# Patient Record
Sex: MALE | Race: WHITE | NOT HISPANIC OR LATINO | Employment: OTHER | URBAN - METROPOLITAN AREA
[De-identification: names, ages, dates, MRNs, and addresses within clinical notes are randomized per-mention and may not be internally consistent; named-entity substitution may affect disease eponyms.]

---

## 2017-01-16 DIAGNOSIS — R94.31 ABNORMAL EKG: ICD-10-CM

## 2017-01-16 DIAGNOSIS — R06.02 SHORTNESS OF BREATH: ICD-10-CM

## 2017-01-16 DIAGNOSIS — I77.810 ASCENDING AORTA DILATATION (HCC): ICD-10-CM

## 2017-01-16 DIAGNOSIS — I10 ESSENTIAL HYPERTENSION: ICD-10-CM

## 2017-01-16 RX ORDER — CARVEDILOL 25 MG/1
TABLET ORAL
Qty: 180 TABLET | Refills: 2 | Status: SHIPPED | OUTPATIENT
Start: 2017-01-16 | End: 2017-07-12 | Stop reason: SDUPTHER

## 2017-03-06 ENCOUNTER — OFFICE VISIT (OUTPATIENT)
Dept: CARDIOLOGY | Facility: CLINIC | Age: 57
End: 2017-03-06

## 2017-03-06 VITALS
DIASTOLIC BLOOD PRESSURE: 70 MMHG | SYSTOLIC BLOOD PRESSURE: 130 MMHG | WEIGHT: 288 LBS | HEIGHT: 73 IN | BODY MASS INDEX: 38.17 KG/M2 | HEART RATE: 55 BPM

## 2017-03-06 DIAGNOSIS — I25.810 CORONARY ARTERY DISEASE INVOLVING CORONARY BYPASS GRAFT OF NATIVE HEART WITHOUT ANGINA PECTORIS: Primary | ICD-10-CM

## 2017-03-06 PROCEDURE — 93000 ELECTROCARDIOGRAM COMPLETE: CPT | Performed by: INTERNAL MEDICINE

## 2017-03-06 PROCEDURE — 99214 OFFICE O/P EST MOD 30 MIN: CPT | Performed by: INTERNAL MEDICINE

## 2017-03-06 RX ORDER — CITALOPRAM 20 MG/1
20 TABLET ORAL DAILY
COMMUNITY
End: 2017-07-12 | Stop reason: ALTCHOICE

## 2017-03-06 RX ORDER — AMLODIPINE BESYLATE 10 MG/1
10 TABLET ORAL DAILY
COMMUNITY
End: 2022-12-02

## 2017-03-08 NOTE — PROGRESS NOTES
Subjective:     Encounter Date:03/06/2017      Patient ID: Edmond Chase is a 56 y.o. male.    Chief Complaint:  Shortness of Breath   This is a chronic problem. The current episode started more than 1 month ago. The problem has been gradually improving. Associated symptoms include chest pain. Pertinent negatives include no ear pain, leg swelling, syncope or vomiting.   Chest Pain    This is a chronic problem. The current episode started more than 1 year ago. The problem has been unchanged. Associated symptoms include malaise/fatigue and shortness of breath. Pertinent negatives include no syncope or vomiting.   Fatigue   Associated symptoms include chest pain and fatigue. Pertinent negatives include no vomiting.       Patient resents today for reevaluation.  He is slowly improving.  Has been a long work for him.  He still short of breath still of a chest discomfort none this is new or change.  He is starting to exercise with some success.  He is still very fatigued however and depressed.  He is seeing a psychologist for this.  He was seen today for further evaluation.    Review of Systems   Constitution: Positive for fatigue and malaise/fatigue.   HENT: Negative for ear pain.    Cardiovascular: Positive for chest pain. Negative for leg swelling and syncope.   Respiratory: Positive for shortness of breath.    Gastrointestinal: Negative for vomiting.   All other systems reviewed and are negative.        ECG 12 Lead  Date/Time: 3/6/2017 10:41 AM  Performed by: PAULIE RANGEL  Authorized by: PAULIE RANGEL   Comparison: compared with previous ECG from 12/5/2016  Similar to previous ECG  Rhythm: sinus rhythm  T depression: V4, V5 and V6  Clinical impression: abnormal ECG               Objective:     Physical Exam   Constitutional: He is oriented to person, place, and time. He appears well-developed.   HENT:   Head: Normocephalic.   Eyes: Conjunctivae are normal.   Neck: Normal range of motion.    Cardiovascular: Normal rate, regular rhythm and normal heart sounds.    Pulmonary/Chest: Breath sounds normal.   Abdominal: Soft. Bowel sounds are normal.   Musculoskeletal: Normal range of motion. He exhibits no edema.   Neurological: He is alert and oriented to person, place, and time.   Skin: Skin is warm and dry.   Psychiatric: He has a normal mood and affect. His behavior is normal.   Vitals reviewed.      Lab Review:       Assessment:         No diagnosis found.       Plan:       1. Coronary artery disease, status post CABG, aortic valve replacement, ascending aortic aneurysm repair.  Patient completed rehabilitation.  I encouraged him to get back into exercising.  2. Coronary artery disease. The patient is on an aspirin, Lipitor, and Coreg. He is eating a heart healthy diet.   3. Hypertension. Patient's blood pressure on office visit today is 150/80. Will follow his blood pressure in rehabilitation and adjusted medications accordingly.  4. Follow-up in 6 months we will do a ultrasound at that time to reassess his bioprosthetic aortic valve. All in all patient's healed nicely is doing well I minimized his medications.  5.  We did have extensive discussion about the utilization of testosterone with his known coronary artery disease.  Patient also was encouraged to start exercising.  Patient does worry quite a bit he does fight many psychological issues.  Coronary Artery Disease  Assessment  • The patient has no angina    Plan  • Lifestyle modifications discussed include adhering to a heart healthy diet, avoidance of tobacco products, maintenance of a healthy weight, medication compliance, regular exercise and regular monitoring of cholesterol and blood pressure    Subjective - Objective  • There is a history of previous coronary artery bypass graft  • Current antiplatelet therapy includes aspirin 81 mg     total length of visit 35 minute

## 2017-05-25 DIAGNOSIS — I25.10 CORONARY ARTERY DISEASE INVOLVING NATIVE CORONARY ARTERY OF NATIVE HEART WITHOUT ANGINA PECTORIS: Primary | ICD-10-CM

## 2017-05-25 RX ORDER — ATORVASTATIN CALCIUM 10 MG/1
TABLET, FILM COATED ORAL
Qty: 90 TABLET | Refills: 2 | Status: SHIPPED | OUTPATIENT
Start: 2017-05-25 | End: 2017-06-27 | Stop reason: SDUPTHER

## 2017-06-12 DIAGNOSIS — I71.21 ASCENDING AORTIC ANEURYSM (HCC): Primary | ICD-10-CM

## 2017-06-15 ENCOUNTER — LAB (OUTPATIENT)
Dept: LAB | Facility: HOSPITAL | Age: 57
End: 2017-06-15

## 2017-06-15 DIAGNOSIS — I25.10 CORONARY ARTERY DISEASE INVOLVING NATIVE CORONARY ARTERY OF NATIVE HEART WITHOUT ANGINA PECTORIS: ICD-10-CM

## 2017-06-15 LAB
ALBUMIN SERPL-MCNC: 4.5 G/DL (ref 3.5–5.2)
ALBUMIN/GLOB SERPL: 1.7 G/DL
ALP SERPL-CCNC: 58 U/L (ref 39–117)
ALT SERPL W P-5'-P-CCNC: 18 U/L (ref 1–41)
ANION GAP SERPL CALCULATED.3IONS-SCNC: 14.3 MMOL/L
AST SERPL-CCNC: 17 U/L (ref 1–40)
BILIRUB SERPL-MCNC: 0.5 MG/DL (ref 0.1–1.2)
BUN BLD-MCNC: 16 MG/DL (ref 6–20)
BUN/CREAT SERPL: 15.7 (ref 7–25)
CALCIUM SPEC-SCNC: 9.1 MG/DL (ref 8.6–10.5)
CHLORIDE SERPL-SCNC: 103 MMOL/L (ref 98–107)
CHOLEST SERPL-MCNC: 149 MG/DL (ref 0–200)
CO2 SERPL-SCNC: 26.7 MMOL/L (ref 22–29)
CREAT BLD-MCNC: 1.02 MG/DL (ref 0.76–1.27)
GFR SERPL CREATININE-BSD FRML MDRD: 75 ML/MIN/1.73
GLOBULIN UR ELPH-MCNC: 2.7 GM/DL
GLUCOSE BLD-MCNC: 119 MG/DL (ref 65–99)
HDLC SERPL-MCNC: 40 MG/DL (ref 40–60)
LDLC SERPL CALC-MCNC: 72 MG/DL (ref 0–100)
LDLC/HDLC SERPL: 1.8 {RATIO}
POTASSIUM BLD-SCNC: 4.1 MMOL/L (ref 3.5–5.2)
PROT SERPL-MCNC: 7.2 G/DL (ref 6–8.5)
SODIUM BLD-SCNC: 144 MMOL/L (ref 136–145)
TRIGL SERPL-MCNC: 186 MG/DL (ref 0–150)
VLDLC SERPL-MCNC: 37.2 MG/DL (ref 5–40)

## 2017-06-15 PROCEDURE — 80053 COMPREHEN METABOLIC PANEL: CPT

## 2017-06-15 PROCEDURE — 36415 COLL VENOUS BLD VENIPUNCTURE: CPT

## 2017-06-15 PROCEDURE — 80061 LIPID PANEL: CPT

## 2017-06-22 ENCOUNTER — HOSPITAL ENCOUNTER (OUTPATIENT)
Dept: CT IMAGING | Facility: HOSPITAL | Age: 57
Discharge: HOME | End: 2017-06-22
Attending: THORACIC SURGERY (CARDIOTHORACIC VASCULAR SURGERY) | Admitting: THORACIC SURGERY (CARDIOTHORACIC VASCULAR SURGERY)

## 2017-06-22 DIAGNOSIS — I71.21 ASCENDING AORTIC ANEURYSM (HCC): ICD-10-CM

## 2017-06-22 PROCEDURE — 71250 CT THORAX DX C-: CPT

## 2017-06-27 ENCOUNTER — TRANSCRIBE ORDERS (OUTPATIENT)
Dept: ADMINISTRATIVE | Facility: HOSPITAL | Age: 57
End: 2017-06-27

## 2017-06-27 ENCOUNTER — OFFICE VISIT (OUTPATIENT)
Dept: CARDIAC SURGERY | Facility: CLINIC | Age: 57
End: 2017-06-27

## 2017-06-27 ENCOUNTER — HOSPITAL ENCOUNTER (OUTPATIENT)
Dept: CARDIOLOGY | Facility: HOSPITAL | Age: 57
Discharge: HOME | End: 2017-06-27
Attending: THORACIC SURGERY (CARDIOTHORACIC VASCULAR SURGERY) | Admitting: THORACIC SURGERY (CARDIOTHORACIC VASCULAR SURGERY)

## 2017-06-27 VITALS
SYSTOLIC BLOOD PRESSURE: 150 MMHG | HEIGHT: 73 IN | OXYGEN SATURATION: 98 % | WEIGHT: 275 LBS | DIASTOLIC BLOOD PRESSURE: 90 MMHG | BODY MASS INDEX: 36.45 KG/M2 | HEART RATE: 65 BPM

## 2017-06-27 VITALS
RESPIRATION RATE: 20 BRPM | OXYGEN SATURATION: 98 % | WEIGHT: 275 LBS | BODY MASS INDEX: 36.45 KG/M2 | TEMPERATURE: 98.8 F | DIASTOLIC BLOOD PRESSURE: 90 MMHG | SYSTOLIC BLOOD PRESSURE: 145 MMHG | HEIGHT: 73 IN | HEART RATE: 61 BPM

## 2017-06-27 DIAGNOSIS — R01.1 HEART MURMUR: Primary | ICD-10-CM

## 2017-06-27 DIAGNOSIS — R01.1 HEART MURMUR: ICD-10-CM

## 2017-06-27 DIAGNOSIS — Z86.79 STATUS POST ASCENDING AORTIC ANEURYSM REPAIR: ICD-10-CM

## 2017-06-27 DIAGNOSIS — G47.33 OBSTRUCTIVE SLEEP APNEA, ADULT: ICD-10-CM

## 2017-06-27 DIAGNOSIS — E66.01 MORBID OBESITY, UNSPECIFIED OBESITY TYPE (HCC): ICD-10-CM

## 2017-06-27 DIAGNOSIS — Z95.2 S/P AVR (AORTIC VALVE REPLACEMENT): Primary | ICD-10-CM

## 2017-06-27 DIAGNOSIS — Z98.890 STATUS POST ASCENDING AORTIC ANEURYSM REPAIR: ICD-10-CM

## 2017-06-27 DIAGNOSIS — Z95.1 S/P CABG (CORONARY ARTERY BYPASS GRAFT): ICD-10-CM

## 2017-06-27 DIAGNOSIS — I10 ESSENTIAL HYPERTENSION: ICD-10-CM

## 2017-06-27 LAB
BH CV ECHO MEAS - AO MAX PG (FULL): 30.8 MMHG
BH CV ECHO MEAS - AO MAX PG: 34.2 MMHG
BH CV ECHO MEAS - AO MEAN PG (FULL): 16.5 MMHG
BH CV ECHO MEAS - AO MEAN PG: 18.5 MMHG
BH CV ECHO MEAS - AO ROOT AREA (BSA CORRECTED): 1.5
BH CV ECHO MEAS - AO ROOT AREA: 10.2 CM^2
BH CV ECHO MEAS - AO ROOT DIAM: 3.6 CM
BH CV ECHO MEAS - AO V2 MAX: 292.5 CM/SEC
BH CV ECHO MEAS - AO V2 MEAN: 201 CM/SEC
BH CV ECHO MEAS - AO V2 VTI: 68.3 CM
BH CV ECHO MEAS - AVA(I,A): 1.3 CM^2
BH CV ECHO MEAS - AVA(I,D): 1.3 CM^2
BH CV ECHO MEAS - AVA(V,A): 1.2 CM^2
BH CV ECHO MEAS - AVA(V,D): 1.2 CM^2
BH CV ECHO MEAS - BSA(HAYCOCK): 2.6 M^2
BH CV ECHO MEAS - BSA: 2.5 M^2
BH CV ECHO MEAS - BZI_BMI: 36.3 KILOGRAMS/M^2
BH CV ECHO MEAS - BZI_METRIC_HEIGHT: 185.4 CM
BH CV ECHO MEAS - BZI_METRIC_WEIGHT: 124.7 KG
BH CV ECHO MEAS - CI(CUBED): 2.9 L/MIN/M^2
BH CV ECHO MEAS - CI(MOD-SP2): 0.93 L/MIN/M^2
BH CV ECHO MEAS - CI(MOD-SP4): 1.1 L/MIN/M^2
BH CV ECHO MEAS - CI(TEICH): 2.1 L/MIN/M^2
BH CV ECHO MEAS - CO(CUBED): 7.2 L/MIN
BH CV ECHO MEAS - CO(MOD-SP2): 2.3 L/MIN
BH CV ECHO MEAS - CO(MOD-SP4): 2.6 L/MIN
BH CV ECHO MEAS - CO(TEICH): 5.2 L/MIN
BH CV ECHO MEAS - CONTRAST EF (2CH): 51.9 ML/M^2
BH CV ECHO MEAS - CONTRAST EF 4CH: 56.6 ML/M^2
BH CV ECHO MEAS - EDV(MOD-SP2): 79 ML
BH CV ECHO MEAS - EDV(MOD-SP4): 83 ML
BH CV ECHO MEAS - EDV(TEICH): 170.5 ML
BH CV ECHO MEAS - EF(CUBED): 64 %
BH CV ECHO MEAS - EF(MOD-SP2): 51.9 %
BH CV ECHO MEAS - EF(MOD-SP4): 56.6 %
BH CV ECHO MEAS - EF(TEICH): 54.7 %
BH CV ECHO MEAS - ESV(MOD-SP2): 38 ML
BH CV ECHO MEAS - ESV(MOD-SP4): 36 ML
BH CV ECHO MEAS - ESV(TEICH): 77.3 ML
BH CV ECHO MEAS - FS: 28.8 %
BH CV ECHO MEAS - IVS/LVPW: 1
BH CV ECHO MEAS - IVSD: 1.4 CM
BH CV ECHO MEAS - LAT PEAK E' VEL: 11 CM/SEC
BH CV ECHO MEAS - LV DIASTOLIC VOL/BSA (35-75): 33.7 ML/M^2
BH CV ECHO MEAS - LV MASS(C)D: 349.6 GRAMS
BH CV ECHO MEAS - LV MASS(C)DI: 141.9 GRAMS/M^2
BH CV ECHO MEAS - LV MAX PG: 3.4 MMHG
BH CV ECHO MEAS - LV MEAN PG: 2 MMHG
BH CV ECHO MEAS - LV SYSTOLIC VOL/BSA (12-30): 14.6 ML/M^2
BH CV ECHO MEAS - LV V1 MAX: 92.5 CM/SEC
BH CV ECHO MEAS - LV V1 MEAN: 60.6 CM/SEC
BH CV ECHO MEAS - LV V1 VTI: 23.3 CM
BH CV ECHO MEAS - LVIDD: 5.9 CM
BH CV ECHO MEAS - LVIDS: 4.2 CM
BH CV ECHO MEAS - LVLD AP2: 7.7 CM
BH CV ECHO MEAS - LVLD AP4: 8.1 CM
BH CV ECHO MEAS - LVLS AP2: 6.4 CM
BH CV ECHO MEAS - LVLS AP4: 7 CM
BH CV ECHO MEAS - LVOT AREA (M): 3.8 CM^2
BH CV ECHO MEAS - LVOT AREA: 3.8 CM^2
BH CV ECHO MEAS - LVOT DIAM: 2.2 CM
BH CV ECHO MEAS - LVPWD: 1.3 CM
BH CV ECHO MEAS - MED PEAK E' VEL: 7 CM/SEC
BH CV ECHO MEAS - MM HR: 56 BPM
BH CV ECHO MEAS - MM R-R INT: 1.1 SEC
BH CV ECHO MEAS - MV A DUR: 0.13 SEC
BH CV ECHO MEAS - MV A MAX VEL: 42.2 CM/SEC
BH CV ECHO MEAS - MV DEC SLOPE: 313 CM/SEC^2
BH CV ECHO MEAS - MV DEC TIME: 0.26 SEC
BH CV ECHO MEAS - MV E MAX VEL: 86.2 CM/SEC
BH CV ECHO MEAS - MV E/A: 2
BH CV ECHO MEAS - MV MAX PG: 3.2 MMHG
BH CV ECHO MEAS - MV MEAN PG: 1 MMHG
BH CV ECHO MEAS - MV P1/2T MAX VEL: 85.9 CM/SEC
BH CV ECHO MEAS - MV P1/2T: 80.4 MSEC
BH CV ECHO MEAS - MV V2 MAX: 89.9 CM/SEC
BH CV ECHO MEAS - MV V2 MEAN: 53.4 CM/SEC
BH CV ECHO MEAS - MV V2 VTI: 32.5 CM
BH CV ECHO MEAS - MVA P1/2T LCG: 2.6 CM^2
BH CV ECHO MEAS - MVA(P1/2T): 2.7 CM^2
BH CV ECHO MEAS - MVA(VTI): 2.7 CM^2
BH CV ECHO MEAS - PA MAX PG (FULL): 3.2 MMHG
BH CV ECHO MEAS - PA MAX PG: 4.9 MMHG
BH CV ECHO MEAS - PA V2 MAX: 111 CM/SEC
BH CV ECHO MEAS - PULM A REVS DUR: 162 SEC
BH CV ECHO MEAS - PULM A REVS VEL: 41.6 CM/SEC
BH CV ECHO MEAS - PULM DIAS VEL: 79.4 CM/SEC
BH CV ECHO MEAS - PULM S/D: 0.58
BH CV ECHO MEAS - PULM SYS VEL: 45.9 CM/SEC
BH CV ECHO MEAS - PVA(V,A): 2.9 CM^2
BH CV ECHO MEAS - PVA(V,D): 2.9 CM^2
BH CV ECHO MEAS - QP/QS: 0.89
BH CV ECHO MEAS - RV MAX PG: 1.8 MMHG
BH CV ECHO MEAS - RV MEAN PG: 1 MMHG
BH CV ECHO MEAS - RV V1 MAX: 66.2 CM/SEC
BH CV ECHO MEAS - RV V1 MEAN: 43.7 CM/SEC
BH CV ECHO MEAS - RV V1 VTI: 16 CM
BH CV ECHO MEAS - RVOT AREA: 4.9 CM^2
BH CV ECHO MEAS - RVOT DIAM: 2.5 CM
BH CV ECHO MEAS - RVSP: 3 MMHG
BH CV ECHO MEAS - SI(AO): 282.2 ML/M^2
BH CV ECHO MEAS - SI(CUBED): 52.2 ML/M^2
BH CV ECHO MEAS - SI(LVOT): 36 ML/M^2
BH CV ECHO MEAS - SI(MOD-SP2): 16.6 ML/M^2
BH CV ECHO MEAS - SI(MOD-SP4): 19.1 ML/M^2
BH CV ECHO MEAS - SI(TEICH): 37.9 ML/M^2
BH CV ECHO MEAS - SUP REN AO DIAM: 2.3 CM
BH CV ECHO MEAS - SV(AO): 695.2 ML
BH CV ECHO MEAS - SV(CUBED): 128.7 ML
BH CV ECHO MEAS - SV(LVOT): 88.6 ML
BH CV ECHO MEAS - SV(MOD-SP2): 41 ML
BH CV ECHO MEAS - SV(MOD-SP4): 47 ML
BH CV ECHO MEAS - SV(RVOT): 78.5 ML
BH CV ECHO MEAS - SV(TEICH): 93.3 ML
BH CV ECHO MEAS - TAPSE (>1.6): 1.5 CM2
BH CV ECHO MEAS - TR MAX V: 19 MMHG
BH CV ECHO MEAS - TR MAX VEL: 201 CM/SEC
BH CV XLRA - RV BASE: 3.1 CM
BH CV XLRA - TDI S': 10 CM/SEC
E/E' RATIO: 9.5
LEFT ATRIUM VOLUME INDEX: 41 ML/M2
LV EF 2D ECHO EST: 57 %
SINUS: 4.3 CM
STJ: 4.3 CM

## 2017-06-27 PROCEDURE — 99213 OFFICE O/P EST LOW 20 MIN: CPT | Performed by: THORACIC SURGERY (CARDIOTHORACIC VASCULAR SURGERY)

## 2017-06-27 PROCEDURE — 93306 TTE W/DOPPLER COMPLETE: CPT | Performed by: INTERNAL MEDICINE

## 2017-06-27 PROCEDURE — 93306 TTE W/DOPPLER COMPLETE: CPT

## 2017-06-27 RX ORDER — VENLAFAXINE 37.5 MG/1
TABLET ORAL
COMMUNITY
Start: 2017-06-14 | End: 2017-07-12

## 2017-07-04 NOTE — PROGRESS NOTES
6/27/17    Chief Complaint:     Follow up evaluation of thoracic aortic aneurysm    History of Present Illness:       Dear Dr. Miguel and Colleagues,  It was nice to see Edmond Chase in follow up evaluation for his thoracic aorta. He is a 56 y.o. male with a history of CAD, AS and an aortic aneurysm. He underwent CABG, AVR and aneurysm resection in 5/16. He has mild SOA and fatigue . He denies other symptoms in the interval. His last chest CT showed residual postsurgical changes and 2 tiny lung nodules. He is here for follow up.    Patient Active Problem List   Diagnosis   • Essential hypertension   • Hyperglycemia   • Hyperlipidemia   • Morbid obesity   • Nocturia   • Nonrheumatic aortic valve insufficiency   • Myocardial ischemia   • Coronary artery disease involving native coronary artery of native heart   • Ascending aortic aneurysm   • CAD in native artery   • S/P CABG (coronary artery bypass graft)   • S/P AVR (aortic valve replacement)   • Status post ascending aortic aneurysm repair   • Hypersomnia with sleep apnea   • Obstructive sleep apnea, adult       Past Medical History:   Diagnosis Date   • Aortic valve insufficiency     nonrheumtic    • Ascending aortic aneurysm    • CAD (coronary artery disease)    • Dizzy     CAREFUL WHEN GETTING UP   • Essential hypertension    • Fatigue    • Hyperglycemia    • Hyperlipidemia    • Morbid obesity    • Myocardial ischemia    • Nocturia    • Pneumonia     FEB 2016   • Shortness of breath    • Sleep apnea        Past Surgical History:   Procedure Laterality Date   • ASCENDING ARCH/HEMIARCH REPLACEMENT N/A 5/2/2016    Procedure: BHAVESH STERNOTOMY CORONARY ARTERY BYPASS GRAFT TIMES 3 USING LEFT INTERNAL MAMMARY ARTERY AND RIGHT GREATER SAPHENOUS VEIN GRAFT PER ENDOSCOPIC VEIN HARVESTING, AORTIC ANEURYSM REPAIR WITH ROOT REPAIR AND AORTIC VALVE REPLACEMENT;  Surgeon: Rosalio Cline MD;  Location: [facility];  Service:    • CARDIAC CATHETERIZATION N/A 4/1/2016     "Procedure: Left Heart Cath;  Surgeon: Erick Tam MD;  Location:  [facility];  Service:    • CARDIAC CATHETERIZATION N/A 4/1/2016    Procedure: Left ventriculography;  Surgeon: Erick Tam MD;  Location:  [facility];  Service:    • CARDIAC CATHETERIZATION N/A 4/1/2016    Procedure: Right Heart Cath;  Surgeon: Erick Tam MD;  Location:  [facility];  Service:    • INNER EAR SURGERY     • TONSILLECTOMY         Allergies   Allergen Reactions   • Bystolic [Nebivolol Hcl]    • Metoprolol Other (See Comments)     Metoprolol seem to cause problems with mood swings         Current Outpatient Prescriptions:   •  amLODIPine (NORVASC) 10 MG tablet, Take 10 mg by mouth Daily., Disp: , Rfl:   •  aspirin 81 MG tablet, Take 81 mg by mouth daily. PT TO CALL OFFICE RE ANY STOP DATE, Disp: , Rfl:   •  atorvastatin (LIPITOR) 10 MG tablet, Take 10 mg by mouth Daily., Disp: , Rfl:   •  carvedilol (COREG) 25 MG tablet, TAKE ONE TABLET BY MOUTH TWICE A DAY, Disp: 180 tablet, Rfl: 2  •  citalopram (CeleXA) 20 MG tablet, Take 20 mg by mouth Daily., Disp: , Rfl:   •  venlafaxine (EFFEXOR) 37.5 MG tablet, , Disp: , Rfl:     Social History     Social History   • Marital status: Single     Spouse name: N/A   • Number of children: N/A   • Years of education: N/A     Occupational History   • Not on file.     Social History Main Topics   • Smoking status: Never Smoker   • Smokeless tobacco: Never Used      Comment: caffeine use   • Alcohol use Yes      Comment: 1-2 drinks/weekly   • Drug use: No   • Sexual activity: Not on file     Other Topics Concern   • Not on file     Social History Narrative       Family History   Problem Relation Age of Onset   • Hypertension Mother    • Diabetes Mother    • Heart disease Mother    • Hypertension Father    • Heart disease Sister    • Other Other      The patient states that his sister has a problem that goes by the name of \"long QT\".  He says this is a " "familial trait but he also says that he is \"not interested in pursuing it for himself\".   • Coronary artery disease Other    • Stroke Maternal Grandmother    • Heart disease Maternal Grandmother      Review of Systems   Constitutional: Positive for fatigue.   Respiratory: Positive for shortness of breath.    All other systems reviewed and are negative.      Physical Exam:    Vital Signs:  Weight: 275 lb (125 kg)   Body mass index is 36.28 kg/(m^2).  Temp: 98.8 °F (37.1 °C)   Heart Rate: 61   BP: 145/90     Physical Exam   Constitutional: He is oriented to person, place, and time. He appears well-developed and well-nourished.   HENT:   Head: Normocephalic and atraumatic.   Nose: Nose normal.   Mouth/Throat: Oropharynx is clear and moist.   Eyes: Conjunctivae are normal. Pupils are equal, round, and reactive to light.   Neck: Normal range of motion. Neck supple. No JVD present. No thyromegaly present.   Cardiovascular: Normal rate, regular rhythm and intact distal pulses.  PMI is not displaced.  Exam reveals no gallop and no friction rub.    Murmur heard.  Pulses:       Radial pulses are 2+ on the right side, and 2+ on the left side.        Dorsalis pedis pulses are 2+ on the right side, and 2+ on the left side.   Soft systolic murmur   Pulmonary/Chest: Effort normal and breath sounds normal. He has no rales.   Abdominal: Soft. Bowel sounds are normal. He exhibits no distension and no mass. There is no tenderness.   Musculoskeletal: Normal range of motion. He exhibits no tenderness or deformity.   Lymphadenopathy:     He has no cervical adenopathy.   Neurological: He is alert and oriented to person, place, and time. He has normal reflexes.   Skin: Skin is warm and dry. No rash noted. No erythema.   Psychiatric: He has a normal mood and affect. His behavior is normal.   sternum well healed     Assessment:     Problem List Items Addressed This Visit        Cardiovascular and Mediastinum    Essential hypertension    S/P " AVR (aortic valve replacement) - Primary       Respiratory    Obstructive sleep apnea, adult       Digestive    Morbid obesity       Other    S/P CABG (coronary artery bypass graft)    Status post ascending aortic aneurysm repair        He has a residual aortic murmur.  Thoracic aorta is stable    Recommendation/Plan:     I recommend a chest ct and office visit in 2 years. I will order a 2 D echo to evaluate AVR and he will see you in follow up      Thank you for allowing me to participate in his care.    Regards,    Rosalio Cline M.D.

## 2017-07-12 ENCOUNTER — LAB (OUTPATIENT)
Dept: LAB | Facility: HOSPITAL | Age: 57
End: 2017-07-12

## 2017-07-12 ENCOUNTER — OFFICE VISIT (OUTPATIENT)
Dept: CARDIOLOGY | Facility: CLINIC | Age: 57
End: 2017-07-12

## 2017-07-12 VITALS
WEIGHT: 277 LBS | DIASTOLIC BLOOD PRESSURE: 84 MMHG | HEART RATE: 63 BPM | BODY MASS INDEX: 36.71 KG/M2 | SYSTOLIC BLOOD PRESSURE: 122 MMHG | HEIGHT: 73 IN

## 2017-07-12 DIAGNOSIS — R06.02 SHORTNESS OF BREATH: ICD-10-CM

## 2017-07-12 DIAGNOSIS — I10 ESSENTIAL HYPERTENSION: ICD-10-CM

## 2017-07-12 DIAGNOSIS — R94.31 ABNORMAL EKG: ICD-10-CM

## 2017-07-12 DIAGNOSIS — I77.810 ASCENDING AORTA DILATATION (HCC): ICD-10-CM

## 2017-07-12 LAB
BASOPHILS # BLD AUTO: 0.01 10*3/MM3 (ref 0–0.2)
BASOPHILS NFR BLD AUTO: 0.2 % (ref 0–1.5)
DEPRECATED RDW RBC AUTO: 43.1 FL (ref 37–54)
EOSINOPHIL # BLD AUTO: 0.14 10*3/MM3 (ref 0–0.7)
EOSINOPHIL NFR BLD AUTO: 2.1 % (ref 0.3–6.2)
ERYTHROCYTE [DISTWIDTH] IN BLOOD BY AUTOMATED COUNT: 12.5 % (ref 11.5–14.5)
HBA1C MFR BLD: 5.2 % (ref 4.8–5.6)
HCT VFR BLD AUTO: 38.3 % (ref 40.4–52.2)
HGB BLD-MCNC: 12.9 G/DL (ref 13.7–17.6)
IMM GRANULOCYTES # BLD: 0 10*3/MM3 (ref 0–0.03)
IMM GRANULOCYTES NFR BLD: 0 % (ref 0–0.5)
LYMPHOCYTES # BLD AUTO: 1.46 10*3/MM3 (ref 0.9–4.8)
LYMPHOCYTES NFR BLD AUTO: 22 % (ref 19.6–45.3)
MCH RBC QN AUTO: 32.2 PG (ref 27–32.7)
MCHC RBC AUTO-ENTMCNC: 33.7 G/DL (ref 32.6–36.4)
MCV RBC AUTO: 95.5 FL (ref 79.8–96.2)
MONOCYTES # BLD AUTO: 0.55 10*3/MM3 (ref 0.2–1.2)
MONOCYTES NFR BLD AUTO: 8.3 % (ref 5–12)
NEUTROPHILS # BLD AUTO: 4.47 10*3/MM3 (ref 1.9–8.1)
NEUTROPHILS NFR BLD AUTO: 67.4 % (ref 42.7–76)
PLATELET # BLD AUTO: 193 10*3/MM3 (ref 140–500)
PMV BLD AUTO: 9.8 FL (ref 6–12)
RBC # BLD AUTO: 4.01 10*6/MM3 (ref 4.6–6)
T-UPTAKE NFR SERPL: 1.04 TBI (ref 0.8–1.3)
T4 SERPL-MCNC: 8.18 MCG/DL (ref 4.5–11.7)
TSH SERPL DL<=0.05 MIU/L-ACNC: 2.81 MIU/ML (ref 0.27–4.2)
WBC NRBC COR # BLD: 6.63 10*3/MM3 (ref 4.5–10.7)

## 2017-07-12 PROCEDURE — 83036 HEMOGLOBIN GLYCOSYLATED A1C: CPT

## 2017-07-12 PROCEDURE — 84436 ASSAY OF TOTAL THYROXINE: CPT

## 2017-07-12 PROCEDURE — 84443 ASSAY THYROID STIM HORMONE: CPT

## 2017-07-12 PROCEDURE — 85025 COMPLETE CBC W/AUTO DIFF WBC: CPT

## 2017-07-12 PROCEDURE — 36415 COLL VENOUS BLD VENIPUNCTURE: CPT

## 2017-07-12 PROCEDURE — 99215 OFFICE O/P EST HI 40 MIN: CPT | Performed by: INTERNAL MEDICINE

## 2017-07-12 PROCEDURE — 93000 ELECTROCARDIOGRAM COMPLETE: CPT | Performed by: INTERNAL MEDICINE

## 2017-07-12 PROCEDURE — 84479 ASSAY OF THYROID (T3 OR T4): CPT

## 2017-07-12 RX ORDER — CARVEDILOL 25 MG/1
12.5 TABLET ORAL 2 TIMES DAILY WITH MEALS
Qty: 180 TABLET | Refills: 2 | Status: ON HOLD
Start: 2017-07-12 | End: 2017-10-30

## 2017-07-12 NOTE — PROGRESS NOTES
Subjective:     Encounter Date:07/12/2017      Patient ID: Edmond Chase is a 56 y.o. male.    Chief Complaint:  Coronary Artery Disease   Presents for follow-up visit. Symptoms include chest pressure and shortness of breath. Pertinent negatives include no chest pain. The symptoms have been worsening.   Chest Pain    This is a recurrent problem. The current episode started more than 1 month ago. The problem occurs intermittently. The problem has been rapidly worsening. The pain is present in the substernal region. The pain is moderate. The quality of the pain is described as pressure. Associated symptoms include malaise/fatigue and shortness of breath.   His past medical history is significant for CAD.   Fatigue   This is a recurrent problem. The current episode started more than 1 year ago. The problem has been rapidly worsening. Associated symptoms include fatigue. Pertinent negatives include no chest pain.   Shortness of Breath   This is a recurrent problem. The current episode started more than 1 month ago. The problem has been rapidly worsening. Pertinent negatives include no chest pain. His past medical history is significant for CAD.       56-year-old gentleman who presents today for reevaluation.  Clinically he is just not doing well.  He said that after I had seen him last he started exercise program and had been doing relatively well.  Patient said he was started to walk more more it was taking more effort to get to his target heart rate and he was feeling very good.  However about a month ago he started to deteriorate.  He also told me that his medicine was changed to Effexor.  He is developed increasing fatigue increasing shortness of breath as well as intermittent chest pressure.  He says also he just had his legs give way is just getting more more fatigued.  He presents today for further evaluation.      Review of Systems   Constitution: Positive for fatigue and malaise/fatigue.    Cardiovascular: Negative for chest pain.   Respiratory: Positive for shortness of breath.    All other systems reviewed and are negative.        ECG 12 Lead  Date/Time: 7/12/2017 4:11 PM  Performed by: PAULIE RANGEL  Authorized by: PAULIE RANGEL   Comparison: compared with previous ECG from 3/6/2017  Similar to previous ECG  Rhythm: sinus rhythm  ST Depression: V4, V5 and V6  Clinical impression: abnormal ECG               Objective:     Physical Exam   Constitutional: He is oriented to person, place, and time. He appears well-developed and well-nourished. No distress.   HENT:   Head: Normocephalic.   Eyes: Conjunctivae are normal. Pupils are equal, round, and reactive to light. No scleral icterus.   Neck: Normal range of motion. Normal carotid pulses, no hepatojugular reflux and no JVD present. Carotid bruit is not present. No tracheal deviation, no edema and no erythema present. No thyromegaly present.   Cardiovascular: Normal rate, regular rhythm, S1 normal, S2 normal, normal heart sounds and intact distal pulses.   No extrasystoles are present. PMI is not displaced.  Exam reveals no gallop, no distant heart sounds and no friction rub.    No murmur heard.  Pulmonary/Chest: Effort normal and breath sounds normal. No respiratory distress. He has no decreased breath sounds. He has no wheezes. He has no rhonchi. He has no rales. He exhibits no tenderness.   Abdominal: Soft. Bowel sounds are normal. He exhibits no distension and no mass. There is no hepatosplenomegaly. There is no tenderness. There is no rebound and no guarding.   Musculoskeletal: Normal range of motion. He exhibits no edema, tenderness or deformity.   Neurological: He is alert and oriented to person, place, and time.   Skin: Skin is warm and dry. No rash noted. He is not diaphoretic. No cyanosis or erythema. No pallor. Nails show no clubbing.   Psychiatric: He has a normal mood and affect. His speech is normal and behavior is normal.  Judgment and thought content normal.   Vitals reviewed.      Lab Review:       Assessment:          Diagnosis Plan   1. Abnormal EKG  Stress Test With Myocardial Perfusion One Day    carvedilol (COREG) 25 MG tablet   2. Shortness of breath  Stress Test With Myocardial Perfusion One Day    CBC & Differential    Thyroid Panel With TSH    carvedilol (COREG) 25 MG tablet    Hemoglobin A1c   3. Essential hypertension  Stress Test With Myocardial Perfusion One Day    CBC & Differential    Thyroid Panel With TSH    carvedilol (COREG) 25 MG tablet    Hemoglobin A1c   4. Ascending aorta dilatation  carvedilol (COREG) 25 MG tablet          Plan:       1.  Patient has significantly deteriorated since I saw him last.  Initially he did better.  He did have his antidepressant change because of some erectile dysfunction side effects.  He did say that ever since he changed his Effexor insulin things did change.  He now presents with multiple complaints.  He did have an echocardiogram recently that showed his valve was still intact.  He is also developed chest pressure and he has known coronary artery disease.  In light of that, start with sending him up for nuclear perfusion study to rule out ischemia.  Obviously with his changed to Effexor his beta blocker could be more effective.  I also cut his Coreg in half.  His blood pressures good saliva to see what happens with that.  He does have a ascending aortic dilatation recently had a CT scan with no significant changes there either.  2.  Hypertension noted above  3.  Fatigue with decreased beta blocker  4.  Will have patient reassess in 4 weeks.

## 2017-07-20 ENCOUNTER — HOSPITAL ENCOUNTER (OUTPATIENT)
Dept: CARDIOLOGY | Facility: HOSPITAL | Age: 57
Discharge: HOME | End: 2017-07-20
Attending: INTERNAL MEDICINE | Admitting: INTERNAL MEDICINE

## 2017-07-20 DIAGNOSIS — R06.02 SHORTNESS OF BREATH: ICD-10-CM

## 2017-07-20 DIAGNOSIS — R94.31 ABNORMAL EKG: ICD-10-CM

## 2017-07-20 DIAGNOSIS — I10 ESSENTIAL HYPERTENSION: ICD-10-CM

## 2017-07-20 LAB
BH CV NUCLEAR PRIOR STUDY: 2
BH CV STRESS BP STAGE 1: NORMAL
BH CV STRESS BP STAGE 2: NORMAL
BH CV STRESS DURATION MIN STAGE 1: 3
BH CV STRESS DURATION MIN STAGE 2: 3
BH CV STRESS DURATION SEC STAGE 1: 0
BH CV STRESS DURATION SEC STAGE 2: 0
BH CV STRESS GRADE STAGE 1: 10
BH CV STRESS GRADE STAGE 2: 12
BH CV STRESS HR STAGE 1: 102
BH CV STRESS HR STAGE 2: 138
BH CV STRESS METS STAGE 1: 5
BH CV STRESS METS STAGE 2: 7.5
BH CV STRESS PROTOCOL 1: NORMAL
BH CV STRESS RECOVERY BP: NORMAL MMHG
BH CV STRESS RECOVERY HR: 78 BPM
BH CV STRESS SPEED STAGE 1: 1.7
BH CV STRESS SPEED STAGE 2: 2.5
BH CV STRESS STAGE 1: 1
BH CV STRESS STAGE 2: 2
LV EF NUC BP: 60 %
MAXIMAL PREDICTED HEART RATE: 162 BPM
PERCENT MAX PREDICTED HR: 85.19 %
STRESS BASELINE BP: NORMAL MMHG
STRESS BASELINE HR: 70 BPM
STRESS PERCENT HR: 100 %
STRESS POST ESTIMATED WORKLOAD: 7 METS
STRESS POST EXERCISE DUR MIN: 6 MIN
STRESS POST EXERCISE DUR SEC: 0 SEC
STRESS POST PEAK BP: NORMAL MMHG
STRESS POST PEAK HR: 138 BPM
STRESS TARGET HR: 138 BPM

## 2017-07-20 PROCEDURE — 78452 HT MUSCLE IMAGE SPECT MULT: CPT | Performed by: INTERNAL MEDICINE

## 2017-07-20 PROCEDURE — 78452 HT MUSCLE IMAGE SPECT MULT: CPT

## 2017-07-20 PROCEDURE — 93017 CV STRESS TEST TRACING ONLY: CPT

## 2017-07-20 PROCEDURE — A9502 TC99M TETROFOSMIN: HCPCS | Performed by: INTERNAL MEDICINE

## 2017-07-20 PROCEDURE — 93018 CV STRESS TEST I&R ONLY: CPT | Performed by: INTERNAL MEDICINE

## 2017-07-20 PROCEDURE — 0 TECHNETIUM TETROFOSMIN KIT: Performed by: INTERNAL MEDICINE

## 2017-07-20 PROCEDURE — 93016 CV STRESS TEST SUPVJ ONLY: CPT | Performed by: INTERNAL MEDICINE

## 2017-07-20 RX ADMIN — TETROFOSMIN 1 DOSE: 1.38 INJECTION, POWDER, LYOPHILIZED, FOR SOLUTION INTRAVENOUS at 13:14

## 2017-07-20 RX ADMIN — TETROFOSMIN 1 DOSE: 1.38 INJECTION, POWDER, LYOPHILIZED, FOR SOLUTION INTRAVENOUS at 12:10

## 2017-07-28 ENCOUNTER — OFFICE VISIT (OUTPATIENT)
Dept: CARDIOLOGY | Facility: CLINIC | Age: 57
End: 2017-07-28

## 2017-07-28 ENCOUNTER — TELEPHONE (OUTPATIENT)
Dept: CARDIOLOGY | Facility: CLINIC | Age: 57
End: 2017-07-28

## 2017-07-28 VITALS
HEIGHT: 73 IN | SYSTOLIC BLOOD PRESSURE: 130 MMHG | OXYGEN SATURATION: 99 % | BODY MASS INDEX: 38.57 KG/M2 | DIASTOLIC BLOOD PRESSURE: 100 MMHG | WEIGHT: 291 LBS | HEART RATE: 69 BPM

## 2017-07-28 DIAGNOSIS — F32.A FATIGUE DUE TO DEPRESSION: ICD-10-CM

## 2017-07-28 DIAGNOSIS — R53.83 FATIGUE DUE TO DEPRESSION: ICD-10-CM

## 2017-07-28 DIAGNOSIS — Z95.1 S/P CABG (CORONARY ARTERY BYPASS GRAFT): ICD-10-CM

## 2017-07-28 DIAGNOSIS — Z95.2 S/P AVR (AORTIC VALVE REPLACEMENT): ICD-10-CM

## 2017-07-28 DIAGNOSIS — I25.119 CORONARY ARTERY DISEASE INVOLVING NATIVE CORONARY ARTERY OF NATIVE HEART WITH ANGINA PECTORIS (HCC): Primary | ICD-10-CM

## 2017-07-28 PROCEDURE — 99213 OFFICE O/P EST LOW 20 MIN: CPT | Performed by: INTERNAL MEDICINE

## 2017-07-28 NOTE — TELEPHONE ENCOUNTER
The pt was seen today at our office. Per pt's request I called and spoke with Chay Chiu's office and told them that Mr. Chase had d/c the Venlafaxine (Effexor) because he said it was interfering with his betablocker.  The pt said he had already called and left this msg himself but wanted me to call as well.  I spoke with Kaitlynn and she said she would pass along the msg and make sure it was documented in the chart.    Sweta

## 2017-07-28 NOTE — PROGRESS NOTES
Subjective:     Encounter Date:07/28/2017      Patient ID: Edmond Chase is a 56 y.o. male.    Chief Complaint:  Coronary Artery Disease   Presents for follow-up visit. Symptoms include chest pain, chest pressure and shortness of breath. The symptoms have been worsening.   Shortness of Breath   This is a recurrent problem. The current episode started more than 1 month ago. The problem has been rapidly worsening. Associated symptoms include chest pain. His past medical history is significant for CAD.   Chest Pain    This is a recurrent problem. The current episode started more than 1 month ago. The problem occurs intermittently. The problem has been rapidly worsening. The pain is present in the substernal region. The pain is moderate. The quality of the pain is described as pressure. Associated symptoms include malaise/fatigue and shortness of breath.   His past medical history is significant for CAD.   Fatigue   This is a recurrent problem. The current episode started more than 1 year ago. The problem has been rapidly worsening. Associated symptoms include chest pain and fatigue.     56-year-old gentleman who presents today for reevaluation.  He still having a lot of the symptoms as noted 2 weeks ago.  He says however things have improved.  His chest discomfort is better his shortness of breath is better and his overall incredible fatigue has significantly improved but still present.  All in all he has made major progress in a a positive direction.    Review of Systems   Constitution: Positive for fatigue and malaise/fatigue.   Cardiovascular: Positive for chest pain.   Respiratory: Positive for shortness of breath.        Procedures       Objective:     Physical Exam   Constitutional: He is oriented to person, place, and time. He appears well-developed.   HENT:   Head: Normocephalic.   Eyes: Conjunctivae are normal.   Neck: Normal range of motion.   Cardiovascular: Normal rate, regular rhythm and normal  heart sounds.    Pulmonary/Chest: Breath sounds normal.   Abdominal: Soft. Bowel sounds are normal.   Musculoskeletal: Normal range of motion. He exhibits no edema.   Neurological: He is alert and oriented to person, place, and time.   Skin: Skin is warm and dry.   Psychiatric: He has a normal mood and affect. His behavior is normal.   Vitals reviewed.      Lab Review:       Assessment:         No diagnosis found.       Plan:         1.  Clinically he is better with changing his antidepressant as well as decreasing his beta blocker he is doing significantly better.  2.  Hypertension blood pressure still little borderline for the time being recurrent tolerated he is increasing his exercise it should improve.  3.  I did clear him for cataract surgery from a vascular standpoint I think he stable for this.  4.  Follow-up 3-6 months sooner if he doesn't improve or he deteriorates.

## 2017-08-04 ENCOUNTER — HOSPITAL ENCOUNTER (EMERGENCY)
Facility: HOSPITAL | Age: 57
Discharge: HOME | End: 2017-08-04
Attending: EMERGENCY MEDICINE | Admitting: EMERGENCY MEDICINE

## 2017-08-04 VITALS
HEART RATE: 54 BPM | WEIGHT: 290 LBS | RESPIRATION RATE: 18 BRPM | BODY MASS INDEX: 38.43 KG/M2 | HEIGHT: 73 IN | OXYGEN SATURATION: 96 % | TEMPERATURE: 98.6 F | SYSTOLIC BLOOD PRESSURE: 136 MMHG | DIASTOLIC BLOOD PRESSURE: 81 MMHG

## 2017-08-04 DIAGNOSIS — N50.1 SCROTAL BLEEDING: Primary | ICD-10-CM

## 2017-08-04 PROCEDURE — 99283 EMERGENCY DEPT VISIT LOW MDM: CPT

## 2017-08-04 NOTE — ED TRIAGE NOTES
Pt states he scratched his testicles and now they are bleeding and wont stop. Pt states he is not on blood thinner.

## 2017-08-05 NOTE — ED NOTES
Pt states that he woke from a nap this afternoon and noticed blood to left side of scrotum. No active bleeding from scrotum. No abd pain     Yazmin Cramer RN  08/04/17 [id number]

## 2017-08-05 NOTE — ED PROVIDER NOTES
EMERGENCY DEPARTMENT ENCOUNTER    CHIEF COMPLAINT  Chief Complaint: Bleeding from the testicle  History given by: Patient  History limited by: Nothing  Room Number: 11/11  PMD: Giorgio Vallecillo MD      HPI:  Pt is a 56 y.o. male who presents complaining of bleeding from the scrotum earlier today. The pt states he was asleep and when he woke up, he had blood around him. The pt believes he scratched his scrotum in his sleep and caused it to bleed. The pt states he was unable to stop the bleeding at home. The pt states he normally gets a little vaso vagal when he sees the sight of blood. Pt reports lightheadedness. Pt takes ASA 81 mg daily.    Duration: Since the incident occurred earlier today  Onset: Gradual  Timing: Constant  Location: Testicles  Radiation: None  Quality: Bleeding  Intensity/Severity: Moderate  Progression: Improving  Associated Symptoms: Lightheadedness  Aggravating Factors: Nothing  Alleviating Factors: Nothing  Previous Episodes: The pt states he normally gets a little vaso vagal when he sees the sight of blood.  Treatment before arrival: ASA 81 mg    PAST MEDICAL HISTORY  Active Ambulatory Problems     Diagnosis Date Noted   • Essential hypertension 02/29/2016   • Hyperglycemia 02/29/2016   • Hyperlipidemia 02/29/2016   • Morbid obesity 02/29/2016   • Nocturia 02/29/2016   • Nonrheumatic aortic valve insufficiency 03/29/2016   • Myocardial ischemia 03/29/2016   • Coronary artery disease involving native coronary artery of native heart 04/19/2016   • Ascending aortic aneurysm 04/19/2016   • CAD in native artery 05/02/2016   • S/P CABG (coronary artery bypass graft) 05/18/2016   • S/P AVR (aortic valve replacement) 05/18/2016   • Status post ascending aortic aneurysm repair 05/18/2016   • Hypersomnia with sleep apnea 09/04/2016   • Obstructive sleep apnea, adult    • Fatigue 07/28/2017     Resolved Ambulatory Problems     Diagnosis Date Noted   • No Resolved Ambulatory Problems     Past Medical  "History:   Diagnosis Date   • Aortic valve insufficiency    • Ascending aortic aneurysm    • CAD (coronary artery disease)    • Dizzy    • Essential hypertension    • Fatigue    • Hyperglycemia    • Hyperlipidemia    • Morbid obesity    • Myocardial ischemia    • Nocturia    • Pneumonia    • Shortness of breath    • Sleep apnea        PAST SURGICAL HISTORY  Past Surgical History:   Procedure Laterality Date   • ASCENDING ARCH/HEMIARCH REPLACEMENT N/A 5/2/2016    Procedure: BHAVESH STERNOTOMY CORONARY ARTERY BYPASS GRAFT TIMES 3 USING LEFT INTERNAL MAMMARY ARTERY AND RIGHT GREATER SAPHENOUS VEIN GRAFT PER ENDOSCOPIC VEIN HARVESTING, AORTIC ANEURYSM REPAIR WITH ROOT REPAIR AND AORTIC VALVE REPLACEMENT;  Surgeon: Rosalio Cline MD;  Location: [facility] OR;  Service:    • CARDIAC CATHETERIZATION N/A 4/1/2016    Procedure: Left Heart Cath;  Surgeon: Erick Tam MD;  Location: [facility] CATH INVASIVE LOCATION;  Service:    • CARDIAC CATHETERIZATION N/A 4/1/2016    Procedure: Left ventriculography;  Surgeon: Erick Tam MD;  Location: [facility] CATH INVASIVE LOCATION;  Service:    • CARDIAC CATHETERIZATION N/A 4/1/2016    Procedure: Right Heart Cath;  Surgeon: Erick Tam MD;  Location: [facility] CATH INVASIVE LOCATION;  Service:    • INNER EAR SURGERY     • TONSILLECTOMY         FAMILY HISTORY  Family History   Problem Relation Age of Onset   • Hypertension Mother    • Diabetes Mother    • Heart disease Mother    • Hypertension Father    • Heart disease Sister    • Other Other      The patient states that his sister has a problem that goes by the name of \"long QT\".  He says this is a familial trait but he also says that he is \"not interested in pursuing it for himself\".   • Coronary artery disease Other    • Stroke Maternal Grandmother    • Heart disease Maternal Grandmother        SOCIAL HISTORY  Social History     Social History   • Marital status: Single     Spouse name: N/A   • Number of children: N/A   • Years of education: N/A "     Occupational History   • Not on file.     Social History Main Topics   • Smoking status: Never Smoker   • Smokeless tobacco: Never Used      Comment: caffeine use   • Alcohol use Yes      Comment: 1-2 drinks/weekly   • Drug use: No   • Sexual activity: Not on file     Other Topics Concern   • Not on file     Social History Narrative       ALLERGIES  Bystolic [nebivolol hcl] and Metoprolol    REVIEW OF SYSTEMS  Review of Systems   Constitutional: Negative for chills and fever.   Respiratory: Negative for cough and shortness of breath.    Cardiovascular: Negative for chest pain and palpitations.   Gastrointestinal: Negative for abdominal pain, nausea and vomiting.   Genitourinary: Negative for dysuria.   Musculoskeletal: Negative for back pain.   Skin:        Bleeding from the scrotum   Neurological: Positive for light-headedness. Negative for syncope, weakness and numbness.   All other systems reviewed and are negative.      PHYSICAL EXAM  ED Triage Vitals   Temp Heart Rate Resp BP SpO2   08/04/17 1821 08/04/17 1821 08/04/17 1821 08/04/17 1823 08/04/17 1821   98.6 °F (37 °C) 77 16 207/108 97 %      Temp src Heart Rate Source Patient Position BP Location FiO2 (%)   08/04/17 1821 08/04/17 1821 -- -- --   Tympanic Monitor          Physical Exam   Constitutional: He is oriented to person, place, and time and well-developed, well-nourished, and in no distress.   HENT:   Head: Normocephalic and atraumatic.   Eyes: EOM are normal.   Neck: Normal range of motion.   Cardiovascular: Normal rate and regular rhythm.    Pulmonary/Chest: Effort normal and breath sounds normal. No respiratory distress.   Genitourinary:   Genitourinary Comments: The pt has dried blood to the scrotum with no active bleeding.   Musculoskeletal: Normal range of motion.   Neurological: He is alert and oriented to person, place, and time. He has normal sensation and normal strength.   Skin: Skin is warm and dry.   Psychiatric: Affect normal.    Nursing note and vitals reviewed.      PROCEDURES  Procedures      PROGRESS AND CONSULTS  ED Course     0111  Upon initial encounter, discussed the plan to discharge the pt due to the scrotum showing no active bleeding. I advised the pt to keep the area clean and to avoid dislodging the clot. The pt understands and agrees with the plan.      MEDICAL DECISION MAKING  Results were reviewed/discussed with the patient and they were also made aware of online access. Pt also made aware that some labs, such as cultures, will not be resulted during ER visit and follow up with PMD is necessary.     MDM  Number of Diagnoses or Management Options  Scrotal bleeding:   Patient Progress  Patient progress: stable         DIAGNOSIS  Final diagnoses:   Scrotal bleeding       DISPOSITION  DISCHARGE    Patient discharged in stable condition.    Reviewed implications of results, diagnosis, meds, responsibility to follow up, warning signs and symptoms of possible worsening, potential complications and reasons to return to ER.    Patient/Family voiced understanding of above instructions.    Discussed plan for discharge, as there is no emergent indication for admission.  Pt/family is agreeable and understands need for follow up and repeat testing.  Pt is aware that discharge does not mean that nothing is wrong but it indicates no emergency is present that requires admission and they must continue care with follow-up as given below or physician of their choice.     FOLLOW-UP  Giorgio Vallecillo MD  [facility] [address]  [phone]    Schedule an appointment as soon as possible for a visit           Medication List      Notice     No changes were made to your prescriptions during this visit.            Latest Documented Vital Signs:  As of 10:52 PM  BP- 136/81 HR- 54 Temp- 98.6 °F (37 °C) (Tympanic) O2 sat- 96%    --  Documentation assistance provided by tiana Kent for Dr. Kent.  Information recorded by the tiana  was done at my direction and has been verified and validated by me.     Daniel Kent  08/04/17 2638       Isaiah Kent MD  08/04/17 [id number]

## 2017-10-23 ENCOUNTER — LAB (OUTPATIENT)
Dept: LAB | Facility: HOSPITAL | Age: 57
End: 2017-10-23
Attending: INTERNAL MEDICINE

## 2017-10-23 ENCOUNTER — OFFICE VISIT (OUTPATIENT)
Dept: CARDIOLOGY | Facility: CLINIC | Age: 57
End: 2017-10-23

## 2017-10-23 ENCOUNTER — TRANSCRIBE ORDERS (OUTPATIENT)
Dept: ADMINISTRATIVE | Facility: HOSPITAL | Age: 57
End: 2017-10-23

## 2017-10-23 VITALS
SYSTOLIC BLOOD PRESSURE: 130 MMHG | DIASTOLIC BLOOD PRESSURE: 90 MMHG | BODY MASS INDEX: 39.49 KG/M2 | HEIGHT: 73 IN | HEART RATE: 62 BPM | WEIGHT: 298 LBS

## 2017-10-23 DIAGNOSIS — Z01.810 PRE-OPERATIVE CARDIOVASCULAR EXAMINATION: Primary | ICD-10-CM

## 2017-10-23 DIAGNOSIS — Z13.6 SCREENING FOR ISCHEMIC HEART DISEASE: ICD-10-CM

## 2017-10-23 DIAGNOSIS — R94.39 ABNORMAL NUCLEAR STRESS TEST: ICD-10-CM

## 2017-10-23 DIAGNOSIS — Z01.810 PRE-OPERATIVE CARDIOVASCULAR EXAMINATION: ICD-10-CM

## 2017-10-23 DIAGNOSIS — I25.708 CORONARY ARTERY DISEASE INVOLVING CORONARY BYPASS GRAFT OF NATIVE HEART WITH OTHER FORMS OF ANGINA PECTORIS (HCC): Primary | ICD-10-CM

## 2017-10-23 PROBLEM — I25.10 CORONARY ARTERY DISEASE: Status: ACTIVE | Noted: 2017-10-23

## 2017-10-23 LAB
ANION GAP SERPL CALCULATED.3IONS-SCNC: 14.1 MMOL/L
BASOPHILS # BLD AUTO: 0.02 10*3/MM3 (ref 0–0.2)
BASOPHILS NFR BLD AUTO: 0.3 % (ref 0–1.5)
BUN BLD-MCNC: 17 MG/DL (ref 6–20)
BUN/CREAT SERPL: 16.3 (ref 7–25)
CALCIUM SPEC-SCNC: 9.3 MG/DL (ref 8.6–10.5)
CHLORIDE SERPL-SCNC: 99 MMOL/L (ref 98–107)
CO2 SERPL-SCNC: 27.9 MMOL/L (ref 22–29)
CREAT BLD-MCNC: 1.04 MG/DL (ref 0.76–1.27)
DEPRECATED RDW RBC AUTO: 43.2 FL (ref 37–54)
EOSINOPHIL # BLD AUTO: 0.2 10*3/MM3 (ref 0–0.7)
EOSINOPHIL NFR BLD AUTO: 2.8 % (ref 0.3–6.2)
ERYTHROCYTE [DISTWIDTH] IN BLOOD BY AUTOMATED COUNT: 13 % (ref 11.5–14.5)
GFR SERPL CREATININE-BSD FRML MDRD: 73 ML/MIN/1.73
GLUCOSE BLD-MCNC: 110 MG/DL (ref 65–99)
HCT VFR BLD AUTO: 38 % (ref 40.4–52.2)
HGB BLD-MCNC: 12.8 G/DL (ref 13.7–17.6)
IMM GRANULOCYTES # BLD: 0 10*3/MM3 (ref 0–0.03)
IMM GRANULOCYTES NFR BLD: 0 % (ref 0–0.5)
LYMPHOCYTES # BLD AUTO: 1.51 10*3/MM3 (ref 0.9–4.8)
LYMPHOCYTES NFR BLD AUTO: 21.3 % (ref 19.6–45.3)
MCH RBC QN AUTO: 31.2 PG (ref 27–32.7)
MCHC RBC AUTO-ENTMCNC: 33.7 G/DL (ref 32.6–36.4)
MCV RBC AUTO: 92.7 FL (ref 79.8–96.2)
MONOCYTES # BLD AUTO: 0.64 10*3/MM3 (ref 0.2–1.2)
MONOCYTES NFR BLD AUTO: 9 % (ref 5–12)
NEUTROPHILS # BLD AUTO: 4.71 10*3/MM3 (ref 1.9–8.1)
NEUTROPHILS NFR BLD AUTO: 66.6 % (ref 42.7–76)
PLATELET # BLD AUTO: 209 10*3/MM3 (ref 140–500)
PMV BLD AUTO: 9.8 FL (ref 6–12)
POTASSIUM BLD-SCNC: 3.9 MMOL/L (ref 3.5–5.2)
RBC # BLD AUTO: 4.1 10*6/MM3 (ref 4.6–6)
SODIUM BLD-SCNC: 141 MMOL/L (ref 136–145)
WBC NRBC COR # BLD: 7.08 10*3/MM3 (ref 4.5–10.7)

## 2017-10-23 PROCEDURE — 85025 COMPLETE CBC W/AUTO DIFF WBC: CPT

## 2017-10-23 PROCEDURE — 80048 BASIC METABOLIC PNL TOTAL CA: CPT

## 2017-10-23 PROCEDURE — 99214 OFFICE O/P EST MOD 30 MIN: CPT | Performed by: INTERNAL MEDICINE

## 2017-10-23 PROCEDURE — 93000 ELECTROCARDIOGRAM COMPLETE: CPT | Performed by: INTERNAL MEDICINE

## 2017-10-23 PROCEDURE — 36415 COLL VENOUS BLD VENIPUNCTURE: CPT

## 2017-10-30 ENCOUNTER — HOSPITAL ENCOUNTER (OUTPATIENT)
Facility: HOSPITAL | Age: 57
Setting detail: HOSPITAL OUTPATIENT SURGERY
Discharge: HOME | End: 2017-10-30
Attending: INTERNAL MEDICINE | Admitting: INTERNAL MEDICINE

## 2017-10-30 VITALS
TEMPERATURE: 97.3 F | RESPIRATION RATE: 16 BRPM | OXYGEN SATURATION: 96 % | WEIGHT: 290 LBS | DIASTOLIC BLOOD PRESSURE: 81 MMHG | SYSTOLIC BLOOD PRESSURE: 121 MMHG | HEIGHT: 73 IN | BODY MASS INDEX: 38.43 KG/M2 | HEART RATE: 67 BPM

## 2017-10-30 DIAGNOSIS — R94.39 ABNORMAL NUCLEAR STRESS TEST: ICD-10-CM

## 2017-10-30 DIAGNOSIS — I25.708 CORONARY ARTERY DISEASE INVOLVING CORONARY BYPASS GRAFT OF NATIVE HEART WITH OTHER FORMS OF ANGINA PECTORIS (HCC): ICD-10-CM

## 2017-10-30 PROCEDURE — 93455 CORONARY ART/GRFT ANGIO S&I: CPT | Performed by: INTERNAL MEDICINE

## 2017-10-30 PROCEDURE — C1894 INTRO/SHEATH, NON-LASER: HCPCS | Performed by: INTERNAL MEDICINE

## 2017-10-30 PROCEDURE — C1769 GUIDE WIRE: HCPCS | Performed by: INTERNAL MEDICINE

## 2017-10-30 PROCEDURE — 25010000002 MIDAZOLAM PER 1 MG: Performed by: INTERNAL MEDICINE

## 2017-10-30 PROCEDURE — 25010000002 FENTANYL CITRATE (PF) 100 MCG/2ML SOLUTION: Performed by: INTERNAL MEDICINE

## 2017-10-30 PROCEDURE — 0 IOPAMIDOL PER 1 ML: Performed by: INTERNAL MEDICINE

## 2017-10-30 RX ORDER — FENTANYL CITRATE 50 UG/ML
INJECTION, SOLUTION INTRAMUSCULAR; INTRAVENOUS AS NEEDED
Status: DISCONTINUED | OUTPATIENT
Start: 2017-10-30 | End: 2017-10-30 | Stop reason: HOSPADM

## 2017-10-30 RX ORDER — LIDOCAINE HYDROCHLORIDE 20 MG/ML
INJECTION, SOLUTION INFILTRATION; PERINEURAL AS NEEDED
Status: DISCONTINUED | OUTPATIENT
Start: 2017-10-30 | End: 2017-10-30 | Stop reason: HOSPADM

## 2017-10-30 RX ORDER — SODIUM CHLORIDE 0.9 % (FLUSH) 0.9 %
1-10 SYRINGE (ML) INJECTION AS NEEDED
Status: DISCONTINUED | OUTPATIENT
Start: 2017-10-30 | End: 2017-10-30 | Stop reason: HOSPADM

## 2017-10-30 RX ORDER — MIDAZOLAM HYDROCHLORIDE 1 MG/ML
INJECTION INTRAMUSCULAR; INTRAVENOUS AS NEEDED
Status: DISCONTINUED | OUTPATIENT
Start: 2017-10-30 | End: 2017-10-30 | Stop reason: HOSPADM

## 2017-10-30 RX ORDER — LIDOCAINE HYDROCHLORIDE 10 MG/ML
0.1 INJECTION, SOLUTION EPIDURAL; INFILTRATION; INTRACAUDAL; PERINEURAL ONCE AS NEEDED
Status: DISCONTINUED | OUTPATIENT
Start: 2017-10-30 | End: 2017-10-30 | Stop reason: HOSPADM

## 2017-10-30 RX ORDER — SODIUM CHLORIDE 9 MG/ML
75 INJECTION, SOLUTION INTRAVENOUS CONTINUOUS
Status: DISCONTINUED | OUTPATIENT
Start: 2017-10-30 | End: 2017-10-30 | Stop reason: HOSPADM

## 2017-10-30 RX ORDER — CARVEDILOL 12.5 MG/1
12.5 TABLET ORAL 2 TIMES DAILY WITH MEALS
COMMUNITY
End: 2017-12-04 | Stop reason: SDUPTHER

## 2017-10-30 RX ORDER — SODIUM CHLORIDE 0.9 % (FLUSH) 0.9 %
1-10 SYRINGE (ML) INJECTION AS NEEDED
Status: CANCELLED | OUTPATIENT
Start: 2017-10-30

## 2017-10-30 RX ADMIN — SODIUM CHLORIDE 75 ML/HR: 9 INJECTION, SOLUTION INTRAVENOUS at 09:08

## 2017-11-29 ENCOUNTER — OFFICE VISIT (OUTPATIENT)
Dept: CARDIOLOGY | Facility: CLINIC | Age: 57
End: 2017-11-29

## 2017-11-29 VITALS
BODY MASS INDEX: 40.56 KG/M2 | WEIGHT: 306 LBS | SYSTOLIC BLOOD PRESSURE: 112 MMHG | HEIGHT: 73 IN | HEART RATE: 61 BPM | DIASTOLIC BLOOD PRESSURE: 82 MMHG

## 2017-11-29 DIAGNOSIS — I10 ESSENTIAL HYPERTENSION: Primary | ICD-10-CM

## 2017-11-29 DIAGNOSIS — Z95.1 S/P CABG (CORONARY ARTERY BYPASS GRAFT): ICD-10-CM

## 2017-11-29 DIAGNOSIS — Z95.2 S/P AVR (AORTIC VALVE REPLACEMENT): ICD-10-CM

## 2017-11-29 PROCEDURE — 93000 ELECTROCARDIOGRAM COMPLETE: CPT | Performed by: INTERNAL MEDICINE

## 2017-11-29 PROCEDURE — 99214 OFFICE O/P EST MOD 30 MIN: CPT | Performed by: INTERNAL MEDICINE

## 2017-11-29 RX ORDER — PREDNISOLONE ACETATE 10 MG/ML
SUSPENSION/ DROPS OPHTHALMIC
COMMUNITY
Start: 2017-08-25 | End: 2019-01-30

## 2017-12-01 DIAGNOSIS — R94.31 ABNORMAL EKG: ICD-10-CM

## 2017-12-01 DIAGNOSIS — R06.02 SHORTNESS OF BREATH: ICD-10-CM

## 2017-12-01 DIAGNOSIS — I77.810 ASCENDING AORTA DILATATION (HCC): ICD-10-CM

## 2017-12-01 DIAGNOSIS — I10 ESSENTIAL HYPERTENSION: ICD-10-CM

## 2017-12-01 RX ORDER — CARVEDILOL 25 MG/1
TABLET ORAL
Qty: 180 TABLET | Refills: 1 | Status: SHIPPED | OUTPATIENT
Start: 2017-12-01 | End: 2017-12-04

## 2017-12-04 ENCOUNTER — TELEPHONE (OUTPATIENT)
Dept: CARDIOLOGY | Facility: CLINIC | Age: 57
End: 2017-12-04

## 2017-12-04 RX ORDER — CARVEDILOL 12.5 MG/1
12.5 TABLET ORAL 2 TIMES DAILY WITH MEALS
Qty: 180 TABLET | Refills: 1 | Status: SHIPPED | OUTPATIENT
Start: 2017-12-04 | End: 2018-06-04 | Stop reason: SDUPTHER

## 2017-12-04 NOTE — TELEPHONE ENCOUNTER
I spoke with pt and he is only taking Coreg 12.5mg twice daily.  He said this was changed in the summer.  I sent in RX.  Joaquina

## 2017-12-04 NOTE — TELEPHONE ENCOUNTER
Reymundodonald [phone] called, informing me that we sent in an RX for Coreg 25mg twice daily.  Patient informed Kaleygrisel that he takes Coreg 12.5mg twice daily not 25mg.  I left a message for pt to verify what dose of Coreg he takes.  Joaquina

## 2017-12-05 NOTE — PROGRESS NOTES
Subjective:     Encounter Date:11/29/2017      Patient ID: Edmond Chase is a 57 y.o. male.    Chief Complaint:  Coronary Artery Disease   Presents for follow-up visit. Symptoms include chest pressure. The symptoms have been worsening.   Chest Pain    This is a recurrent problem. The current episode started more than 1 month ago. The problem occurs intermittently. The problem has been rapidly worsening. The pain is present in the substernal region. The pain is moderate. The quality of the pain is described as pressure. Associated symptoms include malaise/fatigue.   Fatigue   This is a recurrent problem. The current episode started more than 1 year ago. The problem has been rapidly worsening. Associated symptoms include fatigue.       57-year-old gentleman who presents today for reevaluation.  He continues having intermittent episodes of chest pressure as well as fatigue.  Says it really hasn't changed much.  He isn't cutting his carvedilol half which she says has been little bit more difficulties days.  Otherwise he seems to be doing relatively well with no specific new complaints.    Review of Systems   Constitution: Positive for fatigue and malaise/fatigue.   All other systems reviewed and are negative.        ECG 12 Lead  Date/Time: 11/29/2017 3:58 PM  Performed by: PAULIE RANGEL  Authorized by: PAULIE RANGEL   Comparison: compared with previous ECG from 10/23/2017  Similar to previous ECG  Rhythm: sinus rhythm  T depression: V5 and V6  Clinical impression: abnormal ECG               Objective:     Physical Exam   Constitutional: He is oriented to person, place, and time. He appears well-developed.   HENT:   Head: Normocephalic.   Eyes: Conjunctivae are normal.   Neck: Normal range of motion.   Cardiovascular: Normal rate, regular rhythm and normal heart sounds.    Pulmonary/Chest: Breath sounds normal.   Abdominal: Soft. Bowel sounds are normal.   Musculoskeletal: Normal range of motion. He  exhibits no edema.   Neurological: He is alert and oriented to person, place, and time.   Skin: Skin is warm and dry.   Psychiatric: He has a normal mood and affect. His behavior is normal.   Vitals reviewed.      Lab Review:       Assessment:          Diagnosis Plan   1. Essential hypertension     2. S/P AVR (aortic valve replacement)     3. S/P CABG (coronary artery bypass graft)            Plan:       1.  History of coronary artery disease.  Patient continues of atypical chest discomfort I do not believe it is cardiac in origin.  2.  Hypertension blood pressures great  3.  Continue the same follow-up in 6-12 months        Coronary Artery Disease  Assessment  • The patient has no angina  • The patient is having symptoms consistent with unstable angina     Plan  • The patient is being referred to interventional cardiology  • Lifestyle modifications discussed include adhering to a heart healthy diet, avoidance of tobacco products, maintenance of a healthy weight, medication compliance, regular exercise and regular monitoring of cholesterol and blood pressure    Subjective - Objective  • Current antiplatelet therapy includes aspirin 81 mg

## 2018-02-26 RX ORDER — ATORVASTATIN CALCIUM 10 MG/1
TABLET, FILM COATED ORAL
Qty: 90 TABLET | Refills: 0 | Status: SHIPPED | OUTPATIENT
Start: 2018-02-26 | End: 2018-05-22 | Stop reason: SDUPTHER

## 2018-05-22 RX ORDER — ATORVASTATIN CALCIUM 10 MG/1
TABLET, FILM COATED ORAL
Qty: 90 TABLET | Refills: 0 | Status: SHIPPED | OUTPATIENT
Start: 2018-05-22 | End: 2018-09-06 | Stop reason: ALTCHOICE

## 2018-06-04 RX ORDER — CARVEDILOL 12.5 MG/1
TABLET ORAL
Qty: 180 TABLET | Refills: 0 | Status: SHIPPED | OUTPATIENT
Start: 2018-06-04 | End: 2018-09-04 | Stop reason: SDUPTHER

## 2018-06-30 ENCOUNTER — HOSPITAL ENCOUNTER (EMERGENCY)
Facility: HOSPITAL | Age: 58
Discharge: HOME | End: 2018-06-30
Attending: EMERGENCY MEDICINE | Admitting: EMERGENCY MEDICINE

## 2018-06-30 VITALS
HEIGHT: 73 IN | HEART RATE: 72 BPM | BODY MASS INDEX: 41.08 KG/M2 | TEMPERATURE: 97.9 F | OXYGEN SATURATION: 97 % | RESPIRATION RATE: 18 BRPM | WEIGHT: 310 LBS | SYSTOLIC BLOOD PRESSURE: 150 MMHG | DIASTOLIC BLOOD PRESSURE: 87 MMHG

## 2018-06-30 DIAGNOSIS — N50.1 SCROTAL BLEEDING: Primary | ICD-10-CM

## 2018-06-30 LAB
ALBUMIN SERPL-MCNC: 4.2 G/DL (ref 3.5–5.2)
ALBUMIN/GLOB SERPL: 1.6 G/DL
ALP SERPL-CCNC: 66 U/L (ref 39–117)
ALT SERPL W P-5'-P-CCNC: 11 U/L (ref 1–41)
ANION GAP SERPL CALCULATED.3IONS-SCNC: 13.6 MMOL/L
APTT PPP: 31.9 SECONDS (ref 22.7–35.4)
AST SERPL-CCNC: 10 U/L (ref 1–40)
BASOPHILS # BLD AUTO: 0.02 10*3/MM3 (ref 0–0.2)
BASOPHILS NFR BLD AUTO: 0.3 % (ref 0–1.5)
BILIRUB SERPL-MCNC: 0.4 MG/DL (ref 0.1–1.2)
BUN BLD-MCNC: 20 MG/DL (ref 6–20)
BUN/CREAT SERPL: 18.2 (ref 7–25)
CALCIUM SPEC-SCNC: 9.3 MG/DL (ref 8.6–10.5)
CHLORIDE SERPL-SCNC: 104 MMOL/L (ref 98–107)
CO2 SERPL-SCNC: 24.4 MMOL/L (ref 22–29)
CREAT BLD-MCNC: 1.1 MG/DL (ref 0.76–1.27)
DEPRECATED RDW RBC AUTO: 44.9 FL (ref 37–54)
EOSINOPHIL # BLD AUTO: 0.16 10*3/MM3 (ref 0–0.7)
EOSINOPHIL NFR BLD AUTO: 2.1 % (ref 0.3–6.2)
ERYTHROCYTE [DISTWIDTH] IN BLOOD BY AUTOMATED COUNT: 13.4 % (ref 11.5–14.5)
GFR SERPL CREATININE-BSD FRML MDRD: 69 ML/MIN/1.73
GLOBULIN UR ELPH-MCNC: 2.7 GM/DL
GLUCOSE BLD-MCNC: 153 MG/DL (ref 65–99)
HCT VFR BLD AUTO: 37.8 % (ref 40.4–52.2)
HGB BLD-MCNC: 12.3 G/DL (ref 13.7–17.6)
IMM GRANULOCYTES # BLD: 0.01 10*3/MM3 (ref 0–0.03)
IMM GRANULOCYTES NFR BLD: 0.1 % (ref 0–0.5)
INR PPP: 0.99 (ref 0.9–1.1)
LYMPHOCYTES # BLD AUTO: 1.49 10*3/MM3 (ref 0.9–4.8)
LYMPHOCYTES NFR BLD AUTO: 19.8 % (ref 19.6–45.3)
MCH RBC QN AUTO: 30.3 PG (ref 27–32.7)
MCHC RBC AUTO-ENTMCNC: 32.5 G/DL (ref 32.6–36.4)
MCV RBC AUTO: 93.1 FL (ref 79.8–96.2)
MONOCYTES # BLD AUTO: 0.46 10*3/MM3 (ref 0.2–1.2)
MONOCYTES NFR BLD AUTO: 6.1 % (ref 5–12)
NEUTROPHILS # BLD AUTO: 5.39 10*3/MM3 (ref 1.9–8.1)
NEUTROPHILS NFR BLD AUTO: 71.7 % (ref 42.7–76)
PLATELET # BLD AUTO: 236 10*3/MM3 (ref 140–500)
PMV BLD AUTO: 9.9 FL (ref 6–12)
POTASSIUM BLD-SCNC: 4 MMOL/L (ref 3.5–5.2)
PROT SERPL-MCNC: 6.9 G/DL (ref 6–8.5)
PROTHROMBIN TIME: 12.9 SECONDS (ref 11.7–14.2)
RBC # BLD AUTO: 4.06 10*6/MM3 (ref 4.6–6)
SODIUM BLD-SCNC: 142 MMOL/L (ref 136–145)
WBC NRBC COR # BLD: 7.52 10*3/MM3 (ref 4.5–10.7)

## 2018-06-30 PROCEDURE — 99283 EMERGENCY DEPT VISIT LOW MDM: CPT

## 2018-06-30 PROCEDURE — 85730 THROMBOPLASTIN TIME PARTIAL: CPT | Performed by: PHYSICIAN ASSISTANT

## 2018-06-30 PROCEDURE — 85025 COMPLETE CBC W/AUTO DIFF WBC: CPT | Performed by: PHYSICIAN ASSISTANT

## 2018-06-30 PROCEDURE — 80053 COMPREHEN METABOLIC PANEL: CPT | Performed by: PHYSICIAN ASSISTANT

## 2018-06-30 PROCEDURE — 85610 PROTHROMBIN TIME: CPT | Performed by: PHYSICIAN ASSISTANT

## 2018-06-30 RX ORDER — GINSENG 100 MG
CAPSULE ORAL 2 TIMES DAILY
Qty: 14 G | Refills: 0 | Status: SHIPPED | OUTPATIENT
Start: 2018-06-30 | End: 2019-01-30

## 2018-06-30 RX ORDER — LIDOCAINE HYDROCHLORIDE AND EPINEPHRINE 10; 10 MG/ML; UG/ML
20 INJECTION, SOLUTION INFILTRATION; PERINEURAL ONCE
Status: COMPLETED | OUTPATIENT
Start: 2018-06-30 | End: 2018-06-30

## 2018-06-30 RX ADMIN — SILVER NITRATE APPLICATORS 1 APPLICATION: 25; 75 STICK TOPICAL at 22:51

## 2018-06-30 RX ADMIN — LIDOCAINE HYDROCHLORIDE,EPINEPHRINE BITARTRATE 20 ML: 10; .01 INJECTION, SOLUTION INFILTRATION; PERINEURAL at 22:47

## 2018-07-01 NOTE — ED NOTES
"Pt reports \"blood blister the size of a pea\" on scrotum. Pt with bleeding x 30 minutes.     Pt not on blood thinners. Bleeding uncontrolled per EMS.      Kelly Samuel RN  06/30/18 0349       Kelly Samuel RN  06/30/18 2206    "

## 2018-07-01 NOTE — DISCHARGE INSTRUCTIONS
Keep area clean and dry, apply antibiotic ointment once or twice daily, watch carefully for signs of infection, suture needs to be removed in about one week. Return to care if any complications.

## 2018-07-01 NOTE — ED PROVIDER NOTES
EMERGENCY DEPARTMENT ENCOUNTER    CHIEF COMPLAINT  Chief Complaint: Bleeding blister  History given by: Pt  History limited by: Nothing  Room Number: 02/02  PMD: Giorgio Vallecillo MD      HPI:  Pt is a 57 y.o. male who presents complaining of a blood blister on his scrotum that popped and started bleeding at 2000 this evening. The pt does not remember injuring the area. The pt states that he has been to the ER for current symptoms in the past, but the blood blister was in a different location and the episode today has much more bleeding. The pt takes 81mg aspirin every day. The pt denies bruising easily. The pt confirms dizziness which he states can happen to him around blood.     Duration:  2 hours  Onset: Sudden   Timing: Constant  Location: Scrotum  Radiation: None   Quality: Bleeding  Intensity/Severity: Moderate  Progression: No change  Associated Symptoms: DIzziness  Aggravating Factors: Unknown  Alleviating Factors: Unknown  Previous Episodes: The pt has had a similar episode in the past with less bleeding.   Treatment before arrival: None    PAST MEDICAL HISTORY  Active Ambulatory Problems     Diagnosis Date Noted   • Essential hypertension 02/29/2016   • Hyperglycemia 02/29/2016   • Hyperlipidemia 02/29/2016   • Morbid obesity 02/29/2016   • Nocturia 02/29/2016   • Nonrheumatic aortic valve insufficiency 03/29/2016   • Myocardial ischemia 03/29/2016   • Coronary artery disease involving native coronary artery of native heart 04/19/2016   • Ascending aortic aneurysm 04/19/2016   • CAD in native artery 05/02/2016   • S/P CABG (coronary artery bypass graft) 05/18/2016   • S/P AVR (aortic valve replacement) 05/18/2016   • Status post ascending aortic aneurysm repair 05/18/2016   • Hypersomnia with sleep apnea 09/04/2016   • Obstructive sleep apnea, adult    • Fatigue 07/28/2017   • Abnormal nuclear stress test 10/23/2017   • Coronary artery disease 10/23/2017     Resolved Ambulatory Problems     Diagnosis Date  Noted   • No Resolved Ambulatory Problems     Past Medical History:   Diagnosis Date   • Abnormal nuclear stress test    • Aortic valve insufficiency    • Ascending aortic aneurysm    • CAD (coronary artery disease)    • Chest pain    • Dizzy    • Essential hypertension    • Fatigue    • Hyperglycemia    • Hyperlipidemia    • Lightheadedness    • Malaise and fatigue    • Morbid obesity    • Myocardial ischemia    • Nocturia    • Pneumonia    • Scrotal bleeding    • Shortness of breath    • Sleep apnea        PAST SURGICAL HISTORY  Past Surgical History:   Procedure Laterality Date   • AORTIC VALVE REPAIR/REPLACEMENT  05/2016   • ASCENDING ARCH/HEMIARCH REPLACEMENT N/A 5/2/2016    Procedure: BHAVESH STERNOTOMY CORONARY ARTERY BYPASS GRAFT TIMES 3 USING LEFT INTERNAL MAMMARY ARTERY AND RIGHT GREATER SAPHENOUS VEIN GRAFT PER ENDOSCOPIC VEIN HARVESTING, AORTIC ANEURYSM REPAIR WITH ROOT REPAIR AND AORTIC VALVE REPLACEMENT;  Surgeon: Rosalio Cline MD;  Location: [facility] OR;  Service:    • CARDIAC CATHETERIZATION N/A 4/1/2016    Procedure: Left Heart Cath;  Surgeon: Erick Tam MD;  Location: [facility] INVASIVE LOCATION;  Service:    • CARDIAC CATHETERIZATION N/A 4/1/2016    Procedure: Left ventriculography;  Surgeon: Erick Tam MD;  Location: [facility] CATH INVASIVE LOCATION;  Service:    • CARDIAC CATHETERIZATION N/A 4/1/2016    Procedure: Right Heart Cath;  Surgeon: Erick Tam MD;  Location: [facility] CATH INVASIVE LOCATION;  Service:    • CARDIAC CATHETERIZATION N/A 10/30/2017    Procedure: Coronary angiography;  Surgeon: Sorin Vasquez MD;  Location: [facility] CATH INVASIVE LOCATION;  Service:    • CARDIAC CATHETERIZATION  10/30/2017    Procedure: Saphenous Vein Graft;  Surgeon: Sorin Vasquez MD;  Location: [facility] INVASIVE LOCATION;  Service:    • CARDIAC CATHETERIZATION N/A 10/30/2017    Procedure: Native mammary injection;  Surgeon: Sorin Vasquez MD;  Location: [facility] INVASIVE LOCATION;   "Service:    • CORONARY ARTERY BYPASS GRAFT  05/2016    LIMA TO LAD, SVG TO PDA, SVG TO OM2   • INNER EAR SURGERY     • TONSILLECTOMY         FAMILY HISTORY  Family History   Problem Relation Age of Onset   • Hypertension Mother    • Diabetes Mother    • Heart disease Mother    • Hypertension Father    • Heart disease Sister    • Other Other         The patient states that his sister has a problem that goes by the name of \"long QT\".  He says this is a familial trait but he also says that he is \"not interested in pursuing it for himself\".   • Coronary artery disease Other    • Stroke Maternal Grandmother    • Heart disease Maternal Grandmother        SOCIAL HISTORY  Social History     Social History   • Marital status: Single     Spouse name: N/A   • Number of children: N/A   • Years of education: N/A     Occupational History   • Not on file.     Social History Main Topics   • Smoking status: Never Smoker   • Smokeless tobacco: Never Used      Comment: caffeine use   • Alcohol use Yes      Comment: 1-2 drinks/weekly   • Drug use: No   • Sexual activity: Defer     Other Topics Concern   • Not on file     Social History Narrative   • No narrative on file       ALLERGIES  Bystolic [nebivolol hcl] and Metoprolol    REVIEW OF SYSTEMS  Review of Systems   Constitutional: Negative for fever.   Respiratory: Negative for shortness of breath.    Cardiovascular: Negative for chest pain.   Genitourinary: Positive for genital sores (Bleeding blister on scrotum).   Neurological: Positive for dizziness.       PHYSICAL EXAM  ED Triage Vitals [06/30/18 2158]   Temp Heart Rate Resp BP SpO2   97.9 °F (36.6 °C) 70 16 156/82 98 %      Temp src Heart Rate Source Patient Position BP Location FiO2 (%)   Tympanic -- -- -- --       Physical Exam   Constitutional: No distress.   HENT:   Head: Normocephalic and atraumatic.   Eyes: EOM are normal.   Neck: Normal range of motion.   Pulmonary/Chest: No respiratory distress.   Abdominal: There is no " tenderness.   Genitourinary:   Genitourinary Comments: Multiple petechial lesions to the scrotum, one with active bleeding   Musculoskeletal: He exhibits no edema.   Neurological: He is alert.   Skin: Skin is warm and dry.   Nursing note and vitals reviewed.      LAB RESULTS  Lab Results (last 24 hours)     Procedure Component Value Units Date/Time    CBC & Differential [[id number]] Collected:  06/30/18 2225    Specimen:  Blood Updated:  06/30/18 2300    Narrative:       The following orders were created for panel order CBC & Differential.  Procedure                               Abnormality         Status                     ---------                               -----------         ------                     CBC Auto Differential[[id number]]        Abnormal            Final result                 Please view results for these tests on the individual orders.    Comprehensive Metabolic Panel [[id number]]  (Abnormal) Collected:  06/30/18 2225    Specimen:  Blood Updated:  06/30/18 2302     Glucose 153 (H) mg/dL      BUN 20 mg/dL      Creatinine 1.10 mg/dL      Sodium 142 mmol/L      Potassium 4.0 mmol/L      Chloride 104 mmol/L      CO2 24.4 mmol/L      Calcium 9.3 mg/dL      Total Protein 6.9 g/dL      Albumin 4.20 g/dL      ALT (SGPT) 11 U/L      AST (SGOT) 10 U/L      Alkaline Phosphatase 66 U/L      Total Bilirubin 0.4 mg/dL      eGFR Non African Amer 69 mL/min/1.73      Globulin 2.7 gm/dL      A/G Ratio 1.6 g/dL      BUN/Creatinine Ratio 18.2     Anion Gap 13.6 mmol/L     Protime-INR [[id number]]  (Normal) Collected:  06/30/18 2225    Specimen:  Blood Updated:  06/30/18 2311     Protime 12.9 Seconds      INR 0.99    aPTT [[id number]]  (Normal) Collected:  06/30/18 2225    Specimen:  Blood Updated:  06/30/18 2311     PTT 31.9 seconds     CBC Auto Differential [[id number]]  (Abnormal) Collected:  06/30/18 2225    Specimen:  Blood Updated:  06/30/18 2300     WBC 7.52 10*3/mm3      RBC 4.06 (L) 10*6/mm3      Hemoglobin  12.3 (L) g/dL      Hematocrit 37.8 (L) %      MCV 93.1 fL      MCH 30.3 pg      MCHC 32.5 (L) g/dL      RDW 13.4 %      RDW-SD 44.9 fl      MPV 9.9 fL      Platelets 236 10*3/mm3      Neutrophil % 71.7 %      Lymphocyte % 19.8 %      Monocyte % 6.1 %      Eosinophil % 2.1 %      Basophil % 0.3 %      Immature Grans % 0.1 %      Neutrophils, Absolute 5.39 10*3/mm3      Lymphocytes, Absolute 1.49 10*3/mm3      Monocytes, Absolute 0.46 10*3/mm3      Eosinophils, Absolute 0.16 10*3/mm3      Basophils, Absolute 0.02 10*3/mm3      Immature Grans, Absolute 0.01 10*3/mm3           I ordered the above labs and reviewed the results      PROCEDURES  Laceration Repair  Date/Time: 6/30/2018 11:09 PM  Performed by: EPIFANIO ARRIETA.  Authorized by: SHAREE REGAN     Consent:     Consent obtained:  Verbal    Consent given by:  Patient  Anesthesia (see MAR for exact dosages):     Anesthesia method:  Local infiltration    Local anesthetic:  Lidocaine 1% WITH epi (1mL)  Laceration details:     Location:  Anogenital    Anogenital location:  Scrotum  Repair type:     Repair type:  Simple  Pre-procedure details:     Preparation:  Patient was prepped and draped in usual sterile fashion  Exploration:     Hemostasis obtained with: unable to acheive blood control with silver nitrate.  Treatment:     Area cleansed with:  Betadine    Amount of cleaning:  Standard    Irrigation solution:  Sterile saline  Skin repair:     Repair method:  Sutures    Suture size:  5-0    Suture material:  Nylon    Suture technique:  Figure eight    Number of sutures:  1  Approximation:     Approximation:  Close  Post-procedure details:     Patient tolerance of procedure:  Tolerated well, no immediate complications            PROGRESS AND CONSULTS     2218 Ordered protime-INR, aPTT, CMP, and CBC for further evaluation.    2221 Ordered lidocaine with epi.    2315 Rechecked the patient and he is resting comfortably. Discussed pertinent lab results, including  unremarkable lab results. Discussed the plan to discharge the pt. Patient agrees with plan and all questions were addressed.     MEDICAL DECISION MAKING  Results were reviewed/discussed with the patient and they were also made aware of online access. Pt also made aware that some labs, such as cultures, will not be resulted during ER visit and follow up with PMD is necessary.     MDM  Number of Diagnoses or Management Options     Amount and/or Complexity of Data Reviewed  Clinical lab tests: ordered and reviewed (Hemoglobin: 12.3, glucose: 153)  Decide to obtain previous medical records or to obtain history from someone other than the patient: yes  Review and summarize past medical records: yes (Pt was seen in the ER for similar problem in 8/2017.)    Patient Progress  Patient progress: resolved         DIAGNOSIS  Final diagnoses:   Scrotal bleeding       DISPOSITION  Disposition: Discharged.    Patient discharged in stable condition.    Reviewed implications of results, diagnosis, meds, responsibility to follow up, warning signs and symptoms of possible worsening, potential complications and reasons to return to ER, including new or worsening symptoms.    Patient/Family voiced understanding of above instructions.    Discussed plan for discharge, as there is no emergent indication for admission.  Pt/family is agreeable and understands need for follow up and repeat testing.  Pt is aware that discharge does not mean that nothing is wrong but it indicates no emergency is present and they must continue care with follow-up as given below or physician of their choice.     FOLLOW-UP  Fermín Duong MD  [address] [place] [id number]  [phone]    Schedule an appointment as soon as possible for a visit in 1 week  For suture removal    Giorgio Vallecillo MD  [facility] [place], [place] [address] [phone]    Schedule an appointment as soon as possible for a visit            Medication List      New  Prescriptions    bacitracin 500 UNIT/GM ointment  Apply  topically 2 (Two) Times a Day.            Latest Documented Vital Signs:  As of 11:41 PM  BP- 150/87 HR- 72 Temp- 97.9 °F (36.6 °C) (Tympanic) O2 sat- 97%    --  Documentation assistance provided by tiana Dyer for Yandel Naylor PA-C.  Information recorded by the scribe was done at my direction and has been verified and validated by me.     Chantel Dyer  06/30/18 [id number]       NIK Diaz  06/30/18 2919

## 2018-07-01 NOTE — ED PROVIDER NOTES
Pt is a 57 y.o. male who presents to the ED complaining of blood blister to the scrotum that ruptured two hours PTA.        On exam,  Constitutional: NAD  Abdomen: Benign  : There are dilated veins on pt's sctotum that did not stop with AgNO3 application. Yandel Naylor PA-C applied figure-8 suture to ligate varicosity. Bleeding now controlled.       Labs reviewed.     Plan: Discharge home with a follow up with urology.       MD ATTESTATION NOTE    The COLLEEN and I have discussed this patient's history, physical exam, and treatment plan.  I have reviewed the documentation and personally had a face to face interaction with the patient. I affirm the documentation and agree with the treatment and plan.  The attached note describes my personal findings.      Documentation assistance provided by tiana Kingston for Dr. Bell. Information recorded by the scribe was done at my direction and has been verified and validated by me.             Reji Kingston  06/30/18 3012       Lee Bell MD  07/01/18 [id number]

## 2018-08-24 ENCOUNTER — TELEPHONE (OUTPATIENT)
Dept: CARDIOLOGY | Facility: CLINIC | Age: 58
End: 2018-08-24

## 2018-08-24 DIAGNOSIS — I25.10 CORONARY ARTERY DISEASE INVOLVING NATIVE CORONARY ARTERY OF NATIVE HEART WITHOUT ANGINA PECTORIS: Primary | ICD-10-CM

## 2018-09-04 RX ORDER — CARVEDILOL 12.5 MG/1
TABLET ORAL
Qty: 180 TABLET | Refills: 0 | Status: SHIPPED | OUTPATIENT
Start: 2018-09-04 | End: 2018-12-03 | Stop reason: SDUPTHER

## 2018-09-05 ENCOUNTER — LAB (OUTPATIENT)
Dept: LAB | Facility: HOSPITAL | Age: 58
End: 2018-09-05

## 2018-09-05 ENCOUNTER — TELEPHONE (OUTPATIENT)
Dept: CARDIOLOGY | Facility: CLINIC | Age: 58
End: 2018-09-05

## 2018-09-05 DIAGNOSIS — I25.10 CORONARY ARTERY DISEASE INVOLVING NATIVE CORONARY ARTERY OF NATIVE HEART WITHOUT ANGINA PECTORIS: ICD-10-CM

## 2018-09-05 LAB
ALBUMIN SERPL-MCNC: 4.3 G/DL (ref 3.5–5.2)
ALBUMIN/GLOB SERPL: 1.5 G/DL
ALP SERPL-CCNC: 68 U/L (ref 39–117)
ALT SERPL W P-5'-P-CCNC: 13 U/L (ref 1–41)
ANION GAP SERPL CALCULATED.3IONS-SCNC: 12.3 MMOL/L
AST SERPL-CCNC: 14 U/L (ref 1–40)
BILIRUB SERPL-MCNC: 0.5 MG/DL (ref 0.1–1.2)
BUN BLD-MCNC: 13 MG/DL (ref 6–20)
BUN/CREAT SERPL: 12.1 (ref 7–25)
CALCIUM SPEC-SCNC: 8.8 MG/DL (ref 8.6–10.5)
CHLORIDE SERPL-SCNC: 103 MMOL/L (ref 98–107)
CHOLEST SERPL-MCNC: 188 MG/DL (ref 0–200)
CO2 SERPL-SCNC: 28.7 MMOL/L (ref 22–29)
CREAT BLD-MCNC: 1.07 MG/DL (ref 0.76–1.27)
GFR SERPL CREATININE-BSD FRML MDRD: 71 ML/MIN/1.73
GLOBULIN UR ELPH-MCNC: 2.9 GM/DL
GLUCOSE BLD-MCNC: 110 MG/DL (ref 65–99)
HDLC SERPL-MCNC: 38 MG/DL (ref 40–60)
LDLC SERPL CALC-MCNC: 120 MG/DL (ref 0–100)
LDLC/HDLC SERPL: 3.15 {RATIO}
POTASSIUM BLD-SCNC: 3.6 MMOL/L (ref 3.5–5.2)
PROT SERPL-MCNC: 7.2 G/DL (ref 6–8.5)
SODIUM BLD-SCNC: 144 MMOL/L (ref 136–145)
TRIGL SERPL-MCNC: 151 MG/DL (ref 0–150)
VLDLC SERPL-MCNC: 30.2 MG/DL (ref 5–40)

## 2018-09-05 PROCEDURE — 36415 COLL VENOUS BLD VENIPUNCTURE: CPT

## 2018-09-05 PROCEDURE — 80053 COMPREHEN METABOLIC PANEL: CPT

## 2018-09-05 PROCEDURE — 80061 LIPID PANEL: CPT

## 2018-09-05 NOTE — TELEPHONE ENCOUNTER
Pt left a msg at the  saying he had his labs done today and will need his statin and carvedilol refilled.  He is also going to the dentist later this month and needs pre-meds for the visit.      Labs are available in epic.  Please advise about his labs and what to do about pre-meds.    Thanks,  Sweta

## 2018-09-05 NOTE — TELEPHONE ENCOUNTER
LDL is not optimally treated.  Would increase his Lipitor to 20 mg a day have him recheck his statin in approximately 3 months.

## 2018-09-06 RX ORDER — AMOXICILLIN 500 MG/1
TABLET, FILM COATED ORAL
Qty: 4 TABLET | Refills: 0 | Status: SHIPPED | OUTPATIENT
Start: 2018-09-06 | End: 2019-09-17 | Stop reason: SDUPTHER

## 2018-09-06 RX ORDER — ATORVASTATIN CALCIUM 20 MG/1
20 TABLET, FILM COATED ORAL DAILY
Qty: 90 TABLET | Refills: 0 | Status: SHIPPED | OUTPATIENT
Start: 2018-09-06 | End: 2018-12-02 | Stop reason: SDUPTHER

## 2018-09-06 NOTE — TELEPHONE ENCOUNTER
He has he needs amoxicillin 2 g 1 hour before procedure.  Okay to call in prescription for my standpoint

## 2018-12-03 RX ORDER — CARVEDILOL 12.5 MG/1
TABLET ORAL
Qty: 180 TABLET | Refills: 0 | Status: SHIPPED | OUTPATIENT
Start: 2018-12-03 | End: 2019-03-02 | Stop reason: SDUPTHER

## 2018-12-03 RX ORDER — ATORVASTATIN CALCIUM 20 MG/1
TABLET, FILM COATED ORAL
Qty: 90 TABLET | Refills: 0 | Status: SHIPPED | OUTPATIENT
Start: 2018-12-03 | End: 2019-03-02 | Stop reason: SDUPTHER

## 2019-01-30 ENCOUNTER — OFFICE VISIT (OUTPATIENT)
Dept: CARDIOLOGY | Facility: CLINIC | Age: 59
End: 2019-01-30

## 2019-01-30 VITALS
SYSTOLIC BLOOD PRESSURE: 116 MMHG | HEIGHT: 73 IN | WEIGHT: 315 LBS | OXYGEN SATURATION: 98 % | DIASTOLIC BLOOD PRESSURE: 72 MMHG | BODY MASS INDEX: 41.75 KG/M2 | HEART RATE: 62 BPM

## 2019-01-30 DIAGNOSIS — Z95.1 S/P CABG (CORONARY ARTERY BYPASS GRAFT): ICD-10-CM

## 2019-01-30 DIAGNOSIS — I25.10 CAD IN NATIVE ARTERY: Primary | ICD-10-CM

## 2019-01-30 DIAGNOSIS — Z95.2 S/P AVR (AORTIC VALVE REPLACEMENT): ICD-10-CM

## 2019-01-30 PROCEDURE — 99214 OFFICE O/P EST MOD 30 MIN: CPT | Performed by: INTERNAL MEDICINE

## 2019-01-30 PROCEDURE — 93000 ELECTROCARDIOGRAM COMPLETE: CPT | Performed by: INTERNAL MEDICINE

## 2019-01-30 NOTE — PROGRESS NOTES
Subjective:     Encounter Date:1/30/19      Patient ID: Edmond Chase is a 58 y.o. male.    Chief Complaint:  Coronary Artery Disease   Presents for follow-up visit. Symptoms include chest pain and chest pressure. The symptoms have been worsening.   Chest Pain    This is a recurrent problem. The current episode started more than 1 month ago. The problem occurs intermittently. The problem has been rapidly worsening. The pain is present in the substernal region. The pain is moderate. The quality of the pain is described as pressure. Associated symptoms include malaise/fatigue.   His past medical history is significant for CAD.   Fatigue   This is a recurrent problem. The current episode started more than 1 year ago. The problem has been rapidly worsening. Associated symptoms include chest pain and fatigue.       57-year-old gentleman who presents today for reevaluation.  Patient overall is doing relatively well.  He is having these strange sensations after he takes his initial dose of Coreg.  He could easily be having some side effects of it but his heart rate and blood pressure are fine so I am not quite sure what to do with it.  This point I would follow him clinically and see what evolves.        Review of Systems   Constitution: Positive for fatigue and malaise/fatigue.   Cardiovascular: Positive for chest pain.   All other systems reviewed and are negative.        ECG 12 Lead  Date/Time: 1/30/2019 1:02 PM  Performed by: Papa Miguel MD  Authorized by: Papa Miguel MD   Comparison: compared with previous ECG from 11/29/2017  Similar to previous ECG  Rhythm: sinus rhythm  T depression: V4, V5 and V6  Clinical impression: abnormal ECG               Objective:     Physical Exam   Constitutional: He is oriented to person, place, and time. He appears well-developed.   HENT:   Head: Normocephalic.   Eyes: Conjunctivae are normal.   Neck: Normal range of motion.   Cardiovascular: Normal rate and  regular rhythm.   Murmur heard.   Harsh midsystolic murmur is present with a grade of 2/6 at the upper right sternal border radiating to the neck.  Pulmonary/Chest: Breath sounds normal.   Abdominal: Soft. Bowel sounds are normal.   Musculoskeletal: Normal range of motion. He exhibits no edema.   Neurological: He is alert and oriented to person, place, and time.   Skin: Skin is warm and dry.   Psychiatric: He has a normal mood and affect. His behavior is normal.   Vitals reviewed.      Lab Review:       Assessment:          Diagnosis Plan   1. CAD in native artery     2. S/P CABG (coronary artery bypass graft)     3. S/P AVR (aortic valve replacement)            Plan:       1.  History of coronary artery disease.  Patient continues of atypical chest discomfort I do not believe it is cardiac in origin.  2.  Hypertension blood pressures great  3.  Aortic valve replacement.  Last echo was a little over a year ago.  4.  Patient having some strange feelings about an hour after he takes his initial dose of beta-blocker.  I did tell him to check his blood pressure heart rate during these episodes to see if we can get a correlation.  He had one today with his EKG and blood pressure checked both of them were within normal limits I am not quite sure the etiology.  5.  Continue the same follow-up in 6-12 months        Coronary Artery Disease  Assessment  • The patient has no angina  • The patient is having symptoms consistent with unstable angina     Plan  • The patient is being referred to interventional cardiology  • Lifestyle modifications discussed include adhering to a heart healthy diet, avoidance of tobacco products, maintenance of a healthy weight, medication compliance, regular exercise and regular monitoring of cholesterol and blood pressure    Subjective - Objective  • Current antiplatelet therapy includes aspirin 81 mg

## 2019-03-04 RX ORDER — ATORVASTATIN CALCIUM 20 MG/1
TABLET, FILM COATED ORAL
Qty: 90 TABLET | Refills: 0 | Status: SHIPPED | OUTPATIENT
Start: 2019-03-04 | End: 2019-06-01 | Stop reason: SDUPTHER

## 2019-03-04 RX ORDER — CARVEDILOL 12.5 MG/1
TABLET ORAL
Qty: 180 TABLET | Refills: 0 | Status: SHIPPED | OUTPATIENT
Start: 2019-03-04 | End: 2019-05-29 | Stop reason: SDUPTHER

## 2019-05-30 RX ORDER — CARVEDILOL 12.5 MG/1
TABLET ORAL
Qty: 180 TABLET | Refills: 3 | Status: SHIPPED | OUTPATIENT
Start: 2019-05-30 | End: 2019-10-08

## 2019-06-03 RX ORDER — ATORVASTATIN CALCIUM 20 MG/1
TABLET, FILM COATED ORAL
Qty: 90 TABLET | Refills: 0 | Status: SHIPPED | OUTPATIENT
Start: 2019-06-03 | End: 2019-08-28 | Stop reason: SDUPTHER

## 2019-07-07 ENCOUNTER — HOSPITAL ENCOUNTER (EMERGENCY)
Facility: HOSPITAL | Age: 59
Discharge: HOME | End: 2019-07-08
Attending: EMERGENCY MEDICINE | Admitting: EMERGENCY MEDICINE

## 2019-07-07 ENCOUNTER — APPOINTMENT (OUTPATIENT)
Dept: GENERAL RADIOLOGY | Facility: HOSPITAL | Age: 59
End: 2019-07-07

## 2019-07-07 DIAGNOSIS — R06.00 DYSPNEA, UNSPECIFIED TYPE: Primary | ICD-10-CM

## 2019-07-07 LAB
BASOPHILS # BLD AUTO: 0.02 10*3/MM3 (ref 0–0.2)
BASOPHILS NFR BLD AUTO: 0.3 % (ref 0–1.5)
D DIMER PPP FEU-MCNC: 0.51 MCGFEU/ML (ref 0–0.49)
DEPRECATED RDW RBC AUTO: 44 FL (ref 37–54)
EOSINOPHIL # BLD AUTO: 0.12 10*3/MM3 (ref 0–0.4)
EOSINOPHIL NFR BLD AUTO: 1.9 % (ref 0.3–6.2)
ERYTHROCYTE [DISTWIDTH] IN BLOOD BY AUTOMATED COUNT: 12.9 % (ref 12.3–15.4)
HCT VFR BLD AUTO: 38.6 % (ref 37.5–51)
HGB BLD-MCNC: 12.8 G/DL (ref 13–17.7)
IMM GRANULOCYTES # BLD AUTO: 0.02 10*3/MM3 (ref 0–0.05)
IMM GRANULOCYTES NFR BLD AUTO: 0.3 % (ref 0–0.5)
LYMPHOCYTES # BLD AUTO: 1.19 10*3/MM3 (ref 0.7–3.1)
LYMPHOCYTES NFR BLD AUTO: 18.9 % (ref 19.6–45.3)
MCH RBC QN AUTO: 30.7 PG (ref 26.6–33)
MCHC RBC AUTO-ENTMCNC: 33.2 G/DL (ref 31.5–35.7)
MCV RBC AUTO: 92.6 FL (ref 79–97)
MONOCYTES # BLD AUTO: 0.43 10*3/MM3 (ref 0.1–0.9)
MONOCYTES NFR BLD AUTO: 6.8 % (ref 5–12)
NEUTROPHILS # BLD AUTO: 4.51 10*3/MM3 (ref 1.7–7)
NEUTROPHILS NFR BLD AUTO: 71.8 % (ref 42.7–76)
NRBC BLD AUTO-RTO: 0 /100 WBC (ref 0–0.2)
PLATELET # BLD AUTO: 214 10*3/MM3 (ref 140–450)
PMV BLD AUTO: 10.4 FL (ref 6–12)
RBC # BLD AUTO: 4.17 10*6/MM3 (ref 4.14–5.8)
WBC NRBC COR # BLD: 6.29 10*3/MM3 (ref 3.4–10.8)

## 2019-07-07 PROCEDURE — 71046 X-RAY EXAM CHEST 2 VIEWS: CPT

## 2019-07-07 PROCEDURE — 85610 PROTHROMBIN TIME: CPT | Performed by: EMERGENCY MEDICINE

## 2019-07-07 PROCEDURE — 99284 EMERGENCY DEPT VISIT MOD MDM: CPT

## 2019-07-07 PROCEDURE — 85379 FIBRIN DEGRADATION QUANT: CPT | Performed by: EMERGENCY MEDICINE

## 2019-07-07 PROCEDURE — 80053 COMPREHEN METABOLIC PANEL: CPT | Performed by: EMERGENCY MEDICINE

## 2019-07-07 PROCEDURE — 93010 ELECTROCARDIOGRAM REPORT: CPT | Performed by: INTERNAL MEDICINE

## 2019-07-07 PROCEDURE — 84484 ASSAY OF TROPONIN QUANT: CPT | Performed by: EMERGENCY MEDICINE

## 2019-07-07 PROCEDURE — 93005 ELECTROCARDIOGRAM TRACING: CPT | Performed by: EMERGENCY MEDICINE

## 2019-07-07 PROCEDURE — 83880 ASSAY OF NATRIURETIC PEPTIDE: CPT | Performed by: EMERGENCY MEDICINE

## 2019-07-07 PROCEDURE — 85025 COMPLETE CBC W/AUTO DIFF WBC: CPT | Performed by: EMERGENCY MEDICINE

## 2019-07-07 RX ORDER — SODIUM CHLORIDE 0.9 % (FLUSH) 0.9 %
10 SYRINGE (ML) INJECTION AS NEEDED
Status: DISCONTINUED | OUTPATIENT
Start: 2019-07-07 | End: 2019-07-08 | Stop reason: HOSPADM

## 2019-07-08 ENCOUNTER — APPOINTMENT (OUTPATIENT)
Dept: CT IMAGING | Facility: HOSPITAL | Age: 59
End: 2019-07-08

## 2019-07-08 VITALS
BODY MASS INDEX: 44.1 KG/M2 | SYSTOLIC BLOOD PRESSURE: 141 MMHG | WEIGHT: 315 LBS | RESPIRATION RATE: 17 BRPM | DIASTOLIC BLOOD PRESSURE: 87 MMHG | TEMPERATURE: 98.9 F | HEIGHT: 71 IN | OXYGEN SATURATION: 99 % | HEART RATE: 62 BPM

## 2019-07-08 LAB
ALBUMIN SERPL-MCNC: 4.2 G/DL (ref 3.5–5.2)
ALBUMIN/GLOB SERPL: 1.5 G/DL
ALP SERPL-CCNC: 73 U/L (ref 39–117)
ALT SERPL W P-5'-P-CCNC: 12 U/L (ref 1–41)
ANION GAP SERPL CALCULATED.3IONS-SCNC: 10.9 MMOL/L (ref 5–15)
AST SERPL-CCNC: 15 U/L (ref 1–40)
BILIRUB SERPL-MCNC: 0.6 MG/DL (ref 0.2–1.2)
BUN BLD-MCNC: 13 MG/DL (ref 8–23)
BUN/CREAT SERPL: 12.6 (ref 7–25)
CALCIUM SPEC-SCNC: 9.1 MG/DL (ref 8.6–10.5)
CHLORIDE SERPL-SCNC: 105 MMOL/L (ref 98–107)
CO2 SERPL-SCNC: 26.1 MMOL/L (ref 22–29)
CREAT BLD-MCNC: 1.03 MG/DL (ref 0.76–1.27)
GFR SERPL CREATININE-BSD FRML MDRD: 74 ML/MIN/1.73
GLOBULIN UR ELPH-MCNC: 2.8 GM/DL
GLUCOSE BLD-MCNC: 145 MG/DL (ref 65–99)
HOLD SPECIMEN: NORMAL
HOLD SPECIMEN: NORMAL
INR PPP: 0.97 (ref 0.9–1.1)
NT-PROBNP SERPL-MCNC: 284.7 PG/ML (ref 5–900)
POTASSIUM BLD-SCNC: 3.9 MMOL/L (ref 3.5–5.2)
PROT SERPL-MCNC: 7 G/DL (ref 6–8.5)
PROTHROMBIN TIME: 12.6 SECONDS (ref 11.7–14.2)
SODIUM BLD-SCNC: 142 MMOL/L (ref 136–145)
TROPONIN T SERPL-MCNC: <0.01 NG/ML (ref 0–0.03)
WHOLE BLOOD HOLD SPECIMEN: NORMAL
WHOLE BLOOD HOLD SPECIMEN: NORMAL

## 2019-07-08 PROCEDURE — 0 IOPAMIDOL PER 1 ML: Performed by: EMERGENCY MEDICINE

## 2019-07-08 PROCEDURE — 71275 CT ANGIOGRAPHY CHEST: CPT

## 2019-07-08 RX ORDER — ALBUTEROL SULFATE 90 UG/1
2 AEROSOL, METERED RESPIRATORY (INHALATION) EVERY 6 HOURS PRN
Qty: 1 INHALER | Refills: 0 | Status: SHIPPED | OUTPATIENT
Start: 2019-07-08 | End: 2022-12-02

## 2019-07-08 RX ADMIN — IOPAMIDOL 95 ML: 755 INJECTION, SOLUTION INTRAVENOUS at 01:57

## 2019-07-08 NOTE — ED PROVIDER NOTES
" EMERGENCY DEPARTMENT ENCOUNTER    CHIEF COMPLAINT  Chief Complaint: SOA  History given by: Patient  History limited by: None  Room Number: 16/16  PMD: Giorgio Vallecillo MD      HPI:  Pt is a 58 y.o. male who presents complaining of SOA that began 2 weeks ago. Pt also c/o chest pain, he describes as a sensation of \"heaviness\" and fatigue, but denies leg swelling, calf tenderness, and nausea. He takes ASA, but no other anticoagulants. Hx of HTN and CAD. He has had previous cardiac caths and CABG.    Duration: Began 2 weeks ago  Onset: Gradual  Timing: Constant  Intensity/Severity: Moderate  Progression: Unchanged  Associated Symptoms: Chest pain, he describes as a sensation of \"heaviness\" and fatigue,  Previous Episodes: None stated  Treatment before arrival: None stated    PAST MEDICAL HISTORY  Active Ambulatory Problems     Diagnosis Date Noted   • Essential hypertension 02/29/2016   • Hyperglycemia 02/29/2016   • Hyperlipidemia 02/29/2016   • Morbid obesity (CMS/HCC) 02/29/2016   • Nocturia 02/29/2016   • Nonrheumatic aortic valve insufficiency 03/29/2016   • Myocardial ischemia 03/29/2016   • Coronary artery disease involving native coronary artery of native heart 04/19/2016   • Ascending aortic aneurysm (CMS/HCC) 04/19/2016   • CAD in native artery 05/02/2016   • S/P CABG (coronary artery bypass graft) 05/18/2016   • S/P AVR (aortic valve replacement) 05/18/2016   • Status post ascending aortic aneurysm repair 05/18/2016   • Hypersomnia with sleep apnea 09/04/2016   • Obstructive sleep apnea, adult    • Fatigue 07/28/2017   • Abnormal nuclear stress test 10/23/2017   • Coronary artery disease 10/23/2017     Resolved Ambulatory Problems     Diagnosis Date Noted   • No Resolved Ambulatory Problems     Past Medical History:   Diagnosis Date   • Abnormal nuclear stress test    • Aortic valve insufficiency    • Ascending aortic aneurysm (CMS/HCC)    • CAD (coronary artery disease)    • Chest pain    • Dizzy    • " Essential hypertension    • Fatigue    • Hyperglycemia    • Hyperlipidemia    • Lightheadedness    • Malaise and fatigue    • Morbid obesity (CMS/HCC)    • Myocardial ischemia    • Nocturia    • Pneumonia    • Scrotal bleeding    • Shortness of breath    • Sleep apnea        PAST SURGICAL HISTORY  Past Surgical History:   Procedure Laterality Date   • AORTIC VALVE REPAIR/REPLACEMENT  05/2016   • ASCENDING ARCH/HEMIARCH REPLACEMENT N/A 5/2/2016    Procedure: BHAVESH STERNOTOMY CORONARY ARTERY BYPASS GRAFT TIMES 3 USING LEFT INTERNAL MAMMARY ARTERY AND RIGHT GREATER SAPHENOUS VEIN GRAFT PER ENDOSCOPIC VEIN HARVESTING, AORTIC ANEURYSM REPAIR WITH ROOT REPAIR AND AORTIC VALVE REPLACEMENT;  Surgeon: Rosalio Cline MD;  Location: [facility] MAIN OR;  Service:    • CARDIAC CATHETERIZATION N/A 4/1/2016    Procedure: Left Heart Cath;  Surgeon: Erick Tam MD;  Location: [facility] CATH INVASIVE LOCATION;  Service:    • CARDIAC CATHETERIZATION N/A 4/1/2016    Procedure: Left ventriculography;  Surgeon: Erick Tam MD;  Location: [facility] CATH INVASIVE LOCATION;  Service:    • CARDIAC CATHETERIZATION N/A 4/1/2016    Procedure: Right Heart Cath;  Surgeon: Erick Tam MD;  Location: [facility] CATH INVASIVE LOCATION;  Service:    • CARDIAC CATHETERIZATION N/A 10/30/2017    Procedure: Coronary angiography;  Surgeon: Sorin Vasquez MD;  Location: [facility] CATH INVASIVE LOCATION;  Service:    • CARDIAC CATHETERIZATION  10/30/2017    Procedure: Saphenous Vein Graft;  Surgeon: Sorin Vasquez MD;  Location: [facility] CATH INVASIVE LOCATION;  Service:    • CARDIAC CATHETERIZATION N/A 10/30/2017    Procedure: Native mammary injection;  Surgeon: Sorin Vasquez MD;  Location: [facility] CATH INVASIVE LOCATION;  Service:    • CORONARY ARTERY BYPASS GRAFT  05/2016    LIMA TO LAD, SVG TO PDA, SVG TO OM2   • INNER EAR SURGERY     • TONSILLECTOMY         FAMILY HISTORY  Family History   Problem Relation Age of Onset   • Hypertension Mother    • Diabetes  "Mother    • Heart disease Mother    • Hypertension Father    • Heart disease Sister    • Other Other         The patient states that his sister has a problem that goes by the name of \"long QT\".  He says this is a familial trait but he also says that he is \"not interested in pursuing it for himself\".   • Coronary artery disease Other    • Stroke Maternal Grandmother    • Heart disease Maternal Grandmother        SOCIAL HISTORY  Social History     Socioeconomic History   • Marital status: Single     Spouse name: Not on file   • Number of children: Not on file   • Years of education: Not on file   • Highest education level: Not on file   Tobacco Use   • Smoking status: Never Smoker   • Smokeless tobacco: Never Used   • Tobacco comment: caffeine use   Substance and Sexual Activity   • Alcohol use: Yes     Comment: 1-2 drinks/weekly   • Drug use: No   • Sexual activity: Defer       ALLERGIES  Bystolic [nebivolol hcl] and Metoprolol    REVIEW OF SYSTEMS  Review of Systems   Constitutional: Positive for fatigue. Negative for activity change, appetite change and fever.   HENT: Negative for congestion and sore throat.    Eyes: Negative.    Respiratory: Positive for shortness of breath. Negative for cough.    Cardiovascular: Positive for chest pain (he describes as a sensation of \"heaviness\"). Negative for leg swelling.   Gastrointestinal: Negative for abdominal pain, diarrhea, nausea and vomiting.   Endocrine: Negative.    Genitourinary: Negative for decreased urine volume and dysuria.   Musculoskeletal: Negative for neck pain.        Denies any calf tenderness.   Skin: Negative for rash and wound.   Allergic/Immunologic: Negative.    Neurological: Negative for weakness, numbness and headaches.   Hematological: Negative.    Psychiatric/Behavioral: Negative.    All other systems reviewed and are negative.      PHYSICAL EXAM  ED Triage Vitals   Temp Heart Rate Resp BP SpO2   07/07/19 2243 07/07/19 2243 07/07/19 2243 07/07/19 " 2257 07/07/19 2243   98.7 °F (37.1 °C) 77 18 168/89 97 %      Temp src Heart Rate Source Patient Position BP Location FiO2 (%)   07/07/19 2243 07/07/19 2243 -- -- --   Tympanic Monitor          Physical Exam   Constitutional: He is oriented to person, place, and time and well-developed, well-nourished, and in no distress. No distress.   HENT:   Head: Normocephalic and atraumatic.   Eyes: EOM are normal. Pupils are equal, round, and reactive to light.   Neck: Normal range of motion. Neck supple.   Cardiovascular: Normal rate, regular rhythm and normal heart sounds.   Pulmonary/Chest: Effort normal and breath sounds normal. No respiratory distress.   Abdominal: Soft. There is no tenderness. There is no rebound and no guarding.   Musculoskeletal: Normal range of motion. He exhibits no edema.   Neurological: He is alert and oriented to person, place, and time. He has normal sensation and normal strength.   Skin: Skin is warm and dry.   Psychiatric: Mood and affect normal.   Nursing note and vitals reviewed.      LAB RESULTS  Lab Results (last 24 hours)     Procedure Component Value Units Date/Time    CBC & Differential [[id number]] Collected:  07/07/19 2305    Specimen:  Blood Updated:  07/07/19 2340    Narrative:       The following orders were created for panel order CBC & Differential.  Procedure                               Abnormality         Status                     ---------                               -----------         ------                     CBC Auto Differential[[id number]]        Abnormal            Final result                 Please view results for these tests on the individual orders.    Comprehensive Metabolic Panel [[id number]]  (Abnormal) Collected:  07/07/19 2305    Specimen:  Blood Updated:  07/08/19 0007     Glucose 145 mg/dL      BUN 13 mg/dL      Creatinine 1.03 mg/dL      Sodium 142 mmol/L      Potassium 3.9 mmol/L      Chloride 105 mmol/L      CO2 26.1 mmol/L      Calcium 9.1 mg/dL       Total Protein 7.0 g/dL      Albumin 4.20 g/dL      ALT (SGPT) 12 U/L      AST (SGOT) 15 U/L      Alkaline Phosphatase 73 U/L      Total Bilirubin 0.6 mg/dL      eGFR Non African Amer 74 mL/min/1.73      Globulin 2.8 gm/dL      A/G Ratio 1.5 g/dL      BUN/Creatinine Ratio 12.6     Anion Gap 10.9 mmol/L     Narrative:       GFR Normal >60  Chronic Kidney Disease <60  Kidney Failure <15    Protime-INR [[id number]]  (Normal) Collected:  07/07/19 2305    Specimen:  Blood Updated:  07/08/19 0003     Protime 12.6 Seconds      INR 0.97    BNP [[id number]]  (Normal) Collected:  07/07/19 2305    Specimen:  Blood Updated:  07/08/19 0005     proBNP 284.7 pg/mL     Narrative:       Among patients with dyspnea, NT-proBNP is highly sensitive for the detection of acute congestive heart failure. In addition NT-proBNP of <300 pg/ml effectively rules out acute congestive heart failure with 99% negative predictive value.    D-dimer, Quantitative [[id number]]  (Abnormal) Collected:  07/07/19 2305    Specimen:  Blood Updated:  07/07/19 2359     D-Dimer, Quantitative 0.51 MCGFEU/mL     Narrative:       The Stago D-Dimer test used in conjunction with a clinical pretest probability (PTP) assessment model, has been approved by the FDA to rule out the presence of venous thromboembolism (VTE) in outpatients suspected of deep venous thrombosis (DVT) or pulmonary embolism (PE). The cut-off for negative predictive value is <0.50 MCGFEU/mL.    Troponin [[id number]]  (Normal) Collected:  07/07/19 2305    Specimen:  Blood Updated:  07/08/19 0007     Troponin T <0.010 ng/mL     Narrative:       Troponin T Reference Range:  <= 0.03 ng/mL-   Negative for AMI  >0.03 ng/mL-     Abnormal for myocardial necrosis.  Clinicians would have to utilize clinical acumen, EKG, Troponin and serial changes to determine if it is an Acute Myocardial Infarction or myocardial injury due to an underlying chronic condition.     CBC Auto Differential [[id number]]  (Abnormal)  Collected:  07/07/19 2305    Specimen:  Blood Updated:  07/07/19 2340     WBC 6.29 10*3/mm3      RBC 4.17 10*6/mm3      Hemoglobin 12.8 g/dL      Hematocrit 38.6 %      MCV 92.6 fL      MCH 30.7 pg      MCHC 33.2 g/dL      RDW 12.9 %      RDW-SD 44.0 fl      MPV 10.4 fL      Platelets 214 10*3/mm3      Neutrophil % 71.8 %      Lymphocyte % 18.9 %      Monocyte % 6.8 %      Eosinophil % 1.9 %      Basophil % 0.3 %      Immature Grans % 0.3 %      Neutrophils, Absolute 4.51 10*3/mm3      Lymphocytes, Absolute 1.19 10*3/mm3      Monocytes, Absolute 0.43 10*3/mm3      Eosinophils, Absolute 0.12 10*3/mm3      Basophils, Absolute 0.02 10*3/mm3      Immature Grans, Absolute 0.02 10*3/mm3      nRBC 0.0 /100 WBC           I ordered the above labs and reviewed the results    RADIOLOGY  CT Angiogram Chest With Contrast   Final Result   No acute intrathoracic process identified.       Radiation dose reduction techniques were utilized, including automated   exposure control and exposure modulation based on body size.       This report was finalized on 7/8/2019 2:07 AM by Dr. Ani Eubanks M.D.          XR Chest 2 View   Final Result   No acute findings.       This report was finalized on 7/8/2019 12:12 AM by Dr. Ani Eubanks M.D.               I ordered the above noted radiological studies. Interpreted by radiologist. Reviewed by me in PACS.    PROCEDURES  Procedures  EKG          EKG time: 2259  Rhythm/Rate: NSR rate 69  P waves and UT: Nml P  QRS, axis: Nml QRS   ST and T waves: TWI in lead I, aVL, V5, and V6    Interpreted Contemporaneously by me, independently viewed  TWI is present in previous done on 5/6/16. No acute changes from then.    PROGRESS AND CONSULTS     2333 Ordered CBC, troponin, D-dimer, BNP, INR, CMP, and CXR for further evaluation.    0029 Ordered CTA chest for further evaluation.    0252 Rechecked with pt, who is resting comfortably, and informed him of the results of his labs, EKG, CXR, and  CTA chest. Plan to discharge with an albuterol inhaler. He should f/u with his PCP and/or his cardiologist. Pt understands and agrees with the plan, all questions answered.    MEDICAL DECISION MAKING  Results were reviewed/discussed with the patient and they were also made aware of online access. Pt also made aware that some labs, such as cultures, will not be resulted during ER visit and follow up with PMD is necessary.     MDM  Number of Diagnoses or Management Options     Amount and/or Complexity of Data Reviewed  Clinical lab tests: ordered and reviewed  Tests in the radiology section of CPT®: ordered and reviewed  Tests in the medicine section of CPT®: reviewed  Decide to obtain previous medical records or to obtain history from someone other than the patient: yes    Patient Progress  Patient progress: stable         DIAGNOSIS  Final diagnoses:   Dyspnea, unspecified type       DISPOSITION  DISCHARGE    Patient discharged in stable condition.    Reviewed implications of results, diagnosis, meds, responsibility to follow up, warning signs and symptoms of possible worsening, potential complications and reasons to return to ER, including new or worsening sxs.    Patient/Family voiced understanding of above instructions.    Discussed plan for discharge, as there is no emergent indication for admission. Patient referred to primary care provider for BP management due to today's BP. Pt/family is agreeable and understands need for follow up and repeat testing.  Pt is aware that discharge does not mean that nothing is wrong but it indicates no emergency is present that requires admission and they must continue care with follow-up as given below or physician of their choice.     FOLLOW-UP  Giorgio Vallecillo MD  [address] [place], [place] [id number]  [phone]    Schedule an appointment as soon as possible for a visit            Medication List      New Prescriptions    albuterol sulfate  (90 Base) MCG/ACT  inhaler  Commonly known as:  PROVENTIL HFA;VENTOLIN HFA;PROAIR HFA  Inhale 2 puffs Every 6 (Six) Hours As Needed for Wheezing.        Latest Documented Vital Signs:  As of 2:59 AM  BP- 139/88 HR- 60 Temp- 98.7 °F (37.1 °C) (Tympanic) O2 sat- 95%    --  Documentation assistance provided by tiana Shin for Dr. Flores.  Information recorded by the scribe was done at my direction and has been verified and validated by me.     Precious Shin  07/08/19 0259       Michael Flores MD  07/08/19 3506

## 2019-07-08 NOTE — ED TRIAGE NOTES
Pt ambulatory to triage with slow gait, states short of air x 2 weeks, also having chest pain for same amount of time. Last saw PMD on 6/11.

## 2019-07-11 NOTE — PROGRESS NOTES
Date of Office Visit: 2019  Encounter Provider: VIKTORIA Solorio  Place of Service: [facility] CARDIOLOGY  Patient Name: Edmond Chase  :1960    Chief Complaint   Patient presents with   • Coronary Artery Disease   • Cardiac Valve Problem   • Follow-up   :     HPI: Edmond Chase is a 58 y.o. male is a patient of Dr. Miguel. I will be seeing him for the first time today and have reviewed his medical record.     Hispast medical history is significant of coronary artery disease status post coronary artery bypass grafting, hypertension anemia, ascending aortic dilation, obstructive sleep apnea, and morbid obesity.      On 2016, the patient was admitted and underwent an elective coronary artery bypass graft x3 with LIMA to LAD and reverse individual saphenous vein graft to lateral marginal and to the posterior descending artery, aortic aneurysm repair, aortic root repair a Dacron patch, and aortic valve replacement with a #27 magna pericardial valve.  Postoperatively the patient did well and had an uneventful course.  He was discharged home on 2016.    His last echocardiogram was 2017 which showed normal left ventricular systolic function, moderate concentric hypertrophy, bioprosthetic valve present with no regurgitation and appeared normal.  There was mild mitral annular calcification with mild mitral regurgitation.  He had a perfusion stress test in 2017 which showed small size infarct located in the inferior wall with moderate brijesh-infarct ischemia.  Ejection fraction was normal and this was intermediate risk.  He ultimately had cardiac catheterization which showed three-vessel coronary artery disease, patent LIMA-LAD, occluded SVG to OM branch and occluded SVG to PDA.    Patient presents today for emergency department room follow-up.  His primary complaint was shortness of breath which began 2 weeks prior.  He also complained of chest pain  described as a heaviness with fatigue.  CTA of the chest showed nothing acute.  proBNP was within normal limits.  Chemistries and blood count are unremarkable.  He ruled out for myocardial infarction and was prescribed albuterol.  His main complaint today is fatigue.  He has started to walk 5 to 6 days a week for an hour and does not report any increased chest pressure with that.  Continues to have some atypical chest pressure which usually occurs when he is at rest and comes and goes.  He is never had an upper endoscopy.  He denies history of reflux.  In May he weaned himself off of Celexa.  He is occasionally having wheezing at rest which is new for him.  He has not been checking his blood pressure at home    Allergies   Allergen Reactions   • Bystolic [Nebivolol Hcl]    • Metoprolol Other (See Comments)     Metoprolol seem to cause problems with mood swings       Past Medical History:   Diagnosis Date   • Abnormal nuclear stress test    • Aortic valve insufficiency     nonrheumtic    • Ascending aortic aneurysm (CMS/HCC)    • CAD (coronary artery disease)    • Chest pain    • Dizzy     CAREFUL WHEN GETTING UP   • Dyspnea    • Essential hypertension    • Fatigue    • Hyperglycemia    • Hyperlipidemia    • Hypersomnia with sleep apnea    • Lightheadedness    • Malaise and fatigue    • Morbid obesity (CMS/HCC)    • Myocardial ischemia    • Nocturia    • Pneumonia     FEB 2016   • Scrotal bleeding    • Shortness of breath    • Sleep apnea        Past Surgical History:   Procedure Laterality Date   • AORTIC VALVE REPAIR/REPLACEMENT  05/2016   • ASCENDING ARCH/HEMIARCH REPLACEMENT N/A 5/2/2016    Procedure: BHAVESH STERNOTOMY CORONARY ARTERY BYPASS GRAFT TIMES 3 USING LEFT INTERNAL MAMMARY ARTERY AND RIGHT GREATER SAPHENOUS VEIN GRAFT PER ENDOSCOPIC VEIN HARVESTING, AORTIC ANEURYSM REPAIR WITH ROOT REPAIR AND AORTIC VALVE REPLACEMENT;  Surgeon: Rosalio Cline MD;  Location: [facility];  Service:    • CARDIAC  "CATHETERIZATION N/A 4/1/2016    Procedure: Left Heart Cath;  Surgeon: Erick Tam MD;  Location: [facility] CATH INVASIVE LOCATION;  Service:    • CARDIAC CATHETERIZATION N/A 4/1/2016    Procedure: Left ventriculography;  Surgeon: Erick Tam MD;  Location: [facility] CATH INVASIVE LOCATION;  Service:    • CARDIAC CATHETERIZATION N/A 4/1/2016    Procedure: Right Heart Cath;  Surgeon: Erick Tam MD;  Location: [facility] CATH INVASIVE LOCATION;  Service:    • CARDIAC CATHETERIZATION N/A 10/30/2017    Procedure: Coronary angiography;  Surgeon: Sorin Vasquez MD;  Location[facility] CATH INVASIVE LOC[facility];  Service:    • CARDIAC CATHETERIZATION  10/30/2017    Procedure: Saphenous Vein Graft;  Surgeon: Sorin Vasquez MD;  Location: [facility] CATH INVASIVE LOCATION;  Service:    • CARDIAC CATHETERIZATION N/A 10/30/2017    Procedure: Native mammary injection;  Surgeon: Sorin Vasquez MD;  Location: [facility] CATH INVASIVE LOCATION;  Service:    • CORONARY ARTERY BYPASS GRAFT  05/2016    LIMA TO LAD, SVG TO PDA, SVG TO OM2   • INNER EAR SURGERY     • TONSILLECTOMY           Family and social history reviewed.     ROS  All other systems were reviewed and are negative          Objective:     Vitals:    07/12/19 1044   BP: 144/66   BP Location: Left arm   Patient Position: Sitting   Pulse: 67   Weight: (!) 147 kg (324 lb)   Height: 185.4 cm (73\")     Body mass index is 42.75 kg/m².    PHYSICAL EXAM:  Physical Exam   Constitutional: He is oriented to person, place, and time. He appears well-developed and well-nourished. No distress.   HENT:   Head: Normocephalic.   Eyes: Conjunctivae are normal.   Glasses   Neck: Normal range of motion. No JVD present.   Cardiovascular: Normal rate, regular rhythm and intact distal pulses.   Murmur heard.   Systolic murmur is present with a grade of 2/6 at the upper left sternal border.  Pulses:       Carotid pulses are 2+ on the right side, and 2+ on the left side.       Radial pulses are 2+ on the " right side, and 2+ on the left side.        Posterior tibial pulses are 2+ on the right side, and 2+ on the left side.   Pulmonary/Chest: Effort normal and breath sounds normal. No respiratory distress. He has no wheezes. He has no rhonchi. He has no rales. He exhibits no tenderness.   Abdominal: Soft. Bowel sounds are normal. He exhibits no distension.   Musculoskeletal: Normal range of motion. He exhibits no edema.   Neurological: He is alert and oriented to person, place, and time.   Skin: Skin is warm, dry and intact. No rash noted. He is not diaphoretic. No cyanosis.   Psychiatric: He has a normal mood and affect. His behavior is normal. Judgment and thought content normal.         ECG 12 Lead  Date/Time: 7/12/2019 11:50 AM  Performed by: Misa Lobo APRN  Authorized by: Misa Lobo APRN   Comparison: compared with previous ECG from 1/30/2019  Similar to previous ECG  Rhythm: sinus rhythm  Rate: normal  Conduction: 1st degree AV block  T inversion: I, aVL, II, V3, V4, V6 and V5  Comments: No significant change            Current Outpatient Medications   Medication Sig Dispense Refill   • albuterol sulfate  (90 Base) MCG/ACT inhaler Inhale 2 puffs Every 6 (Six) Hours As Needed for Wheezing. 1 inhaler 0   • amLODIPine (NORVASC) 10 MG tablet Take 10 mg by mouth Daily.     • amoxicillin (AMOXIL) 500 MG tablet TAKE ALL 4 PILLS 1 HOUR BEFORE DENTAL PROCEDURE 4 tablet 0   • aspirin 81 MG tablet Take 81 mg by mouth Daily.     • atorvastatin (LIPITOR) 20 MG tablet TAKE ONE TABLET BY MOUTH DAILY 90 tablet 0   • carvedilol (COREG) 12.5 MG tablet TAKE ONE TABLET BY MOUTH TWICE A DAY WITH MEALS 180 tablet 3     No current facility-administered medications for this visit.      Assessment:       Diagnosis Plan   1. S/P aortic valve replacement  Adult Transthoracic Echo Complete W/ Cont if Necessary Per Protocol   2. Dyspnea on exertion  Adult Transthoracic Echo Complete W/ Cont if Necessary Per Protocol   3. S/P  CABG (coronary artery bypass graft)     4. Essential hypertension     5. Ascending aorta dilatation (CMS/HCC)     6. Fatigue, unspecified type  Thyroid Panel With TSH        Orders Placed This Encounter   Procedures   • Thyroid Panel With TSH     Standing Status:   Future     Standing Expiration Date:   7/12/2020   • ECG 12 Lead     This order was created via procedure documentation   • Adult Transthoracic Echo Complete W/ Cont if Necessary Per Protocol     Standing Status:   Future     Standing Expiration Date:   7/11/2020     Order Specific Question:   Reason for exam?     Answer:   Dyspnea     Order Specific Question:   Reason for exam?     Answer:   Valvular Function     Order Specific Question:   Valvular Function specification?     Answer:   Prosthetic Valve     Order Specific Question:   Prosthetic Valve specification?     Answer:   Suspected Dysfunction         Plan:       1.  Coronary artery disease with history of coronary artery bypass grafting times 3 May 2016 with BROCK to LAD, individual vein graft to lateral marginal branch and individual grain graft to PDA.  Cardiac cath 2017 showed only LIMA to LAD to be patent.  Other 2 grafts occluded and medical management recommended.  Continues to have atypical chest pain which is nonexertional.  He is exercising now routinely walking an hour most days of the week which does not exacerbate that.  Continue to follow clinically.  Coronary Artery Disease  Assessment  • The patient has no angina  • The patient is having symptoms consistent with unstable angina     Plan  • The patient is being referred to interventional cardiology  • Lifestyle modifications discussed include adhering to a heart healthy diet, avoidance of tobacco products, maintenance of a healthy weight, medication compliance, regular exercise and regular monitoring of cholesterol and blood pressure    Subjective - Objective  • Current antiplatelet therapy includes aspirin 81 mg      2.  Ascending  aortic aneurysm status post repair May 2016  3.  Status post aortic valve replacement with a pericardial valve last echo July 2017.  He will return for repeat echocardiogram  4.  Carotid artery disease with right internal carotid mild stenosis April 2016  5.  Hypertension-appears elevated today but he has not been checking at home.  I have given him a list to start keeping track of this at home  6.  Obstructive sleep apnea compliant with CPAP  7.  Morbid obesity this greatly impacts his health.  He has started to exercise more frequently.  He would benefit from weight loss  8.  History of anxiety-he weaned off of Celexa and may  9. Fatigue-this seems to be a chronic complaint of electrolytes chemistries and blood count are unremarkable a week ago.  We will check thyroid panel and as above check echocardiogram.  10.  Reported wheezing at rest not appreciated on auscultation today.  His BNP was normal last week.  He has never smoked.  He denies history of asthma but reports history of allergies.  He is to follow-up with his PCP      Follow up in 10/2020 as scheduled.             It has been a pleasure to participate in this patient's care.      Thank you,  VIKTORIA Solorio      **Maria Eugenia Disclaimer:**  Much of this encounter note is an electronic transcription/translation of spoken language to printed text. The electronic translation of spoken language may permit erroneous, or at times, nonsensical words or phrases to be inadvertently transcribed. Although I have reviewed the note for such errors, some may still exist.

## 2019-07-12 ENCOUNTER — OFFICE VISIT (OUTPATIENT)
Dept: CARDIOLOGY | Facility: CLINIC | Age: 59
End: 2019-07-12

## 2019-07-12 VITALS
HEIGHT: 73 IN | SYSTOLIC BLOOD PRESSURE: 144 MMHG | WEIGHT: 315 LBS | HEART RATE: 67 BPM | DIASTOLIC BLOOD PRESSURE: 66 MMHG | BODY MASS INDEX: 41.75 KG/M2

## 2019-07-12 DIAGNOSIS — R06.09 DYSPNEA ON EXERTION: ICD-10-CM

## 2019-07-12 DIAGNOSIS — I77.810 ASCENDING AORTA DILATATION (HCC): ICD-10-CM

## 2019-07-12 DIAGNOSIS — R53.83 FATIGUE, UNSPECIFIED TYPE: ICD-10-CM

## 2019-07-12 DIAGNOSIS — I10 ESSENTIAL HYPERTENSION: ICD-10-CM

## 2019-07-12 DIAGNOSIS — Z95.1 S/P CABG (CORONARY ARTERY BYPASS GRAFT): ICD-10-CM

## 2019-07-12 DIAGNOSIS — Z95.2 S/P AORTIC VALVE REPLACEMENT: Primary | ICD-10-CM

## 2019-07-12 PROCEDURE — 93000 ELECTROCARDIOGRAM COMPLETE: CPT | Performed by: NURSE PRACTITIONER

## 2019-07-12 PROCEDURE — 99214 OFFICE O/P EST MOD 30 MIN: CPT | Performed by: NURSE PRACTITIONER

## 2019-07-15 ENCOUNTER — HOSPITAL ENCOUNTER (OUTPATIENT)
Dept: CARDIOLOGY | Facility: HOSPITAL | Age: 59
Discharge: HOME | End: 2019-07-15
Admitting: NURSE PRACTITIONER

## 2019-07-15 ENCOUNTER — LAB (OUTPATIENT)
Dept: LAB | Facility: HOSPITAL | Age: 59
End: 2019-07-15

## 2019-07-15 VITALS
HEIGHT: 73 IN | BODY MASS INDEX: 41.75 KG/M2 | SYSTOLIC BLOOD PRESSURE: 140 MMHG | HEART RATE: 73 BPM | WEIGHT: 315 LBS | DIASTOLIC BLOOD PRESSURE: 60 MMHG

## 2019-07-15 DIAGNOSIS — R53.83 FATIGUE, UNSPECIFIED TYPE: ICD-10-CM

## 2019-07-15 DIAGNOSIS — Z95.2 S/P AORTIC VALVE REPLACEMENT: ICD-10-CM

## 2019-07-15 DIAGNOSIS — R06.09 DYSPNEA ON EXERTION: ICD-10-CM

## 2019-07-15 LAB
T-UPTAKE NFR SERPL: 1.08 TBI (ref 0.8–1.3)
T4 SERPL-MCNC: 7.18 MCG/DL (ref 4.5–11.7)
TSH SERPL DL<=0.05 MIU/L-ACNC: 2.77 MIU/ML (ref 0.27–4.2)

## 2019-07-15 PROCEDURE — 0399T ADULT TRANSTHORACIC ECHO COMPLETE W/ CONT IF NECESSARY PER PROTOCOL: CPT | Performed by: INTERNAL MEDICINE

## 2019-07-15 PROCEDURE — 93306 TTE W/DOPPLER COMPLETE: CPT | Performed by: INTERNAL MEDICINE

## 2019-07-15 PROCEDURE — 84443 ASSAY THYROID STIM HORMONE: CPT

## 2019-07-15 PROCEDURE — 0399T HC MYOCARDL STRAIN IMAG QUAN ASSMT PER SESS: CPT

## 2019-07-15 PROCEDURE — 84479 ASSAY OF THYROID (T3 OR T4): CPT

## 2019-07-15 PROCEDURE — 84436 ASSAY OF TOTAL THYROXINE: CPT

## 2019-07-15 PROCEDURE — 36415 COLL VENOUS BLD VENIPUNCTURE: CPT

## 2019-07-15 PROCEDURE — 93306 TTE W/DOPPLER COMPLETE: CPT

## 2019-07-16 ENCOUNTER — TELEPHONE (OUTPATIENT)
Dept: CARDIOLOGY | Facility: CLINIC | Age: 59
End: 2019-07-16

## 2019-07-16 LAB
AORTIC ARCH: 3.3 CM
AORTIC ROOT ANNULUS: 2.4 CM
ASCENDING AORTA: 3.1 CM
BH CV ECHO MEAS - AO MAX PG (FULL): 31.1 MMHG
BH CV ECHO MEAS - AO MAX PG: 39.3 MMHG
BH CV ECHO MEAS - AO MEAN PG (FULL): 17.2 MMHG
BH CV ECHO MEAS - AO MEAN PG: 21.2 MMHG
BH CV ECHO MEAS - AO V2 MAX: 313.6 CM/SEC
BH CV ECHO MEAS - AO V2 MEAN: 223.2 CM/SEC
BH CV ECHO MEAS - AO V2 VTI: 72.3 CM
BH CV ECHO MEAS - AVA(I,A): 1.3 CM^2
BH CV ECHO MEAS - AVA(I,D): 1.3 CM^2
BH CV ECHO MEAS - AVA(V,A): 1.4 CM^2
BH CV ECHO MEAS - AVA(V,D): 1.4 CM^2
BH CV ECHO MEAS - BSA(HAYCOCK): 2.8 M^2
BH CV ECHO MEAS - BSA: 2.6 M^2
BH CV ECHO MEAS - BZI_BMI: 42.7 KILOGRAMS/M^2
BH CV ECHO MEAS - BZI_METRIC_HEIGHT: 185.4 CM
BH CV ECHO MEAS - BZI_METRIC_WEIGHT: 147 KG
BH CV ECHO MEAS - EDV(MOD-SP2): 159 ML
BH CV ECHO MEAS - EDV(MOD-SP4): 122 ML
BH CV ECHO MEAS - EDV(TEICH): 137.9 ML
BH CV ECHO MEAS - EF(CUBED): 82.1 %
BH CV ECHO MEAS - EF(MOD-BP): 59 %
BH CV ECHO MEAS - EF(MOD-SP2): 59.7 %
BH CV ECHO MEAS - EF(MOD-SP4): 57.4 %
BH CV ECHO MEAS - EF(TEICH): 74.4 %
BH CV ECHO MEAS - ESV(MOD-SP2): 64 ML
BH CV ECHO MEAS - ESV(MOD-SP4): 52 ML
BH CV ECHO MEAS - ESV(TEICH): 35.3 ML
BH CV ECHO MEAS - IVS/LVPW: 1
BH CV ECHO MEAS - IVSD: 1.5 CM
BH CV ECHO MEAS - LAT PEAK E' VEL: 10 CM/SEC
BH CV ECHO MEAS - LV DIASTOLIC VOL/BSA (35-75): 46.2 ML/M^2
BH CV ECHO MEAS - LV MASS(C)D: 350.6 GRAMS
BH CV ECHO MEAS - LV MASS(C)DI: 132.7 GRAMS/M^2
BH CV ECHO MEAS - LV MAX PG: 8.2 MMHG
BH CV ECHO MEAS - LV MEAN PG: 4.1 MMHG
BH CV ECHO MEAS - LV SYSTOLIC VOL/BSA (12-30): 19.7 ML/M^2
BH CV ECHO MEAS - LV V1 MAX: 143.1 CM/SEC
BH CV ECHO MEAS - LV V1 MEAN: 94.6 CM/SEC
BH CV ECHO MEAS - LV V1 VTI: 29.6 CM
BH CV ECHO MEAS - LVIDD: 5.3 CM
BH CV ECHO MEAS - LVIDS: 3 CM
BH CV ECHO MEAS - LVLD AP2: 9.4 CM
BH CV ECHO MEAS - LVLD AP4: 9.6 CM
BH CV ECHO MEAS - LVLS AP2: 8 CM
BH CV ECHO MEAS - LVLS AP4: 8.6 CM
BH CV ECHO MEAS - LVOT AREA (M): 3.1 CM^2
BH CV ECHO MEAS - LVOT AREA: 3.1 CM^2
BH CV ECHO MEAS - LVOT DIAM: 2 CM
BH CV ECHO MEAS - LVPWD: 1.5 CM
BH CV ECHO MEAS - MED PEAK E' VEL: 8 CM/SEC
BH CV ECHO MEAS - MR MAX PG: 23.7 MMHG
BH CV ECHO MEAS - MR MAX VEL: 243.2 CM/SEC
BH CV ECHO MEAS - MV A DUR: 0.13 SEC
BH CV ECHO MEAS - MV A MAX VEL: 57.7 CM/SEC
BH CV ECHO MEAS - MV DEC SLOPE: 435.8 CM/SEC^2
BH CV ECHO MEAS - MV DEC TIME: 0.19 SEC
BH CV ECHO MEAS - MV E MAX VEL: 87.8 CM/SEC
BH CV ECHO MEAS - MV E/A: 1.5
BH CV ECHO MEAS - MV MAX PG: 3.3 MMHG
BH CV ECHO MEAS - MV MEAN PG: 1.2 MMHG
BH CV ECHO MEAS - MV P1/2T MAX VEL: 87.3 CM/SEC
BH CV ECHO MEAS - MV P1/2T: 58.7 MSEC
BH CV ECHO MEAS - MV V2 MAX: 90.5 CM/SEC
BH CV ECHO MEAS - MV V2 MEAN: 53.1 CM/SEC
BH CV ECHO MEAS - MV V2 VTI: 28.6 CM
BH CV ECHO MEAS - MVA P1/2T LCG: 2.5 CM^2
BH CV ECHO MEAS - MVA(P1/2T): 3.7 CM^2
BH CV ECHO MEAS - MVA(VTI): 3.2 CM^2
BH CV ECHO MEAS - PA ACC TIME: 0.13 SEC
BH CV ECHO MEAS - PA MAX PG (FULL): 2.6 MMHG
BH CV ECHO MEAS - PA MAX PG: 4.3 MMHG
BH CV ECHO MEAS - PA PR(ACCEL): 20.4 MMHG
BH CV ECHO MEAS - PA V2 MAX: 104.2 CM/SEC
BH CV ECHO MEAS - PULM A REVS DUR: 0.13 SEC
BH CV ECHO MEAS - PULM A REVS VEL: 24.8 CM/SEC
BH CV ECHO MEAS - PULM DIAS VEL: 78.5 CM/SEC
BH CV ECHO MEAS - PULM S/D: 0.62
BH CV ECHO MEAS - PULM SYS VEL: 48.3 CM/SEC
BH CV ECHO MEAS - PVA(V,A): 3 CM^2
BH CV ECHO MEAS - PVA(V,D): 3 CM^2
BH CV ECHO MEAS - QP/QS: 0.71
BH CV ECHO MEAS - RAP SYSTOLE: 3 MMHG
BH CV ECHO MEAS - RV MAX PG: 1.8 MMHG
BH CV ECHO MEAS - RV MEAN PG: 0.9 MMHG
BH CV ECHO MEAS - RV V1 MAX: 66.5 CM/SEC
BH CV ECHO MEAS - RV V1 MEAN: 43.1 CM/SEC
BH CV ECHO MEAS - RV V1 VTI: 13.8 CM
BH CV ECHO MEAS - RVOT AREA: 4.7 CM^2
BH CV ECHO MEAS - RVOT DIAM: 2.5 CM
BH CV ECHO MEAS - RVSP: 17 MMHG
BH CV ECHO MEAS - SI(CUBED): 47.4 ML/M^2
BH CV ECHO MEAS - SI(LVOT): 34.7 ML/M^2
BH CV ECHO MEAS - SI(MOD-SP2): 36 ML/M^2
BH CV ECHO MEAS - SI(MOD-SP4): 26.5 ML/M^2
BH CV ECHO MEAS - SI(TEICH): 38.8 ML/M^2
BH CV ECHO MEAS - SUP REN AO DIAM: 2.8 CM
BH CV ECHO MEAS - SV(CUBED): 125.3 ML
BH CV ECHO MEAS - SV(LVOT): 91.7 ML
BH CV ECHO MEAS - SV(MOD-SP2): 95 ML
BH CV ECHO MEAS - SV(MOD-SP4): 70 ML
BH CV ECHO MEAS - SV(RVOT): 65.3 ML
BH CV ECHO MEAS - SV(TEICH): 102.6 ML
BH CV ECHO MEAS - TAPSE (>1.6): 2 CM2
BH CV ECHO MEAS - TR MAX VEL: 187.7 CM/SEC
BH CV ECHO MEASUREMENTS AVERAGE E/E' RATIO: 9.76
BH CV XLRA - RV BASE: 3.3 CM
BH CV XLRA - TDI S': 11 CM/SEC
LEFT ATRIUM VOLUME INDEX: 37 ML/M2
SINUS: 4.4 CM
STJ: 2.9 CM

## 2019-07-16 NOTE — TELEPHONE ENCOUNTER
Reported echo results and thyroid panel results. His /85 after his carvedilol and 145/88. HR is 60-70 s. He continues to complain of fatigue. We discussed diet and exercise and weight loss. His goal is 130/80 or less and he wants to try lifestyle modification.  He has also been using albuterol inhaler and wonders if that is increasing his blood pressure.

## 2019-08-28 RX ORDER — ATORVASTATIN CALCIUM 20 MG/1
20 TABLET, FILM COATED ORAL DAILY
Qty: 90 TABLET | Refills: 0 | Status: SHIPPED | OUTPATIENT
Start: 2019-08-28 | End: 2019-12-09 | Stop reason: SDUPTHER

## 2019-09-17 RX ORDER — AMOXICILLIN 500 MG/1
TABLET, FILM COATED ORAL
Qty: 4 TABLET | Refills: 0 | Status: SHIPPED | OUTPATIENT
Start: 2019-09-17 | End: 2019-10-24 | Stop reason: SDUPTHER

## 2019-10-08 ENCOUNTER — OFFICE VISIT (OUTPATIENT)
Dept: CARDIOLOGY | Facility: CLINIC | Age: 59
End: 2019-10-08

## 2019-10-08 VITALS
BODY MASS INDEX: 41.75 KG/M2 | SYSTOLIC BLOOD PRESSURE: 132 MMHG | HEART RATE: 71 BPM | OXYGEN SATURATION: 98 % | WEIGHT: 315 LBS | HEIGHT: 73 IN | DIASTOLIC BLOOD PRESSURE: 82 MMHG

## 2019-10-08 DIAGNOSIS — I10 ESSENTIAL HYPERTENSION: ICD-10-CM

## 2019-10-08 DIAGNOSIS — I71.21 ASCENDING AORTIC ANEURYSM (HCC): ICD-10-CM

## 2019-10-08 DIAGNOSIS — Z95.2 S/P AVR (AORTIC VALVE REPLACEMENT): Primary | ICD-10-CM

## 2019-10-08 DIAGNOSIS — Z95.1 S/P CABG (CORONARY ARTERY BYPASS GRAFT): ICD-10-CM

## 2019-10-08 PROCEDURE — 99214 OFFICE O/P EST MOD 30 MIN: CPT | Performed by: INTERNAL MEDICINE

## 2019-10-08 RX ORDER — METOPROLOL SUCCINATE 25 MG/1
25 TABLET, EXTENDED RELEASE ORAL DAILY
Qty: 30 TABLET | Refills: 11 | Status: SHIPPED | OUTPATIENT
Start: 2019-10-08 | End: 2019-11-04 | Stop reason: SDUPTHER

## 2019-10-10 NOTE — PROGRESS NOTES
Subjective:     Encounter Date:10/08/19      Patient ID: Edmond Chase is a 59 y.o. male.    Chief Complaint:  Coronary Artery Disease   Presents for follow-up visit. Symptoms include shortness of breath. Pertinent negatives include no chest pressure. The symptoms have been worsening. Compliance with diet is good. Compliance with exercise is good. Compliance with medications is good.   Hypertension   This is a chronic problem. The problem is controlled. Associated symptoms include malaise/fatigue and shortness of breath.       57-year-old gentleman who presents today for reevaluation.  Patient overall is doing relatively well.  He is having these strange sensations after he takes his initial dose of Coreg.  He could easily be having some side effects of it but his heart rate and blood pressure are fine so I am not quite sure what to do with it.  This point I would follow him clinically and see what evolves.        Review of Systems   Constitution: Positive for malaise/fatigue.   Respiratory: Positive for shortness of breath.    All other systems reviewed and are negative.      Procedures         Objective:     Physical Exam   Constitutional: He is oriented to person, place, and time. He appears well-developed.   HENT:   Head: Normocephalic.   Eyes: Conjunctivae are normal.   Neck: Normal range of motion.   Cardiovascular: Normal rate, regular rhythm and normal heart sounds.   Pulmonary/Chest: Breath sounds normal.   Abdominal: Soft. Bowel sounds are normal.   Musculoskeletal: Normal range of motion. He exhibits no edema.   Neurological: He is alert and oriented to person, place, and time.   Skin: Skin is warm and dry.   Psychiatric: He has a normal mood and affect. His behavior is normal.   Vitals reviewed.      Lab Review:       Assessment:          Diagnosis Plan   1. S/P AVR (aortic valve replacement)     2. S/P CABG (coronary artery bypass graft)     3. Ascending aortic aneurysm (CMS/HCC)     4. Essential  hypertension            Plan:       1.  History of coronary artery disease.  Status post coronary artery bypass grafting.  Patient has recurrent chest discomforts that has not changed in many years.  2.  Hypertension blood pressures great  3.  Aortic valve replacement.  Last echo was a little over a year ago.  4.  Overall patient is doing well continue the same follow-up in 6 months        Coronary Artery Disease  Assessment  • The patient has no angina  • The patient is having symptoms consistent with unstable angina     Plan  • The patient is being referred to interventional cardiology  • Lifestyle modifications discussed include adhering to a heart healthy diet, avoidance of tobacco products, maintenance of a healthy weight, medication compliance, regular exercise and regular monitoring of cholesterol and blood pressure    Subjective - Objective  • Current antiplatelet therapy includes aspirin 81 mg

## 2019-10-23 ENCOUNTER — TELEPHONE (OUTPATIENT)
Dept: CARDIOLOGY | Facility: CLINIC | Age: 59
End: 2019-10-23

## 2019-10-23 NOTE — TELEPHONE ENCOUNTER
Pt left msg @ 1:14 pm saying he has a dental appt next week and needs premeds.      Thanks,  Sweta

## 2019-10-24 RX ORDER — AMOXICILLIN 500 MG/1
TABLET, FILM COATED ORAL
Qty: 4 TABLET | Refills: 0 | Status: ON HOLD | OUTPATIENT
Start: 2019-10-24 | End: 2020-07-07

## 2019-11-04 RX ORDER — METOPROLOL SUCCINATE 25 MG/1
25 TABLET, EXTENDED RELEASE ORAL DAILY
Qty: 30 TABLET | Refills: 11 | Status: SHIPPED | OUTPATIENT
Start: 2019-11-04 | End: 2020-10-21 | Stop reason: SDUPTHER

## 2019-12-09 ENCOUNTER — TELEPHONE (OUTPATIENT)
Dept: CARDIOLOGY | Facility: CLINIC | Age: 59
End: 2019-12-09

## 2019-12-09 DIAGNOSIS — I25.10 CAD IN NATIVE ARTERY: Primary | ICD-10-CM

## 2019-12-09 RX ORDER — ATORVASTATIN CALCIUM 20 MG/1
20 TABLET, FILM COATED ORAL DAILY
Qty: 90 TABLET | Refills: 0 | Status: SHIPPED | OUTPATIENT
Start: 2019-12-09 | End: 2020-10-21 | Stop reason: SDUPTHER

## 2019-12-10 NOTE — TELEPHONE ENCOUNTER
Spoke to pt to let him know lab orders had been put in and to get fasting labs as soon as he can.  Pt verbalized understanding./prt

## 2020-02-28 ENCOUNTER — LAB (OUTPATIENT)
Dept: LAB | Facility: HOSPITAL | Age: 60
End: 2020-02-28

## 2020-02-28 ENCOUNTER — TELEPHONE (OUTPATIENT)
Dept: CARDIOLOGY | Facility: CLINIC | Age: 60
End: 2020-02-28

## 2020-02-28 DIAGNOSIS — I25.10 CAD IN NATIVE ARTERY: ICD-10-CM

## 2020-02-28 LAB
ALBUMIN SERPL-MCNC: 4.1 G/DL (ref 3.5–5.2)
ALBUMIN/GLOB SERPL: 1.6 G/DL
ALP SERPL-CCNC: 63 U/L (ref 39–117)
ALT SERPL W P-5'-P-CCNC: 31 U/L (ref 1–41)
ANION GAP SERPL CALCULATED.3IONS-SCNC: 11.3 MMOL/L (ref 5–15)
AST SERPL-CCNC: 73 U/L (ref 1–40)
BILIRUB SERPL-MCNC: 0.5 MG/DL (ref 0.2–1.2)
BUN BLD-MCNC: 14 MG/DL (ref 8–23)
BUN/CREAT SERPL: 15.1 (ref 7–25)
CALCIUM SPEC-SCNC: 8.6 MG/DL (ref 8.6–10.5)
CHLORIDE SERPL-SCNC: 103 MMOL/L (ref 98–107)
CHOLEST SERPL-MCNC: 115 MG/DL (ref 0–200)
CO2 SERPL-SCNC: 27.7 MMOL/L (ref 22–29)
CREAT BLD-MCNC: 0.93 MG/DL (ref 0.76–1.27)
GFR SERPL CREATININE-BSD FRML MDRD: 83 ML/MIN/1.73
GLOBULIN UR ELPH-MCNC: 2.5 GM/DL
GLUCOSE BLD-MCNC: 162 MG/DL (ref 65–99)
HDLC SERPL-MCNC: 35 MG/DL (ref 40–60)
LDLC SERPL CALC-MCNC: 56 MG/DL (ref 0–100)
LDLC/HDLC SERPL: 1.61 {RATIO}
POTASSIUM BLD-SCNC: 4.4 MMOL/L (ref 3.5–5.2)
PROT SERPL-MCNC: 6.6 G/DL (ref 6–8.5)
SODIUM BLD-SCNC: 142 MMOL/L (ref 136–145)
TRIGL SERPL-MCNC: 118 MG/DL (ref 0–150)
VLDLC SERPL-MCNC: 23.6 MG/DL (ref 5–40)

## 2020-02-28 PROCEDURE — 36415 COLL VENOUS BLD VENIPUNCTURE: CPT

## 2020-02-28 PROCEDURE — 80053 COMPREHEN METABOLIC PANEL: CPT

## 2020-02-28 PROCEDURE — 80061 LIPID PANEL: CPT

## 2020-06-17 ENCOUNTER — TELEPHONE (OUTPATIENT)
Dept: CARDIOLOGY | Facility: CLINIC | Age: 60
End: 2020-06-17

## 2020-06-18 ENCOUNTER — OFFICE VISIT (OUTPATIENT)
Dept: CARDIOLOGY | Facility: CLINIC | Age: 60
End: 2020-06-18

## 2020-06-18 VITALS
SYSTOLIC BLOOD PRESSURE: 148 MMHG | DIASTOLIC BLOOD PRESSURE: 86 MMHG | BODY MASS INDEX: 41.08 KG/M2 | OXYGEN SATURATION: 98 % | WEIGHT: 310 LBS | HEART RATE: 74 BPM | HEIGHT: 73 IN

## 2020-06-18 DIAGNOSIS — I10 ESSENTIAL HYPERTENSION: ICD-10-CM

## 2020-06-18 DIAGNOSIS — I71.21 ASCENDING AORTIC ANEURYSM (HCC): ICD-10-CM

## 2020-06-18 DIAGNOSIS — Z95.1 S/P CABG (CORONARY ARTERY BYPASS GRAFT): ICD-10-CM

## 2020-06-18 DIAGNOSIS — Z95.2 S/P AVR (AORTIC VALVE REPLACEMENT): ICD-10-CM

## 2020-06-18 DIAGNOSIS — G47.33 OBSTRUCTIVE SLEEP APNEA, ADULT: ICD-10-CM

## 2020-06-18 DIAGNOSIS — I25.708 CORONARY ARTERY DISEASE OF BYPASS GRAFT OF NATIVE HEART WITH STABLE ANGINA PECTORIS (HCC): Primary | ICD-10-CM

## 2020-06-18 PROCEDURE — 99214 OFFICE O/P EST MOD 30 MIN: CPT | Performed by: NURSE PRACTITIONER

## 2020-06-18 RX ORDER — LISINOPRIL 5 MG/1
10 TABLET ORAL
COMMUNITY
Start: 2020-06-17 | End: 2021-06-24 | Stop reason: DRUGHIGH

## 2020-06-18 NOTE — PROGRESS NOTES
[facility] CARDIOLOGY  [address]  [facility] [address]  Phone: [phone]      Patient Name: Edmond Chase  :1960  Age: 59 y.o.  Primary Cardiologist: Papa Miguel MD  Encounter Provider:  VIKTORIA Van      Chief Complaint:   Chief Complaint   Patient presents with   • Follow-up     6 months         HPI  Edmond Chase is a 59 y.o. male who presents today for semiannual follow-up.     Pt has a  history significant for CAD with history of CABG, ascending aortic aneurysm, history of aortic valve replacement, hypertension, obesity, TJ.    Patient states that he has not done well over the past 6 months.  He states that he has had increased fatigue, shortness of breath, vertigo symptoms that make it challenging for him to walk.  He also adds a sensation of chest pressure.  Patient admits that he has been taking a low dose of his mother's extra carvedilol on occasion, once to twice monthly.  He states that when he has taken the carvedilol his symptoms have improved.  He states that he has taken this in addition to his home dose of metoprolol succinate.   He states that in these situations his HR has been elevated.  Patient admits that he exercises a few times a week, but knows that he should be getting more exercise.  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  Patient states that he has a dull ache to the mid sternal region that has been present since his CABG, he also has a sensation of chest pressure and associated SOA when he exerts himself, he admits that his activity level is low and that his exercise only consists of occasional walking.  He states that after a long period of resting the chest pressure resolves.  He also complains of generalized fatigue, staminal and poor exercise intolerance over the past several months.      The following portions of the patient's history were reviewed  "and updated as appropriate: allergies, current medications, past family history, past medical history, past social history, past surgical history and problem list.      Review of Systems   Constitution: Positive for malaise/fatigue.   Cardiovascular: Positive for dyspnea on exertion and palpitations. Negative for chest pain and leg swelling.   Respiratory: Negative for shortness of breath.    Neurological: Positive for dizziness. Negative for light-headedness.   All other systems reviewed and are negative.      OBJECTIVE:     Vitals:    06/18/20 1107   BP: 148/86   BP Location: Right arm   Patient Position: Sitting   Pulse: 74   SpO2: 98%   Weight: (!) 141 kg (310 lb)   Height: 185.4 cm (73\")     Body mass index is 40.9 kg/m².    Physical Exam   Constitutional: He is oriented to person, place, and time. Vital signs are normal. He appears well-developed and well-nourished.   Eyes: Conjunctivae are normal.   Neck: Carotid bruit is not present.   Cardiovascular: Normal rate and regular rhythm.   Murmur heard.   Blowing midsystolic murmur is present with a grade of 3/6 at the upper right sternal border and upper left sternal border.  Pulmonary/Chest: Effort normal and breath sounds normal.   Abdominal: Normal appearance.   Musculoskeletal: Normal range of motion.   No pedal edema   Neurological: He is alert and oriented to person, place, and time. GCS eye subscore is 4. GCS verbal subscore is 5. GCS motor subscore is 6.   Skin: Skin is warm and dry.   Psychiatric: He has a normal mood and affect. His speech is normal and behavior is normal. Judgment and thought content normal. Cognition and memory are normal.       Procedures    Cardiac Procedures:  1. Echocardiogram 6/27/2017.  LVEF 56%.  Normal LV cavity size.  All LV wall segments contract normally.  Moderate LVH.  LAE.  RA is moderately dilated.  Bioprosthetic aortic valve with peak and mean gradients elevated, however dimensionless index is 0.34 which suggests " normal valve function.  No regurgitation.  Mild MAC.  Mild mitral valve regurgitation.  Trace tricuspid valve regurgitation.  Calculated RVSP 3 mmHg.  2. Myocardial perfusion stress test 7/20/2017.  Small sized infarct in the inferior wall with moderate brijesh-infarct ischemia.  Impressions consistent with intermediate risk study.  3. Cardiac catheterization 10/30/2017.  Three-vessel CAD, patent LIMA to LAD, occluded AO-OMB SVG, occluded AO-PDA SVG recommend medical therapy.        ASSESSMENT:      Diagnosis Plan   1. Coronary artery disease of bypass graft of native heart with stable angina pectoris (CMS/HCC)  Adult Transthoracic Echo Complete W/ Cont if Necessary Per Protocol    Stress Test With Myocardial Perfusion One Day   2. S/P CABG (coronary artery bypass graft)  Stress Test With Myocardial Perfusion One Day   3. S/P AVR (aortic valve replacement)  Adult Transthoracic Echo Complete W/ Cont if Necessary Per Protocol   4. Ascending aortic aneurysm (CMS/[facility])  Adult Transthoracic Echo Complete W/ Cont if Necessary Per Protocol   5. Essential hypertension     6. Obstructive sleep apnea, adult           PLAN OF CARE:     1. CAD.  Patient with history of CABG.  Patient states that he has having symptoms consistent with stable angina.  He reports that he has chest pressure when he exerts himself that resolves after several minutes of rest.  He also reports increased fatigue and decreased exercise tolerance.  I am concerned with patient's symptoms and will make arrangements for myocardial perfusion stress test as well as echocardiogram.  2. History of CABG.  3. History of AVR.  Patient having symptoms consistent with stable angina, shortness of breath and increased fatigue over the past several months.  Patient's AVR has not been assessed for 3 years.  His murmur today seems more prominent than what is documented on prior exams.  Will obtain echocardiogram to further evaluate valvular function.  4. History of ascending  aortic aneurysm.  Patient had a CTA chest from the ER in the summer 2019.  Aneurysm repair stable at that time.  5. Hypertension.  Blood pressure controlled in office today.  Continue lisinopril 5 mg/day, metoprolol succinate 25 mg/day, amlodipine 10 mg/day.  6. TJ.  7. Follow-up to be arranged after above-mentioned testing.             Discharge Medications           Accurate as of June 18, 2020 11:26 AM. If you have any questions, ask your nurse or doctor.               Continue These Medications      Instructions Start Date   albuterol sulfate  (90 Base) MCG/ACT inhaler  Commonly known as:  PROVENTIL HFA;VENTOLIN HFA;PROAIR HFA   2 puffs, Inhalation, Every 6 Hours PRN      amLODIPine 10 MG tablet  Commonly known as:  NORVASC   10 mg, Oral, Daily      amoxicillin 500 MG tablet  Commonly known as:  AMOXIL   TAKE ALL 4 PILLS 1 HOUR BEFORE DENTAL PROCEDURE      aspirin 81 MG tablet   81 mg, Oral, Daily      atorvastatin 20 MG tablet  Commonly known as:  LIPITOR   20 mg, Oral, Daily      lisinopril 5 MG tablet  Commonly known as:  PRINIVIL,ZESTRIL   No dose, route, or frequency recorded.      metFORMIN 500 MG tablet  Commonly known as:  GLUCOPHAGE   No dose, route, or frequency recorded.      metoprolol succinate XL 25 MG 24 hr tablet  Commonly known as:  TOPROL-XL   25 mg, Oral, Daily             Thank you for allowing me to participate in the care of your patient,         VIKTORIA Van  [facility]  06/18/20  11:26 AM      **Maria Eugenia Disclaimer:**  Much of this encounter note is an electronic transcription/translation of spoken language to printed text. The electronic translation of spoken language may permit erroneous, or at times, nonsensical words or phrases to be inadvertently transcribed. Although I have reviewed the note for such errors, some may still exist.

## 2020-07-01 ENCOUNTER — LAB (OUTPATIENT)
Dept: LAB | Facility: HOSPITAL | Age: 60
End: 2020-07-01

## 2020-07-01 ENCOUNTER — TRANSCRIBE ORDERS (OUTPATIENT)
Dept: SLEEP MEDICINE | Facility: HOSPITAL | Age: 60
End: 2020-07-01

## 2020-07-01 ENCOUNTER — HOSPITAL ENCOUNTER (OUTPATIENT)
Dept: CARDIOLOGY | Facility: HOSPITAL | Age: 60
Discharge: HOME | End: 2020-07-01
Admitting: NURSE PRACTITIONER

## 2020-07-01 ENCOUNTER — TRANSCRIBE ORDERS (OUTPATIENT)
Dept: CARDIOLOGY | Facility: CLINIC | Age: 60
End: 2020-07-01

## 2020-07-01 ENCOUNTER — HOSPITAL ENCOUNTER (OUTPATIENT)
Dept: CARDIOLOGY | Facility: HOSPITAL | Age: 60
Discharge: HOME | End: 2020-07-01

## 2020-07-01 VITALS
SYSTOLIC BLOOD PRESSURE: 160 MMHG | DIASTOLIC BLOOD PRESSURE: 100 MMHG | WEIGHT: 310 LBS | BODY MASS INDEX: 41.08 KG/M2 | HEIGHT: 73 IN

## 2020-07-01 DIAGNOSIS — I25.708 CORONARY ARTERY DISEASE OF BYPASS GRAFT OF NATIVE HEART WITH STABLE ANGINA PECTORIS (HCC): Primary | ICD-10-CM

## 2020-07-01 DIAGNOSIS — Z01.810 PREPROCEDURAL CARDIOVASCULAR EXAMINATION: ICD-10-CM

## 2020-07-01 DIAGNOSIS — Z01.818 OTHER SPECIFIED PRE-OPERATIVE EXAMINATION: Primary | ICD-10-CM

## 2020-07-01 DIAGNOSIS — Z13.6 SCREENING FOR CARDIOVASCULAR CONDITION: ICD-10-CM

## 2020-07-01 DIAGNOSIS — I25.708 CORONARY ARTERY DISEASE OF BYPASS GRAFT OF NATIVE HEART WITH STABLE ANGINA PECTORIS (HCC): ICD-10-CM

## 2020-07-01 DIAGNOSIS — I71.21 ASCENDING AORTIC ANEURYSM (HCC): ICD-10-CM

## 2020-07-01 DIAGNOSIS — Z13.6 SCREENING FOR CARDIOVASCULAR CONDITION: Primary | ICD-10-CM

## 2020-07-01 DIAGNOSIS — Z95.1 S/P CABG (CORONARY ARTERY BYPASS GRAFT): ICD-10-CM

## 2020-07-01 DIAGNOSIS — Z95.2 S/P AVR (AORTIC VALVE REPLACEMENT): ICD-10-CM

## 2020-07-01 LAB
ANION GAP SERPL CALCULATED.3IONS-SCNC: 10 MMOL/L (ref 5–15)
BASOPHILS # BLD AUTO: 0.03 10*3/MM3 (ref 0–0.2)
BASOPHILS NFR BLD AUTO: 0.4 % (ref 0–1.5)
BH CV NUCLEAR PRIOR STUDY: 2
BH CV STRESS BP STAGE 1: NORMAL
BH CV STRESS COMMENTS STAGE 1: NORMAL
BH CV STRESS DOSE REGADENOSON STAGE 1: 0.4
BH CV STRESS DURATION MIN STAGE 1: 0
BH CV STRESS DURATION SEC STAGE 1: 10
BH CV STRESS HR STAGE 1: 81
BH CV STRESS PROTOCOL 1: NORMAL
BH CV STRESS RECOVERY BP: NORMAL MMHG
BH CV STRESS RECOVERY HR: 73 BPM
BH CV STRESS STAGE 1: 1
BUN SERPL-MCNC: 14 MG/DL (ref 8–23)
BUN/CREAT SERPL: 15.1 (ref 7–25)
CALCIUM SPEC-SCNC: 8.7 MG/DL (ref 8.6–10.5)
CHLORIDE SERPL-SCNC: 105 MMOL/L (ref 98–107)
CO2 SERPL-SCNC: 26 MMOL/L (ref 22–29)
CREAT SERPL-MCNC: 0.93 MG/DL (ref 0.76–1.27)
DEPRECATED RDW RBC AUTO: 46.2 FL (ref 37–54)
EOSINOPHIL # BLD AUTO: 0.15 10*3/MM3 (ref 0–0.4)
EOSINOPHIL NFR BLD AUTO: 2.1 % (ref 0.3–6.2)
ERYTHROCYTE [DISTWIDTH] IN BLOOD BY AUTOMATED COUNT: 13.8 % (ref 12.3–15.4)
GFR SERPL CREATININE-BSD FRML MDRD: 83 ML/MIN/1.73
GLUCOSE SERPL-MCNC: 194 MG/DL (ref 65–99)
HCT VFR BLD AUTO: 37.8 % (ref 37.5–51)
HGB BLD-MCNC: 12.7 G/DL (ref 13–17.7)
IMM GRANULOCYTES # BLD AUTO: 0.02 10*3/MM3 (ref 0–0.05)
IMM GRANULOCYTES NFR BLD AUTO: 0.3 % (ref 0–0.5)
LV EF NUC BP: 55 %
LYMPHOCYTES # BLD AUTO: 1.68 10*3/MM3 (ref 0.7–3.1)
LYMPHOCYTES NFR BLD AUTO: 23.3 % (ref 19.6–45.3)
MAXIMAL PREDICTED HEART RATE: 159 BPM
MCH RBC QN AUTO: 30.5 PG (ref 26.6–33)
MCHC RBC AUTO-ENTMCNC: 33.6 G/DL (ref 31.5–35.7)
MCV RBC AUTO: 90.6 FL (ref 79–97)
MONOCYTES # BLD AUTO: 0.5 10*3/MM3 (ref 0.1–0.9)
MONOCYTES NFR BLD AUTO: 6.9 % (ref 5–12)
NEUTROPHILS NFR BLD AUTO: 4.82 10*3/MM3 (ref 1.7–7)
NEUTROPHILS NFR BLD AUTO: 67 % (ref 42.7–76)
NRBC BLD AUTO-RTO: 0 /100 WBC (ref 0–0.2)
PERCENT MAX PREDICTED HR: 50.94 %
PLATELET # BLD AUTO: 197 10*3/MM3 (ref 140–450)
PMV BLD AUTO: 9.6 FL (ref 6–12)
POTASSIUM SERPL-SCNC: 4.1 MMOL/L (ref 3.5–5.2)
RBC # BLD AUTO: 4.17 10*6/MM3 (ref 4.14–5.8)
SODIUM SERPL-SCNC: 141 MMOL/L (ref 136–145)
STRESS BASELINE BP: NORMAL MMHG
STRESS BASELINE HR: 65 BPM
STRESS PERCENT HR: 60 %
STRESS POST EXERCISE DUR SEC: 10 SEC
STRESS POST PEAK BP: NORMAL MMHG
STRESS POST PEAK HR: 81 BPM
STRESS TARGET HR: 135 BPM
WBC # BLD AUTO: 7.2 10*3/MM3 (ref 3.4–10.8)

## 2020-07-01 PROCEDURE — 93018 CV STRESS TEST I&R ONLY: CPT | Performed by: INTERNAL MEDICINE

## 2020-07-01 PROCEDURE — 93356 MYOCRD STRAIN IMG SPCKL TRCK: CPT

## 2020-07-01 PROCEDURE — 93306 TTE W/DOPPLER COMPLETE: CPT

## 2020-07-01 PROCEDURE — 25010000002 REGADENOSON 0.4 MG/5ML SOLUTION: Performed by: NURSE PRACTITIONER

## 2020-07-01 PROCEDURE — 93306 TTE W/DOPPLER COMPLETE: CPT | Performed by: INTERNAL MEDICINE

## 2020-07-01 PROCEDURE — 0 RUBIDIUM CHLORIDE: Performed by: NURSE PRACTITIONER

## 2020-07-01 PROCEDURE — 36415 COLL VENOUS BLD VENIPUNCTURE: CPT

## 2020-07-01 PROCEDURE — 93356 MYOCRD STRAIN IMG SPCKL TRCK: CPT | Performed by: INTERNAL MEDICINE

## 2020-07-01 PROCEDURE — 93017 CV STRESS TEST TRACING ONLY: CPT

## 2020-07-01 PROCEDURE — 78492 MYOCRD IMG PET MLT RST&STRS: CPT | Performed by: INTERNAL MEDICINE

## 2020-07-01 PROCEDURE — 78492 MYOCRD IMG PET MLT RST&STRS: CPT

## 2020-07-01 PROCEDURE — 93016 CV STRESS TEST SUPVJ ONLY: CPT | Performed by: INTERNAL MEDICINE

## 2020-07-01 PROCEDURE — 85025 COMPLETE CBC W/AUTO DIFF WBC: CPT

## 2020-07-01 PROCEDURE — 80048 BASIC METABOLIC PNL TOTAL CA: CPT

## 2020-07-01 PROCEDURE — A9555 RB82 RUBIDIUM: HCPCS | Performed by: NURSE PRACTITIONER

## 2020-07-01 RX ADMIN — REGADENOSON 0.4 MG: 0.08 INJECTION, SOLUTION INTRAVENOUS at 08:32

## 2020-07-02 LAB
AORTIC DIMENSIONLESS INDEX: 0.3 (DI)
ASCENDING AORTA: 3.3 CM
BH CV ECHO MEAS - AO ACC SLOPE: 0 CM/SEC^2
BH CV ECHO MEAS - AO ACC TIME: 0.13 SEC
BH CV ECHO MEAS - AO MAX PG (FULL): 38.5 MMHG
BH CV ECHO MEAS - AO MAX PG: 43.2 MMHG
BH CV ECHO MEAS - AO MEAN PG (FULL): 25.8 MMHG
BH CV ECHO MEAS - AO MEAN PG: 28.8 MMHG
BH CV ECHO MEAS - AO ROOT AREA (BSA CORRECTED): 1.4
BH CV ECHO MEAS - AO ROOT AREA: 10.9 CM^2
BH CV ECHO MEAS - AO ROOT DIAM: 3.7 CM
BH CV ECHO MEAS - AO V2 MAX: 328.8 CM/SEC
BH CV ECHO MEAS - AO V2 MEAN: 259.6 CM/SEC
BH CV ECHO MEAS - AO V2 VTI: 69.9 CM
BH CV ECHO MEAS - ASC AORTA: 3.3 CM
BH CV ECHO MEAS - AVA(I,A): 1 CM^2
BH CV ECHO MEAS - AVA(I,D): 1 CM^2
BH CV ECHO MEAS - AVA(V,A): 1 CM^2
BH CV ECHO MEAS - AVA(V,D): 1 CM^2
BH CV ECHO MEAS - BSA(HAYCOCK): 2.7 M^2
BH CV ECHO MEAS - BSA: 2.6 M^2
BH CV ECHO MEAS - BZI_BMI: 40.9 KILOGRAMS/M^2
BH CV ECHO MEAS - BZI_METRIC_HEIGHT: 185.4 CM
BH CV ECHO MEAS - BZI_METRIC_WEIGHT: 140.6 KG
BH CV ECHO MEAS - EDV(MOD-SP2): 109 ML
BH CV ECHO MEAS - EDV(MOD-SP4): 94 ML
BH CV ECHO MEAS - EDV(TEICH): 125.4 ML
BH CV ECHO MEAS - EF(CUBED): 76.2 %
BH CV ECHO MEAS - EF(MOD-BP): 56.7 %
BH CV ECHO MEAS - EF(MOD-SP2): 57.8 %
BH CV ECHO MEAS - EF(MOD-SP4): 58.5 %
BH CV ECHO MEAS - EF(TEICH): 67.9 %
BH CV ECHO MEAS - ESV(MOD-SP2): 46 ML
BH CV ECHO MEAS - ESV(MOD-SP4): 39 ML
BH CV ECHO MEAS - ESV(TEICH): 40.2 ML
BH CV ECHO MEAS - FS: 38.1 %
BH CV ECHO MEAS - IVS/LVPW: 1
BH CV ECHO MEAS - IVSD: 1.7 CM
BH CV ECHO MEAS - LAT PEAK E' VEL: 11.3 CM/SEC
BH CV ECHO MEAS - LV DIASTOLIC VOL/BSA (35-75): 36.3 ML/M^2
BH CV ECHO MEAS - LV MASS(C)D: 407.7 GRAMS
BH CV ECHO MEAS - LV MASS(C)DI: 157.3 GRAMS/M^2
BH CV ECHO MEAS - LV MAX PG: 4.7 MMHG
BH CV ECHO MEAS - LV MEAN PG: 2.9 MMHG
BH CV ECHO MEAS - LV SYSTOLIC VOL/BSA (12-30): 15 ML/M^2
BH CV ECHO MEAS - LV V1 MAX: 108.8 CM/SEC
BH CV ECHO MEAS - LV V1 MEAN: 83.6 CM/SEC
BH CV ECHO MEAS - LV V1 VTI: 23 CM
BH CV ECHO MEAS - LVIDD: 5.1 CM
BH CV ECHO MEAS - LVIDS: 3.2 CM
BH CV ECHO MEAS - LVLD AP2: 9.4 CM
BH CV ECHO MEAS - LVLD AP4: 9.3 CM
BH CV ECHO MEAS - LVLS AP2: 8.1 CM
BH CV ECHO MEAS - LVLS AP4: 7.6 CM
BH CV ECHO MEAS - LVOT AREA (M): 3.1 CM^2
BH CV ECHO MEAS - LVOT AREA: 3.1 CM^2
BH CV ECHO MEAS - LVOT DIAM: 2 CM
BH CV ECHO MEAS - LVPWD: 1.7 CM
BH CV ECHO MEAS - MED PEAK E' VEL: 9.2 CM/SEC
BH CV ECHO MEAS - MR MAX PG: 54.6 MMHG
BH CV ECHO MEAS - MR MAX VEL: 369.5 CM/SEC
BH CV ECHO MEAS - MV A DUR: 0.12 SEC
BH CV ECHO MEAS - MV A MAX VEL: 51.8 CM/SEC
BH CV ECHO MEAS - MV DEC SLOPE: 229.9 CM/SEC^2
BH CV ECHO MEAS - MV DEC TIME: 0.21 SEC
BH CV ECHO MEAS - MV E MAX VEL: 69 CM/SEC
BH CV ECHO MEAS - MV E/A: 1.3
BH CV ECHO MEAS - MV MAX PG: 2.4 MMHG
BH CV ECHO MEAS - MV MEAN PG: 1.1 MMHG
BH CV ECHO MEAS - MV P1/2T MAX VEL: 42.5 CM/SEC
BH CV ECHO MEAS - MV P1/2T: 54.2 MSEC
BH CV ECHO MEAS - MV V2 MAX: 77.1 CM/SEC
BH CV ECHO MEAS - MV V2 MEAN: 50.7 CM/SEC
BH CV ECHO MEAS - MV V2 VTI: 23.2 CM
BH CV ECHO MEAS - MVA P1/2T LCG: 5.2 CM^2
BH CV ECHO MEAS - MVA(P1/2T): 4.1 CM^2
BH CV ECHO MEAS - MVA(VTI): 3.1 CM^2
BH CV ECHO MEAS - PA ACC TIME: 0.12 SEC
BH CV ECHO MEAS - PA MAX PG (FULL): 3.9 MMHG
BH CV ECHO MEAS - PA MAX PG: 5.2 MMHG
BH CV ECHO MEAS - PA PR(ACCEL): 23.1 MMHG
BH CV ECHO MEAS - PA V2 MAX: 114 CM/SEC
BH CV ECHO MEAS - PULM A REVS DUR: 0.11 SEC
BH CV ECHO MEAS - PULM A REVS VEL: 23.6 CM/SEC
BH CV ECHO MEAS - PULM DIAS VEL: 51.8 CM/SEC
BH CV ECHO MEAS - PULM S/D: 0.86
BH CV ECHO MEAS - PULM SYS VEL: 44.6 CM/SEC
BH CV ECHO MEAS - PVA(V,A): 2.5 CM^2
BH CV ECHO MEAS - PVA(V,D): 2.5 CM^2
BH CV ECHO MEAS - QP/QS: 0.81
BH CV ECHO MEAS - RV MAX PG: 1.3 MMHG
BH CV ECHO MEAS - RV MEAN PG: 0.73 MMHG
BH CV ECHO MEAS - RV V1 MAX: 57.4 CM/SEC
BH CV ECHO MEAS - RV V1 MEAN: 40.6 CM/SEC
BH CV ECHO MEAS - RV V1 VTI: 11.6 CM
BH CV ECHO MEAS - RVOT AREA: 4.9 CM^2
BH CV ECHO MEAS - RVOT DIAM: 2.5 CM
BH CV ECHO MEAS - SI(AO): 294.9 ML/M^2
BH CV ECHO MEAS - SI(CUBED): 39.7 ML/M^2
BH CV ECHO MEAS - SI(LVOT): 27.3 ML/M^2
BH CV ECHO MEAS - SI(MOD-SP2): 24.3 ML/M^2
BH CV ECHO MEAS - SI(MOD-SP4): 21.2 ML/M^2
BH CV ECHO MEAS - SI(TEICH): 32.9 ML/M^2
BH CV ECHO MEAS - SUP REN AO DIAM: 3 CM
BH CV ECHO MEAS - SV(AO): 764.5 ML
BH CV ECHO MEAS - SV(CUBED): 102.8 ML
BH CV ECHO MEAS - SV(LVOT): 70.8 ML
BH CV ECHO MEAS - SV(MOD-SP2): 63 ML
BH CV ECHO MEAS - SV(MOD-SP4): 55 ML
BH CV ECHO MEAS - SV(RVOT): 57.3 ML
BH CV ECHO MEAS - SV(TEICH): 85.2 ML
BH CV ECHO MEAS - TAPSE (>1.6): 2 CM2
BH CV ECHO MEASUREMENTS AVERAGE E/E' RATIO: 6.73
BH CV XLRA - RV BASE: 3.5 CM
BH CV XLRA - RV LENGTH: 6.4 CM
BH CV XLRA - RV MID: 2.6 CM
BH CV XLRA - TDI S': 11 CM/SEC
LEFT ATRIUM VOLUME INDEX: 28 ML/M2
MAXIMAL PREDICTED HEART RATE: 159 BPM
SINUS: 4.2 CM
STJ: 3.3 CM
STRESS TARGET HR: 135 BPM

## 2020-07-04 ENCOUNTER — LAB (OUTPATIENT)
Dept: LAB | Facility: HOSPITAL | Age: 60
End: 2020-07-04

## 2020-07-04 PROCEDURE — U0004 COV-19 TEST NON-CDC HGH THRU: HCPCS | Performed by: INTERNAL MEDICINE

## 2020-07-04 PROCEDURE — C9803 HOPD COVID-19 SPEC COLLECT: HCPCS | Performed by: INTERNAL MEDICINE

## 2020-07-06 LAB
REF LAB TEST METHOD: NORMAL
SARS-COV-2 RNA RESP QL NAA+PROBE: NOT DETECTED

## 2020-07-07 ENCOUNTER — HOSPITAL ENCOUNTER (OUTPATIENT)
Facility: HOSPITAL | Age: 60
Setting detail: HOSPITAL OUTPATIENT SURGERY
Discharge: HOME | End: 2020-07-07
Attending: INTERNAL MEDICINE | Admitting: INTERNAL MEDICINE

## 2020-07-07 VITALS
WEIGHT: 310 LBS | HEART RATE: 61 BPM | DIASTOLIC BLOOD PRESSURE: 93 MMHG | BODY MASS INDEX: 41.08 KG/M2 | RESPIRATION RATE: 18 BRPM | TEMPERATURE: 98.2 F | SYSTOLIC BLOOD PRESSURE: 168 MMHG | HEIGHT: 73 IN | OXYGEN SATURATION: 97 %

## 2020-07-07 DIAGNOSIS — I25.708 CORONARY ARTERY DISEASE OF BYPASS GRAFT OF NATIVE HEART WITH STABLE ANGINA PECTORIS (HCC): Primary | ICD-10-CM

## 2020-07-07 LAB — GLUCOSE BLDC GLUCOMTR-MCNC: 121 MG/DL (ref 70–130)

## 2020-07-07 PROCEDURE — 85347 COAGULATION TIME ACTIVATED: CPT

## 2020-07-07 PROCEDURE — C1769 GUIDE WIRE: HCPCS | Performed by: INTERNAL MEDICINE

## 2020-07-07 PROCEDURE — C1725 CATH, TRANSLUMIN NON-LASER: HCPCS | Performed by: INTERNAL MEDICINE

## 2020-07-07 PROCEDURE — C9604 PERC D-E COR REVASC T CABG S: HCPCS | Performed by: INTERNAL MEDICINE

## 2020-07-07 PROCEDURE — 99153 MOD SED SAME PHYS/QHP EA: CPT | Performed by: INTERNAL MEDICINE

## 2020-07-07 PROCEDURE — 82962 GLUCOSE BLOOD TEST: CPT

## 2020-07-07 PROCEDURE — 92937 PRQ TRLUML REVSC CAB GRF 1: CPT | Performed by: INTERNAL MEDICINE

## 2020-07-07 PROCEDURE — C1894 INTRO/SHEATH, NON-LASER: HCPCS | Performed by: INTERNAL MEDICINE

## 2020-07-07 PROCEDURE — 99152 MOD SED SAME PHYS/QHP 5/>YRS: CPT | Performed by: INTERNAL MEDICINE

## 2020-07-07 PROCEDURE — 93459 L HRT ART/GRFT ANGIO: CPT | Performed by: INTERNAL MEDICINE

## 2020-07-07 PROCEDURE — C1874 STENT, COATED/COV W/DEL SYS: HCPCS | Performed by: INTERNAL MEDICINE

## 2020-07-07 PROCEDURE — 25010000002 HEPARIN (PORCINE) PER 1000 UNITS: Performed by: INTERNAL MEDICINE

## 2020-07-07 PROCEDURE — C1887 CATHETER, GUIDING: HCPCS | Performed by: INTERNAL MEDICINE

## 2020-07-07 PROCEDURE — 25010000002 FENTANYL CITRATE (PF) 100 MCG/2ML SOLUTION: Performed by: INTERNAL MEDICINE

## 2020-07-07 PROCEDURE — 0 IOPAMIDOL PER 1 ML: Performed by: INTERNAL MEDICINE

## 2020-07-07 PROCEDURE — 25010000002 MIDAZOLAM PER 1 MG: Performed by: INTERNAL MEDICINE

## 2020-07-07 DEVICE — XIENCE SIERRA™ EVEROLIMUS ELUTING CORONARY STENT SYSTEM 2.50 MM X 15 MM / RAPID-EXCHANGE
Type: IMPLANTABLE DEVICE | Status: FUNCTIONAL
Brand: XIENCE SIERRA™

## 2020-07-07 RX ORDER — FENTANYL CITRATE 50 UG/ML
INJECTION, SOLUTION INTRAMUSCULAR; INTRAVENOUS AS NEEDED
Status: DISCONTINUED | OUTPATIENT
Start: 2020-07-07 | End: 2020-07-07 | Stop reason: HOSPADM

## 2020-07-07 RX ORDER — SODIUM CHLORIDE 0.9 % (FLUSH) 0.9 %
10 SYRINGE (ML) INJECTION AS NEEDED
Status: DISCONTINUED | OUTPATIENT
Start: 2020-07-07 | End: 2020-07-07 | Stop reason: HOSPADM

## 2020-07-07 RX ORDER — GLIMEPIRIDE 2 MG/1
2 TABLET ORAL
COMMUNITY
End: 2021-07-30 | Stop reason: ALTCHOICE

## 2020-07-07 RX ORDER — SODIUM CHLORIDE 9 MG/ML
75 INJECTION, SOLUTION INTRAVENOUS CONTINUOUS
Status: DISCONTINUED | OUTPATIENT
Start: 2020-07-07 | End: 2020-07-07 | Stop reason: HOSPADM

## 2020-07-07 RX ORDER — SODIUM CHLORIDE 9 MG/ML
100 INJECTION, SOLUTION INTRAVENOUS CONTINUOUS
Status: ACTIVE | OUTPATIENT
Start: 2020-07-07 | End: 2020-07-07

## 2020-07-07 RX ORDER — SODIUM CHLORIDE 0.9 % (FLUSH) 0.9 %
3 SYRINGE (ML) INJECTION EVERY 12 HOURS SCHEDULED
Status: DISCONTINUED | OUTPATIENT
Start: 2020-07-07 | End: 2020-07-07 | Stop reason: HOSPADM

## 2020-07-07 RX ORDER — ACETAMINOPHEN 325 MG/1
650 TABLET ORAL EVERY 4 HOURS PRN
Status: DISCONTINUED | OUTPATIENT
Start: 2020-07-07 | End: 2020-07-07 | Stop reason: HOSPADM

## 2020-07-07 RX ORDER — MIDAZOLAM HYDROCHLORIDE 1 MG/ML
INJECTION INTRAMUSCULAR; INTRAVENOUS AS NEEDED
Status: DISCONTINUED | OUTPATIENT
Start: 2020-07-07 | End: 2020-07-07 | Stop reason: HOSPADM

## 2020-07-07 RX ORDER — ASPIRIN 81 MG/1
TABLET, CHEWABLE ORAL AS NEEDED
Status: DISCONTINUED | OUTPATIENT
Start: 2020-07-07 | End: 2020-07-07 | Stop reason: HOSPADM

## 2020-07-07 RX ORDER — CLOPIDOGREL BISULFATE 75 MG/1
75 TABLET ORAL DAILY
Qty: 90 TABLET | Refills: 3 | Status: SHIPPED | OUTPATIENT
Start: 2020-07-07 | End: 2021-08-23 | Stop reason: SDUPTHER

## 2020-07-07 RX ORDER — LIDOCAINE HYDROCHLORIDE 20 MG/ML
INJECTION, SOLUTION INFILTRATION; PERINEURAL AS NEEDED
Status: DISCONTINUED | OUTPATIENT
Start: 2020-07-07 | End: 2020-07-07 | Stop reason: HOSPADM

## 2020-07-07 RX ORDER — CLOPIDOGREL BISULFATE 75 MG/1
TABLET ORAL AS NEEDED
Status: DISCONTINUED | OUTPATIENT
Start: 2020-07-07 | End: 2020-07-07 | Stop reason: HOSPADM

## 2020-07-07 RX ORDER — HEPARIN SODIUM 1000 [USP'U]/ML
INJECTION, SOLUTION INTRAVENOUS; SUBCUTANEOUS AS NEEDED
Status: DISCONTINUED | OUTPATIENT
Start: 2020-07-07 | End: 2020-07-07 | Stop reason: HOSPADM

## 2020-07-07 RX ADMIN — SODIUM CHLORIDE 75 ML/HR: 9 INJECTION, SOLUTION INTRAVENOUS at 08:36

## 2020-07-07 NOTE — INTERVAL H&P NOTE
H&P reviewed. The patient was examined and there are no changes to the H&P.      Stress abnormal in inferior wall and referred for C

## 2020-07-07 NOTE — CONSULTS
Met with patient, discussed benefits of cardiac rehab. Provided phase II information along with the contact information for cardiac rehab here at [facility]. Patient states attended program pervious;y on 6th floor of doctor's building. Will call patient on Thursday this weeke to set up appointment.

## 2020-07-07 NOTE — DISCHARGE INSTRUCTIONS
[facility]  [address] [place] [address]    Coronary Angiogram (Radial/Ulnar Approach) After Care    Refer to this sheet in the next few weeks. These instructions provide you with information on caring for yourself after your procedure. Your caregiver may also give you more specific instructions. Your treatment has been planned according to current medical practices, but problems sometimes occur. Call your caregiver if you have any problems or questions after your procedure.    Home Care Instructions:  · You may shower the day after the procedure. Remove the bandage (dressing) and gently wash the site with plain soap and water. Gently pat the site dry. You may apply a band aid daily for 2 days if desired.    · Do not apply powder or lotion to the site.  · Do not submerge the affected site in water for 3 to 5 days or until the site is completely healed.   · Do not lift, push or pull anything over 10 pounds for 2 days after your procedure.  · Inspect the site at least twice daily. You may notice some bruising at the site and it may be tender for 1 to 2 weeks.     · Increase your fluid intake for the next 2 days.    · Keep arm elevated for 24 hours. For the remainder of the day, keep your arm in “Pledge of Allegiance” position when up and about.     · You may drive 24 hours after the procedure unless otherwise instructed by your caregiver.  · Do not operate machinery or power tools for 24 hours.  · A responsible adult should be with you for the first 24 hours after you arrive home. Do not make any important legal decisions or sign legal papers for 24 hours.  Do not drink alcohol for 24 hours.    · Metformin or any medications containing Metformin should not be taken for 48 hours after your procedure.      Call Your Doctor if:   · You have unusual pain at the radial/ulnar (wrist) site.  · You have redness, warmth, swelling, or pain at the radial/ulnar (wrist) site.  · You have drainage (other  than a small amount of blood on the dressing).  · You have chills or a fever > 101.  · Your arm becomes pale or dark, cool, tingly, or numb.  · You have heavy bleeding from the site, hold pressure on the site for 20 minutes.  If the bleeding stops, apply a fresh bandage and call your cardiologist.  However, if you continue to have bleeding, call 911.

## 2020-07-09 LAB
ACT BLD: 263 SECONDS (ref 82–152)
ACT BLD: 268 SECONDS (ref 82–152)

## 2020-07-16 ENCOUNTER — OFFICE VISIT (OUTPATIENT)
Dept: CARDIAC REHAB | Facility: HOSPITAL | Age: 60
End: 2020-07-16

## 2020-07-16 ENCOUNTER — TELEPHONE (OUTPATIENT)
Dept: CARDIAC REHAB | Facility: HOSPITAL | Age: 60
End: 2020-07-16

## 2020-07-16 DIAGNOSIS — Z95.5 S/P DRUG ELUTING CORONARY STENT PLACEMENT: Primary | ICD-10-CM

## 2020-07-16 PROCEDURE — 93797 PHYS/QHP OP CAR RHAB WO ECG: CPT

## 2020-07-16 NOTE — TELEPHONE ENCOUNTER
Messaged Dr. Miguel about pt's blood pressure and angina/SOA while here for cardiac rehab initial assessment appointment.

## 2020-07-17 RX ORDER — ISOSORBIDE MONONITRATE 30 MG/1
30 TABLET, EXTENDED RELEASE ORAL DAILY
Qty: 30 TABLET | Refills: 6 | Status: SHIPPED | OUTPATIENT
Start: 2020-07-17 | End: 2020-10-21 | Stop reason: SDUPTHER

## 2020-07-20 ENCOUNTER — TREATMENT (OUTPATIENT)
Dept: CARDIAC REHAB | Facility: HOSPITAL | Age: 60
End: 2020-07-20

## 2020-07-20 ENCOUNTER — OFFICE VISIT (OUTPATIENT)
Dept: CARDIOLOGY | Facility: CLINIC | Age: 60
End: 2020-07-20

## 2020-07-20 VITALS
HEIGHT: 73 IN | WEIGHT: 315 LBS | SYSTOLIC BLOOD PRESSURE: 118 MMHG | HEART RATE: 83 BPM | OXYGEN SATURATION: 98 % | DIASTOLIC BLOOD PRESSURE: 76 MMHG | BODY MASS INDEX: 41.75 KG/M2

## 2020-07-20 DIAGNOSIS — G47.33 OBSTRUCTIVE SLEEP APNEA, ADULT: ICD-10-CM

## 2020-07-20 DIAGNOSIS — I10 ESSENTIAL HYPERTENSION: ICD-10-CM

## 2020-07-20 DIAGNOSIS — Z95.1 S/P CABG (CORONARY ARTERY BYPASS GRAFT): ICD-10-CM

## 2020-07-20 DIAGNOSIS — Z95.2 S/P AVR (AORTIC VALVE REPLACEMENT): ICD-10-CM

## 2020-07-20 DIAGNOSIS — Z95.5 S/P DRUG ELUTING CORONARY STENT PLACEMENT: Primary | ICD-10-CM

## 2020-07-20 DIAGNOSIS — I25.708 CORONARY ARTERY DISEASE OF BYPASS GRAFT OF NATIVE HEART WITH STABLE ANGINA PECTORIS (HCC): Primary | ICD-10-CM

## 2020-07-20 DIAGNOSIS — I71.21 ASCENDING AORTIC ANEURYSM (HCC): ICD-10-CM

## 2020-07-20 PROCEDURE — 93798 PHYS/QHP OP CAR RHAB W/ECG: CPT

## 2020-07-20 PROCEDURE — 93000 ELECTROCARDIOGRAM COMPLETE: CPT | Performed by: NURSE PRACTITIONER

## 2020-07-20 PROCEDURE — 99214 OFFICE O/P EST MOD 30 MIN: CPT | Performed by: NURSE PRACTITIONER

## 2020-07-20 NOTE — PROGRESS NOTES
[facility]  [address]  [facility] [address]  Phone: [phone]      Patient Name: Edmond Chase  :1960  Age: 59 y.o.  Primary Cardiologist: Papa Miguel MD  Encounter Provider:  VIKTORIA Van      Chief Complaint:   Chief Complaint   Patient presents with   • Follow-up     Hospital 20         HPI  Edmond Chase is a 59 y.o. male who presents today for hospital reevaluation.        Pt has a  history significant for CAD with history of CABG, ascending aortic aneurysm, history of aortic valve replacement, hypertension, obesity, TJ.    Patient states that since stent placement he unfortunately continues to have some is of increased fatigue.  He reports that the day of the heart catheterization he felt better and began to get more energy, but then a couple of days later he began to have more fatigue.  It is noted that he did complain of some anginal symptoms during cardiac rehab.  On  Dr. Miguel recommended initiating antianginal therapy with isosorbide mononitrate.  Unfortunately, patient was not made aware of this recommendation and has not yet picked up the prescription from the pharmacy.  Patient does have questions involving the results of his cardiac catheterization.  He states that other than the one episode during rehab he has not had any symptoms of chest pain.  He also denies any shortness of breath at rest, he does report some shortness of breath with moderate exertion.  He denies any heart palpitations, lightheadedness, lower extremity edema.  He is participating in cardiac rehab and is hopeful that with more exercise he will gain stamina.      The following portions of the patient's history were reviewed and updated as appropriate: allergies, current medications, past family history, past medical history, past social history, past surgical history and problem list.      Review of Systems   Constitution:  "Positive for malaise/fatigue.   Cardiovascular: Positive for chest pain. Negative for leg swelling.   Respiratory: Negative for shortness of breath.    Neurological: Negative for light-headedness.   All other systems reviewed and are negative.      OBJECTIVE:     Vitals:    07/20/20 1331   BP: 118/76   BP Location: Right arm   Patient Position: Sitting   Pulse: 83   SpO2: 98%   Weight: (!) 145 kg (320 lb)   Height: 185.4 cm (73\")     Body mass index is 42.22 kg/m².    Physical Exam   Constitutional: He is oriented to person, place, and time. Vital signs are normal. He appears well-developed and well-nourished.   Eyes: Conjunctivae are normal.   Neck: Carotid bruit is not present.   Cardiovascular: Normal rate, regular rhythm and normal heart sounds.   No murmur heard.  Pulmonary/Chest: Effort normal and breath sounds normal.   Abdominal: Normal appearance.   Musculoskeletal: Normal range of motion.   No pedal edema   Neurological: He is alert and oriented to person, place, and time. GCS eye subscore is 4. GCS verbal subscore is 5. GCS motor subscore is 6.   Skin: Skin is warm and dry.   Right radial cath site well healed without erythema or ecchymosis, palpable proximal and distal pulses, good capillary refill, good motor function of hand, no thrill or bruit detected, site is soft and non-tender with no hematoma, no sensory deficits noted.     Psychiatric: He has a normal mood and affect. His speech is normal and behavior is normal. Judgment and thought content normal. Cognition and memory are normal.         ECG 12 Lead  Date/Time: 7/20/2020 1:39 PM  Performed by: Marcia Stoner APRN  Authorized by: Marcia Stoner APRN   Comparison: compared with previous ECG from 7/12/2019  Similar to previous ECG  Rhythm: sinus rhythm  Rate: normal  BPM: 85  Conduction: conduction normal  T inversion: I, II, aVL, V4, V5 and V6  QRS axis: normal    Clinical impression: abnormal EKG            Cardiac " Procedures:  1. Echocardiogram 6/27/2017.  LVEF 56%.  Normal LV cavity size.  All LV wall segments contract normally.  Moderate LVH.  LAE.  RA is moderately dilated.  Bioprosthetic aortic valve with peak and mean gradients elevated, however dimensionless index is 0.34 which suggests normal valve function.  No regurgitation.  Mild MAC.  Mild mitral valve regurgitation.  Trace tricuspid valve regurgitation.  Calculated RVSP 3 mmHg.  2. Myocardial perfusion stress test 7/20/2017.  Small sized infarct in the inferior wall with moderate brijesh-infarct ischemia.  Impressions consistent with intermediate risk study.  3. Cardiac catheterization 10/30/2017.  Three-vessel CAD, patent LIMA to LAD, occluded AO-OMB SVG, occluded AO-PDA SVG recommend medical therapy.  4. Echocardiogram 7/1/2020.  LVEF 56%.  Normal diastolic function.  Normal RV cavity size, wall thickness.  No significant perivalvular leak although bioprosthetic valve was not well visualized.  Aortic valve maximum pressure gradient 43.2 mmHg, mean pressure gradient 28.8 mmHg, aortic valve area 1.0 cm².  Mild dilation of the sinuses of Valsalva measuring 4.2 cm.  Worsening aortic valve gradient compared to prior study.  5. Myocardial perfusion stress test 7/1/2020.  Small to medium sized, severe area of ischemia located in the inferior wall.  Impressions consistent with intermediate risk study.  Patient was very symptomatic.  Patient set up for cardiac catheterization.  6. Cardiac catheterization 7/7/2020.  Severe multivessel coronary disease with 100% proximal LAD, 50 to 60% stenoses throughout the circumflex territory within the proximal segment as well as the high marginal and diffuse 70% stenoses throughout the RCA with 100% PDA stenosis with faint collaterals from the LAD.  PCI of the mid LAD via MARCELINA graft with placement of ESMER.  No residual stenosis.  Recommend reevaluating symptoms and if still symptomatic aggressively titrating antianginal medications with  consideration given to long-acting nitrates in the hopes of medically managing his RCA disease as this would require extensive amount of stents to have fully revascularized.        ASSESSMENT:      Diagnosis Plan   1. Coronary artery disease of bypass graft of native heart with stable angina pectoris (CMS/HCC)     2. S/P CABG (coronary artery bypass graft)     3. S/P AVR (aortic valve replacement)     4. Ascending aortic aneurysm (CMS/HCC)     5. Essential hypertension     6. Obstructive sleep apnea, adult           PLAN OF CARE:     1. CAD.  Patient with history of CABG who recently had a ESMER placed to the MARCELINA graft.  Patient also had significant RCA disease that would require extensive amounts of stents to revascularize.  Recommended to treat medically.  Patient is unfortunately still experiencing some increased fatigue and exercise intolerance.  Antianginal in the form of isosorbide mononitrate was added to his regimen on 7/17/2020, unfortunately patient has not yet started this medication.  Patient was advised to start this medication in hopes that it will improve his symptoms of fatigue.  Patient is participating in cardiac rehab and I am hopeful that this will improve his stamina.  He will continue with guideline directed therapy with aspirin, Plavix, atorvastatin, lisinopril, metoprolol succinate, isosorbide mononitrate.  Patient was educated on the importance of continuing DAPT for minimum of 1 year.  2. History of CABG.  3. History of AVR.    4. History of ascending aortic aneurysm.  Patient had a CTA chest from the ER in the summer 2019.  Aneurysm repair stable at that time.  5. Hypertension.  Blood pressure controlled in office today.  Continue lisinopril 5 mg/day, metoprolol succinate 25 mg/day, amlodipine 10 mg/day.  6. TJ.  7. Continue cardiac rehab.  Follow-up with Dr. Miguel in 3 months.  Sooner for problems or complications.             Discharge Medications           Accurate as of July 20, 2020  11:59 PM. If you have any questions, ask your nurse or doctor.               Continue These Medications      Instructions Start Date   albuterol sulfate  (90 Base) MCG/ACT inhaler  Commonly known as:  PROVENTIL HFA;VENTOLIN HFA;PROAIR HFA   2 puffs, Inhalation, Every 6 Hours PRN      amLODIPine 10 MG tablet  Commonly known as:  NORVASC   10 mg, Oral, Daily      aspirin 81 MG tablet   81 mg, Oral, Daily      atorvastatin 20 MG tablet  Commonly known as:  LIPITOR   20 mg, Oral, Daily      clopidogrel 75 MG tablet  Commonly known as:  PLAVIX   75 mg, Oral, Daily      glimepiride 2 MG tablet  Commonly known as:  AMARYL   2 mg, Oral, Every Morning Before Breakfast      isosorbide mononitrate 30 MG 24 hr tablet  Commonly known as:  IMDUR   30 mg, Oral, Daily      lisinopril 5 MG tablet  Commonly known as:  PRINIVIL,ZESTRIL   No dose, route, or frequency recorded.      metoprolol succinate XL 25 MG 24 hr tablet  Commonly known as:  TOPROL-XL   25 mg, Oral, Daily             Thank you for allowing me to participate in the care of your patient,         VIKTORIA Van  [facility]  07/21/20  1:52 PM      **Maria Eugenia Disclaimer:**  Much of this encounter note is an electronic transcription/translation of spoken language to printed text. The electronic translation of spoken language may permit erroneous, or at times, nonsensical words or phrases to be inadvertently transcribed. Although I have reviewed the note for such errors, some may still exist.

## 2020-07-22 ENCOUNTER — TREATMENT (OUTPATIENT)
Dept: CARDIAC REHAB | Facility: HOSPITAL | Age: 60
End: 2020-07-22

## 2020-07-22 DIAGNOSIS — Z95.5 S/P DRUG ELUTING CORONARY STENT PLACEMENT: Primary | ICD-10-CM

## 2020-07-22 PROCEDURE — 93798 PHYS/QHP OP CAR RHAB W/ECG: CPT

## 2020-07-24 ENCOUNTER — TREATMENT (OUTPATIENT)
Dept: CARDIAC REHAB | Facility: HOSPITAL | Age: 60
End: 2020-07-24

## 2020-07-24 DIAGNOSIS — Z95.5 S/P DRUG ELUTING CORONARY STENT PLACEMENT: Primary | ICD-10-CM

## 2020-07-24 PROCEDURE — 93798 PHYS/QHP OP CAR RHAB W/ECG: CPT

## 2020-07-27 ENCOUNTER — TREATMENT (OUTPATIENT)
Dept: CARDIAC REHAB | Facility: HOSPITAL | Age: 60
End: 2020-07-27

## 2020-07-27 DIAGNOSIS — Z95.5 S/P DRUG ELUTING CORONARY STENT PLACEMENT: Primary | ICD-10-CM

## 2020-07-27 PROCEDURE — 93798 PHYS/QHP OP CAR RHAB W/ECG: CPT

## 2020-07-29 ENCOUNTER — TREATMENT (OUTPATIENT)
Dept: CARDIAC REHAB | Facility: HOSPITAL | Age: 60
End: 2020-07-29

## 2020-07-29 DIAGNOSIS — Z95.5 S/P DRUG ELUTING CORONARY STENT PLACEMENT: Primary | ICD-10-CM

## 2020-07-29 PROCEDURE — 93798 PHYS/QHP OP CAR RHAB W/ECG: CPT

## 2020-08-03 ENCOUNTER — TREATMENT (OUTPATIENT)
Dept: CARDIAC REHAB | Facility: HOSPITAL | Age: 60
End: 2020-08-03

## 2020-08-03 ENCOUNTER — TELEPHONE (OUTPATIENT)
Dept: CARDIAC REHAB | Facility: HOSPITAL | Age: 60
End: 2020-08-03

## 2020-08-03 DIAGNOSIS — Z95.5 S/P DRUG ELUTING CORONARY STENT PLACEMENT: Primary | ICD-10-CM

## 2020-08-03 PROCEDURE — 93798 PHYS/QHP OP CAR RHAB W/ECG: CPT

## 2020-08-03 NOTE — TELEPHONE ENCOUNTER
SOPHY Seaman had a couplet with exercise today in Cardiac Rehab. No complaints from the patient.     Thank you,   Missy Saini

## 2020-08-05 ENCOUNTER — TREATMENT (OUTPATIENT)
Dept: CARDIAC REHAB | Facility: HOSPITAL | Age: 60
End: 2020-08-05

## 2020-08-05 DIAGNOSIS — Z95.5 S/P DRUG ELUTING CORONARY STENT PLACEMENT: Primary | ICD-10-CM

## 2020-08-05 PROCEDURE — 93798 PHYS/QHP OP CAR RHAB W/ECG: CPT

## 2020-08-10 ENCOUNTER — TREATMENT (OUTPATIENT)
Dept: CARDIAC REHAB | Facility: HOSPITAL | Age: 60
End: 2020-08-10

## 2020-08-10 DIAGNOSIS — Z95.5 S/P DRUG ELUTING CORONARY STENT PLACEMENT: Primary | ICD-10-CM

## 2020-08-10 PROCEDURE — 93798 PHYS/QHP OP CAR RHAB W/ECG: CPT

## 2020-08-12 ENCOUNTER — TREATMENT (OUTPATIENT)
Dept: CARDIAC REHAB | Facility: HOSPITAL | Age: 60
End: 2020-08-12

## 2020-08-12 DIAGNOSIS — Z95.5 S/P DRUG ELUTING CORONARY STENT PLACEMENT: Primary | ICD-10-CM

## 2020-08-12 PROCEDURE — 93798 PHYS/QHP OP CAR RHAB W/ECG: CPT

## 2020-08-14 ENCOUNTER — TELEPHONE (OUTPATIENT)
Dept: CARDIOLOGY | Facility: CLINIC | Age: 60
End: 2020-08-14

## 2020-08-14 ENCOUNTER — TREATMENT (OUTPATIENT)
Dept: CARDIAC REHAB | Facility: HOSPITAL | Age: 60
End: 2020-08-14

## 2020-08-14 DIAGNOSIS — Z95.5 S/P DRUG ELUTING CORONARY STENT PLACEMENT: Primary | ICD-10-CM

## 2020-08-14 PROCEDURE — 93798 PHYS/QHP OP CAR RHAB W/ECG: CPT

## 2020-08-19 ENCOUNTER — TREATMENT (OUTPATIENT)
Dept: CARDIAC REHAB | Facility: HOSPITAL | Age: 60
End: 2020-08-19

## 2020-08-19 DIAGNOSIS — Z95.5 S/P DRUG ELUTING CORONARY STENT PLACEMENT: Primary | ICD-10-CM

## 2020-08-19 PROCEDURE — 93798 PHYS/QHP OP CAR RHAB W/ECG: CPT

## 2020-08-21 ENCOUNTER — TREATMENT (OUTPATIENT)
Dept: CARDIAC REHAB | Facility: HOSPITAL | Age: 60
End: 2020-08-21

## 2020-08-21 DIAGNOSIS — Z95.5 S/P DRUG ELUTING CORONARY STENT PLACEMENT: Primary | ICD-10-CM

## 2020-08-21 PROCEDURE — 93798 PHYS/QHP OP CAR RHAB W/ECG: CPT

## 2020-08-24 ENCOUNTER — TREATMENT (OUTPATIENT)
Dept: CARDIAC REHAB | Facility: HOSPITAL | Age: 60
End: 2020-08-24

## 2020-08-24 DIAGNOSIS — Z95.5 S/P DRUG ELUTING CORONARY STENT PLACEMENT: Primary | ICD-10-CM

## 2020-08-24 PROCEDURE — 93798 PHYS/QHP OP CAR RHAB W/ECG: CPT

## 2020-08-28 ENCOUNTER — TREATMENT (OUTPATIENT)
Dept: CARDIAC REHAB | Facility: HOSPITAL | Age: 60
End: 2020-08-28

## 2020-08-28 DIAGNOSIS — Z95.5 S/P DRUG ELUTING CORONARY STENT PLACEMENT: Primary | ICD-10-CM

## 2020-08-28 PROCEDURE — 93798 PHYS/QHP OP CAR RHAB W/ECG: CPT

## 2020-09-04 ENCOUNTER — TREATMENT (OUTPATIENT)
Dept: CARDIAC REHAB | Facility: HOSPITAL | Age: 60
End: 2020-09-04

## 2020-09-04 DIAGNOSIS — Z95.5 S/P DRUG ELUTING CORONARY STENT PLACEMENT: Primary | ICD-10-CM

## 2020-09-04 PROCEDURE — 93798 PHYS/QHP OP CAR RHAB W/ECG: CPT

## 2020-09-14 ENCOUNTER — TREATMENT (OUTPATIENT)
Dept: CARDIAC REHAB | Facility: HOSPITAL | Age: 60
End: 2020-09-14

## 2020-09-14 DIAGNOSIS — Z95.5 S/P DRUG ELUTING CORONARY STENT PLACEMENT: Primary | ICD-10-CM

## 2020-09-14 PROCEDURE — 93798 PHYS/QHP OP CAR RHAB W/ECG: CPT

## 2020-09-16 ENCOUNTER — TREATMENT (OUTPATIENT)
Dept: CARDIAC REHAB | Facility: HOSPITAL | Age: 60
End: 2020-09-16

## 2020-09-16 DIAGNOSIS — Z95.5 S/P DRUG ELUTING CORONARY STENT PLACEMENT: Primary | ICD-10-CM

## 2020-09-16 PROCEDURE — 93798 PHYS/QHP OP CAR RHAB W/ECG: CPT

## 2020-09-18 ENCOUNTER — TREATMENT (OUTPATIENT)
Dept: CARDIAC REHAB | Facility: HOSPITAL | Age: 60
End: 2020-09-18

## 2020-09-18 DIAGNOSIS — Z95.5 S/P DRUG ELUTING CORONARY STENT PLACEMENT: Primary | ICD-10-CM

## 2020-09-18 LAB — GLUCOSE BLDC GLUCOMTR-MCNC: 150 MG/DL (ref 70–130)

## 2020-09-18 PROCEDURE — 93798 PHYS/QHP OP CAR RHAB W/ECG: CPT

## 2020-09-18 PROCEDURE — 82962 GLUCOSE BLOOD TEST: CPT

## 2020-09-21 ENCOUNTER — TREATMENT (OUTPATIENT)
Dept: CARDIAC REHAB | Facility: HOSPITAL | Age: 60
End: 2020-09-21

## 2020-09-21 DIAGNOSIS — Z95.5 S/P DRUG ELUTING CORONARY STENT PLACEMENT: Primary | ICD-10-CM

## 2020-09-21 PROCEDURE — 93798 PHYS/QHP OP CAR RHAB W/ECG: CPT

## 2020-09-30 ENCOUNTER — TREATMENT (OUTPATIENT)
Dept: CARDIAC REHAB | Facility: HOSPITAL | Age: 60
End: 2020-09-30

## 2020-09-30 DIAGNOSIS — Z95.5 S/P DRUG ELUTING CORONARY STENT PLACEMENT: Primary | ICD-10-CM

## 2020-09-30 PROCEDURE — 93798 PHYS/QHP OP CAR RHAB W/ECG: CPT

## 2020-10-02 ENCOUNTER — TREATMENT (OUTPATIENT)
Dept: CARDIAC REHAB | Facility: HOSPITAL | Age: 60
End: 2020-10-02

## 2020-10-02 DIAGNOSIS — Z95.5 S/P DRUG ELUTING CORONARY STENT PLACEMENT: Primary | ICD-10-CM

## 2020-10-02 PROCEDURE — 93798 PHYS/QHP OP CAR RHAB W/ECG: CPT

## 2020-10-05 ENCOUNTER — TREATMENT (OUTPATIENT)
Dept: CARDIAC REHAB | Facility: HOSPITAL | Age: 60
End: 2020-10-05

## 2020-10-05 DIAGNOSIS — Z95.5 S/P DRUG ELUTING CORONARY STENT PLACEMENT: Primary | ICD-10-CM

## 2020-10-05 PROCEDURE — 93798 PHYS/QHP OP CAR RHAB W/ECG: CPT

## 2020-10-05 PROCEDURE — 93797 PHYS/QHP OP CAR RHAB WO ECG: CPT

## 2020-10-07 ENCOUNTER — TREATMENT (OUTPATIENT)
Dept: CARDIAC REHAB | Facility: HOSPITAL | Age: 60
End: 2020-10-07

## 2020-10-07 DIAGNOSIS — Z95.5 S/P DRUG ELUTING CORONARY STENT PLACEMENT: Primary | ICD-10-CM

## 2020-10-07 PROCEDURE — 93798 PHYS/QHP OP CAR RHAB W/ECG: CPT

## 2020-10-12 ENCOUNTER — TREATMENT (OUTPATIENT)
Dept: CARDIAC REHAB | Facility: HOSPITAL | Age: 60
End: 2020-10-12

## 2020-10-12 DIAGNOSIS — Z95.5 S/P DRUG ELUTING CORONARY STENT PLACEMENT: Primary | ICD-10-CM

## 2020-10-12 PROCEDURE — 93798 PHYS/QHP OP CAR RHAB W/ECG: CPT

## 2020-10-14 ENCOUNTER — TREATMENT (OUTPATIENT)
Dept: CARDIAC REHAB | Facility: HOSPITAL | Age: 60
End: 2020-10-14

## 2020-10-14 DIAGNOSIS — Z95.5 S/P DRUG ELUTING CORONARY STENT PLACEMENT: Primary | ICD-10-CM

## 2020-10-14 PROCEDURE — 93798 PHYS/QHP OP CAR RHAB W/ECG: CPT

## 2020-10-19 ENCOUNTER — TREATMENT (OUTPATIENT)
Dept: CARDIAC REHAB | Facility: HOSPITAL | Age: 60
End: 2020-10-19

## 2020-10-19 DIAGNOSIS — Z95.5 S/P DRUG ELUTING CORONARY STENT PLACEMENT: Primary | ICD-10-CM

## 2020-10-19 PROCEDURE — 93798 PHYS/QHP OP CAR RHAB W/ECG: CPT

## 2020-10-21 ENCOUNTER — OFFICE VISIT (OUTPATIENT)
Dept: CARDIOLOGY | Facility: CLINIC | Age: 60
End: 2020-10-21

## 2020-10-21 VITALS
BODY MASS INDEX: 41.75 KG/M2 | HEIGHT: 73 IN | HEART RATE: 80 BPM | DIASTOLIC BLOOD PRESSURE: 90 MMHG | OXYGEN SATURATION: 97 % | SYSTOLIC BLOOD PRESSURE: 140 MMHG | WEIGHT: 315 LBS

## 2020-10-21 DIAGNOSIS — I25.708 CORONARY ARTERY DISEASE OF BYPASS GRAFT OF NATIVE HEART WITH STABLE ANGINA PECTORIS (HCC): Primary | ICD-10-CM

## 2020-10-21 DIAGNOSIS — Z95.1 S/P CABG (CORONARY ARTERY BYPASS GRAFT): ICD-10-CM

## 2020-10-21 DIAGNOSIS — I10 ESSENTIAL HYPERTENSION: ICD-10-CM

## 2020-10-21 DIAGNOSIS — Z95.2 S/P AVR (AORTIC VALVE REPLACEMENT): ICD-10-CM

## 2020-10-21 PROCEDURE — 99214 OFFICE O/P EST MOD 30 MIN: CPT | Performed by: INTERNAL MEDICINE

## 2020-10-21 RX ORDER — ISOSORBIDE MONONITRATE 60 MG/1
60 TABLET, EXTENDED RELEASE ORAL DAILY
Qty: 90 TABLET | Refills: 3 | Status: SHIPPED | OUTPATIENT
Start: 2020-10-21 | End: 2021-01-22 | Stop reason: SDUPTHER

## 2020-10-21 RX ORDER — ATORVASTATIN CALCIUM 20 MG/1
20 TABLET, FILM COATED ORAL DAILY
Qty: 90 TABLET | Refills: 3 | Status: ON HOLD | OUTPATIENT
Start: 2020-10-21 | End: 2021-06-17 | Stop reason: SDUPTHER

## 2020-10-21 RX ORDER — METOPROLOL SUCCINATE 25 MG/1
25 TABLET, EXTENDED RELEASE ORAL DAILY
Qty: 90 TABLET | Refills: 3 | Status: SHIPPED | OUTPATIENT
Start: 2020-10-21 | End: 2021-12-20

## 2020-10-21 NOTE — PROGRESS NOTES
Subjective:     Encounter Date:10/21/20        Patient ID: Edmond Chase is a 60 y.o. male.    Chief Complaint:  Coronary Artery Disease  Presents for follow-up visit. Symptoms include shortness of breath. Pertinent negatives include no chest pressure. The symptoms have been worsening. Compliance with diet is good. Compliance with exercise is good. Compliance with medications is good.   Hypertension  This is a chronic problem. The problem is controlled. Associated symptoms include malaise/fatigue and shortness of breath.       60-year-old gentleman who presents today for reevaluation.  Patient had angioplasty and despite that continues to have exertional chest discomfort.  He currently is in rehab working through some of the discomfort with exertion.  He also has some shortness of breath that he describes with and without exertion.  He was seen today for further evaluation.      Review of Systems   Constitution: Positive for malaise/fatigue.   Respiratory: Positive for shortness of breath.    All other systems reviewed and are negative.      Procedures         Objective:     Physical Exam   Constitutional: He is oriented to person, place, and time. He appears well-developed.   HENT:   Head: Normocephalic.   Eyes: Conjunctivae are normal.   Neck: Normal range of motion.   Cardiovascular: Normal rate, regular rhythm and normal heart sounds.   Pulmonary/Chest: Breath sounds normal.   Abdominal: Soft. Bowel sounds are normal.   Musculoskeletal: Normal range of motion.         General: No edema.   Neurological: He is alert and oriented to person, place, and time.   Skin: Skin is warm and dry.   Psychiatric: He has a normal mood and affect. His behavior is normal.   Vitals reviewed.      Lab Review:       Assessment:          Diagnosis Plan   1. Coronary artery disease of bypass graft of native heart with stable angina pectoris (CMS/HCC)     2. Essential hypertension     3. S/P AVR (aortic valve replacement)      4. S/P CABG (coronary artery bypass graft)            Plan:       1.  History of coronary artery disease.  History of coronary artery bypass grafting.  On 7/7/2020 he had a drug-eluting stent of his mid LAD that was placed via his LIMA.  Still continues to have exertional chest angina.  In light of that I will increase his Imdur from 30 to 60 mg a day.  He continues to participate in cardiac rehab to encourage him to continue to do.  2.  Hypertension blood pressure ok  3.  Aortic valve replacement.  Last echo was a little over a year ago.  4.  Overall patient is doing well continue the same follow-up in 6 months        Coronary Artery Disease  Assessment  • The patient has no angina  • The patient is having symptoms consistent with unstable angina     Plan  • The patient is being referred to interventional cardiology  • Lifestyle modifications discussed include adhering to a heart healthy diet, avoidance of tobacco products, maintenance of a healthy weight, medication compliance, regular exercise and regular monitoring of cholesterol and blood pressure    Subjective - Objective  • Current antiplatelet therapy includes aspirin 81 mg        Vitals signs reviewed.   Constitutional:       Appearance: Well-developed.   Eyes:      Conjunctiva/sclera: Conjunctivae normal.   HENT:      Head: Normocephalic.   Neck:      Musculoskeletal: Normal range of motion.   Pulmonary:      Breath sounds: Normal breath sounds.   Cardiovascular:      Normal rate. Regular rhythm. Normal heart sounds.   Edema:     Peripheral edema absent.   Abdominal:      General: Bowel sounds are normal.      Palpations: Abdomen is soft.   Musculoskeletal: Normal range of motion.   Skin:     General: Skin is warm and dry.   Neurological:      Mental Status: Alert and oriented to person, place, and time.   Psychiatric:         Mood and Affect: Mood and affect normal.         Behavior: Behavior normal.

## 2020-10-23 ENCOUNTER — TREATMENT (OUTPATIENT)
Dept: CARDIAC REHAB | Facility: HOSPITAL | Age: 60
End: 2020-10-23

## 2020-10-23 DIAGNOSIS — Z95.5 S/P DRUG ELUTING CORONARY STENT PLACEMENT: Primary | ICD-10-CM

## 2020-10-23 PROCEDURE — 93798 PHYS/QHP OP CAR RHAB W/ECG: CPT

## 2020-10-26 ENCOUNTER — TREATMENT (OUTPATIENT)
Dept: CARDIAC REHAB | Facility: HOSPITAL | Age: 60
End: 2020-10-26

## 2020-10-26 DIAGNOSIS — Z95.5 S/P DRUG ELUTING CORONARY STENT PLACEMENT: Primary | ICD-10-CM

## 2020-10-26 PROCEDURE — 93798 PHYS/QHP OP CAR RHAB W/ECG: CPT

## 2020-10-30 ENCOUNTER — TREATMENT (OUTPATIENT)
Dept: CARDIAC REHAB | Facility: HOSPITAL | Age: 60
End: 2020-10-30

## 2020-10-30 DIAGNOSIS — Z95.5 S/P DRUG ELUTING CORONARY STENT PLACEMENT: Primary | ICD-10-CM

## 2020-10-30 PROCEDURE — 93798 PHYS/QHP OP CAR RHAB W/ECG: CPT

## 2020-11-02 ENCOUNTER — TREATMENT (OUTPATIENT)
Dept: CARDIAC REHAB | Facility: HOSPITAL | Age: 60
End: 2020-11-02

## 2020-11-02 DIAGNOSIS — Z95.5 S/P DRUG ELUTING CORONARY STENT PLACEMENT: Primary | ICD-10-CM

## 2020-11-02 PROCEDURE — 93798 PHYS/QHP OP CAR RHAB W/ECG: CPT

## 2020-11-04 ENCOUNTER — TREATMENT (OUTPATIENT)
Dept: CARDIAC REHAB | Facility: HOSPITAL | Age: 60
End: 2020-11-04

## 2020-11-04 DIAGNOSIS — Z95.5 S/P DRUG ELUTING CORONARY STENT PLACEMENT: Primary | ICD-10-CM

## 2020-11-04 PROCEDURE — 93798 PHYS/QHP OP CAR RHAB W/ECG: CPT

## 2020-11-06 ENCOUNTER — APPOINTMENT (OUTPATIENT)
Dept: CARDIAC REHAB | Facility: HOSPITAL | Age: 60
End: 2020-11-06

## 2020-11-09 ENCOUNTER — APPOINTMENT (OUTPATIENT)
Dept: CARDIAC REHAB | Facility: HOSPITAL | Age: 60
End: 2020-11-09

## 2020-11-11 ENCOUNTER — APPOINTMENT (OUTPATIENT)
Dept: CARDIAC REHAB | Facility: HOSPITAL | Age: 60
End: 2020-11-11

## 2020-11-11 NOTE — PROGRESS NOTES
Subjective:     Encounter Date:10/23/2017      Patient ID: Edmond Chase is a 57 y.o. male.    Chief Complaint:  Coronary Artery Disease   Presents for follow-up visit. Symptoms include chest pain, chest pressure and shortness of breath. The symptoms have been worsening.   Shortness of Breath   This is a recurrent problem. The current episode started more than 1 month ago. The problem has been rapidly worsening. Associated symptoms include chest pain. His past medical history is significant for CAD.   Chest Pain    This is a recurrent problem. The current episode started more than 1 month ago. The problem occurs intermittently. The problem has been rapidly worsening. The pain is present in the substernal region. The pain is moderate. The quality of the pain is described as pressure. Associated symptoms include malaise/fatigue and shortness of breath.   His past medical history is significant for CAD.   Fatigue   This is a recurrent problem. The current episode started more than 1 year ago. The problem has been rapidly worsening. Associated symptoms include chest pain and fatigue.     57-year-old gentleman who presents today for reevaluation.  Patient continues to have episodes of shortness of breath lightheadedness and some pressure in his chest.  He underwent a stress test in the spring.  Patient thought is having a reaction to his drugs.  Patient had his carvedilol decreased to half the dose and despite that he continues to get worse.  He was seen today for reassessment.      Review of Systems   Constitution: Positive for fatigue and malaise/fatigue.   Cardiovascular: Positive for chest pain.   Respiratory: Positive for shortness of breath.    Psychiatric/Behavioral: Positive for depression.   All other systems reviewed and are negative.        ECG 12 Lead  Date/Time: 10/23/2017 3:06 PM  Performed by: PAULIE RANGEL  Authorized by: PAULIE RANGEL   Comparison: compared with previous ECG from  Chief complaint:   Chief Complaint   Patient presents with   • Eye Problem       Vitals:  Visit Vitals  BP (!) 150/86   Pulse 70   Temp 97.8 °F (36.6 °C)   Resp 16   Ht 6' 2\" (1.88 m)   Wt 122.5 kg   SpO2 97%   BMI 34.67 kg/m²       HISTORY OF PRESENT ILLNESS     HPI  58 -year-old male presents with complain of right eye pain x 2 days.  Right eye is red and watery.  Patient states he feels like something is in his right eye.  Patient states he came back from [place] yesterday.  He thinks he might have scratched his eye while taking his mask off.  Patient denies any fall, trauma, fever, chills, redness and swelling surrounding the eyes, change in vision, contact lens use, nausea, vomiting, numbness, tingling on the face, cough, cold symptoms and dizziness.  No known contrast exposure to COVID-19.  Other significant problems:  Patient Active Problem List    Diagnosis Date Noted   • Acute pain of right knee 10/24/2016     Priority: Low   • Pituitary microadenoma (CMS/HCC)      Priority: Low   • Colon polyps      Priority: Low     adenoma due f/u 2016     • Testosterone deficiency 03/02/2011     Priority: Low   • Eczema 03/02/2011     Priority: Low   • Routine general medical examination at a health care facility 02/23/2011     Priority: Low   • Essential hypertension, benign 02/23/2011     Priority: Low       PAST MEDICAL, FAMILY AND SOCIAL HISTORY     Medications:  Current Outpatient Medications   Medication   • Multiple Vitamins-Minerals (MULTI FOR HIM) TABS   • ofloxacin (OCUFLOX) 0.3 % ophthalmic solution     No current facility-administered medications for this visit.        Allergies:  ALLERGIES:  No Known Allergies    Past Medical  History/Surgeries:  Past Medical History:   Diagnosis Date   • Colon polyps 2011    adenoma due f/u 2016   • Dermatitis    • Hypertension    • Pituitary microadenoma (CMS/HCC)        Past Surgical History:   Procedure Laterality Date   • Colonoscopy w biopsy  04/15/2011    Polyps,  7/12/2017  Similar to previous ECG  Rhythm: sinus rhythm  T depression: V3, V4, V5 and V6                 Objective:     Physical Exam   Constitutional: He is oriented to person, place, and time. He appears well-developed.   HENT:   Head: Normocephalic.   Eyes: Conjunctivae are normal.   Neck: Normal range of motion.   Cardiovascular: Normal rate, regular rhythm and normal heart sounds.    Pulmonary/Chest: Breath sounds normal.   Abdominal: Soft. Bowel sounds are normal.   Musculoskeletal: Normal range of motion. He exhibits no edema.   Neurological: He is alert and oriented to person, place, and time.   Skin: Skin is warm and dry.   Psychiatric: He has a normal mood and affect. His behavior is normal.   Vitals reviewed.      Lab Review:       Assessment:          Diagnosis Plan   1. Coronary artery disease involving coronary bypass graft of native heart with other forms of angina pectoris  Case Request Cath Lab: Cardiac Catheterization/Vascular Study   2. Abnormal nuclear stress test  Case Request Cath Lab: Cardiac Catheterization/Vascular Study          Plan:       1.  History of coronary artery disease status post coronary bypass grafting.  Patient presents with worsening shortness of breath and chest discomfort.  I did do a stress test several months ago that showed an inferior infarct with a moderate amount of brijesh-infarct ischemia.  I did change his anti-anginal some to see if it helped and it did not progressively worsens.  I'm when a set him up for cardiac catheterization to get a definitive assessment of his anatomy.  2.  Depression he's also had worsening symptoms over since his medicines for depression were altered.  This was due to an allergic reaction.  Obviously this could be causing some of his symptoms also.  3.  Hypertension blood pressure stable      Coronary Artery Disease  Assessment  • The patient has CCS class III - angina with activities of daily living  • The patient is having symptoms  mild diverticulosis, adenomatous polyps, Dr Ovalle, f/u 5 yrs   • Removal of tonsils,<9 y/o  1969   • Repair of nasal septum         Family History:  Family History   Problem Relation Age of Onset   • Cancer Father         lung   • Hypertension Mother    • Hypertension Father    • NEGATIVE FAMILY HX OF Brother    • NEGATIVE FAMILY HX OF Brother    • NEGATIVE FAMILY HX OF Sister    • Gastrointestinal Brother         colon polyps       Social History:  Social History     Tobacco Use   • Smoking status: Never Smoker   • Smokeless tobacco: Never Used   Substance Use Topics   • Alcohol use: Yes     Alcohol/week: 0.8 - 2.5 standard drinks     Types: 1 - 3 drink(s) per week       REVIEW OF SYSTEMS     Review of Systems   Constitutional: Negative for appetite change, chills, diaphoresis and fever.   HENT: Negative for congestion and sinus pressure.    Eyes: Positive for photophobia, pain and redness. Negative for itching and visual disturbance.   Gastrointestinal: Negative for nausea and vomiting.   Neurological: Negative for dizziness.       PHYSICAL EXAM     Physical Exam  Vitals signs and nursing note reviewed.   Constitutional:       General: He is not in acute distress.     Appearance: Normal appearance. He is well-developed.   Eyes:      General: Lids are normal.      Conjunctiva/sclera:      Right eye: Right conjunctiva is injected.      Left eye: Left conjunctiva is not injected.      Pupils: Pupils are equal, round, and reactive to light.      Comments: Right eye-conjunctival erythema and watery discharge noted. No periorbital erythema, edema or tenderness noted. Extraocular muscles intact. No pain with movement of the eyes.  Fluorescein eye stain - After discussing the procedure and the purpose of the procedure, as well as allergies, a drop of tetracaine was  infiltrated in the right eye to obtain local anesthesia. Fluorescein strip was applied directly to the conjunctiva.    All aspects of the cornea were  consistent with unstable angina     Plan  • The patient is being referred to interventional cardiology    Subjective - Objective  • Current antiplatelet therapy includes aspirin 81 mg              visualized under the fluorescent lamp. Corneal abrasion 2-3 mm size noted at 9 o' clock position of cornea. Upper eyelid everted and sterile  cotton swab used to swab inner side of upper lid.    No obvious foreign body seen.    Patient tolerated this procedure well.     Cardiovascular:      Rate and Rhythm: Normal rate and regular rhythm.      Pulses: Normal pulses.      Heart sounds: Normal heart sounds.   Pulmonary:      Effort: Pulmonary effort is normal.      Breath sounds: Normal breath sounds.      Comments: Lungs - No use of accessory muscles of respiration. Good and equal air entry in both lung fields. No wheezing, rales or rhonchi noted.   Skin:     Capillary Refill: Capillary refill takes less than 2 seconds.   Neurological:      Mental Status: He is alert.   Psychiatric:         Mood and Affect: Mood normal.         Behavior: Behavior normal.         ASSESSMENT/PLAN     Dajuan was seen today for eye problem.    Diagnoses and all orders for this visit:    Abrasion of right cornea, initial encounter  -     ofloxacin (OCUFLOX) 0.3 % ophthalmic solution; Place 2 drops into right eye 4 times daily for 7 days.    Pain in right eye        - Patient to put ofloxacin eyedrops - 2 drops 4 times a day for 5-7  days in right eye.  - Rest your eyes and do not read until symptoms are gone.  - Try to limit use of phone/TV and computer.  - Take Ibuprofen every 6 hours for pain as needed.  - Use ice or cold packs to the affected eye for 20 minutes at a time for comfort.  - If your eyes are sensitive to light, try wearing sunglasses, or stay indoors until symptoms go away.  - Go to the hospital if you develop severe eye pain, decreased vision or blurry vision, fever, redness and pain around the eye; otherwise follow up with your eye physician if no improvement in the next 24-48 hours.  - Written instructions given.  - Patient understands and agrees with the plan.

## 2020-11-13 ENCOUNTER — APPOINTMENT (OUTPATIENT)
Dept: CARDIAC REHAB | Facility: HOSPITAL | Age: 60
End: 2020-11-13

## 2020-11-16 ENCOUNTER — APPOINTMENT (OUTPATIENT)
Dept: CARDIAC REHAB | Facility: HOSPITAL | Age: 60
End: 2020-11-16

## 2020-11-18 ENCOUNTER — APPOINTMENT (OUTPATIENT)
Dept: CARDIAC REHAB | Facility: HOSPITAL | Age: 60
End: 2020-11-18

## 2020-11-20 ENCOUNTER — APPOINTMENT (OUTPATIENT)
Dept: CARDIAC REHAB | Facility: HOSPITAL | Age: 60
End: 2020-11-20

## 2020-12-01 ENCOUNTER — TELEPHONE (OUTPATIENT)
Dept: CARDIOLOGY | Facility: CLINIC | Age: 60
End: 2020-12-01

## 2020-12-01 RX ORDER — AMOXICILLIN 500 MG/1
2000 CAPSULE ORAL SEE ADMIN INSTRUCTIONS
Qty: 4 CAPSULE | Refills: 1 | Status: SHIPPED | OUTPATIENT
Start: 2020-12-01 | End: 2021-05-25 | Stop reason: SDUPTHER

## 2021-01-22 ENCOUNTER — OFFICE VISIT (OUTPATIENT)
Dept: CARDIOLOGY | Facility: CLINIC | Age: 61
End: 2021-01-22

## 2021-01-22 VITALS
WEIGHT: 315 LBS | SYSTOLIC BLOOD PRESSURE: 116 MMHG | OXYGEN SATURATION: 99 % | BODY MASS INDEX: 41.75 KG/M2 | HEART RATE: 79 BPM | HEIGHT: 73 IN | DIASTOLIC BLOOD PRESSURE: 72 MMHG

## 2021-01-22 DIAGNOSIS — I10 ESSENTIAL HYPERTENSION: ICD-10-CM

## 2021-01-22 DIAGNOSIS — I25.708 CORONARY ARTERY DISEASE OF BYPASS GRAFT OF NATIVE HEART WITH STABLE ANGINA PECTORIS (HCC): Primary | ICD-10-CM

## 2021-01-22 PROCEDURE — 99214 OFFICE O/P EST MOD 30 MIN: CPT | Performed by: INTERNAL MEDICINE

## 2021-01-22 RX ORDER — ISOSORBIDE MONONITRATE 60 MG/1
60 TABLET, EXTENDED RELEASE ORAL DAILY
Qty: 90 TABLET | Refills: 3 | Status: ON HOLD | OUTPATIENT
Start: 2021-01-22 | End: 2021-06-17 | Stop reason: SDUPTHER

## 2021-01-22 RX ORDER — SODIUM PHOSPHATE,MONO-DIBASIC 19G-7G/118
ENEMA (ML) RECTAL DAILY
COMMUNITY
End: 2022-12-02

## 2021-01-22 NOTE — PROGRESS NOTES
"      CARDIOLOGY    Papa Miguel MD    ENCOUNTER DATE:  01/22/2021    Edmond Chase / 60 y.o. / male        CHIEF COMPLAINT / REASON FOR OFFICE VISIT     Coronary Artery Disease (3 month f/u )      HISTORY OF PRESENT ILLNESS       HPI  Edmond Chase is a 60 y.o. male who presents today for reevaluation.    Pt has a  history significant for coronary artery disease and has had continued angina.  I recently increased his Imdur which really has made a significant impact.  He says his chest discomfort is significantly improved in fact a lot of times he does not notice his heart at all.  He is also starting to exercise more he has has allowed him to do more activities.  Patient denies  shortness of breath palpitations lightheadedness swelling or fatigue.      The following portions of the patient's history were reviewed and updated as appropriate: allergies, current medications, past family history, past medical history, past social history, past surgical history and problem list.      VITAL SIGNS     Visit Vitals  /72 (BP Location: Left arm, Patient Position: Sitting, Cuff Size: Large Adult)   Pulse 79   Ht 185.4 cm (73\")   Wt (!) 152 kg (336 lb)   SpO2 99%   BMI 44.33 kg/m²         Wt Readings from Last 3 Encounters:   01/22/21 (!) 152 kg (336 lb)   10/21/20 (!) 150 kg (331 lb)   07/20/20 (!) 145 kg (320 lb)     Body mass index is 44.33 kg/m².      REVIEW OF SYSTEMS   Review of Systems   Constitution: Positive for malaise/fatigue.   Respiratory: Positive for shortness of breath.    Musculoskeletal: Positive for joint pain.   Psychiatric/Behavioral: Positive for depression.   All other systems reviewed and are negative.          PHYSICAL EXAMINATION     Vitals signs reviewed.   Constitutional:       Appearance: Well-developed.   Eyes:      Conjunctiva/sclera: Conjunctivae normal.   HENT:      Head: Normocephalic.   Neck:      Musculoskeletal: Normal range of motion.   Pulmonary:      Breath sounds: " Normal breath sounds.   Cardiovascular:      Normal rate. Regular rhythm.   Edema:     Peripheral edema absent.   Musculoskeletal: Normal range of motion.   Skin:     General: Skin is warm and dry.   Neurological:      Mental Status: Alert and oriented to person, place, and time.   Psychiatric:         Behavior: Behavior normal.           REVIEWED DATA     Procedures    Cardiac Procedures:2020  Findings:  1. Coronary Artery Anatomy:  Dominance: Right coronary artery  Left Main: 30% ostial segment stenoses otherwise luminal regularities throughout.  Left Anterior Descendin% stenoses of the proximal segment.  Circumflex Artery: Moderate in caliber size.  The entire vascular bed is fairly tortuous.  The proximal segment contains a 40 to 50% segment stenoses and quickly trifurcates into a high, mid, and inferior marginal branches.  The high marginal branch contains a 50 to 60% mid segment stenoses but again is very tortuous.  The mid marginal branch contains luminal irregularities.  The inferior marginal branch has diffuse disease throughout with scattered 60% stenoses.  Right Coronary Artery: Moderate in caliber size.  This vessel is diffusely diseased.  There is scattered 60 to 70% stenoses within the proximal and mid segments.  This feeds a diffusely diseased distal segment which supplies small posterior lateral branches which also contain scattered 60 to 70% stenoses.  The posterior descending artery is 100% occluded at its ostium and has faint collaterals from the MARCELINA to LAD.     LIMA to LAD:  Graft is patent and supplies a moderate sized LAD with a 90% mid segment stenoses otherwise luminal irregularities throughout.  Does backfill a moderate size diagonal which contains 20% scattered stenoses.  SVG to PDA:  Known to be occluded  SVG to OM3:  Known to be occluded  Interpretation Summary  2020    · Left ventricular systolic function is normal. Calculated EF = 56.7%.  · Left ventricular wall thickness  is consistent with severe concentric hypertrophy. Normal diastolic function.  · Normal right ventricular cavity size, wall thickness, systolic function  · Bioprosthetic valve not well visualized but no significant perivalvular leak.  · Peak velocity of the flow distal to the aortic valve is 328.8 cm/s. Aortic valve maximum pressure gradient is 43.2 mmHg. Aortic valve mean pressure gradient is 28.8 mmHg. Aortic valve area is 1.0 cm2. Aortic valve dimensionless index is 0.3.  · Mild dilation of the sinuses of Valsalva is present measuring 4.2 cm. Prior Aortic root repair a Dacron patch  · There is some worsening of aortic valve gradient compared to previous study on 7/15/19.           Lipid Panel    Lipid Panel 2/28/20   Triglycerides 118   HDL Cholesterol 35 (A)   VLDL Cholesterol 23.6   LDL Cholesterol  56   LDL/HDL Ratio 1.61   (A) Abnormal value                ASSESSMENT & PLAN     No diagnosis found.      SUMMARY/DISCUSSION  1. Coronary artery disease.  Patient was having recurrent exertional angina.  He has responded very nicely.  I encouraged also to do more exercise because this will also promote blood flow.  2. Hypertension blood pressures good  3. Status post AVR/CABG.  4. Status post ascending aortic aneurysm repair.  Last echo was 7/1/2020 at that time his aorta measured 4.2 cm.  5. Again exercise is faith I really stressed that on today's visit.  Follow-up 1 year sooner if issues.        MEDICATIONS         Discharge Medications          Accurate as of January 22, 2021 10:43 AM. If you have any questions, ask your nurse or doctor.            Continue These Medications      Instructions Start Date   albuterol sulfate  (90 Base) MCG/ACT inhaler  Commonly known as: PROVENTIL HFA;VENTOLIN HFA;PROAIR HFA   2 puffs, Inhalation, Every 6 Hours PRN      amLODIPine 10 MG tablet  Commonly known as: NORVASC   10 mg, Oral, Daily      amoxicillin 500 MG capsule  Commonly known as: AMOXIL   2,000 mg, Oral, See  Admin Instructions, 4 capsules 1 hour prior to procedure      aspirin 81 MG tablet   81 mg, Oral, Daily      atorvastatin 20 MG tablet  Commonly known as: LIPITOR   20 mg, Oral, Daily      clopidogrel 75 MG tablet  Commonly known as: PLAVIX   75 mg, Oral, Daily      glimepiride 2 MG tablet  Commonly known as: AMARYL   2 mg, Oral, Every Morning Before Breakfast      glucosamine sulfate 500 MG capsule capsule   Oral, Daily      isosorbide mononitrate 60 MG 24 hr tablet  Commonly known as: IMDUR   60 mg, Oral, Daily      lisinopril 5 MG tablet  Commonly known as: PRINIVIL,ZESTRIL   10 mg      metoprolol succinate XL 25 MG 24 hr tablet  Commonly known as: TOPROL-XL   25 mg, Oral, Daily                 **Dragon Disclaimer:   Much of this encounter note is an electronic transcription/translation of spoken language to printed text. The electronic translation of spoken language may permit erroneous, or at times, nonsensical words or phrases to be inadvertently transcribed. Although I have reviewed the note for such errors, some may still exist.

## 2021-04-15 ENCOUNTER — IMMUNIZATION (OUTPATIENT)
Dept: VACCINE CLINIC | Facility: HOSPITAL | Age: 61
End: 2021-04-15

## 2021-04-15 PROCEDURE — 91300 HC SARSCOV02 VAC 30MCG/0.3ML IM: CPT | Performed by: INTERNAL MEDICINE

## 2021-04-15 PROCEDURE — 0001A: CPT | Performed by: INTERNAL MEDICINE

## 2021-05-04 NOTE — PROGRESS NOTES
Called pt on home phone  LM requesting return call  PSR:  When pt returns this call please remind pt Dr SAVAGE would like her to get Pneumo 23 vaccine  Please assist pt to schedule this nurse visit  THANKS!!     See scanned session report.

## 2021-05-06 ENCOUNTER — IMMUNIZATION (OUTPATIENT)
Dept: VACCINE CLINIC | Facility: HOSPITAL | Age: 61
End: 2021-05-06

## 2021-05-06 PROCEDURE — 0002A: CPT | Performed by: INTERNAL MEDICINE

## 2021-05-06 PROCEDURE — 91300 HC SARSCOV02 VAC 30MCG/0.3ML IM: CPT | Performed by: INTERNAL MEDICINE

## 2021-05-25 RX ORDER — AMOXICILLIN 500 MG/1
2000 CAPSULE ORAL SEE ADMIN INSTRUCTIONS
Qty: 4 CAPSULE | Refills: 1 | Status: SHIPPED | OUTPATIENT
Start: 2021-05-25 | End: 2021-12-07 | Stop reason: SDUPTHER

## 2021-05-25 NOTE — TELEPHONE ENCOUNTER
Patient has a Dentist Appointment scheduled for next Monday and needs medication for pretreatment. His pharmacy is listed ad the [facility] on [address].

## 2021-06-17 ENCOUNTER — HOSPITAL ENCOUNTER (OUTPATIENT)
Facility: HOSPITAL | Age: 61
Discharge: HOME | End: 2021-06-17
Attending: EMERGENCY MEDICINE | Admitting: INTERNAL MEDICINE

## 2021-06-17 ENCOUNTER — APPOINTMENT (OUTPATIENT)
Dept: GENERAL RADIOLOGY | Facility: HOSPITAL | Age: 61
End: 2021-06-17

## 2021-06-17 VITALS
DIASTOLIC BLOOD PRESSURE: 79 MMHG | TEMPERATURE: 97.7 F | RESPIRATION RATE: 18 BRPM | BODY MASS INDEX: 41.75 KG/M2 | HEIGHT: 73 IN | HEART RATE: 60 BPM | OXYGEN SATURATION: 95 % | WEIGHT: 315 LBS | SYSTOLIC BLOOD PRESSURE: 131 MMHG

## 2021-06-17 DIAGNOSIS — I10 HYPERTENSION, UNSPECIFIED TYPE: ICD-10-CM

## 2021-06-17 DIAGNOSIS — R07.9 ACUTE CHEST PAIN: Primary | ICD-10-CM

## 2021-06-17 LAB
ALBUMIN SERPL-MCNC: 4 G/DL (ref 3.5–5.2)
ALBUMIN/GLOB SERPL: 1.8 G/DL
ALP SERPL-CCNC: 67 U/L (ref 39–117)
ALT SERPL W P-5'-P-CCNC: 13 U/L (ref 1–41)
ANION GAP SERPL CALCULATED.3IONS-SCNC: 10.4 MMOL/L (ref 5–15)
APTT PPP: 29.9 SECONDS (ref 22.7–35.4)
AST SERPL-CCNC: 14 U/L (ref 1–40)
BASOPHILS # BLD AUTO: 0.03 10*3/MM3 (ref 0–0.2)
BASOPHILS NFR BLD AUTO: 0.4 % (ref 0–1.5)
BILIRUB SERPL-MCNC: 0.3 MG/DL (ref 0–1.2)
BUN SERPL-MCNC: 15 MG/DL (ref 8–23)
BUN/CREAT SERPL: 17.6 (ref 7–25)
CALCIUM SPEC-SCNC: 8.3 MG/DL (ref 8.6–10.5)
CHLORIDE SERPL-SCNC: 107 MMOL/L (ref 98–107)
CO2 SERPL-SCNC: 25.6 MMOL/L (ref 22–29)
CREAT SERPL-MCNC: 0.85 MG/DL (ref 0.76–1.27)
DEPRECATED RDW RBC AUTO: 47.5 FL (ref 37–54)
EOSINOPHIL # BLD AUTO: 0.17 10*3/MM3 (ref 0–0.4)
EOSINOPHIL NFR BLD AUTO: 2.2 % (ref 0.3–6.2)
ERYTHROCYTE [DISTWIDTH] IN BLOOD BY AUTOMATED COUNT: 14.4 % (ref 12.3–15.4)
GFR SERPL CREATININE-BSD FRML MDRD: 91 ML/MIN/1.73
GLOBULIN UR ELPH-MCNC: 2.2 GM/DL
GLUCOSE SERPL-MCNC: 132 MG/DL (ref 65–99)
HCT VFR BLD AUTO: 38 % (ref 37.5–51)
HGB BLD-MCNC: 12.7 G/DL (ref 13–17.7)
IMM GRANULOCYTES # BLD AUTO: 0.03 10*3/MM3 (ref 0–0.05)
IMM GRANULOCYTES NFR BLD AUTO: 0.4 % (ref 0–0.5)
INR PPP: 0.92 (ref 0.9–1.1)
LYMPHOCYTES # BLD AUTO: 1.38 10*3/MM3 (ref 0.7–3.1)
LYMPHOCYTES NFR BLD AUTO: 17.5 % (ref 19.6–45.3)
MAGNESIUM SERPL-MCNC: 2 MG/DL (ref 1.6–2.4)
MCH RBC QN AUTO: 31.4 PG (ref 26.6–33)
MCHC RBC AUTO-ENTMCNC: 33.4 G/DL (ref 31.5–35.7)
MCV RBC AUTO: 93.8 FL (ref 79–97)
MONOCYTES # BLD AUTO: 0.52 10*3/MM3 (ref 0.1–0.9)
MONOCYTES NFR BLD AUTO: 6.6 % (ref 5–12)
NEUTROPHILS NFR BLD AUTO: 5.74 10*3/MM3 (ref 1.7–7)
NEUTROPHILS NFR BLD AUTO: 72.9 % (ref 42.7–76)
NRBC BLD AUTO-RTO: 0 /100 WBC (ref 0–0.2)
NT-PROBNP SERPL-MCNC: 348.5 PG/ML (ref 0–900)
PLATELET # BLD AUTO: 193 10*3/MM3 (ref 140–450)
PMV BLD AUTO: 9.5 FL (ref 6–12)
POTASSIUM SERPL-SCNC: 3.7 MMOL/L (ref 3.5–5.2)
PROCALCITONIN SERPL-MCNC: 0.06 NG/ML (ref 0–0.25)
PROT SERPL-MCNC: 6.2 G/DL (ref 6–8.5)
PROTHROMBIN TIME: 12.1 SECONDS (ref 11.7–14.2)
QT INTERVAL: 391 MS
RBC # BLD AUTO: 4.05 10*6/MM3 (ref 4.14–5.8)
SARS-COV-2 RNA RESP QL NAA+PROBE: NOT DETECTED
SODIUM SERPL-SCNC: 143 MMOL/L (ref 136–145)
TROPONIN T SERPL-MCNC: <0.01 NG/ML (ref 0–0.03)
TROPONIN T SERPL-MCNC: <0.01 NG/ML (ref 0–0.03)
WBC # BLD AUTO: 7.87 10*3/MM3 (ref 3.4–10.8)

## 2021-06-17 PROCEDURE — 83735 ASSAY OF MAGNESIUM: CPT | Performed by: EMERGENCY MEDICINE

## 2021-06-17 PROCEDURE — 85730 THROMBOPLASTIN TIME PARTIAL: CPT | Performed by: EMERGENCY MEDICINE

## 2021-06-17 PROCEDURE — 93005 ELECTROCARDIOGRAM TRACING: CPT | Performed by: EMERGENCY MEDICINE

## 2021-06-17 PROCEDURE — 93005 ELECTROCARDIOGRAM TRACING: CPT

## 2021-06-17 PROCEDURE — C1894 INTRO/SHEATH, NON-LASER: HCPCS | Performed by: INTERNAL MEDICINE

## 2021-06-17 PROCEDURE — 71045 X-RAY EXAM CHEST 1 VIEW: CPT

## 2021-06-17 PROCEDURE — 84484 ASSAY OF TROPONIN QUANT: CPT | Performed by: EMERGENCY MEDICINE

## 2021-06-17 PROCEDURE — 25010000002 MIDAZOLAM PER 1 MG: Performed by: INTERNAL MEDICINE

## 2021-06-17 PROCEDURE — G0378 HOSPITAL OBSERVATION PER HR: HCPCS

## 2021-06-17 PROCEDURE — 36415 COLL VENOUS BLD VENIPUNCTURE: CPT | Performed by: EMERGENCY MEDICINE

## 2021-06-17 PROCEDURE — 0 IOPAMIDOL PER 1 ML: Performed by: INTERNAL MEDICINE

## 2021-06-17 PROCEDURE — 25010000002 HEPARIN (PORCINE) PER 1000 UNITS: Performed by: INTERNAL MEDICINE

## 2021-06-17 PROCEDURE — 83880 ASSAY OF NATRIURETIC PEPTIDE: CPT | Performed by: EMERGENCY MEDICINE

## 2021-06-17 PROCEDURE — 93455 CORONARY ART/GRFT ANGIO S&I: CPT | Performed by: INTERNAL MEDICINE

## 2021-06-17 PROCEDURE — 25010000002 FENTANYL CITRATE (PF) 50 MCG/ML SOLUTION: Performed by: INTERNAL MEDICINE

## 2021-06-17 PROCEDURE — 85025 COMPLETE CBC W/AUTO DIFF WBC: CPT | Performed by: EMERGENCY MEDICINE

## 2021-06-17 PROCEDURE — 80053 COMPREHEN METABOLIC PANEL: CPT | Performed by: EMERGENCY MEDICINE

## 2021-06-17 PROCEDURE — 99220 PR INITIAL OBSERVATION CARE/DAY 70 MINUTES: CPT | Performed by: INTERNAL MEDICINE

## 2021-06-17 PROCEDURE — 85610 PROTHROMBIN TIME: CPT | Performed by: EMERGENCY MEDICINE

## 2021-06-17 PROCEDURE — C1769 GUIDE WIRE: HCPCS | Performed by: INTERNAL MEDICINE

## 2021-06-17 PROCEDURE — 99285 EMERGENCY DEPT VISIT HI MDM: CPT

## 2021-06-17 PROCEDURE — C9803 HOPD COVID-19 SPEC COLLECT: HCPCS

## 2021-06-17 PROCEDURE — 84145 PROCALCITONIN (PCT): CPT | Performed by: EMERGENCY MEDICINE

## 2021-06-17 PROCEDURE — U0003 INFECTIOUS AGENT DETECTION BY NUCLEIC ACID (DNA OR RNA); SEVERE ACUTE RESPIRATORY SYNDROME CORONAVIRUS 2 (SARS-COV-2) (CORONAVIRUS DISEASE [COVID-19]), AMPLIFIED PROBE TECHNIQUE, MAKING USE OF HIGH THROUGHPUT TECHNOLOGIES AS DESCRIBED BY CMS-2020-01-R: HCPCS | Performed by: EMERGENCY MEDICINE

## 2021-06-17 PROCEDURE — 93010 ELECTROCARDIOGRAM REPORT: CPT | Performed by: INTERNAL MEDICINE

## 2021-06-17 RX ORDER — ASPIRIN 81 MG/1
324 TABLET, CHEWABLE ORAL ONCE
Status: COMPLETED | OUTPATIENT
Start: 2021-06-17 | End: 2021-06-17

## 2021-06-17 RX ORDER — ALBUTEROL SULFATE 2.5 MG/3ML
2.5 SOLUTION RESPIRATORY (INHALATION) EVERY 6 HOURS PRN
Status: DISCONTINUED | OUTPATIENT
Start: 2021-06-17 | End: 2021-06-17 | Stop reason: HOSPADM

## 2021-06-17 RX ORDER — ISOSORBIDE MONONITRATE 60 MG/1
60 TABLET, EXTENDED RELEASE ORAL DAILY
Status: DISCONTINUED | OUTPATIENT
Start: 2021-06-17 | End: 2021-06-17 | Stop reason: HOSPADM

## 2021-06-17 RX ORDER — ATORVASTATIN CALCIUM 40 MG/1
40 TABLET, FILM COATED ORAL DAILY
Qty: 90 TABLET | Refills: 2 | Status: SHIPPED | OUTPATIENT
Start: 2021-06-17 | End: 2022-08-08 | Stop reason: SDUPTHER

## 2021-06-17 RX ORDER — LISINOPRIL 5 MG/1
5 TABLET ORAL DAILY
Status: DISCONTINUED | OUTPATIENT
Start: 2021-06-17 | End: 2021-06-17 | Stop reason: HOSPADM

## 2021-06-17 RX ORDER — SODIUM CHLORIDE 0.9 % (FLUSH) 0.9 %
3 SYRINGE (ML) INJECTION EVERY 12 HOURS SCHEDULED
Status: DISCONTINUED | OUTPATIENT
Start: 2021-06-17 | End: 2021-06-17 | Stop reason: HOSPADM

## 2021-06-17 RX ORDER — LIDOCAINE HYDROCHLORIDE 20 MG/ML
INJECTION, SOLUTION INFILTRATION; PERINEURAL AS NEEDED
Status: DISCONTINUED | OUTPATIENT
Start: 2021-06-17 | End: 2021-06-17 | Stop reason: HOSPADM

## 2021-06-17 RX ORDER — ACETAMINOPHEN 325 MG/1
650 TABLET ORAL EVERY 4 HOURS PRN
Status: DISCONTINUED | OUTPATIENT
Start: 2021-06-17 | End: 2021-06-17 | Stop reason: HOSPADM

## 2021-06-17 RX ORDER — NITROGLYCERIN 0.4 MG/1
0.4 TABLET SUBLINGUAL
Status: DISCONTINUED | OUTPATIENT
Start: 2021-06-17 | End: 2021-06-17 | Stop reason: HOSPADM

## 2021-06-17 RX ORDER — CLONIDINE HYDROCHLORIDE 0.1 MG/1
0.1 TABLET ORAL ONCE
Status: COMPLETED | OUTPATIENT
Start: 2021-06-17 | End: 2021-06-17

## 2021-06-17 RX ORDER — SODIUM CHLORIDE 0.9 % (FLUSH) 0.9 %
10 SYRINGE (ML) INJECTION EVERY 12 HOURS SCHEDULED
Status: DISCONTINUED | OUTPATIENT
Start: 2021-06-17 | End: 2021-06-17 | Stop reason: HOSPADM

## 2021-06-17 RX ORDER — MIDAZOLAM HYDROCHLORIDE 1 MG/ML
INJECTION INTRAMUSCULAR; INTRAVENOUS AS NEEDED
Status: DISCONTINUED | OUTPATIENT
Start: 2021-06-17 | End: 2021-06-17 | Stop reason: HOSPADM

## 2021-06-17 RX ORDER — METOPROLOL SUCCINATE 25 MG/1
25 TABLET, EXTENDED RELEASE ORAL DAILY
Status: DISCONTINUED | OUTPATIENT
Start: 2021-06-17 | End: 2021-06-17 | Stop reason: HOSPADM

## 2021-06-17 RX ORDER — FENTANYL CITRATE 50 UG/ML
INJECTION, SOLUTION INTRAMUSCULAR; INTRAVENOUS AS NEEDED
Status: DISCONTINUED | OUTPATIENT
Start: 2021-06-17 | End: 2021-06-17 | Stop reason: HOSPADM

## 2021-06-17 RX ORDER — CLOPIDOGREL BISULFATE 75 MG/1
75 TABLET ORAL DAILY
Status: DISCONTINUED | OUTPATIENT
Start: 2021-06-17 | End: 2021-06-17 | Stop reason: HOSPADM

## 2021-06-17 RX ORDER — SODIUM CHLORIDE 0.9 % (FLUSH) 0.9 %
10 SYRINGE (ML) INJECTION AS NEEDED
Status: DISCONTINUED | OUTPATIENT
Start: 2021-06-17 | End: 2021-06-17 | Stop reason: HOSPADM

## 2021-06-17 RX ORDER — ASPIRIN 81 MG/1
81 TABLET ORAL DAILY
Status: DISCONTINUED | OUTPATIENT
Start: 2021-06-18 | End: 2021-06-17 | Stop reason: HOSPADM

## 2021-06-17 RX ORDER — ATORVASTATIN CALCIUM 20 MG/1
20 TABLET, FILM COATED ORAL DAILY
Status: DISCONTINUED | OUTPATIENT
Start: 2021-06-17 | End: 2021-06-17 | Stop reason: HOSPADM

## 2021-06-17 RX ORDER — AMLODIPINE BESYLATE 10 MG/1
10 TABLET ORAL DAILY
Status: DISCONTINUED | OUTPATIENT
Start: 2021-06-17 | End: 2021-06-17 | Stop reason: HOSPADM

## 2021-06-17 RX ORDER — ASPIRIN 81 MG/1
81 TABLET ORAL ONCE
Status: DISCONTINUED | OUTPATIENT
Start: 2021-06-18 | End: 2021-06-17

## 2021-06-17 RX ORDER — SODIUM CHLORIDE 9 MG/ML
100 INJECTION, SOLUTION INTRAVENOUS CONTINUOUS
Status: DISCONTINUED | OUTPATIENT
Start: 2021-06-17 | End: 2021-06-17 | Stop reason: HOSPADM

## 2021-06-17 RX ORDER — ISOSORBIDE MONONITRATE 60 MG/1
90 TABLET, EXTENDED RELEASE ORAL DAILY
Qty: 135 TABLET | Refills: 2 | Status: SHIPPED | OUTPATIENT
Start: 2021-06-17 | End: 2022-03-28

## 2021-06-17 RX ADMIN — NITROGLYCERIN 0.4 MG: 0.4 TABLET SUBLINGUAL at 08:13

## 2021-06-17 RX ADMIN — SODIUM CHLORIDE 100 ML/HR: 9 INJECTION, SOLUTION INTRAVENOUS at 13:35

## 2021-06-17 RX ADMIN — CLOPIDOGREL 75 MG: 75 TABLET, FILM COATED ORAL at 12:51

## 2021-06-17 RX ADMIN — CLONIDINE HYDROCHLORIDE 0.1 MG: 0.1 TABLET ORAL at 10:46

## 2021-06-17 RX ADMIN — ISOSORBIDE MONONITRATE 60 MG: 60 TABLET ORAL at 12:51

## 2021-06-17 RX ADMIN — METOPROLOL SUCCINATE 25 MG: 25 TABLET, EXTENDED RELEASE ORAL at 12:52

## 2021-06-17 RX ADMIN — AMLODIPINE BESYLATE 10 MG: 10 TABLET ORAL at 12:51

## 2021-06-17 RX ADMIN — ASPIRIN 324 MG: 81 TABLET, CHEWABLE ORAL at 08:13

## 2021-06-17 RX ADMIN — NITROGLYCERIN 0.4 MG: 0.4 TABLET SUBLINGUAL at 08:28

## 2021-06-17 NOTE — ED TRIAGE NOTES
Pt to ER from home via [facility] with c/o chest pain and soa over the last several days, increasing in severity. Pt states pain is midsternal, pressure, non-radiating. Pt states hx of bypass and stents. Pt anxious appearing in triage.      Pt masked in triage, staff in appropriate ppe.

## 2021-06-17 NOTE — ED NOTES
Pt to ED room 12. A&Ox4. Pt reports midsternal chest pain that started yesterday and reports that he woke up with SOA this morning. Rates his pain at an 8/10 with radiating pain to his neck and bilateral chest. Describes the pain as pressure. Reports increased pain with exertion and says it comes and goes. Pt says he has had chest pain on and off x 6 years. Pt reports nausea at this time but denies vomiting and diarrhea. No other complaints at this time.      Yari Gilman RN  06/17/21 [id number]

## 2021-06-17 NOTE — H&P
Cardiology History & Physical / Consultation      Patient Name: Edmond Chase  Age/Sex: 60 y.o. male  : 1960  MRN: [id number]    Date of Admission: 2021  Date of Encounter Visit: 21  Encounter Provider: Papa Miguel MD  Referring Provider: No ref. provider found  Place of Service: [facility] CARDIOLOGY  Patient Care Team:  Giorgio Vallecillo MD as PCP - General (Family Medicine)  Giorgio Vallecillo MD as PCP - Family Medicine (Family Medicine)          Subjective:     Chief Complaint: Chest Pain    History of Present Illness:  Edmond Chase is a 60 y.o. male patient of mine with a history of hypertension, diabetes, and coronary disease (s/p CABG/AVR bioprosthetic) with continued angina.  His Imdur was increased which improved his symptoms.  His last cardiac Catheterization was done in 2020 and ESMER was placed to his MARCELINA graft.  He has know occlusion of SVG to PDA and OM3. It was recommended to treat his RCA disease with antianginals because it would requires several stents to be fully revascularized due to his diffuse disease.     He presented to the ED this morning with complaints of chest pain when he woke up at 0500 with associated SOA with exertion.  His Imdur usually keeps his angina under control but has not been effective recently.  Patient says he has not really been feeling well for last several days.  Yesterday he just was really extremely fatigued.  He said he tried to do some things and just could not.  He also lost his appetite was a little bit nauseated.    On arrival his B/P was 204/119 adm initial troponin was negative.   EKG showed new T wave inversion in Lead II and V4. He was given clonidine 0.1 mg,  mg, and NTG x 1 with improvement in his pain. His B/p is improved to 169/100     Previous Cardiac Testing   Findings:  1. Coronary Artery Anatomy:  Dominance: Right coronary artery  Left Main: 30% ostial segment stenoses  otherwise luminal regularities throughout.  Left Anterior Descendin% stenoses of the proximal segment.  Circumflex Artery: Moderate in caliber size.  The entire vascular bed is fairly tortuous.  The proximal segment contains a 40 to 50% segment stenoses and quickly trifurcates into a high, mid, and inferior marginal branches.  The high marginal branch contains a 50 to 60% mid segment stenoses but again is very tortuous.  The mid marginal branch contains luminal irregularities.  The inferior marginal branch has diffuse disease throughout with scattered 60% stenoses.  Right Coronary Artery: Moderate in caliber size.  This vessel is diffusely diseased.  There is scattered 60 to 70% stenoses within the proximal and mid segments.  This feeds a diffusely diseased distal segment which supplies small posterior lateral branches which also contain scattered 60 to 70% stenoses.  The posterior descending artery is 100% occluded at its ostium and has faint collaterals from the MARCELINA to LAD.     LIMA to LAD:  Graft is patent and supplies a moderate sized LAD with a 90% mid segment stenoses otherwise luminal irregularities throughout.  Does backfill a moderate size diagonal which contains 20% scattered stenoses.  SVG to PDA:  Known to be occluded  SVG to OM3:  Known to be occluded        2. Hemodynamics:  Left Ventricle: 135/19/16 mmHg  Aorta: 124/75/97 mmHg     3. Left Ventriculogram:  Ejection Fraction: 60 %  Wall Motion: Inferior wall hypokinesis  Mitral Regurgitation: None     4. Percutaneous Intervention:  Location: Mid LAD via MARCELINA graft  Treatment: Lesion was predilated with a 2.5 x 12 mm compliant balloon followed by placement of a 2.5 x 15 mm Xience drug-eluting stent deployed at 12 fredis.  Pre-stenosis: 90 %  Post-stenosis: 0 %  Lesion Type C: No  HENNY Flow Pre: 3  HENNY Flow Post: 3  Bifurcation: No  Severe Calcium: No  Dissection: No     Conclusions:  1. Severe multivessel coronary artery disease with 100% proximal  LAD, 50 to 60% stenoses throughout the circumflex territory within the proximal segment as well as the high marginal, and diffuse 70% stenoses throughout the right coronary artery with 100% PDA stenoses with faint collaterals from the LAD.  2. Normal left ventricular filling pressures of 16 mmHg with a normal ejection fraction of 60% with inferior basal wall hypokinesis.  3. Accessible percutaneous intervention of mid LAD via MARCELINA graft with placement of a 2.5 x 15 mm Xience drug-eluting stent deployed at 12 fredis.  No residual stenosis and stable HENNY-3 flow pre-and post PCI.     Recommendations:   1. Patient was loaded with aspirin and clopidogrel in the Cath Lab and will continue with dual antiplatelet therapy for at least 1 year but may benefit from longer term therapy given diffuse disease within the right coronary artery.  2. Can reevaluate symptoms and if still symptomatic would aggressively titrate antianginal medications with consideration given to long-acting nitrates in the hopes of medically managing his right coronary artery disease as this would require an extensive amount of stents to fully revascularize.  3. Otherwise continue with beta-blocker and statin therapy with referral to cardiac rehab.       ECHO 7/1/2020  · Left ventricular systolic function is normal. Calculated EF = 56.7%.  · Left ventricular wall thickness is consistent with severe concentric hypertrophy. Normal diastolic function.  · Normal right ventricular cavity size, wall thickness, systolic function  · Bioprosthetic valve not well visualized but no significant perivalvular leak.  · Peak velocity of the flow distal to the aortic valve is 328.8 cm/s. Aortic valve maximum pressure gradient is 43.2 mmHg. Aortic valve mean pressure gradient is 28.8 mmHg. Aortic valve area is 1.0 cm2. Aortic valve dimensionless index is 0.3.  · Mild dilation of the sinuses of Valsalva is present measuring 4.2 cm. Prior Aortic root repair a Dacron  patch  · There is some worsening of aortic valve gradient compared to previous study on 7/15/19.    Past Medical History:  Past Medical History:   Diagnosis Date   • Abnormal nuclear stress test    • Aortic valve insufficiency     nonrheumtic    • Ascending aortic aneurysm (CMS/HCC)    • CAD (coronary artery disease)    • Chest pain    • Diabetes mellitus (CMS/HCC)    • Dizzy     CAREFUL WHEN GETTING UP   • Dyspnea    • Essential hypertension    • Fatigue    • Hyperglycemia    • Hyperlipidemia    • Hypersomnia with sleep apnea    • Lightheadedness    • Malaise and fatigue    • Morbid obesity (CMS/HCC)    • Myocardial ischemia    • Nocturia    • Pneumonia     FEB 2016   • Scrotal bleeding    • Shortness of breath    • Sleep apnea        Past Surgical History:   Procedure Laterality Date   • AORTIC VALVE REPAIR/REPLACEMENT  05/2016   • ASCENDING ARCH/HEMIARCH REPLACEMENT N/A 5/2/2016    Procedure: BHAVESH STERNOTOMY CORONARY ARTERY BYPASS GRAFT TIMES 3 USING LEFT INTERNAL MAMMARY ARTERY AND RIGHT GREATER SAPHENOUS VEIN GRAFT PER ENDOSCOPIC VEIN HARVESTING, AORTIC ANEURYSM REPAIR WITH ROOT REPAIR AND AORTIC VALVE REPLACEMENT;  Surgeon: Rosalio Cline MD;  Location: [facility];  Service:    • CARDIAC CATHETERIZATION N/A 4/1/2016    Procedure: Left Heart Cath;  Surgeon: Erick Tam MD;  Location: [facility] INVASIVE LOCATION;  Service:    • CARDIAC CATHETERIZATION N/A 4/1/2016    Procedure: Left ventriculography;  Surgeon: Erick Tam MD;  Location: [facility] INVASIVE LOCATION;  Service:    • CARDIAC CATHETERIZATION N/A 4/1/2016    Procedure: Right Heart Cath;  Surgeon: Erick Tam MD;  Location: [facility] INVASIVE LOCATION;  Service:    • CARDIAC CATHETERIZATION N/A 10/30/2017    Procedure: Coronary angiography;  Surgeon: Sorin Vasquez MD;  Location: [facility] INVASIVE LOCATION;  Service:    • CARDIAC CATHETERIZATION  10/30/2017    Procedure: Saphenous Vein Graft;  Surgeon: Sorin Vasquez MD;  Location:   [facility] CATH INVASIVE LOCATION;  Service:    • CARDIAC CATHETERIZATION N/A 10/30/2017    Procedure: Native mammary injection;  Surgeon: Sorin Vasquez MD;  Location: [facility] CATH INVASIVE LOCATION;  Service:    • CARDIAC CATHETERIZATION N/A 7/7/2020    Procedure: Coronary angiography;  Surgeon: Gregor Kim MD;  Location: [facility] CATH INVASIVE LOCATION;  Service: Cardiovascular;  Laterality: N/A;   • CARDIAC CATHETERIZATION N/A 7/7/2020    Procedure: Left heart cath;  Surgeon: Gregor Kim MD;  Location: [facility] CATH INVASIVE LOCATION;  Service: Cardiovascular;  Laterality: N/A;   • CARDIAC CATHETERIZATION N/A 7/7/2020    Procedure: Left ventriculography;  Surgeon: Gregor Kim MD;  Location: [facility] CATH INVASIVE LOCATION;  Service: Cardiovascular;  Laterality: N/A;   • CARDIAC CATHETERIZATION N/A 7/7/2020    Procedure: Stent ESMER bypass graft;  Surgeon: Gregor Kim MD;  Location: [facility] CATH INVASIVE LOCATION;  Service: Cardiovascular;  Laterality: N/A;   • CORONARY ARTERY BYPASS GRAFT  05/2016    LIMA TO LAD, SVG TO PDA, SVG TO OM2   • INNER EAR SURGERY     • TONSILLECTOMY         Home Medications:   Medications Prior to Admission   Medication Sig Dispense Refill Last Dose   • albuterol sulfate  (90 Base) MCG/ACT inhaler Inhale 2 puffs Every 6 (Six) Hours As Needed for Wheezing. 1 inhaler 0 6/17/2021 at Unknown time   • amLODIPine (NORVASC) 10 MG tablet Take 10 mg by mouth Daily.   6/16/2021 at Unknown time   • amoxicillin (AMOXIL) 500 MG capsule Take 4 capsules by mouth See Admin Instructions. 4 capsules 1 hour prior to procedure 4 capsule 1 Past Month at Unknown time   • aspirin 81 MG tablet Take 81 mg by mouth Daily.   6/16/2021 at Unknown time   • atorvastatin (LIPITOR) 20 MG tablet Take 1 tablet by mouth Daily. 90 tablet 3 6/16/2021 at Unknown time   • clopidogrel (PLAVIX) 75 MG tablet Take 1 tablet by mouth Daily. 90 tablet 3 6/16/2021 at Unknown time   • glimepiride (AMARYL)  "2 MG tablet Take 2 mg by mouth Every Morning Before Breakfast.   6/16/2021 at Unknown time   • isosorbide mononitrate (IMDUR) 60 MG 24 hr tablet Take 1 tablet by mouth Daily. 90 tablet 3 6/16/2021 at Unknown time   • lisinopril (PRINIVIL,ZESTRIL) 5 MG tablet 10 mg.   6/16/2021 at Unknown time   • metoprolol succinate XL (TOPROL-XL) 25 MG 24 hr tablet Take 1 tablet by mouth Daily. 90 tablet 3 6/16/2021 at Unknown time   • glucosamine sulfate 500 MG capsule capsule Take  by mouth Daily.          Allergies:  Allergies   Allergen Reactions   • Bystolic [Nebivolol Hcl]    • Metoprolol Other (See Comments)     Metoprolol seem to cause problems with mood swings       Past Social History:  Social History     Socioeconomic History   • Marital status: Single     Spouse name: Not on file   • Number of children: Not on file   • Years of education: Not on file   • Highest education level: Not on file   Tobacco Use   • Smoking status: Never Smoker   • Smokeless tobacco: Never Used   • Tobacco comment: caffeine use   Substance and Sexual Activity   • Alcohol use: Yes     Comment: 1 drink/monthly   • Drug use: No   • Sexual activity: Defer       Past Family History: History reviewed. No pertinent family history.   Family History   Problem Relation Age of Onset   • Hypertension Mother    • Diabetes Mother    • Heart disease Mother    • Hypertension Father    • Heart disease Sister    • Other Other         The patient states that his sister has a problem that goes by the name of \"long QT\".  He says this is a familial trait but he also says that he is \"not interested in pursuing it for himself\".   • Coronary artery disease Other    • Stroke Maternal Grandmother    • Heart disease Maternal Grandmother        Review of Systems   All other systems reviewed and are negative.          Objective:     Objective:  Temp:  [97.9 °F (36.6 °C)] 97.9 °F (36.6 °C)  Heart Rate:  [68-96] 75  Resp:  [15-19] 15  BP: (160-204)/() " 176/114    Intake/Output Summary (Last 24 hours) at 6/17/2021 1242  Last data filed at 6/17/2021 1204  Gross per 24 hour   Intake 0 ml   Output --   Net 0 ml     Body mass index is 43.54 kg/m².      06/17/21  0812   Weight: (!) 150 kg (330 lb)           Physical Exam:   Vitals reviewed.   Constitutional:       Appearance: Well-developed.   Eyes:      Conjunctiva/sclera: Conjunctivae normal.   HENT:      Head: Normocephalic.   Pulmonary:      Breath sounds: Normal breath sounds.   Cardiovascular:      Normal rate. Regular rhythm.   Abdominal:      General: Bowel sounds are normal.      Palpations: Abdomen is soft.   Musculoskeletal: Normal range of motion.      Cervical back: Normal range of motion. Skin:     General: Skin is warm and dry.   Neurological:      Mental Status: Alert and oriented to person, place, and time.   Psychiatric:         Behavior: Behavior normal.          Labs:   Lab Review:     Results from last 7 days   Lab Units 06/17/21  0927   SODIUM mmol/L 143   POTASSIUM mmol/L 3.7   CHLORIDE mmol/L 107   CO2 mmol/L 25.6   BUN mg/dL 15   CREATININE mg/dL 0.85   GLUCOSE mg/dL 132*   CALCIUM mg/dL 8.3*   AST (SGOT) U/L 14   ALT (SGPT) U/L 13     Results from last 7 days   Lab Units 06/17/21  0927   TROPONIN T ng/mL <0.010     Results from last 7 days   Lab Units 06/17/21  0824   WBC 10*3/mm3 7.87   HEMOGLOBIN g/dL 12.7*   HEMATOCRIT % 38.0   PLATELETS 10*3/mm3 193     Results from last 7 days   Lab Units 06/17/21  0824   INR  0.92   APTT seconds 29.9         Results from last 7 days   Lab Units 06/17/21  0825   MAGNESIUM mg/dL 2.0         Results from last 7 days   Lab Units 06/17/21  0825   PROBNP pg/mL 348.5                 Telemetry      PREVIOUS EKG:          EKG:  On arrival             Assessment:       Acute chest pain        Plan:      1.  Coronary artery disease.  Patient now presents with symptoms that are very similar to what he had last summer.  He does have chronic chest discomfort but this  is different.  It definitely acutely change the last 36 hours.  That is known coronary artery disease I think it is best just to proceed with a heart catheterization.  Risk and benefits were reviewed and we will proceed.  If he has no revascularizable disease try to send him home today if appropriate increase his medication and go from there.    Thank you for allowing me to participate in the care of Edmond Chase. Feel free to contact me directly with any further questions or concerns.    Papa Miguel MD  [facility]  06/17/21  12:42 EDT

## 2021-06-17 NOTE — ED PROVIDER NOTES
EMERGENCY DEPARTMENT ENCOUNTER  Patient was placed in face mask in first look and the following protective measures were taken unless additional measures were taken and documented below in the ED course. Patient was wearing facemask when I entered the room and throughout our encounter. I wore full protective equipment throughout this patient encounter including a face mask, and gloves. Hand hygiene was performed before donning protective equipment and after removal when leaving the room.    Room Number:  12/12  Date of encounter:  6/17/2021  PCP: Giorgio Vallecillo MD    HPI:  Context: Edmond Chase is a 60 y.o. male who presents to the ED c/o chief complaint of chest pain shortness of breath.  Patient complains of shortness of breath for the last 3 days, shortness of breath constant, worse with exertion.  Patient denies any orthopnea but does endorse paroxysmal nocturnal dyspnea.  Patient endorses swelling of his lower extremities, does not take his weight daily.  Patient does endorse history of CHF, does not believe he is currently on diuretic.  Patient denies any cough or upper respiratory symptoms, no fever or systemic symptoms.  Patient also endorses intermittent chest pain, coming and going.  Patient denies any inciting events.  Chest pain is described as diffuse, pressure, sometimes radiates to his neck, associated with worse shortness of breath, nausea, diaphoresis.  Patient reports pain is exertional.  Patient reports pain is usually relieved by isosorbide mononitrate although several times recently has not been.  Patient reports he is currently having chest pain now, began when he woke up at 5:00 this morning.  Patient denies taking aspirin or nitroglycerin today.  Patient reports he had similar chest pain before receiving his last cardiac stent.  Patient reports he is followed by Dr Miguel, cardiology.    MEDICAL HISTORY REVIEW  Reviewed in EPIC    PAST MEDICAL HISTORY  Active Ambulatory Problems      Diagnosis Date Noted   • Essential hypertension 02/29/2016   • Hyperglycemia 02/29/2016   • Hyperlipidemia 02/29/2016   • Morbid obesity (CMS/HCC) 02/29/2016   • Nocturia 02/29/2016   • Nonrheumatic aortic valve insufficiency 03/29/2016   • Myocardial ischemia 03/29/2016   • Coronary artery disease involving native coronary artery of native heart 04/19/2016   • Ascending aortic aneurysm (CMS/[facility]) 04/19/2016   • CAD in native artery 05/02/2016   • S/P CABG (coronary artery bypass graft) 05/18/2016   • S/P AVR (aortic valve replacement) 05/18/2016   • Status post ascending aortic aneurysm repair 05/18/2016   • Hypersomnia with sleep apnea 09/04/2016   • Obstructive sleep apnea, adult    • Fatigue 07/28/2017   • Abnormal nuclear stress test 10/23/2017   • Coronary artery disease 10/23/2017   • Coronary artery disease of bypass graft of native heart with stable angina pectoris (CMS/[facility]) 07/01/2020     Resolved Ambulatory Problems     Diagnosis Date Noted   • No Resolved Ambulatory Problems     Past Medical History:   Diagnosis Date   • Aortic valve insufficiency    • CAD (coronary artery disease)    • Chest pain    • Diabetes mellitus (CMS/[facility])    • Dizzy    • Dyspnea    • Lightheadedness    • Malaise and fatigue    • Pneumonia    • Scrotal bleeding    • Shortness of breath    • Sleep apnea        PAST SURGICAL HISTORY  Past Surgical History:   Procedure Laterality Date   • AORTIC VALVE REPAIR/REPLACEMENT  05/2016   • ASCENDING ARCH/HEMIARCH REPLACEMENT N/A 5/2/2016    Procedure: BHAVESH STERNOTOMY CORONARY ARTERY BYPASS GRAFT TIMES 3 USING LEFT INTERNAL MAMMARY ARTERY AND RIGHT GREATER SAPHENOUS VEIN GRAFT PER ENDOSCOPIC VEIN HARVESTING, AORTIC ANEURYSM REPAIR WITH ROOT REPAIR AND AORTIC VALVE REPLACEMENT;  Surgeon: Rosalio Cline MD;  Location: [facility] OR;  Service:    • CARDIAC CATHETERIZATION N/A 4/1/2016    Procedure: Left Heart Cath;  Surgeon: Erick Tam MD;  Location: [facility] CATH INVASIVE LOCATION;  Service:     • CARDIAC CATHETERIZATION N/A 4/1/2016    Procedure: Left ventriculography;  Surgeon: Erick Tam MD;  Location: [facility] CATH INVASIVE LOCATION;  Service:    • CARDIAC CATHETERIZATION N/A 4/1/2016    Procedure: Right Heart Cath;  Surgeon: Erick Tam MD;  Location: [facility] CATH INVASIVE LOCATION;  Service:    • CARDIAC CATHETERIZATION N/A 10/30/2017    Procedure: Coronary angiography;  Surgeon: Sorin Vasquez MD;  Location: [facility] CATH INVASIVE LOCATION;  Service:    • CARDIAC CATHETERIZATION  10/30/2017    Procedure: Saphenous Vein Graft;  Surgeon: Sorin Vasquez MD;  Location: [facility] CATH INVASIVE LOCATION;  Service:    • CARDIAC CATHETERIZATION N/A 10/30/2017    Procedure: Native mammary injection;  Surgeon: Sorin Vasquez MD;  Location: [facility] CATH INVASIVE LOCATION;  Service:    • CARDIAC CATHETERIZATION N/A 7/7/2020    Procedure: Coronary angiography;  Surgeon: Gregor Kim MD;  Location: [facility] CATH INVASIVE LOCATION;  Service: Cardiovascular;  Laterality: N/A;   • CARDIAC CATHETERIZATION N/A 7/7/2020    Procedure: Left heart cath;  Surgeon: Gregor Kim MD;  Location: [facility] CATH INVASIVE LOCATION;  Service: Cardiovascular;  Laterality: N/A;   • CARDIAC CATHETERIZATION N/A 7/7/2020    Procedure: Left ventriculography;  Surgeon: Gregor Kim MD;  Location: [facility] CATH INVASIVE LOCATION;  Service: Cardiovascular;  Laterality: N/A;   • CARDIAC CATHETERIZATION N/A 7/7/2020    Procedure: Stent ESMER bypass graft;  Surgeon: Gregor Kim MD;  Location: [facility] CATH INVASIVE LOCATION;  Service: Cardiovascular;  Laterality: N/A;   • CORONARY ARTERY BYPASS GRAFT  05/2016    LIMA TO LAD, SVG TO PDA, SVG TO OM2   • INNER EAR SURGERY     • TONSILLECTOMY         FAMILY HISTORY  Family History   Problem Relation Age of Onset   • Hypertension Mother    • Diabetes Mother    • Heart disease Mother    • Hypertension Father    • Heart disease Sister    • Other Other         The patient states  "that his sister has a problem that goes by the name of \"long QT\".  He says this is a familial trait but he also says that he is \"not interested in pursuing it for himself\".   • Coronary artery disease Other    • Stroke Maternal Grandmother    • Heart disease Maternal Grandmother        SOCIAL HISTORY  Social History     Socioeconomic History   • Marital status: Single     Spouse name: Not on file   • Number of children: Not on file   • Years of education: Not on file   • Highest education level: Not on file   Tobacco Use   • Smoking status: Never Smoker   • Smokeless tobacco: Never Used   • Tobacco comment: caffeine use   Substance and Sexual Activity   • Alcohol use: Yes     Comment: 1-2 drinks/weekly   • Drug use: No   • Sexual activity: Defer       ALLERGIES  Bystolic [nebivolol hcl] and Metoprolol    The patient's allergies have been reviewed    REVIEW OF SYSTEMS  All systems reviewed and negative except for those discussed in HPI.     PHYSICAL EXAM  I have reviewed the triage vital signs and nursing notes.  ED Triage Vitals [06/17/21 0630]   Temp Heart Rate Resp BP SpO2   97.9 °F (36.6 °C) 96 18 (!) 204/119 95 %      Temp src Heart Rate Source Patient Position BP Location FiO2 (%)   -- -- Standing Right arm --       General: No acute distress.  HENT: NCAT, PERRL, Nares patent.  Eyes: no scleral icterus.  Neck: trachea midline, no ROM limitations.  CV: regular rhythm, regular rate.  Unable to assess JVD secondary to habitus, nonpitting edema bilateral lower extremities  Respiratory: normal effort, trace crackles bilateral lung bases  Abdomen: soft, nondistended, NTTP, no rebound tenderness, no guarding or rigidity  : deferred.  Musculoskeletal: no deformity.  Neuro: alert, moves all extremities, follows commands.  Skin: warm, dry.    LAB RESULTS  Recent Results (from the past 24 hour(s))   ECG 12 Lead    Collection Time: 06/17/21  6:22 AM   Result Value Ref Range    QT Interval 391 ms   Protime-INR    " Collection Time: 06/17/21  8:24 AM    Specimen: Blood   Result Value Ref Range    Protime 12.1 11.7 - 14.2 Seconds    INR 0.92 0.90 - 1.10   aPTT    Collection Time: 06/17/21  8:24 AM    Specimen: Blood   Result Value Ref Range    PTT 29.9 22.7 - 35.4 seconds   CBC Auto Differential    Collection Time: 06/17/21  8:24 AM    Specimen: Blood   Result Value Ref Range    WBC 7.87 3.40 - 10.80 10*3/mm3    RBC 4.05 (L) 4.14 - 5.80 10*6/mm3    Hemoglobin 12.7 (L) 13.0 - 17.7 g/dL    Hematocrit 38.0 37.5 - 51.0 %    MCV 93.8 79.0 - 97.0 fL    MCH 31.4 26.6 - 33.0 pg    MCHC 33.4 31.5 - 35.7 g/dL    RDW 14.4 12.3 - 15.4 %    RDW-SD 47.5 37.0 - 54.0 fl    MPV 9.5 6.0 - 12.0 fL    Platelets 193 140 - 450 10*3/mm3    Neutrophil % 72.9 42.7 - 76.0 %    Lymphocyte % 17.5 (L) 19.6 - 45.3 %    Monocyte % 6.6 5.0 - 12.0 %    Eosinophil % 2.2 0.3 - 6.2 %    Basophil % 0.4 0.0 - 1.5 %    Immature Grans % 0.4 0.0 - 0.5 %    Neutrophils, Absolute 5.74 1.70 - 7.00 10*3/mm3    Lymphocytes, Absolute 1.38 0.70 - 3.10 10*3/mm3    Monocytes, Absolute 0.52 0.10 - 0.90 10*3/mm3    Eosinophils, Absolute 0.17 0.00 - 0.40 10*3/mm3    Basophils, Absolute 0.03 0.00 - 0.20 10*3/mm3    Immature Grans, Absolute 0.03 0.00 - 0.05 10*3/mm3    nRBC 0.0 0.0 - 0.2 /100 WBC   BNP    Collection Time: 06/17/21  8:25 AM    Specimen: Blood   Result Value Ref Range    proBNP 348.5 0.0 - 900.0 pg/mL   Magnesium    Collection Time: 06/17/21  8:25 AM    Specimen: Blood   Result Value Ref Range    Magnesium 2.0 1.6 - 2.4 mg/dL   Procalcitonin    Collection Time: 06/17/21  8:25 AM    Specimen: Blood   Result Value Ref Range    Procalcitonin 0.06 0.00 - 0.25 ng/mL   Comprehensive Metabolic Panel    Collection Time: 06/17/21  9:27 AM    Specimen: Blood   Result Value Ref Range    Glucose 132 (H) 65 - 99 mg/dL    BUN 15 8 - 23 mg/dL    Creatinine 0.85 0.76 - 1.27 mg/dL    Sodium 143 136 - 145 mmol/L    Potassium 3.7 3.5 - 5.2 mmol/L    Chloride 107 98 - 107 mmol/L    CO2  25.6 22.0 - 29.0 mmol/L    Calcium 8.3 (L) 8.6 - 10.5 mg/dL    Total Protein 6.2 6.0 - 8.5 g/dL    Albumin 4.00 3.50 - 5.20 g/dL    ALT (SGPT) 13 1 - 41 U/L    AST (SGOT) 14 1 - 40 U/L    Alkaline Phosphatase 67 39 - 117 U/L    Total Bilirubin 0.3 0.0 - 1.2 mg/dL    eGFR Non African Amer 91 >60 mL/min/1.73    Globulin 2.2 gm/dL    A/G Ratio 1.8 g/dL    BUN/Creatinine Ratio 17.6 7.0 - 25.0    Anion Gap 10.4 5.0 - 15.0 mmol/L   Troponin    Collection Time: 06/17/21  9:27 AM    Specimen: Blood   Result Value Ref Range    Troponin T <0.010 0.000 - 0.030 ng/mL       I ordered the above labs and reviewed the results.    RADIOLOGY  XR Chest 1 View    Result Date: 6/17/2021  CHEST SINGLE VIEW  HISTORY: Shortness of air and chest pain.  COMPARISON: CT angiogram chest 07/08/2019, 2 view chest 07/07/2019.  FINDINGS: Heart size is enlarged. Median sternotomy wires are present. Lungs appear clear and there is no evidence of pulmonary edema, pleural effusion or infiltrate.      Cardiomegaly with evidence for previous median sternotomy. No evidence for active disease in the chest.  This report was finalized on 6/17/2021 8:45 AM by Dr. Edmond Garcia M.D.        I ordered the above noted radiological studies. I reviewed the images and results. I agree with the radiologist interpretation.    PROCEDURES  Procedures    MEDICATIONS GIVEN IN ER  Medications   nitroglycerin (NITROSTAT) SL tablet 0.4 mg (0.4 mg Sublingual Given 6/17/21 0828)   cloNIDine (CATAPRES) tablet 0.1 mg (has no administration in time range)   aspirin chewable tablet 324 mg (324 mg Oral Given 6/17/21 0813)       PROGRESS, DATA ANALYSIS, CONSULTS, AND MEDICAL DECISION MAKING  A complete history and physical exam have been performed.  All available laboratory and imaging results have been reviewed by myself prior to disposition.    MDM  After the initial H&P, I discussed pertinent information from history and physical exam with patient/family.  Discussed  differential diagnosis.  Discussed plan for ED evaluation/work-up/treatment.  All questions answered.  Patient/family is agreeable with plan.  ED Course as of Jun 17 1042   Thu Jun 17, 2021   0658 EKG independently viewed and contemporaneously interpreted by ED physician. Time: 6:22 AM. Rate 76. Interpretation: Normal sinus rhythm, normal axis, normal QRS, no ST elevation, ST depression with T wave inversion in high lateral leads, T wave inversion in V5 and V6.    [JG]   0659 When compared to prior EKG on 7/20/2020, no acute changes are present. ST depressions with T wave inversions in high lateral leads and T wave inversions in V5 and V6 were present on prior EKG.    [JG]   0727 My differential diagnosis for chest pain includes but is not limited to:  Muscle strain, costochondritis, myositis, pleurisy, rib fracture, intercostal neuritis, herpes zoster, tumor, myocardial infarction, coronary syndrome, unstable angina, angina, aortic dissection, mitral valve prolapse, pericarditis, palpitations, pulmonary embolus, pneumonia, pneumothorax, lung cancer, GERD, esophagitis, esophageal spasm        [JG]   0729 Chest pain concern for ACS.  EKG shows no acute findings.  Patient having active chest pain now, given aspirin nitroglycerin.  Obtaining cardiac work-up.  Even if ED work-up is unremarkable, symptoms concerning for ACS, plan for cardiology consult and admission.    [JG]   0934 Patient reassessed status post single dose of nitroglycerin, reports chest pain dramatically improved, minimal at present.    [JG]   0946 Chronic anemia, hemoglobin at baseline.    [JG]   0955 HEART SCORE:    History #1  (Highly suspicious 2, Moderately suspicious 1, Slightly or non-suspicious 0)    ECG #0  (Significant ST depression 2,  Nonspecific repol disturbance 1, Normal 0)    Age #1  (> or = 65 2, 46-65 1,  < or = 45 0)    Risk factors #2  (hypercholesterolemia, HTN, DM, smoking, pos fam hx, obesity)  (> or = to 3 RF 2, 1 or 2 1, No  risk factors 0)    Troponin #0  (> or = 3x normal limit 2, 1-3x normal limit 1, < or = Normal limit 0)    HEART Score Key:  Scores 0-3: 0.9-1.7% risk of adverse cardiac event. In the HEART Score study, these patients were discharged (0.99% in the retrospective study, 1.7% in the prospective study)  Scores 4-6: 12-16.6% risk of adverse cardiac event. In the HEART Score study, these patients were admitted to the hospital. (11.6% retrospective, 16.6% prospective)  Scores =7: 50-65% risk of adverse cardiac event. In the HEART Score study, these patients were candidates for early invasive measures. (65.2% retrospective, 50.1% prospective)      This patient's HEART score is 4        [JG]   0957 Medical history reviewed and significant for: Patient underwent stress test in June of last year which showed small to medium sized severe area of ischemia located in the inferior wall, EF 55%.  Patient then underwent cardiac cath and July of that year, cardiac catheter showed right coronary artery dominance, left main had 30% ostial segment, LAD had 100% stenosis of proximal segment with LIMA to LAD showing a 90% mid segment stenosis.  Patient had a 40 to 50% lesion in proximal circumflex, 50 to 60% and high marginal branch off of circumflex, 60% lesion in marginal branch off of circumflex, right coronary artery showed diffusely diseased with 60 to 70% lesions.    [JG]   0957 Consulting patient's cardiologist for admission.    [JG]   0959 Patient reassessed.  Discussed ED findings, differential diagnosis, and the need for admission for evaluation/treatment.  They are agreeable to admission and all questions were answered.        [JG]   1041 Phone call with Dr Miguel.  Discussed the patient, relevant history, exam, diagnostics, ED findings/progress, and concerns. They agree to admit the patient to observation telemetry. Care assumed by the admitting physician at this time.        [JG]      ED Course User Index  [JG] Rakel  Lul MARCUS MD       AS OF 10:42 EDT VITALS:    BP - 162/100  HR - 68  TEMP - 97.9 °F (36.6 °C)  O2 SATS - 96%    DIAGNOSIS  Final diagnoses:   Acute chest pain   Hypertension, unspecified type         DISPOSITION  ADMISSION    Discussed treatment plan and reason for admission with pt/family and admitting physician.  Pt/family voiced understanding of the plan for admission for further testing/treatment as needed.          Lul Ellington MD  06/17/21 1048

## 2021-06-24 ENCOUNTER — LAB (OUTPATIENT)
Dept: LAB | Facility: HOSPITAL | Age: 61
End: 2021-06-24

## 2021-06-24 ENCOUNTER — OFFICE VISIT (OUTPATIENT)
Dept: CARDIOLOGY | Facility: CLINIC | Age: 61
End: 2021-06-24

## 2021-06-24 VITALS
HEART RATE: 80 BPM | BODY MASS INDEX: 41.75 KG/M2 | SYSTOLIC BLOOD PRESSURE: 126 MMHG | OXYGEN SATURATION: 98 % | WEIGHT: 315 LBS | HEIGHT: 73 IN | DIASTOLIC BLOOD PRESSURE: 86 MMHG

## 2021-06-24 DIAGNOSIS — Z95.2 S/P AVR (AORTIC VALVE REPLACEMENT): ICD-10-CM

## 2021-06-24 DIAGNOSIS — I10 ESSENTIAL HYPERTENSION: ICD-10-CM

## 2021-06-24 DIAGNOSIS — Z95.1 S/P CABG (CORONARY ARTERY BYPASS GRAFT): ICD-10-CM

## 2021-06-24 DIAGNOSIS — I71.21 ASCENDING AORTIC ANEURYSM (HCC): ICD-10-CM

## 2021-06-24 DIAGNOSIS — I25.708 CORONARY ARTERY DISEASE OF BYPASS GRAFT OF NATIVE HEART WITH STABLE ANGINA PECTORIS (HCC): Primary | ICD-10-CM

## 2021-06-24 DIAGNOSIS — R06.09 DYSPNEA ON EXERTION: ICD-10-CM

## 2021-06-24 DIAGNOSIS — G47.33 OBSTRUCTIVE SLEEP APNEA, ADULT: ICD-10-CM

## 2021-06-24 DIAGNOSIS — R53.83 FATIGUE, UNSPECIFIED TYPE: ICD-10-CM

## 2021-06-24 LAB
FOLATE SERPL-MCNC: 17.6 NG/ML (ref 4.78–24.2)
T4 FREE SERPL-MCNC: 1.37 NG/DL (ref 0.93–1.7)
TSH SERPL DL<=0.05 MIU/L-ACNC: 2.48 UIU/ML (ref 0.27–4.2)
VIT B12 BLD-MCNC: <150 PG/ML (ref 211–946)

## 2021-06-24 PROCEDURE — 82607 VITAMIN B-12: CPT

## 2021-06-24 PROCEDURE — 82746 ASSAY OF FOLIC ACID SERUM: CPT

## 2021-06-24 PROCEDURE — 84439 ASSAY OF FREE THYROXINE: CPT

## 2021-06-24 PROCEDURE — 84443 ASSAY THYROID STIM HORMONE: CPT

## 2021-06-24 PROCEDURE — 36415 COLL VENOUS BLD VENIPUNCTURE: CPT

## 2021-06-24 PROCEDURE — 99215 OFFICE O/P EST HI 40 MIN: CPT | Performed by: NURSE PRACTITIONER

## 2021-06-24 PROCEDURE — 82652 VIT D 1 25-DIHYDROXY: CPT

## 2021-06-24 RX ORDER — LISINOPRIL 10 MG/1
TABLET ORAL
COMMUNITY
Start: 2021-06-06 | End: 2022-12-02

## 2021-06-24 NOTE — PROGRESS NOTES
CARDIOLOGY        Patient Name: Edmond Chase  :1960  Age: 60 y.o.  Primary Cardiologist: Papa Miguel MD  Encounter Provider:  VIKTORIA Van    Date of Service: 21          CHIEF COMPLAINT / REASON FOR OFFICE VISIT     Coronary Artery Disease (BHE 1 wk f/u )      HISTORY OF PRESENT ILLNESS       HPI  Edmond Chase is a 60 y.o. male who presents today for 1 week hospital reevaluation.     Pt has a  history significant for hypertension, diabetes, CAD with history of CABG, history of aortic valve replacement.    Patient hospitalized 2021 for recurrent angina.  Patient presented to the ED with complaints of chest pain that woke him up at 0500 with associated shortness of breath with exertion.  Isosorbide mononitrate usually keeps angina symptoms under control but it has not been effective recently.  BP elevated during ED evaluation at 204/119.  Initial troponin negative.  ECG revealed new T wave inversions in lead II and V4.  Patient received clonidine, aspirin, NTG sublingual in the emergency department.  Patient was taken to the cardiac catheterization lab on  where his LIMA to LAD stent was widely patent and normal.  Left main with 20% luminal irregularities.  Circumflex had an area that was 70% stenosed in the midportion but does not go anywhere and is mainly in the AV groove branch.  RCA dominant and very tortuous vessel with 50% proximal disease, 70% mid vessel disease, 50 to 60% disease up to 90% in the distal RCA territory but due to tortuosity not amenable to any intervention.  Recommend increasing isosorbide mononitrate and following up with cardiology office.    Patient states that again this am, he woke with shortness of breath at 0400.  He states that the pain and pressure in the chest has improved with increased dose of Imdur.  His larger complaint today is continued shortness of breath and fatigue, both at rest and that worsens with minimum exertion.  He  "is complaint with CPAP device, but adds that he is not sleeping as well as he once did with the device and questions if CPAP is functioning properly.  He has not been seen by sleep medicine since initial evaluation in 2016.  He notes that he has sudden episodes of extreme fatigue that are so severe that he has to lay down and rest.  He states that he would like to exercise, but is so fatigued that he has had difficulty with normal ADLs and has not been exercising over the past several weeks.  He has not had any recent lab test for Vit B12, Vit D, Folate, TSH.  He also admits that he \"gets down on himself frequently\"  Not currently being treated for depression.  He states that he has been on anti-depressants in the past and had adverse effects.     The following portions of the patient's history were reviewed and updated as appropriate: allergies, current medications, past family history, past medical history, past social history, past surgical history and problem list.      VITAL SIGNS     Visit Vitals  /86 (BP Location: Left arm, Patient Position: Sitting, Cuff Size: Large Adult)   Pulse 80   Ht 185.4 cm (73\")   Wt (!) 154 kg (340 lb)   SpO2 98%   BMI 44.86 kg/m²         Wt Readings from Last 3 Encounters:   06/24/21 (!) 154 kg (340 lb)   06/17/21 (!) 150 kg (330 lb)   01/22/21 (!) 152 kg (336 lb)     Body mass index is 44.86 kg/m².      REVIEW OF SYSTEMS   Review of Systems   Constitutional: Positive for malaise/fatigue. Negative for chills, fever, weight gain and weight loss.   Cardiovascular: Positive for chest pain and dyspnea on exertion. Negative for leg swelling.   Respiratory: Positive for shortness of breath. Negative for cough, snoring and wheezing.    Hematologic/Lymphatic: Negative for bleeding problem. Does not bruise/bleed easily.   Skin: Negative for color change.   Musculoskeletal: Negative for falls, joint pain and myalgias.   Gastrointestinal: Negative for melena.   Genitourinary: Negative " for hematuria.   Neurological: Negative for excessive daytime sleepiness.   Psychiatric/Behavioral: Negative for depression. The patient is not nervous/anxious.            PHYSICAL EXAMINATION     Constitutional:       Appearance: Normal appearance. Well-developed.   Eyes:      Conjunctiva/sclera: Conjunctivae normal.   Neck:      Vascular: No carotid bruit.   Pulmonary:      Effort: Pulmonary effort is normal.      Breath sounds: Normal breath sounds.   Cardiovascular:      Normal rate. Regular rhythm. Normal S1. Normal S2.      Murmurs: There is a grade 3/6 systolic murmur at the URSB and ULSB.      No gallop. No click. No rub.   Edema:     Peripheral edema absent.   Musculoskeletal: Normal range of motion. Skin:     General: Skin is warm and dry.   Neurological:      Mental Status: Alert and oriented to person, place, and time.      GCS: GCS eye subscore is 4. GCS verbal subscore is 5. GCS motor subscore is 6.   Psychiatric:         Speech: Speech normal.         Behavior: Behavior normal.         Thought Content: Thought content normal.         Judgment: Judgment normal.           REVIEWED DATA     Procedures    Cardiac Procedures:  1. Echocardiogram 6/27/2017.  LVEF 56%.  Normal LV cavity size.  All LV wall segments contract normally.  Moderate LVH.  LAE.  RA is moderately dilated.  Bioprosthetic aortic valve with peak and mean gradients elevated, however dimensionless index is 0.34 which suggests normal valve function.  No regurgitation.  Mild MAC.  Mild mitral valve regurgitation.  Trace tricuspid valve regurgitation.  Calculated RVSP 3 mmHg.  2. Myocardial perfusion stress test 7/20/2017.  Small sized infarct in the inferior wall with moderate brijesh-infarct ischemia.  Impressions consistent with intermediate risk study.  3. Cardiac catheterization 10/30/2017.  Three-vessel CAD, patent LIMA to LAD, occluded AO-OMB SVG, occluded AO-PDA SVG recommend medical therapy.  4. Echocardiogram 7/1/2020.  LVEF 56%.   Normal diastolic function.  Normal RV cavity size, wall thickness.  No significant perivalvular leak although bioprosthetic valve was not well visualized.  Aortic valve maximum pressure gradient 43.2 mmHg, mean pressure gradient 28.8 mmHg, aortic valve area 1.0 cm².  Mild dilation of the sinuses of Valsalva measuring 4.2 cm.  Worsening aortic valve gradient compared to prior study.  5. Myocardial perfusion stress test 7/1/2020.  Small to medium sized, severe area of ischemia located in the inferior wall.  Impressions consistent with intermediate risk study.  Patient was very symptomatic.  Patient set up for cardiac catheterization.  6. Cardiac catheterization 7/7/2020.  Severe multivessel coronary disease with 100% proximal LAD, 50 to 60% stenoses throughout the circumflex territory within the proximal segment as well as the high marginal and diffuse 70% stenoses throughout the RCA with 100% PDA stenosis with faint collaterals from the LAD.  PCI of the mid LAD via MARCELINA graft with placement of ESMER.  No residual stenosis.  Recommend reevaluating symptoms and if still symptomatic aggressively titrating antianginal medications with consideration given to long-acting nitrates in the hopes of medically managing his RCA disease as this would require extensive amount of stents to have fully revascularized.  7. Cardiac catheterization 6/17/2021. LIMA to LAD stent was widely patent and normal.  Left main with 20% luminal irregularities.  Circumflex had an area that was 70% stenosed in the midportion but does not go anywhere and is mainly in the AV groove branch.  RCA dominant and very tortuous vessel with 50% proximal disease, 70% mid vessel disease, 50 to 60% disease up to 90% in the distal RCA territory but due to tortuosity not amenable to any intervention.  Recommend increasing isosorbide mononitrate and following up with cardiology office.        ASSESSMENT & PLAN      Diagnosis Plan   1. Coronary artery disease of bypass  graft of native heart with stable angina pectoris (CMS/HCC)  Adult Transthoracic Echo Complete W/ Cont if Necessary Per Protocol   2. S/P AVR (aortic valve replacement)  Adult Transthoracic Echo Complete W/ Cont if Necessary Per Protocol   3. S/P CABG (coronary artery bypass graft)     4. Essential hypertension     5. Ascending aortic aneurysm (CMS/[facility])     6. Obstructive sleep apnea, adult  Ambulatory Referral to Sleep Medicine   7. Dyspnea on exertion  Adult Transthoracic Echo Complete W/ Cont if Necessary Per Protocol   8. Fatigue, unspecified type  Adult Transthoracic Echo Complete W/ Cont if Necessary Per Protocol    TSH    T4, free    Vitamin B12    Folate    Vitamin D 1,25 Dihydroxy         SUMMARY/DISCUSSION  1. CAD with history of CABG.  Patient with recent hospitalization for angina where he underwent a repeat cardiac catheterization.  Cardiac catheterization revealed that his disease is stable compared to prior cardiac cath.  BROCK to LAD ESMER was patent.  Patient has other disease that is not amenable to any intervention.  (Full cath report documented above).  His isosorbide mononitrate was increased to 90 mg/day at that time.  Patient states that his angina has improved with the same increase however, he continues to have shortness of breath with exertion and extreme fatigue.  I explained to patient that his coronary disease is currently stable and that I am glad that the long-acting nitrate is improving his angina.  He will continue guideline directed therapy with aspirin, clopidogrel, atorvastatin, lisinopril, metoprolol succinate.  2. History of AVR.  Patient with history of bicuspid aortic valve requiring aortic valve replacement.  Gradients on echocardiogram 1 year ago were slightly elevated compared to prior.  Patient does have murmur on exam today which is not new.  I have recommended that given his symptoms of fatigue and shortness of breath with minimal exertion that we repeat echocardiogram to  "determine gradients of the aortic valve.  3. History of aortic aneurysm with repair.  4. Hypertension.  BP stable today at 126/86.  Continue amlodipine 10 mg/day, lisinopril 10 mg/day, metoprolol succinate 25 mg/day.  5. TJ.  Patient compliant with CPAP.  He does note that he is waking more frequently in the night and is questioning if his CPAP device is on the appropriate settings.  He has not been evaluated by sleep medicine since original diagnosis in 2016.  Referral placed to sleep medicine for further evaluation and recommendations.  6. Dyspnea with exertion and fatigue.  Patient's to continued complaints are dyspnea with exertion and extreme fatigue.  Patient states that he gets so fatigued during the day that he has to stop and lay down and take a nap.  Reports shortness of breath with very minimal exertion.  I will obtain an echocardiogram to assess his aortic valve as well as overall systolic function.  I do think that his symptoms are probably multifactorial.  Patient is obese and does not exercise routinely.  He states that he does have desire to exercise but is so tired and short of breath that he is unable to complete normal activities of daily living and is therefore not exercising routinely.  I have also recommended that we evaluate other sources of patient's fatigue especially with a thyroid panel, vitamin B12 level, vitamin D level, folate levels.  I did inform the patient that if he is deficient or if it any of these tests are abnormal that I will need to refer him back to his PCP for treatment.  He verbalized understanding.  Patient also has history of depression and admits that he is \"down on himself\".  He is not currently on any antidepressants.  He states that he has been in the past but he had adverse effects.  I think that this could also be contributing to his symptoms.  7. Keep your previously scheduled appointment with Dr. Miguel in 1 month.  Sooner for problems or complications.    Time " Spent: I spent 47 minutes caring for Edmond on this date of service. This time includes time spent by me in the following activities: preparing for the visit, reviewing tests, performing a medically appropriate examination and/or evaluation, counseling and educating the patient/family/caregiver, ordering medications, tests, or procedures, documenting information in the medical record and care coordination.         MEDICATIONS         Discharge Medications          Accurate as of June 24, 2021 11:28 AM. If you have any questions, ask your nurse or doctor.            Changes to Medications      Instructions Start Date   lisinopril 10 MG tablet  Commonly known as: PRINIVIL,ZESTRIL  What changed: Another medication with the same name was removed. Continue taking this medication, and follow the directions you see here.  Changed by: VIKTORIA Van   No dose, route, or frequency recorded.         Continue These Medications      Instructions Start Date   albuterol sulfate  (90 Base) MCG/ACT inhaler  Commonly known as: PROVENTIL HFA;VENTOLIN HFA;PROAIR HFA   2 puffs, Inhalation, Every 6 Hours PRN      amLODIPine 10 MG tablet  Commonly known as: NORVASC   10 mg, Oral, Daily      amoxicillin 500 MG capsule  Commonly known as: AMOXIL   2,000 mg, Oral, See Admin Instructions, 4 capsules 1 hour prior to procedure      aspirin 81 MG tablet   81 mg, Oral, Daily      atorvastatin 40 MG tablet  Commonly known as: LIPITOR   40 mg, Oral, Daily      clopidogrel 75 MG tablet  Commonly known as: PLAVIX   75 mg, Oral, Daily      glimepiride 2 MG tablet  Commonly known as: AMARYL   2 mg, Oral, Every Morning Before Breakfast      glucosamine sulfate 500 MG capsule capsule   Oral, Daily      isosorbide mononitrate 60 MG 24 hr tablet  Commonly known as: IMDUR   90 mg, Oral, Daily      metoprolol succinate XL 25 MG 24 hr tablet  Commonly known as: TOPROL-XL   25 mg, Oral, Daily                 **Dragon Disclaimer:   Much of this  encounter note is an electronic transcription/translation of spoken language to printed text. The electronic translation of spoken language may permit erroneous, or at times, nonsensical words or phrases to be inadvertently transcribed. Although I have reviewed the note for such errors, some may still exist.

## 2021-06-26 LAB — 1,25(OH)2D SERPL-MCNC: 37 PG/ML (ref 19.9–79.3)

## 2021-06-28 NOTE — PROGRESS NOTES
Patient was informed of laboratory studies, specifically deficiency of vitamin B12.  He was recommended to reach out to his PCP, Dr. Vallecillo, to make arrangements for treatment of vitamin B12 deficiency.  I informed him that he could take an over-the-counter supplement but with his level being as low as it is he would most likely need injections for supplementation.  He states that he will schedule a follow-up appointment with his PCP.

## 2021-07-07 ENCOUNTER — HOSPITAL ENCOUNTER (OUTPATIENT)
Dept: CARDIOLOGY | Facility: HOSPITAL | Age: 61
Discharge: HOME | End: 2021-07-07
Admitting: NURSE PRACTITIONER

## 2021-07-07 VITALS — WEIGHT: 315 LBS | HEIGHT: 73 IN | HEART RATE: 67 BPM | BODY MASS INDEX: 41.75 KG/M2

## 2021-07-07 DIAGNOSIS — I25.708 CORONARY ARTERY DISEASE OF BYPASS GRAFT OF NATIVE HEART WITH STABLE ANGINA PECTORIS (HCC): ICD-10-CM

## 2021-07-07 DIAGNOSIS — Z95.2 S/P AVR (AORTIC VALVE REPLACEMENT): ICD-10-CM

## 2021-07-07 DIAGNOSIS — R53.83 FATIGUE, UNSPECIFIED TYPE: ICD-10-CM

## 2021-07-07 DIAGNOSIS — R06.09 DYSPNEA ON EXERTION: ICD-10-CM

## 2021-07-07 LAB
AORTIC ARCH: 3.2 CM
AORTIC DIMENSIONLESS INDEX: 0.3 (DI)
ASCENDING AORTA: 3.9 CM
BH CV ECHO AV AORTIC VALVE AT ACCEL TIME CALCULATED: 74 MSEC
BH CV ECHO MEAS - ACS: 2.5 CM
BH CV ECHO MEAS - AO ACC TIME: 0.07 SEC
BH CV ECHO MEAS - AO MAX PG (FULL): 27.4 MMHG
BH CV ECHO MEAS - AO MAX PG: 30 MMHG
BH CV ECHO MEAS - AO MEAN PG (FULL): 17 MMHG
BH CV ECHO MEAS - AO MEAN PG: 18 MMHG
BH CV ECHO MEAS - AO ROOT AREA (BSA CORRECTED): 1.6
BH CV ECHO MEAS - AO ROOT AREA: 15.2 CM^2
BH CV ECHO MEAS - AO ROOT DIAM: 4.4 CM
BH CV ECHO MEAS - AO V2 MAX: 274 CM/SEC
BH CV ECHO MEAS - AO V2 MEAN: 198 CM/SEC
BH CV ECHO MEAS - AO V2 VTI: 53.3 CM
BH CV ECHO MEAS - ASC AORTA: 3.9 CM
BH CV ECHO MEAS - AT: 0.07 SEC
BH CV ECHO MEAS - AVA(I,A): 1.4 CM^2
BH CV ECHO MEAS - AVA(I,D): 1.4 CM^2
BH CV ECHO MEAS - AVA(V,A): 1.2 CM^2
BH CV ECHO MEAS - AVA(V,D): 1.2 CM^2
BH CV ECHO MEAS - BSA(HAYCOCK): 2.9 M^2
BH CV ECHO MEAS - BSA: 2.7 M^2
BH CV ECHO MEAS - BZI_BMI: 44.9 KILOGRAMS/M^2
BH CV ECHO MEAS - BZI_METRIC_HEIGHT: 185.4 CM
BH CV ECHO MEAS - BZI_METRIC_WEIGHT: 154.2 KG
BH CV ECHO MEAS - EDV(CUBED): 140.6 ML
BH CV ECHO MEAS - EDV(MOD-SP4): 79 ML
BH CV ECHO MEAS - EDV(TEICH): 129.5 ML
BH CV ECHO MEAS - EF(CUBED): 74.4 %
BH CV ECHO MEAS - EF(MOD-BP): 62 %
BH CV ECHO MEAS - EF(MOD-SP2): 57 %
BH CV ECHO MEAS - EF(MOD-SP4): 67.1 %
BH CV ECHO MEAS - EF(TEICH): 65.9 %
BH CV ECHO MEAS - ESV(CUBED): 35.9 ML
BH CV ECHO MEAS - ESV(MOD-SP4): 26 ML
BH CV ECHO MEAS - ESV(TEICH): 44.1 ML
BH CV ECHO MEAS - FS: 36.5 %
BH CV ECHO MEAS - IVS/LVPW: 1.1
BH CV ECHO MEAS - IVSD: 1.7 CM
BH CV ECHO MEAS - LAT PEAK E' VEL: 9.9 CM/SEC
BH CV ECHO MEAS - LV DIASTOLIC VOL/BSA (35-75): 29.3 ML/M^2
BH CV ECHO MEAS - LV MASS(C)D: 394.6 GRAMS
BH CV ECHO MEAS - LV MASS(C)DI: 146.3 GRAMS/M^2
BH CV ECHO MEAS - LV MAX PG: 2.6 MMHG
BH CV ECHO MEAS - LV MEAN PG: 1 MMHG
BH CV ECHO MEAS - LV SYSTOLIC VOL/BSA (12-30): 9.6 ML/M^2
BH CV ECHO MEAS - LV V1 MAX: 80.9 CM/SEC
BH CV ECHO MEAS - LV V1 MEAN: 57.2 CM/SEC
BH CV ECHO MEAS - LV V1 VTI: 18.2 CM
BH CV ECHO MEAS - LVIDD: 5.2 CM
BH CV ECHO MEAS - LVIDS: 3.3 CM
BH CV ECHO MEAS - LVLD AP4: 8.7 CM
BH CV ECHO MEAS - LVLS AP4: 6.8 CM
BH CV ECHO MEAS - LVOT AREA (M): 4.2 CM^2
BH CV ECHO MEAS - LVOT AREA: 4.2 CM^2
BH CV ECHO MEAS - LVOT DIAM: 2.3 CM
BH CV ECHO MEAS - LVPWD: 1.6 CM
BH CV ECHO MEAS - MED PEAK E' VEL: 10.2 CM/SEC
BH CV ECHO MEAS - MR MAX PG: 22.1 MMHG
BH CV ECHO MEAS - MR MAX VEL: 235 CM/SEC
BH CV ECHO MEAS - MV A DUR: 0.12 SEC
BH CV ECHO MEAS - MV A MAX VEL: 38.7 CM/SEC
BH CV ECHO MEAS - MV DEC SLOPE: 228 CM/SEC^2
BH CV ECHO MEAS - MV DEC TIME: 0.21 SEC
BH CV ECHO MEAS - MV E MAX VEL: 52.1 CM/SEC
BH CV ECHO MEAS - MV E/A: 1.3
BH CV ECHO MEAS - MV MAX PG: 2 MMHG
BH CV ECHO MEAS - MV MEAN PG: 1 MMHG
BH CV ECHO MEAS - MV P1/2T MAX VEL: 52.1 CM/SEC
BH CV ECHO MEAS - MV P1/2T: 66.9 MSEC
BH CV ECHO MEAS - MV V2 MAX: 70.3 CM/SEC
BH CV ECHO MEAS - MV V2 MEAN: 49.5 CM/SEC
BH CV ECHO MEAS - MV V2 VTI: 20 CM
BH CV ECHO MEAS - MVA P1/2T LCG: 4.2 CM^2
BH CV ECHO MEAS - MVA(P1/2T): 3.3 CM^2
BH CV ECHO MEAS - MVA(VTI): 3.8 CM^2
BH CV ECHO MEAS - PA MAX PG (FULL): 2.5 MMHG
BH CV ECHO MEAS - PA MAX PG: 4.9 MMHG
BH CV ECHO MEAS - PA V2 MAX: 111 CM/SEC
BH CV ECHO MEAS - PULM A REVS DUR: 0.14 SEC
BH CV ECHO MEAS - PULM A REVS VEL: 20.9 CM/SEC
BH CV ECHO MEAS - PULM DIAS VEL: 37 CM/SEC
BH CV ECHO MEAS - PULM S/D: 1.2
BH CV ECHO MEAS - PULM SYS VEL: 43.6 CM/SEC
BH CV ECHO MEAS - PVA(V,A): 2 CM^2
BH CV ECHO MEAS - PVA(V,D): 2 CM^2
BH CV ECHO MEAS - QP/QS: 0.67
BH CV ECHO MEAS - RV MAX PG: 2.5 MMHG
BH CV ECHO MEAS - RV MEAN PG: 1 MMHG
BH CV ECHO MEAS - RV V1 MAX: 78.3 CM/SEC
BH CV ECHO MEAS - RV V1 MEAN: 54.5 CM/SEC
BH CV ECHO MEAS - RV V1 VTI: 18 CM
BH CV ECHO MEAS - RVOT AREA: 2.8 CM^2
BH CV ECHO MEAS - RVOT DIAM: 1.9 CM
BH CV ECHO MEAS - SI(AO): 300.6 ML/M^2
BH CV ECHO MEAS - SI(CUBED): 38.8 ML/M^2
BH CV ECHO MEAS - SI(LVOT): 28 ML/M^2
BH CV ECHO MEAS - SI(MOD-SP2): 32 ML/M2
BH CV ECHO MEAS - SI(MOD-SP4): 19.7 ML/M^2
BH CV ECHO MEAS - SI(TEICH): 31.7 ML/M^2
BH CV ECHO MEAS - SV(AO): 810.4 ML
BH CV ECHO MEAS - SV(CUBED): 104.7 ML
BH CV ECHO MEAS - SV(LVOT): 75.6 ML
BH CV ECHO MEAS - SV(MOD-SP2): 79 ML
BH CV ECHO MEAS - SV(MOD-SP4): 53 ML
BH CV ECHO MEAS - SV(RVOT): 51 ML
BH CV ECHO MEAS - SV(TEICH): 85.4 ML
BH CV ECHO MEAS - TAPSE (>1.6): 3.8 CM
BH CV ECHO MEASUREMENTS AVERAGE E/E' RATIO: 5.18
BH CV XLRA - RV BASE: 3.7 CM
BH CV XLRA - RV LENGTH: 8.5 CM
BH CV XLRA - RV MID: 4 CM
BH CV XLRA - TDI S': 10.4 CM/SEC
LEFT ATRIUM VOLUME INDEX: 33 ML/M2
LV EF 2D ECHO EST: 62 %
MAXIMAL PREDICTED HEART RATE: 158 BPM
SINUS: 4.4 CM
STJ: 3.9 CM
STRESS TARGET HR: 134 BPM

## 2021-07-07 PROCEDURE — 93306 TTE W/DOPPLER COMPLETE: CPT | Performed by: INTERNAL MEDICINE

## 2021-07-07 PROCEDURE — 93306 TTE W/DOPPLER COMPLETE: CPT

## 2021-07-08 NOTE — PROGRESS NOTES
Patient made aware of echocardiogram results.  Gradients across the aortic valve replacements are within normal limits.

## 2021-07-30 ENCOUNTER — OFFICE VISIT (OUTPATIENT)
Dept: CARDIOLOGY | Facility: CLINIC | Age: 61
End: 2021-07-30

## 2021-07-30 VITALS
DIASTOLIC BLOOD PRESSURE: 90 MMHG | BODY MASS INDEX: 41.75 KG/M2 | SYSTOLIC BLOOD PRESSURE: 152 MMHG | HEART RATE: 71 BPM | WEIGHT: 315 LBS | HEIGHT: 73 IN | OXYGEN SATURATION: 98 %

## 2021-07-30 DIAGNOSIS — I10 ESSENTIAL HYPERTENSION: ICD-10-CM

## 2021-07-30 DIAGNOSIS — I25.708 CORONARY ARTERY DISEASE OF BYPASS GRAFT OF NATIVE HEART WITH STABLE ANGINA PECTORIS (HCC): Primary | ICD-10-CM

## 2021-07-30 PROCEDURE — 93000 ELECTROCARDIOGRAM COMPLETE: CPT | Performed by: INTERNAL MEDICINE

## 2021-07-30 PROCEDURE — 99214 OFFICE O/P EST MOD 30 MIN: CPT | Performed by: INTERNAL MEDICINE

## 2021-07-30 RX ORDER — PAROXETINE HYDROCHLORIDE 20 MG/1
TABLET, FILM COATED ORAL
COMMUNITY
Start: 2021-07-01 | End: 2022-01-28 | Stop reason: ALTCHOICE

## 2021-07-30 RX ORDER — DAPAGLIFLOZIN 10 MG/1
TABLET, FILM COATED ORAL
COMMUNITY
Start: 2021-07-01 | End: 2022-12-02

## 2021-07-30 RX ORDER — ERGOCALCIFEROL 1.25 MG/1
CAPSULE ORAL
COMMUNITY
Start: 2021-07-01 | End: 2022-07-29

## 2021-07-30 NOTE — PROGRESS NOTES
"      CARDIOLOGY    Papa Miguel MD    ENCOUNTER DATE:  07/30/2021    Edmond Chase / 60 y.o. / male        CHIEF COMPLAINT / REASON FOR OFFICE VISIT     Coronary Artery Disease (06/24/2021 Follow up)  Hypertension    HISTORY OF PRESENT ILLNESS       HPI  Edmond Chase is a 60 y.o. male who presents today for reevaluation.  Overall patient is doing much better.  He had angina with his antianginals he has had very minimal.  He says that occasionally gets palpitation occasionally lightheaded occasional fatigue.  He had a recent echo on 7/7/2021 in the hospital.      The following portions of the patient's history were reviewed and updated as appropriate: allergies, current medications, past family history, past medical history, past social history, past surgical history and problem list.      VITAL SIGNS     Visit Vitals  /90 (BP Location: Left arm)   Pulse 71   Ht 185.4 cm (73\")   Wt (!) 149 kg (329 lb)   SpO2 98%   BMI 43.41 kg/m²         Wt Readings from Last 3 Encounters:   07/30/21 (!) 149 kg (329 lb)   07/07/21 (!) 154 kg (340 lb)   06/24/21 (!) 154 kg (340 lb)     Body mass index is 43.41 kg/m².      REVIEW OF SYSTEMS   Review of Systems   All other systems reviewed and are negative.          PHYSICAL EXAMINATION     Constitutional:       Appearance: Healthy appearance.   Pulmonary:      Effort: Pulmonary effort is normal.   Cardiovascular:      Normal rate. Regular rhythm. Normal S1. Normal S2.      Murmurs: There is no murmur.      No gallop. No click. No rub.   Pulses:     Intact distal pulses.   Edema:     Peripheral edema absent.   Neurological:      Mental Status: Oriented to person, place and time.           REVIEWED DATA       ECG 12 Lead    Date/Time: 7/30/2021 3:13 PM  Performed by: Papa Miguel MD  Authorized by: Papa Miguel MD               1.      Interpretation Summary    · There is a bioprosthetic aortic valve present.  · Peak velocity of the flow distal to " the aortic valve is 274 cm/s. Aortic valve maximum pressure gradient is 30 mmHg. Aortic valve mean pressure gradient is 18 mmHg. Aortic valve dimensionless index is 0.3 .  · Aortic valve area is 1.4 cm2.  · Left ventricular wall thickness is consistent with moderate concentric hypertrophy.  · Estimated left ventricular EF = 62% Estimated left ventricular EF was in agreement with the calculated left ventricular EF. Left ventricular systolic function is normal.  · Left ventricular diastolic function was normal.  · Mild dilation of the aortic root is present. Mild dilation of the sinuses of Valsalva is present.           ASSESSMENT & PLAN      Diagnosis Plan   1. Coronary artery disease of bypass graft of native heart with stable angina pectoris (CMS/[facility])     2. Essential hypertension           SUMMARY/DISCUSSION  1. Coronary artery disease.  Patient is doing much better on his antianginal regimen.  Would continue the same encourage as much exercise as possible.  2. Hypertension blood pressure is a little bit high I told him to start following his blood pressure about 2 or 3 times a week and report back when he comes back and sees me.  3. Diabetes his medicines were recently changed to Farxiga which is an excellent choice with his known coronary artery disease.  4. Murmur patient has a bioprosthetic aortic valve he just had an echocardiogram valve is functioning within normal parameters.  5. Follow-up  6 months        MEDICATIONS         Discharge Medications          Accurate as of July 30, 2021  3:11 PM. If you have any questions, ask your nurse or doctor.            Continue These Medications      Instructions Start Date   albuterol sulfate  (90 Base) MCG/ACT inhaler  Commonly known as: PROVENTIL HFA;VENTOLIN HFA;PROAIR HFA   2 puffs, Inhalation, Every 6 Hours PRN      amLODIPine 10 MG tablet  Commonly known as: NORVASC   10 mg, Oral, Daily      amoxicillin 500 MG capsule  Commonly known as: AMOXIL   2,000  mg, Oral, See Admin Instructions, 4 capsules 1 hour prior to procedure      aspirin 81 MG tablet   81 mg, Oral, Daily      atorvastatin 40 MG tablet  Commonly known as: LIPITOR   40 mg, Oral, Daily      clopidogrel 75 MG tablet  Commonly known as: PLAVIX   75 mg, Oral, Daily      Farxiga 10 MG tablet  Generic drug: Dapagliflozin Propanediol   No dose, route, or frequency recorded.      glucosamine sulfate 500 MG capsule capsule   Oral, Daily      isosorbide mononitrate 60 MG 24 hr tablet  Commonly known as: IMDUR   90 mg, Oral, Daily      lisinopril 10 MG tablet  Commonly known as: PRINIVIL,ZESTRIL   No dose, route, or frequency recorded.      metoprolol succinate XL 25 MG 24 hr tablet  Commonly known as: TOPROL-XL   25 mg, Oral, Daily      PARoxetine 20 MG tablet  Commonly known as: PAXIL   No dose, route, or frequency recorded.      vitamin D 1.25 MG (90001 UT) capsule capsule  Commonly known as: ERGOCALCIFEROL   No dose, route, or frequency recorded.                 **Dragon Disclaimer:   Much of this encounter note is an electronic transcription/translation of spoken language to printed text. The electronic translation of spoken language may permit erroneous, or at times, nonsensical words or phrases to be inadvertently transcribed. Although I have reviewed the note for such errors, some may still exist.

## 2021-08-17 ENCOUNTER — OFFICE VISIT (OUTPATIENT)
Dept: SLEEP MEDICINE | Facility: HOSPITAL | Age: 61
End: 2021-08-17

## 2021-08-17 VITALS
WEIGHT: 315 LBS | BODY MASS INDEX: 41.75 KG/M2 | HEART RATE: 79 BPM | HEIGHT: 73 IN | DIASTOLIC BLOOD PRESSURE: 78 MMHG | SYSTOLIC BLOOD PRESSURE: 144 MMHG | OXYGEN SATURATION: 95 %

## 2021-08-17 DIAGNOSIS — G47.33 OSA ON CPAP: Primary | ICD-10-CM

## 2021-08-17 DIAGNOSIS — Z99.89 OSA ON CPAP: Primary | ICD-10-CM

## 2021-08-17 DIAGNOSIS — G47.14 HYPERSOMNIA DUE TO MEDICAL CONDITION: ICD-10-CM

## 2021-08-17 PROCEDURE — 99243 OFF/OP CNSLTJ NEW/EST LOW 30: CPT | Performed by: INTERNAL MEDICINE

## 2021-08-17 NOTE — PROGRESS NOTES
[facility] New Patient[facility]Consultation       Reason for Consultation: TJ    Patient Care Team:  Giorgio Vallecillo MD as PCP - Family Medicine (Family Medicine)  Sekou Pisano MD as Consulting Physician (Sleep Medicine)    Chief complaint: TJ    History of present illness:    Thank you for asking me to see your patient.  The patient is a 60 y.o. male who I last saw in 2016.  Severe obstructive sleep apnea diagnosed with AHI 79 events per hour.  Auto CPAP recommended.  The patient reports he has been compliant with therapy.  Recently, the patient did have an episode of chest pain and reevaluation recommended.  The patient uses auto CPAP with a full facemask.  The patient states he goes to bed at midnight and awakens at 9 AM.  He is tired upon arising.  He will take 2 naps daily.  However, the patient's [place] Sleepiness Scale is normal at 6.  The patient will use the restroom 2 or 3 times during the nighttime.    Review of Systems:    A complete review of systems was done and all were negative with the exception of the above    History:  Past Medical History:   Diagnosis Date   • Abnormal nuclear stress test    • Aortic valve insufficiency     nonrheumtic    • Ascending aortic aneurysm (CMS/HCC)    • CAD (coronary artery disease)    • Chest pain    • Diabetes mellitus (CMS/HCC)    • Dizzy     CAREFUL WHEN GETTING UP   • Dyspnea    • Essential hypertension    • Fatigue    • Hyperglycemia    • Hyperlipidemia    • Hypersomnia with sleep apnea    • Lightheadedness    • Malaise and fatigue    • Morbid obesity (CMS/HCC)    • Myocardial ischemia    • Nocturia    • TJ on auto CPAP 10/31/2016    Overnight polysomnogram.  Weight 276 pounds.  Severe TJ with AHI 79 events per hour.  Auto CPAP recommended.   • Pneumonia     FEB 2016   • Scrotal bleeding    • Shortness of breath    ,   Past Surgical History:   Procedure Laterality Date   • AORTIC VALVE REPAIR/REPLACEMENT  05/2016   • ASCENDING ARCH/HEMIARCH  REPLACEMENT N/A 5/2/2016    Procedure: BHAVESH STERNOTOMY CORONARY ARTERY BYPASS GRAFT TIMES 3 USING LEFT INTERNAL MAMMARY ARTERY AND RIGHT GREATER SAPHENOUS VEIN GRAFT PER ENDOSCOPIC VEIN HARVESTING, AORTIC ANEURYSM REPAIR WITH ROOT REPAIR AND AORTIC VALVE REPLACEMENT;  Surgeon: Rosalio Cline MD;  Location: [facility] MAIN OR;  Service:    • CARDIAC CATHETERIZATION N/A 4/1/2016    Procedure: Left Heart Cath;  Surgeon: Erick Tam MD;  Location: [facility] CATH INVASIVE LOCATION;  Service:    • CARDIAC CATHETERIZATION N/A 4/1/2016    Procedure: Left ventriculography;  Surgeon: Erick Tam MD;  Location: [facility] CATH INVASIVE LOCATION;  Service:    • CARDIAC CATHETERIZATION N/A 4/1/2016    Procedure: Right Heart Cath;  Surgeon: Erick Tam MD;  Location: [facility] CATH INVASIVE LOCATION;  Service:    • CARDIAC CATHETERIZATION N/A 10/30/2017    Procedure: Coronary angiography;  Surgeon: Sorin Vasquez MD;  Location: [facility] CATH INVASIVE LOCATION;  Service:    • CARDIAC CATHETERIZATION  10/30/2017    Procedure: Saphenous Vein Graft;  Surgeon: Sorin Vasquez MD;  Location: [facility] CATH INVASIVE LOCATION;  Service:    • CARDIAC CATHETERIZATION N/A 10/30/2017    Procedure: Native mammary injection;  Surgeon: Sorin Vasquez MD;  Location: [facility] CATH INVASIVE LOCATION;  Service:    • CARDIAC CATHETERIZATION N/A 7/7/2020    Procedure: Coronary angiography;  Surgeon: Gregor Kim MD;  Location: [facility] CATH INVASIVE LOCATION;  Service: Cardiovascular;  Laterality: N/A;   • CARDIAC CATHETERIZATION N/A 7/7/2020    Procedure: Left heart cath;  Surgeon: Gregor Kim MD;  Location: [facility] CATH INVASIVE LOCATION;  Service: Cardiovascular;  Laterality: N/A;   • CARDIAC CATHETERIZATION N/A 7/7/2020    Procedure: Left ventriculography;  Surgeon: Gregor Kim MD;  Location: [facility] CATH INVASIVE LOCATION;  Service: Cardiovascular;  Laterality: N/A;   • CARDIAC CATHETERIZATION N/A 7/7/2020    Procedure: Stent ESMER bypass  "graft;  Surgeon: Gregor Kim MD;  Location:  [facility] CATH INVASIVE LOCATION;  Service: Cardiovascular;  Laterality: N/A;   • CARDIAC CATHETERIZATION N/A 6/17/2021    Procedure: SAPHENOUS VEIN GRAFT;  Surgeon: Marques Ndiaye MD;  Location:  [facility] CATH INVASIVE LOCATION;  Service: Cardiovascular;  Laterality: N/A;   • CARDIAC CATHETERIZATION N/A 6/17/2021    Procedure: Left Heart Cath;  Surgeon: Marques Ndiaye MD;  Location:  [facility] CATH INVASIVE LOCATION;  Service: Cardiovascular;  Laterality: N/A;   • CARDIAC CATHETERIZATION N/A 6/17/2021    Procedure: Coronary angiography;  Surgeon: Marques Ndiaye MD;  Location:  [facility] CATH INVASIVE LOCATION;  Service: Cardiovascular;  Laterality: N/A;   • CORONARY ARTERY BYPASS GRAFT  05/2016    LIMA TO LAD, SVG TO PDA, SVG TO OM2   • INNER EAR SURGERY     • TONSILLECTOMY     ,   Family History   Problem Relation Age of Onset   • Hypertension Mother    • Diabetes Mother    • Heart disease Mother    • Hypertension Father    • Heart disease Sister    • Other Other         The patient states that his sister has a problem that goes by the name of \"long QT\".  He says this is a familial trait but he also says that he is \"not interested in pursuing it for himself\".   • Coronary artery disease Other    • Stroke Maternal Grandmother    • Heart disease Maternal Grandmother     and   Social History     Socioeconomic History   • Marital status: Single     Spouse name: Not on file   • Number of children: Not on file   • Years of education: Not on file   • Highest education level: Not on file   Tobacco Use   • Smoking status: Never Smoker   • Smokeless tobacco: Never Used   Substance and Sexual Activity   • Alcohol use: Yes     Comment: 1 drink/monthly   • Drug use: No   • Sexual activity: Defer     E-cigarette/Vaping     E-cigarette/Vaping Substances     E-cigarette/Vaping Devices        Social History: He is retired.  No caffeine.    Allergies:  Bystolic [nebivolol hcl] and " "Metoprolol     Medication Review: His list was reviewed    Vital Signs:    Vitals:    08/17/21 1007   BP: 144/78   Pulse: 79   SpO2: 95%   Weight: (!) 150 kg (330 lb)   Height: 185.4 cm (73\")      Body mass index is 43.54 kg/m².  Neck Circumference: 19.75 inches      Physical Exam:    Constitutional:  Well developed 60 y.o. male that appears in no apparent distress.  Awake & oriented times 3.  Normal mood with normal recent and remote memory and normal judgement.  Eyes:  Conjunctivae normal.  Oropharynx: Moist mucous membranes without exudate and a large tongue and uvula and moderate narrowing of the posterior pharyngeal opening and class II-3 Mallampati airway, patient wearing a facemask  Neck: Trachea midline  Respiratory: Effort is not labored  Cardiovascular: Radial pulse regular  Musculoskeletal: Gait appears normal, no digital clubbing evident, minimal, if any pre-tibial edema    Results Review: I reviewed his 2-week sleep log.    DME was [facility], now [facility] and he uses a fullface mask.  Downloads between 5/19 and 8/16/2021 compliance 100% and average usage 10 hours and 32 minutes and average AHI normal without leak and average auto CPAP pressure was 15 and the patient's auto CPAP is 14-20.    Impression:   Severe obstructive sleep apnea by overnight polysomnogram 10/31/2016 adequately treated with auto titrating CPAP.  The patient is at goal and demonstrates good compliance and usage.  The patient does have persistent complaints of hypersomnolence but his [place] Sleepiness Scale is normal at 6.    Plan:  Good sleep hygiene measures should be maintained.  Weight loss would be beneficial in this patient who is morbidly obese (Body mass index is 43.54 kg/m².)     After evaluating the patient and assessing results available, the patient is benefiting from the treatment being provided.     Pathophysiology of TJ briefly described to the patient.  Cardiovascular complications of untreated TJ also " briefly reviewed.      Additionally, I did review the Nikole recall with the patient.  He does need to register his device.  He does not use an ozone .  Humidity should be used at the lowest level.  He has not noted any particulate matter.  He is to continue his CPAP.    I answered all of his questions and I will see him back in 1 year.  He will call me if he has any questions otherwise.    Thank you for requesting me to assist in this patient's care.    Sekou Pisano MD  Sleep Medicine  08/21/21  12:29 EDT

## 2021-08-21 PROBLEM — G47.14 HYPERSOMNIA DUE TO MEDICAL CONDITION: Status: ACTIVE | Noted: 2021-08-21

## 2021-08-23 RX ORDER — CLOPIDOGREL BISULFATE 75 MG/1
75 TABLET ORAL DAILY
Qty: 90 TABLET | Refills: 3 | Status: SHIPPED | OUTPATIENT
Start: 2021-08-23 | End: 2022-08-08 | Stop reason: SDUPTHER

## 2021-12-07 ENCOUNTER — TELEPHONE (OUTPATIENT)
Dept: CARDIOLOGY | Facility: CLINIC | Age: 61
End: 2021-12-07

## 2021-12-07 RX ORDER — AMOXICILLIN 500 MG/1
2000 CAPSULE ORAL SEE ADMIN INSTRUCTIONS
Qty: 4 CAPSULE | Refills: 2 | Status: SHIPPED | OUTPATIENT
Start: 2021-12-07 | End: 2022-01-28 | Stop reason: ALTCHOICE

## 2021-12-07 NOTE — TELEPHONE ENCOUNTER
Sent in amoxicillin refill which it looks like he has taken in the past prior to dental procedures please remind him to take the 4 tablets (for a total of 2000 mg) 1 hour prior to dental cleaning.  Needs to coordinate timing with the dentist.

## 2021-12-20 RX ORDER — METOPROLOL SUCCINATE 25 MG/1
TABLET, EXTENDED RELEASE ORAL
Qty: 90 TABLET | Refills: 3 | Status: SHIPPED | OUTPATIENT
Start: 2021-12-20 | End: 2022-08-08 | Stop reason: SDUPTHER

## 2022-01-25 ENCOUNTER — IMMUNIZATION (OUTPATIENT)
Dept: VACCINE CLINIC | Facility: HOSPITAL | Age: 62
End: 2022-01-25

## 2022-01-25 DIAGNOSIS — Z23 NEED FOR VACCINATION: Primary | ICD-10-CM

## 2022-01-25 PROCEDURE — 0054A HC ADM SARSCV2 30MCG TRS-SUCR BOOSTER: CPT

## 2022-01-25 PROCEDURE — 91305 HC SARSCOV2 VAC 30 MCG TRS-SUCR PFIZER: CPT

## 2022-01-28 ENCOUNTER — OFFICE VISIT (OUTPATIENT)
Dept: CARDIOLOGY | Facility: CLINIC | Age: 62
End: 2022-01-28

## 2022-01-28 VITALS
SYSTOLIC BLOOD PRESSURE: 130 MMHG | BODY MASS INDEX: 41.75 KG/M2 | WEIGHT: 315 LBS | HEIGHT: 73 IN | DIASTOLIC BLOOD PRESSURE: 78 MMHG | HEART RATE: 84 BPM

## 2022-01-28 DIAGNOSIS — I25.708 CORONARY ARTERY DISEASE OF BYPASS GRAFT OF NATIVE HEART WITH STABLE ANGINA PECTORIS: Primary | ICD-10-CM

## 2022-01-28 DIAGNOSIS — I10 ESSENTIAL HYPERTENSION: ICD-10-CM

## 2022-01-28 PROCEDURE — 93000 ELECTROCARDIOGRAM COMPLETE: CPT | Performed by: INTERNAL MEDICINE

## 2022-01-28 PROCEDURE — 99214 OFFICE O/P EST MOD 30 MIN: CPT | Performed by: INTERNAL MEDICINE

## 2022-01-28 NOTE — PROGRESS NOTES
"      CARDIOLOGY    Papa Miguel MD    ENCOUNTER DATE:  01/28/2022    Edmond Chase / 61 y.o. / male        CHIEF COMPLAINT / REASON FOR OFFICE VISIT     Coronary Artery Disease (07/30/2021 Follow up)  Hypertension    HISTORY OF PRESENT ILLNESS       HPI  Edmond Chase is a 61 y.o. male who presents today for reevaluation.  Patient said he has had 2 episodes of chest discomfort.  First episode occurred about 2 weeks before [place].  He said it happened about 4 AM or so.  He said he got up took his medicines a little early and the pain went away.  He had another episode actually last night.  He said it was similar to what he had before [place] but the one before [place] was a lot more intense than the one last night.  He said he took a sip of cold water use inhaler as well as took aspirin isosorbide and he said it all significantly helped.  He said the pain was relieved right away.  He has not had chest discomfort during the daytime or when he is exerting himself.  He did have some shortness of breath with the episodes but has not had exertional shortness of breath palpitations lightheadedness or swelling.      The following portions of the patient's history were reviewed and updated as appropriate: allergies, current medications, past family history, past medical history, past social history, past surgical history and problem list.      VITAL SIGNS     Visit Vitals  /78 (BP Location: Left arm)   Pulse 84   Ht 185.4 cm (73\")   Wt (!) 148 kg (327 lb)   BMI 43.14 kg/m²         Wt Readings from Last 3 Encounters:   01/28/22 (!) 148 kg (327 lb)   08/17/21 (!) 150 kg (330 lb)   07/30/21 (!) 149 kg (329 lb)     Body mass index is 43.14 kg/m².      REVIEW OF SYSTEMS   Review of Systems   All other systems reviewed and are negative.          PHYSICAL EXAMINATION     Vitals reviewed.   Constitutional:       Appearance: Healthy appearance.   Pulmonary:      Effort: Pulmonary effort is normal. "   Cardiovascular:      Normal rate. Regular rhythm. Normal S1. Normal S2.      Murmurs: There is no murmur.      No gallop. No click. No rub.   Pulses:     Intact distal pulses.   Edema:     Peripheral edema absent.   Neurological:      Mental Status: Alert and oriented to person, place and time.           REVIEWED DATA       ECG 12 Lead    Date/Time: 1/28/2022 1:06 PM  Performed by: Papa Miguel MD  Authorized by: Papa Miguel MD   Comparison: compared with previous ECG from 7/20/2020  Similar to previous ECG  Rhythm: sinus rhythm  Ectopy: unifocal PVCs    Clinical impression: abnormal EKG            Cardiac Procedures:  1.           ASSESSMENT & PLAN      Diagnosis Plan   1. Coronary artery disease of bypass graft of native heart with stable angina pectoris (HCC)     2. Essential hypertension           SUMMARY/DISCUSSION  1. Hypertension blood pressures good  2. Coronary artery disease he had 2 episode of chest discomfort that I do not think sounds to be cardiac in origin and I am more concerned it is GI and possibly esophageal spasm.  I told him what to do next time take some Maalox and see if it helps.  The fact he is not having any symptoms when he is exerting himself is encouraging.  Both of these episode occurred in the middle of the night and woke him from sleep.  He was recently cathed returning his coronary disease medically.  This point I will follow him clinically and have him follow-up in a year sooner if he has issues.        MEDICATIONS         Discharge Medications          Accurate as of January 28, 2022  1:02 PM. If you have any questions, ask your nurse or doctor.            Continue These Medications      Instructions Start Date   albuterol sulfate  (90 Base) MCG/ACT inhaler  Commonly known as: PROVENTIL HFA;VENTOLIN HFA;PROAIR HFA   2 puffs, Inhalation, Every 6 Hours PRN      amLODIPine 10 MG tablet  Commonly known as: NORVASC   10 mg, Oral, Daily      aspirin 81 MG tablet    81 mg, Oral, Daily      atorvastatin 40 MG tablet  Commonly known as: LIPITOR   40 mg, Oral, Daily      clopidogrel 75 MG tablet  Commonly known as: PLAVIX   75 mg, Oral, Daily      Farxiga 10 MG tablet  Generic drug: Dapagliflozin Propanediol   No dose, route, or frequency recorded.      glucosamine sulfate 500 MG capsule capsule   Oral, Daily      isosorbide mononitrate 60 MG 24 hr tablet  Commonly known as: IMDUR   90 mg, Oral, Daily      lisinopril 10 MG tablet  Commonly known as: PRINIVIL,ZESTRIL   No dose, route, or frequency recorded.      metoprolol succinate XL 25 MG 24 hr tablet  Commonly known as: TOPROL-XL   TAKE ONE TABLET BY MOUTH DAILY      vitamin D 1.25 MG (00020 UT) capsule capsule  Commonly known as: ERGOCALCIFEROL   No dose, route, or frequency recorded.                 **Dragon Disclaimer:   Much of this encounter note is an electronic transcription/translation of spoken language to printed text. The electronic translation of spoken language may permit erroneous, or at times, nonsensical words or phrases to be inadvertently transcribed. Although I have reviewed the note for such errors, some may still exist.

## 2022-03-28 RX ORDER — ISOSORBIDE MONONITRATE 60 MG/1
TABLET, EXTENDED RELEASE ORAL
Qty: 135 TABLET | Refills: 2 | Status: SHIPPED | OUTPATIENT
Start: 2022-03-28 | End: 2022-08-08 | Stop reason: SDUPTHER

## 2022-06-07 ENCOUNTER — TELEPHONE (OUTPATIENT)
Dept: CARDIOLOGY | Facility: CLINIC | Age: 62
End: 2022-06-07

## 2022-06-07 RX ORDER — AMOXICILLIN 500 MG/1
2000 CAPSULE ORAL SEE ADMIN INSTRUCTIONS
Qty: 4 CAPSULE | Refills: 0 | Status: SHIPPED | OUTPATIENT
Start: 2022-06-07 | End: 2022-07-22

## 2022-06-07 NOTE — TELEPHONE ENCOUNTER
Pt called and LVM stating he is going to the dentist next week and needs his prophylactic antibiotics sent in to the [facility] on [address]. Could you send these in? Thanks!  Deidre

## 2022-07-09 ENCOUNTER — HOSPITAL ENCOUNTER (EMERGENCY)
Facility: HOSPITAL | Age: 62
Discharge: LEFT BEFORE DISCHARGE | End: 2022-07-09

## 2022-07-09 ENCOUNTER — APPOINTMENT (OUTPATIENT)
Dept: GENERAL RADIOLOGY | Facility: HOSPITAL | Age: 62
End: 2022-07-09

## 2022-07-09 VITALS
BODY MASS INDEX: 43.14 KG/M2 | TEMPERATURE: 99.1 F | OXYGEN SATURATION: 97 % | DIASTOLIC BLOOD PRESSURE: 91 MMHG | HEART RATE: 85 BPM | RESPIRATION RATE: 22 BRPM | HEIGHT: 73 IN | SYSTOLIC BLOOD PRESSURE: 171 MMHG

## 2022-07-09 LAB — QT INTERVAL: 379 MS

## 2022-07-09 PROCEDURE — 99211 OFF/OP EST MAY X REQ PHY/QHP: CPT

## 2022-07-09 PROCEDURE — 93005 ELECTROCARDIOGRAM TRACING: CPT

## 2022-07-09 PROCEDURE — 93010 ELECTROCARDIOGRAM REPORT: CPT | Performed by: INTERNAL MEDICINE

## 2022-07-09 RX ORDER — ASPIRIN 81 MG/1
324 TABLET, CHEWABLE ORAL ONCE
Status: DISCONTINUED | OUTPATIENT
Start: 2022-07-09 | End: 2022-07-09 | Stop reason: HOSPADM

## 2022-07-09 RX ORDER — SODIUM CHLORIDE 0.9 % (FLUSH) 0.9 %
10 SYRINGE (ML) INJECTION AS NEEDED
Status: DISCONTINUED | OUTPATIENT
Start: 2022-07-09 | End: 2022-07-09 | Stop reason: HOSPADM

## 2022-07-10 ENCOUNTER — APPOINTMENT (OUTPATIENT)
Dept: GENERAL RADIOLOGY | Facility: HOSPITAL | Age: 62
End: 2022-07-10

## 2022-07-10 ENCOUNTER — HOSPITAL ENCOUNTER (OUTPATIENT)
Facility: HOSPITAL | Age: 62
Discharge: HOME | End: 2022-07-15
Attending: EMERGENCY MEDICINE | Admitting: INTERNAL MEDICINE

## 2022-07-10 DIAGNOSIS — I20.8 ANGINA AT REST: Primary | ICD-10-CM

## 2022-07-10 DIAGNOSIS — R06.02 SHORTNESS OF BREATH: ICD-10-CM

## 2022-07-10 DIAGNOSIS — R79.9 ELEVATED BUN: ICD-10-CM

## 2022-07-10 DIAGNOSIS — D64.9 ANEMIA, UNSPECIFIED TYPE: ICD-10-CM

## 2022-07-10 PROBLEM — I20.89 ANGINA AT REST: Status: ACTIVE | Noted: 2022-07-10

## 2022-07-10 LAB
ALBUMIN SERPL-MCNC: 3.4 G/DL (ref 3.5–5.2)
ALBUMIN/GLOB SERPL: 2.1 G/DL
ALP SERPL-CCNC: 60 U/L (ref 39–117)
ALT SERPL W P-5'-P-CCNC: 15 U/L (ref 1–41)
ANION GAP SERPL CALCULATED.3IONS-SCNC: 10.1 MMOL/L (ref 5–15)
AST SERPL-CCNC: 9 U/L (ref 1–40)
BASOPHILS # BLD AUTO: 0.04 10*3/MM3 (ref 0–0.2)
BASOPHILS NFR BLD AUTO: 0.4 % (ref 0–1.5)
BILIRUB SERPL-MCNC: 0.5 MG/DL (ref 0–1.2)
BUN SERPL-MCNC: 54 MG/DL (ref 8–23)
BUN/CREAT SERPL: 78.3 (ref 7–25)
CALCIUM SPEC-SCNC: 8.2 MG/DL (ref 8.6–10.5)
CHLORIDE SERPL-SCNC: 107 MMOL/L (ref 98–107)
CO2 SERPL-SCNC: 23.9 MMOL/L (ref 22–29)
CREAT SERPL-MCNC: 0.69 MG/DL (ref 0.76–1.27)
DEPRECATED RDW RBC AUTO: 46.3 FL (ref 37–54)
EGFRCR SERPLBLD CKD-EPI 2021: 104 ML/MIN/1.73
EOSINOPHIL # BLD AUTO: 0.21 10*3/MM3 (ref 0–0.4)
EOSINOPHIL NFR BLD AUTO: 2.1 % (ref 0.3–6.2)
ERYTHROCYTE [DISTWIDTH] IN BLOOD BY AUTOMATED COUNT: 13.7 % (ref 12.3–15.4)
GLOBULIN UR ELPH-MCNC: 1.6 GM/DL
GLUCOSE BLDC GLUCOMTR-MCNC: 165 MG/DL (ref 70–130)
GLUCOSE BLDC GLUCOMTR-MCNC: 230 MG/DL (ref 70–130)
GLUCOSE BLDC GLUCOMTR-MCNC: 262 MG/DL (ref 70–130)
GLUCOSE SERPL-MCNC: 269 MG/DL (ref 65–99)
HCT VFR BLD AUTO: 32.6 % (ref 37.5–51)
HGB BLD-MCNC: 10.8 G/DL (ref 13–17.7)
IMM GRANULOCYTES # BLD AUTO: 0.05 10*3/MM3 (ref 0–0.05)
IMM GRANULOCYTES NFR BLD AUTO: 0.5 % (ref 0–0.5)
INR PPP: 1.01 (ref 0.9–1.1)
LYMPHOCYTES # BLD AUTO: 1.75 10*3/MM3 (ref 0.7–3.1)
LYMPHOCYTES NFR BLD AUTO: 17.1 % (ref 19.6–45.3)
MAGNESIUM SERPL-MCNC: 1.8 MG/DL (ref 1.6–2.4)
MCH RBC QN AUTO: 31.3 PG (ref 26.6–33)
MCHC RBC AUTO-ENTMCNC: 33.1 G/DL (ref 31.5–35.7)
MCV RBC AUTO: 94.5 FL (ref 79–97)
MONOCYTES # BLD AUTO: 0.77 10*3/MM3 (ref 0.1–0.9)
MONOCYTES NFR BLD AUTO: 7.5 % (ref 5–12)
NEUTROPHILS NFR BLD AUTO: 7.42 10*3/MM3 (ref 1.7–7)
NEUTROPHILS NFR BLD AUTO: 72.4 % (ref 42.7–76)
NRBC BLD AUTO-RTO: 0 /100 WBC (ref 0–0.2)
PLATELET # BLD AUTO: 208 10*3/MM3 (ref 140–450)
PMV BLD AUTO: 10 FL (ref 6–12)
POTASSIUM SERPL-SCNC: 4.5 MMOL/L (ref 3.5–5.2)
PROT SERPL-MCNC: 5 G/DL (ref 6–8.5)
PROTHROMBIN TIME: 13.2 SECONDS (ref 11.7–14.2)
QT INTERVAL: 356 MS
RBC # BLD AUTO: 3.45 10*6/MM3 (ref 4.14–5.8)
SARS-COV-2 RNA PNL SPEC NAA+PROBE: NOT DETECTED
SODIUM SERPL-SCNC: 141 MMOL/L (ref 136–145)
TROPONIN T SERPL-MCNC: <0.01 NG/ML (ref 0–0.03)
TROPONIN T SERPL-MCNC: <0.01 NG/ML (ref 0–0.03)
WBC NRBC COR # BLD: 10.24 10*3/MM3 (ref 3.4–10.8)

## 2022-07-10 PROCEDURE — 80053 COMPREHEN METABOLIC PANEL: CPT | Performed by: NURSE PRACTITIONER

## 2022-07-10 PROCEDURE — 36415 COLL VENOUS BLD VENIPUNCTURE: CPT

## 2022-07-10 PROCEDURE — G0378 HOSPITAL OBSERVATION PER HR: HCPCS

## 2022-07-10 PROCEDURE — 87635 SARS-COV-2 COVID-19 AMP PRB: CPT | Performed by: NURSE PRACTITIONER

## 2022-07-10 PROCEDURE — 93005 ELECTROCARDIOGRAM TRACING: CPT

## 2022-07-10 PROCEDURE — 99214 OFFICE O/P EST MOD 30 MIN: CPT | Performed by: INTERNAL MEDICINE

## 2022-07-10 PROCEDURE — 83735 ASSAY OF MAGNESIUM: CPT | Performed by: NURSE PRACTITIONER

## 2022-07-10 PROCEDURE — 85610 PROTHROMBIN TIME: CPT | Performed by: NURSE PRACTITIONER

## 2022-07-10 PROCEDURE — 25010000002 LORAZEPAM PER 2 MG: Performed by: NURSE PRACTITIONER

## 2022-07-10 PROCEDURE — 82962 GLUCOSE BLOOD TEST: CPT

## 2022-07-10 PROCEDURE — 93010 ELECTROCARDIOGRAM REPORT: CPT | Performed by: INTERNAL MEDICINE

## 2022-07-10 PROCEDURE — 25010000002 MORPHINE PER 10 MG: Performed by: EMERGENCY MEDICINE

## 2022-07-10 PROCEDURE — 63710000001 INSULIN LISPRO (HUMAN) PER 5 UNITS: Performed by: INTERNAL MEDICINE

## 2022-07-10 PROCEDURE — C9803 HOPD COVID-19 SPEC COLLECT: HCPCS

## 2022-07-10 PROCEDURE — 25010000002 ONDANSETRON PER 1 MG: Performed by: EMERGENCY MEDICINE

## 2022-07-10 PROCEDURE — 84484 ASSAY OF TROPONIN QUANT: CPT | Performed by: NURSE PRACTITIONER

## 2022-07-10 PROCEDURE — 71045 X-RAY EXAM CHEST 1 VIEW: CPT

## 2022-07-10 PROCEDURE — 85025 COMPLETE CBC W/AUTO DIFF WBC: CPT | Performed by: NURSE PRACTITIONER

## 2022-07-10 PROCEDURE — 99284 EMERGENCY DEPT VISIT MOD MDM: CPT

## 2022-07-10 PROCEDURE — 83036 HEMOGLOBIN GLYCOSYLATED A1C: CPT | Performed by: NURSE PRACTITIONER

## 2022-07-10 PROCEDURE — 96374 THER/PROPH/DIAG INJ IV PUSH: CPT

## 2022-07-10 PROCEDURE — 96375 TX/PRO/DX INJ NEW DRUG ADDON: CPT

## 2022-07-10 RX ORDER — AMLODIPINE BESYLATE 10 MG/1
10 TABLET ORAL DAILY
Status: DISCONTINUED | OUTPATIENT
Start: 2022-07-10 | End: 2022-07-15 | Stop reason: HOSPADM

## 2022-07-10 RX ORDER — ONDANSETRON 2 MG/ML
4 INJECTION INTRAMUSCULAR; INTRAVENOUS ONCE
Status: COMPLETED | OUTPATIENT
Start: 2022-07-10 | End: 2022-07-10

## 2022-07-10 RX ORDER — MORPHINE SULFATE 2 MG/ML
2 INJECTION, SOLUTION INTRAMUSCULAR; INTRAVENOUS ONCE
Status: COMPLETED | OUTPATIENT
Start: 2022-07-10 | End: 2022-07-10

## 2022-07-10 RX ORDER — ASPIRIN 325 MG
325 TABLET ORAL ONCE
Status: COMPLETED | OUTPATIENT
Start: 2022-07-10 | End: 2022-07-10

## 2022-07-10 RX ORDER — ALBUTEROL SULFATE 90 UG/1
2 AEROSOL, METERED RESPIRATORY (INHALATION) EVERY 6 HOURS PRN
Status: DISCONTINUED | OUTPATIENT
Start: 2022-07-10 | End: 2022-07-10 | Stop reason: CLARIF

## 2022-07-10 RX ORDER — NICOTINE POLACRILEX 4 MG
15 LOZENGE BUCCAL
Status: DISCONTINUED | OUTPATIENT
Start: 2022-07-10 | End: 2022-07-15 | Stop reason: HOSPADM

## 2022-07-10 RX ORDER — LISINOPRIL 10 MG/1
10 TABLET ORAL
Status: DISCONTINUED | OUTPATIENT
Start: 2022-07-10 | End: 2022-07-15 | Stop reason: HOSPADM

## 2022-07-10 RX ORDER — ATORVASTATIN CALCIUM 20 MG/1
40 TABLET, FILM COATED ORAL NIGHTLY
Status: DISCONTINUED | OUTPATIENT
Start: 2022-07-10 | End: 2022-07-15 | Stop reason: HOSPADM

## 2022-07-10 RX ORDER — CLOPIDOGREL BISULFATE 75 MG/1
75 TABLET ORAL DAILY
Status: DISCONTINUED | OUTPATIENT
Start: 2022-07-10 | End: 2022-07-15 | Stop reason: HOSPADM

## 2022-07-10 RX ORDER — NITROGLYCERIN 0.4 MG/1
0.4 TABLET SUBLINGUAL
Status: DISCONTINUED | OUTPATIENT
Start: 2022-07-10 | End: 2022-07-15 | Stop reason: HOSPADM

## 2022-07-10 RX ORDER — ISOSORBIDE MONONITRATE 60 MG/1
60 TABLET, EXTENDED RELEASE ORAL EVERY 12 HOURS
Status: DISCONTINUED | OUTPATIENT
Start: 2022-07-10 | End: 2022-07-15 | Stop reason: HOSPADM

## 2022-07-10 RX ORDER — DEXTROSE MONOHYDRATE 25 G/50ML
25 INJECTION, SOLUTION INTRAVENOUS
Status: DISCONTINUED | OUTPATIENT
Start: 2022-07-10 | End: 2022-07-15 | Stop reason: HOSPADM

## 2022-07-10 RX ORDER — NITROGLYCERIN 0.4 MG/1
TABLET SUBLINGUAL
Status: COMPLETED
Start: 2022-07-10 | End: 2022-07-10

## 2022-07-10 RX ORDER — SODIUM CHLORIDE 0.9 % (FLUSH) 0.9 %
10 SYRINGE (ML) INJECTION AS NEEDED
Status: DISCONTINUED | OUTPATIENT
Start: 2022-07-10 | End: 2022-07-15 | Stop reason: HOSPADM

## 2022-07-10 RX ORDER — LORAZEPAM 2 MG/ML
1 INJECTION INTRAMUSCULAR ONCE
Status: COMPLETED | OUTPATIENT
Start: 2022-07-10 | End: 2022-07-10

## 2022-07-10 RX ORDER — METOPROLOL SUCCINATE 25 MG/1
25 TABLET, EXTENDED RELEASE ORAL DAILY
Status: DISCONTINUED | OUTPATIENT
Start: 2022-07-10 | End: 2022-07-15 | Stop reason: HOSPADM

## 2022-07-10 RX ORDER — ALBUTEROL SULFATE 2.5 MG/3ML
2.5 SOLUTION RESPIRATORY (INHALATION) EVERY 4 HOURS PRN
Status: DISCONTINUED | OUTPATIENT
Start: 2022-07-10 | End: 2022-07-15 | Stop reason: HOSPADM

## 2022-07-10 RX ORDER — ASPIRIN 81 MG/1
81 TABLET ORAL DAILY
Status: DISCONTINUED | OUTPATIENT
Start: 2022-07-11 | End: 2022-07-15

## 2022-07-10 RX ORDER — INSULIN LISPRO 100 [IU]/ML
0-9 INJECTION, SOLUTION INTRAVENOUS; SUBCUTANEOUS
Status: DISCONTINUED | OUTPATIENT
Start: 2022-07-10 | End: 2022-07-15 | Stop reason: HOSPADM

## 2022-07-10 RX ADMIN — AMLODIPINE BESYLATE 10 MG: 10 TABLET ORAL at 18:17

## 2022-07-10 RX ADMIN — ASPIRIN 325 MG: 325 TABLET ORAL at 07:05

## 2022-07-10 RX ADMIN — LISINOPRIL 10 MG: 10 TABLET ORAL at 18:17

## 2022-07-10 RX ADMIN — SODIUM CHLORIDE 1000 ML: 9 INJECTION, SOLUTION INTRAVENOUS at 07:01

## 2022-07-10 RX ADMIN — EMPAGLIFLOZIN 25 MG: 25 TABLET, FILM COATED ORAL at 18:17

## 2022-07-10 RX ADMIN — METOPROLOL SUCCINATE 25 MG: 25 TABLET, EXTENDED RELEASE ORAL at 18:17

## 2022-07-10 RX ADMIN — ISOSORBIDE MONONITRATE 60 MG: 60 TABLET ORAL at 20:40

## 2022-07-10 RX ADMIN — ONDANSETRON 4 MG: 2 INJECTION INTRAMUSCULAR; INTRAVENOUS at 07:34

## 2022-07-10 RX ADMIN — ATORVASTATIN CALCIUM 40 MG: 20 TABLET, FILM COATED ORAL at 20:40

## 2022-07-10 RX ADMIN — NITROGLYCERIN 0.4 MG: 0.4 TABLET SUBLINGUAL at 06:48

## 2022-07-10 RX ADMIN — INSULIN LISPRO 2 UNITS: 100 INJECTION, SOLUTION INTRAVENOUS; SUBCUTANEOUS at 18:42

## 2022-07-10 RX ADMIN — LORAZEPAM 1 MG: 2 INJECTION INTRAMUSCULAR; INTRAVENOUS at 22:54

## 2022-07-10 RX ADMIN — CLOPIDOGREL 75 MG: 75 TABLET, FILM COATED ORAL at 18:17

## 2022-07-10 RX ADMIN — MORPHINE SULFATE 2 MG: 2 INJECTION, SOLUTION INTRAMUSCULAR; INTRAVENOUS at 07:34

## 2022-07-10 NOTE — PLAN OF CARE
Problem: Adult Inpatient Plan of Care  Goal: Plan of Care Review  Outcome: Ongoing, Progressing  Flowsheets (Taken 7/10/2022 1800)  Progress: no change  Plan of Care Reviewed With: patient  Outcome Evaluation: pt admitted from ER. up with stand-by assist. no c/o of chest pain.   Goal Outcome Evaluation:  Plan of Care Reviewed With: patient        Progress: no change  Outcome Evaluation: pt admitted from ER. up with stand-by assist. no c/o of chest pain.

## 2022-07-10 NOTE — H&P
[facility] History and Physical    Patient Name: Edmond Chase  :1960  61 y.o.    Date of Admission: 7/10/2022  Date of Consultation:  07/10/22  Encounter Provider: Jesika West MD  Place of Service: [facility]  Referring Provider: No ref. provider found  Patient Care Team:  System, Provider Not In as PCP - General      Chief complaint: chest pain    History of Present Illness:  Mr. Chase is a 61 year old male with hypertension, diabetes mellitus type II, coronary artery disease status post prior CABG x3, bioprosthetic aortic valve replacement, who presents with chest pain.       Patient reports that he has been experiencing intermittent chest discomfort for the last 2 days.  He even came to the emergency room yesterday and had an EKG performed low but due to the wait opted not to stay for an evaluation.  Early this morning he was woken up by chest discomfort associated with diaphoresis and shortness of breath.  He took an additional half isosorbide with some improvement in his symptoms and called EMS.    Following his arrival to the hospital he was still having chest discomfort.  He was noted to be mildly hypotensive with systolic pressures in the 90s.  He was also noted to be diaphoretic and uncomfortable appearing.  Initial EKG showed stable lateral T wave changes.  Repeat EKG was poor quality tracing and there was some concerns that he had developed inferior ST elevations.  On my review of the EKG there did not appear to be any evidence of ST elevations to warrant emergent cardiac catheterization.    Following his admission he was treated with sublingual nitroglycerin and morphine with improvement in his symptoms.  Since then his chest discomfort and shortness of breath have greatly improved.  He does report that the symptoms he experienced this morning are similar to his prior anginal symptoms.  He otherwise denies any palpitations,  lightheadedness, near-syncope or syncope.    His work-up so far shows mildly decreased hemoglobin of 10.8, normal white blood count, creatinine of 0.69, mildly elevated glucose, and 2 normal troponins.    His prior cardiac history includes a CABG x3 and bioprosthetic aortic valve replacement.  He subsequently was developed occlusion of both saphenous vein grafts and has undergone drug-eluting stent placement of his mid LAD via his MARCELINA graft.  His last cardiac catheterization in 2021 was performed for recurrent angina.  This showed chronic 50 to 60% stenosis of a severely tortuous mid right coronary artery, chronically occluded ostial posterior descending branch, small vessel disease involving left circumflex artery, nonobstructive disease of the ramus intermedius, and a patent LIMA to LAD with a patent mid LAD stent.  There do not appear to be any disease that was amenable to PCI and medical management was recommended.  Echocardiogram at that time was unremarkable.     Cardiac testing:  ECHO 21  · There is a bioprosthetic aortic valve present.  · Peak velocity of the flow distal to the aortic valve is 274 cm/s. Aortic valve maximum pressure gradient is 30 mmHg. Aortic valve mean pressure gradient is 18 mmHg. Aortic valve dimensionless index is 0.3 .  · Aortic valve area is 1.4 cm2.  · Left ventricular wall thickness is consistent with moderate concentric hypertrophy.  · Estimated left ventricular EF = 62% Estimated left ventricular EF was in agreement with the calculated left ventricular EF. Left ventricular systolic function is normal.  · Left ventricular diastolic function was normal.  · Mild dilation of the aortic root is present. Mild dilation of the sinuses of Valsalva is present.    Cardiac cath 21  CORONARY ANGIOGRAPHY:  Left main: 20% luminal irregularities in the proximal LAD  Left anterior descendin% proximal then 100% occlusion of the mid LAD just beyond the first septal   Ramus  intermedius: Very large serpiginous ramus with 2 branches free of obstructive disease  Circumflex: The native circumflex has a long area that is about 70% stenosed in the midportion it really does not go to anywhere its mainly in AV groove branch  RCA: Is a dominant vessel.  This is a highly tortuous vessel with 50% proximal disease 70% mid disease some 50 to 60% disease maybe up to 90% in the distal RCA but honestly the tortuosity I think precludes intervention on this vessel     BROCK to LAD: widely patent and normal.  The stent in mid LAD is widely patent and looks good the remainder of the vessel looks good     SUMMARY: No significant change from his prior cath would recommend ongoing medical therapy    Past Medical History:   Diagnosis Date   • Abnormal nuclear stress test    • Aortic valve insufficiency     nonrheumtic    • Ascending aortic aneurysm (HCC)    • CAD (coronary artery disease)    • Chest pain    • Diabetes mellitus (HCC)    • Dizzy     CAREFUL WHEN GETTING UP   • Dyspnea    • Essential hypertension    • Fatigue    • Hyperglycemia    • Hyperlipidemia    • Hypersomnia with sleep apnea    • Lightheadedness    • Malaise and fatigue    • Morbid obesity (HCC)    • Myocardial ischemia    • Nocturia    • TJ on auto CPAP 10/31/2016    Overnight polysomnogram.  Weight 276 pounds.  Severe TJ with AHI 79 events per hour.  Auto CPAP recommended.   • Pneumonia     FEB 2016   • Scrotal bleeding    • Shortness of breath        Past Surgical History:   Procedure Laterality Date   • AORTIC VALVE REPAIR/REPLACEMENT  05/2016   • ASCENDING ARCH/HEMIARCH REPLACEMENT N/A 5/2/2016    Procedure: BHAVESH STERNOTOMY CORONARY ARTERY BYPASS GRAFT TIMES 3 USING LEFT INTERNAL MAMMARY ARTERY AND RIGHT GREATER SAPHENOUS VEIN GRAFT PER ENDOSCOPIC VEIN HARVESTING, AORTIC ANEURYSM REPAIR WITH ROOT REPAIR AND AORTIC VALVE REPLACEMENT;  Surgeon: Rosalio Cline MD;  Location: [facility];  Service:    • CARDIAC CATHETERIZATION N/A  4/1/2016    Procedure: Left Heart Cath;  Surgeon: Erick Tam MD;  Location: [facility] CATH INVASIVE LOCATION;  Service:    • CARDIAC CATHETERIZATION N/A 4/1/2016    Procedure: Left ventriculography;  Surgeon: Erick Tam MD;  [facility];  Service:    • CARDIAC CATHETERIZATION N/A 4/1/2016    Procedure: Right Heart Cath;  Surgeon: Erick Tam MD;  Location: [facility] CATH INVASIVE LOCATION;  Service:    • CARDIAC CATHETERIZATION N/A 10/30/2017    Procedure: Coronary angiography;  Surgeon: Sorin Vasquez MD;  Location: [facility] CATH INVASIVE LOCATION;  Service:    • CARDIAC CATHETERIZATION  10/30/2017    Procedure: Saphenous Vein Graft;  Surgeon: Sorin Vasquez MD;  Location: [facility] CATH INVASIVE LOCATION;  Service:    • CARDIAC CATHETERIZATION N/A 10/30/2017    Procedure: Native mammary injection;  Surgeon: Sorin Vasquez MD;  Location: [facility] CATH INVASIVE LOCATION;  Service:    • CARDIAC CATHETERIZATION N/A 7/7/2020    Procedure: Coronary angiography;  Surgeon: Gregor Kim MD;  Location: [facility] CATH INVASIVE LOCATION;  Service: Cardiovascular;  Laterality: N/A;   • CARDIAC CATHETERIZATION N/A 7/7/2020    Procedure: Left heart cath;  Surgeon: Gregor Kim MD;  Location: [facility] CATH INVASIVE LOCATION;  Service: Cardiovascular;  Laterality: N/A;   • CARDIAC CATHETERIZATION N/A 7/7/2020    Procedure: Left ventriculography;  Surgeon: Gregor Kim MD;  Location: [facility] CATH INVASIVE LOCATION;  Service: Cardiovascular;  Laterality: N/A;   • CARDIAC CATHETERIZATION N/A 7/7/2020    Procedure: Stent ESMER bypass graft;  Surgeon: Gregor Kim MD;  Location: [facility] CATH INVASIVE LOCATION;  Service: Cardiovascular;  Laterality: N/A;   • CARDIAC CATHETERIZATION N/A 6/17/2021    Procedure: SAPHENOUS VEIN GRAFT;  Surgeon: Marques Ndiaye MD;  Location: [facility] CATH INVASIVE LOCATION;  Service: Cardiovascular;  Laterality: N/A;   • CARDIAC CATHETERIZATION N/A 6/17/2021     Procedure: Left Heart Cath;  Surgeon: Marques Ndiaye MD;  Location:  [facility] CATH INVASIVE LOCATION;  Service: Cardiovascular;  Laterality: N/A;   • CARDIAC CATHETERIZATION N/A 6/17/2021    Procedure: Coronary angiography;  Surgeon: Marques Ndiaye MD;  Location:  [facility] CATH INVASIVE LOCATION;  Service: Cardiovascular;  Laterality: N/A;   • CORONARY ARTERY BYPASS GRAFT  05/2016    LIMA TO LAD, SVG TO PDA, SVG TO OM2   • INNER EAR SURGERY     • TONSILLECTOMY           Prior to Admission medications    Medication Sig Start Date End Date Taking? Authorizing Provider   albuterol sulfate  (90 Base) MCG/ACT inhaler Inhale 2 puffs Every 6 (Six) Hours As Needed for Wheezing. 7/8/19   Michael Flores MD   amLODIPine (NORVASC) 10 MG tablet Take 10 mg by mouth Daily.    Dontae Cano MD   amoxicillin (AMOXIL) 500 MG capsule Take 4 capsules by mouth See Admin Instructions. 4 capsules 1 hour prior to procedure 6/7/22   Marcia Stoner APRN   aspirin 81 MG tablet Take 81 mg by mouth Daily. 8/5/15   Dontae Cano MD   atorvastatin (LIPITOR) 40 MG tablet Take 1 tablet by mouth Daily. 6/17/21   Marques Ndiaye MD   clopidogrel (PLAVIX) 75 MG tablet Take 1 tablet by mouth Daily. 8/23/21   Papa Miguel MD   Farxiga 10 MG tablet  7/1/21   Dontae Cano MD   glucosamine sulfate 500 MG capsule capsule Take  by mouth Daily.    Dontae Cano MD   isosorbide mononitrate (IMDUR) 60 MG 24 hr tablet TAKE 1 AND 1/2 TABLETS BY MOUTH DAILY 3/28/22   Papa Miguel MD   lisinopril (PRINIVIL,ZESTRIL) 10 MG tablet  6/6/21   Dontae Cano MD   metoprolol succinate XL (TOPROL-XL) 25 MG 24 hr tablet TAKE ONE TABLET BY MOUTH DAILY 12/20/21   Papa Miguel MD   vitamin D (ERGOCALCIFEROL) 1.25 MG (79900 UT) capsule capsule  7/1/21   Dontae Cano MD       Allergies   Allergen Reactions   • Bystolic [Nebivolol Hcl]    • Metoprolol Other (See Comments)     Metoprolol seem to cause  "problems with mood swings       Social History     Socioeconomic History   • Marital status: Single   Tobacco Use   • Smoking status: Never Smoker   • Smokeless tobacco: Never Used   Substance and Sexual Activity   • Alcohol use: Yes     Comment: 1 drink/monthly   • Drug use: No   • Sexual activity: Defer       Family History   Problem Relation Age of Onset   • Hypertension Mother    • Diabetes Mother    • Heart disease Mother    • Hypertension Father    • Heart disease Sister    • Other Other         The patient states that his sister has a problem that goes by the name of \"long QT\".  He says this is a familial trait but he also says that he is \"not interested in pursuing it for himself\".   • Coronary artery disease Other    • Stroke Maternal Grandmother    • Heart disease Maternal Grandmother        REVIEW OF SYSTEMS:   All systems reviewed.  Pertinent positives identified in HPI.  All other systems are negative.      Objective:     Vitals:    07/10/22 0917 07/10/22 0940 07/10/22 0947 07/10/22 1025   BP:       BP Location:       Patient Position:       Pulse: 77 73 80 72   Resp:       Temp:       TempSrc:       SpO2: 97% 96% 98% 97%   Weight:       Height:         Body mass index is 43.14 kg/m².    General Appearance:    Alert, cooperative, in no acute distress   Head:    Normocephalic, without obvious abnormality, atraumatic   Eyes:            Lids and lashes normal, conjunctivae and sclerae normal, no icterus, no pallor, corneas clear, PERRLA   Ears:    Ears appear intact with no abnormalities noted   Neck:   No adenopathy, supple, trachea midline, no thyromegaly, no carotid bruit, no JVD   Lungs:     Clear to auscultation, respirations regular, even and unlabored    Heart:    Regular rhythm and normal rate, normal S1 and S2, no murmur, no gallop, no rub, no click   Chest Wall:    No abnormalities observed   Abdomen:     Normal bowel sounds, no masses, no organomegaly, soft, nontender, nondistended, no guarding, " no rebound  tenderness   Extremities:   Moves all extremities well, no edema, no cyanosis, no redness   Pulses:   Pulses palpable and equal bilaterally. Normal radial, carotid, femoral, dorsalis pedis and posterior tibial pulses bilaterally. Normal abdominal aorta   Skin:  Psychiatric:   No bleeding, bruising or rash    Alert and oriented x 3, normal mood and affect   Lab Review:     Results from last 7 days   Lab Units 07/10/22  0700   SODIUM mmol/L 141   POTASSIUM mmol/L 4.5   CHLORIDE mmol/L 107   CO2 mmol/L 23.9   BUN mg/dL 54*   CREATININE mg/dL 0.69*   CALCIUM mg/dL 8.2*   BILIRUBIN mg/dL 0.5   ALK PHOS U/L 60   ALT (SGPT) U/L 15   AST (SGOT) U/L 9   GLUCOSE mg/dL 269*     Results from last 7 days   Lab Units 07/10/22  0952 07/10/22  0700   TROPONIN T ng/mL <0.010 <0.010     Results from last 7 days   Lab Units 07/10/22  0700   WBC 10*3/mm3 10.24   HEMOGLOBIN g/dL 10.8*   HEMATOCRIT % 32.6*   PLATELETS 10*3/mm3 208     Results from last 7 days   Lab Units 07/10/22  0700   INR  1.01     Results from last 7 days   Lab Units 07/10/22  0700   MAGNESIUM mg/dL 1.8                 EKG      Baseline EKG      I personally viewed and interpreted the patient's EKG/Telemetry data.        Assessment and Plan:       1.  Unstable angina.  Troponins normal.  EKG really does not show any significant changes.  2.  Coronary artery disease.  Prior CABG with occluded saphenous vein grafts.  Also status post drug-eluting stent placement of the mid LAD that appeared patent on his last cath.  Last cardiac catheterization 6/2021 did not show any disease amenable to PCI.  He has been medically managed since then.  3.  Hypertension.  Blood pressures fairly normal at this time.  4.  Hyperlipidemia.  Normally on atorvastatin.  5.  Diabetes mellitus type 2  6.  Obesity    - Currently titrating isosorbide mononitrate dosage.  - Resume remainder of his cardiac regimen.  - Recheck troponin and EKG in the morning.  - We will determine if he  requires further cardiac work-up depending on his clinical course.    Jesika West MD  07/10/22  10:44 EDT

## 2022-07-10 NOTE — ED NOTES
Nursing report ED to floor  Edmond Chase  61 y.o.  male    HPI :   Chief Complaint   Patient presents with   • Shortness of Breath       Admitting doctor:   Jesika West MD    Admitting diagnosis:   The primary encounter diagnosis was Angina at rest (HCC). Diagnoses of Shortness of breath and Elevated BUN were also pertinent to this visit.    Code status:   Current Code Status     Date Active Code Status Order ID Comments User Context       7/10/2022 1434 CPR (Attempt to Resuscitate) [id number]  Jesika West MD ED     Advance Care Planning Activity      Questions for Current Code Status     Question Answer    Code Status (Patient has no pulse and is not breathing) CPR (Attempt to Resuscitate)    Medical Interventions (Patient has pulse or is breathing) Full Support          Allergies:   Bystolic [nebivolol hcl] and Metoprolol    Intake and Output    Intake/Output Summary (Last 24 hours) at 7/10/2022 1519  Last data filed at 7/10/2022 1032  Gross per 24 hour   Intake 1000 ml   Output --   Net 1000 ml       Weight:       07/10/22  0618   Weight: (!) 148 kg (327 lb)       Most recent vitals:   Vitals:    07/10/22 1301 07/10/22 1331 07/10/22 1401 07/10/22 1427   BP: 148/85 143/89 117/86    BP Location:       Patient Position:       Pulse: 71 79 78 85   Resp:       Temp:       TempSrc:       SpO2: 98% 97% 98% 97%   Weight:       Height:           Active LDAs/IV Access:   Lines, Drains & Airways     Active LDAs     Name Placement date Placement time Site Days    Peripheral IV 07/10/22 0658 Left Antecubital 07/10/22  0658  Antecubital  less than 1    Peripheral IV 07/10/22 0721 Left Antecubital 07/10/22  0721  Antecubital  less than 1                Labs (abnormal labs have a star):   Labs Reviewed   COMPREHENSIVE METABOLIC PANEL - Abnormal; Notable for the following components:       Result Value    Glucose 269 (*)     BUN 54 (*)     Creatinine 0.69 (*)     Calcium 8.2 (*)     Total Protein 5.0 (*)     Albumin  3.40 (*)     BUN/Creatinine Ratio 78.3 (*)     All other components within normal limits    Narrative:     GFR Normal >60  Chronic Kidney Disease <60  Kidney Failure <15     CBC WITH AUTO DIFFERENTIAL - Abnormal; Notable for the following components:    RBC 3.45 (*)     Hemoglobin 10.8 (*)     Hematocrit 32.6 (*)     Lymphocyte % 17.1 (*)     Neutrophils, Absolute 7.42 (*)     All other components within normal limits   POCT GLUCOSE FINGERSTICK - Abnormal; Notable for the following components:    Glucose 262 (*)     All other components within normal limits   COVID-19,[facility] IN-HOUSE CEPHEID/NOEMÍ, NP SWAB IN TRANSPORT MEDIA 8-12 HR TAT - Normal    Narrative:     Fact sheet for providers: https://www.fda.gov/media/956984/download    Fact sheet for patients: https://www.fda.gov/media/415054/download    Test performed by PCR.   PROTIME-INR - Normal   TROPONIN (IN-HOUSE) - Normal    Narrative:     Troponin T Reference Range:  <= 0.03 ng/mL-   Negative for AMI  >0.03 ng/mL-     Abnormal for myocardial necrosis.  Clinicians would have to utilize clinical acumen, EKG, Troponin and serial changes to determine if it is an Acute Myocardial Infarction or myocardial injury due to an underlying chronic condition.       Results may be falsely decreased if patient taking Biotin.     TROPONIN (IN-HOUSE) - Normal    Narrative:     Troponin T Reference Range:  <= 0.03 ng/mL-   Negative for AMI  >0.03 ng/mL-     Abnormal for myocardial necrosis.  Clinicians would have to utilize clinical acumen, EKG, Troponin and serial changes to determine if it is an Acute Myocardial Infarction or myocardial injury due to an underlying chronic condition.       Results may be falsely decreased if patient taking Biotin.     MAGNESIUM - Normal   COVID PRE-OP / PRE-PROCEDURE SCREENING ORDER (NO ISOLATION)    Narrative:     The following orders were created for panel order COVID PRE-OP / PRE-PROCEDURE SCREENING ORDER (NO ISOLATION) - Swab,  Nasopharynx.  Procedure                               Abnormality         Status                     ---------                               -----------         ------                     COVID-19,[facility] IN-HOUSE...[[id number]]  Normal              Final result                 Please view results for these tests on the individual orders.   CBC AND DIFFERENTIAL    Narrative:     The following orders were created for panel order CBC & Differential.  Procedure                               Abnormality         Status                     ---------                               -----------         ------                     CBC Auto Differential[[id number]]        Abnormal            Final result                 Please view results for these tests on the individual orders.       EKG:   ECG 12 Lead   Preliminary Result   HEART RATE= 86  bpm   RR Interval= 696  ms   IA Interval= 188  ms   P Horizontal Axis= 10  deg   P Front Axis= 2  deg   QRSD Interval= 87  ms   QT Interval= 356  ms   QRS Axis= 6  deg   T Wave Axis= 114  deg   - ABNORMAL ECG -   Sinus rhythm   Abnormal T, consider ischemia, lateral leads   Electronically Signed By:    Date and Time of Study: 2022-07-10 06:28:49          Meds given in ED:   Medications   sodium chloride 0.9 % flush 10 mL (has no administration in time range)   nitroglycerin (NITROSTAT) SL tablet 0.4 mg (0.4 mg Sublingual Given 7/10/22 0648)   sodium chloride 0.9 % bolus 1,000 mL (0 mL Intravenous Stopped 7/10/22 1032)   aspirin tablet 325 mg (325 mg Oral Given 7/10/22 0705)   morphine injection 2 mg (2 mg Intravenous Given 7/10/22 0734)   ondansetron (ZOFRAN) injection 4 mg (4 mg Intravenous Given 7/10/22 0734)       Imaging results:  XR Chest 1 View    Result Date: 7/10/2022  No evidence for active disease in the chest.  This report was finalized on 7/10/2022 8:12 AM by Dr. Edmond Garcia M.D.        Ambulatory status:   Ambulatory    Social issues:   Social History     Socioeconomic  History   • Marital status: Single   Tobacco Use   • Smoking status: Never Smoker   • Smokeless tobacco: Never Used   Substance and Sexual Activity   • Alcohol use: Yes     Comment: 1 drink/monthly   • Drug use: No   • Sexual activity: Defer       NIH Stroke Scale:        Nursing report ED to floor:

## 2022-07-10 NOTE — ED PROVIDER NOTES
MD ATTESTATION NOTE    The COLLEEN and I have discussed this patient's history, physical exam, and treatment plan.    I provided a substantive portion of the care of this patient. I personally performed the physical exam, in its entirety. The attached note describes my personal findings.      Edmond Chase is a 61 y.o. male who presents to the ED c/o chest pain.  Patient has prior history coronary artery disease AAA MI diabetes.  Patient is anticoagulant on Plavix.  States he has been having chest pain since Friday pressure-like pain has been intermittent.  States that he had some pain yesterday but left the ER without being seen secondary to long wait.  States that around 5:00 this morning he was awoken by worsening chest tightness.  Patient is diaphoretic nauseated and short of breath.  States he thinks he is having a heart attack.  Denies any abdominal pain      On exam:  GENERAL: Moderate distressed, pale diaphoretic  HENT: nares patent  EYES: no scleral icterus  CV: regular rhythm, regular rate  RESPIRATORY: normal effort  ABDOMEN: soft, nontender nondistended  MUSCULOSKELETAL: no deformity  NEURO: alert, moves all extremities, follows commands  SKIN: warm, dry    Labs  Recent Results (from the past 24 hour(s))   POC Glucose Once    Collection Time: 07/11/22  6:22 AM    Specimen: Blood   Result Value Ref Range    Glucose 160 (H) 70 - 130 mg/dL   Troponin    Collection Time: 07/11/22 10:58 AM    Specimen: Blood   Result Value Ref Range    Troponin T <0.010 0.000 - 0.030 ng/mL   BNP    Collection Time: 07/11/22 10:58 AM    Specimen: Blood   Result Value Ref Range    proBNP 118.0 0.0 - 900.0 pg/mL   POC Glucose Once    Collection Time: 07/11/22 11:45 AM    Specimen: Blood   Result Value Ref Range    Glucose 191 (H) 70 - 130 mg/dL   POC Glucose Once    Collection Time: 07/11/22  4:18 PM    Specimen: Blood   Result Value Ref Range    Glucose 134 (H) 70 - 130 mg/dL   POC Glucose Once    Collection Time: 07/11/22   8:26 PM    Specimen: Blood   Result Value Ref Range    Glucose 139 (H) 70 - 130 mg/dL       Radiology  No Radiology Exams Resulted Within Past 24 Hours    Medical Decision Making:  ED Course as of 07/11/22 2305   Sun Jul 10, 2022   0706 Patient is a 61-year-old man with significant cardiac history who presents today with chest pressure that initially began on Friday, worsened this morning around 5 AM.  Patient is diaphoretic, he is very anxious.  His blood pressure started running 123/75, he received 1 sublingual nitro which brought his pain down to 6 but his pressure also dropped to 88/55.  He is getting a liter of normal saline.  he will get an aspirin.  We will send labs monitor troponin. Dr Cazares at bedside. [MS]   0715 Discussed with Dr. West, cardiac interventionalists, she is evaluated the EKG's -states she plans to further evaluate in the computer system. [TJ]   0723 EKG          EKG time: 0628  Rhythm/Rate: Sinus rhythm rate 86  P waves and CT: Normal  QRS, axis: Narrow  ST and T waves: ST depressions in 1 and aVL also some elevations in aVR; no other ST elevations appreciated    Interpreted Contemporaneously by me, independently viewed  No significant changed compared to prior EKG   [TJ]   0724 EKG          EKG time: 0654  Rhythm/Rate: Sinus rhythm rate 84, PVC noted  P waves and CT: Normal  QRS, axis: Narrow regular left axis  ST and T waves: More pronounced ST depressions in aVL also small ST elevations in 3 and aVF    Interpreted Contemporaneously by me, independently viewed [TJ]   0726 Dr. West has reviewed information on the patient.  Data convincing ST elevation MI.  Recommends pain control.  States elevation is likely baseline artifact. [TJ]   0730 Blood pressure 107/61, pain down to a 6 from a 8 out of 10.  Patient still diaphoretic. [MS]   0740 Pressure 120/66, heart rate 77, patient less anxious and more comfortable at this time.  We will continue to monitor. [MS]   0755 Troponin T: <0.010  [MS]   0755 BUN(!): 54 [MS]   0755 Creatinine(!): 0.69 [MS]   0856 Current blood pressure is 120/66, heart rate 77 patient states his pain is down to a 3.  He is resting comfortably in bed, he is not diaphoretic at this time. [MS]   0945 Consult Note    Discussed care with Dr West  Reviewed patient's history, exam, results and need for admission secondary to angina  Dr. West accepts the patient to be admitted to telemetry observation bed.     [MS]   1000 Patient second troponin remains normal. [MS]      ED Course User Index  [MS] Allison Dejesus, APRN  [TJ] Erick Cazares MD           PPE: Both the patient and I wore a surgical mask throughout the entire patient encounter. I wore protective goggles.     Diagnosis  Final diagnoses:   Angina at rest (HCC)   Shortness of breath   Elevated BUN        Erick Cazares MD  07/11/22 6582

## 2022-07-10 NOTE — ED PROVIDER NOTES
EMERGENCY DEPARTMENT ENCOUNTER    Room Number:  02/02  Date of encounter:  7/10/2022  PCP: System, Provider Not In  Historian: Patient  Cardiologist: Dr Miguel      PPE    Patient was placed in face mask in first look. Patient was wearing facemask when I entered the room and throughout our encounter. I wore full protective equipment throughout this patient encounter including a face mask, and gloves. Hand hygiene was performed before donning protective equipment and after removal when leaving the room.        HPI:  Chief Complaint: Chest pain shortness of breath  A complete HPI/ROS/PMH/PSH/SH/FH are unobtainable due to: Nothing    Context: Edmond Chase is a 61 y.o. male with past medical history of AAA, coronary artery disease, MI, diabetes, anticoagulated on Plavix who arrives to the ED via private vehicle from home.  Patient states that he started having chest pain Friday that has been pressure-like and intermittent.  He did come to the ER yesterday however due to the wait he left.  He states this morning around 5 AM the pain returned and is worse than it was initially.  He states he has been sweaty, nauseated, short of breath.  He states that the pain does not radiate anywhere.  He denies recent fever, chills, cough.        PAST MEDICAL HISTORY  Active Ambulatory Problems     Diagnosis Date Noted   • Essential hypertension 02/29/2016   • Hyperglycemia 02/29/2016   • Hyperlipidemia 02/29/2016   • Class 3 severe obesity due to excess calories with serious comorbidity and body mass index (BMI) of 40.0 to 44.9 in adult (HCC) 02/29/2016   • Nocturia 02/29/2016   • Nonrheumatic aortic valve insufficiency 03/29/2016   • Myocardial ischemia 03/29/2016   • Coronary artery disease involving native coronary artery of native heart 04/19/2016   • Ascending aortic aneurysm (HCC) 04/19/2016   • CAD in native artery 05/02/2016   • S/P CABG (coronary artery bypass graft) 05/18/2016   • S/P AVR (aortic valve replacement)  05/18/2016   • Status post ascending aortic aneurysm repair 05/18/2016   • Fatigue 07/28/2017   • Abnormal nuclear stress test 10/23/2017   • Coronary artery disease 10/23/2017   • Coronary artery disease of bypass graft of native heart with stable angina pectoris (HCC) 07/01/2020   • Acute chest pain 06/17/2021   • TJ on auto CPAP 10/31/2016   • Hypersomnia due to medical condition 08/21/2021     Resolved Ambulatory Problems     Diagnosis Date Noted   • No Resolved Ambulatory Problems     Past Medical History:   Diagnosis Date   • Aortic valve insufficiency    • CAD (coronary artery disease)    • Chest pain    • Diabetes mellitus ([facility])    • Dizzy    • Dyspnea    • Hypersomnia with sleep apnea    • Lightheadedness    • Malaise and fatigue    • Morbid obesity ([facility])    • Pneumonia    • Scrotal bleeding    • Shortness of breath          PAST SURGICAL HISTORY  Past Surgical History:   Procedure Laterality Date   • AORTIC VALVE REPAIR/REPLACEMENT  05/2016   • ASCENDING ARCH/HEMIARCH REPLACEMENT N/A 5/2/2016    Procedure: BHAVESH STERNOTOMY CORONARY ARTERY BYPASS GRAFT TIMES 3 USING LEFT INTERNAL MAMMARY ARTERY AND RIGHT GREATER SAPHENOUS VEIN GRAFT PER ENDOSCOPIC VEIN HARVESTING, AORTIC ANEURYSM REPAIR WITH ROOT REPAIR AND AORTIC VALVE REPLACEMENT;  Surgeon: Rosalio Cline MD;  Location: [facility] OR;  Service:    • CARDIAC CATHETERIZATION N/A 4/1/2016    Procedure: Left Heart Cath;  Surgeon: Erick Tam MD;  Location: [facility] INVASIVE LOCATION;  Service:    • CARDIAC CATHETERIZATION N/A 4/1/2016    Procedure: Left ventriculography;  Surgeon: Erick Tam MD;  Location: [facility] INVASIVE LOCATION;  Service:    • CARDIAC CATHETERIZATION N/A 4/1/2016    Procedure: Right Heart Cath;  Surgeon: Erick Tam MD;  Location: [facility] INVASIVE LOCATION;  Service:    • CARDIAC CATHETERIZATION N/A 10/30/2017    Procedure: Coronary angiography;  Surgeon: Sorin Vasquez MD;  Location: [facility] INVASIVE LOCATION;   Service:    • CARDIAC CATHETERIZATION  10/30/2017    Procedure: Saphenous Vein Graft;  Surgeon: Sorin Vasquez MD;  Location: [facility] CATH INVASIVE LOCATION;  Service:    • CARDIAC CATHETERIZATION N/A 10/30/2017    Procedure: Native mammary injection;  Surgeon: Sorin Vasquez MD;  Location: [facility] CATH INVASIVE LOCATION;  Service:    • CARDIAC CATHETERIZATION N/A 7/7/2020    Procedure: Coronary angiography;  Surgeon: Gregor Kim MD;  Location: [facility] CATH INVASIVE LOCATION;  Service: Cardiovascular;  Laterality: N/A;   • CARDIAC CATHETERIZATION N/A 7/7/2020    Procedure: Left heart cath;  Surgeon: Gregor Kim MD;  Location: [facility] CATH INVASIVE LOCATION;  Service: Cardiovascular;  Laterality: N/A;   • CARDIAC CATHETERIZATION N/A 7/7/2020    Procedure: Left ventriculography;  Surgeon: Gregor Kim MD;  Location: [facility] CATH INVASIVE LOCATION;  Service: Cardiovascular;  Laterality: N/A;   • CARDIAC CATHETERIZATION N/A 7/7/2020    Procedure: Stent ESMER bypass graft;  Surgeon: Gregor Kim MD;  Location: [facility] CATH INVASIVE LOCATION;  Service: Cardiovascular;  Laterality: N/A;   • CARDIAC CATHETERIZATION N/A 6/17/2021    Procedure: SAPHENOUS VEIN GRAFT;  Surgeon: Marques Ndiaye MD;  Location: [facility] CATH INVASIVE LOCATION;  Service: Cardiovascular;  Laterality: N/A;   • CARDIAC CATHETERIZATION N/A 6/17/2021    Procedure: Left Heart Cath;  Surgeon: Marques Ndiaye MD;  Location: [facility] CATH INVASIVE LOCATION;  Service: Cardiovascular;  Laterality: N/A;   • CARDIAC CATHETERIZATION N/A 6/17/2021    Procedure: Coronary angiography;  Surgeon: Marques Ndiaye MD;  Location: [facility] CATH INVASIVE LOCATION;  Service: Cardiovascular;  Laterality: N/A;   • CORONARY ARTERY BYPASS GRAFT  05/2016    LIMA TO LAD, SVG TO PDA, SVG TO OM2   • INNER EAR SURGERY     • TONSILLECTOMY           FAMILY HISTORY  Family History   Problem Relation Age of Onset   • Hypertension Mother    • Diabetes Mother   "  • Heart disease Mother    • Hypertension Father    • Heart disease Sister    • Other Other         The patient states that his sister has a problem that goes by the name of \"long QT\".  He says this is a familial trait but he also says that he is \"not interested in pursuing it for himself\".   • Coronary artery disease Other    • Stroke Maternal Grandmother    • Heart disease Maternal Grandmother          SOCIAL HISTORY  Social History     Socioeconomic History   • Marital status: Single   Tobacco Use   • Smoking status: Never Smoker   • Smokeless tobacco: Never Used   Substance and Sexual Activity   • Alcohol use: Yes     Comment: 1 drink/monthly   • Drug use: No   • Sexual activity: Defer         ALLERGIES  Bystolic [nebivolol hcl] and Metoprolol        REVIEW OF SYSTEMS  Review of Systems     All systems reviewed and negative except for those discussed in HPI.        PHYSICAL EXAM    ED Triage Vitals [07/10/22 0618]   Temp Heart Rate Resp BP SpO2   98.4 °F (36.9 °C) 96 20 96/54 98 %       Physical Exam  GENERAL:  nontoxic appearing, mild to moderately distressed, diaphoretic  HENT: normocephalic, atraumatic  EYES: no scleral icterus, PERRL  CV: regular rhythm, regular rate, no murmur  RESPIRATORY: normal effort, CTAB  ABDOMEN: soft, nontender  MUSCULOSKELETAL: no deformity, no calf tenderness or lower extremity edema bilaterally  NEURO: alert, moves all extremities, follows commands, mental status normal/baseline  SKIN: warm, dry, no rash   Psych: Patient very anxious  Nursing notes and vital signs reviewed      LAB RESULTS  Recent Results (from the past 24 hour(s))   ECG 12 Lead    Collection Time: 07/09/22  4:50 PM   Result Value Ref Range    QT Interval 379 ms   ECG 12 Lead    Collection Time: 07/10/22  6:28 AM   Result Value Ref Range    QT Interval 356 ms   Comprehensive Metabolic Panel    Collection Time: 07/10/22  7:00 AM    Specimen: Blood   Result Value Ref Range    Glucose 269 (H) 65 - 99 mg/dL    BUN " 54 (H) 8 - 23 mg/dL    Creatinine 0.69 (L) 0.76 - 1.27 mg/dL    Sodium 141 136 - 145 mmol/L    Potassium 4.5 3.5 - 5.2 mmol/L    Chloride 107 98 - 107 mmol/L    CO2 23.9 22.0 - 29.0 mmol/L    Calcium 8.2 (L) 8.6 - 10.5 mg/dL    Total Protein 5.0 (L) 6.0 - 8.5 g/dL    Albumin 3.40 (L) 3.50 - 5.20 g/dL    ALT (SGPT) 15 1 - 41 U/L    AST (SGOT) 9 1 - 40 U/L    Alkaline Phosphatase 60 39 - 117 U/L    Total Bilirubin 0.5 0.0 - 1.2 mg/dL    Globulin 1.6 gm/dL    A/G Ratio 2.1 g/dL    BUN/Creatinine Ratio 78.3 (H) 7.0 - 25.0    Anion Gap 10.1 5.0 - 15.0 mmol/L    eGFR 104.0 >60.0 mL/min/1.73   Protime-INR    Collection Time: 07/10/22  7:00 AM    Specimen: Blood   Result Value Ref Range    Protime 13.2 11.7 - 14.2 Seconds    INR 1.01 0.90 - 1.10   Troponin    Collection Time: 07/10/22  7:00 AM    Specimen: Blood   Result Value Ref Range    Troponin T <0.010 0.000 - 0.030 ng/mL   Magnesium    Collection Time: 07/10/22  7:00 AM    Specimen: Blood   Result Value Ref Range    Magnesium 1.8 1.6 - 2.4 mg/dL   CBC Auto Differential    Collection Time: 07/10/22  7:00 AM    Specimen: Blood   Result Value Ref Range    WBC 10.24 3.40 - 10.80 10*3/mm3    RBC 3.45 (L) 4.14 - 5.80 10*6/mm3    Hemoglobin 10.8 (L) 13.0 - 17.7 g/dL    Hematocrit 32.6 (L) 37.5 - 51.0 %    MCV 94.5 79.0 - 97.0 fL    MCH 31.3 26.6 - 33.0 pg    MCHC 33.1 31.5 - 35.7 g/dL    RDW 13.7 12.3 - 15.4 %    RDW-SD 46.3 37.0 - 54.0 fl    MPV 10.0 6.0 - 12.0 fL    Platelets 208 140 - 450 10*3/mm3    Neutrophil % 72.4 42.7 - 76.0 %    Lymphocyte % 17.1 (L) 19.6 - 45.3 %    Monocyte % 7.5 5.0 - 12.0 %    Eosinophil % 2.1 0.3 - 6.2 %    Basophil % 0.4 0.0 - 1.5 %    Immature Grans % 0.5 0.0 - 0.5 %    Neutrophils, Absolute 7.42 (H) 1.70 - 7.00 10*3/mm3    Lymphocytes, Absolute 1.75 0.70 - 3.10 10*3/mm3    Monocytes, Absolute 0.77 0.10 - 0.90 10*3/mm3    Eosinophils, Absolute 0.21 0.00 - 0.40 10*3/mm3    Basophils, Absolute 0.04 0.00 - 0.20 10*3/mm3    Immature Grans,  Absolute 0.05 0.00 - 0.05 10*3/mm3    nRBC 0.0 0.0 - 0.2 /100 WBC   COVID-19,[facility] IN-HOUSE CEPHEID/NOEMÍ NP SWAB IN TRANSPORT MEDIA 8-12 HR TAT - Swab, Nasopharynx    Collection Time: 07/10/22  7:11 AM    Specimen: Nasopharynx; Swab   Result Value Ref Range    COVID19 Not Detected Not Detected - Ref. Range   POC Glucose Once    Collection Time: 07/10/22  9:33 AM    Specimen: Blood   Result Value Ref Range    Glucose 262 (H) 70 - 130 mg/dL   Troponin    Collection Time: 07/10/22  9:52 AM    Specimen: Blood   Result Value Ref Range    Troponin T <0.010 0.000 - 0.030 ng/mL       Ordered the above labs and independently reviewed the results.      RADIOLOGY  XR Chest 1 View    Result Date: 7/10/2022  CHEST SINGLE VIEW  HISTORY: Intermittent chest pain.  COMPARISON: Upright chest 2021, CT angiogram chest 2019.  FINDINGS: The heart size is enlarged and median sternotomy wires are present. Lungs appear clear and there is no evidence for pulmonary edema, pleural effusion or infiltrate.      No evidence for active disease in the chest.  This report was finalized on 7/10/2022 8:12 AM by Dr. Edmond Garcia M.D.        I ordered the above noted radiological studies and viewed the images on the PACS system.       EKG      Independently viewed by me and interpreted by Dr Cazares         MEDICAL RECORD REVIEW  Medical records reviewed in epic patient had a cardiac cath 2021:    FINDINGS:     LEFT VENTRICULOGRAPHY: We did not cross the bioprosthetic aortic valve     CORONARY ANGIOGRAPHY:  Left main: 20% luminal irregularities in the proximal LAD  Left anterior descendin% proximal then 100% occlusion of the mid LAD just beyond the first septal   Ramus intermedius: Very large serpiginous ramus with 2 branches free of obstructive disease  Circumflex: The native circumflex has a long area that is about 70% stenosed in the midportion it really does not go to anywhere its mainly in AV groove  branch  RCA: Is a dominant vessel.  This is a highly tortuous vessel with 50% proximal disease 70% mid disease some 50 to 60% disease maybe up to 90% in the distal RCA but honestly the tortuosity I think precludes intervention on this vessel     BROCK to LAD: widely patent and normal.  The stent in mid LAD is widely patent and looks good the remainder of the vessel looks good     SUMMARY: No significant change from his prior cath would recommend ongoing medical therapy      PROCEDURES    Critical Care  Performed by: Allison Dejesus APRN  Authorized by: Erick Cazares MD     Critical care provider statement:     Critical care time (minutes):  40    Critical care was necessary to treat or prevent imminent or life-threatening deterioration of the following conditions: Angina.    Critical care was time spent personally by me on the following activities:  Ordering and performing treatments and interventions, development of treatment plan with patient or surrogate, ordering and review of laboratory studies, discussions with consultants, ordering and review of radiographic studies, evaluation of patient's response to treatment, re-evaluation of patient's condition, examination of patient, review of old charts and obtaining history from patient or surrogate            DIFFERENTIAL DIAGNOSIS  Differential diagnosis for chest pain include but are not limited to the following:  Anxiety, muscle strain, costochondritis, pleurisy, herpes zoster, MI, ACS, Aortic dissection, PE, pneumonia, pneumothorax, GERD        PROGRESS, DATA ANALYSIS, CONSULTS, AND MEDICAL DECISION MAKING        ED Course as of 07/10/22 1451   Sun Jul 10, 2022   0706 Patient is a 61-year-old man with significant cardiac history who presents today with chest pressure that initially began on Friday, worsened this morning around 5 AM.  Patient is diaphoretic, he is very anxious.  His blood pressure started running 123/75, he received 1 sublingual  nitro which brought his pain down to 6 but his pressure also dropped to 88/55.  He is getting a liter of normal saline.  he will get an aspirin.  We will send labs monitor troponin. Dr Cazares at bedside. [MS]   0715 Discussed with Dr. West, cardiac interventionalists, she is evaluated the EKG's -states she plans to further evaluate in the computer system. [TJ]   0723 EKG          EKG time: 0628  Rhythm/Rate: Sinus rhythm rate 86  P waves and TN: Normal  QRS, axis: Narrow  ST and T waves: ST depressions in 1 and aVL also some elevations in aVR; no other ST elevations appreciated    Interpreted Contemporaneously by me, independently viewed  No significant changed compared to prior EKG   [TJ]   0724 EKG          EKG time: 0654  Rhythm/Rate: Sinus rhythm rate 84, PVC noted  P waves and TN: Normal  QRS, axis: Narrow regular left axis  ST and T waves: More pronounced ST depressions in aVL also small ST elevations in 3 and aVF    Interpreted Contemporaneously by me, independently viewed [TJ]   0726 Dr. West has reviewed information on the patient.  Data convincing ST elevation MI.  Recommends pain control.  States elevation is likely baseline artifact. [TJ]   0730 Blood pressure 107/61, pain down to a 6 from a 8 out of 10.  Patient still diaphoretic. [MS]   0740 Pressure 120/66, heart rate 77, patient less anxious and more comfortable at this time.  We will continue to monitor. [MS]   0755 Troponin T: <0.010 [MS]   0755 BUN(!): 54 [MS]   0755 Creatinine(!): 0.69 [MS]   0856 Current blood pressure is 120/66, heart rate 77 patient states his pain is down to a 3.  He is resting comfortably in bed, he is not diaphoretic at this time. [MS]   0945 Consult Note    Discussed care with Dr West  Reviewed patient's history, exam, results and need for admission secondary to angina  Dr. West accepts the patient to be admitted to telemetry observation bed.     [MS]   1000 Patient second troponin remains normal. [MS]      ED Course  User Index  [MS] Allison Dejesus APRN  [TJ] Erick Cazares MD       ADMISSION    Discussed treatment plan and reason for admission with pt/family and admitting physician.  Pt/family voiced understanding of the plan for admission for further testing/treatment as needed.      DIAGNOSIS  Final diagnoses:   Angina at rest (HCC)   Shortness of breath   Elevated BUN         MEDICATIONS GIVEN IN ED    Medications   sodium chloride 0.9 % flush 10 mL (has no administration in time range)   nitroglycerin (NITROSTAT) SL tablet 0.4 mg (0.4 mg Sublingual Given 7/10/22 0648)   sodium chloride 0.9 % bolus 1,000 mL (0 mL Intravenous Stopped 7/10/22 1032)   aspirin tablet 325 mg (325 mg Oral Given 7/10/22 0705)   morphine injection 2 mg (2 mg Intravenous Given 7/10/22 0734)   ondansetron (ZOFRAN) injection 4 mg (4 mg Intravenous Given 7/10/22 0734)           COURSE & MEDICAL DECISION MAKING  Any/All labs and Any/All Imaging studies that were ordered were reviewed and are noted above.  Results were reviewed/discussed with the patient and they were also made aware of online access.    Pt also made aware that some labs, such as cultures, will not be resulted during ER visit and followup with PMD is necessary.        Allison Dejesus APRN  07/10/22 2747

## 2022-07-11 ENCOUNTER — READMISSION MANAGEMENT (OUTPATIENT)
Dept: CALL CENTER | Facility: HOSPITAL | Age: 62
End: 2022-07-11

## 2022-07-11 ENCOUNTER — TELEPHONE (OUTPATIENT)
Dept: INTERNAL MEDICINE | Age: 62
End: 2022-07-11

## 2022-07-11 PROBLEM — I25.10 CORONARY ARTERY DISEASE: Status: ACTIVE | Noted: 2017-10-23

## 2022-07-11 PROBLEM — E11.9 TYPE 2 DIABETES MELLITUS, WITHOUT LONG-TERM CURRENT USE OF INSULIN (HCC): Status: ACTIVE | Noted: 2022-07-11

## 2022-07-11 LAB
GLUCOSE BLDC GLUCOMTR-MCNC: 134 MG/DL (ref 70–130)
GLUCOSE BLDC GLUCOMTR-MCNC: 139 MG/DL (ref 70–130)
GLUCOSE BLDC GLUCOMTR-MCNC: 160 MG/DL (ref 70–130)
GLUCOSE BLDC GLUCOMTR-MCNC: 191 MG/DL (ref 70–130)
HBA1C MFR BLD: 7 % (ref 4.8–5.6)
NT-PROBNP SERPL-MCNC: 118 PG/ML (ref 0–900)
TROPONIN T SERPL-MCNC: <0.01 NG/ML (ref 0–0.03)

## 2022-07-11 PROCEDURE — 63710000001 INSULIN LISPRO (HUMAN) PER 5 UNITS: Performed by: INTERNAL MEDICINE

## 2022-07-11 PROCEDURE — 84484 ASSAY OF TROPONIN QUANT: CPT | Performed by: INTERNAL MEDICINE

## 2022-07-11 PROCEDURE — G0378 HOSPITAL OBSERVATION PER HR: HCPCS

## 2022-07-11 PROCEDURE — 82962 GLUCOSE BLOOD TEST: CPT

## 2022-07-11 PROCEDURE — 83880 ASSAY OF NATRIURETIC PEPTIDE: CPT | Performed by: INTERNAL MEDICINE

## 2022-07-11 PROCEDURE — 99214 OFFICE O/P EST MOD 30 MIN: CPT | Performed by: INTERNAL MEDICINE

## 2022-07-11 RX ORDER — UREA 10 %
3 LOTION (ML) TOPICAL NIGHTLY
Status: DISCONTINUED | OUTPATIENT
Start: 2022-07-11 | End: 2022-07-15 | Stop reason: HOSPADM

## 2022-07-11 RX ORDER — HYDROXYZINE PAMOATE 25 MG/1
25 CAPSULE ORAL 3 TIMES DAILY PRN
Status: DISCONTINUED | OUTPATIENT
Start: 2022-07-11 | End: 2022-07-15 | Stop reason: HOSPADM

## 2022-07-11 RX ADMIN — CLOPIDOGREL 75 MG: 75 TABLET, FILM COATED ORAL at 08:27

## 2022-07-11 RX ADMIN — ISOSORBIDE MONONITRATE 60 MG: 60 TABLET ORAL at 08:27

## 2022-07-11 RX ADMIN — ASPIRIN 81 MG: 81 TABLET, COATED ORAL at 08:27

## 2022-07-11 RX ADMIN — ATORVASTATIN CALCIUM 40 MG: 20 TABLET, FILM COATED ORAL at 20:15

## 2022-07-11 RX ADMIN — ISOSORBIDE MONONITRATE 60 MG: 60 TABLET ORAL at 20:15

## 2022-07-11 RX ADMIN — HYDROXYZINE PAMOATE 25 MG: 25 CAPSULE ORAL at 13:29

## 2022-07-11 RX ADMIN — LISINOPRIL 10 MG: 10 TABLET ORAL at 08:27

## 2022-07-11 RX ADMIN — INSULIN LISPRO 2 UNITS: 100 INJECTION, SOLUTION INTRAVENOUS; SUBCUTANEOUS at 11:52

## 2022-07-11 RX ADMIN — EMPAGLIFLOZIN 25 MG: 25 TABLET, FILM COATED ORAL at 08:27

## 2022-07-11 RX ADMIN — INSULIN LISPRO 2 UNITS: 100 INJECTION, SOLUTION INTRAVENOUS; SUBCUTANEOUS at 08:27

## 2022-07-11 RX ADMIN — AMLODIPINE BESYLATE 10 MG: 10 TABLET ORAL at 08:27

## 2022-07-11 RX ADMIN — Medication 3 MG: at 20:15

## 2022-07-11 RX ADMIN — METOPROLOL SUCCINATE 25 MG: 25 TABLET, EXTENDED RELEASE ORAL at 08:27

## 2022-07-11 NOTE — CASE MANAGEMENT/SOCIAL WORK
Discharge Planning Assessment  [facility]     Patient Name: Edmond Chase  MRN: [id number]  Today's Date: 7/11/2022    Admit Date: 7/10/2022     Discharge Needs Assessment     Row Name 07/11/22 1316       Living Environment    People in Home alone    Current Living Arrangements condominium    Primary Care Provided by self    Provides Primary Care For no one    Family Caregiver if Needed sibling(s)       Resource/Environmental Concerns    Resource/Environmental Concerns none       Transition Planning    Patient/Family Anticipates Transition to home    Patient/Family Anticipated Services at Transition        Discharge Needs Assessment    Equipment Currently Used at Home bp cuff;glucometer    Equipment Needed After Discharge none               Discharge Plan     Row Name 07/11/22 1316       Plan    Plan Home    Patient/Family in Agreement with Plan yes    Plan Comments Met with pt at bedside. Introduced self, explained CCP role, facesheet verified. Pt states he lives alone and is independent with ADLs.  No history of DME.  Pt drives himself.  Has used [facility] in past.  Pt requests new PCP appt with [facility].  Appt made for Fri 7/15 at 1030 with Nargis Velasquez.  Pt notified and agreeable.  Plans to return home at discharge, no identified needs.  EL Espinosa RN              Continued Care and Services - Admitted Since 7/10/2022    Coordination has not been started for this encounter.          Demographic Summary     Row Name 07/11/22 2617       General Information    Admission Type observation    Arrived From home    Referral Source admission list    Reason for Consult discharge planning    Preferred Language English       Contact Information    Permission Granted to Share Info With family/designee  Kyra Ahuja (sister) [phone]               Functional Status    No documentation.                Psychosocial    No documentation.                Abuse/Neglect    No documentation.                Legal    No  documentation.                Substance Abuse    No documentation.                Patient Forms    No documentation.                   Jessica Espinosa RN

## 2022-07-11 NOTE — TELEPHONE ENCOUNTER
Caller: MARIO ALBERTO GARCIA    Best call back number: [phone]    New or established patient?  [x] New  [] Established    Date of discharge: 07/12/22    Facility discharged from: [facility]     Diagnosis/Symptoms: SOB     Length of stay (If applicable): N/A

## 2022-07-11 NOTE — CONSULTS
"Nutrition Services        NUTRITION EDUCATION        Reason for consult: Weight loss information per patient request        Education topic: Calorie counting, Diabetes        Resources given:  Printed materials, Sample menus        Advised regarding: Appropriate portions, Beverage choices, Eating pattern, Food choices, Food preparation, Snacks        Learner:  Patient        Readiness to learn: Accepting            Current height: Height: 185.4 cm (73\")    Current weight: Weight: (!) 144 kg (318 lb 1.6 oz) (07/11/22 0500)    BMI: Body mass index is 41.97 kg/m².        Labs:  Reviewed, listed below        Pertinent medications:  Reviewed         Monitor/Evaluation: RD to follow per protocol, reinforce PRN        Discharge Planning           RD contact info provided: Yes. Contact RD with any questions/concerns.      Outpatient referral:      Comments:       Results from last 7 days   Lab Units 07/10/22  0700   SODIUM mmol/L 141   POTASSIUM mmol/L 4.5   CHLORIDE mmol/L 107   CO2 mmol/L 23.9   BUN mg/dL 54*   CREATININE mg/dL 0.69*   CALCIUM mg/dL 8.2*   BILIRUBIN mg/dL 0.5   ALK PHOS U/L 60   ALT (SGPT) U/L 15   AST (SGOT) U/L 9   GLUCOSE mg/dL 269*     Results from last 7 days   Lab Units 07/10/22  0700   MAGNESIUM mg/dL 1.8   HEMOGLOBIN g/dL 10.8*   HEMATOCRIT % 32.6*     COVID19   Date Value Ref Range Status   07/10/2022 Not Detected Not Detected - Ref. Range Final     Lab Results   Component Value Date    HGBA1C 7.00 (H) 07/10/2022           Electronically signed by:  Lisa Herrera RD  07/11/22 14:11 EDT  "

## 2022-07-11 NOTE — PLAN OF CARE
Goal Outcome Evaluation:  Plan of Care Reviewed With: patient        Progress: no change  Outcome Evaluation: Received pt in bed. Extremely anxious. OOB to chair ad annita. Order for Ativan 1mg IV x1 for anxiety. [facility] consultation placed. CP 2-3/10. Up ad annita. Voiding in BR. Will CTM.

## 2022-07-11 NOTE — CONSULTS
Patient Name:  Edmond Chase  YOB: 1960  Patient Care Team:  System, Provider Not In as PCP - General    Date of Admission:  7/10/2022  Date of Consult:  7/11/2022    Inpatient Hospitalist Consult  Consult performed by: Chantel Hou APRN  Consult ordered by: Annalee Patel APRN  Reason for consult: anxiety        Subjective   History of Present Illness  Mr. Chase is a 61 y.o. male that has been admitted to [facility] due to chest pain.  He has been admitted by Jesika West MD and and we were asked to see and assist with his medical problems, specifically relating to his anxiety. He became anxious in ED due to length of wait time and chest pain. Denies daily/frequent issues with anxiety at baseline. He has not slept well in a couple of days. He uses Cpap with sleep at home and feels this may be causing some of his issues w/sleeping. No other medical complaints on exam. He requests to see a dietician to discuss health weight loss recommendations.      Past Medical History:   Diagnosis Date   • Abnormal nuclear stress test    • Aortic valve insufficiency     nonrheumtic    • Ascending aortic aneurysm (HCC)    • CAD (coronary artery disease)    • Chest pain    • Diabetes mellitus (HCC)    • Dizzy     CAREFUL WHEN GETTING UP   • Dyspnea    • Essential hypertension    • Fatigue    • Hyperglycemia    • Hyperlipidemia    • Hypersomnia with sleep apnea    • Lightheadedness    • Malaise and fatigue    • Morbid obesity (HCC)    • Myocardial ischemia    • Nocturia    • TJ on auto CPAP 10/31/2016    Overnight polysomnogram.  Weight 276 pounds.  Severe TJ with AHI 79 events per hour.  Auto CPAP recommended.   • Pneumonia     FEB 2016   • Scrotal bleeding    • Shortness of breath      Past Surgical History:   Procedure Laterality Date   • AORTIC VALVE REPAIR/REPLACEMENT  05/2016   • ASCENDING ARCH/HEMIARCH REPLACEMENT N/A 5/2/2016    Procedure: BHAVESH STERNOTOMY  CORONARY ARTERY BYPASS GRAFT TIMES 3 USING LEFT INTERNAL MAMMARY ARTERY AND RIGHT GREATER SAPHENOUS VEIN GRAFT PER ENDOSCOPIC VEIN HARVESTING, AORTIC ANEURYSM REPAIR WITH ROOT REPAIR AND AORTIC VALVE REPLACEMENT;  Surgeon: Rosalio Cline MD;  Location: [facility];  Service:    • CARDIAC CATHETERIZATION N/A 4/1/2016    Procedure: Left Heart Cath;  Surgeon: Erick Tam MD;  Location: [facility] CATH INVASIVE LOCATION;  Service:    • CARDIAC CATHETERIZATION N/A 4/1/2016    Procedure: Left ventriculography;  Surgeon: Erick Tam MD;  Location: [facility] CATH INVASIVE LOCATION;  Service:    • CARDIAC CATHETERIZATION N/A 4/1/2016    Procedure: Right Heart Cath;  Surgeon: Erick Tam MD;  Location: [facility] INVASIVE LOCATION;  Service:    • CARDIAC CATHETERIZATION N/A 10/30/2017    Procedure: Coronary angiography;  Surgeon: Sorin Vasquez MD;  Location: [facility] INVASIVE LOCATION;  Service:    • CARDIAC CATHETERIZATION  10/30/2017    Procedure: Saphenous Vein Graft;  Surgeon: Sorin Vasquez MD;  Location: [facility] INVASIVE LOCATION;  Service:    • CARDIAC CATHETERIZATION N/A 10/30/2017    Procedure: Native mammary injection;  Surgeon: Sorin Vasquez MD;  Location: [facility] INVASIVE LOCATION;  Service:    • CARDIAC CATHETERIZATION N/A 7/7/2020    Procedure: Coronary angiography;  Surgeon: Gregor Kim MD;  Location: [facility] INVASIVE LOCATION;  Service: Cardiovascular;  Laterality: N/A;   • CARDIAC CATHETERIZATION N/A 7/7/2020    Procedure: Left heart cath;  Surgeon: Gregor Kim MD;  Location: [facility] INVASIVE LOCATION;  Service: Cardiovascular;  Laterality: N/A;   • CARDIAC CATHETERIZATION N/A 7/7/2020    Procedure: Left ventriculography;  Surgeon: Gregor Kim MD;  Location: [facility] CATH INVASIVE LOCATION;  Service: Cardiovascular;  Laterality: N/A;   • CARDIAC CATHETERIZATION N/A 7/7/2020    Procedure: Stent ESMER bypass graft;  Surgeon: Gregor Kim MD;  Location:   "[facility] CATH INVASIVE LOCATION;  Service: Cardiovascular;  Laterality: N/A;   • CARDIAC CATHETERIZATION N/A 6/17/2021    Procedure: SAPHENOUS VEIN GRAFT;  Surgeon: Marques Ndiaye MD;  Location:  [facility] CATH INVASIVE LOCATION;  Service: Cardiovascular;  Laterality: N/A;   • CARDIAC CATHETERIZATION N/A 6/17/2021    Procedure: Left Heart Cath;  Surgeon: Marques Ndiaye MD;  Location:  [facility] CATH INVASIVE LOCATION;  Service: Cardiovascular;  Laterality: N/A;   • CARDIAC CATHETERIZATION N/A 6/17/2021    Procedure: Coronary angiography;  Surgeon: Marques Ndiaye MD;  Location:  [facility] CATH INVASIVE LOCATION;  Service: Cardiovascular;  Laterality: N/A;   • CORONARY ARTERY BYPASS GRAFT  05/2016    LIMA TO LAD, SVG TO PDA, SVG TO OM2   • INNER EAR SURGERY     • TONSILLECTOMY       Family History   Problem Relation Age of Onset   • Hypertension Mother    • Diabetes Mother    • Heart disease Mother    • Hypertension Father    • Heart disease Sister    • Other Other         The patient states that his sister has a problem that goes by the name of \"long QT\".  He says this is a familial trait but he also says that he is \"not interested in pursuing it for himself\".   • Coronary artery disease Other    • Stroke Maternal Grandmother    • Heart disease Maternal Grandmother      Social History     Tobacco Use   • Smoking status: Never Smoker   • Smokeless tobacco: Never Used   Substance Use Topics   • Alcohol use: Yes     Comment: 1 drink/monthly   • Drug use: No     Medications Prior to Admission   Medication Sig Dispense Refill Last Dose   • albuterol sulfate  (90 Base) MCG/ACT inhaler Inhale 2 puffs Every 6 (Six) Hours As Needed for Wheezing. 1 inhaler 0 7/9/2022 at Unknown time   • amLODIPine (NORVASC) 10 MG tablet Take 10 mg by mouth Daily.   7/9/2022 at Unknown time   • aspirin 81 MG tablet Take 81 mg by mouth Daily.   7/10/2022 at Unknown time   • atorvastatin (LIPITOR) 40 MG tablet Take 1 tablet by mouth Daily. 90 tablet " 2 7/9/2022 at Unknown time   • clopidogrel (PLAVIX) 75 MG tablet Take 1 tablet by mouth Daily. 90 tablet 3 7/9/2022 at Unknown time   • glucosamine sulfate 500 MG capsule capsule Take  by mouth Daily.   7/9/2022 at Unknown time   • isosorbide mononitrate (IMDUR) 60 MG 24 hr tablet TAKE 1 AND 1/2 TABLETS BY MOUTH DAILY 135 tablet 2 7/9/2022 at Unknown time   • lisinopril (PRINIVIL,ZESTRIL) 10 MG tablet    7/9/2022 at Unknown time   • metoprolol succinate XL (TOPROL-XL) 25 MG 24 hr tablet TAKE ONE TABLET BY MOUTH DAILY 90 tablet 3 7/9/2022 at Unknown time   • vitamin D (ERGOCALCIFEROL) 1.25 MG (16694 UT) capsule capsule    7/9/2022 at Unknown time   • amoxicillin (AMOXIL) 500 MG capsule Take 4 capsules by mouth See Admin Instructions. 4 capsules 1 hour prior to procedure 4 capsule 0 More than a month at Unknown time   • Farxiga 10 MG tablet         Allergies:  Bystolic [nebivolol hcl] and Metoprolol    Review of Systems   Constitutional: Negative for fever.   HENT: Negative for congestion.    Respiratory: Negative for shortness of breath.    Cardiovascular: Negative for chest pain.   Gastrointestinal: Negative for nausea.   Genitourinary: Negative for difficulty urinating.   Musculoskeletal: Negative for arthralgias.   Skin: Negative for rash.   Neurological: Negative for headaches.   Psychiatric/Behavioral: Positive for sleep disturbance.       Objective      Vital Signs  Temp:  [97.6 °F (36.4 °C)-98.2 °F (36.8 °C)] 97.7 °F (36.5 °C)  Heart Rate:  [70-93] 70  Resp:  [16-20] 16  BP: (115-167)/(73-96) 125/82  Body mass index is 41.97 kg/m².    Physical Exam  Vitals and nursing note reviewed.   Constitutional:       General: He is not in acute distress.     Appearance: He is obese.   HENT:      Head: Normocephalic.      Mouth/Throat:      Mouth: Mucous membranes are moist.   Eyes:      Conjunctiva/sclera: Conjunctivae normal.   Cardiovascular:      Rate and Rhythm: Normal rate and regular rhythm.   Pulmonary:       Effort: Pulmonary effort is normal. No respiratory distress.      Breath sounds: Normal breath sounds.   Abdominal:      General: Bowel sounds are normal.      Palpations: Abdomen is soft.   Musculoskeletal:      Cervical back: Neck supple.      Right lower leg: No edema.      Left lower leg: No edema.   Skin:     General: Skin is warm and dry.   Neurological:      Mental Status: He is alert and oriented to person, place, and time.   Psychiatric:         Mood and Affect: Mood normal.         Behavior: Behavior normal.         Results Review:   I reviewed the patient's new clinical results.  I reviewed the patient's new imaging results and agree with the interpretation.  I reviewed the patient's other test results and agree with the interpretation  I personally viewed and interpreted the patient's EKG/Telemetry data         Active Hospital Problems    Diagnosis  POA   • **Angina at rest (HCC) [I20.8]  Yes   • Type 2 diabetes mellitus, without long-term current use of insulin (HCC) [E11.9]  Yes   • Coronary artery disease [I25.10]  Yes     Added automatically from request for surgery [id number]     • TJ on auto CPAP [G47.33, Z99.89]  Not Applicable     Overnight polysomnogram.  Weight 276 pounds.  Severe TJ with AHI 79 events per hour.  Auto CPAP recommended.     • S/P CABG (coronary artery bypass graft) [Z95.1]  Not Applicable   • S/P AVR (aortic valve replacement) [Z95.2]  Not Applicable   • Status post ascending aortic aneurysm repair [Z98.890, Z86.79]  Not Applicable   • Essential hypertension [I10]  Yes         · Denies anxiety affecting daily activities but does exhibit mild anxiety symptoms on exam. Add prn hydroxyzine that may also help with sleep. Schedule melatonin as well  · Monitor BG trends. A1C 7.00%. Continue oral med and correctional scale insulin. Consult dietician to discuss weight loss  · Echo and repeat labs pending. Planning stress test tomorrow  · Use home Cpap for sleep; pt states someone can  bring. Noted Pulmonology consult  · Further recommendations based on hospital course      Thank you very much for asking [facility] to be involved in this patient's care. We will follow along with you.      VIKTORIA Meza  [facility]  07/11/22  11:17 EDT

## 2022-07-11 NOTE — PROGRESS NOTES
"[facility]    Patient Name: Edmond Chase  :1960  61 y.o.  LOS: 0  Encounter Provider: Yandel Patel Jr, MD      Patient Care Team:  System, Provider Not In as PCP - General    Chief Complaint: Follow-up unstable angina, coronary artery disease, aortic stenosis status post AVR    Interval History: No acute issues overnight.       Objective   Vital Signs  Temp:  [97.6 °F (36.4 °C)-98.2 °F (36.8 °C)] 97.7 °F (36.5 °C)  Heart Rate:  [70-93] 70  Resp:  [16-20] 16  BP: (115-167)/(73-96) 125/82    Intake/Output Summary (Last 24 hours) at 2022 09  Last data filed at 7/10/2022 1032  Gross per 24 hour   Intake 1000 ml   Output --   Net 1000 ml     Flowsheet Rows    Flowsheet Row First Filed Value   Admission Height 185.4 cm (73\") Documented at 07/10/2022 0618   Admission Weight 148 kg (327 lb) Documented at 07/10/2022 0618            Constitutional:       Appearance: Healthy appearance. Not in distress.   Neck:      Vascular: No JVR. JVD normal.   Pulmonary:      Effort: Pulmonary effort is normal.      Breath sounds: Normal breath sounds. No wheezing. No rhonchi. No rales.   Chest:      Chest wall: Not tender to palpatation.   Cardiovascular:      PMI at left midclavicular line. Normal rate. Regular rhythm. Normal S1. Normal S2.      Murmurs: There is a grade 3/6 harsh midsystolic murmur at the URSB, radiating to the neck.      No gallop. No click. No rub.   Pulses:     Intact distal pulses.   Edema:     Peripheral edema absent.   Abdominal:      General: Bowel sounds are normal.      Palpations: Abdomen is soft.      Tenderness: There is no abdominal tenderness.   Musculoskeletal: Normal range of motion.         General: No tenderness. Skin:     General: Skin is warm and dry.   Neurological:      General: No focal deficit present.      Mental Status: Alert and oriented to person, place and time.           Pertinent Test Results:  Results from last 7 days   Lab Units 07/10/22  0700 "   SODIUM mmol/L 141   POTASSIUM mmol/L 4.5   CHLORIDE mmol/L 107   CO2 mmol/L 23.9   BUN mg/dL 54*   CREATININE mg/dL 0.69*   GLUCOSE mg/dL 269*   CALCIUM mg/dL 8.2*   AST (SGOT) U/L 9   ALT (SGPT) U/L 15     Results from last 7 days   Lab Units 07/10/22  0952 07/10/22  0700   TROPONIN T ng/mL <0.010 <0.010     Results from last 7 days   Lab Units 07/10/22  0700   WBC 10*3/mm3 10.24   HEMOGLOBIN g/dL 10.8*   HEMATOCRIT % 32.6*   PLATELETS 10*3/mm3 208     Results from last 7 days   Lab Units 07/10/22  0700   INR  1.01     Results from last 7 days   Lab Units 07/10/22  0700   MAGNESIUM mg/dL 1.8           Invalid input(s): LDLCALC                Medication Review:   amLODIPine, 10 mg, Oral, Daily  aspirin, 81 mg, Oral, Daily  atorvastatin, 40 mg, Oral, Nightly  clopidogrel, 75 mg, Oral, Daily  empagliflozin, 25 mg, Oral, Daily  insulin lispro, 0-9 Units, Subcutaneous, TID AC  isosorbide mononitrate, 60 mg, Oral, Q12H  lisinopril, 10 mg, Oral, Q24H  metoprolol succinate XL, 25 mg, Oral, Daily              Assessment & Plan   1. Angina -concerning pattern of angina to the patient however troponin is negative.  On review of the notes from last year when he had cardiac catheterization this seems to be a similar presentation.  Given his known severe disease it would be best to screen with nuclear stress as the patient cannot walk on the treadmill.  He had caffeine this morning.  Significant murmur in the aortic position with known prosthetic valve in place.  Will check an echocardiogram today.  Increase nitrate dosing and monitor blood pressure.  2. CAD -continue dual antiplatelet therapy, beta-blocker and statin.  Change to anginal regimen as above.  3. Hypertension  4. Valvular heart disease  5. Diabetes -appreciate medicine consult    Yandel Patel Jr, MD  [facility]  07/11/22  09:19 EDT

## 2022-07-11 NOTE — OUTREACH NOTE
Prep Survey    Flowsheet Row Responses   [facility] patient discharged from? [place]   Is LACE score < 7 ? Yes   Emergency Room discharge w/ pulse ox? No   Eligibility [facility]   Date of Admission 07/10/22   Date of Discharge 07/12/22   Discharge diagnosis Unstable angina,   Coronary artery disease,  Hypertension   Does the patient have one of the following disease processes/diagnoses(primary or secondary)? Other   Does the patient have Home health ordered? No   Is there a DME ordered? No   Prep survey completed? Yes          LAKSHMI VILLALBA - Registered Nurse

## 2022-07-11 NOTE — PLAN OF CARE
Problem: Hypertension Comorbidity  Goal: Blood Pressure in Desired Range  Outcome: Ongoing, Progressing   Goal Outcome Evaluation:  Plan of Care Reviewed With: patient        Progress: no change  Outcome Evaluation: VSS. Prn anxiety meds. Up in chair. No c/o pain. Will monitor.

## 2022-07-12 ENCOUNTER — APPOINTMENT (OUTPATIENT)
Dept: NUCLEAR MEDICINE | Facility: HOSPITAL | Age: 62
End: 2022-07-12

## 2022-07-12 ENCOUNTER — TRANSITIONAL CARE MANAGEMENT TELEPHONE ENCOUNTER (OUTPATIENT)
Dept: CALL CENTER | Facility: HOSPITAL | Age: 62
End: 2022-07-12

## 2022-07-12 ENCOUNTER — APPOINTMENT (OUTPATIENT)
Dept: CARDIOLOGY | Facility: HOSPITAL | Age: 62
End: 2022-07-12

## 2022-07-12 LAB
AORTIC ARCH: 3.4 CM
AORTIC DIMENSIONLESS INDEX: 0.4 (DI)
ASCENDING AORTA: 3.2 CM
BH CV ECHO AV AORTIC VALVE AT ACCEL TIME CALCULATED: 90 MSEC
BH CV ECHO MEAS - AO MAX PG: 25.1 MMHG
BH CV ECHO MEAS - AO MEAN PG: 12.8 MMHG
BH CV ECHO MEAS - AO V2 MAX: 250.4 CM/SEC
BH CV ECHO MEAS - AO V2 VTI: 50.3 CM
BH CV ECHO MEAS - AT: 0.09 SEC
BH CV ECHO MEAS - AVA(I,D): 1.44 CM2
BH CV ECHO MEAS - CONTRAST EF 4CH: 51 CM2
BH CV ECHO MEAS - EDV(CUBED): 173 ML
BH CV ECHO MEAS - EDV(MOD-SP2): 164 ML
BH CV ECHO MEAS - EDV(MOD-SP4): 190 ML
BH CV ECHO MEAS - EF(MOD-BP): 51.1 %
BH CV ECHO MEAS - EF(MOD-SP2): 50.6 %
BH CV ECHO MEAS - EF(MOD-SP4): 51.1 %
BH CV ECHO MEAS - ESV(CUBED): 42.6 ML
BH CV ECHO MEAS - ESV(MOD-SP2): 81 ML
BH CV ECHO MEAS - ESV(MOD-SP4): 93 ML
BH CV ECHO MEAS - FS: 37.3 %
BH CV ECHO MEAS - IVS/LVPW: 1.01 CM
BH CV ECHO MEAS - IVSD: 1.43 CM
BH CV ECHO MEAS - LAT PEAK E' VEL: 9.7 CM/SEC
BH CV ECHO MEAS - LV DIASTOLIC VOL/BSA (35-75): 72.5 CM2
BH CV ECHO MEAS - LV MASS(C)D: 351.7 GRAMS
BH CV ECHO MEAS - LV MAX PG: 3.1 MMHG
BH CV ECHO MEAS - LV MEAN PG: 1.81 MMHG
BH CV ECHO MEAS - LV SYSTOLIC VOL/BSA (12-30): 35.5 CM2
BH CV ECHO MEAS - LV V1 MAX: 88 CM/SEC
BH CV ECHO MEAS - LV V1 VTI: 20 CM
BH CV ECHO MEAS - LVIDD: 5.6 CM
BH CV ECHO MEAS - LVIDS: 3.5 CM
BH CV ECHO MEAS - LVOT AREA: 3.6 CM2
BH CV ECHO MEAS - LVOT DIAM: 2.14 CM
BH CV ECHO MEAS - LVPWD: 1.41 CM
BH CV ECHO MEAS - MED PEAK E' VEL: 6.9 CM/SEC
BH CV ECHO MEAS - MV A DUR: 0.12 SEC
BH CV ECHO MEAS - MV A MAX VEL: 56.3 CM/SEC
BH CV ECHO MEAS - MV DEC SLOPE: 367.1 CM/SEC2
BH CV ECHO MEAS - MV DEC TIME: 160 MSEC
BH CV ECHO MEAS - MV E MAX VEL: 89.4 CM/SEC
BH CV ECHO MEAS - MV E/A: 1.59
BH CV ECHO MEAS - MV MAX PG: 4.1 MMHG
BH CV ECHO MEAS - MV MEAN PG: 1.59 MMHG
BH CV ECHO MEAS - MV P1/2T: 85.4 MSEC
BH CV ECHO MEAS - MV V2 VTI: 28.6 CM
BH CV ECHO MEAS - MVA(P1/2T): 2.6 CM2
BH CV ECHO MEAS - MVA(VTI): 2.5 CM2
BH CV ECHO MEAS - PA ACC TIME: 0.12 SEC
BH CV ECHO MEAS - PA PR(ACCEL): 24.3 MMHG
BH CV ECHO MEAS - PA V2 MAX: 103.2 CM/SEC
BH CV ECHO MEAS - PULM DIAS VEL: 61.1 CM/SEC
BH CV ECHO MEAS - PULM S/D: 0.93
BH CV ECHO MEAS - PULM SYS VEL: 57 CM/SEC
BH CV ECHO MEAS - RAP SYSTOLE: 3 MMHG
BH CV ECHO MEAS - RV MAX PG: 1.44 MMHG
BH CV ECHO MEAS - RV V1 MAX: 60 CM/SEC
BH CV ECHO MEAS - RV V1 VTI: 12 CM
BH CV ECHO MEAS - SI(MOD-SP2): 31.7 ML/M2
BH CV ECHO MEAS - SI(MOD-SP4): 37 ML/M2
BH CV ECHO MEAS - SV(LVOT): 72.4 ML
BH CV ECHO MEAS - SV(MOD-SP2): 83 ML
BH CV ECHO MEAS - SV(MOD-SP4): 97 ML
BH CV ECHO MEAS - TAPSE (>1.6): 1.68 CM
BH CV ECHO MEASUREMENTS AVERAGE E/E' RATIO: 10.77
BH CV REST NUCLEAR ISOTOPE DOSE: 10.2 MCI
BH CV STRESS COMMENTS STAGE 1: NORMAL
BH CV STRESS DOSE REGADENOSON STAGE 1: 0.4
BH CV STRESS DURATION MIN STAGE 1: 0
BH CV STRESS DURATION SEC STAGE 1: 10
BH CV STRESS NUCLEAR ISOTOPE DOSE: 33 MCI
BH CV STRESS PROTOCOL 1: NORMAL
BH CV STRESS RECOVERY BP: NORMAL MMHG
BH CV STRESS RECOVERY HR: 90 BPM
BH CV STRESS STAGE 1: 1
BH CV XLRA - RV BASE: 3.5 CM
BH CV XLRA - RV LENGTH: 7.7 CM
BH CV XLRA - RV MID: 2.13 CM
BH CV XLRA - TDI S': 9.1 CM/SEC
DEPRECATED RDW RBC AUTO: 46.9 FL (ref 37–54)
ERYTHROCYTE [DISTWIDTH] IN BLOOD BY AUTOMATED COUNT: 14.1 % (ref 12.3–15.4)
GLUCOSE BLDC GLUCOMTR-MCNC: 117 MG/DL (ref 70–130)
GLUCOSE BLDC GLUCOMTR-MCNC: 122 MG/DL (ref 70–130)
GLUCOSE BLDC GLUCOMTR-MCNC: 127 MG/DL (ref 70–130)
GLUCOSE BLDC GLUCOMTR-MCNC: 183 MG/DL (ref 70–130)
HCT VFR BLD AUTO: 25 % (ref 37.5–51)
HGB BLD-MCNC: 8.6 G/DL (ref 13–17.7)
LEFT ATRIUM VOLUME INDEX: 43.1 ML/M2
LV EF NUC BP: 50 %
MAXIMAL PREDICTED HEART RATE: 157 BPM
MAXIMAL PREDICTED HEART RATE: 157 BPM
MCH RBC QN AUTO: 31.9 PG (ref 26.6–33)
MCHC RBC AUTO-ENTMCNC: 34.4 G/DL (ref 31.5–35.7)
MCV RBC AUTO: 92.6 FL (ref 79–97)
PERCENT MAX PREDICTED HR: 65.61 %
PLATELET # BLD AUTO: 149 10*3/MM3 (ref 140–450)
PMV BLD AUTO: 9.9 FL (ref 6–12)
RBC # BLD AUTO: 2.7 10*6/MM3 (ref 4.14–5.8)
SINUS: 4.1 CM
STRESS BASELINE BP: NORMAL MMHG
STRESS BASELINE HR: 77 BPM
STRESS PERCENT HR: 77 %
STRESS POST PEAK BP: NORMAL MMHG
STRESS POST PEAK HR: 103 BPM
STRESS TARGET HR: 133 BPM
STRESS TARGET HR: 133 BPM
WBC NRBC COR # BLD: 6.73 10*3/MM3 (ref 3.4–10.8)

## 2022-07-12 PROCEDURE — 25010000002 PERFLUTREN (DEFINITY) 8.476 MG IN SODIUM CHLORIDE (PF) 0.9 % 10 ML INJECTION: Performed by: INTERNAL MEDICINE

## 2022-07-12 PROCEDURE — 94799 UNLISTED PULMONARY SVC/PX: CPT

## 2022-07-12 PROCEDURE — 93016 CV STRESS TEST SUPVJ ONLY: CPT | Performed by: INTERNAL MEDICINE

## 2022-07-12 PROCEDURE — G0378 HOSPITAL OBSERVATION PER HR: HCPCS

## 2022-07-12 PROCEDURE — 78452 HT MUSCLE IMAGE SPECT MULT: CPT | Performed by: INTERNAL MEDICINE

## 2022-07-12 PROCEDURE — 99214 OFFICE O/P EST MOD 30 MIN: CPT | Performed by: INTERNAL MEDICINE

## 2022-07-12 PROCEDURE — 25010000002 REGADENOSON 0.4 MG/5ML SOLUTION: Performed by: INTERNAL MEDICINE

## 2022-07-12 PROCEDURE — 82962 GLUCOSE BLOOD TEST: CPT

## 2022-07-12 PROCEDURE — 85027 COMPLETE CBC AUTOMATED: CPT | Performed by: NURSE PRACTITIONER

## 2022-07-12 PROCEDURE — 93018 CV STRESS TEST I&R ONLY: CPT | Performed by: INTERNAL MEDICINE

## 2022-07-12 PROCEDURE — 63710000001 INSULIN LISPRO (HUMAN) PER 5 UNITS: Performed by: INTERNAL MEDICINE

## 2022-07-12 PROCEDURE — 0 TECHNETIUM SESTAMIBI: Performed by: INTERNAL MEDICINE

## 2022-07-12 PROCEDURE — 78452 HT MUSCLE IMAGE SPECT MULT: CPT

## 2022-07-12 PROCEDURE — 96375 TX/PRO/DX INJ NEW DRUG ADDON: CPT

## 2022-07-12 PROCEDURE — 93306 TTE W/DOPPLER COMPLETE: CPT | Performed by: INTERNAL MEDICINE

## 2022-07-12 PROCEDURE — 93017 CV STRESS TEST TRACING ONLY: CPT

## 2022-07-12 PROCEDURE — A9500 TC99M SESTAMIBI: HCPCS | Performed by: INTERNAL MEDICINE

## 2022-07-12 PROCEDURE — 93306 TTE W/DOPPLER COMPLETE: CPT

## 2022-07-12 RX ORDER — PANTOPRAZOLE SODIUM 40 MG/10ML
40 INJECTION, POWDER, LYOPHILIZED, FOR SOLUTION INTRAVENOUS EVERY 12 HOURS SCHEDULED
Status: DISCONTINUED | OUTPATIENT
Start: 2022-07-12 | End: 2022-07-15

## 2022-07-12 RX ORDER — PANTOPRAZOLE SODIUM 40 MG/1
40 TABLET, DELAYED RELEASE ORAL
Status: DISCONTINUED | OUTPATIENT
Start: 2022-07-13 | End: 2022-07-12

## 2022-07-12 RX ADMIN — AMLODIPINE BESYLATE 10 MG: 10 TABLET ORAL at 10:10

## 2022-07-12 RX ADMIN — TECHNETIUM TC 99M SESTAMIBI 1 DOSE: 1 INJECTION INTRAVENOUS at 08:30

## 2022-07-12 RX ADMIN — EMPAGLIFLOZIN 25 MG: 25 TABLET, FILM COATED ORAL at 10:09

## 2022-07-12 RX ADMIN — LISINOPRIL 10 MG: 10 TABLET ORAL at 10:09

## 2022-07-12 RX ADMIN — ISOSORBIDE MONONITRATE 60 MG: 60 TABLET ORAL at 20:58

## 2022-07-12 RX ADMIN — PERFLUTREN 2 ML: 6.52 INJECTION, SUSPENSION INTRAVENOUS at 11:22

## 2022-07-12 RX ADMIN — METOPROLOL SUCCINATE 25 MG: 25 TABLET, EXTENDED RELEASE ORAL at 10:09

## 2022-07-12 RX ADMIN — PANTOPRAZOLE SODIUM 40 MG: 40 INJECTION, POWDER, FOR SOLUTION INTRAVENOUS at 20:58

## 2022-07-12 RX ADMIN — CLOPIDOGREL 75 MG: 75 TABLET, FILM COATED ORAL at 10:09

## 2022-07-12 RX ADMIN — ATORVASTATIN CALCIUM 40 MG: 20 TABLET, FILM COATED ORAL at 20:58

## 2022-07-12 RX ADMIN — INSULIN LISPRO 2 UNITS: 100 INJECTION, SOLUTION INTRAVENOUS; SUBCUTANEOUS at 16:59

## 2022-07-12 RX ADMIN — ISOSORBIDE MONONITRATE 60 MG: 60 TABLET ORAL at 10:09

## 2022-07-12 RX ADMIN — ASPIRIN 81 MG: 81 TABLET, COATED ORAL at 10:09

## 2022-07-12 RX ADMIN — Medication 3 MG: at 20:58

## 2022-07-12 RX ADMIN — REGADENOSON 0.4 MG: 0.08 INJECTION, SOLUTION INTRAVENOUS at 08:30

## 2022-07-12 RX ADMIN — TECHNETIUM TC 99M SESTAMIBI 1 DOSE: 1 INJECTION INTRAVENOUS at 06:38

## 2022-07-12 NOTE — PLAN OF CARE
Goal Outcome Evaluation:  Plan of Care Reviewed With: patient        Progress: no change  Outcome Evaluation: ECHO pending. NPO for stress test. CPAP in place. Melatonin given for sleep. Pt less anxious and resting for most of the shift. Will CTM.

## 2022-07-12 NOTE — PLAN OF CARE
Goal Outcome Evaluation:  Plan of Care Reviewed With: patient        Progress: improving  Outcome Evaluation: VSS, no complaints of pain. Went for stress test and echo today. Waiting orders from [facility].

## 2022-07-12 NOTE — OUTREACH NOTE
Call Center TCM Note    Flowsheet Row Responses   [facility] patient discharged from? [place]   Does the patient have one of the following disease processes/diagnoses(primary or secondary)? Other   TCM attempt successful? No   Change in Health Status Readmitted          Rolanda Becker RN    7/12/2022, 15:34 CDT

## 2022-07-12 NOTE — PROGRESS NOTES
"[facility]    Patient Name: Edmond Chase  :1960  61 y.o.  LOS: 0  Encounter Provider: Yandel Patel Jr, MD      Patient Care Team:  System, Provider Not In as PCP - General    Chief Complaint: Follow-up unstable angina, coronary artery disease, aortic stenosis status post AVR    Interval History: Echo shows normal prosthetic valve function with normal left ventricular ejection fraction.  Nuclear images reviewed personally.  No further chest pain.       Objective   Vital Signs  Temp:  [97.2 °F (36.2 °C)-98.5 °F (36.9 °C)] 97.7 °F (36.5 °C)  Heart Rate:  [73-82] 82  Resp:  [16-23] 16  BP: (120-137)/(74-78) 137/78    Intake/Output Summary (Last 24 hours) at 2022 1441  Last data filed at 2022 1700  Gross per 24 hour   Intake 360 ml   Output --   Net 360 ml     Flowsheet Rows    Flowsheet Row First Filed Value   Admission Height 185.4 cm (73\") Documented at 07/10/2022 0618   Admission Weight 148 kg (327 lb) Documented at 07/10/2022 0618            Physical Exam  Constitutional:       Appearance: Healthy appearance. Not in distress.   Neck:      Vascular: No JVR. JVD normal.   Pulmonary:      Effort: Pulmonary effort is normal.      Breath sounds: Normal breath sounds. No wheezing. No rhonchi. No rales.   Chest:      Chest wall: Not tender to palpatation.   Cardiovascular:      PMI at left midclavicular line. Normal rate. Regular rhythm. Normal S1. Normal S2.      Murmurs: There is a grade 3/6 harsh midsystolic murmur at the URSB, radiating to the neck.      No gallop. No click. No rub.   Pulses:     Intact distal pulses.   Edema:     Peripheral edema absent.   Abdominal:      General: Bowel sounds are normal.      Palpations: Abdomen is soft.      Tenderness: There is no abdominal tenderness.   Musculoskeletal: Normal range of motion.         General: No tenderness. Skin:     General: Skin is warm and dry.   Neurological:      General: No focal deficit present.      Mental " Status: Alert and oriented to person, place and time.     Physical exam was reviewed, updated and amended when necessary.    Pertinent Test Results:  Results from last 7 days   Lab Units 07/10/22  0700   SODIUM mmol/L 141   POTASSIUM mmol/L 4.5   CHLORIDE mmol/L 107   CO2 mmol/L 23.9   BUN mg/dL 54*   CREATININE mg/dL 0.69*   GLUCOSE mg/dL 269*   CALCIUM mg/dL 8.2*   AST (SGOT) U/L 9   ALT (SGPT) U/L 15     Results from last 7 days   Lab Units 07/11/22  1058 07/10/22  0952 07/10/22  0700   TROPONIN T ng/mL <0.010 <0.010 <0.010     Results from last 7 days   Lab Units 07/10/22  0700   WBC 10*3/mm3 10.24   HEMOGLOBIN g/dL 10.8*   HEMATOCRIT % 32.6*   PLATELETS 10*3/mm3 208     Results from last 7 days   Lab Units 07/10/22  0700   INR  1.01     Results from last 7 days   Lab Units 07/10/22  0700   MAGNESIUM mg/dL 1.8           Invalid input(s): LDLCALC  Results from last 7 days   Lab Units 07/11/22  1058   PROBNP pg/mL 118.0               Medication Review:   amLODIPine, 10 mg, Oral, Daily  aspirin, 81 mg, Oral, Daily  atorvastatin, 40 mg, Oral, Nightly  clopidogrel, 75 mg, Oral, Daily  empagliflozin, 25 mg, Oral, Daily  insulin lispro, 0-9 Units, Subcutaneous, TID AC  isosorbide mononitrate, 60 mg, Oral, Q12H  lisinopril, 10 mg, Oral, Q24H  melatonin, 3 mg, Oral, Nightly  metoprolol succinate XL, 25 mg, Oral, Daily  [START ON 7/13/2022] pantoprazole, 40 mg, Oral, Q AM              Assessment & Plan   1. Angina -concerning pattern of angina to the patient.  Positive stress study.  Plan for cardiac catheterization in the morning.  2. CAD -continue dual antiplatelet therapy, beta-blocker and statin.    Continue current antianginal regimen.  3. Hypertension  4. Valvular heart disease  5. Diabetes -appreciate medicine consult  1.     Yandel Patel Jr, MD  [facility]  07/12/22  14:41 EDT

## 2022-07-12 NOTE — PROGRESS NOTES
"    Name: Edmond Chase ADMIT: 7/10/2022   : 1960  PCP: System, Provider Not In    MRN: [id number] LOS: 0 days   AGE/SEX: 61 y.o. male  ROOM: [facility]     Subjective   Subjective   Feels \"cold\". Afebrile. Reports that he has been having black colored stools lately but not since admission, taking ibuprofen for arthritic pain. Denies chest pain, shortness of breath on exam.     Review of Systems   Constitutional: Positive for chills. Negative for fever.   HENT: Negative for congestion.    Respiratory: Negative for shortness of breath.    Cardiovascular: Negative for chest pain.   Gastrointestinal: Negative for abdominal pain and nausea.   Genitourinary: Negative for difficulty urinating.   Musculoskeletal: Negative for arthralgias and myalgias.   Skin: Negative for rash.   Neurological: Negative for dizziness and headaches.   Psychiatric/Behavioral: Negative for sleep disturbance.        Objective   Objective   Vital Signs  Temp:  [97.2 °F (36.2 °C)-98.5 °F (36.9 °C)] 97.7 °F (36.5 °C)  Heart Rate:  [73-82] 82  Resp:  [16-23] 16  BP: (120-137)/(74-78) 137/78  SpO2:  [93 %-100 %] 97 %  on  Flow (L/min):  [2-3.5] 2;   Device (Oxygen Therapy): room air  Body mass index is 41.96 kg/m².  Physical Exam  Vitals and nursing note reviewed.   Constitutional:       General: He is not in acute distress.     Appearance: He is obese.   HENT:      Head: Normocephalic.      Mouth/Throat:      Mouth: Mucous membranes are moist.   Eyes:      Conjunctiva/sclera: Conjunctivae normal.   Cardiovascular:      Rate and Rhythm: Normal rate and regular rhythm.   Pulmonary:      Effort: Pulmonary effort is normal. No respiratory distress.      Breath sounds: No wheezing or rales.   Abdominal:      General: Bowel sounds are normal.      Palpations: Abdomen is soft.   Musculoskeletal:      Cervical back: Neck supple.      Right lower leg: Edema present.      Left lower leg: Edema present.      Comments: Trace   Skin:     General: Skin " is warm and dry.   Neurological:      Mental Status: He is alert and oriented to person, place, and time.   Psychiatric:         Mood and Affect: Mood normal.         Behavior: Behavior normal.         Results Review     I reviewed the patient's new clinical results.  Results from last 7 days   Lab Units 07/10/22  0700   WBC 10*3/mm3 10.24   HEMOGLOBIN g/dL 10.8*   PLATELETS 10*3/mm3 208     Results from last 7 days   Lab Units 07/10/22  0700   SODIUM mmol/L 141   POTASSIUM mmol/L 4.5   CHLORIDE mmol/L 107   CO2 mmol/L 23.9   BUN mg/dL 54*   CREATININE mg/dL 0.69*   GLUCOSE mg/dL 269*   EGFR mL/min/1.73 104.0     Results from last 7 days   Lab Units 07/10/22  0700   ALBUMIN g/dL 3.40*   BILIRUBIN mg/dL 0.5   ALK PHOS U/L 60   AST (SGOT) U/L 9   ALT (SGPT) U/L 15     Results from last 7 days   Lab Units 07/10/22  0700   CALCIUM mg/dL 8.2*   ALBUMIN g/dL 3.40*   MAGNESIUM mg/dL 1.8       Hemoglobin A1C   Date/Time Value Ref Range Status   07/10/2022 0700 7.00 (H) 4.80 - 5.60 % Final     Glucose   Date/Time Value Ref Range Status   07/12/2022 1140 127 70 - 130 mg/dL Final     Comment:     Meter: [id number] : [id number] Burton Roderick NA   07/12/2022 0627 117 70 - 130 mg/dL Final     Comment:     Meter: [id number] : [id number] Juan RJamieteagan Alexia NA   07/11/2022 2026 139 (H) 70 - 130 mg/dL Final     Comment:     Meter: [id number] : [id number] VonHandorf Alexia NA   07/11/2022 1618 134 (H) 70 - 130 mg/dL Final     Comment:     Meter: [id number] : [id number] Burton Roderick NA   07/11/2022 1145 191 (H) 70 - 130 mg/dL Final     Comment:     Meter: [id number] : [id number] Burotn Roderick NA   07/11/2022 0622 160 (H) 70 - 130 mg/dL Final     Comment:     Meter: [id number] : [id number] Pino MARQUEZ   07/10/2022 2001 230 (H) 70 - 130 mg/dL Final     Comment:     Meter: [id number] : [id number] Pino MARQUEZ       No radiology results for the last day  Scheduled Medications  amLODIPine, 10 mg,  Oral, Daily  aspirin, 81 mg, Oral, Daily  atorvastatin, 40 mg, Oral, Nightly  clopidogrel, 75 mg, Oral, Daily  empagliflozin, 25 mg, Oral, Daily  insulin lispro, 0-9 Units, Subcutaneous, TID AC  isosorbide mononitrate, 60 mg, Oral, Q12H  lisinopril, 10 mg, Oral, Q24H  melatonin, 3 mg, Oral, Nightly  metoprolol succinate XL, 25 mg, Oral, Daily  [START ON 7/13/2022] pantoprazole, 40 mg, Oral, Q AM    Infusions   Diet  NPO Diet NPO Type: Sips with Meds       Assessment/Plan     Active Hospital Problems    Diagnosis  POA   • **Angina at rest (HCC) [I20.8]  Yes   • Type 2 diabetes mellitus, without long-term current use of insulin (HCC) [E11.9]  Yes   • Coronary artery disease [I25.10]  Yes   • TJ on auto CPAP [G47.33, Z99.89]  Not Applicable   • S/P CABG (coronary artery bypass graft) [Z95.1]  Not Applicable   • S/P AVR (aortic valve replacement) [Z95.2]  Not Applicable   • Status post ascending aortic aneurysm repair [Z98.890, Z86.79]  Not Applicable   • Essential hypertension [I10]  Yes      Resolved Hospital Problems   No resolved problems to display.       61 y.o. male admitted with Angina at rest (HCC).    -Per nursing, pt slept better with Cpap and melatonin. Took 1 dose hydroxyzine yesterday afternoon  -Reporting black stools x 2-3 days prior to admission. Hgb on 7/10 was 10.8, baseline around 12. Repeat CBC today. Start po ppi  -S/P stress test and Echo, both pending  -Appreciate Nutrition assistance  -Pulmonology consult still pending  -BG trends acceptable. Currently NPO    Addendum: Hgb down to 8.9. With pt report of recent NSAID use and black stools, consult GI to evaluate. Change PPI to IV BID. Serial H&H. Check anemia labs, hemoccult stool      VIKTORIA Meza  [facility]  07/12/22  13:25 EDT

## 2022-07-13 ENCOUNTER — ANESTHESIA EVENT (OUTPATIENT)
Dept: GASTROENTEROLOGY | Facility: HOSPITAL | Age: 62
End: 2022-07-13

## 2022-07-13 ENCOUNTER — ANESTHESIA (OUTPATIENT)
Dept: GASTROENTEROLOGY | Facility: HOSPITAL | Age: 62
End: 2022-07-13

## 2022-07-13 PROBLEM — D64.9 ANEMIA: Status: ACTIVE | Noted: 2022-07-10

## 2022-07-13 LAB
ANION GAP SERPL CALCULATED.3IONS-SCNC: 11.3 MMOL/L (ref 5–15)
BUN SERPL-MCNC: 23 MG/DL (ref 8–23)
BUN/CREAT SERPL: 30.3 (ref 7–25)
CALCIUM SPEC-SCNC: 8.3 MG/DL (ref 8.6–10.5)
CHLORIDE SERPL-SCNC: 107 MMOL/L (ref 98–107)
CO2 SERPL-SCNC: 23.7 MMOL/L (ref 22–29)
CREAT SERPL-MCNC: 0.76 MG/DL (ref 0.76–1.27)
EGFRCR SERPLBLD CKD-EPI 2021: 101 ML/MIN/1.73
FERRITIN SERPL-MCNC: 30.2 NG/ML (ref 30–400)
FOLATE SERPL-MCNC: 15.5 NG/ML (ref 4.78–24.2)
GLUCOSE BLDC GLUCOMTR-MCNC: 105 MG/DL (ref 70–130)
GLUCOSE BLDC GLUCOMTR-MCNC: 111 MG/DL (ref 70–130)
GLUCOSE BLDC GLUCOMTR-MCNC: 114 MG/DL (ref 70–130)
GLUCOSE BLDC GLUCOMTR-MCNC: 114 MG/DL (ref 70–130)
GLUCOSE SERPL-MCNC: 105 MG/DL (ref 65–99)
HCT VFR BLD AUTO: 22.9 % (ref 37.5–51)
HCT VFR BLD AUTO: 25.4 % (ref 37.5–51)
HCT VFR BLD AUTO: 25.9 % (ref 37.5–51)
HGB BLD-MCNC: 8.1 G/DL (ref 13–17.7)
HGB BLD-MCNC: 8.6 G/DL (ref 13–17.7)
HGB BLD-MCNC: 9 G/DL (ref 13–17.7)
IRON 24H UR-MRATE: 51 MCG/DL (ref 59–158)
IRON SATN MFR SERPL: 15 % (ref 20–50)
POTASSIUM SERPL-SCNC: 3.7 MMOL/L (ref 3.5–5.2)
RETICS # AUTO: 0.11 10*6/MM3 (ref 0.02–0.13)
RETICS/RBC NFR AUTO: 4.16 % (ref 0.7–1.9)
SODIUM SERPL-SCNC: 142 MMOL/L (ref 136–145)
TIBC SERPL-MCNC: 346 MCG/DL (ref 298–536)
TRANSFERRIN SERPL-MCNC: 232 MG/DL (ref 200–360)
VIT B12 BLD-MCNC: 1332 PG/ML (ref 211–946)

## 2022-07-13 PROCEDURE — 85014 HEMATOCRIT: CPT | Performed by: NURSE PRACTITIONER

## 2022-07-13 PROCEDURE — 85018 HEMOGLOBIN: CPT | Performed by: INTERNAL MEDICINE

## 2022-07-13 PROCEDURE — 83540 ASSAY OF IRON: CPT | Performed by: NURSE PRACTITIONER

## 2022-07-13 PROCEDURE — 85045 AUTOMATED RETICULOCYTE COUNT: CPT | Performed by: NURSE PRACTITIONER

## 2022-07-13 PROCEDURE — G0378 HOSPITAL OBSERVATION PER HR: HCPCS

## 2022-07-13 PROCEDURE — 80048 BASIC METABOLIC PNL TOTAL CA: CPT | Performed by: NURSE PRACTITIONER

## 2022-07-13 PROCEDURE — 25010000002 PROPOFOL 10 MG/ML EMULSION: Performed by: STUDENT IN AN ORGANIZED HEALTH CARE EDUCATION/TRAINING PROGRAM

## 2022-07-13 PROCEDURE — 84466 ASSAY OF TRANSFERRIN: CPT | Performed by: NURSE PRACTITIONER

## 2022-07-13 PROCEDURE — 85014 HEMATOCRIT: CPT | Performed by: INTERNAL MEDICINE

## 2022-07-13 PROCEDURE — 85018 HEMOGLOBIN: CPT | Performed by: NURSE PRACTITIONER

## 2022-07-13 PROCEDURE — 99204 OFFICE O/P NEW MOD 45 MIN: CPT | Performed by: INTERNAL MEDICINE

## 2022-07-13 PROCEDURE — 82746 ASSAY OF FOLIC ACID SERUM: CPT | Performed by: NURSE PRACTITIONER

## 2022-07-13 PROCEDURE — 82728 ASSAY OF FERRITIN: CPT | Performed by: NURSE PRACTITIONER

## 2022-07-13 PROCEDURE — 43235 EGD DIAGNOSTIC BRUSH WASH: CPT | Performed by: INTERNAL MEDICINE

## 2022-07-13 PROCEDURE — 82962 GLUCOSE BLOOD TEST: CPT

## 2022-07-13 PROCEDURE — 96376 TX/PRO/DX INJ SAME DRUG ADON: CPT

## 2022-07-13 PROCEDURE — 82607 VITAMIN B-12: CPT | Performed by: NURSE PRACTITIONER

## 2022-07-13 RX ORDER — SODIUM CHLORIDE, SODIUM LACTATE, POTASSIUM CHLORIDE, CALCIUM CHLORIDE 600; 310; 30; 20 MG/100ML; MG/100ML; MG/100ML; MG/100ML
INJECTION, SOLUTION INTRAVENOUS CONTINUOUS PRN
Status: DISCONTINUED | OUTPATIENT
Start: 2022-07-13 | End: 2022-07-13 | Stop reason: SURG

## 2022-07-13 RX ORDER — SUCRALFATE 1 G/1
1 TABLET ORAL
Status: DISCONTINUED | OUTPATIENT
Start: 2022-07-13 | End: 2022-07-15

## 2022-07-13 RX ORDER — PROPOFOL 10 MG/ML
VIAL (ML) INTRAVENOUS AS NEEDED
Status: DISCONTINUED | OUTPATIENT
Start: 2022-07-13 | End: 2022-07-13 | Stop reason: SURG

## 2022-07-13 RX ORDER — LIDOCAINE HYDROCHLORIDE 20 MG/ML
INJECTION, SOLUTION INFILTRATION; PERINEURAL AS NEEDED
Status: DISCONTINUED | OUTPATIENT
Start: 2022-07-13 | End: 2022-07-13 | Stop reason: SURG

## 2022-07-13 RX ORDER — SODIUM CHLORIDE 9 MG/ML
30 INJECTION, SOLUTION INTRAVENOUS CONTINUOUS PRN
Status: DISCONTINUED | OUTPATIENT
Start: 2022-07-13 | End: 2022-07-15 | Stop reason: HOSPADM

## 2022-07-13 RX ADMIN — LISINOPRIL 10 MG: 10 TABLET ORAL at 09:10

## 2022-07-13 RX ADMIN — ATORVASTATIN CALCIUM 40 MG: 20 TABLET, FILM COATED ORAL at 20:26

## 2022-07-13 RX ADMIN — SUCRALFATE 1 G: 1 TABLET ORAL at 16:53

## 2022-07-13 RX ADMIN — METOPROLOL SUCCINATE 25 MG: 25 TABLET, EXTENDED RELEASE ORAL at 09:10

## 2022-07-13 RX ADMIN — LIDOCAINE HYDROCHLORIDE 60 MG: 20 INJECTION, SOLUTION INFILTRATION; PERINEURAL at 10:40

## 2022-07-13 RX ADMIN — Medication 3 MG: at 20:23

## 2022-07-13 RX ADMIN — AMLODIPINE BESYLATE 10 MG: 10 TABLET ORAL at 09:10

## 2022-07-13 RX ADMIN — SODIUM CHLORIDE, POTASSIUM CHLORIDE, SODIUM LACTATE AND CALCIUM CHLORIDE: 600; 310; 30; 20 INJECTION, SOLUTION INTRAVENOUS at 10:36

## 2022-07-13 RX ADMIN — PROPOFOL 120 MG: 10 INJECTION, EMULSION INTRAVENOUS at 10:40

## 2022-07-13 RX ADMIN — PANTOPRAZOLE SODIUM 40 MG: 40 INJECTION, POWDER, FOR SOLUTION INTRAVENOUS at 20:23

## 2022-07-13 RX ADMIN — PANTOPRAZOLE SODIUM 40 MG: 40 INJECTION, POWDER, FOR SOLUTION INTRAVENOUS at 09:10

## 2022-07-13 RX ADMIN — ISOSORBIDE MONONITRATE 60 MG: 60 TABLET ORAL at 20:23

## 2022-07-13 RX ADMIN — SODIUM CHLORIDE 30 ML/HR: 9 INJECTION, SOLUTION INTRAVENOUS at 10:25

## 2022-07-13 RX ADMIN — PROPOFOL 200 MCG/KG/MIN: 10 INJECTION, EMULSION INTRAVENOUS at 10:41

## 2022-07-13 RX ADMIN — ISOSORBIDE MONONITRATE 60 MG: 60 TABLET ORAL at 09:10

## 2022-07-13 RX ADMIN — ASPIRIN 81 MG: 81 TABLET, COATED ORAL at 09:10

## 2022-07-13 RX ADMIN — CLOPIDOGREL 75 MG: 75 TABLET, FILM COATED ORAL at 09:10

## 2022-07-13 RX ADMIN — EMPAGLIFLOZIN 25 MG: 25 TABLET, FILM COATED ORAL at 09:10

## 2022-07-13 RX ADMIN — SUCRALFATE 1 G: 1 TABLET ORAL at 20:23

## 2022-07-13 NOTE — ANESTHESIA POSTPROCEDURE EVALUATION
Patient: Edmond Chase    Procedure Summary     Date: 07/13/22 Room / Location:  [facility]    Anesthesia Start: 1035 Anesthesia Stop: 1056    Procedure: ESOPHAGOGASTRODUODENOSCOPY (N/A Esophagus) Diagnosis:       Anemia, unspecified type      (Anemia, unspecified type [D64.9])    Surgeons: Marcia Hollis MD Provider: Roldan Garcia MD    Anesthesia Type: MAC ASA Status: 4          Anesthesia Type: MAC    Vitals  Vitals Value Taken Time   /70 07/13/22 1056   Temp     Pulse 73 07/13/22 1056   Resp 18 07/13/22 1056   SpO2 96 % 07/13/22 1056           Post Anesthesia Care and Evaluation    Patient location during evaluation: bedside  Patient participation: complete - patient participated  Level of consciousness: awake and alert  Pain management: adequate    Airway patency: patent  Anesthetic complications: No anesthetic complications  PONV Status: controlled  Cardiovascular status: blood pressure returned to baseline and acceptable  Respiratory status: acceptable  Hydration status: acceptable

## 2022-07-13 NOTE — CONSULTS
[facility]  Initial Inpatient Consult Note    Referring Provider: VIKTORIA Hou    Reason for Consultation: Anemia    Subjective     History of present illness:    61 y.o. male with a history of coronary artery disease status post prior stenting admitted with unstable angina.  He is being evaluated by the cardiology team with plans for left heart catheterization today.  As mentioned he has had prior stenting about 2 to 3 years ago and is on Plavix at home.  GI consultation was requested to evaluate anemia.  He had a hemoglobin of 10.8 on presentation which is drifted down to 8.1 today.  He has had periods of anemia in the past although his prior hemoglobin to admission from June 2021 was relatively normal at 12.7.  He does endorse some dark stool in the week or so preceding his admission.  He denies any epigastric pain.  He reports a colonoscopy performed at the age of 58 was told he had diverticulosis but otherwise unremarkable.    Past Medical History:  Past Medical History:   Diagnosis Date   • Abnormal nuclear stress test    • Aortic valve insufficiency     nonrheumtic    • Ascending aortic aneurysm (HCC)    • CAD (coronary artery disease)    • Chest pain    • Diabetes mellitus (HCC)    • Dizzy     CAREFUL WHEN GETTING UP   • Dyspnea    • Essential hypertension    • Fatigue    • Hyperglycemia    • Hyperlipidemia    • Hypersomnia with sleep apnea    • Lightheadedness    • Malaise and fatigue    • Morbid obesity (HCC)    • Myocardial ischemia    • Nocturia    • TJ on auto CPAP 10/31/2016    Overnight polysomnogram.  Weight 276 pounds.  Severe TJ with AHI 79 events per hour.  Auto CPAP recommended.   • Pneumonia     FEB 2016   • Scrotal bleeding    • Shortness of breath      Past Surgical History:  Past Surgical History:   Procedure Laterality Date   • AORTIC VALVE REPAIR/REPLACEMENT  05/2016   • ASCENDING ARCH/HEMIARCH REPLACEMENT N/A 5/2/2016    Procedure: BHAVESH STERNOTOMY CORONARY ARTERY  BYPASS GRAFT TIMES 3 USING LEFT INTERNAL MAMMARY ARTERY AND RIGHT GREATER SAPHENOUS VEIN GRAFT PER ENDOSCOPIC VEIN HARVESTING, AORTIC ANEURYSM REPAIR WITH ROOT REPAIR AND AORTIC VALVE REPLACEMENT;  Surgeon: Rosalio Cline MD;  Location: [facility] OR;  Service:    • CARDIAC CATHETERIZATION N/A 4/1/2016    Procedure: Left Heart Cath;  Surgeon: Erick Tam MD;  Location: [facility] CATH INVASIVE LOCATION;  Service:    • CARDIAC CATHETERIZATION N/A 4/1/2016    Procedure: Left ventriculography;  Surgeon: Erick Tam MD;  Location: [facility] CATH INVASIVE LOCATION;  Service:    • CARDIAC CATHETERIZATION N/A 4/1/2016    Procedure: Right Heart Cath;  Surgeon: Erick Tam MD;  Location: [facility] CATH INVASIVE LOCATION;  Service:    • CARDIAC CATHETERIZATION N/A 10/30/2017    Procedure: Coronary angiography;  Surgeon: Sorin Vasquez MD;  Location: [facility] CATH INVASIVE LOCATION;  Service:    • CARDIAC CATHETERIZATION  10/30/2017    Procedure: Saphenous Vein Graft;  Surgeon: Sorin Vasquez MD;  Location: [facility] INVASIVE LOCATION;  Service:    • CARDIAC CATHETERIZATION N/A 10/30/2017    Procedure: Native mammary injection;  Surgeon: Sorin Vasquez MD;  Location: [facility] INVASIVE LOCATION;  Service:    • CARDIAC CATHETERIZATION N/A 7/7/2020    Procedure: Coronary angiography;  Surgeon: Gregor Kim MD;  Location: [facility] INVASIVE LOCATION;  Service: Cardiovascular;  Laterality: N/A;   • CARDIAC CATHETERIZATION N/A 7/7/2020    Procedure: Left heart cath;  Surgeon: Gregor Kim MD;  Location: [facility] INVASIVE LOCATION;  Service: Cardiovascular;  Laterality: N/A;   • CARDIAC CATHETERIZATION N/A 7/7/2020    Procedure: Left ventriculography;  Surgeon: Gregor Kim MD;  Location: [facility] CATH INVASIVE LOCATION;  Service: Cardiovascular;  Laterality: N/A;   • CARDIAC CATHETERIZATION N/A 7/7/2020    Procedure: Stent ESMER bypass graft;  Surgeon: Gregor Kim MD;  Location: [facility] CATH INVASIVE  "LOCATION;  Service: Cardiovascular;  Laterality: N/A;   • CARDIAC CATHETERIZATION N/A 6/17/2021    Procedure: SAPHENOUS VEIN GRAFT;  Surgeon: Marques Ndiaye MD;  Location:  [facility] INV[facility];  Service: Cardiovascular;  Laterality: N/A;   • CARDIAC CATHETERIZATION N/A 6/17/2021    Procedure: Left Heart Cath;  Surgeon: Marques Ndiaye MD;  Location:  [facility] INV[facility]ATION;  Service: Cardiovascular;  Laterality: N/A;   • CARDIAC CATHETERIZATION N/A 6/17/2021    Procedure: Coronary angiography;  Surgeon: Marques Ndiaye MD;  Location:  [facility] IN[facility];  Service: Cardiovascular;  Laterality: N/A;   • CORONARY ARTERY BYPASS GRAFT  05/2016    LIMA TO LAD, SVG TO PDA, SVG TO OM2   • INNER EAR SURGERY     • TONSILLECTOMY        Social History:   Social History     Tobacco Use   • Smoking status: Never Smoker   • Smokeless tobacco: Never Used   Substance Use Topics   • Alcohol use: Yes     Comment: 1 drink/monthly      Family History:  Family History   Problem Relation Age of Onset   • Hypertension Mother    • Diabetes Mother    • Heart disease Mother    • Hypertension Father    • Heart disease Sister    • Other Other         The patient states that his sister has a problem that goes by the name of \"long QT\".  He says this is a familial trait but he also says that he is \"not interested in pursuing it for himself\".   • Coronary artery disease Other    • Stroke Maternal Grandmother    • Heart disease Maternal Grandmother        Home Meds:  Medications Prior to Admission   Medication Sig Dispense Refill Last Dose   • albuterol sulfate  (90 Base) MCG/ACT inhaler Inhale 2 puffs Every 6 (Six) Hours As Needed for Wheezing. 1 inhaler 0 7/9/2022 at Unknown time   • amLODIPine (NORVASC) 10 MG tablet Take 10 mg by mouth Daily.   7/9/2022 at Unknown time   • aspirin 81 MG tablet Take 81 mg by mouth Daily.   7/10/2022 at Unknown time   • atorvastatin (LIPITOR) 40 MG tablet Take 1 tablet by mouth " Daily. 90 tablet 2 7/9/2022 at Unknown time   • clopidogrel (PLAVIX) 75 MG tablet Take 1 tablet by mouth Daily. 90 tablet 3 7/9/2022 at Unknown time   • glucosamine sulfate 500 MG capsule capsule Take  by mouth Daily.   7/9/2022 at Unknown time   • isosorbide mononitrate (IMDUR) 60 MG 24 hr tablet TAKE 1 AND 1/2 TABLETS BY MOUTH DAILY 135 tablet 2 7/9/2022 at Unknown time   • lisinopril (PRINIVIL,ZESTRIL) 10 MG tablet    7/9/2022 at Unknown time   • metoprolol succinate XL (TOPROL-XL) 25 MG 24 hr tablet TAKE ONE TABLET BY MOUTH DAILY 90 tablet 3 7/9/2022 at Unknown time   • vitamin D (ERGOCALCIFEROL) 1.25 MG (81418 UT) capsule capsule    7/9/2022 at Unknown time   • amoxicillin (AMOXIL) 500 MG capsule Take 4 capsules by mouth See Admin Instructions. 4 capsules 1 hour prior to procedure 4 capsule 0 More than a month at Unknown time   • Farxiga 10 MG tablet         Current Meds:   amLODIPine, 10 mg, Oral, Daily  aspirin, 81 mg, Oral, Daily  atorvastatin, 40 mg, Oral, Nightly  clopidogrel, 75 mg, Oral, Daily  empagliflozin, 25 mg, Oral, Daily  insulin lispro, 0-9 Units, Subcutaneous, TID AC  isosorbide mononitrate, 60 mg, Oral, Q12H  lisinopril, 10 mg, Oral, Q24H  melatonin, 3 mg, Oral, Nightly  metoprolol succinate XL, 25 mg, Oral, Daily  pantoprazole, 40 mg, Intravenous, Q12H      Allergies:  Allergies   Allergen Reactions   • Bystolic [Nebivolol Hcl]    • Metoprolol Other (See Comments)     Metoprolol seem to cause problems with mood swings     Review of Systems  All systems were reviewed and negative except for:  Cardiovascular: positive for  chest pressure / pain, at rest and chest pressure / pain, on exertion  Gastrointestinal: positive for  melena     Objective     Vital Signs  Temp:  [97.5 °F (36.4 °C)-98.1 °F (36.7 °C)] 97.5 °F (36.4 °C)  Heart Rate:  [65-82] 65  Resp:  [16-19] 18  BP: (123-137)/(63-78) 123/63  Physical Exam:  General Appearance:     Alert, cooperative, in no acute distress   Head:     Normocephalic, without obvious abnormality, atraumatic   Eyes:            Lids and lashes normal, conjunctivae and sclerae normal, no icterus   Throat:   No oral lesions, no thrush, oral mucosa moist   Neck:   No adenopathy, supple, trachea midline, no thyromegaly, no carotid bruit, no JVD   Lungs:     Clear to auscultation,respirations regular, even and            unlabored    Heart:    Regular rhythm and normal rate, normal S1 and S2, no        murmur, no gallop, no rub, no click   Chest Wall:    No abnormalities observed   Abdomen:     Normal bowel sounds, no masses, no organomegaly, soft     nontender, nondistended, no guarding, no rebound                 tenderness   Rectal:     Deferred   Extremities:   no edema, no cyanosis, no redness   Skin:   No bleeding, bruising or rash   Lymph nodes:   No palpable adenopathy   Psychiatric:  Judgement and insight: normal   Orientation to person place and time: normal   Mood and affect: normal   Results Review:   I reviewed the patient's new clinical results.    Results from last 7 days   Lab Units 07/13/22  0003 07/12/22  1542 07/10/22  0700   WBC 10*3/mm3  --  6.73 10.24   HEMOGLOBIN g/dL 8.1* 8.6* 10.8*   HEMATOCRIT % 22.9* 25.0* 32.6*   PLATELETS 10*3/mm3  --  149 208     Results from last 7 days   Lab Units 07/10/22  0700   SODIUM mmol/L 141   POTASSIUM mmol/L 4.5   CHLORIDE mmol/L 107   CO2 mmol/L 23.9   BUN mg/dL 54*   CREATININE mg/dL 0.69*   CALCIUM mg/dL 8.2*   BILIRUBIN mg/dL 0.5   ALK PHOS U/L 60   ALT (SGPT) U/L 15   AST (SGOT) U/L 9   GLUCOSE mg/dL 269*     Results from last 7 days   Lab Units 07/10/22  0700   INR  1.01     No results found for: LIPASE    Radiology:  XR Chest 1 View   Final Result   No evidence for active disease in the chest.       This report was finalized on 7/10/2022 8:12 AM by Dr. Edmond Garcia M.D.              Assessment & Plan   Assessment:   1.  Anemia  2.  Melena  3.  Unstable angina  4.  CAD on plavix    Plan:   61-year-old  gentleman admitted with unstable angina with plans for left heart catheterization later today.  We are asked to see him for anemia there is some report of melena preceding his admission.  There has been decline in his hemoglobin over the last year his BUN is disproportionately elevated compared to his creatinine.  When given the okay from the cardiology team I would recommend we perform an EGD, if this is unremarkable then he would likely need a follow-up colonoscopy.  We will continue IV PPI twice daily.    I discussed the patients findings and my recommendations with patient.         Gregor Carlson M.D.  [facility]  [address] [place] [address]  Office: [phone]

## 2022-07-13 NOTE — PLAN OF CARE
Goal Outcome Evaluation:  Plan of Care Reviewed With: patient        Progress: no change  Outcome Evaluation: VSS, no complaints of pain. Went for EGD today. Plan for heart cath in AM.

## 2022-07-13 NOTE — ANESTHESIA PREPROCEDURE EVALUATION
Anesthesia Evaluation     Patient summary reviewed and Nursing notes reviewed   no history of anesthetic complications:  NPO Solid Status: > 8 hours  NPO Liquid Status: > 2 hours           Airway   Mallampati: II  TM distance: >3 FB  Neck ROM: full  Dental      Pulmonary    (+) sleep apnea,   Cardiovascular     ECG reviewed    (+) hypertension, CAD, CABG, hyperlipidemia,     ROS comment: Concerning area of ischemia on stress test, patient will receive LHC. Cardiology following    Neuro/Psych  GI/Hepatic/Renal/Endo    (+) morbid obesity,  diabetes mellitus,     Musculoskeletal     Abdominal    Substance History      OB/GYN          Other                      Anesthesia Plan    ASA 4     MAC     intravenous induction     Anesthetic plan, risks, benefits, and alternatives have been provided, discussed and informed consent has been obtained with: patient.        CODE STATUS:    Code Status (Patient has no pulse and is not breathing): CPR (Attempt to Resuscitate)  Medical Interventions (Patient has pulse or is breathing): Full Support

## 2022-07-13 NOTE — PROGRESS NOTES
"    Name: Edmond Chase ADMIT: 7/10/2022   : 1960  PCP: System, Provider Not In    MRN: [id number] LOS: 0 days   AGE/SEX: 61 y.o. male  ROOM: [facility]     Subjective   Subjective   Seen briefly as pt was being taken to Endoscopy. When asked how he felt, \"not good\" but nonspecific other than feeling weak.     Review of Systems   Constitutional: Positive for fatigue. Negative for fever.   HENT: Negative for congestion.    Respiratory: Negative for shortness of breath.    Cardiovascular: Negative for chest pain.   Gastrointestinal: Negative for nausea.   Genitourinary: Negative for difficulty urinating.   Musculoskeletal: Negative for arthralgias.   Skin: Negative for rash.   Neurological: Positive for weakness.   Psychiatric/Behavioral: Negative for sleep disturbance.        Objective   Objective   Vital Signs  Temp:  [97.5 °F (36.4 °C)-98.1 °F (36.7 °C)] 98.1 °F (36.7 °C)  Heart Rate:  [63-78] 78  Resp:  [16-19] 17  BP: (117-146)/(59-82) 120/59  SpO2:  [93 %-97 %] 94 %  on   ;   Device (Oxygen Therapy): room air  Body mass index is 41.96 kg/m².  Physical Exam  Vitals and nursing note reviewed.   Constitutional:       General: He is not in acute distress.     Appearance: He is obese.   HENT:      Head: Normocephalic.      Mouth/Throat:      Mouth: Mucous membranes are moist.   Eyes:      Conjunctiva/sclera: Conjunctivae normal.   Cardiovascular:      Rate and Rhythm: Normal rate and regular rhythm.   Pulmonary:      Effort: Pulmonary effort is normal. No respiratory distress.   Abdominal:      General: Bowel sounds are normal.      Palpations: Abdomen is soft.   Musculoskeletal:      Cervical back: Neck supple.      Right lower leg: Edema present.      Left lower leg: Edema present.      Comments: Trace   Neurological:      Mental Status: He is alert and oriented to person, place, and time.   Psychiatric:         Mood and Affect: Mood normal.         Behavior: Behavior normal.         Results Review     I " reviewed the patient's new clinical results.  Results from last 7 days   Lab Units 07/13/22  0837 07/13/22  0003 07/12/22  1542 07/10/22  0700   WBC 10*3/mm3  --   --  6.73 10.24   HEMOGLOBIN g/dL 8.6* 8.1* 8.6* 10.8*   PLATELETS 10*3/mm3  --   --  149 208     Results from last 7 days   Lab Units 07/13/22  0837 07/10/22  0700   SODIUM mmol/L 142 141   POTASSIUM mmol/L 3.7 4.5   CHLORIDE mmol/L 107 107   CO2 mmol/L 23.7 23.9   BUN mg/dL 23 54*   CREATININE mg/dL 0.76 0.69*   GLUCOSE mg/dL 105* 269*   EGFR mL/min/1.73 101.0 104.0     Results from last 7 days   Lab Units 07/10/22  0700   ALBUMIN g/dL 3.40*   BILIRUBIN mg/dL 0.5   ALK PHOS U/L 60   AST (SGOT) U/L 9   ALT (SGPT) U/L 15     Results from last 7 days   Lab Units 07/13/22  0837 07/10/22  0700   CALCIUM mg/dL 8.3* 8.2*   ALBUMIN g/dL  --  3.40*   MAGNESIUM mg/dL  --  1.8       Glucose   Date/Time Value Ref Range Status   07/13/2022 1145 111 70 - 130 mg/dL Final     Comment:     Meter: [id number] : [id number] Oz Rajput    07/13/2022 0528 114 70 - 130 mg/dL Final     Comment:     Meter: [id number] : [id number] Mel Alvarez NA   07/12/2022 2012 122 70 - 130 mg/dL Final     Comment:     Meter: [id number] : [id number] Mel Alexia NA   07/12/2022 1607 183 (H) 70 - 130 mg/dL Final     Comment:     Meter: [id number] : [id number] Micheal Vaughn NA   07/12/2022 1140 127 70 - 130 mg/dL Final     Comment:     Meter: [id number] : [id number] Burton Roderick NA   07/12/2022 0627 117 70 - 130 mg/dL Final     Comment:     Meter: [id number] : [id number] Mel MARQUEZ   07/11/2022 2026 139 (H) 70 - 130 mg/dL Final     Comment:     Meter: [id number] : [id number] Mel MARQUEZ       No radiology results for the last day  Scheduled Medications  amLODIPine, 10 mg, Oral, Daily  aspirin, 81 mg, Oral, Daily  atorvastatin, 40 mg, Oral, Nightly  clopidogrel, 75 mg, Oral, Daily  empagliflozin, 25 mg, Oral, Daily  insulin lispro, 0-9  Units, Subcutaneous, TID AC  isosorbide mononitrate, 60 mg, Oral, Q12H  lisinopril, 10 mg, Oral, Q24H  melatonin, 3 mg, Oral, Nightly  metoprolol succinate XL, 25 mg, Oral, Daily  pantoprazole, 40 mg, Intravenous, Q12H  sucralfate, 1 g, Oral, 4x Daily AC & at Bedtime    Infusions  sodium chloride, 30 mL/hr, Last Rate: Stopped (07/13/22 1105)    Diet  Diet Clear Liquid       Assessment/Plan     Active Hospital Problems    Diagnosis  POA   • **Angina at rest (HCC) [I20.8]  Yes   • Type 2 diabetes mellitus, without long-term current use of insulin (HCC) [E11.9]  Yes   • Anemia [D64.9]  Unknown   • Coronary artery disease [I25.10]  Yes   • TJ on auto CPAP [G47.33, Z99.89]  Not Applicable   • S/P CABG (coronary artery bypass graft) [Z95.1]  Not Applicable   • S/P AVR (aortic valve replacement) [Z95.2]  Not Applicable   • Status post ascending aortic aneurysm repair [Z98.890, Z86.79]  Not Applicable   • Essential hypertension [I10]  Yes      Resolved Hospital Problems   No resolved problems to display.       61 y.o. male admitted with Angina at rest (HCC).    -S/P EGD showing esophageal ulcer and erosive gastropathy. Continue PPI, carafate added. Avoid NSAIDS. Monitor Hgb, currently stable  -Cardiology planning lt heart cath tomorrow  -BG trends reviewed, acceptable. Continue to monitor  -Cpap used with sleep. No wheezing on exam    VIKTORIA Meza  [facility]  07/13/22  15:07 EDT

## 2022-07-14 LAB
GLUCOSE BLDC GLUCOMTR-MCNC: 103 MG/DL (ref 70–130)
GLUCOSE BLDC GLUCOMTR-MCNC: 104 MG/DL (ref 70–130)
GLUCOSE BLDC GLUCOMTR-MCNC: 132 MG/DL (ref 70–130)
GLUCOSE BLDC GLUCOMTR-MCNC: 199 MG/DL (ref 70–130)
GLUCOSE BLDC GLUCOMTR-MCNC: 98 MG/DL (ref 70–130)
HCT VFR BLD AUTO: 23.8 % (ref 37.5–51)
HCT VFR BLD AUTO: 24.2 % (ref 37.5–51)
HCT VFR BLD AUTO: 27.9 % (ref 37.5–51)
HGB BLD-MCNC: 7.9 G/DL (ref 13–17.7)
HGB BLD-MCNC: 8.4 G/DL (ref 13–17.7)
HGB BLD-MCNC: 9.1 G/DL (ref 13–17.7)

## 2022-07-14 PROCEDURE — 93455 CORONARY ART/GRFT ANGIO S&I: CPT | Performed by: INTERNAL MEDICINE

## 2022-07-14 PROCEDURE — 0 LIDOCAINE 1 % SOLUTION: Performed by: INTERNAL MEDICINE

## 2022-07-14 PROCEDURE — 99153 MOD SED SAME PHYS/QHP EA: CPT | Performed by: INTERNAL MEDICINE

## 2022-07-14 PROCEDURE — 85014 HEMATOCRIT: CPT | Performed by: INTERNAL MEDICINE

## 2022-07-14 PROCEDURE — 63710000001 INSULIN LISPRO (HUMAN) PER 5 UNITS: Performed by: INTERNAL MEDICINE

## 2022-07-14 PROCEDURE — G0378 HOSPITAL OBSERVATION PER HR: HCPCS

## 2022-07-14 PROCEDURE — 85018 HEMOGLOBIN: CPT | Performed by: INTERNAL MEDICINE

## 2022-07-14 PROCEDURE — 0 IOPAMIDOL PER 1 ML: Performed by: INTERNAL MEDICINE

## 2022-07-14 PROCEDURE — 25010000002 HEPARIN (PORCINE) PER 1000 UNITS: Performed by: INTERNAL MEDICINE

## 2022-07-14 PROCEDURE — C1894 INTRO/SHEATH, NON-LASER: HCPCS | Performed by: INTERNAL MEDICINE

## 2022-07-14 PROCEDURE — C1769 GUIDE WIRE: HCPCS | Performed by: INTERNAL MEDICINE

## 2022-07-14 PROCEDURE — 99214 OFFICE O/P EST MOD 30 MIN: CPT | Performed by: INTERNAL MEDICINE

## 2022-07-14 PROCEDURE — 82962 GLUCOSE BLOOD TEST: CPT

## 2022-07-14 PROCEDURE — 25010000002 FENTANYL CITRATE (PF) 50 MCG/ML SOLUTION: Performed by: INTERNAL MEDICINE

## 2022-07-14 PROCEDURE — 25010000002 MIDAZOLAM PER 1 MG: Performed by: INTERNAL MEDICINE

## 2022-07-14 PROCEDURE — 99152 MOD SED SAME PHYS/QHP 5/>YRS: CPT | Performed by: INTERNAL MEDICINE

## 2022-07-14 RX ORDER — MIDAZOLAM HYDROCHLORIDE 1 MG/ML
INJECTION INTRAMUSCULAR; INTRAVENOUS AS NEEDED
Status: DISCONTINUED | OUTPATIENT
Start: 2022-07-14 | End: 2022-07-14 | Stop reason: HOSPADM

## 2022-07-14 RX ORDER — HYDROCODONE BITARTRATE AND ACETAMINOPHEN 5; 325 MG/1; MG/1
1 TABLET ORAL EVERY 4 HOURS PRN
Status: DISCONTINUED | OUTPATIENT
Start: 2022-07-14 | End: 2022-07-15 | Stop reason: HOSPADM

## 2022-07-14 RX ORDER — LIDOCAINE HYDROCHLORIDE 10 MG/ML
INJECTION, SOLUTION INFILTRATION; PERINEURAL AS NEEDED
Status: DISCONTINUED | OUTPATIENT
Start: 2022-07-14 | End: 2022-07-14 | Stop reason: HOSPADM

## 2022-07-14 RX ORDER — LIDOCAINE HYDROCHLORIDE 20 MG/ML
INJECTION, SOLUTION INFILTRATION; PERINEURAL AS NEEDED
Status: DISCONTINUED | OUTPATIENT
Start: 2022-07-14 | End: 2022-07-14 | Stop reason: HOSPADM

## 2022-07-14 RX ORDER — ACETAMINOPHEN 325 MG/1
650 TABLET ORAL EVERY 4 HOURS PRN
Status: DISCONTINUED | OUTPATIENT
Start: 2022-07-14 | End: 2022-07-15 | Stop reason: HOSPADM

## 2022-07-14 RX ORDER — ONDANSETRON 2 MG/ML
4 INJECTION INTRAMUSCULAR; INTRAVENOUS EVERY 6 HOURS PRN
Status: DISCONTINUED | OUTPATIENT
Start: 2022-07-14 | End: 2022-07-15 | Stop reason: HOSPADM

## 2022-07-14 RX ORDER — ONDANSETRON 4 MG/1
4 TABLET, FILM COATED ORAL EVERY 6 HOURS PRN
Status: DISCONTINUED | OUTPATIENT
Start: 2022-07-14 | End: 2022-07-15 | Stop reason: HOSPADM

## 2022-07-14 RX ORDER — SODIUM CHLORIDE 9 MG/ML
50 INJECTION, SOLUTION INTRAVENOUS CONTINUOUS
Status: ACTIVE | OUTPATIENT
Start: 2022-07-14 | End: 2022-07-14

## 2022-07-14 RX ORDER — FENTANYL CITRATE 50 UG/ML
INJECTION, SOLUTION INTRAMUSCULAR; INTRAVENOUS AS NEEDED
Status: DISCONTINUED | OUTPATIENT
Start: 2022-07-14 | End: 2022-07-14 | Stop reason: HOSPADM

## 2022-07-14 RX ADMIN — LISINOPRIL 10 MG: 10 TABLET ORAL at 10:09

## 2022-07-14 RX ADMIN — ISOSORBIDE MONONITRATE 60 MG: 60 TABLET ORAL at 21:18

## 2022-07-14 RX ADMIN — AMLODIPINE BESYLATE 10 MG: 10 TABLET ORAL at 10:09

## 2022-07-14 RX ADMIN — ISOSORBIDE MONONITRATE 60 MG: 60 TABLET ORAL at 10:09

## 2022-07-14 RX ADMIN — INSULIN LISPRO 2 UNITS: 100 INJECTION, SOLUTION INTRAVENOUS; SUBCUTANEOUS at 16:42

## 2022-07-14 RX ADMIN — Medication 3 MG: at 21:18

## 2022-07-14 RX ADMIN — ASPIRIN 81 MG: 81 TABLET, COATED ORAL at 10:09

## 2022-07-14 RX ADMIN — SUCRALFATE 1 G: 1 TABLET ORAL at 16:42

## 2022-07-14 RX ADMIN — ATORVASTATIN CALCIUM 40 MG: 20 TABLET, FILM COATED ORAL at 21:19

## 2022-07-14 RX ADMIN — PANTOPRAZOLE SODIUM 40 MG: 40 INJECTION, POWDER, FOR SOLUTION INTRAVENOUS at 21:19

## 2022-07-14 RX ADMIN — CLOPIDOGREL 75 MG: 75 TABLET, FILM COATED ORAL at 10:07

## 2022-07-14 RX ADMIN — SUCRALFATE 1 G: 1 TABLET ORAL at 21:18

## 2022-07-14 RX ADMIN — METOPROLOL SUCCINATE 25 MG: 25 TABLET, EXTENDED RELEASE ORAL at 10:08

## 2022-07-14 RX ADMIN — PANTOPRAZOLE SODIUM 40 MG: 40 INJECTION, POWDER, FOR SOLUTION INTRAVENOUS at 10:09

## 2022-07-14 NOTE — PROGRESS NOTES
Name: Edmond Chase ADMIT: 7/10/2022   : 1960  PCP: System, Provider Not In    MRN: [id number] LOS: 0 days   AGE/SEX: 61 y.o. male  ROOM: [facility] OR/MAIN OR     Subjective   Subjective   Plan cardiac cath today. No new issues. Sister at bedside who has multiple questions; all answered to best of my ability    Review of Systems   Constitutional: Positive for fatigue. Negative for fever.   HENT: Negative for congestion.    Respiratory: Negative for shortness of breath.    Cardiovascular: Negative for chest pain.   Gastrointestinal: Negative for abdominal pain and nausea.   Genitourinary: Negative for difficulty urinating.   Musculoskeletal: Negative for arthralgias and myalgias.   Skin: Negative for rash.   Neurological: Negative for headaches.   Psychiatric/Behavioral: Negative for sleep disturbance.        Objective   Objective   Vital Signs  Temp:  [97.6 °F (36.4 °C)-98.6 °F (37 °C)] 98 °F (36.7 °C)  Heart Rate:  [63-78] 71  Resp:  [12-22] 18  BP: (118-150)/(59-81) 143/76  SpO2:  [93 %-100 %] 93 %  on  Flow (L/min):  [1] 1;   Device (Oxygen Therapy): room air  Body mass index is 41.96 kg/m².  Physical Exam  Vitals and nursing note reviewed.   Constitutional:       General: He is not in acute distress.     Appearance: He is obese.   HENT:      Head: Normocephalic.      Mouth/Throat:      Mouth: Mucous membranes are moist.   Eyes:      Conjunctiva/sclera: Conjunctivae normal.   Cardiovascular:      Rate and Rhythm: Normal rate and regular rhythm.   Pulmonary:      Effort: Pulmonary effort is normal. No respiratory distress.      Breath sounds: Normal breath sounds.   Abdominal:      General: Bowel sounds are normal.      Palpations: Abdomen is soft.   Musculoskeletal:      Cervical back: Neck supple.      Right lower leg: Edema present.      Left lower leg: Edema present.      Comments: Trace-1+   Skin:     General: Skin is warm and dry.   Neurological:      Mental Status: He is alert and oriented  to person, place, and time.   Psychiatric:         Mood and Affect: Mood normal.         Behavior: Behavior normal.         Results Review     I reviewed the patient's new clinical results.  Results from last 7 days   Lab Units 07/14/22  0759 07/14/22  0000 07/13/22  1619 07/13/22  0837 07/13/22  0003 07/12/22  1542 07/10/22  0700   WBC 10*3/mm3  --   --   --   --   --  6.73 10.24   HEMOGLOBIN g/dL 8.4* 7.9* 9.0* 8.6*   < > 8.6* 10.8*   PLATELETS 10*3/mm3  --   --   --   --   --  149 208    < > = values in this interval not displayed.     Results from last 7 days   Lab Units 07/13/22  0837 07/10/22  0700   SODIUM mmol/L 142 141   POTASSIUM mmol/L 3.7 4.5   CHLORIDE mmol/L 107 107   CO2 mmol/L 23.7 23.9   BUN mg/dL 23 54*   CREATININE mg/dL 0.76 0.69*   GLUCOSE mg/dL 105* 269*   EGFR mL/min/1.73 101.0 104.0     Results from last 7 days   Lab Units 07/10/22  0700   ALBUMIN g/dL 3.40*   BILIRUBIN mg/dL 0.5   ALK PHOS U/L 60   AST (SGOT) U/L 9   ALT (SGPT) U/L 15     Results from last 7 days   Lab Units 07/13/22  0837 07/10/22  0700   CALCIUM mg/dL 8.3* 8.2*   ALBUMIN g/dL  --  3.40*   MAGNESIUM mg/dL  --  1.8       Glucose   Date/Time Value Ref Range Status   07/14/2022 1102 104 70 - 130 mg/dL Final     Comment:     Meter: [id number] : [id number] Oz Rajput NA   07/14/2022 0659 98 70 - 130 mg/dL Final     Comment:     Meter: [id number] : [id number] Henrietta Candelaria SEAN   07/13/2022 2212 114 70 - 130 mg/dL Final     Comment:     Meter: [id number] : [id number] Henrietta Candelaria SEAN   07/13/2022 1645 105 70 - 130 mg/dL Final     Comment:     Meter: [id number] : [id number] Clintondamaris Rajput JIMMY   07/13/2022 1145 111 70 - 130 mg/dL Final     Comment:     Meter: [id number] : [id number] Oz Rajupt    07/13/2022 0528 114 70 - 130 mg/dL Final     Comment:     Meter: [id number] : [id number] Mel Alvarez    07/12/2022 2012 122 70 - 130 mg/dL Final     Comment:     Meter: [id number] : [id number]  Mel Alvarez NA       No radiology results for the last day  Scheduled Medications  amLODIPine, 10 mg, Oral, Daily  [MAR Hold] aspirin, 81 mg, Oral, Daily  [MAR Hold] atorvastatin, 40 mg, Oral, Nightly  [MAR Hold] clopidogrel, 75 mg, Oral, Daily  [MAR Hold] empagliflozin, 25 mg, Oral, Daily  [MAR Hold] insulin lispro, 0-9 Units, Subcutaneous, TID AC  [MAR Hold] isosorbide mononitrate, 60 mg, Oral, Q12H  lisinopril, 10 mg, Oral, Q24H  [MAR Hold] melatonin, 3 mg, Oral, Nightly  metoprolol succinate XL, 25 mg, Oral, Daily  [MAR Hold] pantoprazole, 40 mg, Intravenous, Q12H  [MAR Hold] sucralfate, 1 g, Oral, 4x Daily AC & at Bedtime    Infusions  sodium chloride, 30 mL/hr, Last Rate: Stopped (07/13/22 1105)    Diet  NPO Diet NPO Type: Sips with Meds       Assessment/Plan     Active Hospital Problems    Diagnosis  POA   • **Angina at rest (HCC) [I20.8]  Yes   • Type 2 diabetes mellitus, without long-term current use of insulin (HCC) [E11.9]  Yes   • Anemia [D64.9]  Unknown   • Coronary artery disease [I25.10]  Yes   • TJ on auto CPAP [G47.33, Z99.89]  Not Applicable   • S/P CABG (coronary artery bypass graft) [Z95.1]  Not Applicable   • S/P AVR (aortic valve replacement) [Z95.2]  Not Applicable   • Status post ascending aortic aneurysm repair [Z98.890, Z86.79]  Not Applicable   • Essential hypertension [I10]  Yes      Resolved Hospital Problems   No resolved problems to display.       61 y.o. male admitted with Angina at rest (HCC).    -S/P EGD showing esophageal ulcer and erosive gastropathy. Continue PPI will change to po, carafate added. Avoid NSAIDS  -Cardiology planning lt heart cath today. If intervention not required, may drop ASA and continue plavix only  -BG trends reviewed, acceptable. Continue to monitor  -Cpap used with sleep. No wheezing on exam  -Pt would be stable for discharge if no intervention needed from medical standpoint. Recommend repeat CBC early next week       Chantel Hou,  VIKTORIA  [facility]  07/14/22  13:37 EDT

## 2022-07-14 NOTE — CASE MANAGEMENT/SOCIAL WORK
Continued Stay Note  [facility]     Patient Name: Edmond Chase  MRN: [id number]  Today's Date: 7/14/2022    Admit Date: 7/10/2022     Discharge Plan     Row Name 07/14/22 1120       Plan    Plan Home    Patient/Family in Agreement with Plan yes    Plan Comments Met with pt at bedside who confirms plan to return home at discharge.  No identified needs at this time.  Pts appt with Nargis Ron rescheduled for Fri July 22 at 1pm..  Pt informed and agreeable.  EL Espinosa RN               Discharge Codes    No documentation.               Expected Discharge Date and Time     Expected Discharge Date Expected Discharge Time    Jul 15, 2022             Jessica Espinosa, RN     home/outpatient PT/when cleared by MD

## 2022-07-14 NOTE — PLAN OF CARE
Problem: Adult Inpatient Plan of Care  Goal: Plan of Care Review  Outcome: Ongoing, Progressing  Flowsheets (Taken 7/14/2022 1814)  Progress: improving  Plan of Care Reviewed With:   patient   sibling  Goal: Patient-Specific Goal (Individualized)  Outcome: Ongoing, Progressing  Goal: Absence of Hospital-Acquired Illness or Injury  Outcome: Ongoing, Progressing  Intervention: Identify and Manage Fall Risk  Recent Flowsheet Documentation  Taken 7/14/2022 1801 by Shelley Carcamo RN  Safety Promotion/Fall Prevention: safety round/check completed  Taken 7/14/2022 1604 by Shelley Carcamo RN  Safety Promotion/Fall Prevention: safety round/check completed  Taken 7/14/2022 1422 by Shelley Carcamo RN  Safety Promotion/Fall Prevention: patient off unit  Taken 7/14/2022 1200 by Shelley Carcamo RN  Safety Promotion/Fall Prevention: patient off unit  Taken 7/14/2022 1049 by Shelley Carcamo RN  Safety Promotion/Fall Prevention: safety round/check completed  Taken 7/14/2022 0940 by Shelley Cacramo RN  Safety Promotion/Fall Prevention:   safety round/check completed   room organization consistent   nonskid shoes/slippers when out of bed   fall prevention program maintained   clutter free environment maintained  Intervention: Prevent Skin Injury  Recent Flowsheet Documentation  Taken 7/14/2022 1049 by Shelley Carcamo RN  Body Position: position changed independently  Taken 7/14/2022 0940 by Shelley Carcamo RN  Body Position:   position changed independently   lower extremity elevated  Intervention: Prevent and Manage VTE (Venous Thromboembolism) Risk  Recent Flowsheet Documentation  Taken 7/14/2022 1801 by Shelley Carcamo, RN  Activity Management: up in chair  Taken 7/14/2022 1604 by Shelley Carcamo RN  Activity Management: up in chair  Taken 7/14/2022 0940 by Shelley Carcamo, RN  Activity Management:   activity adjusted per tolerance   up ad annita   up in chair  VTE Prevention/Management:  dorsiflexion/plantar flexion performed  Intervention: Prevent Infection  Recent Flowsheet Documentation  Taken 7/14/2022 0940 by Shelley Carcamo RN  Infection Prevention:   single patient room provided   rest/sleep promoted   hand hygiene promoted  Goal: Optimal Comfort and Wellbeing  Outcome: Ongoing, Progressing  Intervention: Provide Person-Centered Care  Recent Flowsheet Documentation  Taken 7/14/2022 1801 by Shelley Carcamo RN  Trust Relationship/Rapport:   care explained   questions answered  Taken 7/14/2022 1604 by Shelley Carcamo RN  Trust Relationship/Rapport:   care explained   questions answered  Taken 7/14/2022 1049 by Shelley Carcamo RN  Trust Relationship/Rapport:   care explained   questions answered  Taken 7/14/2022 0940 by Shelley Carcamo RN  Trust Relationship/Rapport:   care explained   questions answered  Goal: Readiness for Transition of Care  Outcome: Ongoing, Progressing     Problem: Diabetes Comorbidity  Goal: Blood Glucose Level Within Targeted Range  Outcome: Ongoing, Progressing  Intervention: Monitor and Manage Glycemia  Recent Flowsheet Documentation  Taken 7/14/2022 0940 by Shelley Carcamo RN  Glycemic Management: blood glucose monitored     Problem: Hypertension Comorbidity  Goal: Blood Pressure in Desired Range  Outcome: Ongoing, Progressing  Intervention: Maintain Blood Pressure Management  Recent Flowsheet Documentation  Taken 7/14/2022 1801 by Shelley Carcamo RN  Medication Review/Management: medications reviewed  Taken 7/14/2022 0940 by Shelley Carcamo RN  Medication Review/Management: medications reviewed   Goal Outcome Evaluation:  Plan of Care Reviewed With: patient, sibling        Progress: improving

## 2022-07-14 NOTE — DISCHARGE INSTRUCTIONS
[facility]  [address] [place] [address]    Coronary Angiogram (Radial/Ulnar Approach) After Care    Refer to this sheet in the next few weeks. These instructions provide you with information on caring for yourself after your procedure. Your caregiver may also give you more specific instructions. Your treatment has been planned according to current medical practices, but problems sometimes occur. Call your caregiver if you have any problems or questions after your procedure.    Home Care Instructions:  You may shower the day after the procedure. Remove the bandage (dressing) and gently wash the site with plain soap and water. Gently pat the site dry. You may apply a band aid daily for 2 days if desired.    Do not apply powder or lotion to the site.  Do not submerge the affected site in water for 3 to 5 days or until the site is completely healed.   Do not lift, push or pull anything over 5 pounds for 5 days after your procedure or as directed by your physician.  As a reference, a gallon of milk weighs 8 pounds.   Inspect the site at least twice daily. You may notice some bruising at the site and it may be tender for 1 to 2 weeks.     Increase your fluid intake for the next 2 days.    Keep arm elevated for 24 hours. For the remainder of the day, keep your arm in “Pledge of Allegiance” position when up and about.     You may drive 24 hours after the procedure unless otherwise instructed by your caregiver.  Do not operate machinery or power tools for 24 hours.  A responsible adult should be with you for the first 24 hours after you arrive home. Do not make any important legal decisions or sign legal papers for 24 hours.  Do not drink alcohol for 24 hours.    Metformin or any medications containing Metformin should not be taken for 48 hours after your procedure.      Call Your Doctor if:   You have unusual pain at the radial/ulnar (wrist) site.  You have redness, warmth, swelling, or pain at the  radial/ulnar (wrist) site.  You have drainage (other than a small amount of blood on the dressing).  `You have chills or a fever > 101.  Your arm becomes pale or dark, cool, tingly, or numb.  You develop chest pain, shortness of breath, feel faint or pass out.    You have heavy bleeding from the site, hold pressure on the site for 20 minutes.  If the bleeding stops, apply a fresh bandage and call your cardiologist.  However, if you        continue to have bleeding, call 911 and continue to apply pressure to the site.   You have any symptoms of a stroke.  Remember BE FAST  B-balance. Sudden trouble walking or loss of balance.  E-eyes.  Sudden changes in how you see or a sudden onset of a very bad headache.   F-face. Sudden weakness or loss of feeling of the face or facial droop on one side.   A-arms Sudden weakness or numbness in one arm.  One arm drifts down if they are both held out in front of you. This happens suddenly and usually on one side of the body.   S-speech.  Sudden trouble speaking, slurred speech or trouble understanding what are saying.   T-time  Time to call emergency services.  Write down the symptoms and the time they started.

## 2022-07-14 NOTE — PROGRESS NOTES
"[facility]    Patient Name: Edmond Chase  :1960  61 y.o.  LOS: 0  Encounter Provider: Yandel Patel Jr, MD      Patient Care Team:  System, Provider Not In as PCP - General    Chief Complaint: Follow-up unstable angina, coronary artery disease, abnormal nuclear stress test, aortic stenosis status post AVR    Interval History: Patient had EGD yesterday with evidence of erosive gastropathy however no recent stigmata of bleeding.  He has significant anxiety and when I came in the room he was screaming that he was short of breath but his oxygen saturation was 100% on room air.  Attempted to see him multiple times yesterday but he was in the EGD lab and no clinical complaints according to nursing staff.       Objective   Vital Signs  Temp:  [97.6 °F (36.4 °C)-98.6 °F (37 °C)] 98.1 °F (36.7 °C)  Heart Rate:  [63-78] 71  Resp:  [17-20] 18  BP: (117-150)/(59-81) 150/81    Intake/Output Summary (Last 24 hours) at 2022 1022  Last data filed at 2022 1854  Gross per 24 hour   Intake 440 ml   Output --   Net 440 ml     Flowsheet Rows    Flowsheet Row First Filed Value   Admission Height 185.4 cm (73\") Documented at 07/10/2022 0618   Admission Weight 148 kg (327 lb) Documented at 07/10/2022 0618            Physical Exam  Vital signs reviewed.  Constitutional:       Appearance: Healthy appearance. Not in distress.   Neck:      Vascular: No JVR. JVD normal.   Pulmonary:      Effort: Pulmonary effort is normal.      Breath sounds: Normal breath sounds. No wheezing. No rhonchi. No rales.   Chest:      Chest wall: Not tender to palpatation.   Cardiovascular:      PMI at left midclavicular line. Normal rate. Regular rhythm. Normal S1. Normal S2.      Murmurs: There is a grade 3/6 harsh midsystolic murmur at the URSB, radiating to the neck.      No gallop. No click. No rub.   Pulses:     Intact distal pulses.   Edema:     Peripheral edema absent.   Abdominal:      General: Bowel sounds are " normal.      Palpations: Abdomen is soft.      Tenderness: There is no abdominal tenderness.   Musculoskeletal: Normal range of motion.         General: No tenderness. Skin:     General: Skin is warm and dry.   Neurological:      General: No focal deficit present.      Mental Status: Alert and oriented to person, place and time.     Physical exam was reviewed, updated and amended when necessary.    Pertinent Test Results:  Results from last 7 days   Lab Units 07/13/22  0837 07/10/22  0700   SODIUM mmol/L 142 141   POTASSIUM mmol/L 3.7 4.5   CHLORIDE mmol/L 107 107   CO2 mmol/L 23.7 23.9   BUN mg/dL 23 54*   CREATININE mg/dL 0.76 0.69*   GLUCOSE mg/dL 105* 269*   CALCIUM mg/dL 8.3* 8.2*   AST (SGOT) U/L  --  9   ALT (SGPT) U/L  --  15     Results from last 7 days   Lab Units 07/11/22  1058 07/10/22  0952 07/10/22  0700   TROPONIN T ng/mL <0.010 <0.010 <0.010     Results from last 7 days   Lab Units 07/14/22  0759 07/14/22  0000 07/13/22  1619 07/13/22  0837 07/13/22  0003 07/12/22  1542 07/10/22  0700   WBC 10*3/mm3  --   --   --   --   --  6.73 10.24   HEMOGLOBIN g/dL 8.4* 7.9* 9.0* 8.6* 8.1* 8.6* 10.8*   HEMATOCRIT % 24.2* 23.8* 25.9* 25.4* 22.9* 25.0* 32.6*   PLATELETS 10*3/mm3  --   --   --   --   --  149 208     Results from last 7 days   Lab Units 07/10/22  0700   INR  1.01     Results from last 7 days   Lab Units 07/10/22  0700   MAGNESIUM mg/dL 1.8           Invalid input(s): LDLCALC  Results from last 7 days   Lab Units 07/11/22  1058   PROBNP pg/mL 118.0               Medication Review:   amLODIPine, 10 mg, Oral, Daily  aspirin, 81 mg, Oral, Daily  atorvastatin, 40 mg, Oral, Nightly  clopidogrel, 75 mg, Oral, Daily  empagliflozin, 25 mg, Oral, Daily  insulin lispro, 0-9 Units, Subcutaneous, TID AC  isosorbide mononitrate, 60 mg, Oral, Q12H  lisinopril, 10 mg, Oral, Q24H  melatonin, 3 mg, Oral, Nightly  metoprolol succinate XL, 25 mg, Oral, Daily  pantoprazole, 40 mg, Intravenous, Q12H  sucralfate, 1 g,  Oral, 4x Daily AC & at Bedtime         sodium chloride, 30 mL/hr, Last Rate: Stopped (07/13/22 1105)        Assessment & Plan   1. Angina -concerning pattern of angina to the patient.  Positive stress study.  Chronic anemia noted.  EGD performed yesterday showing erosive gastropathy without recent stigmata of bleeding. Plan for cardiac catheterization today.  If no intervention we will drop aspirin and continue Plavix monotherapy.  2. CAD -continue dual antiplatelet therapy for now, beta-blocker and statin.    Continue current antianginal regimen.  3. Hypertension  4. Valvular heart disease  5. Diabetes -appreciate medicine consult    If no intervention he will be going home today.    Yandel Patel Jr, MD  [facility]  07/14/22  10:22 EDT

## 2022-07-15 VITALS
SYSTOLIC BLOOD PRESSURE: 152 MMHG | DIASTOLIC BLOOD PRESSURE: 75 MMHG | OXYGEN SATURATION: 96 % | RESPIRATION RATE: 16 BRPM | BODY MASS INDEX: 41.75 KG/M2 | TEMPERATURE: 97.9 F | WEIGHT: 315 LBS | HEIGHT: 73 IN | HEART RATE: 67 BPM

## 2022-07-15 LAB
GLUCOSE BLDC GLUCOMTR-MCNC: 111 MG/DL (ref 70–130)
GLUCOSE BLDC GLUCOMTR-MCNC: 169 MG/DL (ref 70–130)
HCT VFR BLD AUTO: 23 % (ref 37.5–51)
HCT VFR BLD AUTO: 26.8 % (ref 37.5–51)
HGB BLD-MCNC: 8.1 G/DL (ref 13–17.7)
HGB BLD-MCNC: 8.7 G/DL (ref 13–17.7)

## 2022-07-15 PROCEDURE — 99217 PR OBSERVATION CARE DISCHARGE MANAGEMENT: CPT | Performed by: INTERNAL MEDICINE

## 2022-07-15 PROCEDURE — 63710000001 INSULIN LISPRO (HUMAN) PER 5 UNITS: Performed by: INTERNAL MEDICINE

## 2022-07-15 PROCEDURE — 82962 GLUCOSE BLOOD TEST: CPT

## 2022-07-15 PROCEDURE — 85018 HEMOGLOBIN: CPT | Performed by: INTERNAL MEDICINE

## 2022-07-15 PROCEDURE — 85014 HEMATOCRIT: CPT | Performed by: INTERNAL MEDICINE

## 2022-07-15 PROCEDURE — G0378 HOSPITAL OBSERVATION PER HR: HCPCS

## 2022-07-15 PROCEDURE — 99214 OFFICE O/P EST MOD 30 MIN: CPT | Performed by: INTERNAL MEDICINE

## 2022-07-15 RX ORDER — PANTOPRAZOLE SODIUM 40 MG/1
40 TABLET, DELAYED RELEASE ORAL
Status: DISCONTINUED | OUTPATIENT
Start: 2022-07-16 | End: 2022-07-15 | Stop reason: HOSPADM

## 2022-07-15 RX ORDER — SUCRALFATE 1 G/1
1 TABLET ORAL
Status: DISCONTINUED | OUTPATIENT
Start: 2022-07-15 | End: 2022-07-15 | Stop reason: HOSPADM

## 2022-07-15 RX ORDER — FUROSEMIDE 20 MG/1
20 TABLET ORAL DAILY
Status: DISCONTINUED | OUTPATIENT
Start: 2022-07-15 | End: 2022-07-15 | Stop reason: HOSPADM

## 2022-07-15 RX ORDER — PANTOPRAZOLE SODIUM 40 MG/1
40 TABLET, DELAYED RELEASE ORAL
Qty: 30 TABLET | Refills: 5 | Status: SHIPPED | OUTPATIENT
Start: 2022-07-16 | End: 2022-12-06 | Stop reason: SDUPTHER

## 2022-07-15 RX ORDER — POTASSIUM CHLORIDE 750 MG/1
10 TABLET, FILM COATED, EXTENDED RELEASE ORAL DAILY
Status: DISCONTINUED | OUTPATIENT
Start: 2022-07-15 | End: 2022-07-15 | Stop reason: HOSPADM

## 2022-07-15 RX ORDER — POTASSIUM CHLORIDE 750 MG/1
10 TABLET, FILM COATED, EXTENDED RELEASE ORAL DAILY
Qty: 30 TABLET | Refills: 5 | Status: SHIPPED | OUTPATIENT
Start: 2022-07-16 | End: 2022-08-08 | Stop reason: SDUPTHER

## 2022-07-15 RX ORDER — SUCRALFATE 1 G/1
1 TABLET ORAL
Qty: 60 TABLET | Refills: 5 | Status: SHIPPED | OUTPATIENT
Start: 2022-07-15 | End: 2023-01-17

## 2022-07-15 RX ORDER — FUROSEMIDE 20 MG/1
20 TABLET ORAL DAILY
Qty: 30 TABLET | Refills: 5 | Status: SHIPPED | OUTPATIENT
Start: 2022-07-15 | End: 2022-08-08 | Stop reason: SDUPTHER

## 2022-07-15 RX ADMIN — SUCRALFATE 1 G: 1 TABLET ORAL at 06:38

## 2022-07-15 RX ADMIN — METOPROLOL SUCCINATE 25 MG: 25 TABLET, EXTENDED RELEASE ORAL at 08:06

## 2022-07-15 RX ADMIN — ASPIRIN 81 MG: 81 TABLET, COATED ORAL at 08:05

## 2022-07-15 RX ADMIN — PANTOPRAZOLE SODIUM 40 MG: 40 INJECTION, POWDER, FOR SOLUTION INTRAVENOUS at 08:04

## 2022-07-15 RX ADMIN — CLOPIDOGREL 75 MG: 75 TABLET, FILM COATED ORAL at 08:04

## 2022-07-15 RX ADMIN — LISINOPRIL 10 MG: 10 TABLET ORAL at 08:04

## 2022-07-15 RX ADMIN — ISOSORBIDE MONONITRATE 60 MG: 60 TABLET ORAL at 08:05

## 2022-07-15 RX ADMIN — AMLODIPINE BESYLATE 10 MG: 10 TABLET ORAL at 08:04

## 2022-07-15 RX ADMIN — INSULIN LISPRO 2 UNITS: 100 INJECTION, SOLUTION INTRAVENOUS; SUBCUTANEOUS at 11:01

## 2022-07-15 RX ADMIN — EMPAGLIFLOZIN 25 MG: 25 TABLET, FILM COATED ORAL at 08:05

## 2022-07-15 RX ADMIN — POTASSIUM CHLORIDE 10 MEQ: 750 TABLET, EXTENDED RELEASE ORAL at 11:02

## 2022-07-15 NOTE — PROGRESS NOTES
[facility]  Inpatient Progress Note    Reason for Follow Up: GI bleed    Subjective     Interval History:   No further bleeding, and ulcer was found at the GE junction no visible vessel  Last colonoscopy was 3 years ago, was told to have another one in 3 years    Current Facility-Administered Medications:   •  acetaminophen (TYLENOL) tablet 650 mg, 650 mg, Oral, Q4H PRN, Charlie Aj MD  •  albuterol (PROVENTIL) nebulizer solution 0.083% 2.5 mg/3mL, 2.5 mg, Nebulization, Q4H PRN, Charlie Aj MD  •  amLODIPine (NORVASC) tablet 10 mg, 10 mg, Oral, Daily, Charlie Aj MD, 10 mg at 07/14/22 1009  •  aspirin EC tablet 81 mg, 81 mg, Oral, Daily, Charlie Aj MD, 81 mg at 07/14/22 1009  •  atorvastatin (LIPITOR) tablet 40 mg, 40 mg, Oral, Nightly, Charlie Aj MD, 40 mg at 07/14/22 2119  •  clopidogrel (PLAVIX) tablet 75 mg, 75 mg, Oral, Daily, Charlie Aj MD, 75 mg at 07/14/22 1007  •  dextrose (D50W) (25 g/50 mL) IV injection 25 g, 25 g, Intravenous, Q15 Min PRN, Charlie Aj MD  •  dextrose (GLUTOSE) oral gel 15 g, 15 g, Oral, Q15 Min PRN, Chralie Aj MD  •  empagliflozin (JARDIANCE) tablet 25 mg, 25 mg, Oral, Daily, Charlie Aj MD, 25 mg at 07/13/22 0910  •  glucagon (human recombinant) (GLUCAGEN DIAGNOSTIC) injection 1 mg, 1 mg, Intramuscular, Q15 Min PRN, Charlie Aj MD  •  HYDROcodone-acetaminophen (NORCO) 5-325 MG per tablet 1 tablet, 1 tablet, Oral, Q4H PRN, Charlie Aj MD  •  hydrOXYzine pamoate (VISTARIL) capsule 25 mg, 25 mg, Oral, TID PRN, Charlie Aj MD, 25 mg at 07/11/22 1329  •  insulin lispro (ADMELOG) injection 0-9 Units, 0-9 Units, Subcutaneous, TID AC, Charlie Aj MD, 2 Units at 07/14/22 1642  •  isosorbide mononitrate (IMDUR) 24 hr tablet 60 mg, 60 mg, Oral, Q12H, Charlie Aj MD, 60 mg at 07/14/22 2118  •  lisinopril  (PRINIVIL,ZESTRIL) tablet 10 mg, 10 mg, Oral, Q24H, Charlie Aj MD, 10 mg at 07/14/22 1009  •  melatonin tablet 3 mg, 3 mg, Oral, Nightly, Charlie Aj MD, 3 mg at 07/14/22 2118  •  metoprolol succinate XL (TOPROL-XL) 24 hr tablet 25 mg, 25 mg, Oral, Daily, Charlie Aj MD, 25 mg at 07/14/22 1008  •  nitroglycerin (NITROSTAT) SL tablet 0.4 mg, 0.4 mg, Sublingual, Q5 Min PRN, Charlie Aj MD, 0.4 mg at 07/10/22 0648  •  ondansetron (ZOFRAN) tablet 4 mg, 4 mg, Oral, Q6H PRN **OR** ondansetron (ZOFRAN) injection 4 mg, 4 mg, Intravenous, Q6H PRN, Charlie Aj MD  •  pantoprazole (PROTONIX) injection 40 mg, 40 mg, Intravenous, Q12H, Charlie Aj MD, 40 mg at 07/14/22 2119  •  [COMPLETED] Insert peripheral IV, , , Once **AND** sodium chloride 0.9 % flush 10 mL, 10 mL, Intravenous, PRN, Charlie Aj MD  •  sodium chloride 0.9 % infusion, 30 mL/hr, Intravenous, Continuous PRN, Charlie Aj MD, Stopped at 07/13/22 1105  •  sucralfate (CARAFATE) tablet 1 g, 1 g, Oral, 4x Daily AC & at Bedtime, Charlie Aj MD, 1 g at 07/15/22 0638  Review of Systems:    There is weakness of fatigue all other systems reviewed and negative    Objective     Vital Signs  Temp:  [98 °F (36.7 °C)-98.3 °F (36.8 °C)] 98 °F (36.7 °C)  Heart Rate:  [61-86] 67  Resp:  [12-22] 16  BP: (118-155)/(67-85) 136/78  Body mass index is 42.49 kg/m².    Intake/Output Summary (Last 24 hours) at 7/15/2022 0720  Last data filed at 7/15/2022 0300  Gross per 24 hour   Intake 1620 ml   Output --   Net 1620 ml     No intake/output data recorded.     Physical Exam:   General: patient awake, alert and cooperative   Eyes: Normal lids and lashes, no scleral icterus   Neck: supple, normal ROM   Skin: warm and dry, not jaundiced   Cardiovascular: regular rhythm and rate, no murmurs auscultated   Pulm: clear to auscultation bilaterally, regular and unlabored   Abdomen: soft, nontender,  nondistended; normal bowel sounds   Extremities: no rash or edema   Psychiatric: Normal mood and behavior; memory intact     Results Review:     I reviewed the patient's new clinical results.    Results from last 7 days   Lab Units 07/14/22  2344 07/14/22  1644 07/14/22  0759 07/13/22  0003 07/12/22  1542 07/10/22  0700   WBC 10*3/mm3  --   --   --   --  6.73 10.24   HEMOGLOBIN g/dL 8.1* 9.1* 8.4*   < > 8.6* 10.8*   HEMATOCRIT % 23.0* 27.9* 24.2*   < > 25.0* 32.6*   PLATELETS 10*3/mm3  --   --   --   --  149 208    < > = values in this interval not displayed.     Results from last 7 days   Lab Units 07/13/22  0837 07/10/22  0700   SODIUM mmol/L 142 141   POTASSIUM mmol/L 3.7 4.5   CHLORIDE mmol/L 107 107   CO2 mmol/L 23.7 23.9   BUN mg/dL 23 54*   CREATININE mg/dL 0.76 0.69*   CALCIUM mg/dL 8.3* 8.2*   BILIRUBIN mg/dL  --  0.5   ALK PHOS U/L  --  60   ALT (SGPT) U/L  --  15   AST (SGOT) U/L  --  9   GLUCOSE mg/dL 105* 269*     Results from last 7 days   Lab Units 07/10/22  0700   INR  1.01     No results found for: LIPASE    Radiology:  XR Chest 1 View   Final Result   No evidence for active disease in the chest.       This report was finalized on 7/10/2022 8:12 AM by Dr. Edmond Garcia M.D.              Assessment & Plan     Patient Active Problem List   Diagnosis   • Essential hypertension   • Hyperglycemia   • Hyperlipidemia   • Class 3 severe obesity due to excess calories with serious comorbidity and body mass index (BMI) of 40.0 to 44.9 in adult ([facility])   • Nocturia   • Nonrheumatic aortic valve insufficiency   • Myocardial ischemia   • Coronary artery disease involving native coronary artery of native heart   • Ascending aortic aneurysm ([facility])   • CAD in native artery   • S/P CABG (coronary artery bypass graft)   • S/P AVR (aortic valve replacement)   • Status post ascending aortic aneurysm repair   • Fatigue   • Abnormal nuclear stress test   • Coronary artery disease   • Coronary artery disease of bypass  graft of native heart with stable angina pectoris (HCC)   • Acute chest pain   • TJ on auto CPAP   • Hypersomnia due to medical condition   • Angina at rest (HCC)   • Type 2 diabetes mellitus, without long-term current use of insulin (HCC)   • Anemia       Assessment:  1. Anemia  2. Melena  3. Angina  4. Plavix use      Plan:  · EGD with esophageal ulcer, likely source of melena  · Cardiac work-up underway  · Okay for Plavix upon discharge  · We have discussed need for colonoscopy as last one was 3 years ago he would like to do it as an outpatient, we will see him in the office to discuss  I discussed the patients findings and my recommendations with patient and nursing staff.    Gregor Last MD

## 2022-07-15 NOTE — PLAN OF CARE
Goal Outcome Evaluation:      VSS. No c/o chest pain or soa this shift. Sats wnl on RA. IVF d/c. Up ad annita. Plan for d/c home today.

## 2022-07-15 NOTE — DISCHARGE SUMMARY
[facility]      Patient Name: Edmond Chase  :1960  61 y.o.  LOS: 0  Encounter Provider: Yandel Patel Jr, MD      Date of Admission: 7/10/2022    Date of Discharge:  7/15/2022    Treatment Team:  Patient Care Team:  System, Provider Not In as PCP - General    Discharge Condition: Stable  Discharge Diagnosis:    Angina at rest (HCC)    Essential hypertension    S/P CABG (coronary artery bypass graft)    S/P AVR (aortic valve replacement)    Status post ascending aortic aneurysm repair    Coronary artery disease    TJ on auto CPAP    Type 2 diabetes mellitus, without long-term current use of insulin (HCC)    Anemia      History of Present Illness:  Mr. Chase is a 61 year old male with hypertension, diabetes mellitus type II, coronary artery disease status post prior CABG x3, bioprosthetic aortic valve replacement, who presents with chest pain.        Patient reports that he has been experiencing intermittent chest discomfort for the last 2 days.  He even came to the emergency room yesterday and had an EKG performed low but due to the wait opted not to stay for an evaluation.  Early this morning he was woken up by chest discomfort associated with diaphoresis and shortness of breath.  He took an additional half isosorbide with some improvement in his symptoms and called EMS.     Following his arrival to the hospital he was still having chest discomfort.  He was noted to be mildly hypotensive with systolic pressures in the 90s.  He was also noted to be diaphoretic and uncomfortable appearing.  Initial EKG showed stable lateral T wave changes.  Repeat EKG was poor quality tracing and there was some concerns that he had developed inferior ST elevations.  On my review of the EKG there did not appear to be any evidence of ST elevations to warrant emergent cardiac catheterization.     Following his admission he was treated with sublingual nitroglycerin and morphine with improvement in  his symptoms.  Since then his chest discomfort and shortness of breath have greatly improved.  He does report that the symptoms he experienced this morning are similar to his prior anginal symptoms.  He otherwise denies any palpitations, lightheadedness, near-syncope or syncope.     His work-up so far shows mildly decreased hemoglobin of 10.8, normal white blood count, creatinine of 0.69, mildly elevated glucose, and 2 normal troponins.     His prior cardiac history includes a CABG x3 and bioprosthetic aortic valve replacement.  He subsequently was developed occlusion of both saphenous vein grafts and has undergone drug-eluting stent placement of his mid LAD via his MARCELINA graft.  His last cardiac catheterization in 6/2021 was performed for recurrent angina.  This showed chronic 50 to 60% stenosis of a severely tortuous mid right coronary artery, chronically occluded ostial posterior descending branch, small vessel disease involving left circumflex artery, nonobstructive disease of the ramus intermedius, and a patent LIMA to LAD with a patent mid LAD stent.  There do not appear to be any disease that was amenable to PCI and medical management was recommended.  Echocardiogram at that time was unremarkable.     Hospital Course:   Echocardiogram showed normal EF with normal prosthetic valve gradients.  Stress study showed moderately severe area of ischemia in the inferior wall.  On hospital day 2 the patient admitted that he had some melena over the weekend.  GI was consulted and EGD performed showing nonbleeding gastric ulcers.  Cardiac catheterization performed showing stable coronary artery disease.  Decision was made to drop aspirin given gastric ulcer disease.  Patient continued to complain of shortness of air however his oxygen saturations were 100% on room air.  Patient does have mild diastolic dysfunction and a low-dose furosemide was ordered.  He is asking for pulmonary referral as outpatient which will be sent  to [facility].  Gastroenterology recommended Protonix and sucralfate at discharge with appropriate follow-up.  We will have the patient seen and cardiology clinic in 1 week.      Objective:  Temp:  [97.9 °F (36.6 °C)-98.3 °F (36.8 °C)] 97.9 °F (36.6 °C)  Heart Rate:  [61-86] 67  Resp:  [12-22] 16  BP: (134-155)/(67-85) 152/75    Intake/Output Summary (Last 24 hours) at 7/15/2022 1115  Last data filed at 7/15/2022 0900  Gross per 24 hour   Intake 1860 ml   Output --   Net 1860 ml     Body mass index is 42.49 kg/m².      07/11/22  0500 07/12/22  1121 07/15/22  0505   Weight: (!) 144 kg (318 lb 1.6 oz) (!) 144 kg (318 lb) (!) 146 kg (322 lb 1.5 oz)     Weight change:     Physical Exam  Vital signs reviewed.  Constitutional:       Appearance: Healthy appearance. Not in distress.   Neck:      Vascular: No JVR. JVD normal.   Pulmonary:      Effort: Pulmonary effort is normal.      Breath sounds: Normal breath sounds. No wheezing. No rhonchi. No rales.   Chest:      Chest wall: Not tender to palpatation.   Cardiovascular:      PMI at left midclavicular line. Normal rate. Regular rhythm. Normal S1. Normal S2.      Murmurs: There is a grade 3/6 harsh midsystolic murmur at the URSB, radiating to the neck.      No gallop. No click. No rub.   Pulses:     Intact distal pulses.   Edema:     Peripheral edema absent.   Abdominal:      General: Bowel sounds are normal.      Palpations: Abdomen is soft.      Tenderness: There is no abdominal tenderness.   Musculoskeletal: Normal range of motion.         General: No tenderness. Skin:     General: Skin is warm and dry.   Neurological:      General: No focal deficit present.      Mental Status: Alert and oriented to person, place and time.     Physical exam was reviewed, updated and amended when necessary.    Procedures Performed:  Procedure(s):  Left Heart Cath  Coronary angiography  Saphenous Vein Graft  Native mammary injection  07/13 1035 UPPER GI  ENDOSCOPY      Pertinent Test Results:  Results from last 7 days   Lab Units 07/13/22  0837 07/10/22  0700   SODIUM mmol/L 142 141   POTASSIUM mmol/L 3.7 4.5   CHLORIDE mmol/L 107 107   CO2 mmol/L 23.7 23.9   BUN mg/dL 23 54*   CREATININE mg/dL 0.76 0.69*   GLUCOSE mg/dL 105* 269*   CALCIUM mg/dL 8.3* 8.2*   AST (SGOT) U/L  --  9   ALT (SGPT) U/L  --  15     Results from last 7 days   Lab Units 07/11/22  1058 07/10/22  0952 07/10/22  0700   TROPONIN T ng/mL <0.010 <0.010 <0.010     Results from last 7 days   Lab Units 07/15/22  0802 07/14/22  2344 07/14/22  1644 07/14/22  0759 07/14/22  0000 07/13/22  1619 07/13/22  0837 07/13/22  0003 07/12/22  1542 07/10/22  0700   WBC 10*3/mm3  --   --   --   --   --   --   --   --  6.73 10.24   HEMOGLOBIN g/dL 8.7* 8.1* 9.1* 8.4* 7.9* 9.0* 8.6*   < > 8.6* 10.8*   HEMATOCRIT % 26.8* 23.0* 27.9* 24.2* 23.8* 25.9* 25.4*   < > 25.0* 32.6*   PLATELETS 10*3/mm3  --   --   --   --   --   --   --   --  149 208    < > = values in this interval not displayed.     Results from last 7 days   Lab Units 07/10/22  0700   INR  1.01     Results from last 7 days   Lab Units 07/10/22  0700   MAGNESIUM mg/dL 1.8           Invalid input(s): LDLCALC  Results from last 7 days   Lab Units 07/11/22  1058   PROBNP pg/mL 118.0           Discharge Diet:    Dietary Orders (From admission, onward)     Start     Ordered    07/14/22 1349  Diet Regular; Consistent Carbohydrate, Cardiac  Diet Effective Now        Question Answer Comment   Diet Texture / Consistency Regular    Common Modifiers Consistent Carbohydrate    Common Modifiers Cardiac        07/14/22 1348                Activity at Discharge:    Increase as tolerated    Discharge disposition: Stable for discharge home    Discharge Medications:     Discharge Medications      New Medications      Instructions Start Date   furosemide 20 MG tablet  Commonly known as: LASIX   20 mg, Oral, Daily      pantoprazole 40 MG EC tablet  Commonly known as: PROTONIX    40 mg, Oral, Every Early Morning   Start Date: July 16, 2022     potassium chloride 10 MEQ CR tablet   10 mEq, Oral, Daily   Start Date: July 16, 2022     sucralfate 1 g tablet  Commonly known as: CARAFATE   1 g, Oral, 2 Times Daily Before Meals         Continue These Medications      Instructions Start Date   albuterol sulfate  (90 Base) MCG/ACT inhaler  Commonly known as: PROVENTIL HFA;VENTOLIN HFA;PROAIR HFA   2 puffs, Inhalation, Every 6 Hours PRN      amLODIPine 10 MG tablet  Commonly known as: NORVASC   10 mg, Oral, Daily      amoxicillin 500 MG capsule  Commonly known as: AMOXIL   2,000 mg, Oral, See Admin Instructions, 4 capsules 1 hour prior to procedure      atorvastatin 40 MG tablet  Commonly known as: LIPITOR   40 mg, Oral, Daily      clopidogrel 75 MG tablet  Commonly known as: PLAVIX   75 mg, Oral, Daily      Farxiga 10 MG tablet  Generic drug: dapagliflozin Propanediol   No dose, route, or frequency recorded.      glucosamine sulfate 500 MG capsule capsule   Oral, Daily      isosorbide mononitrate 60 MG 24 hr tablet  Commonly known as: IMDUR   TAKE 1 AND 1/2 TABLETS BY MOUTH DAILY      lisinopril 10 MG tablet  Commonly known as: PRINIVIL,ZESTRIL   No dose, route, or frequency recorded.      metoprolol succinate XL 25 MG 24 hr tablet  Commonly known as: TOPROL-XL   TAKE ONE TABLET BY MOUTH DAILY      vitamin D 1.25 MG (85069 UT) capsule capsule  Commonly known as: ERGOCALCIFEROL   No dose, route, or frequency recorded.         Stop These Medications    aspirin 81 MG tablet              Follow-up:   Follow-up Information     Charlie Aj MD Follow up.    Specialty: Cardiology  Contact information:  [address]  [place] [address] [phone]             Gregor Carlson MD. Schedule an appointment as soon as possible for a visit in 6 week(s).    Specialties: Gastroenterology, Internal Medicine  Contact information:  [address]  [address]LO[address]  [place]  [address]  [phone]             System, Provider Not In .    Contact information:  [facility] [address]                         Future Appointments   Date Time Provider Department Center   7/22/2022  1:00 PM Nargis Velsaquez APRN [facility]   8/17/2022 11:00 AM Sekou Pisano MD [facility] None   1/30/2023 11:00 AM Marcia Stoner APRN [facility]         Time Spent on Discharge:  Greater than 30 minutes spent in discharge planning    Yandel Patel Jr, MD  07/15/22  11:15 EDT

## 2022-07-15 NOTE — PROGRESS NOTES
Name: Edmond Chase ADMIT: 7/10/2022   : 1960  PCP: System, Provider Not In    MRN: [id number] LOS: 0 days   AGE/SEX: 61 y.o. male  ROOM: [facility]     Subjective   Subjective   Feels well. No new issues. Discharging today    Review of Systems   Constitutional: Negative for fever.   HENT: Negative for congestion.    Respiratory: Negative for shortness of breath.    Cardiovascular: Negative for chest pain.   Gastrointestinal: Negative for abdominal pain.   Genitourinary: Negative for difficulty urinating.   Musculoskeletal: Negative for arthralgias and myalgias.   Skin: Negative for rash.   Neurological: Negative for headaches.   Psychiatric/Behavioral: Negative for sleep disturbance.        Objective   Objective   Vital Signs  Temp:  [98 °F (36.7 °C)-98.3 °F (36.8 °C)] 98 °F (36.7 °C)  Heart Rate:  [61-86] 67  Resp:  [12-22] 16  BP: (118-155)/(67-85) 136/78  SpO2:  [90 %-100 %] 96 %  on  Flow (L/min):  [1-2] 2;   Device (Oxygen Therapy): room air  Body mass index is 42.49 kg/m².  Physical Exam  Vitals and nursing note reviewed.   Constitutional:       General: He is not in acute distress.     Appearance: He is obese.   HENT:      Head: Normocephalic.      Mouth/Throat:      Mouth: Mucous membranes are moist.   Eyes:      Conjunctiva/sclera: Conjunctivae normal.   Cardiovascular:      Rate and Rhythm: Normal rate and regular rhythm.   Pulmonary:      Effort: Pulmonary effort is normal. No respiratory distress.      Breath sounds: Normal breath sounds.   Abdominal:      General: Bowel sounds are normal.      Palpations: Abdomen is soft.   Musculoskeletal:      Cervical back: Neck supple.      Right lower leg: Edema present.      Left lower leg: Edema present.      Comments: 1+   Neurological:      Mental Status: He is alert and oriented to person, place, and time.   Psychiatric:         Mood and Affect: Mood normal.         Behavior: Behavior normal.         Results Review     I reviewed the patient's  new clinical results.  Results from last 7 days   Lab Units 07/15/22  0802 07/14/22  2344 07/14/22  1644 07/14/22  0759 07/13/22  0003 07/12/22  1542 07/10/22  0700   WBC 10*3/mm3  --   --   --   --   --  6.73 10.24   HEMOGLOBIN g/dL 8.7* 8.1* 9.1* 8.4*   < > 8.6* 10.8*   PLATELETS 10*3/mm3  --   --   --   --   --  149 208    < > = values in this interval not displayed.     Results from last 7 days   Lab Units 07/13/22  0837 07/10/22  0700   SODIUM mmol/L 142 141   POTASSIUM mmol/L 3.7 4.5   CHLORIDE mmol/L 107 107   CO2 mmol/L 23.7 23.9   BUN mg/dL 23 54*   CREATININE mg/dL 0.76 0.69*   GLUCOSE mg/dL 105* 269*   EGFR mL/min/1.73 101.0 104.0     Results from last 7 days   Lab Units 07/10/22  0700   ALBUMIN g/dL 3.40*   BILIRUBIN mg/dL 0.5   ALK PHOS U/L 60   AST (SGOT) U/L 9   ALT (SGPT) U/L 15     Results from last 7 days   Lab Units 07/13/22  0837 07/10/22  0700   CALCIUM mg/dL 8.3* 8.2*   ALBUMIN g/dL  --  3.40*   MAGNESIUM mg/dL  --  1.8       Glucose   Date/Time Value Ref Range Status   07/15/2022 1055 169 (H) 70 - 130 mg/dL Final     Comment:     Meter: [id number] : [id number] David Salinas    07/15/2022 0522 111 70 - 130 mg/dL Final     Comment:     Meter: [id number] : [id number] [facility]   07/14/2022 2104 132 (H) 70 - 130 mg/dL Final     Comment:     Meter: [id number] : [id number] [facility]   07/14/2022 1627 199 (H) 70 - 130 mg/dL Final     Comment:     Meter: [id number] : [id number] Oz Amayadalia NA   07/14/2022 1345 103 70 - 130 mg/dL Final     Comment:     Meter: [id number] : [id number] [facility] Laura T RN   07/14/2022 1102 104 70 - 130 mg/dL Final     Comment:     Meter: [id number] : [id number] Oz MARQUEZ   07/14/2022 0659 98 70 - 130 mg/dL Final     Comment:     Meter: [id number] : [id number] Henrietta ESTRADA       No radiology results for the last day  Scheduled Medications  amLODIPine, 10 mg, Oral, Daily  atorvastatin, 40 mg, Oral,  Nightly  clopidogrel, 75 mg, Oral, Daily  empagliflozin, 25 mg, Oral, Daily  furosemide, 20 mg, Oral, Daily  insulin lispro, 0-9 Units, Subcutaneous, TID AC  isosorbide mononitrate, 60 mg, Oral, Q12H  lisinopril, 10 mg, Oral, Q24H  melatonin, 3 mg, Oral, Nightly  metoprolol succinate XL, 25 mg, Oral, Daily  [START ON 7/16/2022] pantoprazole, 40 mg, Oral, Q AM  potassium chloride, 10 mEq, Oral, Daily  sucralfate, 1 g, Oral, BID AC    Infusions  sodium chloride, 30 mL/hr, Last Rate: Stopped (07/13/22 1105)    Diet  Diet Regular; Consistent Carbohydrate, Cardiac       Assessment/Plan     Active Hospital Problems    Diagnosis  POA   • **Angina at rest (HCC) [I20.8]  Yes   • Type 2 diabetes mellitus, without long-term current use of insulin (HCC) [E11.9]  Yes   • Anemia [D64.9]  Unknown   • Coronary artery disease [I25.10]  Yes   • TJ on auto CPAP [G47.33, Z99.89]  Not Applicable   • S/P CABG (coronary artery bypass graft) [Z95.1]  Not Applicable   • S/P AVR (aortic valve replacement) [Z95.2]  Not Applicable   • Status post ascending aortic aneurysm repair [Z98.890, Z86.79]  Not Applicable   • Essential hypertension [I10]  Yes      Resolved Hospital Problems   No resolved problems to display.       61 y.o. male admitted with Angina at rest (HCC).    -S/P EGD showing esophageal ulcer and erosive gastropathy. Continue po PPI & carafate. Avoid NSAIDS. F/U GI as instructed. Planning outpatient C scope  -S/P lt heart cath yesterday. No intervention required, Continues plavix only  -BG trends reviewed, acceptable. Continue home regimen  -Cpap used with sleep. No wheezing on exam  -Stable for discharge from medical standpoint. Recommend repeat CBC early next week    VIKTORIA Meza  [facility]  07/15/22  10:59 EDT

## 2022-07-18 NOTE — CASE MANAGEMENT/SOCIAL WORK
Case Management Discharge Note      Final Note: Pt discharged home, no known needs.  EL Espinosa RN         Selected Continued Care - Discharged on 7/15/2022 Admission date: 7/10/2022 - Discharge disposition: Home or Self Care    Destination    No services have been selected for the patient.              Durable Medical Equipment    No services have been selected for the patient.              Dialysis/Infusion    No services have been selected for the patient.              Home Medical Care    No services have been selected for the patient.              Therapy    No services have been selected for the patient.              Community Resources    No services have been selected for the patient.              Community & DME    No services have been selected for the patient.                  Transportation Services  Private: Car    Final Discharge Disposition Code: 01 - home or self-care

## 2022-07-22 ENCOUNTER — OFFICE VISIT (OUTPATIENT)
Dept: INTERNAL MEDICINE | Age: 62
End: 2022-07-22

## 2022-07-22 VITALS
SYSTOLIC BLOOD PRESSURE: 140 MMHG | OXYGEN SATURATION: 99 % | HEIGHT: 73 IN | WEIGHT: 314 LBS | BODY MASS INDEX: 41.62 KG/M2 | HEART RATE: 95 BPM | DIASTOLIC BLOOD PRESSURE: 80 MMHG | TEMPERATURE: 97.5 F

## 2022-07-22 DIAGNOSIS — Z99.89 OSA ON CPAP: ICD-10-CM

## 2022-07-22 DIAGNOSIS — K22.10 ULCER OF ESOPHAGUS WITHOUT BLEEDING: Primary | ICD-10-CM

## 2022-07-22 DIAGNOSIS — I10 ESSENTIAL HYPERTENSION: ICD-10-CM

## 2022-07-22 DIAGNOSIS — J45.909 ASTHMA, UNSPECIFIED ASTHMA SEVERITY, UNSPECIFIED WHETHER COMPLICATED, UNSPECIFIED WHETHER PERSISTENT: ICD-10-CM

## 2022-07-22 DIAGNOSIS — R06.02 SHORTNESS OF BREATH: ICD-10-CM

## 2022-07-22 DIAGNOSIS — D64.9 ANEMIA, UNSPECIFIED TYPE: ICD-10-CM

## 2022-07-22 DIAGNOSIS — G47.33 OSA ON CPAP: ICD-10-CM

## 2022-07-22 DIAGNOSIS — I25.708 CORONARY ARTERY DISEASE OF BYPASS GRAFT OF NATIVE HEART WITH STABLE ANGINA PECTORIS: ICD-10-CM

## 2022-07-22 PROCEDURE — 99214 OFFICE O/P EST MOD 30 MIN: CPT

## 2022-07-22 NOTE — PROGRESS NOTES
"    I N T E R N A L  M E D I C I N E  Nargis Velasquez, VIKTORIA       ENCOUNTER DATE:  07/22/2022    Edmond Chase / 61 y.o. / male        CC:   (Transitional Care Follow Up Visit)  Establish Care and Hospital Follow Up Visit        Within 48 business hours after discharge our office contacted him via telephone to coordinate his care and needs.      I reviewed and discussed the details of that call along with the discharge summary, hospital problems, inpatient lab results, inpatient diagnostic studies, and consultation reports with the patient.     Date of TCM Phone Call 7/11/2022   [facility]   Date of Admission 7/10/2022   Date of Discharge 7/12/2022       Risk for Readmission (LACE) Score: 7 (7/15/2022  6:02 AM)            VITALS    Visit Vitals  /80   Pulse 95   Temp 97.5 °F (36.4 °C) (Temporal)   Ht 185.4 cm (72.99\")   Wt (!) 142 kg (314 lb)   SpO2 99%   BMI 41.44 kg/m²       BP Readings from Last 3 Encounters:   07/22/22 140/80   07/15/22 152/75   07/09/22 171/91     Wt Readings from Last 3 Encounters:   07/22/22 (!) 142 kg (314 lb)   07/15/22 (!) 146 kg (322 lb 1.5 oz)   01/28/22 (!) 148 kg (327 lb)      Body mass index is 41.44 kg/m².    HPI:     Date of admission/discharge: As noted above in CC  Hospital: [facility], [place], operated by [facility]   Principle Dx: Angina at rest  Secondary Dx: Gastric ulcers  History prior to hospitalization: HTN, S/P CABG, S/P AVR, S/P ascending aortic aneurysm repair, CAD, TJ on auto CPAP, Type 2 diabetes, Anemia  Evaluation/Treatment: ECHO with normal EF with normal prosthetic valve gradients.  Stress study showed moderately severe area of ischemia in the inferior wall.  EGD with nonbleeding gastric ulcers.  Cardiac cath showed stable coronary artery disease.    Course: Decision made to STOP aspirin due to gastric ulcer disease.  Patient continued to experience episodes of dyspnea despite oxygen saturations being 100% on room air.  Started on furosemide 20 mg daily.  " He was referred to [facility].  GI started patient on pantoprazole and sucralfate.  Was supposed to follow up with cardiology in 1 week, but has not yet scheduled.      Since discharge, he reports fatigue has improved, and he is experiencing reduced episodes of dyspnea.  During his hospitalization, he was experiencing anxiety along with dyspnea when he laid flat.  He remains compliant with CPAP nightly.  He does report several months ago, he was seen by Dr. Vallecillo's office and was newly diagnosed with asthma and prescribed Singulair and Symbicort, which he has not yet been taking.  He has been using an albuterol inhaler with benefit approximately 2-3x weekly.      He is scheduled to follow up with GI in 6 weeks.      Patient Care Team:  System, Provider Not In as PCP - General  ____________________________________________________________________    ASSESSMENT & PLAN:    1. Ulcer of esophagus without bleeding    2. Anemia, unspecified type    3. TJ on auto CPAP    4. Shortness of breath    5. Asthma, unspecified asthma severity, unspecified whether complicated, unspecified whether persistent    6. Essential hypertension    7. Coronary artery disease of bypass graft of native heart with stable angina pectoris (HCC)      Orders Placed This Encounter   Procedures   • Comprehensive metabolic panel   • Ambulatory Referral to Pulmonology   • CBC & Differential       Summary/Discussion:  • Continue pantoprazole and sucralfate as prescribed.  Follow up with GI as scheduled.  • Referral placed to pulmonology to address patient's intermittent episodes of dyspnea when he lays flat.  Continue nightly CPAP use.  • Pt aware that he needs to schedule with cardiology for follow up after hospital admission.  He states he will do so in the next week.    • Will recheck CBC given patient's recent anemia and CMP after starting furosemide.    • Pt educated on need to visit ED for any chest pain, dyspnea, new episodes of GI  bleeding.  Pt acknowledged understanding.    • Agreeable to establish care in 1 week.      Return in about 1 week (around 7/29/2022) for establish care.    ____________________________________________________________________    REVIEW OF SYSTEMS    Review of Systems   Constitutional: Negative for chills, fatigue and fever.   Eyes: Negative for visual disturbance.   Respiratory: Positive for shortness of breath (intermittent episodes when he lays flat). Negative for cough, chest tightness and wheezing.    Cardiovascular: Negative for chest pain, palpitations and leg swelling.   Gastrointestinal: Negative for abdominal pain.   Genitourinary: Negative for difficulty urinating.   Neurological: Negative for dizziness, weakness, light-headedness and headaches.   Psychiatric/Behavioral: Negative for sleep disturbance.         PHYSICAL EXAMINATION    Physical Exam  Vitals reviewed.   Constitutional:       General: He is not in acute distress.     Appearance: Normal appearance. He is not ill-appearing, toxic-appearing or diaphoretic.   HENT:      Head: Normocephalic and atraumatic.   Cardiovascular:      Rate and Rhythm: Normal rate and regular rhythm.      Heart sounds: Murmur heard.   Pulmonary:      Effort: Pulmonary effort is normal.      Breath sounds: Normal breath sounds.   Neurological:      Mental Status: He is alert and oriented to person, place, and time. Mental status is at baseline.   Psychiatric:         Mood and Affect: Mood normal.         Behavior: Behavior normal.         Thought Content: Thought content normal.         Judgment: Judgment normal.           REVIEWED DATA:    Labs:   Lab Results   Component Value Date     07/22/2022    K 4.2 07/22/2022    CALCIUM 9.4 07/22/2022    AST 12 07/22/2022    ALT 17 07/22/2022    BUN 14 07/22/2022    CREATININE 1.02 07/22/2022    CREATININE 0.76 07/13/2022    CREATININE 0.69 (L) 07/10/2022    EGFRIFNONA 91 06/17/2021    EGFRIFAFRI 72 03/29/2016       Lab  Results   Component Value Date    WBC 8.4 07/22/2022    HGB 11.1 (L) 07/22/2022    HGB 8.7 (L) 07/15/2022    HGB 8.1 (L) 07/14/2022     07/22/2022       Lab Results   Component Value Date    GLUCOSEU Negative 04/29/2016    BLOODU Negative 04/29/2016    NITRITEU Negative 04/29/2016    LEUKOCYTESUR Negative 04/29/2016       Imaging:   Adult Transthoracic Echo Complete W/ Cont if Necessary Per Protocol    Result Date: 7/12/2022  Narrative: · Left ventricular ejection fraction appears to be 51 - 55%. Left ventricular systolic function is normal. The left ventricular cavity is borderline dilated. Left ventricular wall thickness is consistent with mild to moderate concentric hypertrophy. All left ventricular wall segments contract normally. Left ventricular diastolic function was normal. No evidence of left ventricular thrombus or mass present. · No significant aortic valve regurgitation is present. No hemodynamically significant aortic valve stenosis is present. Peak velocity of the flow distal to the aortic valve is 250.4 cm/s. Aortic valve mean pressure gradient is 12.8 mmHg. Aortic valve dimensionless index is 0.40 . There is a 27 mm, porcine, magna bioprosthetic aortic valve present. · Mild dilation of the aortic root is present. Sinus of Valsalva = 4.1 cm Borderline dilation of the aortic arch is present. Aortic arch = 3.4 cm · The ascending aorta not well visualized.      Cardiac Catheterization/Vascular Study    Result Date: 7/14/2022  Narrative: Interventionalist: Charlie Aj M.D., F.A.C.C. Procedure performed: 1.  Coronary angiography 2.  Graft angiography Indication: Unstable angina Referring: Jorge Procedure report: Informed consent was obtained and the patient was brought to the cardiac catheterization lab for coronary angiography.  The left wrist was prepped and draped in sterile fashion.  Lidocaine was infused in the subcutaneous tissue for anesthesia.  Access was obtained to the left  radial artery using a 20-gauge radial artery needle.  Following this a 5/6 [place] slender sheath was advanced into the left radial artery without difficulty.  The sheath was then sutured in place.  Following this a 5 [place] IMT catheter was used for LIMA to LAD angiography.  A 5 [place]ran JL 4 catheter was used for left coronary angiography.  A 5 [place] JR 5 catheter was used for right coronary angiography.  A 5 [place]ran MPA 2 catheter was used for SVG to OM and SVG to PDA angiography.  At case completion the MPA catheter was removed over the wire and a comfort band was placed over the left radial artery access with excellent hemostasis. Procedure findings: Left main: Large-caliber vessel that trifurcates to an LAD, ramus and circumflex.  The left main coronary artery is normal. LAD: Medium caliber vessel that has a diffuse 90% proximal stenosis and is chronically occluded in the mid segment.  Gives rise to a small caliber diagonal branch in the midsegment.  The LAD fills via patent LIMA to LAD graft LCX: Medium caliber vessel that gives rise to a small caliber OM1 and OM 2 graft.  There is a diffuse calcified 70 to 80% proximal to mid circumflex stenosis. RCA: Large-caliber, dominant vessel that gives rise to a small caliber PDA and RPL branch.  Severely calcified and tortuous.  The RCA has a calcified 60 to 70% mid vessel stenosis and discrete calcified 80% mid to distal stenosis.  The PDA branch is chronically occluded in the midsegment.  PDA fills via left to right collaterals. Ramus: Medium to large caliber vessel that bifurcates in the midsegment.  There is a discrete 50% stenosis in the more inferior branch of the ramus. LIMA to LAD: Large vessel.  Normal. SVG to OM: Occluded at proximal anastomosis SVG to PDA: Occluded proximal anastomosis Hemodynamics: AO: 116/61 Estimated blood loss: Minimal Complications: None Conclusions: 1. Left main: Normal 2. LAD: Calcified 90% proximal stenosis.  Chronic total occlusion  mid segment.  Mid to distal vessel fills via patent LIMA to LAD 3. LCX: Severely calcified 70 to 80% stenosis in the midsegment. 4. RCA: Severely calcified and tortuous vessel with diffuse 60 to 70% mid vessel stenosis and discrete 80% mid to distal stenosis.  Chronic total occlusion small caliber PDA branch.  PDA fills via left-to-right collaterals. 5.  Ramus: Discrete 50% stenosis in the more inferior branch 6.  LIMA to LAD: Normal 7.  SVG to OM: Occluded at proximal anastomosis 8.  SVG to PDA: Occluded at proximal anastomosis Recommendations: Continue medical management.  RCA and LAD are not good candidates for intervention and unchanged from prior catheterization.    XR Chest 1 View    Result Date: 7/10/2022  Narrative: CHEST SINGLE VIEW  HISTORY: Intermittent chest pain.  COMPARISON: Upright chest 06/17/2021, CT angiogram chest 07/08/2019.  FINDINGS: The heart size is enlarged and median sternotomy wires are present. Lungs appear clear and there is no evidence for pulmonary edema, pleural effusion or infiltrate.      Impression: No evidence for active disease in the chest.  This report was finalized on 7/10/2022 8:12 AM by Dr. Edmond Garcia M.D.      Stress Test With Myocardial Perfusion One Day    Result Date: 7/12/2022  Narrative: · Myocardial perfusion imaging indicates a medium-sized, moderately severe area of ischemia located in the inferior wall. · Left ventricular ejection fraction is normal. (Calculated EF = 50%). · GI artifact is present. · Arrhythmias during stress: frequent PVCs.         Medical Tests:        Summary of old records / correspondence / consultant report:   DC summary re: issues addressed on HPI    Request outside records:         MEDICATIONS   Current Outpatient Medications   Medication Sig Dispense Refill   • albuterol sulfate  (90 Base) MCG/ACT inhaler Inhale 2 puffs Every 6 (Six) Hours As Needed for Wheezing. 1 inhaler 0   • amLODIPine (NORVASC) 10 MG tablet Take 10 mg by  mouth Daily.     • atorvastatin (LIPITOR) 40 MG tablet Take 1 tablet by mouth Daily. 90 tablet 2   • clopidogrel (PLAVIX) 75 MG tablet Take 1 tablet by mouth Daily. 90 tablet 3   • Farxiga 10 MG tablet      • furosemide (LASIX) 20 MG tablet Take 1 tablet by mouth Daily. 30 tablet 5   • glucosamine sulfate 500 MG capsule capsule Take  by mouth Daily.     • isosorbide mononitrate (IMDUR) 60 MG 24 hr tablet TAKE 1 AND 1/2 TABLETS BY MOUTH DAILY 135 tablet 2   • lisinopril (PRINIVIL,ZESTRIL) 10 MG tablet      • metoprolol succinate XL (TOPROL-XL) 25 MG 24 hr tablet TAKE ONE TABLET BY MOUTH DAILY 90 tablet 3   • pantoprazole (PROTONIX) 40 MG EC tablet Take 1 tablet by mouth Every Morning. 30 tablet 5   • potassium chloride 10 MEQ CR tablet Take 1 tablet by mouth Daily. 30 tablet 5   • sucralfate (CARAFATE) 1 g tablet Take 1 tablet by mouth 2 (Two) Times a Day Before Meals. 60 tablet 5   • vitamin D (ERGOCALCIFEROL) 1.25 MG (00638 UT) capsule capsule        No current facility-administered medications for this visit.       Current outpatient and discharge medications have been reconciled for the patient.  Reviewed by: VIKTORIA Carbone         @MSK@

## 2022-07-23 LAB
ALBUMIN SERPL-MCNC: 4.4 G/DL (ref 3.8–4.8)
ALBUMIN/GLOB SERPL: 1.9 {RATIO} (ref 1.2–2.2)
ALP SERPL-CCNC: 77 IU/L (ref 44–121)
ALT SERPL-CCNC: 17 IU/L (ref 0–44)
AST SERPL-CCNC: 12 IU/L (ref 0–40)
BASOPHILS # BLD AUTO: 0 X10E3/UL (ref 0–0.2)
BASOPHILS NFR BLD AUTO: 0 %
BILIRUB SERPL-MCNC: 0.6 MG/DL (ref 0–1.2)
BUN SERPL-MCNC: 14 MG/DL (ref 8–27)
BUN/CREAT SERPL: 14 (ref 10–24)
CALCIUM SERPL-MCNC: 9.4 MG/DL (ref 8.6–10.2)
CHLORIDE SERPL-SCNC: 105 MMOL/L (ref 96–106)
CO2 SERPL-SCNC: 21 MMOL/L (ref 20–29)
CREAT SERPL-MCNC: 1.02 MG/DL (ref 0.76–1.27)
EGFRCR SERPLBLD CKD-EPI 2021: 83 ML/MIN/1.73
EOSINOPHIL # BLD AUTO: 0.1 X10E3/UL (ref 0–0.4)
EOSINOPHIL NFR BLD AUTO: 1 %
ERYTHROCYTE [DISTWIDTH] IN BLOOD BY AUTOMATED COUNT: 14.4 % (ref 11.6–15.4)
GLOBULIN SER CALC-MCNC: 2.3 G/DL (ref 1.5–4.5)
GLUCOSE SERPL-MCNC: 182 MG/DL (ref 65–99)
HCT VFR BLD AUTO: 34 % (ref 37.5–51)
HGB BLD-MCNC: 11.1 G/DL (ref 13–17.7)
IMM GRANULOCYTES # BLD AUTO: 0 X10E3/UL (ref 0–0.1)
IMM GRANULOCYTES NFR BLD AUTO: 0 %
LYMPHOCYTES # BLD AUTO: 1.1 X10E3/UL (ref 0.7–3.1)
LYMPHOCYTES NFR BLD AUTO: 13 %
MCH RBC QN AUTO: 30.7 PG (ref 26.6–33)
MCHC RBC AUTO-ENTMCNC: 32.6 G/DL (ref 31.5–35.7)
MCV RBC AUTO: 94 FL (ref 79–97)
MONOCYTES # BLD AUTO: 0.5 X10E3/UL (ref 0.1–0.9)
MONOCYTES NFR BLD AUTO: 6 %
NEUTROPHILS # BLD AUTO: 6.6 X10E3/UL (ref 1.4–7)
NEUTROPHILS NFR BLD AUTO: 80 %
PLATELET # BLD AUTO: 322 X10E3/UL (ref 150–450)
POTASSIUM SERPL-SCNC: 4.2 MMOL/L (ref 3.5–5.2)
PROT SERPL-MCNC: 6.7 G/DL (ref 6–8.5)
RBC # BLD AUTO: 3.62 X10E6/UL (ref 4.14–5.8)
SODIUM SERPL-SCNC: 142 MMOL/L (ref 134–144)
WBC # BLD AUTO: 8.4 X10E3/UL (ref 3.4–10.8)

## 2022-07-29 ENCOUNTER — OFFICE VISIT (OUTPATIENT)
Dept: INTERNAL MEDICINE | Age: 62
End: 2022-07-29

## 2022-07-29 VITALS
DIASTOLIC BLOOD PRESSURE: 80 MMHG | WEIGHT: 314 LBS | TEMPERATURE: 97.5 F | BODY MASS INDEX: 41.62 KG/M2 | OXYGEN SATURATION: 98 % | HEART RATE: 73 BPM | HEIGHT: 73 IN | SYSTOLIC BLOOD PRESSURE: 120 MMHG

## 2022-07-29 DIAGNOSIS — I10 ESSENTIAL HYPERTENSION: ICD-10-CM

## 2022-07-29 DIAGNOSIS — Z76.89 ENCOUNTER TO ESTABLISH CARE: Primary | ICD-10-CM

## 2022-07-29 DIAGNOSIS — Z99.89 OSA ON CPAP: ICD-10-CM

## 2022-07-29 DIAGNOSIS — E11.9 TYPE 2 DIABETES MELLITUS WITHOUT COMPLICATION, WITHOUT LONG-TERM CURRENT USE OF INSULIN: ICD-10-CM

## 2022-07-29 DIAGNOSIS — I25.708 CORONARY ARTERY DISEASE OF BYPASS GRAFT OF NATIVE HEART WITH STABLE ANGINA PECTORIS: ICD-10-CM

## 2022-07-29 DIAGNOSIS — G47.33 OSA ON CPAP: ICD-10-CM

## 2022-07-29 DIAGNOSIS — K22.10 ULCER OF ESOPHAGUS WITHOUT BLEEDING: ICD-10-CM

## 2022-07-29 PROCEDURE — 99214 OFFICE O/P EST MOD 30 MIN: CPT

## 2022-07-29 RX ORDER — METFORMIN HYDROCHLORIDE 500 MG/1
500 TABLET, EXTENDED RELEASE ORAL
Qty: 30 TABLET | Refills: 0 | Status: SHIPPED | OUTPATIENT
Start: 2022-07-29 | End: 2022-08-29

## 2022-08-04 ENCOUNTER — OFFICE VISIT (OUTPATIENT)
Dept: INTERNAL MEDICINE | Age: 62
End: 2022-08-04

## 2022-08-04 VITALS
DIASTOLIC BLOOD PRESSURE: 62 MMHG | BODY MASS INDEX: 41.75 KG/M2 | OXYGEN SATURATION: 98 % | HEART RATE: 86 BPM | WEIGHT: 315 LBS | HEIGHT: 73 IN | TEMPERATURE: 96.9 F | SYSTOLIC BLOOD PRESSURE: 140 MMHG

## 2022-08-04 DIAGNOSIS — Z00.00 ENCOUNTER FOR ANNUAL WELLNESS EXAM IN MEDICARE PATIENT: Primary | ICD-10-CM

## 2022-08-04 DIAGNOSIS — E55.9 VITAMIN D DEFICIENCY: ICD-10-CM

## 2022-08-04 DIAGNOSIS — Z11.59 ENCOUNTER FOR HEPATITIS C SCREENING TEST FOR LOW RISK PATIENT: ICD-10-CM

## 2022-08-04 DIAGNOSIS — E78.5 HYPERLIPIDEMIA, UNSPECIFIED HYPERLIPIDEMIA TYPE: ICD-10-CM

## 2022-08-04 DIAGNOSIS — Z23 ENCOUNTER FOR IMMUNIZATION: ICD-10-CM

## 2022-08-04 DIAGNOSIS — E11.65 TYPE 2 DIABETES MELLITUS WITH HYPERGLYCEMIA, WITHOUT LONG-TERM CURRENT USE OF INSULIN: ICD-10-CM

## 2022-08-04 DIAGNOSIS — H61.22 IMPACTED CERUMEN OF LEFT EAR: ICD-10-CM

## 2022-08-04 DIAGNOSIS — D64.9 ANEMIA, UNSPECIFIED TYPE: ICD-10-CM

## 2022-08-04 DIAGNOSIS — I10 ESSENTIAL HYPERTENSION: ICD-10-CM

## 2022-08-04 DIAGNOSIS — Z12.5 SCREENING FOR PROSTATE CANCER: ICD-10-CM

## 2022-08-04 PROCEDURE — 99214 OFFICE O/P EST MOD 30 MIN: CPT

## 2022-08-04 PROCEDURE — 0051A COVID-19 (PFIZER) 12+ YRS: CPT

## 2022-08-04 PROCEDURE — 91305 COVID-19 (PFIZER) 12+ YRS: CPT

## 2022-08-04 NOTE — PROGRESS NOTES
I N T E R N A L  M E D I C I N E  VIKTORIA SANCHEZ      ENCOUNTER DATE:  08/04/2022    Edmond Chase / 61 y.o. / male        MEDICARE ANNUAL WELLNESS VISIT       Chief Complaint: Medicare Wellness-Initial Visit       Patient's general assessment of his health since a year ago:     - Compared to one year ago, he feels his physical health is moderately worse, due to bilateral knee pain, angina, ulcer    - Compared to one year ago, he feels his mental health is slightly worse.      HPI for other active medical problems:     PHQ-9 Depression 9: little interest or pleasure in doing things, feeling down, trouble with sleep, feeling tired, feels bad about himself    CHARANJIT-7 Anxiety 7: feeling nervous, anxious, worrying too much, trouble relaxing, becoming easily annoyed or irritable, feeling afraid as if something awful may happen     Offered medication management for depression/ anxiety symptoms, but he declines.  He has tried SSRIs in the past with side effects that were intolerable.  Offered counseling, and he also declines at this time.  No SI/ HI.  He would like to seek follow up with ortho to address his ongoing bilateral knee pain in order to improve physical mobility, which he believes which positively benefit his mental health.      Diabetes: He has started taking metformin 500 mg with dinner and is tolerating well without side effects.      Wants to check with GI at upcoming appointment about scheduling colonoscopy.    He declines PNA vaccine, but is agreeable to update shingles and TDAP at pharmacy.        HISTORY       Recent Hospitalizations:    Recent hospitalization?: YES    If YES, location, date, and diagnoses:     · Location: [facility]  · Date: 07/10/2022-07/15/2022  · Principle Discharge Dx: Angina at rest, gastric ulcers  · Secondary Dx: HTN, S/P CABG, S/P AVR, CAD, TJ on auto CPAP, Type 2 diabetes, anemia      Patient Care Team:    Patient Care Team:  Nargis Velasquez APRN as PCP - General  (Family Medicine)  Papa Miguel MD as Consulting Physician (Cardiology)  Sekou Pisano MD as Consulting Physician (Pulmonary Disease)  Gregor Carlson MD as Consulting Physician (Gastroenterology)  Estevan Yuan MD (Sports Medicine)      Allergies:  Bystolic [nebivolol hcl]    Medications:  Current Outpatient Medications on File Prior to Visit   Medication Sig Dispense Refill   • albuterol sulfate  (90 Base) MCG/ACT inhaler Inhale 2 puffs Every 6 (Six) Hours As Needed for Wheezing. 1 inhaler 0   • amLODIPine (NORVASC) 10 MG tablet Take 10 mg by mouth Daily.     • atorvastatin (LIPITOR) 40 MG tablet Take 1 tablet by mouth Daily. 90 tablet 2   • clopidogrel (PLAVIX) 75 MG tablet Take 1 tablet by mouth Daily. 90 tablet 3   • Farxiga 10 MG tablet      • furosemide (LASIX) 20 MG tablet Take 1 tablet by mouth Daily. 30 tablet 5   • glucosamine sulfate 500 MG capsule capsule Take  by mouth Daily.     • isosorbide mononitrate (IMDUR) 60 MG 24 hr tablet TAKE 1 AND 1/2 TABLETS BY MOUTH DAILY 135 tablet 2   • lisinopril (PRINIVIL,ZESTRIL) 10 MG tablet      • metFORMIN ER (GLUCOPHAGE-XR) 500 MG 24 hr tablet Take 1 tablet by mouth Daily With Dinner for 30 days. 30 tablet 0   • metoprolol succinate XL (TOPROL-XL) 25 MG 24 hr tablet TAKE ONE TABLET BY MOUTH DAILY 90 tablet 3   • pantoprazole (PROTONIX) 40 MG EC tablet Take 1 tablet by mouth Every Morning. 30 tablet 5   • potassium chloride 10 MEQ CR tablet Take 1 tablet by mouth Daily. 30 tablet 5   • sucralfate (CARAFATE) 1 g tablet Take 1 tablet by mouth 2 (Two) Times a Day Before Meals. 60 tablet 5     No current facility-administered medications on file prior to visit.        PFSH:     The following portions of the patient's history were reviewed and updated as appropriate: Allergies / Current Medications / Past Medical History / Surgical History / Social History / Family History    Problem List:  Patient Active Problem List   Diagnosis    • Essential hypertension   • Hyperglycemia   • Hyperlipidemia   • Class 3 severe obesity due to excess calories with serious comorbidity and body mass index (BMI) of 40.0 to 44.9 in adult ([facility])   • Nocturia   • Nonrheumatic aortic valve insufficiency   • Myocardial ischemia   • Coronary artery disease involving native coronary artery of native heart   • Ascending aortic aneurysm ([facility])   • CAD in native artery   • S/P CABG (coronary artery bypass graft)   • S/P AVR (aortic valve replacement)   • Status post ascending aortic aneurysm repair   • Fatigue   • Abnormal nuclear stress test   • Coronary artery disease   • Coronary artery disease of bypass graft of native heart with stable angina pectoris ([facility])   • Acute chest pain   • TJ on auto CPAP   • Hypersomnia due to medical condition   • Angina at rest ([facility])   • Type 2 diabetes mellitus, without long-term current use of insulin ([facility])   • Anemia   • Ulcer of esophagus without bleeding       Past Medical History:  Past Medical History:   Diagnosis Date   • Abnormal nuclear stress test    • Aortic valve insufficiency     nonrheumtic    • Ascending aortic aneurysm ([facility])    • CAD (coronary artery disease)    • Chest pain    • Diabetes mellitus ([facility])    • Dizzy     CAREFUL WHEN GETTING UP   • Dyspnea    • Essential hypertension    • Fatigue    • Hyperglycemia    • Hyperlipidemia    • Hypersomnia with sleep apnea    • Lightheadedness    • Malaise and fatigue    • Morbid obesity ([facility])    • Myocardial ischemia    • Nocturia    • TJ on auto CPAP 10/31/2016    Overnight polysomnogram.  Weight 276 pounds.  Severe TJ with AHI 79 events per hour.  Auto CPAP recommended.   • Pneumonia     FEB 2016   • Scrotal bleeding    • Shortness of breath        Past Surgical History:  Past Surgical History:   Procedure Laterality Date   • AORTIC VALVE REPAIR/REPLACEMENT  05/2016   • ASCENDING ARCH/HEMIARCH REPLACEMENT N/A 5/2/2016    Procedure: BHAVESH STERNOTOMY CORONARY ARTERY BYPASS GRAFT  TIMES 3 USING LEFT INTERNAL MAMMARY ARTERY AND RIGHT GREATER SAPHENOUS VEIN GRAFT PER ENDOSCOPIC VEIN HARVESTING, AORTIC ANEURYSM REPAIR WITH ROOT REPAIR AND AORTIC VALVE REPLACEMENT;  Surgeon: Rosalio Cline MD;  Location: [facility] OR;  Service:    • CARDIAC CATHETERIZATION N/A 4/1/2016    Procedure: Left Heart Cath;  Surgeon: Erick Tam MD;  Location: [facility] CATH INVASIVE LOCATION;  Service:    • CARDIAC CATHETERIZATION N/A 4/1/2016    Procedure: Left ventriculography;  Surgeon: Erick Tam MD;  Location: [facility] CATH INVASIVE LOCATION;  Service:    • CARDIAC CATHETERIZATION N/A 4/1/2016    Procedure: Right Heart Cath;  Surgeon: Erick Tam MD;  Location: [facility] CATH INVASIVE LOCATION;  Service:    • CARDIAC CATHETERIZATION N/A 10/30/2017    Procedure: Coronary angiography;  Surgeon: Sorin Vasquez MD;  Location: [facility] INVASIVE LOCATION;  Service:    • CARDIAC CATHETERIZATION  10/30/2017    Procedure: Saphenous Vein Graft;  Surgeon: Sorin Vasquez MD;  Location: [facility] INVASIVE LOCATION;  Service:    • CARDIAC CATHETERIZATION N/A 10/30/2017    Procedure: Native mammary injection;  Surgeon: Sorin Vasquez MD;  Location: [facility] INVASIVE LOCATION;  Service:    • CARDIAC CATHETERIZATION N/A 7/7/2020    Procedure: Coronary angiography;  Surgeon: Gregor Kim MD;  Location: [facility] INVASIVE LOCATION;  Service: Cardiovascular;  Laterality: N/A;   • CARDIAC CATHETERIZATION N/A 7/7/2020    Procedure: Left heart cath;  Surgeon: Gregor Kim MD;  Location: [facility] INVASIVE LOCATION;  Service: Cardiovascular;  Laterality: N/A;   • CARDIAC CATHETERIZATION N/A 7/7/2020    Procedure: Left ventriculography;  Surgeon: Gregor Kim MD;  Location: [facility] CATH INVASIVE LOCATION;  Service: Cardiovascular;  Laterality: N/A;   • CARDIAC CATHETERIZATION N/A 7/7/2020    Procedure: Stent ESMER bypass graft;  Surgeon: Gregor Kim MD;  Location: [facility] CATH INVASIVE LOCATION;   Service: Cardiovascular;  Laterality: N/A;   • CARDIAC CATHETERIZATION N/A 6/17/2021    Procedure: SAPHENOUS VEIN GRAFT;  Surgeon: Marques Ndiaye MD;  Location:  [facility] CATH INVASIVE LOCATION;  Service: Cardiovascular;  Laterality: N/A;   • CARDIAC CATHETERIZATION N/A 6/17/2021    Procedure: Left Heart Cath;  Surgeon: Marques Ndiaye MD;  Location:  [facility] CATH INVASIVE LOCATION;  Service: Cardiovascular;  Laterality: N/A;   • CARDIAC CATHETERIZATION N/A 6/17/2021    Procedure: Coronary angiography;  Surgeon: Marques Ndiaye MD;  Location:  [facility] CATH INVASIVE LOCATION;  Service: Cardiovascular;  Laterality: N/A;   • CARDIAC CATHETERIZATION N/A 7/14/2022    Procedure: Left Heart Cath;  Surgeon: Charlie Aj MD;  Location:  [facility] CATH INVASIVE LOCATION;  Service: Cardiovascular;  Laterality: N/A;   • CARDIAC CATHETERIZATION N/A 7/14/2022    Procedure: Coronary angiography;  Surgeon: Charlie jA MD;  Location[facility] C[facility] INVASIVE LOC[facility];  Service: Cardiovascular;  Laterality: N/A;   • CARDIAC CATHETERIZATION  7/14/2022    Procedure: Saphenous Vein Graft;  Surgeon: Charlie Aj MD;  Location:  [facility] CATH INVASIVE LOCATION;  Service: Cardiovascular;;   • CARDIAC CATHETERIZATION N/A 7/14/2022    Procedure: Native mammary injection;  Surgeon: Charlie Aj MD;  [facility];  Service: Cardiovascular;  Laterality: N/A;   • CORONARY ARTERY BYPASS GRAFT  05/2016    LIMA TO LAD, SVG TO PDA, SVG TO OM2   • ENDOSCOPY N/A 7/13/2022    Procedure: ESOPHAGOGASTRODUODENOSCOPY;  Surgeon: Marcia Hollis MD;  Location: [facility] ENDOSCOPY;  Service: Gastroenterology;  Laterality: N/A;  PRE- ANEMIA, MELENA  POST- MILD EROSIVE GASTRITIS, GE JUNCTION ULCER   • INNER EAR SURGERY     • TONSILLECTOMY         Social History:  Social History     Socioeconomic History   • Marital status: Single   Tobacco Use   • Smoking status: Never Smoker   • Smokeless tobacco: Never  "Used   Substance and Sexual Activity   • Alcohol use: Yes     Comment: 1 drink/monthly   • Drug use: No   • Sexual activity: Defer       Family History:  Family History   Problem Relation Age of Onset   • Hypertension Mother    • Diabetes Mother    • Heart disease Mother    • Hypertension Father    • Lung cancer Father    • Heart disease Sister    • Stroke Maternal Grandmother    • Heart disease Maternal Grandmother    • Hypertension Maternal Grandfather    • Hypertension Paternal Grandmother    • Hypertension Paternal Grandfather    • Other Other         The patient states that his sister has a problem that goes by the name of \"long QT\".  He says this is a familial trait but he also says that he is \"not interested in pursuing it for himself\".   • Coronary artery disease Other          PATIENT ASSESSMENT     Vitals:  Vitals:    08/04/22 0814   BP: 140/62   BP Location: Right arm   Pulse: 86   Temp: 96.9 °F (36.1 °C)   SpO2: 98%   Weight: (!) 143 kg (315 lb)   Height: 185.4 cm (72.99\")       BP Readings from Last 3 Encounters:   08/04/22 140/62   07/29/22 120/80   07/22/22 140/80     Wt Readings from Last 3 Encounters:   08/04/22 (!) 143 kg (315 lb)   07/29/22 (!) 142 kg (314 lb)   07/22/22 (!) 142 kg (314 lb)      Body mass index is 41.57 kg/m².    [unfilled]        Review of Systems:    Review of Systems   Constitutional: Negative for chills, fever and unexpected weight change.   Respiratory: Negative for cough, chest tightness and shortness of breath.    Cardiovascular: Negative for chest pain, palpitations and leg swelling.   Gastrointestinal: Negative for abdominal pain and blood in stool.   Neurological: Negative for dizziness, weakness, light-headedness and headaches.   Psychiatric/Behavioral: The patient is not nervous/anxious.          Physical Exam:    Physical Exam  Vitals reviewed.   Constitutional:       General: He is not in acute distress.     Appearance: Normal appearance. He is not ill-appearing, " toxic-appearing or diaphoretic.   HENT:      Head: Normocephalic and atraumatic.      Right Ear: Tympanic membrane, ear canal and external ear normal. There is no impacted cerumen.      Left Ear: There is impacted cerumen.      Nose: Nose normal. No congestion or rhinorrhea.      Mouth/Throat:      Mouth: Mucous membranes are moist.      Pharynx: Oropharynx is clear. No oropharyngeal exudate or posterior oropharyngeal erythema.   Eyes:      Extraocular Movements: Extraocular movements intact.      Conjunctiva/sclera: Conjunctivae normal.      Pupils: Pupils are equal, round, and reactive to light.   Cardiovascular:      Rate and Rhythm: Normal rate and regular rhythm.      Heart sounds: Murmur heard.   Pulmonary:      Effort: Pulmonary effort is normal. No respiratory distress.      Breath sounds: Normal breath sounds.   Abdominal:      General: Bowel sounds are normal.      Palpations: Abdomen is soft.      Tenderness: There is no abdominal tenderness.   Musculoskeletal:         General: Normal range of motion.      Cervical back: Normal range of motion and neck supple.      Right lower leg: No edema.      Left lower leg: No edema.   Feet:      Right foot:      Protective Sensation: 10 sites tested. 10 sites sensed.      Left foot:      Protective Sensation: 10 sites tested. 10 sites sensed.   Lymphadenopathy:      Cervical: No cervical adenopathy.   Skin:     General: Skin is warm and dry.   Neurological:      General: No focal deficit present.      Mental Status: He is alert and oriented to person, place, and time. Mental status is at baseline.   Psychiatric:         Mood and Affect: Mood normal.         Behavior: Behavior normal.         Thought Content: Thought content normal.         Judgment: Judgment normal.           Reviewed Data:    Labs:   Lab Results   Component Value Date     07/22/2022    K 4.2 07/22/2022    CALCIUM 9.4 07/22/2022    AST 12 07/22/2022    ALT 17 07/22/2022    BUN 14 07/22/2022     CREATININE 1.02 07/22/2022    CREATININE 0.76 07/13/2022    CREATININE 0.69 (L) 07/10/2022    EGFRIFNONA 91 06/17/2021    EGFRIFAFRI 72 03/29/2016       Lab Results   Component Value Date    HGBA1C 7.00 (H) 07/10/2022    HGBA1C 5.20 07/12/2017    HGBA1C 5.00 04/29/2016       Lab Results   Component Value Date    LDL 56 02/28/2020     (H) 09/05/2018    LDL 72 06/15/2017    HDL 35 (L) 02/28/2020    TRIG 118 02/28/2020       Lab Results   Component Value Date    TSH 2.480 06/24/2021    FREET4 1.37 06/24/2021          Lab Results   Component Value Date    WBC 8.4 07/22/2022    HGB 11.1 (L) 07/22/2022    HGB 8.7 (L) 07/15/2022    HGB 8.1 (L) 07/14/2022     07/22/2022                   Lab Results   Component Value Date    PSA 1.27 08/14/2015       Imaging:          Medical Tests:          Screening for Glaucoma:  Previous screening for glaucoma?: Yes      Hearing Loss Screen:  Finger Rub Hearing Test (right ear): passed  Finger Rub Hearing Test (left ear): failed    Pt agreeable to arrange hearing exam.      Urinary Incontinence Screen:  Episodes of urinary incontinence? : No      Depression Screen:  PHQ-2/PHQ-9 Depression Screening 7/22/2022   Little Interest or Pleasure in Doing Things 0-->not at all   Feeling Down, Depressed or Hopeless 0-->not at all   PHQ-9: Brief Depression Severity Measure Score 0        PHQ-2: 3 (Proceed to PHQ-9)    PHQ-9: 5-9 (Mild Depression)       FUNCTIONAL, FALL RISK, & COGNITIVE SCREENING (Components below):    DATA:    Functional & Cognitive Status 8/4/2022   Do you have difficulty preparing food and eating? No   Do you have difficulty bathing yourself, getting dressed or grooming yourself? No   Do you have difficulty using the toilet? No   Do you have difficulty moving around from place to place? No   Do you have trouble with steps or getting out of a bed or a chair? No   Current Diet Well Balanced Diet   Dental Exam Up to date   Eye Exam Up to date   Exercise (times per  week) 0 times per week   Current Exercises Include No Regular Exercise   Do you need help using the phone?  No   Are you deaf or do you have serious difficulty hearing?  No   Do you need help with transportation? Yes   Do you need help shopping? No   Do you need help preparing meals?  No   Do you need help with housework?  No   Do you need help with laundry? No   Do you need help taking your medications? No   Do you need help managing money? No   Do you ever drive or ride in a car without wearing a seat belt? No   Do you have difficulty concentrating, remembering or making decisions? Yes         A) Assessment of Functional Ability:  (Assessment of ability to perform ADL's (showering/bathing, using toilet, dressing, feeding self, moving self around) and IADL's (use telephone, shop, prepare food, housekeep, do laundry, transport independently, take medications independently, and handle finances)    Degree of functional impairment: NONE (based on assessment noted above)      B) Assessment of Fall Risk:  Fall Risk Assessment was completed, and patient is at low risk for falls.       Need for further evaluation of gait, strength, and balance? : No    Timed Up and Go (TUG):   (>= 12 seconds indicates high risk for falling)    Observable abnormalities included: Normal gait pattern       C. Assessment of Cognitive Function:    Mini-Cog Test:     1) Registration (3 objects): Yes   2) Number of objects recalled: 3   3) Clock Draw: Passed? : Yes       Further evaluation required? : No        COUNSELING       A. Identification of Health Risk Factors:    Risk factors include: cardiovascular risk factors, polypharmacy, chronic pain, obesity and poor hearing      B. Age-Appropriate Screening Schedule:  (Refer to the list below for future screening recommendations based on patient's age, sex and/or medical conditions. Orders for these recommended tests are listed in the plan section. The patient has been provided with a written  plan)    Health Maintenance Topics  Health Maintenance   Topic Date Due   • URINE MICROALBUMIN  Never done   • TDAP/TD VACCINES (1 - Tdap) Never done   • ZOSTER VACCINE (1 of 2) Never done   • LIPID PANEL  02/28/2021   • INFLUENZA VACCINE  10/01/2022   • DIABETIC EYE EXAM  12/01/2022   • HEMOGLOBIN A1C  01/10/2023   • DIABETIC FOOT EXAM  08/04/2023       Health Maintenance Topics Due or Over-Due  Health Maintenance Due   Topic Date Due   • URINE MICROALBUMIN  Never done   • COLORECTAL CANCER SCREENING  Never done   • TDAP/TD VACCINES (1 - Tdap) Never done   • ZOSTER VACCINE (1 of 2) Never done   • HEPATITIS C SCREENING  Never done   • LIPID PANEL  02/28/2021         C. Advanced Care Planning:    Advance Care Planning   ACP discussion was held with the patient during this visit. Patient has an advance directive (not in EMR), copy requested.       D. Patient Self-Management and Personalized Health Advice:    He has been provided with personalized counseling/information (including brochures/handouts) about:     -- optimizing diet/nutrition plans, improving exercise / conditioning, weight management, recommended hearing testing, reducing risk for cardiac/vascular events with pre-existing disease and mental health concerns (depression/anxiety)      He has been recommended for the following preventative services which has been performed today, will be ordered today or ordered/performed on upcoming follow-up visit:     -- EXERCISE counseling provided, CARDIOVASCULAR disease risk reduction counseling performed, screening for prostate cancer (discussed and PSA will be performed annually), **COLORECTAL cancer screening (colonoscopy/Cologuard discussed or ordered), vaccination for PNEUMOVAX/PCV administered (or recommended), vaccination for Tdap / Td administered (or recommended), vaccination for SHINGRIX administered  (or recommended), COVID-19 vaccination (and/or booster) recommendations provided      E. Miscellaneous  Items:    -Aspirin use counseling: Does not need ASA (and currently is not on it)    -Discussed BMI with him. The BMI is above average; BMI management plan is completed (discussed plans for weight loss)    -Reviewed use of high risk medication in the elderly: YES    -Reviewed for potential of harmful drug interactions in the elderly: YES        WRAP UP       Assessment & Plan:    1) MEDICARE ANNUAL WELLNESS VISIT    2) OTHER MEDICAL CONDITIONS ADDRESSED TODAY:            Problem List Items Addressed This Visit        Cardiac and Vasculature    Essential hypertension    Relevant Medications    amLODIPine (NORVASC) 10 MG tablet    lisinopril (PRINIVIL,ZESTRIL) 10 MG tablet    metoprolol succinate XL (TOPROL-XL) 25 MG 24 hr tablet    furosemide (LASIX) 20 MG tablet    Other Relevant Orders    CBC & Differential    Comprehensive Metabolic Panel    TSH+Free T4    Urinalysis without microscopic (no culture) - Urine, Clean Catch    Hyperlipidemia    Relevant Medications    atorvastatin (LIPITOR) 40 MG tablet    Other Relevant Orders    Lipid Panel With / Chol / HDL Ratio       Endocrine and Metabolic    Type 2 diabetes mellitus, without long-term current use of insulin (HCC)    Relevant Medications    Farxiga 10 MG tablet    metFORMIN ER (GLUCOPHAGE-XR) 500 MG 24 hr tablet    Other Relevant Orders    Microalbumin / Creatinine Urine Ratio - Urine, Clean Catch       Hematology and Neoplasia    Anemia    Overview     Added automatically from request for surgery [id number]         Relevant Orders    Vitamin B12      Other Visit Diagnoses     Encounter for annual wellness exam in Medicare patient    -  Primary    Screening for prostate cancer        Relevant Orders    PSA Screen    Encounter for hepatitis C screening test for low risk patient        Relevant Orders    Hepatitis C Antibody    Vitamin D deficiency        Relevant Orders    Vitamin D 25 Hydroxy    Impacted cerumen of left ear        Relevant Orders    Ambulatory  Referral to ENT (Otolaryngology) (Completed)    Encounter for immunization        Relevant Orders    COVID-19 Vaccine (Pfizer) Gray Cap (Completed)                    Orders Placed This Encounter   Procedures   • COVID-19 Vaccine (Pfizer) Gray Cap   • Comprehensive Metabolic Panel   • Lipid Panel With / Chol / HDL Ratio   • PSA Screen   • TSH+Free T4   • Urinalysis without microscopic (no culture) - Urine, Clean Catch   • Hepatitis C Antibody   • Vitamin D 25 Hydroxy   • Vitamin B12   • Microalbumin / Creatinine Urine Ratio - Urine, Clean Catch   • Ambulatory Referral to ENT (Otolaryngology)   • CBC & Differential       Discussion / Summary:    Pt is aware of importance of following up with cardiology, ortho, pulmonary, and GI as scheduled.      Medications as of TODAY:              Current Outpatient Medications   Medication Sig Dispense Refill   • albuterol sulfate  (90 Base) MCG/ACT inhaler Inhale 2 puffs Every 6 (Six) Hours As Needed for Wheezing. 1 inhaler 0   • amLODIPine (NORVASC) 10 MG tablet Take 10 mg by mouth Daily.     • atorvastatin (LIPITOR) 40 MG tablet Take 1 tablet by mouth Daily. 90 tablet 2   • clopidogrel (PLAVIX) 75 MG tablet Take 1 tablet by mouth Daily. 90 tablet 3   • Farxiga 10 MG tablet      • furosemide (LASIX) 20 MG tablet Take 1 tablet by mouth Daily. 30 tablet 5   • glucosamine sulfate 500 MG capsule capsule Take  by mouth Daily.     • isosorbide mononitrate (IMDUR) 60 MG 24 hr tablet TAKE 1 AND 1/2 TABLETS BY MOUTH DAILY 135 tablet 2   • lisinopril (PRINIVIL,ZESTRIL) 10 MG tablet      • metFORMIN ER (GLUCOPHAGE-XR) 500 MG 24 hr tablet Take 1 tablet by mouth Daily With Dinner for 30 days. 30 tablet 0   • metoprolol succinate XL (TOPROL-XL) 25 MG 24 hr tablet TAKE ONE TABLET BY MOUTH DAILY 90 tablet 3   • pantoprazole (PROTONIX) 40 MG EC tablet Take 1 tablet by mouth Every Morning. 30 tablet 5   • potassium chloride 10 MEQ CR tablet Take 1 tablet by mouth Daily. 30 tablet 5   •  sucralfate (CARAFATE) 1 g tablet Take 1 tablet by mouth 2 (Two) Times a Day Before Meals. 60 tablet 5     No current facility-administered medications for this visit.         FOLLOW-UP:            Return in about 6 weeks (around 9/15/2022) for Chronic care.                 Future Appointments   Date Time Provider Department Center   8/8/2022 10:40 AM Marcia Stoner APRN [facility]   8/17/2022 11:00 AM Sekou Pisano MD [facility] None   8/29/2022  2:00 PM Gregor Carlson MD [facility] GE EA MAX [facility]   9/15/2022  9:15 AM Nargis Velasquez APRN [facility]   1/30/2023 11:00 AM Marcia Stoner APRN [facility]           After Visit Summary (AVS) including the Personalized Prevention  Plan Services (PPPS) was either printed and given to the patient at check-out today and/or sent to [facility] for review.       [unfilled]

## 2022-08-05 LAB
25(OH)D3+25(OH)D2 SERPL-MCNC: 37.8 NG/ML (ref 30–100)
ALBUMIN SERPL-MCNC: 4.2 G/DL (ref 3.8–4.8)
ALBUMIN/CREAT UR: 20 MG/G CREAT (ref 0–29)
ALBUMIN/GLOB SERPL: 2 {RATIO} (ref 1.2–2.2)
ALP SERPL-CCNC: 71 IU/L (ref 44–121)
ALT SERPL-CCNC: 11 IU/L (ref 0–44)
APPEARANCE UR: CLEAR
AST SERPL-CCNC: 12 IU/L (ref 0–40)
BASOPHILS # BLD AUTO: 0 X10E3/UL (ref 0–0.2)
BASOPHILS NFR BLD AUTO: 1 %
BILIRUB SERPL-MCNC: 0.6 MG/DL (ref 0–1.2)
BILIRUB UR QL STRIP: NEGATIVE
BUN SERPL-MCNC: 16 MG/DL (ref 8–27)
BUN/CREAT SERPL: 17 (ref 10–24)
CALCIUM SERPL-MCNC: 8.8 MG/DL (ref 8.6–10.2)
CHLORIDE SERPL-SCNC: 104 MMOL/L (ref 96–106)
CHOLEST SERPL-MCNC: 116 MG/DL (ref 100–199)
CHOLEST/HDLC SERPL: 3.2 RATIO (ref 0–5)
CO2 SERPL-SCNC: 24 MMOL/L (ref 20–29)
COLOR UR: YELLOW
CREAT SERPL-MCNC: 0.94 MG/DL (ref 0.76–1.27)
CREAT UR-MCNC: 257.8 MG/DL
EGFRCR SERPLBLD CKD-EPI 2021: 91 ML/MIN/1.73
EOSINOPHIL # BLD AUTO: 0.2 X10E3/UL (ref 0–0.4)
EOSINOPHIL NFR BLD AUTO: 4 %
ERYTHROCYTE [DISTWIDTH] IN BLOOD BY AUTOMATED COUNT: 14.4 % (ref 11.6–15.4)
GLOBULIN SER CALC-MCNC: 2.1 G/DL (ref 1.5–4.5)
GLUCOSE SERPL-MCNC: 148 MG/DL (ref 65–99)
GLUCOSE UR QL STRIP: ABNORMAL
HCT VFR BLD AUTO: 32.5 % (ref 37.5–51)
HCV AB S/CO SERPL IA: 0.1 S/CO RATIO (ref 0–0.9)
HDLC SERPL-MCNC: 36 MG/DL
HGB BLD-MCNC: 10.1 G/DL (ref 13–17.7)
HGB UR QL STRIP: NEGATIVE
IMM GRANULOCYTES # BLD AUTO: 0 X10E3/UL (ref 0–0.1)
IMM GRANULOCYTES NFR BLD AUTO: 0 %
KETONES UR QL STRIP: ABNORMAL
LDLC SERPL CALC-MCNC: 60 MG/DL (ref 0–99)
LEUKOCYTE ESTERASE UR QL STRIP: NEGATIVE
LYMPHOCYTES # BLD AUTO: 1.3 X10E3/UL (ref 0.7–3.1)
LYMPHOCYTES NFR BLD AUTO: 20 %
MCH RBC QN AUTO: 28.9 PG (ref 26.6–33)
MCHC RBC AUTO-ENTMCNC: 31.1 G/DL (ref 31.5–35.7)
MCV RBC AUTO: 93 FL (ref 79–97)
MICROALBUMIN UR-MCNC: 50.6 UG/ML
MONOCYTES # BLD AUTO: 0.5 X10E3/UL (ref 0.1–0.9)
MONOCYTES NFR BLD AUTO: 9 %
NEUTROPHILS # BLD AUTO: 4.2 X10E3/UL (ref 1.4–7)
NEUTROPHILS NFR BLD AUTO: 66 %
NITRITE UR QL STRIP: NEGATIVE
PH UR STRIP: 5.5 [PH] (ref 5–7.5)
PLATELET # BLD AUTO: 264 X10E3/UL (ref 150–450)
POTASSIUM SERPL-SCNC: 3.9 MMOL/L (ref 3.5–5.2)
PROT SERPL-MCNC: 6.3 G/DL (ref 6–8.5)
PROT UR QL STRIP: ABNORMAL
PSA SERPL-MCNC: 1.5 NG/ML (ref 0–4)
RBC # BLD AUTO: 3.5 X10E6/UL (ref 4.14–5.8)
SODIUM SERPL-SCNC: 142 MMOL/L (ref 134–144)
SP GR UR STRIP: >=1.03 (ref 1–1.03)
T4 FREE SERPL-MCNC: 1.38 NG/DL (ref 0.82–1.77)
TRIGL SERPL-MCNC: 104 MG/DL (ref 0–149)
TSH SERPL DL<=0.005 MIU/L-ACNC: 1.85 UIU/ML (ref 0.45–4.5)
UROBILINOGEN UR STRIP-MCNC: 0.2 MG/DL (ref 0.2–1)
VIT B12 SERPL-MCNC: 592 PG/ML (ref 232–1245)
VLDLC SERPL CALC-MCNC: 20 MG/DL (ref 5–40)
WBC # BLD AUTO: 6.3 X10E3/UL (ref 3.4–10.8)

## 2022-08-08 ENCOUNTER — OFFICE VISIT (OUTPATIENT)
Dept: CARDIOLOGY | Facility: CLINIC | Age: 62
End: 2022-08-08

## 2022-08-08 VITALS
DIASTOLIC BLOOD PRESSURE: 82 MMHG | RESPIRATION RATE: 22 BRPM | SYSTOLIC BLOOD PRESSURE: 132 MMHG | HEART RATE: 72 BPM | BODY MASS INDEX: 42.66 KG/M2 | HEIGHT: 72 IN | WEIGHT: 315 LBS

## 2022-08-08 DIAGNOSIS — Z95.1 S/P CABG (CORONARY ARTERY BYPASS GRAFT): ICD-10-CM

## 2022-08-08 DIAGNOSIS — Z95.2 S/P AVR (AORTIC VALVE REPLACEMENT): ICD-10-CM

## 2022-08-08 DIAGNOSIS — I25.708 CORONARY ARTERY DISEASE OF BYPASS GRAFT OF NATIVE HEART WITH STABLE ANGINA PECTORIS: Primary | ICD-10-CM

## 2022-08-08 DIAGNOSIS — I10 ESSENTIAL HYPERTENSION: ICD-10-CM

## 2022-08-08 DIAGNOSIS — G47.33 OBSTRUCTIVE SLEEP APNEA, ADULT: ICD-10-CM

## 2022-08-08 PROCEDURE — 99215 OFFICE O/P EST HI 40 MIN: CPT | Performed by: NURSE PRACTITIONER

## 2022-08-08 RX ORDER — CLOPIDOGREL BISULFATE 75 MG/1
75 TABLET ORAL DAILY
Qty: 90 TABLET | Refills: 3 | Status: SHIPPED | OUTPATIENT
Start: 2022-08-08 | End: 2022-11-30 | Stop reason: HOSPADM

## 2022-08-08 RX ORDER — METOPROLOL SUCCINATE 25 MG/1
25 TABLET, EXTENDED RELEASE ORAL DAILY
Qty: 90 TABLET | Refills: 3 | Status: SHIPPED | OUTPATIENT
Start: 2022-08-08 | End: 2022-12-02

## 2022-08-08 RX ORDER — POTASSIUM CHLORIDE 750 MG/1
10 TABLET, FILM COATED, EXTENDED RELEASE ORAL DAILY
Qty: 30 TABLET | Refills: 5 | Status: SHIPPED | OUTPATIENT
Start: 2022-08-08 | End: 2022-12-02

## 2022-08-08 RX ORDER — FUROSEMIDE 20 MG/1
20 TABLET ORAL DAILY
Qty: 30 TABLET | Refills: 5 | Status: ON HOLD | OUTPATIENT
Start: 2022-08-08 | End: 2022-11-28

## 2022-08-08 RX ORDER — RANOLAZINE 500 MG/1
500 TABLET, EXTENDED RELEASE ORAL 2 TIMES DAILY
Qty: 60 TABLET | Refills: 11 | Status: ON HOLD | OUTPATIENT
Start: 2022-08-08 | End: 2022-11-28

## 2022-08-08 RX ORDER — ATORVASTATIN CALCIUM 40 MG/1
40 TABLET, FILM COATED ORAL DAILY
Qty: 90 TABLET | Refills: 2 | Status: SHIPPED | OUTPATIENT
Start: 2022-08-08 | End: 2022-12-02

## 2022-08-08 RX ORDER — ISOSORBIDE MONONITRATE 60 MG/1
90 TABLET, EXTENDED RELEASE ORAL DAILY
Qty: 135 TABLET | Refills: 2 | Status: SHIPPED | OUTPATIENT
Start: 2022-08-08 | End: 2022-12-02

## 2022-08-08 NOTE — PROGRESS NOTES
CARDIOLOGY        Patient Name: Edmond Chase  :1960  Age: 61 y.o.  Primary Cardiologist: Papa Miguel MD  Encounter Provider:  VIKTORIA Van    Date of Service: 22            CHIEF COMPLAINT / REASON FOR OFFICE VISIT     Heart Problem (1 week hospital follow up   still having chest pain, fatigue and soa )      HISTORY OF PRESENT ILLNESS       Heart Problem  Associated symptoms include chest pain. Pertinent negatives include no chills, coughing, fever or myalgias.     Edmond Chase is a 61 y.o. male who presents today for 1 week hospital reevaluation.     Pt has a  history significant for hypertension, diabetes, CAD with history of CABG, history of aortic valve replacement.    Review of medical records reveals recent hospitalization 7/10 - 7/15/2022.  Patient reports that he has been experiencing intermittent angina.  Patient came to the emergency department and ECG did not reveal any ischemia but he opted not to stay for evaluation.  Early the morning of admission patient woke with recurrent angina and diaphoresis.  He took an additional half of an Imdur with some improvement and called EMS.  Following arrival to the hospital he was continued to have angina and was noted to be mildly hypotensive initial EKG revealed stable lateral T wave changes.  Repeat EKG was poor quality but there was concern about inferior ST elevation, on Dr. Patel's review there was no evidence of ST elevation to warrant emergent cardiac catheterization.  During hospitalization patient had an echocardiogram with normal LVEF, normal prosthetic valve gradients.  Myocardial perfusion study revealed moderately severe area of ischemia in the inferior wall.  On hospital day 2 patient admitted that he had some melena over the weekend.  GI was consulted and EGD performed revealing nonbleeding gastric ulcers.  Cardiac catheterization performed revealed stable coronary disease.  Decision was made to drop  "aspirin given gastric ulcer history.  Patient does have mild diastolic dysfunction and low-dose furosemide was added.  He was also referred to pulmonary for outpatient follow-up for chronic dyspnea.    Patient states that he is very concerned because since discharge he is still battling with shortness of breath that mainly occurs at night as well as chest pain.  Patient states that he is having a difficult time sleeping secondary to trouble breathing and reporting that he is only getting approximately 3 hours of sleep per night.  He does have sleep apnea and is very compliant with his CPAP device.  He has upcoming appointment with Dr. Pisano for further assessment.  He reports that his chest discomfort is making him fearful of sleep.  He states that he is now unsure if his angina is from coronary disease and chronic angina versus gastric ulcer.  Reports that he has several follow-up appointments this month including GI, ENT, PCP as well as pulmonology.  Upon review of patient's record it is noted that since 2020 patient has had hospitalization end of June/July timeframe annually for angina.  During all of these admissions patient has required cardiac catheterization.  Admission in 2020 did require stent placement.  He notes that his mother's birthday is around this time and does admit that in August 2020 she was very ill and ultimately passed away.  He states that he does not feel that he is anxious or depressed or thinking about his mother at the time of admissions.    The following portions of the patient's history were reviewed and updated as appropriate: allergies, current medications, past family history, past medical history, past social history, past surgical history and problem list.      VITAL SIGNS     Visit Vitals  /82 (BP Location: Right arm, Patient Position: Sitting, Cuff Size: Adult)   Pulse 72   Resp 22   Ht 182.9 cm (72\")   Wt (!) 144 kg (318 lb)   BMI 43.13 kg/m²         Wt Readings from " Last 3 Encounters:   08/08/22 (!) 144 kg (318 lb)   08/04/22 (!) 143 kg (315 lb)   07/29/22 (!) 142 kg (314 lb)     Body mass index is 43.13 kg/m².      REVIEW OF SYSTEMS   Review of Systems   Constitutional: Positive for malaise/fatigue. Negative for chills, fever, weight gain and weight loss.   Cardiovascular: Positive for chest pain and dyspnea on exertion. Negative for leg swelling.   Respiratory: Positive for shortness of breath. Negative for cough, snoring and wheezing.    Hematologic/Lymphatic: Negative for bleeding problem. Does not bruise/bleed easily.   Skin: Negative for color change.   Musculoskeletal: Negative for falls, joint pain and myalgias.   Gastrointestinal: Negative for melena.   Genitourinary: Negative for hematuria.   Neurological: Negative for excessive daytime sleepiness.   Psychiatric/Behavioral: Negative for depression. The patient is nervous/anxious.            PHYSICAL EXAMINATION     Constitutional:       Appearance: Normal appearance. Well-developed.   Eyes:      Conjunctiva/sclera: Conjunctivae normal.   Neck:      Vascular: No carotid bruit.   Pulmonary:      Effort: Pulmonary effort is normal.      Breath sounds: Normal breath sounds.   Cardiovascular:      Normal rate. Regular rhythm. Normal S1. Normal S2.      Murmurs: There is a grade 3/6 systolic murmur at the URSB and ULSB.      No gallop. No click. No rub.      Comments: Right radial cath site well healed without erythema or ecchymosis, palpable proximal and distal pulses, good capillary refill, good motor function of hand, no thrill or bruit detected, site is soft and non-tender with no hematoma, no sensory deficits noted.    Edema:     Peripheral edema absent.   Musculoskeletal: Normal range of motion. Skin:     General: Skin is warm and dry.   Neurological:      Mental Status: Alert and oriented to person, place, and time.      GCS: GCS eye subscore is 4. GCS verbal subscore is 5. GCS motor subscore is 6.   Psychiatric:          Mood and Affect: Mood is anxious.         Speech: Speech normal.         Behavior: Behavior normal.         Thought Content: Thought content normal.         Judgment: Judgment normal.           REVIEWED DATA     Procedures      Cardiac Procedures:  1. Echocardiogram 6/27/2017.  LVEF 56%.  Normal LV cavity size.  All LV wall segments contract normally.  Moderate LVH.  LAE.  RA is moderately dilated.  Bioprosthetic aortic valve with peak and mean gradients elevated, however dimensionless index is 0.34 which suggests normal valve function.  No regurgitation.  Mild MAC.  Mild mitral valve regurgitation.  Trace tricuspid valve regurgitation.  Calculated RVSP 3 mmHg.  2. Myocardial perfusion stress test 7/20/2017.  Small sized infarct in the inferior wall with moderate brijesh-infarct ischemia.  Impressions consistent with intermediate risk study.  3. Cardiac catheterization 10/30/2017.  Three-vessel CAD, patent LIMA to LAD, occluded AO-OMB SVG, occluded AO-PDA SVG recommend medical therapy.  4. Echocardiogram 7/1/2020.  LVEF 56%.  Normal diastolic function.  Normal RV cavity size, wall thickness.  No significant perivalvular leak although bioprosthetic valve was not well visualized.  Aortic valve maximum pressure gradient 43.2 mmHg, mean pressure gradient 28.8 mmHg, aortic valve area 1.0 cm².  Mild dilation of the sinuses of Valsalva measuring 4.2 cm.  Worsening aortic valve gradient compared to prior study.  5. Myocardial perfusion stress test 7/1/2020.  Small to medium sized, severe area of ischemia located in the inferior wall.  Impressions consistent with intermediate risk study.  Patient was very symptomatic.  Patient set up for cardiac catheterization.  6. Cardiac catheterization 7/7/2020.  Severe multivessel coronary disease with 100% proximal LAD, 50 to 60% stenoses throughout the circumflex territory within the proximal segment as well as the high marginal and diffuse 70% stenoses throughout the RCA with 100%  PDA stenosis with faint collaterals from the LAD.  PCI of the mid LAD via MARCELINA graft with placement of ESMER.  No residual stenosis.  Recommend reevaluating symptoms and if still symptomatic aggressively titrating antianginal medications with consideration given to long-acting nitrates in the hopes of medically managing his RCA disease as this would require extensive amount of stents to have fully revascularized.  7. Cardiac catheterization 6/17/2021. LIMA to LAD stent was widely patent and normal.  Left main with 20% luminal irregularities.  Circumflex had an area that was 70% stenosed in the midportion but does not go anywhere and is mainly in the AV groove branch.  RCA dominant and very tortuous vessel with 50% proximal disease, 70% mid vessel disease, 50 to 60% disease up to 90% in the distal RCA territory but due to tortuosity not amenable to any intervention.  Recommend increasing isosorbide mononitrate and following up with cardiology office.  8. Myocardial perfusion stress test 7/12/2022.  Medium sized, mildly severe area of ischemia in the inferior wall.  9. Echocardiogram 7/12/2022.  LVEF 51-55%.  All LV wall segments contract normally.  No significant aortic valve regurgitation.  No hemodynamically significant aortic valve stenosis.  There is a 27 mm porcine magna bioprosthetic aortic valve with stable gradients.  10. Cardiac catheterization 7/14/2022.  Left main normal.  LAD with calcified 90% proximal stenosis.   mid segment.  Mid to distal vessels fill via patent LIMA to LAD.  Circumflex severely calcified 70-80% stenosis in the midsegment.RCA is severely calcified and tortuous vessel with diffuse 70% mid vessel stenosis and discrete 80% mid to distal stenosis.   small caliber PDA branch PDA branch fills via left to right collaterals.  Ramus is discrete 50% stenosis in the inferior branch.  LIMA to LAD is normal.  SVG to OM is occluded at proximal anastomosis.  SVG to PDA is occluded at proximal  anastomosis.  Continue medical management.  RCA and LAD are not candidates for intervention and unchanged from prior cardiac catheterization.        ASSESSMENT & PLAN     Diagnoses and all orders for this visit:    1. Coronary artery disease of bypass graft of native heart with stable angina pectoris (HCC) (Primary)  2. S/P CABG (coronary artery bypass graft)  · Patient with recent hospitalization for angina, at that time cardiac catheterization was stable from prior with no targets amenable for intervention.  Patient continues to complain of angina as well as shortness of breath and orthopnea.  Does not appear volume overloaded.  · Patient was found to have gastric ulcer, nonbleeding, during EGD with some noted anemia  · Most recent hemoglobin stable  · For the past 3 years patient has had hospitalization which is led to cardiac catheterization in the end of June/early July.  He does note that in August 2020 his mother passed away and she was very ill during that time.  He also notes that her birthday is around this time.  I do question if some of patient's angina could be underlying regarding this anniversary, he is very fearful and anxious when he develops chest pain but does not feel that it is related to his anxiety from his mother's death.  · Aspirin discontinued secondary to gastric ulcer.  Continue Plavix, atorvastatin, isosorbide mononitrate, metoprolol succinate.  · Add Ranexa 500 mg twice daily for angina  · Encouraged to continue follow-up appointments with pulmonology, GI, PCP    3. S/P AVR (aortic valve replacement)  · Gradients stable on most recent echocardiogram    4. Essential hypertension  · BP stable at 132/82  · Continue current BP regimen    5. Obstructive sleep apnea, adult  · Reports difficulty breathing during sleep and fear of falling asleep secondary to underlying illnesses.  · Compliant with CPAP  · Keep previously scheduled follow-up with sleep medicine.    Other orders  -      atorvastatin (LIPITOR) 40 MG tablet; Take 1 tablet by mouth Daily.  Dispense: 90 tablet; Refill: 2  -     isosorbide mononitrate (IMDUR) 60 MG 24 hr tablet; Take 1.5 tablets by mouth Daily.  Dispense: 135 tablet; Refill: 2  -     metoprolol succinate XL (TOPROL-XL) 25 MG 24 hr tablet; Take 1 tablet by mouth Daily.  Dispense: 90 tablet; Refill: 3  -     furosemide (LASIX) 20 MG tablet; Take 1 tablet by mouth Daily.  Dispense: 30 tablet; Refill: 5  -     potassium chloride 10 MEQ CR tablet; Take 1 tablet by mouth Daily.  Dispense: 30 tablet; Refill: 5  -     clopidogrel (PLAVIX) 75 MG tablet; Take 1 tablet by mouth Daily.  Dispense: 90 tablet; Refill: 3  -     ranolazine (Ranexa) 500 MG 12 hr tablet; Take 1 tablet by mouth 2 (Two) Times a Day.  Dispense: 60 tablet; Refill: 11      Return in about 2 months (around 10/8/2022) for Dr. Miguel- [facility] F/U.    Future Appointments       Provider Department [facility]    8/15/2022 8:10 AM [facility] [facility] PRIMARY CARE [facility]    8/17/2022 11:00 AM Sekou Pisano MD [facility] SLEEP MEDICINE     8/29/2022 2:00 PM Gregor Carlson MD [facility] GASTROENTEROLOGY [facility]    9/15/2022 9:15 AM Nargis Velasquez APRN [facility] PRIMARY CARE [facility]    10/18/2022 2:00 PM Papa Miguel MD [facility] CARDIOLOGY [facility]    1/30/2023 11:00 AM Marcia Stoner APRN [facility] CARDIOLOGY [facility]        Time Spent: I spent 64 minutes caring for Edmond on this date of service. This time includes time spent by me in the following activities: preparing for the visit, reviewing tests, performing a medically appropriate examination and/or evaluation, counseling and educating the patient/family/caregiver, ordering medications, tests, or procedures, documenting information in the medical record and care coordination.             MEDICATIONS         Discharge Medications           Accurate as of August 8, 2022  1:27 PM. If you have any questions, ask your nurse or doctor.            New Medications      Instructions Start Date   ranolazine 500 MG 12 hr tablet  Commonly known as: Ranexa  Started by: VIKTORIA Van   500 mg, Oral, 2 Times Daily         Continue These Medications      Instructions Start Date   albuterol sulfate  (90 Base) MCG/ACT inhaler  Commonly known as: PROVENTIL HFA;VENTOLIN HFA;PROAIR HFA   2 puffs, Inhalation, Every 6 Hours PRN      amLODIPine 10 MG tablet  Commonly known as: NORVASC   10 mg, Oral, Daily      atorvastatin 40 MG tablet  Commonly known as: LIPITOR   40 mg, Oral, Daily      clopidogrel 75 MG tablet  Commonly known as: PLAVIX   75 mg, Oral, Daily      Farxiga 10 MG tablet  Generic drug: dapagliflozin Propanediol   No dose, route, or frequency recorded.      furosemide 20 MG tablet  Commonly known as: LASIX   20 mg, Oral, Daily      glucosamine sulfate 500 MG capsule capsule   Oral, Daily      isosorbide mononitrate 60 MG 24 hr tablet  Commonly known as: IMDUR   90 mg, Oral, Daily      lisinopril 10 MG tablet  Commonly known as: PRINIVIL,ZESTRIL   No dose, route, or frequency recorded.      metFORMIN  MG 24 hr tablet  Commonly known as: GLUCOPHAGE-XR   500 mg, Oral, Daily With Dinner      metoprolol succinate XL 25 MG 24 hr tablet  Commonly known as: TOPROL-XL   25 mg, Oral, Daily      pantoprazole 40 MG EC tablet  Commonly known as: PROTONIX   40 mg, Oral, Every Early Morning      potassium chloride 10 MEQ CR tablet   10 mEq, Oral, Daily      sucralfate 1 g tablet  Commonly known as: CARAFATE   1 g, Oral, 2 Times Daily Before Meals                 **Dragon Disclaimer:   Much of this encounter note is an electronic transcription/translation of spoken language to printed text. The electronic translation of spoken language may permit erroneous, or at times, nonsensical words or phrases to be inadvertently transcribed. Although I have  reviewed the note for such errors, some may still exist.

## 2022-08-17 ENCOUNTER — OFFICE VISIT (OUTPATIENT)
Dept: SLEEP MEDICINE | Facility: HOSPITAL | Age: 62
End: 2022-08-17

## 2022-08-17 ENCOUNTER — TELEPHONE (OUTPATIENT)
Dept: INTERNAL MEDICINE | Age: 62
End: 2022-08-17

## 2022-08-17 VITALS — HEIGHT: 73 IN | WEIGHT: 314 LBS | OXYGEN SATURATION: 98 % | BODY MASS INDEX: 41.62 KG/M2

## 2022-08-17 DIAGNOSIS — G47.14 HYPERSOMNIA DUE TO MEDICAL CONDITION: ICD-10-CM

## 2022-08-17 DIAGNOSIS — G47.33 OSA ON CPAP: Primary | ICD-10-CM

## 2022-08-17 DIAGNOSIS — Z99.89 OSA ON CPAP: Primary | ICD-10-CM

## 2022-08-17 DIAGNOSIS — E66.01 CLASS 3 SEVERE OBESITY DUE TO EXCESS CALORIES WITH SERIOUS COMORBIDITY AND BODY MASS INDEX (BMI) OF 40.0 TO 44.9 IN ADULT: ICD-10-CM

## 2022-08-17 PROCEDURE — G0463 HOSPITAL OUTPT CLINIC VISIT: HCPCS

## 2022-08-17 PROCEDURE — 99213 OFFICE O/P EST LOW 20 MIN: CPT | Performed by: INTERNAL MEDICINE

## 2022-08-17 NOTE — PROGRESS NOTES
"Follow Up Sleep Disorders Center Note     Chief Complaint:  TJ     Primary Care Physician: Nargis Velasquez APRN    Interval History:   The patient is a 61 y.o. male  who I last saw 8/17/2021 and that note was reviewed.  The patient was recently in the hospital due to an esophageal ulcer and erosive gastropathy.  The patient's been more short of breath.  He feels like he is worse but seems to be getting better.  He goes to bed at midnight and awakens at 6 AM.    Review of Systems:    A complete review of systems was done and all were negative with the exception of shortness of breath, atypical chest pain, and some increasing anxiety    Social History:    Social History     Socioeconomic History   • Marital status: Single   Tobacco Use   • Smoking status: Never Smoker   • Smokeless tobacco: Never Used   Vaping Use   • Vaping Use: Never used   Substance and Sexual Activity   • Alcohol use: Yes     Comment: 1 drink/monthly   • Drug use: No   • Sexual activity: Defer       Allergies:  Bystolic [nebivolol hcl]     Medication Review:  Reviewed.      Vital Signs:    Vitals:    08/17/22 0900   SpO2: 98%   Weight: (!) 142 kg (314 lb)   Height: 185.4 cm (72.99\")     Body mass index is 41.44 kg/m².    Physical Exam:    Constitutional:  Well developed 61 y.o. male that appears in no apparent distress.  Awake & oriented times 3.  Normal mood with normal recent and remote memory and normal judgement.  Eyes:  Conjunctivae normal.  Oropharynx: Previously, moist mucous membranes without exudate and a large tongue and uvula and moderate narrowing of the posterior pharyngeal opening and class II-3 Mallampati airway, patient is wearing a facemask.    Self-administered [place] Sleepiness Scale test results: 5  0-5 Lower normal daytime sleepiness  6-10 Higher normal daytime sleepiness  11-12 Mild, 13-15 Moderate, & 16-24 Severe excessive daytime sleepiness     Downloaded PAP Data Reviewed For Compliance:  DME is [employer] and he uses a " fullface mask.  Downloads between 5/18 and 8/15/2022 compliance 99%.  Average usage is 9 hours and 21 minutes.  Average AHI is normal without leak.  Average auto CPAP pressure is 15 and his DreamStation 2 auto CPAP is 14-20    I have reviewed the above results and compared them with the patient's last downloads and reviewed with the patient.    Impression:   Severe obstructive sleep apnea by overnight polysomnogram 10/31/2016 adequately treated with DreamStation 2 auto titrating CPAP.  The patient is at goal and demonstrates good compliance and usage.  The patient does have persistent complaints of hypersomnolence but his [place] Sleepiness Scale is normal at 5.    Plan:  Good sleep hygiene measures should be maintained.  Weight loss would be beneficial in this patient who has class III severe obesity by BMI.      After evaluating the patient and assessing results available, the patient is benefiting from the treatment being provided.     The patient will continue DreamStation 2 auto CPAP.  After clinical evaluation and review of downloads, I recommend no changes to the patient's pressures.  A new prescription will be sent to the patient's DME.    I answered all of the patient's questions.  The patient will call for any problems and will follow up in 1 year.      Sekou Pisano MD  Sleep Medicine  08/17/22  11:03 EDT

## 2022-08-17 NOTE — TELEPHONE ENCOUNTER
Caller: Edmond Chase A    Relationship to patient: Self    Best call back number:[phone]     Patient is needing: PATIENT STATED HE HAD A MISSED CALL ABOUT LABS AND WOULD LIKE A CALL BACK AS SOON AS AVAILABLE PLEASE.

## 2022-08-26 NOTE — ED NOTES
Pt arrived from home by private vehicle. Pt was here yesterday but LWBS after triage. Pt states he is having a hard time breathing and is visibly sweaty. Pt states that he is afraid to go to sleep and is very anxious. Pt keeps stating he feels like he is going to pass out. Pt is in wheelchair at triage.    PAST SURGICAL HISTORY:  Fusion of spine, lumbar region 2015    Status post cervical spinal fusion 2014

## 2022-08-28 DIAGNOSIS — E11.9 TYPE 2 DIABETES MELLITUS WITHOUT COMPLICATION, WITHOUT LONG-TERM CURRENT USE OF INSULIN: ICD-10-CM

## 2022-08-29 ENCOUNTER — TELEPHONE (OUTPATIENT)
Dept: GASTROENTEROLOGY | Facility: CLINIC | Age: 62
End: 2022-08-29

## 2022-08-29 ENCOUNTER — OFFICE VISIT (OUTPATIENT)
Dept: GASTROENTEROLOGY | Facility: CLINIC | Age: 62
End: 2022-08-29

## 2022-08-29 VITALS
BODY MASS INDEX: 41.28 KG/M2 | WEIGHT: 311.5 LBS | HEIGHT: 73 IN | DIASTOLIC BLOOD PRESSURE: 68 MMHG | SYSTOLIC BLOOD PRESSURE: 123 MMHG | TEMPERATURE: 94 F | HEART RATE: 84 BPM

## 2022-08-29 DIAGNOSIS — E11.9 TYPE 2 DIABETES MELLITUS WITHOUT COMPLICATION, WITHOUT LONG-TERM CURRENT USE OF INSULIN: ICD-10-CM

## 2022-08-29 DIAGNOSIS — K63.5 POLYP OF COLON, UNSPECIFIED PART OF COLON, UNSPECIFIED TYPE: ICD-10-CM

## 2022-08-29 DIAGNOSIS — K22.10 ULCER OF ESOPHAGUS WITHOUT BLEEDING: Primary | ICD-10-CM

## 2022-08-29 PROCEDURE — 99214 OFFICE O/P EST MOD 30 MIN: CPT | Performed by: INTERNAL MEDICINE

## 2022-08-29 RX ORDER — METFORMIN HYDROCHLORIDE 500 MG/1
TABLET, EXTENDED RELEASE ORAL
Qty: 90 TABLET | Refills: 3 | Status: SHIPPED | OUTPATIENT
Start: 2022-08-29 | End: 2022-08-30

## 2022-08-29 RX ORDER — FERROUS SULFATE 325(65) MG
325 TABLET ORAL
COMMUNITY
End: 2022-12-02

## 2022-08-29 RX ORDER — SODIUM CHLORIDE, SODIUM LACTATE, POTASSIUM CHLORIDE, CALCIUM CHLORIDE 600; 310; 30; 20 MG/100ML; MG/100ML; MG/100ML; MG/100ML
30 INJECTION, SOLUTION INTRAVENOUS CONTINUOUS
Status: CANCELLED | OUTPATIENT
Start: 2022-10-04

## 2022-08-29 NOTE — PROGRESS NOTES
Chief Complaint   Patient presents with   • Shortness of Breath   • Heartburn   • Anemia     Subjective     HPI     Edmond Chase is a 61 y.o. male who presents today for office follow up.  He was recently seen by her service during the hospital stay last month for anemia and melena.  He had an EGD performed by Dr Hollis which showed evidence of a nonbleeding distal esophageal ulcer.  He did have evidence of iron deficiency and has been started on iron supplementation.  He denies any evidence of further overt GI bleeding.  His last hemoglobin had improved from 8.7-10.  He has a history of colon polyps with his last colonoscopy in 2018 at the [facility].  He has a history of multivessel coronary artery disease on Plavix he had a left heart cath this month which showed nothing amenable to intervention and continued medical management was recommended.    Objective   Vitals:    08/29/22 1355   BP: 123/68   Pulse: 84   Temp: 94 °F (34.4 °C)       Physical Exam  The following data was reviewed by: Gregor Carlson MD on 08/29/2022:  Common labs    Common Labsle 7/22/22 7/22/22 8/4/22 8/4/22 8/4/22 8/4/22 8/4/22 8/19/22    1341 1341 0000 0000 0000 0000 0000    Glucose  182 (A)  148 (A)       BUN  14  16       Creatinine  1.02  0.94       Sodium  142  142       Potassium  4.2  3.9       Chloride  105  104       Calcium  9.4  8.8       Total Protein  6.7  6.3       Albumin  4.4  4.2       Total Bilirubin  0.6  0.6       Alkaline Phosphatase  77  71       AST (SGOT)  12  12       ALT (SGPT)  17  11       WBC 8.4  6.3     7.8   Hemoglobin 11.1 (A)  10.1 (A)     10.0 (A)   Hematocrit 34.0 (A)  32.5 (A)     32.2 (A)   Platelets 322  264     295   Total Cholesterol     116      Triglycerides     104      HDL Cholesterol     36 (A)      LDL Cholesterol      60      Microalbumin, Urine       50.6    PSA      1.5     (A) Abnormal value       Comments are available for some flowsheets but are not being displayed.            Data reviewed: GI studies EGD from 7/2022   Hospital records from 7/2022      Assessment & Plan   Assessment:     1. Ulcer of esophagus without bleeding    2. Polyp of colon, unspecified part of colon, unspecified type    3.      Multivessel CAD on Plavix    Plan:   61-year-old gentleman recently seen for anemia and melena found to have a nonbleeding esophageal ulcer.  Hemoglobin has improved somewhat since hospital discharge and he remains on iron therapy.  We will recommend a follow-up EGD in approximately 6 weeks to ensure healing of his distal esophageal ulcer.  Would also recommend we perform a colonoscopy at that time given history of polyps and to assess for any other potential source for his anemia.  I will reach out to his cardiologist to ensure its okay to hold his Plavix for 5 days prior to endoscopy.            Gregor Carlson M.D.  [facility]  [address] [place] [address]  Office: [phone]

## 2022-08-29 NOTE — TELEPHONE ENCOUNTER
PAXTON patient via telephone for. Scheduled 10/04/2022 with arrival time of 1:00pm . Prep paperwork mailed to verified address on file. Patient advised arrival time may change based on [facility] guidelines. PAXTON MORRIS

## 2022-08-30 RX ORDER — METFORMIN HYDROCHLORIDE 500 MG/1
500 TABLET, EXTENDED RELEASE ORAL
Qty: 30 TABLET | Refills: 5 | Status: SHIPPED | OUTPATIENT
Start: 2022-08-30 | End: 2022-12-02

## 2022-09-15 ENCOUNTER — OFFICE VISIT (OUTPATIENT)
Dept: INTERNAL MEDICINE | Age: 62
End: 2022-09-15

## 2022-09-15 VITALS
DIASTOLIC BLOOD PRESSURE: 80 MMHG | OXYGEN SATURATION: 98 % | HEART RATE: 74 BPM | TEMPERATURE: 97.6 F | HEIGHT: 73 IN | SYSTOLIC BLOOD PRESSURE: 124 MMHG | BODY MASS INDEX: 41.08 KG/M2 | WEIGHT: 310 LBS

## 2022-09-15 DIAGNOSIS — D50.9 IRON DEFICIENCY ANEMIA, UNSPECIFIED IRON DEFICIENCY ANEMIA TYPE: Primary | ICD-10-CM

## 2022-09-15 DIAGNOSIS — I10 ESSENTIAL HYPERTENSION: ICD-10-CM

## 2022-09-15 DIAGNOSIS — E11.65 TYPE 2 DIABETES MELLITUS WITH HYPERGLYCEMIA, WITHOUT LONG-TERM CURRENT USE OF INSULIN: ICD-10-CM

## 2022-09-15 DIAGNOSIS — E78.5 HYPERLIPIDEMIA, UNSPECIFIED HYPERLIPIDEMIA TYPE: ICD-10-CM

## 2022-09-15 LAB
BASOPHILS # BLD AUTO: 0.04 10*3/MM3 (ref 0–0.2)
BASOPHILS NFR BLD AUTO: 0.5 % (ref 0–1.5)
EOSINOPHIL # BLD AUTO: 0.1 10*3/MM3 (ref 0–0.4)
EOSINOPHIL NFR BLD AUTO: 1.3 % (ref 0.3–6.2)
ERYTHROCYTE [DISTWIDTH] IN BLOOD BY AUTOMATED COUNT: 15.6 % (ref 12.3–15.4)
FERRITIN SERPL-MCNC: 16 NG/ML (ref 30–400)
HCT VFR BLD AUTO: 37.7 % (ref 37.5–51)
HGB BLD-MCNC: 11.7 G/DL (ref 13–17.7)
IMM GRANULOCYTES # BLD AUTO: 0.02 10*3/MM3 (ref 0–0.05)
IMM GRANULOCYTES NFR BLD AUTO: 0.3 % (ref 0–0.5)
IRON SATN MFR SERPL: 7 % (ref 20–50)
IRON SERPL-MCNC: 37 MCG/DL (ref 59–158)
LYMPHOCYTES # BLD AUTO: 1.36 10*3/MM3 (ref 0.7–3.1)
LYMPHOCYTES NFR BLD AUTO: 17.1 % (ref 19.6–45.3)
MCH RBC QN AUTO: 27.1 PG (ref 26.6–33)
MCHC RBC AUTO-ENTMCNC: 31 G/DL (ref 31.5–35.7)
MCV RBC AUTO: 87.3 FL (ref 79–97)
MONOCYTES # BLD AUTO: 0.69 10*3/MM3 (ref 0.1–0.9)
MONOCYTES NFR BLD AUTO: 8.7 % (ref 5–12)
NEUTROPHILS # BLD AUTO: 5.76 10*3/MM3 (ref 1.7–7)
NEUTROPHILS NFR BLD AUTO: 72.1 % (ref 42.7–76)
NRBC BLD AUTO-RTO: 0 /100 WBC (ref 0–0.2)
PLATELET # BLD AUTO: 254 10*3/MM3 (ref 140–450)
RBC # BLD AUTO: 4.32 10*6/MM3 (ref 4.14–5.8)
TIBC SERPL-MCNC: 510 MCG/DL
UIBC SERPL-MCNC: 473 MCG/DL (ref 112–346)
WBC # BLD AUTO: 7.97 10*3/MM3 (ref 3.4–10.8)

## 2022-09-15 PROCEDURE — 99214 OFFICE O/P EST MOD 30 MIN: CPT

## 2022-09-15 NOTE — PROGRESS NOTES
"    I N T E R N A L  M E D I C I N E  Nargis Velasquez, APRN    ENCOUNTER DATE:  09/15/2022    Edmond Chase / 61 y.o. / male      CHIEF COMPLAINT / REASON FOR OFFICE VISIT     Hypertension and Diabetes      ASSESSMENT & PLAN     Diagnoses and all orders for this visit:    1. Iron deficiency anemia, unspecified iron deficiency anemia type (Primary)  -     CBC & Differential  -     Iron Profile  -     Ferritin    2. Essential hypertension    3. Hyperlipidemia, unspecified hyperlipidemia type    4. Type 2 diabetes mellitus with hyperglycemia, without long-term current use of insulin ([facility])         SUMMARY/DISCUSSION  • Will monitor anemia/ iron levels.  • Follow up with all specialists as scheduled.  Will recheck A1C at follow up.      Next Appointment with me: Visit date not found    Return in about 3 months (around 12/15/2022) for Chronic care.       VITAL SIGNS     Visit Vitals  /80   Pulse 74   Temp 97.6 °F (36.4 °C)   Ht 185.4 cm (72.99\")   Wt (!) 141 kg (310 lb)   SpO2 98%   BMI 40.91 kg/m²             Wt Readings from Last 3 Encounters:   09/15/22 (!) 141 kg (310 lb)   08/29/22 (!) 141 kg (311 lb 8 oz)   08/17/22 (!) 142 kg (314 lb)     Body mass index is 40.91 kg/m².        MEDICATIONS AT THE TIME OF OFFICE VISIT     Current Outpatient Medications on File Prior to Visit   Medication Sig Dispense Refill   • albuterol sulfate  (90 Base) MCG/ACT inhaler Inhale 2 puffs Every 6 (Six) Hours As Needed for Wheezing. 1 inhaler 0   • amLODIPine (NORVASC) 10 MG tablet Take 10 mg by mouth Daily.     • atorvastatin (LIPITOR) 40 MG tablet Take 1 tablet by mouth Daily. 90 tablet 2   • clopidogrel (PLAVIX) 75 MG tablet Take 1 tablet by mouth Daily. 90 tablet 3   • Farxiga 10 MG tablet      • ferrous sulfate 325 (65 FE) MG tablet Take 325 mg by mouth Daily With Breakfast.     • furosemide (LASIX) 20 MG tablet Take 1 tablet by mouth Daily. 30 tablet 5   • glucosamine sulfate 500 MG capsule capsule Take  by mouth " Daily.     • isosorbide mononitrate (IMDUR) 60 MG 24 hr tablet Take 1.5 tablets by mouth Daily. 135 tablet 2   • lisinopril (PRINIVIL,ZESTRIL) 10 MG tablet      • metFORMIN ER (GLUCOPHAGE-XR) 500 MG 24 hr tablet Take 1 tablet by mouth Daily With Dinner. 30 tablet 5   • metoprolol succinate XL (TOPROL-XL) 25 MG 24 hr tablet Take 1 tablet by mouth Daily. 90 tablet 3   • pantoprazole (PROTONIX) 40 MG EC tablet Take 1 tablet by mouth Every Morning. 30 tablet 5   • potassium chloride 10 MEQ CR tablet Take 1 tablet by mouth Daily. 30 tablet 5   • ranolazine (Ranexa) 500 MG 12 hr tablet Take 1 tablet by mouth 2 (Two) Times a Day. 60 tablet 11   • sucralfate (CARAFATE) 1 g tablet Take 1 tablet by mouth 2 (Two) Times a Day Before Meals. 60 tablet 5     No current facility-administered medications on file prior to visit.        HISTORY OF PRESENT ILLNESS     HTN: Amlodipine 10 mg, lisinopril 10 mg, metoprolol 25 mg.   Blood pressure remains well controlled.      HLD: Atorvastatin 40 mg nightly.  08/2022 Lipid panel with LDL of 60.    Diabetes: Farxiga 10 mg, metformin 500 mg daily.  07/2022 A1C 7.00.    Esophageal ulcer: sucralfate 1 g BID, and pantoprazole 40 mg daily.  Scheduled with Dr. Carlson for EGD and colonoscopy on October 4.  Remains on Pantoprazole and Sucralfate with benefit.  Is taking iron supplement without any side effects.  Denies any episodes of rectal bleeding.  Does report ongoing fatigue, but denies any worsening.    Scheduled to follow up with pulmonology next week for clarification on possible asthma diagnosis.      Patient Care Team:  Nargis Velasquez APRN as PCP - General (Family Medicine)  Papa Miguel MD as Consulting Physician (Cardiology)  Sekou Pisano MD as Consulting Physician (Pulmonary Disease)  Gregor Carlson MD as Consulting Physician (Gastroenterology)  Estevan Yuan MD (Sports Medicine)    REVIEW OF SYSTEMS     Review of Systems   Constitutional: Positive  for fatigue. Negative for chills, fever and unexpected weight change.   Respiratory: Negative for cough, chest tightness and shortness of breath.    Cardiovascular: Negative for chest pain, palpitations and leg swelling.   Neurological: Negative for dizziness, weakness, light-headedness and headaches.   Psychiatric/Behavioral: The patient is not nervous/anxious.           PHYSICAL EXAMINATION     Physical Exam  Vitals reviewed.   Constitutional:       General: He is not in acute distress.     Appearance: Normal appearance. He is not ill-appearing, toxic-appearing or diaphoretic.   HENT:      Head: Normocephalic and atraumatic.   Cardiovascular:      Rate and Rhythm: Normal rate and regular rhythm.      Heart sounds: Normal heart sounds.   Pulmonary:      Effort: Pulmonary effort is normal.      Breath sounds: Normal breath sounds.   Neurological:      Mental Status: He is alert and oriented to person, place, and time. Mental status is at baseline.   Psychiatric:         Mood and Affect: Mood normal.         Behavior: Behavior normal.         Thought Content: Thought content normal.         Judgment: Judgment normal.           REVIEWED DATA     Labs:           Imaging:            Medical Tests:           Summary of old records / correspondence / consultant report:           Request outside records:

## 2022-10-04 ENCOUNTER — ANESTHESIA EVENT (OUTPATIENT)
Dept: GASTROENTEROLOGY | Facility: HOSPITAL | Age: 62
End: 2022-10-04

## 2022-10-04 ENCOUNTER — ANESTHESIA (OUTPATIENT)
Dept: GASTROENTEROLOGY | Facility: HOSPITAL | Age: 62
End: 2022-10-04

## 2022-10-04 ENCOUNTER — HOSPITAL ENCOUNTER (OUTPATIENT)
Facility: HOSPITAL | Age: 62
Setting detail: HOSPITAL OUTPATIENT SURGERY
Discharge: HOME | End: 2022-10-04
Attending: INTERNAL MEDICINE | Admitting: INTERNAL MEDICINE

## 2022-10-04 VITALS
BODY MASS INDEX: 40.91 KG/M2 | RESPIRATION RATE: 16 BRPM | HEART RATE: 66 BPM | OXYGEN SATURATION: 96 % | SYSTOLIC BLOOD PRESSURE: 148 MMHG | WEIGHT: 308.7 LBS | DIASTOLIC BLOOD PRESSURE: 95 MMHG | TEMPERATURE: 97.6 F | HEIGHT: 73 IN

## 2022-10-04 DIAGNOSIS — K63.5 POLYP OF COLON, UNSPECIFIED PART OF COLON, UNSPECIFIED TYPE: ICD-10-CM

## 2022-10-04 DIAGNOSIS — K22.10 ULCER OF ESOPHAGUS WITHOUT BLEEDING: ICD-10-CM

## 2022-10-04 LAB — GLUCOSE BLDC GLUCOMTR-MCNC: 146 MG/DL (ref 70–130)

## 2022-10-04 PROCEDURE — 88305 TISSUE EXAM BY PATHOLOGIST: CPT | Performed by: INTERNAL MEDICINE

## 2022-10-04 PROCEDURE — 82962 GLUCOSE BLOOD TEST: CPT

## 2022-10-04 PROCEDURE — 43239 EGD BIOPSY SINGLE/MULTIPLE: CPT | Performed by: INTERNAL MEDICINE

## 2022-10-04 PROCEDURE — 45380 COLONOSCOPY AND BIOPSY: CPT | Performed by: INTERNAL MEDICINE

## 2022-10-04 PROCEDURE — 25010000002 PROPOFOL 10 MG/ML EMULSION: Performed by: ANESTHESIOLOGY

## 2022-10-04 PROCEDURE — 45385 COLONOSCOPY W/LESION REMOVAL: CPT | Performed by: INTERNAL MEDICINE

## 2022-10-04 RX ORDER — PROPOFOL 10 MG/ML
VIAL (ML) INTRAVENOUS AS NEEDED
Status: DISCONTINUED | OUTPATIENT
Start: 2022-10-04 | End: 2022-10-04 | Stop reason: SURG

## 2022-10-04 RX ORDER — SODIUM CHLORIDE, SODIUM LACTATE, POTASSIUM CHLORIDE, CALCIUM CHLORIDE 600; 310; 30; 20 MG/100ML; MG/100ML; MG/100ML; MG/100ML
30 INJECTION, SOLUTION INTRAVENOUS CONTINUOUS
Status: DISCONTINUED | OUTPATIENT
Start: 2022-10-04 | End: 2022-10-04 | Stop reason: HOSPADM

## 2022-10-04 RX ORDER — LIDOCAINE HYDROCHLORIDE 20 MG/ML
INJECTION, SOLUTION INFILTRATION; PERINEURAL AS NEEDED
Status: DISCONTINUED | OUTPATIENT
Start: 2022-10-04 | End: 2022-10-04 | Stop reason: SURG

## 2022-10-04 RX ADMIN — LIDOCAINE HYDROCHLORIDE 60 MG: 20 INJECTION, SOLUTION INFILTRATION; PERINEURAL at 10:20

## 2022-10-04 RX ADMIN — PROPOFOL 140 MCG/KG/MIN: 10 INJECTION, EMULSION INTRAVENOUS at 10:20

## 2022-10-04 RX ADMIN — SODIUM CHLORIDE, POTASSIUM CHLORIDE, SODIUM LACTATE AND CALCIUM CHLORIDE 30 ML/HR: 600; 310; 30; 20 INJECTION, SOLUTION INTRAVENOUS at 10:01

## 2022-10-04 RX ADMIN — PROPOFOL 120 MG: 10 INJECTION, EMULSION INTRAVENOUS at 10:20

## 2022-10-04 RX ADMIN — LIDOCAINE HYDROCHLORIDE 60 MG: 20 INJECTION, SOLUTION INFILTRATION; PERINEURAL at 10:18

## 2022-10-04 NOTE — ANESTHESIA POSTPROCEDURE EVALUATION
"Patient: Edmond Chase    Procedure Summary     Date: 10/04/22 Room / Location: [facility] ENDOSCOPY 10 /  [facility]    Anesthesia Start: 1017 Anesthesia Stop: 1056    Procedures:       ESOPHAGOGASTRODUODENOSCOPY with biopsies (N/A Esophagus)      COLONOSCOPY to cecum with cold forceps and cold snare polypectomies (N/A ) Diagnosis:       Ulcer of esophagus without bleeding      Polyp of colon, unspecified part of colon, unspecified type      (Ulcer of esophagus without bleeding [K22.10])      (Polyp of colon, unspecified part of colon, unspecified type [K63.5])    Surgeons: Gregor Carlson MD Provider: Yandel Briceño MD    Anesthesia Type: MAC ASA Status: 3          Anesthesia Type: MAC    Vitals  Vitals Value Taken Time   /86 10/04/22 1108   Temp     Pulse 75 10/04/22 1108   Resp 16 10/04/22 1108   SpO2 96 % 10/04/22 1108           Post Anesthesia Care and Evaluation    Patient location during evaluation: bedside  Patient participation: complete - patient participated  Level of consciousness: awake and alert  Pain management: adequate    Airway patency: patent  Anesthetic complications: No anesthetic complications  PONV Status: none  Cardiovascular status: acceptable  Respiratory status: acceptable  Hydration status: acceptable    Comments: /86 (BP Location: Left arm, Patient Position: Lying)   Pulse 75   Temp 36.4 °C (97.6 °F) (Oral)   Resp 16   Ht 185.4 cm (73\")   Wt (!) 140 kg (308 lb 11.2 oz)   SpO2 96%   BMI 40.73 kg/m²         "

## 2022-10-04 NOTE — H&P
[facility]  Pre Procedure History & Physical    Chief Complaint:   Anemia    Subjective     HPI:   62 y.o. male here for EGD/colonoscopy for evaluation of anemia.    Past Medical History:   Past Medical History:   Diagnosis Date   • Abnormal nuclear stress test    • Aortic valve insufficiency     nonrheumtic    • Ascending aortic aneurysm (HCC)    • CAD (coronary artery disease)    • Chest pain    • Diabetes mellitus (HCC)    • Dizzy     CAREFUL WHEN GETTING UP   • Dyspnea    • Essential hypertension    • Fatigue    • Hyperglycemia    • Hyperlipidemia    • Hypersomnia with sleep apnea    • Lightheadedness    • Malaise and fatigue    • Morbid obesity (HCC)    • Myocardial ischemia    • Nocturia    • TJ on auto CPAP 10/31/2016    Overnight polysomnogram.  Weight 276 pounds.  Severe TJ with AHI 79 events per hour.  Auto CPAP recommended.   • Pneumonia     FEB 2016   • Scrotal bleeding    • Shortness of breath        Past Surgical History:  Past Surgical History:   Procedure Laterality Date   • AORTIC VALVE REPAIR/REPLACEMENT  05/2016   • ASCENDING ARCH/HEMIARCH REPLACEMENT N/A 5/2/2016    Procedure: BHAVESH STERNOTOMY CORONARY ARTERY BYPASS GRAFT TIMES 3 USING LEFT INTERNAL MAMMARY ARTERY AND RIGHT GREATER SAPHENOUS VEIN GRAFT PER ENDOSCOPIC VEIN HARVESTING, AORTIC ANEURYSM REPAIR WITH ROOT REPAIR AND AORTIC VALVE REPLACEMENT;  Surgeon: Rosalio Cline MD;  Location: [facility] OR;  Service:    • CARDIAC CATHETERIZATION N/A 4/1/2016    Procedure: Left Heart Cath;  Surgeon: Erick Tam MD;  Location: [facility] INVASIVE LOCATION;  Service:    • CARDIAC CATHETERIZATION N/A 4/1/2016    Procedure: Left ventriculography;  Surgeon: Erick Tam MD;  Location: [facility] INVASIVE LOCATION;  Service:    • CARDIAC CATHETERIZATION N/A 4/1/2016    Procedure: Right Heart Cath;  Surgeon: Erick Tam MD;  Location: [facility] INVASIVE LOCATION;  Service:    • CARDIAC CATHETERIZATION N/A 10/30/2017     Procedure: Coronary angiography;  Surgeon: Sorin Vasquez MD;  Location: [facility] CATH INVASIVE LOCATION;  Service:    • CARDIAC CATHETERIZATION  10/30/2017    Procedure: Saphenous Vein Graft;  Surgeon: Sorin Vasquez MD;  Location: [facility] CATH INVASIVE LOCATION;  Service:    • CARDIAC CATHETERIZATION N/A 10/30/2017    Procedure: Native mammary injection;  Surgeon: Sorin Vasquez MD;  Location: [facility] CATH INVASIVE LOCATION;  Service:    • CARDIAC CATHETERIZATION N/A 7/7/2020    Procedure: Coronary angiography;  Surgeon: Gregor Kim MD;  Location: [facility] CATH INVASIVE LOCATION;  Service: Cardiovascular;  Laterality: N/A;   • CARDIAC CATHETERIZATION N/A 7/7/2020    Procedure: Left heart cath;  Surgeon: Gregor Kim MD;  Location: [facility] CATH INVASIVE LOCATION;  Service: Cardiovascular;  Laterality: N/A;   • CARDIAC CATHETERIZATION N/A 7/7/2020    Procedure: Left ventriculography;  Surgeon: Gregor Kim MD;  Location: [facility] CATH INVASIVE LOCATION;  Service: Cardiovascular;  Laterality: N/A;   • CARDIAC CATHETERIZATION N/A 7/7/2020    Procedure: Stent ESMER bypass graft;  Surgeon: Gregor Kim MD;  Location: [facility] CATH INVASIVE LOCATION;  Service: Cardiovascular;  Laterality: N/A;   • CARDIAC CATHETERIZATION N/A 6/17/2021    Procedure: SAPHENOUS VEIN GRAFT;  Surgeon: Marques Ndiaye MD;  Location: [facility] CATH INVASIVE LOCATION;  Service: Cardiovascular;  Laterality: N/A;   • CARDIAC CATHETERIZATION N/A 6/17/2021    Procedure: Left Heart Cath;  Surgeon: Marques Ndiaye MD;  Location: [facility] CATH INVASIVE LOCATION;  Service: Cardiovascular;  Laterality: N/A;   • CARDIAC CATHETERIZATION N/A 6/17/2021    Procedure: Coronary angiography;  Surgeon: Marques Ndiaye MD;  Location: [facility] CATH INVASIVE LOCATION;  Service: Cardiovascular;  Laterality: N/A;   • CARDIAC CATHETERIZATION N/A 7/14/2022    Procedure: Left Heart Cath;  Surgeon: Charlie Aj MD;  Location: [facility] CATH  "INVASIVE LOCATION;  Service: Cardiovascular;  Laterality: N/A;   • CARDIAC CATHETERIZATION N/A 7/14/2022    Procedure: Coronary angiography;  Surgeon: Charlie Aj MD;  Location:  [facility] CATH INVASIVE LOCATION;  Service: Cardiovascular;  Laterality: N/A;   • CARDIAC CATHETERIZATION  7/14/2022    Procedure: Saphenous Vein Graft;  Surgeon: Charlie Aj MD;  Location:  [facility] CATH INVASIVE LOCATION;  Service: Cardiovascular;;   • CARDIAC CATHETERIZATION N/A 7/14/2022    Procedure: Native mammary injection;  Surgeon: Charlie Aj MD;  Location:  [facility] CATH INVASIVE LOCATION;  Service: Cardiovascular;  Laterality: N/A;   • CORONARY ARTERY BYPASS GRAFT  05/2016    LIMA TO LAD, SVG TO PDA, SVG TO OM2   • ENDOSCOPY N/A 7/13/2022    Procedure: ESOPHAGOGASTRODUODENOSCOPY;  Surgeon: Marcia Hollis MD;  Location: [facility] ENDOSCOPY;  Service: Gastroenterology;  Laterality: N/A;  PRE- ANEMIA, MELENA  POST- MILD EROSIVE GASTRITIS, GE JUNCTION ULCER   • INNER EAR SURGERY     • TONSILLECTOMY         Family History:  Family History   Problem Relation Age of Onset   • Hypertension Mother    • Diabetes Mother    • Heart disease Mother    • Hypertension Father    • Lung cancer Father    • Heart disease Sister    • Stroke Maternal Grandmother    • Heart disease Maternal Grandmother    • Hypertension Maternal Grandfather    • Hypertension Paternal Grandmother    • Hypertension Paternal Grandfather    • Other Other         The patient states that his sister has a problem that goes by the name of \"long QT\".  He says this is a familial trait but he also says that he is \"not interested in pursuing it for himself\".   • Coronary artery disease Other        Social History:   reports that he has never smoked. He has never used smokeless tobacco. He reports current alcohol use. He reports that he does not use drugs.    Medications:   Medications Prior to Admission   Medication Sig Dispense Refill Last Dose   • " albuterol sulfate  (90 Base) MCG/ACT inhaler Inhale 2 puffs Every 6 (Six) Hours As Needed for Wheezing. 1 inhaler 0    • amLODIPine (NORVASC) 10 MG tablet Take 10 mg by mouth Daily.      • atorvastatin (LIPITOR) 40 MG tablet Take 1 tablet by mouth Daily. 90 tablet 2    • clopidogrel (PLAVIX) 75 MG tablet Take 1 tablet by mouth Daily. 90 tablet 3    • Farxiga 10 MG tablet       • ferrous sulfate 325 (65 FE) MG tablet Take 325 mg by mouth Daily With Breakfast.      • furosemide (LASIX) 20 MG tablet Take 1 tablet by mouth Daily. 30 tablet 5    • glucosamine sulfate 500 MG capsule capsule Take  by mouth Daily.      • isosorbide mononitrate (IMDUR) 60 MG 24 hr tablet Take 1.5 tablets by mouth Daily. 135 tablet 2    • lisinopril (PRINIVIL,ZESTRIL) 10 MG tablet       • metFORMIN ER (GLUCOPHAGE-XR) 500 MG 24 hr tablet Take 1 tablet by mouth Daily With Dinner. 30 tablet 5    • metoprolol succinate XL (TOPROL-XL) 25 MG 24 hr tablet Take 1 tablet by mouth Daily. 90 tablet 3    • pantoprazole (PROTONIX) 40 MG EC tablet Take 1 tablet by mouth Every Morning. 30 tablet 5    • potassium chloride 10 MEQ CR tablet Take 1 tablet by mouth Daily. 30 tablet 5    • ranolazine (Ranexa) 500 MG 12 hr tablet Take 1 tablet by mouth 2 (Two) Times a Day. 60 tablet 11    • sucralfate (CARAFATE) 1 g tablet Take 1 tablet by mouth 2 (Two) Times a Day Before Meals. 60 tablet 5        Allergies:  Bystolic [nebivolol hcl]    ROS:    Pertinent items are noted in HPI     Objective     There were no vitals taken for this visit.    Physical Exam   Constitutional: Pt is oriented to person, place, and time and well-developed, well-nourished, and in no distress.   Mouth/Throat: Oropharynx is clear and moist.   Neck: Normal range of motion.   Cardiovascular: Normal rate, regular rhythm and normal heart sounds.    Pulmonary/Chest: Effort normal and breath sounds normal.   Abdominal: Soft. Nontender  Skin: Skin is warm and dry.   Psychiatric: Mood,  memory, affect and judgment normal.     Assessment & Plan     Diagnosis:  Anemia    Anticipated Surgical Procedure:  EGD/Colonoscopy    The risks, benefits, and alternatives of this procedure have been discussed with the patient or the responsible party- the patient understands and agrees to proceed.

## 2022-10-04 NOTE — ANESTHESIA PREPROCEDURE EVALUATION
Anesthesia Evaluation     Patient summary reviewed and Nursing notes reviewed   no history of anesthetic complications:  NPO Solid Status: > 8 hours  NPO Liquid Status: > 8 hours           Airway   Mallampati: II  TM distance: >3 FB  Neck ROM: full  No difficulty expected  Dental - normal exam     Pulmonary    (+) sleep apnea on CPAP,   (-) rhonchi, decreased breath sounds, wheezes, not a smoker  Cardiovascular   Exercise tolerance: good (4-7 METS)    PT is on anticoagulation therapy  Rhythm: regular  Rate: normal    (+) hypertension, CAD, CABG, angina, hyperlipidemia,   (-) LOWERY, murmur    ROS comment: LVEF 51-55%   CAD recommended medical mgmt not amenable to reperfusion intervention     Neuro/Psych  (+) dizziness/light headedness,    (-) CVA  GI/Hepatic/Renal/Endo    (+) morbid obesity, PUD,  diabetes mellitus type 2 poorly controlled using insulin,   (-) no renal disease    Musculoskeletal     Abdominal     Abdomen: soft.   Substance History      OB/GYN          Other                      Anesthesia Plan    ASA 3     MAC   total IV anesthesia  intravenous induction     Anesthetic plan, risks, benefits, and alternatives have been provided, discussed and informed consent has been obtained with: patient.        CODE STATUS:

## 2022-10-04 NOTE — DISCHARGE INSTRUCTIONS
Restart Plavix on Thursday, October 6 per Dr. Carlson.      For the next 24 hours patient needs to be with a responsible adult.    For 24 hours DO NOT drive, operate machinery, appliances, drink alcohol, make important decisions or sign legal documents.    Start with a light or bland diet if you are feeling sick to your stomach otherwise advance to regular diet as tolerated.    Follow recommendations on procedure report if provided by your doctor.    Call Dr Carlson for problems [phone]    Problems may include but not limited to: large amounts of bleeding, trouble breathing, repeated vomiting, severe unrelieved pain, fever or chills.

## 2022-10-05 LAB
LAB AP CASE REPORT: NORMAL
LAB AP DIAGNOSIS COMMENT: NORMAL
PATH REPORT.FINAL DX SPEC: NORMAL
PATH REPORT.GROSS SPEC: NORMAL

## 2022-10-14 ENCOUNTER — TELEPHONE (OUTPATIENT)
Dept: GASTROENTEROLOGY | Facility: CLINIC | Age: 62
End: 2022-10-14

## 2022-10-14 NOTE — TELEPHONE ENCOUNTER
Provider: DR. AMES    Caller: SAHARA TATE    Relationship to Patient: SELF    Phone Number: [phone]    Reason for Call: BIOPSY RESULTS FROM EGD ON 10/4/22. PLEASE CALL PATIENT TO GO OVER RESULTS

## 2022-10-17 NOTE — PROGRESS NOTES
Results d/w patient  He needs referral to Dr Metzger at  [facility] to be evaluated for EMR of esophageal lesion and large colon polyp  He needs office f/u 2 weeks after appt with Dr Metzger.    He should continue daily PPI

## 2022-10-21 ENCOUNTER — OFFICE VISIT (OUTPATIENT)
Dept: CARDIOLOGY | Facility: CLINIC | Age: 62
End: 2022-10-21

## 2022-10-21 VITALS
BODY MASS INDEX: 41.62 KG/M2 | OXYGEN SATURATION: 98 % | SYSTOLIC BLOOD PRESSURE: 154 MMHG | DIASTOLIC BLOOD PRESSURE: 84 MMHG | HEIGHT: 73 IN | WEIGHT: 314 LBS | HEART RATE: 76 BPM

## 2022-10-21 DIAGNOSIS — I25.708 CORONARY ARTERY DISEASE OF BYPASS GRAFT OF NATIVE HEART WITH STABLE ANGINA PECTORIS: Primary | ICD-10-CM

## 2022-10-21 DIAGNOSIS — I10 ESSENTIAL HYPERTENSION: ICD-10-CM

## 2022-10-21 PROCEDURE — 99214 OFFICE O/P EST MOD 30 MIN: CPT | Performed by: INTERNAL MEDICINE

## 2022-10-21 NOTE — PROGRESS NOTES
"      CARDIOLOGY    Papa Miguel MD    ENCOUNTER DATE:  10/21/2022    Edmond Chase / 62 y.o. / male        CHIEF COMPLAINT / REASON FOR OFFICE VISIT     Coronary Artery Disease (08/08/2022 Follow up)  Hypertension    HISTORY OF PRESENT ILLNESS       HPI  Edmond Chase is a 62 y.o. male who presents today for reevaluation.  Patient was recently hospitalized.  He ultimately was found to have a GI bleed.  He just had a scope and there is no evidence of bleeding although he was told he has Last's esophagus.  He also has a very large polyp in his colon that they have been watching that they are also concerned about.  From a cardiovascular standpoint he is actually been doing relatively well.  He was placed on Ranexa by Precious and he says it really has not noticed any type of difference.  With that in mind I told him to stop it.  He is also on some Lasix from the hospital again he was in heart failure then due to probably a high output state.  He denies any type of swelling abdominal swelling or shortness of breath.      The following portions of the patient's history were reviewed and updated as appropriate: allergies, current medications, past family history, past medical history, past social history, past surgical history and problem list.      VITAL SIGNS     Visit Vitals  /84 (BP Location: Left arm, Patient Position: Sitting)   Pulse 76   Ht 185.4 cm (73\")   Wt (!) 142 kg (314 lb)   SpO2 98%   BMI 41.43 kg/m²         Wt Readings from Last 3 Encounters:   10/21/22 (!) 142 kg (314 lb)   10/04/22 (!) 140 kg (308 lb 11.2 oz)   09/15/22 (!) 141 kg (310 lb)     Body mass index is 41.43 kg/m².      REVIEW OF SYSTEMS   ROS        PHYSICAL EXAMINATION     Vitals reviewed.   Constitutional:       Appearance: Healthy appearance.   Pulmonary:      Effort: Pulmonary effort is normal.   Cardiovascular:      Normal rate. Regular rhythm. Normal S1. Normal S2.      Murmurs: There is no murmur.      No " gallop. No click. No rub.   Pulses:     Intact distal pulses.   Edema:     Peripheral edema absent.   Neurological:      Mental Status: Alert and oriented to person, place and time.           REVIEWED DATA     Procedures    Cardiac Procedures:  1.     Lipid Panel    Lipid Panel 8/4/22   Total Cholesterol 116   Triglycerides 104   HDL Cholesterol 36 (A)   VLDL Cholesterol 20   LDL Cholesterol  60   (A) Abnormal value                ASSESSMENT & PLAN      Diagnosis Plan   1. Coronary artery disease of bypass graft of native heart with stable angina pectoris (HCC)        2. Essential hypertension              SUMMARY/DISCUSSION  1. Coronary artery disease.  At this point I would stop his Ranexa he is not really having any angina.  2. Hypertension blood pressure little bit elevated today but this is unusual for him.  I told him to follow his blood pressure uses Lasix on an as-needed basis.  3. Last's esophagus.  Very long discussion about that.  He is going to see someone down at [facility].  4. This point he seems to be doing well from a cardiovascular standpoint I would continue the same and have him follow-up in 6 months.        MEDICATIONS         Discharge Medications          Accurate as of October 21, 2022 11:23 AM. If you have any questions, ask your nurse or doctor.            Continue These Medications      Instructions Start Date   albuterol sulfate  (90 Base) MCG/ACT inhaler  Commonly known as: PROVENTIL HFA;VENTOLIN HFA;PROAIR HFA   2 puffs, Inhalation, Every 6 Hours PRN      amLODIPine 10 MG tablet  Commonly known as: NORVASC   10 mg, Oral, Daily      atorvastatin 40 MG tablet  Commonly known as: LIPITOR   40 mg, Oral, Daily      clopidogrel 75 MG tablet  Commonly known as: PLAVIX   75 mg, Oral, Daily      Farxiga 10 MG tablet  Generic drug: dapagliflozin Propanediol   No dose, route, or frequency recorded.      ferrous sulfate 325 (65 FE) MG tablet   325 mg, Oral, Daily With  Breakfast      furosemide 20 MG tablet  Commonly known as: LASIX   20 mg, Oral, Daily      glucosamine sulfate 500 MG capsule capsule   Oral, Daily      isosorbide mononitrate 60 MG 24 hr tablet  Commonly known as: IMDUR   90 mg, Oral, Daily      lisinopril 10 MG tablet  Commonly known as: PRINIVIL,ZESTRIL   No dose, route, or frequency recorded.      metFORMIN  MG 24 hr tablet  Commonly known as: GLUCOPHAGE-XR   500 mg, Oral, Daily With Dinner      metoprolol succinate XL 25 MG 24 hr tablet  Commonly known as: TOPROL-XL   25 mg, Oral, Daily      pantoprazole 40 MG EC tablet  Commonly known as: PROTONIX   40 mg, Oral, Every Early Morning      potassium chloride 10 MEQ CR tablet   10 mEq, Oral, Daily      ranolazine 500 MG 12 hr tablet  Commonly known as: Ranexa   500 mg, Oral, 2 Times Daily      sucralfate 1 g tablet  Commonly known as: CARAFATE   1 g, Oral, 2 Times Daily Before Meals                 **Dragon Disclaimer:   Much of this encounter note is an electronic transcription/translation of spoken language to printed text. The electronic translation of spoken language may permit erroneous, or at times, nonsensical words or phrases to be inadvertently transcribed. Although I have reviewed the note for such errors, some may still exist.

## 2022-10-28 ENCOUNTER — TELEPHONE (OUTPATIENT)
Dept: GASTROENTEROLOGY | Facility: CLINIC | Age: 62
End: 2022-10-28

## 2022-10-28 NOTE — TELEPHONE ENCOUNTER
Patient called. Advised a referral to Dr. Metzger at [facility] has been sent. Advised someone from their office will call to schedule an appointment. Advised once he has the appointment to call back to our office to schedule a 2 week follow up with Dr. Carlson. Advised to continue his Pantoprazole. He verb understanding.

## 2022-10-28 NOTE — TELEPHONE ENCOUNTER
----- Message from Gregor Carlson MD sent at 10/17/2022 12:03 PM EDT -----  Results d/w patient  He needs referral to Dr Metzger at  [facility] to be evaluated for EMR of esophageal lesion and large colon polyp  He needs office f/u 2 weeks after appt with Dr Metzger.    He should continue daily PPI

## 2022-11-21 ENCOUNTER — TELEPHONE (OUTPATIENT)
Dept: INTERNAL MEDICINE | Age: 62
End: 2022-11-21

## 2022-11-21 NOTE — TELEPHONE ENCOUNTER
Pt was informed Nargis will still be his pcp and will see notes made my doctors int he [facility] system.

## 2022-11-21 NOTE — TELEPHONE ENCOUNTER
Caller: Edmond Chase    Relationship: Self    Best call back number: [phone]    Who are you requesting to speak with (clinical staff, provider,  specific staff member): JUWAN NG    What was the call regarding: PATIENT STATED HE HAS RECENTLY BEEN DIAGNOSED WITH ESOPHOGEAL CANCER, AND IS REQUESTING THAT JUWAN NG BE A PART OF HIS CARE TEAM.    PATIENT STATED HE HAS A [employer] CARE TEAM FOR THIS DIAGNOSIS.    Do you require a callback: PLEASE CALL TO DISCUSS

## 2022-11-25 ENCOUNTER — APPOINTMENT (OUTPATIENT)
Dept: GENERAL RADIOLOGY | Facility: HOSPITAL | Age: 62
End: 2022-11-25

## 2022-11-25 ENCOUNTER — APPOINTMENT (OUTPATIENT)
Dept: CT IMAGING | Facility: HOSPITAL | Age: 62
End: 2022-11-25

## 2022-11-25 ENCOUNTER — HOSPITAL ENCOUNTER (INPATIENT)
Facility: HOSPITAL | Age: 62
LOS: 5 days | Discharge: HOME | End: 2022-11-30
Attending: EMERGENCY MEDICINE | Admitting: INTERNAL MEDICINE

## 2022-11-25 DIAGNOSIS — C15.9 MALIGNANT NEOPLASM OF ESOPHAGUS, UNSPECIFIED LOCATION: ICD-10-CM

## 2022-11-25 DIAGNOSIS — Z86.010 HISTORY OF COLONOSCOPY WITH POLYPECTOMY: ICD-10-CM

## 2022-11-25 DIAGNOSIS — K62.5 RECTAL BLEEDING: Primary | ICD-10-CM

## 2022-11-25 DIAGNOSIS — Z98.890 HISTORY OF COLONOSCOPY WITH POLYPECTOMY: ICD-10-CM

## 2022-11-25 DIAGNOSIS — F41.9 ANXIETY: ICD-10-CM

## 2022-11-25 DIAGNOSIS — C15.9: ICD-10-CM

## 2022-11-25 DIAGNOSIS — Z79.01 CHRONIC ANTICOAGULATION: ICD-10-CM

## 2022-11-25 DIAGNOSIS — I95.9 SYMPTOMATIC HYPOTENSION: ICD-10-CM

## 2022-11-25 DIAGNOSIS — K92.2 GASTROINTESTINAL HEMORRHAGE, UNSPECIFIED GASTROINTESTINAL HEMORRHAGE TYPE: ICD-10-CM

## 2022-11-25 DIAGNOSIS — D62 ACUTE BLOOD LOSS ANEMIA: ICD-10-CM

## 2022-11-25 LAB
ABO GROUP BLD: NORMAL
ALBUMIN SERPL-MCNC: 3.8 G/DL (ref 3.5–5.2)
ALBUMIN/GLOB SERPL: 1.7 G/DL
ALP SERPL-CCNC: 77 U/L (ref 39–117)
ALT SERPL W P-5'-P-CCNC: 12 U/L (ref 1–41)
ANION GAP SERPL CALCULATED.3IONS-SCNC: 11 MMOL/L (ref 5–15)
AST SERPL-CCNC: 11 U/L (ref 1–40)
BASOPHILS # BLD AUTO: 0.02 10*3/MM3 (ref 0–0.2)
BASOPHILS # BLD AUTO: 0.02 10*3/MM3 (ref 0–0.2)
BASOPHILS # BLD AUTO: 0.03 10*3/MM3 (ref 0–0.2)
BASOPHILS NFR BLD AUTO: 0.2 % (ref 0–1.5)
BASOPHILS NFR BLD AUTO: 0.3 % (ref 0–1.5)
BASOPHILS NFR BLD AUTO: 0.4 % (ref 0–1.5)
BILIRUB SERPL-MCNC: 0.4 MG/DL (ref 0–1.2)
BLD GP AB SCN SERPL QL: NEGATIVE
BUN SERPL-MCNC: 18 MG/DL (ref 8–23)
BUN/CREAT SERPL: 18 (ref 7–25)
CALCIUM SPEC-SCNC: 8.9 MG/DL (ref 8.6–10.5)
CHLORIDE SERPL-SCNC: 108 MMOL/L (ref 98–107)
CO2 SERPL-SCNC: 25 MMOL/L (ref 22–29)
CREAT SERPL-MCNC: 1 MG/DL (ref 0.76–1.27)
DEPRECATED RDW RBC AUTO: 54.2 FL (ref 37–54)
DEPRECATED RDW RBC AUTO: 55.9 FL (ref 37–54)
DEPRECATED RDW RBC AUTO: 57.2 FL (ref 37–54)
EGFRCR SERPLBLD CKD-EPI 2021: 84 ML/MIN/1.73
EOSINOPHIL # BLD AUTO: 0.01 10*3/MM3 (ref 0–0.4)
EOSINOPHIL # BLD AUTO: 0.04 10*3/MM3 (ref 0–0.4)
EOSINOPHIL # BLD AUTO: 0.12 10*3/MM3 (ref 0–0.4)
EOSINOPHIL NFR BLD AUTO: 0.1 % (ref 0.3–6.2)
EOSINOPHIL NFR BLD AUTO: 0.5 % (ref 0.3–6.2)
EOSINOPHIL NFR BLD AUTO: 1.7 % (ref 0.3–6.2)
ERYTHROCYTE [DISTWIDTH] IN BLOOD BY AUTOMATED COUNT: 16.6 % (ref 12.3–15.4)
ERYTHROCYTE [DISTWIDTH] IN BLOOD BY AUTOMATED COUNT: 16.6 % (ref 12.3–15.4)
ERYTHROCYTE [DISTWIDTH] IN BLOOD BY AUTOMATED COUNT: 17 % (ref 12.3–15.4)
GLOBULIN UR ELPH-MCNC: 2.2 GM/DL
GLUCOSE BLDC GLUCOMTR-MCNC: 181 MG/DL (ref 70–130)
GLUCOSE SERPL-MCNC: 207 MG/DL (ref 65–99)
HCT VFR BLD AUTO: 25.5 % (ref 37.5–51)
HCT VFR BLD AUTO: 26.7 % (ref 37.5–51)
HCT VFR BLD AUTO: 31.1 % (ref 37.5–51)
HCT VFR BLD AUTO: 36.5 % (ref 37.5–51)
HGB BLD-MCNC: 10.2 G/DL (ref 13–17.7)
HGB BLD-MCNC: 11.5 G/DL (ref 13–17.7)
HGB BLD-MCNC: 8.3 G/DL (ref 13–17.7)
HGB BLD-MCNC: 8.4 G/DL (ref 13–17.7)
IMM GRANULOCYTES # BLD AUTO: 0.01 10*3/MM3 (ref 0–0.05)
IMM GRANULOCYTES # BLD AUTO: 0.03 10*3/MM3 (ref 0–0.05)
IMM GRANULOCYTES # BLD AUTO: 0.05 10*3/MM3 (ref 0–0.05)
IMM GRANULOCYTES NFR BLD AUTO: 0.1 % (ref 0–0.5)
IMM GRANULOCYTES NFR BLD AUTO: 0.4 % (ref 0–0.5)
IMM GRANULOCYTES NFR BLD AUTO: 0.5 % (ref 0–0.5)
INR PPP: 0.95 (ref 0.9–1.1)
LYMPHOCYTES # BLD AUTO: 0.9 10*3/MM3 (ref 0.7–3.1)
LYMPHOCYTES # BLD AUTO: 1.19 10*3/MM3 (ref 0.7–3.1)
LYMPHOCYTES # BLD AUTO: 1.3 10*3/MM3 (ref 0.7–3.1)
LYMPHOCYTES NFR BLD AUTO: 15.4 % (ref 19.6–45.3)
LYMPHOCYTES NFR BLD AUTO: 18.7 % (ref 19.6–45.3)
LYMPHOCYTES NFR BLD AUTO: 8.7 % (ref 19.6–45.3)
MCH RBC QN AUTO: 28.2 PG (ref 26.6–33)
MCH RBC QN AUTO: 28.9 PG (ref 26.6–33)
MCH RBC QN AUTO: 29 PG (ref 26.6–33)
MCHC RBC AUTO-ENTMCNC: 31.5 G/DL (ref 31.5–35.7)
MCHC RBC AUTO-ENTMCNC: 31.5 G/DL (ref 31.5–35.7)
MCHC RBC AUTO-ENTMCNC: 32.5 G/DL (ref 31.5–35.7)
MCV RBC AUTO: 89.2 FL (ref 79–97)
MCV RBC AUTO: 89.5 FL (ref 79–97)
MCV RBC AUTO: 91.8 FL (ref 79–97)
MONOCYTES # BLD AUTO: 0.47 10*3/MM3 (ref 0.1–0.9)
MONOCYTES # BLD AUTO: 0.52 10*3/MM3 (ref 0.1–0.9)
MONOCYTES # BLD AUTO: 0.69 10*3/MM3 (ref 0.1–0.9)
MONOCYTES NFR BLD AUTO: 6.1 % (ref 5–12)
MONOCYTES NFR BLD AUTO: 6.7 % (ref 5–12)
MONOCYTES NFR BLD AUTO: 7.5 % (ref 5–12)
NEUTROPHILS NFR BLD AUTO: 4.98 10*3/MM3 (ref 1.7–7)
NEUTROPHILS NFR BLD AUTO: 5.96 10*3/MM3 (ref 1.7–7)
NEUTROPHILS NFR BLD AUTO: 71.6 % (ref 42.7–76)
NEUTROPHILS NFR BLD AUTO: 77.3 % (ref 42.7–76)
NEUTROPHILS NFR BLD AUTO: 8.68 10*3/MM3 (ref 1.7–7)
NEUTROPHILS NFR BLD AUTO: 83.8 % (ref 42.7–76)
NRBC BLD AUTO-RTO: 0 /100 WBC (ref 0–0.2)
PLATELET # BLD AUTO: 163 10*3/MM3 (ref 140–450)
PLATELET # BLD AUTO: 167 10*3/MM3 (ref 140–450)
PLATELET # BLD AUTO: 237 10*3/MM3 (ref 140–450)
PMV BLD AUTO: 10.1 FL (ref 6–12)
PMV BLD AUTO: 10.1 FL (ref 6–12)
PMV BLD AUTO: 9.7 FL (ref 6–12)
POTASSIUM SERPL-SCNC: 4.1 MMOL/L (ref 3.5–5.2)
PROT SERPL-MCNC: 6 G/DL (ref 6–8.5)
PROTHROMBIN TIME: 12.7 SECONDS (ref 11.7–14.2)
QT INTERVAL: 380 MS
RBC # BLD AUTO: 2.86 10*6/MM3 (ref 4.14–5.8)
RBC # BLD AUTO: 2.91 10*6/MM3 (ref 4.14–5.8)
RBC # BLD AUTO: 4.08 10*6/MM3 (ref 4.14–5.8)
RH BLD: POSITIVE
SODIUM SERPL-SCNC: 144 MMOL/L (ref 136–145)
T&S EXPIRATION DATE: NORMAL
TROPONIN T SERPL-MCNC: 0.01 NG/ML (ref 0–0.03)
WBC NRBC COR # BLD: 10.35 10*3/MM3 (ref 3.4–10.8)
WBC NRBC COR # BLD: 6.96 10*3/MM3 (ref 3.4–10.8)
WBC NRBC COR # BLD: 7.71 10*3/MM3 (ref 3.4–10.8)

## 2022-11-25 PROCEDURE — C1751 CATH, INF, PER/CENT/MIDLINE: HCPCS

## 2022-11-25 PROCEDURE — 74174 CTA ABD&PLVS W/CONTRAST: CPT

## 2022-11-25 PROCEDURE — 99285 EMERGENCY DEPT VISIT HI MDM: CPT

## 2022-11-25 PROCEDURE — 02HV33Z INSERTION OF INFUSION DEVICE INTO SUPERIOR VENA CAVA, PERCUTANEOUS APPROACH: ICD-10-PCS | Performed by: INTERNAL MEDICINE

## 2022-11-25 PROCEDURE — 36415 COLL VENOUS BLD VENIPUNCTURE: CPT

## 2022-11-25 PROCEDURE — 86850 RBC ANTIBODY SCREEN: CPT | Performed by: PHYSICIAN ASSISTANT

## 2022-11-25 PROCEDURE — 93010 ELECTROCARDIOGRAM REPORT: CPT | Performed by: INTERNAL MEDICINE

## 2022-11-25 PROCEDURE — 85610 PROTHROMBIN TIME: CPT | Performed by: EMERGENCY MEDICINE

## 2022-11-25 PROCEDURE — 84484 ASSAY OF TROPONIN QUANT: CPT | Performed by: INTERNAL MEDICINE

## 2022-11-25 PROCEDURE — P9035 PLATELET PHERES LEUKOREDUCED: HCPCS

## 2022-11-25 PROCEDURE — 86900 BLOOD TYPING SEROLOGIC ABO: CPT

## 2022-11-25 PROCEDURE — 25010000002 ALBUMIN HUMAN 25% PER 50 ML

## 2022-11-25 PROCEDURE — P9016 RBC LEUKOCYTES REDUCED: HCPCS

## 2022-11-25 PROCEDURE — 82962 GLUCOSE BLOOD TEST: CPT

## 2022-11-25 PROCEDURE — 99253 IP/OBS CNSLTJ NEW/EST LOW 45: CPT | Performed by: SURGERY

## 2022-11-25 PROCEDURE — 0 IOPAMIDOL PER 1 ML: Performed by: INTERNAL MEDICINE

## 2022-11-25 PROCEDURE — 86923 COMPATIBILITY TEST ELECTRIC: CPT

## 2022-11-25 PROCEDURE — 85018 HEMOGLOBIN: CPT | Performed by: PHYSICIAN ASSISTANT

## 2022-11-25 PROCEDURE — 85025 COMPLETE CBC W/AUTO DIFF WBC: CPT | Performed by: PHYSICIAN ASSISTANT

## 2022-11-25 PROCEDURE — 85014 HEMATOCRIT: CPT | Performed by: PHYSICIAN ASSISTANT

## 2022-11-25 PROCEDURE — 93005 ELECTROCARDIOGRAM TRACING: CPT | Performed by: INTERNAL MEDICINE

## 2022-11-25 PROCEDURE — P9100 PATHOGEN TEST FOR PLATELETS: HCPCS

## 2022-11-25 PROCEDURE — 25010000002 ONDANSETRON PER 1 MG

## 2022-11-25 PROCEDURE — 86927 PLASMA FRESH FROZEN: CPT

## 2022-11-25 PROCEDURE — 80053 COMPREHEN METABOLIC PANEL: CPT | Performed by: PHYSICIAN ASSISTANT

## 2022-11-25 PROCEDURE — 85025 COMPLETE CBC W/AUTO DIFF WBC: CPT | Performed by: INTERNAL MEDICINE

## 2022-11-25 PROCEDURE — 36430 TRANSFUSION BLD/BLD COMPNT: CPT

## 2022-11-25 PROCEDURE — 86901 BLOOD TYPING SEROLOGIC RH(D): CPT | Performed by: PHYSICIAN ASSISTANT

## 2022-11-25 PROCEDURE — 86900 BLOOD TYPING SEROLOGIC ABO: CPT | Performed by: PHYSICIAN ASSISTANT

## 2022-11-25 PROCEDURE — P9047 ALBUMIN (HUMAN), 25%, 50ML: HCPCS

## 2022-11-25 PROCEDURE — 99254 IP/OBS CNSLTJ NEW/EST MOD 60: CPT | Performed by: INTERNAL MEDICINE

## 2022-11-25 PROCEDURE — P9059 PLASMA, FRZ BETWEEN 8-24HOUR: HCPCS

## 2022-11-25 RX ORDER — NOREPINEPHRINE BIT/0.9 % NACL 8 MG/250ML
.02-.3 INFUSION BOTTLE (ML) INTRAVENOUS
Status: DISCONTINUED | OUTPATIENT
Start: 2022-11-25 | End: 2022-11-27

## 2022-11-25 RX ORDER — ALBUMIN (HUMAN) 12.5 G/50ML
SOLUTION INTRAVENOUS
Status: COMPLETED
Start: 2022-11-25 | End: 2022-11-25

## 2022-11-25 RX ORDER — MAGNESIUM CARB/ALUMINUM HYDROX 105-160MG
150 TABLET,CHEWABLE ORAL ONCE
Status: DISCONTINUED | OUTPATIENT
Start: 2022-11-25 | End: 2022-11-25

## 2022-11-25 RX ORDER — DEXTROSE MONOHYDRATE 25 G/50ML
25 INJECTION, SOLUTION INTRAVENOUS
Status: DISCONTINUED | OUTPATIENT
Start: 2022-11-25 | End: 2022-11-30 | Stop reason: HOSPADM

## 2022-11-25 RX ORDER — ONDANSETRON 2 MG/ML
4 INJECTION INTRAMUSCULAR; INTRAVENOUS EVERY 4 HOURS PRN
Status: DISCONTINUED | OUTPATIENT
Start: 2022-11-25 | End: 2022-11-30 | Stop reason: HOSPADM

## 2022-11-25 RX ORDER — NICOTINE POLACRILEX 4 MG
15 LOZENGE BUCCAL
Status: DISCONTINUED | OUTPATIENT
Start: 2022-11-25 | End: 2022-11-30 | Stop reason: HOSPADM

## 2022-11-25 RX ORDER — ALBUMIN (HUMAN) 12.5 G/50ML
25 SOLUTION INTRAVENOUS ONCE
Status: COMPLETED | OUTPATIENT
Start: 2022-11-25 | End: 2022-11-25

## 2022-11-25 RX ADMIN — SODIUM CHLORIDE 1000 ML: 9 INJECTION, SOLUTION INTRAVENOUS at 09:31

## 2022-11-25 RX ADMIN — ONDANSETRON 4 MG: 2 INJECTION INTRAMUSCULAR; INTRAVENOUS at 23:10

## 2022-11-25 RX ADMIN — PANTOPRAZOLE SODIUM 8 MG/HR: 40 INJECTION, POWDER, FOR SOLUTION INTRAVENOUS at 10:51

## 2022-11-25 RX ADMIN — SODIUM CHLORIDE 1000 ML: 9 INJECTION, SOLUTION INTRAVENOUS at 08:00

## 2022-11-25 RX ADMIN — ALBUMIN (HUMAN) 25 G: 12.5 SOLUTION INTRAVENOUS at 15:22

## 2022-11-25 RX ADMIN — IOPAMIDOL 95 ML: 755 INJECTION, SOLUTION INTRAVENOUS at 22:59

## 2022-11-25 RX ADMIN — PANTOPRAZOLE SODIUM 8 MG/HR: 40 INJECTION, POWDER, FOR SOLUTION INTRAVENOUS at 15:06

## 2022-11-25 RX ADMIN — ALBUMIN (HUMAN) 25 G: 0.25 INJECTION, SOLUTION INTRAVENOUS at 15:22

## 2022-11-25 RX ADMIN — POLYETHYLENE GLYCOL 3350, SODIUM SULFATE ANHYDROUS, SODIUM BICARBONATE, SODIUM CHLORIDE, POTASSIUM CHLORIDE 2000 ML: 236; 22.74; 6.74; 5.86; 2.97 POWDER, FOR SOLUTION ORAL at 13:23

## 2022-11-25 RX ADMIN — POLYETHYLENE GLYCOL 3350, SODIUM SULFATE ANHYDROUS, SODIUM BICARBONATE, SODIUM CHLORIDE, POTASSIUM CHLORIDE 2000 ML: 236; 22.74; 6.74; 5.86; 2.97 POWDER, FOR SOLUTION ORAL at 20:58

## 2022-11-25 RX ADMIN — Medication 0.04 MCG/KG/MIN: at 11:27

## 2022-11-25 RX ADMIN — PANTOPRAZOLE SODIUM 8 MG/HR: 40 INJECTION, POWDER, FOR SOLUTION INTRAVENOUS at 19:48

## 2022-11-26 ENCOUNTER — ANESTHESIA EVENT (OUTPATIENT)
Dept: PERIOP | Facility: HOSPITAL | Age: 62
End: 2022-11-26

## 2022-11-26 ENCOUNTER — ANESTHESIA (OUTPATIENT)
Dept: PERIOP | Facility: HOSPITAL | Age: 62
End: 2022-11-26

## 2022-11-26 VITALS — SYSTOLIC BLOOD PRESSURE: 169 MMHG | OXYGEN SATURATION: 93 % | DIASTOLIC BLOOD PRESSURE: 93 MMHG | HEART RATE: 81 BPM

## 2022-11-26 LAB
ALBUMIN SERPL-MCNC: 2.5 G/DL (ref 3.5–5.2)
ALBUMIN/GLOB SERPL: 1.8 G/DL
ALP SERPL-CCNC: 48 U/L (ref 39–117)
ALT SERPL W P-5'-P-CCNC: 8 U/L (ref 1–41)
ANION GAP SERPL CALCULATED.3IONS-SCNC: 6.6 MMOL/L (ref 5–15)
ANION GAP SERPL CALCULATED.3IONS-SCNC: 7 MMOL/L (ref 5–15)
AST SERPL-CCNC: 11 U/L (ref 1–40)
BASOPHILS # BLD AUTO: 0.01 10*3/MM3 (ref 0–0.2)
BASOPHILS # BLD AUTO: 0.02 10*3/MM3 (ref 0–0.2)
BASOPHILS NFR BLD AUTO: 0.1 % (ref 0–1.5)
BASOPHILS NFR BLD AUTO: 0.2 % (ref 0–1.5)
BH BB BLOOD EXPIRATION DATE: NORMAL
BH BB BLOOD TYPE BARCODE: 5100
BH BB BLOOD TYPE BARCODE: 6200
BH BB DISPENSE STATUS: NORMAL
BH BB PRODUCT CODE: NORMAL
BH BB UNIT NUMBER: NORMAL
BILIRUB SERPL-MCNC: 0.4 MG/DL (ref 0–1.2)
BUN SERPL-MCNC: 24 MG/DL (ref 8–23)
BUN SERPL-MCNC: 24 MG/DL (ref 8–23)
BUN/CREAT SERPL: 20 (ref 7–25)
BUN/CREAT SERPL: 25.3 (ref 7–25)
CALCIUM SPEC-SCNC: 6.3 MG/DL (ref 8.6–10.5)
CALCIUM SPEC-SCNC: 6.9 MG/DL (ref 8.6–10.5)
CHLORIDE SERPL-SCNC: 117 MMOL/L (ref 98–107)
CHLORIDE SERPL-SCNC: 120 MMOL/L (ref 98–107)
CO2 SERPL-SCNC: 21.4 MMOL/L (ref 22–29)
CO2 SERPL-SCNC: 22 MMOL/L (ref 22–29)
CREAT SERPL-MCNC: 0.95 MG/DL (ref 0.76–1.27)
CREAT SERPL-MCNC: 1.2 MG/DL (ref 0.76–1.27)
CROSSMATCH INTERPRETATION: NORMAL
DEPRECATED RDW RBC AUTO: 49.3 FL (ref 37–54)
DEPRECATED RDW RBC AUTO: 51.5 FL (ref 37–54)
DEPRECATED RDW RBC AUTO: 52.3 FL (ref 37–54)
DEPRECATED RDW RBC AUTO: 54.1 FL (ref 37–54)
DEPRECATED RDW RBC AUTO: 55.2 FL (ref 37–54)
DEPRECATED RDW RBC AUTO: 55.8 FL (ref 37–54)
EGFRCR SERPLBLD CKD-EPI 2021: 67.5 ML/MIN/1.73
EGFRCR SERPLBLD CKD-EPI 2021: 89.4 ML/MIN/1.73
EOSINOPHIL # BLD AUTO: 0.01 10*3/MM3 (ref 0–0.4)
EOSINOPHIL # BLD AUTO: 0.02 10*3/MM3 (ref 0–0.4)
EOSINOPHIL # BLD AUTO: 0.03 10*3/MM3 (ref 0–0.4)
EOSINOPHIL # BLD AUTO: 0.03 10*3/MM3 (ref 0–0.4)
EOSINOPHIL NFR BLD AUTO: 0.1 % (ref 0.3–6.2)
EOSINOPHIL NFR BLD AUTO: 0.2 % (ref 0.3–6.2)
EOSINOPHIL NFR BLD AUTO: 0.2 % (ref 0.3–6.2)
EOSINOPHIL NFR BLD AUTO: 0.3 % (ref 0.3–6.2)
ERYTHROCYTE [DISTWIDTH] IN BLOOD BY AUTOMATED COUNT: 15.7 % (ref 12.3–15.4)
ERYTHROCYTE [DISTWIDTH] IN BLOOD BY AUTOMATED COUNT: 16 % (ref 12.3–15.4)
ERYTHROCYTE [DISTWIDTH] IN BLOOD BY AUTOMATED COUNT: 16.6 % (ref 12.3–15.4)
ERYTHROCYTE [DISTWIDTH] IN BLOOD BY AUTOMATED COUNT: 16.8 % (ref 12.3–15.4)
ERYTHROCYTE [DISTWIDTH] IN BLOOD BY AUTOMATED COUNT: 17.5 % (ref 12.3–15.4)
ERYTHROCYTE [DISTWIDTH] IN BLOOD BY AUTOMATED COUNT: 17.6 % (ref 12.3–15.4)
FERRITIN SERPL-MCNC: 46.1 NG/ML (ref 30–400)
GLOBULIN UR ELPH-MCNC: 1.4 GM/DL
GLUCOSE BLDC GLUCOMTR-MCNC: 116 MG/DL (ref 70–130)
GLUCOSE BLDC GLUCOMTR-MCNC: 136 MG/DL (ref 70–130)
GLUCOSE BLDC GLUCOMTR-MCNC: 148 MG/DL (ref 70–130)
GLUCOSE BLDC GLUCOMTR-MCNC: 204 MG/DL (ref 70–130)
GLUCOSE BLDC GLUCOMTR-MCNC: 212 MG/DL (ref 70–130)
GLUCOSE SERPL-MCNC: 177 MG/DL (ref 65–99)
GLUCOSE SERPL-MCNC: 280 MG/DL (ref 65–99)
HCT VFR BLD AUTO: 16.9 % (ref 37.5–51)
HCT VFR BLD AUTO: 21.8 % (ref 37.5–51)
HCT VFR BLD AUTO: 22.1 % (ref 37.5–51)
HCT VFR BLD AUTO: 22.9 % (ref 37.5–51)
HCT VFR BLD AUTO: 23 % (ref 37.5–51)
HCT VFR BLD AUTO: 23.4 % (ref 37.5–51)
HGB BLD-MCNC: 5.6 G/DL (ref 13–17.7)
HGB BLD-MCNC: 7.1 G/DL (ref 13–17.7)
HGB BLD-MCNC: 7.3 G/DL (ref 13–17.7)
HGB BLD-MCNC: 7.5 G/DL (ref 13–17.7)
HGB BLD-MCNC: 7.6 G/DL (ref 13–17.7)
HGB BLD-MCNC: 7.9 G/DL (ref 13–17.7)
IMM GRANULOCYTES # BLD AUTO: 0.04 10*3/MM3 (ref 0–0.05)
IMM GRANULOCYTES # BLD AUTO: 0.08 10*3/MM3 (ref 0–0.05)
IMM GRANULOCYTES # BLD AUTO: 0.09 10*3/MM3 (ref 0–0.05)
IMM GRANULOCYTES # BLD AUTO: 0.11 10*3/MM3 (ref 0–0.05)
IMM GRANULOCYTES NFR BLD AUTO: 0.4 % (ref 0–0.5)
IMM GRANULOCYTES NFR BLD AUTO: 0.6 % (ref 0–0.5)
IMM GRANULOCYTES NFR BLD AUTO: 0.6 % (ref 0–0.5)
IMM GRANULOCYTES NFR BLD AUTO: 0.8 % (ref 0–0.5)
IMM GRANULOCYTES NFR BLD AUTO: 0.8 % (ref 0–0.5)
IMM GRANULOCYTES NFR BLD AUTO: 0.9 % (ref 0–0.5)
INR PPP: 1.25 (ref 0.9–1.1)
IRON 24H UR-MRATE: 146 MCG/DL (ref 59–158)
IRON SATN MFR SERPL: 63 % (ref 20–50)
LYMPHOCYTES # BLD AUTO: 1.19 10*3/MM3 (ref 0.7–3.1)
LYMPHOCYTES # BLD AUTO: 1.37 10*3/MM3 (ref 0.7–3.1)
LYMPHOCYTES # BLD AUTO: 1.63 10*3/MM3 (ref 0.7–3.1)
LYMPHOCYTES # BLD AUTO: 1.79 10*3/MM3 (ref 0.7–3.1)
LYMPHOCYTES # BLD AUTO: 1.8 10*3/MM3 (ref 0.7–3.1)
LYMPHOCYTES # BLD AUTO: 1.91 10*3/MM3 (ref 0.7–3.1)
LYMPHOCYTES NFR BLD AUTO: 12 % (ref 19.6–45.3)
LYMPHOCYTES NFR BLD AUTO: 12.4 % (ref 19.6–45.3)
LYMPHOCYTES NFR BLD AUTO: 12.5 % (ref 19.6–45.3)
LYMPHOCYTES NFR BLD AUTO: 16.2 % (ref 19.6–45.3)
LYMPHOCYTES NFR BLD AUTO: 18.4 % (ref 19.6–45.3)
LYMPHOCYTES NFR BLD AUTO: 9.6 % (ref 19.6–45.3)
MAGNESIUM SERPL-MCNC: 1.5 MG/DL (ref 1.6–2.4)
MCH RBC QN AUTO: 28 PG (ref 26.6–33)
MCH RBC QN AUTO: 28.3 PG (ref 26.6–33)
MCH RBC QN AUTO: 28.4 PG (ref 26.6–33)
MCH RBC QN AUTO: 28.9 PG (ref 26.6–33)
MCH RBC QN AUTO: 29.7 PG (ref 26.6–33)
MCH RBC QN AUTO: 29.7 PG (ref 26.6–33)
MCHC RBC AUTO-ENTMCNC: 32.1 G/DL (ref 31.5–35.7)
MCHC RBC AUTO-ENTMCNC: 32.6 G/DL (ref 31.5–35.7)
MCHC RBC AUTO-ENTMCNC: 33.1 G/DL (ref 31.5–35.7)
MCHC RBC AUTO-ENTMCNC: 33.2 G/DL (ref 31.5–35.7)
MCHC RBC AUTO-ENTMCNC: 33.5 G/DL (ref 31.5–35.7)
MCHC RBC AUTO-ENTMCNC: 33.8 G/DL (ref 31.5–35.7)
MCV RBC AUTO: 85.7 FL (ref 79–97)
MCV RBC AUTO: 85.8 FL (ref 79–97)
MCV RBC AUTO: 86.8 FL (ref 79–97)
MCV RBC AUTO: 87 FL (ref 79–97)
MCV RBC AUTO: 88.6 FL (ref 79–97)
MCV RBC AUTO: 89.5 FL (ref 79–97)
MONOCYTES # BLD AUTO: 0.81 10*3/MM3 (ref 0.1–0.9)
MONOCYTES # BLD AUTO: 0.85 10*3/MM3 (ref 0.1–0.9)
MONOCYTES # BLD AUTO: 1.05 10*3/MM3 (ref 0.1–0.9)
MONOCYTES # BLD AUTO: 1.07 10*3/MM3 (ref 0.1–0.9)
MONOCYTES # BLD AUTO: 1.19 10*3/MM3 (ref 0.1–0.9)
MONOCYTES # BLD AUTO: 1.22 10*3/MM3 (ref 0.1–0.9)
MONOCYTES NFR BLD AUTO: 7.8 % (ref 5–12)
MONOCYTES NFR BLD AUTO: 8.3 % (ref 5–12)
MONOCYTES NFR BLD AUTO: 8.5 % (ref 5–12)
MONOCYTES NFR BLD AUTO: 8.6 % (ref 5–12)
MONOCYTES NFR BLD AUTO: 8.8 % (ref 5–12)
MONOCYTES NFR BLD AUTO: 8.9 % (ref 5–12)
NEUTROPHILS NFR BLD AUTO: 10.04 10*3/MM3 (ref 1.7–7)
NEUTROPHILS NFR BLD AUTO: 10.64 10*3/MM3 (ref 1.7–7)
NEUTROPHILS NFR BLD AUTO: 11.3 10*3/MM3 (ref 1.7–7)
NEUTROPHILS NFR BLD AUTO: 7.07 10*3/MM3 (ref 1.7–7)
NEUTROPHILS NFR BLD AUTO: 72.5 % (ref 42.7–76)
NEUTROPHILS NFR BLD AUTO: 73.7 % (ref 42.7–76)
NEUTROPHILS NFR BLD AUTO: 78.3 % (ref 42.7–76)
NEUTROPHILS NFR BLD AUTO: 78.4 % (ref 42.7–76)
NEUTROPHILS NFR BLD AUTO: 78.6 % (ref 42.7–76)
NEUTROPHILS NFR BLD AUTO: 8.59 10*3/MM3 (ref 1.7–7)
NEUTROPHILS NFR BLD AUTO: 8.67 10*3/MM3 (ref 1.7–7)
NEUTROPHILS NFR BLD AUTO: 80.5 % (ref 42.7–76)
NRBC BLD AUTO-RTO: 0 /100 WBC (ref 0–0.2)
NRBC BLD AUTO-RTO: 0 /100 WBC (ref 0–0.2)
NRBC BLD AUTO-RTO: 0.1 /100 WBC (ref 0–0.2)
PHOSPHATE SERPL-MCNC: 5.5 MG/DL (ref 2.5–4.5)
PLATELET # BLD AUTO: 103 10*3/MM3 (ref 140–450)
PLATELET # BLD AUTO: 104 10*3/MM3 (ref 140–450)
PLATELET # BLD AUTO: 105 10*3/MM3 (ref 140–450)
PLATELET # BLD AUTO: 120 10*3/MM3 (ref 140–450)
PLATELET # BLD AUTO: 126 10*3/MM3 (ref 140–450)
PLATELET # BLD AUTO: 136 10*3/MM3 (ref 140–450)
PMV BLD AUTO: 10 FL (ref 6–12)
PMV BLD AUTO: 10.1 FL (ref 6–12)
PMV BLD AUTO: 9.5 FL (ref 6–12)
PMV BLD AUTO: 9.7 FL (ref 6–12)
PMV BLD AUTO: 9.7 FL (ref 6–12)
PMV BLD AUTO: 9.9 FL (ref 6–12)
POTASSIUM SERPL-SCNC: 3.7 MMOL/L (ref 3.5–5.2)
POTASSIUM SERPL-SCNC: 4 MMOL/L (ref 3.5–5.2)
PROT SERPL-MCNC: 3.9 G/DL (ref 6–8.5)
PROTHROMBIN TIME: 15.9 SECONDS (ref 11.7–14.2)
RBC # BLD AUTO: 1.97 10*6/MM3 (ref 4.14–5.8)
RBC # BLD AUTO: 2.46 10*6/MM3 (ref 4.14–5.8)
RBC # BLD AUTO: 2.54 10*6/MM3 (ref 4.14–5.8)
RBC # BLD AUTO: 2.56 10*6/MM3 (ref 4.14–5.8)
RBC # BLD AUTO: 2.65 10*6/MM3 (ref 4.14–5.8)
RBC # BLD AUTO: 2.73 10*6/MM3 (ref 4.14–5.8)
SODIUM SERPL-SCNC: 145 MMOL/L (ref 136–145)
SODIUM SERPL-SCNC: 149 MMOL/L (ref 136–145)
TIBC SERPL-MCNC: 232 MCG/DL (ref 298–536)
TRANSFERRIN SERPL-MCNC: 156 MG/DL (ref 200–360)
UNIT  ABO: NORMAL
UNIT  RH: NORMAL
VIT B12 BLD-MCNC: 323 PG/ML (ref 211–946)
WBC NRBC COR # BLD: 10.93 10*3/MM3 (ref 3.4–10.8)
WBC NRBC COR # BLD: 11.76 10*3/MM3 (ref 3.4–10.8)
WBC NRBC COR # BLD: 12.46 10*3/MM3 (ref 3.4–10.8)
WBC NRBC COR # BLD: 13.57 10*3/MM3 (ref 3.4–10.8)
WBC NRBC COR # BLD: 14.43 10*3/MM3 (ref 3.4–10.8)
WBC NRBC COR # BLD: 9.76 10*3/MM3 (ref 3.4–10.8)

## 2022-11-26 PROCEDURE — 83735 ASSAY OF MAGNESIUM: CPT | Performed by: INTERNAL MEDICINE

## 2022-11-26 PROCEDURE — P9016 RBC LEUKOCYTES REDUCED: HCPCS

## 2022-11-26 PROCEDURE — 0W3P8ZZ CONTROL BLEEDING IN GASTROINTESTINAL TRACT, VIA NATURAL OR ARTIFICIAL OPENING ENDOSCOPIC: ICD-10-PCS | Performed by: INTERNAL MEDICINE

## 2022-11-26 PROCEDURE — 99232 SBSQ HOSP IP/OBS MODERATE 35: CPT | Performed by: SURGERY

## 2022-11-26 PROCEDURE — 86900 BLOOD TYPING SEROLOGIC ABO: CPT

## 2022-11-26 PROCEDURE — 84100 ASSAY OF PHOSPHORUS: CPT | Performed by: INTERNAL MEDICINE

## 2022-11-26 PROCEDURE — 82747 ASSAY OF FOLIC ACID RBC: CPT | Performed by: INTERNAL MEDICINE

## 2022-11-26 PROCEDURE — 82728 ASSAY OF FERRITIN: CPT | Performed by: INTERNAL MEDICINE

## 2022-11-26 PROCEDURE — 84466 ASSAY OF TRANSFERRIN: CPT | Performed by: INTERNAL MEDICINE

## 2022-11-26 PROCEDURE — 80053 COMPREHEN METABOLIC PANEL: CPT | Performed by: INTERNAL MEDICINE

## 2022-11-26 PROCEDURE — 82962 GLUCOSE BLOOD TEST: CPT

## 2022-11-26 PROCEDURE — 99254 IP/OBS CNSLTJ NEW/EST MOD 60: CPT | Performed by: STUDENT IN AN ORGANIZED HEALTH CARE EDUCATION/TRAINING PROGRAM

## 2022-11-26 PROCEDURE — 99232 SBSQ HOSP IP/OBS MODERATE 35: CPT | Performed by: INTERNAL MEDICINE

## 2022-11-26 PROCEDURE — 36430 TRANSFUSION BLD/BLD COMPNT: CPT

## 2022-11-26 PROCEDURE — 85014 HEMATOCRIT: CPT | Performed by: INTERNAL MEDICINE

## 2022-11-26 PROCEDURE — 85610 PROTHROMBIN TIME: CPT | Performed by: SURGERY

## 2022-11-26 PROCEDURE — 0DJ08ZZ INSPECTION OF UPPER INTESTINAL TRACT, VIA NATURAL OR ARTIFICIAL OPENING ENDOSCOPIC: ICD-10-PCS | Performed by: INTERNAL MEDICINE

## 2022-11-26 PROCEDURE — 82607 VITAMIN B-12: CPT | Performed by: INTERNAL MEDICINE

## 2022-11-26 PROCEDURE — 43235 EGD DIAGNOSTIC BRUSH WASH: CPT | Performed by: INTERNAL MEDICINE

## 2022-11-26 PROCEDURE — 83540 ASSAY OF IRON: CPT | Performed by: INTERNAL MEDICINE

## 2022-11-26 PROCEDURE — 63710000001 INSULIN REGULAR HUMAN PER 5 UNITS: Performed by: INTERNAL MEDICINE

## 2022-11-26 PROCEDURE — 85025 COMPLETE CBC W/AUTO DIFF WBC: CPT | Performed by: INTERNAL MEDICINE

## 2022-11-26 PROCEDURE — 45382 COLONOSCOPY W/CONTROL BLEED: CPT | Performed by: INTERNAL MEDICINE

## 2022-11-26 PROCEDURE — 25010000002 PROPOFOL 10 MG/ML EMULSION: Performed by: ANESTHESIOLOGY

## 2022-11-26 DEVICE — DEV CLIP ENDO RESOLUTION360 CONTRL ROT 235CM: Type: IMPLANTABLE DEVICE | Site: CECUM | Status: FUNCTIONAL

## 2022-11-26 RX ORDER — MAGNESIUM SULFATE HEPTAHYDRATE 40 MG/ML
4 INJECTION, SOLUTION INTRAVENOUS AS NEEDED
Status: DISCONTINUED | OUTPATIENT
Start: 2022-11-26 | End: 2022-11-30 | Stop reason: HOSPADM

## 2022-11-26 RX ORDER — PROPOFOL 10 MG/ML
VIAL (ML) INTRAVENOUS CONTINUOUS PRN
Status: DISCONTINUED | OUTPATIENT
Start: 2022-11-26 | End: 2022-11-26 | Stop reason: SURG

## 2022-11-26 RX ORDER — MAGNESIUM SULFATE HEPTAHYDRATE 40 MG/ML
2 INJECTION, SOLUTION INTRAVENOUS AS NEEDED
Status: DISCONTINUED | OUTPATIENT
Start: 2022-11-26 | End: 2022-11-30 | Stop reason: HOSPADM

## 2022-11-26 RX ORDER — ESMOLOL HYDROCHLORIDE 10 MG/ML
INJECTION INTRAVENOUS AS NEEDED
Status: DISCONTINUED | OUTPATIENT
Start: 2022-11-26 | End: 2022-11-26 | Stop reason: SURG

## 2022-11-26 RX ORDER — SODIUM CHLORIDE 9 MG/ML
50 INJECTION, SOLUTION INTRAVENOUS CONTINUOUS
Status: DISCONTINUED | OUTPATIENT
Start: 2022-11-26 | End: 2022-11-27

## 2022-11-26 RX ORDER — LIDOCAINE HYDROCHLORIDE 10 MG/ML
INJECTION, SOLUTION EPIDURAL; INFILTRATION; INTRACAUDAL; PERINEURAL
Status: DISPENSED
Start: 2022-11-26 | End: 2022-11-26

## 2022-11-26 RX ORDER — SODIUM CHLORIDE, SODIUM LACTATE, POTASSIUM CHLORIDE, CALCIUM CHLORIDE 600; 310; 30; 20 MG/100ML; MG/100ML; MG/100ML; MG/100ML
INJECTION, SOLUTION INTRAVENOUS CONTINUOUS PRN
Status: DISCONTINUED | OUTPATIENT
Start: 2022-11-26 | End: 2022-11-26 | Stop reason: SURG

## 2022-11-26 RX ORDER — MAGNESIUM SULFATE 1 G/100ML
1 INJECTION INTRAVENOUS AS NEEDED
Status: DISCONTINUED | OUTPATIENT
Start: 2022-11-26 | End: 2022-11-30 | Stop reason: HOSPADM

## 2022-11-26 RX ADMIN — PANTOPRAZOLE SODIUM 8 MG/HR: 40 INJECTION, POWDER, FOR SOLUTION INTRAVENOUS at 21:44

## 2022-11-26 RX ADMIN — SODIUM CHLORIDE, POTASSIUM CHLORIDE, SODIUM LACTATE AND CALCIUM CHLORIDE: 600; 310; 30; 20 INJECTION, SOLUTION INTRAVENOUS at 02:41

## 2022-11-26 RX ADMIN — PANTOPRAZOLE SODIUM 8 MG/HR: 40 INJECTION, POWDER, FOR SOLUTION INTRAVENOUS at 16:28

## 2022-11-26 RX ADMIN — PANTOPRAZOLE SODIUM 8 MG/HR: 40 INJECTION, POWDER, FOR SOLUTION INTRAVENOUS at 23:34

## 2022-11-26 RX ADMIN — PANTOPRAZOLE SODIUM 8 MG/HR: 40 INJECTION, POWDER, FOR SOLUTION INTRAVENOUS at 06:00

## 2022-11-26 RX ADMIN — INSULIN HUMAN 8 UNITS: 100 INJECTION, SOLUTION PARENTERAL at 06:03

## 2022-11-26 RX ADMIN — ESMOLOL HYDROCHLORIDE 10 MG: 100 INJECTION, SOLUTION INTRAVENOUS at 03:42

## 2022-11-26 RX ADMIN — PANTOPRAZOLE SODIUM 8 MG/HR: 40 INJECTION, POWDER, FOR SOLUTION INTRAVENOUS at 00:56

## 2022-11-26 RX ADMIN — INSULIN HUMAN 5 UNITS: 100 INJECTION, SOLUTION PARENTERAL at 00:36

## 2022-11-26 RX ADMIN — Medication 0.06 MCG/KG/MIN: at 05:50

## 2022-11-26 RX ADMIN — SODIUM CHLORIDE 50 ML/HR: 9 INJECTION, SOLUTION INTRAVENOUS at 17:22

## 2022-11-26 RX ADMIN — PROPOFOL 50 MCG/KG/MIN: 10 INJECTION, EMULSION INTRAVENOUS at 02:42

## 2022-11-26 RX ADMIN — PANTOPRAZOLE SODIUM 8 MG/HR: 40 INJECTION, POWDER, FOR SOLUTION INTRAVENOUS at 11:09

## 2022-11-26 NOTE — ANESTHESIA PROCEDURE NOTES
Arterial Line      Patient reassessed immediately prior to procedure    Patient location during procedure: floor   Line placed for hemodynamic monitoring and ABGs/Labs/ISTAT.  Performed By   Anesthesiologist: Stefan Rodriguez MD   Preanesthetic Checklist  Completed: patient identified, IV checked, site marked, risks and benefits discussed, surgical consent, monitors and equipment checked, pre-op evaluation and timeout performed  Arterial Line Prep    Sterile Tech: cap, gloves, sterile barriers and mask  Prep: ChloraPrep  Patient monitoring: EKG, continuous pulse oximetry and blood pressure monitoring  Arterial Line Procedure   Laterality:left  Location:  radial artery  Catheter size: 20 G   Guidance: palpation technique  Number of attempts: 1  Successful placement: yes   Post Assessment   Dressing Type: wrist guard applied, secured with tape and occlusive dressing applied.   Complications no  Circ/Move/Sens Assessment: normal and unchanged.   Patient Tolerance: patient tolerated the procedure well with no apparent complications

## 2022-11-26 NOTE — ANESTHESIA PREPROCEDURE EVALUATION
Anesthesia Evaluation     Patient summary reviewed and Nursing notes reviewed   NPO Solid Status: > 8 hours  NPO Liquid Status: > 2 hours           Airway   Mallampati: II  TM distance: >3 FB  Neck ROM: full  No difficulty expected  Dental - normal exam     Pulmonary     breath sounds clear to auscultation  (+) shortness of breath, sleep apnea on CPAP,   (-) pneumonia  Cardiovascular   Exercise tolerance: poor (<4 METS)    ECG reviewed  Rhythm: irregular  Rate: normal    (+) hypertension, valvular problems/murmurs, CAD, CABG >6 Months, hyperlipidemia,   (-) angina      Neuro/Psych  (+) dizziness/light headedness,    GI/Hepatic/Renal/Endo    (+) obesity,  GI bleeding lower active bleeding, diabetes mellitus,     Musculoskeletal (-) negative ROS    Abdominal    Substance History - negative use     OB/GYN negative ob/gyn ROS         Other   blood dyscrasia anemia thrombocytopenia,     ROS/Med Hx Other: H/O esophageal CA.  Active Lower GI bleed.  Previous CABG and AV replacement.  Patent grafts to LAD, but blocked grafts to LCX and PDA. Medical management.  EF 50 - 55%. Stress test - inferior wall ischemia.       Phys Exam Other: Pale.   PVCs                Anesthesia Plan    ASA 4 - emergent     MAC and Fransisca     intravenous induction     Anesthetic plan, risks, benefits, and alternatives have been provided, discussed and informed consent has been obtained with: patient.  Pre-procedure education provided  Use of blood products discussed with patient  Consented to blood products.   Plan discussed with CRNA.        CODE STATUS:    Code Status (Patient has no pulse and is not breathing): CPR (Attempt to Resuscitate)  Medical Interventions (Patient has pulse or is breathing): Full Support  Release to patient: Routine Release

## 2022-11-26 NOTE — ANESTHESIA POSTPROCEDURE EVALUATION
Patient: Edmond Chase    Procedure Summary     Date: 11/26/22 Room / Location: [facility] OR  / [facility] MAIN OR    Anesthesia Start: 0240 Anesthesia Stop: 0402    Procedure: COLONOSCOPY AT BEDSIDE Diagnosis:     Surgeons: Tomy Lopez MD Provider: Stefan Rodriguez MD    Anesthesia Type: MAC, Fransisca ASA Status: 4 - Emergent          Anesthesia Type: MACFransisca    Vitals  Vitals Value Taken Time   /61 11/26/22 0651   Temp 36.3 °C (97.4 °F) 11/26/22 0651   Pulse 86 11/26/22 0659   Resp 18 11/26/22 0600   SpO2 98 % 11/26/22 0659   Vitals shown include unvalidated device data.        Post Anesthesia Care and Evaluation      Comments: Pt. Discharged prior to being evaluated by anesthesia.  Chart is reviewed and no complications are noted.  THIS CASE IS NOT MEDICALLY DIRECTED

## 2022-11-27 LAB
ANION GAP SERPL CALCULATED.3IONS-SCNC: 8 MMOL/L (ref 5–15)
BASOPHILS # BLD AUTO: 0.02 10*3/MM3 (ref 0–0.2)
BASOPHILS # BLD AUTO: 0.02 10*3/MM3 (ref 0–0.2)
BASOPHILS # BLD AUTO: 0.03 10*3/MM3 (ref 0–0.2)
BASOPHILS NFR BLD AUTO: 0.2 % (ref 0–1.5)
BASOPHILS NFR BLD AUTO: 0.2 % (ref 0–1.5)
BASOPHILS NFR BLD AUTO: 0.3 % (ref 0–1.5)
BH BB BLOOD EXPIRATION DATE: NORMAL
BH BB BLOOD EXPIRATION DATE: NORMAL
BH BB BLOOD TYPE BARCODE: 5100
BH BB BLOOD TYPE BARCODE: 5100
BH BB DISPENSE STATUS: NORMAL
BH BB DISPENSE STATUS: NORMAL
BH BB PRODUCT CODE: NORMAL
BH BB PRODUCT CODE: NORMAL
BH BB UNIT NUMBER: NORMAL
BH BB UNIT NUMBER: NORMAL
BUN SERPL-MCNC: 21 MG/DL (ref 8–23)
BUN/CREAT SERPL: 28.4 (ref 7–25)
CALCIUM SPEC-SCNC: 7 MG/DL (ref 8.6–10.5)
CHLORIDE SERPL-SCNC: 120 MMOL/L (ref 98–107)
CO2 SERPL-SCNC: 23 MMOL/L (ref 22–29)
CREAT SERPL-MCNC: 0.74 MG/DL (ref 0.76–1.27)
CROSSMATCH INTERPRETATION: NORMAL
CROSSMATCH INTERPRETATION: NORMAL
DEPRECATED RDW RBC AUTO: 54 FL (ref 37–54)
DEPRECATED RDW RBC AUTO: 54.3 FL (ref 37–54)
DEPRECATED RDW RBC AUTO: 57.4 FL (ref 37–54)
EGFRCR SERPLBLD CKD-EPI 2021: 101.2 ML/MIN/1.73
EOSINOPHIL # BLD AUTO: 0.05 10*3/MM3 (ref 0–0.4)
EOSINOPHIL # BLD AUTO: 0.06 10*3/MM3 (ref 0–0.4)
EOSINOPHIL # BLD AUTO: 0.27 10*3/MM3 (ref 0–0.4)
EOSINOPHIL NFR BLD AUTO: 0.5 % (ref 0.3–6.2)
EOSINOPHIL NFR BLD AUTO: 0.7 % (ref 0.3–6.2)
EOSINOPHIL NFR BLD AUTO: 2.9 % (ref 0.3–6.2)
ERYTHROCYTE [DISTWIDTH] IN BLOOD BY AUTOMATED COUNT: 17.1 % (ref 12.3–15.4)
ERYTHROCYTE [DISTWIDTH] IN BLOOD BY AUTOMATED COUNT: 17.3 % (ref 12.3–15.4)
ERYTHROCYTE [DISTWIDTH] IN BLOOD BY AUTOMATED COUNT: 17.8 % (ref 12.3–15.4)
GLUCOSE BLDC GLUCOMTR-MCNC: 108 MG/DL (ref 70–130)
GLUCOSE BLDC GLUCOMTR-MCNC: 130 MG/DL (ref 70–130)
GLUCOSE BLDC GLUCOMTR-MCNC: 169 MG/DL (ref 70–130)
GLUCOSE SERPL-MCNC: 157 MG/DL (ref 65–99)
HCT VFR BLD AUTO: 21.3 % (ref 37.5–51)
HCT VFR BLD AUTO: 22 % (ref 37.5–51)
HCT VFR BLD AUTO: 22.4 % (ref 37.5–51)
HGB BLD-MCNC: 6.8 G/DL (ref 13–17.7)
HGB BLD-MCNC: 7.2 G/DL (ref 13–17.7)
HGB BLD-MCNC: 7.3 G/DL (ref 13–17.7)
IMM GRANULOCYTES # BLD AUTO: 0.1 10*3/MM3 (ref 0–0.05)
IMM GRANULOCYTES NFR BLD AUTO: 1 % (ref 0–0.5)
IMM GRANULOCYTES NFR BLD AUTO: 1.1 % (ref 0–0.5)
IMM GRANULOCYTES NFR BLD AUTO: 1.1 % (ref 0–0.5)
LYMPHOCYTES # BLD AUTO: 1.19 10*3/MM3 (ref 0.7–3.1)
LYMPHOCYTES # BLD AUTO: 1.64 10*3/MM3 (ref 0.7–3.1)
LYMPHOCYTES # BLD AUTO: 1.89 10*3/MM3 (ref 0.7–3.1)
LYMPHOCYTES NFR BLD AUTO: 12.9 % (ref 19.6–45.3)
LYMPHOCYTES NFR BLD AUTO: 17.8 % (ref 19.6–45.3)
LYMPHOCYTES NFR BLD AUTO: 19.5 % (ref 19.6–45.3)
MCH RBC QN AUTO: 27.2 PG (ref 26.6–33)
MCH RBC QN AUTO: 28 PG (ref 26.6–33)
MCH RBC QN AUTO: 29.1 PG (ref 26.6–33)
MCHC RBC AUTO-ENTMCNC: 31.9 G/DL (ref 31.5–35.7)
MCHC RBC AUTO-ENTMCNC: 32.1 G/DL (ref 31.5–35.7)
MCHC RBC AUTO-ENTMCNC: 33.2 G/DL (ref 31.5–35.7)
MCV RBC AUTO: 85.2 FL (ref 79–97)
MCV RBC AUTO: 87.2 FL (ref 79–97)
MCV RBC AUTO: 87.6 FL (ref 79–97)
MONOCYTES # BLD AUTO: 0.61 10*3/MM3 (ref 0.1–0.9)
MONOCYTES # BLD AUTO: 0.76 10*3/MM3 (ref 0.1–0.9)
MONOCYTES # BLD AUTO: 0.77 10*3/MM3 (ref 0.1–0.9)
MONOCYTES NFR BLD AUTO: 6.6 % (ref 5–12)
MONOCYTES NFR BLD AUTO: 7.9 % (ref 5–12)
MONOCYTES NFR BLD AUTO: 8.3 % (ref 5–12)
NEUTROPHILS NFR BLD AUTO: 6.41 10*3/MM3 (ref 1.7–7)
NEUTROPHILS NFR BLD AUTO: 6.86 10*3/MM3 (ref 1.7–7)
NEUTROPHILS NFR BLD AUTO: 69.7 % (ref 42.7–76)
NEUTROPHILS NFR BLD AUTO: 7.22 10*3/MM3 (ref 1.7–7)
NEUTROPHILS NFR BLD AUTO: 70.8 % (ref 42.7–76)
NEUTROPHILS NFR BLD AUTO: 78.5 % (ref 42.7–76)
NRBC BLD AUTO-RTO: 0.1 /100 WBC (ref 0–0.2)
NRBC BLD AUTO-RTO: 0.1 /100 WBC (ref 0–0.2)
NRBC BLD AUTO-RTO: 0.2 /100 WBC (ref 0–0.2)
PLATELET # BLD AUTO: 104 10*3/MM3 (ref 140–450)
PLATELET # BLD AUTO: 93 10*3/MM3 (ref 140–450)
PLATELET # BLD AUTO: 95 10*3/MM3 (ref 140–450)
PMV BLD AUTO: 9.4 FL (ref 6–12)
PMV BLD AUTO: 9.5 FL (ref 6–12)
PMV BLD AUTO: 9.9 FL (ref 6–12)
POTASSIUM SERPL-SCNC: 3.6 MMOL/L (ref 3.5–5.2)
QT INTERVAL: 385 MS
RBC # BLD AUTO: 2.5 10*6/MM3 (ref 4.14–5.8)
RBC # BLD AUTO: 2.51 10*6/MM3 (ref 4.14–5.8)
RBC # BLD AUTO: 2.57 10*6/MM3 (ref 4.14–5.8)
SODIUM SERPL-SCNC: 151 MMOL/L (ref 136–145)
UNIT  ABO: NORMAL
UNIT  ABO: NORMAL
UNIT  RH: NORMAL
UNIT  RH: NORMAL
WBC NRBC COR # BLD: 9.2 10*3/MM3 (ref 3.4–10.8)
WBC NRBC COR # BLD: 9.2 10*3/MM3 (ref 3.4–10.8)
WBC NRBC COR # BLD: 9.7 10*3/MM3 (ref 3.4–10.8)

## 2022-11-27 PROCEDURE — 63710000001 INSULIN REGULAR HUMAN PER 5 UNITS: Performed by: INTERNAL MEDICINE

## 2022-11-27 PROCEDURE — 99232 SBSQ HOSP IP/OBS MODERATE 35: CPT | Performed by: STUDENT IN AN ORGANIZED HEALTH CARE EDUCATION/TRAINING PROGRAM

## 2022-11-27 PROCEDURE — 82962 GLUCOSE BLOOD TEST: CPT

## 2022-11-27 PROCEDURE — 99232 SBSQ HOSP IP/OBS MODERATE 35: CPT | Performed by: INTERNAL MEDICINE

## 2022-11-27 PROCEDURE — 93010 ELECTROCARDIOGRAM REPORT: CPT | Performed by: STUDENT IN AN ORGANIZED HEALTH CARE EDUCATION/TRAINING PROGRAM

## 2022-11-27 PROCEDURE — P9016 RBC LEUKOCYTES REDUCED: HCPCS

## 2022-11-27 PROCEDURE — 86900 BLOOD TYPING SEROLOGIC ABO: CPT

## 2022-11-27 PROCEDURE — 93005 ELECTROCARDIOGRAM TRACING: CPT | Performed by: INTERNAL MEDICINE

## 2022-11-27 PROCEDURE — 80048 BASIC METABOLIC PNL TOTAL CA: CPT | Performed by: INTERNAL MEDICINE

## 2022-11-27 PROCEDURE — 85025 COMPLETE CBC W/AUTO DIFF WBC: CPT | Performed by: INTERNAL MEDICINE

## 2022-11-27 PROCEDURE — 36430 TRANSFUSION BLD/BLD COMPNT: CPT

## 2022-11-27 PROCEDURE — 99232 SBSQ HOSP IP/OBS MODERATE 35: CPT | Performed by: SURGERY

## 2022-11-27 RX ORDER — ISOSORBIDE MONONITRATE 60 MG/1
60 TABLET, EXTENDED RELEASE ORAL
Status: DISCONTINUED | OUTPATIENT
Start: 2022-11-27 | End: 2022-11-30 | Stop reason: HOSPADM

## 2022-11-27 RX ORDER — SODIUM CHLORIDE 450 MG/100ML
50 INJECTION, SOLUTION INTRAVENOUS CONTINUOUS
Status: DISCONTINUED | OUTPATIENT
Start: 2022-11-27 | End: 2022-11-28

## 2022-11-27 RX ORDER — NITROGLYCERIN 0.4 MG/1
0.4 TABLET SUBLINGUAL
Status: DISCONTINUED | OUTPATIENT
Start: 2022-11-27 | End: 2022-11-30 | Stop reason: HOSPADM

## 2022-11-27 RX ADMIN — ISOSORBIDE MONONITRATE 60 MG: 60 TABLET ORAL at 10:16

## 2022-11-27 RX ADMIN — NITROGLYCERIN 0.4 MG: 0.4 TABLET SUBLINGUAL at 03:28

## 2022-11-27 RX ADMIN — PANTOPRAZOLE SODIUM 8 MG/HR: 40 INJECTION, POWDER, FOR SOLUTION INTRAVENOUS at 20:52

## 2022-11-27 RX ADMIN — SODIUM CHLORIDE 50 ML/HR: 4.5 INJECTION, SOLUTION INTRAVENOUS at 11:18

## 2022-11-27 RX ADMIN — PANTOPRAZOLE SODIUM 8 MG/HR: 40 INJECTION, POWDER, FOR SOLUTION INTRAVENOUS at 05:17

## 2022-11-27 RX ADMIN — INSULIN HUMAN 3 UNITS: 100 INJECTION, SOLUTION PARENTERAL at 05:50

## 2022-11-27 RX ADMIN — PANTOPRAZOLE SODIUM 8 MG/HR: 40 INJECTION, POWDER, FOR SOLUTION INTRAVENOUS at 15:46

## 2022-11-27 RX ADMIN — NITROGLYCERIN 0.4 MG: 0.4 TABLET SUBLINGUAL at 03:37

## 2022-11-27 RX ADMIN — NITROGLYCERIN 0.4 MG: 0.4 TABLET SUBLINGUAL at 03:32

## 2022-11-27 RX ADMIN — PANTOPRAZOLE SODIUM 8 MG/HR: 40 INJECTION, POWDER, FOR SOLUTION INTRAVENOUS at 10:18

## 2022-11-27 RX ADMIN — SODIUM CHLORIDE 50 ML/HR: 9 INJECTION, SOLUTION INTRAVENOUS at 05:19

## 2022-11-28 ENCOUNTER — TELEPHONE (OUTPATIENT)
Dept: SURGERY | Facility: CLINIC | Age: 62
End: 2022-11-28

## 2022-11-28 LAB
ANION GAP SERPL CALCULATED.3IONS-SCNC: 4.4 MMOL/L (ref 5–15)
BASOPHILS # BLD AUTO: 0.02 10*3/MM3 (ref 0–0.2)
BASOPHILS # BLD AUTO: 0.03 10*3/MM3 (ref 0–0.2)
BASOPHILS # BLD AUTO: 0.04 10*3/MM3 (ref 0–0.2)
BASOPHILS NFR BLD AUTO: 0.3 % (ref 0–1.5)
BASOPHILS NFR BLD AUTO: 0.4 % (ref 0–1.5)
BASOPHILS NFR BLD AUTO: 0.5 % (ref 0–1.5)
BH BB BLOOD EXPIRATION DATE: NORMAL
BH BB BLOOD EXPIRATION DATE: NORMAL
BH BB BLOOD TYPE BARCODE: 5100
BH BB BLOOD TYPE BARCODE: 5100
BH BB DISPENSE STATUS: NORMAL
BH BB DISPENSE STATUS: NORMAL
BH BB PRODUCT CODE: NORMAL
BH BB PRODUCT CODE: NORMAL
BH BB UNIT NUMBER: NORMAL
BH BB UNIT NUMBER: NORMAL
BUN SERPL-MCNC: 12 MG/DL (ref 8–23)
BUN/CREAT SERPL: 19.7 (ref 7–25)
CALCIUM SPEC-SCNC: 7 MG/DL (ref 8.6–10.5)
CHLORIDE SERPL-SCNC: 112 MMOL/L (ref 98–107)
CO2 SERPL-SCNC: 26.6 MMOL/L (ref 22–29)
CREAT SERPL-MCNC: 0.61 MG/DL (ref 0.76–1.27)
CROSSMATCH INTERPRETATION: NORMAL
CROSSMATCH INTERPRETATION: NORMAL
DEPRECATED RDW RBC AUTO: 50.2 FL (ref 37–54)
DEPRECATED RDW RBC AUTO: 50.9 FL (ref 37–54)
DEPRECATED RDW RBC AUTO: 52.1 FL (ref 37–54)
EGFRCR SERPLBLD CKD-EPI 2021: 107.3 ML/MIN/1.73
EOSINOPHIL # BLD AUTO: 0.26 10*3/MM3 (ref 0–0.4)
EOSINOPHIL # BLD AUTO: 0.28 10*3/MM3 (ref 0–0.4)
EOSINOPHIL # BLD AUTO: 0.3 10*3/MM3 (ref 0–0.4)
EOSINOPHIL NFR BLD AUTO: 3.4 % (ref 0.3–6.2)
EOSINOPHIL NFR BLD AUTO: 3.7 % (ref 0.3–6.2)
EOSINOPHIL NFR BLD AUTO: 3.7 % (ref 0.3–6.2)
ERYTHROCYTE [DISTWIDTH] IN BLOOD BY AUTOMATED COUNT: 16.2 % (ref 12.3–15.4)
ERYTHROCYTE [DISTWIDTH] IN BLOOD BY AUTOMATED COUNT: 16.4 % (ref 12.3–15.4)
ERYTHROCYTE [DISTWIDTH] IN BLOOD BY AUTOMATED COUNT: 16.6 % (ref 12.3–15.4)
FOLATE BLD-MCNC: 331 NG/ML
FOLATE RBC-MCNC: 1498 NG/ML
GLUCOSE BLDC GLUCOMTR-MCNC: 105 MG/DL (ref 70–130)
GLUCOSE BLDC GLUCOMTR-MCNC: 123 MG/DL (ref 70–130)
GLUCOSE BLDC GLUCOMTR-MCNC: 168 MG/DL (ref 70–130)
GLUCOSE SERPL-MCNC: 126 MG/DL (ref 65–99)
HCT VFR BLD AUTO: 19.6 % (ref 37.5–51)
HCT VFR BLD AUTO: 22.1 % (ref 37.5–51)
HCT VFR BLD AUTO: 22.1 % (ref 37.5–51)
HCT VFR BLD AUTO: 22.3 % (ref 37.5–51)
HGB BLD-MCNC: 6.6 G/DL (ref 13–17.7)
HGB BLD-MCNC: 7.4 G/DL (ref 13–17.7)
HGB BLD-MCNC: 7.5 G/DL (ref 13–17.7)
IMM GRANULOCYTES # BLD AUTO: 0.07 10*3/MM3 (ref 0–0.05)
IMM GRANULOCYTES # BLD AUTO: 0.1 10*3/MM3 (ref 0–0.05)
IMM GRANULOCYTES NFR BLD AUTO: 0.9 % (ref 0–0.5)
IMM GRANULOCYTES NFR BLD AUTO: 1.3 % (ref 0–0.5)
LYMPHOCYTES # BLD AUTO: 1.2 10*3/MM3 (ref 0.7–3.1)
LYMPHOCYTES # BLD AUTO: 1.53 10*3/MM3 (ref 0.7–3.1)
LYMPHOCYTES # BLD AUTO: 1.73 10*3/MM3 (ref 0.7–3.1)
LYMPHOCYTES NFR BLD AUTO: 15.9 % (ref 19.6–45.3)
LYMPHOCYTES NFR BLD AUTO: 18.7 % (ref 19.6–45.3)
LYMPHOCYTES NFR BLD AUTO: 22.4 % (ref 19.6–45.3)
MAGNESIUM SERPL-MCNC: 1.7 MG/DL (ref 1.6–2.4)
MCH RBC QN AUTO: 28.4 PG (ref 26.6–33)
MCH RBC QN AUTO: 28.8 PG (ref 26.6–33)
MCH RBC QN AUTO: 28.9 PG (ref 26.6–33)
MCHC RBC AUTO-ENTMCNC: 33.2 G/DL (ref 31.5–35.7)
MCHC RBC AUTO-ENTMCNC: 33.7 G/DL (ref 31.5–35.7)
MCHC RBC AUTO-ENTMCNC: 33.9 G/DL (ref 31.5–35.7)
MCV RBC AUTO: 84.5 FL (ref 79–97)
MCV RBC AUTO: 85 FL (ref 79–97)
MCV RBC AUTO: 87.1 FL (ref 79–97)
MONOCYTES # BLD AUTO: 0.46 10*3/MM3 (ref 0.1–0.9)
MONOCYTES # BLD AUTO: 0.55 10*3/MM3 (ref 0.1–0.9)
MONOCYTES # BLD AUTO: 0.57 10*3/MM3 (ref 0.1–0.9)
MONOCYTES NFR BLD AUTO: 6.1 % (ref 5–12)
MONOCYTES NFR BLD AUTO: 7 % (ref 5–12)
MONOCYTES NFR BLD AUTO: 7.1 % (ref 5–12)
NEUTROPHILS NFR BLD AUTO: 5.1 10*3/MM3 (ref 1.7–7)
NEUTROPHILS NFR BLD AUTO: 5.48 10*3/MM3 (ref 1.7–7)
NEUTROPHILS NFR BLD AUTO: 5.64 10*3/MM3 (ref 1.7–7)
NEUTROPHILS NFR BLD AUTO: 65.9 % (ref 42.7–76)
NEUTROPHILS NFR BLD AUTO: 69 % (ref 42.7–76)
NEUTROPHILS NFR BLD AUTO: 72.5 % (ref 42.7–76)
NRBC BLD AUTO-RTO: 0.3 /100 WBC (ref 0–0.2)
NRBC BLD AUTO-RTO: 0.5 /100 WBC (ref 0–0.2)
PLATELET # BLD AUTO: 102 10*3/MM3 (ref 140–450)
PLATELET # BLD AUTO: 90 10*3/MM3 (ref 140–450)
PLATELET # BLD AUTO: 99 10*3/MM3 (ref 140–450)
PMV BLD AUTO: 10 FL (ref 6–12)
PMV BLD AUTO: 9.6 FL (ref 6–12)
PMV BLD AUTO: 9.8 FL (ref 6–12)
POTASSIUM SERPL-SCNC: 3.4 MMOL/L (ref 3.5–5.2)
RBC # BLD AUTO: 2.32 10*6/MM3 (ref 4.14–5.8)
RBC # BLD AUTO: 2.56 10*6/MM3 (ref 4.14–5.8)
RBC # BLD AUTO: 2.6 10*6/MM3 (ref 4.14–5.8)
SODIUM SERPL-SCNC: 143 MMOL/L (ref 136–145)
UNIT  ABO: NORMAL
UNIT  ABO: NORMAL
UNIT  RH: NORMAL
UNIT  RH: NORMAL
WBC NRBC COR # BLD: 7.56 10*3/MM3 (ref 3.4–10.8)
WBC NRBC COR # BLD: 7.73 10*3/MM3 (ref 3.4–10.8)
WBC NRBC COR # BLD: 8.17 10*3/MM3 (ref 3.4–10.8)

## 2022-11-28 PROCEDURE — 86923 COMPATIBILITY TEST ELECTRIC: CPT

## 2022-11-28 PROCEDURE — 99223 1ST HOSP IP/OBS HIGH 75: CPT | Performed by: NURSE PRACTITIONER

## 2022-11-28 PROCEDURE — 86900 BLOOD TYPING SEROLOGIC ABO: CPT

## 2022-11-28 PROCEDURE — 83735 ASSAY OF MAGNESIUM: CPT | Performed by: INTERNAL MEDICINE

## 2022-11-28 PROCEDURE — 85025 COMPLETE CBC W/AUTO DIFF WBC: CPT | Performed by: INTERNAL MEDICINE

## 2022-11-28 PROCEDURE — 99231 SBSQ HOSP IP/OBS SF/LOW 25: CPT | Performed by: SURGERY

## 2022-11-28 PROCEDURE — 99232 SBSQ HOSP IP/OBS MODERATE 35: CPT | Performed by: INTERNAL MEDICINE

## 2022-11-28 PROCEDURE — 36430 TRANSFUSION BLD/BLD COMPNT: CPT

## 2022-11-28 PROCEDURE — 63710000001 INSULIN REGULAR HUMAN PER 5 UNITS: Performed by: INTERNAL MEDICINE

## 2022-11-28 PROCEDURE — 99231 SBSQ HOSP IP/OBS SF/LOW 25: CPT | Performed by: INTERNAL MEDICINE

## 2022-11-28 PROCEDURE — P9016 RBC LEUKOCYTES REDUCED: HCPCS

## 2022-11-28 PROCEDURE — 25010000002 MAGNESIUM SULFATE 2 GM/50ML SOLUTION: Performed by: INTERNAL MEDICINE

## 2022-11-28 PROCEDURE — 80048 BASIC METABOLIC PNL TOTAL CA: CPT | Performed by: INTERNAL MEDICINE

## 2022-11-28 PROCEDURE — 82962 GLUCOSE BLOOD TEST: CPT

## 2022-11-28 RX ORDER — METOPROLOL SUCCINATE 25 MG/1
25 TABLET, EXTENDED RELEASE ORAL DAILY
Status: DISCONTINUED | OUTPATIENT
Start: 2022-11-28 | End: 2022-11-30 | Stop reason: HOSPADM

## 2022-11-28 RX ORDER — ACETAMINOPHEN 325 MG/1
650 TABLET ORAL EVERY 6 HOURS PRN
Status: DISCONTINUED | OUTPATIENT
Start: 2022-11-28 | End: 2022-11-30 | Stop reason: HOSPADM

## 2022-11-28 RX ORDER — POTASSIUM CHLORIDE 7.45 MG/ML
10 INJECTION INTRAVENOUS
Status: DISCONTINUED | OUTPATIENT
Start: 2022-11-28 | End: 2022-11-30 | Stop reason: HOSPADM

## 2022-11-28 RX ORDER — CHOLECALCIFEROL (VITAMIN D3) 125 MCG
5 CAPSULE ORAL NIGHTLY PRN
Status: DISCONTINUED | OUTPATIENT
Start: 2022-11-28 | End: 2022-11-30 | Stop reason: HOSPADM

## 2022-11-28 RX ORDER — CALCIUM GLUCONATE 20 MG/ML
1 INJECTION, SOLUTION INTRAVENOUS AS NEEDED
Status: DISCONTINUED | OUTPATIENT
Start: 2022-11-28 | End: 2022-11-30 | Stop reason: HOSPADM

## 2022-11-28 RX ORDER — POTASSIUM CHLORIDE 750 MG/1
40 TABLET, FILM COATED, EXTENDED RELEASE ORAL AS NEEDED
Status: DISCONTINUED | OUTPATIENT
Start: 2022-11-28 | End: 2022-11-30 | Stop reason: HOSPADM

## 2022-11-28 RX ORDER — AMLODIPINE BESYLATE 10 MG/1
10 TABLET ORAL DAILY
Status: DISCONTINUED | OUTPATIENT
Start: 2022-11-28 | End: 2022-11-30 | Stop reason: HOSPADM

## 2022-11-28 RX ORDER — HYDROCODONE BITARTRATE AND ACETAMINOPHEN 5; 325 MG/1; MG/1
1 TABLET ORAL EVERY 6 HOURS PRN
Status: DISCONTINUED | OUTPATIENT
Start: 2022-11-28 | End: 2022-11-30 | Stop reason: HOSPADM

## 2022-11-28 RX ORDER — POTASSIUM CHLORIDE 1.5 G/1.77G
40 POWDER, FOR SOLUTION ORAL AS NEEDED
Status: DISCONTINUED | OUTPATIENT
Start: 2022-11-28 | End: 2022-11-30 | Stop reason: HOSPADM

## 2022-11-28 RX ADMIN — MAGNESIUM SULFATE HEPTAHYDRATE 2 G: 2 INJECTION, SOLUTION INTRAVENOUS at 06:36

## 2022-11-28 RX ADMIN — SODIUM CHLORIDE 50 ML/HR: 4.5 INJECTION, SOLUTION INTRAVENOUS at 09:32

## 2022-11-28 RX ADMIN — POTASSIUM CHLORIDE 40 MEQ: 750 TABLET, EXTENDED RELEASE ORAL at 22:53

## 2022-11-28 RX ADMIN — ISOSORBIDE MONONITRATE 60 MG: 60 TABLET ORAL at 08:31

## 2022-11-28 RX ADMIN — PANTOPRAZOLE SODIUM 8 MG/HR: 40 INJECTION, POWDER, FOR SOLUTION INTRAVENOUS at 02:00

## 2022-11-28 RX ADMIN — METOPROLOL SUCCINATE 25 MG: 25 TABLET, EXTENDED RELEASE ORAL at 15:32

## 2022-11-28 RX ADMIN — Medication 5 MG: at 22:53

## 2022-11-28 RX ADMIN — POTASSIUM CHLORIDE 40 MEQ: 750 TABLET, EXTENDED RELEASE ORAL at 18:17

## 2022-11-28 RX ADMIN — AMLODIPINE BESYLATE 10 MG: 10 TABLET ORAL at 15:32

## 2022-11-28 RX ADMIN — INSULIN HUMAN 3 UNITS: 100 INJECTION, SOLUTION PARENTERAL at 13:57

## 2022-11-28 RX ADMIN — PANTOPRAZOLE SODIUM 8 MG/HR: 40 INJECTION, POWDER, FOR SOLUTION INTRAVENOUS at 06:35

## 2022-11-28 NOTE — TELEPHONE ENCOUNTER
Caller: Kyra Ahuja    Relationship to patient: Emergency Contact    Best call back number: [phone]    Type of visit: NEW PATIENT     Requested date: AFTER DISCHARGED    If rescheduling, when is the original appointment: ASAP    Additional notes:  PT IS CURRENTLY ADMITTED INTO THE HOSPITAL AND WAS TOLD BY   TO MAKE A FOLLOW UP  APPT FOR THIS UPCOMING MONDAY. HUB HAD NO AVAILABILITY.

## 2022-11-29 PROBLEM — C15.5 MALIGNANT NEOPLASM OF LOWER THIRD OF ESOPHAGUS: Status: ACTIVE | Noted: 2022-11-29

## 2022-11-29 LAB
ANION GAP SERPL CALCULATED.3IONS-SCNC: 5.3 MMOL/L (ref 5–15)
BASOPHILS # BLD AUTO: 0.03 10*3/MM3 (ref 0–0.2)
BASOPHILS # BLD AUTO: 0.03 10*3/MM3 (ref 0–0.2)
BASOPHILS NFR BLD AUTO: 0.3 % (ref 0–1.5)
BASOPHILS NFR BLD AUTO: 0.4 % (ref 0–1.5)
BUN SERPL-MCNC: 6 MG/DL (ref 8–23)
BUN/CREAT SERPL: 9.2 (ref 7–25)
CALCIUM SPEC-SCNC: 8 MG/DL (ref 8.6–10.5)
CHLORIDE SERPL-SCNC: 110 MMOL/L (ref 98–107)
CO2 SERPL-SCNC: 28.7 MMOL/L (ref 22–29)
CREAT SERPL-MCNC: 0.65 MG/DL (ref 0.76–1.27)
DEPRECATED RDW RBC AUTO: 51.1 FL (ref 37–54)
DEPRECATED RDW RBC AUTO: 51.2 FL (ref 37–54)
EGFRCR SERPLBLD CKD-EPI 2021: 105.2 ML/MIN/1.73
EOSINOPHIL # BLD AUTO: 0.25 10*3/MM3 (ref 0–0.4)
EOSINOPHIL # BLD AUTO: 0.35 10*3/MM3 (ref 0–0.4)
EOSINOPHIL NFR BLD AUTO: 3.6 % (ref 0.3–6.2)
EOSINOPHIL NFR BLD AUTO: 3.8 % (ref 0.3–6.2)
ERYTHROCYTE [DISTWIDTH] IN BLOOD BY AUTOMATED COUNT: 16.3 % (ref 12.3–15.4)
ERYTHROCYTE [DISTWIDTH] IN BLOOD BY AUTOMATED COUNT: 16.5 % (ref 12.3–15.4)
GLUCOSE BLDC GLUCOMTR-MCNC: 118 MG/DL (ref 70–130)
GLUCOSE BLDC GLUCOMTR-MCNC: 134 MG/DL (ref 70–130)
GLUCOSE BLDC GLUCOMTR-MCNC: 151 MG/DL (ref 70–130)
GLUCOSE SERPL-MCNC: 118 MG/DL (ref 65–99)
HCT VFR BLD AUTO: 24.3 % (ref 37.5–51)
HCT VFR BLD AUTO: 25.8 % (ref 37.5–51)
HGB BLD-MCNC: 8.4 G/DL (ref 13–17.7)
HGB BLD-MCNC: 8.4 G/DL (ref 13–17.7)
IMM GRANULOCYTES # BLD AUTO: 0.09 10*3/MM3 (ref 0–0.05)
IMM GRANULOCYTES # BLD AUTO: 0.1 10*3/MM3 (ref 0–0.05)
IMM GRANULOCYTES NFR BLD AUTO: 1.1 % (ref 0–0.5)
IMM GRANULOCYTES NFR BLD AUTO: 1.3 % (ref 0–0.5)
LYMPHOCYTES # BLD AUTO: 1.02 10*3/MM3 (ref 0.7–3.1)
LYMPHOCYTES # BLD AUTO: 1.69 10*3/MM3 (ref 0.7–3.1)
LYMPHOCYTES NFR BLD AUTO: 14.5 % (ref 19.6–45.3)
LYMPHOCYTES NFR BLD AUTO: 18.4 % (ref 19.6–45.3)
MCH RBC QN AUTO: 28.4 PG (ref 26.6–33)
MCH RBC QN AUTO: 29.8 PG (ref 26.6–33)
MCHC RBC AUTO-ENTMCNC: 32.6 G/DL (ref 31.5–35.7)
MCHC RBC AUTO-ENTMCNC: 34.6 G/DL (ref 31.5–35.7)
MCV RBC AUTO: 86.2 FL (ref 79–97)
MCV RBC AUTO: 87.2 FL (ref 79–97)
MONOCYTES # BLD AUTO: 0.43 10*3/MM3 (ref 0.1–0.9)
MONOCYTES # BLD AUTO: 0.56 10*3/MM3 (ref 0.1–0.9)
MONOCYTES NFR BLD AUTO: 6.1 % (ref 5–12)
MONOCYTES NFR BLD AUTO: 6.1 % (ref 5–12)
NEUTROPHILS NFR BLD AUTO: 5.2 10*3/MM3 (ref 1.7–7)
NEUTROPHILS NFR BLD AUTO: 6.43 10*3/MM3 (ref 1.7–7)
NEUTROPHILS NFR BLD AUTO: 70.3 % (ref 42.7–76)
NEUTROPHILS NFR BLD AUTO: 74.1 % (ref 42.7–76)
NRBC BLD AUTO-RTO: 0.4 /100 WBC (ref 0–0.2)
NRBC BLD AUTO-RTO: 0.6 /100 WBC (ref 0–0.2)
PLATELET # BLD AUTO: 126 10*3/MM3 (ref 140–450)
PLATELET # BLD AUTO: 137 10*3/MM3 (ref 140–450)
PMV BLD AUTO: 10 FL (ref 6–12)
PMV BLD AUTO: 9.7 FL (ref 6–12)
POTASSIUM SERPL-SCNC: 4 MMOL/L (ref 3.5–5.2)
RBC # BLD AUTO: 2.82 10*6/MM3 (ref 4.14–5.8)
RBC # BLD AUTO: 2.96 10*6/MM3 (ref 4.14–5.8)
SODIUM SERPL-SCNC: 144 MMOL/L (ref 136–145)
WBC NRBC COR # BLD: 7.02 10*3/MM3 (ref 3.4–10.8)
WBC NRBC COR # BLD: 9.16 10*3/MM3 (ref 3.4–10.8)

## 2022-11-29 PROCEDURE — 99232 SBSQ HOSP IP/OBS MODERATE 35: CPT | Performed by: INTERNAL MEDICINE

## 2022-11-29 PROCEDURE — 82962 GLUCOSE BLOOD TEST: CPT

## 2022-11-29 PROCEDURE — 80048 BASIC METABOLIC PNL TOTAL CA: CPT | Performed by: INTERNAL MEDICINE

## 2022-11-29 PROCEDURE — 99232 SBSQ HOSP IP/OBS MODERATE 35: CPT | Performed by: NURSE PRACTITIONER

## 2022-11-29 PROCEDURE — 99232 SBSQ HOSP IP/OBS MODERATE 35: CPT | Performed by: SURGERY

## 2022-11-29 PROCEDURE — 85025 COMPLETE CBC W/AUTO DIFF WBC: CPT | Performed by: INTERNAL MEDICINE

## 2022-11-29 RX ORDER — PANTOPRAZOLE SODIUM 40 MG/1
40 TABLET, DELAYED RELEASE ORAL
Status: DISCONTINUED | OUTPATIENT
Start: 2022-11-29 | End: 2022-11-30 | Stop reason: HOSPADM

## 2022-11-29 RX ORDER — LORAZEPAM 0.5 MG/1
0.5 TABLET ORAL EVERY 6 HOURS PRN
Status: DISCONTINUED | OUTPATIENT
Start: 2022-11-29 | End: 2022-11-30 | Stop reason: HOSPADM

## 2022-11-29 RX ORDER — POLYETHYLENE GLYCOL 3350 17 G/17G
17 POWDER, FOR SOLUTION ORAL DAILY
Status: DISCONTINUED | OUTPATIENT
Start: 2022-11-29 | End: 2022-11-30 | Stop reason: HOSPADM

## 2022-11-29 RX ADMIN — AMLODIPINE BESYLATE 10 MG: 10 TABLET ORAL at 08:33

## 2022-11-29 RX ADMIN — PANTOPRAZOLE SODIUM 40 MG: 40 TABLET, DELAYED RELEASE ORAL at 13:03

## 2022-11-29 RX ADMIN — Medication 5 MG: at 23:34

## 2022-11-29 RX ADMIN — LORAZEPAM 0.5 MG: 0.5 TABLET ORAL at 21:23

## 2022-11-29 RX ADMIN — METOPROLOL SUCCINATE 25 MG: 25 TABLET, EXTENDED RELEASE ORAL at 08:33

## 2022-11-29 RX ADMIN — ISOSORBIDE MONONITRATE 60 MG: 60 TABLET ORAL at 08:33

## 2022-11-30 ENCOUNTER — READMISSION MANAGEMENT (OUTPATIENT)
Dept: CALL CENTER | Facility: HOSPITAL | Age: 62
End: 2022-11-30

## 2022-11-30 VITALS
OXYGEN SATURATION: 93 % | SYSTOLIC BLOOD PRESSURE: 151 MMHG | HEART RATE: 68 BPM | DIASTOLIC BLOOD PRESSURE: 91 MMHG | HEIGHT: 73 IN | WEIGHT: 310 LBS | TEMPERATURE: 98.1 F | BODY MASS INDEX: 41.08 KG/M2 | RESPIRATION RATE: 21 BRPM

## 2022-11-30 DIAGNOSIS — C80.1 ANXIETY ASSOCIATED WITH CANCER DIAGNOSIS: ICD-10-CM

## 2022-11-30 DIAGNOSIS — C15.5 MALIGNANT NEOPLASM OF LOWER THIRD OF ESOPHAGUS: Primary | ICD-10-CM

## 2022-11-30 DIAGNOSIS — F41.1 ANXIETY ASSOCIATED WITH CANCER DIAGNOSIS: ICD-10-CM

## 2022-11-30 LAB
ANION GAP SERPL CALCULATED.3IONS-SCNC: 6.6 MMOL/L (ref 5–15)
BASOPHILS # BLD AUTO: 0.02 10*3/MM3 (ref 0–0.2)
BASOPHILS NFR BLD AUTO: 0.3 % (ref 0–1.5)
BUN SERPL-MCNC: 8 MG/DL (ref 8–23)
BUN/CREAT SERPL: 12.1 (ref 7–25)
CALCIUM SPEC-SCNC: 8.1 MG/DL (ref 8.6–10.5)
CHLORIDE SERPL-SCNC: 105 MMOL/L (ref 98–107)
CO2 SERPL-SCNC: 28.4 MMOL/L (ref 22–29)
CREAT SERPL-MCNC: 0.66 MG/DL (ref 0.76–1.27)
DEPRECATED RDW RBC AUTO: 52.6 FL (ref 37–54)
EGFRCR SERPLBLD CKD-EPI 2021: 104.7 ML/MIN/1.73
EOSINOPHIL # BLD AUTO: 0.18 10*3/MM3 (ref 0–0.4)
EOSINOPHIL NFR BLD AUTO: 2.7 % (ref 0.3–6.2)
ERYTHROCYTE [DISTWIDTH] IN BLOOD BY AUTOMATED COUNT: 16.7 % (ref 12.3–15.4)
GLUCOSE BLDC GLUCOMTR-MCNC: 118 MG/DL (ref 70–130)
GLUCOSE SERPL-MCNC: 122 MG/DL (ref 65–99)
HCT VFR BLD AUTO: 24.7 % (ref 37.5–51)
HGB BLD-MCNC: 8.4 G/DL (ref 13–17.7)
IMM GRANULOCYTES # BLD AUTO: 0.07 10*3/MM3 (ref 0–0.05)
IMM GRANULOCYTES NFR BLD AUTO: 1 % (ref 0–0.5)
LYMPHOCYTES # BLD AUTO: 1.34 10*3/MM3 (ref 0.7–3.1)
LYMPHOCYTES NFR BLD AUTO: 20 % (ref 19.6–45.3)
MCH RBC QN AUTO: 29.3 PG (ref 26.6–33)
MCHC RBC AUTO-ENTMCNC: 34 G/DL (ref 31.5–35.7)
MCV RBC AUTO: 86.1 FL (ref 79–97)
MONOCYTES # BLD AUTO: 0.41 10*3/MM3 (ref 0.1–0.9)
MONOCYTES NFR BLD AUTO: 6.1 % (ref 5–12)
NEUTROPHILS NFR BLD AUTO: 4.68 10*3/MM3 (ref 1.7–7)
NEUTROPHILS NFR BLD AUTO: 69.9 % (ref 42.7–76)
NRBC BLD AUTO-RTO: 0.4 /100 WBC (ref 0–0.2)
PLATELET # BLD AUTO: 139 10*3/MM3 (ref 140–450)
PMV BLD AUTO: 9.9 FL (ref 6–12)
POTASSIUM SERPL-SCNC: 3.7 MMOL/L (ref 3.5–5.2)
RBC # BLD AUTO: 2.87 10*6/MM3 (ref 4.14–5.8)
SODIUM SERPL-SCNC: 140 MMOL/L (ref 136–145)
WBC NRBC COR # BLD: 6.7 10*3/MM3 (ref 3.4–10.8)

## 2022-11-30 PROCEDURE — 99231 SBSQ HOSP IP/OBS SF/LOW 25: CPT | Performed by: INTERNAL MEDICINE

## 2022-11-30 PROCEDURE — 85025 COMPLETE CBC W/AUTO DIFF WBC: CPT | Performed by: INTERNAL MEDICINE

## 2022-11-30 PROCEDURE — 82962 GLUCOSE BLOOD TEST: CPT

## 2022-11-30 PROCEDURE — 80048 BASIC METABOLIC PNL TOTAL CA: CPT | Performed by: INTERNAL MEDICINE

## 2022-11-30 RX ORDER — POLYETHYLENE GLYCOL 3350 17 G/17G
17 POWDER, FOR SOLUTION ORAL DAILY
Qty: 510 G | Refills: 0 | Status: SHIPPED | OUTPATIENT
Start: 2022-12-01 | End: 2022-12-14

## 2022-11-30 RX ADMIN — POLYETHYLENE GLYCOL 3350 17 G: 17 POWDER, FOR SOLUTION ORAL at 09:08

## 2022-11-30 RX ADMIN — ISOSORBIDE MONONITRATE 60 MG: 60 TABLET ORAL at 09:08

## 2022-11-30 RX ADMIN — PANTOPRAZOLE SODIUM 40 MG: 40 TABLET, DELAYED RELEASE ORAL at 05:00

## 2022-11-30 RX ADMIN — METOPROLOL SUCCINATE 25 MG: 25 TABLET, EXTENDED RELEASE ORAL at 09:08

## 2022-11-30 RX ADMIN — AMLODIPINE BESYLATE 10 MG: 10 TABLET ORAL at 09:08

## 2022-11-30 NOTE — OUTREACH NOTE
Prep Survey    Flowsheet Row Responses   [facility] facility patient discharged from? [place]   Is LACE score < 7 ? No   Emergency Room discharge w/ pulse ox? No   Eligibility [facility]   Date of Admission 11/25/22   Date of Discharge 11/30/22   Discharge Disposition Home or Self Care   Discharge diagnosis Rectal bleeding  Acute blood loss anemia due to GI bleed with resulting hemorrhagic shock    Does the patient have one of the following disease processes/diagnoses(primary or secondary)? Other   Does the patient have Home health ordered? No   Is there a DME ordered? No   Prep survey completed? Yes          ARMAAN VILLALBA - Registered Nurse

## 2022-12-01 ENCOUNTER — HOSPITAL ENCOUNTER (OUTPATIENT)
Facility: HOSPITAL | Age: 62
Setting detail: OBSERVATION
Discharge: ANOTHER HOSPITAL | End: 2022-12-02
Attending: EMERGENCY MEDICINE | Admitting: INTERNAL MEDICINE

## 2022-12-01 ENCOUNTER — TRANSITIONAL CARE MANAGEMENT TELEPHONE ENCOUNTER (OUTPATIENT)
Dept: CALL CENTER | Facility: HOSPITAL | Age: 62
End: 2022-12-01

## 2022-12-01 DIAGNOSIS — K92.2 GASTROINTESTINAL HEMORRHAGE, UNSPECIFIED GASTROINTESTINAL HEMORRHAGE TYPE: ICD-10-CM

## 2022-12-01 DIAGNOSIS — D62 ACUTE BLOOD LOSS ANEMIA: ICD-10-CM

## 2022-12-01 DIAGNOSIS — K62.5 GASTROINTESTINAL HEMORRHAGE ASSOCIATED WITH ANORECTAL SOURCE: Primary | ICD-10-CM

## 2022-12-01 LAB
ABO GROUP BLD: NORMAL
ALBUMIN SERPL-MCNC: 3.1 G/DL (ref 3.5–5.2)
ALBUMIN/GLOB SERPL: 1.8 G/DL
ALP SERPL-CCNC: 55 U/L (ref 39–117)
ALT SERPL W P-5'-P-CCNC: 8 U/L (ref 1–41)
ANION GAP SERPL CALCULATED.3IONS-SCNC: 10.5 MMOL/L (ref 5–15)
AST SERPL-CCNC: 12 U/L (ref 1–40)
BASOPHILS # BLD AUTO: 0.03 10*3/MM3 (ref 0–0.2)
BASOPHILS NFR BLD AUTO: 0.3 % (ref 0–1.5)
BILIRUB SERPL-MCNC: 0.3 MG/DL (ref 0–1.2)
BLD GP AB SCN SERPL QL: NEGATIVE
BUN SERPL-MCNC: 10 MG/DL (ref 8–23)
BUN/CREAT SERPL: 13 (ref 7–25)
CALCIUM SPEC-SCNC: 7.7 MG/DL (ref 8.6–10.5)
CHLORIDE SERPL-SCNC: 105 MMOL/L (ref 98–107)
CO2 SERPL-SCNC: 24.5 MMOL/L (ref 22–29)
CREAT SERPL-MCNC: 0.77 MG/DL (ref 0.76–1.27)
D-LACTATE SERPL-SCNC: 1.7 MMOL/L (ref 0.5–2)
D-LACTATE SERPL-SCNC: 3.7 MMOL/L (ref 0.5–2)
DEPRECATED RDW RBC AUTO: 49.9 FL (ref 37–54)
EGFRCR SERPLBLD CKD-EPI 2021: 100 ML/MIN/1.73
EOSINOPHIL # BLD AUTO: 0.19 10*3/MM3 (ref 0–0.4)
EOSINOPHIL NFR BLD AUTO: 2 % (ref 0.3–6.2)
ERYTHROCYTE [DISTWIDTH] IN BLOOD BY AUTOMATED COUNT: 16.4 % (ref 12.3–15.4)
GLOBULIN UR ELPH-MCNC: 1.7 GM/DL
GLUCOSE BLDC GLUCOMTR-MCNC: 103 MG/DL (ref 70–130)
GLUCOSE BLDC GLUCOMTR-MCNC: 121 MG/DL (ref 70–130)
GLUCOSE SERPL-MCNC: 240 MG/DL (ref 65–99)
HCT VFR BLD AUTO: 22.2 % (ref 37.5–51)
HCT VFR BLD AUTO: 23.2 % (ref 37.5–51)
HCT VFR BLD AUTO: 25.5 % (ref 37.5–51)
HCT VFR BLD AUTO: 25.6 % (ref 37.5–51)
HGB BLD-MCNC: 7.4 G/DL (ref 13–17.7)
HGB BLD-MCNC: 7.5 G/DL (ref 13–17.7)
HGB BLD-MCNC: 8.6 G/DL (ref 13–17.7)
HGB BLD-MCNC: 8.7 G/DL (ref 13–17.7)
HOLD SPECIMEN: NORMAL
HOLD SPECIMEN: NORMAL
IMM GRANULOCYTES # BLD AUTO: 0.07 10*3/MM3 (ref 0–0.05)
IMM GRANULOCYTES NFR BLD AUTO: 0.8 % (ref 0–0.5)
LYMPHOCYTES # BLD AUTO: 2.19 10*3/MM3 (ref 0.7–3.1)
LYMPHOCYTES NFR BLD AUTO: 23.6 % (ref 19.6–45.3)
MCH RBC QN AUTO: 28.7 PG (ref 26.6–33)
MCHC RBC AUTO-ENTMCNC: 33.3 G/DL (ref 31.5–35.7)
MCV RBC AUTO: 86 FL (ref 79–97)
MONOCYTES # BLD AUTO: 0.84 10*3/MM3 (ref 0.1–0.9)
MONOCYTES NFR BLD AUTO: 9.1 % (ref 5–12)
NEUTROPHILS NFR BLD AUTO: 5.96 10*3/MM3 (ref 1.7–7)
NEUTROPHILS NFR BLD AUTO: 64.2 % (ref 42.7–76)
NRBC BLD AUTO-RTO: 0.8 /100 WBC (ref 0–0.2)
PLATELET # BLD AUTO: 191 10*3/MM3 (ref 140–450)
PMV BLD AUTO: 9.7 FL (ref 6–12)
POTASSIUM SERPL-SCNC: 3.6 MMOL/L (ref 3.5–5.2)
PROT SERPL-MCNC: 4.8 G/DL (ref 6–8.5)
RBC # BLD AUTO: 2.58 10*6/MM3 (ref 4.14–5.8)
RH BLD: POSITIVE
SODIUM SERPL-SCNC: 140 MMOL/L (ref 136–145)
T&S EXPIRATION DATE: NORMAL
WBC NRBC COR # BLD: 9.28 10*3/MM3 (ref 3.4–10.8)
WHOLE BLOOD HOLD COAG: NORMAL
WHOLE BLOOD HOLD SPECIMEN: NORMAL

## 2022-12-01 PROCEDURE — 86900 BLOOD TYPING SEROLOGIC ABO: CPT

## 2022-12-01 PROCEDURE — 80053 COMPREHEN METABOLIC PANEL: CPT

## 2022-12-01 PROCEDURE — 99213 OFFICE O/P EST LOW 20 MIN: CPT | Performed by: SURGERY

## 2022-12-01 PROCEDURE — 96366 THER/PROPH/DIAG IV INF ADDON: CPT

## 2022-12-01 PROCEDURE — 82962 GLUCOSE BLOOD TEST: CPT

## 2022-12-01 PROCEDURE — 83605 ASSAY OF LACTIC ACID: CPT

## 2022-12-01 PROCEDURE — 86850 RBC ANTIBODY SCREEN: CPT

## 2022-12-01 PROCEDURE — 85025 COMPLETE CBC W/AUTO DIFF WBC: CPT

## 2022-12-01 PROCEDURE — 99244 OFF/OP CNSLTJ NEW/EST MOD 40: CPT | Performed by: INTERNAL MEDICINE

## 2022-12-01 PROCEDURE — 86923 COMPATIBILITY TEST ELECTRIC: CPT

## 2022-12-01 PROCEDURE — 96375 TX/PRO/DX INJ NEW DRUG ADDON: CPT

## 2022-12-01 PROCEDURE — 96376 TX/PRO/DX INJ SAME DRUG ADON: CPT

## 2022-12-01 PROCEDURE — 99285 EMERGENCY DEPT VISIT HI MDM: CPT

## 2022-12-01 PROCEDURE — G0378 HOSPITAL OBSERVATION PER HR: HCPCS

## 2022-12-01 PROCEDURE — 36430 TRANSFUSION BLD/BLD COMPNT: CPT

## 2022-12-01 PROCEDURE — P9016 RBC LEUKOCYTES REDUCED: HCPCS

## 2022-12-01 PROCEDURE — 96365 THER/PROPH/DIAG IV INF INIT: CPT

## 2022-12-01 PROCEDURE — 86901 BLOOD TYPING SEROLOGIC RH(D): CPT

## 2022-12-01 PROCEDURE — 25010000002 ONDANSETRON PER 1 MG: Performed by: INTERNAL MEDICINE

## 2022-12-01 PROCEDURE — 83605 ASSAY OF LACTIC ACID: CPT | Performed by: EMERGENCY MEDICINE

## 2022-12-01 PROCEDURE — 85018 HEMOGLOBIN: CPT | Performed by: INTERNAL MEDICINE

## 2022-12-01 PROCEDURE — 85014 HEMATOCRIT: CPT | Performed by: INTERNAL MEDICINE

## 2022-12-01 PROCEDURE — 36415 COLL VENOUS BLD VENIPUNCTURE: CPT

## 2022-12-01 RX ORDER — ONDANSETRON 4 MG/1
4 TABLET, FILM COATED ORAL EVERY 6 HOURS PRN
Status: DISCONTINUED | OUTPATIENT
Start: 2022-12-01 | End: 2022-12-02 | Stop reason: HOSPADM

## 2022-12-01 RX ORDER — ACETAMINOPHEN 325 MG/1
650 TABLET ORAL EVERY 4 HOURS PRN
Status: DISCONTINUED | OUTPATIENT
Start: 2022-12-01 | End: 2022-12-02 | Stop reason: HOSPADM

## 2022-12-01 RX ORDER — ONDANSETRON 2 MG/ML
4 INJECTION INTRAMUSCULAR; INTRAVENOUS EVERY 6 HOURS PRN
Status: DISCONTINUED | OUTPATIENT
Start: 2022-12-01 | End: 2022-12-02 | Stop reason: HOSPADM

## 2022-12-01 RX ORDER — IPRATROPIUM BROMIDE AND ALBUTEROL SULFATE 2.5; .5 MG/3ML; MG/3ML
3 SOLUTION RESPIRATORY (INHALATION) EVERY 6 HOURS PRN
Status: DISCONTINUED | OUTPATIENT
Start: 2022-12-01 | End: 2022-12-02 | Stop reason: HOSPADM

## 2022-12-01 RX ORDER — LABETALOL HYDROCHLORIDE 5 MG/ML
10 INJECTION, SOLUTION INTRAVENOUS EVERY 4 HOURS PRN
Status: DISCONTINUED | OUTPATIENT
Start: 2022-12-01 | End: 2022-12-02 | Stop reason: HOSPADM

## 2022-12-01 RX ORDER — PANTOPRAZOLE SODIUM 40 MG/10ML
80 INJECTION, POWDER, LYOPHILIZED, FOR SOLUTION INTRAVENOUS ONCE
Status: COMPLETED | OUTPATIENT
Start: 2022-12-01 | End: 2022-12-01

## 2022-12-01 RX ORDER — PANTOPRAZOLE SODIUM 40 MG/10ML
40 INJECTION, POWDER, LYOPHILIZED, FOR SOLUTION INTRAVENOUS
Status: DISCONTINUED | OUTPATIENT
Start: 2022-12-01 | End: 2022-12-02 | Stop reason: HOSPADM

## 2022-12-01 RX ORDER — MAGNESIUM SULFATE HEPTAHYDRATE 40 MG/ML
2 INJECTION, SOLUTION INTRAVENOUS AS NEEDED
Status: DISCONTINUED | OUTPATIENT
Start: 2022-12-01 | End: 2022-12-02 | Stop reason: HOSPADM

## 2022-12-01 RX ORDER — CHOLECALCIFEROL (VITAMIN D3) 125 MCG
5 CAPSULE ORAL NIGHTLY PRN
Status: DISCONTINUED | OUTPATIENT
Start: 2022-12-01 | End: 2022-12-02 | Stop reason: HOSPADM

## 2022-12-01 RX ORDER — SODIUM CHLORIDE 0.9 % (FLUSH) 0.9 %
10 SYRINGE (ML) INJECTION AS NEEDED
Status: DISCONTINUED | OUTPATIENT
Start: 2022-12-01 | End: 2022-12-02 | Stop reason: HOSPADM

## 2022-12-01 RX ORDER — ACETAMINOPHEN 650 MG/1
650 SUPPOSITORY RECTAL EVERY 4 HOURS PRN
Status: DISCONTINUED | OUTPATIENT
Start: 2022-12-01 | End: 2022-12-02 | Stop reason: HOSPADM

## 2022-12-01 RX ORDER — POTASSIUM CHLORIDE 750 MG/1
40 TABLET, FILM COATED, EXTENDED RELEASE ORAL AS NEEDED
Status: DISCONTINUED | OUTPATIENT
Start: 2022-12-01 | End: 2022-12-02 | Stop reason: HOSPADM

## 2022-12-01 RX ORDER — SODIUM CHLORIDE 9 MG/ML
40 INJECTION, SOLUTION INTRAVENOUS AS NEEDED
Status: DISCONTINUED | OUTPATIENT
Start: 2022-12-01 | End: 2022-12-02 | Stop reason: HOSPADM

## 2022-12-01 RX ORDER — SODIUM CHLORIDE 0.9 % (FLUSH) 0.9 %
10 SYRINGE (ML) INJECTION EVERY 12 HOURS SCHEDULED
Status: DISCONTINUED | OUTPATIENT
Start: 2022-12-01 | End: 2022-12-02 | Stop reason: HOSPADM

## 2022-12-01 RX ORDER — CALCIUM GLUCONATE 20 MG/ML
1 INJECTION, SOLUTION INTRAVENOUS AS NEEDED
Status: DISCONTINUED | OUTPATIENT
Start: 2022-12-01 | End: 2022-12-02 | Stop reason: HOSPADM

## 2022-12-01 RX ORDER — HYDROCODONE BITARTRATE AND ACETAMINOPHEN 5; 325 MG/1; MG/1
1 TABLET ORAL EVERY 6 HOURS PRN
Status: DISCONTINUED | OUTPATIENT
Start: 2022-12-01 | End: 2022-12-02 | Stop reason: HOSPADM

## 2022-12-01 RX ORDER — LORAZEPAM 0.5 MG/1
0.5 TABLET ORAL EVERY 6 HOURS PRN
Status: DISCONTINUED | OUTPATIENT
Start: 2022-12-01 | End: 2022-12-02 | Stop reason: HOSPADM

## 2022-12-01 RX ORDER — NICOTINE POLACRILEX 4 MG
15 LOZENGE BUCCAL
Status: DISCONTINUED | OUTPATIENT
Start: 2022-12-01 | End: 2022-12-02 | Stop reason: HOSPADM

## 2022-12-01 RX ORDER — MAGNESIUM SULFATE HEPTAHYDRATE 40 MG/ML
4 INJECTION, SOLUTION INTRAVENOUS AS NEEDED
Status: DISCONTINUED | OUTPATIENT
Start: 2022-12-01 | End: 2022-12-02 | Stop reason: HOSPADM

## 2022-12-01 RX ORDER — POTASSIUM CHLORIDE 1.5 G/1.77G
40 POWDER, FOR SOLUTION ORAL AS NEEDED
Status: DISCONTINUED | OUTPATIENT
Start: 2022-12-01 | End: 2022-12-02 | Stop reason: HOSPADM

## 2022-12-01 RX ORDER — DEXTROSE MONOHYDRATE 25 G/50ML
25 INJECTION, SOLUTION INTRAVENOUS
Status: DISCONTINUED | OUTPATIENT
Start: 2022-12-01 | End: 2022-12-02 | Stop reason: HOSPADM

## 2022-12-01 RX ORDER — SUCRALFATE 1 G/1
1 TABLET ORAL
Status: DISCONTINUED | OUTPATIENT
Start: 2022-12-01 | End: 2022-12-02 | Stop reason: HOSPADM

## 2022-12-01 RX ORDER — POTASSIUM CHLORIDE 7.45 MG/ML
10 INJECTION INTRAVENOUS
Status: DISCONTINUED | OUTPATIENT
Start: 2022-12-01 | End: 2022-12-02 | Stop reason: HOSPADM

## 2022-12-01 RX ADMIN — ONDANSETRON 4 MG: 2 INJECTION INTRAMUSCULAR; INTRAVENOUS at 21:12

## 2022-12-01 RX ADMIN — PANTOPRAZOLE SODIUM 80 MG: 40 INJECTION, POWDER, FOR SOLUTION INTRAVENOUS at 06:48

## 2022-12-01 RX ADMIN — SUCRALFATE 1 G: 1 TABLET ORAL at 18:48

## 2022-12-01 RX ADMIN — Medication 10 ML: at 20:16

## 2022-12-01 RX ADMIN — PANTOPRAZOLE SODIUM 8 MG/HR: 40 INJECTION, POWDER, FOR SOLUTION INTRAVENOUS at 11:02

## 2022-12-01 RX ADMIN — POLYETHYLENE GLYCOL 3350, SODIUM SULFATE ANHYDROUS, SODIUM BICARBONATE, SODIUM CHLORIDE, POTASSIUM CHLORIDE 2000 ML: 236; 22.74; 6.74; 5.86; 2.97 POWDER, FOR SOLUTION ORAL at 17:42

## 2022-12-01 RX ADMIN — POLYETHYLENE GLYCOL 3350, SODIUM SULFATE ANHYDROUS, SODIUM BICARBONATE, SODIUM CHLORIDE, POTASSIUM CHLORIDE 2000 ML: 236; 22.74; 6.74; 5.86; 2.97 POWDER, FOR SOLUTION ORAL at 20:12

## 2022-12-01 RX ADMIN — PANTOPRAZOLE SODIUM 8 MG/HR: 40 INJECTION, POWDER, FOR SOLUTION INTRAVENOUS at 06:48

## 2022-12-01 RX ADMIN — LABETALOL HYDROCHLORIDE 10 MG: 5 INJECTION, SOLUTION INTRAVENOUS at 23:02

## 2022-12-01 RX ADMIN — PANTOPRAZOLE SODIUM 40 MG: 40 INJECTION, POWDER, FOR SOLUTION INTRAVENOUS at 18:48

## 2022-12-01 NOTE — CONSULTS
Consultation note    Referring physician: Markos Mortensen MD    Consulting Physician: Jose Manuel Baca MD    Reason for consultation: GI bleed    Chief Complaint   Patient presents with   • Rectal Bleeding       HPI:   The patient is a very pleasant 62 y.o. years old male that presented to the hospital with rectal bleeding.  The patient was recently admitted to the hospital after he had massive GI bleed after endoscopic mucosal resection of 5 cecal polyp.  He was discharged yesterday.  He was at home this morning and had 1 dark red bowel movement.  This was followed by bright red bloody bowel movement.  He was seen in the emergency room and was found to have a hemoglobin of 7.4.  He had 2 units of PRBC.  His follow-up hemoglobin was 8.7.  He denies any abdominal pain.  No more bleeding after arrival to the ER      Past Medical History:   Diagnosis Date   • Abnormal nuclear stress test    • Aortic valve insufficiency     nonrheumtic    • Ascending aortic aneurysm    • CAD (coronary artery disease)    • Chest pain    • Diabetes mellitus (HCC)    • Dizzy     CAREFUL WHEN GETTING UP   • Dyspnea    • Essential hypertension    • Fatigue    • Hyperglycemia    • Hyperlipidemia    • Hypersomnia with sleep apnea    • Lightheadedness    • Malaise and fatigue    • Morbid obesity (HCC)    • Myocardial ischemia    • Nocturia    • TJ on auto CPAP 10/31/2016    Overnight polysomnogram.  Weight 276 pounds.  Severe TJ with AHI 79 events per hour.  Auto CPAP recommended.   • Pneumonia     FEB 2016   • Scrotal bleeding    • Shortness of breath        Past Surgical History:   Procedure Laterality Date   • AORTIC VALVE REPAIR/REPLACEMENT  05/2016   • ASCENDING ARCH/HEMIARCH REPLACEMENT N/A 5/2/2016    Procedure: BHAVESH STERNOTOMY CORONARY ARTERY BYPASS GRAFT TIMES 3 USING LEFT INTERNAL MAMMARY ARTERY AND RIGHT GREATER SAPHENOUS VEIN GRAFT PER ENDOSCOPIC VEIN HARVESTING, AORTIC ANEURYSM REPAIR WITH ROOT REPAIR AND AORTIC VALVE  REPLACEMENT;  Surgeon: Rosalio Cline MD;  Location: [facility] MAIN OR;  Service:    • CARDIAC CATHETERIZATION N/A 4/1/2016    Procedure: Left Heart Cath;  Surgeon: Erick Tam MD;  Location: [facility] CATH INVASIVE LOCATION;  Service:    • CARDIAC CATHETERIZATION N/A 4/1/2016    Procedure: Left ventriculography;  Surgeon: Erick Tam MD;  Location: [facility] CATH INVASIVE LOCATION;  Service:    • CARDIAC CATHETERIZATION N/A 4/1/2016    Procedure: Right Heart Cath;  Surgeon: Erick Tam MD;  Location: [facility] CATH INVASIVE LOCATION;  Service:    • CARDIAC CATHETERIZATION N/A 10/30/2017    Procedure: Coronary angiography;  Surgeon: Sorin Vasquez MD;  Location: [facility] CATH INVASIVE LOCATION;  Service:    • CARDIAC CATHETERIZATION  10/30/2017    Procedure: Saphenous Vein Graft;  Surgeon: Sorin Vasquez MD;  Location: [facility] CATH INVASIVE LOCATION;  Service:    • CARDIAC CATHETERIZATION N/A 10/30/2017    Procedure: Native mammary injection;  Surgeon: Sorin Vasquez MD;  Location: [facility] CATH INVASIVE LOCATION;  Service:    • CARDIAC CATHETERIZATION N/A 7/7/2020    Procedure: Coronary angiography;  Surgeon: Gregor Kim MD;  Location: [facility] CATH INVASIVE LOCATION;  Service: Cardiovascular;  Laterality: N/A;   • CARDIAC CATHETERIZATION N/A 7/7/2020    Procedure: Left heart cath;  Surgeon: Gregor Kim MD;  Location: [facility] CATH INVASIVE LOCATION;  Service: Cardiovascular;  Laterality: N/A;   • CARDIAC CATHETERIZATION N/A 7/7/2020    Procedure: Left ventriculography;  Surgeon: Gregor Kim MD;  Location: [facility] CATH INVASIVE LOCATION;  Service: Cardiovascular;  Laterality: N/A;   • CARDIAC CATHETERIZATION N/A 7/7/2020    Procedure: Stent ESMER bypass graft;  Surgeon: Gregor Kim MD;  Location: [facility] CATH INVASIVE LOCATION;  Service: Cardiovascular;  Laterality: N/A;   • CARDIAC CATHETERIZATION N/A 6/17/2021    Procedure: SAPHENOUS VEIN GRAFT;  Surgeon: Marques Ndiaye MD;  Location: [facility]  CATH INVASIVE LOCATION;  Service: Cardiovascular;  Laterality: N/A;   • CARDIAC CATHETERIZATION N/A 6/17/2021    Procedure: Left Heart Cath;  Surgeon: Marques Ndiaye MD;  Location: [facility] CATH INVASIVE LOCATION;  Service: Cardiovascular;  Laterality: N/A;   • CARDIAC CATHETERIZATION N/A 6/17/2021    Procedure: Coronary angiography;  Surgeon: Marques Ndiaye MD;  Location: [facility] CATH INVASIVE LOCATION;  Service: Cardiovascular;  Laterality: N/A;   • CARDIAC CATHETERIZATION N/A 7/14/2022    Procedure: Left Heart Cath;  Surgeon: Charlie Aj MD;  Location: [facility] CATH INVASIVE LOCATION;  Service: Cardiovascular;  Laterality: N/A;   • CARDIAC CATHETERIZATION N/A 7/14/2022    Procedure: Coronary angiography;  Surgeon: Charile Aj MD;  Location: [facility] CATH INVASIVE LOCATION;  Service: Cardiovascular;  Laterality: N/A;   • CARDIAC CATHETERIZATION  7/14/2022    Procedure: Saphenous Vein Graft;  Surgeon: Charlie Aj MD;  Location: [facility] CATH INVASIVE LOCATION;  Service: Cardiovascular;;   • CARDIAC CATHETERIZATION N/A 7/14/2022    Procedure: Native mammary injection;  Surgeon: Charlie Aj MD;  Location: [facility] CATH INVASIVE LOCATION;  Service: Cardiovascular;  Laterality: N/A;   • COLONOSCOPY N/A 11/26/2022    Procedure: COLONOSCOPY AT BEDSIDE;  Surgeon: Tomy Lopez MD;  Location: [facility] MAIN OR;  Service: Gastroenterology;  Laterality: N/A;   • COLONOSCOPY W/ POLYPECTOMY N/A 10/4/2022    Procedure: COLONOSCOPY to cecum with cold forceps and cold snare polypectomies;  Surgeon: Gregor Carlson MD;  Location: [facility] ENDOSCOPY;  Service: Gastroenterology;  Laterality: N/A;  PRE- hx of polyps  POST- diverticulosis, polyps   • CORONARY ARTERY BYPASS GRAFT  05/2016    LIMA TO LAD, SVG TO PDA, SVG TO OM2   • ENDOSCOPY N/A 7/13/2022    Procedure: ESOPHAGOGASTRODUODENOSCOPY;  Surgeon: Marcia Hollis MD;  Location: [facility] ENDOSCOPY;  Service: Gastroenterology;  Laterality:  N/A;  PRE- ANEMIA, MELENA  POST- MILD EROSIVE GASTRITIS, GE JUNCTION ULCER   • ENDOSCOPY N/A 10/4/2022    Procedure: ESOPHAGOGASTRODUODENOSCOPY with biopsies;  Surgeon: Gregor Carlson MD;  Location: [facility] ENDOSCOPY;  Service: Gastroenterology;  Laterality: N/A;  PRE- hx of esophageal ulcer  POST- gastric cardia mass   • INNER EAR SURGERY     • TONSILLECTOMY           Current Facility-Administered Medications:   •  pantoprazole (PROTONIX) injection 40 mg, 40 mg, Intravenous, BID AC, Yandel Kingston MD  •  polyethylene glycol (GoLYTELY) solution 2,000 mL, 2,000 mL, Oral, Q8H, Yandel Kingston MD  •  sodium chloride 0.9 % flush 10 mL, 10 mL, Intravenous, PRN, Praneeth Ron MD    Current Outpatient Medications:   •  albuterol sulfate  (90 Base) MCG/ACT inhaler, Inhale 2 puffs Every 6 (Six) Hours As Needed for Wheezing., Disp: 1 inhaler, Rfl: 0  •  amLODIPine (NORVASC) 10 MG tablet, Take 10 mg by mouth Daily., Disp: , Rfl:   •  atorvastatin (LIPITOR) 40 MG tablet, Take 1 tablet by mouth Daily., Disp: 90 tablet, Rfl: 2  •  Farxiga 10 MG tablet, , Disp: , Rfl:   •  ferrous sulfate 325 (65 FE) MG tablet, Take 325 mg by mouth Daily With Breakfast., Disp: , Rfl:   •  glucosamine sulfate 500 MG capsule capsule, Take  by mouth Daily., Disp: , Rfl:   •  isosorbide mononitrate (IMDUR) 60 MG 24 hr tablet, Take 1.5 tablets by mouth Daily., Disp: 135 tablet, Rfl: 2  •  lisinopril (PRINIVIL,ZESTRIL) 10 MG tablet, , Disp: , Rfl:   •  metFORMIN ER (GLUCOPHAGE-XR) 500 MG 24 hr tablet, Take 1 tablet by mouth Daily With Dinner., Disp: 30 tablet, Rfl: 5  •  metoprolol succinate XL (TOPROL-XL) 25 MG 24 hr tablet, Take 1 tablet by mouth Daily., Disp: 90 tablet, Rfl: 3  •  pantoprazole (PROTONIX) 40 MG EC tablet, Take 1 tablet by mouth Every Morning., Disp: 30 tablet, Rfl: 5  •  polyethylene glycol (MIRALAX) 17 GM/SCOOP powder, Mix one capful (17 g) in liquid and drink by mouth Daily for 30 days., Disp: 510 g, Rfl: 0  •   "potassium chloride 10 MEQ CR tablet, Take 1 tablet by mouth Daily., Disp: 30 tablet, Rfl: 5  •  sucralfate (CARAFATE) 1 g tablet, Take 1 tablet by mouth 2 (Two) Times a Day Before Meals., Disp: 60 tablet, Rfl: 5    Allergies   Allergen Reactions   • Bystolic [Nebivolol Hcl]        Social History     Socioeconomic History   • Marital status: Single   Tobacco Use   • Smoking status: Never   • Smokeless tobacco: Never   Vaping Use   • Vaping Use: Never used   Substance and Sexual Activity   • Alcohol use: Yes     Comment: 1 drink/monthly   • Drug use: No   • Sexual activity: Defer       Family History   Problem Relation Age of Onset   • Hypertension Mother    • Diabetes Mother    • Heart disease Mother    • Hypertension Father    • Lung cancer Father    • Heart disease Sister    • Stroke Maternal Grandmother    • Heart disease Maternal Grandmother    • Hypertension Maternal Grandfather    • Hypertension Paternal Grandmother    • Hypertension Paternal Grandfather    • Other Other         The patient states that his sister has a problem that goes by the name of \"long QT\".  He says this is a familial trait but he also says that he is \"not interested in pursuing it for himself\".   • Coronary artery disease Other        ROS:   Constitutional: denies any weight changes, reports fatigue.  Eyes: : denies blurred or double vision  Cardiovascular: denies chest pain, palpitations, edemas.  Respiratory: denies cough, sputum, SOB.  Gastrointestinal: denies N&V, abd pain, diarrhea, constipation.  Reports rectal bleed  Genitourinary: denies dysuria, frequency.  Endocrine: denies cold intolerance, lethargy and flushing.  Hem: denies excessive bruising and postop bleeding.  Musculoskeletal: denies weakness, joint swelling, pain or stiffness.  Neuro: denies seizures, CVA, paresthesia, or peripheral neuropathy.   Skin: denies change in nevi, rashes, masses.    Physical Exam:   Vitals:    12/01/22 1452   BP:    Pulse: 90   Resp:    Temp:  "   SpO2: 98%     GENERAL:alert, well appearing, and in no distress and chronically ill appearing  HEENT: normochephalic, atraumatic, no scleral icterus moist mucous membranes.  NECK: Supple there is no thyromegaly or lymphadenopathy  CHEST: clear to auscultation, no wheezes, rales or rhonchi, symmetric air entry  CARDIAC: regular rate and rhythm    ABDOMEN: soft, nontender, nondistended, no masses or organomegaly  EXTREMITIES: no cyanosis, clubbing or edema    NEURO: alert and oriented, normal speech, cranial nerves 2-12 grossly intact, no focal deficits   SKIN: Moist, warm, no rashes.    Diagnostic workup:   Hemoglobin 8.7 from 7.4, white blood cell count 9.2  Lactate 1.7    Assessment and plan:   The patient is a very pleasant 62 y.o. years old male with GI bleed likely from prior polypectomy site at the cecum.  Patient already have colonoscopy while admitted during the weekend.  Patient continued to have bleeding.  I discussed with the patient about further step.  I think that he should be transferred to [facility] so Dr. Blanc can treat for complication after endoscopic mucosal resection.    If he continues to be here then he will need to undergo further colonoscopy.  Surgical resection will be left only for inability to control bleeding by endoscopic or radiology means.  He understood    Case was discussed with patient.    Risk and benefits of the current recommended treatment were explained to the patient that had time to ask questions that where answered, verbalized understanding and agreed with the plan.     Jose Manuel Baca MD  General, Minimally Invasive and Endoscopic Surgery  [facility]    [address], [address]  [place], [place], [address]  P: [phone]  F: [phone]

## 2022-12-01 NOTE — ED PROVIDER NOTES
MD ATTESTATION NOTE    The COLLEEN and I have discussed this patient's history, physical exam, and treatment plan.  I have reviewed the documentation and personally had a face to face interaction with the patient. I affirm the documentation and agree with the treatment and plan.  The attached note describes my personal findings.      I provided a substantive portion of the care of the patient.  I personally performed the physical exam in its entirety, and below are my findings.  For this patient encounter, the patient wore surgical mask, I wore full protective PPE including N95 and eye protection.      Brief HPI: Patient presents for recurrent rectal bleeding.  Patient was just admitted to the hospital and discharged on Wednesday.  Patient states he was doing well and then had 2 significant bloody bowel movements.  Patient states he feels lightheaded with movement.  Patient states he feels like his got a pass out.  Patient having no abdominal pain.    PHYSICAL EXAM  ED Triage Vitals [12/01/22 0532]   Temp Heart Rate Resp BP SpO2   98.9 °F (37.2 °C) 94 22 106/60 98 %      Temp src Heart Rate Source Patient Position BP Location FiO2 (%)   Oral -- -- -- --         GENERAL: no acute distress.  Pale and diaphoretic  HENT: nares patent  EYES: no scleral icterus  CV: regular rhythm, normal rate  RESPIRATORY: normal effort  ABDOMEN: soft  MUSCULOSKELETAL: no deformity  NEURO: alert, moves all extremities, follows commands  PSYCH:  calm, cooperative  SKIN: warm, dry    Vital signs and nursing notes reviewed.        Plan: Patient has been started on Protonix.  Patient has been given emergent transfusion here.  Patient will need to be admitted again.       Praneeth Ron MD  12/01/22 0747

## 2022-12-01 NOTE — ED PROVIDER NOTES
EMERGENCY DEPARTMENT ENCOUNTER    Room Number:  05/05  Date seen:  12/1/2022  Time seen: 06:13 EST  PCP: Nargis Velasquez APRN  Historian: patient      HPI:  Chief Complaint: rectal bleeding    A complete HPI/ROS/PMH/PSH/SH/FH are unobtainable due to: none    Context: Edmond Chase is a 62 y.o. male who presents to the ED for evaluation of For rectal bleeding.  Patient was just discharged from the hospital ICU yesterday for a severe gastrointestinal bleed related to a colonoscopy with polyp removal.  He has continued to hold his antiplatelet as instructed.  He states that he woke in the morning with the urge to have a bowel movement, passed some solid stool that had some blood on it and then a short time later passed a large amount of bright in dark blood.  He denies any abdominal pain.  He does feel lightheaded and generally weak.  His symptoms of feeling ill are constant and moderate to severe and has not noted anything that makes it worse or better in particular.  The rectal bleeding is intermittent.        PAST MEDICAL HISTORY  Active Ambulatory Problems     Diagnosis Date Noted   • Essential hypertension 02/29/2016   • Hyperglycemia 02/29/2016   • Hyperlipidemia 02/29/2016   • Class 3 severe obesity due to excess calories with serious comorbidity and body mass index (BMI) of 40.0 to 44.9 in adult (HCC) 02/29/2016   • Nocturia 02/29/2016   • Nonrheumatic aortic valve insufficiency 03/29/2016   • Myocardial ischemia 03/29/2016   • Coronary artery disease involving native coronary artery of native heart 04/19/2016   • Ascending aortic aneurysm 04/19/2016   • CAD in native artery 05/02/2016   • S/P CABG (coronary artery bypass graft) 05/18/2016   • S/P AVR (aortic valve replacement) 05/18/2016   • Status post ascending aortic aneurysm repair 05/18/2016   • Fatigue 07/28/2017   • Abnormal nuclear stress test 10/23/2017   • Coronary artery disease 10/23/2017   • Coronary artery disease of bypass graft of native  heart with stable angina pectoris ([facility]) 07/01/2020   • Acute chest pain 06/17/2021   • TJ on auto CPAP 10/31/2016   • Hypersomnia due to medical condition 08/21/2021   • Angina at rest ([facility]) 07/10/2022   • Type 2 diabetes mellitus, without long-term current use of insulin ([facility]) 07/11/2022   • Anemia 07/10/2022   • Ulcer of esophagus without bleeding 07/29/2022   • Polyp of colon 08/29/2022   • Rectal bleeding 11/25/2022   • Gastrointestinal hemorrhage 11/25/2022   • Malignant neoplasm of lower third of esophagus ([facility]) 11/29/2022     Resolved Ambulatory Problems     Diagnosis Date Noted   • No Resolved Ambulatory Problems     Past Medical History:   Diagnosis Date   • Aortic valve insufficiency    • CAD (coronary artery disease)    • Chest pain    • Diabetes mellitus ([facility])    • Dizzy    • Dyspnea    • Hypersomnia with sleep apnea    • Lightheadedness    • Malaise and fatigue    • Morbid obesity ([facility])    • Pneumonia    • Scrotal bleeding    • Shortness of breath          PAST SURGICAL HISTORY  Past Surgical History:   Procedure Laterality Date   • AORTIC VALVE REPAIR/REPLACEMENT  05/2016   • ASCENDING ARCH/HEMIARCH REPLACEMENT N/A 5/2/2016    Procedure: BHAVESH STERNOTOMY CORONARY ARTERY BYPASS GRAFT TIMES 3 USING LEFT INTERNAL MAMMARY ARTERY AND RIGHT GREATER SAPHENOUS VEIN GRAFT PER ENDOSCOPIC VEIN HARVESTING, AORTIC ANEURYSM REPAIR WITH ROOT REPAIR AND AORTIC VALVE REPLACEMENT;  Surgeon: Rosalio Cline MD;  Location: [facility];  Service:    • CARDIAC CATHETERIZATION N/A 4/1/2016    Procedure: Left Heart Cath;  Surgeon: Erick Tam MD;  Location: [facility] CATH INVASIVE LOCATION;  Service:    • CARDIAC CATHETERIZATION N/A 4/1/2016    Procedure: Left ventriculography;  Surgeon: Erick Tam MD;  Location: [facility] INVASIVE LOCATION;  Service:    • CARDIAC CATHETERIZATION N/A 4/1/2016    Procedure: Right Heart Cath;  Surgeon: Erick Tam MD;  Location: [facility] CATH INVASIVE LOCATION;  Service:    • CARDIAC  CATHETERIZATION N/A 10/30/2017    Procedure: Coronary angiography;  Surgeon: Sorin Vasquez MD;  Location: [facility] CATH INVASIVE LOCATION;  Service:    • CARDIAC CATHETERIZATION  10/30/2017    Procedure: Saphenous Vein Graft;  Surgeon: Sorin Vasquez MD;  Location: [facility] CATH INVASIVE LOCATION;  Service:    • CARDIAC CATHETERIZATION N/A 10/30/2017    Procedure: Native mammary injection;  Surgeon: Sorin Vasquez MD;  Location: [facility] CATH INVASIVE LOCATION;  Service:    • CARDIAC CATHETERIZATION N/A 7/7/2020    Procedure: Coronary angiography;  Surgeon: Gregor Kim MD;  Location: [facility] CATH INVASIVE LOCATION;  Service: Cardiovascular;  Laterality: N/A;   • CARDIAC CATHETERIZATION N/A 7/7/2020    Procedure: Left heart cath;  Surgeon: Gregor Kim MD;  Location: [facility] CATH INVASIVE LOCATION;  Service: Cardiovascular;  Laterality: N/A;   • CARDIAC CATHETERIZATION N/A 7/7/2020    Procedure: Left ventriculography;  Surgeon: Gregor Kim MD;  Location: [facility] CATH INVASIVE LOCATION;  Service: Cardiovascular;  Laterality: N/A;   • CARDIAC CATHETERIZATION N/A 7/7/2020    Procedure: Stent ESMER bypass graft;  Surgeon: Gregor Kim MD;  Location: [facility] CATH INVASIVE LOCATION;  Service: Cardiovascular;  Laterality: N/A;   • CARDIAC CATHETERIZATION N/A 6/17/2021    Procedure: SAPHENOUS VEIN GRAFT;  Surgeon: Marques Ndiaye MD;  Location: [facility] CATH INVASIVE LOCATION;  Service: Cardiovascular;  Laterality: N/A;   • CARDIAC CATHETERIZATION N/A 6/17/2021    Procedure: Left Heart Cath;  Surgeon: Marques Ndiaye MD;  Location: [facility] CATH INVASIVE LOCATION;  Service: Cardiovascular;  Laterality: N/A;   • CARDIAC CATHETERIZATION N/A 6/17/2021    Procedure: Coronary angiography;  Surgeon: Marques Ndiaye MD;  Location: [facility] CATH INVASIVE LOCATION;  Service: Cardiovascular;  Laterality: N/A;   • CARDIAC CATHETERIZATION N/A 7/14/2022    Procedure: Left Heart Cath;  Surgeon: Madai  Charlie MILLER MD;  Location: [facility] CATH INVASIVE LOCATION;  Service: Cardiovascular;  Laterality: N/A;   • CARDIAC CATHETERIZATION N/A 7/14/2022    Procedure: Coronary angiography;  Surgeon: Charlie Aj MD;  Location: [facility] CATH INVASIVE LOCATION;  Service: Cardiovascular;  Laterality: N/A;   • CARDIAC CATHETERIZATION  7/14/2022    Procedure: Saphenous Vein Graft;  Surgeon: Charlie Aj MD;  Location: [facility] CATH INVASIVE LOCATION;  Service: Cardiovascular;;   • CARDIAC CATHETERIZATION N/A 7/14/2022    Procedure: Native mammary injection;  Surgeon: Charlie Aj MD;  Location: [facility] CATH INVASIVE LOCATION;  Service: Cardiovascular;  Laterality: N/A;   • COLONOSCOPY N/A 11/26/2022    Procedure: COLONOSCOPY AT BEDSIDE;  Surgeon: Tomy Lopze MD;  Location: [facility] MAIN OR;  Service: Gastroenterology;  Laterality: N/A;   • COLONOSCOPY W/ POLYPECTOMY N/A 10/4/2022    Procedure: COLONOSCOPY to cecum with cold forceps and cold snare polypectomies;  Surgeon: Gregor Carlson MD;  Location: [facility] ENDOSCOPY;  Service: Gastroenterology;  Laterality: N/A;  PRE- hx of polyps  POST- diverticulosis, polyps   • CORONARY ARTERY BYPASS GRAFT  05/2016    LIMA TO LAD, SVG TO PDA, SVG TO OM2   • ENDOSCOPY N/A 7/13/2022    Procedure: ESOPHAGOGASTRODUODENOSCOPY;  Surgeon: Marcia Hollis MD;  Location: [facility] ENDOSCOPY;  Service: Gastroenterology;  Laterality: N/A;  PRE- ANEMIA, MELENA  POST- MILD EROSIVE GASTRITIS, GE JUNCTION ULCER   • ENDOSCOPY N/A 10/4/2022    Procedure: ESOPHAGOGASTRODUODENOSCOPY with biopsies;  Surgeon: Gregor Carlson MD;  Location: [facility] ENDOSCOPY;  Service: Gastroenterology;  Laterality: N/A;  PRE- hx of esophageal ulcer  POST- gastric cardia mass   • INNER EAR SURGERY     • TONSILLECTOMY           FAMILY HISTORY  Family History   Problem Relation Age of Onset   • Hypertension Mother    • Diabetes Mother    • Heart disease Mother    • Hypertension Father    •  "Lung cancer Father    • Heart disease Sister    • Stroke Maternal Grandmother    • Heart disease Maternal Grandmother    • Hypertension Maternal Grandfather    • Hypertension Paternal Grandmother    • Hypertension Paternal Grandfather    • Other Other         The patient states that his sister has a problem that goes by the name of \"long QT\".  He says this is a familial trait but he also says that he is \"not interested in pursuing it for himself\".   • Coronary artery disease Other          SOCIAL HISTORY  Social History     Socioeconomic History   • Marital status: Single   Tobacco Use   • Smoking status: Never   • Smokeless tobacco: Never   Vaping Use   • Vaping Use: Never used   Substance and Sexual Activity   • Alcohol use: Yes     Comment: 1 drink/monthly   • Drug use: No   • Sexual activity: Defer         ALLERGIES  Bystolic [nebivolol hcl]        REVIEW OF SYSTEMS  Review of Systems     All systems reviewed and negative except for those discussed in HPI.       PHYSICAL EXAM  ED Triage Vitals [12/01/22 0532]   Temp Heart Rate Resp BP SpO2   98.9 °F (37.2 °C) 94 22 106/60 98 %      Temp src Heart Rate Source Patient Position BP Location FiO2 (%)   Oral -- -- -- --         GENERAL: not distressed, pale  HENT: atraumatic  EYES: no scleral icterus  CV: regular rhythm, regular rate  RESPIRATORY: normal effort CTA B  ABDOMEN: soft, nontender nondistended normal bowel sounds no guarding or rigidity  MUSCULOSKELETAL: no deformity  NEURO: alert, moves all extremities, follows commands  SKIN: warm, dry    Vital signs and nursing notes reviewed.          LAB RESULTS  Recent Results (from the past 24 hour(s))   Comprehensive Metabolic Panel    Collection Time: 12/01/22  5:44 AM    Specimen: Blood   Result Value Ref Range    Glucose 240 (H) 65 - 99 mg/dL    BUN 10 8 - 23 mg/dL    Creatinine 0.77 0.76 - 1.27 mg/dL    Sodium 140 136 - 145 mmol/L    Potassium 3.6 3.5 - 5.2 mmol/L    Chloride 105 98 - 107 mmol/L    CO2 24.5 " 22.0 - 29.0 mmol/L    Calcium 7.7 (L) 8.6 - 10.5 mg/dL    Total Protein 4.8 (L) 6.0 - 8.5 g/dL    Albumin 3.10 (L) 3.50 - 5.20 g/dL    ALT (SGPT) 8 1 - 41 U/L    AST (SGOT) 12 1 - 40 U/L    Alkaline Phosphatase 55 39 - 117 U/L    Total Bilirubin 0.3 0.0 - 1.2 mg/dL    Globulin 1.7 gm/dL    A/G Ratio 1.8 g/dL    BUN/Creatinine Ratio 13.0 7.0 - 25.0    Anion Gap 10.5 5.0 - 15.0 mmol/L    eGFR 100.0 >60.0 mL/min/1.73   Type & Screen    Collection Time: 12/01/22  5:44 AM    Specimen: Blood   Result Value Ref Range    ABO Type O     RH type Positive     Antibody Screen Negative     T&S Expiration Date 12/4/2022 11:59:59 PM    Lactic Acid, Plasma    Collection Time: 12/01/22  5:44 AM    Specimen: Blood   Result Value Ref Range    Lactate 3.7 (C) 0.5 - 2.0 mmol/L   Green Top (Gel)    Collection Time: 12/01/22  5:44 AM   Result Value Ref Range    Extra Tube Hold for add-ons.    Lavender Top    Collection Time: 12/01/22  5:44 AM   Result Value Ref Range    Extra Tube hold for add-on    Gold Top - SST    Collection Time: 12/01/22  5:44 AM   Result Value Ref Range    Extra Tube Hold for add-ons.    Light Blue Top    Collection Time: 12/01/22  5:44 AM   Result Value Ref Range    Extra Tube Hold for add-ons.    CBC Auto Differential    Collection Time: 12/01/22  5:44 AM    Specimen: Blood   Result Value Ref Range    WBC 9.28 3.40 - 10.80 10*3/mm3    RBC 2.58 (L) 4.14 - 5.80 10*6/mm3    Hemoglobin 7.4 (L) 13.0 - 17.7 g/dL    Hematocrit 22.2 (L) 37.5 - 51.0 %    MCV 86.0 79.0 - 97.0 fL    MCH 28.7 26.6 - 33.0 pg    MCHC 33.3 31.5 - 35.7 g/dL    RDW 16.4 (H) 12.3 - 15.4 %    RDW-SD 49.9 37.0 - 54.0 fl    MPV 9.7 6.0 - 12.0 fL    Platelets 191 140 - 450 10*3/mm3    Neutrophil % 64.2 42.7 - 76.0 %    Lymphocyte % 23.6 19.6 - 45.3 %    Monocyte % 9.1 5.0 - 12.0 %    Eosinophil % 2.0 0.3 - 6.2 %    Basophil % 0.3 0.0 - 1.5 %    Immature Grans % 0.8 (H) 0.0 - 0.5 %    Neutrophils, Absolute 5.96 1.70 - 7.00 10*3/mm3    Lymphocytes,  Absolute 2.19 0.70 - 3.10 10*3/mm3    Monocytes, Absolute 0.84 0.10 - 0.90 10*3/mm3    Eosinophils, Absolute 0.19 0.00 - 0.40 10*3/mm3    Basophils, Absolute 0.03 0.00 - 0.20 10*3/mm3    Immature Grans, Absolute 0.07 (H) 0.00 - 0.05 10*3/mm3    nRBC 0.8 (H) 0.0 - 0.2 /100 WBC   Prepare RBC, 2 Units    Collection Time: 12/01/22  9:31 AM   Result Value Ref Range    Product Code F2132O10     Unit Number [id number]     UNIT  ABO O     UNIT  RH POS     Crossmatch Interpretation Compatible     Dispense Status IS     Blood Expiration Date 202212202359     Blood Type Barcode 5100     Product Code J2416S02     Unit Number [id number]     UNIT  ABO O     UNIT  RH POS     Crossmatch Interpretation Compatible     Dispense Status IS     Blood Expiration Date 202212202359     Blood Type Barcode 5100    STAT Lactic Acid, Reflex    Collection Time: 12/01/22 12:52 PM    Specimen: Blood   Result Value Ref Range    Lactate 1.7 0.5 - 2.0 mmol/L   Hemoglobin & Hematocrit, Blood    Collection Time: 12/01/22 12:52 PM    Specimen: Blood   Result Value Ref Range    Hemoglobin 8.7 (L) 13.0 - 17.7 g/dL    Hematocrit 25.6 (L) 37.5 - 51.0 %       Ordered the above labs and independently reviewed the results.      PROCEDURES  Critical Care  Performed by: Perla Resendiz PA  Authorized by: Praneeth Ron MD     Critical care provider statement:     Critical care time (minutes):  32    Critical care was necessary to treat or prevent imminent or life-threatening deterioration of the following conditions:  Circulatory failure and shock    Critical care was time spent personally by me on the following activities:  Development of treatment plan with patient or surrogate, discussions with consultants, evaluation of patient's response to treatment, examination of patient, obtaining history from patient or surrogate, review of old charts, re-evaluation of patient's condition, pulse oximetry, ordering and review of radiographic studies,  ordering and review of laboratory studies and ordering and performing treatments and interventions            MEDICATIONS GIVEN IN ER  Medications   sodium chloride 0.9 % flush 10 mL (has no administration in time range)   pantoprazole (PROTONIX) injection 40 mg (has no administration in time range)   polyethylene glycol (GoLYTELY) solution 2,000 mL (has no administration in time range)   pantoprazole (PROTONIX) injection 80 mg (80 mg Intravenous Given 12/1/22 0648)             PROGRESS AND CONSULTS    DDX includes but not limited to gastritis, bleeding ulcer, bleeding diverticula, bleeding polypectomy site    ED Course as of 12/01/22 1556   u Dec 01, 2022   0616 Medical chart reviewed.  Patient admitted 11/25/2022 until 11/30/2022 when he presented with an acute blood loss anemia related to a GI bleed with hemorrhagic shock.  Underwent endoscopy and is status post clipping of a bleeding polypectomy site.  Antiplatelets were deemed okay to resume 7 days post endoscopic intervention.  Hemoglobin was 8.4 yesterday upon discharge and was as low as 5.6 while admitted. [KA]   0616 My initial evaluation of the patient he has having bleeding and has had a one-point hemoglobin drop since yesterday.  Currently his vitals are normal.  We will continue to monitor closely.  I ordered 2 units PRBCs and have a call out to GI.  I called the blood bank and the patient's blood for transfusion will be ready in 4 minutes. I called his nurse and they will send someone to get it to start it immediately   [KA]   0635 BUN: 10 [KA]   0635 Creatinine: 0.77 [KA]   0635 Glucose(!): 240 [KA]   0637 Lactate(!!): 3.7 [KA]   0725 I discussed the patient with Dr. Mortensen of the ICU.  He is familiar with the patient and formal recommendations of GI aware that the patient have an interventional radiology procedure if he had any recurrent bleeding.  He would like us to discuss with gastroenterology before he agrees to admit to make sure the services  are available.  Repeat page out to the gastroenterology. [KA]   9141 I discussed the patient with Dr. Last, gastroenterologist.  He is agreeable with the plan for admission to the ICU. He is going off call and will notify the oncoming MD. [KA]   0758 Dr. Ron has discussed the patient with Dr. Holloway of radiology.  He is able to perform an interventional procedure for this patient including embolization or angiogram but will need confirmation and noticed from gastroenterology because the interventional room is unavailable and the patient will need to go to the OR for the procedure. [KA]      ED Course User Index  [KA] Perla Resendiz PA             Patient was placed in face mask in first look. Patient was wearing facemask each time I entered the room and throughout our encounter. I wore protective equipment throughout this patient encounter including a face mask, eye shield and gloves. Hand hygiene was performed before donning protective equipment and after removal when leaving the room.        DIAGNOSIS  Final diagnoses:   Gastrointestinal hemorrhage, unspecified gastrointestinal hemorrhage type   Acute blood loss anemia           Latest Documented Vital Signs:  As of 15:56 EST  BP- 153/84 HR- 90 Temp- 97.8 °F (36.6 °C) (Oral) O2 sat- 98%       Perla Resendiz PA  12/01/22 8161

## 2022-12-01 NOTE — ED NOTES
Call return from GI on call, to clarify GoLytely order, verbal order to start GoLytely now c pt to consume half by 1800 and other half by 2200, no other orders at this time

## 2022-12-01 NOTE — ED NOTES
Nursing report ED to floor  Edmond Chase  62 y.o.  male    HPI :   Chief Complaint   Patient presents with    Rectal Bleeding       Admitting doctor:   Markos Mortensen MD    Admitting diagnosis:   The primary encounter diagnosis was Gastrointestinal hemorrhage associated with anorectal source. Diagnoses of Gastrointestinal hemorrhage, unspecified gastrointestinal hemorrhage type and Acute blood loss anemia were also pertinent to this visit.    Code status:   Current Code Status       Date Active Code Status Order ID Comments User Context       Prior            Allergies:   Bystolic [nebivolol hcl]    Isolation:   No active isolations    Intake and Output    Intake/Output Summary (Last 24 hours) at 12/1/2022 1748  Last data filed at 12/1/2022 1452  Gross per 24 hour   Intake 1595 ml   Output 300 ml   Net 1295 ml       Weight:       12/01/22  0546   Weight: (!) 144 kg (317 lb 3.2 oz)       Most recent vitals:   Vitals:    12/01/22 1642 12/01/22 1646 12/01/22 1701 12/01/22 1716   BP:  155/85 156/83 160/94   BP Location:       Patient Position:       Pulse: 73 75 73 76   Resp:       Temp:       TempSrc:       SpO2: 98% 97% 98% 100%   Weight:       Height:           Active LDAs/IV Access:   Lines, Drains & Airways       Active LDAs       Name Placement date Placement time Site Days    Peripheral IV 12/01/22 0542 Right Wrist 12/01/22  0542  Wrist  less than 1    Peripheral IV 12/01/22 0641 Anterior;Left Forearm 12/01/22  0641  Forearm  less than 1    Peripheral IV 12/01/22 0649 Left;Posterior Hand 12/01/22  0649  Hand  less than 1                    Labs (abnormal labs have a star):   Labs Reviewed   COMPREHENSIVE METABOLIC PANEL - Abnormal; Notable for the following components:       Result Value    Glucose 240 (*)     Calcium 7.7 (*)     Total Protein 4.8 (*)     Albumin 3.10 (*)     All other components within normal limits    Narrative:     GFR Normal >60  Chronic Kidney Disease <60  Kidney Failure <15      LACTIC ACID, PLASMA - Abnormal; Notable for the following components:    Lactate 3.7 (*)     All other components within normal limits   CBC WITH AUTO DIFFERENTIAL - Abnormal; Notable for the following components:    RBC 2.58 (*)     Hemoglobin 7.4 (*)     Hematocrit 22.2 (*)     RDW 16.4 (*)     Immature Grans % 0.8 (*)     Immature Grans, Absolute 0.07 (*)     nRBC 0.8 (*)     All other components within normal limits   HEMOGLOBIN AND HEMATOCRIT, BLOOD - Abnormal; Notable for the following components:    Hemoglobin 8.7 (*)     Hematocrit 25.6 (*)     All other components within normal limits   LACTIC ACID, REFLEX - Normal   RAINBOW DRAW    Narrative:     The following orders were created for panel order [place] Draw.  Procedure                               Abnormality         Status                     ---------                               -----------         ------                     Green Top (Gel)[[id number]]                                  Final result               Lavender Top[[id number]]                                     Final result               Gold Top - SST[[id number]]                                   Final result               Light Blue Top[[id number]]                                   Final result                 Please view results for these tests on the individual orders.   HEMOGLOBIN AND HEMATOCRIT, BLOOD   HEMOGLOBIN AND HEMATOCRIT, BLOOD   HEMOGLOBIN AND HEMATOCRIT, BLOOD   TYPE AND SCREEN   PREPARE RBC   CBC AND DIFFERENTIAL    Narrative:     The following orders were created for panel order CBC & Differential.  Procedure                               Abnormality         Status                     ---------                               -----------         ------                     CBC Auto Differential[[id number]]        Abnormal            Final result                 Please view results for these tests on the individual orders.   GREEN TOP   LAVENDER TOP   GOLD TOP - SST   LIGHT BLUE  TOP       EKG:   No orders to display       Meds given in ED:   Medications   sodium chloride 0.9 % flush 10 mL (has no administration in time range)   pantoprazole (PROTONIX) injection 40 mg (has no administration in time range)   polyethylene glycol (GoLYTELY) solution 2,000 mL (2,000 mL Oral Given 12/1/22 7072)   pantoprazole (PROTONIX) injection 80 mg (80 mg Intravenous Given 12/1/22 0648)       Imaging results:  No radiology results for the last day    Ambulatory status:   - UP C ASSIST    Social issues:   Social History     Socioeconomic History    Marital status: Single   Tobacco Use    Smoking status: Never    Smokeless tobacco: Never   Vaping Use    Vaping Use: Never used   Substance and Sexual Activity    Alcohol use: Yes     Comment: 1 drink/monthly    Drug use: No    Sexual activity: Defer       NIH Stroke Scale:         Marisol Gunn RN  12/01/22 17:48 EST

## 2022-12-01 NOTE — PROGRESS NOTES
"Nutrition Services    Patient Name:  Edmond Chase  YOB: 1960  MRN: [id number]  Admit Date:  12/1/2022      Comment:     62yoM discharged from [facility] yesterday for acute blood loss anemia due to GI bleed with hemorrhagic shock and esophageal cancer (diagnosed 11/25/22) presents back to ED today for severe hematochezia. Pt s/p polypectomy during last admission.    Nutrition assessment due to newly diagnosed esophageal cancer and suspected sub-optimal PO intake. RD spoke with pt at bedside who endorsed intentional gradual weight loss past year. Also endorsed reflux and decreased PO intake past month. Pt requested information for nutrition with esophageal cancer and reported plan to start keto diet post-discharge. RD encouraged pt to eat soft foods with low acidity once medically appropriate and incorporate general healthy nutrition into diet. Pt reported tentative plan for esophageal tumor resection and feeding tube placement once pt recovered from current acute illness and outpatient testing complete. RD will continue to follow course.     NUTRITION SCREENING      Reason for Encounter Predicted suboptimal intake, new esophageal cancer diagnosis   Diagnosis/Problem Gastrointestinal hemorrhage, unspecified gastrointestinal hemorrhage type       PO Diet Diet: Liquid Diets; Clear Liquid; Texture: Regular Texture (IDDSI 7); Fluid Consistency: Thin (IDDSI 0)   PO Supplements/Snacks    PO Intake %        Labs  Listed below, reviewed   Physical Findings Obese, pale, abdominal pain, SOB, room air   GI Function Hematochezia, recurrent; abdominal pain   Skin Status Pale, clammy, diaphoretic       Height:  Weight:  BMI:    Weight Trend Height: 185.4 cm (73\")  Weight: (!) 144 kg (317 lb 3.2 oz) (12/01/22 0546)  Body mass index is 41.85 kg/m².    Loss, Unknown       Intervention/Plan RD will continue to follow course.          Results from last 7 days   Lab Units 12/01/22  0544 11/30/22  0459 11/29/22  0421 " 11/26/22  1336 11/26/22  0504 11/25/22  0716   SODIUM mmol/L 140 140 144   < > 145 144   POTASSIUM mmol/L 3.6 3.7 4.0   < > 4.0 4.1   CHLORIDE mmol/L 105 105 110*   < > 117* 108*   CO2 mmol/L 24.5 28.4 28.7   < > 21.4* 25.0   BUN mg/dL 10 8 6*   < > 24* 18   CREATININE mg/dL 0.77 0.66* 0.65*   < > 1.20 1.00   CALCIUM mg/dL 7.7* 8.1* 8.0*   < > 6.3* 8.9   BILIRUBIN mg/dL 0.3  --   --   --  0.4 0.4   ALK PHOS U/L 55  --   --   --  48 77   ALT (SGPT) U/L 8  --   --   --  8 12   AST (SGOT) U/L 12  --   --   --  11 11   GLUCOSE mg/dL 240* 122* 118*   < > 280* 207*    < > = values in this interval not displayed.     Results from last 7 days   Lab Units 12/01/22  1252 11/28/22  1352 11/28/22  0437 11/26/22  1032 11/26/22  0504   MAGNESIUM mg/dL  --   --  1.7  --  1.5*   PHOSPHORUS mg/dL  --   --   --   --  5.5*   HEMOGLOBIN g/dL 8.7*   < > 7.4*   < > 7.3*   HEMATOCRIT % 25.6*   < > 22.3*   < > 21.8*  22.1*    < > = values in this interval not displayed.     COVID19   Date Value Ref Range Status   07/10/2022 Not Detected Not Detected - Ref. Range Final     Lab Results   Component Value Date    HGBA1C 7.00 (H) 07/10/2022       RD to follow up per protocol.    Electronically signed by:  Anamika Katz RD  12/01/22 14:46 EST

## 2022-12-01 NOTE — H&P
[facility]    CC: gi bleed    HPI:  Mr. Chase is a 61yo male who underwent polypectomy with Dr. Jimenes and subsequently developed GI bleed at site of polypectomy he was admitted here 11/25/-11/30.  He had very severe life threatening bleed. GI was able to clip the area and he had no further bleeding.  He was seen by cardiology and cleared to stay off plavix indefinately.  He returned home and was doing well until this morning  He had a single large bloody bowel movement, no pain.  He became lightheaded, weak and tremulous.  He came to the ER here and has not had further bleeding since. He has undergone transfusion.  Transfer to [facility] recommended by surgery/GI services here for continuity of care.    Past Medical History:   Diagnosis Date   • Abnormal nuclear stress test    • Aortic valve insufficiency     nonrheumtic    • Ascending aortic aneurysm    • CAD (coronary artery disease)    • Chest pain    • Diabetes mellitus (HCC)    • Dizzy     CAREFUL WHEN GETTING UP   • Dyspnea    • Essential hypertension    • Fatigue    • Hyperglycemia    • Hyperlipidemia    • Hypersomnia with sleep apnea    • Lightheadedness    • Malaise and fatigue    • Morbid obesity (HCC)    • Myocardial ischemia    • Nocturia    • TJ on auto CPAP 10/31/2016    Overnight polysomnogram.  Weight 276 pounds.  Severe TJ with AHI 79 events per hour.  Auto CPAP recommended.   • Pneumonia     FEB 2016   • Scrotal bleeding    • Shortness of breath      Social History     Socioeconomic History   • Marital status: Single   Tobacco Use   • Smoking status: Never   • Smokeless tobacco: Never   Vaping Use   • Vaping Use: Never used   Substance and Sexual Activity   • Alcohol use: Yes     Comment: 1 drink/monthly   • Drug use: No   • Sexual activity: Defer     Family History   Problem Relation Age of Onset   • Hypertension Mother    • Diabetes Mother    • Heart disease Mother    • Hypertension Father    • Lung cancer Father    • Heart  "disease Sister    • Stroke Maternal Grandmother    • Heart disease Maternal Grandmother    • Hypertension Maternal Grandfather    • Hypertension Paternal Grandmother    • Hypertension Paternal Grandfather    • Other Other         The patient states that his sister has a problem that goes by the name of \"long QT\".  He says this is a familial trait but he also says that he is \"not interested in pursuing it for himself\".   • Coronary artery disease Other      MEDS: reviewed as per chart  ALL: bystolic  ROS: 12 point negative except as in HPI    Vital Sign Min/Max for last 24 hours  Temp  Min: 97.8 °F (36.6 °C)  Max: 98.9 °F (37.2 °C)   BP  Min: 106/60  Max: 160/94   Pulse  Min: 69  Max: 94   Resp  Min: 18  Max: 22   SpO2  Min: 94 %  Max: 100 %   No data recorded   Weight  Min: 144 kg (317 lb 3.2 oz)  Max: 144 kg (317 lb 3.2 oz)     GEN:  NAD, appears ill, obese, A&O x3  HEENT: PERRL, EOMI, no icterus, mmm, no JVD, trachea midline, neck supple  CHEST: CTA bilat, no wheezes, no crackles, no use of accessory muscles  CV: RRR, no m/g/r  ABD: soft, nt, nd +bs, no hepatosplenomegaly  EXT: no c/c/e  SKIN: no rashes, no xanthomas, nl turgor  LYMPH: no palpable cervical or supraclavicular lymphadenopathy  NEURO: CN 2-12 intact and symmetric bilaterally  PSYCH: nl affect, nl orientation, nl judgment, nl mood  MsK: no kyphoscoliosis, 5/5 strength ue and le bilaterlaly    Labs: 12/1: reviewed:  Lactate 1.7  hgb 8.7  Glucose 240  Bun 10  Cr 0.77  Na 140  Bicarb 24    A/P:  1. Acute blood loss anemia due to GI bleed with resulting hemorrhagic shock -- better now s/p endoscopic clipping --now with rebleed again  2. Recent colonoscopy with polypectomy  3. CAD with stent -- holding antiplatlet agents -cards says ok to hold indefinately at this point  4. Bioprosthetic aortic valve  5. TJ on cpap  6. obeisty  7. DMII --  8. HLD  9. HTN --  10. Recent disagnosis of esophageal cancer and grijalva's esophagus --continue ppi--follow up with " Dr. Stockton  11. Hypernatremia -- better    Gi planning rescope, IR aware of Patient should  He have bleeding needing intervention    I contacted  and he has been accepted there pending bed availablility

## 2022-12-01 NOTE — DISCHARGE SUMMARY
[facility]    Admit date: 12/1/2022  Discharge date: 12/2/2022    Admission/discharge diagnosis:  1. Acute blood loss anemia due to GI bleed with resulting hemorrhagic shock -- better now s/p endoscopic clipping (sigmoid colon) --now with rebleed again  2. Recent colonoscopy with polypectomy --sigmoid  3. CAD with stent -- holding antiplatlet agents -cards says ok to hold indefinately at this point  4. Bioprosthetic aortic valve  5. TJ on cpap  6. obeisty  7. DMII --  8. HLD  9. HTN --  10. Recent disagnosis of esophageal cancer and grijalva's esophagus --continue ppi--follow up with Dr. Stockton  11. Hypernatremia -- better    HPI:  Mr. Chase is a 63yo male who underwent polypectomy with Dr. Jimenes and subsequently developed GI bleed at site of polypectomy he was admitted here 11/25/-11/30.  He had very severe life threatening bleed. GI was able to clip the area and he had no further bleeding.  He was seen by cardiology and cleared to stay off plavix indefinately.  He returned home and was doing well until this morning  He had a single large bloody bowel movement, no pain.  He became lightheaded, weak and tremulous.  He came to the ER here and has not had further bleeding since. He has undergone transfusion.  Transfer to /[facility] recommended by surgery/GI services here for continuity of care.    Hospital course:  Admitted transfusion done, orders for bowel prep. He was kept overnight awaiting bed at [facility]. A bowel prep was completed overnight    Greater than 30 mins spent in discharging patient    Dispo: [facility]/ hospital    Activity: pre admission    Diet; NPO pending scope           Discharge Medications      Continue These Medications      Instructions Start Date   pantoprazole 40 MG EC tablet  Commonly known as: PROTONIX   40 mg, Oral, Every Early Morning      polyethylene glycol 17 GM/SCOOP powder  Commonly known as: MIRALAX   Mix one capful (17 g) in liquid and drink by mouth Daily for 30  days.      sucralfate 1 g tablet  Commonly known as: CARAFATE   1 g, Oral, 2 Times Daily Before Meals         Stop These Medications    albuterol sulfate  (90 Base) MCG/ACT inhaler  Commonly known as: PROVENTIL HFA;VENTOLIN HFA;PROAIR HFA     amLODIPine 10 MG tablet  Commonly known as: NORVASC     atorvastatin 40 MG tablet  Commonly known as: LIPITOR     Farxiga 10 MG tablet  Generic drug: dapagliflozin Propanediol     ferrous sulfate 325 (65 FE) MG tablet     glucosamine sulfate 500 MG capsule capsule     isosorbide mononitrate 60 MG 24 hr tablet  Commonly known as: IMDUR     lisinopril 10 MG tablet  Commonly known as: PRINIVIL,ZESTRIL     metFORMIN  MG 24 hr tablet  Commonly known as: GLUCOPHAGE-XR     metoprolol succinate XL 25 MG 24 hr tablet  Commonly known as: TOPROL-XL     potassium chloride 10 MEQ CR tablet

## 2022-12-01 NOTE — CONSULTS
Chief Complaint   Patient presents with   • Rectal Bleeding       Edmond Chase is a 62 y.o. male who presents with hematochezia    HPI  Mr. Chase is a 62 yoM w h/o HTN, HLD, CAD previously on Plavix but held recently due to bleeding, bioprosthetic aortic valve placement who presents with recurrent hematochezia.    He had EGD and colonoscopy here in early October with Dr. Carlson that showed 22 mm polyp in the cecum with pseudodepression biopsied, 5 mm polyp removed from transverse colon, 4 mm polyp removed from sigmoid colon and scattered diverticula in sigmoid and descending colon, 3 hyperplastic polyps removed from rectosigmoid and non-bleeding internal hemorrhoids (path from cecal polyp biopsy SSA, transverse TA, and hyperplastic polyps in rectosigmoid) . EGD showed 1.2 cm nodule at the cardia (path Last's esophagus with high grade dysplasia and foci of superficial invasion).  He was then referred to [facility] and had EMR of colon polyp and of gastric cardia nodule with Dr. Metzger in mid November with findings of pT1b adenocarcinoma of esophagus.  He then presented here on 11/25 with significant hematochezia and anemia.  He had CTA 11/26 that showed abnormal contrast adjacent to endoscopy clip within cecum suspicious for active extrav vs pseudoaneurysm. EGD and colonoscopy repeated on 11/26 that showed 3-4 clips at GE junction with no evidence of bleeding and on colonoscopy he was noted to have normal TI although some blood there, ulcer in the cecum with a few clips and blood clot and 6 additional clips were placed, blood noted throughout the colon.  Attempted to transfer patient to [facility] because no IR on call here last weekend, however he was unable to be transferred but stabilized.  He was monitored for the past 3 days in the hospital with reported non-bloody BM and stable Hb.  Cardiology had recommended that he likely could hold Plavix indefinitely.  Hb 7.4 on arrival was 8.4 yesterday and the 2  days preceding following blood transfusion.   He reports he went home and then woke up early this AM with darker stool followed by bright red with associated lightheadedness.  Past Medical History:   Diagnosis Date   • Abnormal nuclear stress test    • Aortic valve insufficiency     nonrheumtic    • Ascending aortic aneurysm    • CAD (coronary artery disease)    • Chest pain    • Diabetes mellitus (HCC)    • Dizzy     CAREFUL WHEN GETTING UP   • Dyspnea    • Essential hypertension    • Fatigue    • Hyperglycemia    • Hyperlipidemia    • Hypersomnia with sleep apnea    • Lightheadedness    • Malaise and fatigue    • Morbid obesity (HCC)    • Myocardial ischemia    • Nocturia    • TJ on auto CPAP 10/31/2016    Overnight polysomnogram.  Weight 276 pounds.  Severe TJ with AHI 79 events per hour.  Auto CPAP recommended.   • Pneumonia     FEB 2016   • Scrotal bleeding    • Shortness of breath        Past Surgical History:   Procedure Laterality Date   • AORTIC VALVE REPAIR/REPLACEMENT  05/2016   • ASCENDING ARCH/HEMIARCH REPLACEMENT N/A 5/2/2016    Procedure: BHAVESH STERNOTOMY CORONARY ARTERY BYPASS GRAFT TIMES 3 USING LEFT INTERNAL MAMMARY ARTERY AND RIGHT GREATER SAPHENOUS VEIN GRAFT PER ENDOSCOPIC VEIN HARVESTING, AORTIC ANEURYSM REPAIR WITH ROOT REPAIR AND AORTIC VALVE REPLACEMENT;  Surgeon: Rosalio Cline MD;  Location: [facility];  Service:    • CARDIAC CATHETERIZATION N/A 4/1/2016    Procedure: Left Heart Cath;  Surgeon: Erick Tam MD;  Location: [facility] INVASIVE LOCATION;  Service:    • CARDIAC CATHETERIZATION N/A 4/1/2016    Procedure: Left ventriculography;  Surgeon: Erick Tam MD;  Location: [facility] INVASIVE LOCATION;  Service:    • CARDIAC CATHETERIZATION N/A 4/1/2016    Procedure: Right Heart Cath;  Surgeon: Erick Tam MD;  Location: [facility] INVASIVE LOCATION;  Service:    • CARDIAC CATHETERIZATION N/A 10/30/2017    Procedure: Coronary angiography;  Surgeon: Sorin Vasquez MD;  Location:  [facility] CATH INVASIVE LOCATION;  Service:    • CARDIAC CATHETERIZATION  10/30/2017    Procedure: Saphenous Vein Graft;  Surgeon: Sorin Vasquez MD;  Location: [facility] CATH INVASIVE LOCATION;  Service:    • CARDIAC CATHETERIZATION N/A 10/30/2017    Procedure: Native mammary injection;  Surgeon: Sorin Vasquez MD;  Location: [facility] CATH INVASIVE LOC[facility];  Service:    • CARDIAC CATHETERIZATION N/A 7/7/2020    Procedure: Coronary angiography;  Surgeon: Gregor Kim MD;  Location: [facility] CATH INVASIVE LOC[facility];  Service: Cardiovascular;  Laterality: N/A;   • CARDIAC CATHETERIZATION N/A 7/7/2020    Procedure: Left heart cath;  Surgeon: Gregor Kim MD;  Location: [facility] CATH INVASIVE LOCATION;  Service: Cardiovascular;  Laterality: N/A;   • CARDIAC CATHETERIZATION N/A 7/7/2020    Procedure: Left ventriculography;  Surgeon: Gregor Kim MD;  Location: [facility] CATH INVASIVE LOCATION;  Service: Cardiovascular;  Laterality: N/A;   • CARDIAC CATHETERIZATION N/A 7/7/2020    Procedure: Stent ESMER bypass graft;  Surgeon: Gregor Kim MD;  Location: [facility] CATH INVASIVE LOCATION;  Service: Cardiovascular;  Laterality: N/A;   • CARDIAC CATHETERIZATION N/A 6/17/2021    Procedure: SAPHENOUS VEIN GRAFT;  Surgeon: Marques Ndiaye MD;  Location: [facility] CATH INVASIVE LOCATION;  Service: Cardiovascular;  Laterality: N/A;   • CARDIAC CATHETERIZATION N/A 6/17/2021    Procedure: Left Heart Cath;  Surgeon: Marques Ndiaye MD;  Location: [facility] CATH INVASIVE LOCATION;  Service: Cardiovascular;  Laterality: N/A;   • CARDIAC CATHETERIZATION N/A 6/17/2021    Procedure: Coronary angiography;  Surgeon: Marques Ndiaye MD;  Location: [facility] CATH INVASIVE LOCATION;  Service: Cardiovascular;  Laterality: N/A;   • CARDIAC CATHETERIZATION N/A 7/14/2022    Procedure: Left Heart Cath;  Surgeon: Charlie Aj MD;  Location: [facility] CATH INVASIVE LOCATION;  Service: Cardiovascular;  Laterality: N/A;   • CARDIAC  CATHETERIZATION N/A 7/14/2022    Procedure: Coronary angiography;  Surgeon: Charlie Aj MD;  Location: [facility] CATH INVASIVE LOCATION;  Service: Cardiovascular;  Laterality: N/A;   • CARDIAC CATHETERIZATION  7/14/2022    Procedure: Saphenous Vein Graft;  Surgeon: Charlie Aj MD;  Location: [facility] CATH INVASIVE LOCATION;  Service: Cardiovascular;;   • CARDIAC CATHETERIZATION N/A 7/14/2022    Procedure: Native mammary injection;  Surgeon: Charlie Aj MD;  Location: [facility] CATH INVASIVE LOCATION;  Service: Cardiovascular;  Laterality: N/A;   • COLONOSCOPY N/A 11/26/2022    Procedure: COLONOSCOPY AT BEDSIDE;  Surgeon: Tomy Lopez MD;  Location: [facility] MAIN OR;  Service: Gastroenterology;  Laterality: N/A;   • COLONOSCOPY W/ POLYPECTOMY N/A 10/4/2022    Procedure: COLONOSCOPY to cecum with cold forceps and cold snare polypectomies;  Surgeon: Gregor Carlson MD;  Location: [facility] ENDOSCOPY;  Service: Gastroenterology;  Laterality: N/A;  PRE- hx of polyps  POST- diverticulosis, polyps   • CORONARY ARTERY BYPASS GRAFT  05/2016    LIMA TO LAD, SVG TO PDA, SVG TO OM2   • ENDOSCOPY N/A 7/13/2022    Procedure: ESOPHAGOGASTRODUODENOSCOPY;  Surgeon: Marcia Hollis MD;  Location: [facility] ENDOSCOPY;  Service: Gastroenterology;  Laterality: N/A;  PRE- ANEMIA, MELENA  POST- MILD EROSIVE GASTRITIS, GE JUNCTION ULCER   • ENDOSCOPY N/A 10/4/2022    Procedure: ESOPHAGOGASTRODUODENOSCOPY with biopsies;  Surgeon: Gregor Carlson MD;  Location: [facility] ENDOSCOPY;  Service: Gastroenterology;  Laterality: N/A;  PRE- hx of esophageal ulcer  POST- gastric cardia mass   • INNER EAR SURGERY     • TONSILLECTOMY           Current Facility-Administered Medications:   •  [COMPLETED] pantoprazole (PROTONIX) injection 80 mg, 80 mg, Intravenous, Once, 80 mg at 12/01/22 0648 **AND** pantoprazole (PROTONIX) 40 mg in 100 mL NS (VTB), 8 mg/hr, Intravenous, Continuous, Perla Resendiz PA, Last Rate: 20 mL/hr  at 12/01/22 0648, 8 mg/hr at 12/01/22 0648  •  sodium chloride 0.9 % flush 10 mL, 10 mL, Intravenous, PRN, Praneeth Ron MD    Current Outpatient Medications:   •  albuterol sulfate  (90 Base) MCG/ACT inhaler, Inhale 2 puffs Every 6 (Six) Hours As Needed for Wheezing., Disp: 1 inhaler, Rfl: 0  •  amLODIPine (NORVASC) 10 MG tablet, Take 10 mg by mouth Daily., Disp: , Rfl:   •  atorvastatin (LIPITOR) 40 MG tablet, Take 1 tablet by mouth Daily., Disp: 90 tablet, Rfl: 2  •  Farxiga 10 MG tablet, , Disp: , Rfl:   •  ferrous sulfate 325 (65 FE) MG tablet, Take 325 mg by mouth Daily With Breakfast., Disp: , Rfl:   •  glucosamine sulfate 500 MG capsule capsule, Take  by mouth Daily., Disp: , Rfl:   •  isosorbide mononitrate (IMDUR) 60 MG 24 hr tablet, Take 1.5 tablets by mouth Daily., Disp: 135 tablet, Rfl: 2  •  lisinopril (PRINIVIL,ZESTRIL) 10 MG tablet, , Disp: , Rfl:   •  metFORMIN ER (GLUCOPHAGE-XR) 500 MG 24 hr tablet, Take 1 tablet by mouth Daily With Dinner., Disp: 30 tablet, Rfl: 5  •  metoprolol succinate XL (TOPROL-XL) 25 MG 24 hr tablet, Take 1 tablet by mouth Daily., Disp: 90 tablet, Rfl: 3  •  pantoprazole (PROTONIX) 40 MG EC tablet, Take 1 tablet by mouth Every Morning., Disp: 30 tablet, Rfl: 5  •  polyethylene glycol (MIRALAX) 17 GM/SCOOP powder, Mix one capful (17 g) in liquid and drink by mouth Daily for 30 days., Disp: 510 g, Rfl: 0  •  potassium chloride 10 MEQ CR tablet, Take 1 tablet by mouth Daily., Disp: 30 tablet, Rfl: 5  •  sucralfate (CARAFATE) 1 g tablet, Take 1 tablet by mouth 2 (Two) Times a Day Before Meals., Disp: 60 tablet, Rfl: 5    Allergies   Allergen Reactions   • Bystolic [Nebivolol Hcl]        Social History     Socioeconomic History   • Marital status: Single   Tobacco Use   • Smoking status: Never   • Smokeless tobacco: Never   Vaping Use   • Vaping Use: Never used   Substance and Sexual Activity   • Alcohol use: Yes     Comment: 1 drink/monthly   • Drug use: No   •  "Sexual activity: Defer       Family History   Problem Relation Age of Onset   • Hypertension Mother    • Diabetes Mother    • Heart disease Mother    • Hypertension Father    • Lung cancer Father    • Heart disease Sister    • Stroke Maternal Grandmother    • Heart disease Maternal Grandmother    • Hypertension Maternal Grandfather    • Hypertension Paternal Grandmother    • Hypertension Paternal Grandfather    • Other Other         The patient states that his sister has a problem that goes by the name of \"long QT\".  He says this is a familial trait but he also says that he is \"not interested in pursuing it for himself\".   • Coronary artery disease Other        Review of Systems   Constitutional: Negative for chills and fever.   Respiratory: Negative for cough and shortness of breath.    Gastrointestinal: Positive for blood in stool. Negative for abdominal distention, abdominal pain, nausea and vomiting.   Skin: Negative for color change and rash.   Neurological: Positive for light-headedness.   All other systems reviewed and are negative.      Vitals:    12/01/22 0801   BP: 138/74   Pulse: 78   Resp:    Temp:    SpO2: 99%       Physical Exam    CT Angiogram Abdomen Pelvis    Result Date: 11/26/2022   1. The patient has an area of abnormal contrast adjacent to an endoscopy clip within the cecum, which is suspicious for active extravasation versus pseudoaneurysm.  FINDINGS were relayed to the patient's nurseTucker, at 12:00 AM  Radiation dose reduction techniques were utilized, including automated exposure control and exposure modulation based on body size.  This report was finalized on 11/26/2022 12:01 AM by Dr. Ani Eubanks M.D.      XR Chest Post CVA Port    Result Date: 11/25/2022   As described.  This report was finalized on 11/25/2022 3:23 PM by Dr. Robert Neumann M.D.       CTA 11/26 that showed abnormal contrast adjacent to endoscopy clip within cecum suspicious for active extrav vs pseudoaneurysm. " EGD and colonoscopy repeated on 11/26 that showed 3-4 clips at GE junction with no evidence of bleeding and on colonoscopy he was noted to have normal TI although some blood there, ulcer in the cecum with a few clips and blood clot and 6 additional clips were placed, blood noted throughout the colon.        Assessment:      1. Hematochezia, Severe,, recurrent following EMR of cecal polyp at [facility]     2.  Acute blood loss anemia     3.  Large cecal polyp status post polypectomy     4.  Coronary artery disease: previously on Plavix but none since 11/24 and this is likely to be discontinued indefinitely per Cardiology     5.  Esophageal cancer              Plan:   - Hemodynamically stable currently  - Monitor H/H, transfuse for Hb<7  - CLD today  - Bowel prep this evening with plan for repeat colonoscopy tomorrow  - If he were to decline clinically, then he will need IR.  I have discussed case with IR on call this AM.   - I also discussed with patient the above plan and also the possibility that endoscopic hemostasis may not be possible necessitating IR anyway.  He is understanding and all questions were answered at this time    Addendum:  Spoke with Dr. Baca and Dr. Mortensen.  I agree that patient would likely benefit from transfer to [facility] for evaluation with his GI physician that performed the procedure initially.  I spoke with patient about this possibility and he is understanding and agreeable.  He has been accepted to [facility] pending bed availability.  Will continue with bowel prep this evening and plan tentatively for repeat colonoscopy tomorrow, however if he is able to be transferred to [facility] and is stable would hold off for evaluation and treatment there.     Yandel Kingston MD

## 2022-12-01 NOTE — ED NOTES
Pt to ED via ambulance from home. Pt with dark red blood in stool that started today. Pt with nausea and vomiting at triage.pt pale.    Pt was recently d/c yesterday from this hospital for same symptoms.    Pt dx with esophageal CA, not currently on treatment

## 2022-12-01 NOTE — OUTREACH NOTE
Call Center TCM Note    Flowsheet Row Responses   [facility] [place], operated by [facility] patient discharged from? [place]   Does the patient have one of the following disease processes/diagnoses(primary or secondary)? Other   TCM attempt successful? No   Unsuccessful attempts Attempt 1   Change in Health Status Readmitted          Sariah Sarabia RN    12/1/2022, 12:44 EST

## 2022-12-01 NOTE — CASE MANAGEMENT/SOCIAL WORK
Dr. Mortensen requesting transfer to [facility]. Contacted  Transfer Center and requested they contact Dr. Mortensen. KECIA CohnN RN

## 2022-12-02 ENCOUNTER — TELEPHONE (OUTPATIENT)
Dept: SURGERY | Facility: CLINIC | Age: 62
End: 2022-12-02

## 2022-12-02 VITALS
TEMPERATURE: 97.8 F | WEIGHT: 314.38 LBS | HEART RATE: 65 BPM | HEIGHT: 73 IN | DIASTOLIC BLOOD PRESSURE: 100 MMHG | SYSTOLIC BLOOD PRESSURE: 171 MMHG | BODY MASS INDEX: 41.67 KG/M2 | OXYGEN SATURATION: 92 % | RESPIRATION RATE: 18 BRPM

## 2022-12-02 LAB
ANION GAP SERPL CALCULATED.3IONS-SCNC: 7.9 MMOL/L (ref 5–15)
BASOPHILS # BLD AUTO: 0.02 10*3/MM3 (ref 0–0.2)
BASOPHILS NFR BLD AUTO: 0.3 % (ref 0–1.5)
BH BB BLOOD EXPIRATION DATE: NORMAL
BH BB BLOOD EXPIRATION DATE: NORMAL
BH BB BLOOD TYPE BARCODE: 5100
BH BB BLOOD TYPE BARCODE: 5100
BH BB DISPENSE STATUS: NORMAL
BH BB DISPENSE STATUS: NORMAL
BH BB PRODUCT CODE: NORMAL
BH BB PRODUCT CODE: NORMAL
BH BB UNIT NUMBER: NORMAL
BH BB UNIT NUMBER: NORMAL
BUN SERPL-MCNC: 12 MG/DL (ref 8–23)
BUN/CREAT SERPL: 17.1 (ref 7–25)
CALCIUM SPEC-SCNC: 8 MG/DL (ref 8.6–10.5)
CHLORIDE SERPL-SCNC: 106 MMOL/L (ref 98–107)
CO2 SERPL-SCNC: 30.1 MMOL/L (ref 22–29)
CREAT SERPL-MCNC: 0.7 MG/DL (ref 0.76–1.27)
CROSSMATCH INTERPRETATION: NORMAL
CROSSMATCH INTERPRETATION: NORMAL
DEPRECATED RDW RBC AUTO: 51.2 FL (ref 37–54)
EGFRCR SERPLBLD CKD-EPI 2021: 102.9 ML/MIN/1.73
EOSINOPHIL # BLD AUTO: 0.11 10*3/MM3 (ref 0–0.4)
EOSINOPHIL NFR BLD AUTO: 1.8 % (ref 0.3–6.2)
ERYTHROCYTE [DISTWIDTH] IN BLOOD BY AUTOMATED COUNT: 16 % (ref 12.3–15.4)
GLUCOSE BLDC GLUCOMTR-MCNC: 101 MG/DL (ref 70–130)
GLUCOSE BLDC GLUCOMTR-MCNC: 156 MG/DL (ref 70–130)
GLUCOSE SERPL-MCNC: 114 MG/DL (ref 65–99)
HCT VFR BLD AUTO: 23.9 % (ref 37.5–51)
HCT VFR BLD AUTO: 23.9 % (ref 37.5–51)
HGB BLD-MCNC: 7.5 G/DL (ref 13–17.7)
HGB BLD-MCNC: 7.5 G/DL (ref 13–17.7)
IMM GRANULOCYTES # BLD AUTO: 0.04 10*3/MM3 (ref 0–0.05)
IMM GRANULOCYTES NFR BLD AUTO: 0.7 % (ref 0–0.5)
LYMPHOCYTES # BLD AUTO: 1.28 10*3/MM3 (ref 0.7–3.1)
LYMPHOCYTES NFR BLD AUTO: 20.8 % (ref 19.6–45.3)
MCH RBC QN AUTO: 27.9 PG (ref 26.6–33)
MCHC RBC AUTO-ENTMCNC: 31.4 G/DL (ref 31.5–35.7)
MCV RBC AUTO: 88.8 FL (ref 79–97)
MONOCYTES # BLD AUTO: 0.45 10*3/MM3 (ref 0.1–0.9)
MONOCYTES NFR BLD AUTO: 7.3 % (ref 5–12)
NEUTROPHILS NFR BLD AUTO: 4.24 10*3/MM3 (ref 1.7–7)
NEUTROPHILS NFR BLD AUTO: 69.1 % (ref 42.7–76)
NRBC BLD AUTO-RTO: 0.3 /100 WBC (ref 0–0.2)
PLATELET # BLD AUTO: 175 10*3/MM3 (ref 140–450)
PMV BLD AUTO: 10.2 FL (ref 6–12)
POTASSIUM SERPL-SCNC: 3.6 MMOL/L (ref 3.5–5.2)
RBC # BLD AUTO: 2.69 10*6/MM3 (ref 4.14–5.8)
SODIUM SERPL-SCNC: 144 MMOL/L (ref 136–145)
UNIT  ABO: NORMAL
UNIT  ABO: NORMAL
UNIT  RH: NORMAL
UNIT  RH: NORMAL
WBC NRBC COR # BLD: 6.14 10*3/MM3 (ref 3.4–10.8)

## 2022-12-02 PROCEDURE — 99214 OFFICE O/P EST MOD 30 MIN: CPT | Performed by: INTERNAL MEDICINE

## 2022-12-02 PROCEDURE — 82962 GLUCOSE BLOOD TEST: CPT

## 2022-12-02 PROCEDURE — 25010000002 LORAZEPAM PER 2 MG: Performed by: INTERNAL MEDICINE

## 2022-12-02 PROCEDURE — G0378 HOSPITAL OBSERVATION PER HR: HCPCS

## 2022-12-02 PROCEDURE — 63710000001 INSULIN REGULAR HUMAN PER 5 UNITS: Performed by: INTERNAL MEDICINE

## 2022-12-02 PROCEDURE — 85025 COMPLETE CBC W/AUTO DIFF WBC: CPT | Performed by: INTERNAL MEDICINE

## 2022-12-02 PROCEDURE — 96375 TX/PRO/DX INJ NEW DRUG ADDON: CPT

## 2022-12-02 PROCEDURE — 80048 BASIC METABOLIC PNL TOTAL CA: CPT | Performed by: INTERNAL MEDICINE

## 2022-12-02 PROCEDURE — 85018 HEMOGLOBIN: CPT | Performed by: INTERNAL MEDICINE

## 2022-12-02 PROCEDURE — 96376 TX/PRO/DX INJ SAME DRUG ADON: CPT

## 2022-12-02 PROCEDURE — 85014 HEMATOCRIT: CPT | Performed by: INTERNAL MEDICINE

## 2022-12-02 RX ORDER — LORAZEPAM 2 MG/ML
0.5 INJECTION INTRAMUSCULAR EVERY 4 HOURS PRN
Status: DISCONTINUED | OUTPATIENT
Start: 2022-12-02 | End: 2022-12-02 | Stop reason: HOSPADM

## 2022-12-02 RX ADMIN — LORAZEPAM 0.5 MG: 2 INJECTION INTRAMUSCULAR; INTRAVENOUS at 09:45

## 2022-12-02 RX ADMIN — PANTOPRAZOLE SODIUM 40 MG: 40 INJECTION, POWDER, FOR SOLUTION INTRAVENOUS at 08:30

## 2022-12-02 RX ADMIN — LORAZEPAM 0.5 MG: 2 INJECTION INTRAMUSCULAR; INTRAVENOUS at 02:17

## 2022-12-02 RX ADMIN — Medication 10 ML: at 08:04

## 2022-12-02 RX ADMIN — INSULIN HUMAN 2 UNITS: 100 INJECTION, SOLUTION PARENTERAL at 00:13

## 2022-12-02 RX ADMIN — LABETALOL HYDROCHLORIDE 10 MG: 5 INJECTION, SOLUTION INTRAVENOUS at 09:45

## 2022-12-02 NOTE — PROGRESS NOTES
Case Management Discharge Note                Selected Continued Care - Admitted Since 12/1/2022     Destination    No services have been selected for the patient.              Durable Medical Equipment    No services have been selected for the patient.              Dialysis/Infusion    No services have been selected for the patient.              Home Medical Care    No services have been selected for the patient.              Therapy    No services have been selected for the patient.              Community Resources    No services have been selected for the patient.              [facility]    No services have been selected for the patient.                       Final Discharge Disposition Code: 02 - short [facility] for [facility]

## 2022-12-02 NOTE — PROGRESS NOTES
"Physicians Statement of Medical Necessity for  Ambulance Transportation    GENERAL INFORMATION     Name: Edmond Chase  YOB: 1960  Medicare #:   Transport Date: 12/2/2022   (Valid for round trips this date, or for scheduled repetitive trips for 60 days from the date signed below.)  Origin: [facility]      Is the Patient's stay covered under Medicare Part A (PPS/DRG?) no Closest appropriate facility?  Yes  If this a hosp-hosp transfer?  Yes  Is this a hospice patient? no    MEDICAL NECESSITY QUESTIONAIRE    Ambulance Transportation is medically necessary only if other means of transportation are contraindicated or would be potentially harmful to the patient.  To meet this requirement, the patient must be either \"bed confined\" or suffer from a condition such that transport by means other than an ambulance is contraindicated by the patient's condition.  The following questions must be answered by the healthcare professional signing below for this form to be valid:     1) Describe the MEDICAL CONDITION (physical and/or mental) of this patient AT THE TIME OF AMBULANCE TRANSPORT that requires the patient to be transported in an ambulance, and why transport by other means is contraindicated by the patient's condition: GI bleed  Past Medical History:   Diagnosis Date   • Abnormal nuclear stress test    • Aortic valve insufficiency     nonrheumtic    • Ascending aortic aneurysm    • CAD (coronary artery disease)    • Chest pain    • Diabetes mellitus (HCC)    • Dizzy     CAREFUL WHEN GETTING UP   • Dyspnea    • Essential hypertension    • Fatigue    • Hyperglycemia    • Hyperlipidemia    • Hypersomnia with sleep apnea    • Lightheadedness    • Malaise and fatigue    • Morbid obesity (HCC)    • Myocardial ischemia    • Nocturia    • TJ on auto CPAP 10/31/2016    Overnight polysomnogram.  Weight 276 pounds.  Severe TJ with AHI 79 events per hour.  Auto CPAP recommended. "   • Pneumonia     FEB 2016   • Scrotal bleeding    • Shortness of breath       Past Surgical History:   Procedure Laterality Date   • AORTIC VALVE REPAIR/REPLACEMENT  05/2016   • ASCENDING ARCH/HEMIARCH REPLACEMENT N/A 5/2/2016    Procedure: BHAVESH STERNOTOMY CORONARY ARTERY BYPASS GRAFT TIMES 3 USING LEFT INTERNAL MAMMARY ARTERY AND RIGHT GREATER SAPHENOUS VEIN GRAFT PER ENDOSCOPIC VEIN HARVESTING, AORTIC ANEURYSM REPAIR WITH ROOT REPAIR AND AORTIC VALVE REPLACEMENT;  Surgeon: Rosalio Cline MD;  Location: [facility] OR;  Service:    • CARDIAC CATHETERIZATION N/A 4/1/2016    Procedure: Left Heart Cath;  Surgeon: Erick Tam MD;  Location: [facility] CATH INVASIVE LOCATION;  Service:    • CARDIAC CATHETERIZATION N/A 4/1/2016    Procedure: Left ventriculography;  Surgeon: Erick Tam MD;  Location: [facility] CATH INVASIVE LOCATION;  Service:    • CARDIAC CATHETERIZATION N/A 4/1/2016    Procedure: Right Heart Cath;  Surgeon: Erick Tam MD;  Location: [facility] CATH INVASIVE LOCATION;  Service:    • CARDIAC CATHETERIZATION N/A 10/30/2017    Procedure: Coronary angiography;  Surgeon: Sorin Vasquez MD;  Location: [facility] CATH INVASIVE LOCATION;  Service:    • CARDIAC CATHETERIZATION  10/30/2017    Procedure: Saphenous Vein Graft;  Surgeon: Sorin Vasquez MD;  Location: [facility] CATH INVASIVE LOCATION;  Service:    • CARDIAC CATHETERIZATION N/A 10/30/2017    Procedure: Native mammary injection;  Surgeon: Sorin Vasquez MD;  Location: [facility] CATH INVASIVE LOCATION;  Service:    • CARDIAC CATHETERIZATION N/A 7/7/2020    Procedure: Coronary angiography;  Surgeon: Gregor Kim MD;  Location: [facility] CATH INVASIVE LOCATION;  Service: Cardiovascular;  Laterality: N/A;   • CARDIAC CATHETERIZATION N/A 7/7/2020    Procedure: Left heart cath;  Surgeon: Gregor Kim MD;  Location: [facility] CATH INVASIVE LOCATION;  Service: Cardiovascular;  Laterality: N/A;   • CARDIAC CATHETERIZATION N/A 7/7/2020    Procedure: Left  ventriculography;  Surgeon: Gregor Kim MD;  Location[facility] C[facility] INVASIVE LOC[facility];  Service: Cardiovascular;  Laterality: N/A;   • CARDIAC CATHETERIZATION N/A 7/7/2020    Procedure: Stent ESMER bypass graft;  Surgeon: Gregor Kim MD;  Location:  [facility] CATH INVASIVE LOCATION;  Service: Cardiovascular;  Laterality: N/A;   • CARDIAC CATHETERIZATION N/A 6/17/2021    Procedure: SAPHENOUS VEIN GRAFT;  Surgeon: Marques Ndiaye MD;  [facility];  Service: Cardiovascular;  Laterality: N/A;   • CARDIAC CATHETERIZATION N/A 6/17/2021    Procedure: Left Heart Cath;  Surgeon: Marques Ndiaye MD;  [facility] C[facility];  Service: Cardiovascular;  Laterality: N/A;   • CARDIAC CATHETERIZATION N/A 6/17/2021    Procedure: Coronary angiography;  Surgeon: Marques Ndiaye MD;  Location: [facility] CATH INVASIVE LOCATION;  Service: Cardiovascular;  Laterality: N/A;   • CARDIAC CATHETERIZATION N/A 7/14/2022    Procedure: Left Heart Cath;  Surgeon: Charlie Aj MD;  Location: [facility] CATH INVASIVE LOCATION;  Service: Cardiovascular;  Laterality: N/A;   • CARDIAC CATHETERIZATION N/A 7/14/2022    Procedure: Coronary angiography;  Surgeon: Charlie Aj MD;  [facility]: [facility] C[facility]ASIVE LOC[facility];  Service: Cardiovascular;  Laterality: N/A;   • CARDIAC CATHETERIZATION  7/14/2022    Procedure: Saphenous Vein Graft;  Surgeon: Charlie Aj MD;  Location: [facility] CATH INVASIVE LOCATION;  Service: Cardiovascular;;   • CARDIAC CATHETERIZATION N/A 7/14/2022    Procedure: Native mammary injection;  Surgeon: Charlie Aj MD;  Location: [facility] CATH INVASIVE LOCATION;  Service: Cardiovascular;  Laterality: N/A;   • COLONOSCOPY N/A 11/26/2022    Procedure: COLONOSCOPY AT BEDSIDE;  Surgeon: Tomy Lopez MD;  Location: [facility] MAIN OR;  Service: Gastroenterology;  Laterality: N/A;   • COLONOSCOPY W/ POLYPECTOMY N/A 10/4/2022    Procedure: COLONOSCOPY to cecum  "with cold forceps and cold snare polypectomies;  Surgeon: Gregor Carlson MD;  Location:  [facility];  Service: Gastroenterology;  Laterality: N/A;  PRE- hx of polyps  POST- diverticulosis, polyps   • CORONARY ARTERY BYPASS GRAFT  05/2016    LIMA TO LAD, SVG TO PDA, SVG TO OM2   • ENDOSCOPY N/A 7/13/2022    Procedure: ESOPHAGOGASTRODUODENOSCOPY;  Surgeon: Marcia Hollis MD;  Location:  [facility];  Service: Gastroenterology;  Laterality: N/A;  PRE- ANEMIA, MELENA  POST- MILD EROSIVE GASTRITIS, GE JUNCTION ULCER   • ENDOSCOPY N/A 10/4/2022    Procedure: ESOPHAGOGASTRODUODENOSCOPY with biopsies;  Surgeon: Gregor Carlson MD;  Location:  [facility];  Service: Gastroenterology;  Laterality: N/A;  PRE- hx of esophageal ulcer  POST- gastric cardia mass   • INNER EAR SURGERY     • TONSILLECTOMY        2) Is this patient \"bed confined\" as defined below? No   To be \"bed confined\" the patient must satisfy all three of the following criteria:  (1) unable to get up from bed without assistance; AND (2) unable to ambulate;  AND (3) unable to sit in a chair or wheelchair.  3) Can this patient safely be transported by car or wheelchair van (I.e., may safely sit during transport, without an attendant or monitoring?)NO  4. In addition to completing questions 1-3 above, please check any of the following conditions that apply*:          *Note: supporting documentation for any boxes checked must be maintained in the patient's medical records  Emergency transport to [facility] for GI bleed    SIGNATURE OF PHYSICIAN OR OTHER AUTHORIZED HEALTHCARE PROFESSIONAL    I certify that the above information is true and correct based on my evaluation of this patient, and represent that the patient requires transport by ambulance and that other forms of transport are contraindicated.  I understand that this information will be used by the Centers for Medicare and Medicaid Services (CMS) to support the " determiniation of medical necessity for ambulance services, and I represent that I have personal knowledge of the patient's condition at the time of transport.       If this box is checked, I also certify that the patient is physically or mentally incapable of signing the ambulance service's claim form and that the institution with which I am affiliated has furnished care, servicor assistance to the patient.  My signature below is made on behalf of the patient pursuant to 42 .36(b)(4). In accordance with 42 .37, the specific reason(s) that the patient is physically or mentally incapable of signing the claim for is as follows: GI bleed    Signature of Physician or Healthcare Professional  Date/Time:   12/2/2022 08:03 EST       (For Scheduled repetitive transport, this form is not valid for transports performed more than 60 days after this date).                                                                                                                                            -------------------Cecilia Song RN  -------------------------------------------------------------------------  Printed Name and Credentials of Physician or Authorized Healthcare Professional     *Form must be signed by patient's attending physician for scheduled, repetitive transports,.  For non-repetitive ambulance transports, if unable to obtain the signature of the attending physician, any of the following may sign (please select below):     Physician  Clinical Nurse Specialist  Registered Nurse X    Physician Assistant  Discharge Planner  Licensed Practical Nurse     Nurse Practitioner   X

## 2022-12-02 NOTE — PLAN OF CARE
Goal Outcome Evaluation:      Patient has had 4 dark red liquid BM's this shift, Pt, has drunk 3000mL of the bowel prep so far, and report has been called to [facility].

## 2022-12-02 NOTE — PROGRESS NOTES
Gastroenterology   Inpatient Progress Note    Reason for Follow Up: GI bleed    Subjective     Interval History:   Situation discussed with nursing staff this morning.  Patient apparently does have a bed at [facility] in the CCU and is being transferred there this morning.  He had good response to bowel prep with multiple liquid, dark red bowel movements.    Current Facility-Administered Medications:   •  acetaminophen (TYLENOL) tablet 650 mg, 650 mg, Oral, Q4H PRN **OR** acetaminophen (TYLENOL) suppository 650 mg, 650 mg, Rectal, Q4H PRN, Markos Mortensen MD  •  calcium gluconate 1g/50ml 0.675% NaCl IV SOLN, 1 g, Intravenous, PRN **AND** calcium gluconate 6 g in sodium chloride 0.9 % 500 mL IVPB, 6 g, Intravenous, PRN **AND** Calcium, , , PRN, Markos Mortensen MD  •  dextrose (D50W) (25 g/50 mL) IV injection 25 g, 25 g, Intravenous, Q15 Min PRN, Markos Mortensen MD  •  dextrose (GLUTOSE) oral gel 15 g, 15 g, Oral, Q15 Min PRN, Markos Mortensen MD  •  glucagon (human recombinant) (GLUCAGEN DIAGNOSTIC) injection 1 mg, 1 mg, Intramuscular, Q15 Min PRN, Markos Mortensen MD  •  HYDROcodone-acetaminophen (NORCO) 5-325 MG per tablet 1 tablet, 1 tablet, Oral, Q6H PRN, Markos Mortensen MD  •  insulin regular (humuLIN R,novoLIN R) injection 0-9 Units, 0-9 Units, Subcutaneous, Q6H, Markos Mortensen MD, 2 Units at 12/02/22 0013  •  ipratropium-albuterol (DUO-NEB) nebulizer solution 3 mL, 3 mL, Nebulization, Q6H PRN, Markos Mortesnen MD  •  labetalol (NORMODYNE,TRANDATE) injection 10 mg, 10 mg, Intravenous, Q4H PRN, Anthony Pereyra MD, 10 mg at 12/01/22 2302  •  LORazepam (ATIVAN) injection 0.5 mg, 0.5 mg, Intravenous, Q4H PRN, Anthony Pereyra MD, 0.5 mg at 12/02/22 0217  •  LORazepam (ATIVAN) tablet 0.5 mg, 0.5 mg, Oral, Q6H PRN, Markos Mortensen MD  •  Magnesium Sulfate 2 gram Bolus, followed by 8 gram infusion (total Mg dose 10 grams)- Mg less than or equal to 1mg/dL, 2 g, Intravenous, PRN **OR** Magnesium Sulfate 2 gram /  50mL Infusion (GIVE X 3 BAGS TO EQUAL 6GM TOTAL DOSE) - Mg 1.1 - 1.5 mg/dl, 2 g, Intravenous, PRN **OR** Magnesium Sulfate 4 gram infusion- Mg 1.6-1.9 mg/dL, 4 g, Intravenous, PRN, Markos Mortensen MD  •  melatonin tablet 5 mg, 5 mg, Oral, Nightly PRN, Markos Mortensen MD  •  ondansetron (ZOFRAN) tablet 4 mg, 4 mg, Oral, Q6H PRN **OR** ondansetron (ZOFRAN) injection 4 mg, 4 mg, Intravenous, Q6H PRN, Markos Mortensen MD, 4 mg at 12/01/22 2112  •  pantoprazole (PROTONIX) injection 40 mg, 40 mg, Intravenous, BID AC, Yandel Kingston MD, 40 mg at 12/01/22 1848  •  potassium chloride (K-DUR,KLOR-CON) ER tablet 40 mEq, 40 mEq, Oral, PRN **OR** potassium chloride (KLOR-CON) packet 40 mEq, 40 mEq, Oral, PRN **OR** potassium chloride 10 mEq in 100 mL IVPB, 10 mEq, Intravenous, Q1H PRN, Markos Mortensen MD  •  potassium phosphate 45 mmol in sodium chloride 0.9 % 500 mL infusion, 45 mmol, Intravenous, PRN **OR** potassium phosphate 30 mmol in sodium chloride 0.9 % 250 mL infusion, 30 mmol, Intravenous, PRN **OR** potassium phosphate 15 mmol in sodium chloride 0.9 % 100 mL infusion, 15 mmol, Intravenous, PRN **OR** sodium phosphates 40 mmol in sodium chloride 0.9 % 500 mL IVPB, 40 mmol, Intravenous, PRN **OR** sodium phosphates 20 mmol in sodium chloride 0.9 % 250 mL IVPB, 20 mmol, Intravenous, PRN, Markos Mortensen MD  •  sodium chloride 0.9 % flush 10 mL, 10 mL, Intravenous, PRN, Praneeth Ron MD  •  sodium chloride 0.9 % flush 10 mL, 10 mL, Intravenous, Q12H, Markos Mortensen MD, 10 mL at 12/01/22 2016  •  sodium chloride 0.9 % flush 10 mL, 10 mL, Intravenous, PRN, Markos Mortensen MD  •  sodium chloride 0.9 % infusion 40 mL, 40 mL, Intravenous, PRN, Markos Mortensen MD  •  sucralfate (CARAFATE) tablet 1 g, 1 g, Oral, BID ACBalbina Scott P, MD, 1 g at 12/01/22 1037  Review of Systems:    All systems were reviewed and negative except for:  Gastrointestinal: positive for  diarrhea    Objective     Vital Signs  Temp:   [97.8 °F (36.6 °C)-98.5 °F (36.9 °C)] 97.8 °F (36.6 °C)  Heart Rate:  [] 75  Resp:  [18] 18  BP: (114-180)/() 135/75  Body mass index is 41.49 kg/m².    Intake/Output Summary (Last 24 hours) at 12/2/2022 0800  Last data filed at 12/1/2022 2000  Gross per 24 hour   Intake 1715 ml   Output 300 ml   Net 1415 ml     No intake/output data recorded.     Physical Exam:   General: patient awake, alert and cooperative   Eyes: no scleral icterus   Skin: warm and dry, not jaundiced   Abdomen: soft, nontender, nondistended; normal bowel sounds, no masses palpated, no periumbical lymphadenopathy   Psychiatric: Appropriate affect and behavior     Results Review:     I reviewed the patient's new clinical results.    Results from last 7 days   Lab Units 12/02/22 0536 12/01/22  2337 12/01/22 2011 12/01/22  1252 12/01/22  0544 11/30/22  0459   WBC 10*3/mm3 6.14  --   --   --  9.28 6.70   HEMOGLOBIN g/dL 7.5*  7.5* 8.6* 7.5*   < > 7.4* 8.4*   HEMATOCRIT % 23.9*  23.9* 25.5* 23.2*   < > 22.2* 24.7*   PLATELETS 10*3/mm3 175  --   --   --  191 139*    < > = values in this interval not displayed.     Results from last 7 days   Lab Units 12/02/22  0536 12/01/22  0544 11/30/22  0459 11/26/22  1336 11/26/22  0504   SODIUM mmol/L 144 140 140   < > 145   POTASSIUM mmol/L 3.6 3.6 3.7   < > 4.0   CHLORIDE mmol/L 106 105 105   < > 117*   CO2 mmol/L 30.1* 24.5 28.4   < > 21.4*   BUN mg/dL 12 10 8   < > 24*   CREATININE mg/dL 0.70* 0.77 0.66*   < > 1.20   CALCIUM mg/dL 8.0* 7.7* 8.1*   < > 6.3*   BILIRUBIN mg/dL  --  0.3  --   --  0.4   ALK PHOS U/L  --  55  --   --  48   ALT (SGPT) U/L  --  8  --   --  8   AST (SGOT) U/L  --  12  --   --  11   GLUCOSE mg/dL 114* 240* 122*   < > 280*    < > = values in this interval not displayed.     Results from last 7 days   Lab Units 11/26/22  1032 11/25/22  0803   INR  1.25* 0.95     No results found for: LIPASE    Radiology:  No orders to display       Assessment & Plan     Patient Active  Problem List   Diagnosis   • Essential hypertension   • Hyperglycemia   • Hyperlipidemia   • Class 3 severe obesity due to excess calories with serious comorbidity and body mass index (BMI) of 40.0 to 44.9 in adult ([facility])   • Nocturia   • Nonrheumatic aortic valve insufficiency   • Myocardial ischemia   • Coronary artery disease involving native coronary artery of native heart   • Ascending aortic aneurysm   • CAD in native artery   • S/P CABG (coronary artery bypass graft)   • S/P AVR (aortic valve replacement)   • Status post ascending aortic aneurysm repair   • Fatigue   • Abnormal nuclear stress test   • Coronary artery disease   • Coronary artery disease of bypass graft of native heart with stable angina pectoris ([facility])   • Acute chest pain   • TJ on auto CPAP   • Hypersomnia due to medical condition   • Angina at rest ([facility])   • Type 2 diabetes mellitus, without long-term current use of insulin ([facility])   • Anemia   • Ulcer of esophagus without bleeding   • Polyp of colon   • Rectal bleeding   • Gastrointestinal hemorrhage   • Malignant neoplasm of lower third of esophagus ([facility])   • Gastrointestinal hemorrhage, unspecified gastrointestinal hemorrhage type     These problems are new to me  Assessment:  1. Hematochezia secondary to post polypectomy bleed.  Hemoglobin stable.  Status post Hemoclip of polypectomy site in the cecum.  2. Acute blood loss anemia.  Hemoglobin stable.  3. History of esophageal cancer status post endoscopic mucosal resection  4. Coronary artery disease with antiplatelet therapy currently being held      Plan:  · [facility]  · Monitor H&H  I discussed the patients findings and my recommendations with patient and nursing staff.    MD Shayne Gonzalez M.D.  [facility], [facility] [place] [address]  Office: [phone]

## 2022-12-02 NOTE — PROGRESS NOTES
Enter Query Response Below      Query Response:   Rectal bleeding was not directly due to plavix, it was due to polypectomy, was made worse by plavix  Electronically signed by Markos Mortensen MD, 22, 10:44 AM EST.             If applicable, please update the problem list.         Patient: Edmond Chase        : 1960  Account: [id number]           Admit Date: 2022        How to Respond to this query:       a. Click New Note     b. Answer query within the yellow box.                c. Update the Problem List, if applicable.      If you have any questions about this query contact me at: [email or web address]    Dr. Mortensen    62 year old male admitted  for rectal bleeding approximately 1 week status post polypectomy. The patient resumed Plavix 72 hours post procedure. On post op day 4 the patient started having bright red blood per rectum. Hemoglobin dropped to 5.6 on . Colonoscopy was performed and showed bleeding at the polypectomy site with large clot. Additional clips (x6) were placed.  Plavix was discontinued. Treatment included transfusion of packed red cells.    Can this be specified as:    rectal bleeding due to Plavix  rectal bleeding unrelated to Plavix  other, please specify____________  unable to clinically determine      By submitting this query, we are merely seeking further clarification of documentation to accurately reflect all conditions that you are monitoring, evaluating, treating or that extend the hospitalization or utilize additional resources of care. Please utilize your independent clinical judgment when addressing the question(s) above.     This query and your response, once completed, will be entered into the legal medical record.    Sincerely,  Anna Cortez RN CCDS  Clinical Documentation Integrity Program

## 2022-12-02 NOTE — PAYOR COMM NOTE
"Edmond Chase (62 y.o. Male)                       ATTENTION; INPATIENT AUTHORIZATION REQUEST - ICD 10 - D62 - K92.2                      FACILITY NPI  - [id number] [facility] [address] [facility] [address]                     PHYSICIAN NPI - [id number] [facility]OR[facility]  [facility] [address] [facility] [address]                     REPLY TO UR DEPT  [phone] OR CALL   GREGG GALLEGOS LPSAMUEL       Date of Birth   1960    Social Security Number       Address   [address]  [facility] [address]    Home Phone   [phone]    MRN   [id number]       Catholic   None    Marital Status   Single                            Admission Date   12/1/22    Admission Type   Emergency    Admitting Provider   Markos Koroma MD    Attending Provider   Markos Koroma MD    Department, Room/Bed   [facility] INTENSIVE CARE, I385/1       Discharge Date       Discharge Disposition       Discharge Destination                               Attending Provider: Markos Koroma MD    Allergies: Bystolic [Nebivolol Hcl]    Isolation: None   Infection: None   Code Status: CPR    Ht: 185.4 cm (72.99\")   Wt: 143 kg (314 lb 6 oz)    Admission Cmt: None   Principal Problem: Gastrointestinal hemorrhage, unspecified gastrointestinal hemorrhage type [K92.2]                 Active Insurance as of 12/1/2022     Primary Coverage     Payor Plan Insurance Group Employer/Plan Group    [facility] BY JODI [facility] BY JODI [id number]     Payor Plan Address Payor Plan Phone Number Payor Plan Fax Number Effective Dates    [address] [address]   1/1/2021 - None Entered    [facility] [id number]       Subscriber Name Subscriber Birth Date Member ID       EDMOND CHASE 1960 [id number]                 Emergency Contacts      (Rel.) Home Phone Work Phone Mobile Phone    Melinda Ahuja [facility] (Relative) [phone] -- [phone]    Sadia Ahuja [facility] (Relative) " [phone] -- [phone]    Kyra Ahuja (Sister) [phone] -- [phone]               History & Physical      Markos Mortensen MD at 12/01/22 1729          [facility]    CC: gi bleed    HPI:  Mr. Chase is a 63yo male who underwent polypectomy with Dr. Jimenes and subsequently developed GI bleed at site of polypectomy he was admitted here 11/25/-11/30.  He had very severe life threatening bleed. GI was able to clip the area and he had no further bleeding.  He was seen by cardiology and cleared to stay off plavix indefinately.  He returned home and was doing well until this morning  He had a single large bloody bowel movement, no pain.  He became lightheaded, weak and tremulous.  He came to the ER here and has not had further bleeding since. He has undergone transfusion.  Transfer to [facility] recommended by surgery/GI services here for continuity of care.    Past Medical History:   Diagnosis Date   • Abnormal nuclear stress test    • Aortic valve insufficiency     nonrheumtic    • Ascending aortic aneurysm    • CAD (coronary artery disease)    • Chest pain    • Diabetes mellitus (HCC)    • Dizzy     CAREFUL WHEN GETTING UP   • Dyspnea    • Essential hypertension    • Fatigue    • Hyperglycemia    • Hyperlipidemia    • Hypersomnia with sleep apnea    • Lightheadedness    • Malaise and fatigue    • Morbid obesity (HCC)    • Myocardial ischemia    • Nocturia    • TJ on auto CPAP 10/31/2016    Overnight polysomnogram.  Weight 276 pounds.  Severe TJ with AHI 79 events per hour.  Auto CPAP recommended.   • Pneumonia     FEB 2016   • Scrotal bleeding    • Shortness of breath      Social History     Socioeconomic History   • Marital status: Single   Tobacco Use   • Smoking status: Never   • Smokeless tobacco: Never   Vaping Use   • Vaping Use: Never used   Substance and Sexual Activity   • Alcohol use: Yes     Comment: 1 drink/monthly   • Drug use: No   • Sexual activity: Defer     Family History  "  Problem Relation Age of Onset   • Hypertension Mother    • Diabetes Mother    • Heart disease Mother    • Hypertension Father    • Lung cancer Father    • Heart disease Sister    • Stroke Maternal Grandmother    • Heart disease Maternal Grandmother    • Hypertension Maternal Grandfather    • Hypertension Paternal Grandmother    • Hypertension Paternal Grandfather    • Other Other         The patient states that his sister has a problem that goes by the name of \"long QT\".  He says this is a familial trait but he also says that he is \"not interested in pursuing it for himself\".   • Coronary artery disease Other      MEDS: reviewed as per chart  ALL: bystolic  ROS: 12 point negative except as in HPI    Vital Sign Min/Max for last 24 hours  Temp  Min: 97.8 °F (36.6 °C)  Max: 98.9 °F (37.2 °C)   BP  Min: 106/60  Max: 160/94   Pulse  Min: 69  Max: 94   Resp  Min: 18  Max: 22   SpO2  Min: 94 %  Max: 100 %   No data recorded   Weight  Min: 144 kg (317 lb 3.2 oz)  Max: 144 kg (317 lb 3.2 oz)     GEN:  NAD, appears ill, obese, A&O x3  HEENT: PERRL, EOMI, no icterus, mmm, no JVD, trachea midline, neck supple  CHEST: CTA bilat, no wheezes, no crackles, no use of accessory muscles  CV: RRR, no m/g/r  ABD: soft, nt, nd +bs, no hepatosplenomegaly  EXT: no c/c/e  SKIN: no rashes, no xanthomas, nl turgor  LYMPH: no palpable cervical or supraclavicular lymphadenopathy  NEURO: CN 2-12 intact and symmetric bilaterally  PSYCH: nl affect, nl orientation, nl judgment, nl mood  MsK: no kyphoscoliosis, 5/5 strength ue and le bilaterlaly    Labs: 12/1: reviewed:  Lactate 1.7  hgb 8.7  Glucose 240  Bun 10  Cr 0.77  Na 140  Bicarb 24    A/P:  1. Acute blood loss anemia due to GI bleed with resulting hemorrhagic shock -- better now s/p endoscopic clipping --now with rebleed again  2. Recent colonoscopy with polypectomy  3. CAD with stent -- holding antiplatlet agents -cards says ok to hold indefinately at this point  4. Bioprosthetic aortic " valve  5. TJ on cpap  6. obeisty  7. DMII --  8. HLD  9. HTN --  10. Recent disagnosis of esophageal cancer and grijalva's esophagus --continue ppi--follow up with Dr. Stockton  11. Hypernatremia -- better    Gi planning rescope, IR aware of Patient should  He have bleeding needing intervention    I contacted  and he has been accepted there pending bed availablility      Electronically signed by Markos Mortensen MD at 12/01/22 1741          Emergency Department Notes      Kelly Carpenter, RN at 12/01/22 0530        Pt to ED via ambulance from home. Pt with dark red blood in stool that started today. Pt with nausea and vomiting at triage.pt pale.    Pt was recently d/c yesterday from this hospital for same symptoms.    Pt dx with esophageal CA, not currently on treatment    Electronically signed by Kelly Carpenter, RN at 12/01/22 0548     Perla Resendiz PA at 12/01/22 0613      Procedure Orders    1. Critical Care [[id number]] ordered by Perla Resendiz PA          Attestation signed by Praneeth Ron MD at 12/02/22 0637        SHARED APC FACE TO FACE: This visit was performed by both the physician and an APC. I personally evaluated and examined the patient. I performed the entirety of the physical exam and I documented this exam in this attestation or in accompanying documentation.    Praneeth Ron MD 12/2/2022 06:37 EST                          EMERGENCY DEPARTMENT ENCOUNTER    Room Number:  05/05  Date seen:  12/1/2022  Time seen: 06:13 EST  PCP: Nargis Velasquez APRN  Historian: patient      HPI:  Chief Complaint: rectal bleeding    A complete HPI/ROS/PMH/PSH/SH/FH are unobtainable due to: none    Context: Edmond Chase is a 62 y.o. male who presents to the ED for evaluation of For rectal bleeding.  Patient was just discharged from the hospital ICU yesterday for a severe gastrointestinal bleed related to a colonoscopy with polyp removal.  He has continued to hold his antiplatelet as instructed.  He  states that he woke in the morning with the urge to have a bowel movement, passed some solid stool that had some blood on it and then a short time later passed a large amount of bright in dark blood.  He denies any abdominal pain.  He does feel lightheaded and generally weak.  His symptoms of feeling ill are constant and moderate to severe and has not noted anything that makes it worse or better in particular.  The rectal bleeding is intermittent.        PAST MEDICAL HISTORY  Active Ambulatory Problems     Diagnosis Date Noted   • Essential hypertension 02/29/2016   • Hyperglycemia 02/29/2016   • Hyperlipidemia 02/29/2016   • Class 3 severe obesity due to excess calories with serious comorbidity and body mass index (BMI) of 40.0 to 44.9 in adult ([facility]) 02/29/2016   • Nocturia 02/29/2016   • Nonrheumatic aortic valve insufficiency 03/29/2016   • Myocardial ischemia 03/29/2016   • Coronary artery disease involving native coronary artery of native heart 04/19/2016   • Ascending aortic aneurysm 04/19/2016   • CAD in native artery 05/02/2016   • S/P CABG (coronary artery bypass graft) 05/18/2016   • S/P AVR (aortic valve replacement) 05/18/2016   • Status post ascending aortic aneurysm repair 05/18/2016   • Fatigue 07/28/2017   • Abnormal nuclear stress test 10/23/2017   • Coronary artery disease 10/23/2017   • Coronary artery disease of bypass graft of native heart with stable angina pectoris ([facility]) 07/01/2020   • Acute chest pain 06/17/2021   • TJ on auto CPAP 10/31/2016   • Hypersomnia due to medical condition 08/21/2021   • Angina at rest ([facility]) 07/10/2022   • Type 2 diabetes mellitus, without long-term current use of insulin ([facility]) 07/11/2022   • Anemia 07/10/2022   • Ulcer of esophagus without bleeding 07/29/2022   • Polyp of colon 08/29/2022   • Rectal bleeding 11/25/2022   • Gastrointestinal hemorrhage 11/25/2022   • Malignant neoplasm of lower third of esophagus ([facility]) 11/29/2022     Resolved Ambulatory Problems      Diagnosis Date Noted   • No Resolved Ambulatory Problems     Past Medical History:   Diagnosis Date   • Aortic valve insufficiency    • CAD (coronary artery disease)    • Chest pain    • Diabetes mellitus (HCC)    • Dizzy    • Dyspnea    • Hypersomnia with sleep apnea    • Lightheadedness    • Malaise and fatigue    • Morbid obesity (HCC)    • Pneumonia    • Scrotal bleeding    • Shortness of breath          PAST SURGICAL HISTORY  Past Surgical History:   Procedure Laterality Date   • AORTIC VALVE REPAIR/REPLACEMENT  05/2016   • ASCENDING ARCH/HEMIARCH REPLACEMENT N/A 5/2/2016    Procedure: BHAVESH STERNOTOMY CORONARY ARTERY BYPASS GRAFT TIMES 3 USING LEFT INTERNAL MAMMARY ARTERY AND RIGHT GREATER SAPHENOUS VEIN GRAFT PER ENDOSCOPIC VEIN HARVESTING, AORTIC ANEURYSM REPAIR WITH ROOT REPAIR AND AORTIC VALVE REPLACEMENT;  Surgeon: Rosalio Cline MD;  Location: [facility] OR;  Service:    • CARDIAC CATHETERIZATION N/A 4/1/2016    Procedure: Left Heart Cath;  Surgeon: Erick Tam MD;  Location: [facility] CATH INVASIVE LOCATION;  Service:    • CARDIAC CATHETERIZATION N/A 4/1/2016    Procedure: Left ventriculography;  Surgeon: Erick Tam MD;  Location: [facility] CATH INVASIVE LOCATION;  Service:    • CARDIAC CATHETERIZATION N/A 4/1/2016    Procedure: Right Heart Cath;  Surgeon: Erick Tam MD;  Location: [facility] CATH INVASIVE LOCATION;  Service:    • CARDIAC CATHETERIZATION N/A 10/30/2017    Procedure: Coronary angiography;  Surgeon: Sorin Vasquez MD;  Location: [facility] CATH INVASIVE LOCATION;  Service:    • CARDIAC CATHETERIZATION  10/30/2017    Procedure: Saphenous Vein Graft;  Surgeon: Sorin Vasquez MD;  Location: [facility] CATH INVASIVE LOCATION;  Service:    • CARDIAC CATHETERIZATION N/A 10/30/2017    Procedure: Native mammary injection;  Surgeon: Sorin Vasquez MD;  Location: [facility] CATH INVASIVE LOCATION;  Service:    • CARDIAC CATHETERIZATION N/A 7/7/2020    Procedure: Coronary angiography;  Surgeon: Judy  Gregor VILLALBA MD;  [facility];  Service: Cardiovascular;  Laterality: N/A;   • CARDIAC CATHETERIZATION N/A 7/7/2020    Procedure: Left heart cath;  Surgeon: Gregor Kim MD;  [facility];  Service: Cardiovascular;  Laterality: N/A;   • CARDIAC CATHETERIZATION N/A 7/7/2020    Procedure: Left ventriculography;  Surgeon: Gregor Kim MD;  [facility];  Service: Cardiovascular;  Laterality: N/A;   • CARDIAC CATHETERIZATION N/A 7/7/2020    Procedure: Stent ESMER bypass graft;  Surgeon: Gregor Kim MD;  [facility];  Service: Cardiovascular;  Laterality: N/A;   • CARDIAC CATHETERIZATION N/A 6/17/2021    Procedure: SAPHENOUS VEIN GRAFT;  Surgeon: Marques Ndiaye MD;  Location: [facility] CATH INVASIVE LOCATION;  Service: Cardiovascular;  Laterality: N/A;   • CARDIAC CATHETERIZATION N/A 6/17/2021    Procedure: Left Heart Cath;  Surgeon: Marques Ndiaye MD;  Location: [facility] CATH INVASIVE LOCATION;  Service: Cardiovascular;  Laterality: N/A;   • CARDIAC CATHETERIZATION N/A 6/17/2021    Procedure: Coronary angiography;  Surgeon: Marques Ndiaye MD;  Location: [facility] CATH INVASIVE LOCATION;  Service: Cardiovascular;  Laterality: N/A;   • CARDIAC CATHETERIZATION N/A 7/14/2022    Procedure: Left Heart Cath;  Surgeon: Charlie Aj MD;  Location: [facility] CATH INVASIVE LOCATION;  Service: Cardiovascular;  Laterality: N/A;   • CARDIAC CATHETERIZATION N/A 7/14/2022    Procedure: Coronary angiography;  Surgeon: Charlie Aj MD;  Location: [facility] CATH INVASIVE LOCATION;  Service: Cardiovascular;  Laterality: N/A;   • CARDIAC CATHETERIZATION  7/14/2022    Procedure: Saphenous Vein Graft;  Surgeon: Charlie Aj MD;  Location: [facility] CATH INVASIVE LOCATION;  Service: Cardiovascular;;   • CARDIAC CATHETERIZATION N/A 7/14/2022    Procedure: Native mammary injection;  Surgeon: Madai  "Charlie MILLER MD;  Location: [facility] CATH INVASIVE LOCATION;  Service: Cardiovascular;  Laterality: N/A;   • COLONOSCOPY N/A 11/26/2022    Procedure: COLONOSCOPY AT BEDSIDE;  Surgeon: Tomy Lopez MD;  Location: [facility] MAIN OR;  Service: Gastroenterology;  Laterality: N/A;   • COLONOSCOPY W/ POLYPECTOMY N/A 10/4/2022    Procedure: COLONOSCOPY to cecum with cold forceps and cold snare polypectomies;  Surgeon: Gregor Carlson MD;  Location: [facility] ENDOSCOPY;  Service: Gastroenterology;  Laterality: N/A;  PRE- hx of polyps  POST- diverticulosis, polyps   • CORONARY ARTERY BYPASS GRAFT  05/2016    LIMA TO LAD, SVG TO PDA, SVG TO OM2   • ENDOSCOPY N/A 7/13/2022    Procedure: ESOPHAGOGASTRODUODENOSCOPY;  Surgeon: Marcia Hollis MD;  Location: [facility] ENDOSCOPY;  Service: Gastroenterology;  Laterality: N/A;  PRE- ANEMIA, MELENA  POST- MILD EROSIVE GASTRITIS, GE JUNCTION ULCER   • ENDOSCOPY N/A 10/4/2022    Procedure: ESOPHAGOGASTRODUODENOSCOPY with biopsies;  Surgeon: Gregor Carlson MD;  Location: [facility] ENDOSCOPY;  Service: Gastroenterology;  Laterality: N/A;  PRE- hx of esophageal ulcer  POST- gastric cardia mass   • INNER EAR SURGERY     • TONSILLECTOMY           FAMILY HISTORY  Family History   Problem Relation Age of Onset   • Hypertension Mother    • Diabetes Mother    • Heart disease Mother    • Hypertension Father    • Lung cancer Father    • Heart disease Sister    • Stroke Maternal Grandmother    • Heart disease Maternal Grandmother    • Hypertension Maternal Grandfather    • Hypertension Paternal Grandmother    • Hypertension Paternal Grandfather    • Other Other         The patient states that his sister has a problem that goes by the name of \"long QT\".  He says this is a familial trait but he also says that he is \"not interested in pursuing it for himself\".   • Coronary artery disease Other          SOCIAL HISTORY  Social History     Socioeconomic History   • Marital status: Single   Tobacco " Use   • Smoking status: Never   • Smokeless tobacco: Never   Vaping Use   • Vaping Use: Never used   Substance and Sexual Activity   • Alcohol use: Yes     Comment: 1 drink/monthly   • Drug use: No   • Sexual activity: Defer         ALLERGIES  Bystolic [nebivolol hcl]        REVIEW OF SYSTEMS  Review of Systems     All systems reviewed and negative except for those discussed in HPI.       PHYSICAL EXAM  ED Triage Vitals [12/01/22 0532]   Temp Heart Rate Resp BP SpO2   98.9 °F (37.2 °C) 94 22 106/60 98 %      Temp src Heart Rate Source Patient Position BP Location FiO2 (%)   Oral -- -- -- --         GENERAL: not distressed, pale  HENT: atraumatic  EYES: no scleral icterus  CV: regular rhythm, regular rate  RESPIRATORY: normal effort CTA B  ABDOMEN: soft, nontender nondistended normal bowel sounds no guarding or rigidity  MUSCULOSKELETAL: no deformity  NEURO: alert, moves all extremities, follows commands  SKIN: warm, dry    Vital signs and nursing notes reviewed.          LAB RESULTS  Recent Results (from the past 24 hour(s))   Comprehensive Metabolic Panel    Collection Time: 12/01/22  5:44 AM    Specimen: Blood   Result Value Ref Range    Glucose 240 (H) 65 - 99 mg/dL    BUN 10 8 - 23 mg/dL    Creatinine 0.77 0.76 - 1.27 mg/dL    Sodium 140 136 - 145 mmol/L    Potassium 3.6 3.5 - 5.2 mmol/L    Chloride 105 98 - 107 mmol/L    CO2 24.5 22.0 - 29.0 mmol/L    Calcium 7.7 (L) 8.6 - 10.5 mg/dL    Total Protein 4.8 (L) 6.0 - 8.5 g/dL    Albumin 3.10 (L) 3.50 - 5.20 g/dL    ALT (SGPT) 8 1 - 41 U/L    AST (SGOT) 12 1 - 40 U/L    Alkaline Phosphatase 55 39 - 117 U/L    Total Bilirubin 0.3 0.0 - 1.2 mg/dL    Globulin 1.7 gm/dL    A/G Ratio 1.8 g/dL    BUN/Creatinine Ratio 13.0 7.0 - 25.0    Anion Gap 10.5 5.0 - 15.0 mmol/L    eGFR 100.0 >60.0 mL/min/1.73   Type & Screen    Collection Time: 12/01/22  5:44 AM    Specimen: Blood   Result Value Ref Range    ABO Type O     RH type Positive     Antibody Screen Negative     T&S  Expiration Date 12/4/2022 11:59:59 PM    Lactic Acid, Plasma    Collection Time: 12/01/22  5:44 AM    Specimen: Blood   Result Value Ref Range    Lactate 3.7 (C) 0.5 - 2.0 mmol/L   Green Top (Gel)    Collection Time: 12/01/22  5:44 AM   Result Value Ref Range    Extra Tube Hold for add-ons.    Lavender Top    Collection Time: 12/01/22  5:44 AM   Result Value Ref Range    Extra Tube hold for add-on    Gold Top - SST    Collection Time: 12/01/22  5:44 AM   Result Value Ref Range    Extra Tube Hold for add-ons.    Light Blue Top    Collection Time: 12/01/22  5:44 AM   Result Value Ref Range    Extra Tube Hold for add-ons.    CBC Auto Differential    Collection Time: 12/01/22  5:44 AM    Specimen: Blood   Result Value Ref Range    WBC 9.28 3.40 - 10.80 10*3/mm3    RBC 2.58 (L) 4.14 - 5.80 10*6/mm3    Hemoglobin 7.4 (L) 13.0 - 17.7 g/dL    Hematocrit 22.2 (L) 37.5 - 51.0 %    MCV 86.0 79.0 - 97.0 fL    MCH 28.7 26.6 - 33.0 pg    MCHC 33.3 31.5 - 35.7 g/dL    RDW 16.4 (H) 12.3 - 15.4 %    RDW-SD 49.9 37.0 - 54.0 fl    MPV 9.7 6.0 - 12.0 fL    Platelets 191 140 - 450 10*3/mm3    Neutrophil % 64.2 42.7 - 76.0 %    Lymphocyte % 23.6 19.6 - 45.3 %    Monocyte % 9.1 5.0 - 12.0 %    Eosinophil % 2.0 0.3 - 6.2 %    Basophil % 0.3 0.0 - 1.5 %    Immature Grans % 0.8 (H) 0.0 - 0.5 %    Neutrophils, Absolute 5.96 1.70 - 7.00 10*3/mm3    Lymphocytes, Absolute 2.19 0.70 - 3.10 10*3/mm3    Monocytes, Absolute 0.84 0.10 - 0.90 10*3/mm3    Eosinophils, Absolute 0.19 0.00 - 0.40 10*3/mm3    Basophils, Absolute 0.03 0.00 - 0.20 10*3/mm3    Immature Grans, Absolute 0.07 (H) 0.00 - 0.05 10*3/mm3    nRBC 0.8 (H) 0.0 - 0.2 /100 WBC   Prepare RBC, 2 Units    Collection Time: 12/01/22  9:31 AM   Result Value Ref Range    Product Code O8606Y70     Unit Number [id number]     UNIT  ABO O     UNIT  RH POS     Crossmatch Interpretation Compatible     Dispense Status IS     Blood Expiration Date 202212202359     Blood Type Barcode 510      Product Code F0661I50     Unit Number [id number]     UNIT  ABO O     UNIT  RH POS     Crossmatch Interpretation Compatible     Dispense Status IS     Blood Expiration Date 202212202359     Blood Type Barcode 5100    STAT Lactic Acid, Reflex    Collection Time: 12/01/22 12:52 PM    Specimen: Blood   Result Value Ref Range    Lactate 1.7 0.5 - 2.0 mmol/L   Hemoglobin & Hematocrit, Blood    Collection Time: 12/01/22 12:52 PM    Specimen: Blood   Result Value Ref Range    Hemoglobin 8.7 (L) 13.0 - 17.7 g/dL    Hematocrit 25.6 (L) 37.5 - 51.0 %       Ordered the above labs and independently reviewed the results.      PROCEDURES  Critical Care  Performed by: Perla Resendiz PA  Authorized by: Praneeth Ron MD     Critical care provider statement:     Critical care time (minutes):  32    Critical care was necessary to treat or prevent imminent or life-threatening deterioration of the following conditions:  Circulatory failure and shock    Critical care was time spent personally by me on the following activities:  Development of treatment plan with patient or surrogate, discussions with consultants, evaluation of patient's response to treatment, examination of patient, obtaining history from patient or surrogate, review of old charts, re-evaluation of patient's condition, pulse oximetry, ordering and review of radiographic studies, ordering and review of laboratory studies and ordering and performing treatments and interventions            MEDICATIONS GIVEN IN ER  Medications   sodium chloride 0.9 % flush 10 mL (has no administration in time range)   pantoprazole (PROTONIX) injection 40 mg (has no administration in time range)   polyethylene glycol (GoLYTELY) solution 2,000 mL (has no administration in time range)   pantoprazole (PROTONIX) injection 80 mg (80 mg Intravenous Given 12/1/22 0648)             PROGRESS AND CONSULTS    DDX includes but not limited to gastritis, bleeding ulcer, bleeding diverticula,  bleeding polypectomy site    ED Course as of 12/01/22 1556   Thu Dec 01, 2022   0616 Medical chart reviewed.  Patient admitted 11/25/2022 until 11/30/2022 when he presented with an acute blood loss anemia related to a GI bleed with hemorrhagic shock.  Underwent endoscopy and is status post clipping of a bleeding polypectomy site.  Antiplatelets were deemed okay to resume 7 days post endoscopic intervention.  Hemoglobin was 8.4 yesterday upon discharge and was as low as 5.6 while admitted. [KA]   0616 My initial evaluation of the patient he has having bleeding and has had a one-point hemoglobin drop since yesterday.  Currently his vitals are normal.  We will continue to monitor closely.  I ordered 2 units PRBCs and have a call out to GI.  I called the blood bank and the patient's blood for transfusion will be ready in 4 minutes. I called his nurse and they will send someone to get it to start it immediately   [KA]   0635 BUN: 10 [KA]   0635 Creatinine: 0.77 [KA]   0635 Glucose(!): 240 [KA]   0637 Lactate(!!): 3.7 [KA]   0725 I discussed the patient with Dr. Mortensen of the ICU.  He is familiar with the patient and formal recommendations of GI aware that the patient have an interventional radiology procedure if he had any recurrent bleeding.  He would like us to discuss with gastroenterology before he agrees to admit to make sure the services are available.  Repeat page out to the gastroenterology. [KA]   0757 I discussed the patient with Dr. Last, gastroenterologist.  He is agreeable with the plan for admission to the ICU. He is going off call and will notify the oncoming MD. [KA]   0757 Dr. Ron has discussed the patient with Dr. Holloway of radiology.  He is able to perform an interventional procedure for this patient including embolization or angiogram but will need confirmation and noticed from gastroenterology because the interventional room is unavailable and the patient will need to go to the OR for the  procedure. [KA]      ED Course User Index  [KA] Perla Resendiz PA             Patient was placed in face mask in first look. Patient was wearing facemask each time I entered the room and throughout our encounter. I wore protective equipment throughout this patient encounter including a face mask, eye shield and gloves. Hand hygiene was performed before donning protective equipment and after removal when leaving the room.        DIAGNOSIS  Final diagnoses:   Gastrointestinal hemorrhage, unspecified gastrointestinal hemorrhage type   Acute blood loss anemia           Latest Documented Vital Signs:  As of 15:56 EST  BP- 153/84 HR- 90 Temp- 97.8 °F (36.6 °C) (Oral) O2 sat- 98%       Perla Resendiz PA  12/01/22 1556      Electronically signed by Praneeth Ron MD at 12/02/22 0637     Praneeth Ron MD at 12/01/22 0707          MD ATTESTATION NOTE    The COLLEEN and I have discussed this patient's history, physical exam, and treatment plan.  I have reviewed the documentation and personally had a face to face interaction with the patient. I affirm the documentation and agree with the treatment and plan.  The attached note describes my personal findings.      I provided a substantive portion of the care of the patient.  I personally performed the physical exam in its entirety, and below are my findings.  For this patient encounter, the patient wore surgical mask, I wore full protective PPE including N95 and eye protection.      Brief HPI: Patient presents for recurrent rectal bleeding.  Patient was just admitted to the hospital and discharged on Wednesday.  Patient states he was doing well and then had 2 significant bloody bowel movements.  Patient states he feels lightheaded with movement.  Patient states he feels like his got a pass out.  Patient having no abdominal pain.    PHYSICAL EXAM  ED Triage Vitals [12/01/22 0532]   Temp Heart Rate Resp BP SpO2   98.9 °F (37.2 °C) 94 22 106/60 98 %      Temp src Heart Rate  Source Patient Position BP Location FiO2 (%)   Oral -- -- -- --         GENERAL: no acute distress.  Pale and diaphoretic  HENT: nares patent  EYES: no scleral icterus  CV: regular rhythm, normal rate  RESPIRATORY: normal effort  ABDOMEN: soft  MUSCULOSKELETAL: no deformity  NEURO: alert, moves all extremities, follows commands  PSYCH:  calm, cooperative  SKIN: warm, dry    Vital signs and nursing notes reviewed.        Plan: Patient has been started on Protonix.  Patient has been given emergent transfusion here.  Patient will need to be admitted again.       Praneeth Ron MD  12/01/22 0708      Electronically signed by Praneeth Ron MD at 12/01/22 0708     Marisol Gunn, RN at 12/01/22 1725         to dania WHITE MD     Electronically signed by Marisol Gunn, RN at 12/01/22 1726     Yandel Marvin at 12/01/22 1727        Dr. Last paged for RN for orders.    Electronically signed by Yandel Marvin at 12/01/22 1728     Marisol Gunn, STEVE at 12/01/22 1730        Call return from  on call, to clarify GoLytely order, verbal order to start GoLytely now c pt to consume half by 1800 and other half by 2200, no other orders at this time    Electronically signed by Marisol Gunn, RN at 12/01/22 1732         Vital Signs (last day)     Date/Time Temp Temp src Pulse Resp BP Patient Position SpO2    12/02/22 0700 97.8 (36.6) Oral 75 -- 135/75 -- 88    12/02/22 0600 -- -- 81 -- -- -- 96    12/02/22 0500 -- -- 64 -- 121/67 -- 97    12/02/22 0400 98 (36.7) Oral 73 -- -- -- 97    12/02/22 0300 -- -- 71 -- 114/71 -- 93    12/02/22 0230 -- -- 73 -- 128/73 -- 93    12/02/22 0200 -- -- 103 -- 144/72 -- 97    12/02/22 0130 -- -- 68 -- 131/79 -- 97    12/02/22 0100 -- -- 83 -- 154/89 -- 99    12/02/22 0030 -- -- 67 -- 133/69 -- 92    12/02/22 0000 -- -- 68 -- 141/73 -- 93    12/01/22 2330 -- -- 80 -- 122/89 -- 98    12/01/22 2300 -- -- 91 -- 149/95 -- 96    12/01/22 2230 -- -- 100 -- 180/110 -- 96    12/01/22 2200 -- --  76 -- 172/98 -- 93    12/01/22 2130 -- -- 81 -- 160/91 -- 95    12/01/22 2115 -- -- 97 -- -- -- 96    12/01/22 2030 -- -- 79 -- 156/86 -- 97    12/01/22 2000 -- -- 82 -- 175/95 -- 98    12/01/22 1900 98.5 (36.9) Oral 84 -- 144/89 -- 99    12/01/22 1816 -- -- 78 -- 159/77 -- 98    12/01/22 1801 -- -- 81 -- 170/87 -- 96    12/01/22 1746 -- -- 77 -- 168/85 -- 98    12/01/22 1731 -- -- 71 -- 140/70 -- 97    12/01/22 1716 -- -- 76 -- 160/94 -- 100    12/01/22 1701 -- -- 73 -- 156/83 -- 98    12/01/22 1646 -- -- 75 -- 155/85 -- 97    12/01/22 1642 -- -- 73 -- -- -- 98    12/01/22 1631 -- -- 74 -- 145/86 -- 97    12/01/22 1616 -- -- 74 -- 139/79 -- 96    12/01/22 1452 -- -- 90 -- -- -- 98    12/01/22 1428 -- -- 71 -- -- -- 96    12/01/22 1401 -- -- 70 -- 153/84 -- 97    12/01/22 1331 -- -- 73 -- 153/83 -- 98    12/01/22 1317 -- -- 70 -- -- -- 98    12/01/22 1316 -- -- -- -- 149/88 -- --    12/01/22 1301 -- -- 70 -- 150/86 -- 97    12/01/22 1246 -- -- 69 -- 146/90 -- 97    12/01/22 1146 -- -- 70 -- 139/74 -- 98    12/01/22 1131 -- -- 71 -- 143/81 -- 98    12/01/22 1046 -- -- 73 -- 146/83 -- 98    12/01/22 1031 -- -- 73 18 136/82 -- 100    12/01/22 1023 -- -- 69 -- -- -- 97    12/01/22 1016 -- -- -- -- 153/93 -- --    12/01/22 1001 -- -- 74 -- 137/75 -- 100    12/01/22 09:58:31 97.8 (36.6) Oral 70 18 139/70 Lying 100    12/01/22 0916 -- -- 69 18 148/78 -- 100    12/01/22 0846 -- -- 71 18 135/78 -- 99    12/01/22 0831 -- -- 71 -- 130/80 -- 100    12/01/22 0816 -- -- 75 -- 136/76 -- 100    12/01/22 0801 -- -- 78 -- 138/74 -- 99    12/01/22 0701 -- -- 75 -- 127/65 -- 100    12/01/22 06:56:49 98.1 (36.7) Oral 75 18 109/70 -- 98    12/01/22 0646 -- -- 76 -- 121/66 -- 97    12/01/22 06:40:36 98.1 (36.7) Oral -- -- -- -- --    12/01/22 0631 -- -- 79 -- 119/69 -- 96    12/01/22 0617 -- -- 80 -- -- -- 98    12/01/22 0616 -- -- 85 -- 112/70 -- --    12/01/22 0601 -- -- 80 -- 111/71 -- 94    12/01/22 0546 -- -- 86 -- -- -- 95     12/01/22 0542 -- -- -- -- 118/71 -- --    12/01/22 0532 98.9 (37.2) Oral 94 22 106/60 -- 98          Oxygen Therapy (last day)     Date/Time SpO2 Device (Oxygen Therapy) Flow (L/min) Oxygen Concentration (%) ETCO2 (mmHg)    12/02/22 0700 88 -- -- -- --    12/02/22 0600 96 -- -- -- --    12/02/22 0500 97 -- -- -- --    12/02/22 0400 97 -- -- -- --    12/02/22 0300 93 -- -- -- --    12/02/22 0230 93 -- -- -- --    12/02/22 0200 97 -- -- -- --    12/02/22 0130 97 CPAP -- -- --    12/02/22 0100 99 -- -- -- --    12/02/22 0047 -- room air -- -- --    12/02/22 0030 92 -- -- -- --    12/02/22 0000 93 CPAP -- -- --    12/01/22 2330 98 -- -- -- --    12/01/22 2300 96 -- -- -- --    12/01/22 2230 96 -- -- -- --    12/01/22 2200 93 -- -- -- --    12/01/22 2130 95 -- -- -- --    12/01/22 2115 96 -- -- -- --    12/01/22 2030 97 -- -- -- --    12/01/22 2000 98 room air -- -- --    12/01/22 1900 99 room air -- -- --    12/01/22 1816 98 -- -- -- --    12/01/22 1801 96 -- -- -- --    12/01/22 1746 98 -- -- -- --    12/01/22 1731 97 -- -- -- --    12/01/22 1716 100 -- -- -- --    12/01/22 1701 98 -- -- -- --    12/01/22 1646 97 -- -- -- --    12/01/22 1642 98 -- -- -- --    12/01/22 1631 97 -- -- -- --    12/01/22 1616 96 -- -- -- --    12/01/22 1452 98 -- -- -- --    12/01/22 1428 96 -- -- -- --    12/01/22 1401 97 -- -- -- --    12/01/22 1331 98 -- -- -- --    12/01/22 1317 98 -- -- -- --    12/01/22 1301 97 -- -- -- --    12/01/22 1246 97 -- -- -- --    12/01/22 1146 98 -- -- -- --    12/01/22 1131 98 -- -- -- --    12/01/22 1046 98 -- -- -- --    12/01/22 1031 100 -- -- -- --    12/01/22 1023 97 -- -- -- --    12/01/22 1001 100 -- -- -- --    12/01/22 09:58:31 100 room air -- -- --    12/01/22 0916 100 -- -- -- --    12/01/22 0846 99 -- -- -- --    12/01/22 0831 100 -- -- -- --    12/01/22 0816 100 -- -- -- --    12/01/22 0801 99 -- -- -- --    12/01/22 0701 100 -- -- -- --    12/01/22 06:56:49 98 -- -- -- --    12/01/22 0646 97  -- -- -- --    12/01/22 0631 96 -- -- -- --    12/01/22 0617 98 -- -- -- --    12/01/22 0601 94 -- -- -- --    12/01/22 0546 95 -- -- -- --    12/01/22 0532 98 room air -- -- --            Facility-Administered Medications as of 12/2/2022   Medication Dose Route Frequency Provider Last Rate Last Admin   • acetaminophen (TYLENOL) tablet 650 mg  650 mg Oral Q4H PRN Markos Mortensen MD        Or   • acetaminophen (TYLENOL) suppository 650 mg  650 mg Rectal Q4H PRN Markos Mortensen MD       • calcium gluconate 1g/50ml 0.675% NaCl IV SOLN  1 g Intravenous PRN Markos Mortensen MD        And   • calcium gluconate 6 g in sodium chloride 0.9 % 500 mL IVPB  6 g Intravenous PRN Markos Mortensen MD       • dextrose (D50W) (25 g/50 mL) IV injection 25 g  25 g Intravenous Q15 Min PRN Markos Mortensen MD       • dextrose (GLUTOSE) oral gel 15 g  15 g Oral Q15 Min PRN Markos Mortensen MD       • glucagon (human recombinant) (GLUCAGEN DIAGNOSTIC) injection 1 mg  1 mg Intramuscular Q15 Min PRN Markos Mortensen MD       • HYDROcodone-acetaminophen (NORCO) 5-325 MG per tablet 1 tablet  1 tablet Oral Q6H PRN Markos Mortensen MD       • insulin regular (humuLIN R,novoLIN R) injection 0-9 Units  0-9 Units Subcutaneous Q6H Markos Mortensen MD   2 Units at 12/02/22 0013   • ipratropium-albuterol (DUO-NEB) nebulizer solution 3 mL  3 mL Nebulization Q6H PRN Markos Mortensen MD       • labetalol (NORMODYNE,TRANDATE) injection 10 mg  10 mg Intravenous Q4H PRN Anthony Pereyra MD   10 mg at 12/02/22 0945   • LORazepam (ATIVAN) injection 0.5 mg  0.5 mg Intravenous Q4H PRN Anthony Pereyra MD   0.5 mg at 12/02/22 0945   • LORazepam (ATIVAN) tablet 0.5 mg  0.5 mg Oral Q6H PRN Markos Mortensen MD       • Magnesium Sulfate 2 gram Bolus, followed by 8 gram infusion (total Mg dose 10 grams)- Mg less than or equal to 1mg/dL  2 g Intravenous PRN Markos Mortensen MD        Or   • Magnesium Sulfate 2 gram / 50mL Infusion (GIVE X 3 BAGS TO EQUAL 6GM TOTAL  DOSE) - Mg 1.1 - 1.5 mg/dl  2 g Intravenous PRN Markos Mortensen MD        Or   • Magnesium Sulfate 4 gram infusion- Mg 1.6-1.9 mg/dL  4 g Intravenous PRN Markos Mortensen MD       • melatonin tablet 5 mg  5 mg Oral Nightly PRN Markos Mortensen MD       • ondansetron (ZOFRAN) tablet 4 mg  4 mg Oral Q6H PRN Markos Mortensen MD        Or   • ondansetron (ZOFRAN) injection 4 mg  4 mg Intravenous Q6H PRN Markos Mortensen MD   4 mg at 12/01/22 2112   • pantoprazole (PROTONIX) injection 40 mg  40 mg Intravenous BID AC Yandel Kingston MD   40 mg at 12/02/22 0830   • [COMPLETED] pantoprazole (PROTONIX) injection 80 mg  80 mg Intravenous Once Perla Resendiz PA   80 mg at 12/01/22 0648   • [COMPLETED] polyethylene glycol (GoLYTELY) solution 2,000 mL  2,000 mL Oral Q8H Yandel Kingston MD   2,000 mL at 12/01/22 2012   • potassium chloride (K-DUR,KLOR-CON) ER tablet 40 mEq  40 mEq Oral PRN Markos Mortensen MD        Or   • potassium chloride (KLOR-CON) packet 40 mEq  40 mEq Oral PRN Markos Mortensen MD        Or   • potassium chloride 10 mEq in 100 mL IVPB  10 mEq Intravenous Q1H PRN Markos Mortensen MD       • potassium phosphate 45 mmol in sodium chloride 0.9 % 500 mL infusion  45 mmol Intravenous PRN Markos Mortensen MD        Or   • potassium phosphate 30 mmol in sodium chloride 0.9 % 250 mL infusion  30 mmol Intravenous PRN Markos Mortensen MD        Or   • potassium phosphate 15 mmol in sodium chloride 0.9 % 100 mL infusion  15 mmol Intravenous PRN Markos Mortensen MD        Or   • sodium phosphates 40 mmol in sodium chloride 0.9 % 500 mL IVPB  40 mmol Intravenous PRN Markos Mortensen MD        Or   • sodium phosphates 20 mmol in sodium chloride 0.9 % 250 mL IVPB  20 mmol Intravenous PRN Markos Mortensen MD       • sodium chloride 0.9 % flush 10 mL  10 mL Intravenous PRN Praneeth Ron MD       • sodium chloride 0.9 % flush 10 mL  10 mL Intravenous Q12H Markos Mortensen MD   10 mL at 12/02/22 0804   • sodium chloride  0.9 % flush 10 mL  10 mL Intravenous PRN Markos Mortensen MD       • sodium chloride 0.9 % infusion 40 mL  40 mL Intravenous PRN Markos Mortensen MD       • sucralfate (CARAFATE) tablet 1 g  1 g Oral BID AC Markos Mortensen MD   1 g at 12/01/22 1848     Blood Administration Record (From admission, onward)    Completed transfusions     Ordered     Start    12/01/22 0623  Transfuse RBC, 2 Units Infuse Each Unit Over: 2H  Transfusion      Released Time Blood Unit Number Status   12/01/22 0633   22  [id number]  [id number] Completed 12/01/22 1038   12/01/22 0924   22  [id number]  [id number] Completed 12/01/22 1223       12/01/22 0622                Orders (last 24 hrs)      Start     Ordered    12/02/22 0600  Basic Metabolic Panel  Daily       12/01/22 1756    12/02/22 0600  CBC & Differential  Daily       12/01/22 1756 12/02/22 0600  CBC Auto Differential  PROCEDURE ONCE         12/01/22 2205 12/02/22 0209  LORazepam (ATIVAN) injection 0.5 mg  Every 4 Hours PRN         12/02/22 0210    12/02/22 0011  POC Glucose Once  PROCEDURE ONCE         12/02/22 0009    12/01/22 2256  labetalol (NORMODYNE,TRANDATE) injection 10 mg  Every 4 Hours PRN         12/01/22 2256    12/01/22 2100  polyethylene glycol (GoLYTELY) solution 2,000 mL  Every 8 Hours Scheduled         12/01/22 1106    12/01/22 2100  sodium chloride 0.9 % flush 10 mL  Every 12 Hours Scheduled         12/01/22 1756    12/01/22 1913  POC Glucose Once  PROCEDURE ONCE         12/01/22 1911 12/01/22 1819  POC Glucose Once  PROCEDURE ONCE         12/01/22 1816    12/01/22 1801  sucralfate (CARAFATE) tablet 1 g  2 Times Daily Before Meals         12/01/22 1759 12/01/22 1800  POC Glucose Q6H  Every 6 Hours       12/01/22 1756    12/01/22 1800  insulin regular (humuLIN R,novoLIN R) injection 0-9 Units  Every 6 Hours Scheduled         12/01/22 1756    12/01/22 1757  HYDROcodone-acetaminophen (NORCO) 5-325 MG per tablet 1 tablet  Every 6 Hours PRN          12/01/22 1758 12/01/22 1757  LORazepam (ATIVAN) tablet 0.5 mg  Every 6 Hours PRN         12/01/22 1757 12/01/22 1757  melatonin tablet 5 mg  Nightly PRN         12/01/22 1757 12/01/22 1757  Daily Weights  Daily       12/01/22 1756 12/01/22 1757  Patient Currently On Electrolyte Replacement Protocol - Please Refer to MAR for Protocol Details  Misc Nursing Order (Specify)  Daily      Comments: Patient Currently On Electrolyte Replacement Protocol - Please Refer to MAR for Protocol Details    12/01/22 1756 12/01/22 1756  ondansetron (ZOFRAN) tablet 4 mg  Every 6 Hours PRN        See Hyperspace for full Linked Orders Report.    12/01/22 1756 12/01/22 1756  ondansetron (ZOFRAN) injection 4 mg  Every 6 Hours PRN        See Hyperspace for full Linked Orders Report.    12/01/22 1756 12/01/22 1756  ipratropium-albuterol (DUO-NEB) nebulizer solution 3 mL  Every 6 Hours PRN         12/01/22 1756 12/01/22 1756  calcium gluconate 1g/50ml 0.675% NaCl IV SOLN  As Needed        See Hyperspace for full Linked Orders Report.    12/01/22 1756 12/01/22 1756  calcium gluconate 6 g in sodium chloride 0.9 % 500 mL IVPB  As Needed        Note to Pharmacy: Final concentration of bolus dose between 10 to 50 mg/mL.   See Hyperspace for full Linked Orders Report.    12/01/22 1756 12/01/22 1756  potassium phosphate 45 mmol in sodium chloride 0.9 % 500 mL infusion  As Needed        See Hyperspace for full Linked Orders Report.    12/01/22 1756 12/01/22 1756  potassium phosphate 30 mmol in sodium chloride 0.9 % 250 mL infusion  As Needed        See Hyperspace for full Linked Orders Report.    12/01/22 1756 12/01/22 1756  potassium phosphate 15 mmol in sodium chloride 0.9 % 100 mL infusion  As Needed        See Hyperspace for full Linked Orders Report.    12/01/22 1756 12/01/22 1756  sodium phosphates 40 mmol in sodium chloride 0.9 % 500 mL IVPB  As Needed        See Hyperspace for full Linked Orders Report.     12/01/22 1756 12/01/22 1756  sodium phosphates 20 mmol in sodium chloride 0.9 % 250 mL IVPB  As Needed        See Hyperspace for full Linked Orders Report.    12/01/22 1756 12/01/22 1755  Magnesium Sulfate 2 gram Bolus, followed by 8 gram infusion (total Mg dose 10 grams)- Mg less than or equal to 1mg/dL  As Needed        See Hyperspace for full Linked Orders Report.    12/01/22 1756    12/01/22 1755  Magnesium Sulfate 2 gram / 50mL Infusion (GIVE X 3 BAGS TO EQUAL 6GM TOTAL DOSE) - Mg 1.1 - 1.5 mg/dl  As Needed        See Hyperspace for full Linked Orders Report.    12/01/22 1756 12/01/22 1755  Magnesium Sulfate 4 gram infusion- Mg 1.6-1.9 mg/dL  As Needed        See Hyperspace for full Linked Orders Report.    12/01/22 1756 12/01/22 1755  potassium chloride (K-DUR,KLOR-CON) ER tablet 40 mEq  As Needed        See Hyperspace for full Linked Orders Report.    12/01/22 1756 12/01/22 1755  potassium chloride (KLOR-CON) packet 40 mEq  As Needed        See Hyperspace for full Linked Orders Report.    12/01/22 1756 12/01/22 1755  potassium chloride 10 mEq in 100 mL IVPB  Every 1 Hour PRN        See Hyperspace for full Linked Orders Report.    12/01/22 1756 12/01/22 1755  acetaminophen (TYLENOL) tablet 650 mg  Every 4 Hours PRN        See Hyperspace for full Linked Orders Report.    12/01/22 1756 12/01/22 1755  acetaminophen (TYLENOL) suppository 650 mg  Every 4 Hours PRN        See Hyperspace for full Linked Orders Report.    12/01/22 1756 12/01/22 1755  Vital Signs Per hospital policy  Per Hospital Policy         12/01/22 1756 12/01/22 1755  Cardiac Monitoring  Continuous         12/01/22 1756 12/01/22 1755  Continuous Pulse Oximetry  Continuous         12/01/22 1756 12/01/22 1755  Height and weight  Once         12/01/22 1756    12/01/22 1755  Intake and Output  Every Shift       12/01/22 1756    12/01/22 1755  Oral Care - Patient Not on NPPV & Not Intubated  Every Shift        12/01/22 1756    12/01/22 1755  If Patient has BG of < 80 and is symptomatic but not on an IV insulin protocol then use the Adult Hypoglycemia Treatment Orders.  Once         12/01/22 1756    12/01/22 1755  POC Glucose Once  Once        Comments: On arrival to unit. If >140, call admitting MD for orders.      12/01/22 1756    12/01/22 1755  Tobacco cessation education  Once         12/01/22 1756    12/01/22 1755  Follow Continuous Cardiac Monitoring Standing Orders  Continuous        Comments: Follow Standing Orders As Outlined in Process Instructions (Open Order Report to View Full Instructions)    12/01/22 1756    12/01/22 1755  ICU / CCU - Maintain IV Access  Continuous,   Status:  Canceled         12/01/22 1756    12/01/22 1755  ICU / CCU - Place Order Consult Intensivist For Critical Care Management (If Patient Not Admitted to Cardiology for Primary Cardiology Condition)  Continuous         12/01/22 1756    12/01/22 1755  ICU / CCU - Notify All Physicians When Patient is Transferred  Once         12/01/22 1756    12/01/22 1755  Use Mobility Guidelines for Advancement of Activity  Until Discontinued         12/01/22 1756    12/01/22 1755  Insert Peripheral IV  Once         12/01/22 1756    12/01/22 1755  Saline Lock & Maintain IV Access  Continuous         12/01/22 1756    12/01/22 1755  Code Status and Medical Interventions:  Continuous         12/01/22 1756    12/01/22 1755  Place Sequential Compression Device  Once         12/01/22 1756    12/01/22 1755  Maintain Sequential Compression Device  Continuous         12/01/22 1756    12/01/22 1754  sodium chloride 0.9 % flush 10 mL  As Needed         12/01/22 1756    12/01/22 1754  sodium chloride 0.9 % infusion 40 mL  As Needed         12/01/22 1756    12/01/22 1754  dextrose (GLUTOSE) oral gel 15 g  Every 15 Minutes PRN         12/01/22 1756    12/01/22 1754  dextrose (D50W) (25 g/50 mL) IV injection 25 g  Every 15 Minutes PRN         12/01/22 1756    12/01/22  1754  glucagon (human recombinant) (GLUCAGEN DIAGNOSTIC) injection 1 mg  Every 15 Minutes PRN         12/01/22 1756    12/01/22 1730  pantoprazole (PROTONIX) injection 40 mg  2 Times Daily Before Meals         12/01/22 1105    12/01/22 1556  Critical Care  Once        Comments: This order was created via procedure documentation    12/01/22 1555    12/01/22 1200  Hemoglobin & Hematocrit, Blood  Every 6 Hours       12/01/22 1115    12/01/22 1116  Inpatient Gastroenterology Consult  Once        Specialty:  Gastroenterology  Provider:  Gregor Last MD    12/01/22 1116    12/01/22 1116  Inpatient General Surgery Consult  Once        Specialty:  General Surgery  Provider:  Jose Manuel Baca MD    12/01/22 1116    12/01/22 1106  Case Request  Once         12/01/22 1106    12/01/22 1105  Diet: Liquid Diets; Clear Liquid; Texture: Regular Texture (IDDSI 7); Fluid Consistency: Thin (IDDSI 0)  Diet Effective Now         12/01/22 1105    12/01/22 0638  pantoprazole (PROTONIX) 40 mg in 100 mL NS (VTB)  Continuous,   Status:  Discontinued        See Hyperspace for full Linked Orders Report.    12/01/22 0637    12/01/22 0543  sodium chloride 0.9 % flush 10 mL  As Needed         12/01/22 0543    Unscheduled  Oxygen Therapy- Nasal Cannula; 2 LPM; Titrate for SPO2: equal to or greater than, 92%  Continuous PRN       12/01/22 0543    Unscheduled  Follow Hypoglycemia Standing Orders For Blood Glucose <70 & Notify Provider of Treatment  As Needed      Comments: Follow Hypoglycemia Orders As Outlined in Process Instructions (Open Order Report to View Full Instructions)  Notify Provider Any Time Hypoglycemia Treatment is Administered    12/01/22 1756    Unscheduled  Initiate & Follow Electrolyte Replacement Protocol  As Needed       12/01/22 1756    Unscheduled  Magnesium  As Needed       12/01/22 1756    Unscheduled  Potassium  As Needed       12/01/22 1756    Unscheduled  Calcium  As Needed      Comments: 6 hours after  infusion complete     See Hyperspace for full Linked Orders Report.    12/01/22 1756    Unscheduled  Patient May Use Home CPAP / BIPAP For Sleep or As Needed  As Needed       12/01/22 1757    Signed and Held  Implement Anesthesia orders day of procedure.  Once         Signed and Held    Signed and Held  Obtain informed consent  Once         Signed and Held    Signed and Held  Verify bowel prep was successful  Once         Signed and Held    Signed and Held  Give tap water enema if bowel prep was insufficient  Once         Signed and Held                   Physician Progress Notes (last 24 hours)      Shayne Aceves MD at 12/02/22 0800          Gastroenterology   Inpatient Progress Note    Reason for Follow Up: GI bleed    Subjective     Interval History:   Situation discussed with nursing staff this morning.  Patient apparently does have a bed at [facility] in the CCU and is being transferred there this morning.  He had good response to bowel prep with multiple liquid, dark red bowel movements.    Current Facility-Administered Medications:   •  acetaminophen (TYLENOL) tablet 650 mg, 650 mg, Oral, Q4H PRN **OR** acetaminophen (TYLENOL) suppository 650 mg, 650 mg, Rectal, Q4H PRN, Markos Mortensen MD  •  calcium gluconate 1g/50ml 0.675% NaCl IV SOLN, 1 g, Intravenous, PRN **AND** calcium gluconate 6 g in sodium chloride 0.9 % 500 mL IVPB, 6 g, Intravenous, PRN **AND** Calcium, , , PRN, Markos Mortensen MD  •  dextrose (D50W) (25 g/50 mL) IV injection 25 g, 25 g, Intravenous, Q15 Min PRN, Markos Mortensen MD  •  dextrose (GLUTOSE) oral gel 15 g, 15 g, Oral, Q15 Min PRN, Markos Mortensen MD  •  glucagon (human recombinant) (GLUCAGEN DIAGNOSTIC) injection 1 mg, 1 mg, Intramuscular, Q15 Min PRN, Markos Mortensen MD  •  HYDROcodone-acetaminophen (NORCO) 5-325 MG per tablet 1 tablet, 1 tablet, Oral, Q6H PRN, Markos Mortensen MD  •  insulin regular (humuLIN R,novoLIN R) injection 0-9 Units, 0-9 Units, Subcutaneous, Q6H,  Markos Mortensen MD, 2 Units at 12/02/22 0013  •  ipratropium-albuterol (DUO-NEB) nebulizer solution 3 mL, 3 mL, Nebulization, Q6H PRN, Markos Mortensen MD  •  labetalol (NORMODYNE,TRANDATE) injection 10 mg, 10 mg, Intravenous, Q4H PRN, Anthony Pereyra MD, 10 mg at 12/01/22 2302  •  LORazepam (ATIVAN) injection 0.5 mg, 0.5 mg, Intravenous, Q4H PRN, Anthony Pereyra MD, 0.5 mg at 12/02/22 0217  •  LORazepam (ATIVAN) tablet 0.5 mg, 0.5 mg, Oral, Q6H PRN, Markos Mortensen MD  •  Magnesium Sulfate 2 gram Bolus, followed by 8 gram infusion (total Mg dose 10 grams)- Mg less than or equal to 1mg/dL, 2 g, Intravenous, PRN **OR** Magnesium Sulfate 2 gram / 50mL Infusion (GIVE X 3 BAGS TO EQUAL 6GM TOTAL DOSE) - Mg 1.1 - 1.5 mg/dl, 2 g, Intravenous, PRN **OR** Magnesium Sulfate 4 gram infusion- Mg 1.6-1.9 mg/dL, 4 g, Intravenous, PRN, Markos Mortensen MD  •  melatonin tablet 5 mg, 5 mg, Oral, Nightly PRN, Markos Mortensen MD  •  ondansetron (ZOFRAN) tablet 4 mg, 4 mg, Oral, Q6H PRN **OR** ondansetron (ZOFRAN) injection 4 mg, 4 mg, Intravenous, Q6H PRN, Markos Mortensen MD, 4 mg at 12/01/22 2112  •  pantoprazole (PROTONIX) injection 40 mg, 40 mg, Intravenous, BID AC, Yandel Kingston MD, 40 mg at 12/01/22 1848  •  potassium chloride (K-DUR,KLOR-CON) ER tablet 40 mEq, 40 mEq, Oral, PRN **OR** potassium chloride (KLOR-CON) packet 40 mEq, 40 mEq, Oral, PRN **OR** potassium chloride 10 mEq in 100 mL IVPB, 10 mEq, Intravenous, Q1H PRN, Markos Moretnsen MD  •  potassium phosphate 45 mmol in sodium chloride 0.9 % 500 mL infusion, 45 mmol, Intravenous, PRN **OR** potassium phosphate 30 mmol in sodium chloride 0.9 % 250 mL infusion, 30 mmol, Intravenous, PRN **OR** potassium phosphate 15 mmol in sodium chloride 0.9 % 100 mL infusion, 15 mmol, Intravenous, PRN **OR** sodium phosphates 40 mmol in sodium chloride 0.9 % 500 mL IVPB, 40 mmol, Intravenous, PRN **OR** sodium phosphates 20 mmol in sodium chloride 0.9 % 250 mL IVPB, 20 mmol,  Intravenous, PRN, Markos Mortensen MD  •  sodium chloride 0.9 % flush 10 mL, 10 mL, Intravenous, PRN, Praneeth Ron MD  •  sodium chloride 0.9 % flush 10 mL, 10 mL, Intravenous, Q12H, Markos Mortensen MD, 10 mL at 12/01/22 2016  •  sodium chloride 0.9 % flush 10 mL, 10 mL, Intravenous, PRN, Markos Mortensen MD  •  sodium chloride 0.9 % infusion 40 mL, 40 mL, Intravenous, PRN, Markos Mortensen MD  •  sucralfate (CARAFATE) tablet 1 g, 1 g, Oral, BID AC, Markos Mortensen MD, 1 g at 12/01/22 1848  Review of Systems:    All systems were reviewed and negative except for:  Gastrointestinal: positive for  diarrhea    Objective     Vital Signs  Temp:  [97.8 °F (36.6 °C)-98.5 °F (36.9 °C)] 97.8 °F (36.6 °C)  Heart Rate:  [] 75  Resp:  [18] 18  BP: (114-180)/() 135/75  Body mass index is 41.49 kg/m².    Intake/Output Summary (Last 24 hours) at 12/2/2022 0800  Last data filed at 12/1/2022 2000  Gross per 24 hour   Intake 1715 ml   Output 300 ml   Net 1415 ml     No intake/output data recorded.     Physical Exam:   General: patient awake, alert and cooperative   Eyes: no scleral icterus   Skin: warm and dry, not jaundiced   Abdomen: soft, nontender, nondistended; normal bowel sounds, no masses palpated, no periumbical lymphadenopathy   Psychiatric: Appropriate affect and behavior     Results Review:     I reviewed the patient's new clinical results.    Results from last 7 days   Lab Units 12/02/22  0536 12/01/22  2337 12/01/22 2011 12/01/22  1252 12/01/22  0544 11/30/22  0459   WBC 10*3/mm3 6.14  --   --   --  9.28 6.70   HEMOGLOBIN g/dL 7.5*  7.5* 8.6* 7.5*   < > 7.4* 8.4*   HEMATOCRIT % 23.9*  23.9* 25.5* 23.2*   < > 22.2* 24.7*   PLATELETS 10*3/mm3 175  --   --   --  191 139*    < > = values in this interval not displayed.     Results from last 7 days   Lab Units 12/02/22  0536 12/01/22  0544 11/30/22  0459 11/26/22  1336 11/26/22  0504   SODIUM mmol/L 144 140 140   < > 145   POTASSIUM mmol/L 3.6 3.6 3.7    < > 4.0   CHLORIDE mmol/L 106 105 105   < > 117*   CO2 mmol/L 30.1* 24.5 28.4   < > 21.4*   BUN mg/dL 12 10 8   < > 24*   CREATININE mg/dL 0.70* 0.77 0.66*   < > 1.20   CALCIUM mg/dL 8.0* 7.7* 8.1*   < > 6.3*   BILIRUBIN mg/dL  --  0.3  --   --  0.4   ALK PHOS U/L  --  55  --   --  48   ALT (SGPT) U/L  --  8  --   --  8   AST (SGOT) U/L  --  12  --   --  11   GLUCOSE mg/dL 114* 240* 122*   < > 280*    < > = values in this interval not displayed.     Results from last 7 days   Lab Units 11/26/22  1032 11/25/22  0803   INR  1.25* 0.95     No results found for: LIPASE    Radiology:  No orders to display       Assessment & Plan     Patient Active Problem List   Diagnosis   • Essential hypertension   • Hyperglycemia   • Hyperlipidemia   • Class 3 severe obesity due to excess calories with serious comorbidity and body mass index (BMI) of 40.0 to 44.9 in adult ([facility])   • Nocturia   • Nonrheumatic aortic valve insufficiency   • Myocardial ischemia   • Coronary artery disease involving native coronary artery of native heart   • Ascending aortic aneurysm   • CAD in native artery   • S/P CABG (coronary artery bypass graft)   • S/P AVR (aortic valve replacement)   • Status post ascending aortic aneurysm repair   • Fatigue   • Abnormal nuclear stress test   • Coronary artery disease   • Coronary artery disease of bypass graft of native heart with stable angina pectoris ([facility])   • Acute chest pain   • TJ on auto CPAP   • Hypersomnia due to medical condition   • Angina at rest ([facility])   • Type 2 diabetes mellitus, without long-term current use of insulin ([facility])   • Anemia   • Ulcer of esophagus without bleeding   • Polyp of colon   • Rectal bleeding   • Gastrointestinal hemorrhage   • Malignant neoplasm of lower third of esophagus ([facility])   • Gastrointestinal hemorrhage, unspecified gastrointestinal hemorrhage type     These problems are new to me  Assessment:  1. Hematochezia secondary to post polypectomy bleed.  Hemoglobin stable.   Status post Hemoclip of polypectomy site in the cecum.  2. Acute blood loss anemia.  Hemoglobin stable.  3. History of esophageal cancer status post endoscopic mucosal resection  4. Coronary artery disease with antiplatelet therapy currently being held      Plan:  · Plan for transfer to [facility] today for repeat colonoscopy  · Monitor H&H  I discussed the patients findings and my recommendations with patient and nursing staff.    MD Shayne Gonzalez M.D.  [facility] [place] [address]  Office: [phone]    Electronically signed by Shayne Aceves MD at 12/02/22 0806          Consult Notes (last 24 hours)      Jose Manuel Baca MD at 12/01/22 1510      Consult Orders    1. Inpatient General Surgery Consult [[id number]] ordered by Markos Mortensen MD at 12/01/22 1116               Consultation note    Referring physician: Markos Mortensen MD    Consulting Physician: Jose Manuel Baca MD    Reason for consultation: GI bleed    Chief Complaint   Patient presents with   • Rectal Bleeding       HPI:   The patient is a very pleasant 62 y.o. years old male that presented to the hospital with rectal bleeding.  The patient was recently admitted to the hospital after he had massive GI bleed after endoscopic mucosal resection of 5 cecal polyp.  He was discharged yesterday.  He was at home this morning and had 1 dark red bowel movement.  This was followed by bright red bloody bowel movement.  He was seen in the emergency room and was found to have a hemoglobin of 7.4.  He had 2 units of PRBC.  His follow-up hemoglobin was 8.7.  He denies any abdominal pain.  No more bleeding after arrival to the ER      Past Medical History:   Diagnosis Date   • Abnormal nuclear stress test    • Aortic valve insufficiency     nonrheumtic    • Ascending aortic aneurysm    • CAD (coronary artery disease)    • Chest pain    • Diabetes  mellitus (HCC)    • Dizzy     CAREFUL WHEN GETTING UP   • Dyspnea    • Essential hypertension    • Fatigue    • Hyperglycemia    • Hyperlipidemia    • Hypersomnia with sleep apnea    • Lightheadedness    • Malaise and fatigue    • Morbid obesity (HCC)    • Myocardial ischemia    • Nocturia    • TJ on auto CPAP 10/31/2016    Overnight polysomnogram.  Weight 276 pounds.  Severe TJ with AHI 79 events per hour.  Auto CPAP recommended.   • Pneumonia     FEB 2016   • Scrotal bleeding    • Shortness of breath        Past Surgical History:   Procedure Laterality Date   • AORTIC VALVE REPAIR/REPLACEMENT  05/2016   • ASCENDING ARCH/HEMIARCH REPLACEMENT N/A 5/2/2016    Procedure: BHAVESH STERNOTOMY CORONARY ARTERY BYPASS GRAFT TIMES 3 USING LEFT INTERNAL MAMMARY ARTERY AND RIGHT GREATER SAPHENOUS VEIN GRAFT PER ENDOSCOPIC VEIN HARVESTING, AORTIC ANEURYSM REPAIR WITH ROOT REPAIR AND AORTIC VALVE REPLACEMENT;  Surgeon: Rosalio Cline MD;  Location: [facility] OR;  Service:    • CARDIAC CATHETERIZATION N/A 4/1/2016    Procedure: Left Heart Cath;  Surgeon: Erick Tma MD;  Location: [facility] CATH INVASIVE LOCATION;  Service:    • CARDIAC CATHETERIZATION N/A 4/1/2016    Procedure: Left ventriculography;  Surgeon: Erick Tam MD;  Location: [facility] CATH INVASIVE LOCATION;  Service:    • CARDIAC CATHETERIZATION N/A 4/1/2016    Procedure: Right Heart Cath;  Surgeon: Erick Tam MD;  Location: [facility] CATH INVASIVE LOCATION;  Service:    • CARDIAC CATHETERIZATION N/A 10/30/2017    Procedure: Coronary angiography;  Surgeon: Sorin Vasquez MD;  Location: [facility] CATH INVASIVE LOCATION;  Service:    • CARDIAC CATHETERIZATION  10/30/2017    Procedure: Saphenous Vein Graft;  Surgeon: Sorin Vasquez MD;  Location: [facility] CATH INVASIVE LOCATION;  Service:    • CARDIAC CATHETERIZATION N/A 10/30/2017    Procedure: Native mammary injection;  Surgeon: Sorin Vasquez MD;  Location: [facility] CATH INVASIVE LOCATION;  Service:    • CARDIAC  CATHETERIZATION N/A 7/7/2020    Procedure: Coronary angiography;  Surgeon: Gregor Kim MD;  Location:  [facility] CATH INVASIVE LOCATION;  Service: Cardiovascular;  Laterality: N/A;   • CARDIAC CATHETERIZATION N/A 7/7/2020    Procedure: Left heart cath;  Surgeon: Gregor Kim MD;  Location:  [facility] CATH INVASIVE LOCATION;  Service: Cardiovascular;  Laterality: N/A;   • CARDIAC CATHETERIZATION N/A 7/7/2020    Procedure: Left ventriculography;  Surgeon: Gregor Kim MD;  Location:  [facility] CATH INVASIVE LOCATION;  Service: Cardiovascular;  Laterality: N/A;   • CARDIAC CATHETERIZATION N/A 7/7/2020    Procedure: Stent ESMER bypass graft;  Surgeon: Gregor Kim MD;  Location:  [facility] CATH INVASIVE LOCATION;  Service: Cardiovascular;  Laterality: N/A;   • CARDIAC CATHETERIZATION N/A 6/17/2021    Procedure: SAPHENOUS VEIN GRAFT;  Surgeon: Marques Ndiaye MD;  [facility];  Service: Cardiovascular;  Laterality: N/A;   • CARDIAC CATHETERIZATION N/A 6/17/2021    Procedure: Left Heart Cath;  Surgeon: Marques Ndiaye MD;  [facility];  Service: Cardiovascular;  Laterality: N/A;   • CARDIAC CATHETERIZATION N/A 6/17/2021    Procedure: Coronary angiography;  Surgeon: Marques Ndiaye MD;  [facility];  Service: Cardiovascular;  Laterality: N/A;   • CARDIAC CATHETERIZATION N/A 7/14/2022    Procedure: Left Heart Cath;  Surgeon: Charlie Aj MD;  [facility];  Service: Cardiovascular;  Laterality: N/A;   • CARDIAC CATHETERIZATION N/A 7/14/2022    Procedure: Coronary angiography;  Surgeon: Charlie Aj MD;  [facility];  Service: Cardiovascular;  Laterality: N/A;   • CARDIAC CATHETERIZATION  7/14/2022    Procedure: Saphenous Vein Graft;  Surgeon: Charlie Aj MD;  [facility];  Service: Cardiovascular;;   • CARDIAC  CATHETERIZATION N/A 7/14/2022    Procedure: Native mammary injection;  Surgeon: Charlie Aj MD;  Location: [facility] CATH INVASIVE LOCATION;  Service: Cardiovascular;  Laterality: N/A;   • COLONOSCOPY N/A 11/26/2022    Procedure: COLONOSCOPY AT BEDSIDE;  Surgeon: Tomy Lopez MD;  Location: [facility] MAIN OR;  Service: Gastroenterology;  Laterality: N/A;   • COLONOSCOPY W/ POLYPECTOMY N/A 10/4/2022    Procedure: COLONOSCOPY to cecum with cold forceps and cold snare polypectomies;  Surgeon: Gregor Carlson MD;  Location: [facility] ENDOSCOPY;  Service: Gastroenterology;  Laterality: N/A;  PRE- hx of polyps  POST- diverticulosis, polyps   • CORONARY ARTERY BYPASS GRAFT  05/2016    LIMA TO LAD, SVG TO PDA, SVG TO OM2   • ENDOSCOPY N/A 7/13/2022    Procedure: ESOPHAGOGASTRODUODENOSCOPY;  Surgeon: Marcia Hollis MD;  Location: [facility] ENDOSCOPY;  Service: Gastroenterology;  Laterality: N/A;  PRE- ANEMIA, MELENA  POST- MILD EROSIVE GASTRITIS, GE JUNCTION ULCER   • ENDOSCOPY N/A 10/4/2022    Procedure: ESOPHAGOGASTRODUODENOSCOPY with biopsies;  Surgeon: Gregor Carlson MD;  Location: [facility] ENDOSCOPY;  Service: Gastroenterology;  Laterality: N/A;  PRE- hx of esophageal ulcer  POST- gastric cardia mass   • INNER EAR SURGERY     • TONSILLECTOMY           Current Facility-Administered Medications:   •  pantoprazole (PROTONIX) injection 40 mg, 40 mg, Intravenous, BID AC, Yandel Kingston MD  •  polyethylene glycol (GoLYTELY) solution 2,000 mL, 2,000 mL, Oral, Q8H, Yandel Kingston MD  •  sodium chloride 0.9 % flush 10 mL, 10 mL, Intravenous, PRN, Praneeth Ron MD    Current Outpatient Medications:   •  albuterol sulfate  (90 Base) MCG/ACT inhaler, Inhale 2 puffs Every 6 (Six) Hours As Needed for Wheezing., Disp: 1 inhaler, Rfl: 0  •  amLODIPine (NORVASC) 10 MG tablet, Take 10 mg by mouth Daily., Disp: , Rfl:   •  atorvastatin (LIPITOR) 40 MG tablet, Take 1 tablet by mouth Daily., Disp: 90 tablet,  Rfl: 2  •  Farxiga 10 MG tablet, , Disp: , Rfl:   •  ferrous sulfate 325 (65 FE) MG tablet, Take 325 mg by mouth Daily With Breakfast., Disp: , Rfl:   •  glucosamine sulfate 500 MG capsule capsule, Take  by mouth Daily., Disp: , Rfl:   •  isosorbide mononitrate (IMDUR) 60 MG 24 hr tablet, Take 1.5 tablets by mouth Daily., Disp: 135 tablet, Rfl: 2  •  lisinopril (PRINIVIL,ZESTRIL) 10 MG tablet, , Disp: , Rfl:   •  metFORMIN ER (GLUCOPHAGE-XR) 500 MG 24 hr tablet, Take 1 tablet by mouth Daily With Dinner., Disp: 30 tablet, Rfl: 5  •  metoprolol succinate XL (TOPROL-XL) 25 MG 24 hr tablet, Take 1 tablet by mouth Daily., Disp: 90 tablet, Rfl: 3  •  pantoprazole (PROTONIX) 40 MG EC tablet, Take 1 tablet by mouth Every Morning., Disp: 30 tablet, Rfl: 5  •  polyethylene glycol (MIRALAX) 17 GM/SCOOP powder, Mix one capful (17 g) in liquid and drink by mouth Daily for 30 days., Disp: 510 g, Rfl: 0  •  potassium chloride 10 MEQ CR tablet, Take 1 tablet by mouth Daily., Disp: 30 tablet, Rfl: 5  •  sucralfate (CARAFATE) 1 g tablet, Take 1 tablet by mouth 2 (Two) Times a Day Before Meals., Disp: 60 tablet, Rfl: 5    Allergies   Allergen Reactions   • Bystolic [Nebivolol Hcl]        Social History     Socioeconomic History   • Marital status: Single   Tobacco Use   • Smoking status: Never   • Smokeless tobacco: Never   Vaping Use   • Vaping Use: Never used   Substance and Sexual Activity   • Alcohol use: Yes     Comment: 1 drink/monthly   • Drug use: No   • Sexual activity: Defer       Family History   Problem Relation Age of Onset   • Hypertension Mother    • Diabetes Mother    • Heart disease Mother    • Hypertension Father    • Lung cancer Father    • Heart disease Sister    • Stroke Maternal Grandmother    • Heart disease Maternal Grandmother    • Hypertension Maternal Grandfather    • Hypertension Paternal Grandmother    • Hypertension Paternal Grandfather    • Other Other         The patient states that his sister has a  "problem that goes by the name of \"long QT\".  He says this is a familial trait but he also says that he is \"not interested in pursuing it for himself\".   • Coronary artery disease Other        ROS:   Constitutional: denies any weight changes, reports fatigue.  Eyes: : denies blurred or double vision  Cardiovascular: denies chest pain, palpitations, edemas.  Respiratory: denies cough, sputum, SOB.  Gastrointestinal: denies N&V, abd pain, diarrhea, constipation.  Reports rectal bleed  Genitourinary: denies dysuria, frequency.  Endocrine: denies cold intolerance, lethargy and flushing.  Hem: denies excessive bruising and postop bleeding.  Musculoskeletal: denies weakness, joint swelling, pain or stiffness.  Neuro: denies seizures, CVA, paresthesia, or peripheral neuropathy.   Skin: denies change in nevi, rashes, masses.    Physical Exam:   Vitals:    12/01/22 1452   BP:    Pulse: 90   Resp:    Temp:    SpO2: 98%     GENERAL:alert, well appearing, and in no distress and chronically ill appearing  HEENT: normochephalic, atraumatic, no scleral icterus moist mucous membranes.  NECK: Supple there is no thyromegaly or lymphadenopathy  CHEST: clear to auscultation, no wheezes, rales or rhonchi, symmetric air entry  CARDIAC: regular rate and rhythm    ABDOMEN: soft, nontender, nondistended, no masses or organomegaly  EXTREMITIES: no cyanosis, clubbing or edema    NEURO: alert and oriented, normal speech, cranial nerves 2-12 grossly intact, no focal deficits   SKIN: Moist, warm, no rashes.    Diagnostic workup:   Hemoglobin 8.7 from 7.4, white blood cell count 9.2  Lactate 1.7    Assessment and plan:   The patient is a very pleasant 62 y.o. years old male with GI bleed likely from prior polypectomy site at the cecum.  Patient already have colonoscopy while admitted during the weekend.  Patient continued to have bleeding.  I discussed with the patient about further step.  I think that he should be transferred to [facility] " so Dr. Blanc can treat for complication after endoscopic mucosal resection.    If he continues to be here then he will need to undergo further colonoscopy.  Surgical resection will be left only for inability to control bleeding by endoscopic or radiology means.  He understood    Case was discussed with patient.    Risk and benefits of the current recommended treatment were explained to the patient that had time to ask questions that where answered, verbalized understanding and agreed with the plan.     Jose Manuel Baca MD  General, Minimally Invasive and Endoscopic Surgery  [facility] [place] [address], [address]  [place], [place], [address]  P: [phone]  F: [phone]     Electronically signed by Jose Manuel Baca MD at 12/01/22 4986

## 2022-12-02 NOTE — PAYOR COMM NOTE
"Edmond Chase (62 y.o. Male)                                ATTENTION; PLEASE CANCEL PREVIOUS INPATIENT AUTHORIZATION REQUEST                             FOR MEMBER ID #[id number]  ,   REPLY TO [facility] DEPT  [phone] OR CALL                            [phone] GREGG LAGUERRESal GIFFORD  (PATIENT STATUS CHANGED TO OBSERVATION )       Date of Birth   1960    Social Security Number       Address   [address]  [facility] [address]    Home Phone   [phone]    MRN   [id number]       Sabianism   None    Marital Status   Single                            Admission Date   12/1/22    Admission Type   Emergency    Admitting Provider   Markos Mortensen MD    Attending Provider   Markos Mortensen MD    Department, Room/Bed   [facility] INTENSIVE CARE, I385/1       Discharge Date       Discharge Disposition       Discharge Destination                               Attending Provider: Markos Mortensen MD    Allergies: Bystolic [Nebivolol Hcl]    Isolation: None   Infection: None   Code Status: CPR    Ht: 185.4 cm (72.99\")   Wt: 143 kg (314 lb 6 oz)    Admission Cmt: None   Principal Problem: Gastrointestinal hemorrhage, unspecified gastrointestinal hemorrhage type [K92.2]                 Active Insurance as of 12/1/2022     Primary Coverage     Payor Plan Insurance Group Employer/Plan Group    [employer] BY J[employer]I [facility] BY JODI [id number]     Payor Plan Address Payor Plan Phone Number Payor Plan Fax Number Effective Dates    [address] [address]   1/1/2021 - None Entered    [facility] [address]       Subscriber Name Subscriber Birth Date Member ID       EDMOND CHASE 1960 [id number]                 Emergency Contacts      (Rel.) Home Phone Work Phone Mobile Phone    Melinda Ahuja [facility] (Relative) [phone] -- [phone]    Sadia Ahuja [facility] (Relative) [phone] -- [phone]    Kyra Ahuja (Sister) [phone] -- [phone]    "

## 2022-12-02 NOTE — TELEPHONE ENCOUNTER
Explained to patient that we will cancel Mondays appointment since he is being transferred to [facility].     Also told patient to call us once he is discharged from [facility] so we can schedule all of our studies before he follows up back in our office.     Pt stated understanding.

## 2022-12-06 ENCOUNTER — OFFICE VISIT (OUTPATIENT)
Dept: GASTROENTEROLOGY | Facility: CLINIC | Age: 62
End: 2022-12-06

## 2022-12-06 ENCOUNTER — TELEPHONE (OUTPATIENT)
Dept: GASTROENTEROLOGY | Facility: CLINIC | Age: 62
End: 2022-12-06

## 2022-12-06 VITALS
TEMPERATURE: 98.3 F | WEIGHT: 308.5 LBS | HEIGHT: 73 IN | BODY MASS INDEX: 40.89 KG/M2 | HEART RATE: 97 BPM | DIASTOLIC BLOOD PRESSURE: 74 MMHG | SYSTOLIC BLOOD PRESSURE: 129 MMHG

## 2022-12-06 DIAGNOSIS — C15.5 MALIGNANT NEOPLASM OF LOWER THIRD OF ESOPHAGUS: Primary | ICD-10-CM

## 2022-12-06 DIAGNOSIS — K63.5 POLYP OF COLON, UNSPECIFIED PART OF COLON, UNSPECIFIED TYPE: ICD-10-CM

## 2022-12-06 PROCEDURE — 99213 OFFICE O/P EST LOW 20 MIN: CPT | Performed by: INTERNAL MEDICINE

## 2022-12-06 RX ORDER — LISINOPRIL 10 MG/1
10 TABLET ORAL EVERY EVENING
COMMUNITY
End: 2023-02-10 | Stop reason: HOSPADM

## 2022-12-06 RX ORDER — PANTOPRAZOLE SODIUM 40 MG/1
40 TABLET, DELAYED RELEASE ORAL
Qty: 30 TABLET | Refills: 5 | Status: SHIPPED | OUTPATIENT
Start: 2022-12-06 | End: 2022-12-15 | Stop reason: SDUPTHER

## 2022-12-06 RX ORDER — POTASSIUM CHLORIDE 750 MG/1
10 TABLET, FILM COATED, EXTENDED RELEASE ORAL DAILY
COMMUNITY
End: 2023-02-10 | Stop reason: HOSPADM

## 2022-12-06 RX ORDER — AMLODIPINE BESYLATE 10 MG/1
10 TABLET ORAL DAILY
COMMUNITY
End: 2023-02-10 | Stop reason: HOSPADM

## 2022-12-06 RX ORDER — ATORVASTATIN CALCIUM 40 MG/1
40 TABLET, FILM COATED ORAL NIGHTLY
Status: ON HOLD | COMMUNITY
End: 2023-02-10 | Stop reason: SDUPTHER

## 2022-12-06 RX ORDER — ERGOCALCIFEROL 1.25 MG/1
50000 CAPSULE ORAL
COMMUNITY
Start: 2022-11-15 | End: 2022-12-14

## 2022-12-06 RX ORDER — ISOSORBIDE MONONITRATE 60 MG/1
60 TABLET, EXTENDED RELEASE ORAL DAILY
COMMUNITY
End: 2023-02-10 | Stop reason: HOSPADM

## 2022-12-06 RX ORDER — DAPAGLIFLOZIN 10 MG/1
10 TABLET, FILM COATED ORAL DAILY
Status: ON HOLD | COMMUNITY
End: 2023-02-10 | Stop reason: SDUPTHER

## 2022-12-06 NOTE — PROGRESS NOTES
Chief Complaint   Patient presents with   • GI Bleeding     Subjective     HPI  Edmond hCase is a 62 y.o. male who presents today for office follow up.  This gentleman is status post endoscopic mucosal resection of a gastric cardia lesion as well as a large laterally spreading colon polyp with Dr. Yassine simental at [facility].  His GE junction lesion showed evidence of submucosal invasion but stages 1B and has been referred to Dr. Nina beltran with thoracic surgery for possible esophagectomy.  His colon polyp was a sessile serrated adenoma with recommendation for 6 to 12-month follow-up.    He did develop postoperative bleeding from his polypectomy site he presented here to [facility] and underwent colonoscopy with 1 my partners with clipping of the polypectomy site in the cecum.  He then represented with further bleeding few days after discharge was sent down to [facility] where he was monitored for 3 days and did not have any further bleeding and did not undergo any further endoscopic evaluation.  He was just discharged yesterday.  Feels weak but overall improving.      Objective   Vitals:    12/06/22 1529   BP: 129/74   Pulse: 97   Temp: 98.3 °F (36.8 °C)       Physical Exam  Vitals reviewed.   Constitutional:       Appearance: He is well-developed.   HENT:      Head: Normocephalic and atraumatic.   Neurological:      Mental Status: He is alert and oriented to person, place, and time.   Psychiatric:         Behavior: Behavior normal.         Thought Content: Thought content normal.         Judgment: Judgment normal.                Assessment & Plan   Assessment:     1. Malignant neoplasm of lower third of esophagus (HCC)    2. Polyp of colon, unspecified part of colon, unspecified type      Plan:   Office f/u in 4 mos, will determine timing of f/u colonoscopy at that time  Recommend daily iron supplement  He is awaiting PET scan and f/u with Dr Stockton for his esophageal cancer            Gregor  MILAN Carlson M.D.  [facility]  [address] [place] [address]  Office: [phone]

## 2022-12-07 ENCOUNTER — TELEPHONE (OUTPATIENT)
Dept: CARDIOLOGY | Facility: CLINIC | Age: 62
End: 2022-12-07

## 2022-12-08 RX ORDER — AMOXICILLIN 500 MG/1
2000 CAPSULE ORAL SEE ADMIN INSTRUCTIONS
COMMUNITY
End: 2022-12-08 | Stop reason: SDUPTHER

## 2022-12-08 RX ORDER — AMOXICILLIN 500 MG/1
CAPSULE ORAL
Qty: 4 CAPSULE | Refills: 0 | Status: SHIPPED | OUTPATIENT
Start: 2022-12-08 | End: 2022-12-14

## 2022-12-09 ENCOUNTER — TELEPHONE (OUTPATIENT)
Dept: SURGERY | Facility: CLINIC | Age: 62
End: 2022-12-09

## 2022-12-09 NOTE — TELEPHONE ENCOUNTER
Spoke w/ pt over the phone to discuss details of upcoming PFT, PET, and stress echo. Pt stated understanding of where to arrive, when, and how to prep for each procedure.

## 2022-12-12 ENCOUNTER — HOSPITAL ENCOUNTER (OUTPATIENT)
Dept: PET IMAGING | Facility: HOSPITAL | Age: 62
Discharge: HOME | End: 2022-12-12

## 2022-12-12 DIAGNOSIS — C15.9 MALIGNANT NEOPLASM OF ESOPHAGUS, UNSPECIFIED LOCATION: ICD-10-CM

## 2022-12-12 LAB — GLUCOSE BLDC GLUCOMTR-MCNC: 149 MG/DL (ref 70–130)

## 2022-12-12 PROCEDURE — 82962 GLUCOSE BLOOD TEST: CPT

## 2022-12-12 PROCEDURE — A9552 F18 FDG: HCPCS | Performed by: NURSE PRACTITIONER

## 2022-12-12 PROCEDURE — 78815 PET IMAGE W/CT SKULL-THIGH: CPT

## 2022-12-12 PROCEDURE — 0 FLUDEOXYGLUCOSE F18 SOLUTION: Performed by: NURSE PRACTITIONER

## 2022-12-12 RX ADMIN — FLUDEOXYGLUCOSE F18 1 DOSE: 300 INJECTION INTRAVENOUS at 08:19

## 2022-12-14 ENCOUNTER — OFFICE VISIT (OUTPATIENT)
Dept: INTERNAL MEDICINE | Age: 62
End: 2022-12-14

## 2022-12-14 ENCOUNTER — HOSPITAL ENCOUNTER (OUTPATIENT)
Facility: HOSPITAL | Age: 62
Setting detail: OBSERVATION
Discharge: HOME | End: 2022-12-15
Attending: EMERGENCY MEDICINE | Admitting: STUDENT IN AN ORGANIZED HEALTH CARE EDUCATION/TRAINING PROGRAM

## 2022-12-14 ENCOUNTER — APPOINTMENT (OUTPATIENT)
Dept: CARDIOLOGY | Facility: HOSPITAL | Age: 62
End: 2022-12-14

## 2022-12-14 ENCOUNTER — APPOINTMENT (OUTPATIENT)
Dept: GENERAL RADIOLOGY | Facility: HOSPITAL | Age: 62
End: 2022-12-14

## 2022-12-14 VITALS
HEART RATE: 88 BPM | BODY MASS INDEX: 41.48 KG/M2 | HEIGHT: 73 IN | DIASTOLIC BLOOD PRESSURE: 80 MMHG | SYSTOLIC BLOOD PRESSURE: 160 MMHG | TEMPERATURE: 97.6 F | OXYGEN SATURATION: 97 % | WEIGHT: 313 LBS

## 2022-12-14 DIAGNOSIS — R06.02 SHORTNESS OF BREATH: Primary | ICD-10-CM

## 2022-12-14 DIAGNOSIS — D64.9 ANEMIA, UNSPECIFIED TYPE: ICD-10-CM

## 2022-12-14 DIAGNOSIS — R42 LIGHTHEADEDNESS: ICD-10-CM

## 2022-12-14 DIAGNOSIS — R07.9 EXERTIONAL CHEST PAIN: Primary | ICD-10-CM

## 2022-12-14 DIAGNOSIS — C15.5 MALIGNANT NEOPLASM OF LOWER THIRD OF ESOPHAGUS: ICD-10-CM

## 2022-12-14 DIAGNOSIS — R06.09 DOE (DYSPNEA ON EXERTION): ICD-10-CM

## 2022-12-14 PROBLEM — D50.9 IRON DEFICIENCY ANEMIA: Status: ACTIVE | Noted: 2022-12-14

## 2022-12-14 LAB
ABO GROUP BLD: NORMAL
ALBUMIN SERPL-MCNC: 3.7 G/DL (ref 3.5–5.2)
ALBUMIN/GLOB SERPL: 1.9 G/DL
ALP SERPL-CCNC: 72 U/L (ref 39–117)
ALT SERPL W P-5'-P-CCNC: 8 U/L (ref 1–41)
ANION GAP SERPL CALCULATED.3IONS-SCNC: 8 MMOL/L (ref 5–15)
AORTIC DIMENSIONLESS INDEX: 0.6 (DI)
AST SERPL-CCNC: 12 U/L (ref 1–40)
BASOPHILS # BLD AUTO: 0.02 10*3/MM3 (ref 0–0.2)
BASOPHILS NFR BLD AUTO: 0.3 % (ref 0–1.5)
BH CV ECHO MEAS - ACS: 1.49 CM
BH CV ECHO MEAS - AO MAX PG: 26.7 MMHG
BH CV ECHO MEAS - AO MEAN PG: 13 MMHG
BH CV ECHO MEAS - AO ROOT DIAM: 4.4 CM
BH CV ECHO MEAS - AO V2 MAX: 258.6 CM/SEC
BH CV ECHO MEAS - AO V2 VTI: 51.8 CM
BH CV ECHO MEAS - AVA(I,D): 1.85 CM2
BH CV ECHO MEAS - EDV(CUBED): 132.9 ML
BH CV ECHO MEAS - EDV(MOD-SP2): 174 ML
BH CV ECHO MEAS - EDV(MOD-SP4): 223 ML
BH CV ECHO MEAS - EF(MOD-BP): 65.9 %
BH CV ECHO MEAS - EF(MOD-SP2): 70.7 %
BH CV ECHO MEAS - EF(MOD-SP4): 64.1 %
BH CV ECHO MEAS - ESV(CUBED): 46.3 ML
BH CV ECHO MEAS - ESV(MOD-SP2): 51 ML
BH CV ECHO MEAS - ESV(MOD-SP4): 80 ML
BH CV ECHO MEAS - FS: 29.7 %
BH CV ECHO MEAS - IVS/LVPW: 0.99 CM
BH CV ECHO MEAS - IVSD: 1.35 CM
BH CV ECHO MEAS - LAT PEAK E' VEL: 12.1 CM/SEC
BH CV ECHO MEAS - LV DIASTOLIC VOL/BSA (35-75): 85.7 CM2
BH CV ECHO MEAS - LV MASS(C)D: 286.5 GRAMS
BH CV ECHO MEAS - LV MAX PG: 6.4 MMHG
BH CV ECHO MEAS - LV MEAN PG: 3.5 MMHG
BH CV ECHO MEAS - LV SYSTOLIC VOL/BSA (12-30): 30.7 CM2
BH CV ECHO MEAS - LV V1 MAX: 126.9 CM/SEC
BH CV ECHO MEAS - LV V1 VTI: 28.6 CM
BH CV ECHO MEAS - LVIDD: 5.1 CM
BH CV ECHO MEAS - LVIDS: 3.6 CM
BH CV ECHO MEAS - LVOT AREA: 3.4 CM2
BH CV ECHO MEAS - LVOT DIAM: 2.07 CM
BH CV ECHO MEAS - LVPWD: 1.36 CM
BH CV ECHO MEAS - MED PEAK E' VEL: 9.1 CM/SEC
BH CV ECHO MEAS - MV A MAX VEL: 42 CM/SEC
BH CV ECHO MEAS - MV DEC SLOPE: 301.6 CM/SEC2
BH CV ECHO MEAS - MV DEC TIME: 0.29 MSEC
BH CV ECHO MEAS - MV E MAX VEL: 83.1 CM/SEC
BH CV ECHO MEAS - MV E/A: 1.98
BH CV ECHO MEAS - MV MAX PG: 3.3 MMHG
BH CV ECHO MEAS - MV MEAN PG: 1.26 MMHG
BH CV ECHO MEAS - MV P1/2T: 91 MSEC
BH CV ECHO MEAS - MV V2 VTI: 24.2 CM
BH CV ECHO MEAS - MVA(P1/2T): 2.42 CM2
BH CV ECHO MEAS - MVA(VTI): 4 CM2
BH CV ECHO MEAS - PA V2 MAX: 104.5 CM/SEC
BH CV ECHO MEAS - QP/QS: 0.58
BH CV ECHO MEAS - RAP SYSTOLE: 3 MMHG
BH CV ECHO MEAS - RV MAX PG: 2.15 MMHG
BH CV ECHO MEAS - RV V1 MAX: 73.3 CM/SEC
BH CV ECHO MEAS - RV V1 VTI: 14.9 CM
BH CV ECHO MEAS - RVOT DIAM: 2.18 CM
BH CV ECHO MEAS - SI(MOD-SP2): 47.3 ML/M2
BH CV ECHO MEAS - SI(MOD-SP4): 54.9 ML/M2
BH CV ECHO MEAS - SV(LVOT): 96 ML
BH CV ECHO MEAS - SV(MOD-SP2): 123 ML
BH CV ECHO MEAS - SV(MOD-SP4): 143 ML
BH CV ECHO MEAS - SV(RVOT): 55.8 ML
BH CV ECHO MEASUREMENTS AVERAGE E/E' RATIO: 7.84
BH CV XLRA - TDI S': 10.7 CM/SEC
BILIRUB SERPL-MCNC: 0.4 MG/DL (ref 0–1.2)
BLD GP AB SCN SERPL QL: NEGATIVE
BUN SERPL-MCNC: 12 MG/DL (ref 8–23)
BUN/CREAT SERPL: 15 (ref 7–25)
CALCIUM SPEC-SCNC: 8.3 MG/DL (ref 8.6–10.5)
CHLORIDE SERPL-SCNC: 107 MMOL/L (ref 98–107)
CO2 SERPL-SCNC: 27 MMOL/L (ref 22–29)
CREAT SERPL-MCNC: 0.8 MG/DL (ref 0.76–1.27)
D-LACTATE SERPL-SCNC: 1.3 MMOL/L (ref 0.5–2)
DEPRECATED RDW RBC AUTO: 48.9 FL (ref 37–54)
EGFRCR SERPLBLD CKD-EPI 2021: 98.8 ML/MIN/1.73
EOSINOPHIL # BLD AUTO: 0.15 10*3/MM3 (ref 0–0.4)
EOSINOPHIL NFR BLD AUTO: 2.5 % (ref 0.3–6.2)
ERYTHROCYTE [DISTWIDTH] IN BLOOD BY AUTOMATED COUNT: 15.2 % (ref 12.3–15.4)
EXPIRATION DATE: ABNORMAL
FECAL OCCULT BLOOD SCREEN, POC: POSITIVE
GLOBULIN UR ELPH-MCNC: 2 GM/DL
GLUCOSE SERPL-MCNC: 146 MG/DL (ref 65–99)
HCT VFR BLD AUTO: 27.6 % (ref 37.5–51)
HGB BLD-MCNC: 8.8 G/DL (ref 13–17.7)
HOLD SPECIMEN: NORMAL
HOLD SPECIMEN: NORMAL
IMM GRANULOCYTES # BLD AUTO: 0.02 10*3/MM3 (ref 0–0.05)
IMM GRANULOCYTES NFR BLD AUTO: 0.3 % (ref 0–0.5)
LEFT ATRIUM VOLUME INDEX: 49.6 ML/M2
LYMPHOCYTES # BLD AUTO: 1.06 10*3/MM3 (ref 0.7–3.1)
LYMPHOCYTES NFR BLD AUTO: 17.4 % (ref 19.6–45.3)
Lab: 204
MAGNESIUM SERPL-MCNC: 1.8 MG/DL (ref 1.6–2.4)
MAXIMAL PREDICTED HEART RATE: 156 BPM
MCH RBC QN AUTO: 27.9 PG (ref 26.6–33)
MCHC RBC AUTO-ENTMCNC: 31.9 G/DL (ref 31.5–35.7)
MCV RBC AUTO: 87.6 FL (ref 79–97)
MONOCYTES # BLD AUTO: 0.51 10*3/MM3 (ref 0.1–0.9)
MONOCYTES NFR BLD AUTO: 8.4 % (ref 5–12)
NEGATIVE CONTROL: NEGATIVE
NEUTROPHILS NFR BLD AUTO: 4.34 10*3/MM3 (ref 1.7–7)
NEUTROPHILS NFR BLD AUTO: 71.1 % (ref 42.7–76)
NRBC BLD AUTO-RTO: 0 /100 WBC (ref 0–0.2)
NT-PROBNP SERPL-MCNC: 628 PG/ML (ref 0–900)
PLATELET # BLD AUTO: 225 10*3/MM3 (ref 140–450)
PMV BLD AUTO: 9.3 FL (ref 6–12)
POSITIVE CONTROL: POSITIVE
POTASSIUM SERPL-SCNC: 3.9 MMOL/L (ref 3.5–5.2)
PROT SERPL-MCNC: 5.7 G/DL (ref 6–8.5)
QT INTERVAL: 365 MS
QT INTERVAL: 399 MS
RBC # BLD AUTO: 3.15 10*6/MM3 (ref 4.14–5.8)
RH BLD: POSITIVE
SINUS: 4.4 CM
SODIUM SERPL-SCNC: 142 MMOL/L (ref 136–145)
STRESS TARGET HR: 133 BPM
T&S EXPIRATION DATE: NORMAL
TROPONIN T SERPL-MCNC: <0.01 NG/ML (ref 0–0.03)
WBC NRBC COR # BLD: 6.1 10*3/MM3 (ref 3.4–10.8)
WHOLE BLOOD HOLD COAG: NORMAL
WHOLE BLOOD HOLD SPECIMEN: NORMAL

## 2022-12-14 PROCEDURE — 86901 BLOOD TYPING SEROLOGIC RH(D): CPT | Performed by: NURSE PRACTITIONER

## 2022-12-14 PROCEDURE — 36415 COLL VENOUS BLD VENIPUNCTURE: CPT | Performed by: STUDENT IN AN ORGANIZED HEALTH CARE EDUCATION/TRAINING PROGRAM

## 2022-12-14 PROCEDURE — 93005 ELECTROCARDIOGRAM TRACING: CPT

## 2022-12-14 PROCEDURE — 82270 OCCULT BLOOD FECES: CPT | Performed by: NURSE PRACTITIONER

## 2022-12-14 PROCEDURE — 83880 ASSAY OF NATRIURETIC PEPTIDE: CPT | Performed by: NURSE PRACTITIONER

## 2022-12-14 PROCEDURE — 25010000002 PERFLUTREN (DEFINITY) 8.476 MG IN SODIUM CHLORIDE (PF) 0.9 % 10 ML INJECTION: Performed by: STUDENT IN AN ORGANIZED HEALTH CARE EDUCATION/TRAINING PROGRAM

## 2022-12-14 PROCEDURE — 86923 COMPATIBILITY TEST ELECTRIC: CPT

## 2022-12-14 PROCEDURE — 93005 ELECTROCARDIOGRAM TRACING: CPT | Performed by: STUDENT IN AN ORGANIZED HEALTH CARE EDUCATION/TRAINING PROGRAM

## 2022-12-14 PROCEDURE — 86900 BLOOD TYPING SEROLOGIC ABO: CPT | Performed by: NURSE PRACTITIONER

## 2022-12-14 PROCEDURE — 86900 BLOOD TYPING SEROLOGIC ABO: CPT

## 2022-12-14 PROCEDURE — 93010 ELECTROCARDIOGRAM REPORT: CPT | Performed by: INTERNAL MEDICINE

## 2022-12-14 PROCEDURE — 80053 COMPREHEN METABOLIC PANEL: CPT | Performed by: NURSE PRACTITIONER

## 2022-12-14 PROCEDURE — 86850 RBC ANTIBODY SCREEN: CPT | Performed by: NURSE PRACTITIONER

## 2022-12-14 PROCEDURE — G0378 HOSPITAL OBSERVATION PER HR: HCPCS

## 2022-12-14 PROCEDURE — 84484 ASSAY OF TROPONIN QUANT: CPT | Performed by: NURSE PRACTITIONER

## 2022-12-14 PROCEDURE — 36430 TRANSFUSION BLD/BLD COMPNT: CPT

## 2022-12-14 PROCEDURE — 99285 EMERGENCY DEPT VISIT HI MDM: CPT

## 2022-12-14 PROCEDURE — P9016 RBC LEUKOCYTES REDUCED: HCPCS

## 2022-12-14 PROCEDURE — 93306 TTE W/DOPPLER COMPLETE: CPT | Performed by: INTERNAL MEDICINE

## 2022-12-14 PROCEDURE — 85025 COMPLETE CBC W/AUTO DIFF WBC: CPT | Performed by: NURSE PRACTITIONER

## 2022-12-14 PROCEDURE — 84484 ASSAY OF TROPONIN QUANT: CPT | Performed by: STUDENT IN AN ORGANIZED HEALTH CARE EDUCATION/TRAINING PROGRAM

## 2022-12-14 PROCEDURE — 99214 OFFICE O/P EST MOD 30 MIN: CPT

## 2022-12-14 PROCEDURE — 93306 TTE W/DOPPLER COMPLETE: CPT

## 2022-12-14 PROCEDURE — 36415 COLL VENOUS BLD VENIPUNCTURE: CPT

## 2022-12-14 PROCEDURE — 83605 ASSAY OF LACTIC ACID: CPT | Performed by: NURSE PRACTITIONER

## 2022-12-14 PROCEDURE — 83735 ASSAY OF MAGNESIUM: CPT | Performed by: NURSE PRACTITIONER

## 2022-12-14 PROCEDURE — 71045 X-RAY EXAM CHEST 1 VIEW: CPT

## 2022-12-14 PROCEDURE — 1111F DSCHRG MED/CURRENT MED MERGE: CPT

## 2022-12-14 RX ORDER — FERROUS SULFATE TAB EC 324 MG (65 MG FE EQUIVALENT) 324 (65 FE) MG
324 TABLET DELAYED RESPONSE ORAL
COMMUNITY
End: 2023-02-10 | Stop reason: HOSPADM

## 2022-12-14 RX ORDER — ATORVASTATIN CALCIUM 20 MG/1
40 TABLET, FILM COATED ORAL NIGHTLY
Status: DISCONTINUED | OUTPATIENT
Start: 2022-12-14 | End: 2022-12-15 | Stop reason: HOSPADM

## 2022-12-14 RX ORDER — SODIUM CHLORIDE 0.9 % (FLUSH) 0.9 %
10 SYRINGE (ML) INJECTION AS NEEDED
Status: DISCONTINUED | OUTPATIENT
Start: 2022-12-14 | End: 2022-12-15 | Stop reason: HOSPADM

## 2022-12-14 RX ORDER — ISOSORBIDE MONONITRATE 30 MG/1
60 TABLET, EXTENDED RELEASE ORAL DAILY
Status: DISCONTINUED | OUTPATIENT
Start: 2022-12-14 | End: 2022-12-15 | Stop reason: HOSPADM

## 2022-12-14 RX ORDER — NITROGLYCERIN 0.4 MG/1
0.4 TABLET SUBLINGUAL
Status: DISCONTINUED | OUTPATIENT
Start: 2022-12-14 | End: 2022-12-15 | Stop reason: HOSPADM

## 2022-12-14 RX ORDER — AMLODIPINE BESYLATE 5 MG/1
10 TABLET ORAL DAILY
Status: DISCONTINUED | OUTPATIENT
Start: 2022-12-14 | End: 2022-12-15 | Stop reason: HOSPADM

## 2022-12-14 RX ORDER — UREA 10 %
3 LOTION (ML) TOPICAL NIGHTLY PRN
Status: DISCONTINUED | OUTPATIENT
Start: 2022-12-14 | End: 2022-12-15 | Stop reason: HOSPADM

## 2022-12-14 RX ORDER — PANTOPRAZOLE SODIUM 40 MG/1
40 TABLET, DELAYED RELEASE ORAL
Status: DISCONTINUED | OUTPATIENT
Start: 2022-12-15 | End: 2022-12-15 | Stop reason: HOSPADM

## 2022-12-14 RX ORDER — ACETAMINOPHEN 325 MG/1
650 TABLET ORAL EVERY 4 HOURS PRN
Status: DISCONTINUED | OUTPATIENT
Start: 2022-12-14 | End: 2022-12-15 | Stop reason: HOSPADM

## 2022-12-14 RX ORDER — LISINOPRIL 5 MG/1
10 TABLET ORAL EVERY EVENING
Status: DISCONTINUED | OUTPATIENT
Start: 2022-12-14 | End: 2022-12-15 | Stop reason: HOSPADM

## 2022-12-14 RX ORDER — ONDANSETRON 2 MG/ML
4 INJECTION INTRAMUSCULAR; INTRAVENOUS EVERY 6 HOURS PRN
Status: DISCONTINUED | OUTPATIENT
Start: 2022-12-14 | End: 2022-12-15 | Stop reason: HOSPADM

## 2022-12-14 RX ORDER — SUCRALFATE 1 G/1
1 TABLET ORAL DAILY
Status: DISCONTINUED | OUTPATIENT
Start: 2022-12-14 | End: 2022-12-15 | Stop reason: HOSPADM

## 2022-12-14 RX ORDER — ONDANSETRON 4 MG/1
4 TABLET, FILM COATED ORAL EVERY 6 HOURS PRN
Status: DISCONTINUED | OUTPATIENT
Start: 2022-12-14 | End: 2022-12-15 | Stop reason: HOSPADM

## 2022-12-14 RX ORDER — FERROUS SULFATE 325(65) MG
325 TABLET ORAL
Status: DISCONTINUED | OUTPATIENT
Start: 2022-12-15 | End: 2022-12-15 | Stop reason: HOSPADM

## 2022-12-14 RX ORDER — FUROSEMIDE 20 MG/1
20 TABLET ORAL DAILY
Status: DISCONTINUED | OUTPATIENT
Start: 2022-12-14 | End: 2022-12-15 | Stop reason: HOSPADM

## 2022-12-14 RX ORDER — FUROSEMIDE 20 MG/1
20 TABLET ORAL AS NEEDED
COMMUNITY
End: 2023-02-10 | Stop reason: HOSPADM

## 2022-12-14 RX ORDER — LORAZEPAM 1 MG/1
0.5 TABLET ORAL 2 TIMES DAILY PRN
Status: DISCONTINUED | OUTPATIENT
Start: 2022-12-14 | End: 2022-12-15 | Stop reason: HOSPADM

## 2022-12-14 RX ORDER — POTASSIUM CHLORIDE 750 MG/1
10 TABLET, FILM COATED, EXTENDED RELEASE ORAL DAILY
Status: DISCONTINUED | OUTPATIENT
Start: 2022-12-14 | End: 2022-12-15 | Stop reason: HOSPADM

## 2022-12-14 RX ADMIN — LISINOPRIL 10 MG: 5 TABLET ORAL at 16:23

## 2022-12-14 RX ADMIN — Medication 10 ML: at 10:50

## 2022-12-14 RX ADMIN — FUROSEMIDE 20 MG: 20 TABLET ORAL at 16:23

## 2022-12-14 RX ADMIN — PERFLUTREN 2 ML: 6.52 INJECTION, SUSPENSION INTRAVENOUS at 15:33

## 2022-12-14 RX ADMIN — ATORVASTATIN CALCIUM 40 MG: 20 TABLET, FILM COATED ORAL at 21:36

## 2022-12-14 RX ADMIN — AMLODIPINE BESYLATE 10 MG: 5 TABLET ORAL at 16:23

## 2022-12-14 RX ADMIN — EMPAGLIFLOZIN 25 MG: 25 TABLET, FILM COATED ORAL at 18:16

## 2022-12-14 RX ADMIN — SUCRALFATE 1 G: 1 TABLET ORAL at 16:23

## 2022-12-14 RX ADMIN — POTASSIUM CHLORIDE 10 MEQ: 750 TABLET, EXTENDED RELEASE ORAL at 16:23

## 2022-12-14 NOTE — H&P
Patient Name:  Edmond Chase  YOB: 1960  MRN:  [id number]  Admit Date:  12/14/2022  Patient Care Team:  Nargis Velasquez APRN as PCP - General (Family Medicine)  Papa Miguel MD as Consulting Physician (Cardiology)  Sekou Pisano MD as Consulting Physician (Pulmonary Disease)  Gregor Carlson MD as Consulting Physician (Gastroenterology)  Estevan Yuan MD (Sports Medicine)      Subjective   History Present Illness     Chief Complaint   Patient presents with   • Shortness of Breath   • Fatigue       Mr. Chase is a 62 y.o. man with a history of coronary artery disease s/p cabg, a bioprosthetic aortic valve, TJ on cpap, type 2 diabetes, hypertension, hyperlipidemia, recent diagnosis of esophageal cancer and grijalva's esophagus followed by Dr. Stockton, recent admission for a GI bleed from a recent polypectomy site with resultant hemorrhagic shock. GI managed to clip the area and stop the bleeding. He presents today with exertional chest pain, shortness of breath, fatigue at the behest of his PCP. He tells me that the predominant symptom that he is experiencing is fatigue, which has been continuously present at least since his hospitalization here for hemorrhagic shock and possibly since a GI bleed he experienced in August, and he simply hasn't recovered. He does endorse worsened chronic stable angina symptoms (exertional chest pressure, relieved by rest and his imdur). He also endorses orthopnea and increased lower extremity swelling despite daily use of prn lasix.      History of Present Illness  Review of Systems   Constitutional: Positive for fatigue. Negative for chills, diaphoresis and fever.   HENT: Negative for postnasal drip, rhinorrhea and sinus pain.    Eyes: Negative for pain and redness.   Respiratory: Positive for shortness of breath. Negative for cough.    Cardiovascular: Positive for chest pain and leg swelling. Negative for palpitations.    Gastrointestinal: Negative for abdominal pain and constipation.   Endocrine: Negative for polydipsia and polyuria.   Genitourinary: Negative for dysuria, frequency and urgency.   Musculoskeletal: Negative for arthralgias and myalgias.   Skin: Negative for rash and wound.   Allergic/Immunologic: Negative.    Neurological: Negative for light-headedness and headaches.   Hematological: Negative.    Psychiatric/Behavioral: Negative for behavioral problems and confusion.        Personal History     Past Medical History:   Diagnosis Date   • Abnormal nuclear stress test    • Aortic valve insufficiency     nonrheumtic    • Ascending aortic aneurysm    • CAD (coronary artery disease)    • Cancer (HCC)    • Chest pain    • Diabetes mellitus (HCC)    • Dizzy     CAREFUL WHEN GETTING UP   • Dyspnea    • Essential hypertension    • Fatigue    • Hyperglycemia    • Hyperlipidemia    • Hypersomnia with sleep apnea    • Lightheadedness    • Malaise and fatigue    • Morbid obesity (HCC)    • Myocardial ischemia    • Nocturia    • TJ on auto CPAP 10/31/2016    Overnight polysomnogram.  Weight 276 pounds.  Severe TJ with AHI 79 events per hour.  Auto CPAP recommended.   • Pneumonia     FEB 2016   • Scrotal bleeding    • Shortness of breath      Past Surgical History:   Procedure Laterality Date   • AORTIC VALVE REPAIR/REPLACEMENT  05/2016   • ASCENDING ARCH/HEMIARCH REPLACEMENT N/A 5/2/2016    Procedure: BHAVESH STERNOTOMY CORONARY ARTERY BYPASS GRAFT TIMES 3 USING LEFT INTERNAL MAMMARY ARTERY AND RIGHT GREATER SAPHENOUS VEIN GRAFT PER ENDOSCOPIC VEIN HARVESTING, AORTIC ANEURYSM REPAIR WITH ROOT REPAIR AND AORTIC VALVE REPLACEMENT;  Surgeon: Rosalio Cline MD;  Location: [facility] OR;  Service:    • CARDIAC CATHETERIZATION N/A 4/1/2016    Procedure: Left Heart Cath;  Surgeon: Erick Tam MD;  Location: [facility] INVASIVE LOCATION;  Service:    • CARDIAC CATHETERIZATION N/A 4/1/2016    Procedure: Left ventriculography;  Surgeon:  Erick Tam MD;  Location: [facility] CATH INVASIVE LOCATION;  Service:    • CARDIAC CATHETERIZATION N/A 4/1/2016    Procedure: Right Heart Cath;  Surgeon: Erick Tam MD;  Location: [facility] CATH INVASIVE LOCATION;  Service:    • CARDIAC CATHETERIZATION N/A 10/30/2017    Procedure: Coronary angiography;  Surgeon: Sorin Vasquez MD;  Location: [facility] CATH INVASIVE LOCATION;  Service:    • CARDIAC CATHETERIZATION  10/30/2017    Procedure: Saphenous Vein Graft;  Surgeon: Sorin Vasquez MD;  Location: [facility] CATH INVASIVE LOCATION;  Service:    • CARDIAC CATHETERIZATION N/A 10/30/2017    Procedure: Native mammary injection;  Surgeon: Sorin Vasquez MD;  Location: [facility] CATH INVASIVE LOCATION;  Service:    • CARDIAC CATHETERIZATION N/A 7/7/2020    Procedure: Coronary angiography;  Surgeon: Gregor Kim MD;  Location: [facility] CATH INVASIVE LOCATION;  Service: Cardiovascular;  Laterality: N/A;   • CARDIAC CATHETERIZATION N/A 7/7/2020    Procedure: Left heart cath;  Surgeon: Gregor Kim MD;  Location: [facility] CATH INVASIVE LOCATION;  Service: Cardiovascular;  Laterality: N/A;   • CARDIAC CATHETERIZATION N/A 7/7/2020    Procedure: Left ventriculography;  Surgeon: Gregor Kim MD;  Location: [facility] CATH INVASIVE LOCATION;  Service: Cardiovascular;  Laterality: N/A;   • CARDIAC CATHETERIZATION N/A 7/7/2020    Procedure: Stent ESMER bypass graft;  Surgeon: Gregor Kim MD;  Location: [facility] CATH INVASIVE LOCATION;  Service: Cardiovascular;  Laterality: N/A;   • CARDIAC CATHETERIZATION N/A 6/17/2021    Procedure: SAPHENOUS VEIN GRAFT;  Surgeon: Marques Ndiaye MD;  Location: [facility] CATH INVASIVE LOCATION;  Service: Cardiovascular;  Laterality: N/A;   • CARDIAC CATHETERIZATION N/A 6/17/2021    Procedure: Left Heart Cath;  Surgeon: Marques Ndiaye MD;  Location: [facility] CATH INVASIVE LOCATION;  Service: Cardiovascular;  Laterality: N/A;   • CARDIAC CATHETERIZATION N/A 6/17/2021    Procedure: Coronary  angiography;  Surgeon: Marques Ndiaye MD;  Location: [facility] CATH INVASIVE LOCATION;  Service: Cardiovascular;  Laterality: N/A;   • CARDIAC CATHETERIZATION N/A 7/14/2022    Procedure: Left Heart Cath;  Surgeon: Charlie Aj MD;  Location: [facility] CATH INVASIVE LOCATION;  Service: Cardiovascular;  Laterality: N/A;   • CARDIAC CATHETERIZATION N/A 7/14/2022    Procedure: Coronary angiography;  Surgeon: Charlie Aj MD;  Location: [facility] CATH INVASIVE LOCATION;  Service: Cardiovascular;  Laterality: N/A;   • CARDIAC CATHETERIZATION  7/14/2022    Procedure: Saphenous Vein Graft;  Surgeon: Charlie Aj MD;  Location: [facility] CATH INVASIVE LOCATION;  Service: Cardiovascular;;   • CARDIAC CATHETERIZATION N/A 7/14/2022    Procedure: Native mammary injection;  Surgeon: Charlie Aj MD;  Location: [facility] CATH INVASIVE LOCATION;  Service: Cardiovascular;  Laterality: N/A;   • COLONOSCOPY N/A 11/26/2022    Procedure: COLONOSCOPY AT BEDSIDE;  Surgeon: Tomy Lopez MD;  Location: [facility] MAIN OR;  Service: Gastroenterology;  Laterality: N/A;   • COLONOSCOPY W/ POLYPECTOMY N/A 10/4/2022    Procedure: COLONOSCOPY to cecum with cold forceps and cold snare polypectomies;  Surgeon: Gregor Carlson MD;  Location: [facility] ENDOSCOPY;  Service: Gastroenterology;  Laterality: N/A;  PRE- hx of polyps  POST- diverticulosis, polyps   • CORONARY ARTERY BYPASS GRAFT  05/2016    LIMA TO LAD, SVG TO PDA, SVG TO OM2   • ENDOSCOPY N/A 7/13/2022    Procedure: ESOPHAGOGASTRODUODENOSCOPY;  Surgeon: Marcia Hollis MD;  Location: [facility] ENDOSCOPY;  Service: Gastroenterology;  Laterality: N/A;  PRE- ANEMIA, MELENA  POST- MILD EROSIVE GASTRITIS, GE JUNCTION ULCER   • ENDOSCOPY N/A 10/4/2022    Procedure: ESOPHAGOGASTRODUODENOSCOPY with biopsies;  Surgeon: Gregor Carlson MD;  Location: [facility] ENDOSCOPY;  Service: Gastroenterology;  Laterality: N/A;  PRE- hx of esophageal ulcer  POST- gastric cardia  "mass   • INNER EAR SURGERY     • TONSILLECTOMY       Family History   Problem Relation Age of Onset   • Hypertension Mother    • Diabetes Mother    • Heart disease Mother    • Hypertension Father    • Lung cancer Father    • Heart disease Sister    • Stroke Maternal Grandmother    • Heart disease Maternal Grandmother    • Hypertension Maternal Grandfather    • Hypertension Paternal Grandmother    • Hypertension Paternal Grandfather    • Other Other         The patient states that his sister has a problem that goes by the name of \"long QT\".  He says this is a familial trait but he also says that he is \"not interested in pursuing it for himself\".   • Coronary artery disease Other      Social History     Tobacco Use   • Smoking status: Never   • Smokeless tobacco: Never   Vaping Use   • Vaping Use: Never used   Substance Use Topics   • Alcohol use: Yes     Comment: 1 drink/monthly   • Drug use: No     No current facility-administered medications on file prior to encounter.     Current Outpatient Medications on File Prior to Encounter   Medication Sig Dispense Refill   • amLODIPine (NORVASC) 10 MG tablet 10 mg.     • amoxicillin (AMOXIL) 500 MG capsule TAKE ALL 4 PILLS ONE HOUR BEFORE PROCEDURE 4 capsule 0   • atorvastatin (LIPITOR) 40 MG tablet 40 mg.     • dapagliflozin Propanediol (Farxiga) 10 MG tablet 10 mg.     • ergocalciferol (ERGOCALCIFEROL) 1.25 MG (16323 UT) capsule 50,000 Int'l Units.     • ferrous sulfate 324 (65 Fe) MG tablet delayed-release EC tablet Take 324 mg by mouth Daily With Breakfast.     • furosemide (LASIX) 20 MG tablet Take 20 mg by mouth Daily.     • isosorbide mononitrate (IMDUR) 30 MG 24 hr tablet 60 mg.     • lisinopril (PRINIVIL,ZESTRIL) 10 MG tablet 10 mg.     • metFORMIN (GLUCOPHAGE) 500 MG tablet 500 mg.     • pantoprazole (PROTONIX) 40 MG EC tablet Take 1 tablet by mouth Every Morning. 30 tablet 5   • polyethylene glycol (MIRALAX) 17 GM/SCOOP powder Mix one capful (17 g) in liquid and " drink by mouth Daily for 30 days. 510 g 0   • potassium chloride 10 MEQ CR tablet      • sucralfate (CARAFATE) 1 g tablet Take 1 tablet by mouth 2 (Two) Times a Day Before Meals. 60 tablet 5     Allergies   Allergen Reactions   • Bystolic [Nebivolol Hcl]        Objective    Objective     Vital Signs  Temp:  [97.6 °F (36.4 °C)-98.9 °F (37.2 °C)] 98.9 °F (37.2 °C)  Heart Rate:  [88-90] 90  Resp:  [20] 20  BP: (125-160)/(75-80) 125/75  SpO2:  [95 %-97 %] 95 %  on   ;   Device (Oxygen Therapy): room air  Body mass index is 41.3 kg/m².    Physical Exam  Constitutional:       General: He is not in acute distress.     Appearance: He is obese. He is not toxic-appearing.   HENT:      Head: Normocephalic and atraumatic.      Mouth/Throat:      Mouth: Mucous membranes are moist.      Pharynx: Oropharynx is clear. No oropharyngeal exudate.   Eyes:      Extraocular Movements: Extraocular movements intact.      Conjunctiva/sclera: Conjunctivae normal.      Pupils: Pupils are equal, round, and reactive to light.   Cardiovascular:      Rate and Rhythm: Normal rate and regular rhythm.      Heart sounds: Murmur heard.   Pulmonary:      Effort: Pulmonary effort is normal. No respiratory distress.      Breath sounds: Normal breath sounds. No wheezing, rhonchi or rales.   Abdominal:      General: Bowel sounds are normal. There is distension.      Palpations: Abdomen is soft.      Tenderness: There is no abdominal tenderness. There is no guarding or rebound.   Musculoskeletal:         General: No tenderness.      Cervical back: Normal range of motion and neck supple.      Right lower leg: No edema.      Left lower leg: No edema.   Skin:     Capillary Refill: Capillary refill takes less than 2 seconds.      Coloration: Skin is pale.      Findings: No erythema or rash.   Neurological:      General: No focal deficit present.      Mental Status: He is alert and oriented to person, place, and time.   Psychiatric:         Mood and Affect: Mood  normal.         Behavior: Behavior normal.         Results Review:  I reviewed the patient's new clinical results.  I reviewed the patient's new imaging results and agree with the interpretation.  I reviewed the patient's other test results and agree with the interpretation  I personally viewed and interpreted the patient's EKG/Telemetry data  Discussed with ED provider.    Lab Results (last 24 hours)     Procedure Component Value Units Date/Time    CBC & Differential [[id number]]  (Abnormal) Collected: 12/14/22 1050    Specimen: Blood Updated: 12/14/22 1102    Narrative:      The following orders were created for panel order CBC & Differential.  Procedure                               Abnormality         Status                     ---------                               -----------         ------                     CBC Auto Differential[[id number]]        Abnormal            Final result                 Please view results for these tests on the individual orders.    Comprehensive Metabolic Panel [[id number]]  (Abnormal) Collected: 12/14/22 1050    Specimen: Blood Updated: 12/14/22 1121     Glucose 146 mg/dL      BUN 12 mg/dL      Creatinine 0.80 mg/dL      Sodium 142 mmol/L      Potassium 3.9 mmol/L      Chloride 107 mmol/L      CO2 27.0 mmol/L      Calcium 8.3 mg/dL      Total Protein 5.7 g/dL      Albumin 3.70 g/dL      ALT (SGPT) 8 U/L      AST (SGOT) 12 U/L      Alkaline Phosphatase 72 U/L      Total Bilirubin 0.4 mg/dL      Globulin 2.0 gm/dL      A/G Ratio 1.9 g/dL      BUN/Creatinine Ratio 15.0     Anion Gap 8.0 mmol/L      eGFR 98.8 mL/min/1.73      Comment: National Kidney Foundation and American Society of Nephrology (ASN) Task Force recommended calculation based on the Chronic Kidney Disease Epidemiology Collaboration (CKD-EPI) equation refit without adjustment for race.       Narrative:      GFR Normal >60  Chronic Kidney Disease <60  Kidney Failure <15      BNP [[id number]]  (Normal) Collected: 12/14/22  1050    Specimen: Blood Updated: 12/14/22 1119     proBNP 628.0 pg/mL     Narrative:      Among patients with dyspnea, NT-proBNP is highly sensitive for the detection of acute congestive heart failure. In addition NT-proBNP of <300 pg/ml effectively rules out acute congestive heart failure with 99% negative predictive value.    Results may be falsely decreased if patient taking Biotin.      Troponin [[id number]]  (Normal) Collected: 12/14/22 1050    Specimen: Blood Updated: 12/14/22 1223     Troponin T <0.010 ng/mL     Narrative:      Troponin T Reference Range:  <= 0.03 ng/mL-   Negative for AMI  >0.03 ng/mL-     Abnormal for myocardial necrosis.  Clinicians would have to utilize clinical acumen, EKG, Troponin and serial changes to determine if it is an Acute Myocardial Infarction or myocardial injury due to an underlying chronic condition.       Results may be falsely decreased if patient taking Biotin.      Lactic Acid, Plasma [[id number]]  (Normal) Collected: 12/14/22 1050    Specimen: Blood Updated: 12/14/22 1119     Lactate 1.3 mmol/L     Magnesium [[id number]]  (Normal) Collected: 12/14/22 1050    Specimen: Blood Updated: 12/14/22 1121     Magnesium 1.8 mg/dL     CBC Auto Differential [[id number]]  (Abnormal) Collected: 12/14/22 1050    Specimen: Blood Updated: 12/14/22 1102     WBC 6.10 10*3/mm3      RBC 3.15 10*6/mm3      Hemoglobin 8.8 g/dL      Hematocrit 27.6 %      MCV 87.6 fL      MCH 27.9 pg      MCHC 31.9 g/dL      RDW 15.2 %      RDW-SD 48.9 fl      MPV 9.3 fL      Platelets 225 10*3/mm3      Neutrophil % 71.1 %      Lymphocyte % 17.4 %      Monocyte % 8.4 %      Eosinophil % 2.5 %      Basophil % 0.3 %      Immature Grans % 0.3 %      Neutrophils, Absolute 4.34 10*3/mm3      Lymphocytes, Absolute 1.06 10*3/mm3      Monocytes, Absolute 0.51 10*3/mm3      Eosinophils, Absolute 0.15 10*3/mm3      Basophils, Absolute 0.02 10*3/mm3      Immature Grans, Absolute 0.02 10*3/mm3      nRBC 0.0 /100 WBC      Troponin [[id number]] Collected: 12/14/22 1050    Specimen: Blood Updated: 12/14/22 1245    POCT Occult Blood Stool [[id number]]  (Abnormal) Collected: 12/14/22 1102    Specimen: Stool from Per Rectum Updated: 12/14/22 1103     Fecal Occult Blood Positive     Lot Number [id number]     Expiration Date 07/31/2023     Positive Control Positive     Negative Control Negative          Imaging Results (Last 24 Hours)     Procedure Component Value Units Date/Time    XR Chest 1 View [[id number]] Collected: 12/14/22 1154     Updated: 12/14/22 1216    Narrative:      CHEST SINGLE VIEW     HISTORY: Shortness of air, extreme fatigue     COMPARISON: Portable chest 11/25/2020, PET CT 12/12/2022.     FINDINGS: Right IJ central venous catheter has been withdrawn since the  prior exam. Heart size upper normal. Sternotomy wires are present. Lungs  appear clear of focal airspace disease and there is no evidence for  pulmonary edema or pleural effusion. Cardiac monitoring leads are  present.       Impression:      No evidence for active disease in the chest.     This report was finalized on 12/14/2022 12:13 PM by Dr. Edmond Garcia M.D.             Results for orders placed during the hospital encounter of 07/10/22    Adult Transthoracic Echo Complete W/ Cont if Necessary Per Protocol    Interpretation Summary  · Left ventricular ejection fraction appears to be 51 - 55%. Left ventricular systolic function is normal. The left ventricular cavity is borderline dilated. Left ventricular wall thickness is consistent with mild to moderate concentric hypertrophy. All left ventricular wall segments contract normally. Left ventricular diastolic function was normal. No evidence of left ventricular thrombus or mass present.  · No significant aortic valve regurgitation is present. No hemodynamically significant aortic valve stenosis is present. Peak velocity of the flow distal to the aortic valve is 250.4 cm/s. Aortic valve mean pressure gradient is 12.8  mmHg. Aortic valve dimensionless index is 0.40 . There is a 27 mm, porcine, magna bioprosthetic aortic valve present.  · Mild dilation of the aortic root is present. Sinus of Valsalva = 4.1 cm Borderline dilation of the aortic arch is present. Aortic arch = 3.4 cm  · The ascending aorta not well visualized.      ECG 12 Lead Dyspnea   Preliminary Result   HEART RATE= 79  bpm   RR Interval= 759  ms   NM Interval= 188  ms   P Horizontal Axis= -36  deg   P Front Axis= 45  deg   QRSD Interval= 91  ms   QT Interval= 365  ms   QRS Axis= 4  deg   T Wave Axis= 134  deg   - ABNORMAL ECG -   Sinus rhythm   Multiple ventricular premature complexes   Probable left atrial enlargement   Abnormal R-wave progression, late transition   Abnormal T, consider ischemia, lateral leads   Electronically Signed By:    Date and Time of Study: 2022-12-14 10:23:46           Assessment/Plan     Active Hospital Problems    Diagnosis  POA   • **Exertional chest pain [R07.9]  Yes   • Type 2 diabetes mellitus, without long-term current use of insulin ([facility]) [E11.9]  Yes   • TJ on auto CPAP [G47.33, Z99.89]  Not Applicable   • S/P CABG (coronary artery bypass graft) [Z95.1]  Not Applicable   • S/P AVR (aortic valve replacement) [Z95.2]  Not Applicable   • Status post ascending aortic aneurysm repair [Z98.890, Z86.79]  Not Applicable   • CAD in native artery [I25.10]  Yes   • Essential hypertension [I10]  Yes   • Class 3 severe obesity due to excess calories with serious comorbidity and body mass index (BMI) of 40.0 to 44.9 in adult ([facility]) [E66.01, Z68.41]  Not Applicable   • Hyperlipidemia [E78.5]  Yes      Resolved Hospital Problems   No resolved problems to display.       Mr. Chase is a 62 y.o. man with a history of coronary artery disease s/p cabg, a bioprosthetic aortic valve, TJ on cpap, type 2 diabetes, hypertension, hyperlipidemia, recent diagnosis of esophageal cancer and grijalva's esophagus followed by Dr. Stockton, recent admission for a GI  bleed from a recent polypectomy site with resultant hemorrhagic shock. GI managed to clip the area and stop the bleeding. He presents today with exertional chest pain, shortness of breath, fatigue.    · Fatigue-I think this might be a combination of symptomatic anemia and debility from his recent hospitalizations. He does have orthopnea and reported lower extremity swelling (his legs are swollen but non-pitting), but his chest xray is clear and BNP is within normal limits. I will transfuse 1 unit of PRBC to see if this makes a difference. Also check an echocardiogram.   · Chest pain-His first troponin was negative. I will repeat this and repeat an ekg. The initial ekg did show t wave inversions and st depressions in leads 1 and AVL, but these are present old ekgs as well. Check an echocardiogram as above and ask cardiology to see him.   · Restart his home medications once reconciled  · I discussed the patient's findings and my recommendations with patient and ED provider.    VTE Prophylaxis - SCDs.  Code Status - Limited code (no CPR, no intubation).       Rahat Patel MD  [facility]  12/14/22  12:46 EST

## 2022-12-14 NOTE — ED NOTES
"Pt to ED with c/o persistent angina at baseline, esophageal discomfort at baseline, diagnosed in last month with esophageal Ca, has not started on chemo yet. Pt recently admitted here, then sent to , had \"massive Gi bleed\" with transfusions, denies blood noted in stools x days, intermittent dizziness and fatigue.      Denies abd pain, fevers, cough, n/v/d.     Pt wearing face mask for the duration while in ER today. This RN wore capr and gloves while providing care.     "

## 2022-12-14 NOTE — ED NOTES
"Nursing report ED to floor  Edmond Chase  62 y.o.  male    HPI :   Chief Complaint   Patient presents with    Shortness of Breath    Fatigue       Admitting doctor:   Rahat Patel MD    Admitting diagnosis:   The primary encounter diagnosis was Exertional chest pain. Diagnoses of LOWERY (dyspnea on exertion) and Lightheadedness were also pertinent to this visit.    Code status:   Current Code Status       Date Active Code Status Order ID Comments User Context       Prior            Allergies:   Bystolic [nebivolol hcl]    Isolation:   No active isolations    Intake and Output  No intake or output data in the 24 hours ending 12/14/22 1341    Weight:       12/14/22  1006   Weight: (!) 142 kg (313 lb)       Most recent vitals:   Vitals:    12/14/22 1006 12/14/22 1014 12/14/22 1316   BP:  125/75 144/83   Pulse: 90  70   Resp: 20  18   Temp: 98.9 °F (37.2 °C)     TempSrc: Tympanic     SpO2: 95%  94%   Weight: (!) 142 kg (313 lb)     Height: 185.4 cm (73\")         Active LDAs/IV Access:   Lines, Drains & Airways       Active LDAs       Name Placement date Placement time Site Days    Peripheral IV 12/14/22 1050 Left Antecubital 12/14/22  1050  Antecubital  less than 1                    Labs (abnormal labs have a star):   Labs Reviewed   COMPREHENSIVE METABOLIC PANEL - Abnormal; Notable for the following components:       Result Value    Glucose 146 (*)     Calcium 8.3 (*)     Total Protein 5.7 (*)     All other components within normal limits    Narrative:     GFR Normal >60  Chronic Kidney Disease <60  Kidney Failure <15     CBC WITH AUTO DIFFERENTIAL - Abnormal; Notable for the following components:    RBC 3.15 (*)     Hemoglobin 8.8 (*)     Hematocrit 27.6 (*)     Lymphocyte % 17.4 (*)     All other components within normal limits   POCT OCCULT BLOOD STOOL (ED ONLY) - Abnormal; Notable for the following components:    Fecal Occult Blood Positive (*)     All other components within normal limits   BNP (IN-HOUSE) - " Normal    Narrative:     Among patients with dyspnea, NT-proBNP is highly sensitive for the detection of acute congestive heart failure. In addition NT-proBNP of <300 pg/ml effectively rules out acute congestive heart failure with 99% negative predictive value.    Results may be falsely decreased if patient taking Biotin.     TROPONIN (IN-HOUSE) - Normal    Narrative:     Troponin T Reference Range:  <= 0.03 ng/mL-   Negative for AMI  >0.03 ng/mL-     Abnormal for myocardial necrosis.  Clinicians would have to utilize clinical acumen, EKG, Troponin and serial changes to determine if it is an Acute Myocardial Infarction or myocardial injury due to an underlying chronic condition.       Results may be falsely decreased if patient taking Biotin.     LACTIC ACID, PLASMA - Normal   MAGNESIUM - Normal   TROPONIN (IN-HOUSE) - Normal    Narrative:     Troponin T Reference Range:  <= 0.03 ng/mL-   Negative for AMI  >0.03 ng/mL-     Abnormal for myocardial necrosis.  Clinicians would have to utilize clinical acumen, EKG, Troponin and serial changes to determine if it is an Acute Myocardial Infarction or myocardial injury due to an underlying chronic condition.       Results may be falsely decreased if patient taking Biotin.     RAINBOW DRAW    Narrative:     The following orders were created for panel order [place] Draw.  Procedure                               Abnormality         Status                     ---------                               -----------         ------                     Green Top (Gel)[[id number]]                                  Final result               Lavender Top[[id number]]                                     Final result               Gold Top - SST[[id number]]                                   Final result               Light Blue Top[[id number]]                                   Final result                 Please view results for these tests on the individual orders.   TYPE AND SCREEN   CBC AND  DIFFERENTIAL    Narrative:     The following orders were created for panel order CBC & Differential.  Procedure                               Abnormality         Status                     ---------                               -----------         ------                     CBC Auto Differential[[id number]]        Abnormal            Final result                 Please view results for these tests on the individual orders.   GREEN TOP   LAVENDER TOP   GOLD TOP - SST   LIGHT BLUE TOP       EKG:   ECG 12 Lead Dyspnea   Preliminary Result   HEART RATE= 79  bpm   RR Interval= 759  ms   SC Interval= 188  ms   P Horizontal Axis= -36  deg   P Front Axis= 45  deg   QRSD Interval= 91  ms   QT Interval= 365  ms   QRS Axis= 4  deg   T Wave Axis= 134  deg   - ABNORMAL ECG -   Sinus rhythm   Multiple ventricular premature complexes   Probable left atrial enlargement   Abnormal R-wave progression, late transition   Abnormal T, consider ischemia, lateral leads   Electronically Signed By:    Date and Time of Study: 2022-12-14 10:23:46      ECG 12 Lead Chest Pain    (Results Pending)       Meds given in ED:   Medications   sodium chloride 0.9 % flush 10 mL (10 mL Intravenous Given 12/14/22 1050)   nitroglycerin (NITROSTAT) SL tablet 0.4 mg (has no administration in time range)   acetaminophen (TYLENOL) tablet 650 mg (has no administration in time range)   ondansetron (ZOFRAN) tablet 4 mg (has no administration in time range)     Or   ondansetron (ZOFRAN) injection 4 mg (has no administration in time range)   melatonin tablet 3 mg (has no administration in time range)       Imaging results:  XR Chest 1 View    Result Date: 12/14/2022  No evidence for active disease in the chest.  This report was finalized on 12/14/2022 12:13 PM by Dr. Edmond Garcia M.D.       Ambulatory status:   - bedrest     Social issues:   Social History     Socioeconomic History    Marital status: Single   Tobacco Use    Smoking status: Never    Smokeless  tobacco: Never   Vaping Use    Vaping Use: Never used   Substance and Sexual Activity    Alcohol use: Yes     Comment: 1 drink/monthly    Drug use: No    Sexual activity: Defer       NIH Stroke Scale:         Hattie Nguyen RN  12/14/22 13:41 EST

## 2022-12-14 NOTE — ED PROVIDER NOTES
EMERGENCY DEPARTMENT ENCOUNTER    Room Number:  48/48  Date of encounter:  12/14/2022  PCP: Nargis Velasquez APRN  Historian: Patient      PPE    Patient was placed in face mask in first look. Patient was wearing facemask when I entered the room and throughout our encounter. I wore full protective equipment throughout this patient encounter including a face mask, and gloves. Hand hygiene was performed before donning protective equipment and after removal when leaving the room.        HPI:  Chief Complaint: Shortness of breath  A complete HPI/ROS/PMH/PSH/SH/FH are unobtainable due to: Nothing    Context: Edmond Chase is a 62 y.o. male with a history of HTN, hyperlipidemia, CAD, DM, GI bleed, esophageal cancer who arrives to the ED via private vehicle.  Patient presents with c/o generalized weakness, increased shortness of breath, dizziness ongoing since discharge from the hospital earlier this month.  He states that he was initially admitted here for GI bleeding and was transferred to [facility].  Patient states that he was at his primary care office today and was sent here for possible low hemoglobin. Patient also states that he feels swollen despite taking Lasix.  Patient denies black or bloody stool.  Patient denies fever, chills,dizziness, abdominal pain. Patient states that he does have chronic chest pain.   Patient states that nothing makes the symptoms better and exertion worsens symptoms.          PAST MEDICAL HISTORY  Active Ambulatory Problems     Diagnosis Date Noted   • Essential hypertension 02/29/2016   • Hyperglycemia 02/29/2016   • Hyperlipidemia 02/29/2016   • Class 3 severe obesity due to excess calories with serious comorbidity and body mass index (BMI) of 40.0 to 44.9 in adult (HCC) 02/29/2016   • Nocturia 02/29/2016   • Nonrheumatic aortic valve insufficiency 03/29/2016   • Myocardial ischemia 03/29/2016   • Coronary artery disease involving native coronary artery of native heart  04/19/2016   • Ascending aortic aneurysm 04/19/2016   • CAD in native artery 05/02/2016   • S/P CABG (coronary artery bypass graft) 05/18/2016   • S/P AVR (aortic valve replacement) 05/18/2016   • Status post ascending aortic aneurysm repair 05/18/2016   • Fatigue 07/28/2017   • Abnormal nuclear stress test 10/23/2017   • Coronary artery disease 10/23/2017   • Coronary artery disease of bypass graft of native heart with stable angina pectoris ([facility]) 07/01/2020   • TJ on auto CPAP 10/31/2016   • Hypersomnia due to medical condition 08/21/2021   • Angina at rest ([facility]) 07/10/2022   • Type 2 diabetes mellitus, without long-term current use of insulin ([facility]) 07/11/2022   • Anemia 07/10/2022   • Ulcer of esophagus without bleeding 07/29/2022   • Polyp of colon 08/29/2022   • Rectal bleeding 11/25/2022   • Gastrointestinal hemorrhage 11/25/2022   • Malignant neoplasm of lower third of esophagus ([facility]) 11/29/2022   • Gastrointestinal hemorrhage, unspecified gastrointestinal hemorrhage type 12/01/2022     Resolved Ambulatory Problems     Diagnosis Date Noted   • No Resolved Ambulatory Problems     Past Medical History:   Diagnosis Date   • Aortic valve insufficiency    • CAD (coronary artery disease)    • Cancer ([facility])    • Chest pain    • Diabetes mellitus ([facility])    • Dizzy    • Dyspnea    • Hypersomnia with sleep apnea    • Lightheadedness    • Malaise and fatigue    • Morbid obesity ([facility])    • Pneumonia    • Scrotal bleeding    • Shortness of breath          PAST SURGICAL HISTORY  Past Surgical History:   Procedure Laterality Date   • AORTIC VALVE REPAIR/REPLACEMENT  05/2016   • ASCENDING ARCH/HEMIARCH REPLACEMENT N/A 5/2/2016    Procedure: BHAVESH STERNOTOMY CORONARY ARTERY BYPASS GRAFT TIMES 3 USING LEFT INTERNAL MAMMARY ARTERY AND RIGHT GREATER SAPHENOUS VEIN GRAFT PER ENDOSCOPIC VEIN HARVESTING, AORTIC ANEURYSM REPAIR WITH ROOT REPAIR AND AORTIC VALVE REPLACEMENT;  Surgeon: Rosalio Cline MD;  Location: [facility];   Service:    • CARDIAC CATHETERIZATION N/A 4/1/2016    Procedure: Left Heart Cath;  Surgeon: Erick Tam MD;  Location: [facility] CATH INVASIVE LOCATION;  Service:    • CARDIAC CATHETERIZATION N/A 4/1/2016    Procedure: Left ventriculography;  Surgeon: Erick Tam MD;  Location: [facility] CATH INVASIVE LOCATION;  Service:    • CARDIAC CATHETERIZATION N/A 4/1/2016    Procedure: Right Heart Cath;  Surgeon: Erick Tam MD;  Location: [facility] CATH INVASIVE LOCATION;  Service:    • CARDIAC CATHETERIZATION N/A 10/30/2017    Procedure: Coronary angiography;  Surgeon: Sorin Vasquez MD;  Location: [facility] CATH INVASIVE LOCATION;  Service:    • CARDIAC CATHETERIZATION  10/30/2017    Procedure: Saphenous Vein Graft;  Surgeon: Sorin Vasquez MD;  Location: [facility] CATH INVASIVE LOCATION;  Service:    • CARDIAC CATHETERIZATION N/A 10/30/2017    Procedure: Native mammary injection;  Surgeon: Sorin Vasquez MD;  Location: [facility] CATH INVASIVE LOCATION;  Service:    • CARDIAC CATHETERIZATION N/A 7/7/2020    Procedure: Coronary angiography;  Surgeon: Gregor Kim MD;  Location: [facility] CATH INVASIVE LOCATION;  Service: Cardiovascular;  Laterality: N/A;   • CARDIAC CATHETERIZATION N/A 7/7/2020    Procedure: Left heart cath;  Surgeon: Gregor Kim MD;  Location: [facility] CATH INVASIVE LOCATION;  Service: Cardiovascular;  Laterality: N/A;   • CARDIAC CATHETERIZATION N/A 7/7/2020    Procedure: Left ventriculography;  Surgeon: Gregor Kim MD;  Location: [facility] CATH INVASIVE LOCATION;  Service: Cardiovascular;  Laterality: N/A;   • CARDIAC CATHETERIZATION N/A 7/7/2020    Procedure: Stent ESMER bypass graft;  Surgeon: Gregor Kim MD;  Location: [facility] CATH INVASIVE LOCATION;  Service: Cardiovascular;  Laterality: N/A;   • CARDIAC CATHETERIZATION N/A 6/17/2021    Procedure: SAPHENOUS VEIN GRAFT;  Surgeon: Marques Ndiaye MD;  Location: [facility] CATH INVASIVE LOCATION;  Service: Cardiovascular;  Laterality: N/A;   •  CARDIAC CATHETERIZATION N/A 6/17/2021    Procedure: Left Heart Cath;  Surgeon: Marques Ndiaye MD;  Location: [facility] CATH INVASIVE LOCATION;  Service: Cardiovascular;  Laterality: N/A;   • CARDIAC CATHETERIZATION N/A 6/17/2021    Procedure: Coronary angiography;  Surgeon: Marques Ndiaye MD;  Location: [facility] CATH INVASIVE LOCATION;  Service: Cardiovascular;  Laterality: N/A;   • CARDIAC CATHETERIZATION N/A 7/14/2022    Procedure: Left Heart Cath;  Surgeon: Charlie Aj MD;  Location: [facility] CATH INVASIVE LOCATION;  Service: Cardiovascular;  Laterality: N/A;   • CARDIAC CATHETERIZATION N/A 7/14/2022    Procedure: Coronary angiography;  Surgeon: Charlie Aj MD;  Location: [facility] CATH INVASIVE LOCATION;  Service: Cardiovascular;  Laterality: N/A;   • CARDIAC CATHETERIZATION  7/14/2022    Procedure: Saphenous Vein Graft;  Surgeon: Charlie Aj MD;  Location: [facility] CATH INVASIVE LOCATION;  Service: Cardiovascular;;   • CARDIAC CATHETERIZATION N/A 7/14/2022    Procedure: Native mammary injection;  Surgeon: Charlie Aj MD;  Location: [facility] CATH INVASIVE LOCATION;  Service: Cardiovascular;  Laterality: N/A;   • COLONOSCOPY N/A 11/26/2022    Procedure: COLONOSCOPY AT BEDSIDE;  Surgeon: Tomy Lopez MD;  Location: [facility] MAIN OR;  Service: Gastroenterology;  Laterality: N/A;   • COLONOSCOPY W/ POLYPECTOMY N/A 10/4/2022    Procedure: COLONOSCOPY to cecum with cold forceps and cold snare polypectomies;  Surgeon: Gregor Carlson MD;  Location: [facility] ENDOSCOPY;  Service: Gastroenterology;  Laterality: N/A;  PRE- hx of polyps  POST- diverticulosis, polyps   • CORONARY ARTERY BYPASS GRAFT  05/2016    LIMA TO LAD, SVG TO PDA, SVG TO OM2   • ENDOSCOPY N/A 7/13/2022    Procedure: ESOPHAGOGASTRODUODENOSCOPY;  Surgeon: Marcia Hollis MD;  Location: [facility] ENDOSCOPY;  Service: Gastroenterology;  Laterality: N/A;  PRE- ANEMIA, MELENA  POST- MILD EROSIVE GASTRITIS, GE JUNCTION  "ULCER   • ENDOSCOPY N/A 10/4/2022    Procedure: ESOPHAGOGASTRODUODENOSCOPY with biopsies;  Surgeon: Gregor Carlson MD;  Location: [facility] ENDOSCOPY;  Service: Gastroenterology;  Laterality: N/A;  PRE- hx of esophageal ulcer  POST- gastric cardia mass   • INNER EAR SURGERY     • TONSILLECTOMY           FAMILY HISTORY  Family History   Problem Relation Age of Onset   • Hypertension Mother    • Diabetes Mother    • Heart disease Mother    • Hypertension Father    • Lung cancer Father    • Heart disease Sister    • Stroke Maternal Grandmother    • Heart disease Maternal Grandmother    • Hypertension Maternal Grandfather    • Hypertension Paternal Grandmother    • Hypertension Paternal Grandfather    • Other Other         The patient states that his sister has a problem that goes by the name of \"long QT\".  He says this is a familial trait but he also says that he is \"not interested in pursuing it for himself\".   • Coronary artery disease Other          SOCIAL HISTORY  Social History     Socioeconomic History   • Marital status: Single   Tobacco Use   • Smoking status: Never   • Smokeless tobacco: Never   Vaping Use   • Vaping Use: Never used   Substance and Sexual Activity   • Alcohol use: Yes     Comment: 1 drink/monthly   • Drug use: No   • Sexual activity: Defer         ALLERGIES  Bystolic [nebivolol hcl]        REVIEW OF SYSTEMS  Review of Systems     All systems reviewed and negative except for those discussed in HPI.        PHYSICAL EXAM    ED Triage Vitals   Temp Heart Rate Resp BP SpO2   12/14/22 1006 12/14/22 1006 12/14/22 1006 12/14/22 1014 12/14/22 1006   98.9 °F (37.2 °C) 90 20 125/75 95 %       Physical Exam  GENERAL: Ill appearing, nontoxic appearing, not distressed  HENT: normocephalic, atraumatic  EYES: no scleral icterus, PERRL, pale sclera  CV: regular rhythm, regular rate, no murmur  RESPIRATORY: normal effort, CTAB  ABDOMEN: soft , normal bowel sounds, nontender  Rectal exam performed.  " Chaperone present throughout exam, STEVE Kuhn  External exam normal.  No visualized external hemorrhoids, no palpated internal hemorrhoids.  No visible anal fissures.  Heme positive, dark brown stool noted.  passed.  MUSCULOSKELETAL: no deformity, bilateral lower extremity swelling, no pitting edema  NEURO: alert, moves all extremities, follows commands, mental status normal/baseline  SKIN: warm, dry, no rash , pallor  Psych: Appropriate mood and affect  Nursing notes and vital signs reviewed      LAB RESULTS  Recent Results (from the past 24 hour(s))   ECG 12 Lead Dyspnea    Collection Time: 12/14/22 10:23 AM   Result Value Ref Range    QT Interval 365 ms   Comprehensive Metabolic Panel    Collection Time: 12/14/22 10:50 AM    Specimen: Blood   Result Value Ref Range    Glucose 146 (H) 65 - 99 mg/dL    BUN 12 8 - 23 mg/dL    Creatinine 0.80 0.76 - 1.27 mg/dL    Sodium 142 136 - 145 mmol/L    Potassium 3.9 3.5 - 5.2 mmol/L    Chloride 107 98 - 107 mmol/L    CO2 27.0 22.0 - 29.0 mmol/L    Calcium 8.3 (L) 8.6 - 10.5 mg/dL    Total Protein 5.7 (L) 6.0 - 8.5 g/dL    Albumin 3.70 3.50 - 5.20 g/dL    ALT (SGPT) 8 1 - 41 U/L    AST (SGOT) 12 1 - 40 U/L    Alkaline Phosphatase 72 39 - 117 U/L    Total Bilirubin 0.4 0.0 - 1.2 mg/dL    Globulin 2.0 gm/dL    A/G Ratio 1.9 g/dL    BUN/Creatinine Ratio 15.0 7.0 - 25.0    Anion Gap 8.0 5.0 - 15.0 mmol/L    eGFR 98.8 >60.0 mL/min/1.73   BNP    Collection Time: 12/14/22 10:50 AM    Specimen: Blood   Result Value Ref Range    proBNP 628.0 0.0 - 900.0 pg/mL   Troponin    Collection Time: 12/14/22 10:50 AM    Specimen: Blood   Result Value Ref Range    Troponin T <0.010 0.000 - 0.030 ng/mL   Lactic Acid, Plasma    Collection Time: 12/14/22 10:50 AM    Specimen: Blood   Result Value Ref Range    Lactate 1.3 0.5 - 2.0 mmol/L   Type & Screen    Collection Time: 12/14/22 10:50 AM    Specimen: Blood   Result Value Ref Range    ABO Type O     RH type Positive     Antibody  Screen Negative     T&S Expiration Date 12/17/2022 11:59:59 PM    Magnesium    Collection Time: 12/14/22 10:50 AM    Specimen: Blood   Result Value Ref Range    Magnesium 1.8 1.6 - 2.4 mg/dL   CBC Auto Differential    Collection Time: 12/14/22 10:50 AM    Specimen: Blood   Result Value Ref Range    WBC 6.10 3.40 - 10.80 10*3/mm3    RBC 3.15 (L) 4.14 - 5.80 10*6/mm3    Hemoglobin 8.8 (L) 13.0 - 17.7 g/dL    Hematocrit 27.6 (L) 37.5 - 51.0 %    MCV 87.6 79.0 - 97.0 fL    MCH 27.9 26.6 - 33.0 pg    MCHC 31.9 31.5 - 35.7 g/dL    RDW 15.2 12.3 - 15.4 %    RDW-SD 48.9 37.0 - 54.0 fl    MPV 9.3 6.0 - 12.0 fL    Platelets 225 140 - 450 10*3/mm3    Neutrophil % 71.1 42.7 - 76.0 %    Lymphocyte % 17.4 (L) 19.6 - 45.3 %    Monocyte % 8.4 5.0 - 12.0 %    Eosinophil % 2.5 0.3 - 6.2 %    Basophil % 0.3 0.0 - 1.5 %    Immature Grans % 0.3 0.0 - 0.5 %    Neutrophils, Absolute 4.34 1.70 - 7.00 10*3/mm3    Lymphocytes, Absolute 1.06 0.70 - 3.10 10*3/mm3    Monocytes, Absolute 0.51 0.10 - 0.90 10*3/mm3    Eosinophils, Absolute 0.15 0.00 - 0.40 10*3/mm3    Basophils, Absolute 0.02 0.00 - 0.20 10*3/mm3    Immature Grans, Absolute 0.02 0.00 - 0.05 10*3/mm3    nRBC 0.0 0.0 - 0.2 /100 WBC   Green Top (Gel)    Collection Time: 12/14/22 10:50 AM   Result Value Ref Range    Extra Tube Hold for add-ons.    Lavender Top    Collection Time: 12/14/22 10:50 AM   Result Value Ref Range    Extra Tube hold for add-on    Gold Top - SST    Collection Time: 12/14/22 10:50 AM   Result Value Ref Range    Extra Tube Hold for add-ons.    Light Blue Top    Collection Time: 12/14/22 10:50 AM   Result Value Ref Range    Extra Tube Hold for add-ons.    Troponin    Collection Time: 12/14/22 10:50 AM    Specimen: Blood   Result Value Ref Range    Troponin T <0.010 0.000 - 0.030 ng/mL   POCT Occult Blood Stool    Collection Time: 12/14/22 11:02 AM    Specimen: Per Rectum; Stool   Result Value Ref Range    Fecal Occult Blood Positive (A) Negative    Lot Number [id number]      Expiration Date 07/31/2023     Positive Control Positive Positive    Negative Control Negative Negative   Prepare RBC, 1 Units    Collection Time: 12/14/22  2:46 PM   Result Value Ref Range    Product Code D1673V36     Unit Number [id number]     UNIT  ABO O     UNIT  RH POS     Crossmatch Interpretation Compatible     Dispense Status XM     Blood Expiration Date [id number]     Blood Type Barcode 5100    ECG 12 Lead Chest Pain    Collection Time: 12/14/22  3:26 PM   Result Value Ref Range    QT Interval 399 ms   Adult Transthoracic Echo Complete W/ Cont if Necessary Per Protocol    Collection Time: 12/14/22  3:29 PM   Result Value Ref Range    Target HR (85%) 133 bpm    Max. Pred. HR (100%) 156 bpm    EF(MOD-bp) 65.9 %    LVIDd 5.1 cm    LVIDs 3.6 cm    IVSd 1.35 cm    LVPWd 1.36 cm    FS 29.7 %    IVS/LVPW 0.99 cm    ESV(cubed) 46.3 ml    LV Sys Vol (BSA corrected) 30.7 cm2    EDV(cubed) 132.9 ml    LV Flanagan Vol (BSA corrected) 85.7 cm2    LVOT area 3.4 cm2    LV mass(C)d 286.5 grams    LVOT diam 2.07 cm    EDV(MOD-sp2) 174.0 ml    EDV(MOD-sp4) 223.0 ml    ESV(MOD-sp2) 51.0 ml    ESV(MOD-sp4) 80.0 ml    SV(MOD-sp2) 123.0 ml    SV(MOD-sp4) 143.0 ml    SI(MOD-sp2) 47.3 ml/m2    SI(MOD-sp4) 54.9 ml/m2    EF(MOD-sp2) 70.7 %    EF(MOD-sp4) 64.1 %    MV E max jr 83.1 cm/sec    MV A max jr 42.0 cm/sec    MV dec time 0.29 msec    MV E/A 1.98     LA ESV Index (BP) 49.6 ml/m2    Med Peak E' Jr 9.1 cm/sec    Lat Peak E' Jr 12.1 cm/sec    Avg E/e' ratio 7.84     SV(LVOT) 96.0 ml    SV(RVOT) 55.8 ml    Qp/Qs 0.58     RV S' 10.7 cm/sec    LV V1 max 126.9 cm/sec    LV V1 max PG 6.4 mmHg    LV V1 mean PG 3.5 mmHg    LV V1 VTI 28.6 cm    Ao pk jr 258.6 cm/sec    Ao max PG 26.7 mmHg    Ao mean PG 13.0 mmHg    Ao V2 VTI 51.8 cm    LESLY(I,D) 1.85 cm2    MV max PG 3.3 mmHg    MV mean PG 1.26 mmHg    MV V2 VTI 24.2 cm    MV P1/2t 91.0 msec    MVA(P1/2t) 2.42 cm2    MVA(VTI) 4.0 cm2    MV dec slope 301.6 cm/sec2    RAP  systole 3.0 mmHg    RVOT diam 2.18 cm    RV V1 max PG 2.15 mmHg    RV V1 max 73.3 cm/sec    RV V1 VTI 14.9 cm    PA V2 max 104.5 cm/sec    Ao root diam 4.4 cm    ACS 1.49 cm    Sinus 4.4 cm    Dimensionless Index 0.60 (DI)       Ordered the above labs and independently reviewed the results.      RADIOLOGY  XR Chest 1 View    Result Date: 12/14/2022  CHEST SINGLE VIEW  HISTORY: Shortness of air, extreme fatigue  COMPARISON: Portable chest 11/25/2020, PET CT 12/12/2022.  FINDINGS: Right IJ central venous catheter has been withdrawn since the prior exam. Heart size upper normal. Sternotomy wires are present. Lungs appear clear of focal airspace disease and there is no evidence for pulmonary edema or pleural effusion. Cardiac monitoring leads are present.      No evidence for active disease in the chest.  This report was finalized on 12/14/2022 12:13 PM by Dr. Edmond Garcia M.D.        I ordered the above noted radiological studies and viewed the images on the PACS system.       EKG      Independently viewed by me and interpreted by Dr Ron         MEDICAL RECORD REVIEW  Medical records reviewed in [facility], patient admitted 11/25 through 11/30/2022 for acute blood loss anemia due to GI bleed, resulting in hemorrhagic shock.  Patient noted to have distal esophageal cancer on recent EGD.  He went to [facility] for polypectomy.  Patient then had admission 12 1 through 12 2 here for acute blood loss anemia due to GI bleed, he was transferred to [facility] for continuity of care for the GI services.  Patient was admitted at [facility] 12/1/1930 12/5/2022.  He received multiple blood transfusions during that admission.      PROCEDURES    Procedures        DIFFERENTIAL DIAGNOSIS  Differential diagnosis include but are not limited to the following: Anemia, GI bleed, electrolyte abnormality, esophageal cancer      PROGRESS, DATA ANALYSIS, CONSULTS, AND MEDICAL DECISION MAKING        ED Course as of 12/14/22  1542   Wed Dec 14, 2022   1048 Patient is a 62-year-old who presents today with shortness of breath, dizziness possible anemia with significant history of GI bleed and esophageal cancer.  Patient does appear very pale.  Labs including type and screen have been ordered to evaluate hemoglobin.  Patient did have heme positive stools on my exam. [MS]   1130 Reviewed pt's history and workup with Dr. Ron.  After a bedside evaluation, he agrees with the plan of care.  Care transferred to him at this time.   [MS]   1235 12:35 EST  Patient presents for multiple complaints.  Patient states he is having chest pain and shortness of breath.  States he is lightheaded.  Patient's stool is not black or bloody but is heme positive and hemoglobin is improved from when he was here before.  Patient states he is too sick to go home.  Patient has been discussed with Dr. Patel and will be admitted [SL]      ED Course User Index  [MS] Allison Dejesus, APRN  [SL] Praneeth Ron MD           MEDICATIONS GIVEN IN ED    Medications   sodium chloride 0.9 % flush 10 mL (10 mL Intravenous Given 12/14/22 1050)   nitroglycerin (NITROSTAT) SL tablet 0.4 mg (has no administration in time range)   acetaminophen (TYLENOL) tablet 650 mg (has no administration in time range)   ondansetron (ZOFRAN) tablet 4 mg (has no administration in time range)     Or   ondansetron (ZOFRAN) injection 4 mg (has no administration in time range)   melatonin tablet 3 mg (has no administration in time range)   LORazepam (ATIVAN) tablet 0.5 mg (has no administration in time range)   amLODIPine (NORVASC) tablet 10 mg (has no administration in time range)   atorvastatin (LIPITOR) tablet 40 mg (has no administration in time range)   empagliflozin (JARDIANCE) tablet 25 mg (has no administration in time range)   ferrous sulfate tablet 325 mg (has no administration in time range)   furosemide (LASIX) tablet 20 mg (has no administration in time range)    isosorbide mononitrate (IMDUR) 24 hr tablet 60 mg (has no administration in time range)   lisinopril (PRINIVIL,ZESTRIL) tablet 10 mg (has no administration in time range)   pantoprazole (PROTONIX) EC tablet 40 mg (has no administration in time range)   potassium chloride (K-DUR,KLOR-CON) ER tablet 10 mEq (has no administration in time range)   sucralfate (CARAFATE) tablet 1 g (has no administration in time range)   perflutren (DEFINITY) 8.476 mg in Sodium Chloride (PF) 0.9 % 10 mL injection (2 mL Intravenous Given 12/14/22 5170)           COURSE & MEDICAL DECISION MAKING  Any/All labs and Any/All Imaging studies that were ordered were reviewed and are noted above.  Results were reviewed/discussed with the patient and they were also made aware of online access.    Pt also made aware that some labs, such as cultures, will not be resulted during ER visit and followup with PMD is necessary.        Allison Dejesus, APRN  12/14/22 8034

## 2022-12-14 NOTE — ED PROVIDER NOTES
MD ATTESTATION NOTE    The COLLEEN and I have discussed this patient's history, physical exam, and treatment plan.  I have reviewed the documentation and personally had a face to face interaction with the patient. I affirm the documentation and agree with the treatment and plan.  The attached note describes my personal findings.      I provided a substantive portion of the care of the patient.  I personally performed the physical exam in its entirety, and below are my findings.  For this patient encounter, the patient wore surgical mask, I wore full protective PPE including N95 and eye protection.      Brief HPI: Patient presents for multiple complaints.  Patient has had recent GI bleed requiring several transfusions.  Patient states he has had increasing chest pain and shortness of breath.  States he is barely able to walk across the room.  Patient has not noted any black stools or dark tarry stools this time.  Patient sent over from primary care doctor for increasing shortness of breath and leg swelling.    PHYSICAL EXAM  ED Triage Vitals   Temp Heart Rate Resp BP SpO2   12/14/22 1006 12/14/22 1006 12/14/22 1006 12/14/22 1014 12/14/22 1006   98.9 °F (37.2 °C) 90 20 125/75 95 %      Temp src Heart Rate Source Patient Position BP Location FiO2 (%)   12/14/22 1006 -- -- -- --   Tympanic             GENERAL: no acute distress  HENT: nares patent  EYES: no scleral icterus  CV: regular rhythm, normal rate  RESPIRATORY: normal effort  ABDOMEN: soft  MUSCULOSKELETAL: no deformity  NEURO: alert, moves all extremities, follows commands  PSYCH:  calm, cooperative  SKIN: warm, dry    Vital signs and nursing notes reviewed.    EKG          EKG time: 1023  Rhythm/Rate: Normal sinus rhythm 79 PVCs   P waves and OR: Normal P waves  QRS, axis: Normal qrs  ST and T waves: T wave inversions lateral leads    Interpreted Contemporaneously by me, independently viewed  Unchanged compared to prior       Plan: Patient will most likely need to  be admitted.  Will need a cardiology consult.    ED Course as of 12/14/22 1821   Wed Dec 14, 2022   1048 Patient is a 62-year-old who presents today with shortness of breath, dizziness possible anemia with significant history of GI bleed and esophageal cancer.  Patient does appear very pale.  Labs including type and screen have been ordered to evaluate hemoglobin.  Patient did have heme positive stools on my exam. [MS]   1130 Reviewed pt's history and workup with Dr. Ron.  After a bedside evaluation, he agrees with the plan of care.  Care transferred to him at this time.   [MS]   1235 12:35 EST  Patient presents for multiple complaints.  Patient states he is having chest pain and shortness of breath.  States he is lightheaded.  Patient's stool is not black or bloody but is heme positive and hemoglobin is improved from when he was here before.  Patient states he is too sick to go home.  Patient has been discussed with Dr. Patel and will be admitted [SL]      ED Course User Index  [MS] Allison Dejesus, APRN  [SL] Praneeth Ron MD        Diagnosis Plan   1. Exertional chest pain        2. LOWERY (dyspnea on exertion)        3. Lightheadedness            Admit       Praneeth Ron MD  12/14/22 1201       Praneeth Ron MD  12/14/22 1237       Praneeth Ron MD  12/14/22 1821

## 2022-12-14 NOTE — PROGRESS NOTES
"    I N T E R N A L  M E D I C I N E  Nargis Velasquez, APRN       ENCOUNTER DATE:  12/14/2022    Edmond Chase / 62 y.o. / male        CC:   (Transitional Care Follow Up Visit)  Hospital Follow Up Visit        Within 48 business hours after discharge our office contacted him via telephone to coordinate his care and needs.      I reviewed and discussed the details of that call along with the discharge summary, hospital problems, inpatient lab results, inpatient diagnostic studies, and consultation reports with the patient.     Date of TCM Phone Call 12/15/2022   [facility]   Date of Admission 12/14/2022   Date of Discharge 12/15/2022   Discharge Disposition Home or Self Care       Risk for Readmission (LACE) Score: 9 (12/15/2022  6:00 AM)            VITALS    Visit Vitals  /80   Pulse 88   Temp 97.6 °F (36.4 °C)   Ht 185.4 cm (72.99\")   Wt (!) 142 kg (313 lb)   SpO2 97%   BMI 41.30 kg/m²       BP Readings from Last 3 Encounters:   12/15/22 155/99   12/14/22 160/80   12/06/22 129/74     Wt Readings from Last 3 Encounters:   12/15/22 (!) 142 kg (313 lb 11.4 oz)   12/14/22 (!) 142 kg (313 lb)   12/06/22 (!) 140 kg (308 lb 8 oz)      Body mass index is 41.3 kg/m².    HPI:     Date of admission/discharge: December 1-5, 2022  Hospital: [facility]  Principle Dx: Acute blood loss anemia due to GI bleed  Secondary Dx: Recent colonoscopy with polypectomy  History prior to hospitalization: CAD with stent, TJ on CPAP, obesity, DMII, HLD, HTN  Evaluation/Treatment/ Course:    Pt has had several recent hospital stays.  He was admitted 11/25-11/30/2022 for hemorrhagic shock following acute blood anemia due to GI bleed.  He had recently undergone colonoscopy with polypectomy.  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  During hospital admission, he received endoscopic clipping to sigmoid colon.    On 12/1-2, he was again admitted at [facility] for a rebleed and then transferred to [facility] where he " received multiple blood transfusions.    He has stopped Plavix indefinately.  He recently followed up with GI office, with recommendation of daily iron supplementation and will next follow up again in 4 months to determine timing of repeat colonoscopy.      He is now being followed by Valerie Stockton MD, thoracic surgery, for his esophageal cancer.  Pt reports plan is to remove esophagus.  He was advised chemo/ radiation is not being planned currently.  He will undergo cardiac clearance prior to surgery.  ECHO is scheduled December 20, 2022.     Here today, pt reports acute worsening of continued fatigue, weakness, shortness of breath since being discharged from the hospital.  He denies any blood in stool.  He has been taking furosemide 20 mg daily since hospital discharge, but does complaint of worsening lower extremity swelling.      He reports he has plans to meet with psychiatry on December 19 to discuss possible medication management of recent anxiety associated with hospital stays and cancer diagnosis.      Patient Care Team:  Nargis Velasqeuz APRN as PCP - General (Family Medicine)  Papa Miguel MD as Consulting Physician (Cardiology)  Sekou Pisano MD as Consulting Physician (Pulmonary Disease)  Gregor Carlson MD as Consulting Physician (Gastroenterology)  Estevan Yuan MD (Sports Medicine)  ____________________________________________________________________    ASSESSMENT & PLAN:    1. Shortness of breath    2. Anemia, unspecified type    3. Malignant neoplasm of lower third of esophagus (HCC)      No orders of the defined types were placed in this encounter.      Summary/Discussion:  • Pt is currently hemodynamically stable, but appears pale and is with complaints of shortness of breath, extreme fatigue, lower extremity swelling, along with history of anemia/ prior rectal bleeding.  He is referred to ER for further evaluation.      Return in about 1 week (around 12/21/2022) for  Recheck.    ____________________________________________________________________    REVIEW OF SYSTEMS    Review of Systems   Constitutional: Positive for fatigue. Negative for chills, fever and unexpected weight change.   Respiratory: Positive for shortness of breath. Negative for cough and chest tightness.    Cardiovascular: Positive for leg swelling. Negative for chest pain and palpitations.   Gastrointestinal: Negative for blood in stool.   Skin: Positive for pallor.   Neurological: Positive for light-headedness. Negative for dizziness, weakness and headaches.   Psychiatric/Behavioral: The patient is not nervous/anxious.          PHYSICAL EXAMINATION    Physical Exam  Vitals reviewed.   Constitutional:       General: He is not in acute distress.     Appearance: Normal appearance. He is not ill-appearing, toxic-appearing or diaphoretic.   HENT:      Head: Normocephalic and atraumatic.      Mouth/Throat:      Mouth: Mucous membranes are dry.   Cardiovascular:      Rate and Rhythm: Normal rate and regular rhythm.      Heart sounds: Normal heart sounds.   Pulmonary:      Effort: Pulmonary effort is normal.      Breath sounds: Normal breath sounds.   Musculoskeletal:      Right lower leg: Edema (nonpitting) present.      Left lower leg: Edema (nonpitting) present.   Skin:     Coloration: Skin is pale.   Neurological:      Mental Status: He is alert and oriented to person, place, and time. Mental status is at baseline.   Psychiatric:         Mood and Affect: Mood normal.         Behavior: Behavior normal.         Thought Content: Thought content normal.         Judgment: Judgment normal.           REVIEWED DATA:    Labs:   Lab Results   Component Value Date     12/15/2022    K 3.8 12/15/2022    CALCIUM 8.4 (L) 12/15/2022    AST 12 12/14/2022    ALT 8 12/14/2022    BUN 12 12/15/2022    CREATININE 0.69 (L) 12/15/2022    CREATININE 0.80 12/14/2022    CREATININE 0.70 (L) 12/02/2022    EGFRIFNONA 91 06/17/2021     EGFRIFAFRI 72 03/29/2016       Lab Results   Component Value Date    WBC 5.79 12/15/2022    HGB 9.8 (L) 12/15/2022    HGB 8.8 (L) 12/14/2022    HGB 7.5 (L) 12/02/2022    HGB 7.5 (L) 12/02/2022     12/15/2022       Lab Results   Component Value Date    PROTEIN 1+ (A) 08/04/2022    GLUCOSEU 3+ (A) 08/04/2022    BLOODU Negative 08/04/2022    NITRITEU Negative 08/04/2022    LEUKOCYTESUR Negative 08/04/2022       Imaging:   Adult Transthoracic Echo Complete W/ Cont if Necessary Per Protocol    Result Date: 12/14/2022  Narrative: •  Left ventricular ejection fraction appears to be 66 - 70%. •  Left ventricular wall thickness is consistent with mild concentric hypertrophy. •  Left ventricular diastolic function was normal. •  There is a bioprosthetic aortic valve present. •  Mild dilation of the aortic root is present. The aortic root measures 4.4 cm. Mild dilation of the sinuses of Valsalva is present.     CT Angiogram Abdomen Pelvis    Result Date: 11/26/2022  Narrative: CT ANGIOGRAM OF THE CHEST  HISTORY: GI bleeding  COMPARISON: None available.  TECHNIQUE: Axial CT imaging was obtained through the abdomen and pelvis. IV contrast was administered. Three-D reformatted images were obtained.  FINDINGS: Images are degraded by motion artifact. Images through the lung bases straight some minimal atelectasis versus scarring. The superior mesenteric artery and celiac axis are widely patent. The gastroduodenal artery arises from the celiac axis, and the proper hepatic artery arises from the superior mesenteric artery. There is a single right renal artery. There are 2 left renal arteries. Patient is suspected to have at least moderate narrowing at the origin of the main left renal artery. The inferior mesenteric artery is patent. There is atherosclerotic involvement of the abdominal aorta, which continues into the iliac vessels, although the iliac vessels appear widely patent, as are the common femoral and visualized  superficial femoral and profunda femoris arteries. The patient has endoscopy clips which are noted within the colon. Adjacent to these clips, there is a bilobed collection of contrast material, measuring up to 1.8 cm, suspicious for either active extravasation or pseudoaneurysm. No suspicious hepatic lesions are seen. Liver is enlarged, measuring up to 19.5 cm in craniocaudal dimensions. Gallbladder is somewhat distended. Adrenal glands, spleen, and duodenum are unremarkable. There are clips which are noted at the GE junction. Correlation with procedural history is recommended. Pancreas is mildly atrophic. Kidneys enhance symmetrically. There is no hydronephrosis. There is a left renal cyst. No additional follow-up is necessary. There is no bowel obstruction. No acute osseous abnormalities are seen.      Impression:  1. The patient has an area of abnormal contrast adjacent to an endoscopy clip within the cecum, which is suspicious for active extravasation versus pseudoaneurysm.  FINDINGS were relayed to the patient's nurse, Tucker, at 12:00 AM  Radiation dose reduction techniques were utilized, including automated exposure control and exposure modulation based on body size.  This report was finalized on 11/26/2022 12:01 AM by Dr. Ani Eubanks M.D.      XR Chest 1 View    Result Date: 12/14/2022  Narrative: CHEST SINGLE VIEW  HISTORY: Shortness of air, extreme fatigue  COMPARISON: Portable chest 11/25/2020, PET CT 12/12/2022.  FINDINGS: Right IJ central venous catheter has been withdrawn since the prior exam. Heart size upper normal. Sternotomy wires are present. Lungs appear clear of focal airspace disease and there is no evidence for pulmonary edema or pleural effusion. Cardiac monitoring leads are present.      Impression: No evidence for active disease in the chest.  This report was finalized on 12/14/2022 12:13 PM by Dr. Edmond Garcia M.D.      NM PET/CT Skull Base to Mid Thigh    Result Date:  12/14/2022  Narrative: F-18 FDG PET SKULL BASE TO MID THIGH WITH PET CT FUSION.  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 Bleeding at cecal polypectomy site.  TECHNIQUE: Radiation dose reduction techniques were utilized, including automated exposure control and exposure modulation based on body size. Blood glucose level at time of injection was 149 mg/dL. 6.8 mCi of F-18 FDG were injected and PET was performed from skull base to mid thigh. CT was obtained for localization and attenuation correction. Time at injection 8:19 AM. PET start time 9:41 AM. There is no recent chest CT for comparison. Correlated with CTA abdomen and pelvis 11/25/2022.  FINDINGS: There is a 1 cm focus of relative hypermetabolic activity along the posterior wall of the GE junction which is immediately adjacent to a surgical clip, and the maximal SUV is 3.6. There is a speckled pattern of activity throughout the stomach, vascular pool, liver, spleen, and marrow space and this finding is therefore indeterminate. There is no hypermetabolic lymphadenopathy within the chest or abdomen. Very heterogeneous speckled pattern of activity throughout the liver without definite suspicious foci. There is no suspicious adrenal activity. There is nonspecific bowel activity. There is no suspicious hypermetabolic activity within the neck.      Impression: Overall, there is no convincing evidence for malignancy at the esophagus or stomach and there is no hypermetabolic lymphadenopathy or convincing evidence for metastatic disease.  This report was finalized on 12/14/2022 12:24 PM by Dr. Alcira Roberts M.D.      XR Chest Post CVA Port    Result Date: 11/25/2022  Narrative: XR CHEST POST CVA PORT-  INDICATIONS: Dyspnea  TECHNIQUE: Frontal view of the chest  COMPARISON: 07/10/2022  FINDINGS:  Right IJ catheter extends to the superior vena cava. Sternotomy wires are present. Heart size is  normal. Pulmonary vasculature is unremarkable. No focal pulmonary consolidation, pleural effusion, or pneumothorax is seen, with note that the costophrenic angles are excluded from the image. No acute osseous process.      Impression:  As described.  This report was finalized on 11/25/2022 3:23 PM by Dr. Robert Neumann M.D.      Arterial Line    Result Date: 11/26/2022  Narrative: Stefan Rodriguez MD     11/26/2022  2:42 AM Arterial Line Patient reassessed immediately prior to procedure Patient location during procedure: floor Line placed for hemodynamic monitoring and ABGs/Labs/ISTAT. Performed By Anesthesiologist: Stefan Rodriguez MD Preanesthetic Checklist Completed: patient identified, IV checked, site marked, risks and benefits discussed, surgical consent, monitors and equipment checked, pre-op evaluation and timeout performed Arterial Line Prep  Sterile Tech: cap, gloves, sterile barriers and mask Prep: ChloraPrep Patient monitoring: EKG, continuous pulse oximetry and blood pressure monitoring Arterial Line Procedure Laterality:left Location:  radial artery Catheter size: 20 G Guidance: palpation technique Number of attempts: 1 Successful placement: yes Post Assessment Dressing Type: wrist guard applied, secured with tape and occlusive dressing applied. Complications no Circ/Move/Sens Assessment: normal and unchanged. Patient Tolerance: patient tolerated the procedure well with no apparent complications        Medical Tests:        Summary of old records / correspondence / consultant report:   H&P    Request outside records:         MEDICATIONS   Current Outpatient Medications   Medication Sig Dispense Refill   • amLODIPine (NORVASC) 10 MG tablet Take 10 mg by mouth Daily.     • atorvastatin (LIPITOR) 40 MG tablet Take 40 mg by mouth Every Night.     • dapagliflozin Propanediol (Farxiga) 10 MG tablet Take 10 mg by mouth Daily.     • ferrous sulfate 324 (65 Fe) MG tablet delayed-release EC tablet  Take 324 mg by mouth Daily With Breakfast.     • furosemide (LASIX) 20 MG tablet Take 20 mg by mouth Daily.     • isosorbide mononitrate (IMDUR) 60 MG 24 hr tablet Take 60 mg by mouth Daily.     • lisinopril (PRINIVIL,ZESTRIL) 10 MG tablet Take 10 mg by mouth Every Evening.     • metFORMIN (GLUCOPHAGE) 500 MG tablet Take 500 mg by mouth Every Evening.     • potassium chloride 10 MEQ CR tablet Take 10 mEq by mouth Daily.     • sucralfate (CARAFATE) 1 g tablet Take 1 tablet by mouth 2 (Two) Times a Day Before Meals. 60 tablet 5   • aspirin 81 MG EC tablet Take 1 tablet by mouth Daily for 30 days. 30 tablet 0   • GLUCOSAMINE HCL PO Take 1 tablet by mouth Daily.     • pantoprazole (PROTONIX) 40 MG EC tablet Take 1 tablet by mouth 2 (Two) Times a Day With Meals. 30 tablet 5     No current facility-administered medications for this visit.       Current outpatient and discharge medications have been reconciled for the patient.  Reviewed by: VIKTORIA Carbone         @MSK@

## 2022-12-14 NOTE — PROGRESS NOTES
Clinical Pharmacy Services: Medication History    Edmond Chase is a 62 y.o. male presenting to [facility] for   Chief Complaint   Patient presents with   • Shortness of Breath   • Fatigue       He  has a past medical history of Abnormal nuclear stress test, Aortic valve insufficiency, Ascending aortic aneurysm, CAD (coronary artery disease), Cancer (HCC), Chest pain, Diabetes mellitus (HCC), Dizzy, Dyspnea, Essential hypertension, Fatigue, Hyperglycemia, Hyperlipidemia, Hypersomnia with sleep apnea, Lightheadedness, Malaise and fatigue, Morbid obesity (HCC), Myocardial ischemia, Nocturia, TJ on auto CPAP (10/31/2016), Pneumonia, Scrotal bleeding, and Shortness of breath.    Allergies as of 12/14/2022 - Reviewed 12/14/2022   Allergen Reaction Noted   • Bystolic [nebivolol hcl]  05/17/2016       Medication information was obtained from: Patient  Pharmacy and Phone Number:     Prior to Admission Medications     Prescriptions Last Dose Informant Patient Reported? Taking?    amLODIPine (NORVASC) 10 MG tablet 12/14/2022 Self Yes Yes    Take 10 mg by mouth Daily.    atorvastatin (LIPITOR) 40 MG tablet 12/13/2022 Self Yes Yes    Take 40 mg by mouth Every Night.    dapagliflozin Propanediol (Farxiga) 10 MG tablet 12/14/2022 Self Yes Yes    Take 10 mg by mouth Daily.    ferrous sulfate 324 (65 Fe) MG tablet delayed-release EC tablet 12/13/2022 Self Yes Yes    Take 324 mg by mouth Daily With Breakfast.    furosemide (LASIX) 20 MG tablet 12/14/2022 Self Yes Yes    Take 20 mg by mouth Daily.    GLUCOSAMINE HCL PO 12/13/2022 Self Yes Yes    Take 1 tablet by mouth Daily.    isosorbide mononitrate (IMDUR) 60 MG 24 hr tablet 12/14/2022 Self Yes Yes    Take 60 mg by mouth Daily.    lisinopril (PRINIVIL,ZESTRIL) 10 MG tablet 12/13/2022 Self Yes Yes    Take 10 mg by mouth Every Evening.    metFORMIN (GLUCOPHAGE) 500 MG tablet 12/13/2022 Self Yes Yes    Take 500 mg by mouth Every Evening.    pantoprazole (PROTONIX)  40 MG EC tablet 12/14/2022 Self No Yes    Take 1 tablet by mouth Every Morning.    potassium chloride 10 MEQ CR tablet 12/14/2022 Self Yes Yes    Take 10 mEq by mouth Daily.    sucralfate (CARAFATE) 1 g tablet 12/13/2022 Self No Yes    Take 1 tablet by mouth 2 (Two) Times a Day Before Meals.    Patient taking differently:  Take 1 g by mouth Daily.            Medication notes: Pt is unsure the strength on his glucosamine    This medication list is complete to the best of my knowledge as of 12/14/2022    Please call if questions.    Skyler Silva  Medication History Technician   [phone]    12/14/2022 13:42 EST

## 2022-12-14 NOTE — PLAN OF CARE
Problem: Fall Injury Risk  Goal: Absence of Fall and Fall-Related Injury  Intervention: Identify and Manage Contributors  Description: Develop a fall prevention plan with the patient and caregiver/family.  Provide reorientation, appropriate sensory stimulation and routines with changes in mental status to decrease risk of fall.  Promote use of personal vision and auditory aids.  Assess assistance level required for safe and effective self-care; provide support as needed, such as toileting, mobilization. For age 65 and older, implement timed toileting with assistance.  Encourage physical activity, such as performance of mobility and self-care at highest level of patient ability, multicomponent exercise program and provision of appropriate assistive devices.  If fall occurs, assess the severity of injury; implement fall injury protocol. Determine the cause and revise fall injury prevention plan.  Regularly review medication contribution to fall risk; adjust medication administration times to minimize risk of falling.  Consider risk related to polypharmacy and age.  Balance adequate pain management with potential for oversedation.  Recent Flowsheet Documentation  Taken 12/14/2022 1600 by Arely Lane, RN  Medication Review/Management:   medications reviewed   high-risk medications identified  Intervention: Promote Injury-Free Environment  Description: Provide a safe, barrier-free environment that encourages independent activity.  Keep care area uncluttered and well-lighted.  Determine need for increased observation or monitoring.  Avoid use of devices that minimize mobility, such as restraints or indwelling urinary catheter.  Recent Flowsheet Documentation  Taken 12/14/2022 1747 by Arely Lane, RN  Safety Promotion/Fall Prevention: safety round/check completed  Taken 12/14/2022 1600 by Arely Lane, RN  Safety Promotion/Fall Prevention:   assistive device/personal items within reach   clutter free environment  maintained   fall prevention program maintained   nonskid shoes/slippers when out of bed   room organization consistent   safety round/check completed   lighting adjusted     Problem: Adult Inpatient Plan of Care  Goal: Absence of Hospital-Acquired Illness or Injury  Intervention: Identify and Manage Fall Risk  Description: Perform standard risk assessment on admission using a validated tool or comprehensive approach appropriate to the patient; reassess fall risk frequently, with change in status or transfer to another level of care.  Communicate fall injury risk to interprofessional healthcare team.  Determine need for increased observation, equipment and environmental modification, such as low bed, signage and supportive, nonskid footwear.  Adjust safety measures to individual developmental age, stage and identified risk factors.  Reinforce the importance of safety and physical activity with patient and family.  Perform regular intentional rounding to assess need for position change, pain assessment and personal needs, including assistance with toileting.  Recent Flowsheet Documentation  Taken 12/14/2022 1747 by Arely Lane, RN  Safety Promotion/Fall Prevention: safety round/check completed  Taken 12/14/2022 1600 by Arely Lane, RN  Safety Promotion/Fall Prevention:   assistive device/personal items within reach   clutter free environment maintained   fall prevention program maintained   nonskid shoes/slippers when out of bed   room organization consistent   safety round/check completed   lighting adjusted  Intervention: Prevent Skin Injury  Description: Perform a screening for skin injury risk, such as pressure or moisture associated skin damage on admission and at regular intervals throughout hospital stay.  Keep all areas of skin (especially folds) clean and dry.  Maintain adequate skin hydration.  Relieve and redistribute pressure and protect bony prominences; implement measures based on patient-specific  risk factors.  Match turning and repositioning schedule to clinical condition.  Encourage weight shift frequently; assist with reposition if unable to complete independently.  Float heels off bed; avoid pressure on the Achilles tendon.  Keep skin free from extended contact with medical devices.  Encourage functional activity and mobility, as early as tolerated.  Use aids (e.g., slide boards, mechanical lift) during transfer.  Recent Flowsheet Documentation  Taken 12/14/2022 1600 by Arely Lane RN  Body Position:   position changed independently   weight shifting   neutral body alignment   neutral head position  Intervention: Prevent and Manage VTE (Venous Thromboembolism) Risk  Description: Assess for VTE (venous thromboembolism) risk.  Encourage and assist with early ambulation.  Initiate and maintain compression or other therapy, as indicated, based on identified risk in accordance with organizational protocol and provider order.  Encourage both active and passive leg exercises while in bed, if unable to ambulate.  Recent Flowsheet Documentation  Taken 12/14/2022 1600 by Arely Lane RN  Activity Management: activity adjusted per tolerance  Goal: Optimal Comfort and Wellbeing  Intervention: Provide Person-Centered Care  Description: Use a family-focused approach to care.  Develop trust and rapport by proactively providing information, encouraging questions, addressing concerns and offering reassurance.  Acknowledge emotional response to hospitalization.  Recognize and utilize personal coping strategies.  Honor spiritual and cultural preferences.  Recent Flowsheet Documentation  Taken 12/14/2022 1600 by Arely Lane RN  Trust Relationship/Rapport:   care explained   choices provided   emotional support provided   empathic listening provided   questions answered   questions encouraged   reassurance provided   thoughts/feelings acknowledged  Goal: Readiness for Transition of Care  Intervention: Mutually  Develop Transition Plan  Description: Identify available resources for support (e.g., family, friends, community).  Identify and address barriers to ongoing treatment and home management (e.g., environmental, financial).  Provide opportunities to practice self-management skills.  Assess and monitor emotional readiness for transition.  Establish or reconnect linkage with outpatient providers or community-based services.  Recent Flowsheet Documentation  Taken 12/14/2022 1644 by Arely Lane, RN  Transportation Anticipated: car, drives self  Patient/Family Anticipated Services at Transition: none  Patient/Family Anticipates Transition to: home  Taken 12/14/2022 1639 by Arely Lane, RN  Equipment Currently Used at Home: none   Goal Outcome Evaluation:      No s/sx of distress. Able to stand and ambulate from stretcher to bed with SBA. Good appetite and PO intake noted. Blood consent signed. Awaiting [facility] availability. VSS. Monitoring.

## 2022-12-15 ENCOUNTER — READMISSION MANAGEMENT (OUTPATIENT)
Dept: CALL CENTER | Facility: HOSPITAL | Age: 62
End: 2022-12-15

## 2022-12-15 VITALS
RESPIRATION RATE: 18 BRPM | BODY MASS INDEX: 41.58 KG/M2 | HEIGHT: 73 IN | TEMPERATURE: 97.8 F | SYSTOLIC BLOOD PRESSURE: 155 MMHG | HEART RATE: 76 BPM | WEIGHT: 313.71 LBS | OXYGEN SATURATION: 91 % | DIASTOLIC BLOOD PRESSURE: 99 MMHG

## 2022-12-15 LAB
ANION GAP SERPL CALCULATED.3IONS-SCNC: 7 MMOL/L (ref 5–15)
BH BB BLOOD EXPIRATION DATE: NORMAL
BH BB BLOOD TYPE BARCODE: 5100
BH BB DISPENSE STATUS: NORMAL
BH BB PRODUCT CODE: NORMAL
BH BB UNIT NUMBER: NORMAL
BUN SERPL-MCNC: 12 MG/DL (ref 8–23)
BUN/CREAT SERPL: 17.4 (ref 7–25)
CALCIUM SPEC-SCNC: 8.4 MG/DL (ref 8.6–10.5)
CHLORIDE SERPL-SCNC: 105 MMOL/L (ref 98–107)
CO2 SERPL-SCNC: 28 MMOL/L (ref 22–29)
CREAT SERPL-MCNC: 0.69 MG/DL (ref 0.76–1.27)
CROSSMATCH INTERPRETATION: NORMAL
DEPRECATED RDW RBC AUTO: 48.4 FL (ref 37–54)
EGFRCR SERPLBLD CKD-EPI 2021: 103.3 ML/MIN/1.73
ERYTHROCYTE [DISTWIDTH] IN BLOOD BY AUTOMATED COUNT: 15 % (ref 12.3–15.4)
GLUCOSE SERPL-MCNC: 98 MG/DL (ref 65–99)
HCT VFR BLD AUTO: 30 % (ref 37.5–51)
HGB BLD-MCNC: 9.8 G/DL (ref 13–17.7)
MCH RBC QN AUTO: 28.7 PG (ref 26.6–33)
MCHC RBC AUTO-ENTMCNC: 32.7 G/DL (ref 31.5–35.7)
MCV RBC AUTO: 88 FL (ref 79–97)
PLATELET # BLD AUTO: 232 10*3/MM3 (ref 140–450)
PMV BLD AUTO: 9.6 FL (ref 6–12)
POTASSIUM SERPL-SCNC: 3.8 MMOL/L (ref 3.5–5.2)
RBC # BLD AUTO: 3.41 10*6/MM3 (ref 4.14–5.8)
SODIUM SERPL-SCNC: 140 MMOL/L (ref 136–145)
TSH SERPL DL<=0.05 MIU/L-ACNC: 1.68 UIU/ML (ref 0.27–4.2)
UNIT  ABO: NORMAL
UNIT  RH: NORMAL
WBC NRBC COR # BLD: 5.79 10*3/MM3 (ref 3.4–10.8)

## 2022-12-15 PROCEDURE — 99213 OFFICE O/P EST LOW 20 MIN: CPT | Performed by: INTERNAL MEDICINE

## 2022-12-15 PROCEDURE — 97161 PT EVAL LOW COMPLEX 20 MIN: CPT

## 2022-12-15 PROCEDURE — G0378 HOSPITAL OBSERVATION PER HR: HCPCS

## 2022-12-15 PROCEDURE — 80048 BASIC METABOLIC PNL TOTAL CA: CPT | Performed by: STUDENT IN AN ORGANIZED HEALTH CARE EDUCATION/TRAINING PROGRAM

## 2022-12-15 PROCEDURE — 85027 COMPLETE CBC AUTOMATED: CPT | Performed by: STUDENT IN AN ORGANIZED HEALTH CARE EDUCATION/TRAINING PROGRAM

## 2022-12-15 PROCEDURE — 84443 ASSAY THYROID STIM HORMONE: CPT | Performed by: STUDENT IN AN ORGANIZED HEALTH CARE EDUCATION/TRAINING PROGRAM

## 2022-12-15 RX ORDER — ASPIRIN 81 MG/1
81 TABLET ORAL DAILY
Status: DISCONTINUED | OUTPATIENT
Start: 2022-12-15 | End: 2022-12-15 | Stop reason: HOSPADM

## 2022-12-15 RX ORDER — ASPIRIN 81 MG/1
81 TABLET ORAL DAILY
Qty: 30 TABLET | Refills: 0 | Status: SHIPPED | OUTPATIENT
Start: 2022-12-16 | End: 2023-01-15

## 2022-12-15 RX ORDER — PANTOPRAZOLE SODIUM 40 MG/1
40 TABLET, DELAYED RELEASE ORAL 2 TIMES DAILY WITH MEALS
Qty: 30 TABLET | Refills: 5 | Status: SHIPPED | OUTPATIENT
Start: 2022-12-15 | End: 2023-02-10 | Stop reason: HOSPADM

## 2022-12-15 RX ADMIN — LISINOPRIL 10 MG: 5 TABLET ORAL at 16:08

## 2022-12-15 RX ADMIN — LORAZEPAM 0.5 MG: 1 TABLET ORAL at 00:00

## 2022-12-15 RX ADMIN — FERROUS SULFATE TAB 325 MG (65 MG ELEMENTAL FE) 325 MG: 325 (65 FE) TAB at 08:33

## 2022-12-15 RX ADMIN — POTASSIUM CHLORIDE 10 MEQ: 750 TABLET, EXTENDED RELEASE ORAL at 08:32

## 2022-12-15 RX ADMIN — SUCRALFATE 1 G: 1 TABLET ORAL at 08:32

## 2022-12-15 RX ADMIN — AMLODIPINE BESYLATE 10 MG: 5 TABLET ORAL at 08:33

## 2022-12-15 RX ADMIN — FUROSEMIDE 20 MG: 20 TABLET ORAL at 08:32

## 2022-12-15 RX ADMIN — ISOSORBIDE MONONITRATE 60 MG: 30 TABLET, EXTENDED RELEASE ORAL at 08:32

## 2022-12-15 NOTE — CASE MANAGEMENT/SOCIAL WORK
Discharge Planning Assessment  [facility]     Patient Name: Edmond Chase  MRN: [id number]  Today's Date: 12/15/2022    Admit Date: 12/14/2022    Plan: Pt intends to return home upon discharge   Discharge Needs Assessment     Row Name 12/15/22 1411       Living Environment    People in Home alone    Current Living Arrangements apartment    Primary Care Provided by self    Provides Primary Care For no one    Family Caregiver if Needed none;other (see comments)  Pt does have relatives that are his HCS    Quality of Family Relationships unable to assess    Able to Return to Prior Arrangements yes       Resource/Environmental Concerns    Resource/Environmental Concerns none    Transportation Concerns none       Transition Planning    Patient/Family Anticipates Transition to home    Patient/Family Anticipated Services at Transition none    Transportation Anticipated car, drives self;family or friend will provide       Discharge Needs Assessment    Equipment Currently Used at Home cpap    Concerns to be Addressed no discharge needs identified;denies needs/concerns at this time    Anticipated Changes Related to Illness none    Equipment Needed After Discharge none    Provided Post Acute Provider List? N/A    Provided Post Acute Provider Quality & Resource List? N/A               Discharge Plan     Row Name 12/15/22 1413       Plan    Plan Pt intends to return home upon discharge    Patient/Family in Agreement with Plan yes    Provided Post Acute Provider List? N/A    Provided Post Acute Provider Quality & Resource List? N/A    Plan Comments Talked to pt at [facility]. Introduced self at D and explained role of CCP. Verified information on face sheet and PCP is Nargis Velasquez. Pt is independent in ADLs and does not have any steps to enter his apartment. Pt states he is independent in ADL's and is able to maneuver about the home without assistive devices. Denies any need for assistives devices or community resources. Pt  denies any difficulty paying for medications for medications. Advised pt that if any further needs arise, to contact case management              Continued Care and Services - Admitted Since 12/14/2022    Coordination has not been started for this encounter.          Demographic Summary    No documentation.                Functional Status    No documentation.                Psychosocial    No documentation.                Abuse/Neglect    No documentation.                Legal    No documentation.                Substance Abuse    No documentation.                Patient Forms    No documentation.                   Sharon Willard RN

## 2022-12-15 NOTE — NURSING NOTE
IV REMOVED FROM LAC. CATHETER INTACT. DRESSING APPLIED. PATIENT ABLE TO VERBALIZE UNDERSTANDING OF DISCHARGE INSTRUCTIONS, MEDICATION CHANGES AND FOLLOW UP IMPORTANCE. INDIFFERENT WITH START OF ASA. WILL DISCUSS FURTHER WITH CARDIOLOGIST AT ONE WEEK FOLLOW UP. AWAITING TRANSPORT.

## 2022-12-15 NOTE — PLAN OF CARE
Goal Outcome Evaluation:  Plan of Care Reviewed With: patient           Outcome Evaluation: Patient is a 62 y.o male who presents to [facility] ED with SOA and generalized weakness. Patient lives at home alone with no DAMARI. Patient is independent at baseline and does not use an AD. Patient sat up to EOB mod(I) today. Patient performed STS with SBA and ambulated 200ft with no AD and SBA. Gait slow but steady with no LOB noted. Patient appears at baseline function at this time. Acute PT will sign off. Recommend patient continue ambulating in halls with nsg.

## 2022-12-15 NOTE — THERAPY EVALUATION
Patient Name: Edmond Chase  : 1960    MRN: [id number]                              Today's Date: 12/15/2022       Admit Date: 2022    Visit Dx:     ICD-10-CM ICD-9-CM   1. Exertional chest pain  R07.9 786.50   2. LOWERY (dyspnea on exertion)  R06.09 786.09   3. Lightheadedness  R42 780.4     Patient Active Problem List   Diagnosis   • Essential hypertension   • Hyperglycemia   • Hyperlipidemia   • Class 3 severe obesity due to excess calories with serious comorbidity and body mass index (BMI) of 40.0 to 44.9 in adult ([facility])   • Nocturia   • Nonrheumatic aortic valve insufficiency   • Myocardial ischemia   • Coronary artery disease involving native coronary artery of native heart   • Ascending aortic aneurysm   • CAD in native artery   • S/P CABG (coronary artery bypass graft)   • S/P AVR (aortic valve replacement)   • Status post ascending aortic aneurysm repair   • Fatigue   • Abnormal nuclear stress test   • Coronary artery disease   • Coronary artery disease of bypass graft of native heart with stable angina pectoris ([facility])   • TJ on auto CPAP   • Hypersomnia due to medical condition   • Angina at rest ([facility])   • Type 2 diabetes mellitus, without long-term current use of insulin ([facility])   • Anemia   • Ulcer of esophagus without bleeding   • Polyp of colon   • Rectal bleeding   • Gastrointestinal hemorrhage   • Malignant neoplasm of lower third of esophagus ([facility])   • Gastrointestinal hemorrhage, unspecified gastrointestinal hemorrhage type   • Exertional chest pain   • Iron deficiency anemia     Past Medical History:   Diagnosis Date   • Abnormal nuclear stress test    • Aortic valve insufficiency     nonrheumtic    • Ascending aortic aneurysm    • CAD (coronary artery disease)    • Cancer ([facility])    • Chest pain    • Diabetes mellitus ([facility])    • Dizzy     CAREFUL WHEN GETTING UP   • Dyspnea    • Essential hypertension    • Fatigue    • Hyperglycemia    • Hyperlipidemia    • Hypersomnia with sleep apnea     • Lightheadedness    • Malaise and fatigue    • Morbid obesity (HCC)    • Myocardial ischemia    • Nocturia    • TJ on auto CPAP 10/31/2016    Overnight polysomnogram.  Weight 276 pounds.  Severe TJ with AHI 79 events per hour.  Auto CPAP recommended.   • Pneumonia     FEB 2016   • Scrotal bleeding    • Shortness of breath      Past Surgical History:   Procedure Laterality Date   • AORTIC VALVE REPAIR/REPLACEMENT  05/2016   • ASCENDING ARCH/HEMIARCH REPLACEMENT N/A 5/2/2016    Procedure: BHAVESH STERNOTOMY CORONARY ARTERY BYPASS GRAFT TIMES 3 USING LEFT INTERNAL MAMMARY ARTERY AND RIGHT GREATER SAPHENOUS VEIN GRAFT PER ENDOSCOPIC VEIN HARVESTING, AORTIC ANEURYSM REPAIR WITH ROOT REPAIR AND AORTIC VALVE REPLACEMENT;  Surgeon: Rosalio Cline MD;  Location: [facility] MAIN OR;  Service:    • CARDIAC CATHETERIZATION N/A 4/1/2016    Procedure: Left Heart Cath;  Surgeon: Erick Tam MD;  Location: [facility] CATH INVASIVE LOCATION;  Service:    • CARDIAC CATHETERIZATION N/A 4/1/2016    Procedure: Left ventriculography;  Surgeon: Erick Tam MD;  Location: [facility] CATH INVASIVE LOCATION;  Service:    • CARDIAC CATHETERIZATION N/A 4/1/2016    Procedure: Right Heart Cath;  Surgeon: Erick Tam MD;  Location: [facility] CATH INVASIVE LOCATION;  Service:    • CARDIAC CATHETERIZATION N/A 10/30/2017    Procedure: Coronary angiography;  Surgeon: Sorin Vasquez MD;  Location: [facility] CATH INVASIVE LOCATION;  Service:    • CARDIAC CATHETERIZATION  10/30/2017    Procedure: Saphenous Vein Graft;  Surgeon: Sorin Vasquez MD;  Location: [facility] CATH INVASIVE LOCATION;  Service:    • CARDIAC CATHETERIZATION N/A 10/30/2017    Procedure: Native mammary injection;  Surgeon: Sorin Vasquez MD;  Location: [facility] CATH INVASIVE LOCATION;  Service:    • CARDIAC CATHETERIZATION N/A 7/7/2020    Procedure: Coronary angiography;  Surgeon: Gregor Kim MD;  Location: [facility] CATH INVASIVE LOCATION;  Service: Cardiovascular;  Laterality: N/A;    • CARDIAC CATHETERIZATION N/A 7/7/2020    Procedure: Left heart cath;  Surgeon: Gregor Kim MD;  Location:  [facility] CATH INVASIVE LOCATION;  Service: Cardiovascular;  Laterality: N/A;   • CARDIAC CATHETERIZATION N/A 7/7/2020    Procedure: Left ventriculography;  Surgeon: Gregor Kim MD;  Location:  [facility] CATH INVASIVE LOCATION;  Service: Cardiovascular;  Laterality: N/A;   • CARDIAC CATHETERIZATION N/A 7/7/2020    Procedure: Stent ESMER bypass graft;  Surgeon: Gregor Kim MD;  Location:  [facility] CATH INVASIVE LOCATION;  Service: Cardiovascular;  Laterality: N/A;   • CARDIAC CATHETERIZATION N/A 6/17/2021    Procedure: SAPHENOUS VEIN GRAFT;  Surgeon: Marques Ndiaye MD;  [facility];  Service: Cardiovascular;  Laterality: N/A;   • CARDIAC CATHETERIZATION N/A 6/17/2021    Procedure: Left Heart Cath;  Surgeon: Marques Ndiaye MD;  [facility];  Service: Cardiovascular;  Laterality: N/A;   • CARDIAC CATHETERIZATION N/A 6/17/2021    Procedure: Coronary angiography;  Surgeon: Marques Ndiaye MD;  [facility];  Service: Cardiovascular;  Laterality: N/A;   • CARDIAC CATHETERIZATION N/A 7/14/2022    Procedure: Left Heart Cath;  Surgeon: Charlie Aj MD;  [facility];  Service: Cardiovascular;  Laterality: N/A;   • CARDIAC CATHETERIZATION N/A 7/14/2022    Procedure: Coronary angiography;  Surgeon: Charlie Aj MD;  [facility];  Service: Cardiovascular;  Laterality: N/A;   • CARDIAC CATHETERIZATION  7/14/2022    Procedure: Saphenous Vein Graft;  Surgeon: Charlie Aj MD;  [facility];  Service: Cardiovascular;;   • CARDIAC CATHETERIZATION N/A 7/14/2022    Procedure: Native mammary injection;  Surgeon: Charlie Aj MD;  Location:  [facility] CATH INVASIVE LOCATION;  Service: Cardiovascular;  Laterality: N/A;    • COLONOSCOPY N/A 11/26/2022    Procedure: COLONOSCOPY AT BEDSIDE;  Surgeon: Tomy Lopez MD;  Location: [facility] MAIN OR;  Service: Gastroenterology;  Laterality: N/A;   • COLONOSCOPY W/ POLYPECTOMY N/A 10/4/2022    Procedure: COLONOSCOPY to cecum with cold forceps and cold snare polypectomies;  Surgeon: Gregor Carlson MD;  Location: [facility] ENDOSCOPY;  Service: Gastroenterology;  Laterality: N/A;  PRE- hx of polyps  POST- diverticulosis, polyps   • CORONARY ARTERY BYPASS GRAFT  05/2016    LIMA TO LAD, SVG TO PDA, SVG TO OM2   • ENDOSCOPY N/A 7/13/2022    Procedure: ESOPHAGOGASTRODUODENOSCOPY;  Surgeon: Marcia Hollis MD;  Location: [facility] ENDOSCOPY;  Service: Gastroenterology;  Laterality: N/A;  PRE- ANEMIA, MELENA  POST- MILD EROSIVE GASTRITIS, GE JUNCTION ULCER   • ENDOSCOPY N/A 10/4/2022    Procedure: ESOPHAGOGASTRODUODENOSCOPY with biopsies;  Surgeon: Gregor Carlson MD;  Location: [facility] ENDOSCOPY;  Service: Gastroenterology;  Laterality: N/A;  PRE- hx of esophageal ulcer  POST- gastric cardia mass   • INNER EAR SURGERY     • TONSILLECTOMY        General Information     Row Name 12/15/22 [facility]5          Physical Therapy Time and Intention    Document Type evaluation  -     Mode of Treatment individual therapy;physical therapy  -     Row Name 12/15/22 [facility]5          General Information    Patient Profile Reviewed yes  -SM     Prior Level of Function independent:  -     Barriers to Rehab medically complex  -     Row Name 12/15/22 [facility]5          Living Environment    People in Home alone  -     Row Name 12/15/22 [facility]          Home Main Entrance    Number of Stairs, Main Entrance none  -     Row Name 12/15/22 [facility]          Cognition    Orientation Status (Cognition) oriented x 4  -SM           User Key  (r) = Recorded By, (t) = Taken By, (c) = Cosigned By    Initials Name Provider Type     Ramona Ortega PT Physical Therapist               Mobility     Row Name 12/15/22 [facility]           Bed Mobility    Bed Mobility supine-sit  -SM     Supine-Sit [place] (Bed Mobility) modified independence  -     Comment, (Bed Mobility) sitting EOB at end of session  -     Row Name 12/15/22 1215          Sit-Stand Transfer    Sit-Stand [place] (Transfers) standby assist  -     Assistive Device (Sit-Stand Transfers) other (see comments)  no AD  -SM     Row Name 12/15/22 1215          Gait/Stairs (Locomotion)    [place] Level (Gait) standby assist  -     Assistive Device (Gait) other (see comments)  no AD  -SM     Distance in Feet (Gait) 200ft  -     [place] Level (Stairs) not tested  -     Comment, (Gait/Stairs) Gait slow but steady with no LOB noted.  -           User Key  (r) = Recorded By, (t) = Taken By, (c) = Cosigned By    Initials Name Provider Type     Ramona Ortega PT Physical Therapist               Obj/Interventions     Row Name 12/15/22 1217          Range of Motion Comprehensive    General Range of Motion no range of motion deficits identified  -[facility] Name 12/15/22 1217          Strength Comprehensive (MMT)    General Manual Muscle Testing (MMT) Assessment no strength deficits identified  -[facility] Name 12/15/22 1217          Balance    Balance Assessment sitting static balance;sitting dynamic balance;sit to stand dynamic balance;standing static balance;standing dynamic balance  -     Static Sitting Balance independent  -     Dynamic Sitting Balance modified independence  -     Position, Sitting Balance sitting edge of bed  -     Sit to Stand Dynamic Balance standby assist  -     Static Standing Balance standby assist  -     Position/Device Used, Standing Balance unsupported  -     Balance Interventions sitting;standing;sit to stand;static;dynamic  -           User Key  (r) = Recorded By, (t) = Taken By, (c) = Cosigned By    Initials Name Provider Type     Ramona Ortega PT Physical Therapist               Goals/Plan    No  documentation.                Clinical Impression     Row Name 12/15/22 1218          Pain    Pretreatment Pain Rating 0/10 - no pain  -SM     Posttreatment Pain Rating 0/10 - no pain  -SM     Row Name 12/15/22 1218          Plan of Care Review    Plan of Care Reviewed With patient  -SM     Outcome Evaluation Patient is a 62 y.o male who presents to [facility] ED with SOA and generalized weakness. Patient lives at home alone with no DAMARI. Patient is independent at baseline and does not use an AD. Patient sat up to EOB mod(I) today. Patient performed STS with SBA and ambulated 200ft with no AD and SBA. Gait slow but steady with no LOB noted. Patient appears at baseline function at this time. Acute PT will sign off. Recommend patient continue ambulating in halls with nsg.  -     Row Name 12/15/22 1218          Therapy Assessment/Plan (PT)    Criteria for Skilled Interventions Met (PT) no;no problems identified which require skilled intervention  -     Row Name 12/15/22 1218          Vital Signs    O2 Delivery Pre Treatment room air  -SM     O2 Delivery Intra Treatment room air  -SM     O2 Delivery Post Treatment room air  -SM     Pre Patient Position Supine  -SM     Intra Patient Position Standing  -SM     Post Patient Position Sitting  -SM     Row Name 12/15/22 1218          Positioning and Restraints    Pre-Treatment Position in bed  -SM     Post Treatment Position bed  -SM     In Bed encouraged to call for assist;call light within reach;notified nsg;sitting EOB  -SM           User Key  (r) = Recorded By, (t) = Taken By, (c) = Cosigned By    Initials Name Provider Type     Ramona Ortega, PT Physical Therapist               Outcome Measures     Row Name 12/15/22 1220          How much help from another person do you currently need...    Turning from your back to your side while in flat bed without using bedrails? 4  -SM     Moving from lying on back to sitting on the side of a flat bed without bedrails? 4  -SM      Moving to and from a bed to a chair (including a wheelchair)? 4  -SM     Standing up from a chair using your arms (e.g., wheelchair, bedside chair)? 4  -SM     Climbing 3-5 steps with a railing? 4  -SM     To walk in hospital room? 4  -SM     AM-PAC 6 Clicks Score (PT) 24  -     Highest level of mobility 8 --> Walked 250 feet or more  -     Row Name 12/15/22 1220          Functional Assessment    Outcome Measure Options AM-PAC 6 Clicks Basic Mobility (PT)  -           User Key  (r) = Recorded By, (t) = Taken By, (c) = Cosigned By    Initials Name Provider Type     Ramona Ortega PT Physical Therapist                             Physical Therapy Education     Title: PT OT SLP Therapies (In Progress)     Topic: Physical Therapy (In Progress)     Point: Mobility training (Done)     Learning Progress Summary           Patient Acceptance, E, VU by  at 12/15/2022 1221                   Point: Home exercise program (Not Started)     Learner Progress:  Not documented in this visit.          Point: Body mechanics (Done)     Learning Progress Summary           Patient Acceptance, E, VU by  at 12/15/2022 1221                   Point: Precautions (Done)     Learning Progress Summary           Patient Acceptance, E, VU by  at 12/15/2022 1221                               User Key     Initials Effective Dates Name Provider Type Discipline     05/02/22 -  Ramona Ortega PT Physical Therapist PT              PT Recommendation and Plan     Plan of Care Reviewed With: patient  Outcome Evaluation: Patient is a 62 y.o male who presents to [facility] ED with SOA and generalized weakness. Patient lives at home alone with no DAMARI. Patient is independent at baseline and does not use an AD. Patient sat up to EOB mod(I) today. Patient performed STS with SBA and ambulated 200ft with no AD and SBA. Gait slow but steady with no LOB noted. Patient appears at baseline function at this time. Acute PT will sign off. Recommend  patient continue ambulating in halls with nsg.     Time Calculation:    PT Charges     Row Name 12/15/22 1221             Time Calculation    Start Time 1126  -SM      Stop Time 1135  -SM      Time Calculation (min) 9 min  -SM      PT Received On 12/15/22  -            User Key  (r) = Recorded By, (t) = Taken By, (c) = Cosigned By    Initials Name Provider Type     Ramona Ortega, KELSEY Physical Therapist              Therapy Charges for Today     Code Description Service Date Service Provider Modifiers Qty    [id number] HC PT EVAL LOW COMPLEXITY 3 12/15/2022 Ramona Ortega PT GP 1          PT G-Codes  Outcome Measure Options: AM-PAC 6 Clicks Basic Mobility (PT)  AM-PAC 6 Clicks Score (PT): 24  PT Discharge Summary  Anticipated Discharge Disposition (PT): home  Patient was intermittently wearing a face mask during this therapy encounter. Therapist used appropriate personal protective equipment including mask and gloves.  Mask used was standard procedure mask. Appropriate PPE was worn during the entire therapy session. Hand hygiene was completed before and after therapy session. Patient is not in enhanced droplet precautions.     Ramona Ortega PT  12/15/2022

## 2022-12-15 NOTE — PROGRESS NOTES
Name: Edmond Chase ADMIT: 2022   : 1960  PCP: Nargis Velasquez APRN    MRN: [id number] LOS: 0 days   AGE/SEX: 62 y.o. male  ROOM: 48/48     Subjective   Subjective     No events overnight. His hgb responded appropriately to the transfusion last and his echocardiogram was unremarkable. Cardiac enzymes have been stable. He reports that he might feel a little bit better than yesterday, but overall I think his symptoms are largely stable.       Objective   Objective   Vital Signs  Temp:  [97.5 °F (36.4 °C)-98.8 °F (37.1 °C)] 97.7 °F (36.5 °C)  Heart Rate:  [63-80] 63  Resp:  [17-18] 18  BP: (128-167)/(83-94) 156/94  SpO2:  [94 %-97 %] 94 %  on   ;   Device (Oxygen Therapy): room air  Body mass index is 41.39 kg/m².  Physical Exam  Constitutional:       General: He is not in acute distress.     Appearance: He is not toxic-appearing.   Cardiovascular:      Rate and Rhythm: Normal rate and regular rhythm.      Heart sounds: Murmur heard.   Pulmonary:      Effort: Pulmonary effort is normal.      Breath sounds: Normal breath sounds.   Abdominal:      General: Bowel sounds are normal.      Palpations: Abdomen is soft.   Musculoskeletal:         General: No tenderness.      Right lower leg: No edema.      Left lower leg: No edema.   Neurological:      Mental Status: He is alert.   Psychiatric:         Mood and Affect: Mood normal.         Behavior: Behavior normal.         Results Review     I reviewed the patient's new clinical results.  Results from last 7 days   Lab Units 12/15/22  0557 22  1050   WBC 10*3/mm3 5.79 6.10   HEMOGLOBIN g/dL 9.8* 8.8*   PLATELETS 10*3/mm3 232 225     Results from last 7 days   Lab Units 12/15/22  0557 22  1050   SODIUM mmol/L 140 142   POTASSIUM mmol/L 3.8 3.9   CHLORIDE mmol/L 105 107   CO2 mmol/L 28.0 27.0   BUN mg/dL 12 12   CREATININE mg/dL 0.69* 0.80   GLUCOSE mg/dL 98 146*   Estimated Creatinine Clearance: 160.6 mL/min (A) (by C-G formula based on SCr of  0.69 mg/dL (L)).  Results from last 7 days   Lab Units 12/14/22  1050   ALBUMIN g/dL 3.70   BILIRUBIN mg/dL 0.4   ALK PHOS U/L 72   AST (SGOT) U/L 12   ALT (SGPT) U/L 8     Results from last 7 days   Lab Units 12/15/22  0557 12/14/22  1050   CALCIUM mg/dL 8.4* 8.3*   ALBUMIN g/dL  --  3.70   MAGNESIUM mg/dL  --  1.8     Results from last 7 days   Lab Units 12/14/22  1050   LACTATE mmol/L 1.3     COVID19   Date Value Ref Range Status   07/10/2022 Not Detected Not Detected - Ref. Range Final   06/17/2021 Not Detected Not Detected - Ref. Range Final   07/04/2020 Not Detected Not Detected - Ref. Range Final     No results found for: HGBA1C, POCGLU    Adult Transthoracic Echo Complete W/ Cont if Necessary Per Protocol  •  Left ventricular ejection fraction appears to be 66 - 70%.  •  Left ventricular wall thickness is consistent with mild concentric   hypertrophy.  •  Left ventricular diastolic function was normal.  •  There is a bioprosthetic aortic valve present.  •  Mild dilation of the aortic root is present. The aortic root measures   4.4 cm. Mild dilation of the sinuses of Valsalva is present.  NM PET/CT Skull Base to Mid Thigh  Narrative: F-18 FDG PET SKULL BASE TO MID THIGH WITH PET CT FUSION.     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 Bleeding at cecal  polypectomy site.     TECHNIQUE: Radiation dose reduction techniques were utilized, including  automated exposure control and exposure modulation based on body size.   Blood glucose level at time of injection was 149 mg/dL. 6.8 mCi of F-18  FDG were injected and PET was performed from skull base to mid thigh. CT  was obtained for localization and attenuation correction. Time at  injection 8:19 AM. PET start time 9:41 AM. There is no recent chest CT  for comparison. Correlated with CTA abdomen and pelvis 11/25/2022.     FINDINGS: There is a 1 cm focus of relative hypermetabolic  activity  along the posterior wall of the GE junction which is immediately  adjacent to a surgical clip, and the maximal SUV is 3.6. There is a  speckled pattern of activity throughout the stomach, vascular pool,  liver, spleen, and marrow space and this finding is therefore  indeterminate. There is no hypermetabolic lymphadenopathy within the  chest or abdomen. Very heterogeneous speckled pattern of activity  throughout the liver without definite suspicious foci. There is no  suspicious adrenal activity. There is nonspecific bowel activity. There  is no suspicious hypermetabolic activity within the neck.     Impression: Overall, there is no convincing evidence for malignancy at  the esophagus or stomach and there is no hypermetabolic lymphadenopathy  or convincing evidence for metastatic disease.     This report was finalized on 12/14/2022 12:24 PM by Dr. Alcira Roberts M.D.     XR Chest 1 View  Narrative: CHEST SINGLE VIEW     HISTORY: Shortness of air, extreme fatigue     COMPARISON: Portable chest 11/25/2020, PET CT 12/12/2022.     FINDINGS: Right IJ central venous catheter has been withdrawn since the  prior exam. Heart size upper normal. Sternotomy wires are present. Lungs  appear clear of focal airspace disease and there is no evidence for  pulmonary edema or pleural effusion. Cardiac monitoring leads are  present.     Impression: No evidence for active disease in the chest.     This report was finalized on 12/14/2022 12:13 PM by Dr. Edmond Garcia M.D.       Scheduled Medications  amLODIPine, 10 mg, Oral, Daily  atorvastatin, 40 mg, Oral, Nightly  empagliflozin, 25 mg, Oral, Daily  ferrous sulfate, 325 mg, Oral, Daily With Breakfast  furosemide, 20 mg, Oral, Daily  isosorbide mononitrate, 60 mg, Oral, Daily  lisinopril, 10 mg, Oral, Q PM  pantoprazole, 40 mg, Oral, Q AM  potassium chloride, 10 mEq, Oral, Daily  sucralfate, 1 g, Oral, Daily    Infusions   Diet  NPO Diet NPO Type: Strict NPO        Assessment/Plan     Active Hospital Problems    Diagnosis  POA   • **Exertional chest pain [R07.9]  Yes   • Iron deficiency anemia [D50.9]  Yes   • Type 2 diabetes mellitus, without long-term current use of insulin (HCC) [E11.9]  Yes   • Fatigue [R53.83]  Yes   • TJ on auto CPAP [G47.33, Z99.89]  Not Applicable   • S/P CABG (coronary artery bypass graft) [Z95.1]  Not Applicable   • S/P AVR (aortic valve replacement) [Z95.2]  Not Applicable   • Status post ascending aortic aneurysm repair [Z98.890, Z86.79]  Not Applicable   • CAD in native artery [I25.10]  Yes   • Essential hypertension [I10]  Yes   • Class 3 severe obesity due to excess calories with serious comorbidity and body mass index (BMI) of 40.0 to 44.9 in adult (HCC) [E66.01, Z68.41]  Not Applicable   • Hyperlipidemia [E78.5]  Yes      Resolved Hospital Problems   No resolved problems to display.       62 y.o. male admitted with Exertional chest pain.    · Chest pain/fatigue-didn't really seem to have much response from the transfusion. Echocardiogram was unremarkable. Cardiac enzymes are negative x2. Cardiology consultation is pending. tsh was also normal.  · Iron deficiency anemia-oral iron  · DM2-glucose is at goal  · Essential hypertension-adequately controlled  · Esophageal cancer and grijalva's esophagus followed by Dr. Stockton-ppi/sulcralfate  · Coronary artery disease s/p CABG-asa/statin/lisinopril/imdur. Doesn't really have room with his heart rate for a beta blocker at the moment  · AS s/p bioprosthetic aortic valve  · TJ on cpap  · SCDs for DVT prophylaxis.  · Limited code (no CPR, no intubation).  · Discussed with patient.  · Anticipate discharge TBD timing yet to be determined.      Rahat Patel MD  [facility]  12/15/22  11:33 EST    I wore protective equipment throughout this patient encounter including a face mask, gloves and protective eyewear.  Hand hygiene was performed before donning protective equipment and after  removal when leaving the room.

## 2022-12-15 NOTE — CONSULTS
Date of Hospital Visit: 2022  Date of consult: 12/15/22  Encounter Provider: Vinayak Zendejas MD  Place of Service: [facility] CARDIOLOGY  Patient Name: Edmond Chase  :1960  Referral Provider: Rahat Patel MD    Chief complaint:  shortness of breath    Reason for consult : Fatigue and chest pain    History of Present Illness   Mr. Chase is a 62 year old pt of Dr. Miguel with a history of HTN, HLD, CAD, DM , GIB,and esophageal cancer. Pt had a cardiac cath in 2016 showed revealed 80% proximal LAD stenosis, percent circumflex stenosis, and 70% diffuse RCA stenosis with dilated aortic root.  On May 2, 2016 the patient underwent an elective CABG with aortic aneurysm repair, aortic root repair, and aortic valve replacement with a #27 magna pericardial valve. An ECHO in 2021 showed LVEF of 51 to 55%, mild dilation of aortic root.,  No hemodynamically significant aortic valve stenosis.  Stress testing done 2022 revealed a medium sized moderately severe area of anemia in the inferior wall which led to cardiac catheterization cardiac catheterization revealed normal left main, 90% proximal stenosis of LAD, LIMA to LAD patent, left circumflex severely calcified 70 to 80% stenosis in mid segment, RCA severely calcified and tortuous with diffuse 60 to 70% mid vessel stenosis and discrete 80% mid to distal stenosis with  of small caliber PDA branch-PDA fills via left-to-right collaterals, LIMA to LAD normal, SVG to OM occluded proximal anastomosis, SVG to PDA occluded at proximal anastomosis.  Patient was determined not to be a good candidate for intervention and cardiac catheterization at was unchanged from prior.    Pt presented on 22 to ER with complaints of passing a large amount of bright red clots. 2 units of PRBCs were given as well as 2 L of IV fluid and systolic pressure improved from 70s to 80s.  Vasopressors were started to support blood  pressure.  On prior upper endoscopy and colonoscopy performed by Dr. Wright October 1, 2022 he was found to have an esophageal mass as well as a large cecal polyp.  He underwent endoscopic mucosal resection of esophageal and cecal masses and found to have esophageal cancer with precancerous polyp in cecum.  Plavix was resumed 4 days after his procedure.  Surgery saw the patient 11/25 and felt that he was not stable enough to undergo CTA.  Colon prep was noted to be poor on colonoscopy on 11/26/2022 although there was red blood in the TI.  A large clot was noted to be attached to ulcer with clips placed in the cecum.  6 additional clips were placed with no bleeding noted after the procedure.   We were asked to see in consulted about holding antiplatelet therapy du to GIB. It was felt his antiplatelet therapy could be held safely and he would eventually nee a single antiplatelet agent given his bio AVR.     Pt presented to ER on 12/14/22with complaints of generalized weakness, increasing shortness of breath and dizziness that is ongoing since being discharged from the hospital earlier this month.Pt reported he was admitted here for GIB and was then transferred to [facility]. Pt reported he was seen at his PCP office today and was sent to ER for low HGB and he has edema despite taking his Lasix. Pt denied any black or bloody stools, fever, chills dizziness or abd pain. He reported chronic chest pain. In ER, BUN/CR 12/0.80, proBNP 628, troponin negative, Lactate 1.3, WBC 6.10, HGB 8.8, PLTs 225, occult stool positive, CXR negative, EKG showed NSR 79 PVcs, T wave inversion lateral leads,     I have been asked to see for chest pain.         ECHO 12/14/22    Left ventricular ejection fraction appears to be 66 - 70%.  •  Left ventricular wall thickness is consistent with mild concentric hypertrophy.  •  Left ventricular diastolic function was normal.  •  There is a bioprosthetic aortic valve present.  •  Mild dilation of the  aortic root is present. The aortic root measures 4.4 cm. Mild dilation of the sinuses of Valsalva is present.       CATH 7/14/22      Conclusions:   1. Left main: Normal  2. LAD: Calcified 90% proximal stenosis.  Chronic total occlusion mid segment.  Mid to distal vessel fills via patent LIMA to LAD  3. LCX: Severely calcified 70 to 80% stenosis in the midsegment.   4. RCA: Severely calcified and tortuous vessel with diffuse 60 to 70% mid vessel stenosis and discrete 80% mid to distal stenosis.  Chronic total occlusion small caliber PDA branch.  PDA fills via left-to-right collaterals.  5.  Ramus: Discrete 50% stenosis in the more inferior branch  6.  LIMA to LAD: Normal  7.  SVG to OM: Occluded at proximal anastomosis  8.  SVG to PDA: Occluded at proximal anastomosis     Recommendations: Continue medical management.  RCA and LAD are not good candidates for intervention and unchanged from prior catheterization      Stress Test 7/12/22    • Myocardial perfusion imaging indicates a medium-sized, moderately severe area of ischemia located in the inferior wall.  • Left ventricular ejection fraction is normal. (Calculated EF = 50%).  • GI artifact is present.  • Arrhythmias during stress: frequent PVCs.         Past Medical History:   Diagnosis Date   • Abnormal nuclear stress test    • Aortic valve insufficiency     nonrheumtic    • Ascending aortic aneurysm    • CAD (coronary artery disease)    • Cancer (HCC)    • Chest pain    • Diabetes mellitus (HCC)    • Dizzy     CAREFUL WHEN GETTING UP   • Dyspnea    • Essential hypertension    • Fatigue    • Hyperglycemia    • Hyperlipidemia    • Hypersomnia with sleep apnea    • Lightheadedness    • Malaise and fatigue    • Morbid obesity (HCC)    • Myocardial ischemia    • Nocturia    • TJ on auto CPAP 10/31/2016    Overnight polysomnogram.  Weight 276 pounds.  Severe TJ with AHI 79 events per hour.  Auto CPAP recommended.   • Pneumonia     FEB 2016   • Scrotal bleeding    •  Shortness of breath        Past Surgical History:   Procedure Laterality Date   • AORTIC VALVE REPAIR/REPLACEMENT  05/2016   • ASCENDING ARCH/HEMIARCH REPLACEMENT N/A 5/2/2016    Procedure: BHAVESH STERNOTOMY CORONARY ARTERY BYPASS GRAFT TIMES 3 USING LEFT INTERNAL MAMMARY ARTERY AND RIGHT GREATER SAPHENOUS VEIN GRAFT PER ENDOSCOPIC VEIN HARVESTING, AORTIC ANEURYSM REPAIR WITH ROOT REPAIR AND AORTIC VALVE REPLACEMENT;  Surgeon: Rosalio Cline MD;  Location: [facility] OR;  Service:    • CARDIAC CATHETERIZATION N/A 4/1/2016    Procedure: Left Heart Cath;  Surgeon: Erick Tam MD;  Location: [facility] CATH INVASIVE LOCATION;  Service:    • CARDIAC CATHETERIZATION N/A 4/1/2016    Procedure: Left ventriculography;  Surgeon: Erick Tam MD;  Location: [facility] CATH INVASIVE LOCATION;  Service:    • CARDIAC CATHETERIZATION N/A 4/1/2016    Procedure: Right Heart Cath;  Surgeon: Erick Tam MD;  Location: [facility] CATH INVASIVE LOCATION;  Service:    • CARDIAC CATHETERIZATION N/A 10/30/2017    Procedure: Coronary angiography;  Surgeon: Sorin Vasquez MD;  Location: [facility] CATH INVASIVE LOCATION;  Service:    • CARDIAC CATHETERIZATION  10/30/2017    Procedure: Saphenous Vein Graft;  Surgeon: Sorin Vasquez MD;  Location: [facility] CATH INVASIVE LOCATION;  Service:    • CARDIAC CATHETERIZATION N/A 10/30/2017    Procedure: Native mammary injection;  Surgeon: Sorin Vasquez MD;  Location: [facility] CATH INVASIVE LOCATION;  Service:    • CARDIAC CATHETERIZATION N/A 7/7/2020    Procedure: Coronary angiography;  Surgeon: Gregor Kim MD;  Location: [facility] CATH INVASIVE LOCATION;  Service: Cardiovascular;  Laterality: N/A;   • CARDIAC CATHETERIZATION N/A 7/7/2020    Procedure: Left heart cath;  Surgeon: Gregor Kim MD;  Location: [facility] CATH INVASIVE LOCATION;  Service: Cardiovascular;  Laterality: N/A;   • CARDIAC CATHETERIZATION N/A 7/7/2020    Procedure: Left ventriculography;  Surgeon: Gregor Kim MD;   [facility];  Service: Cardiovascular;  Laterality: N/A;   • CARDIAC CATHETERIZATION N/A 7/7/2020    Procedure: Stent ESMER bypass graft;  Surgeon: Gregor Kim MD;  Location:  [facility] CATH INVASIVE LOCATION;  Service: Cardiovascular;  Laterality: N/A;   • CARDIAC CATHETERIZATION N/A 6/17/2021    Procedure: SAPHENOUS VEIN GRAFT;  Surgeon: Marques Ndiaye MD;  [facility];  Service: Cardiovascular;  Laterality: N/A;   • CARDIAC CATHETERIZATION N/A 6/17/2021    Procedure: Left Heart Cath;  Surgeon: Marques Ndiaye MD;  [facility];  Service: Cardiovascular;  Laterality: N/A;   • CARDIAC CATHETERIZATION N/A 6/17/2021    Procedure: Coronary angiography;  Surgeon: Marques Ndiaye MD;  Location: [facility] CATH INVASIVE LOCATION;  Service: Cardiovascular;  Laterality: N/A;   • CARDIAC CATHETERIZATION N/A 7/14/2022    Procedure: Left Heart Cath;  Surgeon: Charlie Aj MD;  Location: [facility] CATH INVASIVE LOCATION;  Service: Cardiovascular;  Laterality: N/A;   • CARDIAC CATHETERIZATION N/A 7/14/2022    Procedure: Coronary angiography;  Surgeon: Charlie Aj MD;  Location: [facility] CATH INVASIVE LOCATION;  Service: Cardiovascular;  Laterality: N/A;   • CARDIAC CATHETERIZATION  7/14/2022    Procedure: Saphenous Vein Graft;  Surgeon: Charlie Aj MD;  Location: [facility] CATH INVASIVE LOCATION;  Service: Cardiovascular;;   • CARDIAC CATHETERIZATION N/A 7/14/2022    Procedure: Native mammary injection;  Surgeon: Charlie Aj MD;  Location: [facility] CATH INVASIVE LOCATION;  Service: Cardiovascular;  Laterality: N/A;   • COLONOSCOPY N/A 11/26/2022    Procedure: COLONOSCOPY AT BEDSIDE;  Surgeon: Tomy Lopez MD;  Location: [facility] OR;  Service: Gastroenterology;  Laterality: N/A;   • COLONOSCOPY W/ POLYPECTOMY N/A 10/4/2022    Procedure: COLONOSCOPY to cecum with cold forceps and cold snare polypectomies;   Surgeon: Gregor Carlson MD;  Location:  [facility];  Service: Gastroenterology;  Laterality: N/A;  PRE- hx of polyps  POST- diverticulosis, polyps   • CORONARY ARTERY BYPASS GRAFT  05/2016    LIMA TO LAD, SVG TO PDA, SVG TO OM2   • ENDOSCOPY N/A 7/13/2022    Procedure: ESOPHAGOGASTRODUODENOSCOPY;  Surgeon: Marcia Hollis MD;  Location:  [facility];  Service: Gastroenterology;  Laterality: N/A;  PRE- ANEMIA, MELENA  POST- MILD EROSIVE GASTRITIS, GE JUNCTION ULCER   • ENDOSCOPY N/A 10/4/2022    Procedure: ESOPHAGOGASTRODUODENOSCOPY with biopsies;  Surgeon: Gregor Carlson MD;  Location:  [facility];  Service: Gastroenterology;  Laterality: N/A;  PRE- hx of esophageal ulcer  POST- gastric cardia mass   • INNER EAR SURGERY     • TONSILLECTOMY         (Not in a hospital admission)      Current Meds  Scheduled Meds:amLODIPine, 10 mg, Oral, Daily  atorvastatin, 40 mg, Oral, Nightly  empagliflozin, 25 mg, Oral, Daily  ferrous sulfate, 325 mg, Oral, Daily With Breakfast  furosemide, 20 mg, Oral, Daily  isosorbide mononitrate, 60 mg, Oral, Daily  lisinopril, 10 mg, Oral, Q PM  pantoprazole, 40 mg, Oral, Q AM  potassium chloride, 10 mEq, Oral, Daily  sucralfate, 1 g, Oral, Daily      Continuous Infusions:   PRN Meds:.•  acetaminophen  •  LORazepam  •  melatonin  •  nitroglycerin  •  ondansetron **OR** ondansetron  •  [COMPLETED] Insert Peripheral IV **AND** sodium chloride    Allergies as of 12/14/2022 - Reviewed 12/14/2022   Allergen Reaction Noted   • Bystolic [nebivolol hcl]  05/17/2016       Social History     Socioeconomic History   • Marital status: Single   Tobacco Use   • Smoking status: Never   • Smokeless tobacco: Never   Vaping Use   • Vaping Use: Never used   Substance and Sexual Activity   • Alcohol use: Yes     Comment: 1-2 drink/monthly   • Drug use: No   • Sexual activity: Defer       Family History   Problem Relation Age of Onset   • Hypertension Mother    • Diabetes Mother   "  • Heart disease Mother    • Hypertension Father    • Lung cancer Father    • Heart disease Sister    • Stroke Maternal Grandmother    • Heart disease Maternal Grandmother    • Hypertension Maternal Grandfather    • Hypertension Paternal Grandmother    • Hypertension Paternal Grandfather    • Other Other         The patient states that his sister has a problem that goes by the name of \"long QT\".  He says this is a familial trait but he also says that he is \"not interested in pursuing it for himself\".   • Coronary artery disease Other        REVIEW OF SYSTEMS:   All systems reviewed and pertinent positives include in HPI otherwise negative review of systems.       Objective:   Temp:  [97.5 °F (36.4 °C)-98.8 °F (37.1 °C)] 97.7 °F (36.5 °C)  Heart Rate:  [63-80] 63  Resp:  [17-18] 18  BP: (128-167)/(83-94) 156/94  Body mass index is 41.39 kg/m².  Flowsheet Rows    Flowsheet Row First Filed Value   Admission Height 185.4 cm (73\") Documented at 12/14/2022 1006   Admission Weight 142 kg (313 lb) Documented at 12/14/2022 1006        Vitals:    12/15/22 0737   BP: 156/94   Pulse: 63   Resp:    Temp: 97.7 °F (36.5 °C)   SpO2: 94%       General Appearance:    Alert, cooperative, in no acute distress   Head:    Normocephalic, without obvious abnormality, atraumatic   Eyes:            Lids and lashes normal, conjunctivae and sclerae normal, no   icterus, no pallor, corneas clear, PERRLA   Ears:    Ears appear intact with no abnormalities noted   Throat:   No oral lesions, no thrush, oral mucosa moist   Neck:   No adenopathy, supple, trachea midline, no thyromegaly, no   carotid bruit, no JVD   Back:     No kyphosis present, no scoliosis present, no skin lesions, erythema or scars, no tenderness to percussion or palpation, range of motion normal   Lungs:     Clear to auscultation,respirations regular, even and unlabored    Heart:    Regular rhythm and normal rate, normal S1 and S2, no murmur, no gallop, no rub, no click   Chest " Wall:    No abnormalities observed   Abdomen:     Normal bowel sounds, no masses, no organomegaly, soft nontender, nondistended, no guarding, no rebound  tenderness   Extremities:   Moves all extremities well, no edema, no cyanosis, no redness   Pulses:   Pulses palpable and equal bilaterally. Normal radial, carotid, femoral, dorsalis pedis and posterior tibial pulses bilaterally. Normal abdominal aorta   Skin:  Neurology:   Psychiatric:   No bleeding, bruising or rash   Normal speech and cranial nerve exam, no focal deficit   Alert and oriented x 3, normal mood and affect                 Review of Data:      Results from last 7 days   Lab Units 12/15/22  0557 12/14/22  1050   SODIUM mmol/L 140 142   POTASSIUM mmol/L 3.8 3.9   CHLORIDE mmol/L 105 107   CO2 mmol/L 28.0 27.0   BUN mg/dL 12 12   CREATININE mg/dL 0.69* 0.80   CALCIUM mg/dL 8.4* 8.3*   BILIRUBIN mg/dL  --  0.4   ALK PHOS U/L  --  72   ALT (SGPT) U/L  --  8   AST (SGOT) U/L  --  12   GLUCOSE mg/dL 98 146*     Results from last 7 days   Lab Units 12/14/22  1746 12/14/22  1050   TROPONIN T ng/mL <0.010 <0.010  <0.010     @LABRCNTbnp@  Results from last 7 days   Lab Units 12/15/22  0557 12/14/22  1050   WBC 10*3/mm3 5.79 6.10   HEMOGLOBIN g/dL 9.8* 8.8*   HEMATOCRIT % 30.0* 27.6*   PLATELETS 10*3/mm3 232 225         Results from last 7 days   Lab Units 12/14/22  1050   MAGNESIUM mg/dL 1.8     @LABRCNTIP(chol,trig,hdl,ldl)            I personally viewed and interpreted the patient's EKG/Telemetry data  )  Patient Active Problem List   Diagnosis   • Essential hypertension   • Hyperglycemia   • Hyperlipidemia   • Class 3 severe obesity due to excess calories with serious comorbidity and body mass index (BMI) of 40.0 to 44.9 in adult (HCC)   • Nocturia   • Nonrheumatic aortic valve insufficiency   • Myocardial ischemia   • Coronary artery disease involving native coronary artery of native heart   • Ascending aortic aneurysm   • CAD in native artery   • S/P CABG  (coronary artery bypass graft)   • S/P AVR (aortic valve replacement)   • Status post ascending aortic aneurysm repair   • Fatigue   • Abnormal nuclear stress test   • Coronary artery disease   • Coronary artery disease of bypass graft of native heart with stable angina pectoris (HCC)   • TJ on auto CPAP   • Hypersomnia due to medical condition   • Angina at rest (HCC)   • Type 2 diabetes mellitus, without long-term current use of insulin (HCC)   • Anemia   • Ulcer of esophagus without bleeding   • Polyp of colon   • Rectal bleeding   • Gastrointestinal hemorrhage   • Malignant neoplasm of lower third of esophagus (HCC)   • Gastrointestinal hemorrhage, unspecified gastrointestinal hemorrhage type   • Exertional chest pain   • Iron deficiency anemia         Assessment and Plan:    Mr Chase is a 62 years old gentleman with past medical history of hypertension, hyperlipidemia, diabetes mellitus, GI bleed and esophageal cancer, CAD/CABG with aortic root repair and aortic valve replacement with tissue valve in May 2016.    Coronary/graft angiogram in July 2022 revealed patent LIMA to LAD and occluded venous grafts, severe multivessel native artery disease.  He was admitted last month, November 2022, with GI bleed that required massive transfusion and diagnosed with esophageal cancer and colon polyp followed by polypectomy complicated by bleeding and treated by clipping.  Patient presented to ER on 12/14/2022 with complaints of generalized body weakness  ACS ruled out and patient not in overt heart failure.  He received a unit of packed RBC yesterday and reports feeling a little bit better today.  Home cardiac medications include: Amlodipine, atorvastatin, Farxiga, Lasix, isosorbide mononitrate, lisinopril.     1.  Fatigue-likely multifactorial from anemia, underlying malignancy  No evidence for overt heart failure  2.  Prior history of coronary artery disease/CABG  He reports occasional chest pain during activities  but stable.  No chest pain during current admission.  He has a different chest pain that comes on supine position from GERD  Continue current cardiac medications  3.  Prosthetic aortic valve that is functioning normally.    No further cardiac testing needed at this point.  Patient will be followed in clinic.    I have discussed patient with his nurse    Thank you for consulting with cardiology and allowing me to participate in care of Mr. Chase.  Cardiology will sign off.    Vinayak Zendejas MD  12/15/22  10:25 EST.  Time spent in reviewing chart, discussion and examination:

## 2022-12-15 NOTE — DISCHARGE INSTR - APPOINTMENTS
Call and schedule one week follow up with primary care provider.    CALL AND SCHEDULE OE WEEK FOLLOW UP WITH CARDIOLOGY.

## 2022-12-16 ENCOUNTER — TRANSITIONAL CARE MANAGEMENT TELEPHONE ENCOUNTER (OUTPATIENT)
Dept: CALL CENTER | Facility: HOSPITAL | Age: 62
End: 2022-12-16

## 2022-12-16 NOTE — OUTREACH NOTE
Prep Survey    Flowsheet Row Responses   [facility] patient discharged from? [facility]   Is LACE score < 7 ? No   Eligibility [facility]   Date of Admission 12/14/22   Date of Discharge 12/15/22   Discharge Disposition Home or Self Care   Discharge diagnosis Extertional chest pain   Does the patient have one of the following disease processes/diagnoses(primary or secondary)? Other   Does the patient have Home health ordered? No   Is there a DME ordered? No   Prep survey completed? Yes          VALENTIN A - Registered Nurse

## 2022-12-16 NOTE — OUTREACH NOTE
Call Center TCM Note    Flowsheet Row Responses   [facility] [place] patient discharged from? [facility]   Does the patient have one of the following disease processes/diagnoses(primary or secondary)? Other   TCM attempt successful? Yes   Call start time 1225   Call end time 1225   Discharge diagnosis Extertional chest pain   Meds reviewed with patient/caregiver? Yes   Is the patient having any side effects they believe may be caused by any medication additions or changes? No   Does the patient have all medications ordered at discharge? Yes   Is the patient taking all medications as directed (includes completed medication regime)? Yes   Does the patient have an appointment with their PCP within 7 days of discharge? No   Has home health visited the patient within 72 hours of discharge? N/A   Psychosocial issues? No   Did the patient receive a copy of their discharge instructions? Yes   What is the patient's perception of their health status since discharge? Improving   Is the patient/caregiver able to teach back the hierarchy of who to call/visit for symptoms/problems? PCP, Specialist, Home health nurse, Urgent Care, ED, 911 Yes   TCM call completed? Yes   Wrap up additional comments Doing well, no questions, he is taking care of his f/u appts.   Call end time 1225   Would this patient benefit from a Referral to Amb Social Work? No   Is the patient interested in additional calls from an ambulatory ?  NOTE:  applies to high risk patients requiring additional follow-up. No          Arlette Johnson RN    12/16/2022, 12:26 EST

## 2022-12-19 ENCOUNTER — TELEPHONE (OUTPATIENT)
Dept: CARDIOLOGY | Facility: HOSPITAL | Age: 62
End: 2022-12-19

## 2022-12-19 ENCOUNTER — OFFICE VISIT (OUTPATIENT)
Dept: PSYCHIATRY | Facility: HOSPITAL | Age: 62
End: 2022-12-19

## 2022-12-19 DIAGNOSIS — F41.1 GAD (GENERALIZED ANXIETY DISORDER): Primary | ICD-10-CM

## 2022-12-19 PROCEDURE — 90792 PSYCH DIAG EVAL W/MED SRVCS: CPT | Performed by: NURSE PRACTITIONER

## 2022-12-19 RX ORDER — GABAPENTIN 300 MG/1
300 CAPSULE ORAL 3 TIMES DAILY
Qty: 90 CAPSULE | Refills: 2 | Status: SHIPPED | OUTPATIENT
Start: 2022-12-19 | End: 2023-01-20

## 2022-12-19 NOTE — PROGRESS NOTES
In Person  Provider Location: [facility]    Chief Complaint: Anxiety, uncertainty, insomnia    Subjective  Patient ID: Edmond Chase is a 62 y.o. 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  Remains uncertain regarding cancer dx, plan of care due to acuity of GI bleed, need for recovery prior to consideration of treatment. Currently undergoing PFTs and scans in anticipation of treatment plan development. Identifies anxiety surrounding diagnosis, uncertainty, treatment.     Patient is a retired higher education , higher than average fund of knowledge noted. Relevant medical history includes TJ, compliant with CPAP, obesity, arthritis, heart disease with hx of CABG. Pt endorses chronic feelings of depression, generally pessimistic demeanor. Denies medication or non medication treatment. Current PHQ 9 reviewed; pt endorses several days of reduced interest and pleasure, feeling down and depressed. Pt reports challenges initiating and maintaining continuous sleep. Sleeps in chair, although would like to be able to get back to bed. Able to dose for 3-4 hours at a time in chair with footstool. Utilizes CPAP for any time of sleep. Continues with feelings of fatigue, although seemingly related to recent anemia. Denies this being a chronic concern. Pt endorses feelings of failure, difficulty concentrating, and moving slowly.  Appetite reported to be 'fair.' Pt identifies reduced appetite since appx August, appx 10-15 pounds weight loss. Pt endorses mild nausea. Denies impactful substance use, past or current. Pt reports mild pain in arthritic knee, mild neuropathy in feet. CHARANJIT  7 = 7. Pt identifies anxiety surrounding uncertainty of current situation being primary target sx.    Pt reports historical walking although has been more sedentary with recent health events. Also endorse impact of arthritis on mobility, somewhat responsive to injections, ibuprofen (no longer taking). Intellect is important asset of personality.  Describes goal of being able to actively participate in care, engage in full force living, as able.     Social History  Marital Status: Single, lives alone. 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   Alcohol Use: Occasional alcohol use; very limited since recent diagnosis. Generally 2 drinks 2-3 times monthly.   Marijuana/ Other drug Use: distant hx of marijuana  Spiritual: Open minded    Medical History  Psychiatric History: Pt identifies long standing depression since childhood although denies any treatment. Sees self as pessimist  Hx open heart surgery with complications; acute insomnia with 'reruns of negative aspects of life' playing in head. No medication history.  TJ compliant with CPAP    Family History  Family Psychiatric History: Mother with potential hx of depression  Family Cancer History: Father  of lung cancer, hx tobacco smoke; cared for mother until     The following portions of the patient's history were reviewed and updated as appropriate: He  has a past medical history of Abnormal nuclear stress test, Aortic valve insufficiency, Ascending aortic aneurysm, CAD (coronary artery disease), Cancer (HCC), Chest pain, Diabetes mellitus (HCC), Dizzy, Dyspnea, Essential hypertension, Fatigue, Hyperglycemia, Hyperlipidemia, Hypersomnia with  sleep apnea, Lightheadedness, Malaise and fatigue, Morbid obesity (HCC), Myocardial ischemia, Nocturia, TJ on auto CPAP (10/31/2016), Pneumonia, Scrotal bleeding, and Shortness of breath.  He  has a past surgical history that includes Inner ear surgery; Tonsillectomy; Cardiac catheterization (N/A, 4/1/2016); Cardiac catheterization (N/A, 4/1/2016); Cardiac catheterization (N/A, 4/1/2016); Ascending Arch/Hemiarch Replacement (N/A, 5/2/2016); Aortic valve replacement (05/2016); Coronary artery bypass graft (05/2016); Cardiac catheterization (N/A, 10/30/2017); Cardiac catheterization (10/30/2017); Cardiac catheterization (N/A, 10/30/2017); Cardiac catheterization (N/A, 7/7/2020); Cardiac catheterization (N/A, 7/7/2020); Cardiac catheterization (N/A, 7/7/2020); Cardiac catheterization (N/A, 7/7/2020); Cardiac catheterization (N/A, 6/17/2021); Cardiac catheterization (N/A, 6/17/2021); Cardiac catheterization (N/A, 6/17/2021); Esophagogastroduodenoscopy (N/A, 7/13/2022); Cardiac catheterization (N/A, 7/14/2022); Cardiac catheterization (N/A, 7/14/2022); Cardiac catheterization (7/14/2022); Cardiac catheterization (N/A, 7/14/2022); Esophagogastroduodenoscopy (N/A, 10/4/2022); Colonoscopy w/ polypectomy (N/A, 10/4/2022); and Colonoscopy (N/A, 11/26/2022).  His family history includes Coronary artery disease in an other family member; Diabetes in his mother; Heart disease in his maternal grandmother, mother, and sister; Hypertension in his father, maternal grandfather, mother, paternal grandfather, and paternal grandmother; Lung cancer in his father; Other in an other family member; Stroke in his maternal grandmother.  He  reports that he has never smoked. He has never used smokeless tobacco. He reports current alcohol use. He reports that he does not use drugs.  Current Outpatient Medications   Medication Sig Dispense Refill   • amLODIPine (NORVASC) 10 MG tablet Take 10 mg by mouth Daily.     • aspirin 81 MG EC tablet Take  1 tablet by mouth Daily for 30 days. 30 tablet 0   • atorvastatin (LIPITOR) 40 MG tablet Take 40 mg by mouth Every Night.     • dapagliflozin Propanediol (Farxiga) 10 MG tablet Take 10 mg by mouth Daily.     • ferrous sulfate 324 (65 Fe) MG tablet delayed-release EC tablet Take 324 mg by mouth Daily With Breakfast.     • furosemide (LASIX) 20 MG tablet Take 20 mg by mouth Daily.     • gabapentin (Neurontin) 300 MG capsule Take 1 capsule by mouth 3 (Three) Times a Day. 90 capsule 2   • GLUCOSAMINE HCL PO Take 1 tablet by mouth Daily.     • isosorbide mononitrate (IMDUR) 60 MG 24 hr tablet Take 60 mg by mouth Daily.     • lisinopril (PRINIVIL,ZESTRIL) 10 MG tablet Take 10 mg by mouth Every Evening.     • metFORMIN (GLUCOPHAGE) 500 MG tablet Take 500 mg by mouth Every Evening.     • pantoprazole (PROTONIX) 40 MG EC tablet Take 1 tablet by mouth 2 (Two) Times a Day With Meals. 30 tablet 5   • potassium chloride 10 MEQ CR tablet Take 10 mEq by mouth Daily.     • sucralfate (CARAFATE) 1 g tablet Take 1 tablet by mouth 2 (Two) Times a Day Before Meals. 60 tablet 5     No current facility-administered medications for this visit.     Current Outpatient Medications on File Prior to Visit   Medication Sig   • amLODIPine (NORVASC) 10 MG tablet Take 10 mg by mouth Daily.   • aspirin 81 MG EC tablet Take 1 tablet by mouth Daily for 30 days.   • atorvastatin (LIPITOR) 40 MG tablet Take 40 mg by mouth Every Night.   • dapagliflozin Propanediol (Farxiga) 10 MG tablet Take 10 mg by mouth Daily.   • ferrous sulfate 324 (65 Fe) MG tablet delayed-release EC tablet Take 324 mg by mouth Daily With Breakfast.   • furosemide (LASIX) 20 MG tablet Take 20 mg by mouth Daily.   • GLUCOSAMINE HCL PO Take 1 tablet by mouth Daily.   • isosorbide mononitrate (IMDUR) 60 MG 24 hr tablet Take 60 mg by mouth Daily.   • lisinopril (PRINIVIL,ZESTRIL) 10 MG tablet Take 10 mg by mouth Every Evening.   • metFORMIN (GLUCOPHAGE) 500 MG tablet Take 500 mg by  mouth Every Evening.   • pantoprazole (PROTONIX) 40 MG EC tablet Take 1 tablet by mouth 2 (Two) Times a Day With Meals.   • potassium chloride 10 MEQ CR tablet Take 10 mEq by mouth Daily.   • sucralfate (CARAFATE) 1 g tablet Take 1 tablet by mouth 2 (Two) Times a Day Before Meals.     No current facility-administered medications on file prior to visit.     He is allergic to bystolic [nebivolol hcl]..    Review of Systems   Constitutional: Positive for activity change, appetite change, fatigue and unexpected weight change.   Psychiatric/Behavioral: Positive for decreased concentration, dysphoric mood and sleep disturbance. Negative for suicidal ideas. The patient is nervous/anxious.        Objective   Mental Status Exam  Appearance:  clean and casually dressed, appropriate  Attitude toward clinician:  cooperative and agreeable   Speech:    Rate:  regular rate and rhythm and slow    Volume:  normal  Motor:  no abnormal movements present  Mood:  Organized, methodical, concerned  Affect:  euthymic and mood congruent  Thought Processes:  linear, logical, and goal directed  Thought Content:  normal  Suicidal Thoughts:  absent  Homicidal Thoughts:  absent  Perceptual Disturbance: no perceptual disturbance  Attention and Concentration:  good  Insight and Judgement:  good  Memory:  memory appears to be intact    Physical Exam  Constitutional:       Appearance: Normal appearance. He is obese.   Neurological:      Mental Status: He is alert.   Psychiatric:         Mood and Affect: Mood normal.         Behavior: Behavior normal.         Thought Content: Thought content normal.         Judgment: Judgment normal.      Station and gait observed to be slow, steady.    Lab Review:   Admission on 12/14/2022, Discharged on 12/15/2022   Component Date Value   • QT Interval 12/14/2022 365    • Glucose 12/14/2022 146 (H)    • BUN 12/14/2022 12    • Creatinine 12/14/2022 0.80    • Sodium 12/14/2022 142    • Potassium 12/14/2022 3.9    •  Chloride 12/14/2022 107    • CO2 12/14/2022 27.0    • Calcium 12/14/2022 8.3 (L)    • Total Protein 12/14/2022 5.7 (L)    • Albumin 12/14/2022 3.70    • ALT (SGPT) 12/14/2022 8    • AST (SGOT) 12/14/2022 12    • Alkaline Phosphatase 12/14/2022 72    • Total Bilirubin 12/14/2022 0.4    • Globulin 12/14/2022 2.0    • A/G Ratio 12/14/2022 1.9    • BUN/Creatinine Ratio 12/14/2022 15.0    • Anion Gap 12/14/2022 8.0    • eGFR 12/14/2022 98.8    • proBNP 12/14/2022 628.0    • Troponin T 12/14/2022 <0.010    • Lactate 12/14/2022 1.3    • ABO Type 12/14/2022 O    • RH type 12/14/2022 Positive    • Antibody Screen 12/14/2022 Negative    • T&S Expiration Date 12/14/2022 12/17/2022 11:59:59 PM    • Magnesium 12/14/2022 1.8    • WBC 12/14/2022 6.10    • RBC 12/14/2022 3.15 (L)    • Hemoglobin 12/14/2022 8.8 (L)    • Hematocrit 12/14/2022 27.6 (L)    • MCV 12/14/2022 87.6    • MCH 12/14/2022 27.9    • MCHC 12/14/2022 31.9    • RDW 12/14/2022 15.2    • RDW-SD 12/14/2022 48.9    • MPV 12/14/2022 9.3    • Platelets 12/14/2022 225    • Neutrophil % 12/14/2022 71.1    • Lymphocyte % 12/14/2022 17.4 (L)    • Monocyte % 12/14/2022 8.4    • Eosinophil % 12/14/2022 2.5    • Basophil % 12/14/2022 0.3    • Immature Grans % 12/14/2022 0.3    • Neutrophils, Absolute 12/14/2022 4.34    • Lymphocytes, Absolute 12/14/2022 1.06    • Monocytes, Absolute 12/14/2022 0.51    • Eosinophils, Absolute 12/14/2022 0.15    • Basophils, Absolute 12/14/2022 0.02    • Immature Grans, Absolute 12/14/2022 0.02    • nRBC 12/14/2022 0.0    • Extra Tube 12/14/2022 Hold for add-ons.    • Extra Tube 12/14/2022 hold for add-on    • Extra Tube 12/14/2022 Hold for add-ons.    • Extra Tube 12/14/2022 Hold for add-ons.    • Fecal Occult Blood 12/14/2022 Positive (A)    • Lot Number 12/14/2022 204    • Expiration Date 12/14/2022 07/31/2023    • Positive Control 12/14/2022 Positive    • Negative Control 12/14/2022 Negative    • Troponin T 12/14/2022 <0.010    • QT Interval  12/14/2022 399    • Target HR (85%) 12/14/2022 133    • Max. Pred. HR (100%) 12/14/2022 156    • EF(MOD-bp) 12/14/2022 65.9    • LVIDd 12/14/2022 5.1    • LVIDs 12/14/2022 3.6    • IVSd 12/14/2022 1.35    • LVPWd 12/14/2022 1.36    • FS 12/14/2022 29.7    • IVS/LVPW 12/14/2022 0.99    • ESV(cubed) 12/14/2022 46.3    • LV Sys Vol (BSA correcte* 12/14/2022 30.7    • EDV(cubed) 12/14/2022 132.9    • LV Flanagan Vol (BSA correct* 12/14/2022 85.7    • LVOT area 12/14/2022 3.4    • LV mass(C)d 12/14/2022 286.5    • LVOT diam 12/14/2022 2.07    • EDV(MOD-sp2) 12/14/2022 174.0    • EDV(MOD-sp4) 12/14/2022 223.0    • ESV(MOD-sp2) 12/14/2022 51.0    • ESV(MOD-sp4) 12/14/2022 80.0    • SV(MOD-sp2) 12/14/2022 123.0    • SV(MOD-sp4) 12/14/2022 143.0    • SI(MOD-sp2) 12/14/2022 47.3    • SI(MOD-sp4) 12/14/2022 54.9    • EF(MOD-sp2) 12/14/2022 70.7    • EF(MOD-sp4) 12/14/2022 64.1    • MV E max jr 12/14/2022 83.1    • MV A max jr 12/14/2022 42.0    • MV dec time 12/14/2022 0.29    • MV E/A 12/14/2022 1.98    • LA ESV Index (BP) 12/14/2022 49.6    • Med Peak E' Jr 12/14/2022 9.1    • Lat Peak E' Jr 12/14/2022 12.1    • Avg E/e' ratio 12/14/2022 7.84    • SV(LVOT) 12/14/2022 96.0    • SV(RVOT) 12/14/2022 55.8    • Qp/Qs 12/14/2022 0.58    • RV S' 12/14/2022 10.7    • LV V1 max 12/14/2022 126.9    • LV V1 max PG 12/14/2022 6.4    • LV V1 mean PG 12/14/2022 3.5    • LV V1 VTI 12/14/2022 28.6    • Ao pk jr 12/14/2022 258.6    • Ao max PG 12/14/2022 26.7    • Ao mean PG 12/14/2022 13.0    • Ao V2 VTI 12/14/2022 51.8    • LESLY(I,D) 12/14/2022 1.85    • MV max PG 12/14/2022 3.3    • MV mean PG 12/14/2022 1.26    • MV V2 VTI 12/14/2022 24.2    • MV P1/2t 12/14/2022 91.0    • MVA(P1/2t) 12/14/2022 2.42    • MVA(VTI) 12/14/2022 4.0    • MV dec slope 12/14/2022 301.6    • RAP systole 12/14/2022 3.0    • RVOT diam 12/14/2022 2.18    • RV V1 max PG 12/14/2022 2.15    • RV V1 max 12/14/2022 73.3    • RV V1 VTI 12/14/2022 14.9    • PA V2 max  12/14/2022 104.5    • Ao root diam 12/14/2022 4.4    • ACS 12/14/2022 1.49    • Sinus 12/14/2022 4.4    • Dimensionless Index 12/14/2022 0.60    • Troponin T 12/14/2022 <0.010    • Product Code 12/15/2022 [id number]    • Unit Number 12/15/2022 [id number]    • UNIT  ABO 12/15/2022 O    • UNIT  RH 12/15/2022 POS    • Crossmatch Interpretation 12/15/2022 Compatible    • Dispense Status 12/15/2022 PT    • Blood Expiration Date 12/15/2022 [id number]    • Blood Type Barcode 12/15/2022 5100    • Glucose 12/15/2022 98    • BUN 12/15/2022 12    • Creatinine 12/15/2022 0.69 (L)    • Sodium 12/15/2022 140    • Potassium 12/15/2022 3.8    • Chloride 12/15/2022 105    • CO2 12/15/2022 28.0    • Calcium 12/15/2022 8.4 (L)    • BUN/Creatinine Ratio 12/15/2022 17.4    • Anion Gap 12/15/2022 7.0    • eGFR 12/15/2022 103.3    • WBC 12/15/2022 5.79    • RBC 12/15/2022 3.41 (L)    • Hemoglobin 12/15/2022 9.8 (L)    • Hematocrit 12/15/2022 30.0 (L)    • MCV 12/15/2022 88.0    • MCH 12/15/2022 28.7    • MCHC 12/15/2022 32.7    • RDW 12/15/2022 15.0    • RDW-SD 12/15/2022 48.4    • MPV 12/15/2022 9.6    • Platelets 12/15/2022 232    • TSH 12/15/2022 1.680    Hospital Outpatient Visit on 12/12/2022   Component Date Value   • Glucose 12/12/2022 149 (H)    Admission on 12/01/2022, Discharged on 12/02/2022   Component Date Value   • Glucose 12/01/2022 240 (H)    • BUN 12/01/2022 10    • Creatinine 12/01/2022 0.77    • Sodium 12/01/2022 140    • Potassium 12/01/2022 3.6    • Chloride 12/01/2022 105    • CO2 12/01/2022 24.5    • Calcium 12/01/2022 7.7 (L)    • Total Protein 12/01/2022 4.8 (L)    • Albumin 12/01/2022 3.10 (L)    • ALT (SGPT) 12/01/2022 8    • AST (SGOT) 12/01/2022 12    • Alkaline Phosphatase 12/01/2022 55    • Total Bilirubin 12/01/2022 0.3    • Globulin 12/01/2022 1.7    • A/G Ratio 12/01/2022 1.8    • BUN/Creatinine Ratio 12/01/2022 13.0    • Anion Gap 12/01/2022 10.5    • eGFR 12/01/2022 100.0    • ABO Type 12/01/2022 O     • RH type 12/01/2022 Positive    • Antibody Screen 12/01/2022 Negative    • T&S Expiration Date 12/01/2022 12/4/2022 11:59:59 PM    • Lactate 12/01/2022 3.7 (C)    • Extra Tube 12/01/2022 Hold for add-ons.    • Extra Tube 12/01/2022 hold for add-on    • Extra Tube 12/01/2022 Hold for add-ons.    • Extra Tube 12/01/2022 Hold for add-ons.    • WBC 12/01/2022 9.28    • RBC 12/01/2022 2.58 (L)    • Hemoglobin 12/01/2022 7.4 (L)    • Hematocrit 12/01/2022 22.2 (L)    • MCV 12/01/2022 86.0    • MCH 12/01/2022 28.7    • MCHC 12/01/2022 33.3    • RDW 12/01/2022 16.4 (H)    • RDW-SD 12/01/2022 49.9    • MPV 12/01/2022 9.7    • Platelets 12/01/2022 191    • Neutrophil % 12/01/2022 64.2    • Lymphocyte % 12/01/2022 23.6    • Monocyte % 12/01/2022 9.1    • Eosinophil % 12/01/2022 2.0    • Basophil % 12/01/2022 0.3    • Immature Grans % 12/01/2022 0.8 (H)    • Neutrophils, Absolute 12/01/2022 5.96    • Lymphocytes, Absolute 12/01/2022 2.19    • Monocytes, Absolute 12/01/2022 0.84    • Eosinophils, Absolute 12/01/2022 0.19    • Basophils, Absolute 12/01/2022 0.03    • Immature Grans, Absolute 12/01/2022 0.07 (H)    • nRBC 12/01/2022 0.8 (H)    • Product Code 12/02/2022 [id number]    • Unit Number 12/02/2022 [id number]    • UNIT  ABO 12/02/2022 O    • UNIT  RH 12/02/2022 POS    • Crossmatch Interpretation 12/02/2022 Compatible    • Dispense Status 12/02/2022 PT    • Blood Expiration Date 12/02/2022 202212202359    • Blood Type Barcode 12/02/2022 5100    • Product Code 12/02/2022 [id number]    • Unit Number 12/02/2022 [id number]    • UNIT  ABO 12/02/2022 O    • UNIT  RH 12/02/2022 POS    • Crossmatch Interpretation 12/02/2022 Compatible    • Dispense Status 12/02/2022 PT    • Blood Expiration Date 12/02/2022 202212202359    • Blood Type Barcode 12/02/2022 5100    • Lactate 12/01/2022 1.7    • Hemoglobin 12/01/2022 8.7 (L)    • Hematocrit 12/01/2022 25.6 (L)    • Hemoglobin 12/01/2022 7.5 (L)    • Hematocrit 12/01/2022 23.2  (L)    • Hemoglobin 12/01/2022 8.6 (L)    • Hematocrit 12/01/2022 25.5 (L)    • Glucose 12/01/2022 103    • Glucose 12/01/2022 121    • Hemoglobin 12/02/2022 7.5 (L)    • Hematocrit 12/02/2022 23.9 (L)    • Glucose 12/02/2022 114 (H)    • BUN 12/02/2022 12    • Creatinine 12/02/2022 0.70 (L)    • Sodium 12/02/2022 144    • Potassium 12/02/2022 3.6    • Chloride 12/02/2022 106    • CO2 12/02/2022 30.1 (H)    • Calcium 12/02/2022 8.0 (L)    • BUN/Creatinine Ratio 12/02/2022 17.1    • Anion Gap 12/02/2022 7.9    • eGFR 12/02/2022 102.9    • WBC 12/02/2022 6.14    • RBC 12/02/2022 2.69 (L)    • Hemoglobin 12/02/2022 7.5 (L)    • Hematocrit 12/02/2022 23.9 (L)    • MCV 12/02/2022 88.8    • MCH 12/02/2022 27.9    • MCHC 12/02/2022 31.4 (L)    • RDW 12/02/2022 16.0 (H)    • RDW-SD 12/02/2022 51.2    • MPV 12/02/2022 10.2    • Platelets 12/02/2022 175    • Neutrophil % 12/02/2022 69.1    • Lymphocyte % 12/02/2022 20.8    • Monocyte % 12/02/2022 7.3    • Eosinophil % 12/02/2022 1.8    • Basophil % 12/02/2022 0.3    • Immature Grans % 12/02/2022 0.7 (H)    • Neutrophils, Absolute 12/02/2022 4.24    • Lymphocytes, Absolute 12/02/2022 1.28    • Monocytes, Absolute 12/02/2022 0.45    • Eosinophils, Absolute 12/02/2022 0.11    • Basophils, Absolute 12/02/2022 0.02    • Immature Grans, Absolute 12/02/2022 0.04    • nRBC 12/02/2022 0.3 (H)    • Glucose 12/02/2022 156 (H)    • Glucose 12/02/2022 101    No results displayed because visit has over 200 results.          Diagnoses and all orders for this visit:    1. CHARANJIT (generalized anxiety disorder) (Primary)  -     gabapentin (Neurontin) 300 MG capsule; Take 1 capsule by mouth 3 (Three) Times a Day.  Dispense: 90 capsule; Refill: 2    Plan of Care  Pt with history of dysthymia, current elevation in PHQ 9, CHARANJIT 7 mildly elevated. Considered traditional antidepressant or antianxiety therapy; given recent GI bleed, will forgo use of serotonergic agent. Will add gabapentin 300 mg q dinner  and 600 mg q hs for sx of anxiety and sleep. Potential SE reviewed.     Considered benefits of peer support, cancer based counseling. Shared with patient resources including [employer] and [facility]. Will follow up in two weeks to assist in support surrounding plan of care development.

## 2022-12-19 NOTE — TELEPHONE ENCOUNTER
Patient was scheduled for a dobutamine stress echo today for surgery clearance. Patient had a nuclear stress test, an echo, and a cardiac cath done July 2022. Stress echo order was placed November 29 by [facility]. Patient was recently hospitalized and Dr. Zendejas was consulted on December 15, 2022. In his consult note he states that no further cardiac testing was needed at this point.    Spoke with Dr. Coulter regarding ordered stress echo and he does not feel that this test is necessary for cardiac clearance at this time given that the patient recently had a nuclear stress test and a cardiac cath.     Dobutamine stress echo to be canceled and patient will be called

## 2022-12-20 ENCOUNTER — HOSPITAL ENCOUNTER (OUTPATIENT)
Dept: CARDIOLOGY | Facility: HOSPITAL | Age: 62
End: 2022-12-20

## 2022-12-20 NOTE — TELEPHONE ENCOUNTER
Patient called patient access in regard to scheduling stress test at the hospital since it was cancelled in our office. I spoke with patient to explain to him the message below- this test is no longer necessary due to him having recent testing completed. Patient verbalized understanding but is concerned his surgeon, Dr. Stockton is not aware. I will send encounter to ordering APRN to ensure their office is aware testing is not needed at this time.     Thank you,   Kaylin

## 2022-12-27 ENCOUNTER — READMISSION MANAGEMENT (OUTPATIENT)
Dept: CALL CENTER | Facility: HOSPITAL | Age: 62
End: 2022-12-27

## 2022-12-27 NOTE — OUTREACH NOTE
Medical Week 2 Survey    Flowsheet Row Responses   [facility] patient discharged from? [place]   Does the patient have one of the following disease processes/diagnoses(primary or secondary)? Other   Week 2 attempt successful? Yes   Call start time 1558   Discharge diagnosis Extertional chest pain   Call end time 1602   Meds reviewed with patient/caregiver? Yes   Is the patient having any side effects they believe may be caused by any medication additions or changes? No   Does the patient have all medications ordered at discharge? Yes   Is the patient taking all medications as directed (includes completed medication regime)? Yes   Does the patient have a primary care provider?  Yes   Does the patient have an appointment with their PCP within 7 days of discharge? No   What is preventing the patient from scheduling follow up appointments within 7 days of discharge? Haven't had time   Nursing Interventions Educated patient on importance of making appointment, Advised patient to make appointment   Has the patient kept scheduled appointments due by today? N/A   Has home health visited the patient within 72 hours of discharge? N/A   Psychosocial issues? No   Did the patient receive a copy of their discharge instructions? Yes   Nursing interventions Reviewed instructions with patient   What is the patient's perception of their health status since discharge? Improving   Is the patient/caregiver able to teach back signs and symptoms related to disease process for when to call PCP? Yes   Is the patient/caregiver able to teach back signs and symptoms related to disease process for when to call 911? Yes   Is the patient/caregiver able to teach back the hierarchy of who to call/visit for symptoms/problems? PCP, Specialist, Home health nurse, Urgent Care, ED, 911 Yes   If the patient is a current smoker, are they able to teach back resources for cessation? Not a smoker   Week 2 Call Completed? Yes          DIONISIO Mays  Licensed Nurse

## 2023-01-04 ENCOUNTER — OFFICE VISIT (OUTPATIENT)
Dept: PSYCHIATRY | Facility: HOSPITAL | Age: 63
End: 2023-01-04
Payer: COMMERCIAL

## 2023-01-04 DIAGNOSIS — F41.1 GAD (GENERALIZED ANXIETY DISORDER): Primary | ICD-10-CM

## 2023-01-04 PROCEDURE — 99214 OFFICE O/P EST MOD 30 MIN: CPT | Performed by: NURSE PRACTITIONER

## 2023-01-04 NOTE — PROGRESS NOTES
[facility]  In Person Session    Subjective  Patient ID: Edmond Chase is a 62 y.o. male is seen face to face in the [facility] ([facility]. Pt friend, Lester, patient's oldest friend, is present with patient permission.    CC: Anxiety, uncertainty, insomnia    HPI: Interval history reviewed; pt continues to anxiously await plan of care determination regarding esophageal cancer, recent GI bleed. Has upcoming visit with Dr. Stockton, looking forward to understanding treatment plans. Pt reports delay regarding initiation of gabapentin, although has gotten to goal dosing of 300 mg q AM, 300 mg q evening meal, and 300 mg q hs. Reports improved ability to sleep 4-5 hours at a time and nap during day if needed. Continues to use CPAP regularly. Pt recognizes fatigue which is a daily contrast to previous normal although finds current rest to be acceptable. Believes anxiety has benefited some by addition of gabapentin. Does not endorse specific SE, although confirms challenges differentiating cause and effect of various changes over recent months. Pt endorses vacillating large emotions, although appreciates having breakdowns with those he trusts and feels comforted by. Does not feel this is new, and instead articulates experiences of grief over holidays, consideration of debilitation alongside treatment, etc. Medication hx reviewed to include historical treatment with SSRI and SNRI therapy without perception of efficacy.    Collateral history per friend echoes importance of taking it easy and not pushing himself as much. Is available to assist where needed. Plans to stay with patient to care for him through April. Both see this timeline as being an important consideration.     During visit, pt becomes somewhat shaky; vitals obtained. /82, 96% RA, 82 BPM. Pt calms once knowing vitals are normal. Discussed having had oatmeal for breakfast, and did take diabetic medications. Does have  glucometer and agrees to take.  This normalizes prior to patient leaving office with stable gait.    Objective   Mental Status Exam  Appearance:  clean and casually dressed, appropriate  Attitude toward clinician:  cooperative and agreeable   Speech:    Rate:  regular rate and rhythm   Volume:  normal  Motor:  no abnormal movements present  Mood:  Nervous  Affect:  anxious  Thought Processes:  linear, logical, and goal directed  Thought Content:  normal  Suicidal Thoughts:  absent  Homicidal Thoughts:  absent  Perceptual Disturbance: no perceptual disturbance  Attention and Concentration:  fair  Insight and Judgement:  fair  Memory:  memory appears to be intact    Review of Systems   Constitutional: Positive for activity change and fatigue.   Psychiatric/Behavioral: Positive for sleep disturbance. Negative for dysphoric mood. The patient is nervous/anxious.      Medications Reviewed:  Gabapentin 300 mg tid    Diagnoses and all orders for this visit:    1. CHARANJIT (generalized anxiety disorder) (Primary)      Plan of Care  Pt with continued CHARANJIT, acutely elevated with current uncertainty surrounding health and plan of care.  Continue gabapentin 300 mg q am, dinner, and bedtime. Will consider increasing dose at next visit.   Counseling provided regarding ability for pre hab, benefits of activity as able/ rest as needed.  Follow up scheduled in 2 weeks via Zoom to consider treatment recommendations and needs at that time.    I spent 39 minutes caring for Edmond on this date of service. This time includes time spent by me in the following activities: preparing for the visit, obtaining and/or reviewing a separately obtained history, performing a medically appropriate examination and/or evaluation, counseling and educating the patient/family/caregiver and documenting information in the medical record.

## 2023-01-05 ENCOUNTER — READMISSION MANAGEMENT (OUTPATIENT)
Dept: CALL CENTER | Facility: HOSPITAL | Age: 63
End: 2023-01-05
Payer: COMMERCIAL

## 2023-01-05 NOTE — OUTREACH NOTE
Medical Week 1 Survey    Flowsheet Row Responses   [facility] patient discharged from? [facility]   Does the patient have one of the following disease processes/diagnoses(primary or secondary)? Other   Call start time 1057   Call end time 1059   Discharge diagnosis Extertional chest pain   Meds reviewed with patient/caregiver? Yes   Is the patient having any side effects they believe may be caused by any medication additions or changes? No   Does the patient have all medications ordered at discharge? N/A   Is the patient taking all medications as directed (includes completed medication regime)? Yes   Does the patient have a primary care provider?  Yes   Does the patient have an appointment with their PCP within 7 days of discharge? Yes   Has the patient kept scheduled appointments due by today? Yes   Has home health visited the patient within 72 hours of discharge? N/A   Psychosocial issues? No   Did the patient receive a copy of their discharge instructions? Yes   Nursing interventions Reviewed instructions with patient   What is the patient's perception of their health status since discharge? Improving   Is the patient/caregiver able to teach back signs and symptoms related to disease process for when to call PCP? Yes   Is the patient/caregiver able to teach back signs and symptoms related to disease process for when to call 911? Yes   Is the patient/caregiver able to teach back the hierarchy of who to call/visit for symptoms/problems? PCP, Specialist, Home health nurse, Urgent Care, ED, 911 Yes   If the patient is a current smoker, are they able to teach back resources for cessation? Not a smoker   Is the patient interested in additional calls from an ambulatory ?  NOTE:  applies to high risk patients requiring additional follow-up. No          LES MARCUS - Registered Nurse

## 2023-01-16 ENCOUNTER — OFFICE VISIT (OUTPATIENT)
Dept: SURGERY | Facility: CLINIC | Age: 63
End: 2023-01-16
Payer: COMMERCIAL

## 2023-01-16 ENCOUNTER — PATIENT OUTREACH (OUTPATIENT)
Dept: OTHER | Facility: HOSPITAL | Age: 63
End: 2023-01-16
Payer: COMMERCIAL

## 2023-01-16 ENCOUNTER — READMISSION MANAGEMENT (OUTPATIENT)
Dept: CALL CENTER | Facility: HOSPITAL | Age: 63
End: 2023-01-16
Payer: COMMERCIAL

## 2023-01-16 ENCOUNTER — LAB (OUTPATIENT)
Dept: LAB | Facility: HOSPITAL | Age: 63
End: 2023-01-16
Payer: COMMERCIAL

## 2023-01-16 VITALS
SYSTOLIC BLOOD PRESSURE: 182 MMHG | DIASTOLIC BLOOD PRESSURE: 88 MMHG | HEART RATE: 78 BPM | OXYGEN SATURATION: 97 % | WEIGHT: 310 LBS | BODY MASS INDEX: 41.08 KG/M2 | HEIGHT: 73 IN

## 2023-01-16 DIAGNOSIS — C15.5 MALIGNANT NEOPLASM OF LOWER THIRD OF ESOPHAGUS: ICD-10-CM

## 2023-01-16 DIAGNOSIS — K92.1 GASTROINTESTINAL HEMORRHAGE WITH MELENA: ICD-10-CM

## 2023-01-16 DIAGNOSIS — C15.5 MALIGNANT NEOPLASM OF LOWER THIRD OF ESOPHAGUS: Primary | ICD-10-CM

## 2023-01-16 LAB
BASOPHILS # BLD AUTO: 0.02 10*3/MM3 (ref 0–0.2)
BASOPHILS NFR BLD AUTO: 0.3 % (ref 0–1.5)
DEPRECATED RDW RBC AUTO: 46.5 FL (ref 37–54)
EOSINOPHIL # BLD AUTO: 0.12 10*3/MM3 (ref 0–0.4)
EOSINOPHIL NFR BLD AUTO: 1.8 % (ref 0.3–6.2)
ERYTHROCYTE [DISTWIDTH] IN BLOOD BY AUTOMATED COUNT: 14.5 % (ref 12.3–15.4)
HCT VFR BLD AUTO: 36.8 % (ref 37.5–51)
HGB BLD-MCNC: 11.5 G/DL (ref 13–17.7)
IMM GRANULOCYTES # BLD AUTO: 0.01 10*3/MM3 (ref 0–0.05)
IMM GRANULOCYTES NFR BLD AUTO: 0.2 % (ref 0–0.5)
LYMPHOCYTES # BLD AUTO: 1.04 10*3/MM3 (ref 0.7–3.1)
LYMPHOCYTES NFR BLD AUTO: 15.8 % (ref 19.6–45.3)
MCH RBC QN AUTO: 27.8 PG (ref 26.6–33)
MCHC RBC AUTO-ENTMCNC: 31.3 G/DL (ref 31.5–35.7)
MCV RBC AUTO: 88.9 FL (ref 79–97)
MONOCYTES # BLD AUTO: 0.54 10*3/MM3 (ref 0.1–0.9)
MONOCYTES NFR BLD AUTO: 8.2 % (ref 5–12)
NEUTROPHILS NFR BLD AUTO: 4.85 10*3/MM3 (ref 1.7–7)
NEUTROPHILS NFR BLD AUTO: 73.7 % (ref 42.7–76)
NRBC BLD AUTO-RTO: 0 /100 WBC (ref 0–0.2)
PLATELET # BLD AUTO: 218 10*3/MM3 (ref 140–450)
PMV BLD AUTO: 10.1 FL (ref 6–12)
RBC # BLD AUTO: 4.14 10*6/MM3 (ref 4.14–5.8)
WBC NRBC COR # BLD: 6.58 10*3/MM3 (ref 3.4–10.8)

## 2023-01-16 PROCEDURE — 85025 COMPLETE CBC W/AUTO DIFF WBC: CPT

## 2023-01-16 PROCEDURE — 99215 OFFICE O/P EST HI 40 MIN: CPT | Performed by: THORACIC SURGERY (CARDIOTHORACIC VASCULAR SURGERY)

## 2023-01-16 PROCEDURE — 36415 COLL VENOUS BLD VENIPUNCTURE: CPT

## 2023-01-16 RX ORDER — ACETAMINOPHEN 500 MG
1000 TABLET ORAL ONCE
Status: CANCELLED | OUTPATIENT
Start: 2023-02-03 | End: 2023-01-16

## 2023-01-16 RX ORDER — GABAPENTIN 300 MG/1
600 CAPSULE ORAL ONCE
Status: CANCELLED | OUTPATIENT
Start: 2023-02-03 | End: 2023-01-16

## 2023-01-16 RX ORDER — SODIUM CHLORIDE 9 MG/ML
40 INJECTION, SOLUTION INTRAVENOUS AS NEEDED
Status: CANCELLED | OUTPATIENT
Start: 2023-02-03

## 2023-01-16 RX ORDER — HEPARIN SODIUM 5000 [USP'U]/ML
5000 INJECTION, SOLUTION INTRAVENOUS; SUBCUTANEOUS ONCE
Status: CANCELLED | OUTPATIENT
Start: 2023-02-03

## 2023-01-16 RX ORDER — CELECOXIB 200 MG/1
200 CAPSULE ORAL ONCE
Status: CANCELLED | OUTPATIENT
Start: 2023-02-03 | End: 2023-01-16

## 2023-01-16 RX ORDER — SODIUM CHLORIDE 0.9 % (FLUSH) 0.9 %
3-10 SYRINGE (ML) INJECTION AS NEEDED
Status: CANCELLED | OUTPATIENT
Start: 2023-02-03

## 2023-01-16 RX ORDER — SODIUM CHLORIDE 0.9 % (FLUSH) 0.9 %
3 SYRINGE (ML) INJECTION EVERY 12 HOURS SCHEDULED
Status: CANCELLED | OUTPATIENT
Start: 2023-02-03

## 2023-01-16 RX ORDER — METOPROLOL SUCCINATE 25 MG/1
25 TABLET, EXTENDED RELEASE ORAL EVERY EVENING
COMMUNITY
Start: 2022-12-18 | End: 2023-02-10 | Stop reason: HOSPADM

## 2023-01-16 NOTE — OUTREACH NOTE
Medical Week 4 Survey    Flowsheet Row Responses   [facility] patient discharged from? [place]   Does the patient have one of the following disease processes/diagnoses(primary or secondary)? Other   Week 4 attempt successful? No          GALINA GENAO - Registered Nurse

## 2023-01-16 NOTE — LETTER
"January 20, 2023     VIKTORIA Carbone  [address]  [facility] [address]  [facility] [address]    Patient: Edmond Chase   YOB: 1960   Date of Visit: 1/16/2023       Dear VIKTORIA Lazo:    Thank you for referring Edmond Chase to me for evaluation. Below are the relevant portions of my assessment and plan of care.    If you have questions, please do not hesitate to call me. I look forward to following Edmond along with you.         Sincerely,        Valerie Stockton MD        CC: No Recipients  Valerie Stockton MD  01/20/23 1940  Signed  Chief Complaint  Esophageal cancer  Subjective         Edmond Chase presents to [facility] SUR[facility]ERY  History of Present Illness    EDMOND CHASE is a 62-year-old male who presents today for follow-up. He is accompanied today by an adult male.    The patient reports he is no longer bleeding.    The patient is accompanied by an adult male today.  An adult female states that the patient experienced dizziness when he went down and leaned forward.    The patient reports that he has not felt right since 07/2022. He states that he has Last's esophagus. He reports that he is anxious to start his treatment to address the cancer.  He states that he came to see his primary care provider and she thought blood work was in order.  He states that they took blood work and kept him overnight. He reports that he was given 1 unit of blood.He states that he had a stent in 2020. He reports that he had an echocardiogram and a stress test in 07/2022. He states that the day after Thanksgiving was his big near death experience. He reports that he received 10 units of blood the first day and 5 units afterwards. He states that he has been feeling \" crappy \" since then. He reports that he is continuing to take over-the-counter iron. He states that he had to take an over-the-counter laxative to counteract the iron. He reports that he is seeing a psychiatric " "VIKTORIA and she has him on gabapentin 900 mg a day.  He reports that he has a dry throat and a dry mouth. He states that he has not slept in his bed since 07/2022. He reports that he spends most nights in his easy chair. He denies any more bleeding from the bottom. He states that he is always worried about black stool. He denies any blood or black stool. He states that the iron makes him constipated. He reports that he takes a laxative and it seems to help. He states that he has not seen a dietitian yet. He reports that he likes to walk. He states that walking is easier for him than standing around is very hard.        Objective    Vital Signs:  BP (!) 182/88 (BP Location: Left arm, Patient Position: Sitting, Cuff Size: Adult)   Pulse 78   Ht 185.4 cm (73\")   Wt (!) 141 kg (310 lb)   SpO2 97%   BMI 40.90 kg/m²   Estimated body mass index is 40.9 kg/m² as calculated from the following:    Height as of this encounter: 185.4 cm (73\").    Weight as of this encounter: 141 kg (310 lb).             Physical Exam  Vitals and nursing note reviewed.   Constitutional:       Appearance: He is well-developed.   HENT:      Head: Normocephalic and atraumatic.      Nose: Nose normal.   Eyes:      Conjunctiva/sclera: Conjunctivae normal.   Pulmonary:      Effort: Pulmonary effort is normal.   Musculoskeletal:         General: Normal range of motion.      Cervical back: Normal range of motion and neck supple.   Skin:     General: Skin is warm and dry.   Neurological:      Mental Status: He is alert and oriented to person, place, and time.   Psychiatric:         Behavior: Behavior normal.         Thought Content: Thought content normal.         Judgment: Judgment normal.        Result Review :                  Assessment and Plan   Diagnoses and all orders for this visit:    1. Malignant neoplasm of lower third of esophagus (HCC) (Primary)  -     CBC & Differential; Future  -     Ambulatory Referral to Oncology Nurse Navigator  -    " " Case Request; Standing  -     CBC and Differential; Future  -     Basic metabolic panel; Future  -     APTT; Future  -     Protime-INR; Future  -     Type and screen; Future  -     ECG 12 Lead; Future  -     acetaminophen (TYLENOL) tablet 1,000 mg  -     celecoxib (CeleBREX) capsule 200 mg  -     gabapentin (NEURONTIN) capsule 600 mg  -     heparin (porcine) 5000 UNIT/ML injection 5,000 Units  -     sodium chloride 0.9 % flush 3 mL  -     sodium chloride 0.9 % flush 3-10 mL  -     sodium chloride 0.9 % infusion 40 mL  -     ampicillin-sulbactam (UNASYN) 3 g in sodium chloride 0.9 % 100 mL IVPB-VTB  -     Case Request    2. Gastrointestinal hemorrhage with melena  -     CBC & Differential; Future    Other orders  -     Follow Anesthesia Guidelines / Protocol; Future  -     Obtain Informed Consent; Future  -     Provide NPO Instructions to Patient; Future  -     Chlorhexidine Skin Prep; Future  -     Provide NPO Instructions to Patient; Future  -     Provide \"Carbo Loading\" Instructions to Patient; Future  -     Provide Patient With Enhanced Recovery Booklet(s) or Handout; Future  -     Provide Chlorhexidine Skin Prep Wipes and Instructions; Future  -     Provide Patient With Enhanced Recovery Booklet(s); Future  -     Provide Patient With Education on Use of Incentive Spirometry; Future  -     Patient to Exercise 3 Times Per Week For At Least 20 Minutes; Future  -     Follow Anesthesia Guidelines / Protocol; Standing  -     Patient to Drink Gatorade 20oz 2 Hours Prior to Surgery; Standing  -     Diabetic Patient to Drink Gatorade G2 20oz 2 Hours Prior to Surgery; Standing  -     Insert Peripheral IV; Standing  -     Saline Lock & Maintain IV Access; Standing  -     Inpatient Admission; Standing        Edmond Chase is a 69-year-old male with a history of adenocarcinoma of the esophagus with invasion. We discussed the risks, benefits, and alternatives of an esophagectomy.  We discussed the risks of esophagectomy " including pneumonia, anastomotic leak and atrial fibrillation.  We discussed the need for feeding tube for 8 to 12 weeks postoperatively as well as the need to be n.p.o. for 2 weeks postoperatively.    Mr. EDMOND TATE is scheduled for an esophagectomy the first week of 02/03/2023.  He needs to follow up with his cardiologist for cardiac clearance for esophagectomy and I have messaged Dr. Miguel.    He is having issues with fatigue and dizziness which I think is secondary to his gabapentin use.  I have instructed him to stop gabapentin.      I spent 40 minutes caring for Edmond on this date of service. This time includes time spent by me in the following activities:preparing for the visit, reviewing tests, obtaining and/or reviewing a separately obtained history, performing a medically appropriate examination and/or evaluation , counseling and educating the patient/family/caregiver, ordering medications, tests, or procedures, documenting information in the medical record and independently interpreting results and communicating that information with the patient/family/caregiver  Follow Up   No follow-ups on file.  Patient was given instructions and counseling regarding his condition or for health maintenance advice. Please see specific information pulled into the AVS if appropriate.     Transcribed from ambient dictation for Valerie Stockton MD by Estevan Mondragon.  01/16/23   13:05 EST    Patient or patient representative verbalized consent to the visit recording.  I have personally performed the services described in this document as transcribed by the above individual, and it is both accurate and complete.

## 2023-01-16 NOTE — PROGRESS NOTES
"Chief Complaint  Esophageal cancer  Subjective        Edmond Chase presents to [facility] THORACIC SURGERY  History of Present Illness    EDMOND CHASE is a 62-year-old male who presents today for follow-up. He is accompanied today by an adult male.    The patient reports he is no longer bleeding.    The patient is accompanied by an adult male today.  An adult female states that the patient experienced dizziness when he went down and leaned forward.    The patient reports that he has not felt right since 07/2022. He states that he has Last's esophagus. He reports that he is anxious to start his treatment to address the cancer.  He states that he came to see his primary care provider and she thought blood work was in order.  He states that they took blood work and kept him overnight. He reports that he was given 1 unit of blood.He states that he had a stent in 2020. He reports that he had an echocardiogram and a stress test in 07/2022. He states that the day after Thanksgiving was his big near death experience. He reports that he received 10 units of blood the first day and 5 units afterwards. He states that he has been feeling \" crappy \" since then. He reports that he is continuing to take over-the-counter iron. He states that he had to take an over-the-counter laxative to counteract the iron. He reports that he is seeing a psychiatric APRN and she has him on gabapentin 900 mg a day.  He reports that he has a dry throat and a dry mouth. He states that he has not slept in his bed since 07/2022. He reports that he spends most nights in his easy chair. He denies any more bleeding from the bottom. He states that he is always worried about black stool. He denies any blood or black stool. He states that the iron makes him constipated. He reports that he takes a laxative and it seems to help. He states that he has not seen a dietitian yet. He reports that he likes to walk. He states that " "walking is easier for him than standing around is very hard.        Objective   Vital Signs:  BP (!) 182/88 (BP Location: Left arm, Patient Position: Sitting, Cuff Size: Adult)   Pulse 78   Ht 185.4 cm (73\")   Wt (!) 141 kg (310 lb)   SpO2 97%   BMI 40.90 kg/m²   Estimated body mass index is 40.9 kg/m² as calculated from the following:    Height as of this encounter: 185.4 cm (73\").    Weight as of this encounter: 141 kg (310 lb).             Physical Exam  Vitals and nursing note reviewed.   Constitutional:       Appearance: He is well-developed.   HENT:      Head: Normocephalic and atraumatic.      Nose: Nose normal.   Eyes:      Conjunctiva/sclera: Conjunctivae normal.   Pulmonary:      Effort: Pulmonary effort is normal.   Musculoskeletal:         General: Normal range of motion.      Cervical back: Normal range of motion and neck supple.   Skin:     General: Skin is warm and dry.   Neurological:      Mental Status: He is alert and oriented to person, place, and time.   Psychiatric:         Behavior: Behavior normal.         Thought Content: Thought content normal.         Judgment: Judgment normal.        Result Review :                   Assessment and Plan   Diagnoses and all orders for this visit:    1. Malignant neoplasm of lower third of esophagus (HCC) (Primary)  -     CBC & Differential; Future  -     Ambulatory Referral to Oncology Nurse Navigator  -     Case Request; Standing  -     CBC and Differential; Future  -     Basic metabolic panel; Future  -     APTT; Future  -     Protime-INR; Future  -     Type and screen; Future  -     ECG 12 Lead; Future  -     acetaminophen (TYLENOL) tablet 1,000 mg  -     celecoxib (CeleBREX) capsule 200 mg  -     gabapentin (NEURONTIN) capsule 600 mg  -     heparin (porcine) 5000 UNIT/ML injection 5,000 Units  -     sodium chloride 0.9 % flush 3 mL  -     sodium chloride 0.9 % flush 3-10 mL  -     sodium chloride 0.9 % infusion 40 mL  -     ampicillin-sulbactam " "(UNASYN) 3 g in sodium chloride 0.9 % 100 mL IVPB-VTB  -     Case Request    2. Gastrointestinal hemorrhage with melena  -     CBC & Differential; Future    Other orders  -     Follow Anesthesia Guidelines / Protocol; Future  -     Obtain Informed Consent; Future  -     Provide NPO Instructions to Patient; Future  -     Chlorhexidine Skin Prep; Future  -     Provide NPO Instructions to Patient; Future  -     Provide \"Carbo Loading\" Instructions to Patient; Future  -     Provide Patient With Enhanced Recovery Booklet(s) or Handout; Future  -     Provide Chlorhexidine Skin Prep Wipes and Instructions; Future  -     Provide Patient With Enhanced Recovery Booklet(s); Future  -     Provide Patient With Education on Use of Incentive Spirometry; Future  -     Patient to Exercise 3 Times Per Week For At Least 20 Minutes; Future  -     Follow Anesthesia Guidelines / Protocol; Standing  -     Patient to Drink Gatorade 20oz 2 Hours Prior to Surgery; Standing  -     Diabetic Patient to Drink Gatorade G2 20oz 2 Hours Prior to Surgery; Standing  -     Insert Peripheral IV; Standing  -     Saline Lock & Maintain IV Access; Standing  -     Inpatient Admission; Standing        Edmond Chase is a 69-year-old male with a history of adenocarcinoma of the esophagus with invasion. We discussed the risks, benefits, and alternatives of an esophagectomy.  We discussed the risks of esophagectomy including pneumonia, anastomotic leak and atrial fibrillation.  We discussed the need for feeding tube for 8 to 12 weeks postoperatively as well as the need to be n.p.o. for 2 weeks postoperatively.    Mr. EDMOND CHASE is scheduled for an esophagectomy the first week of 02/03/2023.  He needs to follow up with his cardiologist for cardiac clearance for esophagectomy and I have messaged Dr. Miguel.    He is having issues with fatigue and dizziness which I think is secondary to his gabapentin use.  I have instructed him to stop gabapentin.     "   I spent 40 minutes caring for Edmond on this date of service. This time includes time spent by me in the following activities:preparing for the visit, reviewing tests, obtaining and/or reviewing a separately obtained history, performing a medically appropriate examination and/or evaluation , counseling and educating the patient/family/caregiver, ordering medications, tests, or procedures, documenting information in the medical record and independently interpreting results and communicating that information with the patient/family/caregiver  Follow Up   No follow-ups on file.  Patient was given instructions and counseling regarding his condition or for health maintenance advice. Please see specific information pulled into the AVS if appropriate.     Transcribed from ambient dictation for Valerie Stockton MD by Estevan Mondragon.  01/16/23   13:05 EST    Patient or patient representative verbalized consent to the visit recording.  I have personally performed the services described in this document as transcribed by the above individual, and it is both accurate and complete.

## 2023-01-17 ENCOUNTER — PATIENT OUTREACH (OUTPATIENT)
Dept: OTHER | Facility: HOSPITAL | Age: 63
End: 2023-01-17
Payer: COMMERCIAL

## 2023-01-17 RX ORDER — SUCRALFATE 1 G/1
TABLET ORAL
Qty: 60 TABLET | Refills: 5 | Status: SHIPPED | OUTPATIENT
Start: 2023-01-17 | End: 2023-02-10 | Stop reason: HOSPADM

## 2023-01-17 NOTE — PROGRESS NOTES
Called patient,naman diamondm that Dr. Stockton referred his case to me. Requested call back to discuss my role and explain what services we offer in the Cancer Center

## 2023-01-18 ENCOUNTER — PATIENT OUTREACH (OUTPATIENT)
Dept: OTHER | Facility: HOSPITAL | Age: 63
End: 2023-01-18
Payer: COMMERCIAL

## 2023-01-18 ENCOUNTER — TELEPHONE (OUTPATIENT)
Dept: CARDIOLOGY | Facility: CLINIC | Age: 63
End: 2023-01-18
Payer: COMMERCIAL

## 2023-01-18 DIAGNOSIS — C15.5 MALIGNANT NEOPLASM OF LOWER THIRD OF ESOPHAGUS: Primary | ICD-10-CM

## 2023-01-18 NOTE — TELEPHONE ENCOUNTER
PER DR. RANGEL--PT NEEDS APPT WITH DR. RANGEL OR [facility]AGE IN THE NEXT WEEK OR 2 FOR CHEST PAIN FOLLOWING A GI BLEED.    THANKS,  ALYSSIA

## 2023-01-18 NOTE — PROGRESS NOTES
"Rec'd call from patient. Introduced myself, explained the navigation role, he was receptive.  He states he has stage 1 esophageal cancer. He has a history of coronary artery disease, a bioprosthetic aortic valve, TJ on cpap, type 2 diabetes, hypertension, hyperlipidemia and grijalva's esophagus    The patient stated he hasn't been feeling well since late July when he was admitted for an esophageal bleed. He was referred to Dr. 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  He had then developed a GI bleed after a colonoscopy in [facility] right after Thanksgiving. He was admitted to the ICU and required 10 units of blood. \"Since then I have had 5 more units of blood\".    He saw Dr. Stockton on 1/16/23 and will be having an esophagectomy on 2/3/23.  He has a good understanding of his pathology and treatment options.  He was able to teach back of his plan of care.  We discussed CM/DCP will be following him while IP and will facilitate any needs that he may have.  He was sent for labs that day; he assumes to make sure he was no longer anemic. The meets with his cardiologist later this week to get the sign off for surgery.  He has pre-admission testing next week.      The patient lives alone in a single level [facility]. He stated he is overweight (310 pounds currently) and has arthritis in 1 knee. He does not use any assistive devices.  We discussed some weight loss after surgery is likely, although we will refer him to [facility] due to his diagnosis. The patient stated he wanted to work on losing weight after surgery.     Te patient verbalized an adequate support system with friends and family. His friend will be staying with him through April. He denies any transportation or resource needs.    The patient verbalized being \"scared\" about his upcoming surgery. provided active listening and emotional support, validating and normalizing patient's feelings. He has met with " Anna on two occasions and was scheduled to meet with her yesterday via telemedicine appt although had connection issues. He stated her phone number was garbled on his cell phone so he couldn't call her back.  I provided him with Anna's number. He will call and reschedule his appt. On 12/18/22, his PHQ-9 was 15 with CHARANJIT 7 = 7. Patient expressed gratitude for my support and denied any additional needs at this time.    We discussed integrative therapies and other services at [facility].  The patient will stop by the Cancer Center on Friday after his cardiology appt. I will give him a navigation folder at that time.

## 2023-01-20 ENCOUNTER — TELEMEDICINE - AUDIO (OUTPATIENT)
Dept: NUTRITION | Facility: HOSPITAL | Age: 63
End: 2023-01-20
Payer: COMMERCIAL

## 2023-01-20 ENCOUNTER — OFFICE VISIT (OUTPATIENT)
Dept: CARDIOLOGY | Facility: CLINIC | Age: 63
End: 2023-01-20
Payer: COMMERCIAL

## 2023-01-20 ENCOUNTER — PATIENT OUTREACH (OUTPATIENT)
Dept: OTHER | Facility: HOSPITAL | Age: 63
End: 2023-01-20
Payer: COMMERCIAL

## 2023-01-20 VITALS
DIASTOLIC BLOOD PRESSURE: 88 MMHG | OXYGEN SATURATION: 98 % | BODY MASS INDEX: 41.62 KG/M2 | WEIGHT: 314 LBS | SYSTOLIC BLOOD PRESSURE: 154 MMHG | HEART RATE: 74 BPM | HEIGHT: 73 IN

## 2023-01-20 DIAGNOSIS — I10 ESSENTIAL HYPERTENSION: ICD-10-CM

## 2023-01-20 DIAGNOSIS — I25.708 CORONARY ARTERY DISEASE OF BYPASS GRAFT OF NATIVE HEART WITH STABLE ANGINA PECTORIS: Primary | ICD-10-CM

## 2023-01-20 DIAGNOSIS — Z95.2 S/P AVR (AORTIC VALVE REPLACEMENT): ICD-10-CM

## 2023-01-20 PROCEDURE — 99214 OFFICE O/P EST MOD 30 MIN: CPT | Performed by: INTERNAL MEDICINE

## 2023-01-20 NOTE — PROGRESS NOTES
OUTPATIENT ONCOLOGY NUTRITION ASSESSMENT      Patient Name: Edmond Chase  YOB: 1960  MRN: [id number]  Assessment Date: 1/20/2023        COMMENTS:  Referral from lung nurse navigator. The patient is scheduled for esophagectomy 2/3/23. Spoke to patient on the phone today. He mentioned he would like to lose weight prior to surgery and I discouraged him from intentionally losing weight at this time. Explained he will lose weight after his surgery most likely. Recommended that he work on eating smaller amounts more frequently instead so that he becomes comfortable with this eating pattern for after his surgery. He seems to have a good understanding of the plan for a feeding tube and gradually increasing the po diet post op.   The patient states he was told to drink carnation instant breakfast prior to his surgery but he is lactose intolerant.   If Impact AR is needed will be happy to arrange in order to get this to the patient preoperatively.               Reason for Assessment Nursing referral     Diagnosis/Problem   Esophageal cancer   Treatment Plan Surgery   Frequency    Goal of cancer treatment Currative   Comments:        Encounter Information        Nutrition/Diet History:     Oral Nutrition Supplements:    Factors/Symptoms Affecting Intake: No factors at this time   Comments:      Medical/Surgical History Past Medical History:   Diagnosis Date   • Abnormal nuclear stress test    • Aortic valve insufficiency     nonrheumtic    • Ascending aortic aneurysm    • CAD (coronary artery disease)    • Cancer (HCC)    • Chest pain    • Diabetes mellitus (HCC)    • Dizzy     CAREFUL WHEN GETTING UP   • Dyspnea    • Essential hypertension    • Fatigue    • Hyperglycemia    • Hyperlipidemia    • Hypersomnia with sleep apnea    • Lightheadedness    • Malaise and fatigue    • Morbid obesity (HCC)    • Myocardial ischemia    • Nocturia    • TJ on auto CPAP 10/31/2016    Overnight polysomnogram.  Weight 276  pounds.  Severe TJ with AHI 79 events per hour.  Auto CPAP recommended.   • Pneumonia     FEB 2016   • Scrotal bleeding    • Shortness of breath        Past Surgical History:   Procedure Laterality Date   • AORTIC VALVE REPAIR/REPLACEMENT  05/2016   • ASCENDING ARCH/HEMIARCH REPLACEMENT N/A 5/2/2016    Procedure: BHAVESH STERNOTOMY CORONARY ARTERY BYPASS GRAFT TIMES 3 USING LEFT INTERNAL MAMMARY ARTERY AND RIGHT GREATER SAPHENOUS VEIN GRAFT PER ENDOSCOPIC VEIN HARVESTING, AORTIC ANEURYSM REPAIR WITH ROOT REPAIR AND AORTIC VALVE REPLACEMENT;  Surgeon: Rosalio Cline MD;  Location: [facility] OR;  Service:    • CARDIAC CATHETERIZATION N/A 4/1/2016    Procedure: Left Heart Cath;  Surgeon: Erick Tam MD;  Location: [facility] CATH INVASIVE LOCATION;  Service:    • CARDIAC CATHETERIZATION N/A 4/1/2016    Procedure: Left ventriculography;  Surgeon: Erick Tam MD;  Location: [facility] CATH INVASIVE LOCATION;  Service:    • CARDIAC CATHETERIZATION N/A 4/1/2016    Procedure: Right Heart Cath;  Surgeon: Erick Tam MD;  Location: [facility] CATH INVASIVE LOCATION;  Service:    • CARDIAC CATHETERIZATION N/A 10/30/2017    Procedure: Coronary angiography;  Surgeon: Sorin Vasquez MD;  Location: [facility] CATH INVASIVE LOCATION;  Service:    • CARDIAC CATHETERIZATION  10/30/2017    Procedure: Saphenous Vein Graft;  Surgeon: Sorin Vasquez MD;  Location: [facility] CATH INVASIVE LOCATION;  Service:    • CARDIAC CATHETERIZATION N/A 10/30/2017    Procedure: Native mammary injection;  Surgeon: Sorin Vasquez MD;  Location: [facility] CATH INVASIVE LOCATION;  Service:    • CARDIAC CATHETERIZATION N/A 7/7/2020    Procedure: Coronary angiography;  Surgeon: Gregor Kim MD;  Location: [facility] CATH INVASIVE LOCATION;  Service: Cardiovascular;  Laterality: N/A;   • CARDIAC CATHETERIZATION N/A 7/7/2020    Procedure: Left heart cath;  Surgeon: Gregor Kim MD;  Location: [facility] CATH INVASIVE LOCATION;  Service: Cardiovascular;   Laterality: N/A;   • CARDIAC CATHETERIZATION N/A 7/7/2020    Procedure: Left ventriculography;  Surgeon: Gregor Kim MD;  Location: [facility] CATH INVASIVE LOC[facility];  Service: Cardiovascular;  Laterality: N/A;   • CARDIAC CATHETERIZATION N/A 7/7/2020    Procedure: Stent ESMER bypass graft;  Surgeon: Gregor Kim MD;  Location:  [facility] CATH INVASIVE LOCATION;  Service: Cardiovascular;  Laterality: N/A;   • CARDIAC CATHETERIZATION N/A 6/17/2021    Procedure: SAPHENOUS VEIN GRAFT;  Surgeon: Marques Ndiaye MD;  Location:  [facility] CATH INVASIVE LOCATION;  Service: Cardiovascular;  Laterality: N/A;   • CARDIAC CATHETERIZATION N/A 6/17/2021    Procedure: Left Heart Cath;  Surgeon: Marques Ndiaye MD;  [facility];  Service: Cardiovascular;  Laterality: N/A;   • CARDIAC CATHETERIZATION N/A 6/17/2021    Procedure: Coronary angiography;  Surgeon: Marques Ndiaye MD;  Location: [facility] CATH INVASIVE LOCATION;  Service: Cardiovascular;  Laterality: N/A;   • CARDIAC CATHETERIZATION N/A 7/14/2022    Procedure: Left Heart Cath;  Surgeon: Charlie Aj MD;  [facility];  Service: Cardiovascular;  Laterality: N/A;   • CARDIAC CATHETERIZATION N/A 7/14/2022    Procedure: Coronary angiography;  Surgeon: Charlie Aj MD;  [facility];  Service: Cardiovascular;  Laterality: N/A;   • CARDIAC CATHETERIZATION  7/14/2022    Procedure: Saphenous Vein Graft;  Surgeon: Charlie Aj MD;  Location: [facility] CATH INVASIVE LOCATION;  Service: Cardiovascular;;   • CARDIAC CATHETERIZATION N/A 7/14/2022    Procedure: Native mammary injection;  Surgeon: Charlie Aj MD;  Location: [facility] CATH INVASIVE LOCATION;  Service: Cardiovascular;  Laterality: N/A;   • COLONOSCOPY N/A 11/26/2022    Procedure: COLONOSCOPY AT BEDSIDE;  Surgeon: Tomy Lopez MD;  Location: [facility] MAIN OR;  Service: Gastroenterology;  Laterality: N/A;   •  "COLONOSCOPY W/ POLYPECTOMY N/A 10/4/2022    Procedure: COLONOSCOPY to cecum with cold forceps and cold snare polypectomies;  Surgeon: Gregor Carlson MD;  Location:  [facility] ENDOSCOPY;  Service: Gastroenterology;  Laterality: N/A;  PRE- hx of polyps  POST- diverticulosis, polyps   • CORONARY ARTERY BYPASS GRAFT  05/2016    LIMA TO LAD, SVG TO PDA, SVG TO OM2   • ENDOSCOPY N/A 7/13/2022    Procedure: ESOPHAGOGASTRODUODENOSCOPY;  Surgeon: Marcia Hollis MD;  Location: [facility] ENDOSCOPY;  Service: Gastroenterology;  Laterality: N/A;  PRE- ANEMIA, MELENA  POST- MILD EROSIVE GASTRITIS, GE JUNCTION ULCER   • ENDOSCOPY N/A 10/4/2022    Procedure: ESOPHAGOGASTRODUODENOSCOPY with biopsies;  Surgeon: Gregor Carlson MD;  Location: [facility] ENDOSCOPY;  Service: Gastroenterology;  Laterality: N/A;  PRE- hx of esophageal ulcer  POST- gastric cardia mass   • INNER EAR SURGERY     • TONSILLECTOMY            Anthropometrics        Current Height  Current Weight  BMI kg/m2 Ht Readings from Last 1 Encounters:   01/20/23 185.4 cm (73\")     Wt Readings from Last 1 Encounters:   01/20/23 (!) 142 kg (314 lb)     There is no height or weight on file to calculate BMI. BMI 41     Obese       Ideal Body Weight (IBW) 184 lb       Usual Body Weight (UBW) 310 lb   Weight Change/Trend Stable       Weight History Wt Readings from Last 30 Encounters:   01/20/23 0756 (!) 142 kg (314 lb)   01/16/23 0949 (!) 141 kg (310 lb)   12/15/22 0618 (!) 142 kg (313 lb 11.4 oz)   12/14/22 1553 (!) 140 kg (308 lb)   12/14/22 1532 (!) 142 kg (313 lb)   12/14/22 1006 (!) 142 kg (313 lb)   12/14/22 0932 (!) 142 kg (313 lb)   12/06/22 1529 (!) 140 kg (308 lb 8 oz)   12/02/22 0600 (!) 143 kg (314 lb 6 oz)   12/01/22 0546 (!) 144 kg (317 lb 3.2 oz)   11/25/22 1123 (!) 141 kg (310 lb)   10/21/22 1052 (!) 142 kg (314 lb)   10/04/22 0934 (!) 140 kg (308 lb 11.2 oz)   09/15/22 0915 (!) 141 kg (310 lb)   08/29/22 1355 (!) 141 kg (311 lb 8 oz)   08/17/22 0900 " (!) 142 kg (314 lb)   08/08/22 1032 (!) 144 kg (318 lb)   08/04/22 0814 (!) 143 kg (315 lb)   07/29/22 0744 (!) 142 kg (314 lb)   07/22/22 1247 (!) 142 kg (314 lb)   07/15/22 0505 (!) 146 kg (322 lb 1.5 oz)   07/12/22 1121 (!) 144 kg (318 lb)   07/11/22 0500 (!) 144 kg (318 lb 1.6 oz)   07/10/22 0618 (!) 148 kg (327 lb)   01/28/22 1108 (!) 148 kg (327 lb)   08/17/21 1007 (!) 150 kg (330 lb)   07/30/21 1016 (!) 149 kg (329 lb)   07/07/21 1225 (!) 154 kg (340 lb)   06/24/21 1105 (!) 154 kg (340 lb)   06/17/21 0812 (!) 150 kg (330 lb)   01/22/21 0823 (!) 152 kg (336 lb)   10/21/20 1044 (!) 150 kg (331 lb)   07/20/20 1331 (!) 145 kg (320 lb)   07/07/20 0821 (!) 141 kg (310 lb)   07/01/20 0740 (!) 141 kg (310 lb)   06/18/20 1107 (!) 141 kg (310 lb)   10/08/19 1424 (!) 147 kg (324 lb)          Medications           Current medications: Glucosamine HCl, amLODIPine, atorvastatin, dapagliflozin Propanediol, ferrous sulfate, furosemide, isosorbide mononitrate, lisinopril, metFORMIN, metoprolol succinate XL, pantoprazole, potassium chloride, and sucralfate                 Tests/Procedures        Tests/Procedures Other:  esophagectomy 2/3/23     Labs       Pertinent Labs          Invalid input(s): LABALBUJUANITA  Results from last 7 days   Lab Units 01/16/23  1214   HEMOGLOBIN g/dL 11.5*   HEMATOCRIT % 36.8*   WBC 10*3/mm3 6.58     Results from last 7 days   Lab Units 01/16/23  1214   PLATELETS 10*3/mm3 218     COVID19   Date Value Ref Range Status   07/10/2022 Not Detected Not Detected - Ref. Range Final     Lab Results   Component Value Date    HGBA1C 7.00 (H) 07/10/2022              Estimated/Assessed Needs        Energy Requirements    Height for Calculation      Weight for Calculation 83 kg   Method for Estimation  25 kcal/kg, 30 kcal/kg   EST Needs (kcal/day) 3879-0585       Protein Requirements    Weight for Calculation 142 kg   EST Protein Needs (g/kg) 0.8 gm/kg   EST Daily Needs (g/day) 114 g       Fluid Requirements      Method for Estimation 1 mL/kcal    Estimated Needs (mL/day) 6416-3760           PES STATEMENT / NUTRITION DIAGNOSIS      Nutrition Dx Problem Problem:    Knowledge Deficit    Etiology:  Medical diagnosis: Esophageal cancer  Factors affecting nutrition: Reported/Observed By    Signs/Symptoms:  Information Deficit and Report/Observation    Comment:      NUTRITION INTERVENTION / PLAN OF CARE      Intervention Goal(s) Maintain nutrition status and Provide information         RD Intervention/Action Follow Tx progress       --    RD to follow per protocol.    Electronically signed by:  Audrey Correa RD, LD  01/20/23 11:38 EST

## 2023-01-20 NOTE — PROGRESS NOTES
"      CARDIOLOGY    Papa Miguel MD    ENCOUNTER DATE:  01/20/2023    Edmond Chase / 62 y.o. / male        CHIEF COMPLAINT / REASON FOR OFFICE VISIT     Surgery Clearance  Chronic angina  Hypertension  AVR    HISTORY OF PRESENT ILLNESS       HPI  Edmond Chase is a 62 y.o. male who presents today for surgical consultation.  Patient was found to have a GI bleed first part of December.  Patient is now found to have a esophageal cancer and is to undergo a right video-assisted esophagectomy.  I was asked by thoracic surgery for perioperative risk assessment.  Patient has chest discomfort he has had chest discomfort for many years.  This is angina however this is stable angina.  He has had a recent heart catheterization with no target vessels and diffuse coronary artery disease.      The following portions of the patient's history were reviewed and updated as appropriate: allergies, current medications, past family history, past medical history, past social history, past surgical history and problem list.      VITAL SIGNS     Visit Vitals  /88 (BP Location: Left arm)   Pulse 74   Ht 185.4 cm (73\")   Wt (!) 142 kg (314 lb)   SpO2 98%   BMI 41.43 kg/m²         Wt Readings from Last 3 Encounters:   01/20/23 (!) 142 kg (314 lb)   01/16/23 (!) 141 kg (310 lb)   12/15/22 (!) 142 kg (313 lb 11.4 oz)     Body mass index is 41.43 kg/m².      REVIEW OF SYSTEMS   ROS        PHYSICAL EXAMINATION     Vitals reviewed.   Constitutional:       Appearance: Healthy appearance.   Pulmonary:      Effort: Pulmonary effort is normal.   Cardiovascular:      Normal rate. Regular rhythm. Normal S1. Normal S2.      Murmurs: There is no murmur.      No gallop. No click. No rub.   Pulses:     Intact distal pulses.   Edema:     Peripheral edema absent.   Neurological:      Mental Status: Alert and oriented to person, place and time.           REVIEWED DATA     Procedures    Cardiac Procedures:  7/14/22  Conclusions:   1. Left " main: Normal  2. LAD: Calcified 90% proximal stenosis.  Chronic total occlusion mid segment.  Mid to distal vessel fills via patent LIMA to LAD  3. LCX: Severely calcified 70 to 80% stenosis in the midsegment.   4. RCA: Severely calcified and tortuous vessel with diffuse 60 to 70% mid vessel stenosis and discrete 80% mid to distal stenosis.  Chronic total occlusion small caliber PDA branch.  PDA fills via left-to-right collaterals.  5.  Ramus: Discrete 50% stenosis in the more inferior branch  6.  LIMA to LAD: Normal  7.  SVG to OM: Occluded at proximal anastomosis  8.  SVG to PDA: Occluded at proximal anastomosis     Interpretation Summary  12/14/22       •  Left ventricular ejection fraction appears to be 66 - 70%.  •  Left ventricular wall thickness is consistent with mild concentric hypertrophy.  •  Left ventricular diastolic function was normal.  •  There is a bioprosthetic aortic valve present.  •  Mild dilation of the aortic root is present. The aortic root measures 4.4 cm. Mild dilation of the sinuses of Valsalva is present.     1.     Lipid Panel    Lipid Panel 8/4/22   Total Cholesterol 116   Triglycerides 104   HDL Cholesterol 36 (A)   VLDL Cholesterol 20   LDL Cholesterol  60   (A) Abnormal value                ASSESSMENT & PLAN      Diagnosis Plan   1. Coronary artery disease of bypass graft of native heart with stable angina pectoris (HCC)        2. Essential hypertension        3. S/P AVR (aortic valve replacement)              SUMMARY/DISCUSSION  1. Hypertension blood pressure is little bit elevated today.  He is obviously very concerned about surgery.  2. Status post AVR.  Patient had a recent echo results noted above valve is fine.  3. Stable angina patient is going to have chest pains perioperatively.  He has had stable angina for many years and with the stress of the surgery this is going to be an anticipated issue perioperatively.  Treated with nitroglycerin either nitroglycerin paste sublingual  nitroglycerin or his oral nitroglycerin.  I would suggest perioperatively we used nitroglycerin paste in case we have issues with his blood pressure.  What is going to also complicate the problem is he is having an esophagectomy so we will never know if his chest discomfort is postsurgical or cardiac in origin.  We will address the issues as they arise.  Patient is at moderate risk for surgery but no further testing or no further intervention is going to change that risk.  Would proceed as clinically indicated.        MEDICATIONS         Discharge Medications          Accurate as of January 20, 2023  9:32 AM. If you have any questions, ask your nurse or doctor.            Continue These Medications      Instructions Start Date   amLODIPine 10 MG tablet  Commonly known as: NORVASC   10 mg, Oral, Daily      atorvastatin 40 MG tablet  Commonly known as: LIPITOR   40 mg, Oral, Nightly      Farxiga 10 MG tablet  Generic drug: dapagliflozin Propanediol   10 mg, Oral, Daily      ferrous sulfate 324 (65 Fe) MG tablet delayed-release EC tablet   324 mg, Oral, Daily With Breakfast      furosemide 20 MG tablet  Commonly known as: LASIX   20 mg, Oral, Daily      gabapentin 300 MG capsule  Commonly known as: Neurontin   300 mg, Oral, 3 Times Daily      GLUCOSAMINE HCL PO   1 tablet, Oral, Daily      isosorbide mononitrate 60 MG 24 hr tablet  Commonly known as: IMDUR   60 mg, Oral, Daily      lisinopril 10 MG tablet  Commonly known as: PRINIVIL,ZESTRIL   10 mg, Oral, Every Evening      metFORMIN 500 MG tablet  Commonly known as: GLUCOPHAGE   500 mg, Oral, Every Evening      metoprolol succinate XL 25 MG 24 hr tablet  Commonly known as: TOPROL-XL   No dose, route, or frequency recorded.      pantoprazole 40 MG EC tablet  Commonly known as: PROTONIX   40 mg, Oral, 2 Times Daily With Meals      potassium chloride 10 MEQ CR tablet   10 mEq, Oral, Daily      sucralfate 1 g tablet  Commonly known as: CARAFATE   TAKE ONE TABLET BY MOUTH  TWICE A DAY BEFORE A MEAL                 **Dragon Disclaimer:   Much of this encounter note is an electronic transcription/translation of spoken language to printed text. The electronic translation of spoken language may permit erroneous, or at times, nonsensical words or phrases to be inadvertently transcribed. Although I have reviewed the note for such errors, some may still exist.

## 2023-01-20 NOTE — PROGRESS NOTES
Patient stopped by [facility], although I was not available.  Provided navigation folder with the following information: [facility], Cancer and Restorative Exercise - CARE, [employer] exercise program, Living with a Diagnosis of Lung Cancer, Cancer Resource Center, Message Therapy, Reiki Therapy, [facility], Cancer Nutrition, Smoking Cessation and Survivorship Clinic.      I will follow up next week

## 2023-01-23 ENCOUNTER — PRE-ADMISSION TESTING (OUTPATIENT)
Dept: PREADMISSION TESTING | Facility: HOSPITAL | Age: 63
End: 2023-01-23
Payer: COMMERCIAL

## 2023-01-23 VITALS
RESPIRATION RATE: 18 BRPM | HEART RATE: 61 BPM | TEMPERATURE: 97.6 F | SYSTOLIC BLOOD PRESSURE: 127 MMHG | HEIGHT: 73 IN | DIASTOLIC BLOOD PRESSURE: 74 MMHG | WEIGHT: 314 LBS | BODY MASS INDEX: 41.62 KG/M2 | OXYGEN SATURATION: 97 %

## 2023-01-23 DIAGNOSIS — C15.5 MALIGNANT NEOPLASM OF LOWER THIRD OF ESOPHAGUS: ICD-10-CM

## 2023-01-23 LAB
ABO GROUP BLD: NORMAL
BLD GP AB SCN SERPL QL: NEGATIVE
RH BLD: POSITIVE
T&S EXPIRATION DATE: NORMAL

## 2023-01-23 PROCEDURE — 85025 COMPLETE CBC W/AUTO DIFF WBC: CPT | Performed by: THORACIC SURGERY (CARDIOTHORACIC VASCULAR SURGERY)

## 2023-01-23 PROCEDURE — 80048 BASIC METABOLIC PNL TOTAL CA: CPT | Performed by: THORACIC SURGERY (CARDIOTHORACIC VASCULAR SURGERY)

## 2023-01-23 PROCEDURE — 86901 BLOOD TYPING SEROLOGIC RH(D): CPT

## 2023-01-23 PROCEDURE — 85610 PROTHROMBIN TIME: CPT | Performed by: THORACIC SURGERY (CARDIOTHORACIC VASCULAR SURGERY)

## 2023-01-23 PROCEDURE — 85730 THROMBOPLASTIN TIME PARTIAL: CPT | Performed by: THORACIC SURGERY (CARDIOTHORACIC VASCULAR SURGERY)

## 2023-01-23 PROCEDURE — 86900 BLOOD TYPING SEROLOGIC ABO: CPT

## 2023-01-23 PROCEDURE — 86850 RBC ANTIBODY SCREEN: CPT

## 2023-01-23 RX ORDER — CHLORHEXIDINE GLUCONATE 500 MG/1
CLOTH TOPICAL TAKE AS DIRECTED
Status: ON HOLD | COMMUNITY
End: 2023-02-03

## 2023-01-23 NOTE — DISCHARGE INSTRUCTIONS
Take the following medications the morning of surgery: ISOSORBIDE, AMLODIPINE AND PANTOPRAZOLE    ARRIVE  AM ON 2/3/23    If you are on prescription narcotic pain medication to control your pain you may also take that medication the morning of surgery.    General Instructions:  Do not eat solid food after midnight the night before surgery.  You may drink clear liquids day of surgery but must stop at least one hour before your hospital arrival time.  It is beneficial for you to have a clear drink that contains carbohydrates the day of surgery.  We suggest a 12 to 20 ounce bottle of Gatorade or Powerade for non-diabetic patients or a 12 to 20 ounce bottle of G2 or Powerade Zero for diabetic patients. (Pediatric patients, are not advised to drink a 12 to 20 ounce carbohydrate drink)    Clear liquids are liquids you can see through.  Nothing red in color.     Plain water                               Sports drinks  Sodas                                   Gelatin (Jell-O)  Fruit juices without pulp such as white grape juice and apple juice  Popsicles that contain no fruit or yogurt  Tea or coffee (no cream or milk added)  Gatorade / Powerade  G2 / Powerade Zero    Infants may have breast milk up to four hours before surgery.  Infants drinking formula may drink formula up to six hours before surgery.   Patients who avoid smoking, chewing tobacco and alcohol for 4 weeks prior to surgery have a reduced risk of post-operative complications.  Quit smoking as many days before surgery as you can.  Do not smoke, use chewing tobacco or drink alcohol the day of surgery.   If applicable bring your C-PAP/ BI-PAP machine.  Bring any papers given to you in the doctor’s office.  Wear clean comfortable clothes.  Do not wear contact lenses, false eyelashes or make-up.  Bring a case for your glasses.   Bring crutches or walker if applicable.  Remove all piercings.  Leave jewelry and any other valuables at home.  Hair extensions with  metal clips must be removed prior to surgery.  The Pre-Admission Testing nurse will instruct you to bring medications if unable to obtain an accurate list in Pre-Admission Testing.        If you were given a blood bank ID arm band remember to bring it with you the day of surgery.    Preventing a Surgical Site Infection:  For 2 to 3 days before surgery, avoid shaving with a razor because the razor can irritate skin and make it easier to develop an infection.    Any areas of open skin can increase the risk of a post-operative wound infection by allowing bacteria to enter and travel throughout the body.  Notify your surgeon if you have any skin wounds / rashes even if it is not near the expected surgical site.  The area will need assessed to determine if surgery should be delayed until it is healed.  The night prior to surgery shower using a fresh bar of anti-bacterial soap (such as Dial) and clean washcloth.  Sleep in a clean bed with clean clothing.  Do not allow pets to sleep with you.  Shower on the morning of surgery using a fresh bar of anti-bacterial soap (such as Dial) and clean washcloth.  Dry with a clean towel and dress in clean clothing.  Ask your surgeon if you will be receiving antibiotics prior to surgery.  Make sure you, your family, and all healthcare providers clean their hands with soap and water or an alcohol based hand  before caring for you or your wound.    Day of surgery:  Your arrival time is approximately two hours before your scheduled surgery time.  Upon arrival, a Pre-op nurse and Anesthesiologist will review your health history, obtain vital signs, and answer questions you may have.  The only belongings needed at this time will be a list of your home medications and if applicable your C-PAP/BI-PAP machine.  A Pre-op nurse will start an IV and you may receive medication in preparation for surgery, including something to help you relax.     Please be aware that surgery does come  with discomfort.  We want to make every effort to control your discomfort so please discuss any uncontrolled symptoms with your nurse.   Your doctor will most likely have prescribed pain medications.      If you are going home after surgery you will receive individualized written care instructions before being discharged.  A responsible adult must drive you to and from the hospital on the day of your surgery and stay with you for 24 hours.  Discharge prescriptions can be filled by the hospital pharmacy during regular pharmacy hours.  If you are having surgery late in the day/evening your prescription may be e-prescribed to your pharmacy.  Please verify your pharmacy hours or chose a 24 hour pharmacy to avoid not having access to your prescription because your pharmacy has closed for the day.    If you are staying overnight following surgery, you will be transported to your hospital room following the recovery period.  [facility] has all private rooms.    If you have any questions please call Pre-Admission Testing at [phone].  Deductibles and co-payments are collected on the day of service. Please be prepared to pay the required co-pay, deductible or deposit on the day of service as defined by your plan.    Call your surgeon immediately if you experience any of the following symptoms:  Sore Throat  Shortness of Breath or difficulty breathing  Cough  Chills  Body soreness or muscle pain  Headache  Fever  New loss of taste or smell  Do not arrive for your surgery ill.  Your procedure will need to be rescheduled to another time.  You will need to call your physician before the day of surgery to avoid any unnecessary exposure to hospital staff as well as other patients.   CHLORHEXIDINE CLOTH INSTRUCTIONS  The morning of surgery follow these instructions using the Chlorhexidine cloths you've been given.  These steps reduce bacteria on the body.  Do not use the cloths near your eyes, ears mouth,  genitalia or on open wounds.  Throw the cloths away after use but do not try to flush them down a toilet.      Open and remove one cloth at a time from the package.    Leave the cloth unfolded and begin the bathing.  Massage the skin with the cloths using gentle pressure to remove bacteria.  Do not scrub harshly.   Follow the steps below with one 2% CHG cloth per area (6 total cloths).  One cloth for neck, shoulders and chest.  One cloth for both arms, hands, fingers and underarms (do underarms last).  One cloth for the abdomen followed by groin.  One cloth for right leg and foot including between the toes.  One cloth for left leg and foot including between the toes.  The last cloth is to be used for the back of the neck, back and buttocks.    Allow the CHG to air dry 3 minutes on the skin which will give it time to work and decrease the chance of irritation.  The skin may feel sticky until it is dry.  Do not rinse with water or any other liquid or you will lose the beneficial effects of the CHG.  If mild skin irritation occurs, do rinse the skin to remove the CHG.  Report this to the nurse at time of admission.  Do not apply lotions, creams, ointments, deodorants or perfumes after using the clothes. Dress in clean clothes before coming to the hospital.

## 2023-01-24 ENCOUNTER — PATIENT OUTREACH (OUTPATIENT)
Dept: OTHER | Facility: HOSPITAL | Age: 63
End: 2023-01-24
Payer: COMMERCIAL

## 2023-01-24 NOTE — PROGRESS NOTES
Called patient. He picked up navigation folder last Friday with the following information: [facility], Cancer and Restorative Exercise - CARE, [employer] exercise program, Living with a Diagnosis of Lung Cancer, [facility] Resource [facility], Message [facility], Reiki Therapy, [facility] [facility], Cancer Nutrition, Smoking Cessation and Survivorship Clinic.      He had pre-admission testing last week. He complains of being tired. We discussed this still may be related to his anemia. He is still taking iron.     He has some degree of lactose intolerance. He plans on buying Boost instead of carnation instant breakfast.     He sees Anna this coming Friday. States they may discuss an alterative antidepressant. He stated that Dr. Stockton thought his gabapentin was causing some side effects.      He denied any other supportive care or resource needs.     I will try to meet him in person before this appt with Anna Friday. He agreed.

## 2023-01-27 ENCOUNTER — OFFICE VISIT (OUTPATIENT)
Dept: PSYCHIATRY | Facility: HOSPITAL | Age: 63
End: 2023-01-27
Payer: COMMERCIAL

## 2023-01-27 ENCOUNTER — PATIENT OUTREACH (OUTPATIENT)
Dept: OTHER | Facility: HOSPITAL | Age: 63
End: 2023-01-27
Payer: COMMERCIAL

## 2023-01-27 DIAGNOSIS — F41.1 GAD (GENERALIZED ANXIETY DISORDER): Primary | ICD-10-CM

## 2023-01-27 PROCEDURE — 99214 OFFICE O/P EST MOD 30 MIN: CPT | Performed by: NURSE PRACTITIONER

## 2023-01-27 NOTE — PROGRESS NOTES
Called patient, left message that I would not be able to meet him in person today, although will attempt to attend his post op appt with Dr. Stockton or call later that week. Wished him well on his surgery

## 2023-01-30 ENCOUNTER — OFFICE VISIT (OUTPATIENT)
Dept: SURGERY | Facility: CLINIC | Age: 63
End: 2023-01-30
Payer: COMMERCIAL

## 2023-01-30 VITALS
HEART RATE: 80 BPM | SYSTOLIC BLOOD PRESSURE: 136 MMHG | OXYGEN SATURATION: 96 % | HEIGHT: 73 IN | WEIGHT: 314 LBS | BODY MASS INDEX: 41.62 KG/M2 | DIASTOLIC BLOOD PRESSURE: 72 MMHG

## 2023-01-30 DIAGNOSIS — C15.5 MALIGNANT NEOPLASM OF LOWER THIRD OF ESOPHAGUS: Primary | ICD-10-CM

## 2023-01-30 PROCEDURE — 99215 OFFICE O/P EST HI 40 MIN: CPT | Performed by: NURSE PRACTITIONER

## 2023-02-02 ENCOUNTER — ANESTHESIA EVENT (OUTPATIENT)
Dept: PERIOP | Facility: HOSPITAL | Age: 63
DRG: 327 | End: 2023-02-02
Payer: COMMERCIAL

## 2023-02-03 ENCOUNTER — HOSPITAL ENCOUNTER (INPATIENT)
Facility: HOSPITAL | Age: 63
LOS: 7 days | Discharge: HOME HEALTH | DRG: 327 | End: 2023-02-10
Attending: THORACIC SURGERY (CARDIOTHORACIC VASCULAR SURGERY) | Admitting: THORACIC SURGERY (CARDIOTHORACIC VASCULAR SURGERY)
Payer: COMMERCIAL

## 2023-02-03 ENCOUNTER — ANESTHESIA (OUTPATIENT)
Dept: PERIOP | Facility: HOSPITAL | Age: 63
DRG: 327 | End: 2023-02-03
Payer: COMMERCIAL

## 2023-02-03 ENCOUNTER — APPOINTMENT (OUTPATIENT)
Dept: GENERAL RADIOLOGY | Facility: HOSPITAL | Age: 63
DRG: 327 | End: 2023-02-03
Payer: COMMERCIAL

## 2023-02-03 DIAGNOSIS — C15.5 MALIGNANT NEOPLASM OF LOWER THIRD OF ESOPHAGUS: Primary | ICD-10-CM

## 2023-02-03 LAB
ABO GROUP BLD: NORMAL
ANION GAP SERPL CALCULATED.3IONS-SCNC: 8 MMOL/L (ref 5–15)
BLD GP AB SCN SERPL QL: NEGATIVE
BUN SERPL-MCNC: 17 MG/DL (ref 8–23)
BUN/CREAT SERPL: 15.6 (ref 7–25)
CALCIUM SPEC-SCNC: 8.7 MG/DL (ref 8.6–10.5)
CHLORIDE SERPL-SCNC: 105 MMOL/L (ref 98–107)
CO2 SERPL-SCNC: 24 MMOL/L (ref 22–29)
CREAT SERPL-MCNC: 1.09 MG/DL (ref 0.76–1.27)
DEPRECATED RDW RBC AUTO: 43.7 FL (ref 37–54)
EGFRCR SERPLBLD CKD-EPI 2021: 75.8 ML/MIN/1.73
ERYTHROCYTE [DISTWIDTH] IN BLOOD BY AUTOMATED COUNT: 13.9 % (ref 12.3–15.4)
GLUCOSE BLDC GLUCOMTR-MCNC: 108 MG/DL (ref 70–130)
GLUCOSE BLDC GLUCOMTR-MCNC: 200 MG/DL (ref 70–130)
GLUCOSE BLDC GLUCOMTR-MCNC: 206 MG/DL (ref 70–130)
GLUCOSE BLDC GLUCOMTR-MCNC: 215 MG/DL (ref 70–130)
GLUCOSE SERPL-MCNC: 205 MG/DL (ref 65–99)
HCT VFR BLD AUTO: 34.2 % (ref 37.5–51)
HGB BLD-MCNC: 10.9 G/DL (ref 13–17.7)
MCH RBC QN AUTO: 28.2 PG (ref 26.6–33)
MCHC RBC AUTO-ENTMCNC: 31.9 G/DL (ref 31.5–35.7)
MCV RBC AUTO: 88.4 FL (ref 79–97)
PLATELET # BLD AUTO: 202 10*3/MM3 (ref 140–450)
PMV BLD AUTO: 9.4 FL (ref 6–12)
POTASSIUM SERPL-SCNC: 4.5 MMOL/L (ref 3.5–5.2)
RBC # BLD AUTO: 3.87 10*6/MM3 (ref 4.14–5.8)
RH BLD: POSITIVE
SODIUM SERPL-SCNC: 137 MMOL/L (ref 136–145)
T&S EXPIRATION DATE: NORMAL
WBC NRBC COR # BLD: 12.46 10*3/MM3 (ref 3.4–10.8)

## 2023-02-03 PROCEDURE — C9290 INJ, BUPIVACAINE LIPOSOME: HCPCS | Performed by: ANESTHESIOLOGY

## 2023-02-03 PROCEDURE — 86850 RBC ANTIBODY SCREEN: CPT | Performed by: THORACIC SURGERY (CARDIOTHORACIC VASCULAR SURGERY)

## 2023-02-03 PROCEDURE — 25010000002 HEPARIN (PORCINE) PER 1000 UNITS: Performed by: THORACIC SURGERY (CARDIOTHORACIC VASCULAR SURGERY)

## 2023-02-03 PROCEDURE — 86901 BLOOD TYPING SEROLOGIC RH(D): CPT | Performed by: THORACIC SURGERY (CARDIOTHORACIC VASCULAR SURGERY)

## 2023-02-03 PROCEDURE — 0DT54ZZ RESECTION OF ESOPHAGUS, PERCUTANEOUS ENDOSCOPIC APPROACH: ICD-10-PCS | Performed by: THORACIC SURGERY (CARDIOTHORACIC VASCULAR SURGERY)

## 2023-02-03 PROCEDURE — 93010 ELECTROCARDIOGRAM REPORT: CPT | Performed by: INTERNAL MEDICINE

## 2023-02-03 PROCEDURE — 25010000002 MIDAZOLAM PER 1 MG: Performed by: ANESTHESIOLOGY

## 2023-02-03 PROCEDURE — P9041 ALBUMIN (HUMAN),5%, 50ML: HCPCS | Performed by: NURSE ANESTHETIST, CERTIFIED REGISTERED

## 2023-02-03 PROCEDURE — 44015 INSERT NEEDLE CATH BOWEL: CPT | Performed by: SURGERY

## 2023-02-03 PROCEDURE — 85027 COMPLETE CBC AUTOMATED: CPT | Performed by: THORACIC SURGERY (CARDIOTHORACIC VASCULAR SURGERY)

## 2023-02-03 PROCEDURE — 43288 ESPHG THRSC MOBLJ: CPT | Performed by: SURGERY

## 2023-02-03 PROCEDURE — 0DHA4UZ INSERTION OF FEEDING DEVICE INTO JEJUNUM, PERCUTANEOUS ENDOSCOPIC APPROACH: ICD-10-PCS | Performed by: THORACIC SURGERY (CARDIOTHORACIC VASCULAR SURGERY)

## 2023-02-03 PROCEDURE — 0 HYDROMORPHONE HCL PF 50 MG/5ML SOLUTION: Performed by: THORACIC SURGERY (CARDIOTHORACIC VASCULAR SURGERY)

## 2023-02-03 PROCEDURE — 71045 X-RAY EXAM CHEST 1 VIEW: CPT

## 2023-02-03 PROCEDURE — 25010000002 HYDROMORPHONE PER 4 MG: Performed by: THORACIC SURGERY (CARDIOTHORACIC VASCULAR SURGERY)

## 2023-02-03 PROCEDURE — 25010000002 MAGNESIUM SULFATE PER 500 MG OF MAGNESIUM: Performed by: NURSE ANESTHETIST, CERTIFIED REGISTERED

## 2023-02-03 PROCEDURE — 0BJ08ZZ INSPECTION OF TRACHEOBRONCHIAL TREE, VIA NATURAL OR ARTIFICIAL OPENING ENDOSCOPIC: ICD-10-PCS | Performed by: THORACIC SURGERY (CARDIOTHORACIC VASCULAR SURGERY)

## 2023-02-03 PROCEDURE — 80048 BASIC METABOLIC PNL TOTAL CA: CPT | Performed by: THORACIC SURGERY (CARDIOTHORACIC VASCULAR SURGERY)

## 2023-02-03 PROCEDURE — C1729 CATH, DRAINAGE: HCPCS | Performed by: THORACIC SURGERY (CARDIOTHORACIC VASCULAR SURGERY)

## 2023-02-03 PROCEDURE — 25010000002 FENTANYL CITRATE (PF) 50 MCG/ML SOLUTION: Performed by: ANESTHESIOLOGY

## 2023-02-03 PROCEDURE — 86900 BLOOD TYPING SEROLOGIC ABO: CPT | Performed by: THORACIC SURGERY (CARDIOTHORACIC VASCULAR SURGERY)

## 2023-02-03 PROCEDURE — 25010000002 ONDANSETRON PER 1 MG: Performed by: ANESTHESIOLOGY

## 2023-02-03 PROCEDURE — C9290 INJ, BUPIVACAINE LIPOSOME: HCPCS | Performed by: THORACIC SURGERY (CARDIOTHORACIC VASCULAR SURGERY)

## 2023-02-03 PROCEDURE — 88313 SPECIAL STAINS GROUP 2: CPT | Performed by: THORACIC SURGERY (CARDIOTHORACIC VASCULAR SURGERY)

## 2023-02-03 PROCEDURE — 88341 IMHCHEM/IMCYTCHM EA ADD ANTB: CPT | Performed by: THORACIC SURGERY (CARDIOTHORACIC VASCULAR SURGERY)

## 2023-02-03 PROCEDURE — 0 BUPIVACAINE LIPOSOME 1.3 % SUSPENSION: Performed by: ANESTHESIOLOGY

## 2023-02-03 PROCEDURE — 93005 ELECTROCARDIOGRAM TRACING: CPT

## 2023-02-03 PROCEDURE — 88309 TISSUE EXAM BY PATHOLOGIST: CPT | Performed by: THORACIC SURGERY (CARDIOTHORACIC VASCULAR SURGERY)

## 2023-02-03 PROCEDURE — 25010000002 AMPICILLIN-SULBACTAM PER 1.5 G: Performed by: THORACIC SURGERY (CARDIOTHORACIC VASCULAR SURGERY)

## 2023-02-03 PROCEDURE — 25010000002 DEXAMETHASONE SODIUM PHOSPHATE 20 MG/5ML SOLUTION: Performed by: NURSE ANESTHETIST, CERTIFIED REGISTERED

## 2023-02-03 PROCEDURE — 82962 GLUCOSE BLOOD TEST: CPT

## 2023-02-03 PROCEDURE — 8E0W4CZ ROBOTIC ASSISTED PROCEDURE OF TRUNK REGION, PERCUTANEOUS ENDOSCOPIC APPROACH: ICD-10-PCS | Performed by: THORACIC SURGERY (CARDIOTHORACIC VASCULAR SURGERY)

## 2023-02-03 PROCEDURE — 43288 ESPHG THRSC MOBLJ: CPT | Performed by: THORACIC SURGERY (CARDIOTHORACIC VASCULAR SURGERY)

## 2023-02-03 PROCEDURE — 3E0T3BZ INTRODUCTION OF ANESTHETIC AGENT INTO PERIPHERAL NERVES AND PLEXI, PERCUTANEOUS APPROACH: ICD-10-PCS | Performed by: THORACIC SURGERY (CARDIOTHORACIC VASCULAR SURGERY)

## 2023-02-03 PROCEDURE — 0 BUPIVACAINE LIPOSOME 1.3 % SUSPENSION 20 ML VIAL: Performed by: THORACIC SURGERY (CARDIOTHORACIC VASCULAR SURGERY)

## 2023-02-03 PROCEDURE — 88305 TISSUE EXAM BY PATHOLOGIST: CPT | Performed by: THORACIC SURGERY (CARDIOTHORACIC VASCULAR SURGERY)

## 2023-02-03 PROCEDURE — 88342 IMHCHEM/IMCYTCHM 1ST ANTB: CPT | Performed by: THORACIC SURGERY (CARDIOTHORACIC VASCULAR SURGERY)

## 2023-02-03 PROCEDURE — 07B74ZZ EXCISION OF THORAX LYMPHATIC, PERCUTANEOUS ENDOSCOPIC APPROACH: ICD-10-PCS | Performed by: THORACIC SURGERY (CARDIOTHORACIC VASCULAR SURGERY)

## 2023-02-03 PROCEDURE — 25010000002 HYDROMORPHONE PER 4 MG: Performed by: NURSE ANESTHETIST, CERTIFIED REGISTERED

## 2023-02-03 PROCEDURE — 0 AMPICILLIN-SULBACTAM PER 1.5 G: Performed by: NURSE ANESTHETIST, CERTIFIED REGISTERED

## 2023-02-03 PROCEDURE — 25010000002 ONABOTULINUMTOXINA 100 UNITS RECONSTITUTED SOLUTION: Performed by: THORACIC SURGERY (CARDIOTHORACIC VASCULAR SURGERY)

## 2023-02-03 PROCEDURE — 25010000002 PROPOFOL 10 MG/ML EMULSION: Performed by: NURSE ANESTHETIST, CERTIFIED REGISTERED

## 2023-02-03 PROCEDURE — 25010000002 ALBUMIN HUMAN 5% PER 50 ML: Performed by: NURSE ANESTHETIST, CERTIFIED REGISTERED

## 2023-02-03 PROCEDURE — 44015 INSERT NEEDLE CATH BOWEL: CPT | Performed by: THORACIC SURGERY (CARDIOTHORACIC VASCULAR SURGERY)

## 2023-02-03 DEVICE — STAPLER 30 RELOAD GREEN
Type: IMPLANTABLE DEVICE | Site: ESOPHAGUS | Status: FUNCTIONAL
Brand: ENDOWRIST

## 2023-02-03 DEVICE — LIGACLIP MCA MULTIPLE CLIP APPLIERS, 20 SMALL CLIPS
Type: IMPLANTABLE DEVICE | Site: ESOPHAGUS | Status: FUNCTIONAL
Brand: LIGACLIP

## 2023-02-03 DEVICE — HORIZON TI SMALL RED 6 CLIPS/CART
Type: IMPLANTABLE DEVICE | Site: ESOPHAGUS | Status: FUNCTIONAL
Brand: WECK

## 2023-02-03 DEVICE — PLEDGET SOFT LARGE 3/8X3/16X1/16
Type: IMPLANTABLE DEVICE | Site: ESOPHAGUS | Status: FUNCTIONAL
Brand: DEKNATEL

## 2023-02-03 DEVICE — STAPLER 30 RELOAD
Type: IMPLANTABLE DEVICE | Site: ESOPHAGUS | Status: FUNCTIONAL
Brand: ENDOWRIST

## 2023-02-03 DEVICE — LIGACLIP MCA MULTIPLE CLIP APPLIERS, 20 MEDIUM CLIPS
Type: IMPLANTABLE DEVICE | Site: ESOPHAGUS | Status: FUNCTIONAL
Brand: LIGACLIP

## 2023-02-03 DEVICE — STAPLER 30 RELOAD WHITE
Type: IMPLANTABLE DEVICE | Site: ESOPHAGUS | Status: FUNCTIONAL
Brand: ENDOWRIST

## 2023-02-03 DEVICE — ABSORBABLE HEMOSTAT (OXIDIZED REGENERATED CELLULOSE, U.S.P.)
Type: IMPLANTABLE DEVICE | Site: ESOPHAGUS | Status: FUNCTIONAL
Brand: SURGICEL

## 2023-02-03 DEVICE — ARTICULATION RELOAD WITH TRI-STAPLE TECHNOLOGY
Type: IMPLANTABLE DEVICE | Site: ESOPHAGUS | Status: FUNCTIONAL
Brand: ENDO GIA

## 2023-02-03 DEVICE — ABSORBABLE HEMOSTAT (OXIDIZED REGENERATED CELLULOSE)
Type: IMPLANTABLE DEVICE | Site: ESOPHAGUS | Status: FUNCTIONAL
Brand: SURGICEL NU-KNIT

## 2023-02-03 RX ORDER — ONDANSETRON 2 MG/ML
4 INJECTION INTRAMUSCULAR; INTRAVENOUS ONCE AS NEEDED
Status: DISCONTINUED | OUTPATIENT
Start: 2023-02-03 | End: 2023-02-03 | Stop reason: HOSPADM

## 2023-02-03 RX ORDER — SODIUM CHLORIDE 9 MG/ML
9 INJECTION, SOLUTION INTRAVENOUS CONTINUOUS
Status: DISCONTINUED | OUTPATIENT
Start: 2023-02-03 | End: 2023-02-03

## 2023-02-03 RX ORDER — MAGNESIUM HYDROXIDE 1200 MG/15ML
LIQUID ORAL AS NEEDED
Status: DISCONTINUED | OUTPATIENT
Start: 2023-02-03 | End: 2023-02-03 | Stop reason: HOSPADM

## 2023-02-03 RX ORDER — DEXAMETHASONE SODIUM PHOSPHATE 4 MG/ML
INJECTION, SOLUTION INTRA-ARTICULAR; INTRALESIONAL; INTRAMUSCULAR; INTRAVENOUS; SOFT TISSUE AS NEEDED
Status: DISCONTINUED | OUTPATIENT
Start: 2023-02-03 | End: 2023-02-03 | Stop reason: SURG

## 2023-02-03 RX ORDER — FENTANYL CITRATE 50 UG/ML
INJECTION, SOLUTION INTRAMUSCULAR; INTRAVENOUS
Status: COMPLETED | OUTPATIENT
Start: 2023-02-03 | End: 2023-02-03

## 2023-02-03 RX ORDER — NICOTINE POLACRILEX 4 MG
15 LOZENGE BUCCAL
Status: DISCONTINUED | OUTPATIENT
Start: 2023-02-03 | End: 2023-02-08

## 2023-02-03 RX ORDER — SODIUM CHLORIDE 9 MG/ML
40 INJECTION, SOLUTION INTRAVENOUS AS NEEDED
Status: DISCONTINUED | OUTPATIENT
Start: 2023-02-03 | End: 2023-02-03 | Stop reason: HOSPADM

## 2023-02-03 RX ORDER — KETAMINE HCL IN NACL, ISO-OSM 100MG/10ML
SYRINGE (ML) INJECTION AS NEEDED
Status: DISCONTINUED | OUTPATIENT
Start: 2023-02-03 | End: 2023-02-03 | Stop reason: SURG

## 2023-02-03 RX ORDER — ACETAMINOPHEN 500 MG
1000 TABLET ORAL 3 TIMES DAILY
Status: DISCONTINUED | OUTPATIENT
Start: 2023-02-03 | End: 2023-02-06

## 2023-02-03 RX ORDER — HEPARIN SODIUM 5000 [USP'U]/ML
5000 INJECTION, SOLUTION INTRAVENOUS; SUBCUTANEOUS ONCE
Status: COMPLETED | OUTPATIENT
Start: 2023-02-03 | End: 2023-02-03

## 2023-02-03 RX ORDER — DEXTROSE MONOHYDRATE 25 G/50ML
25 INJECTION, SOLUTION INTRAVENOUS
Status: DISCONTINUED | OUTPATIENT
Start: 2023-02-03 | End: 2023-02-10 | Stop reason: HOSPADM

## 2023-02-03 RX ORDER — CELECOXIB 200 MG/1
200 CAPSULE ORAL ONCE
Status: COMPLETED | OUTPATIENT
Start: 2023-02-03 | End: 2023-02-03

## 2023-02-03 RX ORDER — SODIUM CHLORIDE, SODIUM LACTATE, POTASSIUM CHLORIDE, CALCIUM CHLORIDE 600; 310; 30; 20 MG/100ML; MG/100ML; MG/100ML; MG/100ML
9 INJECTION, SOLUTION INTRAVENOUS CONTINUOUS
Status: DISCONTINUED | OUTPATIENT
Start: 2023-02-03 | End: 2023-02-06

## 2023-02-03 RX ORDER — LABETALOL HYDROCHLORIDE 5 MG/ML
10 INJECTION, SOLUTION INTRAVENOUS EVERY 4 HOURS PRN
Status: DISCONTINUED | OUTPATIENT
Start: 2023-02-03 | End: 2023-02-10 | Stop reason: HOSPADM

## 2023-02-03 RX ORDER — BUPIVACAINE HYDROCHLORIDE 2.5 MG/ML
INJECTION, SOLUTION EPIDURAL; INFILTRATION; INTRACAUDAL
Status: COMPLETED
Start: 2023-02-03 | End: 2023-02-03

## 2023-02-03 RX ORDER — MIDAZOLAM HYDROCHLORIDE 1 MG/ML
1 INJECTION INTRAMUSCULAR; INTRAVENOUS
Status: CANCELLED | OUTPATIENT
Start: 2023-02-03

## 2023-02-03 RX ORDER — ONDANSETRON 2 MG/ML
4 INJECTION INTRAMUSCULAR; INTRAVENOUS EVERY 6 HOURS PRN
Status: DISCONTINUED | OUTPATIENT
Start: 2023-02-03 | End: 2023-02-10 | Stop reason: HOSPADM

## 2023-02-03 RX ORDER — SODIUM CHLORIDE 0.9 % (FLUSH) 0.9 %
3 SYRINGE (ML) INJECTION EVERY 12 HOURS SCHEDULED
Status: DISCONTINUED | OUTPATIENT
Start: 2023-02-03 | End: 2023-02-03 | Stop reason: HOSPADM

## 2023-02-03 RX ORDER — SUCCINYLCHOLINE/SOD CL,ISO/PF 200MG/10ML
SYRINGE (ML) INTRAVENOUS AS NEEDED
Status: DISCONTINUED | OUTPATIENT
Start: 2023-02-03 | End: 2023-02-03 | Stop reason: SURG

## 2023-02-03 RX ORDER — ONDANSETRON 2 MG/ML
INJECTION INTRAMUSCULAR; INTRAVENOUS AS NEEDED
Status: DISCONTINUED | OUTPATIENT
Start: 2023-02-03 | End: 2023-02-03 | Stop reason: SURG

## 2023-02-03 RX ORDER — ALBUMIN, HUMAN INJ 5% 5 %
SOLUTION INTRAVENOUS CONTINUOUS PRN
Status: DISCONTINUED | OUTPATIENT
Start: 2023-02-03 | End: 2023-02-03 | Stop reason: SURG

## 2023-02-03 RX ORDER — NALOXONE HCL 0.4 MG/ML
0.1 VIAL (ML) INJECTION
Status: DISCONTINUED | OUTPATIENT
Start: 2023-02-03 | End: 2023-02-10 | Stop reason: HOSPADM

## 2023-02-03 RX ORDER — OXYCODONE HYDROCHLORIDE 5 MG/1
5 TABLET ORAL ONCE AS NEEDED
Status: DISCONTINUED | OUTPATIENT
Start: 2023-02-03 | End: 2023-02-03 | Stop reason: HOSPADM

## 2023-02-03 RX ORDER — GABAPENTIN 300 MG/1
300 CAPSULE ORAL EVERY 8 HOURS SCHEDULED
Status: DISCONTINUED | OUTPATIENT
Start: 2023-02-04 | End: 2023-02-10 | Stop reason: HOSPADM

## 2023-02-03 RX ORDER — MIDAZOLAM HYDROCHLORIDE 1 MG/ML
INJECTION INTRAMUSCULAR; INTRAVENOUS
Status: COMPLETED | OUTPATIENT
Start: 2023-02-03 | End: 2023-02-03

## 2023-02-03 RX ORDER — LIDOCAINE HYDROCHLORIDE 10 MG/ML
0.5 INJECTION, SOLUTION EPIDURAL; INFILTRATION; INTRACAUDAL; PERINEURAL ONCE AS NEEDED
Status: DISCONTINUED | OUTPATIENT
Start: 2023-02-03 | End: 2023-02-03 | Stop reason: HOSPADM

## 2023-02-03 RX ORDER — BUPIVACAINE HYDROCHLORIDE 2.5 MG/ML
INJECTION, SOLUTION EPIDURAL; INFILTRATION; INTRACAUDAL
Status: COMPLETED | OUTPATIENT
Start: 2023-02-03 | End: 2023-02-03

## 2023-02-03 RX ORDER — SODIUM CHLORIDE, SODIUM LACTATE, POTASSIUM CHLORIDE, CALCIUM CHLORIDE 600; 310; 30; 20 MG/100ML; MG/100ML; MG/100ML; MG/100ML
9 INJECTION, SOLUTION INTRAVENOUS CONTINUOUS
Status: DISCONTINUED | OUTPATIENT
Start: 2023-02-03 | End: 2023-02-10 | Stop reason: HOSPADM

## 2023-02-03 RX ORDER — LIDOCAINE HYDROCHLORIDE 20 MG/ML
INJECTION, SOLUTION INFILTRATION; PERINEURAL AS NEEDED
Status: DISCONTINUED | OUTPATIENT
Start: 2023-02-03 | End: 2023-02-03 | Stop reason: SURG

## 2023-02-03 RX ORDER — GABAPENTIN 300 MG/1
300 CAPSULE ORAL EVERY 8 HOURS SCHEDULED
Status: DISCONTINUED | OUTPATIENT
Start: 2023-02-04 | End: 2023-02-03

## 2023-02-03 RX ORDER — SODIUM CHLORIDE 0.9 % (FLUSH) 0.9 %
3-10 SYRINGE (ML) INJECTION AS NEEDED
Status: DISCONTINUED | OUTPATIENT
Start: 2023-02-03 | End: 2023-02-03 | Stop reason: HOSPADM

## 2023-02-03 RX ORDER — OXYCODONE HYDROCHLORIDE 5 MG/1
5 TABLET ORAL EVERY 4 HOURS PRN
Status: DISCONTINUED | OUTPATIENT
Start: 2023-02-03 | End: 2023-02-10 | Stop reason: HOSPADM

## 2023-02-03 RX ORDER — DIPHENHYDRAMINE HCL 25 MG
25 CAPSULE ORAL EVERY 4 HOURS PRN
Status: DISCONTINUED | OUTPATIENT
Start: 2023-02-03 | End: 2023-02-03 | Stop reason: HOSPADM

## 2023-02-03 RX ORDER — FAMOTIDINE 10 MG/ML
20 INJECTION, SOLUTION INTRAVENOUS ONCE
Status: COMPLETED | OUTPATIENT
Start: 2023-02-03 | End: 2023-02-03

## 2023-02-03 RX ORDER — MIDAZOLAM HYDROCHLORIDE 1 MG/ML
1 INJECTION INTRAMUSCULAR; INTRAVENOUS
Status: DISCONTINUED | OUTPATIENT
Start: 2023-02-03 | End: 2023-02-03 | Stop reason: HOSPADM

## 2023-02-03 RX ORDER — DIPHENHYDRAMINE HYDROCHLORIDE 50 MG/ML
25 INJECTION INTRAMUSCULAR; INTRAVENOUS EVERY 4 HOURS PRN
Status: DISCONTINUED | OUTPATIENT
Start: 2023-02-03 | End: 2023-02-03 | Stop reason: HOSPADM

## 2023-02-03 RX ORDER — HYDROMORPHONE HCL IN 0.9% NACL 10 MG/50ML
PATIENT CONTROLLED ANALGESIA SYRINGE INTRAVENOUS CONTINUOUS
Status: DISCONTINUED | OUTPATIENT
Start: 2023-02-03 | End: 2023-02-06

## 2023-02-03 RX ORDER — SODIUM CHLORIDE 0.9 % (FLUSH) 0.9 %
3 SYRINGE (ML) INJECTION EVERY 12 HOURS SCHEDULED
Status: CANCELLED | OUTPATIENT
Start: 2023-02-03

## 2023-02-03 RX ORDER — IBUPROFEN 600 MG/1
1 TABLET ORAL
Status: DISCONTINUED | OUTPATIENT
Start: 2023-02-03 | End: 2023-02-10 | Stop reason: HOSPADM

## 2023-02-03 RX ORDER — OXYCODONE HYDROCHLORIDE 5 MG/1
5 TABLET ORAL EVERY 4 HOURS PRN
Status: DISCONTINUED | OUTPATIENT
Start: 2023-02-04 | End: 2023-02-03

## 2023-02-03 RX ORDER — DEXTROSE, SODIUM CHLORIDE, AND POTASSIUM CHLORIDE 5; .45; .15 G/100ML; G/100ML; G/100ML
125 INJECTION INTRAVENOUS CONTINUOUS
Status: DISCONTINUED | OUTPATIENT
Start: 2023-02-03 | End: 2023-02-05

## 2023-02-03 RX ORDER — FAMOTIDINE 10 MG/ML
20 INJECTION, SOLUTION INTRAVENOUS ONCE
Status: CANCELLED | OUTPATIENT
Start: 2023-02-03 | End: 2023-02-03

## 2023-02-03 RX ORDER — ACETAMINOPHEN 500 MG
1000 TABLET ORAL ONCE
Status: COMPLETED | OUTPATIENT
Start: 2023-02-03 | End: 2023-02-03

## 2023-02-03 RX ORDER — ROCURONIUM BROMIDE 10 MG/ML
INJECTION, SOLUTION INTRAVENOUS AS NEEDED
Status: DISCONTINUED | OUTPATIENT
Start: 2023-02-03 | End: 2023-02-03 | Stop reason: SURG

## 2023-02-03 RX ORDER — MAGNESIUM SULFATE HEPTAHYDRATE 500 MG/ML
INJECTION, SOLUTION INTRAMUSCULAR; INTRAVENOUS AS NEEDED
Status: DISCONTINUED | OUTPATIENT
Start: 2023-02-03 | End: 2023-02-03 | Stop reason: SURG

## 2023-02-03 RX ORDER — SODIUM CHLORIDE 9 MG/ML
30 INJECTION, SOLUTION INTRAVENOUS CONTINUOUS
Status: DISCONTINUED | OUTPATIENT
Start: 2023-02-03 | End: 2023-02-06

## 2023-02-03 RX ORDER — SODIUM CHLORIDE 0.9 % (FLUSH) 0.9 %
3-10 SYRINGE (ML) INJECTION AS NEEDED
Status: CANCELLED | OUTPATIENT
Start: 2023-02-03

## 2023-02-03 RX ORDER — PROPOFOL 10 MG/ML
VIAL (ML) INTRAVENOUS AS NEEDED
Status: DISCONTINUED | OUTPATIENT
Start: 2023-02-03 | End: 2023-02-03 | Stop reason: SURG

## 2023-02-03 RX ORDER — GABAPENTIN 300 MG/1
600 CAPSULE ORAL ONCE
Status: COMPLETED | OUTPATIENT
Start: 2023-02-03 | End: 2023-02-03

## 2023-02-03 RX ORDER — ACETAMINOPHEN 500 MG
1000 TABLET ORAL 3 TIMES DAILY
Status: DISCONTINUED | OUTPATIENT
Start: 2023-02-03 | End: 2023-02-03

## 2023-02-03 RX ORDER — HYDROMORPHONE HYDROCHLORIDE 1 MG/ML
0.5 INJECTION, SOLUTION INTRAMUSCULAR; INTRAVENOUS; SUBCUTANEOUS
Status: DISPENSED | OUTPATIENT
Start: 2023-02-03 | End: 2023-02-10

## 2023-02-03 RX ORDER — AMPICILLIN SODIUM AND SULBACTAM SODIUM 2; 1 G/1; G/1
INJECTION, POWDER, FOR SOLUTION INTRAVENOUS AS NEEDED
Status: DISCONTINUED | OUTPATIENT
Start: 2023-02-03 | End: 2023-02-03 | Stop reason: SURG

## 2023-02-03 RX ORDER — HYDROMORPHONE HYDROCHLORIDE 1 MG/ML
0.25 INJECTION, SOLUTION INTRAMUSCULAR; INTRAVENOUS; SUBCUTANEOUS
Status: DISCONTINUED | OUTPATIENT
Start: 2023-02-03 | End: 2023-02-03 | Stop reason: HOSPADM

## 2023-02-03 RX ORDER — ONDANSETRON 4 MG/1
4 TABLET, FILM COATED ORAL EVERY 6 HOURS PRN
Status: DISCONTINUED | OUTPATIENT
Start: 2023-02-03 | End: 2023-02-03

## 2023-02-03 RX ORDER — FENTANYL CITRATE 50 UG/ML
50 INJECTION, SOLUTION INTRAMUSCULAR; INTRAVENOUS
Status: DISCONTINUED | OUTPATIENT
Start: 2023-02-03 | End: 2023-02-03 | Stop reason: HOSPADM

## 2023-02-03 RX ORDER — LIDOCAINE HYDROCHLORIDE 10 MG/ML
0.5 INJECTION, SOLUTION EPIDURAL; INFILTRATION; INTRACAUDAL; PERINEURAL ONCE AS NEEDED
Status: CANCELLED | OUTPATIENT
Start: 2023-02-03

## 2023-02-03 RX ORDER — NALOXONE HCL 0.4 MG/ML
0.4 VIAL (ML) INJECTION
Status: DISCONTINUED | OUTPATIENT
Start: 2023-02-03 | End: 2023-02-03 | Stop reason: HOSPADM

## 2023-02-03 RX ORDER — SODIUM CHLORIDE, SODIUM LACTATE, POTASSIUM CHLORIDE, CALCIUM CHLORIDE 600; 310; 30; 20 MG/100ML; MG/100ML; MG/100ML; MG/100ML
9 INJECTION, SOLUTION INTRAVENOUS CONTINUOUS
Status: CANCELLED | OUTPATIENT
Start: 2023-02-03

## 2023-02-03 RX ORDER — HEPARIN SODIUM 5000 [USP'U]/ML
5000 INJECTION, SOLUTION INTRAVENOUS; SUBCUTANEOUS EVERY 8 HOURS SCHEDULED
Status: DISCONTINUED | OUTPATIENT
Start: 2023-02-04 | End: 2023-02-10 | Stop reason: HOSPADM

## 2023-02-03 RX ORDER — METOCLOPRAMIDE HYDROCHLORIDE 5 MG/ML
10 INJECTION INTRAMUSCULAR; INTRAVENOUS ONCE AS NEEDED
Status: DISCONTINUED | OUTPATIENT
Start: 2023-02-03 | End: 2023-02-03 | Stop reason: HOSPADM

## 2023-02-03 RX ORDER — NITROGLYCERIN 0.4 MG/1
0.4 TABLET SUBLINGUAL
Status: DISCONTINUED | OUTPATIENT
Start: 2023-02-03 | End: 2023-02-10 | Stop reason: HOSPADM

## 2023-02-03 RX ORDER — FENTANYL CITRATE 50 UG/ML
50 INJECTION, SOLUTION INTRAMUSCULAR; INTRAVENOUS
Status: CANCELLED | OUTPATIENT
Start: 2023-02-03

## 2023-02-03 RX ADMIN — AMPICILLIN SODIUM AND SULBACTAM SODIUM 3 G: 2; 1 INJECTION, POWDER, FOR SOLUTION INTRAVENOUS at 09:30

## 2023-02-03 RX ADMIN — GABAPENTIN 600 MG: 300 CAPSULE ORAL at 06:32

## 2023-02-03 RX ADMIN — HYDROMORPHONE HYDROCHLORIDE 0.25 MG: 1 INJECTION, SOLUTION INTRAMUSCULAR; INTRAVENOUS; SUBCUTANEOUS at 16:28

## 2023-02-03 RX ADMIN — SODIUM CHLORIDE, POTASSIUM CHLORIDE, SODIUM LACTATE AND CALCIUM CHLORIDE: 600; 310; 30; 20 INJECTION, SOLUTION INTRAVENOUS at 09:22

## 2023-02-03 RX ADMIN — ROCURONIUM BROMIDE 10 MG: 50 INJECTION, SOLUTION INTRAVENOUS at 13:53

## 2023-02-03 RX ADMIN — ALBUMIN HUMAN: 0.05 INJECTION, SOLUTION INTRAVENOUS at 08:54

## 2023-02-03 RX ADMIN — FENTANYL CITRATE 25 MCG: 50 INJECTION, SOLUTION INTRAMUSCULAR; INTRAVENOUS at 07:08

## 2023-02-03 RX ADMIN — Medication 10 MG: at 10:06

## 2023-02-03 RX ADMIN — SODIUM CHLORIDE 30 ML/HR: 9 INJECTION, SOLUTION INTRAVENOUS at 17:02

## 2023-02-03 RX ADMIN — DEXAMETHASONE SODIUM PHOSPHATE 10 MG: 4 INJECTION, SOLUTION INTRAMUSCULAR; INTRAVENOUS at 08:18

## 2023-02-03 RX ADMIN — BUPIVACAINE 10 ML: 13.3 INJECTION, SUSPENSION, LIPOSOMAL INFILTRATION at 07:35

## 2023-02-03 RX ADMIN — SODIUM CHLORIDE, POTASSIUM CHLORIDE, SODIUM LACTATE AND CALCIUM CHLORIDE 9 ML/HR: 600; 310; 30; 20 INJECTION, SOLUTION INTRAVENOUS at 06:58

## 2023-02-03 RX ADMIN — SODIUM CHLORIDE, POTASSIUM CHLORIDE, SODIUM LACTATE AND CALCIUM CHLORIDE 9 ML/HR: 600; 310; 30; 20 INJECTION, SOLUTION INTRAVENOUS at 06:56

## 2023-02-03 RX ADMIN — Medication 10 MG: at 14:02

## 2023-02-03 RX ADMIN — FAMOTIDINE 20 MG: 10 INJECTION INTRAVENOUS at 06:56

## 2023-02-03 RX ADMIN — Medication 20 MG: at 07:48

## 2023-02-03 RX ADMIN — METOPROLOL TARTRATE 5 MG: 1 INJECTION, SOLUTION INTRAVENOUS at 21:10

## 2023-02-03 RX ADMIN — AMPICILLIN SODIUM AND SULBACTAM SODIUM 3 G: 2; 1 INJECTION, POWDER, FOR SOLUTION INTRAMUSCULAR; INTRAVENOUS at 07:30

## 2023-02-03 RX ADMIN — SODIUM CHLORIDE, POTASSIUM CHLORIDE, SODIUM LACTATE AND CALCIUM CHLORIDE: 600; 310; 30; 20 INJECTION, SOLUTION INTRAVENOUS at 10:42

## 2023-02-03 RX ADMIN — ONDANSETRON 4 MG: 2 INJECTION INTRAMUSCULAR; INTRAVENOUS at 15:06

## 2023-02-03 RX ADMIN — LIDOCAINE HYDROCHLORIDE 100 MG: 20 INJECTION, SOLUTION INFILTRATION; PERINEURAL at 07:48

## 2023-02-03 RX ADMIN — DEXTROSE MONOHYDRATE, SODIUM CHLORIDE, AND POTASSIUM CHLORIDE 125 ML/HR: 50; 4.5; 1.49 INJECTION, SOLUTION INTRAVENOUS at 21:32

## 2023-02-03 RX ADMIN — ROCURONIUM BROMIDE 10 MG: 50 INJECTION, SOLUTION INTRAVENOUS at 07:48

## 2023-02-03 RX ADMIN — Medication 10 MG: at 08:54

## 2023-02-03 RX ADMIN — SODIUM CHLORIDE, POTASSIUM CHLORIDE, SODIUM LACTATE AND CALCIUM CHLORIDE: 600; 310; 30; 20 INJECTION, SOLUTION INTRAVENOUS at 13:42

## 2023-02-03 RX ADMIN — ACETAMINOPHEN 1000 MG: 500 TABLET, FILM COATED ORAL at 21:10

## 2023-02-03 RX ADMIN — Medication 10 MG: at 11:03

## 2023-02-03 RX ADMIN — AMPICILLIN SODIUM AND SULBACTAM SODIUM 3 G: 2; 1 INJECTION, POWDER, FOR SOLUTION INTRAVENOUS at 11:30

## 2023-02-03 RX ADMIN — ALBUMIN HUMAN: 0.05 INJECTION, SOLUTION INTRAVENOUS at 10:45

## 2023-02-03 RX ADMIN — AMPICILLIN SODIUM AND SULBACTAM SODIUM 3 G: 2; 1 INJECTION, POWDER, FOR SOLUTION INTRAVENOUS at 13:30

## 2023-02-03 RX ADMIN — BUPIVACAINE HYDROCHLORIDE 20 ML: 2.5 INJECTION, SOLUTION EPIDURAL; INFILTRATION; INTRACAUDAL; PERINEURAL at 07:35

## 2023-02-03 RX ADMIN — NITROGLYCERIN 1.5 INCH: 20 OINTMENT TOPICAL at 23:43

## 2023-02-03 RX ADMIN — SUGAMMADEX 200 MG: 100 INJECTION, SOLUTION INTRAVENOUS at 15:27

## 2023-02-03 RX ADMIN — CELECOXIB 200 MG: 200 CAPSULE ORAL at 06:32

## 2023-02-03 RX ADMIN — HYDROMORPHONE HYDROCHLORIDE 0.25 MG: 1 INJECTION, SOLUTION INTRAMUSCULAR; INTRAVENOUS; SUBCUTANEOUS at 17:00

## 2023-02-03 RX ADMIN — Medication 10 MG: at 13:00

## 2023-02-03 RX ADMIN — HYDROMORPHONE HYDROCHLORIDE: 10 INJECTION, SOLUTION INTRAMUSCULAR; INTRAVENOUS; SUBCUTANEOUS at 16:54

## 2023-02-03 RX ADMIN — ROCURONIUM BROMIDE 20 MG: 50 INJECTION, SOLUTION INTRAVENOUS at 09:25

## 2023-02-03 RX ADMIN — MAGNESIUM SULFATE HEPTAHYDRATE 2 G: 500 INJECTION, SOLUTION INTRAMUSCULAR; INTRAVENOUS at 08:27

## 2023-02-03 RX ADMIN — ROCURONIUM BROMIDE 10 MG: 50 INJECTION, SOLUTION INTRAVENOUS at 14:44

## 2023-02-03 RX ADMIN — Medication 200 MG: at 07:49

## 2023-02-03 RX ADMIN — INSULIN HUMAN 4 UNITS: 100 INJECTION, SOLUTION PARENTERAL at 23:55

## 2023-02-03 RX ADMIN — MIDAZOLAM 1 MG: 1 INJECTION INTRAMUSCULAR; INTRAVENOUS at 07:07

## 2023-02-03 RX ADMIN — ACETAMINOPHEN 1000 MG: 500 TABLET, FILM COATED ORAL at 06:38

## 2023-02-03 RX ADMIN — ROCURONIUM BROMIDE 20 MG: 50 INJECTION, SOLUTION INTRAVENOUS at 10:35

## 2023-02-03 RX ADMIN — SODIUM CHLORIDE, POTASSIUM CHLORIDE, SODIUM LACTATE AND CALCIUM CHLORIDE 9 ML/HR: 600; 310; 30; 20 INJECTION, SOLUTION INTRAVENOUS at 07:31

## 2023-02-03 RX ADMIN — Medication 10 MG: at 12:01

## 2023-02-03 RX ADMIN — ROCURONIUM BROMIDE 20 MG: 50 INJECTION, SOLUTION INTRAVENOUS at 13:04

## 2023-02-03 RX ADMIN — Medication 10 MG: at 14:51

## 2023-02-03 RX ADMIN — FENTANYL CITRATE 25 MCG: 50 INJECTION, SOLUTION INTRAMUSCULAR; INTRAVENOUS at 07:14

## 2023-02-03 RX ADMIN — ROCURONIUM BROMIDE 10 MG: 50 INJECTION, SOLUTION INTRAVENOUS at 08:58

## 2023-02-03 RX ADMIN — HEPARIN SODIUM 5000 UNITS: 5000 INJECTION INTRAVENOUS; SUBCUTANEOUS at 07:28

## 2023-02-03 RX ADMIN — ROCURONIUM BROMIDE 20 MG: 50 INJECTION, SOLUTION INTRAVENOUS at 11:52

## 2023-02-03 RX ADMIN — BUPIVACAINE HYDROCHLORIDE 20 ML: 2.5 INJECTION, SOLUTION EPIDURAL; INFILTRATION; INTRACAUDAL at 07:55

## 2023-02-03 RX ADMIN — PROPOFOL 200 MG: 10 INJECTION, EMULSION INTRAVENOUS at 07:48

## 2023-02-03 RX ADMIN — HYDROMORPHONE HYDROCHLORIDE 1 MG: 1 INJECTION, SOLUTION INTRAMUSCULAR; INTRAVENOUS; SUBCUTANEOUS at 15:50

## 2023-02-03 RX ADMIN — SODIUM CHLORIDE, POTASSIUM CHLORIDE, SODIUM LACTATE AND CALCIUM CHLORIDE: 600; 310; 30; 20 INJECTION, SOLUTION INTRAVENOUS at 11:44

## 2023-02-03 RX ADMIN — ROCURONIUM BROMIDE 40 MG: 50 INJECTION, SOLUTION INTRAVENOUS at 07:57

## 2023-02-03 NOTE — ANESTHESIA PROCEDURE NOTES
Peripheral Block      Patient location during procedure: holding area  Reason for block: at surgeon's request and post-op pain management  Performed by  Anesthesiologist: Jerri Su MD  Preanesthetic Checklist  Completed: patient identified, IV checked, site marked, risks and benefits discussed, surgical consent, monitors and equipment checked, pre-op evaluation and timeout performed  Prep:  Sterile barriers:gloves  Prep: ChloraPrep  Patient monitoring: continuous pulse oximetry, blood pressure monitoring and EKG  Procedure    Sedation: yes    Laterality:right  Block Type:erector spinae block    Needle Type:echogenic  Needle Gauge:20 G      Medications Used: bupivacaine liposome (EXPAREL) 1.3 % injection - Infiltration   10 mL - 2/3/2023 7:35:00 AM  bupivacaine PF (MARCAINE) 0.25 % injection - Injection   20 mL - 2/3/2023 7:35:00 AM      Medications  Comment:Ultrasound guidance was used to view and verify medication disbursement.  Ultrasound guidance was used for needle placement.    Post Assessment  Injection Assessment: negative aspiration for heme, no paresthesia on injection and incremental injection  Patient Tolerance:comfortable throughout block  Complications:no  Additional Notes  Ultrasound guidance was used to guide needle placement, as well as to view and verify medication disbursement.

## 2023-02-03 NOTE — ANESTHESIA PROCEDURE NOTES
Arterial Line      Patient location during procedure: holding area  Start time: 2/3/2023 7:30 AM  Stop Time:2/3/2023 7:35 AM       Line placed for hemodynamic monitoring.  Performed By   Anesthesiologist: Jerri Su MD   Preanesthetic Checklist  Completed: patient identified, IV checked, site marked, risks and benefits discussed, monitors and equipment checked and pre-op evaluation  Arterial Line Prep    Prep: ChloraPrep  Arterial Line Procedure   Laterality:left  Location:  radial artery  Catheter size: 20 G   Guidance: ultrasound guided  PROCEDURE NOTE/ULTRASOUND INTERPRETATION.  Using ultrasound guidance the potential vascular sites for insertion of the catheter were visualized to determine the patency of the vessel to be used for vascular access.  After selecting the appropriate site for insertion, the needle was visualized under ultrasound being inserted into the radial artery, followed by ultrasound confirmation of wire and catheter placement. There were no abnormalities seen on ultrasound; an image was taken; and the patient tolerated the procedure with no complications.   Number of attempts: 1  Successful placement: yes Images: still images obtained, printed/placed on the chart  Post Assessment   Dressing Type: occlusive dressing applied.   Complications no  Circ/Move/Sens Assessment: normal.   Patient Tolerance: patient tolerated the procedure well with no apparent complications

## 2023-02-03 NOTE — ANESTHESIA PROCEDURE NOTES
Airway  Urgency: elective    Date/Time: 2/3/2023 7:51 AM  Airway not difficult    General Information and Staff    Patient location during procedure: OR  Anesthesiologist: Jerri Su MD  CRNA/CAA: Jarad Chavez CRNA    Indications and Patient Condition  Indications for airway management: airway protection    Preoxygenated: yes  MILS maintained throughout  Mask difficulty assessment: 2 - vent by mask + OA or adjuvant +/- NMBA    Final Airway Details  Final airway type: endotracheal airway      Successful airway: EBT - double lumen left  Cuffed: yes   Successful intubation technique: direct laryngoscopy  Facilitating devices/methods: intubating stylet  Endotracheal tube insertion site: oral  Blade: Kent  Blade size: 2  EBT DL size (fr): 39  Cormack-Lehane Classification: grade I - full view of glottis  Placement verified by: chest auscultation, bronchoscopy, capnometry and single lung ventilation   Measured from: teeth  ETT/EBT  to teeth (cm): 31  Number of attempts at approach: 1  Assessment: lips, teeth, and gum same as pre-op and atraumatic intubation

## 2023-02-03 NOTE — ANESTHESIA PREPROCEDURE EVALUATION
Anesthesia Evaluation                  Airway   Mallampati: I  TM distance: >3 FB  Neck ROM: full  No difficulty expected  Dental - normal exam     Pulmonary - normal exam   (+) pneumonia , shortness of breath, sleep apnea on CPAP,   Cardiovascular     (+) hypertension, valvular problems/murmurs, CAD, CABG, angina, systolic click, hyperlipidemia,     ROS comment: AVR and aortic root    Neuro/Psych  (+) headaches, dizziness/light headedness, psychiatric history Anxiety and Depression,    GI/Hepatic/Renal/Endo    (+) obesity, morbid obesity, GERD, PUD, GI bleeding lower , diabetes mellitus type 2,     Musculoskeletal     Abdominal    Substance History      OB/GYN          Other   arthritis,    history of cancer active                  Anesthesia Plan    ASA 3     general     intravenous induction   Postoperative Plan: Expected vent after surgery  Anesthetic plan, risks, benefits, and alternatives have been provided, discussed and informed consent has been obtained with: patient.        CODE STATUS:

## 2023-02-03 NOTE — ANESTHESIA POSTPROCEDURE EVALUATION
Patient: Edmond Chase    Procedure Summary     Date: 02/03/23 Room / Location: [facility] OR  / [facility] MAIN OR    Anesthesia Start: 0737 Anesthesia Stop: 1601    Procedure: BRONCHOSCOPY, RIGHT ROBOTIC VIDEO ASSISTED THORACOSCOPY WITH TOTAL ESOPHAGECTOMY, INTERCOSTAL NERVE BLOCK, FEEDING JEJUNOSTOMY PLACEMENT (Right: Esophagus) Diagnosis:       Malignant neoplasm of lower third of esophagus (HCC)      (Malignant neoplasm of lower third of esophagus (HCC) [C15.5])    Surgeons: Valerie Stockton MD Provider: Jerri Su MD    Anesthesia Type: general ASA Status: 3          Anesthesia Type: general    Vitals  Vitals Value Taken Time   /78 02/03/23 1701   Temp 36.6 °C (97.9 °F) 02/03/23 1557   Pulse 75 02/03/23 1714   Resp 20 02/03/23 1557   SpO2 96 % 02/03/23 1714   Vitals shown include unvalidated device data.        Post Anesthesia Care and Evaluation    Patient location during evaluation: bedside  Patient participation: complete - patient participated  Level of consciousness: awake and alert  Pain management: adequate    Airway patency: patent  Anesthetic complications: No anesthetic complications  PONV Status: none  Cardiovascular status: acceptable  Respiratory status: acceptable  Hydration status: acceptable    Comments: /73   Pulse 74   Temp 36.6 °C (97.9 °F) (Oral)   Resp 20   SpO2 92%

## 2023-02-03 NOTE — ANESTHESIA PROCEDURE NOTES
Peripheral Block      Patient location during procedure: holding area  Start time: 2/3/2023 7:55 AM  Stop time: 2/3/2023 8:05 AM  Reason for block: at surgeon's request and post-op pain management  Performed by  Anesthesiologist: Jerri Su MD  Preanesthetic Checklist  Completed: patient identified, IV checked, site marked, risks and benefits discussed, surgical consent, monitors and equipment checked, pre-op evaluation and timeout performed  Prep:  Sterile barriers:gloves  Prep: ChloraPrep  Patient monitoring: continuous pulse oximetry, blood pressure monitoring and EKG  Procedure    Sedation: yes  Performed under: PNB  Guidance:ultrasound guided    ULTRASOUND INTERPRETATION.  Using ultrasound guidance a 20 G gauge needle was placed in close proximity to the nerve, at which point, under ultrasound guidance anesthetic was injected in the area of the nerve and spread of the anesthesia was seen on ultrasound in close proximity thereto.  There were no abnormalities seen on ultrasound; a digital image was taken; and the patient tolerated the procedure with no complications. Images:still images obtained, printed/placed on chart    Laterality:Bilateral  Block Type:TAP    Needle Type:echogenic  Needle Gauge:20 G    Catheter Size:20 G    Medications Used: bupivacaine liposome (EXPAREL) 1.3 % injection - Infiltration   10 mL - 2/3/2023 7:55:00 AM  bupivacaine PF (MARCAINE) 0.25 % injection - Injection   20 mL - 2/3/2023 7:55:00 AM      Medications  Comment:Ultrasound guidance was used to view and verify medication disbursement.  Ultrasound guidance was used for needle placement.  5cc of exparel in each side of rectus sheath block and 10 cc of marcaine 0.25%    Post Assessment  Injection Assessment: negative aspiration for heme, no paresthesia on injection and incremental injection  Patient Tolerance:comfortable throughout block  Complications:no  Additional Notes  Ultrasound guidance was used to guide needle placement,  as well as to view and verify medication disbursement.

## 2023-02-03 NOTE — BRIEF OP NOTE
THORASCOPIC ESOPHAGOGASTRECTOMY WITH DAVINCI ROBOT  Progress Note    Edmond Chase  2/3/2023    Pre-op Diagnosis:   Malignant neoplasm of lower third of esophagus (HCC) [C15.5]       Post-Op Diagnosis Codes:     * Malignant neoplasm of lower third of esophagus (HCC) [C15.5]    Procedure/CPT® Codes:        Procedure(s):  BRONCHOSCOPY, RIGHT ROBOTIC VIDEO ASSISTED THORACOSCOPY WITH TOTAL ESOPHAGECTOMY, INTERCOSTAL NERVE BLOCK, FEEDING JEJUNOSTOMY PLACEMENT        Surgeon(s):  Valerie Stockton MD Gul, Nabeel, MD    Anesthesia: General with Block    Staff:   Circulator: Mary Jane Ferreira RN; Sulema Aldrich RN; Julia Mejía RN  Scrub Person: Perla Saucedo  Assistant: Tamika Boyce CRNFA  Assistant: Tamika Boyce CRNFA      Estimated Blood Loss: 400 mL    Urine Voided: 300 mL    Specimens:                Specimens     ID Source Type Tests Collected By Collected At Frozen?    A Lymph Node Tissue · TISSUE PATHOLOGY EXAM   Valerie Stockton MD 2/3/23 0853 No    Description: lymph node level 9 R, fresh for permanent    B Lymph Node Tissue · TISSUE PATHOLOGY EXAM   Valerie Stockton MD 2/3/23 0854 No    Description: lymph node level 7 R, fresh for permanent    C Esophagus Tissue · TISSUE PATHOLOGY EXAM   Valerie Stockton MD 2/3/23 1407 No    Description: esophagus and GE junction, fresh for permanent    D Esophagus Tissue · TISSUE PATHOLOGY EXAM   Valerie Stockton MD 2/3/23 1414 No    Description: FINAL ESOPHAGEAL MARGIN    This specimen was not marked as sent.    E Stomach Tissue · TISSUE PATHOLOGY EXAM   Valerie Stockton MD 2/3/23 1435 No    Description: FINAL GASTRIC MARGIN    This specimen was not marked as sent.                Drains:   Chest Tube 1 Right Midaxillary (Active)       Gastrostomy/Enterostomy Jejunostomy 1 19 Fr. LUQ (Active)       Urethral Catheter Non-latex;Silicone 16 Fr. (Active)       Findings: Distal esophageal cancer, normal anatomy        Complications: None  apparent    Assistant: Tamika Boyce CRNFA  was responsible for performing the following activities: Retraction, Suction, Closing, Placing Dressing and Held/Positioned Camera and their skilled assistance was necessary for the success of this case.    Valerie Stockton MD     Date: 2/3/2023  Time: 15:17 EST

## 2023-02-03 NOTE — OP NOTE
THORASCOPIC ESOPHAGOGASTRECTOMY WITH DAVINCI ROBOT  Procedure Report    Patient Name:  Edmond Chase  YOB: 1960    Date of Surgery:  2/3/2023     Indications: Stage I esophageal cancer    Pre-op Diagnosis:   Malignant neoplasm of lower third of esophagus (HCC) [C15.5]       Post-Op Diagnosis Codes:     * Malignant neoplasm of lower third of esophagus (HCC) [C15.5]    Procedure/CPT® Codes:      Procedure(s):  BRONCHOSCOPY, RIGHT ROBOTIC VIDEO ASSISTED THORACOSCOPY WITH TOTAL ESOPHAGECTOMY, INTERCOSTAL NERVE BLOCK, FEEDING JEJUNOSTOMY PLACEMENT    Staff:  Surgeon(s):  Valerie Stockton MD Gul, Nabeel, MD    Assistant: Dr. Kirby and Tamika Boyce CRNFA was responsible for performing the following activities: Retraction, Suction, Irrigation, Closing, Placing Dressing and Held/Positioned Camera and their skilled assistance was necessary for the success of this case.     Anesthesia: General with Block    Estimated Blood Loss: 400 mL    Implants:    Implant Name Type Inv. Item Serial No.  Lot No. LRB No. Used Action   RELOAD STPLR ENDOWRIST 30 DAVINCI/X/XI 6ROW 2.5 WHT 1P/U - [id number] Implant RELOAD STPLR ENDOWRIST 30 DAVINCI/X/XI 6ROW 2.5 WHT 1P/U  INTUITIVE SURGICAL [id number] Right 1 Implanted   RELOAD STPLR ENDOWRIST 30 DAVINCI/X/XI 6ROW 2.5 WHT 1P/U - [id number] Implant RELOAD STPLR ENDOWRIST 30 DAVINCI/X/XI 6ROW 2.5 WHT 1P/U  INTUITIVE SURGICAL [id number] Right 2 Implanted   HEMOST ABS SURGICEL 2X14 - [id number] Implant HEMOST ABS SURGICEL 2X14  [employer]  DIV OF J AND J [id number] Right 1 Implanted   RELOAD STPLR ENDOWRIST 30 DAVINCI/X/XI 6ROW 4.3 GRN 1P/U - [id number] Implant RELOAD STPLR ENDOWRIST 30 DAVINCI/X/XI 6ROW 4.3 GRN 1P/U  INTUITIVE SURGICAL [id number] Right 1 Implanted   RELOAD STPLR ENDOWRIST 30 DAVINCI/X/XI 6ROW 4.3 GRN 1P/U - [id number] Implant RELOAD STPLR ENDOWRIST 30 DAVINCI/X/XI 6ROW 4.3 GRN 1P/U  INTUITIVE SURGICAL [id number] Right 1 Implanted   RELOAD STPLJORDYN MURPHYT  30 DAVINCI/X/XI 6ROW 3.5 TREVA 1P/U - [id number] Implant RELOAD STPLR ENDOWRIST 30 DAVINCI/X/XI 6ROW 3.5 TREVA 1P/U  INTUITIVE SURGICAL [id number] Right 2 Implanted   CLIP LIGAT VASC HORIZON TI SM/WD RD 6CT - [id number] Implant CLIP LIGAT VASC HORIZON TI SM/WD RD 6CT  [employer] [id number] Right 1 Implanted   RELOAD STPLR ENDOWRIST 30 DAVINCI/X/XI 6ROW 3.5 TREVA 1P/U - [id number] Implant RELOAD STPLR ENDOWRIST 30 DAVINCI/X/XI 6ROW 3.5 TREVA 1P/U  INTUITIVE SURGICAL [id number] Right 1 Implanted   INCISIONLINE PLEDGET TFLN SFT LG - [id number] Implant INCISIONLINE PLEDGET TFLN SFT LG  [employer] [id number] Right 1 Implanted   CLIPAPPLR M/ ENDO LIGACLIP 20CLP 11IN MD - [id number] Implant CLIPAPPLR M/ ENDO LIGACLIP 20CLP 11IN MD  [employer] ENDO SURGERY  DIV OF J AND J [id number] Right 1 Implanted   RELOAD STPLR ENDO MARCIA TRISTAPLE 45 ART MD KRISHNA - [id number] Implant RELOAD STPLR ENDO MARCIA TRISTAPLE 45 ART MD ERENDIRA SANTILLAN 2 Right 1 Implanted   RELOAD STPLR ENDO MARCIA TRISTAPLE 45 ART MD KRISHNA - [id number] Implant RELOAD STPLR ENDO MARCIA TRISTAPLE 45 ART MD ERENDIRA SANTILLAN [id number] Right 12 Implanted   CLIPAPPLR M/ ENDO LIGACLIP 9 3/8IN SM - [id number] Implant CLIPAPPLR M/ ENDO LIGACLIP 9 3/8IN SM  [employer] ENDO SURGERY  DIV OF J AND J [id number] Right 1 Implanted   HEMOST ABS SURGICEL NUKNIT 6X9 - [id number] Implant HEMOST ABS SURGICEL NUKNIT 6X9  [employer]  DIV OF J AND J [id number] Right 1 Implanted   RELOAD STPLR ENDO MARCIA TRISTAPLE 45 ART MD KRISHNA - [id number] Implant RELOAD STPLR ENDO MARCIA TRISTAPLE 45 ART MD ERENDIRA SANTILLAN [id number] Right 4 Implanted   RELOAD STPLR ENDO MARCIA TRISTAPLE 45 ART MD KRISHNA - [id number] Implant RELOAD STPLR ENDO MARCIA TRISTAPLE 45 ART MD KRISHNA  COVIDISANDI [id number] Right 5 Implanted       Specimen:             Specimens     ID Source Type Tests Collected By Collected At Frozen?    A Lymph Node Tissue · TISSUE PATHOLOGY EXAM   Valerie Stockton MD 2/3/23 0853 No    Description: lymph node level 9 R, fresh for permanent    B  Lymph Node Tissue · TISSUE PATHOLOGY EXAM   Valerie Stockton MD 2/3/23 0827 No    Description: lymph node level 7 R, fresh for permanent    C Esophagus Tissue · TISSUE PATHOLOGY EXAM   Valerie Stockton MD 2/3/23 1407 No    Description: esophagus and GE junction, fresh for permanent    D Esophagus Tissue · TISSUE PATHOLOGY EXAM   Valerie Stockton MD 2/3/23 1414 No    Description: FINAL ESOPHAGEAL MARGIN    E Stomach Tissue · TISSUE PATHOLOGY EXAM   Valerie Stockton MD 2/3/23 1435 No    Description: FINAL GASTRIC MARGIN            Findings: Normal anatomy.  No evidence of malignancy in the surrounding lymph nodes.      Complications: None apparent    Description of Procedure: Edmond Chase was identified in the preop area and again his consent was confirmed, he was taken from the holding area to the operating room, where he was placed supine on the OR table. An operative timeout was performed, and after induction with general endotracheal anesthesia and placement of a double-lumen endotracheal tube, the patient was placed in the left lateral decubitus position and the right chest was prepped and draped in standard sterile fashion.  Surface landmarks including scapular tip, anterior/superior iliac spine were identified and an access incision was made over the anterior axillary line in the seventh intercostal space.  A 12 mm robotic trocar was placed in this access incision.  Posteriorly, 3 8 mm ports were placed in the seventh intercostal space.  An assistant port was placed in the fifth intercostal space mid axillary line.  The robot was brought on the field and docked.  After targeting was achieved, the caudier forcep and robotic energy device were brought into the field.  I proceeded to the robotic console and Dr. Kirby remained at the bedside to manipulate and pass instruments.   The lung was retracted anteriorly and dissection began with use of Bovie electrocautery over the esophagus where the pleura was opened  longitudinally. This was carried up to the level of the azygous vein, which was dissected free and transected using an Endo-MARCIA stapler with a vascular load. Dissection of the pleura continued superiorly beyond the level of the gregory onward to the thoracic inlet. Now the esophagus was encircled at the level of the inferior pulmonary vein and a penrose drain was placed around it for retraction.  The Penrose drain was stapled.  Blunt dissection with the aid of the energy device was continued all the way into the thoracic inlet and likewise distally to the esophageal hiatus. No mass was identified.  The inferior pulmonary ligament lymph nodes, subcarinal lymph node and paraesophageal lymph nodes were harvested. Care was also taken to avoid injury to the membranous airway, and the dissection was completed without difficulty. Following completion of this dissection a rib block was performed and a 24 Ecuadorean chest tube was placed into the chest cavity and the lung was reexpanded without difficulty.  The posterior trocar site had some bleeding associated with it.  This was controlled with Bovie electrocautery.  The a 24 Kenyan chest tube was placed in the anterior trocar site under direct vision.  The incisions were closed in standard fashion.  The patient was repositioned in the supine position and the abdomen was prepped and draped in standard sterile fashion, with the use of chlorhexidine pain and Ioban drape, as was the left neck. A shoulder roll was placed to facilitate neck extension.  A Veress needle was inserted in Mathew's point in the left upper quadrant.  Insufflation was obtained.  A 12 mm robotic trocar was placed in the midline just above the umbilicus.  3 robotic trochars were placed to the left and right of midline.  An assistant port was placed in the right upper quadrant.  The laparoscopic liver retractor was placed in the midline and the left lateral segment was elevated.  The robot was brought into the  field and docked in standard fashion.  Instruments were placed in the abdomen under direct vision.  Dr. Kirby remained at the bedside to manipulate and pass instruments and I proceeded to the robotic console.    Dissection began by first taking down the pars flaccida and mobilizing the esophageal hiatus.  There was a replaced right hepatic artery that had to be divided.  The peritoneum overlying the hiatus was opened and dissection was continued circumferentially around the esophagus, to complete the crural dissection and a penrose drain was placed around the EGJ.  Attention was now directed towards mobilizing the stomach. The greater omentum was elevated and inspected, and the avascular plane distal to the gastroepiploic artery was identified, and this was entered and dissected using the Ligasure. Care was taken at all times to protect and preserve the gastroepiploic artery. Dissection continued proximally along the greater curvature of the stomach, towards the spleen and the short gastric vessels were taken. Once this dissection was continued and the highest short gastric was taken, the stomach was elevated and some of the posterior attachments within the lesser sac were taken.  Now, the stomach was free along its entirety, and attention was directed towards identifying the left gastric arterial pedicle. This was dissected and transected using an endovascular MARCIA stapler with a vascular load. The cut ends were inspected and there was bleeding from the staple edges from the left gastric artery.  I elected to perform a laparotomy.  An approximately 10 cm incision was made supraumbilicall just below the xiphoid.  Dissection was carried down into the abdomen using Bovie electrocautery.  The Bookwalter retractor was placed in the patient's abdomen to provide visualization.  The cut edges of the staple line were oversewn with Prolene suture with pledgets.  Hemostasis was obtained.  There was a small liver hematoma which  was examined and packed.  Once hemostasis was obtained, we proceeded with the operation.  Now, with the stomach completely mobilized, attention was directed towards the neck.  A 4 cm curvilinear incision was made over the anterior triangle of the neck and dissected down through the subcutaneous tissues. The platysma muscle was divided, as was the omohyoid. Due to the previous thoracoscopic mobilization, the esophagus was soon identified and retrieved into the wound. This being done, the proximal esophagus was transected using an Endo-MARCIA stapler, and a Penrose drain was secured to the esophageal tip, and the esophageal specimen was withdrawn into the abdomen.   The stomach was measured and a 4 cm gastric tube was fashioned leaving a small gastric pouch just proximal to the pylorus with 19 sequential firings of an Endo-MARCIA stapler. The esophageal and proximal gastric specimen was transected and sent for pathologic examination. Now, a Penrose drain was secured to the proximal stomach. The Penrose was placed so that orientation could be maintained, and was withdrawn back up through the neck. Of note, the stomach was directed under direct vision through the esophageal hiatus and was passed without difficulty through the esophageal bed.   Meanwhile, the esophageal anastomosis was completed. Once alignment was confirmed in the neck, the electrocautery was used to create an enterotomy in the proximal cervical esophagus and in the stomach, after securing 3-0 silk sutures were placed. This was placed so that a functional end-to-side anastomosis would be created between the cervical esophagus and the gastric conduit. The posterior wall of the anastomosis was completed using a single firing of a 45 mm Endo-MARCIA stapler with a purple tissue load. The anterior wall was completed with a running 4-0 PDS suture and 3-0 silk lembert sutures. Prior to securing the final sutures of the anastomosis, an NG tube was passed through the  anastomosis into the gastric conduit.  The platysma was reapproximated using a 3-0 Vicryl. A running 4-0 Monocryl subcuticular suture was applied to the skin. At this point, the abdomen was inspected for hemostasis.  A 16 Albanian T-tube was used to place a feeding jejunostomy approximately 40 cm from the ligament of Treitz.  This was created in a Abram fashion.  Two 0 PDS sutures were used in a running fashion to reapproximate the fascia. A 3-0 Vicryl suture was used in the deep dermal tissues and a running 4-0 Monocryl subcuticular suture was used to reapproximate the skin. Dermabond was used to complete the skin incision. A dry sterile dressing was applied. The patient was extubated and transferred to recovery room in a stable condition, having suffered no intraoperative complications. All needle and sponge counts were correct at the end of the procedure. There were no intraoperative complications.          Valerie Stockton MD     Date: 2/3/2023  Time: 17:22 EST

## 2023-02-04 ENCOUNTER — APPOINTMENT (OUTPATIENT)
Dept: CARDIOLOGY | Facility: HOSPITAL | Age: 63
DRG: 327 | End: 2023-02-04
Payer: COMMERCIAL

## 2023-02-04 ENCOUNTER — APPOINTMENT (OUTPATIENT)
Dept: GENERAL RADIOLOGY | Facility: HOSPITAL | Age: 63
DRG: 327 | End: 2023-02-04
Payer: COMMERCIAL

## 2023-02-04 LAB
ANION GAP SERPL CALCULATED.3IONS-SCNC: 9.8 MMOL/L (ref 5–15)
BASOPHILS # BLD AUTO: 0.01 10*3/MM3 (ref 0–0.2)
BASOPHILS NFR BLD AUTO: 0.1 % (ref 0–1.5)
BH CV ECHO MEAS - AO MAX PG: 23.8 MMHG
BH CV ECHO MEAS - AO MEAN PG: 13 MMHG
BH CV ECHO MEAS - AO V2 MAX: 244 CM/SEC
BH CV ECHO MEAS - AO V2 VTI: 45.3 CM
BH CV ECHO MEAS - AVA(I,D): 2 CM2
BH CV ECHO MEAS - EDV(CUBED): 166.4 ML
BH CV ECHO MEAS - ESV(CUBED): 53.3 ML
BH CV ECHO MEAS - FS: 31.6 %
BH CV ECHO MEAS - IVS/LVPW: 1.07 CM
BH CV ECHO MEAS - IVSD: 1.5 CM
BH CV ECHO MEAS - LV MASS(C)D: 355.3 GRAMS
BH CV ECHO MEAS - LV MAX PG: 4.3 MMHG
BH CV ECHO MEAS - LV MEAN PG: 2.37 MMHG
BH CV ECHO MEAS - LV V1 MAX: 103.9 CM/SEC
BH CV ECHO MEAS - LV V1 VTI: 19.3 CM
BH CV ECHO MEAS - LVIDD: 5.5 CM
BH CV ECHO MEAS - LVIDS: 3.8 CM
BH CV ECHO MEAS - LVOT AREA: 4.7 CM2
BH CV ECHO MEAS - LVOT DIAM: 2.45 CM
BH CV ECHO MEAS - LVPWD: 1.4 CM
BH CV ECHO MEAS - SV(LVOT): 90.6 ML
BUN SERPL-MCNC: 20 MG/DL (ref 8–23)
BUN/CREAT SERPL: 20.6 (ref 7–25)
CALCIUM SPEC-SCNC: 8.5 MG/DL (ref 8.6–10.5)
CHLORIDE SERPL-SCNC: 106 MMOL/L (ref 98–107)
CO2 SERPL-SCNC: 26.2 MMOL/L (ref 22–29)
CREAT SERPL-MCNC: 0.97 MG/DL (ref 0.76–1.27)
DEPRECATED RDW RBC AUTO: 43.8 FL (ref 37–54)
EGFRCR SERPLBLD CKD-EPI 2021: 87.2 ML/MIN/1.73
EOSINOPHIL # BLD AUTO: 0 10*3/MM3 (ref 0–0.4)
EOSINOPHIL NFR BLD AUTO: 0 % (ref 0.3–6.2)
ERYTHROCYTE [DISTWIDTH] IN BLOOD BY AUTOMATED COUNT: 13.9 % (ref 12.3–15.4)
GLUCOSE BLDC GLUCOMTR-MCNC: 149 MG/DL (ref 70–130)
GLUCOSE BLDC GLUCOMTR-MCNC: 169 MG/DL (ref 70–130)
GLUCOSE BLDC GLUCOMTR-MCNC: 171 MG/DL (ref 70–130)
GLUCOSE BLDC GLUCOMTR-MCNC: 175 MG/DL (ref 70–130)
GLUCOSE BLDC GLUCOMTR-MCNC: 200 MG/DL (ref 70–130)
GLUCOSE SERPL-MCNC: 194 MG/DL (ref 65–99)
HCT VFR BLD AUTO: 31.9 % (ref 37.5–51)
HGB BLD-MCNC: 9.9 G/DL (ref 13–17.7)
IMM GRANULOCYTES # BLD AUTO: 0.02 10*3/MM3 (ref 0–0.05)
IMM GRANULOCYTES NFR BLD AUTO: 0.2 % (ref 0–0.5)
LYMPHOCYTES # BLD AUTO: 0.38 10*3/MM3 (ref 0.7–3.1)
LYMPHOCYTES NFR BLD AUTO: 3.7 % (ref 19.6–45.3)
MAXIMAL PREDICTED HEART RATE: 156 BPM
MCH RBC QN AUTO: 27 PG (ref 26.6–33)
MCHC RBC AUTO-ENTMCNC: 31 G/DL (ref 31.5–35.7)
MCV RBC AUTO: 86.9 FL (ref 79–97)
MONOCYTES # BLD AUTO: 0.74 10*3/MM3 (ref 0.1–0.9)
MONOCYTES NFR BLD AUTO: 7.3 % (ref 5–12)
NEUTROPHILS NFR BLD AUTO: 88.7 % (ref 42.7–76)
NEUTROPHILS NFR BLD AUTO: 9.03 10*3/MM3 (ref 1.7–7)
NRBC BLD AUTO-RTO: 0 /100 WBC (ref 0–0.2)
PLATELET # BLD AUTO: 201 10*3/MM3 (ref 140–450)
PMV BLD AUTO: 10.4 FL (ref 6–12)
POTASSIUM SERPL-SCNC: 4.7 MMOL/L (ref 3.5–5.2)
QT INTERVAL: 388 MS
RBC # BLD AUTO: 3.67 10*6/MM3 (ref 4.14–5.8)
SODIUM SERPL-SCNC: 142 MMOL/L (ref 136–145)
STRESS TARGET HR: 133 BPM
WBC NRBC COR # BLD: 10.18 10*3/MM3 (ref 3.4–10.8)

## 2023-02-04 PROCEDURE — 93321 DOPPLER ECHO F-UP/LMTD STD: CPT

## 2023-02-04 PROCEDURE — 63710000001 INSULIN REGULAR HUMAN PER 5 UNITS: Performed by: INTERNAL MEDICINE

## 2023-02-04 PROCEDURE — 85025 COMPLETE CBC W/AUTO DIFF WBC: CPT | Performed by: THORACIC SURGERY (CARDIOTHORACIC VASCULAR SURGERY)

## 2023-02-04 PROCEDURE — 25010000002 HYDROMORPHONE PER 4 MG: Performed by: THORACIC SURGERY (CARDIOTHORACIC VASCULAR SURGERY)

## 2023-02-04 PROCEDURE — 97110 THERAPEUTIC EXERCISES: CPT

## 2023-02-04 PROCEDURE — 93325 DOPPLER ECHO COLOR FLOW MAPG: CPT | Performed by: INTERNAL MEDICINE

## 2023-02-04 PROCEDURE — 93308 TTE F-UP OR LMTD: CPT

## 2023-02-04 PROCEDURE — 71045 X-RAY EXAM CHEST 1 VIEW: CPT

## 2023-02-04 PROCEDURE — 99254 IP/OBS CNSLTJ NEW/EST MOD 60: CPT | Performed by: INTERNAL MEDICINE

## 2023-02-04 PROCEDURE — 93308 TTE F-UP OR LMTD: CPT | Performed by: INTERNAL MEDICINE

## 2023-02-04 PROCEDURE — 25010000002 HEPARIN (PORCINE) PER 1000 UNITS: Performed by: THORACIC SURGERY (CARDIOTHORACIC VASCULAR SURGERY)

## 2023-02-04 PROCEDURE — 97166 OT EVAL MOD COMPLEX 45 MIN: CPT

## 2023-02-04 PROCEDURE — 97161 PT EVAL LOW COMPLEX 20 MIN: CPT

## 2023-02-04 PROCEDURE — 93321 DOPPLER ECHO F-UP/LMTD STD: CPT | Performed by: INTERNAL MEDICINE

## 2023-02-04 PROCEDURE — 99024 POSTOP FOLLOW-UP VISIT: CPT | Performed by: NURSE PRACTITIONER

## 2023-02-04 PROCEDURE — 93325 DOPPLER ECHO COLOR FLOW MAPG: CPT

## 2023-02-04 PROCEDURE — 80048 BASIC METABOLIC PNL TOTAL CA: CPT | Performed by: THORACIC SURGERY (CARDIOTHORACIC VASCULAR SURGERY)

## 2023-02-04 PROCEDURE — 94761 N-INVAS EAR/PLS OXIMETRY MLT: CPT

## 2023-02-04 PROCEDURE — 82962 GLUCOSE BLOOD TEST: CPT

## 2023-02-04 PROCEDURE — 94799 UNLISTED PULMONARY SVC/PX: CPT

## 2023-02-04 RX ORDER — SODIUM CHLORIDE 9 MG/ML
10 INJECTION, SOLUTION INTRAVENOUS CONTINUOUS
Status: DISCONTINUED | OUTPATIENT
Start: 2023-02-04 | End: 2023-02-07

## 2023-02-04 RX ORDER — DIAZEPAM 2 MG/1
2 TABLET ORAL EVERY 6 HOURS PRN
Status: DISCONTINUED | OUTPATIENT
Start: 2023-02-04 | End: 2023-02-10 | Stop reason: HOSPADM

## 2023-02-04 RX ORDER — DEXMEDETOMIDINE HYDROCHLORIDE 4 UG/ML
.2-1.5 INJECTION, SOLUTION INTRAVENOUS
Status: DISCONTINUED | OUTPATIENT
Start: 2023-02-04 | End: 2023-02-04

## 2023-02-04 RX ADMIN — ACETAMINOPHEN 1000 MG: 500 TABLET, FILM COATED ORAL at 15:20

## 2023-02-04 RX ADMIN — DEXTROSE MONOHYDRATE, SODIUM CHLORIDE, AND POTASSIUM CHLORIDE 125 ML/HR: 50; 4.5; 1.49 INJECTION, SOLUTION INTRAVENOUS at 13:26

## 2023-02-04 RX ADMIN — INSULIN HUMAN 2 UNITS: 100 INJECTION, SOLUTION PARENTERAL at 11:55

## 2023-02-04 RX ADMIN — ACETAMINOPHEN 1000 MG: 500 TABLET, FILM COATED ORAL at 08:00

## 2023-02-04 RX ADMIN — METOPROLOL TARTRATE 25 MG: 25 TABLET, FILM COATED ORAL at 08:01

## 2023-02-04 RX ADMIN — HEPARIN SODIUM 5000 UNITS: 5000 INJECTION INTRAVENOUS; SUBCUTANEOUS at 13:19

## 2023-02-04 RX ADMIN — ACETAMINOPHEN 1000 MG: 500 TABLET, FILM COATED ORAL at 20:21

## 2023-02-04 RX ADMIN — INSULIN HUMAN 2 UNITS: 100 INJECTION, SOLUTION PARENTERAL at 05:40

## 2023-02-04 RX ADMIN — DEXTROSE MONOHYDRATE, SODIUM CHLORIDE, AND POTASSIUM CHLORIDE 125 ML/HR: 50; 4.5; 1.49 INJECTION, SOLUTION INTRAVENOUS at 21:28

## 2023-02-04 RX ADMIN — DEXTROSE MONOHYDRATE, SODIUM CHLORIDE, AND POTASSIUM CHLORIDE 125 ML/HR: 50; 4.5; 1.49 INJECTION, SOLUTION INTRAVENOUS at 06:02

## 2023-02-04 RX ADMIN — HEPARIN SODIUM 5000 UNITS: 5000 INJECTION INTRAVENOUS; SUBCUTANEOUS at 20:23

## 2023-02-04 RX ADMIN — METOPROLOL TARTRATE 25 MG: 25 TABLET, FILM COATED ORAL at 20:20

## 2023-02-04 RX ADMIN — GABAPENTIN 300 MG: 300 CAPSULE ORAL at 20:23

## 2023-02-04 RX ADMIN — SODIUM CHLORIDE 10 ML/HR: 9 INJECTION, SOLUTION INTRAVENOUS at 10:23

## 2023-02-04 RX ADMIN — DIAZEPAM 2 MG: 2 TABLET ORAL at 20:21

## 2023-02-04 RX ADMIN — HYDROMORPHONE HYDROCHLORIDE 0.5 MG: 1 INJECTION, SOLUTION INTRAMUSCULAR; INTRAVENOUS; SUBCUTANEOUS at 20:22

## 2023-02-04 RX ADMIN — DIAZEPAM 2 MG: 2 TABLET ORAL at 11:55

## 2023-02-04 RX ADMIN — HEPARIN SODIUM 5000 UNITS: 5000 INJECTION INTRAVENOUS; SUBCUTANEOUS at 05:32

## 2023-02-04 RX ADMIN — NITROGLYCERIN 1.5 INCH: 20 OINTMENT TOPICAL at 05:32

## 2023-02-04 NOTE — CONSULTS
"Nutrition Services    Patient Name:  Edmond Chase  YOB: 1960  MRN: [id number]  Admit Date:  2/3/2023    Assessment Date:  02/04/23    Comment: Consult for TF assessment    Pt is POD #1 total esophagectomy due to esophageal cancer. J-tube placed as well. Initial plan was to initiate TF @ 10 mL/hr today (with strict NPO and do not advance TF), however now only going to run normal saline at 10 ml/hr for now per NP's latest note.     When ready to begin TF, recommend:  Impact Peptide 1.5 @ 10 mL/hr, 30 mL water flush Q 4 hours for patency.   Do not advance TF rate for first 24 hours per Dr. Stockton's orders.   When ready to advance, do so by 10-20 mL Q 8 hours to goal rate of 65 mL/hr as tolerated.   Provision at goal: 2340 kcal, 146 gm protein, 1201 ml free water + 180 ml in flushes.     RD to follow clinical course.     CLINICAL NUTRITION ASSESSMENT      Reason for Assessment Physician Consult, Tube Feeding Assessment    Diagnosis/Problem Esophageal cancer  S/P: Bronchoscopy, right video-assisted thoracoscopy with total esophagectomy, intercostal nerve block, feeding jejunostomy tube placement on 2/3/23     Medical & Surgical Hx Past Medical History:   Diagnosis Date   • Abnormal nuclear stress test    • Anxiety and depression    • Aortic valve insufficiency     nonrheumtic    • Arthritis    • Ascending aortic aneurysm    • CAD (coronary artery disease)    • Cancer (HCC)    • Chest pain    • Diabetes mellitus (HCC)    • Dizzy     CAREFUL WHEN GETTING UP   • Dyspnea    • Esophageal cancer (HCC)    • Essential hypertension    • Fatigue    • GERD (gastroesophageal reflux disease)    • GI bleed    • History of recent blood transfusion    • Hyperglycemia    • Hyperlipidemia    • Hypersomnia with sleep apnea    • Lightheadedness    • Malaise and fatigue    • Migraines     \"OCCULAR MIGRAINES\"   • Morbid obesity (HCC)    • Myocardial ischemia    • Nocturia    • TJ on auto CPAP 10/31/2016    Overnight " polysomnogram.  Weight 276 pounds.  Severe TJ with AHI 79 events per hour.  Auto CPAP recommended.   • Pneumonia     FEB 2016   • Scrotal bleeding    • Shortness of breath    • SOB (shortness of breath)        Past Surgical History:   Procedure Laterality Date   • AORTIC VALVE REPAIR/REPLACEMENT  05/2016   • ASCENDING ARCH/HEMIARCH REPLACEMENT N/A 5/2/2016    Procedure: BHAVESH STERNOTOMY CORONARY ARTERY BYPASS GRAFT TIMES 3 USING LEFT INTERNAL MAMMARY ARTERY AND RIGHT GREATER SAPHENOUS VEIN GRAFT PER ENDOSCOPIC VEIN HARVESTING, AORTIC ANEURYSM REPAIR WITH ROOT REPAIR AND AORTIC VALVE REPLACEMENT;  Surgeon: Rosalio Cline MD;  Location: [facility] OR;  Service:    • CARDIAC CATHETERIZATION N/A 4/1/2016    Procedure: Left Heart Cath;  Surgeon: Erick Tam MD;  Location: [facility] CATH INVASIVE LOCATION;  Service:    • CARDIAC CATHETERIZATION N/A 4/1/2016    Procedure: Left ventriculography;  Surgeon: Erick Tam MD;  Location: [facility] CATH INVASIVE LOCATION;  Service:    • CARDIAC CATHETERIZATION N/A 4/1/2016    Procedure: Right Heart Cath;  Surgeon: Erick Tam MD;  Location: [facility] INVASIVE LOCATION;  Service:    • CARDIAC CATHETERIZATION N/A 10/30/2017    Procedure: Coronary angiography;  Surgeon: Sorin Vasquez MD;  Location: [facility] CATH INVASIVE LOCATION;  Service:    • CARDIAC CATHETERIZATION  10/30/2017    Procedure: Saphenous Vein Graft;  Surgeon: Sorin Vasquez MD;  Location: [facility] CATH INVASIVE LOCATION;  Service:    • CARDIAC CATHETERIZATION N/A 10/30/2017    Procedure: Native mammary injection;  Surgeon: Sorin Vasquez MD;  Location: [facility] INVASIVE LOCATION;  Service:    • CARDIAC CATHETERIZATION N/A 7/7/2020    Procedure: Coronary angiography;  Surgeon: Gregor Kim MD;  Location: [facility] INVASIVE LOCATION;  Service: Cardiovascular;  Laterality: N/A;   • CARDIAC CATHETERIZATION N/A 7/7/2020    Procedure: Left heart cath;  Surgeon: Gregor Kim MD;  Location: [facility]  CATH INVASIVE LOCATION;  Service: Cardiovascular;  Laterality: N/A;   • CARDIAC CATHETERIZATION N/A 7/7/2020    Procedure: Left ventriculography;  Surgeon: Gregor Kim MD;  Location: [facility] CATH INVASIVE LOCATION;  Service: Cardiovascular;  Laterality: N/A;   • CARDIAC CATHETERIZATION N/A 7/7/2020    Procedure: Stent ESMER bypass graft;  Surgeon: Gregor Kim MD;  [facility];  Service: Cardiovascular;  Laterality: N/A;   • CARDIAC CATHETERIZATION N/A 6/17/2021    Procedure: SAPHENOUS VEIN GRAFT;  Surgeon: Marques Ndiaye MD;  Location: [facility] CATH INVASIVE LOCATION;  Service: Cardiovascular;  Laterality: N/A;   • CARDIAC CATHETERIZATION N/A 6/17/2021    Procedure: Left Heart Cath;  Surgeon: Marques Ndiaye MD;  Location: [facility] CATH INVASIVE LOCATION;  Service: Cardiovascular;  Laterality: N/A;   • CARDIAC CATHETERIZATION N/A 6/17/2021    Procedure: Coronary angiography;  Surgeon: Marques Ndiaye MD;  Location: [facility] CATH INVASIVE LOCATION;  Service: Cardiovascular;  Laterality: N/A;   • CARDIAC CATHETERIZATION N/A 7/14/2022    Procedure: Left Heart Cath;  Surgeon: Charlie Aj MD;  Location: [facility] CATH INVASIVE LOCATION;  Service: Cardiovascular;  Laterality: N/A;   • CARDIAC CATHETERIZATION N/A 7/14/2022    Procedure: Coronary angiography;  Surgeon: Charlie Aj MD;  Location: [facility] CATH INVASIVE LOCATION;  Service: Cardiovascular;  Laterality: N/A;   • CARDIAC CATHETERIZATION  7/14/2022    Procedure: Saphenous Vein Graft;  Surgeon: Charlie Aj MD;  Location: [facility] CATH INVASIVE LOCATION;  Service: Cardiovascular;;   • CARDIAC CATHETERIZATION N/A 7/14/2022    Procedure: Native mammary injection;  Surgeon: Charlie Aj MD;  Location: [facility] CATH INVASIVE LOCATION;  Service: Cardiovascular;  Laterality: N/A;   • COLONOSCOPY N/A 11/26/2022    Procedure: COLONOSCOPY AT BEDSIDE;  Surgeon: Tomy Lopez MD;  Location: [facility] OR;  " Service: Gastroenterology;  Laterality: N/A;   • COLONOSCOPY W/ POLYPECTOMY N/A 10/4/2022    Procedure: COLONOSCOPY to cecum with cold forceps and cold snare polypectomies;  Surgeon: Gregor Carlson MD;  Location:  [facility];  Service: Gastroenterology;  Laterality: N/A;  PRE- hx of polyps  POST- diverticulosis, polyps   • CORONARY ARTERY BYPASS GRAFT  05/2016    LIMA TO LAD, SVG TO PDA, SVG TO OM2   • ENDOSCOPY N/A 7/13/2022    Procedure: ESOPHAGOGASTRODUODENOSCOPY;  Surgeon: Marcia Hollis MD;  Location:  [facility];  Service: Gastroenterology;  Laterality: N/A;  PRE- ANEMIA, MELENA  POST- MILD EROSIVE GASTRITIS, GE JUNCTION ULCER   • ENDOSCOPY N/A 10/4/2022    Procedure: ESOPHAGOGASTRODUODENOSCOPY with biopsies;  Surgeon: Gregor Carlson MD;  Location:  [facility];  Service: Gastroenterology;  Laterality: N/A;  PRE- hx of esophageal ulcer  POST- gastric cardia mass   • INNER EAR SURGERY     • TONSILLECTOMY          Current Problems      Encounter Information        Nutrition History NPO. MD comments for TF to start at 10 ml/hr, do not advance. POD #1 esophagectomy & J-tube. This morning, NP for CTS indicated normal saline only @ 10 ml/hr and hold off on starting TF today.    Factors Affecting Intake altered GI function, swallow impairment   Tests/Procedures X-Ray, Other: surgery 2/3/23, chest x-rays (multiple)  \"some subcutaneous emphysema along right lateral chest wall and some left basilar density along the left diaphragm, likely atelectasis.\"     Anthropometrics        Current Height   Current Weight  BMI kg/m2 Height: 185.4 cm (73\")  Weight: (!) 144 kg (317 lb 10.9 oz) (02/04/23 0600)  Body mass index is 41.91 kg/m².   Admission Weight    Ideal Body Weight (IBW) 184 lb (172%)   Usual Body Weight (UBW) Mid-300's, trending down into lower 300's lb in past year   Weight Change/Trend Loss, stable since July 2022       Weight history: Wt Readings from Last 30 Encounters: " "  02/04/23 0600 (!) 144 kg (317 lb 10.9 oz)   02/03/23 1800 (!) 143 kg (315 lb 4.1 oz)   01/30/23 1509 (!) 142 kg (314 lb)   01/23/23 1440 (!) 142 kg (314 lb)   01/20/23 0756 (!) 142 kg (314 lb)   01/16/23 0949 (!) 141 kg (310 lb)   12/15/22 0618 (!) 142 kg (313 lb 11.4 oz)   12/14/22 1553 (!) 140 kg (308 lb)   12/14/22 1532 (!) 142 kg (313 lb)   12/14/22 1006 (!) 142 kg (313 lb)   12/14/22 0932 (!) 142 kg (313 lb)   12/06/22 1529 (!) 140 kg (308 lb 8 oz)   12/02/22 0600 (!) 143 kg (314 lb 6 oz)   12/01/22 0546 (!) 144 kg (317 lb 3.2 oz)   11/25/22 1123 (!) 141 kg (310 lb)   10/21/22 1052 (!) 142 kg (314 lb)   10/04/22 0934 (!) 140 kg (308 lb 11.2 oz)   09/15/22 0915 (!) 141 kg (310 lb)   08/29/22 1355 (!) 141 kg (311 lb 8 oz)   08/17/22 0900 (!) 142 kg (314 lb)   08/08/22 1032 (!) 144 kg (318 lb)   08/04/22 0814 (!) 143 kg (315 lb)   07/29/22 0744 (!) 142 kg (314 lb)   07/22/22 1247 (!) 142 kg (314 lb)   07/15/22 0505 (!) 146 kg (322 lb 1.5 oz)   07/12/22 1121 (!) 144 kg (318 lb)   07/11/22 0500 (!) 144 kg (318 lb 1.6 oz)   07/10/22 0618 (!) 148 kg (327 lb)   01/28/22 1108 (!) 148 kg (327 lb)   08/17/21 1007 (!) 150 kg (330 lb)   07/30/21 1016 (!) 149 kg (329 lb)   07/07/21 1225 (!) 154 kg (340 lb)   06/24/21 1105 (!) 154 kg (340 lb)   06/17/21 0812 (!) 150 kg (330 lb)   01/22/21 0823 (!) 152 kg (336 lb)   10/21/20 1044 (!) 150 kg (331 lb)   07/20/20 1331 (!) 145 kg (320 lb)   07/07/20 0821 (!) 141 kg (310 lb)        Estimated/Assessed Needs        Energy Requirements    Height for Calculation  Height: 185.4 cm (73\")   Weight for Calculation 144 kg (actual)   Method for Estimation  14 kcal/kg   EST Needs (kcal/day) 2016       Protein Requirements    Weight for Calculation 83.6 kg (IBW used, BMI 41)   EST Protein Needs (g/kg) 2.0 gm/kg   EST Daily Needs (g/day) 167       Fluid Requirements     Method for Estimation 1 mL/kcal    Estimated Needs (mL/day) 2016       Fluid Deficit    Current Na Level (mEq/L)    Desired " Na Level (mEq/L)    Estimated Fluid Deficit (L)       Labs        Pertinent Labs    Results from last 7 days   Lab Units 02/04/23  0309 02/03/23  1611   SODIUM mmol/L 142 137   POTASSIUM mmol/L 4.7 4.5   CHLORIDE mmol/L 106 105   CO2 mmol/L 26.2 24.0   BUN mg/dL 20 17   CREATININE mg/dL 0.97 1.09   CALCIUM mg/dL 8.5* 8.7   GLUCOSE mg/dL 194* 205*     Results from last 7 days   Lab Units 02/04/23  0309   HEMOGLOBIN g/dL 9.9*   HEMATOCRIT % 31.9*   WBC 10*3/mm3 10.18     Results from last 7 days   Lab Units 02/04/23  0309 02/03/23  1611   PLATELETS 10*3/mm3 201 202     COVID19   Date Value Ref Range Status   07/10/2022 Not Detected Not Detected - Ref. Range Final     Lab Results   Component Value Date    HGBA1C 7.00 (H) 07/10/2022          Medications            Scheduled Medications acetaminophen, 1,000 mg, Per J Tube, TID  gabapentin, 300 mg, Per J Tube, Q8H  heparin (porcine), 5,000 Units, Subcutaneous, Q8H  insulin regular, 0-9 Units, Subcutaneous, Q6H  metoprolol tartrate, 25 mg, Per J Tube, Q12H   Or  metoprolol tartrate, 5 mg, Intravenous, Q12H  nitroglycerin, 1.5 inch, Topical, Q6H        Infusions dextrose 5 % and sodium chloride 0.45 % with KCl 20 mEq/L, 125 mL/hr, Last Rate: 125 mL/hr (02/04/23 0602)  HYDROmorphone HCl-NaCl,   lactated ringers, 9 mL/hr, Last Rate: 9 mL/hr (02/03/23 0737)  lactated ringers, 9 mL/hr, Last Rate: Stopped (02/03/23 1500)  sodium chloride, 30 mL/hr, Last Rate: 30 mL/hr (02/03/23 1702)  sodium chloride, 10 mL/hr, Last Rate: 10 mL/hr (02/04/23 1023)        PRN Medications •  dextrose  •  dextrose  •  diazePAM  •  glucagon (human recombinant)  •  HYDROmorphone  •  labetalol  •  naloxone  •  nitroglycerin  •  [DISCONTINUED] ondansetron **OR** ondansetron  •  oxyCODONE     Physical Findings          Physical Appearance on oxygen therapy (per flow sheet)   Oral/Mouth Cavity WNL   Edema  lower extremity , 1+ (trace), 2+ (mild), 3+ (moderate)   Gastrointestinal last bowel movement:2/2    Skin  surgical incision - chest, neck, abdomen   Tubes/Drains chest tube, jejunostomy , NG tube   NFPE Not applicable at this time   --  Current Nutrition Orders & Evaluation of Intake       Oral Nutrition     Food Allergies NKFA   Current PO Diet NPO Diet NPO Type: Strict NPO   Supplement    PO Evaluation     Trending % PO Intake      Nutrition Diagnosis        Nutrition Dx Problem 1 Problem: Needs Alternative Route  Etiology: Medical Diagnosis and Factors Affecting Nutrition   -S/p esophagectomy d/t cancer  Signs/Symptoms: NPO, Report/Observation and Other (comment) - J-tube placed  :      INTERVENTION / PLAN OF CARE  Intervention Goal        Intervention Goal(s) Maintain nutrition status, Nutrition support treatment, Meet estimated needs, Disease management/therapy, Initiate TF/PN and No significant weight loss     Nutrition Intervention        RD Action Follow Tx Progress, Care plan reviewed and Recommend/ordered: EN Rx below     Prescription         Diet     Supplement/Snack    EN/PN  See below   Prescription Ordered No, recommended - NP Vita Jones indicated to hold TF for now.       Enteral Prescription:     Enteral Route Jejunostomy    TF Delivery Method Continuous    Enteral Product Impact Peptide 1.5    Modular -    Propofol Rate/Kcal -    TF Start Rate 10 ml/hr - do not advance on day #1    TF Goal Rate 65 ml/hr    Free Water Flush 30 ml Q 4 hr - adjust per clinical course/labs    TF Provision at Goal: 2340 kcal, 146 gm pro, 1201 ml free water + 180 mL flush         Calories 116% needs met         Protein  87% needs met         Fluid (mL) 1381 mL     Prescription Ordered No, recommended       Monitor/Evaluation        Monitor Per protocol, I&O, Pertinent labs, EN delivery/tolerance, Weight, Skin status, GI status, Symptoms, POC/GOC   Discharge Plan Pending clinical course   Education Will instruct as appropriate     RD to follow per protocol.       Electronically signed by:  Neris Chang  PATRICIA  02/04/23 11:14 EST

## 2023-02-04 NOTE — PROGRESS NOTES
[facility]  [phone]  Dr. Abdi Dominique    Subjective:  LOS: 1    Chief Complaint: Postop esophagectomy    Patient is sitting in a chair.  He feels somewhat short of breath.  Earlier having chest discomfort any Nitropaste was applied.  However he started feeling dizzy.  This was subsequently taken off.  He feels okay now.  Expected chest pain from recent surgery.    Objective   Vital Signs past 24hrs    Temp range: Temp (24hrs), Av.1 °F (36.7 °C), Min:97.5 °F (36.4 °C), Max:98.8 °F (37.1 °C)    BP range: BP: (106-156)/(59-87) 106/59  Pulse range: Heart Rate:  [67-90] 67  Resp rate range: Resp:  [12-20] 18    Device (Oxygen Therapy): nasal cannula with [id number] (L/min):  [1-10] 1  Oxygen range:SpO2:  [91 %-98 %] 94 %      (!) 144 kg (317 lb 10.9 oz); Body mass index is 41.91 kg/m².    Intake/Output Summary (Last 24 hours) at 2023 1303  Last data filed at 2023 1208  Gross per 24 hour   Intake 3947 ml   Output 3025 ml   Net 922 ml       Physical Exam  Constitutional:       Appearance: He is obese. He is ill-appearing.   Eyes:      Pupils: Pupils are equal, round, and reactive to light.   Cardiovascular:      Rate and Rhythm: Normal rate and regular rhythm.      Heart sounds: No murmur heard.  Pulmonary:      Effort: Pulmonary effort is normal.      Breath sounds: Normal breath sounds.      Comments: Right chest tube  Abdominal:      General: Bowel sounds are normal.      Palpations: Abdomen is soft. There is no mass.      Tenderness: There is no abdominal tenderness.      Comments: PEJ+nt   Musculoskeletal:         General: Swelling (2+) present.   Neurological:      Mental Status: He is alert.       Results Review:    I have reviewed the laboratory and imaging data since the last note by [facility] physician.  My annotations are noted in assessment and plan.    Medication Review:  I have reviewed the current MAR.  My annotations are noted in assessment and plan.    dextrose 5 % and sodium  chloride 0.45 % with KCl 20 mEq/L, 125 mL/hr, Last Rate: 125 mL/hr (02/04/23 0602)  HYDROmorphone HCl-NaCl,   lactated ringers, 9 mL/hr, Last Rate: 9 mL/hr (02/03/23 0737)  lactated ringers, 9 mL/hr, Last Rate: Stopped (02/03/23 1500)  sodium chloride, 30 mL/hr, Last Rate: 30 mL/hr (02/03/23 1702)  sodium chloride, 10 mL/hr, Last Rate: 10 mL/hr (02/04/23 1023)      Plan   Lines, Drains & Airways     Active LDAs     Name Placement date Placement time Site Days    Peripheral IV 02/03/23 0636 Posterior;Right Hand 02/03/23  0636  Hand  1    Peripheral IV 02/03/23 0633 Left;Posterior Hand 02/03/23 0633  Hand  1    Chest Tube 1 Right Midaxillary 02/03/23  1058  Midaxillary  1    NG/OG Tube Nasogastric Left nostril 02/03/23  1500  Left nostril  sutured  less than 1    Gastrostomy/Enterostomy Jejunostomy 1 19 Fr. LUQ 02/03/23  1439  LUQ  less than 1    Urethral Catheter Non-latex;Silicone 16 Fr. 02/03/23  0800  -- 1    Arterial Line 02/03/23 Left Radial 02/03/23  0730  created via procedure documentation  Radial  1              [facility] Problems  Status post esophagectomy for adeno CA distal third of esophagus, 2/3/2023  Right-sided pneumothorax with chest tube  Expected postop pain  CAD with stent  Bioprosthetic aortic valve  TJ on CPAP at home, no PAP therapy here  Morbid obesity  Diabetes type 2      THESE ARE NEW MEDICAL PROBLEMS TO ME.    Plan of Treatment    Doing well postoperatively.  Pain under control.  Note thoracic surgery has started some fluids via J-tube.  Keeping n.p.o.  On Dilaudid PCA.    With right-sided chest tube and pneumothorax almost completely resolved.    Note cardiology consultation by thoracic surgery.  No clear evidence of any acute coronary syndrome.    Appears to have considerable leg swelling over the ankles.  We will check his albumin again.  Currently on IV fluids.    Sliding scale for diabetes.    Abdi Dominique MD  02/04/23  13:03 EST      Part of this note may be an electronic  transcription/translation of spoken language to printed text using the Dragon Dictation System.

## 2023-02-04 NOTE — CONSULTS
CONSULT NOTE    Patient Identification:  Edmond Chase  62 y.o.  male  1960  [id number]            Requesting physician: Dr. Stockton    Reason for Consultation:  ICU management post-esophagetcomy    CC: Esophageal cancer    History of Present Illness:  Edmond Chase is a 62-year-old male with a medical history of esophageal adenocarcinoma with invasion, coronary artery disease with stent placement, prior placement of bioprosthetic aortic valve, type 2 diabetes and obstructive sleep apnea on auto CPAP.  He is a never smoker.    He was recently has been admitted twice with acute blood loss anemia due to GI bleed.  In late November 2022, patient had recently had a polypectomy and presented to the ER 1 week post polypectomy with dizziness, lightheadedness and bright red rectal rectum.  Patient underwent clipping with improvement and was discharged.  He returned to the hospital on 12/1/2022 with acute blood loss anemia due to GI bleed and was subsequently transferred to [facility] for further evaluation.  Patient's condition improved and patient did not requiring GI intervention.    Patient presented to the hospital on 2/3/2023 for scheduled esophagectomy with Dr. Stockton for malignant neoplasm of lower third of the esophagus.  He also had a feeding jejunostomy placement.  He was extubated in the PACU and is admitted to the ICU for postop management.    Review of Systems:  Constitutional: Negative for activity change, appetite change, chills, fatigue or fever.  HEENT: Negative for congestion, sinus pain or pressure.  Positive for sore throat.  Negative for eye discharge, itching pain or redness.  Respiratory: Negative for cough, choking, wheezing or stridor.  Positive for subjective shortness of breath.  Cardiac: Negative for palpitations.  Positive for substernal chest pain without radiation and leg swelling.  GI: Negative for constipation, diarrhea, nausea or vomiting.  Positive for abdominal  "pain.  : Negative for dysuria, frequency or urgency.  Musculoskeletal: Negative for arthralgias or myalgias.  Skin: Negative for color change, pallor, rash or wound.  Neurological: Negative for syncope, weakness, numbness.  Positive for intermittent dizziness.  Psychiatric: Positive for anxiety/nervousness.    Past Medical History:  Past Medical History:   Diagnosis Date   • Abnormal nuclear stress test    • Anxiety and depression    • Aortic valve insufficiency     nonrheumtic    • Arthritis    • Ascending aortic aneurysm    • CAD (coronary artery disease)    • Cancer (HCC)    • Chest pain    • Diabetes mellitus (HCC)    • Dizzy     CAREFUL WHEN GETTING UP   • Dyspnea    • Esophageal cancer (HCC)    • Essential hypertension    • Fatigue    • GERD (gastroesophageal reflux disease)    • GI bleed    • History of recent blood transfusion    • Hyperglycemia    • Hyperlipidemia    • Hypersomnia with sleep apnea    • Lightheadedness    • Malaise and fatigue    • Migraines     \"OCCULAR MIGRAINES\"   • Morbid obesity (HCC)    • Myocardial ischemia    • Nocturia    • TJ on auto CPAP 10/31/2016    Overnight polysomnogram.  Weight 276 pounds.  Severe TJ with AHI 79 events per hour.  Auto CPAP recommended.   • Pneumonia     FEB 2016   • Scrotal bleeding    • Shortness of breath    • SOB (shortness of breath)        Past Surgical History:  Past Surgical History:   Procedure Laterality Date   • AORTIC VALVE REPAIR/REPLACEMENT  05/2016   • ASCENDING ARCH/HEMIARCH REPLACEMENT N/A 5/2/2016    Procedure: BHAVESH STERNOTOMY CORONARY ARTERY BYPASS GRAFT TIMES 3 USING LEFT INTERNAL MAMMARY ARTERY AND RIGHT GREATER SAPHENOUS VEIN GRAFT PER ENDOSCOPIC VEIN HARVESTING, AORTIC ANEURYSM REPAIR WITH ROOT REPAIR AND AORTIC VALVE REPLACEMENT;  Surgeon: Rosalio Cline MD;  Location: [facility] OR;  Service:    • CARDIAC CATHETERIZATION N/A 4/1/2016    Procedure: Left Heart Cath;  Surgeon: Erick Tam MD;  Location: [facility] CATH INVASIVE " LOCATION;  Service:    • CARDIAC CATHETERIZATION N/A 4/1/2016    Procedure: Left ventriculography;  Surgeon: Erick Tam MD;  [facility]AS[facility] LOC[facility];  Service:    • CARDIAC CATHETERIZATION N/A 4/1/2016    Procedure: Right Heart Cath;  Surgeon: Erick Tam MD;  [facility];  Service:    • CARDIAC CATHETERIZATION N/A 10/30/2017    Procedure: Coronary angiography;  Surgeon: Sorin Vasquez MD;  Location: [facility] CATH INVASIVE LOCATION;  Service:    • CARDIAC CATHETERIZATION  10/30/2017    Procedure: Saphenous Vein Graft;  Surgeon: Sorin Vasquez MD;  [facility];  Service:    • CARDIAC CATHETERIZATION N/A 10/30/2017    Procedure: Native mammary injection;  Surgeon: Sorin Vasquez MD;  Location: [facility] CATH INVASIVE LOCATION;  Service:    • CARDIAC CATHETERIZATION N/A 7/7/2020    Procedure: Coronary angiography;  Surgeon: Gregor Kim MD;  Location: [facility] CATH INVASIVE LOCATION;  Service: Cardiovascular;  Laterality: N/A;   • CARDIAC CATHETERIZATION N/A 7/7/2020    Procedure: Left heart cath;  Surgeon: Gregor Kim MD;  Location: [facility] CATH INVASIVE LOCATION;  Service: Cardiovascular;  Laterality: N/A;   • CARDIAC CATHETERIZATION N/A 7/7/2020    Procedure: Left ventriculography;  Surgeon: Gregor Kim MD;  Location: [facility] CATH INVASIVE LOCATION;  Service: Cardiovascular;  Laterality: N/A;   • CARDIAC CATHETERIZATION N/A 7/7/2020    Procedure: Stent ESMER bypass graft;  Surgeon: Gregor Kim MD;  Location: [facility] CATH INVASIVE LOCATION;  Service: Cardiovascular;  Laterality: N/A;   • CARDIAC CATHETERIZATION N/A 6/17/2021    Procedure: SAPHENOUS VEIN GRAFT;  Surgeon: Marques Ndiaye MD;  Location: [facility] CATH INVASIVE LOCATION;  Service: Cardiovascular;  Laterality: N/A;   • CARDIAC CATHETERIZATION N/A 6/17/2021    Procedure: Left Heart Cath;  Surgeon: Marques Ndiaye MD;  Location: [facility] CATH INVASIVE LOCATION;   Service: Cardiovascular;  Laterality: N/A;   • CARDIAC CATHETERIZATION N/A 6/17/2021    Procedure: Coronary angiography;  Surgeon: Marques Ndiaye MD;  Location: [facility] CATH INVASIVE LOCATION;  Service: Cardiovascular;  Laterality: N/A;   • CARDIAC CATHETERIZATION N/A 7/14/2022    Procedure: Left Heart Cath;  Surgeon: Charlie Aj MD;  Location: [facility] CATH INVASIVE LOCATION;  Service: Cardiovascular;  Laterality: N/A;   • CARDIAC CATHETERIZATION N/A 7/14/2022    Procedure: Coronary angiography;  Surgeon: Charlie Aj MD;  Location: [facility] CATH INVASIVE LOCATION;  Service: Cardiovascular;  Laterality: N/A;   • CARDIAC CATHETERIZATION  7/14/2022    Procedure: Saphenous Vein Graft;  Surgeon: Charlie Aj MD;  Location: [facility] CATH INVASIVE LOCATION;  Service: Cardiovascular;;   • CARDIAC CATHETERIZATION N/A 7/14/2022    Procedure: Native mammary injection;  Surgeon: Charlie Aj MD;  Location: [facility] CATH INVASIVE LOCATION;  Service: Cardiovascular;  Laterality: N/A;   • COLONOSCOPY N/A 11/26/2022    Procedure: COLONOSCOPY AT BEDSIDE;  Surgeon: Tomy Lopez MD;  Location: [facility] MAIN OR;  Service: Gastroenterology;  Laterality: N/A;   • COLONOSCOPY W/ POLYPECTOMY N/A 10/4/2022    Procedure: COLONOSCOPY to cecum with cold forceps and cold snare polypectomies;  Surgeon: Gregor Carlson MD;  Location: [facility] ENDOSCOPY;  Service: Gastroenterology;  Laterality: N/A;  PRE- hx of polyps  POST- diverticulosis, polyps   • CORONARY ARTERY BYPASS GRAFT  05/2016    LIMA TO LAD, SVG TO PDA, SVG TO OM2   • ENDOSCOPY N/A 7/13/2022    Procedure: ESOPHAGOGASTRODUODENOSCOPY;  Surgeon: Marcia Hollis MD;  Location: [facility] ENDOSCOPY;  Service: Gastroenterology;  Laterality: N/A;  PRE- ANEMIA, MELENA  POST- MILD EROSIVE GASTRITIS, GE JUNCTION ULCER   • ENDOSCOPY N/A 10/4/2022    Procedure: ESOPHAGOGASTRODUODENOSCOPY with biopsies;  Surgeon: Gregor Carlson MD;  Location: [facility]  ENDOSCOPY;  Service: Gastroenterology;  Laterality: N/A;  PRE- hx of esophageal ulcer  POST- gastric cardia mass   • INNER EAR SURGERY     • TONSILLECTOMY          Home Meds:  Medications Prior to Admission   Medication Sig Dispense Refill Last Dose   • amLODIPine (NORVASC) 10 MG tablet Take 10 mg by mouth Daily.   2/3/2023 at 0430   • atorvastatin (LIPITOR) 40 MG tablet Take 40 mg by mouth Every Night.   Past Week   • dapagliflozin Propanediol (Farxiga) 10 MG tablet Take 10 mg by mouth Daily.   Past Week   • ferrous sulfate 324 (65 Fe) MG tablet delayed-release EC tablet Take 324 mg by mouth Daily With Breakfast.   Past Week   • furosemide (LASIX) 20 MG tablet Take 20 mg by mouth As Needed.   Past Week   • GLUCOSAMINE HCL PO Take 1 tablet by mouth Daily.   Past Week   • isosorbide mononitrate (IMDUR) 60 MG 24 hr tablet Take 60 mg by mouth Daily.   2/3/2023 at 0430   • lisinopril (PRINIVIL,ZESTRIL) 10 MG tablet Take 10 mg by mouth Every Evening. HOLD 24 HOURS PRIOR TO SURGERY   Past Week   • metFORMIN (GLUCOPHAGE) 500 MG tablet Take 500 mg by mouth Every Evening.   Past Week   • metoprolol succinate XL (TOPROL-XL) 25 MG 24 hr tablet 25 mg Every Evening.   2/2/2023 at 1830   • pantoprazole (PROTONIX) 40 MG EC tablet Take 1 tablet by mouth 2 (Two) Times a Day With Meals. (Patient taking differently: Take 40 mg by mouth Daily.) 30 tablet 5 2/3/2023 at 0430   • potassium chloride 10 MEQ CR tablet Take 10 mEq by mouth Daily.   Past Week   • sucralfate (CARAFATE) 1 g tablet TAKE ONE TABLET BY MOUTH TWICE A DAY BEFORE A MEAL (Patient taking differently: Take 1 g by mouth Every Evening.) 60 tablet 5 Past Week       Allergies:  No Known Allergies    Social History:   Social History     Socioeconomic History   • Marital status: Single   Tobacco Use   • Smoking status: Never   • Smokeless tobacco: Never   Vaping Use   • Vaping Use: Never used   Substance and Sexual Activity   • Alcohol use: Yes     Comment: 1-2 drink/monthly  "  • Drug use: No   • Sexual activity: Defer       Family History:  Family History   Problem Relation Age of Onset   • Hypertension Mother    • Diabetes Mother    • Heart disease Mother    • Hypertension Father    • Lung cancer Father    • Heart disease Sister    • Stroke Maternal Grandmother    • Heart disease Maternal Grandmother    • Hypertension Maternal Grandfather    • Hypertension Paternal Grandmother    • Hypertension Paternal Grandfather    • Other Other         The patient states that his sister has a problem that goes by the name of \"long QT\".  He says this is a familial trait but he also says that he is \"not interested in pursuing it for himself\".   • Coronary artery disease Other    • Malig Hyperthermia Neg Hx        Physical Exam:  /75   Pulse 83   Temp 97.5 °F (36.4 °C) (Oral)   Resp 20   Ht 185.4 cm (73\")   Wt (!) 143 kg (315 lb 4.1 oz)   SpO2 95%   BMI 41.59 kg/m²  Body mass index is 41.59 kg/m². 95% (!) 143 kg (315 lb 4.1 oz)     Physical Exam:  Constitutional: Alert, obese male laying in bed.  Normal appearance, no acute distress.  Head: Normocephalic, atraumatic.  Nose: NG tube to low wall suction present in left nare.  Mouth/throat: Dry mucous membranes, clear of exudate or erythema.  Mallampati type III airway.  Eyes: Pupils equal round reactive to light, extraocular movements intact, conjunctiva normal  Neck: Supple, no JVD, trachea midline, surgical incision over left neck (edges approximated Dermabond in place)  Cardiovascular: Normal rate, regular rhythm, normal S1-S2.  No murmur or gallop.    Pulmonary: Normal respiratory effort, breath sounds clear, no wheezes, rhonchi or rales.  SPO2 95% on 10 L face tent.  Abdominal: Obese, soft, hypoactive bowel sounds.  Generalized tenderness.  : Not assessed  Musculoskeletal: Negative for swelling or tenderness over joints.  3+ edema in bilateral lower extremities (patient reports chronic)  Skin: Warm, dry, no ecchymosis or erythema.  " Capillary fill less than 3 seconds.  Neurological: Alert and oriented x3, no focal deficit.  Psychiatric: Anxious.      LABS:  COVID19   Date Value Ref Range Status   07/10/2022 Not Detected Not Detected - Ref. Range Final       Lab Results   Component Value Date    CALCIUM 8.7 02/03/2023    PHOS 5.5 (H) 11/26/2022     Results from last 7 days   Lab Units 02/03/23  1611   SODIUM mmol/L 137   POTASSIUM mmol/L 4.5   CHLORIDE mmol/L 105   CO2 mmol/L 24.0   BUN mg/dL 17   CREATININE mg/dL 1.09   GLUCOSE mg/dL 205*   CALCIUM mg/dL 8.7   WBC 10*3/mm3 12.46*   HEMOGLOBIN g/dL 10.9*   PLATELETS 10*3/mm3 202     Lab Results   Component Value Date    CKTOTAL 296 (H) 02/20/2016    TROPONINT <0.010 12/14/2022                                 Lab Results   Component Value Date    TSH 1.680 12/15/2022     Estimated Creatinine Clearance: 101.7 mL/min (by C-G formula based on SCr of 1.09 mg/dL).         Imaging: I personally visualized the images of scans/x-rays performed within last 3 days.  CXR 2/3/23: right chest tube in place in right lung base, 4cm right apical pneumothorax without shift.       Assessment:  1. Malignant neoplasm of the lower third of these status post esophagectomy.  2. Right apical pneumothorax measuring 4 cm (CXR 2/3/2023 at 1623)  3. Post-operative pain  4. Coronary artery disease with stent placement  5. Bioprosthetic aortic valve  6. Obstructive sleep apnea on CPAP  7. Morbid obesity (BMI 41.59)  8. Type 2 diabetes with hyperglycemia  9. Hypertension  10. Recent GI bleed following polypectomy    Recommendations:  · Admit patient to the ICU for post operative esophagectomy management  · Upon assessment, patient complaining of substernal chest discomfort without radiation.  He reports that he has a history of angina is on isosorbide at baseline and follows with Dr. Miguel. EKG performed, unchanged from previous. Reviewed previous pre-op office note from Dr. Miguel, who recommended nitropaste  post-operatively if angina symptoms persisted. Ordered 1.5inch nitropaste Q6 hours.   · CXR from 2/3/23 at 1623 showed 4cm right apical pneumothorax without shift- patient currently asymptomatic. Unclear if thoracic surgery is aware of this apical pneumothorax, STAT CXR ordered to evaluate pneumothorax.  · TJ- provide supplemental oxygen overnight, NO CPAP/BIPAP for patient due to surgery.  · Accuchecks/SSI ordered.  · Scheduled metoprolol and PRN labetolol ordered to maintain SBP <160.  · Significant output from right basilar CT, surgery aware. H&H recheck ordered for midnight.  · Dilaudid PCA + ERAS protocol per thoracic surgery.  · Heparin for DVT prophylaxis.  · NPO.    Patient was placed in face mask upon entering room and kept mask on throughout our encounter. I wore full protective equipment throughout this patient encounter including a face mask, gown and gloves. Hand hygiene was performed before donning protective equipment and after removal when leaving the room.    Belinda Koehler, APRN  2/3/2023  20:01 EST      Much of this encounter note is an electronic transcription/translation of spoken language to printed text using Dragon Software.

## 2023-02-04 NOTE — PLAN OF CARE
Problem: Aspiration (Enteral Nutrition)  Goal: Absence of Aspiration Signs and Symptoms  Outcome: Adequate for Care Transition     Problem: Device-Related Complication Risk (Enteral Nutrition)  Goal: Safe, Effective Therapy Delivery  Outcome: Ongoing, Not Progressing     Problem: Feeding Intolerance (Enteral Nutrition)  Goal: Feeding Tolerance  Outcome: Ongoing, Not Progressing   Goal Outcome Evaluation: RD assessment for TF needs, s/p esophagectomy & J-tube placement. When ready to initiate, Impact Peptide 1.5 per MD orders. Currently has NS @ 10 ml/hr only via J-tube. Strict NPO.

## 2023-02-04 NOTE — PLAN OF CARE
Goal Outcome Evaluation:              Outcome Evaluation: Pts/p bronchoscopy and thoracoscopy with total esophagectomy and J-tube placement on 2/3/23. Requires CGA for STS transfers and to perform standing exercises. Unable to assess ambulation this date as pt to stay hooked to suction for chest tube per RN, thus mimiced ambulation with march in place supported by FWW. Prior to admission pt lives in one level condo with elevator access. Has friend to assist upon DC. Anticipate DC home with HH PT once medically appropriate.

## 2023-02-04 NOTE — CONSULTS
[facility] Consult    Patient Name: Edmond Chase  Age/Sex: 62 y.o. male  : 1960  MRN: [id number]    Date of Admission: 2/3/2023  Date of Encounter Visit: 23  Encounter Provider: Jesika West MD  Referring Provider: Valerie Stockton MD  Place of Service: [facility] CARDIOLOGY  Patient Care Team:  Nargis Velasquez APRN as PCP - General (Family Medicine)  Papa Miguel MD as Consulting Physician (Cardiology)  Sekou Pisano MD as Consulting Physician (Pulmonary Disease)  Gregor Carlson MD as Consulting Physician (Gastroenterology)  Estevan Yuan MD (Sports Medicine)  Shelley Goldsmith, RN as Nurse Navigator    Subjective:     Consulted for: chest pain, CAD    Chief Complaint: post op esophagectomy and J tube placement    History of Present Illness:  Edmond Chase is a 62 y.o. male patient of Dr. Miguel's with coronary artery disease with prior CABG, bioprosthetic AVR in 2016, hypertension, hyperlipidemia, diabetes mellitus type 2, recently diagnosed esophageal cancer who is now status post esophagectomy and J-tube placement on 2/3/2022.      Patient has a history of known chronic stable angina.  He was last evaluated by Dr. Miguel on 2023.  He was felt to be stable from a cardiac standpoint no felt he was okay to proceed with surgery.  Dr. Miguel recommended having nitroglycerin paste placed perioperatively to manage issues with his blood pressure and angina.    Patient underwent robotic assisted fluoroscopy was total esophagectomy and J-tube placement on 2/3/2023 with Dr. Stockton.  Is recommended he was placed on nitroglycerin paste postoperatively.  However this morning he was noted to be relatively hypotensive.  The patient did complain of symptoms of lightheadedness.  His Nitropaste has since been removed.   The patient does complain of chest discomfort but he is unable to distinguish it from surgical  "pain.  He does report that the pain is constant.  He does feel like he has some difficulty breathing although he is only on 1 L nasal cannula and saturating normally.  His arterial line pressures appear to be elevated.  EKG postoperatively shows no acute changes.    Most recent cardiac work-up included a cardiac catheterization in 7/2022.  This showed a patent LIMA to LAD, diffusely calcified 70 to 80% stenosis of the proximal to mid circumflex artery, and 80% stenosis of the mid to distal right coronary artery, 50% stenosis of the ramus intermedius, and occluded saphenous vein graft to the OM and occluded saphenous vein graft to the PDA.  Last echocardiogram on 12/14/2022 which showed normal left ventricular systolic function and wall motion with an EF of 66 to 70%, normal function of his bioprosthetic aortic valve, and mild dilatation of his aortic root measuring 4.4 cm.      Past Medical History:  Past Medical History:   Diagnosis Date   • Abnormal nuclear stress test    • Anxiety and depression    • Aortic valve insufficiency     nonrheumtic    • Arthritis    • Ascending aortic aneurysm    • CAD (coronary artery disease)    • Cancer (HCC)    • Chest pain    • Diabetes mellitus (HCC)    • Dizzy     CAREFUL WHEN GETTING UP   • Dyspnea    • Esophageal cancer (HCC)    • Essential hypertension    • Fatigue    • GERD (gastroesophageal reflux disease)    • GI bleed    • History of recent blood transfusion    • Hyperglycemia    • Hyperlipidemia    • Hypersomnia with sleep apnea    • Lightheadedness    • Malaise and fatigue    • Migraines     \"OCCULAR MIGRAINES\"   • Morbid obesity (HCC)    • Myocardial ischemia    • Nocturia    • TJ on auto CPAP 10/31/2016    Overnight polysomnogram.  Weight 276 pounds.  Severe TJ with AHI 79 events per hour.  Auto CPAP recommended.   • Pneumonia     FEB 2016   • Scrotal bleeding    • Shortness of breath    • SOB (shortness of breath)        Past Surgical History:   Procedure " Laterality Date   • AORTIC VALVE REPAIR/REPLACEMENT  05/2016   • ASCENDING ARCH/HEMIARCH REPLACEMENT N/A 5/2/2016    Procedure: BHAVESH STERNOTOMY CORONARY ARTERY BYPASS GRAFT TIMES 3 USING LEFT INTERNAL MAMMARY ARTERY AND RIGHT GREATER SAPHENOUS VEIN GRAFT PER ENDOSCOPIC VEIN HARVESTING, AORTIC ANEURYSM REPAIR WITH ROOT REPAIR AND AORTIC VALVE REPLACEMENT;  Surgeon: Rosalio Cline MD;  Location: [facility] MAIN OR;  Service:    • CARDIAC CATHETERIZATION N/A 4/1/2016    Procedure: Left Heart Cath;  Surgeon: Erick Tam MD;  Location: [facility] CATH INVASIVE LOCATION;  Service:    • CARDIAC CATHETERIZATION N/A 4/1/2016    Procedure: Left ventriculography;  Surgeon: Erick Tam MD;  Location: [facility] CATH INVASIVE LOCATION;  Service:    • CARDIAC CATHETERIZATION N/A 4/1/2016    Procedure: Right Heart Cath;  Surgeon: Erick Tam MD;  Location: [facility] CATH INVASIVE LOCATION;  Service:    • CARDIAC CATHETERIZATION N/A 10/30/2017    Procedure: Coronary angiography;  Surgeon: Sorin Vasquez MD;  Location: [facility] CATH INVASIVE LOCATION;  Service:    • CARDIAC CATHETERIZATION  10/30/2017    Procedure: Saphenous Vein Graft;  Surgeon: Sorin Vasquez MD;  Location: [facility] CATH INVASIVE LOCATION;  Service:    • CARDIAC CATHETERIZATION N/A 10/30/2017    Procedure: Native mammary injection;  Surgeon: Sorin Vasquez MD;  Location: [facility] CATH INVASIVE LOCATION;  Service:    • CARDIAC CATHETERIZATION N/A 7/7/2020    Procedure: Coronary angiography;  Surgeon: Gregor Kim MD;  Location: [facility] CATH INVASIVE LOCATION;  Service: Cardiovascular;  Laterality: N/A;   • CARDIAC CATHETERIZATION N/A 7/7/2020    Procedure: Left heart cath;  Surgeon: Gregor Kim MD;  Location: [facility] CATH INVASIVE LOCATION;  Service: Cardiovascular;  Laterality: N/A;   • CARDIAC CATHETERIZATION N/A 7/7/2020    Procedure: Left ventriculography;  Surgeon: Gregor Kim MD;  Location: [facility] CATH INVASIVE LOCATION;  Service: Cardiovascular;   Laterality: N/A;   • CARDIAC CATHETERIZATION N/A 7/7/2020    Procedure: Stent ESMER bypass graft;  Surgeon: Gregor Kim MD;  [facility];  Service: Cardiovascular;  Laterality: N/A;   • CARDIAC CATHETERIZATION N/A 6/17/2021    Procedure: SAPHENOUS VEIN GRAFT;  Surgeon: Marques Ndiaye MD;  [facility];  Service: Cardiovascular;  Laterality: N/A;   • CARDIAC CATHETERIZATION N/A 6/17/2021    Procedure: Left Heart Cath;  Surgeon: Marques Ndiaye MD;  [facility];  Service: Cardiovascular;  Laterality: N/A;   • CARDIAC CATHETERIZATION N/A 6/17/2021    Procedure: Coronary angiography;  Surgeon: Marques Ndiaye MD;  Location: [facility] CATH INVASIVE LOCATION;  Service: Cardiovascular;  Laterality: N/A;   • CARDIAC CATHETERIZATION N/A 7/14/2022    Procedure: Left Heart Cath;  Surgeon: Charlie Aj MD;  Location: [facility] CATH INVASIVE LOCATION;  Service: Cardiovascular;  Laterality: N/A;   • CARDIAC CATHETERIZATION N/A 7/14/2022    Procedure: Coronary angiography;  Surgeon: Charlie Aj MD;  [facility];  Service: Cardiovascular;  Laterality: N/A;   • CARDIAC CATHETERIZATION  7/14/2022    Procedure: Saphenous Vein Graft;  Surgeon: Charlie Aj MD;  Location: [facility] CATH INVASIVE LOCATION;  Service: Cardiovascular;;   • CARDIAC CATHETERIZATION N/A 7/14/2022    Procedure: Native mammary injection;  Surgeon: Charlie Aj MD;  Location: [facility] CATH INVASIVE LOCATION;  Service: Cardiovascular;  Laterality: N/A;   • COLONOSCOPY N/A 11/26/2022    Procedure: COLONOSCOPY AT BEDSIDE;  Surgeon: Tomy Lopez MD;  Location: [facility] MAIN OR;  Service: Gastroenterology;  Laterality: N/A;   • COLONOSCOPY W/ POLYPECTOMY N/A 10/4/2022    Procedure: COLONOSCOPY to cecum with cold forceps and cold snare polypectomies;  Surgeon: Gregor Carlson MD;  Location: [facility] ENDOSCOPY;   Service: Gastroenterology;  Laterality: N/A;  PRE- hx of polyps  POST- diverticulosis, polyps   • CORONARY ARTERY BYPASS GRAFT  05/2016    LIMA TO LAD, SVG TO PDA, SVG TO OM2   • ENDOSCOPY N/A 7/13/2022    Procedure: ESOPHAGOGASTRODUODENOSCOPY;  Surgeon: Marcia Hollis MD;  Location:  [facility];  Service: Gastroenterology;  Laterality: N/A;  PRE- ANEMIA, MELENA  POST- MILD EROSIVE GASTRITIS, GE JUNCTION ULCER   • ENDOSCOPY N/A 10/4/2022    Procedure: ESOPHAGOGASTRODUODENOSCOPY with biopsies;  Surgeon: Gregor Carlson MD;  Location:  [facility];  Service: Gastroenterology;  Laterality: N/A;  PRE- hx of esophageal ulcer  POST- gastric cardia mass   • INNER EAR SURGERY     • TONSILLECTOMY         Home Medications:   Medications Prior to Admission   Medication Sig Dispense Refill Last Dose   • amLODIPine (NORVASC) 10 MG tablet Take 10 mg by mouth Daily.   2/3/2023 at 0430   • atorvastatin (LIPITOR) 40 MG tablet Take 40 mg by mouth Every Night.   Past Week   • dapagliflozin Propanediol (Farxiga) 10 MG tablet Take 10 mg by mouth Daily.   Past Week   • ferrous sulfate 324 (65 Fe) MG tablet delayed-release EC tablet Take 324 mg by mouth Daily With Breakfast.   Past Week   • furosemide (LASIX) 20 MG tablet Take 20 mg by mouth As Needed.   Past Week   • GLUCOSAMINE HCL PO Take 1 tablet by mouth Daily.   Past Week   • isosorbide mononitrate (IMDUR) 60 MG 24 hr tablet Take 60 mg by mouth Daily.   2/3/2023 at 0430   • lisinopril (PRINIVIL,ZESTRIL) 10 MG tablet Take 10 mg by mouth Every Evening. HOLD 24 HOURS PRIOR TO SURGERY   Past Week   • metFORMIN (GLUCOPHAGE) 500 MG tablet Take 500 mg by mouth Every Evening.   Past Week   • metoprolol succinate XL (TOPROL-XL) 25 MG 24 hr tablet 25 mg Every Evening.   2/2/2023 at 1830   • pantoprazole (PROTONIX) 40 MG EC tablet Take 1 tablet by mouth 2 (Two) Times a Day With Meals. (Patient taking differently: Take 40 mg by mouth Daily.) 30 tablet 5 2/3/2023 at 0430   •  "potassium chloride 10 MEQ CR tablet Take 10 mEq by mouth Daily.   Past Week   • sucralfate (CARAFATE) 1 g tablet TAKE ONE TABLET BY MOUTH TWICE A DAY BEFORE A MEAL (Patient taking differently: Take 1 g by mouth Every Evening.) 60 tablet 5 Past Week       Allergies:  No Known Allergies    Past Social History:  Social History     Socioeconomic History   • Marital status: Single   Tobacco Use   • Smoking status: Never   • Smokeless tobacco: Never   Vaping Use   • Vaping Use: Never used   Substance and Sexual Activity   • Alcohol use: Yes     Comment: 1-2 drink/monthly   • Drug use: No   • Sexual activity: Defer       Past Family History:  Family History   Problem Relation Age of Onset   • Hypertension Mother    • Diabetes Mother    • Heart disease Mother    • Hypertension Father    • Lung cancer Father    • Heart disease Sister    • Stroke Maternal Grandmother    • Heart disease Maternal Grandmother    • Hypertension Maternal Grandfather    • Hypertension Paternal Grandmother    • Hypertension Paternal Grandfather    • Other Other         The patient states that his sister has a problem that goes by the name of \"long QT\".  He says this is a familial trait but he also says that he is \"not interested in pursuing it for himself\".   • Coronary artery disease Other    • Malig Hyperthermia Neg Hx        Review of Systems:     CONSTITUTIONAL: No weight loss, fever, chills, weakness or fatigue.   HEENT: Eyes: No visual loss, blurred vision, double vision or yellow sclerae. Ears, Nose, Throat: No hearing loss, sneezing, congestion, runny nose or sore throat.   SKIN: No rash or itching.     RESPIRATORY: No shortness of breath, hemoptysis, cough or sputum.   GASTROINTESTINAL: No anorexia, nausea, vomiting or diarrhea. No abdominal pain, bright red blood per rectum or melena.  GENITOURINARY: No burning on urination, hematuria or increased frequency.  NEUROLOGICAL: No headache, dizziness, syncope, paralysis, ataxia, numbness or " tingling in the extremities. No change in bowel or bladder control.   MUSCULOSKELETAL: No muscle, back pain, joint pain or stiffness.   HEMATOLOGIC: No anemia, bleeding or bruising.   LYMPHATICS: No enlarged nodes. No history of splenectomy.   PSYCHIATRIC: No history of depression, anxiety, hallucinations.   ENDOCRINOLOGIC: No reports of sweating, cold or heat intolerance. No polyuria or polydipsia.     Objective:   Temp:  [97.5 °F (36.4 °C)-98.8 °F (37.1 °C)] 98.8 °F (37.1 °C)  Heart Rate:  [67-90] 70  Resp:  [12-20] 18  BP: (106-156)/(57-87) 107/65  Arterial Line BP: (124-197)/(56-80) 140/76     Intake/Output Summary (Last 24 hours) at 2/4/2023 1426  Last data filed at 2/4/2023 1208  Gross per 24 hour   Intake 2947 ml   Output 2995 ml   Net -48 ml     Body mass index is 41.91 kg/m².      02/03/23  1800 02/04/23  0600   Weight: (!) 143 kg (315 lb 4.1 oz) (!) 144 kg (317 lb 10.9 oz)     Weight change:     Physical Exam:   General Appearance:    Alert, cooperative, in no acute distress   Head:    Normocephalic, without obvious abnormality, atraumatic   Eyes:            Conjunctivae and sclerae normal, no   icterus, no pallor, corneas clear, PERRLA   Neck:   No adenopathy, supple, trachea midline, no thyromegaly, no   carotid bruit, no JVD   Lungs:     Clear to auscultation,respirations regular, even and unlabored    Heart:    Regular rhythm and normal rate, normal S1 and S2, no murmur, +rub   Chest Wall:    No abnormalities observed   Abdomen:     Normal bowel sounds, no masses, no organomegaly, soft, nontender, nondistended, no guarding, no rebound  tenderness   Extremities:   Moves all extremities well, no edema, no cyanosis, no redness   Pulses:   Pulses palpable and equal bilaterally.      Lab Review:   Results from last 7 days   Lab Units 02/04/23  0309 02/03/23  1611   SODIUM mmol/L 142 137   POTASSIUM mmol/L 4.7 4.5   CHLORIDE mmol/L 106 105   CO2 mmol/L 26.2 24.0   BUN mg/dL 20 17   CREATININE mg/dL 0.97 1.09    GLUCOSE mg/dL 194* 205*   CALCIUM mg/dL 8.5* 8.7         Results from last 7 days   Lab Units 23  0309 23  1611   WBC 10*3/mm3 10.18 12.46*   HEMOGLOBIN g/dL 9.9* 10.9*   HEMATOCRIT % 31.9* 34.2*   PLATELETS 10*3/mm3 201 202                   Invalid input(s): LDLCALC            Echo 2022:  •  Left ventricular ejection fraction appears to be 66 - 70%.  •  Left ventricular wall thickness is consistent with mild concentric hypertrophy.  •  Left ventricular diastolic function was normal.  •  There is a bioprosthetic aortic valve present.  •  Mild dilation of the aortic root is present. The aortic root measures 4.4 cm. Mild dilation of the sinuses of Valsalva is present.    [facility] 2022:  1. Left main: Normal  2. LAD: Calcified 90% proximal stenosis.  Chronic total occlusion mid segment.  Mid to distal vessel fills via patent LIMA to LAD  3. LCX: Severely calcified 70 to 80% stenosis in the midsegment.   4. RCA: Severely calcified and tortuous vessel with diffuse 60 to 70% mid vessel stenosis and discrete 80% mid to distal stenosis.  Chronic total occlusion small caliber PDA branch.  PDA fills via left-to-right collaterals.  5.  Ramus: Discrete 50% stenosis in the more inferior branch  6.  LIMA to LAD: Normal  7.  SVG to OM: Occluded at proximal anastomosis  8.  SVG to PDA: Occluded at proximal anastomosis    Lexiscan Cardiolite Stress test 2022:  • Myocardial perfusion imaging indicates a medium-sized, moderately severe area of ischemia located in the inferior wall.  • Left ventricular ejection fraction is normal. (Calculated EF = 50%).  • GI artifact is present.  • Arrhythmias during stress: frequent PVCs.    [facility] 2021:  CORONARY ANGIOGRAPHY:  Left main: 20% luminal irregularities in the proximal LAD  Left anterior descendin% proximal then 100% occlusion of the mid LAD just beyond the first septal   Ramus intermedius: Very large serpiginous ramus with 2 branches free of  obstructive disease  Circumflex: The native circumflex has a long area that is about 70% stenosed in the midportion it really does not go to anywhere its mainly in AV groove branch  RCA: Is a dominant vessel.  This is a highly tortuous vessel with 50% proximal disease 70% mid disease some 50 to 60% disease maybe up to 90% in the distal RCA but honestly the tortuosity I think precludes intervention on this vessel     BROCK to LAD: widely patent and normal.  The stent in mid LAD is widely patent and looks good the remainder of the vessel looks good     SUMMARY: No significant change from his prior cath would recommend ongoing medical therapy    [facility] 2020:  1. Severe multivessel coronary artery disease with 100% proximal LAD, 50 to 60% stenoses throughout the circumflex territory within the proximal segment as well as the high marginal, and diffuse 70% stenoses throughout the right coronary artery with 100% PDA stenoses with faint collaterals from the LAD.  2. Normal left ventricular filling pressures of 16 mmHg with a normal ejection fraction of 60% with inferior basal wall hypokinesis.  3. Accessible percutaneous intervention of mid LAD via MARCELINA graft with placement of a 2.5 x 15 mm Xience drug-eluting stent deployed at 12 fredis.  No residual stenosis and stable HENNY-3 flow pre-and post PCI.      [id number]:    2022:      Imaging:  Imaging Results (Most Recent)     Procedure Component Value Units Date/Time    XR Chest 1 View [[id number]] Collected: 23 0750     Updated: 23 075    Narrative:      CHEST SINGLE VIEW     HISTORY: Postop lung surgery     COMPARISON: Portable chest 2023, 2022     FINDINGS: There is a nasogastric tube with tip overlapping the right  hilum and this is without change. Heart size is enlarged and median  sternotomy wires are present. There is a right basilar chest tube and  the right-sided pneumothorax exhibits significant improvement compared  to  yesterday's exam at 4:13 PM and is not clearly identified. There is  subcutaneous emphysema along the right lateral chest wall. There is  developed increased density of the left lung base with silhouetting of  the left hemidiaphragm and this may be due to increased atelectasis or  developing infiltrate and could be followed on subsequent exam.       Impression:      1. No change in appearance of the right lung with right chest tube in  place. Pneumothorax exhibits improvement and is not clearly identified.  There is subcutaneous emphysema along the right lateral chest wall.  2. Increased left basilar density with silhouetting of the left  hemidiaphragm. This may be due to progressive atelectasis and could be  followed on subsequent exam.     This report was finalized on 2/4/2023 7:51 AM by Dr. Edmond Garcia M.D.       XR Chest 1 View [[id number]] Collected: 02/03/23 2119     Updated: 02/03/23 2124    Narrative:      SINGLE VIEW OF THE CHEST     HISTORY: Right-sided pneumothorax     COMPARISON: 02/03/2023     FINDINGS:  Tubes and lines appear stable in position. Previously identified  right-sided pneumothorax appears smaller. There is cardiomegaly.  Bibasilar atelectasis appears improved. Subcutaneous air is noted within  the right lateral chest wall. Patient is status post median sternotomy  with coronary artery bypass grafting, as well as valve replacement.       Impression:      Interval improvement in right-sided pneumothorax.     This report was finalized on 2/3/2023 9:20 PM by Dr. Ani Eubanks M.D.       XR Chest 1 View [[id number]] Collected: 02/03/23 1638     Updated: 02/03/23 1642    Narrative:      PORTABLE CHEST X-RAY     HISTORY: Postop total esophagectomy.     TECHNIQUE: Portable chest x-ray correlated with preoperative chest x-ray  December 14, 2022.     FINDINGS: There is a right chest tube position over the lung base. A  right apical pneumothorax measures about 4 cm long. There is  no  mediastinal shift. Nasogastric tube has its tip to the right of midline  below the level of the right hilum, as is typically seen following this  procedure. The cardiac silhouette is enlarged and the pulmonary  vasculature is engorged. There may be a small left pleural effusion. No  pneumothorax on the left.       Impression:      Postoperative change in the chest with 4 cm right apical  pneumothorax as described.     This report was finalized on 2/3/2023 4:39 PM by Dr. Yandel Lees M.D.               Assessment/Plan:     1.  Esophageal cancer status post esophagectomy.  And J-tube placement.  2.  Chest pain.  Suspect is most likely postoperative pain.  His EKG postoperatively shows no acute changes.  The symptoms are constant.  Monitor for now.  3.  Possible pericardial rub.  Noted on exam today.  4.  Coronary artery disease.  Cardiac catheterization 7/2022 showed only 1 out of 3 patent grafts and chronic severe stenosis of the left circumflex and right coronary arteries.  He has been medically managed since.  5.  History of bioprosthetic aortic valve replacement.  Normal function on his last echocardiogram in 12/2022.  6.  Hypertension.  Blood pressures are intermittently elevated.  He is back on metoprolol.  7.  Hyperlipidemia  8.  Diabetes mellitus type 2    -Check limited echocardiogram to further evaluate pericardial rub noted on exam.  - Continue metoprolol and titrate dosage as needed.  Resume the remainder of his home antihypertensive medications as needed.  - At this point I think is less likely that his chest pain is anginal.  We will monitor his symptoms for now.      Thank you for allowing me to participate in the care of Edmond Chase. Feel free to contact me directly with any further questions or concerns.    Jesika West MD  [facility]  02/04/23  14:26 EST

## 2023-02-04 NOTE — NURSING NOTE
Dr Pichardo notified via phone regarding increased CT output. 120mL from 5p-6p, 110mL 6-7pm, 7p-740pm 70mL. No new orders. Continue to monitor output; no need to call for hourly output of 100mL/hour unless patient becomes hemodynamically unstable.

## 2023-02-04 NOTE — NURSING NOTE
Dr rollins at bedside to assess pt. md ordered to d/c nitro patch due to pt being dizzy. Md assessed chest tube and is aware of output

## 2023-02-04 NOTE — THERAPY EVALUATION
Patient Name: Edmond Chase  : 1960    MRN: [id number]                              Today's Date: 2023       Admit Date: 2/3/2023    Visit Dx:     ICD-10-CM ICD-9-CM   1. Malignant neoplasm of lower third of esophagus (HCC)  C15.5 150.5     Patient Active Problem List   Diagnosis   • Essential hypertension   • Hyperglycemia   • Hyperlipidemia   • Class 3 severe obesity due to excess calories with serious comorbidity and body mass index (BMI) of 40.0 to 44.9 in adult (Formerly [facility])   • Nocturia   • Nonrheumatic aortic valve insufficiency   • Myocardial ischemia   • Coronary artery disease involving native coronary artery of native heart   • Ascending aortic aneurysm   • CAD in native artery   • S/P CABG (coronary artery bypass graft)   • S/P AVR (aortic valve replacement)   • Status post ascending aortic aneurysm repair   • Fatigue   • Abnormal nuclear stress test   • Coronary artery disease   • Coronary artery disease of bypass graft of native heart with stable angina pectoris (Formerly [facility])   • TJ on auto CPAP   • Hypersomnia due to medical condition   • Angina at rest ([facility])   • Type 2 diabetes mellitus, without long-term current use of insulin (Formerly [facility])   • Anemia   • Ulcer of esophagus without bleeding   • Polyp of colon   • Rectal bleeding   • Gastrointestinal hemorrhage   • Malignant neoplasm of lower third of esophagus (HCC)   • Gastrointestinal hemorrhage, unspecified gastrointestinal hemorrhage type   • Exertional chest pain   • Iron deficiency anemia     Past Medical History:   Diagnosis Date   • Abnormal nuclear stress test    • Anxiety and depression    • Aortic valve insufficiency     nonrheumtic    • Arthritis    • Ascending aortic aneurysm    • CAD (coronary artery disease)    • Cancer (HCC)    • Chest pain    • Diabetes mellitus ([facility])    • Dizzy     CAREFUL WHEN GETTING UP   • Dyspnea    • Esophageal cancer (HCC)    • Essential hypertension    • Fatigue    • GERD (gastroesophageal reflux disease)    • GI  "bleed    • History of recent blood transfusion    • Hyperglycemia    • Hyperlipidemia    • Hypersomnia with sleep apnea    • Lightheadedness    • Malaise and fatigue    • Migraines     \"OCCULAR MIGRAINES\"   • Morbid obesity (HCC)    • Myocardial ischemia    • Nocturia    • TJ on auto CPAP 10/31/2016    Overnight polysomnogram.  Weight 276 pounds.  Severe TJ with AHI 79 events per hour.  Auto CPAP recommended.   • Pneumonia     FEB 2016   • Scrotal bleeding    • Shortness of breath    • SOB (shortness of breath)      Past Surgical History:   Procedure Laterality Date   • AORTIC VALVE REPAIR/REPLACEMENT  05/2016   • ASCENDING ARCH/HEMIARCH REPLACEMENT N/A 5/2/2016    Procedure: BHAVESH STERNOTOMY CORONARY ARTERY BYPASS GRAFT TIMES 3 USING LEFT INTERNAL MAMMARY ARTERY AND RIGHT GREATER SAPHENOUS VEIN GRAFT PER ENDOSCOPIC VEIN HARVESTING, AORTIC ANEURYSM REPAIR WITH ROOT REPAIR AND AORTIC VALVE REPLACEMENT;  Surgeon: Rosalio Cline MD;  Location: [facility] OR;  Service:    • CARDIAC CATHETERIZATION N/A 4/1/2016    Procedure: Left Heart Cath;  Surgeon: Erick Tam MD;  Location: [facility] INVASIVE LOCATION;  Service:    • CARDIAC CATHETERIZATION N/A 4/1/2016    Procedure: Left ventriculography;  Surgeon: Erick Tam MD;  Location: [facility] CATH INVASIVE LOCATION;  Service:    • CARDIAC CATHETERIZATION N/A 4/1/2016    Procedure: Right Heart Cath;  Surgeon: Erick Tam MD;  Location: [facility] CATH INVASIVE LOCATION;  Service:    • CARDIAC CATHETERIZATION N/A 10/30/2017    Procedure: Coronary angiography;  Surgeon: Sorin Vasquez MD;  Location: [facility] CATH INVASIVE LOCATION;  Service:    • CARDIAC CATHETERIZATION  10/30/2017    Procedure: Saphenous Vein Graft;  Surgeon: Sorin Vasquez MD;  Location: [facility] CATH INVASIVE LOCATION;  Service:    • CARDIAC CATHETERIZATION N/A 10/30/2017    Procedure: Native mammary injection;  Surgeon: Sorin Vasquez MD;  Location: [facility] CATH INVASIVE LOCATION;  Service:    • " CARDIAC CATHETERIZATION N/A 7/7/2020    Procedure: Coronary angiography;  Surgeon: Gregor Kim MD;  Location:  [facility] CATH INVASIVE LOCATION;  Service: Cardiovascular;  Laterality: N/A;   • CARDIAC CATHETERIZATION N/A 7/7/2020    Procedure: Left heart cath;  Surgeon: Gregor Kim MD;  Location:  [facility] CATH INVASIVE LOCATION;  Service: Cardiovascular;  Laterality: N/A;   • CARDIAC CATHETERIZATION N/A 7/7/2020    Procedure: Left ventriculography;  Surgeon: Gregor Kim MD;  Location:  [facility] CATH INVASIVE LOCATION;  Service: Cardiovascular;  Laterality: N/A;   • CARDIAC CATHETERIZATION N/A 7/7/2020    Procedure: Stent ESMER bypass graft;  Surgeon: Gregor Kim MD;  Location:  [facility] CATH INVASIVE LOCATION;  Service: Cardiovascular;  Laterality: N/A;   • CARDIAC CATHETERIZATION N/A 6/17/2021    Procedure: SAPHENOUS VEIN GRAFT;  Surgeon: Marques Ndiaye MD;  Location: [facility] CATH INVASIVE LOCATION;  Service: Cardiovascular;  Laterality: N/A;   • CARDIAC CATHETERIZATION N/A 6/17/2021    Procedure: Left Heart Cath;  Surgeon: Marques Ndiaye MD;  Location:  [facility] CATH INVASIVE LOCATION;  Service: Cardiovascular;  Laterality: N/A;   • CARDIAC CATHETERIZATION N/A 6/17/2021    Procedure: Coronary angiography;  Surgeon: Marques Ndiaye MD;  Location: [facility] CATH INVASIVE LOCATION;  Service: Cardiovascular;  Laterality: N/A;   • CARDIAC CATHETERIZATION N/A 7/14/2022    Procedure: Left Heart Cath;  Surgeon: Charlie Aj MD;  Location: [facility] CATH INVASIVE LOCATION;  Service: Cardiovascular;  Laterality: N/A;   • CARDIAC CATHETERIZATION N/A 7/14/2022    Procedure: Coronary angiography;  Surgeon: Charlie Aj MD;  Location:  [facility] CATH INVASIVE LOCATION;  Service: Cardiovascular;  Laterality: N/A;   • CARDIAC CATHETERIZATION  7/14/2022    Procedure: Saphenous Vein Graft;  Surgeon: Charlie Aj MD;  Location:  [facility] CATH INVASIVE LOCATION;  Service: Cardiovascular;;   • CARDIAC  CATHETERIZATION N/A 7/14/2022    Procedure: Native mammary injection;  Surgeon: Charlie Aj MD;  Location: [facility] CATH INVASIVE LOCATION;  Service: Cardiovascular;  Laterality: N/A;   • COLONOSCOPY N/A 11/26/2022    Procedure: COLONOSCOPY AT BEDSIDE;  Surgeon: Tomy Lopez MD;  Location: [facility] MAIN OR;  Service: Gastroenterology;  Laterality: N/A;   • COLONOSCOPY W/ POLYPECTOMY N/A 10/4/2022    Procedure: COLONOSCOPY to cecum with cold forceps and cold snare polypectomies;  Surgeon: Gregor Carlson MD;  Location: [facility] ENDOSCOPY;  Service: Gastroenterology;  Laterality: N/A;  PRE- hx of polyps  POST- diverticulosis, polyps   • CORONARY ARTERY BYPASS GRAFT  05/2016    LIMA TO LAD, SVG TO PDA, SVG TO OM2   • ENDOSCOPY N/A 7/13/2022    Procedure: ESOPHAGOGASTRODUODENOSCOPY;  Surgeon: Marcia Hollis MD;  Location: [facility] ENDOSCOPY;  Service: Gastroenterology;  Laterality: N/A;  PRE- ANEMIA, MELENA  POST- MILD EROSIVE GASTRITIS, GE JUNCTION ULCER   • ENDOSCOPY N/A 10/4/2022    Procedure: ESOPHAGOGASTRODUODENOSCOPY with biopsies;  Surgeon: Gregor Carlson MD;  Location: [facility] ENDOSCOPY;  Service: Gastroenterology;  Laterality: N/A;  PRE- hx of esophageal ulcer  POST- gastric cardia mass   • INNER EAR SURGERY     • TONSILLECTOMY        General Information     Row Name 02/04/23 1454          OT Time and Intention    Document Type evaluation  -BS     Mode of Treatment occupational therapy  -BS     Row Name 02/04/23 1454          General Information    Patient Profile Reviewed yes  -BS     Prior Level of Function independent:;feeding;grooming;dressing;bathing;min assist:;home management;cooking;cleaning  -BS     Existing Precautions/Restrictions fall  -BS     Barriers to Rehab medically complex  -BS     Row Name 02/04/23 3367          Occupational Profile    Environmental Supports and Barriers (Occupational Profile) Pt reports he is independent with all ADLs and driving at baseline. He has a  walkin shower, NO shower chair, commode is a low height, and has a friend who is providing as a caregiver until April  -     Row Name 02/04/23 1454          Living Environment    People in Home alone;friend(s)  Pt reports friend caregiving until April  -     Row Name 02/04/23 1454          Cognition    Orientation Status (Cognition) oriented x 4  -BS     Row Name 02/04/23 1454          Safety Issues, Functional Mobility    Impairments Affecting Function (Mobility) strength;endurance/activity tolerance  -BS           User Key  (r) = Recorded By, (t) = Taken By, (c) = Cosigned By    Initials Name Provider Type    Tiffany Gupta OT Occupational Therapist                 Mobility/ADL's     Row Name 02/04/23 1457          Bed Mobility    Bed Mobility scooting/bridging  -BS     Scooting/Bridging [place] (Bed Mobility) set up  SUA with bed adjustments  -     Row Name 02/04/23 1457          Transfers    Comment, (Transfers) Pt declined further OOB activity d/t fatigue and pain  -BS           User Key  (r) = Recorded By, (t) = Taken By, (c) = Cosigned By    Initials Name Provider Type    Tiffany Gupta OT Occupational Therapist               Obj/Interventions     Row Name 02/04/23 1458          Vision Assessment/Intervention    Visual Impairment/Limitations WFL  -BS     Row Name 02/04/23 1458          Range of Motion Comprehensive    General Range of Motion bilateral upper extremity ROM WFL  -     Row Name 02/04/23 1458          Strength Comprehensive (MMT)    General Manual Muscle Testing (MMT) Assessment upper extremity strength deficits identified  -BS     Comment, General Manual Muscle Testing (MMT) Assessment BUE grossly 4-/5. Generalized weakness noted  -BS     Row Name 02/04/23 1458          Balance    Balance Assessment sitting static balance  -BS     Static Sitting Balance standby assist  -BS     Balance Interventions sitting;static  -BS           User Key  (r) = Recorded By, (t) = Taken By, (c) =  Cosigned By    Initials Name Provider Type    Tiffany Gupta OT Occupational Therapist               Goals/Plan     Row Name 02/04/23 1505          Dressing Goal 1 (OT)    Activity/Device (Dressing Goal 1, OT) upper body dressing;lower body dressing  -BS     [place]/Cues Needed (Dressing Goal 1, OT) standby assist  -BS     Time Frame (Dressing Goal 1, OT) long term goal (LTG);10 days  -BS     Progress/Outcome (Dressing Goal 1, OT) goal ongoing  -BS     Row Name 02/04/23 1505          Toileting Goal 1 (OT)    Activity/Device (Toileting Goal 1, OT) adjust/manage clothing;perform perineal hygiene;commode  -BS     [place] Level/Cues Needed (Toileting Goal 1, OT) minimum assist (75% or more patient effort)  -BS     Time Frame (Toileting Goal 1, OT) long term goal (LTG);10 days  -BS     Progress/Outcome (Toileting Goal 1, OT) goal ongoing  -BS     [facility] Name 02/04/23 1507          Grooming Goal 1 (OT)    Activity/Device (Grooming Goal 1, OT) hair care;oral care;wash face, hands  -BS     [place] (Grooming Goal 1, OT) standby assist  -BS     Time Frame (Grooming Goal 1, OT) long term goal (LTG);10 days  -BS     Strategies/Barriers (Grooming Goal 1, OT) sinkside  -BS     Progress/Outcome (Grooming Goal 1, OT) goal ongoing  -BS     [facility] Name 02/04/23 1506          Strength Goal 1 (OT)    Strength Goal 1 (OT) BUE 4+/5  -BS     Time Frame (Strength Goal 1, OT) long term goal (LTG);by discharge  -BS     Progress/Outcome (Strength Goal 1, OT) goal ongoing  -BS     Row Name 02/04/23 1509          Therapy Assessment/Plan (OT)    Planned Therapy Interventions (OT) activity tolerance training;adaptive equipment training;BADL retraining;functional balance retraining;strengthening exercise;ROM/therapeutic exercise;occupation/activity based interventions  -BS           User Key  (r) = Recorded By, (t) = Taken By, (c) = Cosigned By    Initials Name Provider Type    Tiffany Gupta OT Occupational Therapist                Clinical Impression     Row Name 02/04/23 1459          Pain Assessment    Pretreatment Pain Rating 6/10  -BS     Posttreatment Pain Rating 6/10  -BS     Pain Location generalized  -BS     Pain Location - abdomen  -BS     Pain Intervention(s) Repositioned;Ambulation/increased activity  -BS     Row Name 02/04/23 1459          Plan of Care Review    Plan of Care Reviewed With patient  -BS     Progress no change  -BS     Outcome Evaluation Pt is a 62 y.o. male s/p bronchoscopy and thoracoscopy with total espohagectomy. Pt reports he is independent with all ADLs, household managment, and driving at baseline. He has a friend who has volunteered to assist/ caregive until April. Pt declined OOB activity d/t pain and fatigue limiting eval. Pt presents with decreased strength, activity tolerance, and pain impacting ADL independence. Skilled IPOT services warranted. Rec d/c home with assist and home health.  -BS     Row Name 02/04/23 1459          Therapy Assessment/Plan (OT)    Rehab Potential (OT) good, to achieve stated therapy goals  -BS     Criteria for Skilled Therapeutic Interventions Met (OT) yes;meets criteria;skilled treatment is necessary  -BS     Therapy Frequency (OT) 5 times/wk  -BS     Row Name 02/04/23 1459          Therapy Plan Review/Discharge Plan (OT)    Anticipated Discharge Disposition (OT) home with assist;home with home health  -BS     Row Name 02/04/23 1459          Vital Signs    O2 Delivery Pre Treatment nasal cannula  -BS     O2 Delivery Intra Treatment nasal cannula  -BS     O2 Delivery Post Treatment nasal cannula  -BS     Pre Patient Position Supine  -BS     Intra Patient Position Supine  -BS     Post Patient Position Supine  -BS     Row Name 02/04/23 1459          Positioning and Restraints    Pre-Treatment Position in bed  -BS     Post Treatment Position bed  -BS     In Bed notified nsg;call light within reach;encouraged to call for assist;exit alarm on  -BS           User Key  (r) = Recorded  By, (t) = Taken By, (c) = Cosigned By    Initials Name Provider Type    Tiffany Gupta OT Occupational Therapist               Outcome Measures     Row Name 02/04/23 1507          How much help from another is currently needed...    Putting on and taking off regular lower body clothing? 2  -BS     Bathing (including washing, rinsing, and drying) 2  -BS     Toileting (which includes using toilet bed pan or urinal) 2  -BS     Putting on and taking off regular upper body clothing 3  -BS     Taking care of personal grooming (such as brushing teeth) 3  -BS     Eating meals 4  -BS     AM-PAC 6 Clicks Score (OT) 16  -BS     Row Name 02/04/23 1308          How much help from another person do you currently need...    Turning from your back to your side while in flat bed without using bedrails? 3  -CC     Moving from lying on back to sitting on the side of a flat bed without bedrails? 3  -CC     Moving to and from a bed to a chair (including a wheelchair)? 3  -CC     Standing up from a chair using your arms (e.g., wheelchair, bedside chair)? 3  -CC     Climbing 3-5 steps with a railing? 2  -CC     To walk in hospital room? 3  -CC     AM-PAC 6 Clicks Score (PT) 17  -CC     Highest level of mobility 5 --> Static standing  -CC     Row Name 02/04/23 1507 02/04/23 1308       Functional Assessment    Outcome Measure Options AM-PAC 6 Clicks Daily Activity (OT)  -BS AM-PAC 6 Clicks Basic Mobility (PT)  -CC          User Key  (r) = Recorded By, (t) = Taken By, (c) = Cosigned By    Initials Name Provider Type    Rolanda Winters, PT Physical Therapist    Tiffany Gupta OT Occupational Therapist                Occupational Therapy Education     Title: PT OT SLP Therapies (In Progress)     Topic: Occupational Therapy (In Progress)     Point: ADL training (Done)     Description:   Instruct learner(s) on proper safety adaptation and remediation techniques during self care or transfers.   Instruct in proper use of assistive  devices.              Learning Progress Summary           Patient Acceptance, E, VU by BS at 2/4/2023 1507    Comment: Reason for eval/ consult                   Point: Home exercise program (Not Started)     Description:   Instruct learner(s) on appropriate technique for monitoring, assisting and/or progressing therapeutic exercises/activities.              Learner Progress:  Not documented in this visit.          Point: Precautions (Done)     Description:   Instruct learner(s) on prescribed precautions during self-care and functional transfers.              Learning Progress Summary           Patient Acceptance, E, VU by BS at 2/4/2023 1507    Comment: Reason for eval/ consult                   Point: Body mechanics (Done)     Description:   Instruct learner(s) on proper positioning and spine alignment during self-care, functional mobility activities and/or exercises.              Learning Progress Summary           Patient Acceptance, E, VU by BS at 2/4/2023 1507    Comment: Reason for eval/ consult                               User Key     Initials Effective Dates Name Provider Type Discipline    KECIA 11/14/22 -  Tifafny Holloway, WILLI Occupational Therapist OT              OT Recommendation and Plan  Planned Therapy Interventions (OT): activity tolerance training, adaptive equipment training, BADL retraining, functional balance retraining, strengthening exercise, ROM/therapeutic exercise, occupation/activity based interventions  Therapy Frequency (OT): 5 times/wk  Plan of Care Review  Plan of Care Reviewed With: patient  Progress: no change  Outcome Evaluation: Pt is a 62 y.o. male s/p bronchoscopy and thoracoscopy with total espohagectomy. Pt reports he is independent with all ADLs, household managment, and driving at baseline. He has a friend who has volunteered to assist/ caregive until April. Pt declined OOB activity d/t pain and fatigue limiting eval. Pt presents with decreased strength, activity tolerance, and  pain impacting ADL independence. Skilled IPOT services warranted. Rec d/c home with assist and home health.     Time Calculation:    Time Calculation- OT     Row Name 02/04/23 1508             Time Calculation- OT    OT Start Time 1330  -BS      OT Stop Time 1342  -BS      OT Time Calculation (min) 12 min  -BS      Total Timed Code Minutes- OT 0 minute(s)  -BS      OT Received On 02/04/23  -BS      OT - Next Appointment 02/06/23  -BS      OT Goal Re-Cert Due Date 02/18/23  -BS         Untimed Charges    OT Eval/Re-eval Minutes 12  -BS         Total Minutes    Untimed Charges Total Minutes 12  -BS       Total Minutes 12  -BS            User Key  (r) = Recorded By, (t) = Taken By, (c) = Cosigned By    Initials Name Provider Type    Tiffany Gupta OT Occupational Therapist              Therapy Charges for Today     Code Description Service Date Service Provider Modifiers Qty    [id number] HC OT EVAL MOD COMPLEXITY 3 2/4/2023 Tiffany Holloway OT GO 1               Tiffany Holloway OT  2/4/2023

## 2023-02-04 NOTE — PLAN OF CARE
Goal Outcome Evaluation:  Plan of Care Reviewed With: patient        Progress: no change  Outcome Evaluation: Received from PACU at 1800. Vital signs stable. Ct output averaging approximatley 100mL/hour (MD notified). F/c averaging about 125ml/hr. Pain well controlled at this time. CT intact and without crepitus, fluctuation or air leak.

## 2023-02-04 NOTE — THERAPY EVALUATION
Patient Name: Edmond Chase  : 1960    MRN: [id number]                              Today's Date: 2023       Admit Date: 2/3/2023    Visit Dx:     ICD-10-CM ICD-9-CM   1. Malignant neoplasm of lower third of esophagus (HCC)  C15.5 150.5     Patient Active Problem List   Diagnosis   • Essential hypertension   • Hyperglycemia   • Hyperlipidemia   • Class 3 severe obesity due to excess calories with serious comorbidity and body mass index (BMI) of 40.0 to 44.9 in adult (Formerly [facility])   • Nocturia   • Nonrheumatic aortic valve insufficiency   • Myocardial ischemia   • Coronary artery disease involving native coronary artery of native heart   • Ascending aortic aneurysm   • CAD in native artery   • S/P CABG (coronary artery bypass graft)   • S/P AVR (aortic valve replacement)   • Status post ascending aortic aneurysm repair   • Fatigue   • Abnormal nuclear stress test   • Coronary artery disease   • Coronary artery disease of bypass graft of native heart with stable angina pectoris (Formerly [facility])   • TJ on auto CPAP   • Hypersomnia due to medical condition   • Angina at rest (Formerly [facility])   • Type 2 diabetes mellitus, without long-term current use of insulin (Formerly [facility])   • Anemia   • Ulcer of esophagus without bleeding   • Polyp of colon   • Rectal bleeding   • Gastrointestinal hemorrhage   • Malignant neoplasm of lower third of esophagus (HCC)   • Gastrointestinal hemorrhage, unspecified gastrointestinal hemorrhage type   • Exertional chest pain   • Iron deficiency anemia     Past Medical History:   Diagnosis Date   • Abnormal nuclear stress test    • Anxiety and depression    • Aortic valve insufficiency     nonrheumtic    • Arthritis    • Ascending aortic aneurysm    • CAD (coronary artery disease)    • Cancer (HCC)    • Chest pain    • Diabetes mellitus (Formerly [facility])    • Dizzy     CAREFUL WHEN GETTING UP   • Dyspnea    • Esophageal cancer (HCC)    • Essential hypertension    • Fatigue    • GERD (gastroesophageal reflux disease)    • GI  "bleed    • History of recent blood transfusion    • Hyperglycemia    • Hyperlipidemia    • Hypersomnia with sleep apnea    • Lightheadedness    • Malaise and fatigue    • Migraines     \"OCCULAR MIGRAINES\"   • Morbid obesity (HCC)    • Myocardial ischemia    • Nocturia    • TJ on auto CPAP 10/31/2016    Overnight polysomnogram.  Weight 276 pounds.  Severe TJ with AHI 79 events per hour.  Auto CPAP recommended.   • Pneumonia     FEB 2016   • Scrotal bleeding    • Shortness of breath    • SOB (shortness of breath)      Past Surgical History:   Procedure Laterality Date   • AORTIC VALVE REPAIR/REPLACEMENT  05/2016   • ASCENDING ARCH/HEMIARCH REPLACEMENT N/A 5/2/2016    Procedure: BHAVESH STERNOTOMY CORONARY ARTERY BYPASS GRAFT TIMES 3 USING LEFT INTERNAL MAMMARY ARTERY AND RIGHT GREATER SAPHENOUS VEIN GRAFT PER ENDOSCOPIC VEIN HARVESTING, AORTIC ANEURYSM REPAIR WITH ROOT REPAIR AND AORTIC VALVE REPLACEMENT;  Surgeon: Rosalio Cline MD;  Location: [facility] OR;  Service:    • CARDIAC CATHETERIZATION N/A 4/1/2016    Procedure: Left Heart Cath;  Surgeon: Erick Tam MD;  Location: [facility] INVASIVE LOCATION;  Service:    • CARDIAC CATHETERIZATION N/A 4/1/2016    Procedure: Left ventriculography;  Surgeon: Erick Tam MD;  Location: [facility] CATH INVASIVE LOCATION;  Service:    • CARDIAC CATHETERIZATION N/A 4/1/2016    Procedure: Right Heart Cath;  Surgeon: Erick Tam MD;  Location: [facility] CATH INVASIVE LOCATION;  Service:    • CARDIAC CATHETERIZATION N/A 10/30/2017    Procedure: Coronary angiography;  Surgeon: Sorin Vasquez MD;  Location: [facility] CATH INVASIVE LOCATION;  Service:    • CARDIAC CATHETERIZATION  10/30/2017    Procedure: Saphenous Vein Graft;  Surgeon: Sorin Vasquez MD;  Location: [facility] CATH INVASIVE LOCATION;  Service:    • CARDIAC CATHETERIZATION N/A 10/30/2017    Procedure: Native mammary injection;  Surgeon: Sorin Vasquez MD;  Location: [facility] CATH INVASIVE LOCATION;  Service:    • " CARDIAC CATHETERIZATION N/A 7/7/2020    Procedure: Coronary angiography;  Surgeon: Gregor Kim MD;  Location:  [facility] CATH INVASIVE LOCATION;  Service: Cardiovascular;  Laterality: N/A;   • CARDIAC CATHETERIZATION N/A 7/7/2020    Procedure: Left heart cath;  Surgeon: Gregor Kim MD;  Location:  [facility] CATH INVASIVE LOCATION;  Service: Cardiovascular;  Laterality: N/A;   • CARDIAC CATHETERIZATION N/A 7/7/2020    Procedure: Left ventriculography;  Surgeon: Gregor Kim MD;  Location:  [facility] CATH INVASIVE LOCATION;  Service: Cardiovascular;  Laterality: N/A;   • CARDIAC CATHETERIZATION N/A 7/7/2020    Procedure: Stent ESMER bypass graft;  Surgeon: Gregor Kim MD;  Location:  [facility] CATH INVASIVE LOCATION;  Service: Cardiovascular;  Laterality: N/A;   • CARDIAC CATHETERIZATION N/A 6/17/2021    Procedure: SAPHENOUS VEIN GRAFT;  Surgeon: Marques Ndiaye MD;  Location: [facility] CATH INVASIVE LOCATION;  Service: Cardiovascular;  Laterality: N/A;   • CARDIAC CATHETERIZATION N/A 6/17/2021    Procedure: Left Heart Cath;  Surgeon: Marques Ndiaye MD;  Location:  [facility] CATH INVASIVE LOCATION;  Service: Cardiovascular;  Laterality: N/A;   • CARDIAC CATHETERIZATION N/A 6/17/2021    Procedure: Coronary angiography;  Surgeon: Marques Ndiaye MD;  Location: [facility] CATH INVASIVE LOCATION;  Service: Cardiovascular;  Laterality: N/A;   • CARDIAC CATHETERIZATION N/A 7/14/2022    Procedure: Left Heart Cath;  Surgeon: Charlie Aj MD;  Location: [facility] CATH INVASIVE LOCATION;  Service: Cardiovascular;  Laterality: N/A;   • CARDIAC CATHETERIZATION N/A 7/14/2022    Procedure: Coronary angiography;  Surgeon: Charlie Aj MD;  Location:  [facility] CATH INVASIVE LOCATION;  Service: Cardiovascular;  Laterality: N/A;   • CARDIAC CATHETERIZATION  7/14/2022    Procedure: Saphenous Vein Graft;  Surgeon: Charlie Aj MD;  Location:  [facility] CATH INVASIVE LOCATION;  Service: Cardiovascular;;   • CARDIAC  CATHETERIZATION N/A 7/14/2022    Procedure: Native mammary injection;  Surgeon: Charlie Aj MD;  Location: [facility] CATH INVASIVE LOCATION;  Service: Cardiovascular;  Laterality: N/A;   • COLONOSCOPY N/A 11/26/2022    Procedure: COLONOSCOPY AT BEDSIDE;  Surgeon: Tomy Lopez MD;  Location: [facility] MAIN OR;  Service: Gastroenterology;  Laterality: N/A;   • COLONOSCOPY W/ POLYPECTOMY N/A 10/4/2022    Procedure: COLONOSCOPY to cecum with cold forceps and cold snare polypectomies;  Surgeon: Gregor Carlson MD;  Location: [facility] ENDOSCOPY;  Service: Gastroenterology;  Laterality: N/A;  PRE- hx of polyps  POST- diverticulosis, polyps   • CORONARY ARTERY BYPASS GRAFT  05/2016    LIMA TO LAD, SVG TO PDA, SVG TO OM2   • ENDOSCOPY N/A 7/13/2022    Procedure: ESOPHAGOGASTRODUODENOSCOPY;  Surgeon: Marcia Hollis MD;  Location: [facility] ENDOSCOPY;  Service: Gastroenterology;  Laterality: N/A;  PRE- ANEMIA, MELENA  POST- MILD EROSIVE GASTRITIS, GE JUNCTION ULCER   • ENDOSCOPY N/A 10/4/2022    Procedure: ESOPHAGOGASTRODUODENOSCOPY with biopsies;  Surgeon: Gregor Carlson MD;  Location: [facility] ENDOSCOPY;  Service: Gastroenterology;  Laterality: N/A;  PRE- hx of esophageal ulcer  POST- gastric cardia mass   • INNER EAR SURGERY     • TONSILLECTOMY        General Information     Row Name 02/04/23 1303          Physical Therapy Time and Intention    Document Type evaluation  -CC     Mode of Treatment individual therapy;physical therapy  -CC     Row Name 02/04/23 1303          General Information    Patient Profile Reviewed yes  -CC     Barriers to Rehab none identified  -CC     Row Name 02/04/23 1303          Living Environment    People in Home other (see comments)  -CC     Row Name 02/04/23 1303          Stairs Within Home, Primary    Stairs, Within Home, Primary lives in condo with elevator access, one level condo  -CC     Row Name 02/04/23 1303          Cognition    Orientation Status (Cognition) oriented x  4  -     Row Name 02/04/23 1303          Safety Issues, Functional Mobility    Impairments Affecting Function (Mobility) strength;endurance/activity tolerance  -           User Key  (r) = Recorded By, (t) = Taken By, (c) = Cosigned By    Initials Name Provider Type    CC Rolanda Posadas, KELSEY Physical Therapist               Mobility     Row Name 02/04/23 1304          Bed Mobility    Comment, (Bed Mobility) UIC  -     Row Name 02/04/23 1304          Sit-Stand Transfer    Sit-Stand [place] (Transfers) contact guard  -     Assistive Device (Sit-Stand Transfers) walker, front-wheeled  -CC     Comment, (Sit-Stand Transfer) x 2 trials with standing exercises  -     Row Name 02/04/23 1304          Gait/Stairs (Locomotion)    [place] Level (Gait) 1 person to manage equipment;unable to assess  -     Comment, (Gait/Stairs) unable to trial gait today due to extensive lines/tubes. Per RN pt to stay connected to suction for chest tube.  -           User Key  (r) = Recorded By, (t) = Taken By, (c) = Cosigned By    Initials Name Provider Type    CC Rolanda Posadas, KELSEY Physical Therapist               Obj/Interventions     Row Name 02/04/23 1305          Strength Comprehensive (MMT)    General Manual Muscle Testing (MMT) Assessment lower extremity strength deficits identified  -     Row Name 02/04/23 1305          Motor Skills    Therapeutic Exercise --  2x20 standing march UE supported, 1x10 standing mini squat UE supported  -           User Key  (r) = Recorded By, (t) = Taken By, (c) = Cosigned By    Initials Name Provider Type     Rolanda Posadas PT Physical Therapist               Goals/Plan     Row Name 02/04/23 1308          Bed Mobility Goal 1 (PT)    Activity/Assistive Device (Bed Mobility Goal 1, PT) bed mobility activities, all  -     [place] Level/Cues Needed (Bed Mobility Goal 1, PT) supervision required  -     Time Frame (Bed Mobility Goal 1, PT) by discharge  -      Row Name 02/04/23 1303          Transfer Goal 1 (PT)    Activity/Assistive Device (Transfer Goal 1, PT) transfers, all  -CC     [place] Level/Cues Needed (Transfer Goal 1, PT) supervision required  -CC     Time Frame (Transfer Goal 1, PT) by discharge  -CC     Row Name 02/04/23 1307          Gait Training Goal 1 (PT)    Activity/Assistive Device (Gait Training Goal 1, PT) gait (walking locomotion)  -CC     [place] Level (Gait Training Goal 1, PT) standby assist  -CC     Distance (Gait Training Goal 1, PT) 100  -CC     Time Frame (Gait Training Goal 1, PT) by discharge  -CC           User Key  (r) = Recorded By, (t) = Taken By, (c) = Cosigned By    Initials Name Provider Type    CC Rolanda Posadas, PT Physical Therapist               Clinical Impression     Row Name 02/04/23 1303          Pain    Pain Location - abdomen  -     Row Name 02/04/23 1306          Plan of Care Review    Outcome Evaluation Pts/p bronchoscopy and thoracoscopy with total esophagectomy and J-tube placement on 2/3/23. Requires CGA for STS transfers and to perform standing exercises. Unable to assess ambulation this date as pt to stay hooked to suction for chest tube per RN, thus mimiced ambulation with march in place supported by FWW. Prior to admission pt lives in one level condo with elevator access. Has friend to assist upon DC. Anticipate DC home with HH PT once medically appropriate.  -     Row Name 02/04/23 1304          Therapy Assessment/Plan (PT)    Rehab Potential (PT) good, to achieve stated therapy goals  -     Criteria for Skilled Interventions Met (PT) yes;skilled treatment is necessary  -CC     Therapy Frequency (PT) 5 times/wk  -CC     Row Name 02/04/23 1304          Positioning and Restraints    Pre-Treatment Position sitting in chair/recliner  -CC     Post Treatment Position chair  -CC     In Chair reclined;call light within reach;encouraged to call for assist;with family/caregiver  -CC           User  Key  (r) = Recorded By, (t) = Taken By, (c) = Cosigned By    Initials Name Provider Type    Rolanda Winters PT Physical Therapist               Outcome Measures     Row Name 02/04/23 1308          How much help from another person do you currently need...    Turning from your back to your side while in flat bed without using bedrails? 3  -CC     Moving from lying on back to sitting on the side of a flat bed without bedrails? 3  -CC     Moving to and from a bed to a chair (including a wheelchair)? 3  -CC     Standing up from a chair using your arms (e.g., wheelchair, bedside chair)? 3  -CC     Climbing 3-5 steps with a railing? 2  -CC     To walk in hospital room? 3  -CC     AM-PAC 6 Clicks Score (PT) 17  -CC     Highest level of mobility 5 --> Static standing  -CC     Row Name 02/04/23 1308          Functional Assessment    Outcome Measure Options AM-PAC 6 Clicks Basic Mobility (PT)  -CC           User Key  (r) = Recorded By, (t) = Taken By, (c) = Cosigned By    Initials Name Provider Type    Rolanda Winters PT Physical Therapist                             Physical Therapy Education     Title: PT OT SLP Therapies (Not Started)     Topic: Physical Therapy (Not Started)     Point: Mobility training (Not Started)     Learner Progress:  Not documented in this visit.          Point: Home exercise program (Not Started)     Learner Progress:  Not documented in this visit.          Point: Body mechanics (Not Started)     Learner Progress:  Not documented in this visit.          Point: Precautions (Not Started)     Learner Progress:  Not documented in this visit.                          PT Recommendation and Plan     Outcome Evaluation: Pts/p bronchoscopy and thoracoscopy with total esophagectomy and J-tube placement on 2/3/23. Requires CGA for STS transfers and to perform standing exercises. Unable to assess ambulation this date as pt to stay hooked to suction for chest tube per RN, thus mimiced  ambulation with march in place supported by FWW. Prior to admission pt lives in one level condo with elevator access. Has friend to assist upon DC. Anticipate DC home with HH PT once medically appropriate.     Time Calculation:    PT Charges     Row Name 02/04/23 1308             Time Calculation    Start Time 1101  -CC      Stop Time 1121  -CC      Time Calculation (min) 20 min  -CC      PT Received On 02/04/23  -CC      PT - Next Appointment 02/06/23  -CC      PT Goal Re-Cert Due Date 02/11/23  -CC         Time Calculation- PT    Total Timed Code Minutes- PT 10 minute(s)  -CC         Timed Charges    22008 - PT Therapeutic Exercise Minutes 10  -CC         Untimed Charges    PT Eval/Re-eval Minutes 10  -CC         Total Minutes    Timed Charges Total Minutes 10  -CC      Untimed Charges Total Minutes 10  -CC       Total Minutes 20  -CC            User Key  (r) = Recorded By, (t) = Taken By, (c) = Cosigned By    Initials Name Provider Type    CC Rolanda Posadas, PT Physical Therapist              Therapy Charges for Today     Code Description Service Date Service Provider Modifiers Qty    84615740173 HC PT THER PROC EA 15 MIN 2/4/2023 Rolanda Posadas, PT GP 1    61473796011 HC PT EVAL LOW COMPLEXITY 1 2/4/2023 Rolanda Posadas, PT GP 1          PT G-Codes  Outcome Measure Options: AM-PAC 6 Clicks Basic Mobility (PT)  AM-PAC 6 Clicks Score (PT): 17  PT Discharge Summary  Anticipated Discharge Disposition (PT): home with assist, home with home health    Rolanda Posadas PT  2/4/2023

## 2023-02-04 NOTE — PLAN OF CARE
"Goal Outcome Evaluation:   Patient is drowsy from surgery procedure and PCA medications, but is able to open eyes spontaneously and answer questions appropriately.  Patient is hesitant to move due to pain, nurse encourages patient to move more and utilize his IS at bedside.  Patient has asked often for sips of water to rinse his mouth or something other than barely moist swabs to use, nurse reminds him that he is not to have anything in his mouth at this time.  Patient is asking how long he will have the NG tube in place, \"because it bothers me, I feel like there is something in my throat\".  Nurse reminds patient that NG tube is sutured in and necessary at this time, that his surgeon will discuss time goals after she evaluates his healing process.                    "

## 2023-02-04 NOTE — PROGRESS NOTES
"  POST-OPERATIVE NOTE     Chief Complaint: Esophageal cancer, postoperative care  S/P: Bronchoscopy, right video-assisted thoracoscopy with total esophagectomy, intercostal nerve block, feeding jejunostomy tube placement  POD # 1    Subjective:  Symptoms:  Stable.  He reports shortness of breath, chest pain and weakness.    Diet:  NPO.  No nausea or vomiting.    Activity level: Impaired due to pain.    Pain:  He complains of pain that is mild.  Pain is partially controlled.        Objective:  General Appearance:  Ill-appearing and in no acute distress.    Vital signs: (most recent): Blood pressure 129/72, pulse 75, temperature 98.2 °F (36.8 °C), resp. rate 18, height 185.4 cm (73\"), weight (!) 144 kg (317 lb 10.9 oz), SpO2 95 %.  Vital signs are normal.  No fever.    Output: Producing urine.    Lungs:  Normal effort and normal respiratory rate.  He is not in respiratory distress.  No rales, wheezes or rhonchi.    Heart: Normal rate.  Regular rhythm.    Chest: Chest wall tenderness present.    Abdomen: Abdomen is soft.  Absent bowel sounds.   There is generalized tenderness.     Extremities: There is dependent edema.    Pulses: Distal pulses are intact.    Neurological: Patient is alert and oriented to person, place and time.    Skin:  Warm and dry.              Chest tube:   Site: Right, Clean, Dry, Intact and Securement device intact  Suction: -20 cm  Air Leak: negative  24 Hour Total: 1115cc    NGT:   24 Hour Total: 0cc    Results Review:     I reviewed the patient's new clinical results.  I reviewed the patient's new imaging results and agree with the interpretation.  I reviewed the patient's other test results and agree with the interpretation  Discussed with Patient, RN, Dr. Pichardo.    Assessment & Plan      Chest x-ray this morning demonstrates no significant pneumothorax with some subcutaneous emphysema along right lateral chest wall and some left basilar density along the left diaphragm, likely " atelectasis.    Esophageal cancer: POD #1, s/p total esophagectomy.  Initiate normal saline at 10 cc/h via J-tube.  Okay for meds through J-tube.  Hold off on initiating tube feeds for now.  Patient should be strict NPO, but may have swab sparingly.  Absolutely no BiPAP.  Continue Dilaudid PCA.  Keep Narvaez catheter in place for now.  Okay to DC A-line.  Check labs and chest x-ray in AM.    Postoperative weakness: PT/OT consulted.  Up to chair and have patient take 3 walks per day with assistance of staff.    Coronary artery disease with angina: Patient follows with Dr. Miguel.  He was initiated on Nitropaste last night per Dr. Miguel's reoperative recommendations.  He is quite lightheaded this morning so we will DC Nitropaste.  We will go ahead and ask cardiology to see him for their opinion while he is here.  Obviously he is at high risk for developing atrial fibrillation postoperatively so we will continue scheduled metoprolol for now.    Appreciate assistance from all providers.    VIKTORIA Ramirez  [facility]  02/04/23  11:01 EST    Patient was seen and assessed while wearing personal protective equipment including facemask, protective eyewear and gloves.  Hand hygiene performed prior to entering the room and upon exiting with doffing of gloves.

## 2023-02-04 NOTE — PLAN OF CARE
Goal Outcome Evaluation:  Plan of Care Reviewed With: patient        Progress: no change  Outcome Evaluation: Pt is a 62 y.o. male s/p bronchoscopy and thoracoscopy with total espohagectomy. Pt reports he is independent with all ADLs, household managment, and driving at baseline. He has a friend who has volunteered to assist/ caregive until April. Pt declined OOB activity d/t pain and fatigue limiting eval. Pt presents with decreased strength, activity tolerance, and pain impacting ADL independence. Skilled IPOT services warranted. Rec d/c home with assist and home health.

## 2023-02-05 ENCOUNTER — APPOINTMENT (OUTPATIENT)
Dept: GENERAL RADIOLOGY | Facility: HOSPITAL | Age: 63
DRG: 327 | End: 2023-02-05
Payer: COMMERCIAL

## 2023-02-05 LAB
ALBUMIN SERPL-MCNC: 3.4 G/DL (ref 3.5–5.2)
ALBUMIN/GLOB SERPL: 1.5 G/DL
ALP SERPL-CCNC: 74 U/L (ref 39–117)
ALT SERPL W P-5'-P-CCNC: 362 U/L (ref 1–41)
ANION GAP SERPL CALCULATED.3IONS-SCNC: 6 MMOL/L (ref 5–15)
AST SERPL-CCNC: 220 U/L (ref 1–40)
BILIRUB SERPL-MCNC: 0.6 MG/DL (ref 0–1.2)
BUN SERPL-MCNC: 24 MG/DL (ref 8–23)
BUN/CREAT SERPL: 22 (ref 7–25)
CALCIUM SPEC-SCNC: 8.1 MG/DL (ref 8.6–10.5)
CHLORIDE SERPL-SCNC: 104 MMOL/L (ref 98–107)
CO2 SERPL-SCNC: 26 MMOL/L (ref 22–29)
CREAT SERPL-MCNC: 1.09 MG/DL (ref 0.76–1.27)
CREAT UR-MCNC: 251.9 MG/DL
DEPRECATED RDW RBC AUTO: 45.7 FL (ref 37–54)
EGFRCR SERPLBLD CKD-EPI 2021: 75.8 ML/MIN/1.73
ERYTHROCYTE [DISTWIDTH] IN BLOOD BY AUTOMATED COUNT: 14.2 % (ref 12.3–15.4)
GLOBULIN UR ELPH-MCNC: 2.2 GM/DL
GLUCOSE BLDC GLUCOMTR-MCNC: 109 MG/DL (ref 70–130)
GLUCOSE BLDC GLUCOMTR-MCNC: 123 MG/DL (ref 70–130)
GLUCOSE BLDC GLUCOMTR-MCNC: 157 MG/DL (ref 70–130)
GLUCOSE BLDC GLUCOMTR-MCNC: 157 MG/DL (ref 70–130)
GLUCOSE SERPL-MCNC: 164 MG/DL (ref 65–99)
HCT VFR BLD AUTO: 33.4 % (ref 37.5–51)
HGB BLD-MCNC: 10.4 G/DL (ref 13–17.7)
MAGNESIUM SERPL-MCNC: 2.1 MG/DL (ref 1.6–2.4)
MCH RBC QN AUTO: 27.7 PG (ref 26.6–33)
MCHC RBC AUTO-ENTMCNC: 31.1 G/DL (ref 31.5–35.7)
MCV RBC AUTO: 89.1 FL (ref 79–97)
NT-PROBNP SERPL-MCNC: 1193 PG/ML (ref 0–900)
PHOSPHATE SERPL-MCNC: 3.5 MG/DL (ref 2.5–4.5)
PLATELET # BLD AUTO: 179 10*3/MM3 (ref 140–450)
PMV BLD AUTO: 10.3 FL (ref 6–12)
POTASSIUM SERPL-SCNC: 4.9 MMOL/L (ref 3.5–5.2)
PROT SERPL-MCNC: 5.6 G/DL (ref 6–8.5)
RBC # BLD AUTO: 3.75 10*6/MM3 (ref 4.14–5.8)
SODIUM SERPL-SCNC: 136 MMOL/L (ref 136–145)
SODIUM UR-SCNC: <20 MMOL/L
WBC NRBC COR # BLD: 13.32 10*3/MM3 (ref 3.4–10.8)

## 2023-02-05 PROCEDURE — 80053 COMPREHEN METABOLIC PANEL: CPT | Performed by: INTERNAL MEDICINE

## 2023-02-05 PROCEDURE — 99024 POSTOP FOLLOW-UP VISIT: CPT | Performed by: NURSE PRACTITIONER

## 2023-02-05 PROCEDURE — 71045 X-RAY EXAM CHEST 1 VIEW: CPT

## 2023-02-05 PROCEDURE — 99232 SBSQ HOSP IP/OBS MODERATE 35: CPT | Performed by: INTERNAL MEDICINE

## 2023-02-05 PROCEDURE — 0 HYDROMORPHONE HCL PF 50 MG/5ML SOLUTION: Performed by: THORACIC SURGERY (CARDIOTHORACIC VASCULAR SURGERY)

## 2023-02-05 PROCEDURE — 94799 UNLISTED PULMONARY SVC/PX: CPT

## 2023-02-05 PROCEDURE — 25010000002 HEPARIN (PORCINE) PER 1000 UNITS: Performed by: THORACIC SURGERY (CARDIOTHORACIC VASCULAR SURGERY)

## 2023-02-05 PROCEDURE — 63710000001 INSULIN REGULAR HUMAN PER 5 UNITS: Performed by: INTERNAL MEDICINE

## 2023-02-05 PROCEDURE — 25010000002 HYDROMORPHONE PER 4 MG: Performed by: THORACIC SURGERY (CARDIOTHORACIC VASCULAR SURGERY)

## 2023-02-05 PROCEDURE — 83735 ASSAY OF MAGNESIUM: CPT

## 2023-02-05 PROCEDURE — 25010000002 FUROSEMIDE PER 20 MG: Performed by: INTERNAL MEDICINE

## 2023-02-05 PROCEDURE — 82962 GLUCOSE BLOOD TEST: CPT

## 2023-02-05 PROCEDURE — 85027 COMPLETE CBC AUTOMATED: CPT | Performed by: NURSE PRACTITIONER

## 2023-02-05 PROCEDURE — 84300 ASSAY OF URINE SODIUM: CPT | Performed by: NURSE PRACTITIONER

## 2023-02-05 PROCEDURE — 83880 ASSAY OF NATRIURETIC PEPTIDE: CPT | Performed by: INTERNAL MEDICINE

## 2023-02-05 PROCEDURE — 84100 ASSAY OF PHOSPHORUS: CPT | Performed by: NURSE PRACTITIONER

## 2023-02-05 PROCEDURE — 82570 ASSAY OF URINE CREATININE: CPT | Performed by: NURSE PRACTITIONER

## 2023-02-05 RX ORDER — FUROSEMIDE 10 MG/ML
40 INJECTION INTRAMUSCULAR; INTRAVENOUS EVERY 12 HOURS
Status: DISCONTINUED | OUTPATIENT
Start: 2023-02-05 | End: 2023-02-08

## 2023-02-05 RX ADMIN — HEPARIN SODIUM 5000 UNITS: 5000 INJECTION INTRAVENOUS; SUBCUTANEOUS at 21:23

## 2023-02-05 RX ADMIN — ACETAMINOPHEN 1000 MG: 500 TABLET, FILM COATED ORAL at 15:20

## 2023-02-05 RX ADMIN — GABAPENTIN 300 MG: 300 CAPSULE ORAL at 15:19

## 2023-02-05 RX ADMIN — GABAPENTIN 300 MG: 300 CAPSULE ORAL at 21:24

## 2023-02-05 RX ADMIN — HEPARIN SODIUM 5000 UNITS: 5000 INJECTION INTRAVENOUS; SUBCUTANEOUS at 05:39

## 2023-02-05 RX ADMIN — METOPROLOL TARTRATE 25 MG: 25 TABLET, FILM COATED ORAL at 08:34

## 2023-02-05 RX ADMIN — DIAZEPAM 2 MG: 2 TABLET ORAL at 20:36

## 2023-02-05 RX ADMIN — HYDROMORPHONE HYDROCHLORIDE: 10 INJECTION, SOLUTION INTRAMUSCULAR; INTRAVENOUS; SUBCUTANEOUS at 01:33

## 2023-02-05 RX ADMIN — INSULIN HUMAN 2 UNITS: 100 INJECTION, SOLUTION PARENTERAL at 12:24

## 2023-02-05 RX ADMIN — ACETAMINOPHEN 1000 MG: 500 TABLET, FILM COATED ORAL at 08:34

## 2023-02-05 RX ADMIN — METOPROLOL TARTRATE 25 MG: 25 TABLET, FILM COATED ORAL at 20:35

## 2023-02-05 RX ADMIN — DEXTROSE MONOHYDRATE, SODIUM CHLORIDE, AND POTASSIUM CHLORIDE 125 ML/HR: 50; 4.5; 1.49 INJECTION, SOLUTION INTRAVENOUS at 05:36

## 2023-02-05 RX ADMIN — ACETAMINOPHEN 1000 MG: 500 TABLET, FILM COATED ORAL at 20:36

## 2023-02-05 RX ADMIN — SODIUM CHLORIDE 1000 ML: 9 INJECTION, SOLUTION INTRAVENOUS at 12:24

## 2023-02-05 RX ADMIN — HEPARIN SODIUM 5000 UNITS: 5000 INJECTION INTRAVENOUS; SUBCUTANEOUS at 15:21

## 2023-02-05 RX ADMIN — GABAPENTIN 300 MG: 300 CAPSULE ORAL at 05:39

## 2023-02-05 RX ADMIN — HYDROMORPHONE HYDROCHLORIDE 0.5 MG: 1 INJECTION, SOLUTION INTRAMUSCULAR; INTRAVENOUS; SUBCUTANEOUS at 01:45

## 2023-02-05 RX ADMIN — FUROSEMIDE 40 MG: 10 INJECTION, SOLUTION INTRAMUSCULAR; INTRAVENOUS at 17:09

## 2023-02-05 NOTE — PROGRESS NOTES
[facility]  [phone]  Dr. Abdi Dominique    Subjective:  LOS: 2    Chief Complaint: Postop esophagectomy    Patient is sitting in a chair.  He looks and feels somewhat better. Somewhat woozy from pain meds.    Objective   Vital Signs past 24hrs    Temp range: Temp (24hrs), Av.4 °F (36.9 °C), Min:98 °F (36.7 °C), Max:98.6 °F (37 °C)    BP range: BP: (107-151)/(57-85) 132/70  Pulse range: Heart Rate:  [] 70  Resp rate range: Resp:  [18-20] 18    Device (Oxygen Therapy): nasal cannula with VKHY0List (L/min):  [1] 1  Oxygen range:SpO2:  [90 %-96 %] 96 %      (!) 147 kg (324 lb 8.3 oz); Body mass index is 43.01 kg/m².    Intake/Output Summary (Last 24 hours) at 2023 1314  Last data filed at 2023 0300  Gross per 24 hour   Intake 2163 ml   Output 815 ml   Net 1348 ml       Physical Exam  Constitutional:       Appearance: He is obese.   Eyes:      Pupils: Pupils are equal, round, and reactive to light.   Cardiovascular:      Rate and Rhythm: Normal rate and regular rhythm.      Heart sounds: No murmur heard.  Pulmonary:      Effort: Pulmonary effort is normal.      Breath sounds: Normal breath sounds.      Comments: Right chest tube  Abdominal:      General: Bowel sounds are normal.      Palpations: Abdomen is soft. There is no mass.      Tenderness: There is no abdominal tenderness.      Comments: PEJ+nt   Musculoskeletal:         General: Swelling (2+) present.   Neurological:      Mental Status: He is alert.       Results Review:    I have reviewed the laboratory and imaging data since the last note by [facility] physician.  My annotations are noted in assessment and plan.    Medication Review:  I have reviewed the current MAR.  My annotations are noted in assessment and plan.    dextrose 5 % and sodium chloride 0.45 % with KCl 20 mEq/L, 125 mL/hr, Last Rate: 125 mL/hr (23 0536)  HYDROmorphone HCl-NaCl,   lactated ringers, 9 mL/hr, Last Rate: 9 mL/hr (23 0737)  lactated ringers,  9 mL/hr, Last Rate: Stopped (02/03/23 1500)  sodium chloride, 30 mL/hr, Last Rate: 30 mL/hr (02/03/23 1702)  sodium chloride, 10 mL/hr, Last Rate: 10 mL/hr (02/04/23 1023)      Plan   Lines, Drains & Airways     Active LDAs     Name Placement date Placement time Site Days    Peripheral IV 02/03/23 0636 Posterior;Right Hand 02/03/23  0636  Hand  1    Peripheral IV 02/03/23 0633 Left;Posterior Hand 02/03/23  0633  Hand  1    Chest Tube 1 Right Midaxillary 02/03/23  1058  Midaxillary  1    NG/OG Tube Nasogastric Left nostril 02/03/23  1500  Left nostril  sutured  less than 1    Gastrostomy/Enterostomy Jejunostomy 1 19 Fr. LUQ 02/03/23  1439  LUQ  less than 1    Urethral Catheter Non-latex;Silicone 16 Fr. 02/03/23  0800  -- 1    Arterial Line 02/03/23 Left Radial 02/03/23  0730  created via procedure documentation  Radial  1              [facility] Problems  Status post esophagectomy for adeno CA distal third of esophagus, 2/3/2023  Right-sided pneumothorax with chest tube  Expected postop pain  CAD with stent  Bioprosthetic aortic valve  TJ on CPAP at home, no PAP therapy here  Morbid obesity  Diabetes type 2  Elevated liver enzymes        Plan of Treatment    Doing well postoperatively.  Pain under control.  On Dilaudid PCA.    With right-sided chest tube and pneumothorax almost completely resolved.    Note cardiology input.  No evidence of any acute coronary syndrome.    Leg swelling and poor UO. Nephrology consult per thoracic surgery.    Elevated liver enzymes.  We will need to monitor.    Sliding scale for diabetes.    Hopefully well enough for transfer out of ICU tomorrow.    Abdi Dominique MD  02/05/23  13:14 EST      Part of this note may be an electronic transcription/translation of spoken language to printed text using the Dragon Dictation System.

## 2023-02-05 NOTE — CONSULTS
"  Patient Care Team:  Nargis Velasquez APRN as PCP - General (Family Medicine)  Papa Miguel MD as Consulting Physician (Cardiology)  Sekou Pisano MD as Consulting Physician (Pulmonary Disease)  Gregor Carlson MD as Consulting Physician (Gastroenterology)  Estevan Yuan MD (Sports Medicine)  Shelley Goldsmith, RN as Nurse Navigator    Chief complaint: decreased uop    Subjective     History of Present Illness  61yo with h/o esoph adenocarcinoma, CAD, DMII s/p esophagectomy on 2/3.  He has had reduced uop recently and we were asked to see him.  He is awake and alert.  He tells me he has had cardiac disease and CHF previously.  He is on lasix on home meds.      Review of Systems   Constitutional: Negative for fatigue and fever.   Respiratory: Positive for shortness of breath. Negative for cough.    Cardiovascular: Positive for leg swelling. Negative for chest pain.   Gastrointestinal: Negative for diarrhea, nausea and vomiting.   Musculoskeletal: Negative for back pain.   Neurological: Negative for headaches.   Psychiatric/Behavioral: Negative for confusion.        Past Medical History:   Diagnosis Date   • Abnormal nuclear stress test    • Anxiety and depression    • Aortic valve insufficiency     nonrheumtic    • Arthritis    • Ascending aortic aneurysm    • CAD (coronary artery disease)    • Cancer (HCC)    • Chest pain    • Diabetes mellitus (HCC)    • Dizzy     CAREFUL WHEN GETTING UP   • Dyspnea    • Esophageal cancer (HCC)    • Essential hypertension    • Fatigue    • GERD (gastroesophageal reflux disease)    • GI bleed    • History of recent blood transfusion    • Hyperglycemia    • Hyperlipidemia    • Hypersomnia with sleep apnea    • Lightheadedness    • Malaise and fatigue    • Migraines     \"OCCULAR MIGRAINES\"   • Morbid obesity (HCC)    • Myocardial ischemia    • Nocturia    • TJ on auto CPAP 10/31/2016    Overnight polysomnogram.  Weight 276 pounds.  Severe TJ with AHI " 79 events per hour.  Auto CPAP recommended.   • Pneumonia     FEB 2016   • Scrotal bleeding    • Shortness of breath    • SOB (shortness of breath)    ,   Past Surgical History:   Procedure Laterality Date   • AORTIC VALVE REPAIR/REPLACEMENT  05/2016   • ASCENDING ARCH/HEMIARCH REPLACEMENT N/A 5/2/2016    Procedure: BHAVESH STERNOTOMY CORONARY ARTERY BYPASS GRAFT TIMES 3 USING LEFT INTERNAL MAMMARY ARTERY AND RIGHT GREATER SAPHENOUS VEIN GRAFT PER ENDOSCOPIC VEIN HARVESTING, AORTIC ANEURYSM REPAIR WITH ROOT REPAIR AND AORTIC VALVE REPLACEMENT;  Surgeon: Rosalio Cline MD;  Location: [facility] OR;  Service:    • CARDIAC CATHETERIZATION N/A 4/1/2016    Procedure: Left Heart Cath;  Surgeon: Erick Tam MD;  Location: [facility] CATH INVASIVE LOCATION;  Service:    • CARDIAC CATHETERIZATION N/A 4/1/2016    Procedure: Left ventriculography;  Surgeon: Erick Tam MD;  Location: [facility] CATH INVASIVE LOCATION;  Service:    • CARDIAC CATHETERIZATION N/A 4/1/2016    Procedure: Right Heart Cath;  Surgeon: Erick Tam MD;  Location: [facility] CATH INVASIVE LOCATION;  Service:    • CARDIAC CATHETERIZATION N/A 10/30/2017    Procedure: Coronary angiography;  Surgeon: Sorin Vasquez MD;  Location: [facility] CATH INVASIVE LOCATION;  Service:    • CARDIAC CATHETERIZATION  10/30/2017    Procedure: Saphenous Vein Graft;  Surgeon: Sorin Vasquez MD;  Location: [facility] CATH INVASIVE LOCATION;  Service:    • CARDIAC CATHETERIZATION N/A 10/30/2017    Procedure: Native mammary injection;  Surgeon: Sorin Vasquez MD;  Location: [facility] CATH INVASIVE LOCATION;  Service:    • CARDIAC CATHETERIZATION N/A 7/7/2020    Procedure: Coronary angiography;  Surgeon: Gregor Kim MD;  Location: [facility] CATH INVASIVE LOCATION;  Service: Cardiovascular;  Laterality: N/A;   • CARDIAC CATHETERIZATION N/A 7/7/2020    Procedure: Left heart cath;  Surgeon: Gregor Kim MD;  Location: [facility] CATH INVASIVE LOCATION;  Service: Cardiovascular;   Laterality: N/A;   • CARDIAC CATHETERIZATION N/A 7/7/2020    Procedure: Left ventriculography;  Surgeon: Gregor Kim MD;  Location: [facility] CATH INVASIVE LOC[facility];  Service: Cardiovascular;  Laterality: N/A;   • CARDIAC CATHETERIZATION N/A 7/7/2020    Procedure: Stent ESMER bypass graft;  Surgeon: Gregor Kim MD;  Location:  [facility] CATH INVASIVE LOCATION;  Service: Cardiovascular;  Laterality: N/A;   • CARDIAC CATHETERIZATION N/A 6/17/2021    Procedure: SAPHENOUS VEIN GRAFT;  Surgeon: Marques Ndiaye MD;  Location:  [facility] CATH INVASIVE LOCATION;  Service: Cardiovascular;  Laterality: N/A;   • CARDIAC CATHETERIZATION N/A 6/17/2021    Procedure: Left Heart Cath;  Surgeon: Marques Ndiaye MD;  [facility];  Service: Cardiovascular;  Laterality: N/A;   • CARDIAC CATHETERIZATION N/A 6/17/2021    Procedure: Coronary angiography;  Surgeon: Marques Ndiaye MD;  Location: [facility] CATH INVASIVE LOCATION;  Service: Cardiovascular;  Laterality: N/A;   • CARDIAC CATHETERIZATION N/A 7/14/2022    Procedure: Left Heart Cath;  Surgeon: Charlie Aj MD;  [facility];  Service: Cardiovascular;  Laterality: N/A;   • CARDIAC CATHETERIZATION N/A 7/14/2022    Procedure: Coronary angiography;  Surgeon: Charlie Aj MD;  [facility];  Service: Cardiovascular;  Laterality: N/A;   • CARDIAC CATHETERIZATION  7/14/2022    Procedure: Saphenous Vein Graft;  Surgeon: Charlie Aj MD;  Location: [facility] CATH INVASIVE LOCATION;  Service: Cardiovascular;;   • CARDIAC CATHETERIZATION N/A 7/14/2022    Procedure: Native mammary injection;  Surgeon: Charlie Aj MD;  Location: [facility] CATH INVASIVE LOCATION;  Service: Cardiovascular;  Laterality: N/A;   • COLONOSCOPY N/A 11/26/2022    Procedure: COLONOSCOPY AT BEDSIDE;  Surgeon: Tomy Lopez MD;  Location: [facility] MAIN OR;  Service: Gastroenterology;  Laterality: N/A;   •  "COLONOSCOPY W/ POLYPECTOMY N/A 10/4/2022    Procedure: COLONOSCOPY to cecum with cold forceps and cold snare polypectomies;  Surgeon: Gregor Carlson MD;  Location:  [facility] ENDOSCOPY;  Service: Gastroenterology;  Laterality: N/A;  PRE- hx of polyps  POST- diverticulosis, polyps   • CORONARY ARTERY BYPASS GRAFT  05/2016    LIMA TO LAD, SVG TO PDA, SVG TO OM2   • ENDOSCOPY N/A 7/13/2022    Procedure: ESOPHAGOGASTRODUODENOSCOPY;  Surgeon: Marcia Hollis MD;  Location: [facility] ENDOSCOPY;  Service: Gastroenterology;  Laterality: N/A;  PRE- ANEMIA, MELENA  POST- MILD EROSIVE GASTRITIS, GE JUNCTION ULCER   • ENDOSCOPY N/A 10/4/2022    Procedure: ESOPHAGOGASTRODUODENOSCOPY with biopsies;  Surgeon: Gregor Carlson MD;  Location: [facility] ENDOSCOPY;  Service: Gastroenterology;  Laterality: N/A;  PRE- hx of esophageal ulcer  POST- gastric cardia mass   • INNER EAR SURGERY     • TONSILLECTOMY     ,   Family History   Problem Relation Age of Onset   • Hypertension Mother    • Diabetes Mother    • Heart disease Mother    • Hypertension Father    • Lung cancer Father    • Heart disease Sister    • Stroke Maternal Grandmother    • Heart disease Maternal Grandmother    • Hypertension Maternal Grandfather    • Hypertension Paternal Grandmother    • Hypertension Paternal Grandfather    • Other Other         The patient states that his sister has a problem that goes by the name of \"long QT\".  He says this is a familial trait but he also says that he is \"not interested in pursuing it for himself\".   • Coronary artery disease Other    • Malig Hyperthermia Neg Hx    ,   Social History     Socioeconomic History   • Marital status: Single   Tobacco Use   • Smoking status: Never   • Smokeless tobacco: Never   Vaping Use   • Vaping Use: Never used   Substance and Sexual Activity   • Alcohol use: Yes     Comment: 1-2 drink/monthly   • Drug use: No   • Sexual activity: Defer     E-cigarette/Vaping   • E-cigarette/Vaping Use Never " User      E-cigarette/Vaping Substances     E-cigarette/Vaping Devices       ,   Medications Prior to Admission   Medication Sig Dispense Refill Last Dose   • amLODIPine (NORVASC) 10 MG tablet Take 10 mg by mouth Daily.   2/3/2023 at 0430   • atorvastatin (LIPITOR) 40 MG tablet Take 40 mg by mouth Every Night.   Past Week   • dapagliflozin Propanediol (Farxiga) 10 MG tablet Take 10 mg by mouth Daily.   Past Week   • ferrous sulfate 324 (65 Fe) MG tablet delayed-release EC tablet Take 324 mg by mouth Daily With Breakfast.   Past Week   • furosemide (LASIX) 20 MG tablet Take 20 mg by mouth As Needed.   Past Week   • GLUCOSAMINE HCL PO Take 1 tablet by mouth Daily.   Past Week   • isosorbide mononitrate (IMDUR) 60 MG 24 hr tablet Take 60 mg by mouth Daily.   2/3/2023 at 0430   • lisinopril (PRINIVIL,ZESTRIL) 10 MG tablet Take 10 mg by mouth Every Evening. HOLD 24 HOURS PRIOR TO SURGERY   Past Week   • metFORMIN (GLUCOPHAGE) 500 MG tablet Take 500 mg by mouth Every Evening.   Past Week   • metoprolol succinate XL (TOPROL-XL) 25 MG 24 hr tablet 25 mg Every Evening.   2/2/2023 at 1830   • pantoprazole (PROTONIX) 40 MG EC tablet Take 1 tablet by mouth 2 (Two) Times a Day With Meals. (Patient taking differently: Take 40 mg by mouth Daily.) 30 tablet 5 2/3/2023 at 0430   • potassium chloride 10 MEQ CR tablet Take 10 mEq by mouth Daily.   Past Week   • sucralfate (CARAFATE) 1 g tablet TAKE ONE TABLET BY MOUTH TWICE A DAY BEFORE A MEAL (Patient taking differently: Take 1 g by mouth Every Evening.) 60 tablet 5 Past Week   , Scheduled Meds:  acetaminophen, 1,000 mg, Per J Tube, TID  gabapentin, 300 mg, Per J Tube, Q8H  heparin (porcine), 5,000 Units, Subcutaneous, Q8H  insulin regular, 0-9 Units, Subcutaneous, Q6H  metoprolol tartrate, 25 mg, Per J Tube, Q12H   Or  metoprolol tartrate, 5 mg, Intravenous, Q12H  sodium chloride, 1,000 mL, Intravenous, Once    , Continuous Infusions:  dextrose 5 % and sodium chloride 0.45 % with  KCl 20 mEq/L, 125 mL/hr, Last Rate: 125 mL/hr (02/05/23 0536)  HYDROmorphone HCl-NaCl,   lactated ringers, 9 mL/hr, Last Rate: 9 mL/hr (02/03/23 0737)  lactated ringers, 9 mL/hr, Last Rate: Stopped (02/03/23 1500)  sodium chloride, 30 mL/hr, Last Rate: 30 mL/hr (02/03/23 1702)  sodium chloride, 10 mL/hr, Last Rate: 10 mL/hr (02/04/23 1023)    , PRN Meds:  •  dextrose  •  dextrose  •  diazePAM  •  glucagon (human recombinant)  •  HYDROmorphone  •  labetalol  •  naloxone  •  nitroglycerin  •  [DISCONTINUED] ondansetron **OR** ondansetron  •  oxyCODONE and Allergies:  Patient has no known allergies.    Objective     Vital Signs  Temp:  [98.2 °F (36.8 °C)-98.6 °F (37 °C)] 98.2 °F (36.8 °C)  Heart Rate:  [] 73  Resp:  [10-28] 10  BP: (108-151)/(61-85) 129/69  Arterial Line BP: (125-201)/(58-72) 196/72    I/O this shift:  In: 60 [Other:60]  Out: 390 [Urine:330; Chest Tube:60]  I/O last 3 completed shifts:  In: 4110 [I.V.:3869; Other:241]  Out: 2440 [Urine:1240; Emesis/NG output:125; Chest Tube:1075]    Physical Exam  Constitutional:       Appearance: He is obese.   HENT:      Head: Normocephalic.      Nose: Nose normal.      Mouth/Throat:      Mouth: Mucous membranes are moist.   Eyes:      General: No scleral icterus.     Pupils: Pupils are equal, round, and reactive to light.   Cardiovascular:      Rate and Rhythm: Normal rate and regular rhythm.      Pulses: Normal pulses.      Heart sounds: Normal heart sounds.   Pulmonary:      Effort: Pulmonary effort is normal.      Breath sounds: Rhonchi present.   Abdominal:      General: Abdomen is flat.   Musculoskeletal:         General: Normal range of motion.      Cervical back: Normal range of motion.      Right lower leg: Edema present.      Left lower leg: Edema present.   Skin:     General: Skin is warm and dry.   Neurological:      General: No focal deficit present.      Mental Status: He is alert.         Results Review:    I reviewed the patient's new clinical  results.    WBC WBC   Date Value Ref Range Status   02/05/2023 13.32 (H) 3.40 - 10.80 10*3/mm3 Final   02/04/2023 10.18 3.40 - 10.80 10*3/mm3 Final   02/03/2023 12.46 (H) 3.40 - 10.80 10*3/mm3 Final      HGB Hemoglobin   Date Value Ref Range Status   02/05/2023 10.4 (L) 13.0 - 17.7 g/dL Final   02/04/2023 9.9 (L) 13.0 - 17.7 g/dL Final   02/03/2023 10.9 (L) 13.0 - 17.7 g/dL Final      HCT Hematocrit   Date Value Ref Range Status   02/05/2023 33.4 (L) 37.5 - 51.0 % Final   02/04/2023 31.9 (L) 37.5 - 51.0 % Final   02/03/2023 34.2 (L) 37.5 - 51.0 % Final      Platlets No results found for: LABPLAT   MCV MCV   Date Value Ref Range Status   02/05/2023 89.1 79.0 - 97.0 fL Final   02/04/2023 86.9 79.0 - 97.0 fL Final   02/03/2023 88.4 79.0 - 97.0 fL Final          Sodium Sodium   Date Value Ref Range Status   02/05/2023 136 136 - 145 mmol/L Final   02/04/2023 142 136 - 145 mmol/L Final   02/03/2023 137 136 - 145 mmol/L Final      Potassium Potassium   Date Value Ref Range Status   02/05/2023 4.9 3.5 - 5.2 mmol/L Final   02/04/2023 4.7 3.5 - 5.2 mmol/L Final   02/03/2023 4.5 3.5 - 5.2 mmol/L Final      Chloride Chloride   Date Value Ref Range Status   02/05/2023 104 98 - 107 mmol/L Final   02/04/2023 106 98 - 107 mmol/L Final   02/03/2023 105 98 - 107 mmol/L Final      CO2 CO2   Date Value Ref Range Status   02/05/2023 26.0 22.0 - 29.0 mmol/L Final   02/04/2023 26.2 22.0 - 29.0 mmol/L Final   02/03/2023 24.0 22.0 - 29.0 mmol/L Final      BUN BUN   Date Value Ref Range Status   02/05/2023 24 (H) 8 - 23 mg/dL Final   02/04/2023 20 8 - 23 mg/dL Final   02/03/2023 17 8 - 23 mg/dL Final      Creatinine Creatinine   Date Value Ref Range Status   02/05/2023 1.09 0.76 - 1.27 mg/dL Final   02/04/2023 0.97 0.76 - 1.27 mg/dL Final   02/03/2023 1.09 0.76 - 1.27 mg/dL Final      Calcium Calcium   Date Value Ref Range Status   02/05/2023 8.1 (L) 8.6 - 10.5 mg/dL Final   02/04/2023 8.5 (L) 8.6 - 10.5 mg/dL Final   02/03/2023 8.7 8.6 -  10.5 mg/dL Final      PO4 No results found for: CAPO4   Albumin Albumin   Date Value Ref Range Status   02/05/2023 3.4 (L) 3.5 - 5.2 g/dL Final      Magnesium Magnesium   Date Value Ref Range Status   02/05/2023 2.1 1.6 - 2.4 mg/dL Final      Uric Acid No results found for: URICACID         Assessment & Plan       Malignant neoplasm of lower third of esophagus (HCC)      Assessment & Plan  Decreased uop-  Fairly normal renal function at this time.  Baseline closer to 0.8 from previous labs.  Urine sodium <20.  Poss decompensated heart failure? Doesn't appear volume depleted on exam, some dyspnea.  Had noted pleural effusion on xray with small right sided pneumo.  Will hold IVF's at this time and start iv lasix 40mg bid.  Monitor uop for now.    Anemia- hgb stable  Esoph adeno- s/p esophagectomy  Right sided pneumo- chest tube in place.  H/o bioprosthetic aortic valve      I discussed the patients findings and my recommendations with patient    Estevan Arriaga MD  02/05/23  15:36 EST

## 2023-02-05 NOTE — PLAN OF CARE
Problem: Feeding Intolerance (Enteral Nutrition)  Goal: Feeding Tolerance  Outcome: Ongoing, Progressing   Goal Outcome Evaluation: TF started today via J-tube.

## 2023-02-05 NOTE — CONSULTS
Nutrition Services    Patient Name:  Edmond Chase  YOB: 1960  MRN: [id number]  Admit Date:  2/3/2023  Assessment Date:  02/05/23    NUTRITION NOTE       Reason for Encounter Follow-up , Other: TF assessment consult        Nutrition Order NPO Diet NPO Type: Strict NPO    EN/PN Order Impact Peptide 1.5 @ 10 mL/hr  Do not advance    Supplements/Snacks -         Pertinent Information TF began this morning via J-tube as per RD recommendations in 2/4/23 consultation note. Will not be advancing beyond 10 ml/hr today. Glu 169 (trending in mid-100 to low-200 range), . Chest xray of 2/5 showed small pneumothorax, right chest tube in place.          Intervention/Plan -RD to monitor TF tolerance, labs, weight and clinical status.   -TF rate to advance per MD orders - goal is 65 ml/hr.      RD to follow up per protocol.    Electronically signed by:  Neris Chang RD  02/05/23 10:00 EST

## 2023-02-05 NOTE — PROGRESS NOTES
"  POST-OPERATIVE NOTE     Chief Complaint: Esophageal cancer, postoperative care  S/P: Bronchoscopy, right video-assisted thoracoscopy with total esophagectomy, intercostal nerve block, feeding jejunostomy tube placement  POD # 2    Subjective:  Symptoms:  Stable.  He reports shortness of breath, chest pain and weakness.    Diet:  NPO.  No nausea or vomiting.    Activity level: Impaired due to pain.    Pain:  He complains of pain that is mild.  Pain is partially controlled.    Persistent postop pain but overall feeling well.  No new complaints.    Objective:  General Appearance:  Ill-appearing, in no acute distress and comfortable.    Vital signs: (most recent): Blood pressure 108/75, pulse 77, temperature 98.5 °F (36.9 °C), temperature source Oral, resp. rate 18, height 185 cm (72.84\"), weight (!) 147 kg (324 lb 8.3 oz), SpO2 94 %.  Vital signs are normal.  No fever.    Output: Producing urine.    HEENT: (NG tube and nasal cannula)    Lungs:  Normal effort and normal respiratory rate.  He is not in respiratory distress.  No rales, wheezes or rhonchi.    Heart: Normal rate.  Regular rhythm.    Chest: Chest wall tenderness present.    Abdomen: Abdomen is soft.  Absent bowel sounds.   There is generalized tenderness.  There is incisional tenderness.    Extremities: Decreased range of motion.  There is dependent edema (Bilateral lower extremities).    Pulses: Distal pulses are intact.    Neurological: Patient is alert and oriented to person, place and time.    Skin:  Warm and dry.  (Postoperative incisions well approximated and intact)          Chest tube:   Site: Right, Clean, Dry, Intact and Securement device intact  Suction: -20 cm  Air Leak: negative  24 Hour Total: 640cc    NGT:   24 Hour [id number] cc    Results Review:     I reviewed the patient's new clinical results.  I reviewed the patient's new imaging results and agree with the interpretation.  I reviewed the patient's other test results and agree with the " interpretation  Discussed with Patient, RN, Dr. Pichardo.    Assessment & Plan      Esophageal cancer: POD #2, s/p total esophagectomy.  Some expected postop pain, but this is improved with ERAS medications.  Continue PCA for now.  We will initiate tube feeds at 10 cc/h via J-tube.  Do not advance.Patient should be strict NPO, but may have swabs sparingly.  Absolutely no BiPAP.  Low urine output and slightly elevated creatinine.  Keep Narvaez catheter in place for now.  We will asked nephrology to see him.  Appreciate their assistance.  Place chest tube to waterseal and leave NG tube to intermittent low wall suction.  We will plan to remove these on postoperative day 5.    Postoperative weakness: PT/OT consulted.  Up to chair and have patient take 3 walks per day with assistance of staff.    Coronary artery disease with angina: Patient follows with Dr. Miguel.  He was initiated on Nitropaste last night per Dr. Miguel's reoperative recommendations.  This was discontinued yesterday secondary to lightheadedness.  Cardiology has seen him.  Appreciate their assistance.  Continue scheduled metoprolol.      Appreciate assistance from all providers.    Sujata John, FABIAN, APRN  [facility]  02/05/23  10:24 EST    Patient was seen and assessed while wearing personal protective equipment including facemask, protective eyewear and gloves.  Hand hygiene performed prior to entering the room and upon exiting with doffing of gloves.

## 2023-02-05 NOTE — NURSING NOTE
Dr. Pichardo notified of no change to pt status including urine output still low. Dr. Pichardo said calls were not necessary if everything is ok.

## 2023-02-05 NOTE — PROGRESS NOTES
[facility] Follow Up    Chief Complaint: Follow up CAD    Interval History: The patient ambulated some around the nurses station today.  He complains of feeling weak and shaky with exertion.  Chest pain is unchanged.  Does not really worsen with exertion.    Objective:     Objective:  Temp:  [98 °F (36.7 °C)-98.6 °F (37 °C)] 98.4 °F (36.9 °C)  Heart Rate:  [] 70  Resp:  [18-20] 18  BP: (108-151)/(61-85) 132/70  Arterial Line BP: (125-201)/(58-72) 196/72     Intake/Output Summary (Last 24 hours) at 2/5/2023 1429  Last data filed at 2/5/2023 1200  Gross per 24 hour   Intake 2223 ml   Output 1155 ml   Net 1068 ml     Body mass index is 43.01 kg/m².      02/04/23  0600 02/04/23  1727 02/05/23  0300   Weight: (!) 144 kg (317 lb 10.9 oz) (!) 144 kg (317 lb 7.4 oz) (!) 147 kg (324 lb 8.3 oz)     Weight change: 1 kg (2 lb 3.3 oz)      Physical Exam:   General : Alert, cooperative, in no acute distress.  Neuro: Alert,cooperative and oriented.  Lungs: CTAB. Normal respiratory effort and rate.  CV: Regular rate and rhythm, normal S1 and S2, no murmurs, gallops or rubs.  ABD: Soft, nontender, nondistended. Positive bowel sounds.  Extr: No edema or cyanosis, moves all extremities.    Lab Review:   Results from last 7 days   Lab Units 02/05/23  0325 02/04/23  0309   SODIUM mmol/L 136 142   POTASSIUM mmol/L 4.9 4.7   CHLORIDE mmol/L 104 106   CO2 mmol/L 26.0 26.2   BUN mg/dL 24* 20   CREATININE mg/dL 1.09 0.97   GLUCOSE mg/dL 164* 194*   CALCIUM mg/dL 8.1* 8.5*   AST (SGOT) U/L 220*  --    ALT (SGPT) U/L 362*  --          Results from last 7 days   Lab Units 02/05/23  0325 02/04/23  0309   WBC 10*3/mm3 13.32* 10.18   HEMOGLOBIN g/dL 10.4* 9.9*   HEMATOCRIT % 33.4* 31.9*   PLATELETS 10*3/mm3 179 201         Results from last 7 days   Lab Units 02/05/23  0325   MAGNESIUM mg/dL 2.1           Invalid input(s): LDLCALC          I reviewed the patient's new clinical results.  I personally viewed and  interpreted the patient's EKG  Current Medications:   Scheduled Meds:acetaminophen, 1,000 mg, Per J Tube, TID  gabapentin, 300 mg, Per J Tube, Q8H  heparin (porcine), 5,000 Units, Subcutaneous, Q8H  insulin regular, 0-9 Units, Subcutaneous, Q6H  metoprolol tartrate, 25 mg, Per J Tube, Q12H   Or  metoprolol tartrate, 5 mg, Intravenous, Q12H  sodium chloride, 1,000 mL, Intravenous, Once      Continuous Infusions:dextrose 5 % and sodium chloride 0.45 % with KCl 20 mEq/L, 125 mL/hr, Last Rate: 125 mL/hr (02/05/23 0536)  HYDROmorphone HCl-NaCl,   lactated ringers, 9 mL/hr, Last Rate: 9 mL/hr (02/03/23 0737)  lactated ringers, 9 mL/hr, Last Rate: Stopped (02/03/23 1500)  sodium chloride, 30 mL/hr, Last Rate: 30 mL/hr (02/03/23 1702)  sodium chloride, 10 mL/hr, Last Rate: 10 mL/hr (02/04/23 1023)        Allergies:  No Known Allergies    Assessment/Plan:     1.  Esophageal cancer status post esophagectomy.  And J-tube placement.  2.  Chest pain.  Suspect is most likely postoperative pain.  His EKG postoperatively shows no acute changes.  The symptoms are constant does not really seem to worsen with exertion.  Limited echocardiogram shows grossly normal ventricular systolic function and wall motion.  3.  Coronary artery disease.  Cardiac catheterization 7/2022 showed only 1 out of 3 patent grafts and chronic severe stenosis of the left circumflex and right coronary arteries.  He has been medically managed since.  4.  History of bioprosthetic aortic valve replacement.  Normal function on his last echocardiogram in 12/2022.  5.  Hypertension.    Well controlled.  6.  Hyperlipidemia  7.  Diabetes mellitus type 2  8.  Trivial pericardial effusion.  Noted on limited echocardiogram yesterday.  Echocardiogram performed for pericardial rub noted on exam.  This is appears to resolved today on exam.    - Continue current cardiac management.    Jesika West MD  02/05/23  14:29 EST

## 2023-02-06 ENCOUNTER — APPOINTMENT (OUTPATIENT)
Dept: GENERAL RADIOLOGY | Facility: HOSPITAL | Age: 63
DRG: 327 | End: 2023-02-06
Payer: COMMERCIAL

## 2023-02-06 LAB
ALBUMIN SERPL-MCNC: 3.2 G/DL (ref 3.5–5.2)
ALBUMIN/GLOB SERPL: 1 G/DL
ALP SERPL-CCNC: 78 U/L (ref 39–117)
ALT SERPL W P-5'-P-CCNC: 260 U/L (ref 1–41)
ANION GAP SERPL CALCULATED.3IONS-SCNC: 9.1 MMOL/L (ref 5–15)
AST SERPL-CCNC: 77 U/L (ref 1–40)
BILIRUB SERPL-MCNC: 0.5 MG/DL (ref 0–1.2)
BUN SERPL-MCNC: 22 MG/DL (ref 8–23)
BUN/CREAT SERPL: 19.5 (ref 7–25)
CALCIUM SPEC-SCNC: 8.6 MG/DL (ref 8.6–10.5)
CHLORIDE SERPL-SCNC: 102 MMOL/L (ref 98–107)
CO2 SERPL-SCNC: 28.9 MMOL/L (ref 22–29)
CREAT SERPL-MCNC: 1.13 MG/DL (ref 0.76–1.27)
DEPRECATED RDW RBC AUTO: 47 FL (ref 37–54)
EGFRCR SERPLBLD CKD-EPI 2021: 72.6 ML/MIN/1.73
ERYTHROCYTE [DISTWIDTH] IN BLOOD BY AUTOMATED COUNT: 14.2 % (ref 12.3–15.4)
GLOBULIN UR ELPH-MCNC: 3.1 GM/DL
GLUCOSE BLDC GLUCOMTR-MCNC: 118 MG/DL (ref 70–130)
GLUCOSE BLDC GLUCOMTR-MCNC: 121 MG/DL (ref 70–130)
GLUCOSE BLDC GLUCOMTR-MCNC: 141 MG/DL (ref 70–130)
GLUCOSE BLDC GLUCOMTR-MCNC: 153 MG/DL (ref 70–130)
GLUCOSE SERPL-MCNC: 128 MG/DL (ref 65–99)
HCT VFR BLD AUTO: 32.6 % (ref 37.5–51)
HGB BLD-MCNC: 10.1 G/DL (ref 13–17.7)
MCH RBC QN AUTO: 27.8 PG (ref 26.6–33)
MCHC RBC AUTO-ENTMCNC: 31 G/DL (ref 31.5–35.7)
MCV RBC AUTO: 89.8 FL (ref 79–97)
PHOSPHATE SERPL-MCNC: 2.9 MG/DL (ref 2.5–4.5)
PLATELET # BLD AUTO: 167 10*3/MM3 (ref 140–450)
PMV BLD AUTO: 10.1 FL (ref 6–12)
POTASSIUM SERPL-SCNC: 4.4 MMOL/L (ref 3.5–5.2)
PROT SERPL-MCNC: 6.3 G/DL (ref 6–8.5)
RBC # BLD AUTO: 3.63 10*6/MM3 (ref 4.14–5.8)
SODIUM SERPL-SCNC: 140 MMOL/L (ref 136–145)
URATE SERPL-MCNC: 5.8 MG/DL (ref 3.4–7)
WBC NRBC COR # BLD: 6.66 10*3/MM3 (ref 3.4–10.8)

## 2023-02-06 PROCEDURE — 85027 COMPLETE CBC AUTOMATED: CPT | Performed by: NURSE PRACTITIONER

## 2023-02-06 PROCEDURE — 80053 COMPREHEN METABOLIC PANEL: CPT | Performed by: INTERNAL MEDICINE

## 2023-02-06 PROCEDURE — 99232 SBSQ HOSP IP/OBS MODERATE 35: CPT | Performed by: INTERNAL MEDICINE

## 2023-02-06 PROCEDURE — 97110 THERAPEUTIC EXERCISES: CPT

## 2023-02-06 PROCEDURE — 84550 ASSAY OF BLOOD/URIC ACID: CPT | Performed by: INTERNAL MEDICINE

## 2023-02-06 PROCEDURE — 82962 GLUCOSE BLOOD TEST: CPT

## 2023-02-06 PROCEDURE — 0 HYDROMORPHONE HCL PF 50 MG/5ML SOLUTION: Performed by: THORACIC SURGERY (CARDIOTHORACIC VASCULAR SURGERY)

## 2023-02-06 PROCEDURE — 84100 ASSAY OF PHOSPHORUS: CPT | Performed by: NURSE PRACTITIONER

## 2023-02-06 PROCEDURE — 25010000002 FUROSEMIDE PER 20 MG: Performed by: INTERNAL MEDICINE

## 2023-02-06 PROCEDURE — 63710000001 INSULIN REGULAR HUMAN PER 5 UNITS: Performed by: INTERNAL MEDICINE

## 2023-02-06 PROCEDURE — 99024 POSTOP FOLLOW-UP VISIT: CPT | Performed by: NURSE PRACTITIONER

## 2023-02-06 PROCEDURE — 25010000002 HYDROMORPHONE PER 4 MG: Performed by: THORACIC SURGERY (CARDIOTHORACIC VASCULAR SURGERY)

## 2023-02-06 PROCEDURE — 94799 UNLISTED PULMONARY SVC/PX: CPT

## 2023-02-06 PROCEDURE — 94761 N-INVAS EAR/PLS OXIMETRY MLT: CPT

## 2023-02-06 PROCEDURE — 71045 X-RAY EXAM CHEST 1 VIEW: CPT

## 2023-02-06 PROCEDURE — 25010000002 HEPARIN (PORCINE) PER 1000 UNITS: Performed by: THORACIC SURGERY (CARDIOTHORACIC VASCULAR SURGERY)

## 2023-02-06 RX ORDER — ACETAMINOPHEN 160 MG/5ML
1000 SOLUTION ORAL 3 TIMES DAILY
Status: DISCONTINUED | OUTPATIENT
Start: 2023-02-06 | End: 2023-02-10 | Stop reason: HOSPADM

## 2023-02-06 RX ADMIN — METOPROLOL TARTRATE 25 MG: 25 TABLET, FILM COATED ORAL at 20:57

## 2023-02-06 RX ADMIN — ACETAMINOPHEN 1000 MG: 325 SUSPENSION ORAL at 15:41

## 2023-02-06 RX ADMIN — INSULIN HUMAN 2 UNITS: 100 INJECTION, SOLUTION PARENTERAL at 23:11

## 2023-02-06 RX ADMIN — HYDROMORPHONE HYDROCHLORIDE: 10 INJECTION, SOLUTION INTRAMUSCULAR; INTRAVENOUS; SUBCUTANEOUS at 01:05

## 2023-02-06 RX ADMIN — DIAZEPAM 2 MG: 2 TABLET ORAL at 23:13

## 2023-02-06 RX ADMIN — FUROSEMIDE 40 MG: 10 INJECTION, SOLUTION INTRAMUSCULAR; INTRAVENOUS at 15:45

## 2023-02-06 RX ADMIN — FUROSEMIDE 40 MG: 10 INJECTION, SOLUTION INTRAMUSCULAR; INTRAVENOUS at 04:10

## 2023-02-06 RX ADMIN — ACETAMINOPHEN 1000 MG: 325 SUSPENSION ORAL at 20:56

## 2023-02-06 RX ADMIN — ACETAMINOPHEN 1000 MG: 500 TABLET, FILM COATED ORAL at 08:06

## 2023-02-06 RX ADMIN — HEPARIN SODIUM 5000 UNITS: 5000 INJECTION INTRAVENOUS; SUBCUTANEOUS at 06:27

## 2023-02-06 RX ADMIN — GABAPENTIN 300 MG: 300 CAPSULE ORAL at 13:58

## 2023-02-06 RX ADMIN — METOPROLOL TARTRATE 25 MG: 25 TABLET, FILM COATED ORAL at 08:06

## 2023-02-06 RX ADMIN — HYDROMORPHONE HYDROCHLORIDE 0.5 MG: 1 INJECTION, SOLUTION INTRAMUSCULAR; INTRAVENOUS; SUBCUTANEOUS at 21:17

## 2023-02-06 RX ADMIN — HYDROMORPHONE HYDROCHLORIDE 0.5 MG: 1 INJECTION, SOLUTION INTRAMUSCULAR; INTRAVENOUS; SUBCUTANEOUS at 15:42

## 2023-02-06 RX ADMIN — GABAPENTIN 300 MG: 300 CAPSULE ORAL at 23:13

## 2023-02-06 RX ADMIN — DIAZEPAM 2 MG: 2 TABLET ORAL at 08:06

## 2023-02-06 RX ADMIN — HEPARIN SODIUM 5000 UNITS: 5000 INJECTION INTRAVENOUS; SUBCUTANEOUS at 23:10

## 2023-02-06 RX ADMIN — HYDROMORPHONE HYDROCHLORIDE 0.5 MG: 1 INJECTION, SOLUTION INTRAMUSCULAR; INTRAVENOUS; SUBCUTANEOUS at 23:13

## 2023-02-06 RX ADMIN — GABAPENTIN 300 MG: 300 CAPSULE ORAL at 06:27

## 2023-02-06 RX ADMIN — HEPARIN SODIUM 5000 UNITS: 5000 INJECTION INTRAVENOUS; SUBCUTANEOUS at 13:58

## 2023-02-06 NOTE — PLAN OF CARE
Goal Outcome Evaluation:  Plan of Care Reviewed With: patient        Progress: improving  Outcome Evaluation: Pt agreeable to PT this afternoon. HE is up in chair upon entry to room. Pt progressing w activity today. He was able to stand w SBA/CGA. He then ambulated w SBA/CGA using rolling platform walker approx 120 ft. He exhibits slow pace and requires assist to manage equipment, but no overt unsteadiness or LOB noted. Pt assisted to bed after activity w all lines intact.  Will continue to progress activity as tolerated.

## 2023-02-06 NOTE — PROGRESS NOTES
LOS: 3 days   Patient Care Team:  Nargis Velasquez APRN as PCP - General (Family Medicine)  Papa Miguel MD as Consulting Physician (Cardiology)  Sekou Pisano MD as Consulting Physician (Pulmonary Disease)  Gregor Carlson MD as Consulting Physician (Gastroenterology)  Estevan Yuan MD (Sports Medicine)  Shelley Goldsmith, RN as Nurse Navigator    Chief Complaint: Follow-up chest pain, coronary artery disease, tissue AVR.    Interval History: Still feels very weak today.  He has some pain around his surgical site, but denied any anginal pain.  Short of breath from shallow breathing, but otherwise okay.    Vital Signs:  Temp:  [97.8 °F (36.6 °C)-98.4 °F (36.9 °C)] 98.4 °F (36.9 °C)  Heart Rate:  [65-89] 71  Resp:  [10-28] 22  BP: (104-149)/(53-85) 138/71    Intake/Output Summary (Last 24 hours) at 2/6/2023 1146  Last data filed at 2/6/2023 0806  Gross per 24 hour   Intake 2571 ml   Output 2825 ml   Net -254 ml       Physical Exam:   General Appearance:    No acute distress, alert and oriented x4   Lungs:     Clear to auscultation bilaterally     Heart:    Regular rhythm and normal rate. II/VI SM RUSB and LUSB.   Abdomen:     Soft, postop with J-tube, nondistended.    Extremities:   Trace to 1+ edema of the lower extremities     Results Review:    Results from last 7 days   Lab Units 02/06/23  0636   SODIUM mmol/L 140   POTASSIUM mmol/L 4.4   CHLORIDE mmol/L 102   CO2 mmol/L 28.9   BUN mg/dL 22   CREATININE mg/dL 1.13   GLUCOSE mg/dL 128*   CALCIUM mg/dL 8.6         Results from last 7 days   Lab Units 02/06/23  0636   WBC 10*3/mm3 6.66   HEMOGLOBIN g/dL 10.1*   HEMATOCRIT % 32.6*   PLATELETS 10*3/mm3 167             Results from last 7 days   Lab Units 02/05/23  0325   MAGNESIUM mg/dL 2.1           I reviewed the patient's new clinical results.        Assessment:  1.  Esophageal cancer, status post total esophagectomy and J-tube placement on 2/3/2023 by Dr. Stockton.  2.  Chest pain  postoperatively  3.  Coronary artery disease, status post CABG (1 out of 3 bypass grafts patent on heart catheterization in July 2022.  Chronic severe stenosis of the left circumflex and RCA managed medically)  4.  Tissue aortic valve replacement  5.  Morbid obesity, complicating all aspects of care  6.  Right-sided pneumothorax, status post chest tube  7.  Hypertension  8.  Diabetes    Plan:  -He seems to be fairly stable from a cardiac perspective.  The chest discomfort is likely postoperative pain.  No significant EKG changes.    -Tissue AVR functioning normally with normal gradients by echo on 12/14/2022    -IV Lasix per nephrology for poor urine output yesterday.    -Continue metoprolol 25 mg every 12 hours.    -Add back Imdur, lisinopril, and amlodipine when appropriate.  Blood pressure has been reasonable thus far.    Jamal Ramey MD  02/06/23  11:46 EST

## 2023-02-06 NOTE — PAYOR COMM NOTE
"Edmond Chase (62 y.o. Male)                     ATTENTION; CASE REF #[id number] - HP / OP REPORT / CLINICALS FOR NURSE REVIEW                   REPLY TO [facility] DEPT  [id number] OR CALL  GREGG GALLEGOS LPN       Date of Birth   1960    Social Security Number       Address   [address]  [facility] [address]    Home Phone   [phone]    MRN   [id number]       Adventist   None    Marital Status   Single                            Admission Date   2/3/23    Admission Type   Elective    Admitting Provider   Valerie Stockton MD    Attending Provider   Valerie Stockton MD    Department, Room/Bed   [facility] INTENSIVE CARE, I377/1       Discharge Date       Discharge Disposition       Discharge Destination                               Attending Provider: Valerie Stockton MD    Allergies: No Known Allergies    Isolation: None   Infection: None   Code Status: CPR    Ht: 185 cm (72.84\")   Wt: 147 kg (324 lb 8.3 oz)    Admission Cmt: None   Principal Problem: Malignant neoplasm of lower third of esophagus (HCC) [C15.5]                 Active Insurance as of 2/3/2023     Primary Coverage     Payor Plan Insurance Group Employer/Plan Group    [facility] BY JODI [id number]     Payor Plan Address Payor Plan Phone Number Payor Plan Fax Number Effective Dates    [address] [address]   1/1/2021 - None Entered    [facility] [id number]       Subscriber Name Subscriber Birth Date Member ID       EDMOND CHASE 1960 [id number]                 Emergency Contacts      (Rel.) Home Phone Work Phone Mobile Phone    Kyra Ahuja (Sister) [phone] -- [phone]    Melinda Ahuja [facility] (Relative) [phone] -- [phone]    Sadia Ahuja [facility] (Relative) [phone] -- [phone]               History & Physical      Valerie Stockton MD at 02/03/23 0727          H&P reviewed. The patient was examined and there are no " "changes to the H&P.          Electronically signed by Valerie Stockton MD at 02/03/23 0731   Source Note        Chief Complaint  Esophageal cancer  Subjective         Edmond Chase presents to [facility] THORACIC SURGERY  History of Present Illness    EDMOND CHASE is a 62-year-old male who presents today for follow-up. He is accompanied today by an adult male.    The patient reports he is no longer bleeding.    The patient is accompanied by an adult male today.  An adult female states that the patient experienced dizziness when he went down and leaned forward.    The patient reports that he has not felt right since 07/2022. He states that he has Last's esophagus. He reports that he is anxious to start his treatment to address the cancer.  He states that he came to see his primary care provider and she thought blood work was in order.  He states that they took blood work and kept him overnight. He reports that he was given 1 unit of blood.He states that he had a stent in 2020. He reports that he had an echocardiogram and a stress test in 07/2022. He states that the day after Thanksgiving was his big near death experience. He reports that he received 10 units of blood the first day and 5 units afterwards. He states that he has been feeling \" crappy \" since then. He reports that he is continuing to take over-the-counter iron. He states that he had to take an over-the-counter laxative to counteract the iron. He reports that he is seeing a psychiatric APRN and she has him on gabapentin 900 mg a day.  He reports that he has a dry throat and a dry mouth. He states that he has not slept in his bed since 07/2022. He reports that he spends most nights in his easy chair. He denies any more bleeding from the bottom. He states that he is always worried about black stool. He denies any blood or black stool. He states that the iron makes him constipated. He reports that he takes a laxative and it " "seems to help. He states that he has not seen a dietitian yet. He reports that he likes to walk. He states that walking is easier for him than standing around is very hard.        Objective    Vital Signs:  BP (!) 182/88 (BP Location: Left arm, Patient Position: Sitting, Cuff Size: Adult)   Pulse 78   Ht 185.4 cm (73\")   Wt (!) 141 kg (310 lb)   SpO2 97%   BMI 40.90 kg/m²   Estimated body mass index is 40.9 kg/m² as calculated from the following:    Height as of this encounter: 185.4 cm (73\").    Weight as of this encounter: 141 kg (310 lb).             Physical Exam  Vitals and nursing note reviewed.   Constitutional:       Appearance: He is well-developed.   HENT:      Head: Normocephalic and atraumatic.      Nose: Nose normal.   Eyes:      Conjunctiva/sclera: Conjunctivae normal.   Pulmonary:      Effort: Pulmonary effort is normal.   Musculoskeletal:         General: Normal range of motion.      Cervical back: Normal range of motion and neck supple.   Skin:     General: Skin is warm and dry.   Neurological:      Mental Status: He is alert and oriented to person, place, and time.   Psychiatric:         Behavior: Behavior normal.         Thought Content: Thought content normal.         Judgment: Judgment normal.        Result Review :                  Assessment and Plan   Diagnoses and all orders for this visit:    1. Malignant neoplasm of lower third of esophagus (HCC) (Primary)  -     CBC & Differential; Future  -     Ambulatory Referral to Oncology Nurse Navigator  -     Case Request; Standing  -     CBC and Differential; Future  -     Basic metabolic panel; Future  -     APTT; Future  -     Protime-INR; Future  -     Type and screen; Future  -     ECG 12 Lead; Future  -     acetaminophen (TYLENOL) tablet 1,000 mg  -     celecoxib (CeleBREX) capsule 200 mg  -     gabapentin (NEURONTIN) capsule 600 mg  -     heparin (porcine) 5000 UNIT/ML injection 5,000 Units  -     sodium chloride 0.9 % flush 3 mL  -     " "sodium chloride 0.9 % flush 3-10 mL  -     sodium chloride 0.9 % infusion 40 mL  -     ampicillin-sulbactam (UNASYN) 3 g in sodium chloride 0.9 % 100 mL IVPB-VTB  -     Case Request    2. Gastrointestinal hemorrhage with melena  -     CBC & Differential; Future    Other orders  -     Follow Anesthesia Guidelines / Protocol; Future  -     Obtain Informed Consent; Future  -     Provide NPO Instructions to Patient; Future  -     Chlorhexidine Skin Prep; Future  -     Provide NPO Instructions to Patient; Future  -     Provide \"Carbo Loading\" Instructions to Patient; Future  -     Provide Patient With Enhanced Recovery Booklet(s) or Handout; Future  -     Provide Chlorhexidine Skin Prep Wipes and Instructions; Future  -     Provide Patient With Enhanced Recovery Booklet(s); Future  -     Provide Patient With Education on Use of Incentive Spirometry; Future  -     Patient to Exercise 3 Times Per Week For At Least 20 Minutes; Future  -     Follow Anesthesia Guidelines / Protocol; Standing  -     Patient to Drink Gatorade 20oz 2 Hours Prior to Surgery; Standing  -     Diabetic Patient to Drink Gatorade G2 20oz 2 Hours Prior to Surgery; Standing  -     Insert Peripheral IV; Standing  -     Saline Lock & Maintain IV Access; Standing  -     Inpatient Admission; Standing        Edmond Chase is a 69-year-old male with a history of adenocarcinoma of the esophagus with invasion. We discussed the risks, benefits, and alternatives of an esophagectomy.  We discussed the risks of esophagectomy including pneumonia, anastomotic leak and atrial fibrillation.  We discussed the need for feeding tube for 8 to 12 weeks postoperatively as well as the need to be n.p.o. for 2 weeks postoperatively.    Mr. EDMOND CHASE is scheduled for an esophagectomy the first week of 02/03/2023.  He needs to follow up with his cardiologist for cardiac clearance for esophagectomy and I have messaged Dr. Miguel.    He is having issues with fatigue and " dizziness which I think is secondary to his gabapentin use.  I have instructed him to stop gabapentin.      I spent 40 minutes caring for Edmond on this date of service. This time includes time spent by me in the following activities:preparing for the visit, reviewing tests, obtaining and/or reviewing a separately obtained history, performing a medically appropriate examination and/or evaluation , counseling and educating the patient/family/caregiver, ordering medications, tests, or procedures, documenting information in the medical record and independently interpreting results and communicating that information with the patient/family/caregiver  Follow Up   No follow-ups on file.  Patient was given instructions and counseling regarding his condition or for health maintenance advice. Please see specific information pulled into the AVS if appropriate.     Transcribed from ambient dictation for Valerie Stockton MD by Estevan Mondragon.  01/16/23   13:05 EST    Patient or patient representative verbalized consent to the visit recording.  I have personally performed the services described in this document as transcribed by the above individual, and it is both accurate and complete.        Electronically signed by Valerie Stockton MD at 01/20/23 1940                      Facility-Administered Medications as of 2/6/2023   Medication Dose Route Frequency Provider Last Rate Last Admin   • [COMPLETED] acetaminophen (TYLENOL) tablet 1,000 mg  1,000 mg Oral Once Valerie Stockton MD   1,000 mg at 02/03/23 0638   • [COMPLETED] ampicillin-sulbactam (UNASYN) 3 g in sodium chloride 0.9 % 100 mL IVPB-VTB  3 g Intravenous Once Valerie Stockton MD   3 g at 02/03/23 0730   • [COMPLETED] bupivacaine (PF) (MARCAINE) 0.25 % injection  - ADS Override Pull            • [COMPLETED] bupivacaine liposome (EXPAREL) 1.3 % injection  - ADS Override Pull            • [COMPLETED] celecoxib (CeleBREX) capsule 200 mg  200 mg Oral Once Valerie Stockton MD    200 mg at 02/03/23 0632   • dextrose (D50W) (25 g/50 mL) IV injection 25 g  25 g Intravenous Q15 Min PRN Valerie Stockton MD       • dextrose (GLUTOSE) oral gel 15 g  15 g Oral Q15 Min PRN Valerie Stockton MD       • diazePAM (VALIUM) tablet 2 mg  2 mg Per J Tube Q6H PRN Vita Jones APRN   2 mg at 02/06/23 0806   • [COMPLETED] famotidine (PEPCID) injection 20 mg  20 mg Intravenous Once Jerri Su MD   20 mg at 02/03/23 0656   • [COMPLETED] fentaNYL citrate (PF) (SUBLIMAZE) injection   Intravenous Code / Trauma / Sedation Medication Jerri Su MD   25 mcg at 02/03/23 0714   • furosemide (LASIX) injection 40 mg  40 mg Intravenous Q12H Estevan Arriaga MD   40 mg at 02/06/23 0410   • gabapentin (NEURONTIN) capsule 300 mg  300 mg Per J Tube Q8H Sujata John DNP, APRN   300 mg at 02/06/23 0627   • [COMPLETED] gabapentin (NEURONTIN) capsule 600 mg  600 mg Oral Once Valerie Stockton MD   600 mg at 02/03/23 0632   • glucagon (GLUCAGEN) injection 1 mg  1 mg Intramuscular Q15 Min PRN Valerie Stockton MD       • [COMPLETED] heparin (porcine) 5000 UNIT/ML injection 5,000 Units  5,000 Units Subcutaneous Once Valerie Stockton MD   5,000 Units at 02/03/23 0728   • heparin (porcine) 5000 UNIT/ML injection 5,000 Units  5,000 Units Subcutaneous Q8H Valerie Stockton MD   5,000 Units at 02/06/23 0627   • HYDROmorphone (DILAUDID) injection 0.5 mg  0.5 mg Intravenous Q2H PRN Valerie Stockton MD   0.5 mg at 02/05/23 0145   • HYDROmorphone (DILAUDID) PCA 0.2 mg/ml 50 mL syringe   Intravenous Continuous Valerie Stockton MD   New Syringe/Cartridge at 02/06/23 0105   • insulin regular (humuLIN R,novoLIN R) injection 0-9 Units  0-9 Units Subcutaneous Q6H Markos Mortensen MD   2 Units at 02/05/23 1224   • labetalol (NORMODYNE,TRANDATE) injection 10 mg  10 mg Intravenous Q4H PRN Belinda Koehler, APRN       • lactated ringers infusion  9 mL/hr Intravenous Continuous Valerie Stockton MD 9 mL/hr at 02/03/23 0737 New Bag at 02/03/23  1144   • metoprolol tartrate (LOPRESSOR) tablet 25 mg  25 mg Per J Tube Q12H Sujata John DNP, APRN   25 mg at 02/06/23 0806    Or   • metoprolol tartrate (LOPRESSOR) injection 5 mg  5 mg Intravenous Q12H Sujata John DNP, APRN   5 mg at 02/03/23 2110   • [COMPLETED] midazolam (VERSED) injection   Intravenous Code / Trauma / Sedation Medication Jerri Su MD   1 mg at 02/03/23 0707   • naloxone (NARCAN) injection 0.1 mg  0.1 mg Intravenous Q5 Min PRN Valerie Stockton MD       • nitroglycerin (NITROSTAT) SL tablet 0.4 mg  0.4 mg Sublingual Q5 Min PRN Valerie Stockton MD       • ondansetron (ZOFRAN) injection 4 mg  4 mg Intravenous Q6H PRN Valerie Stockton MD       • oxyCODONE (ROXICODONE) immediate release tablet 5 mg  5 mg Per J Tube Q4H PRN Sujata John DNP, APRN       • [COMPLETED] sodium chloride 0.9 % bolus 1,000 mL  1,000 mL Intravenous Once Sujata John DNP, APRN 250 mL/hr at 02/05/23 1224 1,000 mL at 02/05/23 1224   • sodium chloride 0.9 % infusion  10 mL/hr Per J Tube Continuous Vita Jones APRN 10 mL/hr at 02/04/23 1023 10 mL/hr at 02/04/23 1023     Orders      Start     Ordered    02/07/23 0600  CBC (No Diff)  Morning Draw         02/06/23 1059    02/06/23 0859  Impact Peptide 1.5 (Pivot 1.5); Tube Feeding Type: Continuous; Continuous Tube Feeding Start Rate (mL/hr): 20; Then Advance Rate By (mL/hr): 20; Every __ Hours: Patient at Goal Rate; To Goal Rate of (mL/hr): 20  Diet Effective Now         02/06/23 0858    02/06/23 0629  POC Glucose Once  PROCEDURE ONCE         02/06/23 0626    02/06/23 0600  Renal Function Panel  Daily       02/05/23 0814    02/06/23 0600  CBC (No Diff)  Morning Draw         02/05/23 1126    02/06/23 0600  Comprehensive Metabolic Panel  Morning Draw         02/05/23 1316    02/06/23 0600  Uric Acid  Morning Draw         02/05/23 1544    02/06/23 0600  Basic Metabolic Panel  Morning Draw,   Status:  Canceled         02/05/23 1544    02/06/23 0600   Phosphorus  PROCEDURE ONCE         02/05/23 2204 02/05/23 2309  POC Glucose Once  PROCEDURE ONCE         02/05/23 2307    02/05/23 1828  POC Glucose Once  PROCEDURE ONCE         02/05/23 1825    02/05/23 1645  furosemide (LASIX) injection 40 mg  Every 12 Hours         02/05/23 1545    02/05/23 1545  BNP  Once         02/05/23 1544    02/05/23 1215  sodium chloride 0.9 % bolus 1,000 mL  Once         02/05/23 1124    02/05/23 1124  POC Glucose Once  PROCEDURE ONCE         02/05/23 1120    02/05/23 0901  Chest Tube To Water Seal  Continuous         02/05/23 0900    02/05/23 0901  Inpatient Nephrology Consult  Once        Specialty:  Nephrology  Provider:  Sheldon Murray MD    02/05/23 0901    02/05/23 0842  Sodium, Urine, Random - Urine, Clean Catch  Once         02/05/23 0841    02/05/23 0842  Creatinine Urine Random (kidney function) GFR component - Urine, Clean Catch  Once         02/05/23 0841    02/05/23 0841  Impact Peptide 1.5 (Pivot 1.5); Tube Feeding Type: Continuous; Continuous Tube Feeding Start Rate (mL/hr): 10; Then Advance Rate By (mL/hr): Do Not Advance; Every __ Hours: Patient at Goal Rate; To Goal Rate of (mL/hr): 10  Diet Effective Now,   Status:  Canceled         02/05/23 0840    02/05/23 0840  Elevate Head Of Bed 30-45 Degrees  Continuous         02/05/23 0840    02/05/23 0840  Inpatient Consult to Nutrition Services  Once        Provider:  (Not yet assigned)    02/05/23 0840    02/05/23 0840  Administer Tube Feeding Via Feeding Tube Already in Place  Continuous         02/05/23 0840    02/05/23 0600  Renal Function Panel  Morning Draw,   Status:  Canceled         02/04/23 1107    02/05/23 0600  CBC (No Diff)  Morning Draw         02/04/23 1107    02/05/23 0600  XR Chest 1 View  Daily       02/04/23 1107    02/05/23 0600  Comprehensive Metabolic Panel  Morning Draw         02/04/23 1309    02/05/23 0600  Phosphorus  PROCEDURE ONCE         02/04/23 2204 02/05/23 0600  Magnesium  Morning  Draw         02/04/23 2346    02/05/23 0533  POC Glucose Once  PROCEDURE ONCE         02/05/23 0528    02/05/23 0000  POC Glucose Q6H  Every 6 Hours       02/04/23 2345    02/04/23 2332  POC Glucose Once  PROCEDURE ONCE         02/04/23 2323    02/04/23 2200  gabapentin (NEURONTIN) capsule 300 mg  Every 8 Hours Scheduled         02/03/23 2058    02/04/23 2045  dexmedetomidine (PRECEDEX) 400 mcg in 100 mL NS infusion  Titrated,   Status:  Discontinued         02/04/23 1953    02/04/23 1610  Adult Transthoracic Echo Limited W/ Cont if Necessary Per Protocol  Once         02/04/23 1610    02/04/23 1600  gabapentin (NEURONTIN) capsule 300 mg  Every 8 Hours Scheduled,   Status:  Discontinued         02/03/23 1538    02/04/23 1533  POC Glucose Once  PROCEDURE ONCE         02/04/23 1531    02/04/23 1500  oxyCODONE (ROXICODONE) immediate release tablet 5 mg  Every 4 Hours PRN,   Status:  Discontinued         02/03/23 1538    02/04/23 1106  POC Glucose Once  PROCEDURE ONCE         02/04/23 1104    02/04/23 1101  Inpatient Cardiology Consult  Once        Specialty:  Cardiology  Provider:  Papa Miguel MD    02/04/23 1101    02/04/23 1000  sodium chloride 0.9 % infusion  Continuous         02/04/23 0906    02/04/23 0908  diazePAM (VALIUM) tablet 2 mg  Every 6 Hours PRN         02/04/23 0908    02/04/23 0800  Ambulate patient in AM  3 Times Daily         02/03/23 1805 02/04/23 0800  Clean incision line twice daily with peroxide and paint with Betadine.  2 Times Daily       02/03/23 1805 02/04/23 0700  POC Glucose TID AC  3 Times Daily Before Meals,   Status:  Canceled       02/03/23 1805    02/04/23 0600  CBC & Differential  Morning Draw         02/03/23 1805 02/04/23 0600  Basic Metabolic Panel  Morning Draw         02/03/23 1805    02/04/23 0600  heparin (porcine) 5000 UNIT/ML injection 5,000 Units  Every 8 Hours Scheduled         02/03/23 1805    02/04/23 0600  XR Chest 1 View  1 Time Imaging         02/03/23  1805    02/04/23 0600  Clear & Record PCA Settings  2x Daily PCA       02/03/23 1805    02/04/23 0600  CBC Auto Differential  PROCEDURE ONCE         02/03/23 2205 02/04/23 0541  POC Glucose Once  PROCEDURE ONCE         02/04/23 0538    02/04/23 0322  POC Glucose Once  PROCEDURE ONCE         02/04/23 0321    02/04/23 0000  insulin regular (humuLIN R,novoLIN R) injection 0-9 Units  Every 6 Hours Scheduled         02/03/23 1856    02/04/23 0000  nitroglycerin (NITROSTAT) ointment 1.5 inch  Every 6 Hours Scheduled,   Status:  Discontinued         02/03/23 2004 02/04/23 0000  XR Chest 1 View  1 Time Imaging,   Status:  Canceled         02/03/23 2040 02/03/23 2352  POC Glucose Once  PROCEDURE ONCE         02/03/23 2350 02/03/23 2145  acetaminophen (TYLENOL) tablet 1,000 mg  3 Times Daily,   Status:  Discontinued         02/03/23 2058 02/03/23 2145  metoprolol tartrate (LOPRESSOR) tablet 25 mg  Every 12 Hours Scheduled        See Hyperspace for full Linked Orders Report.    02/03/23 2058 02/03/23 2145  metoprolol tartrate (LOPRESSOR) injection 5 mg  Every 12 Hours Scheduled        See Hyperspace for full Linked Orders Report.    02/03/23 2058 02/03/23 2100  acetaminophen (TYLENOL) tablet 1,000 mg  3 Times Daily,   Status:  Discontinued         02/03/23 1805 02/03/23 2100  metoprolol tartrate (LOPRESSOR) tablet 25 mg  Every 12 Hours Scheduled,   Status:  Discontinued        See Hyperspace for full Linked Orders Report.    02/03/23 1805 02/03/23 2100  metoprolol tartrate (LOPRESSOR) injection 5 mg  Every 12 Hours Scheduled,   Status:  Discontinued        See Hyperspace for full Linked Orders Report.    02/03/23 1805 02/03/23 2059  oxyCODONE (ROXICODONE) immediate release tablet 5 mg  Every 4 Hours PRN         02/03/23 2059 02/03/23 2056  labetalol (NORMODYNE,TRANDATE) injection 10 mg  Every 4 Hours PRN         02/03/23 2056 02/03/23 2051  XR Chest 1 View  1 Time Imaging         02/03/23  2050 02/03/23 2005  ECG 12 Lead Chest Pain  STAT         02/03/23 2004 02/03/23 2000  Incentive Spirometry  Every 2 Hours While Awake       02/03/23 1805 02/03/23 1953  POC Glucose Once  PROCEDURE ONCE         02/03/23 1951 02/03/23 1900  Vital Signs every 1 hour x4, then every 2 hours x2, then every 4 hours.  Every Hour       02/03/23 1805 02/03/23 1900  dextrose 5 % and sodium chloride 0.45 % with KCl 20 mEq/L infusion  Continuous,   Status:  Discontinued         02/03/23 1805 02/03/23 1900  insulin regular (humuLIN R,novoLIN R) injection 0-9 Units  Every 6 Hours Scheduled,   Status:  Discontinued         02/03/23 1805 02/03/23 1805  Notify Physician (Standard Adult Parameters)  Until Discontinued        Comments: Notify MD for chest tube op >200cc/hr for 4 hrs or if >1000cc in 4hrs, if UOP <30cc/hr or < 120cc in 4hrs    02/03/23 1805 02/03/23 1805  Notify Provider - Chest Tube Output Greater Than 100 mL/hr  Until Discontinued         02/03/23 1805 02/03/23 1805  Cardiac Monitoring  Continuous,   Status:  Canceled         02/03/23 1805 02/03/23 1805  Arterial line monitoring  Every Shift       02/03/23 1805 02/03/23 1805  I&O every 4 hours for the first 24 hours, then every shift.  Every Shift       02/03/23 1805 02/03/23 1805  D/C A-line if hemodynamically stable  Every Shift      Comments: POD1    02/03/23 1805    02/03/23 1805  D/C chest tube dressing  Once        Comments: POD1    02/03/23 1805    02/03/23 1805  Remove incision line dressing.  Once        Comments: POD1    02/03/23 1805    02/03/23 1805  Continuous Cardiac Monitoring  Continuous        Comments: Follow Standing Orders As Outlined in Process Instructions (Open Order Report to View Full Instructions)    02/03/23 1805    02/03/23 1805  Telemetry - Maintain IV Access  Continuous         02/03/23 1805    02/03/23 1805  May Be Off Telemetry for Tests  Continuous         02/03/23 1805    02/03/23 1805  Oxygen  "Therapy- Nasal Cannula; Titrate for SPO2: equal to or greater than, per policy, 90%  Continuous         02/03/23 1805 02/03/23 1805  Continuous Pulse Oximetry  Continuous         02/03/23 1805 02/03/23 1805  DO NOT bolus patient without notifying Surgeon  Until Discontinued         02/03/23 1805 02/03/23 1805  Code Status and Medical Interventions:  Continuous         02/03/23 1805 02/03/23 1805  Chest Tube to Continuous Suction  Continuous,   Status:  Canceled         02/03/23 1805 02/03/23 1805  NPO Diet NPO Type: Strict NPO  Diet Effective Now        Comments: NO MEDS, NO WATER, NO ICE CHIPS    02/03/23 1805 02/03/23 1805  Consult to Nutritional Services  Once        Provider:  (Not yet assigned)    02/03/23 1805 02/03/23 1805  Inpatient Intensivist Consult  Once        Provider:  Markos Mortensen MD    02/03/23 1805 02/03/23 1805  May use jejunostomy for meds on 2/4/23 at 1500.  Nursing Communication  Once        Comments: May use jejunostomy for meds on 2/4/23 at 1500.    02/03/23 1805 02/03/23 1805  Absolutely no BiPAP, please place a sign above the patient's bed that reads \"absolutely no BiPAP\"  Nursing Communication  Once        Comments: Absolutely no BiPAP, please place a sign above the patient's bed that reads \"absolutely no BiPAP\"    02/03/23 1805 02/03/23 1805  Head of bed to be greater than 30 degrees at all times, please place a sign above the patient's bed that reads \"head of bed at 30 degrees or greater at all times\"  Nursing Communication  Once        Comments: Head of bed to be greater than 30 degrees at all times, please place a sign above the patient's bed that reads \"head of bed at 30 degrees or greater at all times\"    02/03/23 1805 02/03/23 1805  NG Tube to Low Wall Suction  Continuous        Comments: Please flush with 10 cc of saline every shift to keep the NG tube patent and functional    02/03/23 1805 02/03/23 1805  Do NOT Hold Basal or Correction Scale " Insulin When Patient is NPO, Hold Scheduled Mealtime (Bolus) Insulin if NPO  Continuous         02/03/23 1805 02/03/23 1805  Assess Respiratory Rate, Pain Score & Sedation Level Using Pasero Opioid-Sedation Scale (POSS)  Per Order Details        Comments: Assess Every 2 Hours x24 Hours, Then Every 4 Hours & PRN While Receiving PCA.    02/03/23 1805 02/03/23 1805  Notify Provider for Inadequate Pain Control, Excessive Sedation or Other Issues Regarding PCA Therapy  Until Discontinued         02/03/23 1805 02/03/23 1805  Opioid Administration - Capnography (EtCO2) Monitoring  Per Order Details         02/03/23 1805 02/03/23 1805  Opioid Administration - Document EtCO2 Value With Each Set of Vitals & Any Change in Patient Status  Per Order Details         02/03/23 1805 02/03/23 1805  Opioid Administration - Notify Provider Capnography (EtCO2)  Until Discontinued         02/03/23 1805 02/03/23 1804  ondansetron (ZOFRAN) tablet 4 mg  Every 6 Hours PRN,   Status:  Discontinued        See Hyperspace for full Linked Orders Report.    02/03/23 1805 02/03/23 1804  ondansetron (ZOFRAN) injection 4 mg  Every 6 Hours PRN        See Hyperspace for full Linked Orders Report.    02/03/23 1805 02/03/23 1804  dextrose (GLUTOSE) oral gel 15 g  Every 15 Minutes PRN         02/03/23 1805 02/03/23 1804  dextrose (D50W) (25 g/50 mL) IV injection 25 g  Every 15 Minutes PRN         02/03/23 1805 02/03/23 1804  glucagon (GLUCAGEN) injection 1 mg  Every 15 Minutes PRN         02/03/23 1805 02/03/23 1804  naloxone (NARCAN) injection 0.1 mg  Every 5 Minutes PRN         02/03/23 1805 02/03/23 1804  nitroglycerin (NITROSTAT) SL tablet 0.4 mg  Every 5 Minutes PRN         02/03/23 1805 02/03/23 1804  Telemetry - Continuous Pulse Oximetry  Continuous PRN,   Status:  Canceled      Comments: If Patient Develops Unresponsiveness, Acute Dyspnea, Cyanosis or Suspected Hypoxemia Start Continuous Pulse Ox Monitoring,  Apply Oxygen & Notify Provider    02/03/23 1805    02/03/23 1804  Oxygen Therapy- Nasal Cannula; Titrate for SPO2: 90%, Greater Than or Equal To  Continuous PRN,   Status:  Canceled      Comments: If Patient Develops Unresponsiveness, Acute Dyspnea, Cyanosis or Suspected Hypoxemia Start Continuous Pulse Ox Monitoring, Apply Oxygen & Notify Provider    02/03/23 1805    02/03/23 1658  sodium chloride 0.9 % infusion  Continuous,   Status:  Discontinued         02/03/23 1656    02/03/23 1628  HYDROmorphone (DILAUDID) PCA 0.2 mg/ml 50 mL syringe  Continuous         02/03/23 1626    02/03/23 1609  POC Glucose Once  PROCEDURE ONCE         02/03/23 1607    02/03/23 1542  Vital Signs Every 5 Minutes for 15 Minutes, Every 15 Minutes Thereafter.  Continuous,   Status:  Canceled         02/03/23 1541    02/03/23 1542  Call Anesthesiologist For Additional IV Fluid Bolus For Hypotension / Tachycardia  Continuous,   Status:  Canceled         02/03/23 1541    02/03/23 1542  Notify Anesthesia of Any Acute Changes in Patient Condition  Until Discontinued,   Status:  Canceled         02/03/23 1541    02/03/23 1542  Notify Anesthesia for Unrelieved Pain  Until Discontinued,   Status:  Canceled         02/03/23 1541    02/03/23 1542  If Respirations are Less Than 8, See Narcan Order & Notify Anesthesia STAT  Continuous,   Status:  Canceled         02/03/23 1541    02/03/23 1542  Once Discharge Criteria to Floor Met, Follow Surgeon Orders  Continuous,   Status:  Canceled         02/03/23 1541    02/03/23 1542  Discharge Patient From PACU When Discharge Criteria Met  Continuous,   Status:  Canceled         02/03/23 1541    02/03/23 1541  diphenhydrAMINE (BENADRYL) capsule 25 mg  Every 4 Hours PRN,   Status:  Discontinued        See Hyperspace for full Linked Orders Report.    02/03/23 1541    02/03/23 1541  diphenhydrAMINE (BENADRYL) injection 25 mg  Every 4 Hours PRN,   Status:  Discontinued        See Hyperspace for full Linked Orders  Report.    02/03/23 1541    02/03/23 1541  diphenhydrAMINE (BENADRYL) injection 25 mg  Every 4 Hours PRN,   Status:  Discontinued        See Hyperspace for full Linked Orders Report.    02/03/23 1541    02/03/23 1541  ondansetron (ZOFRAN) injection 4 mg  Once As Needed,   Status:  Discontinued         02/03/23 1541    02/03/23 1541  metoclopramide (REGLAN) injection 10 mg  Once As Needed,   Status:  Discontinued         02/03/23 1541    02/03/23 1541  oxyCODONE (ROXICODONE) immediate release tablet 5 mg  Once As Needed,   Status:  Discontinued         02/03/23 1541    02/03/23 1541  HYDROmorphone (DILAUDID) injection 0.25 mg  Every 30 Minutes PRN,   Status:  Discontinued         02/03/23 1541    02/03/23 1541  naloxone (NARCAN) injection 0.4 mg  Every 5 Minutes PRN,   Status:  Discontinued         02/03/23 1541    02/03/23 1540  XR Chest 1 View  1 Time Imaging         02/03/23 1539    02/03/23 1539  Oxygen Therapy- Nasal Cannula; Titrate for SPO2: per policy  Continuous,   Status:  Canceled         02/03/23 1538    02/03/23 1539  Pulse Oximetry, Continuous  Continuous,   Status:  Canceled         02/03/23 1538    02/03/23 1539  Assess Need for Indwelling Urinary Catheter - Follow Removal Protocol  Continuous,   Status:  Canceled        Comments: Follow Protocol As Outlined in Process Instructions (Open Order Report to View Full Instructions)    02/03/23 1538    02/03/23 1539  Urinary Catheter Care  Every Shift,   Status:  Canceled       02/03/23 1538    02/03/23 1539  Basic Metabolic Panel  STAT         02/03/23 1538    02/03/23 1539  CBC (No Diff)  STAT         02/03/23 1538    02/03/23 1539  PT Consult: Eval & Treat Post Surgery Care  Once         02/03/23 1538    02/03/23 1539  OT Consult: Eval & Treat  Once         02/03/23 1538    02/03/23 1539  Continue Indwelling Urinary Catheter  Once        See Hyperspace for full Linked Orders Report.    02/03/23 1538    02/03/23 1539  Assess Need for Indwelling Urinary  Catheter - Follow Removal Protocol  Continuous        Comments: Follow Protocol As Outlined in Process Instructions (Open Order Report to View Full Instructions)   See Hyperspace for full Linked Orders Report.    02/03/23 1538    02/03/23 1538  HYDROmorphone (DILAUDID) injection 0.5 mg  Every 2 Hours PRN         02/03/23 1538    02/03/23 1538  Urinary Catheter Care  Every Shift      See Hyperspace for full Linked Orders Report.    02/03/23 1538    02/03/23 1012  thrombin (THROMBIN-JMI) spray kit  As Needed,   Status:  Discontinued         02/03/23 1012    02/03/23 1012  gelatin absorbable (GELFOAM) sponge  As Needed,   Status:  Discontinued         02/03/23 1012    02/03/23 0906  Insert Indwelling Urinary Catheter  Once,   Status:  Canceled        Comments: Follow Protocol As Outlined in Process Instructions (Open Order Report to View Full Instructions)    02/03/23 0905    02/03/23 0900  sodium chloride 0.9 % flush 3 mL  Every 12 Hours Scheduled,   Status:  Discontinued         02/03/23 0601    02/03/23 0900  sodium chloride 0.9 % flush 3 mL  Every 12 Hours Scheduled,   Status:  Discontinued         02/03/23 0625    02/03/23 0856  sodium chloride (NS) irrigation solution  As Needed,   Status:  Discontinued         02/03/23 0856    02/03/23 0855  sodium chloride (NS) irrigation solution  As Needed,   Status:  Discontinued         02/03/23 0855    02/03/23 0854  onabotulinumtoxina (BOTOX) injection  As Needed,   Status:  Discontinued         02/03/23 0855    02/03/23 0853  Tissue Pathology Exam  RELEASE UPON ORDERING         02/03/23 0853    02/03/23 0849  bupivacaine (PF) 0.25 % 10 mL, bupivacaine liposome 20 mL mixture  As Needed,   Status:  Discontinued         02/03/23 0852    02/03/23 0741  Pulse Oximetry, Continuous  Continuous,   Status:  Canceled         02/03/23 0743    02/03/23 0708  fentaNYL citrate (PF) (SUBLIMAZE) injection  Code / Trauma / Sedation Medication         02/03/23 0708    02/03/23 0707   midazolam (VERSED) injection  Code / Trauma / Sedation Medication         02/03/23 0708    02/03/23 0705  lactated ringers infusion  Continuous,   Status:  Discontinued         02/03/23 0703    02/03/23 0700  sodium chloride 0.9 % infusion  Continuous,   Status:  Discontinued         02/03/23 0658    02/03/23 0649  bupivacaine (PF) (MARCAINE) 0.25 % injection  - ADS Override Pull        Note to Pharmacy: Created by cabinet override    02/03/23 0649    02/03/23 0648  bupivacaine liposome (EXPAREL) 1.3 % injection  - ADS Override Pull        Note to Pharmacy: Created by cabinet override    02/03/23 0648    02/03/23 0632  POC Glucose Once  PROCEDURE ONCE         02/03/23 0631    02/03/23 0627  lactated ringers infusion  Continuous         02/03/23 0625    02/03/23 0627  famotidine (PEPCID) injection 20 mg  Once         02/03/23 0625    02/03/23 0626  Insert Peripheral IV  Once,   Status:  Canceled         02/03/23 0625    02/03/23 0626  Saline Lock & Maintain IV Access  Continuous,   Status:  Canceled         02/03/23 0625    02/03/23 0626  Pulse Oximetry, Continuous  Continuous,   Status:  Canceled         02/03/23 0625    02/03/23 0626  POC Glucose Once  Once,   Status:  Canceled        Comments: For all diabetic patients. Notify Anesthesiologist for blood sugar greater than 180 or less than 60..      02/03/23 0625    02/03/23 0626  May take Beta Blocker from home with sip of water.  Once,   Status:  Canceled        Comments: Only applicable to patients on home Beta Blocker    02/03/23 0625    02/03/23 0625  fentaNYL citrate (PF) (SUBLIMAZE) injection 50 mcg  Every 10 Minutes PRN,   Status:  Discontinued         02/03/23 0625    02/03/23 0625  midazolam (VERSED) injection 1 mg  Every 10 Minutes PRN,   Status:  Discontinued         02/03/23 0625    02/03/23 0625  Vital Signs - Per Anesthesia Protocol  As Needed,   Status:  Canceled       02/03/23 0625    02/03/23 0625  Oxygen Therapy- Nasal Cannula; Titrate for SPO2:  Per Policy  Continuous PRN,   Status:  Canceled       02/03/23 0625    02/03/23 0625  Pulse Oximetry, Continuous  Continuous PRN,   Status:  Canceled       02/03/23 0625    02/03/23 0625  POC Glucose PRN  As Needed,   Status:  Canceled      Comments: For all diabetic patients. Notify Anesthesiologist for blood sugar > 180.      02/03/23 0625 02/03/23 0625  sodium chloride 0.9 % flush 3-10 mL  As Needed,   Status:  Discontinued         02/03/23 0625    02/03/23 0625  lidocaine PF 1% (XYLOCAINE) injection 0.5 mL  Once As Needed,   Status:  Discontinued         02/03/23 0625    02/03/23 0603  acetaminophen (TYLENOL) tablet 1,000 mg  Once         02/03/23 0601 02/03/23 0603  celecoxib (CeleBREX) capsule 200 mg  Once         02/03/23 0601 02/03/23 0603  gabapentin (NEURONTIN) capsule 600 mg  Once         02/03/23 0601 02/03/23 0603  heparin (porcine) 5000 UNIT/ML injection 5,000 Units  Once         02/03/23 0601    02/03/23 0603  ampicillin-sulbactam (UNASYN) 3 g in sodium chloride 0.9 % 100 mL IVPB-VTB  Once         02/03/23 0601 02/03/23 0602  Follow Anesthesia Guidelines / Protocol  Once,   Status:  Canceled         02/03/23 0601    02/03/23 0602  Patient to Drink Gatorade 20oz 2 Hours Prior to Surgery  Once,   Status:  Canceled         02/03/23 0601    02/03/23 0602  Diabetic Patient to Drink Gatorade G2 20oz 2 Hours Prior to Surgery  Once,   Status:  Canceled         02/03/23 0601    02/03/23 0602  Insert Peripheral IV  Once,   Status:  Canceled        Comments: Please place two IVs on all DaVinci cases    02/03/23 0601 02/03/23 0602  Saline Lock & Maintain IV Access  Continuous,   Status:  Canceled         02/03/23 0601    02/03/23 0602  Inpatient Admission  Once         02/03/23 0601    02/03/23 0601  sodium chloride 0.9 % flush 3-10 mL  As Needed,   Status:  Discontinued         02/03/23 0601    02/03/23 0601  sodium chloride 0.9 % infusion 40 mL  As Needed,   Status:  Discontinued          02/03/23 0601    02/03/23 0600  Type & Screen  Morning Draw         01/23/23 1538    02/03/23 0000  Hemoglobin & Hematocrit, Blood  Once,   Status:  Canceled         02/03/23 2040    Unscheduled  Up in Chair tonight if hemodynamically stable and for meals.  As Needed       02/03/23 1805    Unscheduled  Potassium  As Needed      Comments: For Sudden Ventricular Dysrhythmia      02/03/23 1805    Unscheduled  Magnesium  As Needed      Comments: For Sudden Ventricular Dysrhythmia      02/03/23 1805    Unscheduled  Digoxin Level  As Needed      Comments: For Atrial Dysrhythmia      02/03/23 1805    Unscheduled  Blood Gas, Arterial -  As Needed      Comments: Per O2 Policy      02/03/23 1805    Unscheduled  Follow Hypoglycemia Standing Orders For Blood Glucose <70 & Notify Provider of Treatment  As Needed      Comments: Follow Hypoglycemia Orders As Outlined in Process Instructions (Open Order Report to View Full Instructions)  Notify Provider Any Time Hypoglycemia Treatment is Administered    02/03/23 1805    Unscheduled  Michael and Document Tube Depth (in cm)  As Needed       02/05/23 0840    Signed and Held  Vital Signs - Per Anesthesia Protocol  As Needed,   Status:  Canceled         Signed and Held    Signed and Held  Oxygen Therapy- Nasal Cannula; Titrate for SPO2: Per Policy  Continuous PRN,   Status:  Canceled         Signed and Held    Signed and Held  Pulse Oximetry, Continuous  Continuous PRN,   Status:  Canceled         Signed and Held    Signed and Held  POC Glucose PRN  As Needed,   Status:  Canceled        Comments: For all diabetic patients. Notify Anesthesiologist for blood sugar > 180.      Signed and Held    Signed and Held  Insert Peripheral IV  Once,   Status:  Canceled         Signed and Held    Signed and Held  Saline Lock & Maintain IV Access  Continuous,   Status:  Canceled         Signed and Held    Signed and Held  sodium chloride 0.9 % flush 3 mL  Every 12 Hours Scheduled,   Status:  Canceled          Signed and Held    Signed and Held  sodium chloride 0.9 % flush 3-10 mL  As Needed,   Status:  Canceled         Signed and Held    Signed and Held  lidocaine PF 1% (XYLOCAINE) injection 0.5 mL  Once As Needed,   Status:  Canceled         Signed and Held    Signed and Held  lactated ringers infusion  Continuous,   Status:  Canceled         Signed and Held    Signed and Held  fentaNYL citrate (PF) (SUBLIMAZE) injection 50 mcg  Every 10 Minutes PRN,   Status:  Canceled         Signed and Held    Signed and Held  midazolam (VERSED) injection 1 mg  Every 10 Minutes PRN,   Status:  Canceled         Signed and Held    Signed and Held  famotidine (PEPCID) injection 20 mg  Once,   Status:  Canceled         Signed and Held    Signed and Held  May take Beta Blocker from home with sip of water.  Once,   Status:  Canceled        Comments: Only applicable to patients on home Beta Blocker    Signed and Held    Signed and Held  POC Glucose Once  Once,   Status:  Canceled        Comments: For all diabetic patients. Notify Anesthesiologist for blood sugar greater than 180 or less than 60..      Signed and Held                   Operative/Procedure Notes        Valerie Stockton MD at 02/03/23 0836          THORASCOPIC ESOPHAGOGASTRECTOMY WITH DAVINCI ROBOT  Procedure Report    Patient Name:  Edmond Chase  YOB: 1960    Date of Surgery:  2/3/2023     Indications: Stage I esophageal cancer    Pre-op Diagnosis:   Malignant neoplasm of lower third of esophagus (HCC) [C15.5]       Post-Op Diagnosis Codes:     * Malignant neoplasm of lower third of esophagus (HCC) [C15.5]    Procedure/CPT® Codes:      Procedure(s):  BRONCHOSCOPY, RIGHT ROBOTIC VIDEO ASSISTED THORACOSCOPY WITH TOTAL ESOPHAGECTOMY, INTERCOSTAL NERVE BLOCK, FEEDING JEJUNOSTOMY PLACEMENT    Staff:  Surgeon(s):  Valerie Stockton MD Gul, Nabeel, MD    Assistant: Dr. Kirby and Tamika Boyce CRNFA was responsible for performing the following activities:  Retraction, Suction, Irrigation, Closing, Placing Dressing and Held/Positioned Camera and their skilled assistance was necessary for the success of this case.     Anesthesia: General with Block    Estimated Blood Loss: 400 mL    Implants:    Implant Name Type Inv. Item Serial No.  Lot No. LRB No. Used Action   RELOAD STPLR ENDOWRIST 30 DAVINCI/X/XI 6ROW 2.5 WHT 1P/U - [id number] Implant RELOAD STPLR ENDOWRIST 30 DAVINCI/X/XI 6ROW 2.5 WHT 1P/U  INTUITIVE SURGICAL [id number] Right 1 Implanted   RELOAD STPLR ENDOWRIST 30 DAVINCI/X/XI 6ROW 2.5 WHT 1P/U - [id number] Implant RELOAD STPLR ENDOWRIST 30 DAVINCI/X/XI 6ROW 2.5 WHT 1P/U  INTUITIVE SURGICAL [id number] Right 2 Implanted   HEMOST ABS SURGICEL 2X14 - [id number] Implant HEMOST ABS SURGICEL 2X14  [employer]  DIV OF J AND J [id number] Right 1 Implanted   RELOAD STPLR ENDOWRIST 30 DAVINCI/X/XI 6ROW 4.3 GRN 1P/U - [id number] Implant RELOAD STPLR ENDOWRIST 30 DAVINCI/X/XI 6ROW 4.3 GRN 1P/U  INTUITIVE SURGICAL [id number] Right 1 Implanted   RELOAD STPLR ENDOWRIST 30 DAVINCI/X/XI 6ROW 4.3 GRN 1P/U - [id number] Implant RELOAD STPLR ENDOWRIST 30 DAVINCI/X/XI 6ROW 4.3 GRN 1P/U  INTUITIVE SURGICAL [id number] Right 1 Implanted   RELOAD STPLR ENDOWRIST 30 DAVINCI/X/XI 6ROW 3.5 TREVA 1P/U - [id number] Implant RELOAD STPLR ENDOWRIST 30 DAVINCI/X/XI 6ROW 3.5 TREVA 1P/U  INTUITIVE SURGICAL [id number] Right 2 Implanted   CLIP LIGAT VASC HORIZON TI SM/WD RD 6CT - [id number] Implant CLIP LIGAT VASC HORIZON TI SM/WD RD 6CT  [employer] [id number] Right 1 Implanted   RELOAD STPLR ENDOWRIST 30 DAVINCI/X/XI 6ROW 3.5 TREVA 1P/U - [id number] Implant RELOAD STPLR ENDOWRIST 30 DAVINCI/X/XI 6ROW 3.5 TREVA 1P/U  [facility] SURGICAL [id number] Right 1 Implanted   INCISIONLINE PLEDGET TFLN SFT  - [id number] Implant INCISIONLINE PLEDGET TFLN SFT   [employer]ODU[employer] [id number] Right 1 Implanted   CLIPAPPLR M/ ENDO LIGACLIP 20CLP 11IN MD - [id number] Implant CLIPAPPLR M/ ENDO LIGACLIP 20CLP  11IN MD  [employer] ENDO SURGERY  DIV OF J AND J [id number] Right 1 Implanted   RELOAD STPLR ENDO MARCIA TRISTAPLE 45 ART MD KRISHNA - [id number] Implant RELOAD STPLR ENDO MARCIA TRISTAPLE 45 ART MD ERENDIRA SANTILLAN 2 Right 1 Implanted   RELOAD STPLR ENDO MARCIA TRISTAPLE 45 ART MD KRISHNA - [id number] Implant RELOAD STPLR ENDO MARCIA TRISTAPLE 45 ART MD ERENDIRA SANTILLAN [id number] Right 12 Implanted   CLIPAPPLR M/ ENDO LIGACLIP 9 3/8IN SM - [id number] Implant CLIPAPPLR M/ ENDO LIGACLIP 9 3/8IN SM  [employer] ENDO SURG[employer]  DIV OF J AND J [id number] Right 1 Implanted   HEMOST ABS SURGICEL NUKNIT 6X9 - [id number] Implant HEMOST ABS SURGICEL NUKNIT 6X9  [employer]  DIV OF J AND J [id number] Right 1 Implanted   RELOAD STPLR ENDO MARCIA TRISTAPLE 45 ART MD KRISHNA - [id number] Implant RELOAD STPLR ENDO MARCIA TRISTAPLE 45 ART MD ERENDIRA SANTILLAN [id number] Right 4 Implanted   RELOAD STPLR ENDO MARCIA TRISTAPLE 45 ART MD KRISHNA - [id number] Implant RELOAD STPLR ENDO MARCIA TRISTAPLE 45 ART MD ERENDIRA SANTILLAN [id number] Right 5 Implanted       Specimen:             Specimens     ID Source Type Tests Collected By Collected At Frozen?    A Lymph Node Tissue · TISSUE PATHOLOGY EXAM   Valerie Stockton MD 2/3/23 0853 No    Description: lymph node level 9 R, fresh for permanent    B Lymph Node Tissue · TISSUE PATHOLOGY EXAM   Valerie Stockton MD 2/3/23 0854 No    Description: lymph node level 7 R, fresh for permanent    C Esophagus Tissue · TISSUE PATHOLOGY EXAM   Valerie Stockton MD 2/3/23 1407 No    Description: esophagus and GE junction, fresh for permanent    D Esophagus Tissue · TISSUE PATHOLOGY EXAM   Valerie Stockton MD 2/3/23 1414 No    Description: FINAL ESOPHAGEAL MARGIN    E Stomach Tissue · TISSUE PATHOLOGY EXAM   Valerie tSockton MD 2/3/23 1435 No    Description: FINAL GASTRIC MARGIN            Findings: Normal anatomy.  No evidence of malignancy in the surrounding lymph nodes.      Complications: None apparent    Description of Procedure: Edmond ANGELA Chase was identified in the preop area  and again his consent was confirmed, he was taken from the holding area to the operating room, where he was placed supine on the OR table. An operative timeout was performed, and after induction with general endotracheal anesthesia and placement of a double-lumen endotracheal tube, the patient was placed in the left lateral decubitus position and the right chest was prepped and draped in standard sterile fashion.  Surface landmarks including scapular tip, anterior/superior iliac spine were identified and an access incision was made over the anterior axillary line in the seventh intercostal space.  A 12 mm robotic trocar was placed in this access incision.  Posteriorly, 3 8 mm ports were placed in the seventh intercostal space.  An assistant port was placed in the fifth intercostal space mid axillary line.  The robot was brought on the field and docked.  After targeting was achieved, the caudier forcep and robotic energy device were brought into the field.  I proceeded to the robotic console and Dr. Kirby remained at the bedside to manipulate and pass instruments.   The lung was retracted anteriorly and dissection began with use of Bovie electrocautery over the esophagus where the pleura was opened longitudinally. This was carried up to the level of the azygous vein, which was dissected free and transected using an Endo-MARCIA stapler with a vascular load. Dissection of the pleura continued superiorly beyond the level of the gregory onward to the thoracic inlet. Now the esophagus was encircled at the level of the inferior pulmonary vein and a penrose drain was placed around it for retraction.  The Penrose drain was stapled.  Blunt dissection with the aid of the energy device was continued all the way into the thoracic inlet and likewise distally to the esophageal hiatus. No mass was identified.  The inferior pulmonary ligament lymph nodes, subcarinal lymph node and paraesophageal lymph nodes were harvested. Care was also  taken to avoid injury to the membranous airway, and the dissection was completed without difficulty. Following completion of this dissection a rib block was performed and a 24 Hungarian chest tube was placed into the chest cavity and the lung was reexpanded without difficulty.  The posterior trocar site had some bleeding associated with it.  This was controlled with Bovie electrocautery.  The a 24 Icelandic chest tube was placed in the anterior trocar site under direct vision.  The incisions were closed in standard fashion.  The patient was repositioned in the supine position and the abdomen was prepped and draped in standard sterile fashion, with the use of chlorhexidine pain and Ioban drape, as was the left neck. A shoulder roll was placed to facilitate neck extension.  A Veress needle was inserted in Mathew's point in the left upper quadrant.  Insufflation was obtained.  A 12 mm robotic trocar was placed in the midline just above the umbilicus.  3 robotic trochars were placed to the left and right of midline.  An assistant port was placed in the right upper quadrant.  The laparoscopic liver retractor was placed in the midline and the left lateral segment was elevated.  The robot was brought into the field and docked in standard fashion.  Instruments were placed in the abdomen under direct vision.  Dr. Kirby remained at the bedside to manipulate and pass instruments and I proceeded to the robotic console.    Dissection began by first taking down the pars flaccida and mobilizing the esophageal hiatus.  There was a replaced right hepatic artery that had to be divided.  The peritoneum overlying the hiatus was opened and dissection was continued circumferentially around the esophagus, to complete the crural dissection and a penrose drain was placed around the EGJ.  Attention was now directed towards mobilizing the stomach. The greater omentum was elevated and inspected, and the avascular plane distal to the gastroepiploic  artery was identified, and this was entered and dissected using the Ligasure. Care was taken at all times to protect and preserve the gastroepiploic artery. Dissection continued proximally along the greater curvature of the stomach, towards the spleen and the short gastric vessels were taken. Once this dissection was continued and the highest short gastric was taken, the stomach was elevated and some of the posterior attachments within the lesser sac were taken.  Now, the stomach was free along its entirety, and attention was directed towards identifying the left gastric arterial pedicle. This was dissected and transected using an endovascular MARCIA stapler with a vascular load. The cut ends were inspected and there was bleeding from the staple edges from the left gastric artery.  I elected to perform a laparotomy.  An approximately 10 cm incision was made supraumbilicall just below the xiphoid.  Dissection was carried down into the abdomen using Bovie electrocautery.  The Bookwalter retractor was placed in the patient's abdomen to provide visualization.  The cut edges of the staple line were oversewn with Prolene suture with pledgets.  Hemostasis was obtained.  There was a small liver hematoma which was examined and packed.  Once hemostasis was obtained, we proceeded with the operation.  Now, with the stomach completely mobilized, attention was directed towards the neck.  A 4 cm curvilinear incision was made over the anterior triangle of the neck and dissected down through the subcutaneous tissues. The platysma muscle was divided, as was the omohyoid. Due to the previous thoracoscopic mobilization, the esophagus was soon identified and retrieved into the wound. This being done, the proximal esophagus was transected using an Endo-MARCIA stapler, and a Penrose drain was secured to the esophageal tip, and the esophageal specimen was withdrawn into the abdomen.   The stomach was measured and a 4 cm gastric tube was  fashioned leaving a small gastric pouch just proximal to the pylorus with 19 sequential firings of an Endo-MARCIA stapler. The esophageal and proximal gastric specimen was transected and sent for pathologic examination. Now, a Penrose drain was secured to the proximal stomach. The Penrose was placed so that orientation could be maintained, and was withdrawn back up through the neck. Of note, the stomach was directed under direct vision through the esophageal hiatus and was passed without difficulty through the esophageal bed.   Meanwhile, the esophageal anastomosis was completed. Once alignment was confirmed in the neck, the electrocautery was used to create an enterotomy in the proximal cervical esophagus and in the stomach, after securing 3-0 silk sutures were placed. This was placed so that a functional end-to-side anastomosis would be created between the cervical esophagus and the gastric conduit. The posterior wall of the anastomosis was completed using a single firing of a 45 mm Endo-MARCIA stapler with a purple tissue load. The anterior wall was completed with a running 4-0 PDS suture and 3-0 silk lembert sutures. Prior to securing the final sutures of the anastomosis, an NG tube was passed through the anastomosis into the gastric conduit.  The platysma was reapproximated using a 3-0 Vicryl. A running 4-0 Monocryl subcuticular suture was applied to the skin. At this point, the abdomen was inspected for hemostasis.  A 16 Setswana T-tube was used to place a feeding jejunostomy approximately 40 cm from the ligament of Treitz.  This was created in a Abram fashion.  Two 0 PDS sutures were used in a running fashion to reapproximate the fascia. A 3-0 Vicryl suture was used in the deep dermal tissues and a running 4-0 Monocryl subcuticular suture was used to reapproximate the skin. Dermabond was used to complete the skin incision. A dry sterile dressing was applied. The patient was extubated and transferred to recovery room  in a stable condition, having suffered no intraoperative complications. All needle and sponge counts were correct at the end of the procedure. There were no intraoperative complications.          Valerie Stockton MD     Date: 2/3/2023  Time: 17:22 EST      Electronically signed by Valerie Stockton MD at 02/03/23 1729          Physician Progress Notes            Yazmin Molina APRN at 02/06/23 0924        Supportive Oncology Services    Supportive visit to patient, well known to me via outpatient care in Behavioral Oncology clinic. Pt is alert and oriented. Close friend, Lester, is at bedside. Pt is notably tired, appropriate to situation. Reports stable pain, elevated anxiety. Considers concerns regarding rehabilitation needed to recover from surgery. Nursing reports appropriate night sleep until appx shift change; pt reports seeing monkeys. Is not seeing them now. Denies any altered perceptions at this time. Pt did have valium this AM due to anxiety.    Considered with patient non pharmacological strategies for maintaining level of orientation, minimizing anxiety.  Curtains are open, natural light coming in. Close friend at bedside. Nurse plans to assist with getting patient up shortly. Considered hypnagogic state with adjusted visual experience vs delirium; will communicate with surgical team as delirium may benefit from discontinuation of benzodiazapines, alternate treatment strategy. Otherwise, I remain available if needed.    VIKTORIA Moeller  [phone]    Electronically signed by Yazmin Molina APRN at 02/06/23 0909     Jesika West MD at 02/05/23 1422            [facility] Follow Up    Chief Complaint: Follow up CAD    Interval History: The patient ambulated some around the nurses station today.  He complains of feeling weak and shaky with exertion.  Chest pain is unchanged.  Does not really worsen with exertion.    Objective:     Objective:  Temp:   [98 °F (36.7 °C)-98.6 °F (37 °C)] 98.4 °F (36.9 °C)  Heart Rate:  [] 70  Resp:  [18-20] 18  BP: (108-151)/(61-85) 132/70  Arterial Line BP: (125-201)/(58-72) 196/72     Intake/Output Summary (Last 24 hours) at 2/5/2023 1429  Last data filed at 2/5/2023 1200  Gross per 24 hour   Intake 2223 ml   Output 1155 ml   Net 1068 ml     Body mass index is 43.01 kg/m².      02/04/23  0600 02/04/23  1727 02/05/23  0300   Weight: (!) 144 kg (317 lb 10.9 oz) (!) 144 kg (317 lb 7.4 oz) (!) 147 kg (324 lb 8.3 oz)     Weight change: 1 kg (2 lb 3.3 oz)      Physical Exam:   General : Alert, cooperative, in no acute distress.  Neuro: Alert,cooperative and oriented.  Lungs: CTAB. Normal respiratory effort and rate.  CV: Regular rate and rhythm, normal S1 and S2, no murmurs, gallops or rubs.  ABD: Soft, nontender, nondistended. Positive bowel sounds.  Extr: No edema or cyanosis, moves all extremities.    Lab Review:   Results from last 7 days   Lab Units 02/05/23  0325 02/04/23  0309   SODIUM mmol/L 136 142   POTASSIUM mmol/L 4.9 4.7   CHLORIDE mmol/L 104 106   CO2 mmol/L 26.0 26.2   BUN mg/dL 24* 20   CREATININE mg/dL 1.09 0.97   GLUCOSE mg/dL 164* 194*   CALCIUM mg/dL 8.1* 8.5*   AST (SGOT) U/L 220*  --    ALT (SGPT) U/L 362*  --          Results from last 7 days   Lab Units 02/05/23  0325 02/04/23  0309   WBC 10*3/mm3 13.32* 10.18   HEMOGLOBIN g/dL 10.4* 9.9*   HEMATOCRIT % 33.4* 31.9*   PLATELETS 10*3/mm3 179 201         Results from last 7 days   Lab Units 02/05/23  0325   MAGNESIUM mg/dL 2.1           Invalid input(s): LDLCALC          I reviewed the patient's new clinical results.  I personally viewed and interpreted the patient's EKG  Current Medications:   Scheduled Meds:acetaminophen, 1,000 mg, Per J Tube, TID  gabapentin, 300 mg, Per J Tube, Q8H  heparin (porcine), 5,000 Units, Subcutaneous, Q8H  insulin regular, 0-9 Units, Subcutaneous, Q6H  metoprolol tartrate, 25 mg, Per J Tube, Q12H   Or  metoprolol tartrate, 5 mg,  Intravenous, Q12H  sodium chloride, 1,000 mL, Intravenous, Once      Continuous Infusions:dextrose 5 % and sodium chloride 0.45 % with KCl 20 mEq/L, 125 mL/hr, Last Rate: 125 mL/hr (02/05/23 0536)  HYDROmorphone HCl-NaCl,   lactated ringers, 9 mL/hr, Last Rate: 9 mL/hr (02/03/23 0737)  lactated ringers, 9 mL/hr, Last Rate: Stopped (02/03/23 1500)  sodium chloride, 30 mL/hr, Last Rate: 30 mL/hr (02/03/23 1702)  sodium chloride, 10 mL/hr, Last Rate: 10 mL/hr (02/04/23 1023)        Allergies:  No Known Allergies    Assessment/Plan:     1.  Esophageal cancer status post esophagectomy.  And J-tube placement.  2.  Chest pain.  Suspect is most likely postoperative pain.  His EKG postoperatively shows no acute changes.  The symptoms are constant does not really seem to worsen with exertion.  Limited echocardiogram shows grossly normal ventricular systolic function and wall motion.  3.  Coronary artery disease.  Cardiac catheterization 7/2022 showed only 1 out of 3 patent grafts and chronic severe stenosis of the left circumflex and right coronary arteries.  He has been medically managed since.  4.  History of bioprosthetic aortic valve replacement.  Normal function on his last echocardiogram in 12/2022.  5.  Hypertension.    Well controlled.  6.  Hyperlipidemia  7.  Diabetes mellitus type 2  8.  Trivial pericardial effusion.  Noted on limited echocardiogram yesterday.  Echocardiogram performed for pericardial rub noted on exam.  This is appears to resolved today on exam.    - Continue current cardiac management.    Jesika West MD  02/05/23  14:29 EST    Electronically signed by Jesika West MD at 02/05/23 6131     Abdi Dominique MD at 02/05/23 8856              [facility]  [phone]  Dr. Abdi Doimnique    Subjective:  LOS: 2    Chief Complaint: Postop esophagectomy    Patient is sitting in a chair.  He looks and feels somewhat better. Somewhat woozy from pain meds.    Objective   Vital Signs past  24hrs    Temp range: Temp (24hrs), Av.4 °F (36.9 °C), Min:98 °F (36.7 °C), Max:98.6 °F (37 °C)    BP range: BP: (107-151)/(57-85) 132/70  Pulse range: Heart Rate:  [] 70  Resp rate range: Resp:  [18-20] 18    Device (Oxygen Therapy): nasal cannula with ANHB1Gblw (L/min):  [1] 1  Oxygen range:SpO2:  [90 %-96 %] 96 %      (!) 147 kg (324 lb 8.3 oz); Body mass index is 43.01 kg/m².    Intake/Output Summary (Last 24 hours) at 2023 1314  Last data filed at 2023 0300  Gross per 24 hour   Intake 2163 ml   Output 815 ml   Net 1348 ml       Physical Exam  Constitutional:       Appearance: He is obese.   Eyes:      Pupils: Pupils are equal, round, and reactive to light.   Cardiovascular:      Rate and Rhythm: Normal rate and regular rhythm.      Heart sounds: No murmur heard.  Pulmonary:      Effort: Pulmonary effort is normal.      Breath sounds: Normal breath sounds.      Comments: Right chest tube  Abdominal:      General: Bowel sounds are normal.      Palpations: Abdomen is soft. There is no mass.      Tenderness: There is no abdominal tenderness.      Comments: PEJ+nt   Musculoskeletal:         General: Swelling (2+) present.   Neurological:      Mental Status: He is alert.       Results Review:    I have reviewed the laboratory and imaging data since the last note by [facility] physician.  My annotations are noted in assessment and plan.    Medication Review:  I have reviewed the current MAR.  My annotations are noted in assessment and plan.    dextrose 5 % and sodium chloride 0.45 % with KCl 20 mEq/L, 125 mL/hr, Last Rate: 125 mL/hr (23 0536)  HYDROmorphone HCl-NaCl,   lactated ringers, 9 mL/hr, Last Rate: 9 mL/hr (23 0737)  lactated ringers, 9 mL/hr, Last Rate: Stopped (23 1500)  sodium chloride, 30 mL/hr, Last Rate: 30 mL/hr (23 1702)  sodium chloride, 10 mL/hr, Last Rate: 10 mL/hr (23 1023)           PCCM Problems  Status post esophagectomy for adeno CA distal third of  "esophagus, 2/3/2023  Right-sided pneumothorax with chest tube  Expected postop pain  CAD with stent  Bioprosthetic aortic valve  TJ on CPAP at home, no PAP therapy here  Morbid obesity  Diabetes type 2  Elevated liver enzymes        Plan of Treatment    Doing well postoperatively.  Pain under control.  On Dilaudid PCA.    With right-sided chest tube and pneumothorax almost completely resolved.    Note cardiology input.  No evidence of any acute coronary syndrome.    Leg swelling and poor UO. Nephrology consult per thoracic surgery.    Elevated liver enzymes.  We will need to monitor.    Sliding scale for diabetes.    Hopefully well enough for transfer out of ICU tomorrow.    Abdi Dominique MD  02/05/23  13:14 EST      Part of this note may be an electronic transcription/translation of spoken language to printed text using the Dragon Dictation System.      Electronically signed by Abdi Dominique MD at 02/05/23 1317     Sujata John DNP, APRN at 02/05/23 1024          POST-OPERATIVE NOTE     Chief Complaint: Esophageal cancer, postoperative care  S/P: Bronchoscopy, right video-assisted thoracoscopy with total esophagectomy, intercostal nerve block, feeding jejunostomy tube placement  POD # 2    Subjective:  Symptoms:  Stable.  He reports shortness of breath, chest pain and weakness.    Diet:  NPO.  No nausea or vomiting.    Activity level: Impaired due to pain.    Pain:  He complains of pain that is mild.  Pain is partially controlled.    Persistent postop pain but overall feeling well.  No new complaints.    Objective:  General Appearance:  Ill-appearing, in no acute distress and comfortable.    Vital signs: (most recent): Blood pressure 108/75, pulse 77, temperature 98.5 °F (36.9 °C), temperature source Oral, resp. rate 18, height 185 cm (72.84\"), weight (!) 147 kg (324 lb 8.3 oz), SpO2 94 %.  Vital signs are normal.  No fever.    Output: Producing urine.    HEENT: (NG tube and nasal cannula)    Lungs:  Normal " effort and normal respiratory rate.  He is not in respiratory distress.  No rales, wheezes or rhonchi.    Heart: Normal rate.  Regular rhythm.    Chest: Chest wall tenderness present.    Abdomen: Abdomen is soft.  Absent bowel sounds.   There is generalized tenderness.  There is incisional tenderness.    Extremities: Decreased range of motion.  There is dependent edema (Bilateral lower extremities).    Pulses: Distal pulses are intact.    Neurological: Patient is alert and oriented to person, place and time.    Skin:  Warm and dry.  (Postoperative incisions well approximated and intact)          Chest tube:   Site: Right, Clean, Dry, Intact and Securement device intact  Suction: -20 cm  Air Leak: negative  24 Hour Total: 640cc    NGT:   24 Hour [id number] cc    Results Review:     I reviewed the patient's new clinical results.  I reviewed the patient's new imaging results and agree with the interpretation.  I reviewed the patient's other test results and agree with the interpretation  Discussed with Patient, RN, Dr. Pichardo.    Assessment & Plan      Esophageal cancer: POD #2, s/p total esophagectomy.  Some expected postop pain, but this is improved with ERAS medications.  Continue PCA for now.  We will initiate tube feeds at 10 cc/h via J-tube.  Do not advance.Patient should be strict NPO, but may have swabs sparingly.  Absolutely no BiPAP.  Low urine output and slightly elevated creatinine.  Keep Narvaez catheter in place for now.  We will asked nephrology to see him.  Appreciate their assistance.  Place chest tube to waterseal and leave NG tube to intermittent low wall suction.  We will plan to remove these on postoperative day 5.    Postoperative weakness: PT/OT consulted.  Up to chair and have patient take 3 walks per day with assistance of staff.    Coronary artery disease with angina: Patient follows with Dr. Miguel.  He was initiated on Nitropaste last night per Dr. Miguel's reoperative recommendations.  This was  discontinued yesterday secondary to lightheadedness.  Cardiology has seen him.  Appreciate their assistance.  Continue scheduled metoprolol.      Appreciate assistance from all providers.    Sujata John DNP, VIKTORIA  [facility]  23  10:24 EST    Patient was seen and assessed while wearing personal protective equipment including facemask, protective eyewear and gloves.  Hand hygiene performed prior to entering the room and upon exiting with doffing of gloves.         Electronically signed by Sujata John DNP, APRN at 23 1030     Abdi Dominique MD at 23 1309              [facility]  [phone]  Dr. Abdi Dominique    Subjective:  LOS: 1    Chief Complaint: Postop esophagectomy    Patient is sitting in a chair.  He feels somewhat short of breath.  Earlier having chest discomfort any Nitropaste was applied.  However he started feeling dizzy.  This was subsequently taken off.  He feels okay now.  Expected chest pain from recent surgery.    Objective   Vital Signs past 24hrs    Temp range: Temp (24hrs), Av.1 °F (36.7 °C), Min:97.5 °F (36.4 °C), Max:98.8 °F (37.1 °C)    BP range: BP: (106-156)/(59-87) 106/59  Pulse range: Heart Rate:  [67-90] 67  Resp rate range: Resp:  [12-20] 18    Device (Oxygen Therapy): nasal cannula with QNHP2Dbbu (L/min):  [1-10] 1  Oxygen range:SpO2:  [91 %-98 %] 94 %      (!) 144 kg (317 lb 10.9 oz); Body mass index is 41.91 kg/m².    Intake/Output Summary (Last 24 hours) at 2023 1303  Last data filed at 2023 1208  Gross per 24 hour   Intake 3947 ml   Output 3025 ml   Net 922 ml       Physical Exam  Constitutional:       Appearance: He is obese. He is ill-appearing.   Eyes:      Pupils: Pupils are equal, round, and reactive to light.   Cardiovascular:      Rate and Rhythm: Normal rate and regular rhythm.      Heart sounds: No murmur heard.  Pulmonary:      Effort: Pulmonary effort is normal.      Breath sounds: Normal breath sounds.       Comments: Right chest tube  Abdominal:      General: Bowel sounds are normal.      Palpations: Abdomen is soft. There is no mass.      Tenderness: There is no abdominal tenderness.      Comments: PEJ+nt   Musculoskeletal:         General: Swelling (2+) present.   Neurological:      Mental Status: He is alert.       Results Review:    I have reviewed the laboratory and imaging data since the last note by [facility] physician.  My annotations are noted in assessment and plan.    Medication Review:  I have reviewed the current MAR.  My annotations are noted in assessment and plan.    dextrose 5 % and sodium chloride 0.45 % with KCl 20 mEq/L, 125 mL/hr, Last Rate: 125 mL/hr (02/04/23 0602)  HYDROmorphone HCl-NaCl,   lactated ringers, 9 mL/hr, Last Rate: 9 mL/hr (02/03/23 0737)  lactated ringers, 9 mL/hr, Last Rate: Stopped (02/03/23 1500)  sodium chloride, 30 mL/hr, Last Rate: 30 mL/hr (02/03/23 1702)  sodium chloride, 10 mL/hr, Last Rate: 10 mL/hr (02/04/23 1023)      Plan   Lines, Drains & Airways     Active LDAs     Name Placement date Placement time Site Days    Peripheral IV 02/03/23 0636 Posterior;Right Hand 02/03/23  0636  Hand  1    Peripheral IV 02/03/23 0633 Left;Posterior Hand 02/03/23  0633  Hand  1    Chest Tube 1 Right Midaxillary 02/03/23  1058  Midaxillary  1    NG/OG Tube Nasogastric Left nostril 02/03/23  1500  Left nostril  sutured  less than 1    Gastrostomy/Enterostomy Jejunostomy 1 19 Fr. LUQ 02/03/23  1439  LUQ  less than 1    Urethral Catheter Non-latex;Silicone 16 Fr. 02/03/23  0800  -- 1    Arterial Line 02/03/23 Left Radial 02/03/23  0730  created via procedure documentation  Radial  1              [facility] Problems  Status post esophagectomy for adeno CA distal third of esophagus, 2/3/2023  Right-sided pneumothorax with chest tube  Expected postop pain  CAD with stent  Bioprosthetic aortic valve  TJ on CPAP at home, no PAP therapy here  Morbid obesity  Diabetes type 2      THESE ARE NEW MEDICAL  "PROBLEMS TO ME.    Plan of Treatment    Doing well postoperatively.  Pain under control.  Note thoracic surgery has started some fluids via J-tube.  Keeping n.p.o.  On Dilaudid PCA.    With right-sided chest tube and pneumothorax almost completely resolved.    Note cardiology consultation by thoracic surgery.  No clear evidence of any acute coronary syndrome.    Appears to have considerable leg swelling over the ankles.  We will check his albumin again.  Currently on IV fluids.    Sliding scale for diabetes.    Abdi Dominique MD  02/04/23  13:03 EST      Part of this note may be an electronic transcription/translation of spoken language to printed text using the Dragon Dictation System.      Electronically signed by Abdi Dominique MD at 02/04/23 1309     Vita Jones APRN at 02/04/23 1101     Attestation signed by Paddy Pichardo MD PhD at 02/04/23 1151    I have reviewed this documentation and agree.                  POST-OPERATIVE NOTE     Chief Complaint: Esophageal cancer, postoperative care  S/P: Bronchoscopy, right video-assisted thoracoscopy with total esophagectomy, intercostal nerve block, feeding jejunostomy tube placement  POD # 1    Subjective:  Symptoms:  Stable.  He reports shortness of breath, chest pain and weakness.    Diet:  NPO.  No nausea or vomiting.    Activity level: Impaired due to pain.    Pain:  He complains of pain that is mild.  Pain is partially controlled.        Objective:  General Appearance:  Ill-appearing and in no acute distress.    Vital signs: (most recent): Blood pressure 129/72, pulse 75, temperature 98.2 °F (36.8 °C), resp. rate 18, height 185.4 cm (73\"), weight (!) 144 kg (317 lb 10.9 oz), SpO2 95 %.  Vital signs are normal.  No fever.    Output: Producing urine.    Lungs:  Normal effort and normal respiratory rate.  He is not in respiratory distress.  No rales, wheezes or rhonchi.    Heart: Normal rate.  Regular rhythm.    Chest: Chest wall tenderness present.    Abdomen: " Abdomen is soft.  Absent bowel sounds.   There is generalized tenderness.     Extremities: There is dependent edema.    Pulses: Distal pulses are intact.    Neurological: Patient is alert and oriented to person, place and time.    Skin:  Warm and dry.              Chest tube:   Site: Right, Clean, Dry, Intact and Securement device intact  Suction: -20 cm  Air Leak: negative  24 Hour Total: 1115cc    NGT:   24 Hour Total: 0cc    Results Review:     I reviewed the patient's new clinical results.  I reviewed the patient's new imaging results and agree with the interpretation.  I reviewed the patient's other test results and agree with the interpretation  Discussed with Patient, RN, Dr. Pichardo.    Assessment & Plan      Chest x-ray this morning demonstrates no significant pneumothorax with some subcutaneous emphysema along right lateral chest wall and some left basilar density along the left diaphragm, likely atelectasis.    Esophageal cancer: POD #1, s/p total esophagectomy.  Initiate normal saline at 10 cc/h via J-tube.  Okay for meds through J-tube.  Hold off on initiating tube feeds for now.  Patient should be strict NPO, but may have swab sparingly.  Absolutely no BiPAP.  Continue Dilaudid PCA.  Keep Narvaez catheter in place for now.  Okay to DC A-line.  Check labs and chest x-ray in AM.    Postoperative weakness: PT/OT consulted.  Up to chair and have patient take 3 walks per day with assistance of staff.    Coronary artery disease with angina: Patient follows with Dr. Miguel.  He was initiated on Nitropaste last night per Dr. iMguel's reoperative recommendations.  He is quite lightheaded this morning so we will DC Nitropaste.  We will go ahead and ask cardiology to see him for their opinion while he is here.  Obviously he is at high risk for developing atrial fibrillation postoperatively so we will continue scheduled metoprolol for now.    Appreciate assistance from all providers.    VIKTORIA Ramirez  [facility]  02/04/23  11:01 EST    Patient was seen and assessed while wearing personal protective equipment including facemask, protective eyewear and gloves.  Hand hygiene performed prior to entering the room and upon exiting with doffing of gloves.         Electronically signed by Paddy Pichardo MD PhD at 02/04/23 1151          Consult Notes (last 7 days)      Belinda Koehler APRN at 02/03/23 2001     Attestation signed by Michael Parker MD at 02/03/23 0804    IIMichael MD, personally performed the services described in this documentation as scribed by VIKTORIA , and it is both accurate and complete.  I have independently examined the patient and agree with the assessment and plan with additions and clarifications.  I performed > 50% of the management (including interview, exam, assessment, and plan) required in the care of this patient. have reviewed this documentation and agree.                           CONSULT NOTE    Patient Identification:  Edmond Chase  62 y.o.  male  1960  [id number]            Requesting physician: Dr. Stockton    Reason for Consultation:  ICU management post-esophagetcomy    CC: Esophageal cancer    History of Present Illness:  Edmond Chase is a 62-year-old male with a medical history of esophageal adenocarcinoma with invasion, coronary artery disease with stent placement, prior placement of bioprosthetic aortic valve, type 2 diabetes and obstructive sleep apnea on auto CPAP.  He is a never smoker.    He was recently has been admitted twice with acute blood loss anemia due to GI bleed.  In late November 2022, patient had recently had a polypectomy and presented to the ER 1 week post polypectomy with dizziness, lightheadedness and bright red rectal rectum.  Patient underwent clipping with improvement and was discharged.  He returned to the hospital on 12/1/2022 with acute blood loss anemia due to GI bleed and was subsequently transferred to [facility]  "for further evaluation.  Patient's condition improved and patient did not requiring GI intervention.    Patient presented to the hospital on 2/3/2023 for scheduled esophagectomy with Dr. Stockton for malignant neoplasm of lower third of the esophagus.  He also had a feeding jejunostomy placement.  He was extubated in the PACU and is admitted to the ICU for postop management.    Review of Systems:  Constitutional: Negative for activity change, appetite change, chills, fatigue or fever.  HEENT: Negative for congestion, sinus pain or pressure.  Positive for sore throat.  Negative for eye discharge, itching pain or redness.  Respiratory: Negative for cough, choking, wheezing or stridor.  Positive for subjective shortness of breath.  Cardiac: Negative for palpitations.  Positive for substernal chest pain without radiation and leg swelling.  GI: Negative for constipation, diarrhea, nausea or vomiting.  Positive for abdominal pain.  : Negative for dysuria, frequency or urgency.  Musculoskeletal: Negative for arthralgias or myalgias.  Skin: Negative for color change, pallor, rash or wound.  Neurological: Negative for syncope, weakness, numbness.  Positive for intermittent dizziness.  Psychiatric: Positive for anxiety/nervousness.    Past Medical History:  Past Medical History:   Diagnosis Date   • Abnormal nuclear stress test    • Anxiety and depression    • Aortic valve insufficiency     nonrheumtic    • Arthritis    • Ascending aortic aneurysm    • CAD (coronary artery disease)    • Cancer (HCC)    • Chest pain    • Diabetes mellitus (HCC)    • Dizzy     CAREFUL WHEN GETTING UP   • Dyspnea    • Esophageal cancer (HCC)    • Essential hypertension    • Fatigue    • GERD (gastroesophageal reflux disease)    • GI bleed    • History of recent blood transfusion    • Hyperglycemia    • Hyperlipidemia    • Hypersomnia with sleep apnea    • Lightheadedness    • Malaise and fatigue    • Migraines     \"OCCULAR MIGRAINES\"   • Morbid " obesity (HCC)    • Myocardial ischemia    • Nocturia    • TJ on auto CPAP 10/31/2016    Overnight polysomnogram.  Weight 276 pounds.  Severe TJ with AHI 79 events per hour.  Auto CPAP recommended.   • Pneumonia     FEB 2016   • Scrotal bleeding    • Shortness of breath    • SOB (shortness of breath)        Past Surgical History:  Past Surgical History:   Procedure Laterality Date   • AORTIC VALVE REPAIR/REPLACEMENT  05/2016   • ASCENDING ARCH/HEMIARCH REPLACEMENT N/A 5/2/2016    Procedure: BHAVESH STERNOTOMY CORONARY ARTERY BYPASS GRAFT TIMES 3 USING LEFT INTERNAL MAMMARY ARTERY AND RIGHT GREATER SAPHENOUS VEIN GRAFT PER ENDOSCOPIC VEIN HARVESTING, AORTIC ANEURYSM REPAIR WITH ROOT REPAIR AND AORTIC VALVE REPLACEMENT;  Surgeon: Rosalio Cline MD;  Location: [facility] MAIN OR;  Service:    • CARDIAC CATHETERIZATION N/A 4/1/2016    Procedure: Left Heart Cath;  Surgeon: Erick Tam MD;  Location: [facility] CATH INVASIVE LOCATION;  Service:    • CARDIAC CATHETERIZATION N/A 4/1/2016    Procedure: Left ventriculography;  Surgeon: Erick Tam MD;  Location: [facility] CATH INVASIVE LOCATION;  Service:    • CARDIAC CATHETERIZATION N/A 4/1/2016    Procedure: Right Heart Cath;  Surgeon: Erick Tam MD;  Location: [facility] CATH INVASIVE LOCATION;  Service:    • CARDIAC CATHETERIZATION N/A 10/30/2017    Procedure: Coronary angiography;  Surgeon: Sorin Vasquez MD;  Location: [facility] CATH INVASIVE LOCATION;  Service:    • CARDIAC CATHETERIZATION  10/30/2017    Procedure: Saphenous Vein Graft;  Surgeon: Sorin Vasquez MD;  Location: [facility] CATH INVASIVE LOCATION;  Service:    • CARDIAC CATHETERIZATION N/A 10/30/2017    Procedure: Native mammary injection;  Surgeon: Sorin Vasquez MD;  Location: [facility] CATH INVASIVE LOCATION;  Service:    • CARDIAC CATHETERIZATION N/A 7/7/2020    Procedure: Coronary angiography;  Surgeon: Gregor Kim MD;  Location: [facility] CATH INVASIVE LOCATION;  Service: Cardiovascular;  Laterality: N/A;   •  CARDIAC CATHETERIZATION N/A 7/7/2020    Procedure: Left heart cath;  Surgeon: Gregor Kim MD;  Location:  [facility] CATH INVASIVE LOCATION;  Service: Cardiovascular;  Laterality: N/A;   • CARDIAC CATHETERIZATION N/A 7/7/2020    Procedure: Left ventriculography;  Surgeon: Gregor Kim MD;  Location:  [facility] CATH INVASIVE LOCATION;  Service: Cardiovascular;  Laterality: N/A;   • CARDIAC CATHETERIZATION N/A 7/7/2020    Procedure: Stent ESMER bypass graft;  Surgeon: Gregor Kim MD;  Location:  [facility] CATH INVASIVE LOCATION;  Service: Cardiovascular;  Laterality: N/A;   • CARDIAC CATHETERIZATION N/A 6/17/2021    Procedure: SAPHENOUS VEIN GRAFT;  Surgeon: Marques Ndiaye MD;  Location:  [facility] CATH INVASIVE LOCATION;  Service: Cardiovascular;  Laterality: N/A;   • CARDIAC CATHETERIZATION N/A 6/17/2021    Procedure: Left Heart Cath;  Surgeon: Marques Ndiaye MD;  [facility] C[facility]VASIVE [facility];  Service: Cardiovascular;  Laterality: N/A;   • CARDIAC CATHETERIZATION N/A 6/17/2021    Procedure: Coronary angiography;  Surgeon: Marques Ndiaye MD;  [facility] C[facility];  Service: Cardiovascular;  Laterality: N/A;   • CARDIAC CATHETERIZATION N/A 7/14/2022    Procedure: Left Heart Cath;  Surgeon: Charlie Aj MD;  Location: [facility] CATH INVASIVE LOC[facility];  Service: Cardiovascular;  Laterality: N/A;   • CARDIAC CATHETERIZATION N/A 7/14/2022    Procedure: Coronary angiography;  Surgeon: Charlie Aj MD;  Location: [facility] CATH INVASIVE LOC[facility];  Service: Cardiovascular;  Laterality: N/A;   • CARDIAC CATHETERIZATION  7/14/2022    Procedure: Saphenous Vein Graft;  Surgeon: Charlie Aj MD;  [facility]: [facility] CATH [facility]VASIVE LOC[facility];  Service: Cardiovascular;;   • CARDIAC CATHETERIZATION N/A 7/14/2022    Procedure: Native mammary injection;  Surgeon: Charlie Aj MD;  Location:  [facility] CATH INVASIVE LOCATION;  Service: Cardiovascular;  Laterality: N/A;   •  COLONOSCOPY N/A 11/26/2022    Procedure: COLONOSCOPY AT BEDSIDE;  Surgeon: Tomy Lopez MD;  Location: [facility] MAIN OR;  Service: Gastroenterology;  Laterality: N/A;   • COLONOSCOPY W/ POLYPECTOMY N/A 10/4/2022    Procedure: COLONOSCOPY to cecum with cold forceps and cold snare polypectomies;  Surgeon: Gregor Carlson MD;  Location:  [facility] ENDOSCOPY;  Service: Gastroenterology;  Laterality: N/A;  PRE- hx of polyps  POST- diverticulosis, polyps   • CORONARY ARTERY BYPASS GRAFT  05/2016    LIMA TO LAD, SVG TO PDA, SVG TO OM2   • ENDOSCOPY N/A 7/13/2022    Procedure: ESOPHAGOGASTRODUODENOSCOPY;  Surgeon: Marcia Hollis MD;  [facility];  Service: Gastroenterology;  Laterality: N/A;  PRE- ANEMIA, MELENA  POST- MILD EROSIVE GASTRITIS, GE JUNCTION ULCER   • ENDOSCOPY N/A 10/4/2022    Procedure: ESOPHAGOGASTRODUODENOSCOPY with biopsies;  Surgeon: Gregor Carlson MD;  [facility];  Service: Gastroenterology;  Laterality: N/A;  PRE- hx of esophageal ulcer  POST- gastric cardia mass   • INNER EAR SURGERY     • TONSILLECTOMY          Home Meds:  Medications Prior to Admission   Medication Sig Dispense Refill Last Dose   • amLODIPine (NORVASC) 10 MG tablet Take 10 mg by mouth Daily.   2/3/2023 at 0430   • atorvastatin (LIPITOR) 40 MG tablet Take 40 mg by mouth Every Night.   Past Week   • dapagliflozin Propanediol (Farxiga) 10 MG tablet Take 10 mg by mouth Daily.   Past Week   • ferrous sulfate 324 (65 Fe) MG tablet delayed-release EC tablet Take 324 mg by mouth Daily With Breakfast.   Past Week   • furosemide (LASIX) 20 MG tablet Take 20 mg by mouth As Needed.   Past Week   • GLUCOSAMINE HCL PO Take 1 tablet by mouth Daily.   Past Week   • isosorbide mononitrate (IMDUR) 60 MG 24 hr tablet Take 60 mg by mouth Daily.   2/3/2023 at 0430   • lisinopril (PRINIVIL,ZESTRIL) 10 MG tablet Take 10 mg by mouth Every Evening. HOLD 24 HOURS PRIOR TO SURGERY   Past Week   • metFORMIN  "(GLUCOPHAGE) 500 MG tablet Take 500 mg by mouth Every Evening.   Past Week   • metoprolol succinate XL (TOPROL-XL) 25 MG 24 hr tablet 25 mg Every Evening.   2/2/2023 at 1830   • pantoprazole (PROTONIX) 40 MG EC tablet Take 1 tablet by mouth 2 (Two) Times a Day With Meals. (Patient taking differently: Take 40 mg by mouth Daily.) 30 tablet 5 2/3/2023 at 0430   • potassium chloride 10 MEQ CR tablet Take 10 mEq by mouth Daily.   Past Week   • sucralfate (CARAFATE) 1 g tablet TAKE ONE TABLET BY MOUTH TWICE A DAY BEFORE A MEAL (Patient taking differently: Take 1 g by mouth Every Evening.) 60 tablet 5 Past Week       Allergies:  No Known Allergies    Social History:   Social History     Socioeconomic History   • Marital status: Single   Tobacco Use   • Smoking status: Never   • Smokeless tobacco: Never   Vaping Use   • Vaping Use: Never used   Substance and Sexual Activity   • Alcohol use: Yes     Comment: 1-2 drink/monthly   • Drug use: No   • Sexual activity: Defer       Family History:  Family History   Problem Relation Age of Onset   • Hypertension Mother    • Diabetes Mother    • Heart disease Mother    • Hypertension Father    • Lung cancer Father    • Heart disease Sister    • Stroke Maternal Grandmother    • Heart disease Maternal Grandmother    • Hypertension Maternal Grandfather    • Hypertension Paternal Grandmother    • Hypertension Paternal Grandfather    • Other Other         The patient states that his sister has a problem that goes by the name of \"long QT\".  He says this is a familial trait but he also says that he is \"not interested in pursuing it for himself\".   • Coronary artery disease Other    • Malig Hyperthermia Neg Hx        Physical Exam:  /75   Pulse 83   Temp 97.5 °F (36.4 °C) (Oral)   Resp 20   Ht 185.4 cm (73\")   Wt (!) 143 kg (315 lb 4.1 oz)   SpO2 95%   BMI 41.59 kg/m²  Body mass index is 41.59 kg/m². 95% (!) 143 kg (315 lb 4.1 oz)     Physical Exam:  Constitutional: Alert, obese " male laying in bed.  Normal appearance, no acute distress.  Head: Normocephalic, atraumatic.  Nose: NG tube to low wall suction present in left nare.  Mouth/throat: Dry mucous membranes, clear of exudate or erythema.  Mallampati type III airway.  Eyes: Pupils equal round reactive to light, extraocular movements intact, conjunctiva normal  Neck: Supple, no JVD, trachea midline, surgical incision over left neck (edges approximated Dermabond in place)  Cardiovascular: Normal rate, regular rhythm, normal S1-S2.  No murmur or gallop.    Pulmonary: Normal respiratory effort, breath sounds clear, no wheezes, rhonchi or rales.  SPO2 95% on 10 L face tent.  Abdominal: Obese, soft, hypoactive bowel sounds.  Generalized tenderness.  : Not assessed  Musculoskeletal: Negative for swelling or tenderness over joints.  3+ edema in bilateral lower extremities (patient reports chronic)  Skin: Warm, dry, no ecchymosis or erythema.  Capillary fill less than 3 seconds.  Neurological: Alert and oriented x3, no focal deficit.  Psychiatric: Anxious.      LABS:  COVID19   Date Value Ref Range Status   07/10/2022 Not Detected Not Detected - Ref. Range Final       Lab Results   Component Value Date    CALCIUM 8.7 02/03/2023    PHOS 5.5 (H) 11/26/2022     Results from last 7 days   Lab Units 02/03/23  1611   SODIUM mmol/L 137   POTASSIUM mmol/L 4.5   CHLORIDE mmol/L 105   CO2 mmol/L 24.0   BUN mg/dL 17   CREATININE mg/dL 1.09   GLUCOSE mg/dL 205*   CALCIUM mg/dL 8.7   WBC 10*3/mm3 12.46*   HEMOGLOBIN g/dL 10.9*   PLATELETS 10*3/mm3 202     Lab Results   Component Value Date    CKTOTAL 296 (H) 02/20/2016    TROPONINT <0.010 12/14/2022                                 Lab Results   Component Value Date    TSH 1.680 12/15/2022     Estimated Creatinine Clearance: 101.7 mL/min (by C-G formula based on SCr of 1.09 mg/dL).         Imaging: I personally visualized the images of scans/x-rays performed within last 3 days.  CXR 2/3/23: right chest tube  in place in right lung base, 4cm right apical pneumothorax without shift.       Assessment:  1. Malignant neoplasm of the lower third of these status post esophagectomy.  2. Right apical pneumothorax measuring 4 cm (CXR 2/3/2023 at 1623)  3. Post-operative pain  4. Coronary artery disease with stent placement  5. Bioprosthetic aortic valve  6. Obstructive sleep apnea on CPAP  7. Morbid obesity (BMI 41.59)  8. Type 2 diabetes with hyperglycemia  9. Hypertension  10. Recent GI bleed following polypectomy    Recommendations:  · Admit patient to the ICU for post operative esophagectomy management  · Upon assessment, patient complaining of substernal chest discomfort without radiation.  He reports that he has a history of angina is on isosorbide at baseline and follows with Dr. Miguel. EKG performed, unchanged from previous. Reviewed previous pre-op office note from Dr. Miguel, who recommended nitropaste post-operatively if angina symptoms persisted. Ordered 1.5inch nitropaste Q6 hours.   · CXR from 2/3/23 at 1623 showed 4cm right apical pneumothorax without shift- patient currently asymptomatic. Unclear if thoracic surgery is aware of this apical pneumothorax, STAT CXR ordered to evaluate pneumothorax.  · JT- provide supplemental oxygen overnight, NO CPAP/BIPAP for patient due to surgery.  · Accuchecks/SSI ordered.  · Scheduled metoprolol and PRN labetolol ordered to maintain SBP <160.  · Significant output from right basilar CT, surgery aware. H&H recheck ordered for midnight.  · Dilaudid PCA + ERAS protocol per thoracic surgery.  · Heparin for DVT prophylaxis.  · NPO.    Patient was placed in face mask upon entering room and kept mask on throughout our encounter. I wore full protective equipment throughout this patient encounter including a face mask, gown and gloves. Hand hygiene was performed before donning protective equipment and after removal when leaving the room.    Belinda Koehler, APRN  2/3/2023  20:01  EST      Much of this encounter note is an electronic transcription/translation of spoken language to printed text using Dragon Software.    Electronically signed by Michael Parker MD at [phone]     Jesika West MD at [phone]            [facility] Consult    Patient Name: Edmond Chase  Age/Sex: 62 y.o. male  : 1960  MRN: [id number]    Date of Admission: 2/3/2023  Date of Encounter Visit: 23  Encounter Provider: Jesika West MD  Referring Provider: Valerie Stockton MD  Place of Service: [facility] CARDIOLOGY  Patient Care Team:  Nargis Velasquez APRN as PCP - General (Family Medicine)  Papa Miguel MD as Consulting Physician (Cardiology)  Sekou Pisano MD as Consulting Physician (Pulmonary Disease)  Gregor Carlson MD as Consulting Physician (Gastroenterology)  Estevan Yuan MD (Sports Medicine)  Shelley Goldsmith, RN as Nurse Navigator    Subjective:     Consulted for: chest pain, CAD    Chief Complaint: post op esophagectomy and J tube placement    History of Present Illness:  Edmond Chase is a 62 y.o. male patient of Dr. Miguel's with coronary artery disease with prior CABG, bioprosthetic AVR in 2016, hypertension, hyperlipidemia, diabetes mellitus type 2, recently diagnosed esophageal cancer who is now status post esophagectomy and J-tube placement on 2/3/2022.      Patient has a history of known chronic stable angina.  He was last evaluated by Dr. Miguel on 2023.  He was felt to be stable from a cardiac standpoint no felt he was okay to proceed with surgery.  Dr. Miguel recommended having nitroglycerin paste placed perioperatively to manage issues with his blood pressure and angina.    Patient underwent robotic assisted fluoroscopy was total esophagectomy and J-tube placement on 2/3/2023 with Dr. Stockton.  Is recommended he was placed on nitroglycerin paste postoperatively.   "However this morning he was noted to be relatively hypotensive.  The patient did complain of symptoms of lightheadedness.  His Nitropaste has since been removed.   The patient does complain of chest discomfort but he is unable to distinguish it from surgical pain.  He does report that the pain is constant.  He does feel like he has some difficulty breathing although he is only on 1 L nasal cannula and saturating normally.  His arterial line pressures appear to be elevated.  EKG postoperatively shows no acute changes.    Most recent cardiac work-up included a cardiac catheterization in 7/2022.  This showed a patent LIMA to LAD, diffusely calcified 70 to 80% stenosis of the proximal to mid circumflex artery, and 80% stenosis of the mid to distal right coronary artery, 50% stenosis of the ramus intermedius, and occluded saphenous vein graft to the OM and occluded saphenous vein graft to the PDA.  Last echocardiogram on 12/14/2022 which showed normal left ventricular systolic function and wall motion with an EF of 66 to 70%, normal function of his bioprosthetic aortic valve, and mild dilatation of his aortic root measuring 4.4 cm.      Past Medical History:  Past Medical History:   Diagnosis Date   • Abnormal nuclear stress test    • Anxiety and depression    • Aortic valve insufficiency     nonrheumtic    • Arthritis    • Ascending aortic aneurysm    • CAD (coronary artery disease)    • Cancer (HCC)    • Chest pain    • Diabetes mellitus (HCC)    • Dizzy     CAREFUL WHEN GETTING UP   • Dyspnea    • Esophageal cancer (HCC)    • Essential hypertension    • Fatigue    • GERD (gastroesophageal reflux disease)    • GI bleed    • History of recent blood transfusion    • Hyperglycemia    • Hyperlipidemia    • Hypersomnia with sleep apnea    • Lightheadedness    • Malaise and fatigue    • Migraines     \"OCCULAR MIGRAINES\"   • Morbid obesity (HCC)    • Myocardial ischemia    • Nocturia    • TJ on auto CPAP 10/31/2016    " Overnight polysomnogram.  Weight 276 pounds.  Severe TJ with AHI 79 events per hour.  Auto CPAP recommended.   • Pneumonia     FEB 2016   • Scrotal bleeding    • Shortness of breath    • SOB (shortness of breath)        Past Surgical History:   Procedure Laterality Date   • AORTIC VALVE REPAIR/REPLACEMENT  05/2016   • ASCENDING ARCH/HEMIARCH REPLACEMENT N/A 5/2/2016    Procedure: BHAVESH STERNOTOMY CORONARY ARTERY BYPASS GRAFT TIMES 3 USING LEFT INTERNAL MAMMARY ARTERY AND RIGHT GREATER SAPHENOUS VEIN GRAFT PER ENDOSCOPIC VEIN HARVESTING, AORTIC ANEURYSM REPAIR WITH ROOT REPAIR AND AORTIC VALVE REPLACEMENT;  Surgeon: Rosalio Cline MD;  Location: [facility] OR;  Service:    • CARDIAC CATHETERIZATION N/A 4/1/2016    Procedure: Left Heart Cath;  Surgeon: Erick Tam MD;  Location: [facility] CATH INVASIVE LOCATION;  Service:    • CARDIAC CATHETERIZATION N/A 4/1/2016    Procedure: Left ventriculography;  Surgeon: Erick Tam MD;  Location: [facility] CATH INVASIVE LOCATION;  Service:    • CARDIAC CATHETERIZATION N/A 4/1/2016    Procedure: Right Heart Cath;  Surgeon: Erick Tam MD;  Location: [facility] CATH INVASIVE LOCATION;  Service:    • CARDIAC CATHETERIZATION N/A 10/30/2017    Procedure: Coronary angiography;  Surgeon: Sorin Vasquez MD;  Location: [facility] CATH INVASIVE LOCATION;  Service:    • CARDIAC CATHETERIZATION  10/30/2017    Procedure: Saphenous Vein Graft;  Surgeon: Sorin Vasquez MD;  Location: [facility] CATH INVASIVE LOCATION;  Service:    • CARDIAC CATHETERIZATION N/A 10/30/2017    Procedure: Native mammary injection;  Surgeon: Sorin Vasquez MD;  Location: [facility] INVASIVE LOCATION;  Service:    • CARDIAC CATHETERIZATION N/A 7/7/2020    Procedure: Coronary angiography;  Surgeon: Gregor Kim MD;  Location: [facility] INVASIVE LOCATION;  Service: Cardiovascular;  Laterality: N/A;   • CARDIAC CATHETERIZATION N/A 7/7/2020    Procedure: Left heart cath;  Surgeon: Gregor Kim MD;  [facility];  Service: Cardiovascular;  Laterality: N/A;   • CARDIAC CATHETERIZATION N/A 7/7/2020    Procedure: Left ventriculography;  Surgeon: Gregor Kim MD;  [facility];  Service: Cardiovascular;  Laterality: N/A;   • CARDIAC CATHETERIZATION N/A 7/7/2020    Procedure: Stent ESMER bypass graft;  Surgeon: Gregor Kim MD;  Location: [facility] CATH INVASIVE LOCATION;  Service: Cardiovascular;  Laterality: N/A;   • CARDIAC CATHETERIZATION N/A 6/17/2021    Procedure: SAPHENOUS VEIN GRAFT;  Surgeon: Marques Ndiaye MD;  Location: [facility] CATH INVASIVE LOCATION;  Service: Cardiovascular;  Laterality: N/A;   • CARDIAC CATHETERIZATION N/A 6/17/2021    Procedure: Left Heart Cath;  Surgeon: Marques Ndiaye MD;  Location: [facility] CATH INVASIVE LOCATION;  Service: Cardiovascular;  Laterality: N/A;   • CARDIAC CATHETERIZATION N/A 6/17/2021    Procedure: Coronary angiography;  Surgeon: Marques Ndiaye MD;  Location: [facility] CATH INVASIVE LOCATION;  Service: Cardiovascular;  Laterality: N/A;   • CARDIAC CATHETERIZATION N/A 7/14/2022    Procedure: Left Heart Cath;  Surgeon: Charlie Aj MD;  Location: [facility] CATH INVASIVE LOCATION;  Service: Cardiovascular;  Laterality: N/A;   • CARDIAC CATHETERIZATION N/A 7/14/2022    Procedure: Coronary angiography;  Surgeon: Charlie Aj MD;  Location: [facility] CATH INVASIVE LOCATION;  Service: Cardiovascular;  Laterality: N/A;   • CARDIAC CATHETERIZATION  7/14/2022    Procedure: Saphenous Vein Graft;  Surgeon: Charlie Aj MD;  Location: [facility] CATH INVASIVE LOCATION;  Service: Cardiovascular;;   • CARDIAC CATHETERIZATION N/A 7/14/2022    Procedure: Native mammary injection;  Surgeon: Charlie Aj MD;  Location: [facility] CATH INVASIVE LOCATION;  Service: Cardiovascular;  Laterality: N/A;   • COLONOSCOPY N/A 11/26/2022    Procedure: COLONOSCOPY AT BEDSIDE;  Surgeon: Tomy Lopez MD;  Location: [facility]  MAIN OR;  Service: Gastroenterology;  Laterality: N/A;   • COLONOSCOPY W/ POLYPECTOMY N/A 10/4/2022    Procedure: COLONOSCOPY to cecum with cold forceps and cold snare polypectomies;  Surgeon: Gregor Carlson MD;  Location:  [facility]OSCOPY;  Service: Gastroenterology;  Laterality: N/A;  PRE- hx of polyps  POST- diverticulosis, polyps   • CORONARY ARTERY BYPASS GRAFT  05/2016    LIMA TO LAD, SVG TO PDA, SVG TO OM2   • ENDOSCOPY N/A 7/13/2022    Procedure: ESOPHAGOGASTRODUODENOSCOPY;  Surgeon: Marcia Hollis MD;  Location:  [facility]OSCOPY;  Service: Gastroenterology;  Laterality: N/A;  PRE- ANEMIA, MELENA  POST- MILD EROSIVE GASTRITIS, GE JUNCTION ULCER   • ENDOSCOPY N/A 10/4/2022    Procedure: ESOPHAGOGASTRODUODENOSCOPY with biopsies;  Surgeon: Gregor Carlson MD;  Location:  [facility];  Service: Gastroenterology;  Laterality: N/A;  PRE- hx of esophageal ulcer  POST- gastric cardia mass   • INNER EAR SURGERY     • TONSILLECTOMY         Home Medications:   Medications Prior to Admission   Medication Sig Dispense Refill Last Dose   • amLODIPine (NORVASC) 10 MG tablet Take 10 mg by mouth Daily.   2/3/2023 at 0430   • atorvastatin (LIPITOR) 40 MG tablet Take 40 mg by mouth Every Night.   Past Week   • dapagliflozin Propanediol (Farxiga) 10 MG tablet Take 10 mg by mouth Daily.   Past Week   • ferrous sulfate 324 (65 Fe) MG tablet delayed-release EC tablet Take 324 mg by mouth Daily With Breakfast.   Past Week   • furosemide (LASIX) 20 MG tablet Take 20 mg by mouth As Needed.   Past Week   • GLUCOSAMINE HCL PO Take 1 tablet by mouth Daily.   Past Week   • isosorbide mononitrate (IMDUR) 60 MG 24 hr tablet Take 60 mg by mouth Daily.   2/3/2023 at 0430   • lisinopril (PRINIVIL,ZESTRIL) 10 MG tablet Take 10 mg by mouth Every Evening. HOLD 24 HOURS PRIOR TO SURGERY   Past Week   • metFORMIN (GLUCOPHAGE) 500 MG tablet Take 500 mg by mouth Every Evening.   Past Week   • metoprolol succinate XL  "(TOPROL-XL) 25 MG 24 hr tablet 25 mg Every Evening.   2/2/2023 at 1830   • pantoprazole (PROTONIX) 40 MG EC tablet Take 1 tablet by mouth 2 (Two) Times a Day With Meals. (Patient taking differently: Take 40 mg by mouth Daily.) 30 tablet 5 2/3/2023 at 0430   • potassium chloride 10 MEQ CR tablet Take 10 mEq by mouth Daily.   Past Week   • sucralfate (CARAFATE) 1 g tablet TAKE ONE TABLET BY MOUTH TWICE A DAY BEFORE A MEAL (Patient taking differently: Take 1 g by mouth Every Evening.) 60 tablet 5 Past Week       Allergies:  No Known Allergies    Past Social History:  Social History     Socioeconomic History   • Marital status: Single   Tobacco Use   • Smoking status: Never   • Smokeless tobacco: Never   Vaping Use   • Vaping Use: Never used   Substance and Sexual Activity   • Alcohol use: Yes     Comment: 1-2 drink/monthly   • Drug use: No   • Sexual activity: Defer       Past Family History:  Family History   Problem Relation Age of Onset   • Hypertension Mother    • Diabetes Mother    • Heart disease Mother    • Hypertension Father    • Lung cancer Father    • Heart disease Sister    • Stroke Maternal Grandmother    • Heart disease Maternal Grandmother    • Hypertension Maternal Grandfather    • Hypertension Paternal Grandmother    • Hypertension Paternal Grandfather    • Other Other         The patient states that his sister has a problem that goes by the name of \"long QT\".  He says this is a familial trait but he also says that he is \"not interested in pursuing it for himself\".   • Coronary artery disease Other    • Malig Hyperthermia Neg Hx        Review of Systems:     CONSTITUTIONAL: No weight loss, fever, chills, weakness or fatigue.   HEENT: Eyes: No visual loss, blurred vision, double vision or yellow sclerae. Ears, Nose, Throat: No hearing loss, sneezing, congestion, runny nose or sore throat.   SKIN: No rash or itching.     RESPIRATORY: No shortness of breath, hemoptysis, cough or sputum. "   GASTROINTESTINAL: No anorexia, nausea, vomiting or diarrhea. No abdominal pain, bright red blood per rectum or melena.  GENITOURINARY: No burning on urination, hematuria or increased frequency.  NEUROLOGICAL: No headache, dizziness, syncope, paralysis, ataxia, numbness or tingling in the extremities. No change in bowel or bladder control.   MUSCULOSKELETAL: No muscle, back pain, joint pain or stiffness.   HEMATOLOGIC: No anemia, bleeding or bruising.   LYMPHATICS: No enlarged nodes. No history of splenectomy.   PSYCHIATRIC: No history of depression, anxiety, hallucinations.   ENDOCRINOLOGIC: No reports of sweating, cold or heat intolerance. No polyuria or polydipsia.     Objective:   Temp:  [97.5 °F (36.4 °C)-98.8 °F (37.1 °C)] 98.8 °F (37.1 °C)  Heart Rate:  [67-90] 70  Resp:  [12-20] 18  BP: (106-156)/(57-87) 107/65  Arterial Line BP: (124-197)/(56-80) 140/76     Intake/Output Summary (Last 24 hours) at 2/4/2023 1426  Last data filed at 2/4/2023 1208  Gross per 24 hour   Intake 2947 ml   Output 2995 ml   Net -48 ml     Body mass index is 41.91 kg/m².      02/03/23  1800 02/04/23  0600   Weight: (!) 143 kg (315 lb 4.1 oz) (!) 144 kg (317 lb 10.9 oz)     Weight change:     Physical Exam:   General Appearance:    Alert, cooperative, in no acute distress   Head:    Normocephalic, without obvious abnormality, atraumatic   Eyes:            Conjunctivae and sclerae normal, no   icterus, no pallor, corneas clear, PERRLA   Neck:   No adenopathy, supple, trachea midline, no thyromegaly, no   carotid bruit, no JVD   Lungs:     Clear to auscultation,respirations regular, even and unlabored    Heart:    Regular rhythm and normal rate, normal S1 and S2, no murmur, +rub   Chest Wall:    No abnormalities observed   Abdomen:     Normal bowel sounds, no masses, no organomegaly, soft, nontender, nondistended, no guarding, no rebound  tenderness   Extremities:   Moves all extremities well, no edema, no cyanosis, no redness    Pulses:   Pulses palpable and equal bilaterally.      Lab Review:   Results from last 7 days   Lab Units 02/04/23  0309 02/03/23  1611   SODIUM mmol/L 142 137   POTASSIUM mmol/L 4.7 4.5   CHLORIDE mmol/L 106 105   CO2 mmol/L 26.2 24.0   BUN mg/dL 20 17   CREATININE mg/dL 0.97 1.09   GLUCOSE mg/dL 194* 205*   CALCIUM mg/dL 8.5* 8.7         Results from last 7 days   Lab Units 02/04/23  0309 02/03/23  1611   WBC 10*3/mm3 10.18 12.46*   HEMOGLOBIN g/dL 9.9* 10.9*   HEMATOCRIT % 31.9* 34.2*   PLATELETS 10*3/mm3 201 202                   Invalid input(s): LDLCALC            Echo 12/14/2022:  •  Left ventricular ejection fraction appears to be 66 - 70%.  •  Left ventricular wall thickness is consistent with mild concentric hypertrophy.  •  Left ventricular diastolic function was normal.  •  There is a bioprosthetic aortic valve present.  •  Mild dilation of the aortic root is present. The aortic root measures 4.4 cm. Mild dilation of the sinuses of Valsalva is present.    [facility] 07/14/2022:  1. Left main: Normal  2. LAD: Calcified 90% proximal stenosis.  Chronic total occlusion mid segment.  Mid to distal vessel fills via patent LIMA to LAD  3. LCX: Severely calcified 70 to 80% stenosis in the midsegment.   4. RCA: Severely calcified and tortuous vessel with diffuse 60 to 70% mid vessel stenosis and discrete 80% mid to distal stenosis.  Chronic total occlusion small caliber PDA branch.  PDA fills via left-to-right collaterals.  5.  Ramus: Discrete 50% stenosis in the more inferior branch  6.  LIMA to LAD: Normal  7.  SVG to OM: Occluded at proximal anastomosis  8.  SVG to PDA: Occluded at proximal anastomosis    Lexiscan Cardiolite Stress test 07/12/2022:  • Myocardial perfusion imaging indicates a medium-sized, moderately severe area of ischemia located in the inferior wall.  • Left ventricular ejection fraction is normal. (Calculated EF = 50%).  • GI artifact is present.  • Arrhythmias during stress: frequent  PVCs.    [facility] 2021:  CORONARY ANGIOGRAPHY:  Left main: 20% luminal irregularities in the proximal LAD  Left anterior descendin% proximal then 100% occlusion of the mid LAD just beyond the first septal   Ramus intermedius: Very large serpiginous ramus with 2 branches free of obstructive disease  Circumflex: The native circumflex has a long area that is about 70% stenosed in the midportion it really does not go to anywhere its mainly in AV groove branch  RCA: Is a dominant vessel.  This is a highly tortuous vessel with 50% proximal disease 70% mid disease some 50 to 60% disease maybe up to 90% in the distal RCA but honestly the tortuosity I think precludes intervention on this vessel     BROCK to LAD: widely patent and normal.  The stent in mid LAD is widely patent and looks good the remainder of the vessel looks good     SUMMARY: No significant change from his prior cath would recommend ongoing medical therapy    [facility] 2020:  1. Severe multivessel coronary artery disease with 100% proximal LAD, 50 to 60% stenoses throughout the circumflex territory within the proximal segment as well as the high marginal, and diffuse 70% stenoses throughout the right coronary artery with 100% PDA stenoses with faint collaterals from the LAD.  2. Normal left ventricular filling pressures of 16 mmHg with a normal ejection fraction of 60% with inferior basal wall hypokinesis.  3. Accessible percutaneous intervention of mid LAD via MARCELINA graft with placement of a 2.5 x 15 mm Xience drug-eluting stent deployed at 12 fredis.  No residual stenosis and stable HENNY-3 flow pre-and post PCI.      [id number]:    2022:      Imaging:  Imaging Results (Most Recent)     Procedure Component Value Units Date/Time    XR Chest 1 View [[id number]] Collected: 23 0750     Updated: 23 0754    Narrative:      CHEST SINGLE VIEW     HISTORY: Postop lung surgery     COMPARISON: Portable chest 2023, 2022      FINDINGS: There is a nasogastric tube with tip overlapping the right  hilum and this is without change. Heart size is enlarged and median  sternotomy wires are present. There is a right basilar chest tube and  the right-sided pneumothorax exhibits significant improvement compared  to yesterday's exam at 4:13 PM and is not clearly identified. There is  subcutaneous emphysema along the right lateral chest wall. There is  developed increased density of the left lung base with silhouetting of  the left hemidiaphragm and this may be due to increased atelectasis or  developing infiltrate and could be followed on subsequent exam.       Impression:      1. No change in appearance of the right lung with right chest tube in  place. Pneumothorax exhibits improvement and is not clearly identified.  There is subcutaneous emphysema along the right lateral chest wall.  2. Increased left basilar density with silhouetting of the left  hemidiaphragm. This may be due to progressive atelectasis and could be  followed on subsequent exam.     This report was finalized on 2/4/2023 7:51 AM by Dr. Edmond Garcia M.D.       XR Chest 1 View [[id number]] Collected: 02/03/23 2119     Updated: 02/03/23 2124    Narrative:      SINGLE VIEW OF THE CHEST     HISTORY: Right-sided pneumothorax     COMPARISON: 02/03/2023     FINDINGS:  Tubes and lines appear stable in position. Previously identified  right-sided pneumothorax appears smaller. There is cardiomegaly.  Bibasilar atelectasis appears improved. Subcutaneous air is noted within  the right lateral chest wall. Patient is status post median sternotomy  with coronary artery bypass grafting, as well as valve replacement.       Impression:      Interval improvement in right-sided pneumothorax.     This report was finalized on 2/3/2023 9:20 PM by Dr. Ani Eubanks M.D.       XR Chest 1 View [[id number]] Collected: 02/03/23 1638     Updated: 02/03/23 1642    Narrative:      PORTABLE CHEST  X-RAY     HISTORY: Postop total esophagectomy.     TECHNIQUE: Portable chest x-ray correlated with preoperative chest x-ray  December 14, 2022.     FINDINGS: There is a right chest tube position over the lung base. A  right apical pneumothorax measures about 4 cm long. There is no  mediastinal shift. Nasogastric tube has its tip to the right of midline  below the level of the right hilum, as is typically seen following this  procedure. The cardiac silhouette is enlarged and the pulmonary  vasculature is engorged. There may be a small left pleural effusion. No  pneumothorax on the left.       Impression:      Postoperative change in the chest with 4 cm right apical  pneumothorax as described.     This report was finalized on 2/3/2023 4:39 PM by Dr. Ynadel Lees M.D.               Assessment/Plan:     1.  Esophageal cancer status post esophagectomy.  And J-tube placement.  2.  Chest pain.  Suspect is most likely postoperative pain.  His EKG postoperatively shows no acute changes.  The symptoms are constant.  Monitor for now.  3.  Possible pericardial rub.  Noted on exam today.  4.  Coronary artery disease.  Cardiac catheterization 7/2022 showed only 1 out of 3 patent grafts and chronic severe stenosis of the left circumflex and right coronary arteries.  He has been medically managed since.  5.  History of bioprosthetic aortic valve replacement.  Normal function on his last echocardiogram in 12/2022.  6.  Hypertension.  Blood pressures are intermittently elevated.  He is back on metoprolol.  7.  Hyperlipidemia  8.  Diabetes mellitus type 2    -Check limited echocardiogram to further evaluate pericardial rub noted on exam.  - Continue metoprolol and titrate dosage as needed.  Resume the remainder of his home antihypertensive medications as needed.  - At this point I think is less likely that his chest pain is anginal.  We will monitor his symptoms for now.      Thank you for allowing me to participate in the care of  Edmond Chase. Feel free to contact me directly with any further questions or concerns.    Jesika West MD  [facility]  02/04/23  14:26 EST             Electronically signed by Jesika West MD at 02/04/23 1609     Estevan Arriaga MD at 02/05/23 1536            Patient Care Team:  Nargis Velasquez APRN as PCP - General (Family Medicine)  Papa Miguel MD as Consulting Physician (Cardiology)  Sekou Pisano MD as Consulting Physician (Pulmonary Disease)  Gregor Carlson MD as Consulting Physician (Gastroenterology)  Estevan Yuan MD (Sports Medicine)  Shelley Goldsmith, RN as Nurse Navigator    Chief complaint: decreased uop    Subjective     History of Present Illness  61yo with h/o esoph adenocarcinoma, CAD, DMII s/p esophagectomy on 2/3.  He has had reduced uop recently and we were asked to see him.  He is awake and alert.  He tells me he has had cardiac disease and CHF previously.  He is on lasix on home meds.      Review of Systems   Constitutional: Negative for fatigue and fever.   Respiratory: Positive for shortness of breath. Negative for cough.    Cardiovascular: Positive for leg swelling. Negative for chest pain.   Gastrointestinal: Negative for diarrhea, nausea and vomiting.   Musculoskeletal: Negative for back pain.   Neurological: Negative for headaches.   Psychiatric/Behavioral: Negative for confusion.        Past Medical History:   Diagnosis Date   • Abnormal nuclear stress test    • Anxiety and depression    • Aortic valve insufficiency     nonrheumtic    • Arthritis    • Ascending aortic aneurysm    • CAD (coronary artery disease)    • Cancer (HCC)    • Chest pain    • Diabetes mellitus (HCC)    • Dizzy     CAREFUL WHEN GETTING UP   • Dyspnea    • Esophageal cancer (HCC)    • Essential hypertension    • Fatigue    • GERD (gastroesophageal reflux disease)    • GI bleed    • History of recent blood transfusion    • Hyperglycemia    •  "Hyperlipidemia    • Hypersomnia with sleep apnea    • Lightheadedness    • Malaise and fatigue    • Migraines     \"OCCULAR MIGRAINES\"   • Morbid obesity (HCC)    • Myocardial ischemia    • Nocturia    • TJ on auto CPAP 10/31/2016    Overnight polysomnogram.  Weight 276 pounds.  Severe TJ with AHI 79 events per hour.  Auto CPAP recommended.   • Pneumonia     FEB 2016   • Scrotal bleeding    • Shortness of breath    • SOB (shortness of breath)    ,   Past Surgical History:   Procedure Laterality Date   • AORTIC VALVE REPAIR/REPLACEMENT  05/2016   • ASCENDING ARCH/HEMIARCH REPLACEMENT N/A 5/2/2016    Procedure: BHAVESH STERNOTOMY CORONARY ARTERY BYPASS GRAFT TIMES 3 USING LEFT INTERNAL MAMMARY ARTERY AND RIGHT GREATER SAPHENOUS VEIN GRAFT PER ENDOSCOPIC VEIN HARVESTING, AORTIC ANEURYSM REPAIR WITH ROOT REPAIR AND AORTIC VALVE REPLACEMENT;  Surgeon: Rosalio Cline MD;  Location: [facility] OR;  Service:    • CARDIAC CATHETERIZATION N/A 4/1/2016    Procedure: Left Heart Cath;  Surgeon: Erick Tam MD;  Location: [facility] CATH INVASIVE LOCATION;  Service:    • CARDIAC CATHETERIZATION N/A 4/1/2016    Procedure: Left ventriculography;  Surgeon: Erick Tam MD;  Location: [facility] CATH INVASIVE LOCATION;  Service:    • CARDIAC CATHETERIZATION N/A 4/1/2016    Procedure: Right Heart Cath;  Surgeon: Erick Tam MD;  Location: [facility] CATH INVASIVE LOCATION;  Service:    • CARDIAC CATHETERIZATION N/A 10/30/2017    Procedure: Coronary angiography;  Surgeon: Sorin Vasquez MD;  Location: [facility] CATH INVASIVE LOCATION;  Service:    • CARDIAC CATHETERIZATION  10/30/2017    Procedure: Saphenous Vein Graft;  Surgeon: Sorin Vasquez MD;  Location: [facility] CATH INVASIVE LOCATION;  Service:    • CARDIAC CATHETERIZATION N/A 10/30/2017    Procedure: Native mammary injection;  Surgeon: Sorin Vasquez MD;  Location: [facility] CATH INVASIVE LOCATION;  Service:    • CARDIAC CATHETERIZATION N/A 7/7/2020    Procedure: Coronary angiography;  " Surgeon: Gregor Kim MD;  Location[facility] CATH INVASIVE LOC[facility];  Service: Cardiovascular;  Laterality: N/A;   • CARDIAC CATHETERIZATION N/A 7/7/2020    Procedure: Left heart cath;  Surgeon: Gregor Kim MD;  [facility];  Service: Cardiovascular;  Laterality: N/A;   • CARDIAC CATHETERIZATION N/A 7/7/2020    Procedure: Left ventriculography;  Surgeon: Gregor Kim MD;  Location: [facility] CATH INVASIVE LOC[facility];  Service: Cardiovascular;  Laterality: N/A;   • CARDIAC CATHETERIZATION N/A 7/7/2020    Procedure: Stent ESMER bypass graft;  Surgeon: Gregor Kim MD;  [facility] C[facility]ASIVE [facility];  Service: Cardiovascular;  Laterality: N/A;   • CARDIAC CATHETERIZATION N/A 6/17/2021    Procedure: SAPHENOUS VEIN GRAFT;  Surgeon: Marques Ndiaye MD;  Location: [facility] CATH INVASIVE LOCATION;  Service: Cardiovascular;  Laterality: N/A;   • CARDIAC CATHETERIZATION N/A 6/17/2021    Procedure: Left Heart Cath;  Surgeon: Marques Ndiaye MD;  Location: [facility] CATH INVASIVE LOCATION;  Service: Cardiovascular;  Laterality: N/A;   • CARDIAC CATHETERIZATION N/A 6/17/2021    Procedure: Coronary angiography;  Surgeon: Marques Ndiaye MD;  Location: [facility] CATH INVASIVE LOCATION;  Service: Cardiovascular;  Laterality: N/A;   • CARDIAC CATHETERIZATION N/A 7/14/2022    Procedure: Left Heart Cath;  Surgeon: Charlie Aj MD;  Location: [facility] CATH INVASIVE LOCATION;  Service: Cardiovascular;  Laterality: N/A;   • CARDIAC CATHETERIZATION N/A 7/14/2022    Procedure: Coronary angiography;  Surgeon: Charlie Aj MD;  Location: [facility] CATH INVASIVE LOCATION;  Service: Cardiovascular;  Laterality: N/A;   • CARDIAC CATHETERIZATION  7/14/2022    Procedure: Saphenous Vein Graft;  Surgeon: Charlie Aj MD;  Location: [facility] CATH INVASIVE LOCATION;  Service: Cardiovascular;;   • CARDIAC CATHETERIZATION N/A 7/14/2022    Procedure: Native mammary injection;   "Surgeon: Charlie Aj MD;  Location: [facility] CATH INVASIVE LOCATION;  Service: Cardiovascular;  Laterality: N/A;   • COLONOSCOPY N/A 11/26/2022    Procedure: COLONOSCOPY AT BEDSIDE;  Surgeon: Tomy Lopez MD;  Location: [facility] MAIN OR;  Service: Gastroenterology;  Laterality: N/A;   • COLONOSCOPY W/ POLYPECTOMY N/A 10/4/2022    Procedure: COLONOSCOPY to cecum with cold forceps and cold snare polypectomies;  Surgeon: Gregor Carlson MD;  Location: [facility] ENDOSCOPY;  Service: Gastroenterology;  Laterality: N/A;  PRE- hx of polyps  POST- diverticulosis, polyps   • CORONARY ARTERY BYPASS GRAFT  05/2016    LIMA TO LAD, SVG TO PDA, SVG TO OM2   • ENDOSCOPY N/A 7/13/2022    Procedure: ESOPHAGOGASTRODUODENOSCOPY;  Surgeon: Marcia Hollis MD;  Location: [facility] ENDOSCOPY;  Service: Gastroenterology;  Laterality: N/A;  PRE- ANEMIA, MELENA  POST- MILD EROSIVE GASTRITIS, GE JUNCTION ULCER   • ENDOSCOPY N/A 10/4/2022    Procedure: ESOPHAGOGASTRODUODENOSCOPY with biopsies;  Surgeon: Gregor Carlson MD;  Location: [facility] ENDOSCOPY;  Service: Gastroenterology;  Laterality: N/A;  PRE- hx of esophageal ulcer  POST- gastric cardia mass   • INNER EAR SURGERY     • TONSILLECTOMY     ,   Family History   Problem Relation Age of Onset   • Hypertension Mother    • Diabetes Mother    • Heart disease Mother    • Hypertension Father    • Lung cancer Father    • Heart disease Sister    • Stroke Maternal Grandmother    • Heart disease Maternal Grandmother    • Hypertension Maternal Grandfather    • Hypertension Paternal Grandmother    • Hypertension Paternal Grandfather    • Other Other         The patient states that his sister has a problem that goes by the name of \"long QT\".  He says this is a familial trait but he also says that he is \"not interested in pursuing it for himself\".   • Coronary artery disease Other    • Malig Hyperthermia Neg Hx    ,   Social History     Socioeconomic History   • Marital status: " Single   Tobacco Use   • Smoking status: Never   • Smokeless tobacco: Never   Vaping Use   • Vaping Use: Never used   Substance and Sexual Activity   • Alcohol use: Yes     Comment: 1-2 drink/monthly   • Drug use: No   • Sexual activity: Defer     E-cigarette/Vaping   • E-cigarette/Vaping Use Never User      E-cigarette/Vaping Substances     E-cigarette/Vaping Devices       ,   Medications Prior to Admission   Medication Sig Dispense Refill Last Dose   • amLODIPine (NORVASC) 10 MG tablet Take 10 mg by mouth Daily.   2/3/2023 at 0430   • atorvastatin (LIPITOR) 40 MG tablet Take 40 mg by mouth Every Night.   Past Week   • dapagliflozin Propanediol (Farxiga) 10 MG tablet Take 10 mg by mouth Daily.   Past Week   • ferrous sulfate 324 (65 Fe) MG tablet delayed-release EC tablet Take 324 mg by mouth Daily With Breakfast.   Past Week   • furosemide (LASIX) 20 MG tablet Take 20 mg by mouth As Needed.   Past Week   • GLUCOSAMINE HCL PO Take 1 tablet by mouth Daily.   Past Week   • isosorbide mononitrate (IMDUR) 60 MG 24 hr tablet Take 60 mg by mouth Daily.   2/3/2023 at 0430   • lisinopril (PRINIVIL,ZESTRIL) 10 MG tablet Take 10 mg by mouth Every Evening. HOLD 24 HOURS PRIOR TO SURGERY   Past Week   • metFORMIN (GLUCOPHAGE) 500 MG tablet Take 500 mg by mouth Every Evening.   Past Week   • metoprolol succinate XL (TOPROL-XL) 25 MG 24 hr tablet 25 mg Every Evening.   2/2/2023 at 1830   • pantoprazole (PROTONIX) 40 MG EC tablet Take 1 tablet by mouth 2 (Two) Times a Day With Meals. (Patient taking differently: Take 40 mg by mouth Daily.) 30 tablet 5 2/3/2023 at 0430   • potassium chloride 10 MEQ CR tablet Take 10 mEq by mouth Daily.   Past Week   • sucralfate (CARAFATE) 1 g tablet TAKE ONE TABLET BY MOUTH TWICE A DAY BEFORE A MEAL (Patient taking differently: Take 1 g by mouth Every Evening.) 60 tablet 5 Past Week   , Scheduled Meds:  acetaminophen, 1,000 mg, Per J Tube, TID  gabapentin, 300 mg, Per J Tube, Q8H  heparin  (porcine), 5,000 Units, Subcutaneous, Q8H  insulin regular, 0-9 Units, Subcutaneous, Q6H  metoprolol tartrate, 25 mg, Per J Tube, Q12H   Or  metoprolol tartrate, 5 mg, Intravenous, Q12H  sodium chloride, 1,000 mL, Intravenous, Once    , Continuous Infusions:  dextrose 5 % and sodium chloride 0.45 % with KCl 20 mEq/L, 125 mL/hr, Last Rate: 125 mL/hr (02/05/23 0536)  HYDROmorphone HCl-NaCl,   lactated ringers, 9 mL/hr, Last Rate: 9 mL/hr (02/03/23 0737)  lactated ringers, 9 mL/hr, Last Rate: Stopped (02/03/23 1500)  sodium chloride, 30 mL/hr, Last Rate: 30 mL/hr (02/03/23 1702)  sodium chloride, 10 mL/hr, Last Rate: 10 mL/hr (02/04/23 1023)    , PRN Meds:  •  dextrose  •  dextrose  •  diazePAM  •  glucagon (human recombinant)  •  HYDROmorphone  •  labetalol  •  naloxone  •  nitroglycerin  •  [DISCONTINUED] ondansetron **OR** ondansetron  •  oxyCODONE and Allergies:  Patient has no known allergies.    Objective     Vital Signs  Temp:  [98.2 °F (36.8 °C)-98.6 °F (37 °C)] 98.2 °F (36.8 °C)  Heart Rate:  [] 73  Resp:  [10-28] 10  BP: (108-151)/(61-85) 129/69  Arterial Line BP: (125-201)/(58-72) 196/72    I/O this shift:  In: 60 [Other:60]  Out: 390 [Urine:330; Chest Tube:60]  I/O last 3 completed shifts:  In: 4110 [I.V.:3869; Other:241]  Out: 2440 [Urine:1240; Emesis/NG output:125; Chest Tube:1075]    Physical Exam  Constitutional:       Appearance: He is obese.   HENT:      Head: Normocephalic.      Nose: Nose normal.      Mouth/Throat:      Mouth: Mucous membranes are moist.   Eyes:      General: No scleral icterus.     Pupils: Pupils are equal, round, and reactive to light.   Cardiovascular:      Rate and Rhythm: Normal rate and regular rhythm.      Pulses: Normal pulses.      Heart sounds: Normal heart sounds.   Pulmonary:      Effort: Pulmonary effort is normal.      Breath sounds: Rhonchi present.   Abdominal:      General: Abdomen is flat.   Musculoskeletal:         General: Normal range of motion.       Cervical back: Normal range of motion.      Right lower leg: Edema present.      Left lower leg: Edema present.   Skin:     General: Skin is warm and dry.   Neurological:      General: No focal deficit present.      Mental Status: He is alert.         Results Review:    I reviewed the patient's new clinical results.    WBC WBC   Date Value Ref Range Status   02/05/2023 13.32 (H) 3.40 - 10.80 10*3/mm3 Final   02/04/2023 10.18 3.40 - 10.80 10*3/mm3 Final   02/03/2023 12.46 (H) 3.40 - 10.80 10*3/mm3 Final      HGB Hemoglobin   Date Value Ref Range Status   02/05/2023 10.4 (L) 13.0 - 17.7 g/dL Final   02/04/2023 9.9 (L) 13.0 - 17.7 g/dL Final   02/03/2023 10.9 (L) 13.0 - 17.7 g/dL Final      HCT Hematocrit   Date Value Ref Range Status   02/05/2023 33.4 (L) 37.5 - 51.0 % Final   02/04/2023 31.9 (L) 37.5 - 51.0 % Final   02/03/2023 34.2 (L) 37.5 - 51.0 % Final      Platlets No results found for: LABPLAT   MCV MCV   Date Value Ref Range Status   02/05/2023 89.1 79.0 - 97.0 fL Final   02/04/2023 86.9 79.0 - 97.0 fL Final   02/03/2023 88.4 79.0 - 97.0 fL Final          Sodium Sodium   Date Value Ref Range Status   02/05/2023 136 136 - 145 mmol/L Final   02/04/2023 142 136 - 145 mmol/L Final   02/03/2023 137 136 - 145 mmol/L Final      Potassium Potassium   Date Value Ref Range Status   02/05/2023 4.9 3.5 - 5.2 mmol/L Final   02/04/2023 4.7 3.5 - 5.2 mmol/L Final   02/03/2023 4.5 3.5 - 5.2 mmol/L Final      Chloride Chloride   Date Value Ref Range Status   02/05/2023 104 98 - 107 mmol/L Final   02/04/2023 106 98 - 107 mmol/L Final   02/03/2023 105 98 - 107 mmol/L Final      CO2 CO2   Date Value Ref Range Status   02/05/2023 26.0 22.0 - 29.0 mmol/L Final   02/04/2023 26.2 22.0 - 29.0 mmol/L Final   02/03/2023 24.0 22.0 - 29.0 mmol/L Final      BUN BUN   Date Value Ref Range Status   02/05/2023 24 (H) 8 - 23 mg/dL Final   02/04/2023 20 8 - 23 mg/dL Final   02/03/2023 17 8 - 23 mg/dL Final      Creatinine Creatinine   Date  Value Ref Range Status   02/05/2023 1.09 0.76 - 1.27 mg/dL Final   02/04/2023 0.97 0.76 - 1.27 mg/dL Final   02/03/2023 1.09 0.76 - 1.27 mg/dL Final      Calcium Calcium   Date Value Ref Range Status   02/05/2023 8.1 (L) 8.6 - 10.5 mg/dL Final   02/04/2023 8.5 (L) 8.6 - 10.5 mg/dL Final   02/03/2023 8.7 8.6 - 10.5 mg/dL Final      PO4 No results found for: CAPO4   Albumin Albumin   Date Value Ref Range Status   02/05/2023 3.4 (L) 3.5 - 5.2 g/dL Final      Magnesium Magnesium   Date Value Ref Range Status   02/05/2023 2.1 1.6 - 2.4 mg/dL Final      Uric Acid No results found for: URICACID         Assessment & Plan       Malignant neoplasm of lower third of esophagus (HCC)      Assessment & Plan  Decreased uop-  Fairly normal renal function at this time.  Baseline closer to 0.8 from previous labs.  Urine sodium <20.  Poss decompensated heart failure? Doesn't appear volume depleted on exam, some dyspnea.  Had noted pleural effusion on xray with small right sided pneumo.  Will hold IVF's at this time and start iv lasix 40mg bid.  Monitor uop for now.    Anemia- hgb stable  Esoph adeno- s/p esophagectomy  Right sided pneumo- chest tube in place.  H/o bioprosthetic aortic valve      I discussed the patients findings and my recommendations with patient    Estevan Arriaga MD  02/05/23  15:36 EST          Electronically signed by Estevan Arriaga MD at 02/05/23 1427

## 2023-02-06 NOTE — PROGRESS NOTES
Supportive Oncology Services    Supportive visit to patient, well known to me via outpatient care in Behavioral Oncology clinic. Pt is alert and oriented. Close friend, Lester, is at bedside. Pt is notably tired, appropriate to situation. Reports stable pain, elevated anxiety. Considers concerns regarding rehabilitation needed to recover from surgery. Nursing reports appropriate night sleep until appx shift change; pt reports seeing monkeys. Is not seeing them now. Denies any altered perceptions at this time. Pt did have valium this AM due to anxiety.    Considered with patient non pharmacological strategies for maintaining level of orientation, minimizing anxiety.  Curtains are open, natural light coming in. Close friend at bedside. Nurse plans to assist with getting patient up shortly. Considered hypnagogic state with adjusted visual experience vs delirium; will communicate with surgical team as delirium may benefit from discontinuation of benzodiazapines, alternate treatment strategy. Otherwise, I remain available if needed.    Anna Molina, APRN  [phone]

## 2023-02-06 NOTE — PLAN OF CARE
Goal Outcome Evaluation:  Plan of Care Reviewed With: patient           Outcome Evaluation: Pt has transfer orders to floor. Chest tube output marked at 140 this evening. Up around unit x2 this shift. Using IS q1-2h. PCA d/c this afternoon. Pt c/o some confusion from time to time but answers questions appropriately. VSS, afebrile. Will continue to monitor.

## 2023-02-06 NOTE — PROGRESS NOTES
Discharge Planning Assessment  [facility]     Patient Name: Edmond Chase  MRN: [id number]  Today's Date: 2/6/2023    Admit Date: 2/3/2023    Plan: Home with [facility]   Discharge Needs Assessment     Row Name 02/06/23 1459       Living Environment    People in Home alone;friend(s)    Name(s) of People in Home His friend Daniel is staying with him now    Current Living Arrangements condominium    Potentially Unsafe Housing Conditions none    Primary Care Provided by self    Provides Primary Care For no one    Family Caregiver if Needed friend(s);sibling(s);other relative(s)    Quality of Family Relationships supportive    Able to Return to Prior Arrangements yes       Resource/Environmental Concerns    Transportation Concerns none       Food Insecurity    Within the past 12 months, you worried that your food would run out before you got the money to buy more. Never true    Within the past 12 months, the food you bought just didn't last and you didn't have money to get more. Never true       Transition Planning    Patient/Family Anticipates Transition to home    Patient/Family Anticipated Services at Transition none    Transportation Anticipated family or friend will provide       Discharge Needs Assessment    Current Outpatient/Agency/Support Group homecare agency    Equipment Currently Used at Home cpap;bp cuff;grab bar;glucometer    Concerns to be Addressed discharge planning    Anticipated Changes Related to Illness none    Equipment Needed After Discharge none    Provided Post Acute Provider List? N/A    Provided Post Acute Provider Quality & Resource List? N/A               Discharge Plan     Row Name 02/06/23 1508       Plan    Plan Comments CCP spoke to patient at bedside.   CCP role explained.  Discharge planning discussed. Face sheet verified.   Pt emergency contact is his sister [phone].  Pt obtains his medications from [facility] pharmacy [facility].   Patient PCP is VIKTORIA Carbone.  Pt  lives in a house with his friend Daniel.   Pt is independent with ADL’s.  He uses no DME to ambulate.  He has no history of rehab.  He has no history of Home Health.   Pt plan is Home with [facility].  [facility] will follow.  Pt denies needs.    CCP following    Row Name 02/06/23 1507       Plan    Plan Home with [facility]              Continued Care and Services - Admitted Since 2/3/2023     Home Medical Care     Service Provider Request Status Selected Services Address Phone Fax Patient Preferred     [facility] Pending - Request Sent N/A [id number] [address] [phone] --                 Demographic Summary    No documentation.                Functional Status    No documentation.                Psychosocial    No documentation.                Abuse/Neglect    No documentation.                Legal    No documentation.                Substance Abuse    No documentation.                Patient Forms    No documentation.                   Cecilia Song RN

## 2023-02-06 NOTE — PROGRESS NOTES
[facility]  [phone]  Dr. Abdi Dominique    Subjective:  LOS: 3    Chief Complaint: Postop esophagectomy    Patient reports being somewhat woozy.  Also some intermittent hallucinations.  He feels due to pain meds.    Objective   Vital Signs past 24hrs    Temp range: Temp (24hrs), Av.2 °F (36.8 °C), Min:97.8 °F (36.6 °C), Max:98.4 °F (36.9 °C)    BP range: BP: (104-149)/(53-85) 120/85  Pulse range: Heart Rate:  [65-89] 75  Resp rate range: Resp:  [10-28] 22    Device (Oxygen Therapy): nasal cannula with [id number] (L/min):  [1-4] 4  Oxygen range:SpO2:  [86 %-100 %] 90 %      (!) 147 kg (324 lb 8.3 oz); Body mass index is 43.01 kg/m².    Intake/Output Summary (Last 24 hours) at 2023 1056  Last data filed at 2023 0806  Gross per 24 hour   Intake 2571 ml   Output 2825 ml   Net -254 ml       Physical Exam  Constitutional:       Appearance: He is obese.   Eyes:      Pupils: Pupils are equal, round, and reactive to light.   Cardiovascular:      Rate and Rhythm: Normal rate and regular rhythm.      Heart sounds: No murmur heard.  Pulmonary:      Effort: Pulmonary effort is normal.      Breath sounds: Normal breath sounds.      Comments: Right chest tube  Abdominal:      General: Bowel sounds are normal.      Palpations: Abdomen is soft. There is no mass.      Tenderness: There is no abdominal tenderness.      Comments: PEJ+nt   Musculoskeletal:         General: Swelling (2+) present.   Neurological:      Mental Status: He is alert.       Results Review:    I have reviewed the laboratory and imaging data since the last note by [facility] physician.  My annotations are noted in assessment and plan.    Medication Review:  I have reviewed the current MAR.  My annotations are noted in assessment and plan.    HYDROmorphone HCl-NaCl,   lactated ringers, 9 mL/hr, Last Rate: 9 mL/hr (23 0737)  sodium chloride, 10 mL/hr, Last Rate: 10 mL/hr (23 1023)      Plan   Lines, Drains & Airways     Active  LDAs     Name Placement date Placement time Site Days    Peripheral IV 02/03/23 0636 Posterior;Right Hand 02/03/23  0636  Hand  1    Peripheral IV 02/03/23 0633 Left;Posterior Hand 02/03/23  0633  Hand  1    Chest Tube 1 Right Midaxillary 02/03/23  1058  Midaxillary  1    NG/OG Tube Nasogastric Left nostril 02/03/23  1500  Left nostril  sutured  less than 1    Gastrostomy/Enterostomy Jejunostomy 1 19 Fr. LUQ 02/03/23  1439  LUQ  less than 1    Urethral Catheter Non-latex;Silicone 16 Fr. 02/03/23  0800  -- 1    Arterial Line 02/03/23 Left Radial 02/03/23  0730  created via procedure documentation  Radial  1              [facility] Problems  Status post esophagectomy for adeno CA distal third of esophagus, 2/3/2023  Right-sided pneumothorax with chest tube  Expected postop pain  CAD with stent  Bioprosthetic aortic valve  TJ on CPAP at home, no PAP therapy here  Morbid obesity  Diabetes type 2  Elevated liver enzymes        Plan of Treatment    Continues to improve daily.  Able to ambulate around the nurses station.  He feels that the pain meds are causing confusion.  Will speak with thoracic surgery about switching to as needed pain meds and taking off Dilaudid PCA.    With right-sided chest tube and pneumothorax almost completely resolved.  Remains on -20 suction at this time.  Chest x-ray shows persistent right-sided infiltrates but patient is afebrile with white count normal.    Note cardiology input.  No evidence of any acute coronary syndrome.    Leg swelling and poor UO.  Now on diuretics per nephrology.    Elevated liver enzymes.  Improved after diuretics.    Sliding scale for diabetes.    Hopefully well enough for transfer out of ICU.    Abdi Dominique MD  02/06/23  10:56 EST      Part of this note may be an electronic transcription/translation of spoken language to printed text using the Dragon Dictation System.

## 2023-02-06 NOTE — THERAPY TREATMENT NOTE
Patient Name: Edmond Chase  : 1960    MRN: [id number]                              Today's Date: 2023       Admit Date: 2/3/2023    Visit Dx:     ICD-10-CM ICD-9-CM   1. Malignant neoplasm of lower third of esophagus (HCC)  C15.5 150.5     Patient Active Problem List   Diagnosis   • Essential hypertension   • Hyperglycemia   • Hyperlipidemia   • Class 3 severe obesity due to excess calories with serious comorbidity and body mass index (BMI) of 40.0 to 44.9 in adult ([facility])   • Nocturia   • Nonrheumatic aortic valve insufficiency   • Myocardial ischemia   • Coronary artery disease involving native coronary artery of native heart   • Ascending aortic aneurysm   • CAD in native artery   • S/P CABG (coronary artery bypass graft)   • S/P AVR (aortic valve replacement)   • Status post ascending aortic aneurysm repair   • Fatigue   • Abnormal nuclear stress test   • Coronary artery disease   • Coronary artery disease of bypass graft of native heart with stable angina pectoris ([facility])   • TJ on auto CPAP   • Hypersomnia due to medical condition   • Angina at rest ([facility])   • Type 2 diabetes mellitus, without long-term current use of insulin ([facility])   • Anemia   • Ulcer of esophagus without bleeding   • Polyp of colon   • Rectal bleeding   • Gastrointestinal hemorrhage   • Malignant neoplasm of lower third of esophagus (HCC)   • Gastrointestinal hemorrhage, unspecified gastrointestinal hemorrhage type   • Exertional chest pain   • Iron deficiency anemia     Past Medical History:   Diagnosis Date   • Abnormal nuclear stress test    • Anxiety and depression    • Aortic valve insufficiency     nonrheumtic    • Arthritis    • Ascending aortic aneurysm    • CAD (coronary artery disease)    • Cancer (HCC)    • Chest pain    • Diabetes mellitus ([facility])    • Dizzy     CAREFUL WHEN GETTING UP   • Dyspnea    • Esophageal cancer (HCC)    • Essential hypertension    • Fatigue    • GERD (gastroesophageal reflux disease)    • GI  "bleed    • History of recent blood transfusion    • Hyperglycemia    • Hyperlipidemia    • Hypersomnia with sleep apnea    • Lightheadedness    • Malaise and fatigue    • Migraines     \"OCCULAR MIGRAINES\"   • Morbid obesity (HCC)    • Myocardial ischemia    • Nocturia    • TJ on auto CPAP 10/31/2016    Overnight polysomnogram.  Weight 276 pounds.  Severe TJ with AHI 79 events per hour.  Auto CPAP recommended.   • Pneumonia     FEB 2016   • Scrotal bleeding    • Shortness of breath    • SOB (shortness of breath)      Past Surgical History:   Procedure Laterality Date   • AORTIC VALVE REPAIR/REPLACEMENT  05/2016   • ASCENDING ARCH/HEMIARCH REPLACEMENT N/A 5/2/2016    Procedure: BHAVESH STERNOTOMY CORONARY ARTERY BYPASS GRAFT TIMES 3 USING LEFT INTERNAL MAMMARY ARTERY AND RIGHT GREATER SAPHENOUS VEIN GRAFT PER ENDOSCOPIC VEIN HARVESTING, AORTIC ANEURYSM REPAIR WITH ROOT REPAIR AND AORTIC VALVE REPLACEMENT;  Surgeon: Rosalio Cline MD;  Location: [facility] OR;  Service:    • CARDIAC CATHETERIZATION N/A 4/1/2016    Procedure: Left Heart Cath;  Surgeon: Erick Tam MD;  Location: [facility] INVASIVE LOCATION;  Service:    • CARDIAC CATHETERIZATION N/A 4/1/2016    Procedure: Left ventriculography;  Surgeon: Erick Tam MD;  Location: [facility] CATH INVASIVE LOCATION;  Service:    • CARDIAC CATHETERIZATION N/A 4/1/2016    Procedure: Right Heart Cath;  Surgeon: Erick aTm MD;  Location: [facility] CATH INVASIVE LOCATION;  Service:    • CARDIAC CATHETERIZATION N/A 10/30/2017    Procedure: Coronary angiography;  Surgeon: Sorin Vasquez MD;  Location: [facility] CATH INVASIVE LOCATION;  Service:    • CARDIAC CATHETERIZATION  10/30/2017    Procedure: Saphenous Vein Graft;  Surgeon: Sorin Vasquez MD;  Location: [facility] CATH INVASIVE LOCATION;  Service:    • CARDIAC CATHETERIZATION N/A 10/30/2017    Procedure: Native mammary injection;  Surgeon: Sorin Vasquez MD;  Location: [facility] CATH INVASIVE LOCATION;  Service:    • " CARDIAC CATHETERIZATION N/A 7/7/2020    Procedure: Coronary angiography;  Surgeon: Gregor Kim MD;  Location:  [facility] CATH INVASIVE LOCATION;  Service: Cardiovascular;  Laterality: N/A;   • CARDIAC CATHETERIZATION N/A 7/7/2020    Procedure: Left heart cath;  Surgeon: Gregor Kim MD;  Location:  [facility] CATH INVASIVE LOCATION;  Service: Cardiovascular;  Laterality: N/A;   • CARDIAC CATHETERIZATION N/A 7/7/2020    Procedure: Left ventriculography;  Surgeon: Gregor Kim MD;  Location:  [facility] CATH INVASIVE LOCATION;  Service: Cardiovascular;  Laterality: N/A;   • CARDIAC CATHETERIZATION N/A 7/7/2020    Procedure: Stent ESMER bypass graft;  Surgeon: Gregor Kim MD;  Location:  [facility] CATH INVASIVE LOCATION;  Service: Cardiovascular;  Laterality: N/A;   • CARDIAC CATHETERIZATION N/A 6/17/2021    Procedure: SAPHENOUS VEIN GRAFT;  Surgeon: Marques Ndiaye MD;  [facility]AS[facility];  Service: Cardiovascular;  Laterality: N/A;   • CARDIAC CATHETERIZATION N/A 6/17/2021    Procedure: Left Heart Cath;  Surgeon: Marques Ndiaye MD;  Location:  [facility] CATH INVASIVE LOCATION;  Service: Cardiovascular;  Laterality: N/A;   • CARDIAC CATHETERIZATION N/A 6/17/2021    Procedure: Coronary angiography;  Surgeon: Marques Ndiaye MD;  [facility];  Service: Cardiovascular;  Laterality: N/A;   • CARDIAC CATHETERIZATION N/A 7/14/2022    Procedure: Left Heart Cath;  Surgeon: Charlie Aj MD;  [facility];  Service: Cardiovascular;  Laterality: N/A;   • CARDIAC CATHETERIZATION N/A 7/14/2022    Procedure: Coronary angiography;  Surgeon: Charlie Aj MD;  Location:  [facility] CATH INVASIVE LOCATION;  Service: Cardiovascular;  Laterality: N/A;   • CARDIAC CATHETERIZATION  7/14/2022    Procedure: Saphenous Vein Graft;  Surgeon: Charlie Aj MD;  Location:  [facility] CATH INVASIVE LOCATION;  Service: Cardiovascular;;   • CARDIAC  CATHETERIZATION N/A 7/14/2022    Procedure: Native mammary injection;  Surgeon: Charlie Aj MD;  Location: [facility] CATH INVASIVE LOCATION;  Service: Cardiovascular;  Laterality: N/A;   • COLONOSCOPY N/A 11/26/2022    Procedure: COLONOSCOPY AT BEDSIDE;  Surgeon: Tomy Lopez MD;  Location: [facility] OR;  Service: Gastroenterology;  Laterality: N/A;   • COLONOSCOPY W/ POLYPECTOMY N/A 10/4/2022    Procedure: COLONOSCOPY to cecum with cold forceps and cold snare polypectomies;  Surgeon: Gregor Carlson MD;  Location: [facility] ENDOSCOPY;  Service: Gastroenterology;  Laterality: N/A;  PRE- hx of polyps  POST- diverticulosis, polyps   • CORONARY ARTERY BYPASS GRAFT  05/2016    LIMA TO LAD, SVG TO PDA, SVG TO OM2   • ENDOSCOPY N/A 7/13/2022    Procedure: ESOPHAGOGASTRODUODENOSCOPY;  Surgeon: Marcia Hollis MD;  Location: [facility] ENDOSCOPY;  Service: Gastroenterology;  Laterality: N/A;  PRE- ANEMIA, MELENA  POST- MILD EROSIVE GASTRITIS, GE JUNCTION ULCER   • ENDOSCOPY N/A 10/4/2022    Procedure: ESOPHAGOGASTRODUODENOSCOPY with biopsies;  Surgeon: Gregor Carlson MD;  Location: [facility] ENDOSCOPY;  Service: Gastroenterology;  Laterality: N/A;  PRE- hx of esophageal ulcer  POST- gastric cardia mass   • ESOPHAGOGASTRECTOMY Right 2/3/2023    Procedure: BRONCHOSCOPY, RIGHT ROBOTIC VIDEO ASSISTED THORACOSCOPY WITH TOTAL ESOPHAGECTOMY, INTERCOSTAL NERVE BLOCK, FEEDING JEJUNOSTOMY PLACEMENT;  Surgeon: Valerie Stockton MD;  Location: [facility] OR;  Service: Robotics - DaVinci;  Laterality: Right;   • INNER EAR SURGERY     • TONSILLECTOMY        General Information     Row Name 02/06/23 1549          Physical Therapy Time and Intention    Document Type therapy note (daily note)  -EJ     Mode of Treatment physical therapy  -EJ     Row Name 02/06/23 1549          General Information    Existing Precautions/Restrictions fall  multiple lines and tubes  -EJ           User Key  (r) = Recorded By, (t) = Taken By,  (c) = Cosigned By    Initials Name Provider Type    Clari Morley, PT Physical Therapist               Mobility     Row Name 02/06/23 1549          Bed Mobility    Bed Mobility sit-supine  -EJ     Sit-Supine Trigg (Bed Mobility) verbal cues;minimum assist (75% patient effort);2 person assist  -EJ     Row Name 02/06/23 1549          Sit-Stand Transfer    Sit-Stand Trigg (Transfers) verbal cues;contact guard;standby assist;1 person to manage equipment  -EJ     Assistive Device (Sit-Stand Transfers) walker, rolling platform  -EJ     Row Name 02/06/23 1549          Gait/Stairs (Locomotion)    Trigg Level (Gait) verbal cues;contact guard;standby assist  -EJ     Assistive Device (Gait) walker, rolling platform  -EJ     Distance in Feet (Gait) 120  -EJ     Deviations/Abnormal Patterns (Gait) krystin decreased  -EJ     Comment, (Gait/Stairs) no overt LOB noted, assist required for multiple lines and tubes  -EJ           User Key  (r) = Recorded By, (t) = Taken By, (c) = Cosigned By    Initials Name Provider Type    Clari Morley, PT Physical Therapist               Obj/Interventions    No documentation.                Goals/Plan    No documentation.                Clinical Impression     Row Name 02/06/23 1550          Pain    Pain Location - Side/Orientation Right  -EJ     Pain Location - flank;chest  -EJ     Pre/Posttreatment Pain Comment from chest tube  -EJ     Pain Intervention(s) Repositioned;Ambulation/increased activity  -EJ     Row Name 02/06/23 1550          Plan of Care Review    Plan of Care Reviewed With patient  -EJ     Progress improving  -EJ     Outcome Evaluation Pt agreeable to PT this afternoon. HE is up in chair upon entry to room. Pt progressing w activity today. He was able to stand w SBA/CGA. He then ambulated w SBA/CGA using rolling platform walker approx 120 ft. He exhibits slow pace and requires assist to manage equipment, but no overt unsteadiness or LOB noted.  Pt assisted to bed after activity w all lines intact.  Will continue to progress activity as tolerated.  -EJ     Row Name 02/06/23 2501          Positioning and Restraints    Pre-Treatment Position sitting in chair/recliner  -EJ     Post Treatment Position bed  -EJ     In Bed notified nsg;supine;call light within reach;encouraged to call for assist;exit alarm on  -EJ           User Key  (r) = Recorded By, (t) = Taken By, (c) = Cosigned By    Initials Name Provider Type    Clari Morley PT Physical Therapist               Outcome Measures     Row Name 02/06/23 0092          How much help from another person do you currently need...    Turning from your back to your side while in flat bed without using bedrails? 3  -EJ     Moving from lying on back to sitting on the side of a flat bed without bedrails? 3  -EJ     Moving to and from a bed to a chair (including a wheelchair)? 3  -EJ     Standing up from a chair using your arms (e.g., wheelchair, bedside chair)? 3  -EJ     Climbing 3-5 steps with a railing? 2  -EJ     To walk in hospital room? 3  -EJ     AM-PAC 6 Clicks Score (PT) 17  -EJ     Highest level of mobility 5 --> Static standing  -EJ           User Key  (r) = Recorded By, (t) = Taken By, (c) = Cosigned By    Initials Name Provider Type    Clari Morley, PT Physical Therapist                             Physical Therapy Education     Title: PT OT SLP Therapies (In Progress)     Topic: Physical Therapy (Not Started)     Point: Mobility training (Not Started)     Learner Progress:  Not documented in this visit.          Point: Home exercise program (Not Started)     Learner Progress:  Not documented in this visit.          Point: Body mechanics (Not Started)     Learner Progress:  Not documented in this visit.          Point: Precautions (Not Started)     Learner Progress:  Not documented in this visit.                          PT Recommendation and Plan     Plan of Care Reviewed With:  patient  Progress: improving  Outcome Evaluation: Pt agreeable to PT this afternoon. HE is up in chair upon entry to room. Pt progressing w activity today. He was able to stand w SBA/CGA. He then ambulated w SBA/CGA using rolling platform walker approx 120 ft. He exhibits slow pace and requires assist to manage equipment, but no overt unsteadiness or LOB noted. Pt assisted to bed after activity w all lines intact.  Will continue to progress activity as tolerated.     Time Calculation:    PT Charges     Row Name 02/06/23 1553             Time Calculation    Start Time 1450  -EJ      Stop Time 1507  -EJ      Time Calculation (min) 17 min  -EJ      PT Received On 02/06/23  -EJ      PT - Next Appointment 02/07/23  -EJ            User Key  (r) = Recorded By, (t) = Taken By, (c) = Cosigned By    Initials Name Provider Type    Clari Morley, PT Physical Therapist              Therapy Charges for Today     Code Description Service Date Service Provider Modifiers Qty    64537411095 HC PT THER PROC EA 15 MIN 2/6/2023 Clari Ling, PT GP 1    74239855276 HC PT THER SUPP EA 15 MIN 2/6/2023 Clari Ling, PT GP 1          PT G-Codes  Outcome Measure Options: AM-PAC 6 Clicks Daily Activity (OT)  AM-PAC 6 Clicks Score (PT): 17  AM-PAC 6 Clicks Score (OT): 16       Clari Ling PT  2/6/2023

## 2023-02-06 NOTE — PROGRESS NOTES
[facility] Progress Note      Patient Name: Edmond Chase  : 1960  MRN: [id number]  Primary Care Physician:  Nargis Velasquez APRN  Date of admission: 2/3/2023    Subjective     Interval History:   The patient was seen and examined today for follow-up on oligoria  Doing better.  Urinating well.  Urine output of 1.9 L last 24 hours.    Review of Systems:   As noted above    Objective     Vitals:   Temp:  [97.2 °F (36.2 °C)-98.8 °F (37.1 °C)] 98.8 °F (37.1 °C)  Heart Rate:  [65-89] 86  Resp:  [20-28] 20  BP: (104-148)/(53-85) 133/80  Flow (L/min):  [1-4] 2    Intake/Output Summary (Last 24 hours) at 2023 1640  Last data filed at 2023 1400  Gross per 24 hour   Intake 2439 ml   Output 2960 ml   Net -521 ml       Physical Exam:    General Appearance: alert, oriented x 3, no acute distress   Skin: warm and dry  HEENT: oral mucosa normal, nonicteric sclera  Neck: supple, no JVD  Lungs: CTA  Heart: RRR, normal S1 and S2  Abdomen: soft, nontender, nondistended  : no palpable bladder  Extremities: 2+ pitting edema bilateral lower extremities cyanosis or clubbing  Neuro: normal speech and mental status     Scheduled Meds:     acetaminophen, 1,000 mg, Per J Tube, TID  furosemide, 40 mg, Intravenous, Q12H  gabapentin, 300 mg, Per J Tube, Q8H  heparin (porcine), 5,000 Units, Subcutaneous, Q8H  insulin regular, 0-9 Units, Subcutaneous, Q6H  metoprolol tartrate, 25 mg, Per J Tube, Q12H   Or  metoprolol tartrate, 5 mg, Intravenous, Q12H      IV Meds:   lactated ringers, 9 mL/hr, Last Rate: 9 mL/hr (23 0737)  sodium chloride, 10 mL/hr, Last Rate: 10 mL/hr (23 1023)        Results Reviewed:   I have personally reviewed the results from the time of this admission to 2023 16:40 EST     Results from last 7 days   Lab Units 23  0636 23  0325 23  0309   SODIUM mmol/L 140 136 142   POTASSIUM mmol/L 4.4 4.9 4.7   CHLORIDE mmol/L 102 104 106   CO2 mmol/L 28.9  26.0 26.2   BUN mg/dL 22 24* 20   CREATININE mg/dL 1.13 1.09 0.97   CALCIUM mg/dL 8.6 8.1* 8.5*   BILIRUBIN mg/dL 0.5 0.6  --    ALK PHOS U/L 78 74  --    ALT (SGPT) U/L 260* 362*  --    AST (SGOT) U/L 77* 220*  --    GLUCOSE mg/dL 128* 164* 194*       Estimated Creatinine Clearance: 100 mL/min (by C-G formula based on SCr of 1.13 mg/dL).    Results from last 7 days   Lab Units 02/06/23  0636 02/05/23  0325   MAGNESIUM mg/dL  --  2.1   PHOSPHORUS mg/dL 2.9 3.5       Results from last 7 days   Lab Units 02/06/23  0636   URIC ACID mg/dL 5.8       Results from last 7 days   Lab Units 02/06/23  0636 02/05/23  0325 02/04/23  0309 02/03/23  1611   WBC 10*3/mm3 6.66 13.32* 10.18 12.46*   HEMOGLOBIN g/dL 10.1* 10.4* 9.9* 10.9*   PLATELETS 10*3/mm3 167 179 201 202             Assessment / Plan     ASSESSMENT:  1 RAMIREZ with decrease urine output Fairly normal renal function at this time.  Baseline closer to 0.8 from previous labs.  Urine sodium <20.  Poss decompensated heart failure? Doesn't appear volume depleted on exam, some dyspnea.  Had noted pleural effusion on xray with small right sided pneumo.  Will hold IVF's at this time and start iv lasix 40mg bid.  Monitor uop for now.    Anemia- hgb stable  Esoph adeno- s/p esophagectomy  Right sided pneumo- chest tube in place.  H/o bioprosthetic aortic valve        PLAN:  · Diuresed well with Lasix.  · We will continue same diuretic dose.  · Continue surveillance lab.  · Replace electrolytes.      Thank you for involving us in the care of Edmond ANGELA Salvatore.  Please feel free to call with any questions.    Alex Hernadez MD  02/06/23  16:40 [facility]    [facility]  [phone]    Parts of this note may be an electronic transcription/translation of spoken language to printed text using the Dragon dictation system.

## 2023-02-06 NOTE — PROGRESS NOTES
"  POST-OPERATIVE NOTE     Chief Complaint: Esophageal cancer, postoperative care  S/P: Bronchoscopy, right video-assisted thoracoscopy with total esophagectomy, intercostal nerve block, feeding jejunostomy tube placement  POD # 3    Subjective:  Symptoms:  Stable.  He reports shortness of breath, chest pain and weakness.    Diet:  NPO.  No nausea or vomiting.    Activity level: Impaired due to pain.    Pain:  He complains of pain that is mild.  Pain is partially controlled.    Up to chair.  Reports PCA is making him \"loopy,\" but feeling well overall    Objective:  General Appearance:  Ill-appearing, in no acute distress and comfortable.    Vital signs: (most recent): Blood pressure 133/69, pulse 79, temperature 97.2 °F (36.2 °C), temperature source Oral, resp. rate 20, height 185 cm (72.84\"), weight (!) 147 kg (324 lb 8.3 oz), SpO2 96 %.  Vital signs are normal.  No fever.    Output: Producing urine.    HEENT: (NG tube and nasal cannula)    Lungs:  Normal effort and normal respiratory rate.  He is not in respiratory distress.  No rales, wheezes or rhonchi.    Heart: Normal rate.  Regular rhythm.    Chest: Chest wall tenderness present.    Abdomen: Abdomen is soft.  Absent bowel sounds.   There is generalized tenderness.  There is incisional tenderness.    Extremities: Decreased range of motion.  There is dependent edema (Bilateral lower extremities).    Pulses: Distal pulses are intact.    Neurological: Patient is alert and oriented to person, place and time.    Skin:  Warm and dry.  (Postoperative incisions well approximated and intact)            Chest tube:   Site: Right, Clean, Dry, Intact and Securement device intact  Suction: waterseal  Air Leak: negative  24 Hour Total: 130cc    NGT:   24 Hour Total: 150cc    Results Review:     I reviewed the patient's new clinical results.  I reviewed the patient's new imaging results and agree with the interpretation.  I reviewed the patient's other test results and agree " with the interpretation  Discussed with Patient, RN, Dr. Pichardo.    Assessment & Plan      Patient is POD #3, s/p total esophagectomy.  Some expected postop pain, but this is improved with ERAS medications.  We will stop his PCA per patient request.  Continue Dilaudid IV as needed.  Increase tube feeds to 20 cc/h via J-tube. Do not advance. Continue strict NPO, but may have swabs sparingly.     Increased urine output with initiation of Lasix yesterday.  Appreciate nephrology assistance.    Continue chest tube to waterseal and leave NG tube to intermittent low wall suction.  We will plan to remove these on postoperative day 5.    Postoperative weakness: PT/OT consulted.  Up to chair and have patient take 3 walks per day with assistance of staff.    Coronary artery disease with angina: Patient follows with Dr. Miguel.  Nitropaste has been discontinued.  Appreciate assistance from cardiology    Recheck labs and chest x-ray tomorrow morning.  Wean oxygen as able.  Absolutely no BiPAP.  Transfer to [facility].    Sujata John DNP, APRN  [facility]  02/06/23  14:22 EST    Patient was seen and assessed while wearing personal protective equipment including facemask, protective eyewear and gloves.  Hand hygiene performed prior to entering the room and upon exiting with doffing of gloves.

## 2023-02-07 ENCOUNTER — APPOINTMENT (OUTPATIENT)
Dept: GENERAL RADIOLOGY | Facility: HOSPITAL | Age: 63
DRG: 327 | End: 2023-02-07
Payer: COMMERCIAL

## 2023-02-07 LAB
ALBUMIN SERPL-MCNC: 3.1 G/DL (ref 3.5–5.2)
ANION GAP SERPL CALCULATED.3IONS-SCNC: 9.8 MMOL/L (ref 5–15)
BUN SERPL-MCNC: 20 MG/DL (ref 8–23)
BUN/CREAT SERPL: 23.8 (ref 7–25)
CALCIUM SPEC-SCNC: 8.6 MG/DL (ref 8.6–10.5)
CHLORIDE SERPL-SCNC: 101 MMOL/L (ref 98–107)
CO2 SERPL-SCNC: 31.2 MMOL/L (ref 22–29)
CREAT SERPL-MCNC: 0.84 MG/DL (ref 0.76–1.27)
DEPRECATED RDW RBC AUTO: 45.9 FL (ref 37–54)
EGFRCR SERPLBLD CKD-EPI 2021: 97.4 ML/MIN/1.73
ERYTHROCYTE [DISTWIDTH] IN BLOOD BY AUTOMATED COUNT: 14.1 % (ref 12.3–15.4)
GLUCOSE BLDC GLUCOMTR-MCNC: 117 MG/DL (ref 70–130)
GLUCOSE BLDC GLUCOMTR-MCNC: 134 MG/DL (ref 70–130)
GLUCOSE BLDC GLUCOMTR-MCNC: 139 MG/DL (ref 70–130)
GLUCOSE BLDC GLUCOMTR-MCNC: 164 MG/DL (ref 70–130)
GLUCOSE SERPL-MCNC: 147 MG/DL (ref 65–99)
HCT VFR BLD AUTO: 31.9 % (ref 37.5–51)
HGB BLD-MCNC: 10.1 G/DL (ref 13–17.7)
LAB AP CASE REPORT: NORMAL
LAB AP DIAGNOSIS COMMENT: NORMAL
LAB AP SYNOPTIC CHECKLIST: NORMAL
MAGNESIUM SERPL-MCNC: 2 MG/DL (ref 1.6–2.4)
MCH RBC QN AUTO: 28.1 PG (ref 26.6–33)
MCHC RBC AUTO-ENTMCNC: 31.7 G/DL (ref 31.5–35.7)
MCV RBC AUTO: 88.9 FL (ref 79–97)
PATH REPORT.FINAL DX SPEC: NORMAL
PATH REPORT.GROSS SPEC: NORMAL
PHOSPHATE SERPL-MCNC: 2.6 MG/DL (ref 2.5–4.5)
PLATELET # BLD AUTO: 182 10*3/MM3 (ref 140–450)
PMV BLD AUTO: 9.9 FL (ref 6–12)
POTASSIUM SERPL-SCNC: 3.8 MMOL/L (ref 3.5–5.2)
RBC # BLD AUTO: 3.59 10*6/MM3 (ref 4.14–5.8)
SODIUM SERPL-SCNC: 142 MMOL/L (ref 136–145)
WBC NRBC COR # BLD: 5.07 10*3/MM3 (ref 3.4–10.8)

## 2023-02-07 PROCEDURE — 99232 SBSQ HOSP IP/OBS MODERATE 35: CPT | Performed by: INTERNAL MEDICINE

## 2023-02-07 PROCEDURE — 99024 POSTOP FOLLOW-UP VISIT: CPT

## 2023-02-07 PROCEDURE — 97530 THERAPEUTIC ACTIVITIES: CPT

## 2023-02-07 PROCEDURE — 25010000002 FUROSEMIDE PER 20 MG: Performed by: INTERNAL MEDICINE

## 2023-02-07 PROCEDURE — 25010000002 ONDANSETRON PER 1 MG: Performed by: THORACIC SURGERY (CARDIOTHORACIC VASCULAR SURGERY)

## 2023-02-07 PROCEDURE — 85027 COMPLETE CBC AUTOMATED: CPT | Performed by: INTERNAL MEDICINE

## 2023-02-07 PROCEDURE — 97535 SELF CARE MNGMENT TRAINING: CPT

## 2023-02-07 PROCEDURE — 82962 GLUCOSE BLOOD TEST: CPT

## 2023-02-07 PROCEDURE — 63710000001 INSULIN REGULAR HUMAN PER 5 UNITS: Performed by: INTERNAL MEDICINE

## 2023-02-07 PROCEDURE — 83735 ASSAY OF MAGNESIUM: CPT | Performed by: THORACIC SURGERY (CARDIOTHORACIC VASCULAR SURGERY)

## 2023-02-07 PROCEDURE — 25010000002 HEPARIN (PORCINE) PER 1000 UNITS: Performed by: THORACIC SURGERY (CARDIOTHORACIC VASCULAR SURGERY)

## 2023-02-07 PROCEDURE — 71045 X-RAY EXAM CHEST 1 VIEW: CPT

## 2023-02-07 PROCEDURE — 80069 RENAL FUNCTION PANEL: CPT | Performed by: NURSE PRACTITIONER

## 2023-02-07 PROCEDURE — 25010000002 HYDROMORPHONE PER 4 MG: Performed by: THORACIC SURGERY (CARDIOTHORACIC VASCULAR SURGERY)

## 2023-02-07 RX ORDER — LISINOPRIL 10 MG/1
10 TABLET ORAL
Status: DISCONTINUED | OUTPATIENT
Start: 2023-02-07 | End: 2023-02-08

## 2023-02-07 RX ORDER — DOCUSATE SODIUM 50 MG/5 ML
100 LIQUID (ML) ORAL 2 TIMES DAILY
Status: DISCONTINUED | OUTPATIENT
Start: 2023-02-07 | End: 2023-02-10 | Stop reason: HOSPADM

## 2023-02-07 RX ADMIN — DIAZEPAM 2 MG: 2 TABLET ORAL at 05:08

## 2023-02-07 RX ADMIN — ACETAMINOPHEN 1000 MG: 325 SUSPENSION ORAL at 20:13

## 2023-02-07 RX ADMIN — LISINOPRIL 10 MG: 10 TABLET ORAL at 15:39

## 2023-02-07 RX ADMIN — DIAZEPAM 2 MG: 2 TABLET ORAL at 12:31

## 2023-02-07 RX ADMIN — GABAPENTIN 300 MG: 300 CAPSULE ORAL at 15:40

## 2023-02-07 RX ADMIN — DIAZEPAM 2 MG: 2 TABLET ORAL at 20:13

## 2023-02-07 RX ADMIN — DOCUSATE SODIUM 100 MG: 50 LIQUID ORAL at 20:27

## 2023-02-07 RX ADMIN — GABAPENTIN 300 MG: 300 CAPSULE ORAL at 05:07

## 2023-02-07 RX ADMIN — ACETAMINOPHEN 1000 MG: 325 SUSPENSION ORAL at 08:12

## 2023-02-07 RX ADMIN — HYDROMORPHONE HYDROCHLORIDE 0.5 MG: 1 INJECTION, SOLUTION INTRAMUSCULAR; INTRAVENOUS; SUBCUTANEOUS at 20:14

## 2023-02-07 RX ADMIN — GABAPENTIN 300 MG: 300 CAPSULE ORAL at 20:13

## 2023-02-07 RX ADMIN — HEPARIN SODIUM 5000 UNITS: 5000 INJECTION INTRAVENOUS; SUBCUTANEOUS at 20:13

## 2023-02-07 RX ADMIN — ACETAMINOPHEN 1000 MG: 325 SUSPENSION ORAL at 15:39

## 2023-02-07 RX ADMIN — METOPROLOL TARTRATE 25 MG: 25 TABLET, FILM COATED ORAL at 08:12

## 2023-02-07 RX ADMIN — HYDROMORPHONE HYDROCHLORIDE 0.5 MG: 1 INJECTION, SOLUTION INTRAMUSCULAR; INTRAVENOUS; SUBCUTANEOUS at 06:46

## 2023-02-07 RX ADMIN — HEPARIN SODIUM 5000 UNITS: 5000 INJECTION INTRAVENOUS; SUBCUTANEOUS at 15:39

## 2023-02-07 RX ADMIN — INSULIN HUMAN 2 UNITS: 100 INJECTION, SOLUTION PARENTERAL at 21:43

## 2023-02-07 RX ADMIN — ONDANSETRON 4 MG: 2 INJECTION INTRAMUSCULAR; INTRAVENOUS at 00:14

## 2023-02-07 RX ADMIN — HEPARIN SODIUM 5000 UNITS: 5000 INJECTION INTRAVENOUS; SUBCUTANEOUS at 05:07

## 2023-02-07 RX ADMIN — FUROSEMIDE 40 MG: 10 INJECTION, SOLUTION INTRAMUSCULAR; INTRAVENOUS at 05:07

## 2023-02-07 RX ADMIN — OXYCODONE 5 MG: 5 TABLET ORAL at 15:39

## 2023-02-07 RX ADMIN — METOPROLOL TARTRATE 25 MG: 25 TABLET, FILM COATED ORAL at 20:13

## 2023-02-07 RX ADMIN — LABETALOL HYDROCHLORIDE 10 MG: 5 INJECTION, SOLUTION INTRAVENOUS at 11:26

## 2023-02-07 RX ADMIN — FUROSEMIDE 40 MG: 10 INJECTION, SOLUTION INTRAMUSCULAR; INTRAVENOUS at 15:39

## 2023-02-07 RX ADMIN — DOCUSATE SODIUM 100 MG: 50 LIQUID ORAL at 12:27

## 2023-02-07 RX ADMIN — HYDROMORPHONE HYDROCHLORIDE 0.5 MG: 1 INJECTION, SOLUTION INTRAMUSCULAR; INTRAVENOUS; SUBCUTANEOUS at 11:25

## 2023-02-07 NOTE — DISCHARGE PLACEMENT REQUEST
"Edmond Chase (62 y.o. Male)     Date of Birth   1960    Social Security Number       Address   [address]  [facility] [address]    Home Phone   [phone]    MRN   [id number]       Cheondoism   None    Marital Status   Single                            Admission Date   2/3/23    Admission Type   Elective    Admitting Provider   Valerie Stockton MD    Attending Provider   Valerie Stockton MD    Department, Room/Bed   [address], [address]/1       Discharge Date       Discharge Disposition       Discharge Destination                               Attending Provider: Valerie Stockton MD    Allergies: No Known Allergies    Isolation: None   Infection: None   Code Status: CPR    Ht: 185 cm (72.84\")   Wt: 147 kg (324 lb 8.3 oz)    Admission Cmt: None   Principal Problem: Malignant neoplasm of lower third of esophagus (HCC) [C15.5]                 Active Insurance as of 2/3/2023     Primary Coverage     Payor Plan Insurance Group Employer/Plan Group    [facility] BY JODI [facility] BY JODI [id number]     Payor Plan Address Payor Plan Phone Number Payor Plan Fax Number Effective Dates    [address] [address]   1/1/2021 - None Entered    [facility] [id number]       Subscriber Name Subscriber Birth Date Member ID       EDMOND CHASE 1960 [id number]                 Emergency Contacts      (Rel.) Home Phone Work Phone Mobile Phone    Kyra Ahuja (Sister) [phone] -- [phone]    Melinda Ahuja [facility] (Relative) [phone] -- [phone]    Sadia Ahuja [facility] (Relative) [phone] -- [phone]          "

## 2023-02-07 NOTE — PROGRESS NOTES
[facility] Progress Note      Patient Name: Edmond Chase  : 1960  MRN: [id number]  Primary Care Physician:  Nargis Velasquez APRN  Date of admission: 2/3/2023    Subjective     Interval History:   The patient was seen and examined today for follow-up on oligoria  Urine output of 3.5 L last 24 hours able to achieve net negative balance of 3 L..  No available weight today  Review of Systems:   As noted above    Objective     Vitals:   Temp:  [97.2 °F (36.2 °C)-98.8 °F (37.1 °C)] 98.7 °F (37.1 °C)  Heart Rate:  [71-90] 75  Resp:  [20-22] 20  BP: (120-172)/(66-91) 172/91  Flow (L/min):  [2] 2    Intake/Output Summary (Last 24 hours) at 2023 0937  Last data filed at 2023 0810  Gross per 24 hour   Intake 543.45 ml   Output 2886 ml   Net -2342.55 ml       Physical Exam:    General Appearance: alert, oriented x 3, no acute distress   Skin: warm and dry  HEENT: oral mucosa normal, nonicteric sclera  Neck: supple, no JVD  Lungs: CTA  Heart: RRR, normal S1 and S2  Abdomen: soft, nontender, nondistended  : no palpable bladder  Extremities: 1-2+ pitting edema bilateral lower extremities.  Improved from before.  No cyanosis or clubbing  Neuro: normal speech and mental status     Scheduled Meds:     acetaminophen, 1,000 mg, Per J Tube, TID  furosemide, 40 mg, Intravenous, Q12H  gabapentin, 300 mg, Per J Tube, Q8H  heparin (porcine), 5,000 Units, Subcutaneous, Q8H  insulin regular, 0-9 Units, Subcutaneous, Q6H  metoprolol tartrate, 25 mg, Per J Tube, Q12H   Or  metoprolol tartrate, 5 mg, Intravenous, Q12H      IV Meds:   lactated ringers, 9 mL/hr, Last Rate: 9 mL/hr (23 0737)  sodium chloride, 10 mL/hr, Last Rate: 10 mL/hr (23 1023)        Results Reviewed:   I have personally reviewed the results from the time of this admission to 2023 09:37 EST     Results from last 7 days   Lab Units 23  0558 23  0636 23  0325   SODIUM mmol/L 142 140 136    POTASSIUM mmol/L 3.8 4.4 4.9   CHLORIDE mmol/L 101 102 104   CO2 mmol/L 31.2* 28.9 26.0   BUN mg/dL 20 22 24*   CREATININE mg/dL 0.84 1.13 1.09   CALCIUM mg/dL 8.6 8.6 8.1*   BILIRUBIN mg/dL  --  0.5 0.6   ALK PHOS U/L  --  78 74   ALT (SGPT) U/L  --  260* 362*   AST (SGOT) U/L  --  77* 220*   GLUCOSE mg/dL 147* 128* 164*       Estimated Creatinine Clearance: 134.5 mL/min (by C-G formula based on SCr of 0.84 mg/dL).    Results from last 7 days   Lab Units 02/07/23  0558 02/06/23  0636 02/05/23  0325   MAGNESIUM mg/dL 2.0  --  2.1   PHOSPHORUS mg/dL 2.6 2.9 3.5       Results from last 7 days   Lab Units 02/06/23  0636   URIC ACID mg/dL 5.8       Results from last 7 days   Lab Units 02/07/23  0558 02/06/23  0636 02/05/23  0325 02/04/23  0309 02/03/23  1611   WBC 10*3/mm3 5.07 6.66 13.32* 10.18 12.46*   HEMOGLOBIN g/dL 10.1* 10.1* 10.4* 9.9* 10.9*   PLATELETS 10*3/mm3 182 167 179 201 202             Assessment / Plan     ASSESSMENT:  1 RAMIREZ with decrease urine output Fairly normal renal function at this time.  Baseline closer to 0.8 from previous labs.  Urine sodium <20.  Poss decompensated heart failure? Doesn't appear volume depleted on exam, some dyspnea.  Had noted pleural effusion on xray with small right sided pneumo.  Will hold IVF's at this time and start iv lasix 40mg bid.  Monitor uop for now.    Anemia- hgb stable  Esoph adeno- s/p esophagectomy  Right sided pneumo- chest tube in place.  H/o bioprosthetic aortic valve        PLAN:  · Patient continues to have excessive hypovolemia.  We will continue IV diuretics for the time being.  · Check daily weight.  · Continue surveillance labs.  · We will consider switch to oral diuretics tomorrow ma'am.      Thank you for involving us in the care of Edmond Chase.  Please feel free to call with any questions.    Alex Hernadez MD  02/07/23  09:37 [facility]    [facility]  [phone]    Parts of this note may be an electronic  transcription/translation of spoken language to printed text using the Dragon dictation system.

## 2023-02-07 NOTE — PLAN OF CARE
Goal Outcome Evaluation:  Plan of Care Reviewed With: patient           Outcome Evaluation: Pt was found UIC, agreeable to activity with OT. He performs UB AROM/therex to promote strength for ADLs/mobility. He stands with CGA and uses platform walker to hold chest tube for fxl ambulation in room and hallway to promote endurance for household mobility and ADLs. Pt completes TF BSC with SBA, CGA for toileting tasks for clothing mgt. Pt returns to supine in bed and left with all needs in reach.

## 2023-02-07 NOTE — PROGRESS NOTES
"Nutrition Services    Patient Name:  Edmond Chase  YOB: 1960  MRN: [id number]  Admit Date:  2/3/2023    Assessment Date:  02/07/23    Comment: Nutrition follow up.  Tf's adv to 30mL/hr today with the Impact Peptide.  IVF stopped as well.  NG tube remains in with minimal output (possilbe removal tomorrow).  No BM since prior to admit, a stool softener was added today.  Pt doesn't c/o of any GI issues other than incisional pain at the tube site. Asking for water and when he can drink something. Advised him to ask the vascular surgical team and reinforced strict NPO at this time.     Will to increase his free water to maintain his hydration to 40mL/hr.  TF goal is 65mL/hr (plan is to start av tomorrow).  Provisions below    RD to follow clinical course. nutrition support needs, readiness to try cyclic feeds.    CLINICAL NUTRITION ASSESSMENT      Reason for Assessment Follow-up Protocol, Tube Feeding Assessment    Diagnosis/Problem Esophageal cancer  S/P: Bronchoscopy, right video-assisted thoracoscopy with total esophagectomy, intercostal nerve block, feeding jejunostomy tube placement on 2/3/23     Medical & Surgical Hx Past Medical History:   Diagnosis Date   • Abnormal nuclear stress test    • Anxiety and depression    • Aortic valve insufficiency     nonrheumtic    • Arthritis    • Ascending aortic aneurysm    • CAD (coronary artery disease)    • Cancer (HCC)    • Chest pain    • Diabetes mellitus (HCC)    • Dizzy     CAREFUL WHEN GETTING UP   • Dyspnea    • Esophageal cancer (HCC)    • Essential hypertension    • Fatigue    • GERD (gastroesophageal reflux disease)    • GI bleed    • History of recent blood transfusion    • Hyperglycemia    • Hyperlipidemia    • Hypersomnia with sleep apnea    • Lightheadedness    • Malaise and fatigue    • Migraines     \"OCCULAR MIGRAINES\"   • Morbid obesity (HCC)    • Myocardial ischemia    • Nocturia    • TJ on auto CPAP 10/31/2016    Overnight " polysomnogram.  Weight 276 pounds.  Severe TJ with AHI 79 events per hour.  Auto CPAP recommended.   • Pneumonia     FEB 2016   • Scrotal bleeding    • Shortness of breath    • SOB (shortness of breath)        Past Surgical History:   Procedure Laterality Date   • AORTIC VALVE REPAIR/REPLACEMENT  05/2016   • ASCENDING ARCH/HEMIARCH REPLACEMENT N/A 5/2/2016    Procedure: BHAVESH STERNOTOMY CORONARY ARTERY BYPASS GRAFT TIMES 3 USING LEFT INTERNAL MAMMARY ARTERY AND RIGHT GREATER SAPHENOUS VEIN GRAFT PER ENDOSCOPIC VEIN HARVESTING, AORTIC ANEURYSM REPAIR WITH ROOT REPAIR AND AORTIC VALVE REPLACEMENT;  Surgeon: Rosalio Cline MD;  Location: [facility] OR;  Service:    • CARDIAC CATHETERIZATION N/A 4/1/2016    Procedure: Left Heart Cath;  Surgeon: Erick Tam MD;  Location: [facility] CATH INVASIVE LOCATION;  Service:    • CARDIAC CATHETERIZATION N/A 4/1/2016    Procedure: Left ventriculography;  Surgeon: Erick Tam MD;  Location: [facility] CATH INVASIVE LOCATION;  Service:    • CARDIAC CATHETERIZATION N/A 4/1/2016    Procedure: Right Heart Cath;  Surgeon: Erick Tam MD;  Location: [facility] INVASIVE LOCATION;  Service:    • CARDIAC CATHETERIZATION N/A 10/30/2017    Procedure: Coronary angiography;  Surgeon: Sorin Vasquez MD;  Location: [facility] CATH INVASIVE LOCATION;  Service:    • CARDIAC CATHETERIZATION  10/30/2017    Procedure: Saphenous Vein Graft;  Surgeon: Sorin Vasquez MD;  Location: [facility] CATH INVASIVE LOCATION;  Service:    • CARDIAC CATHETERIZATION N/A 10/30/2017    Procedure: Native mammary injection;  Surgeon: Sorin Vasquez MD;  Location: [facility] INVASIVE LOCATION;  Service:    • CARDIAC CATHETERIZATION N/A 7/7/2020    Procedure: Coronary angiography;  Surgeon: Gregor Kim MD;  Location: [facility] INVASIVE LOCATION;  Service: Cardiovascular;  Laterality: N/A;   • CARDIAC CATHETERIZATION N/A 7/7/2020    Procedure: Left heart cath;  Surgeon: Gregor Kim MD;  Location: [facility]  CATH INVASIVE LOCATION;  Service: Cardiovascular;  Laterality: N/A;   • CARDIAC CATHETERIZATION N/A 7/7/2020    Procedure: Left ventriculography;  Surgeon: Gregor Kim MD;  Location: [facility] CATH INVASIVE LOCATION;  Service: Cardiovascular;  Laterality: N/A;   • CARDIAC CATHETERIZATION N/A 7/7/2020    Procedure: Stent ESMER bypass graft;  Surgeon: Gregor Kim MD;  [facility] CATH INVAS[facility] LOC[facility];  Service: Cardiovascular;  Laterality: N/A;   • CARDIAC CATHETERIZATION N/A 6/17/2021    Procedure: SAPHENOUS VEIN GRAFT;  Surgeon: Marques Ndiaye MD;  Location: [facility] CATH INVASIVE LOCATION;  Service: Cardiovascular;  Laterality: N/A;   • CARDIAC CATHETERIZATION N/A 6/17/2021    Procedure: Left Heart Cath;  Surgeon: Marques Ndiaye MD;  Location: [facility] CATH INVASIVE LOCATION;  Service: Cardiovascular;  Laterality: N/A;   • CARDIAC CATHETERIZATION N/A 6/17/2021    Procedure: Coronary angiography;  Surgeon: Marques Ndiaye MD;  Location: [facility] CATH INVASIVE LOCATION;  Service: Cardiovascular;  Laterality: N/A;   • CARDIAC CATHETERIZATION N/A 7/14/2022    Procedure: Left Heart Cath;  Surgeon: Charlie Aj MD;  Location: [facility] CATH INVASIVE LOCATION;  Service: Cardiovascular;  Laterality: N/A;   • CARDIAC CATHETERIZATION N/A 7/14/2022    Procedure: Coronary angiography;  Surgeon: Charlie Aj MD;  Location: [facility] CATH INVASIVE LOCATION;  Service: Cardiovascular;  Laterality: N/A;   • CARDIAC CATHETERIZATION  7/14/2022    Procedure: Saphenous Vein Graft;  Surgeon: Charlie Aj MD;  Location: [facility] CATH INVASIVE LOCATION;  Service: Cardiovascular;;   • CARDIAC CATHETERIZATION N/A 7/14/2022    Procedure: Native mammary injection;  Surgeon: Charlie Aj MD;  Location: [facility] CATH INVASIVE LOCATION;  Service: Cardiovascular;  Laterality: N/A;   • COLONOSCOPY N/A 11/26/2022    Procedure: COLONOSCOPY AT BEDSIDE;  Surgeon: Tomy Lopez MD;  Location: [facility] OR;  " Service: Gastroenterology;  Laterality: N/A;   • COLONOSCOPY W/ POLYPECTOMY N/A 10/4/2022    Procedure: COLONOSCOPY to cecum with cold forceps and cold snare polypectomies;  Surgeon: Gregor Carlson MD;  Location: [facility] ENDOSCOPY;  Service: Gastroenterology;  Laterality: N/A;  PRE- hx of polyps  POST- diverticulosis, polyps   • CORONARY ARTERY BYPASS GRAFT  05/2016    LIMA TO LAD, SVG TO PDA, SVG TO OM2   • ENDOSCOPY N/A 7/13/2022    Procedure: ESOPHAGOGASTRODUODENOSCOPY;  Surgeon: Marcia Hollis MD;  Location: [facility] ENDOSCOPY;  Service: Gastroenterology;  Laterality: N/A;  PRE- ANEMIA, MELENA  POST- MILD EROSIVE GASTRITIS, GE JUNCTION ULCER   • ENDOSCOPY N/A 10/4/2022    Procedure: ESOPHAGOGASTRODUODENOSCOPY with biopsies;  Surgeon: Gregor Carlson MD;  Location: [facility] ENDOSCOPY;  Service: Gastroenterology;  Laterality: N/A;  PRE- hx of esophageal ulcer  POST- gastric cardia mass   • ESOPHAGOGASTRECTOMY Right 2/3/2023    Procedure: BRONCHOSCOPY, RIGHT ROBOTIC VIDEO ASSISTED THORACOSCOPY WITH TOTAL ESOPHAGECTOMY, INTERCOSTAL NERVE BLOCK, FEEDING JEJUNOSTOMY PLACEMENT;  Surgeon: Valerie Stockton MD;  Location: [facility] MAIN OR;  Service: Robotics - [employer];  Laterality: Right;   • INNER EAR SURGERY     • TONSILLECTOMY          Current Problems      Encounter Information        Nutrition History NPO. MD comments for TF to start at 10 ml/hr, do not advance. POD #1 esophagectomy & J-tube. This morning, NP for CTS indicated normal saline only @ 10 ml/hr and hold off on starting TF today.    Factors Affecting Intake altered GI function, swallow impairment   Tests/Procedures X-Ray, Other: surgery 2/3/23, chest x-rays (multiple)  \"some subcutaneous emphysema along right lateral chest wall and some left basilar density along the left diaphragm, likely atelectasis.\"     Anthropometrics        Current Height   Current Weight  BMI kg/m2 Height: 185 cm (72.84\")  Weight: (!) 147 kg (324 lb 8.3 oz) (02/05/23 " "0300)  Body mass index is 43.01 kg/m².   Admission Weight    Ideal Body Weight (IBW) 184 lb (172%)   Usual Body Weight (UBW) Mid-300's, trending down into lower 300's lb in past year   Weight Change/Trend Loss, stable since July 2022       Weight history: Wt Readings from Last 30 Encounters:   02/05/23 0300 (!) 147 kg (324 lb 8.3 oz)   02/04/23 1727 (!) 144 kg (317 lb 7.4 oz)   02/04/23 0600 (!) 144 kg (317 lb 10.9 oz)   02/03/23 1800 (!) 143 kg (315 lb 4.1 oz)   01/30/23 1509 (!) 142 kg (314 lb)   01/23/23 1440 (!) 142 kg (314 lb)   01/20/23 0756 (!) 142 kg (314 lb)   01/16/23 0949 (!) 141 kg (310 lb)   12/15/22 0618 (!) 142 kg (313 lb 11.4 oz)   12/14/22 1553 (!) 140 kg (308 lb)   12/14/22 1532 (!) 142 kg (313 lb)   12/14/22 1006 (!) 142 kg (313 lb)   12/14/22 0932 (!) 142 kg (313 lb)   12/06/22 1529 (!) 140 kg (308 lb 8 oz)   12/02/22 0600 (!) 143 kg (314 lb 6 oz)   12/01/22 0546 (!) 144 kg (317 lb 3.2 oz)   11/25/22 1123 (!) 141 kg (310 lb)   10/21/22 1052 (!) 142 kg (314 lb)   10/04/22 0934 (!) 140 kg (308 lb 11.2 oz)   09/15/22 0915 (!) 141 kg (310 lb)   08/29/22 1355 (!) 141 kg (311 lb 8 oz)   08/17/22 0900 (!) 142 kg (314 lb)   08/08/22 1032 (!) 144 kg (318 lb)   08/04/22 0814 (!) 143 kg (315 lb)   07/29/22 0744 (!) 142 kg (314 lb)   07/22/22 1247 (!) 142 kg (314 lb)   07/15/22 0505 (!) 146 kg (322 lb 1.5 oz)   07/12/22 1121 (!) 144 kg (318 lb)   07/11/22 0500 (!) 144 kg (318 lb 1.6 oz)   07/10/22 0618 (!) 148 kg (327 lb)   01/28/22 1108 (!) 148 kg (327 lb)   08/17/21 1007 (!) 150 kg (330 lb)   07/30/21 1016 (!) 149 kg (329 lb)   07/07/21 1225 (!) 154 kg (340 lb)   06/24/21 1105 (!) 154 kg (340 lb)   06/17/21 0812 (!) 150 kg (330 lb)   01/22/21 0823 (!) 152 kg (336 lb)   10/21/20 1044 (!) 150 kg (331 lb)   07/20/20 1331 (!) 145 kg (320 lb)   07/07/20 0821 (!) 141 kg (310 lb)        Estimated/Assessed Needs        Energy Requirements    Height for Calculation  Height: 185 cm (72.84\")   Weight for Calculation " 144 kg (actual)   Method for Estimation  14 kcal/kg   EST Needs (kcal/day) 2016       Protein Requirements    Weight for Calculation 83.6 kg (IBW used, BMI 41)   EST Protein Needs (g/kg) 2.0 gm/kg   EST Daily Needs (g/day) 167       Fluid Requirements     Method for Estimation 1 mL/kcal    Estimated Needs (mL/day) 2016       Fluid Deficit    Current Na Level (mEq/L)    Desired Na Level (mEq/L)    Estimated Fluid Deficit (L)       Labs        Pertinent Labs    Results from last 7 days   Lab Units 02/07/23 0558 02/06/23 0636 02/05/23  0325   SODIUM mmol/L 142 140 136   POTASSIUM mmol/L 3.8 4.4 4.9   CHLORIDE mmol/L 101 102 104   CO2 mmol/L 31.2* 28.9 26.0   BUN mg/dL 20 22 24*   CREATININE mg/dL 0.84 1.13 1.09   CALCIUM mg/dL 8.6 8.6 8.1*   BILIRUBIN mg/dL  --  0.5 0.6   ALK PHOS U/L  --  78 74   ALT (SGPT) U/L  --  260* 362*   AST (SGOT) U/L  --  77* 220*   GLUCOSE mg/dL 147* 128* 164*     Results from last 7 days   Lab Units 02/07/23 0558 02/06/23 0636 02/05/23  0325   MAGNESIUM mg/dL 2.0  --  2.1   PHOSPHORUS mg/dL 2.6   < > 3.5   HEMOGLOBIN g/dL 10.1*   < > 10.4*   HEMATOCRIT % 31.9*   < > 33.4*   WBC 10*3/mm3 5.07   < > 13.32*   ALBUMIN g/dL 3.1*   < > 3.4*    < > = values in this interval not displayed.     Results from last 7 days   Lab Units 02/07/23 0558 02/06/23  0636 02/05/23  0325 02/04/23  0309 02/03/23  1611   PLATELETS 10*3/mm3 182 167 179 201 202     COVID19   Date Value Ref Range Status   07/10/2022 Not Detected Not Detected - Ref. Range Final     Lab Results   Component Value Date    HGBA1C 7.00 (H) 07/10/2022          Medications            Scheduled Medications acetaminophen, 1,000 mg, Per J Tube, TID  docusate sodium, 100 mg, Per J Tube, BID  furosemide, 40 mg, Intravenous, Q12H  gabapentin, 300 mg, Per J Tube, Q8H  heparin (porcine), 5,000 Units, Subcutaneous, Q8H  insulin regular, 0-9 Units, Subcutaneous, Q6H  metoprolol tartrate, 25 mg, Per J Tube, Q12H   Or  metoprolol tartrate, 5 mg,  Intravenous, Q12H        Infusions lactated ringers, 9 mL/hr, Last Rate: 9 mL/hr (02/03/23 0737)        PRN Medications •  dextrose  •  dextrose  •  diazePAM  •  glucagon (human recombinant)  •  HYDROmorphone  •  labetalol  •  naloxone  •  nitroglycerin  •  [DISCONTINUED] ondansetron **OR** ondansetron  •  oxyCODONE     Physical Findings          Physical Appearance on oxygen therapy (per flow sheet)   Oral/Mouth Cavity WNL   Edema  lower extremity , 1+ (trace), 2+ (mild), 3+ (moderate)   Gastrointestinal last bowel movement:2/2   Skin  surgical incision - chest, neck, abdomen   Tubes/Drains chest tube, jejunostomy , NG tube   NFPE Not applicable at this time   --  Current Nutrition Orders & Evaluation of Intake       Oral Nutrition     Food Allergies NKFA   Current PO Diet NPO Diet NPO Type: Strict NPO   Supplement    PO Evaluation     Trending % PO Intake       Enteral Nutrition     Enteral Route Jejunostomy    TF Delivery Method Continuous    Enteral Product Impact Peptide 1.5    Modular     Propofol Rate/Kcal     TF Current Rate (mL) 30    TF Goal Rate (mL) 65    Current Water Flush (mL) 0    Current TF Provision  1080kcal, 67gm protein, 554mL free water + 0mL water flushes         Calories 53 % needs met         Protein  40 % needs met         TF Fluid (mL) 554mL         IV Fluids stopped    Avg Volume Delivered (mL) Not well documented    % Goal Volume     TF Residual  other:150mL out NG    TF Changes rate increased    TF Tolerance tolerating     Nutrition Diagnosis        Nutrition Dx Problem 1 Problem: Needs Alternative Route  Etiology: Medical Diagnosis and Factors Affecting Nutrition   -S/p esophagectomy d/t cancer  Signs/Symptoms: NPO, Report/Observation and Other (comment) - J-tube placed  :      INTERVENTION / PLAN OF CARE  Intervention Goal        Intervention Goal(s) Maintain nutrition status, Nutrition support treatment, Meet estimated needs, Disease management/therapy, Initiate TF/PN and No  significant weight loss     Nutrition Intervention        RD Action Follow Tx Progress, Care plan reviewed and Recommend/ordered: EN Rx below     Prescription         Diet     Supplement/Snack    EN/PN  See below   Prescription Ordered No, recommended - NP Vita Jones indicated to hold TF for now.       Enteral Prescription:     Enteral Route Jejunostomy    TF Delivery Method Continuous    Enteral Product Impact Peptide 1.5    Modular -    Propofol Rate/Kcal -    TF Start Rate 10 ml/hr - do not advance on day #1    TF Goal Rate 65 ml/hr    Free Water Flush 40mL/hr    TF Provision at Goal: 2340 kcal, 146 gm pro, 1201 ml free water + 960 mL flush         Calories 116% needs met         Protein  87% needs met         Fluid (mL) 2161 mL     Prescription Ordered No, recommended       Monitor/Evaluation        Monitor Per protocol, I&O, Pertinent labs, EN delivery/tolerance, Weight, Skin status, GI status, Symptoms, POC/GOC   Discharge Plan Pending clinical course   Education Will instruct as appropriate           Education topic: Tube feeding instruction     Resources given:  Other: discussion     Advised regarding: Other:  TF/water/NPO status     Learner:  Patient, Caregiver     Readiness to learn: Accepting     RD contact info provided: Yes     Outpatient referral:      RD to follow per protocol.       Electronically signed by:  Kristy Mejía RD  02/07/23 13:18 EST

## 2023-02-07 NOTE — PLAN OF CARE
"Goal Outcome Evaluation:  Plan of Care Reviewed With: patient        Progress: no change  Outcome Evaluation: Pt UIC at beg of PT treatment this AM. Pt reporting feeling \"tired\" throughout session and lethargy noted.  Pt stood req min A x 2 and use of fww. Pt then amb w/ CGA/min A x 2 and use of fww. Pt very slow although no overt LOB and limited by fatigue. Pt in chair at end of session w/ cardio thoracic team in room. PT will prog as pt carey.    Patient was intermittently wearing a face mask during this therapy encounter. Therapist used appropriate personal protective equipment including mask and gloves.  Mask used was standard procedure mask. Appropriate PPE was worn during the entire therapy session. Hand hygiene was completed before and after therapy session. Patient is not in enhanced droplet precautions.  PT tech: Jared.   "

## 2023-02-07 NOTE — THERAPY TREATMENT NOTE
Patient Name: Edmond Chase  : 1960    MRN: [id number]                              Today's Date: 2023       Admit Date: 2/3/2023    Visit Dx:     ICD-10-CM ICD-9-CM   1. Malignant neoplasm of lower third of esophagus (HCC)  C15.5 150.5     Patient Active Problem List   Diagnosis   • Essential hypertension   • Hyperglycemia   • Hyperlipidemia   • Class 3 severe obesity due to excess calories with serious comorbidity and body mass index (BMI) of 40.0 to 44.9 in adult ([facility])   • Nocturia   • Nonrheumatic aortic valve insufficiency   • Myocardial ischemia   • Coronary artery disease involving native coronary artery of native heart   • Ascending aortic aneurysm   • CAD in native artery   • S/P CABG (coronary artery bypass graft)   • S/P AVR (aortic valve replacement)   • Status post ascending aortic aneurysm repair   • Fatigue   • Abnormal nuclear stress test   • Coronary artery disease   • Coronary artery disease of bypass graft of native heart with stable angina pectoris ([facility])   • JT on auto CPAP   • Hypersomnia due to medical condition   • Angina at rest ([facility])   • Type 2 diabetes mellitus, without long-term current use of insulin ([facility])   • Anemia   • Ulcer of esophagus without bleeding   • Polyp of colon   • Rectal bleeding   • Gastrointestinal hemorrhage   • Malignant neoplasm of lower third of esophagus (HCC)   • Gastrointestinal hemorrhage, unspecified gastrointestinal hemorrhage type   • Exertional chest pain   • Iron deficiency anemia     Past Medical History:   Diagnosis Date   • Abnormal nuclear stress test    • Anxiety and depression    • Aortic valve insufficiency     nonrheumtic    • Arthritis    • Ascending aortic aneurysm    • CAD (coronary artery disease)    • Cancer (HCC)    • Chest pain    • Diabetes mellitus ([facility])    • Dizzy     CAREFUL WHEN GETTING UP   • Dyspnea    • Esophageal cancer (HCC)    • Essential hypertension    • Fatigue    • GERD (gastroesophageal reflux disease)    • GI  "bleed    • History of recent blood transfusion    • Hyperglycemia    • Hyperlipidemia    • Hypersomnia with sleep apnea    • Lightheadedness    • Malaise and fatigue    • Migraines     \"OCCULAR MIGRAINES\"   • Morbid obesity (HCC)    • Myocardial ischemia    • Nocturia    • TJ on auto CPAP 10/31/2016    Overnight polysomnogram.  Weight 276 pounds.  Severe TJ with AHI 79 events per hour.  Auto CPAP recommended.   • Pneumonia     FEB 2016   • Scrotal bleeding    • Shortness of breath    • SOB (shortness of breath)      Past Surgical History:   Procedure Laterality Date   • AORTIC VALVE REPAIR/REPLACEMENT  05/2016   • ASCENDING ARCH/HEMIARCH REPLACEMENT N/A 5/2/2016    Procedure: BHAVESH STERNOTOMY CORONARY ARTERY BYPASS GRAFT TIMES 3 USING LEFT INTERNAL MAMMARY ARTERY AND RIGHT GREATER SAPHENOUS VEIN GRAFT PER ENDOSCOPIC VEIN HARVESTING, AORTIC ANEURYSM REPAIR WITH ROOT REPAIR AND AORTIC VALVE REPLACEMENT;  Surgeon: Rosalio Cline MD;  Location: [facility] OR;  Service:    • CARDIAC CATHETERIZATION N/A 4/1/2016    Procedure: Left Heart Cath;  Surgeon: Erick Tam MD;  Location: [facility] INVASIVE LOCATION;  Service:    • CARDIAC CATHETERIZATION N/A 4/1/2016    Procedure: Left ventriculography;  Surgeon: Erick Tam MD;  Location: [facility] CATH INVASIVE LOCATION;  Service:    • CARDIAC CATHETERIZATION N/A 4/1/2016    Procedure: Right Heart Cath;  Surgeon: Erick Tam MD;  Location: [facility] CATH INVASIVE LOCATION;  Service:    • CARDIAC CATHETERIZATION N/A 10/30/2017    Procedure: Coronary angiography;  Surgeon: Sorin Vasquez MD;  Location: [facility] CATH INVASIVE LOCATION;  Service:    • CARDIAC CATHETERIZATION  10/30/2017    Procedure: Saphenous Vein Graft;  Surgeon: Sorin Vasquez MD;  Location: [facility] CATH INVASIVE LOCATION;  Service:    • CARDIAC CATHETERIZATION N/A 10/30/2017    Procedure: Native mammary injection;  Surgeon: Sorin Vasquez MD;  Location: [facility] CATH INVASIVE LOCATION;  Service:    • " CARDIAC CATHETERIZATION N/A 7/7/2020    Procedure: Coronary angiography;  Surgeon: Gregor Kim MD;  Location:  [facility] CATH INVASIVE LOCATION;  Service: Cardiovascular;  Laterality: N/A;   • CARDIAC CATHETERIZATION N/A 7/7/2020    Procedure: Left heart cath;  Surgeon: Gregor Kim MD;  Location:  [facility] CATH INVASIVE LOCATION;  Service: Cardiovascular;  Laterality: N/A;   • CARDIAC CATHETERIZATION N/A 7/7/2020    Procedure: Left ventriculography;  Surgeon: Gregor Kim MD;  Location:  [facility] CATH INVASIVE LOCATION;  Service: Cardiovascular;  Laterality: N/A;   • CARDIAC CATHETERIZATION N/A 7/7/2020    Procedure: Stent ESMER bypass graft;  Surgeon: Gregor Kim MD;  Location:  [facility] CATH INVASIVE LOCATION;  Service: Cardiovascular;  Laterality: N/A;   • CARDIAC CATHETERIZATION N/A 6/17/2021    Procedure: SAPHENOUS VEIN GRAFT;  Surgeon: Marques Ndiaye MD;  [facility];  Service: Cardiovascular;  Laterality: N/A;   • CARDIAC CATHETERIZATION N/A 6/17/2021    Procedure: Left Heart Cath;  Surgeon: Marques Ndiaye MD;  Location:  [facility] CATH INVASIVE LOCATION;  Service: Cardiovascular;  Laterality: N/A;   • CARDIAC CATHETERIZATION N/A 6/17/2021    Procedure: Coronary angiography;  Surgeon: Marques Ndiaye MD;  [facility];  Service: Cardiovascular;  Laterality: N/A;   • CARDIAC CATHETERIZATION N/A 7/14/2022    Procedure: Left Heart Cath;  Surgeon: Charlie Aj MD;  [facility];  Service: Cardiovascular;  Laterality: N/A;   • CARDIAC CATHETERIZATION N/A 7/14/2022    Procedure: Coronary angiography;  Surgeon: Charlie Aj MD;  Location:  [facility] CATH INVASIVE LOCATION;  Service: Cardiovascular;  Laterality: N/A;   • CARDIAC CATHETERIZATION  7/14/2022    Procedure: Saphenous Vein Graft;  Surgeon: Charlie Aj MD;  Location:  [facility] CATH INVASIVE LOCATION;  Service: Cardiovascular;;   • CARDIAC  CATHETERIZATION N/A 7/14/2022    Procedure: Native mammary injection;  Surgeon: Charlie Aj MD;  Location: [facility] CATH INVASIVE LOCATION;  Service: Cardiovascular;  Laterality: N/A;   • COLONOSCOPY N/A 11/26/2022    Procedure: COLONOSCOPY AT BEDSIDE;  Surgeon: Tomy Lopez MD;  Location: [facility] MAIN OR;  Service: Gastroenterology;  Laterality: N/A;   • COLONOSCOPY W/ POLYPECTOMY N/A 10/4/2022    Procedure: COLONOSCOPY to cecum with cold forceps and cold snare polypectomies;  Surgeon: Gregor Carlson MD;  Location: [facility] ENDOSCOPY;  Service: Gastroenterology;  Laterality: N/A;  PRE- hx of polyps  POST- diverticulosis, polyps   • CORONARY ARTERY BYPASS GRAFT  05/2016    LIMA TO LAD, SVG TO PDA, SVG TO OM2   • ENDOSCOPY N/A 7/13/2022    Procedure: ESOPHAGOGASTRODUODENOSCOPY;  Surgeon: Marcia Hollis MD;  Location: [facility] ENDOSCOPY;  Service: Gastroenterology;  Laterality: N/A;  PRE- ANEMIA, MELENA  POST- MILD EROSIVE GASTRITIS, GE JUNCTION ULCER   • ENDOSCOPY N/A 10/4/2022    Procedure: ESOPHAGOGASTRODUODENOSCOPY with biopsies;  Surgeon: Gregor Carlson MD;  Location: [facility] ENDOSCOPY;  Service: Gastroenterology;  Laterality: N/A;  PRE- hx of esophageal ulcer  POST- gastric cardia mass   • ESOPHAGOGASTRECTOMY Right 2/3/2023    Procedure: BRONCHOSCOPY, RIGHT ROBOTIC VIDEO ASSISTED THORACOSCOPY WITH TOTAL ESOPHAGECTOMY, INTERCOSTAL NERVE BLOCK, FEEDING JEJUNOSTOMY PLACEMENT;  Surgeon: Valerie Stockton MD;  Location: [facility] MAIN OR;  Service: Robotics - DaVinci;  Laterality: Right;   • INNER EAR SURGERY     • TONSILLECTOMY        General Information     Row Name 02/07/23 1023          Physical Therapy Time and Intention    Document Type therapy note (daily note)  -PH     Mode of Treatment physical therapy  -PH     Row Name 02/07/23 1023          General Information    Existing Precautions/Restrictions fall  -PH     Barriers to Rehab medically complex;previous functional deficit  -PH     Row  Name 02/07/23 1023          Cognition    Orientation Status (Cognition) oriented x 4  -PH     Row Name 02/07/23 1023          Safety Issues, Functional Mobility    Impairments Affecting Function (Mobility) endurance/activity tolerance;strength;balance;pain  -PH     Comment, Safety Issues/Impairments (Mobility) gt belt and non skid socks donned  -PH           User Key  (r) = Recorded By, (t) = Taken By, (c) = Cosigned By    Initials Name Provider Type     Lexi Alanis PTA Physical Therapist Assistant               Mobility     Row Name 02/07/23 1023          Bed Mobility    Comment, (Bed Mobility) UIC and returned to chair  -PH     Row Name 02/07/23 1023          Sit-Stand Transfer    Sit-Stand [place] (Transfers) minimum assist (75% patient effort);verbal cues;nonverbal cues (demo/gesture)  -PH     Assistive Device (Sit-Stand Transfers) walker, front-wheeled  -PH     Row Name 02/07/23 1023          Gait/Stairs (Locomotion)    [place] Level (Gait) contact guard;minimum assist (75% patient effort);verbal cues;2 person assist  -PH     Assistive Device (Gait) walker, front-wheeled  -PH     Distance in Feet (Gait) 100'  -PH     Deviations/Abnormal Patterns (Gait) krystin decreased;gait speed decreased;festinating/shuffling;stride length decreased  -PH     Bilateral Gait Deviations forward flexed posture;heel strike decreased  -PH     [place] Level (Stairs) not tested  -PH     Comment, (Gait/Stairs) no overt LOB; pt limited by fatigue.  -PH           User Key  (r) = Recorded By, (t) = Taken By, (c) = Cosigned By    Initials Name Provider Type     Lexi Alanis PTA Physical Therapist Assistant               Obj/Interventions     Row Name 02/07/23 1025          Motor Skills    Therapeutic Exercise other (see comments)  franky/adan team in room at end of session  -PH           User Key  (r) = Recorded By, (t) = Taken By, (c) = Cosigned By    Initials Name Provider Type      "Lexi Alanis PTA Physical Therapist Assistant               Goals/Plan    No documentation.                Clinical Impression     Row Name 02/07/23 1026          Pain    Posttreatment Pain Rating 4/10  -PH     Pain Location - Side/Orientation Right  -PH     Pain Location incisional  -PH     Pain Location - flank  -PH     Pre/Posttreatment Pain Comment chest tube insertion region  -PH     Pain Intervention(s) Ambulation/increased activity;Repositioned;Rest  -PH     Additional Documentation Pain Scale: Numbers Pre/Post-Treatment (Group)  -PH     Row Name 02/07/23 1026          Plan of Care Review    Plan of Care Reviewed With patient  -PH     Progress no change  -PH     Outcome Evaluation Pt UIC at beg of PT treatment this AM. Pt reporting feeling \"tired\" throughout session and lethargy noted.  Pt stood req min A x 2 and use of fww. Pt then amb w/ CGA/min A x 2 and use of fww. Pt very slow although no overt LOB and limited by fatigue. Pt in chair at end of session w/ cardio thoracic team in room. PT will prog as pt carey.  -PH     Row Name 02/07/23 1026          Vital Signs    O2 Delivery Pre Treatment nasal cannula  -PH     O2 Delivery Intra Treatment nasal cannula  -PH     O2 Delivery Post Treatment nasal cannula  -PH     Row Name 02/07/23 1026          Positioning and Restraints    Pre-Treatment Position sitting in chair/recliner  -PH     Post Treatment Position chair  -PH     In Chair reclined;call light within reach;encouraged to call for assist;exit alarm on  -PH           User Key  (r) = Recorded By, (t) = Taken By, (c) = Cosigned By    Initials Name Provider Type    PH Lexi Alanis PTA Physical Therapist Assistant               Outcome Measures     Row Name 02/07/23 1029          How much help from another person do you currently need...    Turning from your back to your side while in flat bed without using bedrails? 3  -PH     Moving from lying on back to sitting on the side of a flat bed " "without bedrails? 3  -PH     Moving to and from a bed to a chair (including a wheelchair)? 3  -PH     Standing up from a chair using your arms (e.g., wheelchair, bedside chair)? 3  -PH     Climbing 3-5 steps with a railing? 2  -PH     To walk in hospital room? 3  -PH     AM-PAC 6 Clicks Score (PT) 17  -PH     Highest level of mobility 5 --> Static standing  -PH     Row Name 02/07/23 1029          Functional Assessment    Outcome Measure Options AM-PAC 6 Clicks Basic Mobility (PT)  -PH           User Key  (r) = Recorded By, (t) = Taken By, (c) = Cosigned By    Initials Name Provider Type     Lexi Alanis PTA Physical Therapist Assistant                             Physical Therapy Education     Title: PT OT SLP Therapies (Done)     Topic: Physical Therapy (Done)     Point: Mobility training (Done)     Learning Progress Summary           Patient Acceptance, E,D, VU by  at 2/7/2023 1029    Acceptance, E, VU by  at 2/6/2023 1739                   Point: Home exercise program (Done)     Learning Progress Summary           Patient Acceptance, E,D, VU by  at 2/7/2023 1029    Acceptance, E, VU by  at 2/6/2023 1739                   Point: Body mechanics (Done)     Learning Progress Summary           Patient Acceptance, E,D, VU by  at 2/7/2023 1029    Acceptance, E, VU by  at 2/6/2023 1739                   Point: Precautions (Done)     Learning Progress Summary           Patient Acceptance, E,D, VU by  at 2/7/2023 1029    Acceptance, E, VU by  at 2/6/2023 1739                               User Key     Initials Effective Dates Name Provider Type Discipline     06/16/21 -  Lexi Alanis PTA Physical Therapist Assistant PT    JF 01/11/22 -  Naila Grimm, RN Registered Nurse Nurse              PT Recommendation and Plan     Plan of Care Reviewed With: patient  Progress: no change  Outcome Evaluation: Pt UIC at beg of PT treatment this AM. Pt reporting feeling \"tired\" throughout " session and lethargy noted.  Pt stood req min A x 2 and use of fww. Pt then amb w/ CGA/min A x 2 and use of fww. Pt very slow although no overt LOB and limited by fatigue. Pt in chair at end of session w/ cardio thoracic team in room. PT will prog as pt carey.     Time Calculation:    PT Charges     Row Name 02/07/23 1030             Time Calculation    Start Time 0945  -PH      Stop Time 1000  -PH      Time Calculation (min) 15 min  -PH      PT Received On 02/07/23  -PH      PT - Next Appointment 02/08/23  -PH         Timed Charges    50315 - PT Therapeutic Activity Minutes 15  -PH         Total Minutes    Timed Charges Total Minutes 15  -PH       Total Minutes 15  -PH            User Key  (r) = Recorded By, (t) = Taken By, (c) = Cosigned By    Initials Name Provider Type    PH Lexi Alanis PTA Physical Therapist Assistant              Therapy Charges for Today     Code Description Service Date Service Provider Modifiers Qty    73367060989 HC PT THERAPEUTIC ACT EA 15 MIN 2/7/2023 Lexi Alanis PTA GP 1    62061722031 HC PT THER SUPP EA 15 MIN 2/7/2023 Lexi Alanis PTA GP 1          PT G-Codes  Outcome Measure Options: AM-PAC 6 Clicks Basic Mobility (PT)  AM-PAC 6 Clicks Score (PT): 17  AM-PAC 6 Clicks Score (OT): 16  PT Discharge Summary  Anticipated Discharge Disposition (PT): skilled nursing facility, home with home health, home with assist    Lexi Alanis PTA  2/7/2023

## 2023-02-07 NOTE — PROGRESS NOTES
"  POST-OPERATIVE NOTE     Chief Complaint: Esophageal cancer, postoperative care  S/P: Bronchoscopy, right video-assisted thoracoscopy with total esophagectomy, intercostal nerve block, feeding jejunostomy tube placement  POD # 3    Subjective:  Symptoms:  Stable.  He reports chest pain (Surgical incisions tenderness.) and weakness.  No shortness of breath.    Diet:  NPO.  No nausea or vomiting.    Activity level: Impaired due to pain.    Pain:  He complains of pain that is mild.  Pain is well controlled.        Objective:  General Appearance:  Ill-appearing, in no acute distress and comfortable.    Vital signs: (most recent): Blood pressure 172/91, pulse 75, temperature 98.7 °F (37.1 °C), temperature source Oral, resp. rate 20, height 185 cm (72.84\"), weight (!) 147 kg (324 lb 8.3 oz), SpO2 93 %.  Vital signs are normal.  No fever.    Output: Producing urine (Narvaez catheter in place).    HEENT: (NG tube: I L WS     and nasal cannula)    Lungs:  Normal effort and normal respiratory rate.  He is not in respiratory distress.  No rales, wheezes or rhonchi.    Heart: Normal rate.  Regular rhythm.    Chest: Symmetric chest wall expansion. Chest wall tenderness present.    Abdomen: Abdomen is soft.  Absent bowel sounds.   There is generalized tenderness.  There is incisional tenderness.    Extremities: Decreased range of motion.  There is dependent edema (Bilateral lower extremities).    Pulses: Distal pulses are intact.    Neurological: Patient is alert and oriented to person, place and time.    Skin:  Warm and dry.  (Postoperative incisions well approximated and intact)          Chest tube:   Site: Right, Clean, Dry, Intact and Securement device intact  Suction: waterseal  Air Leak: negative  24 Hour Total: 130cc    NGT:   24 Hour Total: 150cc    Results Review:     I reviewed the patient's new clinical results.  I reviewed the patient's new imaging results and agree with the interpretation.  I reviewed the patient's " other test results and agree with the interpretation  Discussed with Patient, RN, Dr. Pichardo.    Assessment & Plan      Patient is POD #4, s/p total esophagectomy.      He endorses some expected postoperative pain.  Well-controlled on current medications.  He is tolerating tube feeds at 20 cc/h via J-tube.  We will uptitrate to a fixed rate of 30 cc/h with free water flushes per registered dietitian to ensure J-tube patency.  Colace started.    Ensure strict n.p.o. status, may have oral swabs sparingly.    Diuresis per nephrology, appreciate recommendations.    Chest tube remains to waterseal and is draining a small amount of thin serosanguineous fluid. Continue NG tube to intermittent low wall suction. We will plan to remove these on postoperative day 5.    Postoperative weakness: PT/OT assistance appreciated. Up to chair and have patient take 3 walks per day with assistance of staff.    Coronary artery disease with angina: Recommendations from cardiology noted and much appreciated.  Chest discomfort attributed to postoperative pain. Continue metoprolol 25 every 12 hours.  Defer antihypertensives to cardiology.    Recheck labs and chest x-ray tomorrow morning.  Wean oxygen as able.  Absolutely no BiPAP.  Discontinue Narvaez catheter    VIKTORIA Hughes  [facility]  02/07/23  11:00 EST    Patient was seen and assessed while wearing personal protective equipment including facemask, protective eyewear and gloves.  Hand hygiene performed prior to entering the room and upon exiting with doffing of gloves.

## 2023-02-07 NOTE — PROGRESS NOTES
LOS: 4 days   Patient Care Team:  Nargis Velasquez APRN as PCP - General (Family Medicine)  Papa Miguel MD as Consulting Physician (Cardiology)  Sekou Pisano MD as Consulting Physician (Pulmonary Disease)  Gregor Carlson MD as Consulting Physician (Gastroenterology)  Estevan Yuan MD (Sports Medicine)  Shelley Goldsmith, RN as Nurse Navigator    Chief Complaint: Follow-up chest pain, coronary artery disease, tissue AVR.    Interval History: Feels slightly better today.  He has some pain around his surgical site, but no anginal pain.  No significant shortness of breath.  Vital Signs:  Temp:  [97.6 °F (36.4 °C)-98.8 °F (37.1 °C)] 97.6 °F (36.4 °C)  Heart Rate:  [75-90] 75  Resp:  [20] 20  BP: (137-172)/(66-91) 155/83    Intake/Output Summary (Last 24 hours) at 2/7/2023 1420  Last data filed at 2/7/2023 0810  Gross per 24 hour   Intake 543.45 ml   Output 2486 ml   Net -1942.55 ml       Physical Exam:   General Appearance:    No acute distress, alert and oriented x4   Lungs:     Clear to auscultation bilaterally     Heart:    Regular rhythm and normal rate. II/VI SM RUSB and LUSB.   Abdomen:     Soft, postop with J-tube, nondistended.    Extremities:   Trace to 1+ edema of the lower extremities     Results Review:    Results from last 7 days   Lab Units 02/07/23  0558   SODIUM mmol/L 142   POTASSIUM mmol/L 3.8   CHLORIDE mmol/L 101   CO2 mmol/L 31.2*   BUN mg/dL 20   CREATININE mg/dL 0.84   GLUCOSE mg/dL 147*   CALCIUM mg/dL 8.6         Results from last 7 days   Lab Units 02/07/23  0558   WBC 10*3/mm3 5.07   HEMOGLOBIN g/dL 10.1*   HEMATOCRIT % 31.9*   PLATELETS 10*3/mm3 182             Results from last 7 days   Lab Units 02/07/23  0558   MAGNESIUM mg/dL 2.0           I reviewed the patient's new clinical results.        Assessment:  1.  Esophageal cancer, status post total esophagectomy and J-tube placement on 2/3/2023 by Dr. Stockton.  2.  Chest pain postoperatively  3.  Coronary  artery disease, status post CABG (1 out of 3 bypass grafts patent on heart catheterization in July 2022.  Chronic severe stenosis of the left circumflex and RCA managed medically)  4.  Tissue aortic valve replacement  5.  Morbid obesity, complicating all aspects of care  6.  Right-sided pneumothorax, status post chest tube  7.  Hypertension  8.  Diabetes    Plan:  -Again, this is most likely postsurgical pain.  He states that he is not having any angina.  Currently off home dose of Imdur.    -Tissue AVR functioning normally with normal gradients by echo on 12/14/2022    -IV Lasix per nephrology for poor urine output.  Peripheral edema appears largely unchanged.    -Continue metoprolol 25 mg every 12 hours.    -Add back lisinopril.  I would continue to hold the amlodipine given the peripheral edema.    Jamal Ramey MD  02/07/23  14:20 EST

## 2023-02-07 NOTE — THERAPY TREATMENT NOTE
Patient Name: Edmond Chase  : 1960    MRN: [id number]                              Today's Date: 2023       Admit Date: 2/3/2023    Visit Dx:     ICD-10-CM ICD-9-CM   1. Malignant neoplasm of lower third of esophagus (HCC)  C15.5 150.5     Patient Active Problem List   Diagnosis   • Essential hypertension   • Hyperglycemia   • Hyperlipidemia   • Class 3 severe obesity due to excess calories with serious comorbidity and body mass index (BMI) of 40.0 to 44.9 in adult ([facility])   • Nocturia   • Nonrheumatic aortic valve insufficiency   • Myocardial ischemia   • Coronary artery disease involving native coronary artery of native heart   • Ascending aortic aneurysm   • CAD in native artery   • S/P CABG (coronary artery bypass graft)   • S/P AVR (aortic valve replacement)   • Status post ascending aortic aneurysm repair   • Fatigue   • Abnormal nuclear stress test   • Coronary artery disease   • Coronary artery disease of bypass graft of native heart with stable angina pectoris ([facility])   • TJ on auto CPAP   • Hypersomnia due to medical condition   • Angina at rest ([facility])   • Type 2 diabetes mellitus, without long-term current use of insulin ([facility])   • Anemia   • Ulcer of esophagus without bleeding   • Polyp of colon   • Rectal bleeding   • Gastrointestinal hemorrhage   • Malignant neoplasm of lower third of esophagus (HCC)   • Gastrointestinal hemorrhage, unspecified gastrointestinal hemorrhage type   • Exertional chest pain   • Iron deficiency anemia     Past Medical History:   Diagnosis Date   • Abnormal nuclear stress test    • Anxiety and depression    • Aortic valve insufficiency     nonrheumtic    • Arthritis    • Ascending aortic aneurysm    • CAD (coronary artery disease)    • Cancer (HCC)    • Chest pain    • Diabetes mellitus ([facility])    • Dizzy     CAREFUL WHEN GETTING UP   • Dyspnea    • Esophageal cancer (HCC)    • Essential hypertension    • Fatigue    • GERD (gastroesophageal reflux disease)    • GI  "bleed    • History of recent blood transfusion    • Hyperglycemia    • Hyperlipidemia    • Hypersomnia with sleep apnea    • Lightheadedness    • Malaise and fatigue    • Migraines     \"OCCULAR MIGRAINES\"   • Morbid obesity (HCC)    • Myocardial ischemia    • Nocturia    • TJ on auto CPAP 10/31/2016    Overnight polysomnogram.  Weight 276 pounds.  Severe TJ with AHI 79 events per hour.  Auto CPAP recommended.   • Pneumonia     FEB 2016   • Scrotal bleeding    • Shortness of breath    • SOB (shortness of breath)      Past Surgical History:   Procedure Laterality Date   • AORTIC VALVE REPAIR/REPLACEMENT  05/2016   • ASCENDING ARCH/HEMIARCH REPLACEMENT N/A 5/2/2016    Procedure: BHAVESH STERNOTOMY CORONARY ARTERY BYPASS GRAFT TIMES 3 USING LEFT INTERNAL MAMMARY ARTERY AND RIGHT GREATER SAPHENOUS VEIN GRAFT PER ENDOSCOPIC VEIN HARVESTING, AORTIC ANEURYSM REPAIR WITH ROOT REPAIR AND AORTIC VALVE REPLACEMENT;  Surgeon: Rosalio Cline MD;  Location: [facility] OR;  Service:    • CARDIAC CATHETERIZATION N/A 4/1/2016    Procedure: Left Heart Cath;  Surgeon: Erick Tam MD;  Location: [facility] INVASIVE LOCATION;  Service:    • CARDIAC CATHETERIZATION N/A 4/1/2016    Procedure: Left ventriculography;  Surgeon: Erick Tam MD;  Location: [facility] CATH INVASIVE LOCATION;  Service:    • CARDIAC CATHETERIZATION N/A 4/1/2016    Procedure: Right Heart Cath;  Surgeon: Erick Tam MD;  Location: [facility] CATH INVASIVE LOCATION;  Service:    • CARDIAC CATHETERIZATION N/A 10/30/2017    Procedure: Coronary angiography;  Surgeon: Sorin Vasquez MD;  Location: [facility] CATH INVASIVE LOCATION;  Service:    • CARDIAC CATHETERIZATION  10/30/2017    Procedure: Saphenous Vein Graft;  Surgeon: Sorin Vasquez MD;  Location: [facility] CATH INVASIVE LOCATION;  Service:    • CARDIAC CATHETERIZATION N/A 10/30/2017    Procedure: Native mammary injection;  Surgeon: Sorin Vasquez MD;  Location: [facility] CATH INVASIVE LOCATION;  Service:    • " CARDIAC CATHETERIZATION N/A 7/7/2020    Procedure: Coronary angiography;  Surgeon: Gregor Kim MD;  Location:  [facility] CATH INVASIVE LOCATION;  Service: Cardiovascular;  Laterality: N/A;   • CARDIAC CATHETERIZATION N/A 7/7/2020    Procedure: Left heart cath;  Surgeon: Gregor Kim MD;  Location:  [facility] CATH INVASIVE LOCATION;  Service: Cardiovascular;  Laterality: N/A;   • CARDIAC CATHETERIZATION N/A 7/7/2020    Procedure: Left ventriculography;  Surgeon: Gregor Kim MD;  Location:  [facility] CATH INVASIVE LOCATION;  Service: Cardiovascular;  Laterality: N/A;   • CARDIAC CATHETERIZATION N/A 7/7/2020    Procedure: Stent ESMER bypass graft;  Surgeon: Gregor Kim MD;  Location:  [facility] CATH INVASIVE LOCATION;  Service: Cardiovascular;  Laterality: N/A;   • CARDIAC CATHETERIZATION N/A 6/17/2021    Procedure: SAPHENOUS VEIN GRAFT;  Surgeon: Marques Ndiaye MD;  Location: [facility] CATH INVASIVE LOC[facility];  Service: Cardiovascular;  Laterality: N/A;   • CARDIAC CATHETERIZATION N/A 6/17/2021    Procedure: Left Heart Cath;  Surgeon: Marques Ndiaye MD;  Location:  [facility] CATH INVASIVE LOCATION;  Service: Cardiovascular;  Laterality: N/A;   • CARDIAC CATHETERIZATION N/A 6/17/2021    Procedure: Coronary angiography;  Surgeon: Marques Ndiaye MD;  [facility];  Service: Cardiovascular;  Laterality: N/A;   • CARDIAC CATHETERIZATION N/A 7/14/2022    Procedure: Left Heart Cath;  Surgeon: Charlie Aj MD;  [facility];  Service: Cardiovascular;  Laterality: N/A;   • CARDIAC CATHETERIZATION N/A 7/14/2022    Procedure: Coronary angiography;  Surgeon: Charlie Aj MD;  Location:  [facility] CATH INVASIVE LOCATION;  Service: Cardiovascular;  Laterality: N/A;   • CARDIAC CATHETERIZATION  7/14/2022    Procedure: Saphenous Vein Graft;  Surgeon: Charlie Aj MD;  Location:  [facility] CATH INVASIVE LOCATION;  Service: Cardiovascular;;   • CARDIAC  CATHETERIZATION N/A 7/14/2022    Procedure: Native mammary injection;  Surgeon: hCarlie Aj MD;  Location: [facility] CATH INVASIVE LOCATION;  Service: Cardiovascular;  Laterality: N/A;   • COLONOSCOPY N/A 11/26/2022    Procedure: COLONOSCOPY AT BEDSIDE;  Surgeon: Tomy Lopez MD;  Location: [facility] MAIN OR;  Service: Gastroenterology;  Laterality: N/A;   • COLONOSCOPY W/ POLYPECTOMY N/A 10/4/2022    Procedure: COLONOSCOPY to cecum with cold forceps and cold snare polypectomies;  Surgeon: Gregor Carlson MD;  Location: [facility] ENDOSCOPY;  Service: Gastroenterology;  Laterality: N/A;  PRE- hx of polyps  POST- diverticulosis, polyps   • CORONARY ARTERY BYPASS GRAFT  05/2016    LIMA TO LAD, SVG TO PDA, SVG TO OM2   • ENDOSCOPY N/A 7/13/2022    Procedure: ESOPHAGOGASTRODUODENOSCOPY;  Surgeon: Marcia Hollis MD;  Location: [facility] ENDOSCOPY;  Service: Gastroenterology;  Laterality: N/A;  PRE- ANEMIA, MELENA  POST- MILD EROSIVE GASTRITIS, GE JUNCTION ULCER   • ENDOSCOPY N/A 10/4/2022    Procedure: ESOPHAGOGASTRODUODENOSCOPY with biopsies;  Surgeon: Gregor Carlson MD;  Location: [facility] ENDOSCOPY;  Service: Gastroenterology;  Laterality: N/A;  PRE- hx of esophageal ulcer  POST- gastric cardia mass   • ESOPHAGOGASTRECTOMY Right 2/3/2023    Procedure: BRONCHOSCOPY, RIGHT ROBOTIC VIDEO ASSISTED THORACOSCOPY WITH TOTAL ESOPHAGECTOMY, INTERCOSTAL NERVE BLOCK, FEEDING JEJUNOSTOMY PLACEMENT;  Surgeon: Valerie Stockton MD;  Location: [facility] MAIN OR;  Service: Robotics - DaVinci;  Laterality: Right;   • INNER EAR SURGERY     • TONSILLECTOMY        General Information     Row Name 02/07/23 1553          OT Time and Intention    Document Type therapy note (daily note)  -JW     Mode of Treatment occupational therapy  -     Row Name 02/07/23 155          General Information    Patient Profile Reviewed yes  -JW     Existing Precautions/Restrictions fall;other (see comments)  R chest tube to water seal. NG tube,  PEG  -[facility] Name 02/07/23 1553          Cognition    Orientation Status (Cognition) oriented x 4  -[facility] Name 02/07/23 1553          Safety Issues, Functional Mobility    Impairments Affecting Function (Mobility) endurance/activity tolerance;strength;balance;pain  -           User Key  (r) = Recorded By, (t) = Taken By, (c) = Cosigned By    Initials Name Provider Type     Geraldine Pappas OT Occupational Therapist                 Mobility/ADL's     Row Name 02/07/23 1553          Bed Mobility    Bed Mobility sit-supine  -     Sit-Supine [place] (Bed Mobility) minimum assist (75% patient effort);verbal cues  -     Comment, (Bed Mobility) UIC at start of session  -[facility] Name 02/07/23 1553          Transfers    Transfers toilet transfer  -[facility] Name 02/07/23 1553          Sit-Stand Transfer    Sit-Stand [place] (Transfers) minimum assist (75% patient effort);verbal cues;nonverbal cues (demo/gesture)  -     Assistive Device (Sit-Stand Transfers) walker, front-wheeled  -JW     Row Name 02/07/23 1553          Toilet Transfer    Type (Toilet Transfer) sit-stand;stand-sit  -     [place] Level (Toilet Transfer) contact guard;verbal cues  -     Assistive Device (Toilet Transfer) walker, front-wheeled;commode, 3-in-1  -[facility] Name 02/07/23 1553          Functional Mobility    Functional Mobility- Comment fxl ambulation in room and hallway to address endurance for household mobility. CGA and use of rolling flatform walker for lines/chest tube  -[facility] Name 02/07/23 1553          Activities of Daily Living    BADL Assessment/Intervention toileting;grooming  -JW     Row Name 02/07/23 1553          Toileting Assessment/Training    [place] Level (Toileting) contact guard assist  -     Assistive Devices (Toileting) commode, 3-in-1  -     Comment, (Toileting) CGA for clothing mgt  -[facility] Name 02/07/23 1553          Grooming Assessment/Training    [place] Level  (Grooming) oral care regimen;set up  -     Position (Grooming) supported sitting  -           User Key  (r) = Recorded By, (t) = Taken By, (c) = Cosigned By    Initials Name Provider Type    Geraldine Hartley OT Occupational Therapist               Obj/Interventions     Row Name 02/07/23 1556          Shoulder (Therapeutic Exercise)    Shoulder (Therapeutic Exercise) AROM (active range of motion)  -     Shoulder AROM (Therapeutic Exercise) bilateral;flexion;extension;sitting;10 repetitions  -     Row Name 02/07/23 1556          Motor Skills    Therapeutic Exercise shoulder  -     Row Name 02/07/23 1556          Balance    Balance Assessment standing static balance;standing dynamic balance  -     Static Sitting Balance modified independence  -     Static Standing Balance contact guard  -     Dynamic Standing Balance minimal assist  -     Position/Device Used, Standing Balance walker, front-wheeled  -           User Key  (r) = Recorded By, (t) = Taken By, (c) = Cosigned By    Initials Name Provider Type    Geraldine Hartley OT Occupational Therapist               Goals/Plan    No documentation.                Clinical Impression     Row Name 02/07/23 1557          Pain Assessment    Pretreatment Pain Rating 4/10  -     Posttreatment Pain Rating 4/10  -     Pain Location incisional  -     Pain Location - flank  -     Pain Intervention(s) Repositioned;Ambulation/increased activity;Rest  -     Row Name 02/07/23 1557          Plan of Care Review    Plan of Care Reviewed With patient  -     Outcome Evaluation Pt was found UIC, agreeable to activity with OT. He performs UB AROM/therex to promote strength for ADLs/mobility. He stands with CGA and uses platform walker to hold chest tube for fxl ambulation in room and hallway to promote endurance for household mobility and ADLs. Pt completes TF BSC with SBA, CGA for toileting tasks for clothing mgt. Pt returns to supine in bed and left with all  needs in reach.  -     Row Name 02/07/23 1557          Positioning and Restraints    Pre-Treatment Position sitting in chair/recliner  -JW     Post Treatment Position bed  -JW     In Bed fowlers;call light within reach;encouraged to call for assist;exit alarm on  -JW           User Key  (r) = Recorded By, (t) = Taken By, (c) = Cosigned By    Initials Name Provider Type    JW Geraldine Pappas OT Occupational Therapist               Outcome Measures     Row Name 02/07/23 1029          How much help from another person do you currently need...    Turning from your back to your side while in flat bed without using bedrails? 3  -PH     Moving from lying on back to sitting on the side of a flat bed without bedrails? 3  -PH     Moving to and from a bed to a chair (including a wheelchair)? 3  -PH     Standing up from a chair using your arms (e.g., wheelchair, bedside chair)? 3  -PH     Climbing 3-5 steps with a railing? 2  -PH     To walk in hospital room? 3  -PH     AM-PAC 6 Clicks Score (PT) 17  -PH     Highest level of mobility 5 --> Static standing  -PH     Row Name 02/07/23 1029          Functional Assessment    Outcome Measure Options AM-PAC 6 Clicks Basic Mobility (PT)  -PH           User Key  (r) = Recorded By, (t) = Taken By, (c) = Cosigned By    Initials Name Provider Type     Lexi Alanis PTA Physical Therapist Assistant                Occupational Therapy Education     Title: PT OT SLP Therapies (Done)     Topic: Occupational Therapy (Done)     Point: ADL training (Done)     Description:   Instruct learner(s) on proper safety adaptation and remediation techniques during self care or transfers.   Instruct in proper use of assistive devices.              Learning Progress Summary           Patient Acceptance, E, VU by ALLAN at 2/6/2023 1739    Acceptance, E, VU by BS at 2/4/2023 1507    Comment: Reason for eval/ consult                   Point: Home exercise program (Done)     Description:   Instruct  learner(s) on appropriate technique for monitoring, assisting and/or progressing therapeutic exercises/activities.              Learning Progress Summary           Patient Acceptance, E, VU by  at 2/6/2023 1739                   Point: Precautions (Done)     Description:   Instruct learner(s) on prescribed precautions during self-care and functional transfers.              Learning Progress Summary           Patient Acceptance, E, VU by  at 2/6/2023 1739    Acceptance, E, VU by BS at 2/4/2023 1507    Comment: Reason for eval/ consult                   Point: Body mechanics (Done)     Description:   Instruct learner(s) on proper positioning and spine alignment during self-care, functional mobility activities and/or exercises.              Learning Progress Summary           Patient Acceptance, E, VU by  at 2/6/2023 1739    Acceptance, E, VU by BS at 2/4/2023 1507    Comment: Reason for eval/ consult                               User Key     Initials Effective Dates Name Provider Type Discipline     01/11/22 -  Naila Grimm, RN Registered Nurse Nurse     11/14/22 -  Tiffany Holloway OT Occupational Therapist OT              OT Recommendation and Plan     Plan of Care Review  Plan of Care Reviewed With: patient  Outcome Evaluation: Pt was found UIC, agreeable to activity with OT. He performs UB AROM/therex to promote strength for ADLs/mobility. He stands with CGA and uses platform walker to hold chest tube for fxl ambulation in room and hallway to promote endurance for household mobility and ADLs. Pt completes TF BSC with SBA, CGA for toileting tasks for clothing mgt. Pt returns to supine in bed and left with all needs in reach.     Time Calculation:    Time Calculation- OT     Row Name 02/07/23 1600             Time Calculation- OT    OT Start Time 1430  -      OT Stop Time 1500  -      OT Time Calculation (min) 30 min  -      Total Timed Code Minutes- OT 30 minute(s)  -      OT Received On  02/07/23  -      OT - Next Appointment 02/08/23  -         Timed Charges    70188 - OT Therapeutic Activity Minutes 20  -JW      81559 - OT Self Care/Mgmt Minutes 10  -JW         Total Minutes    Timed Charges Total Minutes 30  -JW       Total Minutes 30  -JW            User Key  (r) = Recorded By, (t) = Taken By, (c) = Cosigned By    Initials Name Provider Type    Geraldine Hartley OT Occupational Therapist              Therapy Charges for Today     Code Description Service Date Service Provider Modifiers Qty    98933617147 HC OT THERAPEUTIC ACT EA 15 MIN 2/7/2023 Geraldine Pappas OT GO 1    75186913565 HC OT SELF CARE/MGMT/TRAIN EA 15 MIN 2/7/2023 Geraldine Pappas OT GO 1               Geraldine Pappas OT  2/7/2023

## 2023-02-07 NOTE — PLAN OF CARE
Goal Outcome Evaluation:  Plan of Care Reviewed With: patient        Progress: no change  Outcome Evaluation: VSS ex occasional sinus tachycardia on monitor. Up to BSC x2 assist. Pain managed with prn valium and IV pain meds. Right chest tube to waterseal. no air leak. Pt states he is confused but answers orientation questions appropriately. Narvaez catheter to bedside drainage. catheter care provided as needed. TF infusing via jtube per order. NG tube to intermittent LWS. Will continue to provide supportive care.

## 2023-02-08 ENCOUNTER — APPOINTMENT (OUTPATIENT)
Dept: GENERAL RADIOLOGY | Facility: HOSPITAL | Age: 63
DRG: 327 | End: 2023-02-08
Payer: COMMERCIAL

## 2023-02-08 LAB
ALBUMIN SERPL-MCNC: 3.2 G/DL (ref 3.5–5.2)
ANION GAP SERPL CALCULATED.3IONS-SCNC: 5.5 MMOL/L (ref 5–15)
BUN SERPL-MCNC: 19 MG/DL (ref 8–23)
BUN/CREAT SERPL: 23.5 (ref 7–25)
CALCIUM SPEC-SCNC: 8.6 MG/DL (ref 8.6–10.5)
CHLORIDE SERPL-SCNC: 101 MMOL/L (ref 98–107)
CO2 SERPL-SCNC: 34.5 MMOL/L (ref 22–29)
CREAT SERPL-MCNC: 0.81 MG/DL (ref 0.76–1.27)
DEPRECATED RDW RBC AUTO: 47 FL (ref 37–54)
EGFRCR SERPLBLD CKD-EPI 2021: 98.5 ML/MIN/1.73
ERYTHROCYTE [DISTWIDTH] IN BLOOD BY AUTOMATED COUNT: 14.1 % (ref 12.3–15.4)
GLUCOSE BLDC GLUCOMTR-MCNC: 143 MG/DL (ref 70–130)
GLUCOSE BLDC GLUCOMTR-MCNC: 166 MG/DL (ref 70–130)
GLUCOSE BLDC GLUCOMTR-MCNC: 182 MG/DL (ref 70–130)
GLUCOSE SERPL-MCNC: 160 MG/DL (ref 65–99)
HCT VFR BLD AUTO: 33.3 % (ref 37.5–51)
HGB BLD-MCNC: 10.3 G/DL (ref 13–17.7)
MCH RBC QN AUTO: 28 PG (ref 26.6–33)
MCHC RBC AUTO-ENTMCNC: 30.9 G/DL (ref 31.5–35.7)
MCV RBC AUTO: 90.5 FL (ref 79–97)
PHOSPHATE SERPL-MCNC: 2.7 MG/DL (ref 2.5–4.5)
PLATELET # BLD AUTO: 200 10*3/MM3 (ref 140–450)
PMV BLD AUTO: 9.3 FL (ref 6–12)
POTASSIUM SERPL-SCNC: 3.7 MMOL/L (ref 3.5–5.2)
RBC # BLD AUTO: 3.68 10*6/MM3 (ref 4.14–5.8)
SODIUM SERPL-SCNC: 141 MMOL/L (ref 136–145)
WBC NRBC COR # BLD: 4.97 10*3/MM3 (ref 3.4–10.8)

## 2023-02-08 PROCEDURE — 99024 POSTOP FOLLOW-UP VISIT: CPT | Performed by: NURSE PRACTITIONER

## 2023-02-08 PROCEDURE — 99232 SBSQ HOSP IP/OBS MODERATE 35: CPT | Performed by: NURSE PRACTITIONER

## 2023-02-08 PROCEDURE — 63710000001 INSULIN REGULAR HUMAN PER 5 UNITS: Performed by: INTERNAL MEDICINE

## 2023-02-08 PROCEDURE — 85027 COMPLETE CBC AUTOMATED: CPT | Performed by: NURSE PRACTITIONER

## 2023-02-08 PROCEDURE — 71045 X-RAY EXAM CHEST 1 VIEW: CPT

## 2023-02-08 PROCEDURE — 94762 N-INVAS EAR/PLS OXIMTRY CONT: CPT

## 2023-02-08 PROCEDURE — 25010000002 HEPARIN (PORCINE) PER 1000 UNITS: Performed by: THORACIC SURGERY (CARDIOTHORACIC VASCULAR SURGERY)

## 2023-02-08 PROCEDURE — 82962 GLUCOSE BLOOD TEST: CPT

## 2023-02-08 PROCEDURE — 80069 RENAL FUNCTION PANEL: CPT | Performed by: NURSE PRACTITIONER

## 2023-02-08 PROCEDURE — 25010000002 FUROSEMIDE PER 20 MG: Performed by: INTERNAL MEDICINE

## 2023-02-08 PROCEDURE — 97530 THERAPEUTIC ACTIVITIES: CPT

## 2023-02-08 RX ORDER — TORSEMIDE 20 MG/1
80 TABLET ORAL DAILY
Status: DISCONTINUED | OUTPATIENT
Start: 2023-02-09 | End: 2023-02-10 | Stop reason: HOSPADM

## 2023-02-08 RX ORDER — TORSEMIDE 20 MG/1
80 TABLET ORAL DAILY
Status: DISCONTINUED | OUTPATIENT
Start: 2023-02-08 | End: 2023-02-08

## 2023-02-08 RX ORDER — NICOTINE POLACRILEX 4 MG
15 LOZENGE BUCCAL
Status: DISCONTINUED | OUTPATIENT
Start: 2023-02-08 | End: 2023-02-10 | Stop reason: HOSPADM

## 2023-02-08 RX ORDER — LISINOPRIL 10 MG/1
10 TABLET ORAL
Status: DISCONTINUED | OUTPATIENT
Start: 2023-02-09 | End: 2023-02-10 | Stop reason: HOSPADM

## 2023-02-08 RX ADMIN — INSULIN HUMAN 2 UNITS: 100 INJECTION, SOLUTION PARENTERAL at 11:17

## 2023-02-08 RX ADMIN — DOCUSATE SODIUM 100 MG: 50 LIQUID ORAL at 08:44

## 2023-02-08 RX ADMIN — HEPARIN SODIUM 5000 UNITS: 5000 INJECTION INTRAVENOUS; SUBCUTANEOUS at 05:17

## 2023-02-08 RX ADMIN — INSULIN HUMAN 2 UNITS: 100 INJECTION, SOLUTION PARENTERAL at 17:18

## 2023-02-08 RX ADMIN — ACETAMINOPHEN 1000 MG: 325 SUSPENSION ORAL at 21:13

## 2023-02-08 RX ADMIN — HEPARIN SODIUM 5000 UNITS: 5000 INJECTION INTRAVENOUS; SUBCUTANEOUS at 13:37

## 2023-02-08 RX ADMIN — GABAPENTIN 300 MG: 300 CAPSULE ORAL at 21:13

## 2023-02-08 RX ADMIN — METOPROLOL TARTRATE 25 MG: 25 TABLET, FILM COATED ORAL at 21:13

## 2023-02-08 RX ADMIN — DIAZEPAM 2 MG: 2 TABLET ORAL at 18:23

## 2023-02-08 RX ADMIN — GABAPENTIN 300 MG: 300 CAPSULE ORAL at 05:18

## 2023-02-08 RX ADMIN — METOPROLOL TARTRATE 25 MG: 25 TABLET, FILM COATED ORAL at 08:44

## 2023-02-08 RX ADMIN — ACETAMINOPHEN 1000 MG: 325 SUSPENSION ORAL at 15:11

## 2023-02-08 RX ADMIN — HEPARIN SODIUM 5000 UNITS: 5000 INJECTION INTRAVENOUS; SUBCUTANEOUS at 21:13

## 2023-02-08 RX ADMIN — GABAPENTIN 300 MG: 300 CAPSULE ORAL at 13:37

## 2023-02-08 RX ADMIN — FUROSEMIDE 40 MG: 10 INJECTION, SOLUTION INTRAMUSCULAR; INTRAVENOUS at 05:17

## 2023-02-08 RX ADMIN — ACETAMINOPHEN 1000 MG: 325 SUSPENSION ORAL at 08:44

## 2023-02-08 RX ADMIN — LISINOPRIL 10 MG: 10 TABLET ORAL at 08:44

## 2023-02-08 RX ADMIN — DIAZEPAM 2 MG: 2 TABLET ORAL at 05:18

## 2023-02-08 NOTE — PROGRESS NOTES
[facility] Progress Note      Patient Name: Edmond Chase  : 1960  MRN: [id number]  Primary Care Physician:  Nargis Velasquez APRN  Date of admission: 2/3/2023    Subjective     Interval History:   The patient was seen and examined today for follow-up on oliguria  Urine output has not been recorded.  Patient noted that he is urinating very well  No weight checked today.  Review of Systems:   As noted above    Objective     Vitals:   Temp:  [97.6 °F (36.4 °C)-98.3 °F (36.8 °C)] 97.9 °F (36.6 °C)  Heart Rate:  [76] 76  Resp:  [18-20] 18  BP: (145-164)/(72-83) 164/82  Flow (L/min):  [1-2] 2    Intake/Output Summary (Last 24 hours) at 2023 1012  Last data filed at 2023 0845  Gross per 24 hour   Intake 130 ml   Output 460 ml   Net -330 ml       Physical Exam:    General Appearance: alert, oriented x 3, no acute distress   Skin: warm and dry  HEENT: oral mucosa normal, nonicteric sclera  Neck: supple, no JVD  Lungs: CTA  Heart: RRR, normal S1 and S2  Abdomen: soft, nontender, nondistended  : no palpable bladder  Extremities: 1-2+ pitting edema bilateral lower extremities.  Improved from before.  No cyanosis or clubbing  Neuro: normal speech and mental status     Scheduled Meds:     acetaminophen, 1,000 mg, Per J Tube, TID  docusate sodium, 100 mg, Per J Tube, BID  furosemide, 40 mg, Intravenous, Q12H  gabapentin, 300 mg, Per J Tube, Q8H  heparin (porcine), 5,000 Units, Subcutaneous, Q8H  insulin regular, 0-9 Units, Subcutaneous, Q6H  lisinopril, 10 mg, Nasogastric, Q24H  metoprolol tartrate, 25 mg, Per J Tube, Q12H   Or  metoprolol tartrate, 5 mg, Intravenous, Q12H      IV Meds:   lactated ringers, 9 mL/hr, Last Rate: 9 mL/hr (23 0737)        Results Reviewed:   I have personally reviewed the results from the time of this admission to 2023 10:12 EST     Results from last 7 days   Lab Units 23  0458 23  0558 23  0636 23  0325   SODIUM  mmol/L 141 142 140 136   POTASSIUM mmol/L 3.7 3.8 4.4 4.9   CHLORIDE mmol/L 101 101 102 104   CO2 mmol/L 34.5* 31.2* 28.9 26.0   BUN mg/dL 19 20 22 24*   CREATININE mg/dL 0.81 0.84 1.13 1.09   CALCIUM mg/dL 8.6 8.6 8.6 8.1*   BILIRUBIN mg/dL  --   --  0.5 0.6   ALK PHOS U/L  --   --  78 74   ALT (SGPT) U/L  --   --  260* 362*   AST (SGOT) U/L  --   --  77* 220*   GLUCOSE mg/dL 160* 147* 128* 164*       Estimated Creatinine Clearance: 139.4 mL/min (by C-G formula based on SCr of 0.81 mg/dL).    Results from last 7 days   Lab Units 02/08/23 0458 02/07/23 0558 02/06/23 0636 02/05/23  0325   MAGNESIUM mg/dL  --  2.0  --  2.1   PHOSPHORUS mg/dL 2.7 2.6 2.9 3.5       Results from last 7 days   Lab Units 02/06/23  0636   URIC ACID mg/dL 5.8       Results from last 7 days   Lab Units 02/08/23 0458 02/07/23 0558 02/06/23  0636 02/05/23  0325 02/04/23  0309   WBC 10*3/mm3 4.97 5.07 6.66 13.32* 10.18   HEMOGLOBIN g/dL 10.3* 10.1* 10.1* 10.4* 9.9*   PLATELETS 10*3/mm3 200 182 167 179 201             Assessment / Plan     ASSESSMENT:  1.  RAMIREZ with decrease urine output Fairly normal renal function at this time.  Baseline closer to 0.8 from previous labs.  Urine sodium <20.    Secondary to decompensated heart failure.  Diuresing well with Lasix  2. Anemia- hgb stable  3. Esoph adeno- s/p esophagectomy  4. Right sided pneumo- chest tube in place.  5. H/o bioprosthetic aortic valve        PLAN:  · His volume status seems to be improving  · We will switch to torsemide 80 mg daily starting today.  · We will check daily weight  · Continue surveillance labs      Thank you for involving us in the care of Edmond Chase.  Please feel free to call with any questions.    Alex Hernadez MD  02/08/23  10:12 [facility]    [facility]  [phone]    Parts of this note may be an electronic transcription/translation of spoken language to printed text using the Dragon dictation system.

## 2023-02-08 NOTE — PLAN OF CARE
Goal Outcome Evaluation:   VSS. A&OX4. 02 @ 2lpm. Pain managed with PRN pain medication. Rt chest tube to WS. No air leak noted. NGT to intermittent LWS, brown output noted. Impact peptide per J tube. Strict NPO, ok to have swabs. Voiding per urinal. Call light in reach.

## 2023-02-08 NOTE — PLAN OF CARE
Problem: Adult Inpatient Plan of Care  Goal: Plan of Care Review  2/8/2023 1415 by Marisol Smiley RN  Outcome: Ongoing, Progressing  Flowsheets (Taken 2/8/2023 1415)  Progress: improving  Plan of Care Reviewed With: patient  2/8/2023 1414 by Marisol Smiley, RN  Outcome: Ongoing, Progressing  Flowsheets (Taken 2/8/2023 1414)  Progress: improving  Plan of Care Reviewed With: patient  2/8/2023 1413 by Marisol Smiley RN  Outcome: Ongoing, Progressing   Goal Outcome Evaluation:  Plan of Care Reviewed With: patient        Progress: improving       Pt had NG tube and right chest tube removed today. Pain much better controlled at this time. Pt tube feeds will be to goal at 1630. Tolerating them well at this time. Pt had a large bowel movement today. Pt has been up in the chair multiple times. Walked in the renee also. Pt will need further education on his j-tube before discharge. Pt has been room air all day but does require it at night due to not being able to use his CPAP. VSS. Anticipate discharge in the next few days.

## 2023-02-08 NOTE — PROGRESS NOTES
"  POST-OPERATIVE NOTE     Chief Complaint: Esophageal cancer, postoperative care  S/P: Bronchoscopy, right video-assisted thoracoscopy with total esophagectomy, intercostal nerve block, feeding jejunostomy tube placement  POD # 5    Subjective:  Symptoms:  Stable.  He reports chest pain (Surgical incisions tenderness.) and weakness.  No shortness of breath.    Diet:  NPO.  No nausea or vomiting.    Activity level: Impaired due to pain.    Pain:  He complains of pain that is mild.  Pain is well controlled.    Up to chair. Did not sleep well, multiple BMs.    Objective:  General Appearance:  Ill-appearing, in no acute distress and comfortable.    Vital signs: (most recent): Blood pressure 137/78, pulse 76, temperature 98.6 °F (37 °C), temperature source Oral, resp. rate 18, height 185 cm (72.84\"), weight (!) 147 kg (324 lb 8.3 oz), SpO2 93 %.  Vital signs are normal.  No fever.    Output: Producing urine (Narvaez catheter in place).    Lungs:  Normal effort and normal respiratory rate.  He is not in respiratory distress.  No rales, wheezes or rhonchi.    Heart: Normal rate.  Regular rhythm.    Chest: Symmetric chest wall expansion. Chest wall tenderness present.    Abdomen: Abdomen is soft.  Absent bowel sounds.   There is generalized tenderness.  There is incisional tenderness.    Extremities: Decreased range of motion.  There is dependent edema (Bilateral lower extremities).    Pulses: Distal pulses are intact.    Neurological: Patient is alert and oriented to person, place and time.    Skin:  Warm and dry.  (Postoperative incisions well approximated and intact)            Chest tube:   Site: Right, Clean, Dry, Intact and Securement device intact  Suction: waterseal  Air Leak: negative  24 Hour Total: 110cc    NGT:   24 Hour Total: 200cc    Results Review:     I reviewed the patient's new clinical results.  I reviewed the patient's new imaging results and agree with the interpretation.  I reviewed the patient's other " test results and agree with the interpretation  Discussed with Patient, RN, Dr. Stockton    Assessment & Plan      Patient is POD #5, s/p total esophagectomy.      Continues to have some expected postoperative pain, but is well controlled overall.  NG and chest tube removed at the bedside today.  Advance tube feeds to goal rate of 65ml/hr. Plan to transition to cyclic feeds tomorrow evening.  Appreciate assistance from registered dietitian.  Impact peptide is on national shortage.  However, he will need high-protein formula to promote healing given his recent extensive surgery.  RD has recommended Peptamen 1.5 at discharge.  Bowel movement today with initiation of colace.  Continue strict n.p.o. , may have oral swabs sparingly.  Diuresis per nephrology. Has been changed to torsemide 80mg PO.   Continue metoprolol 25 every 12 hours.  Defer antihypertensives to cardiology.    Recheck labs and chest x-ray tomorrow morning.  Wean oxygen as able.  Absolutely no BiPAP.  We will plan to order an overnight oximetry study prior to discharge to assess need for nocturnal oxygen given that patient is unable to use his BiPAP in the immediate postoperative period.    Sujata John, FABIAN, APRN  [facility]  02/08/23  11:18 EST    Patient was seen and assessed while wearing personal protective equipment including facemask, protective eyewear and gloves.  Hand hygiene performed prior to entering the room and upon exiting with doffing of gloves.

## 2023-02-08 NOTE — PROGRESS NOTES
"    Patient Name: Edmond Chase  :1960  62 y.o.      Patient Care Team:  Nargis Velasquez APRN as PCP - General (Family Medicine)  Papa Miguel MD as Consulting Physician (Cardiology)  Sekou Pisano MD as Consulting Physician (Pulmonary Disease)  Gregor Carlson MD as Consulting Physician (Gastroenterology)  Estevan Yuan MD (Sports Medicine)  Shelley Goldsmith, RN as Nurse Navigator    Chief Complaint: follow up esophageal cancer, coronary artery disease, tissue aortic valve replacement    Interval History: chest feels better with chest tube out. A little hard to talk. abd tender.        Objective   Vital Signs  Temp:  [97.9 °F (36.6 °C)-98.6 °F (37 °C)] 98.6 °F (37 °C)  Heart Rate:  [76] 76  Resp:  [18] 18  BP: (137-164)/(72-82) 137/78    Intake/Output Summary (Last 24 hours) at 2023 1304  Last data filed at 2023 0845  Gross per 24 hour   Intake 130 ml   Output 460 ml   Net -330 ml     Flowsheet Rows    Flowsheet Row First Filed Value   Admission Height 185.4 cm (73\") Documented at 2023 1800   Admission Weight 143 kg (315 lb 4.1 oz) Documented at 2023 1800          Physical Exam:   General Appearance:    Alert, cooperative, in no acute distress   Lungs:     Clear to auscultation.  Normal respiratory effort and rate.      Heart:    Regular rhythm and normal rate, normal S1 and S2, no murmurs, gallops or rubs.     Chest Wall:    No abnormalities observed   Abdomen:     Soft, nontender, positive bowel sounds.     Extremities:   no cyanosis, clubbing or edema.  No marked joint deformities.  Adequate musculoskeletal strength.       Results Review:    Results from last 7 days   Lab Units 23  0458   SODIUM mmol/L 141   POTASSIUM mmol/L 3.7   CHLORIDE mmol/L 101   CO2 mmol/L 34.5*   BUN mg/dL 19   CREATININE mg/dL 0.81   GLUCOSE mg/dL 160*   CALCIUM mg/dL 8.6         Results from last 7 days   Lab Units 23  0458   WBC 10*3/mm3 4.97   HEMOGLOBIN " g/dL 10.3*   HEMATOCRIT % 33.3*   PLATELETS 10*3/mm3 200         Results from last 7 days   Lab Units 02/07/23  0558   MAGNESIUM mg/dL 2.0                   Medication Review:   acetaminophen, 1,000 mg, Per J Tube, TID  docusate sodium, 100 mg, Per J Tube, BID  gabapentin, 300 mg, Per J Tube, Q8H  heparin (porcine), 5,000 Units, Subcutaneous, Q8H  insulin regular, 0-9 Units, Subcutaneous, Q6H  lisinopril, 10 mg, Nasogastric, Q24H  metoprolol tartrate, 25 mg, Per J Tube, Q12H   Or  metoprolol tartrate, 5 mg, Intravenous, Q12H  [START ON 2/9/2023] torsemide, 80 mg, Per J Tube, Daily         lactated ringers, 9 mL/hr, Last Rate: 9 mL/hr (02/03/23 0737)        Assessment & Plan   1. Esophageal cancer status post total esophagectomy and j tube placement on 2/3/23 by Dr. Stockton  2. Chest pain post operatively, likely surgical. Not similar to prior angina.  3. Coronary artery disease status post CABG ( 2/3 bypasses  occluded on repeat heart catheterization. Severe native vessel disease managed medically.   4. Bioprosthetic aortic valve replacement, normal function by TTE 12/14/2022  5. Diabetes mellitis type II  6. Hypertension, holding amlodipine due to lower extremity edema. Continue lisinopril and metoprolol. Reasonable control currently and trending better since the addition of home lisinopril. Will continue to follow.   7. Morbid obesity  8. Right sided pneumothorax, status post chest tube  9. RAMIREZ , nephrology following    Cardiac status appears stable. Will continue to follow blood pressure.     VIKTORIA Delgadillo  [facility]  02/08/23  13:04 EST

## 2023-02-08 NOTE — PLAN OF CARE
Goal Outcome Evaluation:  Plan of Care Reviewed With: patient, friend        Progress: improving  Outcome Evaluation: Pt seen by PT this AM for treatment. Pt stood w/ SBA and use of fww. Pt then amb 300' req CGA/SV and use of fww. Pt's distance limited by fatigue and need for BR. Pt steady w/ no overt LOB. Pt on BR toilet and agreeable to pulling call light for assist when done. Aide notified. PT will prog as pt carey.    Patient was intermittently wearing a face mask during this therapy encounter. Therapist used appropriate personal protective equipment including mask and gloves.  Mask used was standard procedure mask. Appropriate PPE was worn during the entire therapy session. Hand hygiene was completed before and after therapy session. Patient is not in enhanced droplet precautions.

## 2023-02-08 NOTE — THERAPY TREATMENT NOTE
Patient Name: Edmond Chase  : 1960    MRN: [id number]                              Today's Date: 2023       Admit Date: 2/3/2023    Visit Dx:     ICD-10-CM ICD-9-CM   1. Malignant neoplasm of lower third of esophagus (HCC)  C15.5 150.5     Patient Active Problem List   Diagnosis   • Essential hypertension   • Hyperglycemia   • Hyperlipidemia   • Class 3 severe obesity due to excess calories with serious comorbidity and body mass index (BMI) of 40.0 to 44.9 in adult ([facility])   • Nocturia   • Nonrheumatic aortic valve insufficiency   • Myocardial ischemia   • Coronary artery disease involving native coronary artery of native heart   • Ascending aortic aneurysm   • CAD in native artery   • S/P CABG (coronary artery bypass graft)   • S/P AVR (aortic valve replacement)   • Status post ascending aortic aneurysm repair   • Fatigue   • Abnormal nuclear stress test   • Coronary artery disease   • Coronary artery disease of bypass graft of native heart with stable angina pectoris ([facility])   • TJ on auto CPAP   • Hypersomnia due to medical condition   • Angina at rest ([facility])   • Type 2 diabetes mellitus, without long-term current use of insulin ([facility])   • Anemia   • Ulcer of esophagus without bleeding   • Polyp of colon   • Rectal bleeding   • Gastrointestinal hemorrhage   • Malignant neoplasm of lower third of esophagus (HCC)   • Gastrointestinal hemorrhage, unspecified gastrointestinal hemorrhage type   • Exertional chest pain   • Iron deficiency anemia     Past Medical History:   Diagnosis Date   • Abnormal nuclear stress test    • Anxiety and depression    • Aortic valve insufficiency     nonrheumtic    • Arthritis    • Ascending aortic aneurysm    • CAD (coronary artery disease)    • Cancer (HCC)    • Chest pain    • Diabetes mellitus ([facility])    • Dizzy     CAREFUL WHEN GETTING UP   • Dyspnea    • Esophageal cancer (HCC)    • Essential hypertension    • Fatigue    • GERD (gastroesophageal reflux disease)    • GI  "bleed    • History of recent blood transfusion    • Hyperglycemia    • Hyperlipidemia    • Hypersomnia with sleep apnea    • Lightheadedness    • Malaise and fatigue    • Migraines     \"OCCULAR MIGRAINES\"   • Morbid obesity (HCC)    • Myocardial ischemia    • Nocturia    • TJ on auto CPAP 10/31/2016    Overnight polysomnogram.  Weight 276 pounds.  Severe TJ with AHI 79 events per hour.  Auto CPAP recommended.   • Pneumonia     FEB 2016   • Scrotal bleeding    • Shortness of breath    • SOB (shortness of breath)      Past Surgical History:   Procedure Laterality Date   • AORTIC VALVE REPAIR/REPLACEMENT  05/2016   • ASCENDING ARCH/HEMIARCH REPLACEMENT N/A 5/2/2016    Procedure: BHAVESH STERNOTOMY CORONARY ARTERY BYPASS GRAFT TIMES 3 USING LEFT INTERNAL MAMMARY ARTERY AND RIGHT GREATER SAPHENOUS VEIN GRAFT PER ENDOSCOPIC VEIN HARVESTING, AORTIC ANEURYSM REPAIR WITH ROOT REPAIR AND AORTIC VALVE REPLACEMENT;  Surgeon: Rosalio Cline MD;  Location: [facility] OR;  Service:    • CARDIAC CATHETERIZATION N/A 4/1/2016    Procedure: Left Heart Cath;  Surgeon: Erick Tam MD;  Location: [facility] INVASIVE LOCATION;  Service:    • CARDIAC CATHETERIZATION N/A 4/1/2016    Procedure: Left ventriculography;  Surgeon: Erick Tam MD;  Location: [facility] CATH INVASIVE LOCATION;  Service:    • CARDIAC CATHETERIZATION N/A 4/1/2016    Procedure: Right Heart Cath;  Surgeon: Erick Tam MD;  Location: [facility] CATH INVASIVE LOCATION;  Service:    • CARDIAC CATHETERIZATION N/A 10/30/2017    Procedure: Coronary angiography;  Surgeon: Soirn Vasquez MD;  Location: [facility] CATH INVASIVE LOCATION;  Service:    • CARDIAC CATHETERIZATION  10/30/2017    Procedure: Saphenous Vein Graft;  Surgeon: Sorin Vasquez MD;  Location: [facility] CATH INVASIVE LOCATION;  Service:    • CARDIAC CATHETERIZATION N/A 10/30/2017    Procedure: Native mammary injection;  Surgeon: Sorin Vasquez MD;  Location: [facility] CATH INVASIVE LOCATION;  Service:    • " CARDIAC CATHETERIZATION N/A 7/7/2020    Procedure: Coronary angiography;  Surgeon: Gregor Kim MD;  Location:  [facility] CATH INVASIVE LOCATION;  Service: Cardiovascular;  Laterality: N/A;   • CARDIAC CATHETERIZATION N/A 7/7/2020    Procedure: Left heart cath;  Surgeon: Gregor Kim MD;  Location:  [facility] CATH INVASIVE LOCATION;  Service: Cardiovascular;  Laterality: N/A;   • CARDIAC CATHETERIZATION N/A 7/7/2020    Procedure: Left ventriculography;  Surgeon: Gregor Kim MD;  Location:  [facility] CATH INVASIVE LOCATION;  Service: Cardiovascular;  Laterality: N/A;   • CARDIAC CATHETERIZATION N/A 7/7/2020    Procedure: Stent ESMER bypass graft;  Surgeon: Gregor Kim MD;  Location:  [facility] CATH INVASIVE LOCATION;  Service: Cardiovascular;  Laterality: N/A;   • CARDIAC CATHETERIZATION N/A 6/17/2021    Procedure: SAPHENOUS VEIN GRAFT;  Surgeon: Marques Ndiaye MD;  Location[facility];  Service: Cardiovascular;  Laterality: N/A;   • CARDIAC CATHETERIZATION N/A 6/17/2021    Procedure: Left Heart Cath;  Surgeon: Marques Ndiaye MD;  Location:  [facility] CATH INVASIVE LOCATION;  Service: Cardiovascular;  Laterality: N/A;   • CARDIAC CATHETERIZATION N/A 6/17/2021    Procedure: Coronary angiography;  Surgeon: Marques Ndiaye MD;  [facility];  Service: Cardiovascular;  Laterality: N/A;   • CARDIAC CATHETERIZATION N/A 7/14/2022    Procedure: Left Heart Cath;  Surgeon: Charlie Aj MD;  [facility];  Service: Cardiovascular;  Laterality: N/A;   • CARDIAC CATHETERIZATION N/A 7/14/2022    Procedure: Coronary angiography;  Surgeon: Charlie Aj MD;  Location:  [facility] CATH INVASIVE LOCATION;  Service: Cardiovascular;  Laterality: N/A;   • CARDIAC CATHETERIZATION  7/14/2022    Procedure: Saphenous Vein Graft;  Surgeon: Charlie Aj MD;  Location:  [facility] CATH INVASIVE LOCATION;  Service: Cardiovascular;;   • CARDIAC  CATHETERIZATION N/A 7/14/2022    Procedure: Native mammary injection;  Surgeon: Charlie Aj MD;  Location: [facility] CATH INVASIVE LOCATION;  Service: Cardiovascular;  Laterality: N/A;   • COLONOSCOPY N/A 11/26/2022    Procedure: COLONOSCOPY AT BEDSIDE;  Surgeon: Tomy Lopez MD;  Location: [facility] MAIN OR;  Service: Gastroenterology;  Laterality: N/A;   • COLONOSCOPY W/ POLYPECTOMY N/A 10/4/2022    Procedure: COLONOSCOPY to cecum with cold forceps and cold snare polypectomies;  Surgeon: Gregor Carlson MD;  Location: [facility] ENDOSCOPY;  Service: Gastroenterology;  Laterality: N/A;  PRE- hx of polyps  POST- diverticulosis, polyps   • CORONARY ARTERY BYPASS GRAFT  05/2016    LIMA TO LAD, SVG TO PDA, SVG TO OM2   • ENDOSCOPY N/A 7/13/2022    Procedure: ESOPHAGOGASTRODUODENOSCOPY;  Surgeon: Marcia Hollis MD;  Location: [facility] ENDOSCOPY;  Service: Gastroenterology;  Laterality: N/A;  PRE- ANEMIA, MELENA  POST- MILD EROSIVE GASTRITIS, GE JUNCTION ULCER   • ENDOSCOPY N/A 10/4/2022    Procedure: ESOPHAGOGASTRODUODENOSCOPY with biopsies;  Surgeon: Gregor Carlson MD;  Location: [facility] ENDOSCOPY;  Service: Gastroenterology;  Laterality: N/A;  PRE- hx of esophageal ulcer  POST- gastric cardia mass   • ESOPHAGOGASTRECTOMY Right 2/3/2023    Procedure: BRONCHOSCOPY, RIGHT ROBOTIC VIDEO ASSISTED THORACOSCOPY WITH TOTAL ESOPHAGECTOMY, INTERCOSTAL NERVE BLOCK, FEEDING JEJUNOSTOMY PLACEMENT;  Surgeon: Valerie Stockton MD;  Location: [facility] MAIN OR;  Service: Robotics - DaVinci;  Laterality: Right;   • INNER EAR SURGERY     • TONSILLECTOMY        General Information     Row Name 02/08/23 0843          Physical Therapy Time and Intention    Document Type therapy note (daily note)  -PH     Mode of Treatment physical therapy  -PH     Row Name 02/08/23 0843          General Information    Existing Precautions/Restrictions fall  -PH     Barriers to Rehab medically complex;previous functional deficit  -PH     Row  Name 02/08/23 0843          Safety Issues, Functional Mobility    Impairments Affecting Function (Mobility) endurance/activity tolerance;shortness of breath  -PH     Comment, Safety Issues/Impairments (Mobility) gt belt and non skid sock; ng tube, R chest tube  -PH           User Key  (r) = Recorded By, (t) = Taken By, (c) = Cosigned By    Initials Name Provider Type     Lexi Alanis PTA Physical Therapist Assistant               Mobility     Row Name 02/08/23 0844          Bed Mobility    Scooting/Bridging [place] (Bed Mobility) not tested  -PH     Supine-Sit [place] (Bed Mobility) not tested  -PH     Sit-Supine [place] (Bed Mobility) not tested  -PH     Comment, (Bed Mobility) UIC at start of session and on BR toilet at end  -PH     Row Name 02/08/23 0844          Sit-Stand Transfer    Sit-Stand [place] (Transfers) contact guard;verbal cues  -PH     Assistive Device (Sit-Stand Transfers) walker, front-wheeled  -PH     Comment, (Sit-Stand Transfer) cues for postural adjustment  -PH     Row Name 02/08/23 0844          Gait/Stairs (Locomotion)    [place] Level (Gait) contact guard;verbal cues  -PH     Distance in Feet (Gait) 300'  -PH     Deviations/Abnormal Patterns (Gait) krystin decreased;gait speed decreased  -PH     Bilateral Gait Deviations forward flexed posture  -PH     [place] Level (Stairs) not tested  -PH     Comment, (Gait/Stairs) fairly steady w/ no overt LOB; pt limited in distance by fatigue and need for BR  -PH           User Key  (r) = Recorded By, (t) = Taken By, (c) = Cosigned By    Initials Name Provider Type     Lexi Alanis PTA Physical Therapist Assistant               Obj/Interventions     Row Name 02/08/23 0846          Balance    Balance Assessment sitting static balance;standing static balance  -PH     Static Sitting Balance modified independence  -PH     Static Standing Balance standby assist  -PH     Position/Device Used, Standing  Balance walker, front-wheeled  -PH           User Key  (r) = Recorded By, (t) = Taken By, (c) = Cosigned By    Initials Name Provider Type     Lexi Alanis PTA Physical Therapist Assistant               Goals/Plan    No documentation.                Clinical Impression     Row Name 02/08/23 0846          Pain    Posttreatment Pain Rating 3/10  -PH     Pain Location - Side/Orientation Right  -PH     Pain Location incisional  -PH     Pain Location - flank  -PH     Additional Documentation Pain Scale: Numbers Pre/Post-Treatment (Group)  -PH     Row Name 02/08/23 0846          Plan of Care Review    Plan of Care Reviewed With patient;friend  -PH     Progress improving  -PH     Outcome Evaluation Pt seen by PT this AM for treatment. Pt stood w/ SBA and use of fww. Pt then amb 300' req CGA/SV and use of fww. Pt's distance limited by fatigue and need for BR. Pt steady w/ no overt LOB. Pt on BR toilet and agreeable to pulling call light for assist when done. Aide notified. PT will prog as pt carey.  -PH     Row Name 02/08/23 0846          Vital Signs    O2 Delivery Pre Treatment room air  -PH     O2 Delivery Intra Treatment room air  -PH     O2 Delivery Post Treatment room air  -PH     Row Name 02/08/23 0846          Positioning and Restraints    Pre-Treatment Position sitting in chair/recliner  -PH     Post Treatment Position bathroom  -PH     Bathroom sitting;call light within reach;encouraged to call for assist;notified nsg  -PH           User Key  (r) = Recorded By, (t) = Taken By, (c) = Cosigned By    Initials Name Provider Type     Lexi Alanis PTA Physical Therapist Assistant               Outcome Measures     Row Name 02/08/23 0848          How much help from another person do you currently need...    Turning from your back to your side while in flat bed without using bedrails? 3  -PH     Moving from lying on back to sitting on the side of a flat bed without bedrails? 3  -PH     Moving to and  from a bed to a chair (including a wheelchair)? 3  -PH     Standing up from a chair using your arms (e.g., wheelchair, bedside chair)? 3  -PH     Climbing 3-5 steps with a railing? 2  -PH     To walk in hospital room? 3  -PH     AM-PAC 6 Clicks Score (PT) 17  -PH     Highest level of mobility 5 --> Static standing  -PH     Row Name 02/08/23 0848          Functional Assessment    Outcome Measure Options AM-PAC 6 Clicks Basic Mobility (PT)  -           User Key  (r) = Recorded By, (t) = Taken By, (c) = Cosigned By    Initials Name Provider Type     Lexi Alanis PTA Physical Therapist Assistant                             Physical Therapy Education     Title: PT OT SLP Therapies (Done)     Topic: Physical Therapy (Done)     Point: Mobility training (Done)     Learning Progress Summary           Patient Acceptance, E,D, VU,DU by  at 2/8/2023 0848    Acceptance, E,D, VU by  at 2/7/2023 1029    Acceptance, E, VU by  at 2/6/2023 1739                   Point: Home exercise program (Done)     Learning Progress Summary           Patient Acceptance, E,D, VU by  at 2/7/2023 1029    Acceptance, E, VU by  at 2/6/2023 1739                   Point: Body mechanics (Done)     Learning Progress Summary           Patient Acceptance, E,D, VU,DU by  at 2/8/2023 0848    Acceptance, E,D, VU by  at 2/7/2023 1029    Acceptance, E, VU by  at 2/6/2023 1739                   Point: Precautions (Done)     Learning Progress Summary           Patient Acceptance, E,D, VU,DU by  at 2/8/2023 0848    Acceptance, E,D, VU by  at 2/7/2023 1029    Acceptance, E, VU by  at 2/6/2023 1739                               User Key     Initials Effective Dates Name Provider Type [facility] 06/16/21 -  Lexi Alanis PTA Physical Therapist Assistant PT    JF 01/11/22 -  Naila Grimm RN Registered Nurse Nurse              PT Recommendation and Plan     Plan of Care Reviewed With: patient, friend  Progress:  improving  Outcome Evaluation: Pt seen by PT this AM for treatment. Pt stood w/ SBA and use of fww. Pt then amb 300' req CGA/SV and use of fww. Pt's distance limited by fatigue and need for BR. Pt steady w/ no overt LOB. Pt on BR toilet and agreeable to pulling call light for assist when done. Aide notified. PT will prog as pt carey.     Time Calculation:    PT Charges     Row Name 02/08/23 0849             Time Calculation    Start Time 0811  -PH      Stop Time 0825  -PH      Time Calculation (min) 14 min  -PH      PT Received On 02/08/23  -PH      PT - Next Appointment 02/09/23  -PH         Timed Charges    36353 - PT Therapeutic Activity Minutes 14  -PH         Total Minutes    Timed Charges Total Minutes 14  -PH       Total Minutes 14  -PH            User Key  (r) = Recorded By, (t) = Taken By, (c) = Cosigned By    Initials Name Provider Type    PH Lexi Alanis, PTA Physical Therapist Assistant              Therapy Charges for Today     Code Description Service Date Service Provider Modifiers Qty    83161045814 HC PT THERAPEUTIC ACT EA 15 MIN 2/7/2023 Lexi Alanis, PTA GP 1    38051845140 HC PT THER SUPP EA 15 MIN 2/7/2023 Lexi Alanis, PTA GP 1    28487487432 HC PT THERAPEUTIC ACT EA 15 MIN 2/8/2023 Lexi Alanis, PTA GP 1          PT G-Codes  Outcome Measure Options: AM-PAC 6 Clicks Basic Mobility (PT)  AM-PAC 6 Clicks Score (PT): 17  AM-PAC 6 Clicks Score (OT): 16  PT Discharge Summary  Anticipated Discharge Disposition (PT): home with home health, home with assist    Lexi Alanis PTA  2/8/2023

## 2023-02-08 NOTE — DISCHARGE INSTRUCTIONS
Post-op Esophagectomy Discharge Instructions    1. Activity:  Climb stairs as tolerated  May drive car after 2 weeks or as directed by Physician  Walk at least 3-4 times a day  Limit lifting for first 6 weeks. No lifting, pushing, or pulling greater than 10 pounds till seen by MD.  Continue to use your incentive spirometer at least 4 times per day.    2. Nutrition:  Tube feeds only. Strict nothing by mouth.  All meds should be crushed or given in liquid formulation via j-tube.      3. Incision (wound) Care:  Remove dressing after 48 hours  If continued drainage, change dressing every 24 hours and as needed.  May shower after discharge.  Wash around incision area with soap and water daily.  No lotions or creams on incision area.  Take temperature daily for the first week after discharge.     4. When to call for Medical Advise:  Fever greater than 101 degrees  Unusual or severe pain  Difficulty breathing  Incision changes (redness, swelling, or increased drainage)  Any questions or problems    5. Medication Instructions:  Take Pain medications as directed to stay comfortable  No driving or drinking alcohol when taking prescribed pain meds  Laxative of choice if needed for constipation.    6. Medication and dosages:  See discharge medication instruction form  Post-op Esophagectomy Discharge Instructions    1. Activity:  Climb stairs as tolerated  May drive car after 2 weeks or as directed by Physician  Walk at least 3-4 times a day  Limit lifting for first 6 weeks. No lifting, pushing, or pulling greater than 10 pounds till seen by MD.  Continue to use your incentive spirometer at least 4 times per day.    2. Nutrition:  Tube feeds only. Strict nothing by mouth.  All meds should be crushed or given in liquid formulation via j-tube.      3. Incision (wound) Care:  Remove dressing after 48 hours  If continued drainage, change dressing every 24 hours and as needed.  May shower after discharge.  Wash around incision area  with soap and water daily.  No lotions or creams on incision area.  Take temperature daily for the first week after discharge.     4. When to call for Medical Advise:  Fever greater than 101 degrees  Unusual or severe pain  Difficulty breathing  Incision changes (redness, swelling, or increased drainage)  Any questions or problems    5. Medication Instructions:  Take Pain medications as directed to stay comfortable  No driving or drinking alcohol when taking prescribed pain meds  Laxative of choice if needed for constipation.    6. Medication and dosages:  See discharge medication instruction form

## 2023-02-08 NOTE — CASE MANAGEMENT/SOCIAL WORK
Continued Stay Note  [facility]     Patient Name: Edmond Chase  MRN: [id number]  Today's Date: 2/8/2023    Admit Date: 2/3/2023    Plan: Home with [facility]   Discharge Plan     Row Name 02/08/23 1539       Plan    Plan Home with [facility]    Patient/Family in Agreement with Plan yes    Plan Comments [facility] continues to follow.  Spoke with Kimberly/Maribel who states there is a national shortage of Impact Peptide and alternative formula needed.  Discussed with dietician who states Peptamen can be used.  Kimberly states they do have Peptamen in stock.  Pt is current with [facility] for CPAP.   Cannot use CPAP at this time.  Will need testing to qualify for home O2. Once testing and orders received, will need to fax to [phone].  [facility] continues to follow.  EL Espinosa RN               Discharge Codes    No documentation.               Expected Discharge Date and Time     Expected Discharge Date Expected Discharge Time    Feb 10, 2023             Jessica Espinosa RN

## 2023-02-09 ENCOUNTER — APPOINTMENT (OUTPATIENT)
Dept: GENERAL RADIOLOGY | Facility: HOSPITAL | Age: 63
DRG: 327 | End: 2023-02-09
Payer: COMMERCIAL

## 2023-02-09 ENCOUNTER — PATIENT OUTREACH (OUTPATIENT)
Dept: OTHER | Facility: HOSPITAL | Age: 63
End: 2023-02-09
Payer: COMMERCIAL

## 2023-02-09 LAB
ALBUMIN SERPL-MCNC: 3.3 G/DL (ref 3.5–5.2)
ANION GAP SERPL CALCULATED.3IONS-SCNC: 7.7 MMOL/L (ref 5–15)
BUN SERPL-MCNC: 23 MG/DL (ref 8–23)
BUN/CREAT SERPL: 37.7 (ref 7–25)
CALCIUM SPEC-SCNC: 8.6 MG/DL (ref 8.6–10.5)
CHLORIDE SERPL-SCNC: 102 MMOL/L (ref 98–107)
CO2 SERPL-SCNC: 32.3 MMOL/L (ref 22–29)
CREAT SERPL-MCNC: 0.61 MG/DL (ref 0.76–1.27)
DEPRECATED RDW RBC AUTO: 44.2 FL (ref 37–54)
EGFRCR SERPLBLD CKD-EPI 2021: 107.3 ML/MIN/1.73
ERYTHROCYTE [DISTWIDTH] IN BLOOD BY AUTOMATED COUNT: 13.9 % (ref 12.3–15.4)
GLUCOSE BLDC GLUCOMTR-MCNC: 127 MG/DL (ref 70–130)
GLUCOSE BLDC GLUCOMTR-MCNC: 169 MG/DL (ref 70–130)
GLUCOSE BLDC GLUCOMTR-MCNC: 182 MG/DL (ref 70–130)
GLUCOSE BLDC GLUCOMTR-MCNC: 182 MG/DL (ref 70–130)
GLUCOSE SERPL-MCNC: 184 MG/DL (ref 65–99)
HCT VFR BLD AUTO: 32.6 % (ref 37.5–51)
HGB BLD-MCNC: 10.2 G/DL (ref 13–17.7)
MAGNESIUM SERPL-MCNC: 1.9 MG/DL (ref 1.6–2.4)
MCH RBC QN AUTO: 27.4 PG (ref 26.6–33)
MCHC RBC AUTO-ENTMCNC: 31.3 G/DL (ref 31.5–35.7)
MCV RBC AUTO: 87.6 FL (ref 79–97)
PHOSPHATE SERPL-MCNC: 1.3 MG/DL (ref 2.5–4.5)
PLATELET # BLD AUTO: 231 10*3/MM3 (ref 140–450)
PMV BLD AUTO: 10.2 FL (ref 6–12)
POTASSIUM SERPL-SCNC: 3.3 MMOL/L (ref 3.5–5.2)
RBC # BLD AUTO: 3.72 10*6/MM3 (ref 4.14–5.8)
SODIUM SERPL-SCNC: 142 MMOL/L (ref 136–145)
WBC NRBC COR # BLD: 5.74 10*3/MM3 (ref 3.4–10.8)

## 2023-02-09 PROCEDURE — 80069 RENAL FUNCTION PANEL: CPT | Performed by: NURSE PRACTITIONER

## 2023-02-09 PROCEDURE — 83735 ASSAY OF MAGNESIUM: CPT | Performed by: INTERNAL MEDICINE

## 2023-02-09 PROCEDURE — 97530 THERAPEUTIC ACTIVITIES: CPT

## 2023-02-09 PROCEDURE — 85027 COMPLETE CBC AUTOMATED: CPT | Performed by: NURSE PRACTITIONER

## 2023-02-09 PROCEDURE — 82962 GLUCOSE BLOOD TEST: CPT

## 2023-02-09 PROCEDURE — 97110 THERAPEUTIC EXERCISES: CPT | Performed by: OCCUPATIONAL THERAPIST

## 2023-02-09 PROCEDURE — 63710000001 INSULIN REGULAR HUMAN PER 5 UNITS: Performed by: INTERNAL MEDICINE

## 2023-02-09 PROCEDURE — 25010000002 HEPARIN (PORCINE) PER 1000 UNITS: Performed by: THORACIC SURGERY (CARDIOTHORACIC VASCULAR SURGERY)

## 2023-02-09 PROCEDURE — 99024 POSTOP FOLLOW-UP VISIT: CPT

## 2023-02-09 PROCEDURE — 25010000002 HYDROMORPHONE PER 4 MG: Performed by: THORACIC SURGERY (CARDIOTHORACIC VASCULAR SURGERY)

## 2023-02-09 PROCEDURE — 71045 X-RAY EXAM CHEST 1 VIEW: CPT

## 2023-02-09 PROCEDURE — 99232 SBSQ HOSP IP/OBS MODERATE 35: CPT | Performed by: NURSE PRACTITIONER

## 2023-02-09 RX ORDER — LISINOPRIL 10 MG/1
10 TABLET ORAL
Qty: 30 TABLET | Refills: 1 | Status: SHIPPED | OUTPATIENT
Start: 2023-02-10 | End: 2023-03-12

## 2023-02-09 RX ORDER — ACETAMINOPHEN 160 MG/5ML
1000 SOLUTION ORAL 3 TIMES DAILY
Qty: 1320 ML | Refills: 0 | Status: SHIPPED | OUTPATIENT
Start: 2023-02-09 | End: 2023-02-13

## 2023-02-09 RX ADMIN — GABAPENTIN 300 MG: 300 CAPSULE ORAL at 13:01

## 2023-02-09 RX ADMIN — LISINOPRIL 10 MG: 10 TABLET ORAL at 09:29

## 2023-02-09 RX ADMIN — GABAPENTIN 300 MG: 300 CAPSULE ORAL at 06:14

## 2023-02-09 RX ADMIN — POTASSIUM PHOSPHATE, MONOBASIC AND POTASSIUM PHOSPHATE, DIBASIC 30 MMOL: 224; 236 INJECTION, SOLUTION, CONCENTRATE INTRAVENOUS at 13:08

## 2023-02-09 RX ADMIN — METOPROLOL TARTRATE 25 MG: 25 TABLET, FILM COATED ORAL at 09:29

## 2023-02-09 RX ADMIN — INSULIN HUMAN 2 UNITS: 100 INJECTION, SOLUTION PARENTERAL at 06:15

## 2023-02-09 RX ADMIN — INSULIN HUMAN 2 UNITS: 100 INJECTION, SOLUTION PARENTERAL at 00:22

## 2023-02-09 RX ADMIN — HYDROMORPHONE HYDROCHLORIDE 0.5 MG: 1 INJECTION, SOLUTION INTRAMUSCULAR; INTRAVENOUS; SUBCUTANEOUS at 01:59

## 2023-02-09 RX ADMIN — TORSEMIDE 80 MG: 20 TABLET ORAL at 09:29

## 2023-02-09 RX ADMIN — ACETAMINOPHEN 1000 MG: 325 SUSPENSION ORAL at 17:24

## 2023-02-09 RX ADMIN — HEPARIN SODIUM 5000 UNITS: 5000 INJECTION INTRAVENOUS; SUBCUTANEOUS at 06:15

## 2023-02-09 RX ADMIN — DIAZEPAM 2 MG: 2 TABLET ORAL at 13:08

## 2023-02-09 RX ADMIN — INSULIN HUMAN 2 UNITS: 100 INJECTION, SOLUTION PARENTERAL at 13:01

## 2023-02-09 RX ADMIN — ACETAMINOPHEN 1000 MG: 325 SUSPENSION ORAL at 20:32

## 2023-02-09 RX ADMIN — ACETAMINOPHEN 1000 MG: 325 SUSPENSION ORAL at 09:29

## 2023-02-09 RX ADMIN — HEPARIN SODIUM 5000 UNITS: 5000 INJECTION INTRAVENOUS; SUBCUTANEOUS at 13:00

## 2023-02-09 RX ADMIN — DIAZEPAM 2 MG: 2 TABLET ORAL at 00:22

## 2023-02-09 RX ADMIN — HEPARIN SODIUM 5000 UNITS: 5000 INJECTION INTRAVENOUS; SUBCUTANEOUS at 22:56

## 2023-02-09 RX ADMIN — DOCUSATE SODIUM 100 MG: 50 LIQUID ORAL at 09:30

## 2023-02-09 RX ADMIN — METOPROLOL TARTRATE 25 MG: 25 TABLET, FILM COATED ORAL at 20:32

## 2023-02-09 NOTE — THERAPY TREATMENT NOTE
Patient Name: Edmond Chase  : 1960    MRN: [id number]                              Today's Date: 2023       Admit Date: 2/3/2023    Visit Dx:     ICD-10-CM ICD-9-CM   1. Malignant neoplasm of lower third of esophagus (HCC)  C15.5 150.5     Patient Active Problem List   Diagnosis   • Essential hypertension   • Hyperglycemia   • Hyperlipidemia   • Class 3 severe obesity due to excess calories with serious comorbidity and body mass index (BMI) of 40.0 to 44.9 in adult ([facility])   • Nocturia   • Nonrheumatic aortic valve insufficiency   • Myocardial ischemia   • Coronary artery disease involving native coronary artery of native heart   • Ascending aortic aneurysm   • CAD in native artery   • S/P CABG (coronary artery bypass graft)   • S/P AVR (aortic valve replacement)   • Status post ascending aortic aneurysm repair   • Fatigue   • Abnormal nuclear stress test   • Coronary artery disease   • Coronary artery disease of bypass graft of native heart with stable angina pectoris ([facility])   • TJ on auto CPAP   • Hypersomnia due to medical condition   • Angina at rest ([facility])   • Type 2 diabetes mellitus, without long-term current use of insulin ([facility])   • Anemia   • Ulcer of esophagus without bleeding   • Polyp of colon   • Rectal bleeding   • Gastrointestinal hemorrhage   • Malignant neoplasm of lower third of esophagus (HCC)   • Gastrointestinal hemorrhage, unspecified gastrointestinal hemorrhage type   • Exertional chest pain   • Iron deficiency anemia     Past Medical History:   Diagnosis Date   • Abnormal nuclear stress test    • Anxiety and depression    • Aortic valve insufficiency     nonrheumtic    • Arthritis    • Ascending aortic aneurysm    • CAD (coronary artery disease)    • Cancer (HCC)    • Chest pain    • Diabetes mellitus ([facility])    • Dizzy     CAREFUL WHEN GETTING UP   • Dyspnea    • Esophageal cancer (HCC)    • Essential hypertension    • Fatigue    • GERD (gastroesophageal reflux disease)    • GI  "bleed    • History of recent blood transfusion    • Hyperglycemia    • Hyperlipidemia    • Hypersomnia with sleep apnea    • Lightheadedness    • Malaise and fatigue    • Migraines     \"OCCULAR MIGRAINES\"   • Morbid obesity (HCC)    • Myocardial ischemia    • Nocturia    • TJ on auto CPAP 10/31/2016    Overnight polysomnogram.  Weight 276 pounds.  Severe TJ with AHI 79 events per hour.  Auto CPAP recommended.   • Pneumonia     FEB 2016   • Scrotal bleeding    • Shortness of breath    • SOB (shortness of breath)      Past Surgical History:   Procedure Laterality Date   • AORTIC VALVE REPAIR/REPLACEMENT  05/2016   • ASCENDING ARCH/HEMIARCH REPLACEMENT N/A 5/2/2016    Procedure: BHAVESH STERNOTOMY CORONARY ARTERY BYPASS GRAFT TIMES 3 USING LEFT INTERNAL MAMMARY ARTERY AND RIGHT GREATER SAPHENOUS VEIN GRAFT PER ENDOSCOPIC VEIN HARVESTING, AORTIC ANEURYSM REPAIR WITH ROOT REPAIR AND AORTIC VALVE REPLACEMENT;  Surgeon: Rosalio Cline MD;  Location: [facility] OR;  Service:    • CARDIAC CATHETERIZATION N/A 4/1/2016    Procedure: Left Heart Cath;  Surgeon: Erick Tma MD;  Location: [facility] INVASIVE LOCATION;  Service:    • CARDIAC CATHETERIZATION N/A 4/1/2016    Procedure: Left ventriculography;  Surgeon: Erick Tam MD;  Location: [facility] CATH INVASIVE LOCATION;  Service:    • CARDIAC CATHETERIZATION N/A 4/1/2016    Procedure: Right Heart Cath;  Surgeon: Erick Tam MD;  Location: [facility] CATH INVASIVE LOCATION;  Service:    • CARDIAC CATHETERIZATION N/A 10/30/2017    Procedure: Coronary angiography;  Surgeon: Sorin Vasquez MD;  Location: [facility] CATH INVASIVE LOCATION;  Service:    • CARDIAC CATHETERIZATION  10/30/2017    Procedure: Saphenous Vein Graft;  Surgeon: Sorin Vasquez MD;  Location: [facility] CATH INVASIVE LOCATION;  Service:    • CARDIAC CATHETERIZATION N/A 10/30/2017    Procedure: Native mammary injection;  Surgeon: Sorin Vasquez MD;  Location: [facility] CATH INVASIVE LOCATION;  Service:    • " CARDIAC CATHETERIZATION N/A 7/7/2020    Procedure: Coronary angiography;  Surgeon: Gregor Kim MD;  Location:  [facility] CATH INVASIVE LOCATION;  Service: Cardiovascular;  Laterality: N/A;   • CARDIAC CATHETERIZATION N/A 7/7/2020    Procedure: Left heart cath;  Surgeon: Gregor Kim MD;  Location:  [facility] CATH INVASIVE LOCATION;  Service: Cardiovascular;  Laterality: N/A;   • CARDIAC CATHETERIZATION N/A 7/7/2020    Procedure: Left ventriculography;  Surgeon: Gregor Kim MD;  Location:  [facility] CATH INVASIVE LOCATION;  Service: Cardiovascular;  Laterality: N/A;   • CARDIAC CATHETERIZATION N/A 7/7/2020    Procedure: Stent ESMER bypass graft;  Surgeon: Gregor Kim MD;  Location:  [facility] CATH INVASIVE LOCATION;  Service: Cardiovascular;  Laterality: N/A;   • CARDIAC CATHETERIZATION N/A 6/17/2021    Procedure: SAPHENOUS VEIN GRAFT;  Surgeon: Marques Ndiaye MD;  [facility];  Service: Cardiovascular;  Laterality: N/A;   • CARDIAC CATHETERIZATION N/A 6/17/2021    Procedure: Left Heart Cath;  Surgeon: Marques Ndiaye MD;  Location:  [facility] CATH INVASIVE LOCATION;  Service: Cardiovascular;  Laterality: N/A;   • CARDIAC CATHETERIZATION N/A 6/17/2021    Procedure: Coronary angiography;  Surgeon: Marques Ndiaye MD;  [facility];  Service: Cardiovascular;  Laterality: N/A;   • CARDIAC CATHETERIZATION N/A 7/14/2022    Procedure: Left Heart Cath;  Surgeon: Charlie Aj MD;  [facility];  Service: Cardiovascular;  Laterality: N/A;   • CARDIAC CATHETERIZATION N/A 7/14/2022    Procedure: Coronary angiography;  Surgeon: Charlie Aj MD;  Location:  [facility] CATH INVASIVE LOCATION;  Service: Cardiovascular;  Laterality: N/A;   • CARDIAC CATHETERIZATION  7/14/2022    Procedure: Saphenous Vein Graft;  Surgeon: Charlie Aj MD;  Location:  [facility] CATH INVASIVE LOCATION;  Service: Cardiovascular;;   • CARDIAC  CATHETERIZATION N/A 7/14/2022    Procedure: Native mammary injection;  Surgeon: Charlie Aj MD;  Location: [facility] CATH INVASIVE LOCATION;  Service: Cardiovascular;  Laterality: N/A;   • COLONOSCOPY N/A 11/26/2022    Procedure: COLONOSCOPY AT BEDSIDE;  Surgeon: Tomy Lopez MD;  Location: [facility] MAIN OR;  Service: Gastroenterology;  Laterality: N/A;   • COLONOSCOPY W/ POLYPECTOMY N/A 10/4/2022    Procedure: COLONOSCOPY to cecum with cold forceps and cold snare polypectomies;  Surgeon: Gregor Carlson MD;  Location: [facility] ENDOSCOPY;  Service: Gastroenterology;  Laterality: N/A;  PRE- hx of polyps  POST- diverticulosis, polyps   • CORONARY ARTERY BYPASS GRAFT  05/2016    LIMA TO LAD, SVG TO PDA, SVG TO OM2   • ENDOSCOPY N/A 7/13/2022    Procedure: ESOPHAGOGASTRODUODENOSCOPY;  Surgeon: Marcia Hollis MD;  Location: [facility] ENDOSCOPY;  Service: Gastroenterology;  Laterality: N/A;  PRE- ANEMIA, MELENA  POST- MILD EROSIVE GASTRITIS, GE JUNCTION ULCER   • ENDOSCOPY N/A 10/4/2022    Procedure: ESOPHAGOGASTRODUODENOSCOPY with biopsies;  Surgeon: Gregor Carlson MD;  Location: [facility] ENDOSCOPY;  Service: Gastroenterology;  Laterality: N/A;  PRE- hx of esophageal ulcer  POST- gastric cardia mass   • ESOPHAGOGASTRECTOMY Right 2/3/2023    Procedure: BRONCHOSCOPY, RIGHT ROBOTIC VIDEO ASSISTED THORACOSCOPY WITH TOTAL ESOPHAGECTOMY, INTERCOSTAL NERVE BLOCK, FEEDING JEJUNOSTOMY PLACEMENT;  Surgeon: Valerie Stockton MD;  Location: [facility] MAIN OR;  Service: Robotics - DaVinci;  Laterality: Right;   • INNER EAR SURGERY     • TONSILLECTOMY        General Information     Row Name 02/09/23 1254          OT Time and Intention    Document Type therapy note (daily note)  -     Mode of Treatment occupational therapy;individual therapy  -     Row Name 02/09/23 1251          General Information    Existing Precautions/Restrictions fall  -     Row Name 02/09/23 1257          Cognition    Orientation Status  (Cognition) oriented x 4  -     Row Name 02/09/23 1255          Safety Issues, Functional Mobility    Impairments Affecting Function (Mobility) strength;endurance/activity tolerance  -           User Key  (r) = Recorded By, (t) = Taken By, (c) = Cosigned By    Initials Name Provider Type     Sara Kennedy, OTR Occupational Therapist                 Mobility/ADL's     Row Name 02/09/23 1256          Bed Mobility    Comment, (Bed Mobility) NT UIC  -     Row Name 02/09/23 1256          Transfers    Transfers sit-stand transfer;stand-sit transfer;toilet transfer  -     Row Name 02/09/23 1256          Sit-Stand Transfer    Sit-Stand [place] (Transfers) set up;standby assist  [facility]     Assistive Device (Sit-Stand Transfers) walker, front-wheeled  [place] Name 02/09/23 1256          Stand-Sit Transfer    Stand-Sit [place] (Transfers) set up;standby assist  [facility]     Assistive Device (Stand-Sit Transfers) walker, front-wheeled  [facility] Name 02/09/23 1256          Toilet Transfer    Type (Toilet Transfer) stand-sit;sit-stand  -     Assumption Level (Toilet Transfer) set up;standby assist  [facility]     Assistive Device (Toilet Transfer) walker, front-wheeled;grab bars/safety frame  -     Row Name 02/09/23 1256          Functional Mobility    Functional Mobility- Ind. Level set up required;standby assist  [facility]     Functional Mobility- Device walker, front-wheeled  -     Functional Mobility- Comment in room, to BR, to chair  -[facility] Name 02/09/23 1256          Toileting Assessment/Training    Assumption Level (Toileting) toileting skills;adjust/manage clothing;perform perineal hygiene;standby assist  -     Position (Toileting) supported sitting;supported standing  -     Row Name 02/09/23 1256          Grooming Assessment/Training    Assumption Level (Grooming) grooming skills;wash face, hands;standby assist;set up  -     Position (Grooming) supported sitting  -           User  Key  (r) = Recorded By, (t) = Taken By, (c) = Cosigned By    Initials Name Provider Type     Sara Kennedy, OTR Occupational Therapist               Obj/Interventions     Row Name 02/09/23 1258          Shoulder (Therapeutic Exercise)    Shoulder AROM (Therapeutic Exercise) left;right;extension;flexion;scapular protraction;scapular retraction;10 repetitions;2 sets;sitting  -     Row Name 02/09/23 1258          Elbow/Forearm (Therapeutic Exercise)    Elbow/Forearm (Therapeutic Exercise) AROM (active range of motion)  -     Elbow/Forearm AROM (Therapeutic Exercise) right;left;flexion;extension;10 repetitions;2 sets;sitting  -     Row Name 02/09/23 1258          Motor Skills    Therapeutic Exercise shoulder;elbow/forearm  -[facility] Name 02/09/23 1258          Balance    Static Sitting Balance modified independence  -     Position, Sitting Balance sitting in chair  -     Static Standing Balance standby assist  -     Dynamic Standing Balance standby assist  -     Position/Device Used, Standing Balance walker, rolling  -     Balance Interventions sitting;standing;sit to stand;supported;static;dynamic;occupation based/functional task  -           User Key  (r) = Recorded By, (t) = Taken By, (c) = Cosigned By    Initials Name Provider Type     Sara Kennedy, OTR Occupational Therapist               Goals/Plan    No documentation.                Clinical Impression     Row Name 02/09/23 1259          Pain Assessment    Pretreatment Pain Rating 3/10  -KP     Posttreatment Pain Rating 3/10  -KP     Pain Location - Side/Orientation Right  -     Pain Location incisional  -     Pain Intervention(s) Repositioned  -     Row Name 02/09/23 1259          Plan of Care Review    Plan of Care Reviewed With patient;family  -     Progress improving  -     Outcome Evaluation pt completed OT tx session. pt completed tsf, walking, tsf to toilet and toileting skills w SBA.. SBA w grooming  skills. pt completed AROM 10x2 to incr strength/endurance. cont therapy to incr ADL, strength, balance, and tsf.  -     Row Name 02/09/23 1259          Positioning and Restraints    Pre-Treatment Position sitting in chair/recliner  -KP     Post Treatment Position chair  -KP     In Chair sitting;with family/caregiver;call light within reach;encouraged to call for assist;exit alarm on  -KP           User Key  (r) = Recorded By, (t) = Taken By, (c) = Cosigned By    Initials Name Provider Type    Sara Parekh, OTR Occupational Therapist               Outcome Measures     Row Name 02/09/23 1300          How much help from another is currently needed...    Putting on and taking off regular lower body clothing? 2  -KP     Bathing (including washing, rinsing, and drying) 3  -KP     Toileting (which includes using toilet bed pan or urinal) 3  -KP     Putting on and taking off regular upper body clothing 3  -KP     Taking care of personal grooming (such as brushing teeth) 3  -KP     Eating meals 4  -KP     AM-PAC 6 Clicks Score (OT) 18  -KP     Row Name 02/09/23 1130          How much help from another person do you currently need...    Turning from your back to your side while in flat bed without using bedrails? 4  -PH     Moving from lying on back to sitting on the side of a flat bed without bedrails? 3  -PH     Moving to and from a bed to a chair (including a wheelchair)? 3  -PH     Standing up from a chair using your arms (e.g., wheelchair, bedside chair)? 4  -PH     Climbing 3-5 steps with a railing? 3  -PH     To walk in hospital room? 3  -PH     AM-PAC 6 Clicks Score (PT) 20  -PH     Highest level of mobility 6 --> Walked 10 steps or more  -PH     Row Name 02/09/23 1300 02/09/23 1130       Functional Assessment    Outcome Measure Options AM-PAC 6 Clicks Daily Activity (OT)  -KP AM-PAC 6 Clicks Basic Mobility (PT)  -PH          User Key  (r) = Recorded By, (t) = Taken By, (c) = Cosigned By    Initials  Name Provider Type    Sara Parekh OTR Occupational Therapist    PH TobiasLexi recinos, PTA Physical Therapist Assistant                Occupational Therapy Education     Title: PT OT SLP Therapies (Done)     Topic: Occupational Therapy (Done)     Point: ADL training (Done)     Description:   Instruct learner(s) on proper safety adaptation and remediation techniques during self care or transfers.   Instruct in proper use of assistive devices.              Learning Progress Summary           Patient Acceptance, E, VU by  at 2/6/2023 1739    Acceptance, E, VU by BS at 2/4/2023 1507    Comment: Reason for eval/ consult                   Point: Home exercise program (Done)     Description:   Instruct learner(s) on appropriate technique for monitoring, assisting and/or progressing therapeutic exercises/activities.              Learning Progress Summary           Patient Acceptance, E, VU by  at 2/6/2023 1739                   Point: Precautions (Done)     Description:   Instruct learner(s) on prescribed precautions during self-care and functional transfers.              Learning Progress Summary           Patient Acceptance, E, VU by  at 2/6/2023 1739    Acceptance, E, VU by BS at 2/4/2023 1507    Comment: Reason for eval/ consult                   Point: Body mechanics (Done)     Description:   Instruct learner(s) on proper positioning and spine alignment during self-care, functional mobility activities and/or exercises.              Learning Progress Summary           Patient Acceptance, E, VU by  at 2/6/2023 1739    Acceptance, E, VU by BS at 2/4/2023 1507    Comment: Reason for eval/ consult                               User Key     Initials Effective Dates Name Provider Type Discipline     01/11/22 -  Naila Grimm, RN Registered Nurse Nurse    BS 11/14/22 -  Tiffany Holloway OT Occupational Therapist OT              OT Recommendation and Plan     Plan of Care Review  Plan of Care  Reviewed With: patient, family  Progress: improving  Outcome Evaluation: pt completed OT tx session. pt completed tsf, walking, tsf to toilet and toileting skills w SBA.. SBA w grooming skills. pt completed AROM 10x2 to incr strength/endurance. cont therapy to incr ADL, strength, balance, and tsf.     Time Calculation:    Time Calculation- OT     Row Name 02/09/23 1302             Time Calculation- OT    OT Start Time 0824  -      OT Stop Time 0842  -      OT Time Calculation (min) 18 min  -KP      Total Timed Code Minutes- OT 18 minute(s)  -KP      OT Received On 02/09/23  -      OT - Next Appointment 02/10/23  -         Timed Charges    12705 - OT Therapeutic Exercise Minutes 18  -KP         Total Minutes    Timed Charges Total Minutes 18  -KP       Total Minutes 18  -KP            User Key  (r) = Recorded By, (t) = Taken By, (c) = Cosigned By    Initials Name Provider Type     Sara Kennedy OTR Occupational Therapist              Therapy Charges for Today     Code Description Service Date Service Provider Modifiers Qty    [id number]  OT THER PROC EA 15 MIN 2/9/2023 Sara Kennedy OTR GO 1               JADA Hernandez  2/9/2023

## 2023-02-09 NOTE — PROGRESS NOTES
[facility] Progress Note      Patient Name: Edmond Chase  : 1960  MRN: [id number]  Primary Care Physician:  Nargis Velasquez APRN  Date of admission: 2/3/2023    Subjective     Interval History:   The patient was seen and examined today for follow-up on oliguria  Doing well overall.  Denies any nausea or vomiting.  Urinating well.  Lower extremity edema has much improved.  Slightly confused.    Review of Systems:   As noted above    Objective     Vitals:   Temp:  [98.1 °F (36.7 °C)-99.3 °F (37.4 °C)] 99.3 °F (37.4 °C)  Heart Rate:  [93] 93  Resp:  [18] 18  BP: (137-159)/(75-83) 159/75    Intake/Output Summary (Last 24 hours) at 2023 1019  Last data filed at 2023 1420  Gross per 24 hour   Intake 60 ml   Output --   Net 60 ml       Physical Exam:    General Appearance: alert, oriented x 3, no acute distress   Skin: warm and dry  HEENT: oral mucosa normal, nonicteric sclera  Neck: supple, no JVD  Lungs: CTA  Heart: RRR, normal S1 and S2  Abdomen: soft, nontender, nondistended  : no palpable bladder  Extremities: 1-2+ pitting edema bilateral lower extremities.  Improved from before.  No cyanosis or clubbing  Neuro: normal speech and mental status     Scheduled Meds:     acetaminophen, 1,000 mg, Per J Tube, TID  docusate sodium, 100 mg, Per J Tube, BID  gabapentin, 300 mg, Per J Tube, Q8H  heparin (porcine), 5,000 Units, Subcutaneous, Q8H  insulin regular, 0-9 Units, Subcutaneous, Q6H  lisinopril, 10 mg, Per J Tube, Q24H  metoprolol tartrate, 25 mg, Per J Tube, Q12H   Or  metoprolol tartrate, 5 mg, Intravenous, Q12H  torsemide, 80 mg, Per J Tube, Daily      IV Meds:   lactated ringers, 9 mL/hr, Last Rate: 9 mL/hr (23 0737)        Results Reviewed:   I have personally reviewed the results from the time of this admission to 2023 10:19 EST     Results from last 7 days   Lab Units 02/09/23  0536 23  0458 23  0558 23  0636 23  0325   SODIUM  mmol/L 142 141 142 140 136   POTASSIUM mmol/L 3.3* 3.7 3.8 4.4 4.9   CHLORIDE mmol/L 102 101 101 102 104   CO2 mmol/L 32.3* 34.5* 31.2* 28.9 26.0   BUN mg/dL 23 19 20 22 24*   CREATININE mg/dL 0.61* 0.81 0.84 1.13 1.09   CALCIUM mg/dL 8.6 8.6 8.6 8.6 8.1*   BILIRUBIN mg/dL  --   --   --  0.5 0.6   ALK PHOS U/L  --   --   --  78 74   ALT (SGPT) U/L  --   --   --  260* 362*   AST (SGOT) U/L  --   --   --  77* 220*   GLUCOSE mg/dL 184* 160* 147* 128* 164*       Estimated Creatinine Clearance: 178.2 mL/min (A) (by C-G formula based on SCr of 0.61 mg/dL (L)).    Results from last 7 days   Lab Units 02/09/23 0536 02/08/23 0458 02/07/23 0558 02/06/23 0636 02/05/23  0325   MAGNESIUM mg/dL 1.9  --  2.0  --  2.1   PHOSPHORUS mg/dL 1.3* 2.7 2.6   < > 3.5    < > = values in this interval not displayed.       Results from last 7 days   Lab Units 02/06/23  0636   URIC ACID mg/dL 5.8       Results from last 7 days   Lab Units 02/09/23 0536 02/08/23 0458 02/07/23 0558 02/06/23 0636 02/05/23  0325   WBC 10*3/mm3 5.74 4.97 5.07 6.66 13.32*   HEMOGLOBIN g/dL 10.2* 10.3* 10.1* 10.1* 10.4*   PLATELETS 10*3/mm3 231 200 182 167 179             Assessment / Plan     ASSESSMENT:  1.  RAMIREZ with decrease urine output Fairly normal renal function at this time.  Baseline closer to 0.8 from previous labs.  Urine sodium <20.    Secondary to decompensated heart failure.  Diuresing well with Lasix  2. Anemia- hgb stable  3. Esoph adeno- s/p esophagectomy  4. Right sided pneumo- chest tube in place.  5. H/o bioprosthetic aortic valve        PLAN:  · Patient's volume status is improving.  · Continue torsemide at 80 mg daily.  · Replace potassium.  · Continue surveillance labs      Thank you for involving us in the care of Edmond Chase.  Please feel free to call with any questions.    Alex Hernadez MD  02/09/23  10:19 [facility]    [facility]  [phone]    Parts of this note may be an electronic  transcription/translation of spoken language to printed text using the Dragon dictation system.

## 2023-02-09 NOTE — PLAN OF CARE
Goal Outcome Evaluation:  Plan of Care Reviewed With: patient, family        Progress: improving  Outcome Evaluation: pt completed OT tx session. pt completed tsf, walking, tsf to toilet and toileting skills w SBA.. SBA w grooming skills. pt completed AROM 10x2 to incr strength/endurance. cont therapy to incr ADL, strength, balance, and tsf.

## 2023-02-09 NOTE — PROGRESS NOTES
"Nutrition Services    Patient Name:  Edmond Chase  YOB: 1960  MRN: [id number]  Admit Date:  2/3/2023    Assessment Date:  02/09/23    Comment: Nutrition follow up.  Currently a little SOB. TF's advanced to goal of 65mL/hr with Impact Peptide. + BM x3 on 2/8. Labs reviewed, Phos 1.3, K+ 3.3 today, replacement has been ordered. Chest tube and NG removed yesterday. May get discharged Friday.      Recommend changing to cyclic feeding rate of 85mL/hr x 18 hrs, and increasing water to 45mL/hr x 18 hrs. (5p-11a - pt can adjust at home to better fit his personal schedule). Discussed this with the pt and visitor. If Impact Peptide for home delivery not available, would use Peptamen 1.5.     RD to follow clinical course. nutrition support needs.    CLINICAL NUTRITION ASSESSMENT      Reason for Assessment Follow-up Protocol, Tube Feeding Assessment    Diagnosis/Problem Esophageal cancer  S/P: Bronchoscopy, right video-assisted thoracoscopy with total esophagectomy, intercostal nerve block, feeding jejunostomy tube placement on 2/3/23     Medical & Surgical Hx Past Medical History:   Diagnosis Date   • Abnormal nuclear stress test    • Anxiety and depression    • Aortic valve insufficiency     nonrheumtic    • Arthritis    • Ascending aortic aneurysm    • CAD (coronary artery disease)    • Cancer (HCC)    • Chest pain    • Diabetes mellitus (HCC)    • Dizzy     CAREFUL WHEN GETTING UP   • Dyspnea    • Esophageal cancer (HCC)    • Essential hypertension    • Fatigue    • GERD (gastroesophageal reflux disease)    • GI bleed    • History of recent blood transfusion    • Hyperglycemia    • Hyperlipidemia    • Hypersomnia with sleep apnea    • Lightheadedness    • Malaise and fatigue    • Migraines     \"OCCULAR MIGRAINES\"   • Morbid obesity (HCC)    • Myocardial ischemia    • Nocturia    • TJ on auto CPAP 10/31/2016    Overnight polysomnogram.  Weight 276 pounds.  Severe TJ with AHI 79 events per hour.  Auto " CPAP recommended.   • Pneumonia     FEB 2016   • Scrotal bleeding    • Shortness of breath    • SOB (shortness of breath)        Past Surgical History:   Procedure Laterality Date   • AORTIC VALVE REPAIR/REPLACEMENT  05/2016   • ASCENDING ARCH/HEMIARCH REPLACEMENT N/A 5/2/2016    Procedure: BHAVESH STERNOTOMY CORONARY ARTERY BYPASS GRAFT TIMES 3 USING LEFT INTERNAL MAMMARY ARTERY AND RIGHT GREATER SAPHENOUS VEIN GRAFT PER ENDOSCOPIC VEIN HARVESTING, AORTIC ANEURYSM REPAIR WITH ROOT REPAIR AND AORTIC VALVE REPLACEMENT;  Surgeon: Rosalio Cline MD;  Location: [facility] OR;  Service:    • CARDIAC CATHETERIZATION N/A 4/1/2016    Procedure: Left Heart Cath;  Surgeon: Erick Tam MD;  Location: [facility] CATH INVASIVE LOCATION;  Service:    • CARDIAC CATHETERIZATION N/A 4/1/2016    Procedure: Left ventriculography;  Surgeon: Erick Tam MD;  Location: [facility] CATH INVASIVE LOCATION;  Service:    • CARDIAC CATHETERIZATION N/A 4/1/2016    Procedure: Right Heart Cath;  Surgeon: Erick Tam MD;  Location: [facility] CATH INVASIVE LOCATION;  Service:    • CARDIAC CATHETERIZATION N/A 10/30/2017    Procedure: Coronary angiography;  Surgeon: Sorin Vasquez MD;  Location: [facility] CATH INVASIVE LOCATION;  Service:    • CARDIAC CATHETERIZATION  10/30/2017    Procedure: Saphenous Vein Graft;  Surgeon: Sorin Vasquez MD;  Location: [facility] CATH INVASIVE LOCATION;  Service:    • CARDIAC CATHETERIZATION N/A 10/30/2017    Procedure: Native mammary injection;  Surgeon: Sorin Vasquez MD;  Location: [facility] CATH INVASIVE LOCATION;  Service:    • CARDIAC CATHETERIZATION N/A 7/7/2020    Procedure: Coronary angiography;  Surgeon: Gregor Kim MD;  Location: [facility] CATH INVASIVE LOCATION;  Service: Cardiovascular;  Laterality: N/A;   • CARDIAC CATHETERIZATION N/A 7/7/2020    Procedure: Left heart cath;  Surgeon: Gregor Kim MD;  Location: [facility] CATH INVASIVE LOCATION;  Service: Cardiovascular;  Laterality: N/A;   • CARDIAC  CATHETERIZATION N/A 7/7/2020    Procedure: Left ventriculography;  Surgeon: Gregor Kim MD;  Location:  [facility] CATH INVASIVE LOCATION;  Service: Cardiovascular;  Laterality: N/A;   • CARDIAC CATHETERIZATION N/A 7/7/2020    Procedure: Stent ESMER bypass graft;  Surgeon: Gregor Kim MD;  Location:  [facility] CATH INVASIVE LOCATION;  Service: Cardiovascular;  Laterality: N/A;   • CARDIAC CATHETERIZATION N/A 6/17/2021    Procedure: SAPHENOUS VEIN GRAFT;  Surgeon: Marques Ndiaye MD;  Location:  [facility] CATH INVASIVE LOCATION;  Service: Cardiovascular;  Laterality: N/A;   • CARDIAC CATHETERIZATION N/A 6/17/2021    Procedure: Left Heart Cath;  Surgeon: Marques Ndiaye MD;  Location:  [facility] CATH INVASIVE LOCATION;  Service: Cardiovascular;  Laterality: N/A;   • CARDIAC CATHETERIZATION N/A 6/17/2021    Procedure: Coronary angiography;  Surgeon: Marques Ndiaye MD;  Location: [facility] CATH INVASIVE LOCATION;  Service: Cardiovascular;  Laterality: N/A;   • CARDIAC CATHETERIZATION N/A 7/14/2022    Procedure: Left Heart Cath;  Surgeon: Charlie Aj MD;  Location: [facility] CATH INVASIVE LOCATION;  Service: Cardiovascular;  Laterality: N/A;   • CARDIAC CATHETERIZATION N/A 7/14/2022    Procedure: Coronary angiography;  Surgeon: Charlie Aj MD;  Location:  [facility] CATH INVASIVE LOCATION;  Service: Cardiovascular;  Laterality: N/A;   • CARDIAC CATHETERIZATION  7/14/2022    Procedure: Saphenous Vein Graft;  Surgeon: Charlie Aj MD;  Location: [facility] CATH INVASIVE LOCATION;  Service: Cardiovascular;;   • CARDIAC CATHETERIZATION N/A 7/14/2022    Procedure: Native mammary injection;  Surgeon: Charlie Aj MD;  Location: [facility] CATH INVASIVE LOCATION;  Service: Cardiovascular;  Laterality: N/A;   • COLONOSCOPY N/A 11/26/2022    Procedure: COLONOSCOPY AT BEDSIDE;  Surgeon: Tomy Lopez MD;  Location: [facility] MAIN OR;  Service: Gastroenterology;  Laterality: N/A;   • COLONOSCOPY W/ POLYPECTOMY  "N/A 10/4/2022    Procedure: COLONOSCOPY to cecum with cold forceps and cold snare polypectomies;  Surgeon: Gregor Carlson MD;  Location: [facility] ENDOSCOPY;  Service: Gastroenterology;  Laterality: N/A;  PRE- hx of polyps  POST- diverticulosis, polyps   • CORONARY ARTERY BYPASS GRAFT  05/2016    LIMA TO LAD, SVG TO PDA, SVG TO OM2   • ENDOSCOPY N/A 7/13/2022    Procedure: ESOPHAGOGASTRODUODENOSCOPY;  Surgeon: Marcia Hollis MD;  Location: [facility] ENDOSCOPY;  Service: Gastroenterology;  Laterality: N/A;  PRE- ANEMIA, MELENA  POST- MILD EROSIVE GASTRITIS, GE JUNCTION ULCER   • ENDOSCOPY N/A 10/4/2022    Procedure: ESOPHAGOGASTRODUODENOSCOPY with biopsies;  Surgeon: Gregor Carlson MD;  Location: [facility] ENDOSCOPY;  Service: Gastroenterology;  Laterality: N/A;  PRE- hx of esophageal ulcer  POST- gastric cardia mass   • ESOPHAGOGASTRECTOMY Right 2/3/2023    Procedure: BRONCHOSCOPY, RIGHT ROBOTIC VIDEO ASSISTED THORACOSCOPY WITH TOTAL ESOPHAGECTOMY, INTERCOSTAL NERVE BLOCK, FEEDING JEJUNOSTOMY PLACEMENT;  Surgeon: Valerie Stockton MD;  Location: [facility] MAIN OR;  Service: Robotics - DaVinci;  Laterality: Right;   • INNER EAR SURGERY     • TONSILLECTOMY          Current Problems      Encounter Information        Nutrition History NPO. MD comments for TF to start at 10 ml/hr, do not advance. POD #1 esophagectomy & J-tube. This morning, NP for CTS indicated normal saline only @ 10 ml/hr and hold off on starting TF today.    Factors Affecting Intake altered GI function, swallow impairment   Tests/Procedures X-Ray, Other: surgery 2/3/23, chest x-rays (multiple)  \"some subcutaneous emphysema along right lateral chest wall and some left basilar density along the left diaphragm, likely atelectasis.\"     Anthropometrics        Current Height   Current Weight  BMI kg/m2 Height: 185 cm (72.84\")  Weight: (!) 137 kg (301 lb) (02/09/23 0500)  Body mass index is 39.89 kg/m².   Admission Weight    Ideal Body Weight (IBW) " "184 lb (172%)   Usual Body Weight (UBW) Mid-300's, trending down into lower 300's lb in past year   Weight Change/Trend Loss, stable since July 2022       Weight history: Wt Readings from Last 30 Encounters:   02/09/23 0500 (!) 137 kg (301 lb)   02/05/23 0300 (!) 147 kg (324 lb 8.3 oz)   02/04/23 1727 (!) 144 kg (317 lb 7.4 oz)   02/04/23 0600 (!) 144 kg (317 lb 10.9 oz)   02/03/23 1800 (!) 143 kg (315 lb 4.1 oz)   01/30/23 1509 (!) 142 kg (314 lb)   01/23/23 1440 (!) 142 kg (314 lb)   01/20/23 0756 (!) 142 kg (314 lb)   01/16/23 0949 (!) 141 kg (310 lb)   12/15/22 0618 (!) 142 kg (313 lb 11.4 oz)   12/14/22 1553 (!) 140 kg (308 lb)   12/14/22 1532 (!) 142 kg (313 lb)   12/14/22 1006 (!) 142 kg (313 lb)   12/14/22 0932 (!) 142 kg (313 lb)   12/06/22 1529 (!) 140 kg (308 lb 8 oz)   12/02/22 0600 (!) 143 kg (314 lb 6 oz)   12/01/22 0546 (!) 144 kg (317 lb 3.2 oz)   11/25/22 1123 (!) 141 kg (310 lb)   10/21/22 1052 (!) 142 kg (314 lb)   10/04/22 0934 (!) 140 kg (308 lb 11.2 oz)   09/15/22 0915 (!) 141 kg (310 lb)   08/29/22 1355 (!) 141 kg (311 lb 8 oz)   08/17/22 0900 (!) 142 kg (314 lb)   08/08/22 1032 (!) 144 kg (318 lb)   08/04/22 0814 (!) 143 kg (315 lb)   07/29/22 0744 (!) 142 kg (314 lb)   07/22/22 1247 (!) 142 kg (314 lb)   07/15/22 0505 (!) 146 kg (322 lb 1.5 oz)   07/12/22 1121 (!) 144 kg (318 lb)   07/11/22 0500 (!) 144 kg (318 lb 1.6 oz)   07/10/22 0618 (!) 148 kg (327 lb)   01/28/22 1108 (!) 148 kg (327 lb)   08/17/21 1007 (!) 150 kg (330 lb)   07/30/21 1016 (!) 149 kg (329 lb)   07/07/21 1225 (!) 154 kg (340 lb)   06/24/21 1105 (!) 154 kg (340 lb)   06/17/21 0812 (!) 150 kg (330 lb)   01/22/21 0823 (!) 152 kg (336 lb)   10/21/20 1044 (!) 150 kg (331 lb)   07/20/20 1331 (!) 145 kg (320 lb)   07/07/20 0821 (!) 141 kg (310 lb)        Estimated/Assessed Needs        Energy Requirements    Height for Calculation  Height: 185 cm (72.84\")   Weight for Calculation 144 kg (actual)   Method for Estimation  14 " kcal/kg   EST Needs (kcal/day) 2016       Protein Requirements    Weight for Calculation 83.6 kg (IBW used, BMI 41)   EST Protein Needs (g/kg) 2.0 gm/kg   EST Daily Needs (g/day) 167       Fluid Requirements     Method for Estimation 1 mL/kcal    Estimated Needs (mL/day) 2016       Fluid Deficit    Current Na Level (mEq/L)    Desired Na Level (mEq/L)    Estimated Fluid Deficit (L)       Labs        Pertinent Labs    Results from last 7 days   Lab Units 02/09/23 0536 02/08/23 0458 02/07/23 0558 02/06/23 0636 02/05/23  0325   SODIUM mmol/L 142 141 142 140 136   POTASSIUM mmol/L 3.3* 3.7 3.8 4.4 4.9   CHLORIDE mmol/L 102 101 101 102 104   CO2 mmol/L 32.3* 34.5* 31.2* 28.9 26.0   BUN mg/dL 23 19 20 22 24*   CREATININE mg/dL 0.61* 0.81 0.84 1.13 1.09   CALCIUM mg/dL 8.6 8.6 8.6 8.6 8.1*   BILIRUBIN mg/dL  --   --   --  0.5 0.6   ALK PHOS U/L  --   --   --  78 74   ALT (SGPT) U/L  --   --   --  260* 362*   AST (SGOT) U/L  --   --   --  77* 220*   GLUCOSE mg/dL 184* 160* 147* 128* 164*     Results from last 7 days   Lab Units 02/09/23 0536 02/08/23 0458 02/07/23 0558 02/06/23 0636 02/05/23  0325   MAGNESIUM mg/dL 1.9  --  2.0  --  2.1   PHOSPHORUS mg/dL 1.3*   < > 2.6   < > 3.5   HEMOGLOBIN g/dL 10.2*   < > 10.1*   < > 10.4*   HEMATOCRIT % 32.6*   < > 31.9*   < > 33.4*   WBC 10*3/mm3 5.74   < > 5.07   < > 13.32*   ALBUMIN g/dL 3.3*   < > 3.1*   < > 3.4*    < > = values in this interval not displayed.     Results from last 7 days   Lab Units 02/09/23 0536 02/08/23  0458 02/07/23  0558 02/06/23  0636 02/05/23  0325   PLATELETS 10*3/mm3 231 200 182 167 179     COVID19   Date Value Ref Range Status   07/10/2022 Not Detected Not Detected - Ref. Range Final     Lab Results   Component Value Date    HGBA1C 7.00 (H) 07/10/2022          Medications            Scheduled Medications acetaminophen, 1,000 mg, Per J Tube, TID  docusate sodium, 100 mg, Per J Tube, BID  gabapentin, 300 mg, Per J Tube, Q8H  heparin (porcine),  5,000 Units, Subcutaneous, Q8H  insulin regular, 0-9 Units, Subcutaneous, Q6H  lisinopril, 10 mg, Per J Tube, Q24H  metoprolol tartrate, 25 mg, Per J Tube, Q12H   Or  metoprolol tartrate, 5 mg, Intravenous, Q12H  torsemide, 80 mg, Per J Tube, Daily        Infusions lactated ringers, 9 mL/hr, Last Rate: 9 mL/hr (02/03/23 0737)        PRN Medications •  dextrose  •  dextrose  •  diazePAM  •  glucagon (human recombinant)  •  HYDROmorphone  •  labetalol  •  naloxone  •  nitroglycerin  •  [DISCONTINUED] ondansetron **OR** ondansetron  •  oxyCODONE  •  potassium phosphate infusion greater than 15 mMoles **OR** potassium phosphate infusion greater than 15 mMoles **OR** potassium phosphate **OR** sodium phosphate IVPB **OR** sodium phosphate IVPB     Physical Findings          Physical Appearance alert, obese, oriented, on oxygen therapy   Oral/Mouth Cavity WNL   Edema  lower extremity , 2+ (mild)   Gastrointestinal last bowel movement:2/8 x3   Skin  surgical incision - chest, neck, abdomen   Tubes/Drains jejunostomy    NFPE Not applicable at this time   --  Current Nutrition Orders & Evaluation of Intake       Oral Nutrition     Food Allergies NKFA   Current PO Diet NPO Diet NPO Type: Strict NPO   Supplement    PO Evaluation     Trending % PO Intake       Enteral Nutrition     Enteral Route Jejunostomy    TF Delivery Method Continuous    Enteral Product Impact Peptide 1.5    Modular     Propofol Rate/Kcal     TF Current Rate (mL) 65    TF Goal Rate (mL) 65    Current Water Flush (mL) 40mL/hr    Current TF Provision  2340 kcal, 146 gm pro, 1201 ml free water + 960 mL flush         Calories 116 % needs met         Protein  87 % needs met         TF Fluid (mL) 2161mL         IV Fluids     Avg Volume Delivered (mL) Not well documented    % Goal Volume     TF Residual  other:n/a    TF Changes rate increased    TF Tolerance tolerating     Nutrition Diagnosis        Nutrition Dx Problem 1 Problem: Needs Alternative  Route  Etiology: Medical Diagnosis and Factors Affecting Nutrition   -S/p esophagectomy d/t cancer  Signs/Symptoms: NPO, Report/Observation and Other (comment) - J-tube placed  :      INTERVENTION / PLAN OF CARE  Intervention Goal        Intervention Goal(s) Maintain nutrition status, Nutrition support treatment, Meet estimated needs, Disease management/therapy, Initiate TF/PN and No significant weight loss     Nutrition Intervention        RD Action Follow Tx Progress, Care plan reviewed and Recommend/ordered: EN Rx below     Prescription         Diet     Supplement/Snack    EN/PN  See below   Prescription Ordered       Enteral Prescription:     Enteral Route Jejunostomy    TF Delivery Method Cyclic x 18 hrs    Enteral Product Impact Peptide 1.5    Modular -    Propofol Rate/Kcal -    TF Start Rate 65 ml/hr     TF Goal Rate 85 ml/hr    Free Water Flush 45mL/hr    TF Provision at Goal: 2295 kcal, 143 gm pro, 1178 ml free water + 810 mL flush         Calories 114% needs met         Protein  86% needs met         Fluid (mL) 1988 mL     Prescription Ordered Yes       Monitor/Evaluation        Monitor Per protocol, I&O, Pertinent labs, EN delivery/tolerance, Weight, Skin status, GI status, Symptoms, POC/GOC   Discharge Plan Pending clinical course   Education Will instruct as appropriate           Education topic: Enteral nutrition, Tube feeding instruction     Resources given:  Other:  discussion     Advised regarding: Other: cyclic feedings     Learner:  Patient     Readiness to learn: Accepting     RD contact info provided: Yes     Outpatient referral:        RD to follow per protocol.       Electronically signed by:  Kristy Mejía RD  02/09/23 10:50 EST

## 2023-02-09 NOTE — PROGRESS NOTES
"    Patient Name: Edmond Chase  :1960  62 y.o.      Patient Care Team:  Nargis Velasquez APRN as PCP - General (Family Medicine)  Papa Miguel MD as Consulting Physician (Cardiology)  Sekou Pisano MD as Consulting Physician (Pulmonary Disease)  Gregor Carlson MD as Consulting Physician (Gastroenterology)  Estevan Yuan MD (Sports Medicine)  Shelley Goldsmith, RN as Nurse Navigator    Chief Complaint: follow up esophageal cancer, coronary artery disease, tissue aortic valve replacement    Interval History: doesn't looks like he feels as good as yesterday, more ill appearing.        Objective   Vital Signs  Temp:  [97.9 °F (36.6 °C)-99.3 °F (37.4 °C)] 97.9 °F (36.6 °C)  Heart Rate:  [84-93] 84  Resp:  [18] 18  BP: (144-159)/(75-92) 144/92    Intake/Output Summary (Last 24 hours) at 2023 1501  Last data filed at 2023 0810  Gross per 24 hour   Intake 1760 ml   Output --   Net 1760 ml     Flowsheet Rows    Flowsheet Row First Filed Value   Admission Height 185.4 cm (73\") Documented at 2023 1800   Admission Weight 143 kg (315 lb 4.1 oz) Documented at 2023 1800          Physical Exam:   General Appearance:    Alert, cooperative, in no acute distress   Lungs:     Clear to auscultation.  Normal respiratory effort and rate.      Heart:    Regular rhythm and normal rate, normal S1 and S2, + murmur, no gallops or rubs.     Chest Wall:    No abnormalities observed   Abdomen:     Soft, nontender, positive bowel sounds.     Extremities:   no cyanosis, clubbing or edema.  No marked joint deformities.  Adequate musculoskeletal strength.       Results Review:    Results from last 7 days   Lab Units 23  0536   SODIUM mmol/L 142   POTASSIUM mmol/L 3.3*   CHLORIDE mmol/L 102   CO2 mmol/L 32.3*   BUN mg/dL 23   CREATININE mg/dL 0.61*   GLUCOSE mg/dL 184*   CALCIUM mg/dL 8.6         Results from last 7 days   Lab Units 23  0536   WBC 10*3/mm3 5.74   HEMOGLOBIN " g/dL 10.2*   HEMATOCRIT % 32.6*   PLATELETS 10*3/mm3 231         Results from last 7 days   Lab Units 02/09/23  0536   MAGNESIUM mg/dL 1.9                   Medication Review:   acetaminophen, 1,000 mg, Per J Tube, TID  docusate sodium, 100 mg, Per J Tube, BID  gabapentin, 300 mg, Per J Tube, Q8H  heparin (porcine), 5,000 Units, Subcutaneous, Q8H  insulin regular, 0-9 Units, Subcutaneous, Q6H  lisinopril, 10 mg, Per J Tube, Q24H  metoprolol tartrate, 25 mg, Per J Tube, Q12H   Or  metoprolol tartrate, 5 mg, Intravenous, Q12H  torsemide, 80 mg, Per J Tube, Daily         lactated ringers, 9 mL/hr, Last Rate: 9 mL/hr (02/03/23 0737)        Assessment & Plan   1. Esophageal cancer status post total esophagectomy and j tube placement on 2/3/23 by Dr. Mahin elizondo NPO  2. Chest pain post operatively, likely surgical. Not similar to prior angina.  3. Coronary artery disease status post CABG ( 2/3 bypasses  occluded on repeat heart catheterization. Severe native vessel disease managed medically.   4. Bioprosthetic aortic valve replacement, normal function by TTE 12/14/2022  5. Diabetes mellitis type II  6. Hypertension, holding amlodipine due to lower extremity edema. Continue lisinopril and metoprolol. Reasonable control currently and trending better since the addition of home lisinopril. Will continue to follow.   7. Morbid obesity  8. Right sided pneumothorax, status post chest tube  9. RAMIREZ , nephrology following    BP ok given clinical scenario. On oral torsemide per nephrology.    VIKTORIA Delgadillo  [facility]  02/09/23  15:01 EST

## 2023-02-09 NOTE — PROGRESS NOTES
Rec'd call from patient. He was admitted last Friday. Wanted to talk about his hh plan. Explained he will have cm/DCP in hospital to help arrange that. Encouraged him to ask his nurse to have the CM/DCP contact him. He said he has spoken with someone about supplies and a dietician.    No other needs right now. I will f/u in a few weeks

## 2023-02-09 NOTE — NURSING NOTE
Tube feeding off at 1300 and resumed at 1700 per RD instruction.    Friend able to verbalize understanding of medication administration via G-tube as explained and demonstrated.

## 2023-02-09 NOTE — PLAN OF CARE
Goal Outcome Evaluation:   VSS. Remains on room air. NSR with PVC's noted. Overnight sleep study done, pt did not sleep stating he was too scared to sleep. Pt requested 02 and explained that sleep study was being done and his SP02 was 95% and 02 was not required. Pt verbalized understanding but still having anxiety r/t sleeping without 02. Impact peptide infusing @65ml/hr, goal rate. Up with SBA. Pain managed with PRN medication. Call light in reach.

## 2023-02-09 NOTE — THERAPY TREATMENT NOTE
Patient Name: Edmond Chase  : 1960    MRN: [id number]                              Today's Date: 2023       Admit Date: 2/3/2023    Visit Dx:     ICD-10-CM ICD-9-CM   1. Malignant neoplasm of lower third of esophagus (HCC)  C15.5 150.5     Patient Active Problem List   Diagnosis   • Essential hypertension   • Hyperglycemia   • Hyperlipidemia   • Class 3 severe obesity due to excess calories with serious comorbidity and body mass index (BMI) of 40.0 to 44.9 in adult ([facility])   • Nocturia   • Nonrheumatic aortic valve insufficiency   • Myocardial ischemia   • Coronary artery disease involving native coronary artery of native heart   • Ascending aortic aneurysm   • CAD in native artery   • S/P CABG (coronary artery bypass graft)   • S/P AVR (aortic valve replacement)   • Status post ascending aortic aneurysm repair   • Fatigue   • Abnormal nuclear stress test   • Coronary artery disease   • Coronary artery disease of bypass graft of native heart with stable angina pectoris ([facility])   • TJ on auto CPAP   • Hypersomnia due to medical condition   • Angina at rest ([facility])   • Type 2 diabetes mellitus, without long-term current use of insulin ([facility])   • Anemia   • Ulcer of esophagus without bleeding   • Polyp of colon   • Rectal bleeding   • Gastrointestinal hemorrhage   • Malignant neoplasm of lower third of esophagus (HCC)   • Gastrointestinal hemorrhage, unspecified gastrointestinal hemorrhage type   • Exertional chest pain   • Iron deficiency anemia     Past Medical History:   Diagnosis Date   • Abnormal nuclear stress test    • Anxiety and depression    • Aortic valve insufficiency     nonrheumtic    • Arthritis    • Ascending aortic aneurysm    • CAD (coronary artery disease)    • Cancer (HCC)    • Chest pain    • Diabetes mellitus ([facility])    • Dizzy     CAREFUL WHEN GETTING UP   • Dyspnea    • Esophageal cancer (HCC)    • Essential hypertension    • Fatigue    • GERD (gastroesophageal reflux disease)    • GI  "bleed    • History of recent blood transfusion    • Hyperglycemia    • Hyperlipidemia    • Hypersomnia with sleep apnea    • Lightheadedness    • Malaise and fatigue    • Migraines     \"OCCULAR MIGRAINES\"   • Morbid obesity (HCC)    • Myocardial ischemia    • Nocturia    • TJ on auto CPAP 10/31/2016    Overnight polysomnogram.  Weight 276 pounds.  Severe TJ with AHI 79 events per hour.  Auto CPAP recommended.   • Pneumonia     FEB 2016   • Scrotal bleeding    • Shortness of breath    • SOB (shortness of breath)      Past Surgical History:   Procedure Laterality Date   • AORTIC VALVE REPAIR/REPLACEMENT  05/2016   • ASCENDING ARCH/HEMIARCH REPLACEMENT N/A 5/2/2016    Procedure: BHAVESH STERNOTOMY CORONARY ARTERY BYPASS GRAFT TIMES 3 USING LEFT INTERNAL MAMMARY ARTERY AND RIGHT GREATER SAPHENOUS VEIN GRAFT PER ENDOSCOPIC VEIN HARVESTING, AORTIC ANEURYSM REPAIR WITH ROOT REPAIR AND AORTIC VALVE REPLACEMENT;  Surgeon: Rosalio Cline MD;  Location: [facility] OR;  Service:    • CARDIAC CATHETERIZATION N/A 4/1/2016    Procedure: Left Heart Cath;  Surgeon: Erick Tam MD;  Location: [facility] INVASIVE LOCATION;  Service:    • CARDIAC CATHETERIZATION N/A 4/1/2016    Procedure: Left ventriculography;  Surgeon: Erick Tam MD;  Location: [facility] CATH INVASIVE LOCATION;  Service:    • CARDIAC CATHETERIZATION N/A 4/1/2016    Procedure: Right Heart Cath;  Surgeon: Erick Tam MD;  Location: [facility] CATH INVASIVE LOCATION;  Service:    • CARDIAC CATHETERIZATION N/A 10/30/2017    Procedure: Coronary angiography;  Surgeon: Sorin Vasquez MD;  Location: [facility] CATH INVASIVE LOCATION;  Service:    • CARDIAC CATHETERIZATION  10/30/2017    Procedure: Saphenous Vein Graft;  Surgeon: Sorin Vasquez MD;  Location: [facility] CATH INVASIVE LOCATION;  Service:    • CARDIAC CATHETERIZATION N/A 10/30/2017    Procedure: Native mammary injection;  Surgeon: Sorin Vasquez MD;  Location: [facility] CATH INVASIVE LOCATION;  Service:    • " CARDIAC CATHETERIZATION N/A 7/7/2020    Procedure: Coronary angiography;  Surgeon: Gregor Kim MD;  Location:  [facility] CATH INVASIVE LOCATION;  Service: Cardiovascular;  Laterality: N/A;   • CARDIAC CATHETERIZATION N/A 7/7/2020    Procedure: Left heart cath;  Surgeon: Gregor Kim MD;  Location:  [facility] CATH INVASIVE LOCATION;  Service: Cardiovascular;  Laterality: N/A;   • CARDIAC CATHETERIZATION N/A 7/7/2020    Procedure: Left ventriculography;  Surgeon: Gregor Kim MD;  Location:  [facility] CATH INVASIVE LOCATION;  Service: Cardiovascular;  Laterality: N/A;   • CARDIAC CATHETERIZATION N/A 7/7/2020    Procedure: Stent ESMER bypass graft;  Surgeon: Gregor Kim MD;  Location:  [facility] CATH INVASIVE LOCATION;  Service: Cardiovascular;  Laterality: N/A;   • CARDIAC CATHETERIZATION N/A 6/17/2021    Procedure: SAPHENOUS VEIN GRAFT;  Surgeon: Marques Ndiaye MD;  [facility];  Service: Cardiovascular;  Laterality: N/A;   • CARDIAC CATHETERIZATION N/A 6/17/2021    Procedure: Left Heart Cath;  Surgeon: Marques Ndiaye MD;  Location:  [facility] CATH INVASIVE LOCATION;  Service: Cardiovascular;  Laterality: N/A;   • CARDIAC CATHETERIZATION N/A 6/17/2021    Procedure: Coronary angiography;  Surgeon: Marques Ndiaye MD;  [facility];  Service: Cardiovascular;  Laterality: N/A;   • CARDIAC CATHETERIZATION N/A 7/14/2022    Procedure: Left Heart Cath;  Surgeon: Charlie Aj MD;  [facility];  Service: Cardiovascular;  Laterality: N/A;   • CARDIAC CATHETERIZATION N/A 7/14/2022    Procedure: Coronary angiography;  Surgeon: Charlie Aj MD;  Location:  [facility] CATH INVASIVE LOCATION;  Service: Cardiovascular;  Laterality: N/A;   • CARDIAC CATHETERIZATION  7/14/2022    Procedure: Saphenous Vein Graft;  Surgeon: Charlie Aj MD;  Location:  [facility] CATH INVASIVE LOCATION;  Service: Cardiovascular;;   • CARDIAC  CATHETERIZATION N/A 7/14/2022    Procedure: Native mammary injection;  Surgeon: Charlie Aj MD;  Location: [facility] CATH INVASIVE LOCATION;  Service: Cardiovascular;  Laterality: N/A;   • COLONOSCOPY N/A 11/26/2022    Procedure: COLONOSCOPY AT BEDSIDE;  Surgeon: Tomy Lopez MD;  Location: [facility] OR;  Service: Gastroenterology;  Laterality: N/A;   • COLONOSCOPY W/ POLYPECTOMY N/A 10/4/2022    Procedure: COLONOSCOPY to cecum with cold forceps and cold snare polypectomies;  Surgeon: Gregor Carlson MD;  Location: [facility] ENDOSCOPY;  Service: Gastroenterology;  Laterality: N/A;  PRE- hx of polyps  POST- diverticulosis, polyps   • CORONARY ARTERY BYPASS GRAFT  05/2016    LIMA TO LAD, SVG TO PDA, SVG TO OM2   • ENDOSCOPY N/A 7/13/2022    Procedure: ESOPHAGOGASTRODUODENOSCOPY;  Surgeon: Marcia Hollis MD;  Location: [facility] ENDOSCOPY;  Service: Gastroenterology;  Laterality: N/A;  PRE- ANEMIA, MELENA  POST- MILD EROSIVE GASTRITIS, GE JUNCTION ULCER   • ENDOSCOPY N/A 10/4/2022    Procedure: ESOPHAGOGASTRODUODENOSCOPY with biopsies;  Surgeon: Gregor Carlson MD;  Location: [facility] ENDOSCOPY;  Service: Gastroenterology;  Laterality: N/A;  PRE- hx of esophageal ulcer  POST- gastric cardia mass   • ESOPHAGOGASTRECTOMY Right 2/3/2023    Procedure: BRONCHOSCOPY, RIGHT ROBOTIC VIDEO ASSISTED THORACOSCOPY WITH TOTAL ESOPHAGECTOMY, INTERCOSTAL NERVE BLOCK, FEEDING JEJUNOSTOMY PLACEMENT;  Surgeon: Valerie Stockton MD;  Location: [facility] MAIN OR;  Service: Robotics - DaVinci;  Laterality: Right;   • INNER EAR SURGERY     • TONSILLECTOMY        General Information     Row Name 02/09/23 1123          Physical Therapy Time and Intention    Document Type therapy note (daily note)  -PH     Mode of Treatment physical therapy  -PH     Row Name 02/09/23 1123          General Information    Existing Precautions/Restrictions fall  -PH     Row Name 02/09/23 1123          Cognition    Orientation Status (Cognition)  oriented x 4  -PH     Row Name 02/09/23 1123          Safety Issues, Functional Mobility    Impairments Affecting Function (Mobility) endurance/activity tolerance;strength;balance  -PH     Comment, Safety Issues/Impairments (Mobility) gt belt and non skid socks donned; ng tube  -PH           User Key  (r) = Recorded By, (t) = Taken By, (c) = Cosigned By    Initials Name Provider Type    PH Lexi Alanis PTA Physical Therapist Assistant               Mobility     Row Name 02/09/23 1124          Bed Mobility    Bed Mobility sit-supine  -PH     Sit-Supine Andrews (Bed Mobility) modified independence  -PH     Assistive Device (Bed Mobility) bed rails;head of bed elevated  -PH     Comment, (Bed Mobility) UIC at start of session  -PH     Row Name 02/09/23 1124          Sit-Stand Transfer    Sit-Stand Andrews (Transfers) standby assist  -PH     Assistive Device (Sit-Stand Transfers) walker, front-wheeled  -PH     Comment, (Sit-Stand Transfer) 2x; 1x from chair and 1x from EOB  -PH     Row Name 02/09/23 1124          Gait/Stairs (Locomotion)    Andrews Level (Gait) contact guard;verbal cues;standby assist  -PH     Assistive Device (Gait) walker, front-wheeled;other (see comments)  HHA  -PH     Distance in Feet (Gait) 12' to BR toilet, 30' w/ use of fww, 120' w/ no AD / HHA  -PH     Deviations/Abnormal Patterns (Gait) krystin decreased;gait speed decreased  -PH     Bilateral Gait Deviations forward flexed posture;heel strike decreased  -PH     Andrews Level (Stairs) not tested  -PH     Comment, (Gait/Stairs) slow w/ no LOB; Trialed no AD today as it is pt's BL; pt steady w/ use of fww and mildly unsteady req CGA/HHA w/ no AD  -PH           User Key  (r) = Recorded By, (t) = Taken By, (c) = Cosigned By    Initials Name Provider Type     Lexi Alanis PTA Physical Therapist Assistant               Obj/Interventions     Row Name 02/09/23 1126          Balance    Balance Assessment  sitting static balance;standing static balance  -PH     Static Sitting Balance modified independence  -PH     Static Standing Balance standby assist  -PH     Position/Device Used, Standing Balance walker, front-wheeled  -PH           User Key  (r) = Recorded By, (t) = Taken By, (c) = Cosigned By    Initials Name Provider Type     Lexi Alanis PTA Physical Therapist Assistant               Goals/Plan    No documentation.                Clinical Impression     Row Name 02/09/23 1127          Pain    Pretreatment Pain Rating 3/10  -PH     Posttreatment Pain Rating 3/10  -PH     Pain Intervention(s) Rest  -PH     Row Name 02/09/23 1127          Plan of Care Review    Plan of Care Reviewed With patient  -PH     Progress no change  -PH     Outcome Evaluation Pt seen by PT this AM for treatment. Pt UIC and stood w/ SBA and use of fww. Pt amb to BR then out in hallway w/ SBA and use of fww. Pt trialed amb w/o fww and req CGA and HHA. Pt steadier w/ use of fww. Rec fww for home after dc. Notified CCP. Pt returned to bed after gait stating he was fatigued and had not been sleeping at hospital. Sit to supine req SBA. PT will prog as pt carey.  -PH     Row Name 02/09/23 1127          Vital Signs    O2 Delivery Pre Treatment room air  -PH     O2 Delivery Intra Treatment room air  -PH     O2 Delivery Post Treatment room air  -PH     Row Name 02/09/23 1127          Positioning and Restraints    Pre-Treatment Position sitting in chair/recliner  -PH     Post Treatment Position bed  -PH     In Chair reclined;call light within reach;encouraged to call for assist;exit alarm on  -PH           User Key  (r) = Recorded By, (t) = Taken By, (c) = Cosigned By    Initials Name Provider Type     Lexi Alanis PTA Physical Therapist Assistant               Outcome Measures     Row Name 02/09/23 1130          How much help from another person do you currently need...    Turning from your back to your side while in flat bed  without using bedrails? 4  -PH     Moving from lying on back to sitting on the side of a flat bed without bedrails? 3  -PH     Moving to and from a bed to a chair (including a wheelchair)? 3  -PH     Standing up from a chair using your arms (e.g., wheelchair, bedside chair)? 4  -PH     Climbing 3-5 steps with a railing? 3  -PH     To walk in hospital room? 3  -PH     AM-PAC 6 Clicks Score (PT) 20  -PH     Highest level of mobility 6 --> Walked 10 steps or more  -PH     Row Name 02/09/23 1130          Functional Assessment    Outcome Measure Options AM-PAC 6 Clicks Basic Mobility (PT)  -           User Key  (r) = Recorded By, (t) = Taken By, (c) = Cosigned By    Initials Name Provider Type    Lexi Ceja PTA Physical Therapist Assistant                             Physical Therapy Education     Title: PT OT SLP Therapies (Done)     Topic: Physical Therapy (Done)     Point: Mobility training (Done)     Learning Progress Summary           Patient Acceptance, E,D, VU,DU by  at 2/9/2023 1130    Acceptance, E,D, VU,DU by  at 2/8/2023 0848    Acceptance, E,D, VU by  at 2/7/2023 1029    Acceptance, E, VU by  at 2/6/2023 1739                   Point: Home exercise program (Done)     Learning Progress Summary           Patient Acceptance, E,D, VU by  at 2/7/2023 1029    Acceptance, E, VU by JF at 2/6/2023 1739                   Point: Body mechanics (Done)     Learning Progress Summary           Patient Acceptance, E,D, VU,DU by  at 2/9/2023 1130    Acceptance, E,D, VU,DU by  at 2/8/2023 0848    Acceptance, E,D, VU by  at 2/7/2023 1029    Acceptance, E, VU by JF at 2/6/2023 1739                   Point: Precautions (Done)     Learning Progress Summary           Patient Acceptance, E,D, VU,DU by  at 2/9/2023 1130    Acceptance, E,D, VU,DU by  at 2/8/2023 0848    Acceptance, E,D, VU by  at 2/7/2023 1029    Acceptance, E, VU by ALLAN at 2/6/2023 6722                               User Key      Initials Effective Dates Name Provider Type Discipline    PH 06/16/21 -  Lexi Alanis PTA Physical Therapist Assistant PT    JF 01/11/22 -  Naila Grimm, RN Registered Nurse Nurse              PT Recommendation and Plan     Plan of Care Reviewed With: patient  Progress: no change  Outcome Evaluation: Pt seen by PT this AM for treatment. Pt UIC and stood w/ SBA and use of fww. Pt amb to BR then out in hallway w/ SBA and use of fww. Pt trialed amb w/o fww and req CGA and HHA. Pt steadier w/ use of fww. Rec fww for home after dc. Notified CCP. Pt returned to bed after gait stating he was fatigued and had not been sleeping at hospital. Sit to supine req SBA. PT will prog as pt carey.     Time Calculation:    PT Charges     Row Name 02/09/23 1132             Time Calculation    Start Time 1008  -PH      Stop Time 1024  -PH      Time Calculation (min) 16 min  -PH      PT Received On 02/09/23  -PH      PT - Next Appointment 02/10/23  -PH         Timed Charges    25070 - PT Therapeutic Activity Minutes 16  -PH         Total Minutes    Timed Charges Total Minutes 16  -PH       Total Minutes 16  -PH            User Key  (r) = Recorded By, (t) = Taken By, (c) = Cosigned By    Initials Name Provider Type    PH Lexi Alanis PTA Physical Therapist Assistant              Therapy Charges for Today     Code Description Service Date Service Provider Modifiers Qty    08469856981 HC PT THERAPEUTIC ACT EA 15 MIN 2/8/2023 Lexi Alanis PTA GP 1    44270883028 HC PT THERAPEUTIC ACT EA 15 MIN 2/9/2023 Lexi Alanis PTA GP 1          PT G-Codes  Outcome Measure Options: AM-PAC 6 Clicks Basic Mobility (PT)  AM-PAC 6 Clicks Score (PT): 20  AM-PAC 6 Clicks Score (OT): 16  PT Discharge Summary  Anticipated Discharge Disposition (PT): home with home health, home with assist    Lexi Alanis PTA  2/9/2023

## 2023-02-09 NOTE — PLAN OF CARE
"  Problem: Adult Inpatient Plan of Care  Goal: Plan of Care Review  Outcome: Ongoing, Progressing  Flowsheets  Taken 2/9/2023 1422 by Arely Lane, RN  Plan of Care Reviewed With: patient  Outcome Evaluation: Patient alert and oriented despite, drinking small amount of water from swab cup, getting up to bathroom w/o assistance, states he \"just don't know what I'm going sometimes\". All cups and fluids removed from bedside. Appears anxious and fearful. Reports being SOA despite adequate saturation. 2 LPM applied for comfort. Denies pain. PRN Valium Given for anxiety. Appears to be resting, eyes closed. Reportedly up all noc. Valve click and PVC's noted, Incisions dermabounded, LUCIE, no drainage. No ABD binder in place r/t girth. Monitoring and supporting.  Taken 2/9/2023 1259 by Sara Kennedy, OTR  Progress: improving  Goal: Patient-Specific Goal (Individualized)  Outcome: Ongoing, Progressing  Goal: Absence of Hospital-Acquired Illness or Injury  Outcome: Ongoing, Progressing  Intervention: Identify and Manage Fall Risk  Description: Perform standard risk assessment on admission using a validated tool or comprehensive approach appropriate to the patient; reassess fall risk frequently, with change in status or transfer to another level of care.  Communicate fall injury risk to interprofessional healthcare team.  Determine need for increased observation, equipment and environmental modification, such as low bed, signage and supportive, nonskid footwear.  Adjust safety measures to individual developmental age, stage and identified risk factors.  Reinforce the importance of safety and physical activity with patient and family.  Perform regular intentional rounding to assess need for position change, pain assessment and personal needs, including assistance with toileting.  Recent Flowsheet Documentation  Taken 2/9/2023 1400 by Arely Lane, RN  Safety Promotion/Fall Prevention:   clutter free environment " maintained   assistive device/personal items within reach   lighting adjusted   nonskid shoes/slippers when out of bed   room organization consistent   safety round/check completed  Taken 2/9/2023 1200 by Arely Lane, RN  Safety Promotion/Fall Prevention:   activity supervised   assistive device/personal items within reach   fall prevention program maintained   nonskid shoes/slippers when out of bed   room organization consistent   safety round/check completed  Taken 2/9/2023 1000 by Arely Lane, RN  Safety Promotion/Fall Prevention:   activity supervised   assistive device/personal items within reach   clutter free environment maintained   fall prevention program maintained   nonskid shoes/slippers when out of bed   room organization consistent   safety round/check completed   lighting adjusted  Taken 2/9/2023 0810 by Arely Lane, RN  Safety Promotion/Fall Prevention:   activity supervised   assistive device/personal items within reach   clutter free environment maintained   fall prevention program maintained   nonskid shoes/slippers when out of bed   room organization consistent   safety round/check completed  Intervention: Prevent Skin Injury  Description: Perform a screening for skin injury risk, such as pressure or moisture associated skin damage on admission and at regular intervals throughout hospital stay.  Keep all areas of skin (especially folds) clean and dry.  Maintain adequate skin hydration.  Relieve and redistribute pressure and protect bony prominences; implement measures based on patient-specific risk factors.  Match turning and repositioning schedule to clinical condition.  Encourage weight shift frequently; assist with reposition if unable to complete independently.  Float heels off bed; avoid pressure on the Achilles tendon.  Keep skin free from extended contact with medical devices.  Encourage functional activity and mobility, as early as tolerated.  Use aids (e.g., slide boards,  mechanical lift) during transfer.  Recent Flowsheet Documentation  Taken 2/9/2023 1400 by Arely Lane RN  Body Position: (up in recliner.) --  Taken 2/9/2023 1200 by Arely Lane RN  Body Position: (up in chair.) --  Taken 2/9/2023 1000 by Arely Lane RN  Body Position: (up in recliner)   weight shifting   position changed independently  Taken 2/9/2023 0810 by Arely Lane RN  Body Position:   position changed independently   neutral body alignment   neutral head position  Intervention: Prevent and Manage VTE (Venous Thromboembolism) Risk  Description: Assess for VTE (venous thromboembolism) risk.  Encourage and assist with early ambulation.  Initiate and maintain compression or other therapy, as indicated, based on identified risk in accordance with organizational protocol and provider order.  Encourage both active and passive leg exercises while in bed, if unable to ambulate.  Recent Flowsheet Documentation  Taken 2/9/2023 1400 by Arely Lane RN  Activity Management:   activity adjusted per tolerance   ambulated to bathroom  Taken 2/9/2023 1000 by Arely Lane RN  Activity Management: activity adjusted per tolerance  Taken 2/9/2023 0810 by Arely Lane RN  Activity Management:   activity adjusted per tolerance   ambulated in room  Intervention: Prevent Infection  Description: Maintain skin and mucous membrane integrity; promote hand, oral and pulmonary hygiene.  Optimize fluid balance, nutrition, sleep and glycemic control to maximize infection resistance.  Identify potential sources of infection early to prevent or mitigate progression of infection (e.g., wound, lines, devices).  Evaluate ongoing need for invasive devices; remove promptly when no longer indicated.  Recent Flowsheet Documentation  Taken 2/9/2023 1400 by Arely Lane RN  Infection Prevention:   environmental surveillance performed   equipment surfaces disinfected   hand hygiene promoted   personal protective equipment  utilized   rest/sleep promoted   single patient room provided   visitors restricted/screened  Taken 2/9/2023 1200 by Arely Lane RN  Infection Prevention:   environmental surveillance performed   equipment surfaces disinfected   hand hygiene promoted   personal protective equipment utilized   rest/sleep promoted   single patient room provided   visitors restricted/screened  Taken 2/9/2023 1000 by Arely Lane RN  Infection Prevention:   environmental surveillance performed   equipment surfaces disinfected   hand hygiene promoted   personal protective equipment utilized   rest/sleep promoted   single patient room provided   visitors restricted/screened  Taken 2/9/2023 0810 by Arely Lane RN  Infection Prevention:   environmental surveillance performed   equipment surfaces disinfected   hand hygiene promoted   personal protective equipment utilized   single patient room provided   rest/sleep promoted   visitors restricted/screened  Goal: Optimal Comfort and Wellbeing  Outcome: Ongoing, Progressing  Intervention: Provide Person-Centered Care  Description: Use a family-focused approach to care.  Develop trust and rapport by proactively providing information, encouraging questions, addressing concerns and offering reassurance.  Acknowledge emotional response to hospitalization.  Recognize and utilize personal coping strategies.  Honor spiritual and cultural preferences.  Recent Flowsheet Documentation  Taken 2/9/2023 1200 by Arely Lane RN  Trust Relationship/Rapport:   choices provided   care explained   emotional support provided   empathic listening provided   questions answered   reassurance provided   questions encouraged   thoughts/feelings acknowledged  Taken 2/9/2023 0810 by Arely Lane RN  Trust Relationship/Rapport:   care explained   choices provided   emotional support provided   questions answered   empathic listening provided   questions encouraged   reassurance provided    thoughts/feelings acknowledged  Goal: Readiness for Transition of Care  Outcome: Ongoing, Progressing     Problem: Fall Injury Risk  Goal: Absence of Fall and Fall-Related Injury  Outcome: Ongoing, Progressing  Intervention: Identify and Manage Contributors  Description: Develop a fall prevention plan with the patient and caregiver/family.  Provide reorientation, appropriate sensory stimulation and routines with changes in mental status to decrease risk of fall.  Promote use of personal vision and auditory aids.  Assess assistance level required for safe and effective self-care; provide support as needed, such as toileting, mobilization. For age 65 and older, implement timed toileting with assistance.  Encourage physical activity, such as performance of mobility and self-care at highest level of patient ability, multicomponent exercise program and provision of appropriate assistive devices.  If fall occurs, assess the severity of injury; implement fall injury protocol. Determine the cause and revise fall injury prevention plan.  Regularly review medication contribution to fall risk; adjust medication administration times to minimize risk of falling.  Consider risk related to polypharmacy and age.  Balance adequate pain management with potential for oversedation.  Recent Flowsheet Documentation  Taken 2/9/2023 1400 by Arely Lane RN  Medication Review/Management:   medications reviewed   high-risk medications identified  Taken 2/9/2023 1200 by Arely Lane RN  Medication Review/Management:   medications reviewed   high-risk medications identified  Taken 2/9/2023 1000 by Arely Lane RN  Medication Review/Management:   medications reviewed   high-risk medications identified  Taken 2/9/2023 0810 by Arely Lane, RN  Medication Review/Management:   medications reviewed   high-risk medications identified  Intervention: Promote Injury-Free Environment  Description: Provide a safe, barrier-free environment that  encourages independent activity.  Keep care area uncluttered and well-lighted.  Determine need for increased observation or monitoring.  Avoid use of devices that minimize mobility, such as restraints or indwelling urinary catheter.  Recent Flowsheet Documentation  Taken 2/9/2023 1400 by Arely Lane, RN  Safety Promotion/Fall Prevention:   clutter free environment maintained   assistive device/personal items within reach   lighting adjusted   nonskid shoes/slippers when out of bed   room organization consistent   safety round/check completed  Taken 2/9/2023 1200 by Arely Lane, RN  Safety Promotion/Fall Prevention:   activity supervised   assistive device/personal items within reach   fall prevention program maintained   nonskid shoes/slippers when out of bed   room organization consistent   safety round/check completed  Taken 2/9/2023 1000 by Arely Lane, RN  Safety Promotion/Fall Prevention:   activity supervised   assistive device/personal items within reach   clutter free environment maintained   fall prevention program maintained   nonskid shoes/slippers when out of bed   room organization consistent   safety round/check completed   lighting adjusted  Taken 2/9/2023 0810 by Arely Lane, RN  Safety Promotion/Fall Prevention:   activity supervised   assistive device/personal items within reach   clutter free environment maintained   fall prevention program maintained   nonskid shoes/slippers when out of bed   room organization consistent   safety round/check completed  Goal: Absence of Fall and Fall-Related Injury  Outcome: Ongoing, Progressing  Intervention: Identify and Manage Contributors  Description: Develop a fall prevention plan with the patient and caregiver/family.  Provide reorientation, appropriate sensory stimulation and routines with changes in mental status to decrease risk of fall.  Promote use of personal vision and auditory aids.  Assess assistance level required for safe and  effective self-care; provide support as needed, such as toileting, mobilization. For age 65 and older, implement timed toileting with assistance.  Encourage physical activity, such as performance of mobility and self-care at highest level of patient ability, multicomponent exercise program and provision of appropriate assistive devices.  If fall occurs, assess the severity of injury; implement fall injury protocol. Determine the cause and revise fall injury prevention plan.  Regularly review medication contribution to fall risk; adjust medication administration times to minimize risk of falling.  Consider risk related to polypharmacy and age.  Balance adequate pain management with potential for oversedation.  Recent Flowsheet Documentation  Taken 2/9/2023 1400 by Arely Lane, RN  Medication Review/Management:   medications reviewed   high-risk medications identified  Taken 2/9/2023 1200 by Arely Lane, RN  Medication Review/Management:   medications reviewed   high-risk medications identified  Taken 2/9/2023 1000 by Arely Lane, RN  Medication Review/Management:   medications reviewed   high-risk medications identified  Taken 2/9/2023 0810 by Arely Lane, RN  Medication Review/Management:   medications reviewed   high-risk medications identified  Intervention: Promote Injury-Free Environment  Description: Provide a safe, barrier-free environment that encourages independent activity.  Keep care area uncluttered and well-lighted.  Determine need for increased observation or monitoring.  Avoid use of devices that minimize mobility, such as restraints or indwelling urinary catheter.  Recent Flowsheet Documentation  Taken 2/9/2023 1400 by Arely Lane, RN  Safety Promotion/Fall Prevention:   clutter free environment maintained   assistive device/personal items within reach   lighting adjusted   nonskid shoes/slippers when out of bed   room organization consistent   safety round/check completed  Taken  2/9/2023 1200 by Arely Lane, RN  Safety Promotion/Fall Prevention:   activity supervised   assistive device/personal items within reach   fall prevention program maintained   nonskid shoes/slippers when out of bed   room organization consistent   safety round/check completed  Taken 2/9/2023 1000 by Arely Lane, RN  Safety Promotion/Fall Prevention:   activity supervised   assistive device/personal items within reach   clutter free environment maintained   fall prevention program maintained   nonskid shoes/slippers when out of bed   room organization consistent   safety round/check completed   lighting adjusted  Taken 2/9/2023 0810 by Arely Lane, RN  Safety Promotion/Fall Prevention:   activity supervised   assistive device/personal items within reach   clutter free environment maintained   fall prevention program maintained   nonskid shoes/slippers when out of bed   room organization consistent   safety round/check completed     Problem: Skin Injury Risk Increased  Goal: Skin Health and Integrity  Outcome: Ongoing, Progressing  Intervention: Optimize Skin Protection  Description: Perform a full pressure injury risk assessment, as indicated by screening, upon admission to care unit.  Reassess skin (injury risk, full inspection) frequently (e.g., scheduled interval, with change in condition) to provide optimal early detection and prevention.  Maintain adequate tissue perfusion (e.g., encourage fluid balance; avoid crossing legs, constrictive clothing or devices) to promote tissue oxygenation.  Maintain head of bed at lowest degree of elevation tolerated, considering medical condition and other restrictions.  Avoid positioning onto an area that remains reddened.  Minimize incontinence and moisture (e.g., toileting schedule; moisture-wicking pad, diaper or incontinence collection device; skin moisture barrier).  Cleanse skin promptly and gently when soiled utilizing a pH-balanced cleanser.  Relieve and  redistribute pressure (e.g., scheduled position changes, weight shifts, use of support surface, medical device repositioning, protective dressing application, use of positioning device, microclimate control, use of pressure-injury-monitor  Encourage increased activity, such as sitting in a chair at the bedside or early mobilization, when able to tolerate.  Recent Flowsheet Documentation  Taken 2/9/2023 1400 by Arely Lane RN  Head of Bed (HOB) Positioning: HOB at 30-45 degrees  Taken 2/9/2023 1200 by Arely Lane RN  Head of Bed (HOB) Positioning: HOB at 30-45 degrees  Taken 2/9/2023 1000 by Arely Lane RN  Head of Bed (HOB) Positioning: HOB at 30-45 degrees  Taken 2/9/2023 0810 by Arely Lane RN  Pressure Reduction Techniques: heels elevated off bed  Head of Bed (HOB) Positioning: HOB at 30-45 degrees  Pressure Reduction Devices:   alternating pressure pump (ADD)   pressure-redistributing mattress utilized     Problem: Chest Pain  Goal: Resolution of Chest Pain Symptoms  Outcome: Ongoing, Progressing     Problem: Device-Related Complication Risk (Enteral Nutrition)  Goal: Safe, Effective Therapy Delivery  Outcome: Ongoing, Progressing  Intervention: Prevent Feeding-Related Adverse Events  Description: Utilize aseptic technique when initiating and handling enteral feeding system.  Avoid tubing misconnections by labelling and securing all tubing and connections; trace all tubing back to origin.  Monitor and manage integrity of enteral access device (e.g., tension, pulling, connections, obstructions, leaking, cracks).  Stabilize and secure access device (e.g., abdominal binder, securement device).  Flush tube regularly (e.g., every 4 hours, before and after medication delivery, after intermittent feeding) to maintain tube patency; use purified water if immunocompromised or critically ill.  Review medications for drug-nutrient incompatibility and suitability of administration through enteral  "tube.  Manage tube occlusion (e.g., warm water flushes, enzymatic agent, mechanical dislodgement device).  Manage medication delivery (e.g., use clean enteral syringes, dilute powdered medication with water, use liquid dosage forms when available).  Recent Flowsheet Documentation  Taken 2/9/2023 0810 by Arely Lane RN  Enteral Feeding Safety: placement checked     Problem: Feeding Intolerance (Enteral Nutrition)  Goal: Feeding Tolerance  Outcome: Ongoing, Progressing   Goal Outcome Evaluation:  Plan of Care Reviewed With: patient           Outcome Evaluation: Patient alert and oriented despite, drinking small amount of water from swab cup, getting up to bathroom w/o assistance, states he \"just don't know what I'm going sometimes\". All cups and fluids removed from bedside. Appears anxious and fearful. Reports being SOA despite adequate saturation. 2 LPM applied for comfort. Denies pain. PRN Valium Given for anxiety. Appears to be resting, eyes closed. Reportedly up all noc. Valve click and PVC's noted, Incisions dermabounded, LUCIE, no drainage. No ABD binder in place r/t girth. Monitoring and supporting.  "

## 2023-02-09 NOTE — PROGRESS NOTES
"  POST-OPERATIVE NOTE     Chief Complaint: Esophageal cancer, postoperative care  S/P: Bronchoscopy, right video-assisted thoracoscopy with total esophagectomy, intercostal nerve block, feeding jejunostomy tube placement  POD # 6    Subjective:  Symptoms:  Stable.  He reports weakness and anxiety.  No shortness of breath.  Chest pain: Surgical incisions tenderness.    Diet:  NPO.  No nausea or vomiting.    Activity level: Impaired due to weakness.    Pain:  He complains of pain that is mild.  Pain is well controlled.    Patient is anxious    Objective:  General Appearance:  Ill-appearing, in no acute distress and comfortable.    Vital signs: (most recent): Blood pressure 149/88, pulse 88, temperature 98.8 °F (37.1 °C), temperature source Oral, resp. rate 18, height 185 cm (72.84\"), weight (!) 137 kg (301 lb), SpO2 95 %.  Vital signs are normal.  No fever.    Output: Producing urine (Narvaez catheter in place).    Lungs:  Normal effort and normal respiratory rate.  He is not in respiratory distress.  No rales, wheezes or rhonchi.    Heart: Normal rate.  Regular rhythm.    Chest: Symmetric chest wall expansion. Chest wall tenderness present.    Abdomen: Abdomen is soft.  Absent bowel sounds.   There is generalized tenderness.  There is incisional tenderness.    Extremities: Decreased range of motion.  There is dependent edema (Bilateral lower extremities).    Pulses: Distal pulses are intact.    Neurological: Patient is alert and oriented to person, place and time.    Skin:  Warm and dry.  (Postoperative incisions well approximated and intact)            Results Review:     I reviewed the patient's new clinical results.  I reviewed the patient's new imaging results and agree with the interpretation.  I reviewed the patient's other test results and agree with the interpretation  Discussed with Patient, RN, Dr. Stockton    Assessment & Plan      Patient is POD #6, s/p total esophagectomy.      Patient is moderately anxious " today and reports some shortness of breath attributed to anxiety. He is responsive to verbal redirection.  Provide supplemental oxygen as needed for comfort.  His pain is well controlled on current analgesic medications.  This morning's chest x-ray remains primarily unchanged compared to previous imaging.  There remains a trace right apical pneumothorax..    He is tolerating continuous tube feeds.  We will transition to him to cyclic feeds starting this afternoon per RD recommendations which is 85 mL/h x 18 hours of Impact peptide along with 45 mL/h x 18 hours of free water.  He will need high-protein formula to promote healing given his recent surgery.  He will also require separate feed and flush bags to ensure patency of the feeding tube.    RAMIREZ with oliguria: Diuresis per nephrology.  Continue torsemide 80 mg daily.  Creat 0.6.    Hypokalemia and hypophosphatemia: Replace per electrolyte replacement protocol.  Continue to trend with morning labs.     Hypertension: Antihypertensives per cardiology's recommendations.  Blood pressure remains stable and his heart rate is controlled.    TJ: Absolutely no BiPAP.  Patient will require supplemental oxygen via nasal cannula at night which has been arranged.  Reinforced education regarding BiPAP contraindication at this time and risks associated with nonadherence to medical recommendation.  Patient is agreeable.    Ensure strict n.p.o. status.  Patient was observed to have taken a small sip of water from what was intended to be a cup for oral swabs.  Reinforced education regarding contraindication of p.o. intake at this time.     Possible discharge home tomorrow pending stable clinical course.    DVT prophylaxis: Subcutaneous heparin.    VIKTORIA Hughes  [facility]  02/09/23  14:34 EST    Patient was seen and assessed while wearing personal protective equipment including facemask, protective eyewear and gloves.  Hand hygiene performed prior to  entering the room and upon exiting with doffing of gloves.

## 2023-02-09 NOTE — PLAN OF CARE
Goal Outcome Evaluation:  Plan of Care Reviewed With: patient        Progress: no change  Outcome Evaluation: Pt seen by PT this AM for treatment. Pt UIC and stood w/ SBA and use of fww. Pt amb to BR then out in hallway w/ SBA and use of fww. Pt trialed amb w/o fww and req CGA and HHA. Pt steadier w/ use of fww. Rec fww for home after dc. Notified CCP. Pt returned to bed after gait stating he was fatigued and had not been sleeping at hospital. Sit to supine req SBA. PT will prog as pt carey.    Patient was intermittently wearing a face mask during this therapy encounter. Therapist used appropriate personal protective equipment including mask and gloves.  Mask used was standard procedure mask. Appropriate PPE was worn during the entire therapy session. Hand hygiene was completed before and after therapy session. Patient is not in enhanced droplet precautions.

## 2023-02-09 NOTE — PROGRESS NOTES
Over night pulse oximetery study performed on room air. When I removed this am , pt stated he did not sleep at all as he was scared not having his cpap machine nor the positive pressure of his oxygen.

## 2023-02-10 ENCOUNTER — APPOINTMENT (OUTPATIENT)
Dept: GENERAL RADIOLOGY | Facility: HOSPITAL | Age: 63
DRG: 327 | End: 2023-02-10
Payer: COMMERCIAL

## 2023-02-10 ENCOUNTER — READMISSION MANAGEMENT (OUTPATIENT)
Dept: CALL CENTER | Facility: HOSPITAL | Age: 63
End: 2023-02-10
Payer: COMMERCIAL

## 2023-02-10 ENCOUNTER — HOME HEALTH ADMISSION (OUTPATIENT)
Dept: HOME HEALTH SERVICES | Facility: HOME HEALTHCARE | Age: 63
End: 2023-02-10
Payer: COMMERCIAL

## 2023-02-10 VITALS
DIASTOLIC BLOOD PRESSURE: 92 MMHG | SYSTOLIC BLOOD PRESSURE: 138 MMHG | OXYGEN SATURATION: 94 % | TEMPERATURE: 98.4 F | BODY MASS INDEX: 39.89 KG/M2 | WEIGHT: 301 LBS | HEART RATE: 90 BPM | RESPIRATION RATE: 18 BRPM | HEIGHT: 73 IN

## 2023-02-10 LAB
ALBUMIN SERPL-MCNC: 3 G/DL (ref 3.5–5.2)
ANION GAP SERPL CALCULATED.3IONS-SCNC: 10.9 MMOL/L (ref 5–15)
BUN SERPL-MCNC: 26 MG/DL (ref 8–23)
BUN/CREAT SERPL: 34.7 (ref 7–25)
CALCIUM SPEC-SCNC: 8.5 MG/DL (ref 8.6–10.5)
CHLORIDE SERPL-SCNC: 100 MMOL/L (ref 98–107)
CO2 SERPL-SCNC: 32.1 MMOL/L (ref 22–29)
CREAT SERPL-MCNC: 0.75 MG/DL (ref 0.76–1.27)
DEPRECATED RDW RBC AUTO: 45.3 FL (ref 37–54)
EGFRCR SERPLBLD CKD-EPI 2021: 100.8 ML/MIN/1.73
ERYTHROCYTE [DISTWIDTH] IN BLOOD BY AUTOMATED COUNT: 14 % (ref 12.3–15.4)
GLUCOSE BLDC GLUCOMTR-MCNC: 169 MG/DL (ref 70–130)
GLUCOSE BLDC GLUCOMTR-MCNC: 197 MG/DL (ref 70–130)
GLUCOSE BLDC GLUCOMTR-MCNC: 214 MG/DL (ref 70–130)
GLUCOSE SERPL-MCNC: 200 MG/DL (ref 65–99)
HCT VFR BLD AUTO: 34.5 % (ref 37.5–51)
HGB BLD-MCNC: 10.8 G/DL (ref 13–17.7)
MCH RBC QN AUTO: 28 PG (ref 26.6–33)
MCHC RBC AUTO-ENTMCNC: 31.3 G/DL (ref 31.5–35.7)
MCV RBC AUTO: 89.4 FL (ref 79–97)
PHOSPHATE SERPL-MCNC: 2.9 MG/DL (ref 2.5–4.5)
PLATELET # BLD AUTO: 251 10*3/MM3 (ref 140–450)
PMV BLD AUTO: 9.9 FL (ref 6–12)
POTASSIUM SERPL-SCNC: 3.8 MMOL/L (ref 3.5–5.2)
RBC # BLD AUTO: 3.86 10*6/MM3 (ref 4.14–5.8)
SODIUM SERPL-SCNC: 143 MMOL/L (ref 136–145)
WBC NRBC COR # BLD: 7.13 10*3/MM3 (ref 3.4–10.8)

## 2023-02-10 PROCEDURE — 80069 RENAL FUNCTION PANEL: CPT | Performed by: NURSE PRACTITIONER

## 2023-02-10 PROCEDURE — 99231 SBSQ HOSP IP/OBS SF/LOW 25: CPT | Performed by: NURSE PRACTITIONER

## 2023-02-10 PROCEDURE — 25010000002 HEPARIN (PORCINE) PER 1000 UNITS: Performed by: THORACIC SURGERY (CARDIOTHORACIC VASCULAR SURGERY)

## 2023-02-10 PROCEDURE — 82962 GLUCOSE BLOOD TEST: CPT

## 2023-02-10 PROCEDURE — 63710000001 INSULIN REGULAR HUMAN PER 5 UNITS: Performed by: INTERNAL MEDICINE

## 2023-02-10 PROCEDURE — 85027 COMPLETE CBC AUTOMATED: CPT | Performed by: NURSE PRACTITIONER

## 2023-02-10 PROCEDURE — 71045 X-RAY EXAM CHEST 1 VIEW: CPT

## 2023-02-10 PROCEDURE — 99024 POSTOP FOLLOW-UP VISIT: CPT

## 2023-02-10 RX ORDER — ATORVASTATIN CALCIUM 40 MG/1
40 TABLET, FILM COATED ORAL NIGHTLY
Qty: 30 TABLET | Refills: 0 | Status: SHIPPED | OUTPATIENT
Start: 2023-02-10

## 2023-02-10 RX ORDER — DAPAGLIFLOZIN 10 MG/1
10 TABLET, FILM COATED ORAL DAILY
Qty: 30 TABLET | Refills: 0 | Status: SHIPPED | OUTPATIENT
Start: 2023-02-10

## 2023-02-10 RX ORDER — POTASSIUM CHLORIDE 20MEQ/15ML
20 LIQUID (ML) ORAL DAILY
Qty: 225 ML | Refills: 0 | Status: SHIPPED | OUTPATIENT
Start: 2023-02-10 | End: 2023-03-03

## 2023-02-10 RX ORDER — TORSEMIDE 20 MG/1
80 TABLET ORAL DAILY
Qty: 28 TABLET | Refills: 0 | Status: SHIPPED | OUTPATIENT
Start: 2023-02-11 | End: 2023-03-03

## 2023-02-10 RX ADMIN — TORSEMIDE 80 MG: 20 TABLET ORAL at 09:24

## 2023-02-10 RX ADMIN — LISINOPRIL 10 MG: 10 TABLET ORAL at 09:24

## 2023-02-10 RX ADMIN — METOPROLOL TARTRATE 25 MG: 25 TABLET, FILM COATED ORAL at 09:22

## 2023-02-10 RX ADMIN — INSULIN HUMAN 2 UNITS: 100 INJECTION, SOLUTION PARENTERAL at 00:17

## 2023-02-10 RX ADMIN — HEPARIN SODIUM 5000 UNITS: 5000 INJECTION INTRAVENOUS; SUBCUTANEOUS at 13:56

## 2023-02-10 RX ADMIN — ACETAMINOPHEN 1000 MG: 325 SUSPENSION ORAL at 09:24

## 2023-02-10 RX ADMIN — INSULIN HUMAN 2 UNITS: 100 INJECTION, SOLUTION PARENTERAL at 05:57

## 2023-02-10 RX ADMIN — DOCUSATE SODIUM 100 MG: 50 LIQUID ORAL at 09:24

## 2023-02-10 RX ADMIN — INSULIN HUMAN 4 UNITS: 100 INJECTION, SOLUTION PARENTERAL at 12:01

## 2023-02-10 RX ADMIN — HEPARIN SODIUM 5000 UNITS: 5000 INJECTION INTRAVENOUS; SUBCUTANEOUS at 05:57

## 2023-02-10 NOTE — PROGRESS NOTES
"    Patient Name: Edmond Chase  :1960  62 y.o.      Patient Care Team:  Nargis Velasquez APRN as PCP - General (Family Medicine)  Papa Miguel MD as Consulting Physician (Cardiology)  Sekou Pisano MD as Consulting Physician (Pulmonary Disease)  Gregor Carlson MD as Consulting Physician (Gastroenterology)  Estevan Yuan MD (Sports Medicine)  Shelley Goldsmith, RN as Nurse Navigator    Chief Complaint: follow up esophageal cancer, coronary artery disease, tissue aortic valve replacement    Interval History: has swelling. Doesn't feel short of breath. On room air during day.     Objective   Vital Signs  Temp:  [97.9 °F (36.6 °C)-98.6 °F (37 °C)] 98.4 °F (36.9 °C)  Heart Rate:  [84-90] 90  Resp:  [18] 18  BP: (138-164)/(69-92) 138/92    Intake/Output Summary (Last 24 hours) at 2/10/2023 1321  Last data filed at 2/10/2023 1100  Gross per 24 hour   Intake 1575 ml   Output --   Net 1575 ml     Flowsheet Rows    Flowsheet Row First Filed Value   Admission Height 185.4 cm (73\") Documented at 2023 1800   Admission Weight 143 kg (315 lb 4.1 oz) Documented at 2023 1800          Physical Exam:   General Appearance:    Alert, cooperative, in no acute distress   Lungs:     Clear to auscultation.  Normal respiratory effort and rate.      Heart:    Regular rhythm and normal rate, normal S1 and S2, + murmur, no gallops or rubs.     Chest Wall:    No abnormalities observed   Abdomen:     Soft, nontender, positive bowel sounds.     Extremities:   no cyanosis, clubbing   No marked joint deformities.  Adequate musculoskeletal strength.  2+ pedal edema     Results Review:    Results from last 7 days   Lab Units 02/10/23  0542   SODIUM mmol/L 143   POTASSIUM mmol/L 3.8   CHLORIDE mmol/L 100   CO2 mmol/L 32.1*   BUN mg/dL 26*   CREATININE mg/dL 0.75*   GLUCOSE mg/dL 200*   CALCIUM mg/dL 8.5*         Results from last 7 days   Lab Units 02/10/23  0542   WBC 10*3/mm3 7.13   HEMOGLOBIN " g/dL 10.8*   HEMATOCRIT % 34.5*   PLATELETS 10*3/mm3 251         Results from last 7 days   Lab Units 02/09/23  0536   MAGNESIUM mg/dL 1.9                   Medication Review:   acetaminophen, 1,000 mg, Per J Tube, TID  docusate sodium, 100 mg, Per J Tube, BID  gabapentin, 300 mg, Per J Tube, Q8H  heparin (porcine), 5,000 Units, Subcutaneous, Q8H  insulin regular, 0-9 Units, Subcutaneous, Q6H  lisinopril, 10 mg, Per J Tube, Q24H  metoprolol tartrate, 25 mg, Per J Tube, Q12H   Or  metoprolol tartrate, 5 mg, Intravenous, Q12H  torsemide, 80 mg, Per J Tube, Daily         lactated ringers, 9 mL/hr, Last Rate: 9 mL/hr (02/03/23 0737)        Assessment & Plan   1. Esophageal cancer status post total esophagectomy and j tube placement on 2/3/23 by Dr. Mahin elizondo NPO  2. Chest pain post operatively, likely surgical. Not similar to prior angina.  3. Coronary artery disease status post CABG ( 2/3 bypasses  occluded on repeat heart catheterization. Severe native vessel disease managed medically.   4. Bioprosthetic aortic valve replacement, normal function by TTE 12/14/2022  5. Diabetes mellitis type II  6. Hypertension, holding amlodipine due to lower extremity edema. Continue lisinopril and metoprolol. Reasonable control currently and trending better since the addition of home lisinopril. Will continue to follow.   7. Morbid obesity  8. Right sided pneumothorax, status post chest tube  9. RAMIREZ , nephrology following    BP ok given clinical scenario. On oral torsemide per nephrology.  Nothing further to add from cardiac standpoint. Will see as needed.   Keep appt with VIKTORIA Turpin April 20th.     VIKTORIA Delgadillo  [facility]  02/10/23  13:21 EST

## 2023-02-10 NOTE — DISCHARGE INSTR - ACTIVITY
Follow up with Dr. Koch in 2 weeks to repeat CKD labs.  [facility] to provide home care services. [facility] to provide tube feed supplies. [facility] will send nurse tomorrow afernoon.

## 2023-02-10 NOTE — DISCHARGE SUMMARY
Patient Care Team:  Nargis Velasquez APRN as PCP - General (Family Medicine)  Papa Miguel MD as Consulting Physician (Cardiology)  Sekou Pisano MD as Consulting Physician (Pulmonary Disease)  Gregor Carlson MD as Consulting Physician (Gastroenterology)  Estevan Yuan MD (Sports Medicine)  Shelley Goldsmith, RN as Nurse Navigator    Date of Admission: 2/3/2023   Date of Discharge:  2/10/2023    Discharge Diagnosis: Esophageal cancer    Presenting Problem  Malignant neoplasm of lower third of esophagus (HCC) [C15.5]     History of Present Illness  Edmond Chase is a 62 y.o. male who was evaluated with Dr. Valerie Stockton on 1/16/2023.  He has a history significant for adenocarcinoma of the esophagus with invasion.  An esophagectomy was ultimately recommended to the patient and he was agreeable to proceed.    Hospital Course    Mr. Chase presented to [facility] on 2/3/2023 for a scheduled right VAT with total esophagectomy and jejunostomy feeding tube placement performed by Dr. Valerie Stockotn.  He was transferred to PACU for anesthesia recovery and was subsequently transferred to ICU for initial postoperative care.  The patient was then transferred to [facility] for the remainder of his hospitalization. The patient had a satisfactory postoperative course.  Nephrology was consulted given his history of Lasix dependence and cardiology for hypertension.  Please refer to daily progress notes for events of stay.    RAMIREZ with oliguria: Continue torsemide 80 mg daily along with 20 mEq daily of potassium solution via J-tube per nephrology.  He is scheduled to follow-up in 2 weeks along with repeat labs      Hypertension: Per cardiology continue to hold Norvasc and Imdur for now.  Blood pressure well controlled on lisinopril and Lopressor which is to be continued at discharge.    TJ: BiPAP contraindicated at this time.  Patient will require supplemental oxygen via nasal cannula  at night which has been arranged.    Nutrition: Patient has been tolerating cyclic feeds which we will continue.  He will require Peptamen 1.5 at a rate of 85 mL/h x 18 hours along with 45 mL/h x 18 hours of free water.  It is imperative we continue a high-protein formulation to promote healing given his recent surgery.  He will also require separate feet and flush bags to ensure patency of the feeding tube.    He is scheduled to follow-up with Dr. Valerie Stockton on 2/28/2023 along with this an esophagram to be performed prior to this appointment which is scheduled for 2/22/2023.    Ensure strict n.p.o. status this was reiterated to the patient    Procedures Performed  Procedure(s):  BRONCHOSCOPY, RIGHT ROBOTIC VIDEO ASSISTED THORACOSCOPY WITH TOTAL ESOPHAGECTOMY, INTERCOSTAL NERVE BLOCK, FEEDING JEJUNOSTOMY PLACEMENT       Consults:   Consults     Date and Time Order Name Status Description    2/5/2023  9:01 AM Inpatient Nephrology Consult      2/4/2023 11:01 AM Inpatient Cardiology Consult      2/3/2023  6:05 PM Inpatient Intensivist Consult            Pertinent Test Results:     Imaging Results (Last 24 Hours)     Procedure Component Value Units Date/Time    XR Chest 1 View [[id number]] Collected: 02/10/23 0753     Updated: 02/10/23 0757    Narrative:      XR CHEST 1 VW-clinical: Chest tube management     COMPARISON examination 2/9/2023     FINDINGS: There is a persistent right apical pneumothorax, size is  similar to the previous examination. Subcutaneous emphysema at the base  of the right neck as before. Airspace disease at the right lung base has  not improved. There is a small right-sided pleural effusion seen. The  cardiomediastinal silhouette is stable. The left lung is clear. The  remainder is unremarkable.     CONCLUSION: Stable chest     This report was finalized on 2/10/2023 7:54 AM by Dr. Bam Cerda M.D.             Lab Results (last 24 hours)     Procedure Component Value Units Date/Time    POC  Glucose Once [[id number]]  (Abnormal) Collected: 02/10/23 1123    Specimen: Blood Updated: 02/10/23 1126     Glucose 214 mg/dL      Comment: Meter: [id number] : [id number] Dami MARQUEZ       Renal Function Panel [[id number]]  (Abnormal) Collected: 02/10/23 0542    Specimen: Blood Updated: 02/10/23 0702     Glucose 200 mg/dL      BUN 26 mg/dL      Creatinine 0.75 mg/dL      Sodium 143 mmol/L      Potassium 3.8 mmol/L      Comment: Slight hemolysis detected by analyzer. Results may be affected.        Chloride 100 mmol/L      CO2 32.1 mmol/L      Calcium 8.5 mg/dL      Albumin 3.0 g/dL      Phosphorus 2.9 mg/dL      Anion Gap 10.9 mmol/L      BUN/Creatinine Ratio 34.7     eGFR 100.8 mL/min/1.73     Narrative:      GFR Normal >60  Chronic Kidney Disease <60  Kidney Failure <15      CBC (No Diff) [[id number]]  (Abnormal) Collected: 02/10/23 0542    Specimen: Blood Updated: 02/10/23 0643     WBC 7.13 10*3/mm3      RBC 3.86 10*6/mm3      Hemoglobin 10.8 g/dL      Hematocrit 34.5 %      MCV 89.4 fL      MCH 28.0 pg      MCHC 31.3 g/dL      RDW 14.0 %      RDW-SD 45.3 fl      MPV 9.9 fL      Platelets 251 10*3/mm3     POC Glucose Once [[id number]]  (Abnormal) Collected: 02/10/23 0551    Specimen: Blood Updated: 02/10/23 0552     Glucose 197 mg/dL      Comment: Meter: [id number] : [id number] [email or web address] NA       POC Glucose Once [[id number]]  (Abnormal) Collected: 02/10/23 0013    Specimen: Blood Updated: 02/10/23 0015     Glucose 169 mg/dL      Comment: Meter: [id number] : [id number] [email or web address] NA       POC Glucose Once [[id number]]  (Normal) Collected: 02/09/23 1609    Specimen: Blood Updated: 02/09/23 1611     Glucose 127 mg/dL      Comment: Meter: [id number] : [id number] Jerrica MARQUEZ               Condition on Discharge: Stable for discharge home    Vital Signs  Temp:  [98.2 °F (36.8 °C)-98.6 °F (37 °C)] 98.4 °F (36.9 °C)  Heart Rate:  [90] 90  Resp:  [18] 18  BP: (138-164)/(69-92)  138/92    Physical Exam:    General Appearance:    Alert, cooperative, in no acute distress   Head:    Normocephalic, without obvious abnormality, atraumatic   Eyes:            Lids and lashes normal, conjunctivae and sclerae normal, no   icterus, no pallor, corneas clear, PERRLA   Ears:    Ears appear intact with no abnormalities noted   Throat:   No oral lesions, no thrush, oral mucosa moist   Neck:   No adenopathy, supple, trachea midline, no thyromegaly, no   carotid bruit, no JVD   Back:     No kyphosis present, no scoliosis present, no skin lesions,      erythema or scars, no tenderness to percussion or                   palpation,   range of motion normal   Lungs:     Clear to auscultation,respirations regular, even and                  unlabored    Heart:    Regular rhythm and normal rate, normal S1 and S2, no            murmur, no gallop, no rub, no click   Chest Wall:    No abnormalities observed   Abdomen:     Normal bowel sounds, no masses, no organomegaly, soft        non-tender, non-distended, no guarding, no rebound                Tenderness [feeding tube clean, dry, and intact sutured in place.]   Rectal:     Deferred   Extremities:   Moves all extremities well, no edema, no cyanosis, no             redness   Pulses:   Pulses palpable and equal bilaterally   Skin:   No bleeding, bruising or rash [surgical incisions clean, dry, intact closed with Dermabond.]    Lymph nodes:   No palpable adenopathy   Neurologic:   Cranial nerves 2 - 12 grossly intact, sensation intact, DTR       present and equal bilaterally       Discharge Disposition  Home today    Discharge Medications     Discharge Medications      New Medications      Instructions Start Date   M- MG/5ML liquid  Generic drug: acetaminophen   Administer 31 mL per J tube 3 (Three) Times a Day for 30 days.      metoprolol tartrate 25 MG tablet  Commonly known as: LOPRESSOR   25 mg, Per J Tube, Every 12 Hours Scheduled      Potassium Chloride  10 % solution  Replaces: potassium chloride 10 MEQ CR tablet   20 mEq, Per J Tube, Daily      Torsemide 40 MG tablet   80 mg, Per J Tube, Daily   Start Date: February 11, 2023        Changes to Medications      Instructions Start Date   atorvastatin 40 MG tablet  Commonly known as: LIPITOR  What changed: how to take this   40 mg, Per J Tube, Nightly      Farxiga 10 MG tablet  Generic drug: dapagliflozin Propanediol  What changed: how to take this   10 mg, Per J Tube, Daily      lisinopril 10 MG tablet  Commonly known as: PRINIVILZESTRIL  What changed:   · how to take this  · when to take this  · additional instructions   10 mg, Per J Tube, Every 24 Hours Scheduled      metFORMIN 500 MG tablet  Commonly known as: GLUCOPHAGE  What changed: how to take this   500 mg, Per J Tube, Every Evening         Stop These Medications    amLODIPine 10 MG tablet  Commonly known as: NORVASC     ferrous sulfate 324 (65 Fe) MG tablet delayed-release EC tablet     furosemide 20 MG tablet  Commonly known as: LASIX     GLUCOSAMINE HCL PO     isosorbide mononitrate 60 MG 24 hr tablet  Commonly known as: IMDUR     metoprolol succinate XL 25 MG 24 hr tablet  Commonly known as: TOPROL-XL     pantoprazole 40 MG EC tablet  Commonly known as: PROTONIX     potassium chloride 10 MEQ CR tablet  Replaced by: Potassium Chloride 10 % solution     sucralfate 1 g tablet  Commonly known as: CARAFATE            Discharge Instructions:  · No heavy lifting, pushing, pulling greater than 10 pounds.  · No driving up until 2 weeks after surgery and no longer taking narcotics.  · Resume home diet as tolerated.  · Continue incentive spirometer at least 4 times per day.  · Remove dressing from post chest tube site after 48 hours, may shower and clean surgical sites with antibacterial soap or hydrogen peroxide, and apply gauze dressing or band-aid as needed for any drainage.  No dressing needed once no longer draining.          Follow-up Appointments  Future  Appointments   Date Time Provider Department Center   2/17/2023  7:00 AM [facility] [place] 5 BH [facility] XRAY [facility]   2/28/2023  3:45 PM Valerie Stockton MD [facility]   4/10/2023 10:00 AM Shelley Estrella PA-C [facility] GE EA MAX [facility]   4/20/2023 11:00 AM Marcia Stoner APRN [facility] CD [facility]   9/6/2023 10:45 AM Sekou Pisano MD [facility] ANDERS[facility]2 None     Additional Instructions for the Follow-ups that You Need to Schedule     Ambulatory Referral to Home Health (Hospital)   As directed      Please cleanse postop incisions with hydrogen peroxide.  Keep area around J-tube site clean and dry.  Apply Desitin as needed.    Order Comments: Please cleanse postop incisions with hydrogen peroxide.  Keep area around J-tube site clean and dry.  Apply Desitin as needed.     Face to Face Visit Date: 2/8/2023    Follow-up provider for Plan of Care?: I treated the patient in an acute care facility and will not continue treatment after discharge.    Follow-up provider: VALERIE STOCKTON [[id number]]    Reason/Clinical Findings: s/p esohagectomy for esophageal cancer    Describe mobility limitations that make leaving home difficult: daily tube feeds    Nursing/Therapeutic Services Requested: Skilled Nursing    Skilled nursing orders: Wound care dressing/changes Enteral therapy    Frequency: 1 Week 1         FL Esophagram Complete Double-Contrast    Feb 22, 2023 (Approximate)      Exam reason: rule out esophageal leak s/p esophagectomy    Release to patient: Immediate         XR Chest 2 View    Mar 08, 2023 (Approximate)      Exam reason: post-op               Test Results Pending at Discharge      For any questions regarding patient's stay, please refer to patient's chart.    VIKTORIA Hughes  [facility]  02/10/23  15:53 EST

## 2023-02-10 NOTE — PAYOR COMM NOTE
"Edmond Chase (62 y.o. Male)     PLEASE SEE ATTACHED FOR CONTINUED INPT STAY DAYS    REF #  [id number]    PLEASE CALL SALIMA MCNAMARA RN/ DEPT @ [phone]   OR [phone]    THANK YOU  SALIMA MCNAMARA RN  [facility]       Date of Birth   1960    Social Security Number       Address   [address]  [place] [address]    Home Phone   [phone]    MRN   [id number]       Worship   None    Marital Status   Single                            Admission Date   2/3/23    Admission Type   Elective    Admitting Provider   Valerie Stockton MD    Attending Provider   Valerie Stockton MD    Department, Room/Bed   [address], E553/1       Discharge Date       Discharge Disposition       Discharge Destination                               Attending Provider: Valerie Stockton MD    Allergies: No Known Allergies    Isolation: None   Infection: None   Code Status: CPR    Ht: 185 cm (72.84\")   Wt: 137 kg (301 lb)    Admission Cmt: None   Principal Problem: Malignant neoplasm of lower third of esophagus (HCC) [C15.5]                 Active Insurance as of 2/3/2023     Primary Coverage     Payor Plan Insurance Group Employer/Plan Group    [facility] BY JODI [id number]     Payor Plan Address Payor Plan Phone Number Payor Plan Fax Number Effective Dates    [address] [address]   1/1/2021 - None Entered    [place] [id number]       Subscriber Name Subscriber Birth Date Member ID       EDMOND CHASE 1960 [id number]                 Emergency Contacts      (Rel.) Home Phone Work Phone Mobile Phone    Kyra Ahuja (Sister) [phone] -- [phone]    Melinda Ahuja [facility] (Relative) [phone] -- [phone]    Sadia Ahuja [facility] (Relative) [phone] -- [phone]            Jet: NPI [id number]  Tax ID [id number]    Intake & Output (last day)       02/09 0701  02/10 0700 02/10 0701 02/11 " "0700    Other 300 45    NG/GT 1460 1530    Total Intake(mL/kg) 1760 (12.8) 1575 (11.5)    Urine (mL/kg/hr) 400 (0.1)     Total Output 400     Net +1360 +1575          Urine Unmeasured Occurrence 2 x 1 x        Lines, Drains & Airways     Active LDAs     Name Placement date Placement time Site Days    Peripheral IV 23 Left;Posterior Wrist 23  Wrist  2    Gastrostomy/Enterostomy Jejunostomy 1 19 Fr. LUQ 23  1439  LUQ  6                   Physician Progress Notes (last 48 hours)      Namita Swift APRN at 23 1500              Patient Name: Edmond Chase  :1960  62 y.o.      Patient Care Team:  Nargis Velasquez APRN as PCP - General (Family Medicine)  Papa Miguel MD as Consulting Physician (Cardiology)  Sekou Pisano MD as Consulting Physician (Pulmonary Disease)  Gregor Carlson MD as Consulting Physician (Gastroenterology)  Estevan Yuan MD (Sports Medicine)  Shelley Goldsmith, RN as Nurse Navigator    Chief Complaint: follow up esophageal cancer, coronary artery disease, tissue aortic valve replacement    Interval History: doesn't looks like he feels as good as yesterday, more ill appearing.        Objective   Vital Signs  Temp:  [97.9 °F (36.6 °C)-99.3 °F (37.4 °C)] 97.9 °F (36.6 °C)  Heart Rate:  [84-93] 84  Resp:  [18] 18  BP: (144-159)/(75-92) 144/92    Intake/Output Summary (Last 24 hours) at 2023 1501  Last data filed at 2023 0810  Gross per 24 hour   Intake 1760 ml   Output --   Net 1760 ml     Flowsheet Rows    Flowsheet Row First Filed Value   Admission Height 185.4 cm (73\") Documented at 2023 1800   Admission Weight 143 kg (315 lb 4.1 oz) Documented at 2023 1800          Physical Exam:   General Appearance:    Alert, cooperative, in no acute distress   Lungs:     Clear to auscultation.  Normal respiratory effort and rate.      Heart:    Regular rhythm and normal rate, normal S1 and S2, + murmur, no " gallops or rubs.     Chest Wall:    No abnormalities observed   Abdomen:     Soft, nontender, positive bowel sounds.     Extremities:   no cyanosis, clubbing or edema.  No marked joint deformities.  Adequate musculoskeletal strength.       Results Review:    Results from last 7 days   Lab Units 02/09/23  0536   SODIUM mmol/L 142   POTASSIUM mmol/L 3.3*   CHLORIDE mmol/L 102   CO2 mmol/L 32.3*   BUN mg/dL 23   CREATININE mg/dL 0.61*   GLUCOSE mg/dL 184*   CALCIUM mg/dL 8.6         Results from last 7 days   Lab Units 02/09/23  0536   WBC 10*3/mm3 5.74   HEMOGLOBIN g/dL 10.2*   HEMATOCRIT % 32.6*   PLATELETS 10*3/mm3 231         Results from last 7 days   Lab Units 02/09/23  0536   MAGNESIUM mg/dL 1.9                   Medication Review:   acetaminophen, 1,000 mg, Per J Tube, TID  docusate sodium, 100 mg, Per J Tube, BID  gabapentin, 300 mg, Per J Tube, Q8H  heparin (porcine), 5,000 Units, Subcutaneous, Q8H  insulin regular, 0-9 Units, Subcutaneous, Q6H  lisinopril, 10 mg, Per J Tube, Q24H  metoprolol tartrate, 25 mg, Per J Tube, Q12H   Or  metoprolol tartrate, 5 mg, Intravenous, Q12H  torsemide, 80 mg, Per J Tube, Daily         lactated ringers, 9 mL/hr, Last Rate: 9 mL/hr (02/03/23 0737)        Assessment & Plan   1. Esophageal cancer status post total esophagectomy and j tube placement on 2/3/23 by Dr. Mahin elizondo NPO  2. Chest pain post operatively, likely surgical. Not similar to prior angina.  3. Coronary artery disease status post CABG ( 2/3 bypasses  occluded on repeat heart catheterization. Severe native vessel disease managed medically.   4. Bioprosthetic aortic valve replacement, normal function by TTE 12/14/2022  5. Diabetes mellitis type II  6. Hypertension, holding amlodipine due to lower extremity edema. Continue lisinopril and metoprolol. Reasonable control currently and trending better since the addition of home lisinopril. Will continue to follow.   7. Morbid obesity  8. Right sided pneumothorax,  "status post chest tube  9. RAMIREZ , nephrology following    BP ok given clinical scenario. On oral torsemide per nephrology.    VIKTORIA Delgadillo  [facility]  02/09/23  15:01 EST      Electronically signed by Namita Swift APRN at 02/09/23 1507     Nicola Jenkins APRN at 02/09/23 1434     Attestation signed by Valerie Stockton MD at 02/09/23 1541    I have reviewed this documentation and agree.                  POST-OPERATIVE NOTE     Chief Complaint: Esophageal cancer, postoperative care  S/P: Bronchoscopy, right video-assisted thoracoscopy with total esophagectomy, intercostal nerve block, feeding jejunostomy tube placement  POD # 6    Subjective:  Symptoms:  Stable.  He reports weakness and anxiety.  No shortness of breath.  Chest pain: Surgical incisions tenderness.    Diet:  NPO.  No nausea or vomiting.    Activity level: Impaired due to weakness.    Pain:  He complains of pain that is mild.  Pain is well controlled.    Patient is anxious    Objective:  General Appearance:  Ill-appearing, in no acute distress and comfortable.    Vital signs: (most recent): Blood pressure 149/88, pulse 88, temperature 98.8 °F (37.1 °C), temperature source Oral, resp. rate 18, height 185 cm (72.84\"), weight (!) 137 kg (301 lb), SpO2 95 %.  Vital signs are normal.  No fever.    Output: Producing urine (Narvaez catheter in place).    Lungs:  Normal effort and normal respiratory rate.  He is not in respiratory distress.  No rales, wheezes or rhonchi.    Heart: Normal rate.  Regular rhythm.    Chest: Symmetric chest wall expansion. Chest wall tenderness present.    Abdomen: Abdomen is soft.  Absent bowel sounds.   There is generalized tenderness.  There is incisional tenderness.    Extremities: Decreased range of motion.  There is dependent edema (Bilateral lower extremities).    Pulses: Distal pulses are intact.    Neurological: Patient is alert and oriented to person, place and time.    Skin:  Warm and dry.  " (Postoperative incisions well approximated and intact)            Results Review:     I reviewed the patient's new clinical results.  I reviewed the patient's new imaging results and agree with the interpretation.  I reviewed the patient's other test results and agree with the interpretation  Discussed with Patient, RN, Dr. Stockton    Assessment & Plan      Patient is POD #6, s/p total esophagectomy.      Patient is moderately anxious today and reports some shortness of breath attributed to anxiety. He is responsive to verbal redirection.  Provide supplemental oxygen as needed for comfort.  His pain is well controlled on current analgesic medications.  This morning's chest x-ray remains primarily unchanged compared to previous imaging.  There remains a trace right apical pneumothorax..    He is tolerating continuous tube feeds.  We will transition to him to cyclic feeds starting this afternoon per RD recommendations which is 85 mL/h x 18 hours of Impact peptide along with 45 mL/h x 18 hours of free water.  He will need high-protein formula to promote healing given his recent surgery.  He will also require separate feed and flush bags to ensure patency of the feeding tube.    RAMIREZ with oliguria: Diuresis per nephrology.  Continue torsemide 80 mg daily.  Creat 0.6.    Hypokalemia and hypophosphatemia: Replace per electrolyte replacement protocol.  Continue to trend with morning labs.     Hypertension: Antihypertensives per cardiology's recommendations.  Blood pressure remains stable and his heart rate is controlled.    TJ: Absolutely no BiPAP.  Patient will require supplemental oxygen via nasal cannula at night which has been arranged.  Reinforced education regarding BiPAP contraindication at this time and risks associated with nonadherence to medical recommendation.  Patient is agreeable.    Ensure strict n.p.o. status.  Patient was observed to have taken a small sip of water from what was intended to be a cup for oral  swabs.  Reinforced education regarding contraindication of p.o. intake at this time.     Possible discharge home tomorrow pending stable clinical course.    DVT prophylaxis: Subcutaneous heparin.    VIKTORIA Hughes  [facility]  23  14:34 EST    Patient was seen and assessed while wearing personal protective equipment including facemask, protective eyewear and gloves.  Hand hygiene performed prior to entering the room and upon exiting with doffing of gloves.         Electronically signed by Valerie Stockton MD at 23 1541     Alex Hernadez MD at 23 1019              [facility] Progress Note      Patient Name: Edmond Chase  : 1960  MRN: [id number]  Primary Care Physician:  Nargis Velasquez APRN  Date of admission: 2/3/2023    Subjective     Interval History:   The patient was seen and examined today for follow-up on oliguria  Doing well overall.  Denies any nausea or vomiting.  Urinating well.  Lower extremity edema has much improved.  Slightly confused.    Review of Systems:   As noted above    Objective     Vitals:   Temp:  [98.1 °F (36.7 °C)-99.3 °F (37.4 °C)] 99.3 °F (37.4 °C)  Heart Rate:  [93] 93  Resp:  [18] 18  BP: (137-159)/(75-83) 159/75    Intake/Output Summary (Last 24 hours) at 2023 1019  Last data filed at 2023 1420  Gross per 24 hour   Intake 60 ml   Output --   Net 60 ml       Physical Exam:    General Appearance: alert, oriented x 3, no acute distress   Skin: warm and dry  HEENT: oral mucosa normal, nonicteric sclera  Neck: supple, no JVD  Lungs: CTA  Heart: RRR, normal S1 and S2  Abdomen: soft, nontender, nondistended  : no palpable bladder  Extremities: 1-2+ pitting edema bilateral lower extremities.  Improved from before.  No cyanosis or clubbing  Neuro: normal speech and mental status     Scheduled Meds:     acetaminophen, 1,000 mg, Per J Tube, TID  docusate sodium, 100 mg, Per J Tube, BID  gabapentin,  300 mg, Per J Tube, Q8H  heparin (porcine), 5,000 Units, Subcutaneous, Q8H  insulin regular, 0-9 Units, Subcutaneous, Q6H  lisinopril, 10 mg, Per J Tube, Q24H  metoprolol tartrate, 25 mg, Per J Tube, Q12H   Or  metoprolol tartrate, 5 mg, Intravenous, Q12H  torsemide, 80 mg, Per J Tube, Daily      IV Meds:   lactated ringers, 9 mL/hr, Last Rate: 9 mL/hr (02/03/23 0737)        Results Reviewed:   I have personally reviewed the results from the time of this admission to 2/9/2023 10:19 EST     Results from last 7 days   Lab Units 02/09/23 0536 02/08/23 0458 02/07/23 0558 02/06/23 0636 02/05/23  0325   SODIUM mmol/L 142 141 142 140 136   POTASSIUM mmol/L 3.3* 3.7 3.8 4.4 4.9   CHLORIDE mmol/L 102 101 101 102 104   CO2 mmol/L 32.3* 34.5* 31.2* 28.9 26.0   BUN mg/dL 23 19 20 22 24*   CREATININE mg/dL 0.61* 0.81 0.84 1.13 1.09   CALCIUM mg/dL 8.6 8.6 8.6 8.6 8.1*   BILIRUBIN mg/dL  --   --   --  0.5 0.6   ALK PHOS U/L  --   --   --  78 74   ALT (SGPT) U/L  --   --   --  260* 362*   AST (SGOT) U/L  --   --   --  77* 220*   GLUCOSE mg/dL 184* 160* 147* 128* 164*       Estimated Creatinine Clearance: 178.2 mL/min (A) (by C-G formula based on SCr of 0.61 mg/dL (L)).    Results from last 7 days   Lab Units 02/09/23 0536 02/08/23 0458 02/07/23 0558 02/06/23  0636 02/05/23  0325   MAGNESIUM mg/dL 1.9  --  2.0  --  2.1   PHOSPHORUS mg/dL 1.3* 2.7 2.6   < > 3.5    < > = values in this interval not displayed.       Results from last 7 days   Lab Units 02/06/23  0636   URIC ACID mg/dL 5.8       Results from last 7 days   Lab Units 02/09/23  0536 02/08/23  0458 02/07/23  0558 02/06/23  0636 02/05/23  0325   WBC 10*3/mm3 5.74 4.97 5.07 6.66 13.32*   HEMOGLOBIN g/dL 10.2* 10.3* 10.1* 10.1* 10.4*   PLATELETS 10*3/mm3 231 200 182 167 179             Assessment / Plan     ASSESSMENT:  1.  RAMIREZ with decrease urine output Fairly normal renal function at this time.  Baseline closer to 0.8 from previous labs.  Urine sodium <20.     "Secondary to decompensated heart failure.  Diuresing well with Lasix  2. Anemia- hgb stable  3. Esoph adeno- s/p esophagectomy  4. Right sided pneumo- chest tube in place.  5. H/o bioprosthetic aortic valve        PLAN:  · Patient's volume status is improving.  · Continue torsemide at 80 mg daily.  · Replace potassium.  · Continue surveillance labs      Thank you for involving us in the care of Edmond Chase.  Please feel free to call with any questions.    Alex Hernadez MD  23  10:19 [facility]    [facility]  [phone]    Parts of this note may be an electronic transcription/translation of spoken language to printed text using the Dragon dictation system.    Electronically signed by Alex Hernadez MD at 23 1020     Namita Swift APRN at 23 1304              Patient Name: Edmond Chase  :1960  62 y.o.      Patient Care Team:  Nargis Velasquez APRN as PCP - General (Family Medicine)  Papa Miguel MD as Consulting Physician (Cardiology)  Sekou Pisano MD as Consulting Physician (Pulmonary Disease)  Gregor Carlson MD as Consulting Physician (Gastroenterology)  Estevan Yuan MD (Sports Medicine)  Shelley Goldsmith, RN as Nurse Navigator    Chief Complaint: follow up esophageal cancer, coronary artery disease, tissue aortic valve replacement    Interval History: chest feels better with chest tube out. A little hard to talk. abd tender.        Objective   Vital Signs  Temp:  [97.9 °F (36.6 °C)-98.6 °F (37 °C)] 98.6 °F (37 °C)  Heart Rate:  [76] 76  Resp:  [18] 18  BP: (137-164)/(72-82) 137/78    Intake/Output Summary (Last 24 hours) at 2023 1304  Last data filed at 2023 0845  Gross per 24 hour   Intake 130 ml   Output 460 ml   Net -330 ml     Flowsheet Rows    Flowsheet Row First Filed Value   Admission Height 185.4 cm (73\") Documented at 2023 1800   Admission Weight 143 kg (315 lb 4.1 oz) Documented at " 02/03/2023 1800          Physical Exam:   General Appearance:    Alert, cooperative, in no acute distress   Lungs:     Clear to auscultation.  Normal respiratory effort and rate.      Heart:    Regular rhythm and normal rate, normal S1 and S2, no murmurs, gallops or rubs.     Chest Wall:    No abnormalities observed   Abdomen:     Soft, nontender, positive bowel sounds.     Extremities:   no cyanosis, clubbing or edema.  No marked joint deformities.  Adequate musculoskeletal strength.       Results Review:    Results from last 7 days   Lab Units 02/08/23  0458   SODIUM mmol/L 141   POTASSIUM mmol/L 3.7   CHLORIDE mmol/L 101   CO2 mmol/L 34.5*   BUN mg/dL 19   CREATININE mg/dL 0.81   GLUCOSE mg/dL 160*   CALCIUM mg/dL 8.6         Results from last 7 days   Lab Units 02/08/23  0458   WBC 10*3/mm3 4.97   HEMOGLOBIN g/dL 10.3*   HEMATOCRIT % 33.3*   PLATELETS 10*3/mm3 200         Results from last 7 days   Lab Units 02/07/23  0558   MAGNESIUM mg/dL 2.0                   Medication Review:   acetaminophen, 1,000 mg, Per J Tube, TID  docusate sodium, 100 mg, Per J Tube, BID  gabapentin, 300 mg, Per J Tube, Q8H  heparin (porcine), 5,000 Units, Subcutaneous, Q8H  insulin regular, 0-9 Units, Subcutaneous, Q6H  lisinopril, 10 mg, Nasogastric, Q24H  metoprolol tartrate, 25 mg, Per J Tube, Q12H   Or  metoprolol tartrate, 5 mg, Intravenous, Q12H  [START ON 2/9/2023] torsemide, 80 mg, Per J Tube, Daily         lactated ringers, 9 mL/hr, Last Rate: 9 mL/hr (02/03/23 0737)        Assessment & Plan   1. Esophageal cancer status post total esophagectomy and j tube placement on 2/3/23 by Dr. Stockton  2. Chest pain post operatively, likely surgical. Not similar to prior angina.  3. Coronary artery disease status post CABG ( 2/3 bypasses  occluded on repeat heart catheterization. Severe native vessel disease managed medically.   4. Bioprosthetic aortic valve replacement, normal function by TTE 12/14/2022  5. Diabetes mellitis type II  6.  Hypertension, holding amlodipine due to lower extremity edema. Continue lisinopril and metoprolol. Reasonable control currently and trending better since the addition of home lisinopril. Will continue to follow.   7. Morbid obesity  8. Right sided pneumothorax, status post chest tube  9. RAMIREZ , nephrology following    Cardiac status appears stable. Will continue to follow blood pressure.     VIKTORIA Delgadillo  [facility]  02/08/23  13:04 EST      Electronically signed by Namita Swift APRN at 02/08/23 4726

## 2023-02-10 NOTE — PROGRESS NOTES
[facility] Progress Note      Patient Name: Edmond Chase  : 1960  MRN: [id number]  Primary Care Physician:  Nargis Velasquez APRN  Date of admission: 2/3/2023    Subjective     Interval History:   The patient was seen and examined today for follow-up on oliguria  Doing better today.  Lower extremity edema has improved  Urinating well.      Review of Systems:   As noted above    Objective     Vitals:   Temp:  [98.2 °F (36.8 °C)-98.6 °F (37 °C)] 98.4 °F (36.9 °C)  Heart Rate:  [90] 90  Resp:  [18] 18  BP: (138-164)/(69-92) 138/92    Intake/Output Summary (Last 24 hours) at 2/10/2023 1504  Last data filed at 2/10/2023 1100  Gross per 24 hour   Intake 1575 ml   Output --   Net 1575 ml       Physical Exam:    General Appearance: alert, oriented x 3, no acute distress   Skin: warm and dry  HEENT: oral mucosa normal, nonicteric sclera  Neck: supple, no JVD  Lungs: CTA  Heart: RRR, normal S1 and S2  Abdomen: soft, nontender, nondistended  : no palpable bladder  Extremities: 1-2+ pitting edema bilateral lower extremities.  Improved from before.  No cyanosis or clubbing  Neuro: normal speech and mental status     Scheduled Meds:     acetaminophen, 1,000 mg, Per J Tube, TID  docusate sodium, 100 mg, Per J Tube, BID  gabapentin, 300 mg, Per J Tube, Q8H  heparin (porcine), 5,000 Units, Subcutaneous, Q8H  insulin regular, 0-9 Units, Subcutaneous, Q6H  lisinopril, 10 mg, Per J Tube, Q24H  metoprolol tartrate, 25 mg, Per J Tube, Q12H   Or  metoprolol tartrate, 5 mg, Intravenous, Q12H  torsemide, 80 mg, Per J Tube, Daily      IV Meds:   lactated ringers, 9 mL/hr, Last Rate: 9 mL/hr (23 0737)        Results Reviewed:   I have personally reviewed the results from the time of this admission to 2/10/2023 15:04 EST     Results from last 7 days   Lab Units 02/10/23  0542 23  0536 23  0458 23  0558 23  0636 23  0325   SODIUM mmol/L 143 142 141   < > 140 136    POTASSIUM mmol/L 3.8 3.3* 3.7   < > 4.4 4.9   CHLORIDE mmol/L 100 102 101   < > 102 104   CO2 mmol/L 32.1* 32.3* 34.5*   < > 28.9 26.0   BUN mg/dL 26* 23 19   < > 22 24*   CREATININE mg/dL 0.75* 0.61* 0.81   < > 1.13 1.09   CALCIUM mg/dL 8.5* 8.6 8.6   < > 8.6 8.1*   BILIRUBIN mg/dL  --   --   --   --  0.5 0.6   ALK PHOS U/L  --   --   --   --  78 74   ALT (SGPT) U/L  --   --   --   --  260* 362*   AST (SGOT) U/L  --   --   --   --  77* 220*   GLUCOSE mg/dL 200* 184* 160*   < > 128* 164*    < > = values in this interval not displayed.       Estimated Creatinine Clearance: 145 mL/min (A) (by C-G formula based on SCr of 0.75 mg/dL (L)).    Results from last 7 days   Lab Units 02/10/23  0542 02/09/23  0536 02/08/23 0458 02/07/23  0558 02/06/23 0636 02/05/23  0325   MAGNESIUM mg/dL  --  1.9  --  2.0  --  2.1   PHOSPHORUS mg/dL 2.9 1.3* 2.7 2.6   < > 3.5    < > = values in this interval not displayed.       Results from last 7 days   Lab Units 02/06/23  0636   URIC ACID mg/dL 5.8       Results from last 7 days   Lab Units 02/10/23  0542 02/09/23  0536 02/08/23 0458 02/07/23  0558 02/06/23 0636   WBC 10*3/mm3 7.13 5.74 4.97 5.07 6.66   HEMOGLOBIN g/dL 10.8* 10.2* 10.3* 10.1* 10.1*   PLATELETS 10*3/mm3 251 231 200 182 167             Assessment / Plan     ASSESSMENT:  1.  RAMIREZ with decrease urine output Fairly normal renal function at this time.  Baseline closer to 0.8.  Urine sodium <20.    Secondary to decompensated heart failure.  Diuresing well   2. Anemia- hgb stable  3. Esoph adeno- s/p esophagectomy  4. Right sided pneumo- chest tube in place.  5. H/o bioprosthetic aortic valve        PLAN:  · Patient's volume status is improving.  · Continue torsemide at 80 mg daily.  · Okay to discharge home from nephrology standpoint we will follow-up with nephrology clinic in 2 weeks with repeat CKD labs  · Continue surveillance labs      Thank you for involving us in the care of Edmond Chase.  Please feel free to  call with any questions.    Alex Hernadez MD  02/10/23  15:04 [facility]    [facility]  [phone]    Parts of this note may be an electronic transcription/translation of spoken language to printed text using the Dragon dictation system.

## 2023-02-10 NOTE — PROGRESS NOTES
[facility] to provide Home Care services. [facility] to provide Tube Feeding supplies. [facility] will deliver and hook patient up to TF today at bedside before discharge. [facility] will plan a SN visit tomorrow afternoon. Patient agreeable. Contact information confirmed. Patient caregiver will be staying with the patient, Daniel Hernandez [phone].

## 2023-02-10 NOTE — PLAN OF CARE
"Goal Outcome Evaluation:   VSS. A&OX4. No c/o pain or discomfort. Up with SBA. Pt reports feeling \"foggy\" at start of shift. Pt does not want to take narcotics and see if he feels better. Gabapentin, pain medication and valium held. Pt states he's feeling better currently. Pt appears more awake than the start of the shift. Impact peptide infusing. Just increased to 85ml/hr. Appears to be tolerating. Pt educated r/t HOB with TF infusing, NPO status and no cpap use at home. Pt verbalizes understanding. Pt requesting 02 for comfort, SPO2 >92%. Appears anxious at times. All incisions CDI. Up in chair. Call light in reach.   "

## 2023-02-11 ENCOUNTER — HOME CARE VISIT (OUTPATIENT)
Dept: HOME HEALTH SERVICES | Facility: HOME HEALTHCARE | Age: 63
End: 2023-02-11
Payer: COMMERCIAL

## 2023-02-11 PROCEDURE — G0299 HHS/HOSPICE OF RN EA 15 MIN: HCPCS

## 2023-02-11 NOTE — HOME HEALTH
" SOC 2/11  DIAG AND FOCUS OF CARE: T/I ON DISEASE PROESS FOR AFTERCARE FOLLOWING SURGERY DUE TO NEOPLASM OF ESOPHAGUS, T/I ON TUBE FEEDS AND JTUBE CARE.   HX: HTN, RAMIREZ, TJ  Patient is a 61 yo male lives alone in his condo. Has a friend staying with him for now to act as primary care giver.   He is up in home without any assistive devices.   Patient had a right VAT with total esophagectomy and jejunostomy feeding tube placed . He has TJ and his BiPAP is contraindicated at this time. He is on oxyen at night and has been using it at times during the day due to SOA. Nursing needs to ensure strict NPO status . We rev importance of good mouth care.   He has c/o pain to abd at incision site 2 to 3 at visit, taking tylenol as needed   He has midline abd inicision, pic taken at visit and measured. t/i to keep clean and dry and call nursing if any ss of infeciton.   He has Jtube to left abd skin intact, t/i on need to flush appropriately. Feeds running when nursing there , states his cg is managing . CG not at visit today , will need to discuss tube feeds with him at next visit.   Patient is on Peptamen 1.5 at a rate of 85 ml hr x 18 hrs along with 45 ml / hr x 18 hrs of free water.  He has a kangaroo pump with both bags hanging.   He will not run feeds at night due to it being to noisy.  When nursing arrived he walked into room carring his pole with pump and feeds on it.  T/I that it is a falls hazard and we need to unhook when he goes to bathroom or have someone else carry the pole. He stated \" it is to hard to unhook and leaks,\"    Patient noted to be very tired and somewhat aggitated at visit due to how he is feeling.   Nursing did rev medications in home and comapred to d/c instructins he has alll meds present .  They did stop his amolodipine and imdur .   Nursing noted him to have elevated bp at visit , 172/98, his BP monitor was 140's over 80's. Instructed him to take his BP 3 x day and notify nursing if his readings " are   over 160/90's.  He v/u.

## 2023-02-11 NOTE — OUTREACH NOTE
Prep Survey    Flowsheet Row Responses   [facility] patient discharged from? [place]   Is LACE score < 7 ? No   Eligibility [facility]   Date of Admission 02/03/23   Date of Discharge 02/10/23   Discharge Disposition Home-Health Care Svc   Discharge diagnosis Malignant neoplasm of lower third of esophagus , right VAT with total esophagectomy, feeding jejunostomy placement   Does the patient have one of the following disease processes/diagnoses(primary or secondary)? Cardiothoracic surgery   Does the patient have Home health ordered? Yes   What is the Home health agency?  [facility], [facility]   Is there a DME ordered? Yes   What DME was ordered? O2 therapy   Prep survey completed? Yes          Bethanie ALFOSNO - Registered Nurse

## 2023-02-12 VITALS
HEART RATE: 74 BPM | RESPIRATION RATE: 22 BRPM | DIASTOLIC BLOOD PRESSURE: 98 MMHG | TEMPERATURE: 98.9 F | SYSTOLIC BLOOD PRESSURE: 172 MMHG | OXYGEN SATURATION: 96 %

## 2023-02-12 NOTE — CASE COMMUNICATION
" SOC 2/11  DIAG AND FOCUS OF CARE: T/I ON DISEASE PROESS FOR AFTERCARE FOLLOWING SURGERY DUE TO NEOPLASM OF ESOPHAGUS, T/I ON TUBE FEEDS AND JTUBE CARE.   HX: HTN, RAMIREZ, TJ  Patient is a 61 yo male lives alone in his condo. Has a friend staying with him for now to act as primary care giver.   He is up in home without any assistive devices.   Patient had a right VAT with total esophagectomy and jejunostomy feeding tube placed . He has OS A and his BiPAP is contraindicated at this time. He is on oxyen at night and has been using it at times during the day due to SOA. Nursing needs to ensure strict NPO status . We rev importance of good mouth care.   He has c/o pain to abd at incision site 2 to 3 at visit, taking tylenol as needed   He has midline abd inicision, pic taken at visit and measured. t/i to keep clean and dry and call nursing if any ss of infeciton.   He has Jt ube to left abd skin intact, t/i on need to flush appropriately. Feeds running when nursing there , states his cg is managing . CG not at visit today , will need to discuss tube feeds with him at next visit.   Patient is on Peptamen 1.5 at a rate of 85 ml hr x 18 hrs along with 45 ml / hr x 18 hrs of free water.  He has a kangaroo pump with both bags hanging.   He will not run feeds at night due to it being to noisy.  When nursing arriv ed he walked into room carring his pole with pump and feeds on it.  T/I that it is a falls hazard and we need to unhook when he goes to bathroom or have someone else carry the pole. He stated \" it is to hard to unhook and leaks,\"    Patient noted to be very tired and somewhat aggitated at visit due to how he is feeling.   Nursing did rev medications in home and comapred to d/c instructins he has alll meds present .  They did stop his am olodipine and imdur .   Nursing noted him to have elevated bp at visit , 172/98, his BP monitor was 140's over 80's. Instructed him to take his BP 3 x day and notify nursing if his " readings are   over 160/90's.  He v/u.

## 2023-02-13 ENCOUNTER — TRANSITIONAL CARE MANAGEMENT TELEPHONE ENCOUNTER (OUTPATIENT)
Dept: CALL CENTER | Facility: HOSPITAL | Age: 63
End: 2023-02-13
Payer: COMMERCIAL

## 2023-02-13 ENCOUNTER — HOME CARE VISIT (OUTPATIENT)
Dept: HOME HEALTH SERVICES | Facility: HOME HEALTHCARE | Age: 63
End: 2023-02-13
Payer: COMMERCIAL

## 2023-02-13 ENCOUNTER — TELEPHONE (OUTPATIENT)
Dept: INTERNAL MEDICINE | Age: 63
End: 2023-02-13
Payer: COMMERCIAL

## 2023-02-13 PROCEDURE — G0299 HHS/HOSPICE OF RN EA 15 MIN: HCPCS

## 2023-02-13 RX ORDER — ACETAMINOPHEN 160 MG/5ML
1000 SUSPENSION ORAL 3 TIMES DAILY
Qty: 1311 ML | Refills: 0 | Status: SHIPPED | OUTPATIENT
Start: 2023-02-13 | End: 2023-02-13

## 2023-02-13 RX ORDER — ACETAMINOPHEN 160 MG/5ML
1000 SUSPENSION ORAL 3 TIMES DAILY
Qty: 1311 ML | Refills: 0 | Status: SHIPPED | OUTPATIENT
Start: 2023-02-13 | End: 2023-02-27

## 2023-02-13 NOTE — CASE MANAGEMENT/SOCIAL WORK
Case Management Discharge Note      Final Note: Pt discharged home with [facility] for tube feeds.  Transported by friend.  EL Espinosa RN    Provided Post Acute Provider List?: N/A  Provided Post Acute Provider Quality & Resource List?: N/A    Selected Continued Care - Discharged on 2/10/2023 Admission date: 2/3/2023 - Discharge disposition: Home-Health Care Svc    Destination    No services have been selected for the patient.              Durable Medical Equipment    No services have been selected for the patient.              Dialysis/Infusion     Service Provider Selected Services Address Phone Fax Patient Preferred    [facility] [id number] [place] [id number] [phone] --          Home Medical Care Coordination complete.    Service Provider Selected Services Address Phone Fax Patient Preferred    [facility] [address] [facility] [address] [place] [id number] [phone] --          Therapy    No services have been selected for the patient.              Community Resources    No services have been selected for the patient.              Community & DME    No services have been selected for the patient.                       Final Discharge Disposition Code: 06 - home with home health care

## 2023-02-13 NOTE — PAYOR COMM NOTE
"Edmond Chase (62 y.o. Male)     PLEASE SEE ATTACHED FOR DC NOTICE    REF #   [id number]    THANK YOU  SALIMA MCNAMARA RN/ DEPT  [facility]   [phone]  [phone]    Date of Birth   1960    Social Security Number       Address   [address]  [place] [address]    Home Phone   [phone]    MRN   [id number]       [place]   None    Marital Status   Single                            Admission Date   2/3/23    Admission Type   Elective    Admitting Provider   Valerie Stockton MD    Attending Provider       Department, Room/Bed   [facility], E553/1       Discharge Date   2/10/2023    Discharge Disposition   Home-Health Care [facility]    Discharge Destination                               Attending Provider: (none)   Allergies: No Known Allergies    Isolation: None   Infection: None   Code Status: CPR    Ht: 185 cm (72.84\")   Wt: 137 kg (301 lb)    Admission Cmt: None   Principal Problem: Malignant neoplasm of lower third of esophagus (HCC) [C15.5]                 Active Insurance as of 2/3/2023     Primary Coverage     Payor Plan Insurance Group Employer/Plan Group    [facility] [place] [employer] [facility] BY [employer] [id number]     Payor Plan Address Payor Plan Phone Number Payor Plan Fax Number Effective Dates    [address] [address]   1/1/2021 - None Entered    [place] [id number]       Subscriber Name Subscriber Birth Date Member ID       EDMOND CHASE 1960 [id number]                 Emergency Contacts      (Rel.) Home Phone Work Phone Mobile Phone    Kyra Ahuja (Sister) [phone] -- [phone]    Melinda Ahuja [facility] (Relative) [phone] -- [phone]    Sadia Ahuja HCS (Relative) [phone] -- [phone]            [place]: NPI [id number]  Tax ID [id number]     Discharge Summary      Nicola Jenkins APRN at 02/10/23 1303     Attestation signed by Leonidas Kirby MD at 02/10/23 1810    I have " reviewed this documentation and agree.                       Patient Care Team:  Nargis Velasquez APRN as PCP - General (Family Medicine)  Papa Miguel MD as Consulting Physician (Cardiology)  Sekou Pisano MD as Consulting Physician (Pulmonary Disease)  Gregor Carlson MD as Consulting Physician (Gastroenterology)  Estevan Yuan MD (Sports Medicine)  Shelley Goldsmith, RN as Nurse Navigator    Date of Admission: 2/3/2023   Date of Discharge:  2/10/2023    Discharge Diagnosis: Esophageal cancer    Presenting Problem  Malignant neoplasm of lower third of esophagus (HCC) [C15.5]     History of Present Illness  Edmond Chase is a 62 y.o. male who was evaluated with Dr. Valerie Stockton on 1/16/2023.  He has a history significant for adenocarcinoma of the esophagus with invasion.  An esophagectomy was ultimately recommended to the patient and he was agreeable to proceed.    Hospital Course    Mr. Chase presented to [facility] on 2/3/2023 for a scheduled right VAT with total esophagectomy and jejunostomy feeding tube placement performed by Dr. Valerie Stockton.  He was transferred to PACU for anesthesia recovery and was subsequently transferred to ICU for initial postoperative care.  The patient was then transferred to [facility] for the remainder of his hospitalization. The patient had a satisfactory postoperative course.  Nephrology was consulted given his history of Lasix dependence and cardiology for hypertension.  Please refer to daily progress notes for events of stay.    RAMIREZ with oliguria: Continue torsemide 80 mg daily along with 20 mEq daily of potassium solution via J-tube per nephrology.  He is scheduled to follow-up in 2 weeks along with repeat labs      Hypertension: Per cardiology continue to hold Norvasc and Imdur for now.  Blood pressure well controlled on lisinopril and Lopressor which is to be continued at discharge.    TJ: BiPAP contraindicated at this time.   Patient will require supplemental oxygen via nasal cannula at night which has been arranged.    Nutrition: Patient has been tolerating cyclic feeds which we will continue.  He will require Peptamen 1.5 at a rate of 85 mL/h x 18 hours along with 45 mL/h x 18 hours of free water.  It is imperative we continue a high-protein formulation to promote healing given his recent surgery.  He will also require separate feet and flush bags to ensure patency of the feeding tube.    He is scheduled to follow-up with Dr. Valerie Stockton on 2/28/2023 along with this an esophagram to be performed prior to this appointment which is scheduled for 2/22/2023.    Ensure strict n.p.o. status this was reiterated to the patient    Procedures Performed  Procedure(s):  BRONCHOSCOPY, RIGHT ROBOTIC VIDEO ASSISTED THORACOSCOPY WITH TOTAL ESOPHAGECTOMY, INTERCOSTAL NERVE BLOCK, FEEDING JEJUNOSTOMY PLACEMENT       Consults:   Consults     Date and Time Order Name Status Description    2/5/2023  9:01 AM Inpatient Nephrology Consult      2/4/2023 11:01 AM Inpatient Cardiology Consult      2/3/2023  6:05 PM Inpatient Intensivist Consult            Pertinent Test Results:     Imaging Results (Last 24 Hours)     Procedure Component Value Units Date/Time    XR Chest 1 View [[id number]] Collected: 02/10/23 0753     Updated: 02/10/23 0757    Narrative:      XR CHEST 1 VW-clinical: Chest tube management     COMPARISON examination 2/9/2023     FINDINGS: There is a persistent right apical pneumothorax, size is  similar to the previous examination. Subcutaneous emphysema at the base  of the right neck as before. Airspace disease at the right lung base has  not improved. There is a small right-sided pleural effusion seen. The  cardiomediastinal silhouette is stable. The left lung is clear. The  remainder is unremarkable.     CONCLUSION: Stable chest     This report was finalized on 2/10/2023 7:54 AM by Dr. Bam Cerda M.D.             Lab Results (last 24  hours)     Procedure Component Value Units Date/Time    POC Glucose Once [[id number]]  (Abnormal) Collected: 02/10/23 1123    Specimen: Blood Updated: 02/10/23 1126     Glucose 214 mg/dL      Comment: Meter: [id number] : [id number] Dami MARQUEZ       Renal Function Panel [[id number]]  (Abnormal) Collected: 02/10/23 0542    Specimen: Blood Updated: 02/10/23 0702     Glucose 200 mg/dL      BUN 26 mg/dL      Creatinine 0.75 mg/dL      Sodium 143 mmol/L      Potassium 3.8 mmol/L      Comment: Slight hemolysis detected by analyzer. Results may be affected.        Chloride 100 mmol/L      CO2 32.1 mmol/L      Calcium 8.5 mg/dL      Albumin 3.0 g/dL      Phosphorus 2.9 mg/dL      Anion Gap 10.9 mmol/L      BUN/Creatinine Ratio 34.7     eGFR 100.8 mL/min/1.73     Narrative:      GFR Normal >60  Chronic Kidney Disease <60  Kidney Failure <15      CBC (No Diff) [[id number]]  (Abnormal) Collected: 02/10/23 0542    Specimen: Blood Updated: 02/10/23 0643     WBC 7.13 10*3/mm3      RBC 3.86 10*6/mm3      Hemoglobin 10.8 g/dL      Hematocrit 34.5 %      MCV 89.4 fL      MCH 28.0 pg      MCHC 31.3 g/dL      RDW 14.0 %      RDW-SD 45.3 fl      MPV 9.9 fL      Platelets 251 10*3/mm3     POC Glucose Once [[id number]]  (Abnormal) Collected: 02/10/23 0551    Specimen: Blood Updated: 02/10/23 0552     Glucose 197 mg/dL      Comment: Meter: [id number] : [id number] Selina Hermanly NA       POC Glucose Once [[id number]]  (Abnormal) Collected: 02/10/23 0013    Specimen: Blood Updated: 02/10/23 0015     Glucose 169 mg/dL      Comment: Meter: [id number] : [id number] Marks Perla NA       POC Glucose Once [[id number]]  (Normal) Collected: 02/09/23 1609    Specimen: Blood Updated: 02/09/23 1611     Glucose 127 mg/dL      Comment: Meter: [id number] : [id number] Ndayishimiye Aura NA               Condition on Discharge: Stable for discharge home    Vital Signs  Temp:  [98.2 °F (36.8 °C)-98.6 °F (37 °C)] 98.4 °F (36.9 °C)  Heart  Rate:  [90] 90  Resp:  [18] 18  BP: (138-164)/(69-92) 138/92    Physical Exam:    General Appearance:    Alert, cooperative, in no acute distress   Head:    Normocephalic, without obvious abnormality, atraumatic   Eyes:            Lids and lashes normal, conjunctivae and sclerae normal, no   icterus, no pallor, corneas clear, PERRLA   Ears:    Ears appear intact with no abnormalities noted   Throat:   No oral lesions, no thrush, oral mucosa moist   Neck:   No adenopathy, supple, trachea midline, no thyromegaly, no   carotid bruit, no JVD   Back:     No kyphosis present, no scoliosis present, no skin lesions,      erythema or scars, no tenderness to percussion or                   palpation,   range of motion normal   Lungs:     Clear to auscultation,respirations regular, even and                  unlabored    Heart:    Regular rhythm and normal rate, normal S1 and S2, no            murmur, no gallop, no rub, no click   Chest Wall:    No abnormalities observed   Abdomen:     Normal bowel sounds, no masses, no organomegaly, soft        non-tender, non-distended, no guarding, no rebound                Tenderness [feeding tube clean, dry, and intact sutured in place.]   Rectal:     Deferred   Extremities:   Moves all extremities well, no edema, no cyanosis, no             redness   Pulses:   Pulses palpable and equal bilaterally   Skin:   No bleeding, bruising or rash [surgical incisions clean, dry, intact closed with Dermabond.]    Lymph nodes:   No palpable adenopathy   Neurologic:   Cranial nerves 2 - 12 grossly intact, sensation intact, DTR       present and equal bilaterally       Discharge Disposition  Home today    Discharge Medications     Discharge Medications      New Medications      Instructions Start Date   M- MG/5ML liquid  Generic drug: acetaminophen   Administer 31 mL per J tube 3 (Three) Times a Day for 30 days.      metoprolol tartrate 25 MG tablet  Commonly known as: LOPRESSOR   25 mg, Per J  Tube, Every 12 Hours Scheduled      Potassium Chloride 10 % solution  Replaces: potassium chloride 10 MEQ CR tablet   20 mEq, Per J Tube, Daily      Torsemide 40 MG tablet   80 mg, Per J Tube, Daily   Start Date: February 11, 2023        Changes to Medications      Instructions Start Date   atorvastatin 40 MG tablet  Commonly known as: LIPITOR  What changed: how to take this   40 mg, Per J Tube, Nightly      Farxiga 10 MG tablet  Generic drug: dapagliflozin Propanediol  What changed: how to take this   10 mg, Per J Tube, Daily      lisinopril 10 MG tablet  Commonly known as: PRINCHEYENNEILZESTRIL  What changed:   · how to take this  · when to take this  · additional instructions   10 mg, Per J Tube, Every 24 Hours Scheduled      metFORMIN 500 MG tablet  Commonly known as: GLUCOPHAGE  What changed: how to take this   500 mg, Per J Tube, Every Evening         Stop These Medications    amLODIPine 10 MG tablet  Commonly known as: NORVASC     ferrous sulfate 324 (65 Fe) MG tablet delayed-release EC tablet     furosemide 20 MG tablet  Commonly known as: LASIX     GLUCOSAMINE HCL PO     isosorbide mononitrate 60 MG 24 hr tablet  Commonly known as: IMDUR     metoprolol succinate XL 25 MG 24 hr tablet  Commonly known as: TOPROL-XL     pantoprazole 40 MG EC tablet  Commonly known as: PROTONIX     potassium chloride 10 MEQ CR tablet  Replaced by: Potassium Chloride 10 % solution     sucralfate 1 g tablet  Commonly known as: CARAFATE            Discharge Instructions:  · No heavy lifting, pushing, pulling greater than 10 pounds.  · No driving up until 2 weeks after surgery and no longer taking narcotics.  · Resume home diet as tolerated.  · Continue incentive spirometer at least 4 times per day.  · Remove dressing from post chest tube site after 48 hours, may shower and clean surgical sites with antibacterial soap or hydrogen peroxide, and apply gauze dressing or band-aid as needed for any drainage.  No dressing needed once no  longer draining.          Follow-up Appointments  Future Appointments   Date Time Provider Department Center   2/17/2023  7:00 AM [facility] [place] 5 BH [facility] XRAY [facility]   2/28/2023  3:45 PM Valerie Stockton MD [facility] TS [facility]   4/10/2023 10:00 AM Shelley Estrella PA-C [facility] GE EA MAX [facility]   4/20/2023 11:00 AM Marcia Stoner APRN [facility]AL CD [facility]   9/6/2023 10:45 AM Sekou Pisano MD [facility] ANDERSO2 None     Additional Instructions for the Follow-ups that You Need to Schedule     Ambulatory Referral to Home Health (Hospital)   As directed      Please cleanse postop incisions with hydrogen peroxide.  Keep area around J-tube site clean and dry.  Apply Desitin as needed.    Order Comments: Please cleanse postop incisions with hydrogen peroxide.  Keep area around J-tube site clean and dry.  Apply Desitin as needed.     Face to Face Visit Date: 2/8/2023    Follow-up provider for Plan of Care?: I treated the patient in an acute care facility and will not continue treatment after discharge.    Follow-up provider: VALERIE STOCKTON [[id number]]    Reason/Clinical Findings: s/p esohagectomy for esophageal cancer    Describe mobility limitations that make leaving home difficult: daily tube feeds    Nursing/Therapeutic Services Requested: Skilled Nursing    Skilled nursing orders: Wound care dressing/changes Enteral therapy    Frequency: 1 Week 1         FL Esophagram Complete Double-Contrast    Feb 22, 2023 (Approximate)      Exam reason: rule out esophageal leak s/p esophagectomy    Release to patient: Immediate         XR Chest 2 View    Mar 08, 2023 (Approximate)      Exam reason: post-op               Test Results Pending at Discharge      For any questions regarding patient's stay, please refer to patient's chart.    VIKTORIA Hughes  [facility]  02/10/23  15:53 EST            Electronically signed by Leonidas Kirby MD at 02/10/23 6632

## 2023-02-13 NOTE — TELEPHONE ENCOUNTER
Kailey from [facility] called ans states is blood pressure has elevated to  160/80 all his reading are in epic and mychart they stopped two of his meds when he got out of the hospital a good call back number for her is  [phone]

## 2023-02-13 NOTE — OUTREACH NOTE
Call Center TCM Note    Flowsheet Row Responses   [facility] patient discharged from? [place]   Does the patient have one of the following disease processes/diagnoses(primary or secondary)? Cardiothoracic surgery   TCM attempt successful? Yes   Call start time 1050   Call end time 1058   Discharge diagnosis Malignant neoplasm of lower third of esophagus , right VAT with total esophagectomy, feeding jejunostomy tube placement   Person spoke with today (if not patient) and relationship Patient and caregiver, Daniel   Meds reviewed with patient/caregiver? Yes   Does the patient have all medications related to this admission filled (includes all antibiotics, pain medications, cardiac medications, etc.) Yes   Is the patient taking all medications as directed (includes completed medication regime)? Yes   Medication comments Meds per J tube, nothing by mouth   Comments PCP Nargis BLUM. Patient declines need to see PCP at this time. Reports that after seeing specialists, will contact PCP office for follow up appt.    Does the patient have an appointment with their PCP within 7 days of discharge? No   Nursing Interventions Patient declined scheduling/rescheduling appointment at this time, Patient desires to follow up with specialty only   What is the Home health agency?  [facility]   Has home health visited the patient within 72 hours of discharge? Yes   What DME was ordered? O2 therapy, nutrition per tube feeds   Has all DME been delivered? Yes   Psychosocial issues? No   Did the patient receive a copy of their discharge instructions? Yes   Nursing interventions Reviewed instructions with patient   What is the patient's perception of their health status since discharge? Improving   Is the patient /caregiver able to teach back basic post-op care? Practice cough and deep breath every 4 hours while awake   Is the patient/caregiver able to teach back steps to recovery at home? Set small, achievable goals  for return to baseline health, Rest and rebuild strength, gradually increase activity   If the patient is a current smoker, are they able to teach back resources for cessation? Not a smoker   Is the patient/caregiver able to teach back the hierarchy of who to call/visit for symptoms/problems? PCP, Specialist, Home health nurse, Urgent Care, ED, 911 Yes   TCM call completed? Yes   Call end time 1058   Would this patient benefit from a Referral to [facility] Social Work? No   Is the patient interested in additional calls from an ambulatory ?  NOTE:  applies to high risk patients requiring additional follow-up. No          Tamika Draper, STEVE    2/13/2023, 10:59 EST

## 2023-02-14 ENCOUNTER — PATIENT OUTREACH (OUTPATIENT)
Dept: OTHER | Facility: HOSPITAL | Age: 63
End: 2023-02-14
Payer: COMMERCIAL

## 2023-02-14 NOTE — PROGRESS NOTES
"Rec'd call from Daniel Hernandez, the patient's .  Left message that they will be out of tube feedings soon, requested call back. Stated the \"vendor is not communicating with me\".    Called [facility], [phone] #3. On hold for 15 minutes, introduced myself, explained patient is running out of TFs. They will call the patient to address the issue.    Called patient, spoke with Daniel. He doesn't think [employer] has called them.  He said he isn't sure if [employer]'s voicemail was set up. I gave him [facility]'s number to call.    Rec'd another call from Daniel, he can't reach [facility]. Requested call back.    Called [facility], introduced myself, explained the patient is low on TFs and are having trouble contacting [employer] about getting more.  She will call the patient and address the tube feeding issue.       "

## 2023-02-14 NOTE — HOME HEALTH
nursing saw reg feeting tube, and feeds.   he started on new med olanzapine, due to sleeping and   anxiety issues.   mar updated.     153/82  168/75  168/84 with their machine   today for me 178/84,   may need to restart his amolodipine.  need to get in touch with MD,   called left message

## 2023-02-15 ENCOUNTER — HOME CARE VISIT (OUTPATIENT)
Dept: HOME HEALTH SERVICES | Facility: HOME HEALTHCARE | Age: 63
End: 2023-02-15
Payer: COMMERCIAL

## 2023-02-15 ENCOUNTER — PATIENT OUTREACH (OUTPATIENT)
Dept: OTHER | Facility: HOSPITAL | Age: 63
End: 2023-02-15
Payer: COMMERCIAL

## 2023-02-15 VITALS
RESPIRATION RATE: 18 BRPM | DIASTOLIC BLOOD PRESSURE: 72 MMHG | OXYGEN SATURATION: 92 % | SYSTOLIC BLOOD PRESSURE: 128 MMHG | TEMPERATURE: 98.2 F | HEART RATE: 83 BPM

## 2023-02-15 VITALS
HEART RATE: 78 BPM | RESPIRATION RATE: 16 BRPM | OXYGEN SATURATION: 96 % | DIASTOLIC BLOOD PRESSURE: 84 MMHG | TEMPERATURE: 98.3 F | SYSTOLIC BLOOD PRESSURE: 178 MMHG

## 2023-02-15 PROCEDURE — G0300 HHS/HOSPICE OF LPN EA 15 MIN: HCPCS

## 2023-02-15 NOTE — HOME HEALTH
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  He has a J tube on left side of ABD.  His tube feed was delivered while nurse was present.  He is on [place] 1.5 at 85cc/hr for 18 hours.  Incisions looke good no s/s of infecton noted.  He resides with his partner whom manages his medications.  Communiation put into Dr. Koch about patients toresmide and postassium refills.  Patient educated on the importance of doing his IS.  He did voice understanding.

## 2023-02-15 NOTE — PROGRESS NOTES
Called patient. He said [facility] is there now. He asked that I speak with his friend Daniel. S/w Daniel, states Pearl from [facility] was handling his tube feedings. They are supposed to get them today.  Explained that they should address any home health and tube feeding needs with the [facility] agency, who will help manage that. He agreed and was appreciative of the follow up call.     I will f/u in a few weeks-they will call as needed

## 2023-02-16 ENCOUNTER — OFFICE VISIT (OUTPATIENT)
Dept: PSYCHIATRY | Facility: HOSPITAL | Age: 63
End: 2023-02-16
Payer: COMMERCIAL

## 2023-02-16 DIAGNOSIS — F41.1 GAD (GENERALIZED ANXIETY DISORDER): Primary | ICD-10-CM

## 2023-02-16 DIAGNOSIS — R41.0 DELIRIUM: ICD-10-CM

## 2023-02-16 PROCEDURE — 99214 OFFICE O/P EST MOD 30 MIN: CPT | Performed by: NURSE PRACTITIONER

## 2023-02-16 NOTE — PROGRESS NOTES
[facility]  Video visit-pandemic. Pt consented to session conducted through Zoom video.  You have chosen to receive care through a telehealth visit.  Do you consent to use a video/audio connection for your medical care today? YES  Telehealth provided in patient's home.  Location of Provider: [facility]    Subjective  Patient ID: Edmond Chase is a 62 y.o. male is seen via telehealth in the [facility].    CC: Insomnia, delirium    HPI/ Interval History:   Pt reports improvement in sleep since addition of zyprexa; has not taken melatonin. Reports sleeping for 2-3 hours, waking, and sleeping another 2-3 hours. Does report odd sensation once alongside initiation of this drug, potentially hypnagogic reaction. Some distress reported by friend with these initial doses, although seemingly improved over time. Considers need for taking a moment to orient self to surroundings. Overall reports significant benefit to current intervention, despite not being a complete cure. Reports persistent low energy, continuing to feel somewhat 'goofy.' Continues to nap during the day, felt by friend to be more than pt is reporting. Pt reports continued dedication to tube feeds, despite challenges managing supply. Reports desperation regarding desire to drink water and finds this to be more distressing aspect of current situation. Has test tomorrow to evaluate swallowing capabilities with hope for advanced diet.     Exam: As above    Recent Labs Reviewed:  No results displayed because visit has over 200 results.      Pre-Admission Testing on 01/23/2023   Component Date Value   • ABO Type 01/23/2023 O    • RH type 01/23/2023 Positive    • Antibody Screen 01/23/2023 Negative    • T&S Expiration Date 01/23/2023 1/24/2023 11:59:00 PM    Results Encounter on 01/21/2023   Component Date Value   • Glucose 01/23/2023 129 (H)    • BUN 01/23/2023 14    • Creatinine 01/23/2023 0.85    • Sodium  01/23/2023 143    • Potassium 01/23/2023 3.8    • Chloride 01/23/2023 107    • CO2 01/23/2023 23.7    • Calcium 01/23/2023 8.8    • BUN/Creatinine Ratio 01/23/2023 16.5    • Anion Gap 01/23/2023 12.3    • eGFR 01/23/2023 97.0    • PTT 01/23/2023 30.7    • Protime 01/23/2023 13.5    • INR 01/23/2023 1.02    • WBC 01/23/2023 6.23    • RBC 01/23/2023 4.14    • Hemoglobin 01/23/2023 11.7 (L)    • Hematocrit 01/23/2023 36.7 (L)    • MCV 01/23/2023 88.6    • MCH 01/23/2023 28.3    • MCHC 01/23/2023 31.9    • RDW 01/23/2023 14.5    • RDW-SD 01/23/2023 46.3    • MPV 01/23/2023 9.8    • Platelets 01/23/2023 221    • Neutrophil % 01/23/2023 75.5    • Lymphocyte % 01/23/2023 14.8 (L)    • Monocyte % 01/23/2023 7.4    • Eosinophil % 01/23/2023 1.8    • Basophil % 01/23/2023 0.3    • Immature Grans % 01/23/2023 0.2    • Neutrophils, Absolute 01/23/2023 4.71    • Lymphocytes, Absolute 01/23/2023 0.92    • Monocytes, Absolute 01/23/2023 0.46    • Eosinophils, Absolute 01/23/2023 0.11    • Basophils, Absolute 01/23/2023 0.02    • Immature Grans, Absolute 01/23/2023 0.01    • nRBC 01/23/2023 0.0    Lab on 01/16/2023   Component Date Value   • WBC 01/16/2023 6.58    • RBC 01/16/2023 4.14    • Hemoglobin 01/16/2023 11.5 (L)    • Hematocrit 01/16/2023 36.8 (L)    • MCV 01/16/2023 88.9    • MCH 01/16/2023 27.8    • MCHC 01/16/2023 31.3 (L)    • RDW 01/16/2023 14.5    • RDW-SD 01/16/2023 46.5    • MPV 01/16/2023 10.1    • Platelets 01/16/2023 218    • Neutrophil % 01/16/2023 73.7    • Lymphocyte % 01/16/2023 15.8 (L)    • Monocyte % 01/16/2023 8.2    • Eosinophil % 01/16/2023 1.8    • Basophil % 01/16/2023 0.3    • Immature Grans % 01/16/2023 0.2    • Neutrophils, Absolute 01/16/2023 4.85    • Lymphocytes, Absolute 01/16/2023 1.04    • Monocytes, Absolute 01/16/2023 0.54    • Eosinophils, Absolute 01/16/2023 0.12    • Basophils, Absolute 01/16/2023 0.02    • Immature Grans, Absolute 01/16/2023 0.01    • nRBC 01/16/2023 0.0        Current  Psychotropic Medications:  zyprexa 5 mg q hs  Melatonin 5 mg q hs -has not initiated    Plan of Care/ Medical Decision Making  Continue olanzapine as written. Considered potential benefits of melatonin, encouraging to initate half dose appx 7 PM.   Counseling provided surrounding consideration of progress alongside additional goals.   Reviewed benefits of separation bt day and night, behavioral activation as able. Supported friend in current care for patient, caregiver distress.  Follow up scheduled in two weeks.    Diagnoses and all orders for this visit:    1. CHARANJIT (generalized anxiety disorder) (Primary)    2. Delirium    I spent 33 minutes caring for Edmond on this date of service.

## 2023-02-17 ENCOUNTER — HOSPITAL ENCOUNTER (OUTPATIENT)
Dept: GENERAL RADIOLOGY | Facility: HOSPITAL | Age: 63
Discharge: HOME | End: 2023-02-17
Admitting: NURSE PRACTITIONER
Payer: COMMERCIAL

## 2023-02-17 ENCOUNTER — HOSPITAL ENCOUNTER (EMERGENCY)
Facility: HOSPITAL | Age: 63
Discharge: HOME | End: 2023-02-17
Attending: EMERGENCY MEDICINE | Admitting: EMERGENCY MEDICINE
Payer: COMMERCIAL

## 2023-02-17 ENCOUNTER — APPOINTMENT (OUTPATIENT)
Dept: GENERAL RADIOLOGY | Facility: HOSPITAL | Age: 63
End: 2023-02-17
Payer: COMMERCIAL

## 2023-02-17 VITALS
HEART RATE: 90 BPM | RESPIRATION RATE: 18 BRPM | WEIGHT: 301 LBS | BODY MASS INDEX: 39.89 KG/M2 | DIASTOLIC BLOOD PRESSURE: 95 MMHG | TEMPERATURE: 98.3 F | SYSTOLIC BLOOD PRESSURE: 143 MMHG | OXYGEN SATURATION: 97 % | HEIGHT: 73 IN

## 2023-02-17 DIAGNOSIS — Z90.49 HISTORY OF ESOPHAGECTOMY: ICD-10-CM

## 2023-02-17 DIAGNOSIS — I47.1 SVT (SUPRAVENTRICULAR TACHYCARDIA): Primary | ICD-10-CM

## 2023-02-17 DIAGNOSIS — Z98.890 HISTORY OF ESOPHAGECTOMY: ICD-10-CM

## 2023-02-17 DIAGNOSIS — C15.5 MALIGNANT NEOPLASM OF LOWER THIRD OF ESOPHAGUS: ICD-10-CM

## 2023-02-17 DIAGNOSIS — Z86.79 HISTORY OF CORONARY ARTERY DISEASE: ICD-10-CM

## 2023-02-17 LAB
ALBUMIN SERPL-MCNC: 3.3 G/DL (ref 3.5–5.2)
ALBUMIN/GLOB SERPL: 1 G/DL
ALP SERPL-CCNC: 97 U/L (ref 39–117)
ALT SERPL W P-5'-P-CCNC: 18 U/L (ref 1–41)
ANION GAP SERPL CALCULATED.3IONS-SCNC: 10.3 MMOL/L (ref 5–15)
AST SERPL-CCNC: 10 U/L (ref 1–40)
BASOPHILS # BLD AUTO: 0.03 10*3/MM3 (ref 0–0.2)
BASOPHILS NFR BLD AUTO: 0.3 % (ref 0–1.5)
BILIRUB SERPL-MCNC: <0.2 MG/DL (ref 0–1.2)
BUN SERPL-MCNC: 44 MG/DL (ref 8–23)
BUN/CREAT SERPL: 35.2 (ref 7–25)
CALCIUM SPEC-SCNC: 8.7 MG/DL (ref 8.6–10.5)
CHLORIDE SERPL-SCNC: 104 MMOL/L (ref 98–107)
CO2 SERPL-SCNC: 28.7 MMOL/L (ref 22–29)
CREAT SERPL-MCNC: 1.25 MG/DL (ref 0.76–1.27)
DEPRECATED RDW RBC AUTO: 44.3 FL (ref 37–54)
EGFRCR SERPLBLD CKD-EPI 2021: 64.3 ML/MIN/1.73
EOSINOPHIL # BLD AUTO: 0.15 10*3/MM3 (ref 0–0.4)
EOSINOPHIL NFR BLD AUTO: 1.5 % (ref 0.3–6.2)
ERYTHROCYTE [DISTWIDTH] IN BLOOD BY AUTOMATED COUNT: 14 % (ref 12.3–15.4)
GEN 5 2HR TROPONIN T REFLEX: 36 NG/L
GLOBULIN UR ELPH-MCNC: 3.3 GM/DL
GLUCOSE SERPL-MCNC: 192 MG/DL (ref 65–99)
HCT VFR BLD AUTO: 32.1 % (ref 37.5–51)
HGB BLD-MCNC: 9.9 G/DL (ref 13–17.7)
IMM GRANULOCYTES # BLD AUTO: 0.03 10*3/MM3 (ref 0–0.05)
IMM GRANULOCYTES NFR BLD AUTO: 0.3 % (ref 0–0.5)
LYMPHOCYTES # BLD AUTO: 1.11 10*3/MM3 (ref 0.7–3.1)
LYMPHOCYTES NFR BLD AUTO: 11.4 % (ref 19.6–45.3)
MAGNESIUM SERPL-MCNC: 2.5 MG/DL (ref 1.6–2.4)
MCH RBC QN AUTO: 27 PG (ref 26.6–33)
MCHC RBC AUTO-ENTMCNC: 30.8 G/DL (ref 31.5–35.7)
MCV RBC AUTO: 87.7 FL (ref 79–97)
MONOCYTES # BLD AUTO: 0.7 10*3/MM3 (ref 0.1–0.9)
MONOCYTES NFR BLD AUTO: 7.2 % (ref 5–12)
NEUTROPHILS NFR BLD AUTO: 7.71 10*3/MM3 (ref 1.7–7)
NEUTROPHILS NFR BLD AUTO: 79.3 % (ref 42.7–76)
NRBC BLD AUTO-RTO: 0 /100 WBC (ref 0–0.2)
NT-PROBNP SERPL-MCNC: 314 PG/ML (ref 0–900)
PLATELET # BLD AUTO: 459 10*3/MM3 (ref 140–450)
PMV BLD AUTO: 9.4 FL (ref 6–12)
POTASSIUM SERPL-SCNC: 4.1 MMOL/L (ref 3.5–5.2)
PROT SERPL-MCNC: 6.6 G/DL (ref 6–8.5)
QT INTERVAL: 347 MS
RBC # BLD AUTO: 3.66 10*6/MM3 (ref 4.14–5.8)
SODIUM SERPL-SCNC: 143 MMOL/L (ref 136–145)
TROPONIN T DELTA: -10 NG/L
TROPONIN T SERPL HS-MCNC: 46 NG/L
WBC NRBC COR # BLD: 9.73 10*3/MM3 (ref 3.4–10.8)

## 2023-02-17 PROCEDURE — 83735 ASSAY OF MAGNESIUM: CPT | Performed by: EMERGENCY MEDICINE

## 2023-02-17 PROCEDURE — 93005 ELECTROCARDIOGRAM TRACING: CPT

## 2023-02-17 PROCEDURE — 80053 COMPREHEN METABOLIC PANEL: CPT | Performed by: EMERGENCY MEDICINE

## 2023-02-17 PROCEDURE — 36415 COLL VENOUS BLD VENIPUNCTURE: CPT

## 2023-02-17 PROCEDURE — 93010 ELECTROCARDIOGRAM REPORT: CPT | Performed by: INTERNAL MEDICINE

## 2023-02-17 PROCEDURE — 74221 X-RAY XM ESOPHAGUS 2CNTRST: CPT

## 2023-02-17 PROCEDURE — 0 DIATRIZOATE MEGLUMINE & SODIUM PER 1 ML: Performed by: NURSE PRACTITIONER

## 2023-02-17 PROCEDURE — 85025 COMPLETE CBC W/AUTO DIFF WBC: CPT | Performed by: EMERGENCY MEDICINE

## 2023-02-17 PROCEDURE — 84484 ASSAY OF TROPONIN QUANT: CPT | Performed by: EMERGENCY MEDICINE

## 2023-02-17 PROCEDURE — 71045 X-RAY EXAM CHEST 1 VIEW: CPT

## 2023-02-17 PROCEDURE — 83880 ASSAY OF NATRIURETIC PEPTIDE: CPT | Performed by: EMERGENCY MEDICINE

## 2023-02-17 PROCEDURE — 99284 EMERGENCY DEPT VISIT MOD MDM: CPT

## 2023-02-17 RX ADMIN — ANTACID/ANTIFLATULENT 0.5 PACKET: 380; 550; 10; 10 GRANULE, EFFERVESCENT ORAL at 07:50

## 2023-02-17 RX ADMIN — DIATRIZOATE MEGLUMINE AND DIATRIZOATE SODIUM 120 ML: 660; 100 LIQUID ORAL; RECTAL at 07:30

## 2023-02-17 NOTE — ED PROVIDER NOTES
EMERGENCY DEPARTMENT ENCOUNTER    Room Number:  15/15  Date of encounter:  2/17/2023  PCP: Nargis Velasquez APRN  Historian: Patient      HPI:  Chief Complaint: Tachycardia  A complete HPI/ROS/PMH/PSH/SH/FH are unobtainable due to: None    Context: Edmond Chase is a 62 y.o. male who presents to the ED via [facility] from home with acute onset tachycardia, chest pain and shortness of breath.  On EMS arrival they found him to be in SVT with heart rate in the 150s.  He converted to a normal sinus rhythm after 1 dose of 6 mg adenosine as well as getting 324 mg of aspirin.  Per EMS he reported relief of chest pain and dyspnea after converting.  Patient reports being recently status post esophagectomy secondary cancer.  Patient states that he is able to start transition to liquids today.  Reports a resolution of his chest pain and shortness of breath at this time.  No other issues or complaints at this time.      MEDICAL RECORD REVIEW    External (non-ED) record review: Discharge summary reviewed from February 10, 2023, was admitted February 3, 2023, underwent right robotic video-assisted thoracoscopy with total esophagectomy and was discharged on February 10, 2023.  FL esophagram complete double contrast performed earlier today showed patent anastomosis, normal transit, no leak    PAST MEDICAL HISTORY  Active Ambulatory Problems     Diagnosis Date Noted   • Essential hypertension 02/29/2016   • Hyperglycemia 02/29/2016   • Hyperlipidemia 02/29/2016   • Class 3 severe obesity due to excess calories with serious comorbidity and body mass index (BMI) of 40.0 to 44.9 in adult (HCC) 02/29/2016   • Nocturia 02/29/2016   • Nonrheumatic aortic valve insufficiency 03/29/2016   • Myocardial ischemia 03/29/2016   • Coronary artery disease involving native coronary artery of native heart 04/19/2016   • Ascending aortic aneurysm 04/19/2016   • CAD in native artery 05/02/2016   • S/P CABG (coronary artery bypass graft)  05/18/2016   • S/P AVR (aortic valve replacement) 05/18/2016   • Status post ascending aortic aneurysm repair 05/18/2016   • Fatigue 07/28/2017   • Abnormal nuclear stress test 10/23/2017   • Coronary artery disease 10/23/2017   • Coronary artery disease of bypass graft of native heart with stable angina pectoris (Formerly [facility]) 07/01/2020   • TJ on auto CPAP 10/31/2016   • Hypersomnia due to medical condition 08/21/2021   • Angina at rest (Formerly [facility]) 07/10/2022   • Type 2 diabetes mellitus, without long-term current use of insulin (Formerly [facility]) 07/11/2022   • Anemia 07/10/2022   • Ulcer of esophagus without bleeding 07/29/2022   • Polyp of colon 08/29/2022   • Rectal bleeding 11/25/2022   • Gastrointestinal hemorrhage 11/25/2022   • Malignant neoplasm of lower third of esophagus (Formerly [facility]) 11/29/2022   • Gastrointestinal hemorrhage, unspecified gastrointestinal hemorrhage type 12/01/2022   • Exertional chest pain 12/14/2022   • Iron deficiency anemia 12/14/2022     Resolved Ambulatory Problems     Diagnosis Date Noted   • No Resolved Ambulatory Problems     Past Medical History:   Diagnosis Date   • Anxiety and depression    • Aortic valve insufficiency    • Arthritis    • CAD (coronary artery disease)    • Cancer (Formerly [facility])    • Chest pain    • Diabetes mellitus (Formerly [facility])    • Dizzy    • Dyspnea    • Esophageal cancer (Formerly [facility])    • GERD (gastroesophageal reflux disease)    • GI bleed    • History of recent blood transfusion    • Hypersomnia with sleep apnea    • Lightheadedness    • Malaise and fatigue    • Migraines    • Morbid obesity (Formerly [facility])    • Pneumonia    • Scrotal bleeding    • Shortness of breath    • SOB (shortness of breath)          PAST SURGICAL HISTORY  Past Surgical History:   Procedure Laterality Date   • AORTIC VALVE REPAIR/REPLACEMENT  05/2016   • ASCENDING ARCH/HEMIARCH REPLACEMENT N/A 5/2/2016    Procedure: BHAVESH STERNOTOMY CORONARY ARTERY BYPASS GRAFT TIMES 3 USING LEFT INTERNAL MAMMARY ARTERY AND RIGHT GREATER SAPHENOUS VEIN GRAFT PER  ENDOSCOPIC VEIN HARVESTING, AORTIC ANEURYSM REPAIR WITH ROOT REPAIR AND AORTIC VALVE REPLACEMENT;  Surgeon: Rosalio Cline MD;  Location: [facility] MAIN OR;  Service:    • CARDIAC CATHETERIZATION N/A 4/1/2016    Procedure: Left Heart Cath;  Surgeon: Erick Tam MD;  Location: [facility] CATH INVASIVE LOCATION;  Service:    • CARDIAC CATHETERIZATION N/A 4/1/2016    Procedure: Left ventriculography;  Surgeon: Erick Tam MD;  Location: [facility] CATH INVASIVE LOCATION;  Service:    • CARDIAC CATHETERIZATION N/A 4/1/2016    Procedure: Right Heart Cath;  Surgeon: Erick Tam MD;  Location: [facility] CATH INVASIVE LOCATION;  Service:    • CARDIAC CATHETERIZATION N/A 10/30/2017    Procedure: Coronary angiography;  Surgeon: Sorin Vasquez MD;  Location: [facility] CATH INVASIVE LOCATION;  Service:    • CARDIAC CATHETERIZATION  10/30/2017    Procedure: Saphenous Vein Graft;  Surgeon: Sorin Vasqeuz MD;  Location: [facility] CATH INVASIVE LOCATION;  Service:    • CARDIAC CATHETERIZATION N/A 10/30/2017    Procedure: Native mammary injection;  Surgeon: Sorin Vasquez MD;  Location: [facility] CATH INVASIVE LOCATION;  Service:    • CARDIAC CATHETERIZATION N/A 7/7/2020    Procedure: Coronary angiography;  Surgeon: Gregor Kim MD;  Location: [facility] CATH INVASIVE LOCATION;  Service: Cardiovascular;  Laterality: N/A;   • CARDIAC CATHETERIZATION N/A 7/7/2020    Procedure: Left heart cath;  Surgeon: Gregor Kim MD;  Location: [facility] CATH INVASIVE LOCATION;  Service: Cardiovascular;  Laterality: N/A;   • CARDIAC CATHETERIZATION N/A 7/7/2020    Procedure: Left ventriculography;  Surgeon: Gregor Kim MD;  Location: [facility] CATH INVASIVE LOCATION;  Service: Cardiovascular;  Laterality: N/A;   • CARDIAC CATHETERIZATION N/A 7/7/2020    Procedure: Stent ESMER bypass graft;  Surgeon: Gregor Kim MD;  Location: [facility] CATH INVASIVE LOCATION;  Service: Cardiovascular;  Laterality: N/A;   • CARDIAC CATHETERIZATION N/A 6/17/2021     Procedure: SAPHENOUS VEIN GRAFT;  Surgeon: Marques Ndiaye MD;  Location: [facility] CATH INVASIVE LOCATION;  Service: Cardiovascular;  Laterality: N/A;   • CARDIAC CATHETERIZATION N/A 6/17/2021    Procedure: Left Heart Cath;  Surgeon: Marques Ndiaye MD;  [facility];  Service: Cardiovascular;  Laterality: N/A;   • CARDIAC CATHETERIZATION N/A 6/17/2021    Procedure: Coronary angiography;  Surgeon: Marques Ndiaye MD;  Location: [facility] CATH INVASIVE LOCATION;  Service: Cardiovascular;  Laterality: N/A;   • CARDIAC CATHETERIZATION N/A 7/14/2022    Procedure: Left Heart Cath;  Surgeon: Charlie Aj MD;  Location: [facility] CATH INVASIVE LOCATION;  Service: Cardiovascular;  Laterality: N/A;   • CARDIAC CATHETERIZATION N/A 7/14/2022    Procedure: Coronary angiography;  Surgeon: Charlie Aj MD;  Location: [facility] CATH INVASIVE LOCATION;  Service: Cardiovascular;  Laterality: N/A;   • CARDIAC CATHETERIZATION  7/14/2022    Procedure: Saphenous Vein Graft;  Surgeon: Charlie Aj MD;  Location: [facility] CATH INVASIVE LOCATION;  Service: Cardiovascular;;   • CARDIAC CATHETERIZATION N/A 7/14/2022    Procedure: Native mammary injection;  Surgeon: Charlie Aj MD;  Location: [facility] CATH INVASIVE LOCATION;  Service: Cardiovascular;  Laterality: N/A;   • COLONOSCOPY N/A 11/26/2022    Procedure: COLONOSCOPY AT BEDSIDE;  Surgeon: Tomy Lopez MD;  Location: [facility] MAIN OR;  Service: Gastroenterology;  Laterality: N/A;   • COLONOSCOPY W/ POLYPECTOMY N/A 10/4/2022    Procedure: COLONOSCOPY to cecum with cold forceps and cold snare polypectomies;  Surgeon: Gregor Carlson MD;  Location: [facility] ENDOSCOPY;  Service: Gastroenterology;  Laterality: N/A;  PRE- hx of polyps  POST- diverticulosis, polyps   • CORONARY ARTERY BYPASS GRAFT  05/2016    LIMA TO LAD, SVG TO PDA, SVG TO OM2   • ENDOSCOPY N/A 7/13/2022    Procedure: ESOPHAGOGASTRODUODENOSCOPY;  Surgeon: Telma  "Marcia GALLEGOS MD;  Location: [facility] ENDOSCOPY;  Service: Gastroenterology;  Laterality: N/A;  PRE- ANEMIA, MELENA  POST- MILD EROSIVE GASTRITIS, GE JUNCTION ULCER   • ENDOSCOPY N/A 10/4/2022    Procedure: ESOPHAGOGASTRODUODENOSCOPY with biopsies;  Surgeon: Gregor Carlson MD;  Location: [facility] ENDOSCOPY;  Service: Gastroenterology;  Laterality: N/A;  PRE- hx of esophageal ulcer  POST- gastric cardia mass   • ESOPHAGOGASTRECTOMY Right 2/3/2023    Procedure: BRONCHOSCOPY, RIGHT ROBOTIC VIDEO ASSISTED THORACOSCOPY WITH TOTAL ESOPHAGECTOMY, INTERCOSTAL NERVE BLOCK, FEEDING JEJUNOSTOMY PLACEMENT;  Surgeon: Valerie Stockton MD;  Location: [facility] MAIN OR;  Service: Robotics - DaVinci;  Laterality: Right;   • INNER EAR SURGERY     • TONSILLECTOMY           FAMILY HISTORY  Family History   Problem Relation Age of Onset   • Hypertension Mother    • Diabetes Mother    • Heart disease Mother    • Hypertension Father    • Lung cancer Father    • Heart disease Sister    • Stroke Maternal Grandmother    • Heart disease Maternal Grandmother    • Hypertension Maternal Grandfather    • Hypertension Paternal Grandmother    • Hypertension Paternal Grandfather    • Other Other         The patient states that his sister has a problem that goes by the name of \"long QT\".  He says this is a familial trait but he also says that he is \"not interested in pursuing it for himself\".   • Coronary artery disease Other    • Malig Hyperthermia Neg Hx          SOCIAL HISTORY  Social History     Socioeconomic History   • Marital status: Single   Tobacco Use   • Smoking status: Never   • Smokeless tobacco: Never   Vaping Use   • Vaping Use: Never used   Substance and Sexual Activity   • Alcohol use: Yes     Comment: 1-2 drink/monthly   • Drug use: No   • Sexual activity: Defer         ALLERGIES  Patient has no known allergies.        REVIEW OF SYSTEMS  Review of Systems     All systems reviewed and negative except for those discussed in HPI. "       PHYSICAL EXAM    I have reviewed the triage vital signs and nursing notes.    ED Triage Vitals [02/17/23 1554]   Temp Heart Rate Resp BP SpO2   98.3 °F (36.8 °C) 98 18 131/74 97 %      Temp src Heart Rate Source Patient Position BP Location FiO2 (%)   Tympanic Monitor Lying Right arm --       Physical Exam  General: No acute distress, nontoxic  HEENT: Mucous membranes moist, atraumatic, EOMI  Neck: Full ROM  Pulm: Symmetric chest rise, nonlabored, lungs CTAB  Cardiovascular: Regular rate and rhythm, intact distal pulses, no peripheral edema  GI: Soft, nontender, nondistended, no rebound, no guarding, bowel sounds present, left upper quadrant feeding tube in place  MSK: Full ROM, no deformity  Skin: Warm, dry, subacute ecchymosis across the abdomen with well-healing surgical scar  Neuro: Awake, alert, oriented x 4, GCS 15, moving all extremities, no focal deficits  Psych: Calm, cooperative      N95, protective eye goggles, and gloves used during this encounter. Patient in surgical mask.      LAB RESULTS  Recent Results (from the past 24 hour(s))   ECG 12 Lead Tachycardia    Collection Time: 02/17/23  4:15 PM   Result Value Ref Range    QT Interval 347 ms   Comprehensive Metabolic Panel    Collection Time: 02/17/23  4:29 PM    Specimen: Blood   Result Value Ref Range    Glucose 192 (H) 65 - 99 mg/dL    BUN 44 (H) 8 - 23 mg/dL    Creatinine 1.25 0.76 - 1.27 mg/dL    Sodium 143 136 - 145 mmol/L    Potassium 4.1 3.5 - 5.2 mmol/L    Chloride 104 98 - 107 mmol/L    CO2 28.7 22.0 - 29.0 mmol/L    Calcium 8.7 8.6 - 10.5 mg/dL    Total Protein 6.6 6.0 - 8.5 g/dL    Albumin 3.3 (L) 3.5 - 5.2 g/dL    ALT (SGPT) 18 1 - 41 U/L    AST (SGOT) 10 1 - 40 U/L    Alkaline Phosphatase 97 39 - 117 U/L    Total Bilirubin <0.2 0.0 - 1.2 mg/dL    Globulin 3.3 gm/dL    A/G Ratio 1.0 g/dL    BUN/Creatinine Ratio 35.2 (H) 7.0 - 25.0    Anion Gap 10.3 5.0 - 15.0 mmol/L    eGFR 64.3 >60.0 mL/min/1.73   High Sensitivity Troponin T     Collection Time: 02/17/23  4:29 PM    Specimen: Blood   Result Value Ref Range    HS Troponin T 46 (H) <15 ng/L   BNP    Collection Time: 02/17/23  4:29 PM    Specimen: Blood   Result Value Ref Range    proBNP 314.0 0.0 - 900.0 pg/mL   Magnesium    Collection Time: 02/17/23  4:29 PM    Specimen: Blood   Result Value Ref Range    Magnesium 2.5 (H) 1.6 - 2.4 mg/dL   CBC Auto Differential    Collection Time: 02/17/23  4:29 PM    Specimen: Blood   Result Value Ref Range    WBC 9.73 3.40 - 10.80 10*3/mm3    RBC 3.66 (L) 4.14 - 5.80 10*6/mm3    Hemoglobin 9.9 (L) 13.0 - 17.7 g/dL    Hematocrit 32.1 (L) 37.5 - 51.0 %    MCV 87.7 79.0 - 97.0 fL    MCH 27.0 26.6 - 33.0 pg    MCHC 30.8 (L) 31.5 - 35.7 g/dL    RDW 14.0 12.3 - 15.4 %    RDW-SD 44.3 37.0 - 54.0 fl    MPV 9.4 6.0 - 12.0 fL    Platelets 459 (H) 140 - 450 10*3/mm3    Neutrophil % 79.3 (H) 42.7 - 76.0 %    Lymphocyte % 11.4 (L) 19.6 - 45.3 %    Monocyte % 7.2 5.0 - 12.0 %    Eosinophil % 1.5 0.3 - 6.2 %    Basophil % 0.3 0.0 - 1.5 %    Immature Grans % 0.3 0.0 - 0.5 %    Neutrophils, Absolute 7.71 (H) 1.70 - 7.00 10*3/mm3    Lymphocytes, Absolute 1.11 0.70 - 3.10 10*3/mm3    Monocytes, Absolute 0.70 0.10 - 0.90 10*3/mm3    Eosinophils, Absolute 0.15 0.00 - 0.40 10*3/mm3    Basophils, Absolute 0.03 0.00 - 0.20 10*3/mm3    Immature Grans, Absolute 0.03 0.00 - 0.05 10*3/mm3    nRBC 0.0 0.0 - 0.2 /100 WBC   High Sensitivity Troponin T 2Hr    Collection Time: 02/17/23  6:47 PM    Specimen: Blood   Result Value Ref Range    HS Troponin T 36 (H) <15 ng/L    Troponin T Delta -10 (L) >=-4 - <+4 ng/L       Ordered the above labs and independently interpreted results. My findings will be discussed in the medical decision making section below        RADIOLOGY  XR Chest 1 View    Result Date: 2/17/2023  XR CHEST 1 VW-  HISTORY: Male who is 62 years-old,  chest pain  TECHNIQUE: Frontal view of the chest  COMPARISON: 02/10/2023  FINDINGS: The heart size is normal. Cardiac valve  marker is seen. Sternotomy wires are present. Pulmonary vasculature is unremarkable. No focal pulmonary consolidation, pleural effusion, or pneumothorax. No acute osseous process.      No no focal pulmonary consolidation. Follow-up as clinical indications persist.  This report was finalized on 2/17/2023 4:43 PM by Dr. Robert Neumann M.D.      FL Esophagram Complete Double-Contrast    Result Date: 2/17/2023  FLUOROSCOPIC ESOPHAGRAM COMPLETE DOUBLE CONTRAST WITH WATER SOLUBLE  CLINICAL: Esophagectomy performed on 02/03/2023, evaluate for leak.  FLUOROSCOPY TIME: 2 minutes.  Number of images: 160.  Rapid sequence imaging of the esophagus performed while swallowing Gastrografin only. Effervescent crystals for double contrast.  FINDINGS: Gastrografin transit is normal. Surgical anastomosis is widely patent. No extravasation at the anastomotic site or any other location. Gastrografin moves through the esophagectomy filling the subdiaphragmatic portion of the stomach without delay.  CONCLUSION: 1. Patent anastomosis. 2. Normal transit. 3. No leak.  This report was finalized on 2/17/2023 9:25 AM by Dr. Bam Cerda M.D.        Ordered the above noted radiological studies.  Independently interpreted by me and my independent review of findings can be found in the ED Course.  See dictation for official radiology interpretation.      PROCEDURES    Procedures  EKG - Per my independent interpretation:    EKG Time: 1615  Rhythm/Rate: Sinus rhythm with a rate of 96  Normal axis  Normal interval  Isolated T wave inversion in leads I and aVL  No STEMI       These are stable findings and no emergent changes compared to February 3, 2023      MEDICATIONS GIVEN IN ER    Medications - No data to display      PROGRESS, DATA ANALYSIS, CONSULTS, AND MEDICAL DECISION MAKING    Please note that this section constitutes my independent interpretation of clinical data including lab results, radiology, EKG's.  This constitutes my independent  professional opinion regarding differential diagnosis and management of this patient.  It may include any factors such as history from outside sources, review of external records, social determinants of health, management of medications, response to those treatments, and discussions with other providers.    Differential Diagnosis and Plan: On review of EMS EKG, patient appeared to have been in SVT which is now spontaneously converted to normal sinus rhythm.  We will check lab work-up to see any evidence of an NSTEMI, electrolyte abnormalities, renal failure, anemia, among others.  Plan for EKG, labs, chest x-ray, and reevaluation with results.    Additional sources:  - Discussed/ obtained information from independent historians:   EMS provides history and EKG showing signs of SVT     - Chronic or social conditions impacting care: None     - Shared decision making:  Patient fully updated on and in agreement with the course and plan moving forward    ED Course as of 02/17/23 2210 Fri Feb 17, 2023   1655 WBC: 9.73 [DC]   1655 Hemoglobin(!): 9.9  10.8 seven days ago [DC]   1655 Platelets(!): 459 [DC]   1712 Glucose(!): 192 [DC]   1712 BUN(!): 44 [DC]   1712 Creatinine: 1.25  0.75 seven days ago [DC]   1712 Magnesium(!): 2.5 [DC]   1712 proBNP: 314.0 [DC]   1712 HS Troponin T(!): 46 [DC]   1940 Patient remains well-appearing and in no acute distress, has not had any recurrence of abnormal rhythm.  Reassuring lab work-up with no evidence of rising troponin and actually a delta troponin -10 which is not unexpected given the fact that his abnormal rhythm has converted back to normal with tachycardia moving forward.  At this point I think he is safe for discharge, he is comfortable with the plan for discharge with outpatient PCP and surgical follow-up next week.  ED return for worsening symptoms as needed. [DC]      ED Course User Index  [DC] Fernando Schroeder MD     Ambulatory without difficulty at discharge, tolerating  p.o. without difficulty.    Hospitalization Considered?:  Hospitalization was initially considered as he is recently status post esophagectomy now with an SVT that was converted prior to arrival, however showed troponins to be concernedly elevated or with a concerning delta or with new signs of renal failure or recurrent arrhythmias, hospitalization would be the course of action.  However, this was ultimately not needed and he was deemed safe for discharge after reassuring work-up and no recurrence of tachycardia.    Orders Placed During This Visit:  Orders Placed This Encounter   Procedures   • XR Chest 1 View   • Comprehensive Metabolic Panel   • High Sensitivity Troponin T   • BNP   • Magnesium   • CBC Auto Differential   • High Sensitivity Troponin T 2Hr   • ECG 12 Lead Tachycardia   • CBC & Differential       Additional orders considered but not placed:      Independent interpretation of labs, radiology studies, and discussions with consultants: See ED Course        AS OF 22:10 EST VITALS:    BP - 143/95  HR - 90  TEMP - 98.3 °F (36.8 °C) (Tympanic)  02 SATS - 97%        DIAGNOSIS  Final diagnoses:   SVT (supraventricular tachycardia) (HCC)   History of coronary artery disease   History of esophagectomy         DISPOSITION  DISCHARGE    Patient discharged in stable condition.    Reviewed implications of results, diagnosis, meds, responsibility to follow up, warning signs and symptoms of possible worsening, potential complications and reasons to return to ER. If your blood pressure > 120/80 please follow up with your primary care provider for further management.    Patient/Family voiced understanding of above instructions.    Discussed plan for discharge, as there is no emergent indication for admission. Pt/family is agreeable and understands need for follow up and repeat testing.  Pt is aware that discharge does not mean that nothing is wrong but it indicates no emergency is present that requires admission and  they must continue care with follow-up as given below or physician of their choice.     FOLLOW-UP  [facility] Emergency Department  [address] [facility] [address] [phone]    As needed, If symptoms worsen    Nargis Velasquez APRN  [address] [facility]  [place] [address]  [phone]    Schedule an appointment as soon as possible for a visit   next week for recheck, As needed    Papa Miguel MD  [address] [facility]  [place] [address]  [phone]    Schedule an appointment as soon as possible for a visit   next week for recheck as needed    Valerie Stockton MD  [address] [facility] [address]  [place] [phone]    Go on 2/28/2023  As scheduled         Medication List      No changes were made to your prescriptions during this visit.                       --    Please note that portions of this were completed with a voice recognition program.       Note Disclaimer: At [facility], we believe that sharing information builds trust and better relationships. You are receiving this note because you are receiving care at [facility] or recently visited. It is possible you will see health information before a provider has talked with you about it. This kind of information can be easy to misunderstand. To help you fully understand what it means for your health, we urge you to discuss this note with your provider.         Fernando Schroeder MD  02/17/23 [id number]

## 2023-02-17 NOTE — ED NOTES
Pt presents via EMS from home after a sudden onset of chest pain with dyspnea and weakness. Upon arrival, pt noted to be in SVT with a rate in the 150s. Pt converted with 6mg Adenosine x1 dose. 324mg ASA also administered. Reported a relief of chest pain and dyspnea after converting. Pt wears oxygen on 2L NC continuously.

## 2023-02-18 ENCOUNTER — HOME CARE VISIT (OUTPATIENT)
Dept: HOME HEALTH SERVICES | Facility: HOME HEALTHCARE | Age: 63
End: 2023-02-18
Payer: COMMERCIAL

## 2023-02-18 VITALS
HEART RATE: 93 BPM | TEMPERATURE: 99 F | OXYGEN SATURATION: 98 % | RESPIRATION RATE: 20 BRPM | SYSTOLIC BLOOD PRESSURE: 138 MMHG | DIASTOLIC BLOOD PRESSURE: 80 MMHG

## 2023-02-18 PROCEDURE — G0495 RN CARE TRAIN/EDU IN HH: HCPCS

## 2023-02-18 NOTE — DISCHARGE INSTRUCTIONS
Continue current medications, stay well-hydrated, close outpatient PCP, cardiology, and surgical follow-up as needed, ED return for worsening symptoms as needed.

## 2023-02-18 NOTE — HOME HEALTH
PLAN FOR NEXT SNV- ASSESS G-TUBE SITE/ ABD INCISIONS, A/I TUBE FEEDING, PO FLUID INTAKE, ASSESS BUTTOCKS INTEG    2/18-PT WENT TO ER YESTERDAY R/T TO SVT, NO NEW MED CHANGES. PT HAD SWALLOW STUDY YESTERDAY AND WAS CLEARED FOR LIQUID PO INTAKE, PT W/ C/O SORE BUTTOCKS- SN ASSESS AS PT W/ REDNESS, BLANCHES APPEARS W/ INTEG INTACT, ON FLESHY PORTION OF BUTT CHECKS NOT OVER BONE, SN INTURCT TO KEEP CLEAN/DRY AND APPLY BARRIER CREAM- PT IS ABLE TO TEACH BACK

## 2023-02-21 ENCOUNTER — HOME CARE VISIT (OUTPATIENT)
Dept: HOME HEALTH SERVICES | Facility: HOME HEALTHCARE | Age: 63
End: 2023-02-21
Payer: COMMERCIAL

## 2023-02-21 ENCOUNTER — READMISSION MANAGEMENT (OUTPATIENT)
Dept: CALL CENTER | Facility: HOSPITAL | Age: 63
End: 2023-02-21
Payer: COMMERCIAL

## 2023-02-21 VITALS
HEART RATE: 71 BPM | DIASTOLIC BLOOD PRESSURE: 90 MMHG | OXYGEN SATURATION: 93 % | TEMPERATURE: 96.4 F | SYSTOLIC BLOOD PRESSURE: 148 MMHG | RESPIRATION RATE: 18 BRPM

## 2023-02-21 PROCEDURE — G0299 HHS/HOSPICE OF RN EA 15 MIN: HCPCS

## 2023-02-21 NOTE — OUTREACH NOTE
CT Surgery Week 2 Survey    Flowsheet Row Responses   [facility] [place] patient discharged from? [place]   Does the patient have one of the following disease processes/diagnoses(primary or secondary)? Cardiothoracic surgery   Week 2 attempt successful? No   Unsuccessful attempts Attempt 1   Comments regarding appointments Oncology 2/23/23 at 9:30   Does the patient have a primary care provider?  Yes   Does the patient have an appointment scheduled with their C/T surgeon? Yes  [ 2/28/2023 at 3:45 PM]   Has the patient kept scheduled appointments due by today? N/A   What is the Home health agency?  [facility]   Has home health visited the patient within 72 hours of discharge? Yes   Psychosocial issues? No          Chrissie ALFONSO - Registered Nurse

## 2023-02-21 NOTE — PROGRESS NOTES
Patient underwent esophagram on 2/17 and there was no evidence of extravasation or leak.  He was contacted by nurse practitioner, Sujata John and advance to a clear liquid diet with orders to continue tube feeding at the same rate until following up in our office.  Received a call today from home health asking for clarification.  Patient is to continue clear liquid diet with same tube feeding rate until he follows up with Dr. Valerie Stockton in our office at his previously scheduled appointment on 2/28/2023.

## 2023-02-21 NOTE — HOME HEALTH
Patient states he has no questions with tube feeding pump or tube feed admin.  As far as anchors, we had none in office, did put request to Meredith to order some, in the mean time I taught patient a way to anchor tube with tape.  Continue to monitor bottom, mostly blachable red, has some weak looking areas of skin, educated on weight rotation, zinc application daily, keep area dry.  CP assessment WNL.  Patient unsure of diet, we called CT surg, he is to continue clear liquids and tube feeds until cleared further by CT surgery, he has appointment in 1 week.

## 2023-02-22 NOTE — CASE COMMUNICATION
THE FOLLOWING SUPPLIES WERE ORDERED 2/21/23:    [facility] UNIVERSAL SECUREMENT DEVICE - 10    ORDER #[id number]

## 2023-02-23 ENCOUNTER — HOME CARE VISIT (OUTPATIENT)
Dept: HOME HEALTH SERVICES | Facility: HOME HEALTHCARE | Age: 63
End: 2023-02-23
Payer: COMMERCIAL

## 2023-02-23 ENCOUNTER — OFFICE VISIT (OUTPATIENT)
Dept: PSYCHIATRY | Facility: HOSPITAL | Age: 63
End: 2023-02-23
Payer: COMMERCIAL

## 2023-02-23 VITALS
HEART RATE: 77 BPM | DIASTOLIC BLOOD PRESSURE: 80 MMHG | TEMPERATURE: 97.6 F | RESPIRATION RATE: 18 BRPM | OXYGEN SATURATION: 100 % | SYSTOLIC BLOOD PRESSURE: 138 MMHG

## 2023-02-23 DIAGNOSIS — R41.0 DELIRIUM: ICD-10-CM

## 2023-02-23 DIAGNOSIS — F41.1 GAD (GENERALIZED ANXIETY DISORDER): Primary | ICD-10-CM

## 2023-02-23 PROCEDURE — G0300 HHS/HOSPICE OF LPN EA 15 MIN: HCPCS

## 2023-02-23 PROCEDURE — 99214 OFFICE O/P EST MOD 30 MIN: CPT | Performed by: NURSE PRACTITIONER

## 2023-02-23 NOTE — HOME HEALTH
Patient has appointment with Nephrology on Wednesday and Surgeon on 2/28/2023. Wants to move home health appointment to Thursday

## 2023-02-23 NOTE — PROGRESS NOTES
Behavioral Oncology Services  Video visit-pandemic. Pt consented to session conducted through Zoom video.  You have chosen to receive care through a telehealth visit.  Do you consent to use a video/audio connection for your medical care today? YES  Telehealth provided in patient's home.  Location of Provider: [facility]    Subjective  Patient ID: Edmond Chase is a 62 y.o. male is seen via telehealth in the Behavioral Oncology Clinic.    CC: Anxiety, depression, delirium, insomnia    HPI/ Interval History:   Pt is noted to be more alert in conversation as time passes since esophagectomy. Reports sleeping somewhat better, although remains fragmented. Has added melatonin in addition to zyprexa, forgetting to take zyprexa last night.  Pt continues to mourn long recovery, long connection to tube feeds. Does acknolwedge appropriate scans, appropriate healing. Is able to drink now, alhtough unforuntately ended up in tachycardia with need to call EMS, presenting to ED. Feels this was potentially related to overdoing it when intake allowed. Fortunately, they were able to stablize rhythm, and denies additional episdes since this time. Pt has upcoming visit with Dr. Stockton on Tuesday to discuss results, continued plan of care. Agrees speech is more coherent and mood and anxiety are under improved control. Does feel somewhat 'goofy' on zyprexa. Pt does note he doesn't sleep as soundly without this, although does feel melatonin has been tremendously helpful. Personal goals of limiting medication burden when advised.    Pt appreciates being able to drink, learning to take small sips. Bowels are moving appropriate, alhtough loose. Acknolwedges this is to be somewhat expected given liquid diet. Continues to acknowledge goal of transitioning to bed. Identifies importance of moving around, walking, specifically to get outside, alleviate pressure on coccxy, etc.    Pt continues to endorse lack of control., being at  the mercy of medical team, body, etc.     Exam: As above    Recent Labs Reviewed:  Admission on 02/17/2023, Discharged on 02/17/2023   Component Date Value   • QT Interval 02/17/2023 347    • Glucose 02/17/2023 192 (H)    • BUN 02/17/2023 44 (H)    • Creatinine 02/17/2023 1.25    • Sodium 02/17/2023 143    • Potassium 02/17/2023 4.1    • Chloride 02/17/2023 104    • CO2 02/17/2023 28.7    • Calcium 02/17/2023 8.7    • Total Protein 02/17/2023 6.6    • Albumin 02/17/2023 3.3 (L)    • ALT (SGPT) 02/17/2023 18    • AST (SGOT) 02/17/2023 10    • Alkaline Phosphatase 02/17/2023 97    • Total Bilirubin 02/17/2023 <0.2    • Globulin 02/17/2023 3.3    • A/G Ratio 02/17/2023 1.0    • BUN/Creatinine Ratio 02/17/2023 35.2 (H)    • Anion Gap 02/17/2023 10.3    • eGFR 02/17/2023 64.3    • HS Troponin T 02/17/2023 46 (H)    • proBNP 02/17/2023 314.0    • Magnesium 02/17/2023 2.5 (H)    • WBC 02/17/2023 9.73    • RBC 02/17/2023 3.66 (L)    • Hemoglobin 02/17/2023 9.9 (L)    • Hematocrit 02/17/2023 32.1 (L)    • MCV 02/17/2023 87.7    • MCH 02/17/2023 27.0    • MCHC 02/17/2023 30.8 (L)    • RDW 02/17/2023 14.0    • RDW-SD 02/17/2023 44.3    • MPV 02/17/2023 9.4    • Platelets 02/17/2023 459 (H)    • Neutrophil % 02/17/2023 79.3 (H)    • Lymphocyte % 02/17/2023 11.4 (L)    • Monocyte % 02/17/2023 7.2    • Eosinophil % 02/17/2023 1.5    • Basophil % 02/17/2023 0.3    • Immature Grans % 02/17/2023 0.3    • Neutrophils, Absolute 02/17/2023 7.71 (H)    • Lymphocytes, Absolute 02/17/2023 1.11    • Monocytes, Absolute 02/17/2023 0.70    • Eosinophils, Absolute 02/17/2023 0.15    • Basophils, Absolute 02/17/2023 0.03    • Immature Grans, Absolute 02/17/2023 0.03    • nRBC 02/17/2023 0.0    • HS Troponin T 02/17/2023 36 (H)    • Troponin T Delta 02/17/2023 -10 (L)    No results displayed because visit has over 200 results.      Pre-Admission Testing on 01/23/2023   Component Date Value   • ABO Type 01/23/2023 O    • RH type 01/23/2023  Positive    • Antibody Screen 01/23/2023 Negative    • T&S Expiration Date 01/23/2023 1/24/2023 11:59:00 PM    Results Encounter on 01/21/2023   Component Date Value   • Glucose 01/23/2023 129 (H)    • BUN 01/23/2023 14    • Creatinine 01/23/2023 0.85    • Sodium 01/23/2023 143    • Potassium 01/23/2023 3.8    • Chloride 01/23/2023 107    • CO2 01/23/2023 23.7    • Calcium 01/23/2023 8.8    • BUN/Creatinine Ratio 01/23/2023 16.5    • Anion Gap 01/23/2023 12.3    • eGFR 01/23/2023 97.0    • PTT 01/23/2023 30.7    • Protime 01/23/2023 13.5    • INR 01/23/2023 1.02    • WBC 01/23/2023 6.23    • RBC 01/23/2023 4.14    • Hemoglobin 01/23/2023 11.7 (L)    • Hematocrit 01/23/2023 36.7 (L)    • MCV 01/23/2023 88.6    • MCH 01/23/2023 28.3    • MCHC 01/23/2023 31.9    • RDW 01/23/2023 14.5    • RDW-SD 01/23/2023 46.3    • MPV 01/23/2023 9.8    • Platelets 01/23/2023 221    • Neutrophil % 01/23/2023 75.5    • Lymphocyte % 01/23/2023 14.8 (L)    • Monocyte % 01/23/2023 7.4    • Eosinophil % 01/23/2023 1.8    • Basophil % 01/23/2023 0.3    • Immature Grans % 01/23/2023 0.2    • Neutrophils, Absolute 01/23/2023 4.71    • Lymphocytes, Absolute 01/23/2023 0.92    • Monocytes, Absolute 01/23/2023 0.46    • Eosinophils, Absolute 01/23/2023 0.11    • Basophils, Absolute 01/23/2023 0.02    • Immature Grans, Absolute 01/23/2023 0.01    • nRBC 01/23/2023 0.0    Lab on 01/16/2023   Component Date Value   • WBC 01/16/2023 6.58    • RBC 01/16/2023 4.14    • Hemoglobin 01/16/2023 11.5 (L)    • Hematocrit 01/16/2023 36.8 (L)    • MCV 01/16/2023 88.9    • MCH 01/16/2023 27.8    • MCHC 01/16/2023 31.3 (L)    • RDW 01/16/2023 14.5    • RDW-SD 01/16/2023 46.5    • MPV 01/16/2023 10.1    • Platelets 01/16/2023 218    • Neutrophil % 01/16/2023 73.7    • Lymphocyte % 01/16/2023 15.8 (L)    • Monocyte % 01/16/2023 8.2    • Eosinophil % 01/16/2023 1.8    • Basophil % 01/16/2023 0.3    • Immature Grans % 01/16/2023 0.2    • Neutrophils, Absolute  01/16/2023 4.85    • Lymphocytes, Absolute 01/16/2023 1.04    • Monocytes, Absolute 01/16/2023 0.54    • Eosinophils, Absolute 01/16/2023 0.12    • Basophils, Absolute 01/16/2023 0.02    • Immature Grans, Absolute 01/16/2023 0.01    • nRBC 01/16/2023 0.0      Current Psychotropic Medications:  olanzapine 5 mg q hs    Plan of Care/ Medical Decision Making  Reduce olanzapine to 2.5 mg q hs; allow increase back to 5 mg if feels more helpful.  Continue melatonin 5 mg q hs.  Supported patient in current experience, normalized feelings, and supported progress.  Consult viridiana Brooks, with patient request for contact once diet advances.  Follow up arranged in appx 2 weeks.    Diagnoses and all orders for this visit:    1. CHARANJIT (generalized anxiety disorder) (Primary)    2. Delirium    I spent 35 minutes caring for Edmond on this date of service.

## 2023-02-24 ENCOUNTER — READMISSION MANAGEMENT (OUTPATIENT)
Dept: CALL CENTER | Facility: HOSPITAL | Age: 63
End: 2023-02-24
Payer: COMMERCIAL

## 2023-02-24 PROCEDURE — G0180 MD CERTIFICATION HHA PATIENT: HCPCS | Performed by: THORACIC SURGERY (CARDIOTHORACIC VASCULAR SURGERY)

## 2023-02-24 NOTE — OUTREACH NOTE
CT Surgery Week 2 Survey    Flowsheet Row Responses   [facility] patient discharged from? [facility]   Does the patient have one of the following disease processes/diagnoses(primary or secondary)? Cardiothoracic surgery   Week 2 attempt successful? Yes   Unsuccessful attempts Attempt 2   Call end time 1622   Discharge diagnosis Malignant neoplasm of lower third of esophagus , right VAT with total esophagectomy, feeding jejunostomy tube placement   Meds reviewed with patient/caregiver? Yes   Is the patient having any side effects they believe may be caused by any medication additions or changes? No   Does the patient have all medications related to this admission filled (includes all antibiotics, pain medications, cardiac medications, etc.) Yes   Is the patient taking all medications as directed (includes completed medication regime)? Yes   Has the patient kept scheduled appointments due by today? Yes   What is the Home health agency?  [facility]   Has home health visited the patient within 72 hours of discharge? Yes   What is the patient's perception of their health status since discharge? Improving   Nursing interventions Nurse provided patient education   Is the patient/caregiver able to teach back normal signs of recovery? Nausea and lack of appetite, Pain or discomfort at incisional site   Nursing interventions Reassured on normal signs of recovery   Is the patient /caregiver able to teach back basic post-op care? No tub bath, swimming, or hot tub until instructed by MD, Practice cough and deep breath every 4 hours while awake   Is the patient/caregiver able to teach back signs and symptoms of incisional infection? Increased redness, swelling or pain at the incisonal site, Increased drainage or bleeding, Incisional warmth, Pus or odor from incision, Fever   Is the patient/caregiver able to teach back steps to recovery at home? Set small, achievable goals for return to baseline health, Rest and  rebuild strength, gradually increase activity, Eat a well-balance diet   Week 2 call completed? Yes   Is the patient interested in additional calls from an ambulatory ?  NOTE:  applies to high risk patients requiring additional follow-up. No          Brittany MARCUS - Registered Nurse

## 2023-02-27 ENCOUNTER — HOSPITAL ENCOUNTER (OUTPATIENT)
Dept: GENERAL RADIOLOGY | Facility: HOSPITAL | Age: 63
Discharge: HOME | End: 2023-02-27
Admitting: NURSE PRACTITIONER
Payer: COMMERCIAL

## 2023-02-27 DIAGNOSIS — C15.5 MALIGNANT NEOPLASM OF LOWER THIRD OF ESOPHAGUS: ICD-10-CM

## 2023-02-27 PROCEDURE — 71046 X-RAY EXAM CHEST 2 VIEWS: CPT

## 2023-02-28 ENCOUNTER — OFFICE VISIT (OUTPATIENT)
Dept: SURGERY | Facility: CLINIC | Age: 63
End: 2023-02-28
Payer: COMMERCIAL

## 2023-02-28 VITALS
HEART RATE: 86 BPM | OXYGEN SATURATION: 96 % | WEIGHT: 289.4 LBS | DIASTOLIC BLOOD PRESSURE: 74 MMHG | BODY MASS INDEX: 38.39 KG/M2 | RESPIRATION RATE: 16 BRPM | SYSTOLIC BLOOD PRESSURE: 148 MMHG

## 2023-02-28 DIAGNOSIS — C15.5 MALIGNANT NEOPLASM OF LOWER THIRD OF ESOPHAGUS: Primary | ICD-10-CM

## 2023-02-28 PROCEDURE — 99024 POSTOP FOLLOW-UP VISIT: CPT | Performed by: THORACIC SURGERY (CARDIOTHORACIC VASCULAR SURGERY)

## 2023-03-01 ENCOUNTER — TELEMEDICINE - AUDIO (OUTPATIENT)
Dept: NUTRITION | Facility: HOSPITAL | Age: 63
End: 2023-03-01
Payer: COMMERCIAL

## 2023-03-01 NOTE — PROGRESS NOTES
OUTPATIENT ONCOLOGY NUTRITION ASSESSMENT    Patient Name: Edmond Chase  YOB: 1960  MRN: [id number]  Assessment Date: 3/1/2023    COMMENTS:  Patient requesting follow up with RD.   Spoke to patient today on the phone today. He saw the thoracic surgeon yesterday. He has been having issues with the feeding tube and pump keeping him up at night, leaking, etc and has only been able to get 4-5 cartons on average but he understands after talking with Dr Stockton that he needs to make an effort to get all six cartons in each day.   He is encouraged that the diet is advanced to full liquids and next week will advance to soft.   He is asking for a handout with ideas for foods to eat when advanced to soft.   Weight is 289 lb, decreased from 314 lb at discharge. He understands that while weight loss is needed it is not good to lose too much weight too fast.   Sending patient the requested information in mail and will be available as needed for any questions.         Reason for Assessment Follow up, Nurse Practitioner referral , Education , Other: patient request     Diagnosis/Problem   Esophageal ca       Encounter Information        Nutrition/Diet History:     Oral Nutrition Supplements:    Factors/Symptoms Affecting Intake: Diarrhea, Early satiety, Weight loss   Comments:      Medical/Surgical History Past Medical History:   Diagnosis Date   • Abnormal nuclear stress test    • Anxiety and depression    • Aortic valve insufficiency     nonrheumtic    • Arthritis    • Ascending aortic aneurysm    • CAD (coronary artery disease)    • Cancer (HCC)    • Chest pain    • Diabetes mellitus (HCC)    • Dizzy     CAREFUL WHEN GETTING UP   • Dyspnea    • Esophageal cancer (HCC)    • Essential hypertension    • Fatigue    • GERD (gastroesophageal reflux disease)    • GI bleed    • History of recent blood transfusion    • Hyperglycemia    • Hyperlipidemia    • Hypersomnia with sleep apnea    • Lightheadedness    • Malaise  "and fatigue    • Migraines     \"OCCULAR MIGRAINES\"   • Morbid obesity (HCC)    • Myocardial ischemia    • Nocturia    • TJ on auto CPAP 10/31/2016    Overnight polysomnogram.  Weight 276 pounds.  Severe TJ with AHI 79 events per hour.  Auto CPAP recommended.   • Pneumonia     FEB 2016   • Scrotal bleeding    • Shortness of breath    • SOB (shortness of breath)        Past Surgical History:   Procedure Laterality Date   • AORTIC VALVE REPAIR/REPLACEMENT  05/2016   • ASCENDING ARCH/HEMIARCH REPLACEMENT N/A 5/2/2016    Procedure: BHAVESH STERNOTOMY CORONARY ARTERY BYPASS GRAFT TIMES 3 USING LEFT INTERNAL MAMMARY ARTERY AND RIGHT GREATER SAPHENOUS VEIN GRAFT PER ENDOSCOPIC VEIN HARVESTING, AORTIC ANEURYSM REPAIR WITH ROOT REPAIR AND AORTIC VALVE REPLACEMENT;  Surgeon: Rosalio Cline MD;  Location: [facility] OR;  Service:    • CARDIAC CATHETERIZATION N/A 4/1/2016    Procedure: Left Heart Cath;  Surgeon: Erick Tam MD;  Location: [facility] CATH INVASIVE LOCATION;  Service:    • CARDIAC CATHETERIZATION N/A 4/1/2016    Procedure: Left ventriculography;  Surgeon: Erick Tam MD;  Location: [facility] CATH INVASIVE LOCATION;  Service:    • CARDIAC CATHETERIZATION N/A 4/1/2016    Procedure: Right Heart Cath;  Surgeon: Erick Tam MD;  Location: [facility] CATH INVASIVE LOCATION;  Service:    • CARDIAC CATHETERIZATION N/A 10/30/2017    Procedure: Coronary angiography;  Surgeon: Sorin Vasquez MD;  Location: [facility] CATH INVASIVE LOCATION;  Service:    • CARDIAC CATHETERIZATION  10/30/2017    Procedure: Saphenous Vein Graft;  Surgeon: Sorin Vasquez MD;  Location: [facility] CATH INVASIVE LOCATION;  Service:    • CARDIAC CATHETERIZATION N/A 10/30/2017    Procedure: Native mammary injection;  Surgeon: Sorin Vasquez MD;  Location: [facility] CATH INVASIVE LOCATION;  Service:    • CARDIAC CATHETERIZATION N/A 7/7/2020    Procedure: Coronary angiography;  Surgeon: Gregor Kim MD;  Location: [facility] CATH INVASIVE LOCATION;  Service: " Cardiovascular;  Laterality: N/A;   • CARDIAC CATHETERIZATION N/A 7/7/2020    Procedure: Left heart cath;  Surgeon: Gregor Kim MD;  [facility]AS[facility] LOC[facility];  Service: Cardiovascular;  Laterality: N/A;   • CARDIAC CATHETERIZATION N/A 7/7/2020    Procedure: Left ventriculography;  Surgeon: Gregor Kim MD;  Location:  [facility] CATH INVASIVE LOCATION;  Service: Cardiovascular;  Laterality: N/A;   • CARDIAC CATHETERIZATION N/A 7/7/2020    Procedure: Stent ESMER bypass graft;  Surgeon: Gregor Kim MD;  Location:  [facility] CATH INVASIVE LOCATION;  Service: Cardiovascular;  Laterality: N/A;   • CARDIAC CATHETERIZATION N/A 6/17/2021    Procedure: SAPHENOUS VEIN GRAFT;  Surgeon: Marques Ndiaye MD;  [facility]: [facility] C[facility] LOC[facility];  Service: Cardiovascular;  Laterality: N/A;   • CARDIAC CATHETERIZATION N/A 6/17/2021    Procedure: Left Heart Cath;  Surgeon: Marques Ndiaye MD;  Location: [facility] CATH INVASIVE LOCATION;  Service: Cardiovascular;  Laterality: N/A;   • CARDIAC CATHETERIZATION N/A 6/17/2021    Procedure: Coronary angiography;  Surgeon: Marques Ndiaye MD;  Location: [facility] CATH INVASIVE LOCATION;  Service: Cardiovascular;  Laterality: N/A;   • CARDIAC CATHETERIZATION N/A 7/14/2022    Procedure: Left Heart Cath;  Surgeon: Charlie Aj MD;  Location: [facility] CATH INVASIVE LOCATION;  Service: Cardiovascular;  Laterality: N/A;   • CARDIAC CATHETERIZATION N/A 7/14/2022    Procedure: Coronary angiography;  Surgeon: Charlie Aj MD;  Location: [facility] CATH INVASIVE LOCATION;  Service: Cardiovascular;  Laterality: N/A;   • CARDIAC CATHETERIZATION  7/14/2022    Procedure: Saphenous Vein Graft;  Surgeon: Charlie Aj MD;  Location: [facility] CATH INVASIVE LOCATION;  Service: Cardiovascular;;   • CARDIAC CATHETERIZATION N/A 7/14/2022    Procedure: Native mammary injection;  Surgeon: Charlie Aj MD;  Location: [facility] CATH INVASIVE LOCATION;  Service:  "Cardiovascular;  Laterality: N/A;   • COLONOSCOPY N/A 11/26/2022    Procedure: COLONOSCOPY AT BEDSIDE;  Surgeon: Tomy Lopez MD;  Location: [facility] MAIN OR;  Service: Gastroenterology;  Laterality: N/A;   • COLONOSCOPY W/ POLYPECTOMY N/A 10/4/2022    Procedure: COLONOSCOPY to cecum with cold forceps and cold snare polypectomies;  Surgeon: Gregor Carlson MD;  Location: [facility] ENDOSCOPY;  Service: Gastroenterology;  Laterality: N/A;  PRE- hx of polyps  POST- diverticulosis, polyps   • CORONARY ARTERY BYPASS GRAFT  05/2016    LIMA TO LAD, SVG TO PDA, SVG TO OM2   • ENDOSCOPY N/A 7/13/2022    Procedure: ESOPHAGOGASTRODUODENOSCOPY;  Surgeon: Marcia Hollis MD;  Location: [facility] ENDOSCOPY;  Service: Gastroenterology;  Laterality: N/A;  PRE- ANEMIA, MELENA  POST- MILD EROSIVE GASTRITIS, GE JUNCTION ULCER   • ENDOSCOPY N/A 10/4/2022    Procedure: ESOPHAGOGASTRODUODENOSCOPY with biopsies;  Surgeon: Gregor Carlson MD;  Location: [facility] ENDOSCOPY;  Service: Gastroenterology;  Laterality: N/A;  PRE- hx of esophageal ulcer  POST- gastric cardia mass   • ESOPHAGOGASTRECTOMY Right 2/3/2023    Procedure: BRONCHOSCOPY, RIGHT ROBOTIC VIDEO ASSISTED THORACOSCOPY WITH TOTAL ESOPHAGECTOMY, INTERCOSTAL NERVE BLOCK, FEEDING JEJUNOSTOMY PLACEMENT;  Surgeon: Valerie Stockton MD;  Location: [facility] OR;  Service: [facility] - [facility];  Laterality: Right;   • INNER EAR SURGERY     • TONSILLECTOMY            Anthropometrics        Current Height Ht Readings from Last 1 Encounters:   02/17/23 184.9 cm (72.8\")      Current Weight Wt Readings from Last 1 Encounters:   02/28/23 131 kg (289 lb 6.4 oz)      BMI  38   Usual Body Weight (UBW) 318 lb   Weight Change/Trend Loss   Weight History Wt Readings from Last 30 Encounters:   02/28/23 1442 131 kg (289 lb 6.4 oz)   02/17/23 1554 (!) 137 kg (301 lb)   02/10/23 0500 (!) 137 kg (301 lb)   02/09/23 0500 (!) 137 kg (301 lb)   02/05/23 0300 (!) 147 kg (324 lb 8.3 oz)   02/04/23 " 1727 (!) 144 kg (317 lb 7.4 oz)   02/04/23 0600 (!) 144 kg (317 lb 10.9 oz)   02/03/23 1800 (!) 143 kg (315 lb 4.1 oz)   01/30/23 1509 (!) 142 kg (314 lb)   01/23/23 1440 (!) 142 kg (314 lb)   01/20/23 0756 (!) 142 kg (314 lb)   01/16/23 0949 (!) 141 kg (310 lb)   12/15/22 0618 (!) 142 kg (313 lb 11.4 oz)   12/14/22 1553 (!) 140 kg (308 lb)   12/14/22 1532 (!) 142 kg (313 lb)   12/14/22 1006 (!) 142 kg (313 lb)   12/14/22 0932 (!) 142 kg (313 lb)   12/06/22 1529 (!) 140 kg (308 lb 8 oz)   12/02/22 0600 (!) 143 kg (314 lb 6 oz)   12/01/22 0546 (!) 144 kg (317 lb 3.2 oz)   11/25/22 1123 (!) 141 kg (310 lb)   10/21/22 1052 (!) 142 kg (314 lb)   10/04/22 0934 (!) 140 kg (308 lb 11.2 oz)   09/15/22 0915 (!) 141 kg (310 lb)   08/29/22 1355 (!) 141 kg (311 lb 8 oz)   08/17/22 0900 (!) 142 kg (314 lb)   08/08/22 1032 (!) 144 kg (318 lb)   08/04/22 0814 (!) 143 kg (315 lb)   07/29/22 0744 (!) 142 kg (314 lb)   07/22/22 1247 (!) 142 kg (314 lb)   07/15/22 0505 (!) 146 kg (322 lb 1.5 oz)   07/12/22 1121 (!) 144 kg (318 lb)   07/11/22 0500 (!) 144 kg (318 lb 1.6 oz)   07/10/22 0618 (!) 148 kg (327 lb)   01/28/22 1108 (!) 148 kg (327 lb)   08/17/21 1007 (!) 150 kg (330 lb)   07/30/21 1016 (!) 149 kg (329 lb)   07/07/21 1225 (!) 154 kg (340 lb)   06/24/21 1105 (!) 154 kg (340 lb)   06/17/21 0812 (!) 150 kg (330 lb)   01/22/21 0823 (!) 152 kg (336 lb)   10/21/20 1044 (!) 150 kg (331 lb)          Medications           Current medications: Loperamide HCl, O2, OLANZapine, atorvastatin, dapagliflozin Propanediol, lisinopril, metFORMIN, metoprolol tartrate, potassium chloride, and torsemide                 Tests/Procedures        Tests/Procedures Esophagram     Labs       Pertinent Labs          Invalid input(s): JUANITA BOOTHE          COVID19   Date Value Ref Range Status   07/10/2022 Not Detected Not Detected - Ref. Range Final     Lab Results   Component Value Date    HGBA1C 7.00 (H) 07/10/2022          Physical Findings           Tubes/Drains jejunostomy        Estimated/Assessed Needs        Energy Requirements    Height for Calculation      Weight for Calculation 80  kg  IBW   Method for Estimation  25 kcal/kg   EST Needs (kcal/day) 2000       Protein Requirements    Weight for Calculation 131 kg   EST Protein Needs (g/kg) 0.8 gm/kg   EST Daily Needs (g/day) 105 g       Fluid Requirements     Method for Estimation 1 mL/kcal    Estimated Needs (mL/day) 2000           PES STATEMENT / NUTRITION DIAGNOSIS      Nutrition Dx Problem Problem:    Unintentional Weight Loss, Altered GI Function and Needs Alternative Route    Etiology:  Medical diagnosis: Esophageal cancer  Factors affecting nutrition: Reported/Observed By, Reported GI Symptoms      Signs/Symptoms:  Report of Minimal PO Intake, EN Intake/Delivery and Unintended Weight Change    Comment:      NUTRITION INTERVENTION / PLAN OF CARE      Intervention Goal(s) Nutrition support treatment, Provide information, Reduce/improve symptoms, Disease management/therapy, Tolerate PO , Advance diet, Modify texture/consistency and Maintain weight         RD Intervention/Action Encouraged intake, Follow Tx progress, Recommended/ordered         Recommendations:       PO Diet       Supplements       Snacks       Enteral Nutrition Jejunostomy    Intermittent/cyclic    Impact Peptide 1.5                       Home regimen: 6 cartons per day                        Providing: Calories: 2250    Protein:141    Fluid:1158    Total Fluid: 1968   Needs met:  111% calories    134%protein   --      Monitor/Evaluation PO intake, Pertinent labs, Weight, Symptoms, TF tolerance, Follow up as needed   Education Education provided   --    RD to follow per protocol.    Electronically signed by:  Audrey Correa RD, [place]  03/01/23 15:14 EST

## 2023-03-01 NOTE — PROGRESS NOTES
"Chief Complaint  Esophageal cancer  Subjective        Edmond Chase presents to [facility] THORACIC SURGERY  History of Present Illness  Mr. Chase is a pleasant 62-year-old gentleman who is status post esophagectomy on 02/03/2023 for a T1BN0 pathologic esophageal adenocarcinoma with all margins negative. He presents today in follow-up. He is accompanied today by an adult female.    The patient reports that he is \"so-so.\" He states the surgery was a successful. He reports that he was so focused on getting rid of the cancer that he had no idea that the recovery was going to be as rigorous as it is proving to be. He reports that he is not doing his tube feeds for as long as he needs to. He states that he is supposed to do 6 bottles a day for 18 hours. He reports that he is bay if he can get 4 bottles in. He states that there is no time for him to sleep with the tube feed running, because it beats and carries on all night long. He states that he can not manage it. He reports that he is attached to it all the time. He states that he can not do anything for himself. He describes it as incredibly disgusting.  He cites it is also falling apart. He states that home health is a great idea. He reports that they check his vitals. He states that he has a different person every time, he has to go through the same history with them. He reports that he drinks mainly water. He states that occasionally he drinks lemonade, apple juice, grape juice, popsicles, and Jell-O. He reports that he spit up all the Jell-O one night. He cites that he is not sure if he ate it too fast. He states that it was disgusting. He reports that he is lactose intolerant. He states that all of his stools are like diarrhea.     He reports that he has been seeing Ms. Diaz. He states that Ms. Diaz prescribed him a serious sedative and he has been taking a liquid melatonin. He reports that he has been having very bizarre " "dreams. He states that he sleeps for 2 hours and then he is up. He reports that this happens about 3 or 4 cycles. He states that he goes to bed at 9 or 10 at night. He reports that he is going to discontinue the sedative and just go with the liquid melatonin because the nightmares are awful and it makes him very goofy. He states that he will let her know what he has decided to do. He reports that he has been sick since 07/2022. He states that he has not really slept in a bed since 11/2022. He reports that every time he lays down, he describes himself as getting \"panicky\". He states that he has been sleeping in his chair really for the last 3 months, due to that he has developed sores. He states that he has some ointments that he has been using on his sores. He reports that he tries to move around as much as possible. He states that he takes at least 1 to 2 little walks a day.     He reports that he is not using the impact anymore. He states that he can not use the metformin XR right now. He states that he has his old pills. He reports that he is going to see Dr. Sahh, the nephrologist, tomorrow. He states that he has stopped taking Tylenol for his incisional pain. He reports that he takes lisinopril in the morning. He states that he has not taken isosorbide since surgery.     He states that his cardiologist is Dr. Beal. He reports that he had tachycardia on 02/17/2023. He states that his heart rate was 161. He states that he had to call the EMTs and gave him shots in his apartment. He reports that they took him over here for observation and stayed a few hours. He states that they did a couple of different EKGs and blood work. He reports that the tachycardia made him very short of breath too. He states that he had a little SVT that was the day he started taking liquid. He states that there was a lot of sugar intake that day.    Objective   Vital Signs:  /74 (BP Location: Left arm, Patient Position: Sitting, " "Cuff Size: Adult)   Pulse 86   Resp 16   Wt 131 kg (289 lb 6.4 oz)   SpO2 96%   BMI 38.39 kg/m²   Estimated body mass index is 38.39 kg/m² as calculated from the following:    Height as of 2/17/23: 184.9 cm (72.8\").    Weight as of this encounter: 131 kg (289 lb 6.4 oz).             Physical Exam  Vitals and nursing note reviewed.   Constitutional:       Appearance: He is well-developed.   HENT:      Head: Normocephalic and atraumatic.      Nose: Nose normal.   Eyes:      Conjunctiva/sclera: Conjunctivae normal.   Pulmonary:      Effort: Pulmonary effort is normal.   Musculoskeletal:         General: Normal range of motion.      Cervical back: Normal range of motion and neck supple.   Skin:     General: Skin is warm and dry.   Neurological:      Mental Status: He is alert and oriented to person, place, and time.   Psychiatric:         Behavior: Behavior normal.         Thought Content: Thought content normal.         Judgment: Judgment normal.        Result Review :        I have reviewed the esophagram performed on 02/17/2023, which demonstrates no evidence of leak.           Assessment and Plan   There are no diagnoses linked to this encounter.  Mr. Chase is a pleasant 62-year-old gentleman status post esophagectomy for a [id number] esophageal adenocarcinoma. He will need to follow up with oncology to discuss any additional treatment, although I suspect he will not need any for his stage I esophagus cancer. His weight is currently 289 pounds, which is 131 kg, which is decreased of 6 kg since his last visit. We have discussed tube feedings and weight maintenance strategies today. I will plan to see him in 2 weeks for weight check. Patient was advised he can began to do full liquids for this week. Next Tuesday, he will began soft diet.  I will plan to see him in 2 weeks for weight check.  He will continue his tube feeds at full strength for now.  He will follow-up with Ms. Diaz to discuss medications for " his anxiety.    Follow Up   No follow-ups on file.  Patient was given instructions and counseling regarding his condition or for health maintenance advice. Please see specific information pulled into the AVS if appropriate.       Transcribed from ambient dictation for Valerie Stockton MD by Cristiane Hernandez  02/28/23   19:09 EST    Patient or patient representative verbalized consent to the visit recording.  I have personally performed the services described in this document as transcribed by the above individual, and it is both accurate and complete.

## 2023-03-02 ENCOUNTER — HOME CARE VISIT (OUTPATIENT)
Dept: HOME HEALTH SERVICES | Facility: HOME HEALTHCARE | Age: 63
End: 2023-03-02
Payer: COMMERCIAL

## 2023-03-02 VITALS
OXYGEN SATURATION: 95 % | HEART RATE: 68 BPM | DIASTOLIC BLOOD PRESSURE: 98 MMHG | RESPIRATION RATE: 18 BRPM | TEMPERATURE: 97.3 F | SYSTOLIC BLOOD PRESSURE: 162 MMHG

## 2023-03-02 PROCEDURE — G0299 HHS/HOSPICE OF RN EA 15 MIN: HCPCS

## 2023-03-02 NOTE — HOME HEALTH
Tube feeds going well, patient upgraded to full liquid diet with plans to upgrade to soft diet in about a week.  BP a little eleavted today, will CTM.  Patient showed me a spot on his right back, looks to be an old drain or chest tube site, it is not fully closed, has a little slough and there was a small amount of serous drainage on his shirt.  Will contact CT surgery about this and dress if needed.  Planning for d/c next week unless we add more visits for wound.  Discussed suplly ordering for TFs, told them to call [facility] if they aget down to a few day supply and [facility]

## 2023-03-03 ENCOUNTER — OFFICE VISIT (OUTPATIENT)
Dept: INTERNAL MEDICINE | Age: 63
End: 2023-03-03
Payer: COMMERCIAL

## 2023-03-03 ENCOUNTER — PATIENT OUTREACH (OUTPATIENT)
Dept: OTHER | Facility: HOSPITAL | Age: 63
End: 2023-03-03
Payer: COMMERCIAL

## 2023-03-03 VITALS
DIASTOLIC BLOOD PRESSURE: 70 MMHG | HEART RATE: 81 BPM | SYSTOLIC BLOOD PRESSURE: 148 MMHG | HEIGHT: 73 IN | OXYGEN SATURATION: 95 % | WEIGHT: 291 LBS | BODY MASS INDEX: 38.57 KG/M2 | TEMPERATURE: 97.2 F

## 2023-03-03 DIAGNOSIS — E11.65 TYPE 2 DIABETES MELLITUS WITH HYPERGLYCEMIA, WITHOUT LONG-TERM CURRENT USE OF INSULIN: Primary | ICD-10-CM

## 2023-03-03 DIAGNOSIS — L03.818 CELLULITIS OF OTHER SPECIFIED SITE: ICD-10-CM

## 2023-03-03 DIAGNOSIS — I10 ESSENTIAL HYPERTENSION: ICD-10-CM

## 2023-03-03 PROCEDURE — 99214 OFFICE O/P EST MOD 30 MIN: CPT

## 2023-03-03 RX ORDER — CEPHALEXIN 500 MG/1
CAPSULE ORAL
Qty: 14 CAPSULE | Refills: 0 | Status: SHIPPED | OUTPATIENT
Start: 2023-03-03 | End: 2023-03-17

## 2023-03-03 NOTE — PROGRESS NOTES
"    I N T E R N A L  M E D I C I N E  Nargis Velasquez, APRN    ENCOUNTER DATE:  03/03/2023    Edmond Chase / 62 y.o. / male      CHIEF COMPLAINT / REASON FOR OFFICE VISIT     Hospital Follow Up Visit      ASSESSMENT & PLAN     Diagnoses and all orders for this visit:    1. Type 2 diabetes mellitus with hyperglycemia, without long-term current use of insulin (HCC) (Primary)  -     Hemoglobin A1c    2. Essential hypertension    3. Cellulitis of other specified site  -     cephalexin (Keflex) 500 MG capsule; Take 1 capsule per J tube 2 (Two) Times a Day for 7 days.  Dispense: 14 capsule; Refill: 0         SUMMARY/DISCUSSION  • Agreeable to recheck A1C in the context of deciding whether to resume Metformin use.  • BP elevated today, as well as at home.  He is agreeable to reach out to cardiology to obtain instruction on whether it is acceptable for him to use amlodipine use at this time.  • Pt instructed to monitor for any signs of worsening infection, including increased erythema, warmth, purulent drainage, pain, fever, chills.  He is aware these are all signs to visit the ER.        Next Appointment with me: Visit date not found    Return in about 3 months (around 6/3/2023) for Recheck.      VITAL SIGNS     Visit Vitals  /70   Pulse 81   Temp 97.2 °F (36.2 °C)   Ht 184.9 cm (72.8\")   Wt 132 kg (291 lb)   SpO2 95%   BMI 38.61 kg/m²             Wt Readings from Last 3 Encounters:   03/03/23 132 kg (291 lb)   02/28/23 131 kg (289 lb 6.4 oz)   02/17/23 (!) 137 kg (301 lb)     Body mass index is 38.61 kg/m².        MEDICATIONS AT THE TIME OF OFFICE VISIT     Current Outpatient Medications on File Prior to Visit   Medication Sig Dispense Refill   • atorvastatin (LIPITOR) 40 MG tablet Administer 1 tablet per J tube Every Night. 30 tablet 0   • dapagliflozin Propanediol (Farxiga) 10 MG tablet Administer 10 mg per J tube Daily. 30 tablet 0   • lisinopril (PRINIVIL,ZESTRIL) 10 MG tablet Administer 1 tablet per J tube " Daily for 30 days. 30 tablet 1   • Loperamide HCl (IMODIUM PO) Administer 1 tablet per J tube As Needed. LOOSE STOOLS     • metFORMIN (GLUCOPHAGE) 500 MG tablet Administer 1 tablet per J tube Every Evening. 15 tablet 0   • metoprolol tartrate (LOPRESSOR) 25 MG tablet Administer 1 tablet per J tube Every 12 (Twelve) Hours for 14 days. Indications: High Blood Pressure Disorder 28 tablet 0   • O2 (OXYGEN) Inhale 2 L/min Every Night.     • [DISCONTINUED] OLANZapine (zyPREXA) 5 MG tablet Administer 1 tablet per J tube Every Night. 30 tablet 2   • [DISCONTINUED] potassium chloride (KAYCIEL) 20 mEq/15 mL solution Administer 15 mL per J tube Daily. 225 mL 0   • [DISCONTINUED] torsemide (DEMADEX) 20 MG tablet Administer 4 tablets per J tube Daily. 28 tablet 0     No current facility-administered medications on file prior to visit.        HISTORY OF PRESENT ILLNESS     Pt is accompanied by his caregiver to today's appointment.  He is recently s/p esophagectomy for adenocarcinoma of the esophagus.    Presented to the ER on February 17, 2023 with tachycadia.  He was treated with 1 dose of 6 mg adenosine, and converted to normal sinus rhythm.  It was determined that he would be safe to discharge.  Overall, he has been slowly improving since hospital discharge.      Here today seeking clarification of his medications.  He has not been taking Metformin for his Type 2 Diabetes because he has the XR version, and he is obtaining all feeds via J Tube.  Does remain on Farxiga 10 mg daily.  He has lost approximately 20 pounds over the last couple months.      HTN: Averaging 140s/70s at home on lisinopril 10 mg daily and metoprolol tartrate 25 mg BID.      He reports some ongoing drainage from a right thoracic back drain site associated with his surgery.  Denies any pain, warmth, fever, chills.      Patient Care Team:  Nargis Velasquez APRN as PCP - General (Family Medicine)  Papa Miguel MD as Consulting Physician  (Cardiology)  Sekou Pisano MD as Consulting Physician (Pulmonary Disease)  Gregor Carlson MD as Consulting Physician (Gastroenterology)  Estevan Yuan MD (Sports Medicine)  Shelley Goldsmith, RN as Nurse Navigator    REVIEW OF SYSTEMS     Review of Systems   Constitutional: Negative for chills, fever and unexpected weight change.   Respiratory: Negative for cough, chest tightness and shortness of breath.    Cardiovascular: Negative for chest pain, palpitations and leg swelling.   Skin: Positive for wound.   Neurological: Negative for dizziness, weakness, light-headedness and headaches.   Psychiatric/Behavioral: The patient is not nervous/anxious.           PHYSICAL EXAMINATION     Physical Exam  Vitals reviewed.   Constitutional:       General: He is not in acute distress.     Appearance: Normal appearance. He is not ill-appearing, toxic-appearing or diaphoretic.   HENT:      Head: Normocephalic and atraumatic.   Cardiovascular:      Rate and Rhythm: Normal rate and regular rhythm.      Heart sounds: Normal heart sounds.   Pulmonary:      Effort: Pulmonary effort is normal.      Breath sounds: Normal breath sounds.   Musculoskeletal:      Right lower leg: Edema (Scant, non pitting) present.      Left lower leg: Edema (Scant, non pitting) present.   Skin:     General: Skin is warm and dry.      Comments: Right thoracic back: Horizontal incision with an approximate 0.5 in long area of dehiscence.  Scant yellow discharge from this area.  Borders with mild erythema.     Neurological:      Mental Status: He is alert and oriented to person, place, and time. Mental status is at baseline.   Psychiatric:         Mood and Affect: Mood normal.         Behavior: Behavior normal.         Thought Content: Thought content normal.         Judgment: Judgment normal.           REVIEWED DATA     Labs:           Imaging:            Medical Tests:           Summary of old records / correspondence / consultant  report:           Request outside records:

## 2023-03-03 NOTE — PROGRESS NOTES
"Called patient. He is doing \"so so\". He met with Dr. Stockton and progressed to [place] diet. He will start soft foods next week. He is currently eating ice cream, creamed soups, oatmeal and grits. He stated \"I don't really get that hungry\". He had an appt with Audrey dietician this week. She is mailing him a packet on soft foods. He has learned that he has to eat small frequent meals. The patient reports intermittent issues with his feeding pump. He was able to have full feed last night.     He reports difficulty sleeping, which he says Anna is helping him work through. He is napping when he can. He meets with Anna again 3/14.     He reports an episode of tachycardia 2 weeks ago. He went to the ER.  He denies any new episodes since then.    He meets with his PCP today. He will be showing her an area on his back that has been draining slightly. He also reports a few small open areas on his bottom, which appear to be getting better.    He still has [facility] and has been watching his tube feeding supplies more closely; ordering them when he is getting low.     He reports that he fatigues easily. He is trying to walk around more with his cane. He states he is not dizzy and denies falls. He has not resumed driving yet. He has a caregiver staying with him and states his family will be coming in town tomorrow.     He saw the nephrologist yesterday.  He meets with Dr. Stockton again 3/14.     The patient states he was more concerned with getting the cancer out, that he didn't pay close attention to better understand what his post-op course would look like.  Provided active listening and emotional support, validating and normalizing patient's feelings. WE discussed additional resources available to him through [employer] and [facility] [employer].  He has ongoing appts with Anna as well. He stated \"I am already living my new different life\".  Patient expressed gratitude for my support and denied any additional needs at this time.    I will " call next month; he has my contact info and will call as needed

## 2023-03-04 LAB — HBA1C MFR BLD: 6.6 % (ref 4.8–5.6)

## 2023-03-08 ENCOUNTER — HOME CARE VISIT (OUTPATIENT)
Dept: HOME HEALTH SERVICES | Facility: HOME HEALTHCARE | Age: 63
End: 2023-03-08
Payer: COMMERCIAL

## 2023-03-08 VITALS
OXYGEN SATURATION: 100 % | HEART RATE: 73 BPM | TEMPERATURE: 97.3 F | DIASTOLIC BLOOD PRESSURE: 100 MMHG | RESPIRATION RATE: 18 BRPM | SYSTOLIC BLOOD PRESSURE: 170 MMHG

## 2023-03-08 PROCEDURE — G0299 HHS/HOSPICE OF RN EA 15 MIN: HCPCS

## 2023-03-08 NOTE — HOME HEALTH
"Patient discharged from home health nursing today. Only issue is BP is elevated today (170/100 and HR of 73), says he is just having a \"bad day\". I explained to him the risks associated with hypertension and that we could request more visits for home health to monitor BP but he states he would rather monitor on his own and report to providers if it remains elevated. (CT surg, cardiology and PCP all tagged in this message)    No issues with tube feeding, patient and caregiver compotent in maintaining feeds and supplies.  Patient insturcted on betadine paint of spot on back per CT surg, it actually looks much better today.  Patient agreeable to d/c."

## 2023-03-14 ENCOUNTER — TELEPHONE (OUTPATIENT)
Dept: CARDIOLOGY | Facility: CLINIC | Age: 63
End: 2023-03-14
Payer: COMMERCIAL

## 2023-03-14 ENCOUNTER — OFFICE VISIT (OUTPATIENT)
Dept: PSYCHIATRY | Facility: HOSPITAL | Age: 63
End: 2023-03-14
Payer: COMMERCIAL

## 2023-03-14 ENCOUNTER — OFFICE VISIT (OUTPATIENT)
Dept: SURGERY | Facility: CLINIC | Age: 63
End: 2023-03-14
Payer: COMMERCIAL

## 2023-03-14 VITALS
OXYGEN SATURATION: 95 % | WEIGHT: 292.54 LBS | SYSTOLIC BLOOD PRESSURE: 142 MMHG | BODY MASS INDEX: 38.77 KG/M2 | HEART RATE: 73 BPM | HEIGHT: 73 IN | DIASTOLIC BLOOD PRESSURE: 88 MMHG

## 2023-03-14 DIAGNOSIS — F41.1 GAD (GENERALIZED ANXIETY DISORDER): Primary | ICD-10-CM

## 2023-03-14 DIAGNOSIS — F33.1 MODERATE EPISODE OF RECURRENT MAJOR DEPRESSIVE DISORDER: ICD-10-CM

## 2023-03-14 DIAGNOSIS — C15.5 MALIGNANT NEOPLASM OF LOWER THIRD OF ESOPHAGUS: ICD-10-CM

## 2023-03-14 DIAGNOSIS — G47.00 INSOMNIA, UNSPECIFIED TYPE: ICD-10-CM

## 2023-03-14 DIAGNOSIS — C15.5 MALIGNANT NEOPLASM OF LOWER THIRD OF ESOPHAGUS: Primary | ICD-10-CM

## 2023-03-14 PROCEDURE — 99024 POSTOP FOLLOW-UP VISIT: CPT | Performed by: THORACIC SURGERY (CARDIOTHORACIC VASCULAR SURGERY)

## 2023-03-14 PROCEDURE — 99214 OFFICE O/P EST MOD 30 MIN: CPT | Performed by: NURSE PRACTITIONER

## 2023-03-14 PROCEDURE — 3079F DIAST BP 80-89 MM HG: CPT | Performed by: THORACIC SURGERY (CARDIOTHORACIC VASCULAR SURGERY)

## 2023-03-14 PROCEDURE — 3077F SYST BP >= 140 MM HG: CPT | Performed by: THORACIC SURGERY (CARDIOTHORACIC VASCULAR SURGERY)

## 2023-03-14 RX ORDER — MIRTAZAPINE 15 MG/1
15 TABLET, ORALLY DISINTEGRATING ORAL NIGHTLY
Qty: 30 TABLET | Refills: 2 | Status: SHIPPED | OUTPATIENT
Start: 2023-03-14 | End: 2023-03-28

## 2023-03-14 RX ORDER — MIRTAZAPINE 15 MG/1
15 TABLET, FILM COATED ORAL NIGHTLY
Qty: 30 TABLET | Refills: 2 | Status: SHIPPED | OUTPATIENT
Start: 2023-03-14 | End: 2023-03-14

## 2023-03-14 NOTE — PROGRESS NOTES
Behavioral Oncology Services  Video visit-pandemic. Pt consented to session conducted through Zoom video.  You have chosen to receive care through a telehealth visit.  Do you consent to use a video/audio connection for your medical care today? YES  Telehealth provided in patient's home.  Location of Provider: [facility]    Subjective  Patient ID: Edmond Chase is a 62 y.o. male is seen via telehealth in the Behavioral Oncology Clinic.    CC: Anxiety, depression, insomnia, esophageal cancer, delirium    HPI/ Interval History:   Pt continues in follow up regarding recent esophagectomy for esophageal cancer. Continues to endorse high level of anxiety, exacerbated by challenging recovery, feelings of dependence. Pt is very logical and linear in conversation. Pt continues to endorse significant demoralization alongside chronic illness. Reports goal of remaining positive, working to invest in his own recovery while allowing space for expected grief.    Sleep assessed; pt reports continuation of melatonin, transitioned to OTC gummies. Is no longer taking olanzapine. Reports feeling as though this was contributing to feeling 'goofy' in the AM and odd dreams- both reportedly improved with discontinuation. Continues to endorse disrupted sleep, largely impacted by nocturia and complications related to tube feeds. Reports challenges managing disruptions related to battery, clogged tube, etc. Is hopeful for continued progression to more normal diet/ oral intake. Currently reports ability to tolerate very small quantities before feeling full. Gag reflex is strong. Pt continues to sleep elevated in chair; bed is also elevated, although not yet manageable with J tube due to positioning.     Friend, Lester, remains present and helpful. Plans to return home mid April with plans of incorporating more regular (although not live in) family support. Lester reports elevation in patient anxiety which persists throughout day  and night. Friend also reports current restlessness at night. Does not feel he is as restless during the day. Both confirm persistence of frequent tearfulness.  Previous medications reviewed; pt reports hx trials of SSRIs, SNRIs with improved anxiety, although with perceived affective flattening. Collateral hx per friend reports previous positive difference on medications as compared to off.     Exam: As above    Recent Labs Reviewed:  Office Visit on 03/03/2023   Component Date Value   • Hemoglobin A1C 03/03/2023 6.60 (H)    Admission on 02/17/2023, Discharged on 02/17/2023   Component Date Value   • QT Interval 02/17/2023 347    • Glucose 02/17/2023 192 (H)    • BUN 02/17/2023 44 (H)    • Creatinine 02/17/2023 1.25    • Sodium 02/17/2023 143    • Potassium 02/17/2023 4.1    • Chloride 02/17/2023 104    • CO2 02/17/2023 28.7    • Calcium 02/17/2023 8.7    • Total Protein 02/17/2023 6.6    • Albumin 02/17/2023 3.3 (L)    • ALT (SGPT) 02/17/2023 18    • AST (SGOT) 02/17/2023 10    • Alkaline Phosphatase 02/17/2023 97    • Total Bilirubin 02/17/2023 <0.2    • Globulin 02/17/2023 3.3    • A/G Ratio 02/17/2023 1.0    • BUN/Creatinine Ratio 02/17/2023 35.2 (H)    • Anion Gap 02/17/2023 10.3    • eGFR 02/17/2023 64.3    • HS Troponin T 02/17/2023 46 (H)    • proBNP 02/17/2023 314.0    • Magnesium 02/17/2023 2.5 (H)    • WBC 02/17/2023 9.73    • RBC 02/17/2023 3.66 (L)    • Hemoglobin 02/17/2023 9.9 (L)    • Hematocrit 02/17/2023 32.1 (L)    • MCV 02/17/2023 87.7    • MCH 02/17/2023 27.0    • MCHC 02/17/2023 30.8 (L)    • RDW 02/17/2023 14.0    • RDW-SD 02/17/2023 44.3    • MPV 02/17/2023 9.4    • Platelets 02/17/2023 459 (H)    • Neutrophil % 02/17/2023 79.3 (H)    • Lymphocyte % 02/17/2023 11.4 (L)    • Monocyte % 02/17/2023 7.2    • Eosinophil % 02/17/2023 1.5    • Basophil % 02/17/2023 0.3    • Immature Grans % 02/17/2023 0.3    • Neutrophils, Absolute 02/17/2023 7.71 (H)    • Lymphocytes, Absolute 02/17/2023 1.11    •  Monocytes, Absolute 02/17/2023 0.70    • Eosinophils, Absolute 02/17/2023 0.15    • Basophils, Absolute 02/17/2023 0.03    • Immature Grans, Absolute 02/17/2023 0.03    • nRBC 02/17/2023 0.0    • HS Troponin T 02/17/2023 36 (H)    • Troponin T Delta 02/17/2023 -10 (L)    No results displayed because visit has over 200 results.      Pre-Admission Testing on 01/23/2023   Component Date Value   • ABO Type 01/23/2023 O    • RH type 01/23/2023 Positive    • Antibody Screen 01/23/2023 Negative    • T&S Expiration Date 01/23/2023 1/24/2023 11:59:00 PM    Results Encounter on 01/21/2023   Component Date Value   • Glucose 01/23/2023 129 (H)    • BUN 01/23/2023 14    • Creatinine 01/23/2023 0.85    • Sodium 01/23/2023 143    • Potassium 01/23/2023 3.8    • Chloride 01/23/2023 107    • CO2 01/23/2023 23.7    • Calcium 01/23/2023 8.8    • BUN/Creatinine Ratio 01/23/2023 16.5    • Anion Gap 01/23/2023 12.3    • eGFR 01/23/2023 97.0    • PTT 01/23/2023 30.7    • Protime 01/23/2023 13.5    • INR 01/23/2023 1.02    • WBC 01/23/2023 6.23    • RBC 01/23/2023 4.14    • Hemoglobin 01/23/2023 11.7 (L)    • Hematocrit 01/23/2023 36.7 (L)    • MCV 01/23/2023 88.6    • MCH 01/23/2023 28.3    • MCHC 01/23/2023 31.9    • RDW 01/23/2023 14.5    • RDW-SD 01/23/2023 46.3    • MPV 01/23/2023 9.8    • Platelets 01/23/2023 221    • Neutrophil % 01/23/2023 75.5    • Lymphocyte % 01/23/2023 14.8 (L)    • Monocyte % 01/23/2023 7.4    • Eosinophil % 01/23/2023 1.8    • Basophil % 01/23/2023 0.3    • Immature Grans % 01/23/2023 0.2    • Neutrophils, Absolute 01/23/2023 4.71    • Lymphocytes, Absolute 01/23/2023 0.92    • Monocytes, Absolute 01/23/2023 0.46    • Eosinophils, Absolute 01/23/2023 0.11    • Basophils, Absolute 01/23/2023 0.02    • Immature Grans, Absolute 01/23/2023 0.01    • nRBC 01/23/2023 0.0    Lab on 01/16/2023   Component Date Value   • WBC 01/16/2023 6.58    • RBC 01/16/2023 4.14    • Hemoglobin 01/16/2023 11.5 (L)    • Hematocrit  01/16/2023 36.8 (L)    • MCV 01/16/2023 88.9    • MCH 01/16/2023 27.8    • MCHC 01/16/2023 31.3 (L)    • RDW 01/16/2023 14.5    • RDW-SD 01/16/2023 46.5    • MPV 01/16/2023 10.1    • Platelets 01/16/2023 218    • Neutrophil % 01/16/2023 73.7    • Lymphocyte % 01/16/2023 15.8 (L)    • Monocyte % 01/16/2023 8.2    • Eosinophil % 01/16/2023 1.8    • Basophil % 01/16/2023 0.3    • Immature Grans % 01/16/2023 0.2    • Neutrophils, Absolute 01/16/2023 4.85    • Lymphocytes, Absolute 01/16/2023 1.04    • Monocytes, Absolute 01/16/2023 0.54    • Eosinophils, Absolute 01/16/2023 0.12    • Basophils, Absolute 01/16/2023 0.02    • Immature Grans, Absolute 01/16/2023 0.01    • nRBC 01/16/2023 0.0    labs reviewed    Current Psychotropic Medications:  olanzapine 5 mg q hs - no longer taking  Melatonin - taking as needed    Plan of Care/ Medical Decision Making  Add mirtazapine ODT 15 mg q hs for depression, anxiety, appetite, nausea, and sleep.  Continue melatonin, prioritizing effective sleep pattern and more regular use if insomnia persists following addition of mirtazapine.  Supported patient in current experience, normalized feelings, and supported progress.  Follow up arranged in appx 2 weeks.    Diagnoses and all orders for this visit:    1. CHARANJIT (generalized anxiety disorder) (Primary)    2. Malignant neoplasm of lower third of esophagus (HCC)    3. Insomnia, unspecified type    4. Moderate episode of recurrent major depressive disorder (HCC)    Other orders  -     Discontinue: mirtazapine (REMERON) 15 MG tablet; Take 1 tablet by mouth Every Night.  Dispense: 30 tablet; Refill: 2  -     mirtazapine (REMERON SOL-TAB) 15 MG disintegrating tablet; Place 1 tablet on the tongue Every Night.  Dispense: 30 tablet; Refill: 2     I spent 38 minutes caring for Edmond on this date of service.

## 2023-03-14 NOTE — PROGRESS NOTES
"Chief Complaint  No chief complaint on file.    Subjective        Edmond Chase presents to [facility] THORACIC SURGERY  History of Present Illness  Mr. Chase is a pleasant 62-year-old gentleman status post esophagectomy. He presents for a postoperative visit. His weight is stable at 292 pounds. He is eating a soft diet. He is doing tube feeds at full strength. He is accompanied by an adult male.    The patient reports he is doing fair. He states he is eating more but he fears that he can eat enough to maintain himself. He notes that he gets full quickly. He reports today he had instant oatmeal and he could not finish the portion.   Objective   Vital Signs:  /88 (BP Location: Left arm, Patient Position: Sitting, Cuff Size: Adult)   Pulse 73   Ht 184.9 cm (72.8\")   Wt 133 kg (292 lb 8.6 oz)   SpO2 95%   BMI 38.81 kg/m²   Estimated body mass index is 38.81 kg/m² as calculated from the following:    Height as of this encounter: 184.9 cm (72.8\").    Weight as of this encounter: 133 kg (292 lb 8.6 oz).             Physical Exam  Vitals and nursing note reviewed.   Constitutional:       Appearance: He is well-developed.   HENT:      Head: Normocephalic and atraumatic.      Nose: Nose normal.   Eyes:      Conjunctiva/sclera: Conjunctivae normal.   Pulmonary:      Effort: Pulmonary effort is normal.   Musculoskeletal:         General: Normal range of motion.      Cervical back: Normal range of motion and neck supple.   Skin:     General: Skin is warm and dry.   Neurological:      Mental Status: He is alert and oriented to person, place, and time.   Psychiatric:         Behavior: Behavior normal.         Thought Content: Thought content normal.         Judgment: Judgment normal.        Result Review :                   Assessment and Plan   Diagnoses and all orders for this visit:    1. Malignant neoplasm of lower third of esophagus (HCC) (Primary)      Mr. Chase is a pleasant " 62-year-old gentleman status post esophagectomy for a [id number] esophageal adenocarcinoma. His weight is stable. We will plan to decrease his tube feeds to half and he will plan to go to a regular diet. I will see him in 2 weeks. We discussed his appetite and eating habits. Advised the patient to try to set an alarm to eat 100 calories of something every 2 to 3 hours to get him up to 1800 calories per day. He is also advised that he can try to eat whatever kind of food he would like to try starting on today.        Follow Up   No follow-ups on file. The patient will follow up in 2 weeks.  Patient was given instructions and counseling regarding his condition or for health maintenance advice. Please see specific information pulled into the AVS if appropriate.     Transcribed from ambient dictation for Valerie Stockton MD by Naila Herrera.  03/14/23   18:37 EDT    Patient or patient representative verbalized consent to the visit recording.  I have personally performed the services described in this document as transcribed by the above individual, and it is both accurate and complete.

## 2023-03-14 NOTE — TELEPHONE ENCOUNTER
Per Dr. Miguel pt needs a hospital f/u with Precious WHITNEY for SVT.  Please call/schedule.    Thanks,  Sweta

## 2023-03-17 ENCOUNTER — OFFICE VISIT (OUTPATIENT)
Dept: CARDIOLOGY | Facility: CLINIC | Age: 63
End: 2023-03-17
Payer: COMMERCIAL

## 2023-03-17 VITALS
BODY MASS INDEX: 38.97 KG/M2 | SYSTOLIC BLOOD PRESSURE: 164 MMHG | WEIGHT: 294 LBS | DIASTOLIC BLOOD PRESSURE: 92 MMHG | OXYGEN SATURATION: 95 % | HEART RATE: 94 BPM | HEIGHT: 73 IN

## 2023-03-17 DIAGNOSIS — I10 ESSENTIAL HYPERTENSION: ICD-10-CM

## 2023-03-17 DIAGNOSIS — Z95.2 S/P AVR (AORTIC VALVE REPLACEMENT): ICD-10-CM

## 2023-03-17 DIAGNOSIS — I25.708 CORONARY ARTERY DISEASE OF BYPASS GRAFT OF NATIVE HEART WITH STABLE ANGINA PECTORIS: Primary | ICD-10-CM

## 2023-03-17 PROCEDURE — 3080F DIAST BP >= 90 MM HG: CPT | Performed by: INTERNAL MEDICINE

## 2023-03-17 PROCEDURE — 99214 OFFICE O/P EST MOD 30 MIN: CPT | Performed by: INTERNAL MEDICINE

## 2023-03-17 PROCEDURE — 3077F SYST BP >= 140 MM HG: CPT | Performed by: INTERNAL MEDICINE

## 2023-03-17 RX ORDER — LISINOPRIL 10 MG/1
10 TABLET ORAL DAILY
COMMUNITY

## 2023-03-17 RX ORDER — METOPROLOL SUCCINATE 25 MG/1
TABLET, EXTENDED RELEASE ORAL DAILY
COMMUNITY
Start: 2023-03-15

## 2023-03-17 RX ORDER — AMLODIPINE BESYLATE 10 MG/1
10 TABLET ORAL DAILY
Qty: 30 TABLET | Refills: 11 | Status: SHIPPED | OUTPATIENT
Start: 2023-03-17

## 2023-03-17 NOTE — PROGRESS NOTES
"      CARDIOLOGY    Papa Miguel MD    ENCOUNTER DATE:  03/17/2023    Edmond Chase / 62 y.o. / male        CHIEF COMPLAINT / REASON FOR OFFICE VISIT     Rapid Heart Rate (02/17/2023 ER Follow up)  Coronary artery bypass grafting  Aortic valve replacement  Status post ascending aneurysm repair    HISTORY OF PRESENT ILLNESS       HPI  Edmond Chase is a 62 y.o. male who presents today for reevaluation.  Patient was recently in the hospital about a month ago with increased heart rates.  He was placed back on some of his medications and with that he is doing better he is only had 1 further episode that was very brief since he was seen in emergency room.  Patient had an esophagectomy for esophageal cancer.  He says he is a little bit better he has been very slow to recover.  He is making some progress I explained to him this is a very large surgery.      The following portions of the patient's history were reviewed and updated as appropriate: allergies, current medications, past family history, past medical history, past social history, past surgical history and problem list.      VITAL SIGNS     Visit Vitals  /92 (BP Location: Left arm)   Pulse 94   Ht 184.9 cm (72.8\")   Wt 133 kg (294 lb)   SpO2 95%   BMI 39.00 kg/m²         Wt Readings from Last 3 Encounters:   03/17/23 133 kg (294 lb)   03/14/23 133 kg (292 lb 8.6 oz)   03/03/23 132 kg (291 lb)     Body mass index is 39 kg/m².      REVIEW OF SYSTEMS   ROS        PHYSICAL EXAMINATION     Vitals reviewed.   Constitutional:       Appearance: Healthy appearance.   Pulmonary:      Effort: Pulmonary effort is normal.   Cardiovascular:      Normal rate. Regular rhythm. Normal S1. Normal S2.      Murmurs: There is no murmur.      No gallop. No click. No rub.   Pulses:     Intact distal pulses.   Edema:     Peripheral edema absent.   Neurological:      Mental Status: Alert and oriented to person, place and time.           REVIEWED DATA "     Procedures    Cardiac Procedures:  1.     Lipid Panel    Lipid Panel 8/4/22   Total Cholesterol 116   Triglycerides 104   HDL Cholesterol 36 (A)   VLDL Cholesterol 20   LDL Cholesterol  60   (A) Abnormal value                ASSESSMENT & PLAN      Diagnosis Plan   1. Coronary artery disease of bypass graft of native heart with stable angina pectoris (HCC)        2. Essential hypertension        3. S/P AVR (aortic valve replacement)              SUMMARY/DISCUSSION  1. Coronary artery disease.  Status post coronary artery bypass grafting.  Patient has known stenosis that we have treated medically.  He has not been on his amlodipine or his Imdur but he also says he has not been having any chest discomfort.  At this point I would leave him off his isosorbide.  His blood pressure is elevated today so I Una place him back on Norvasc both to increase blood flow as well as to treat his blood pressure.  2. Hypertension patient's blood pressure is back up.  We stopped a lot of his medications postoperatively because his blood pressure was running low.  3. Esophagectomy patient continues to slowly recover I explained to him it is a very large surgery.  4. Aortic valve replacement.  5. At this point I would follow back up with him in about 3 months recheck his blood pressure.  Patient is starting to recover from his surgery.        MEDICATIONS         Discharge Medications          Accurate as of March 17, 2023 11:27 AM. If you have any questions, ask your nurse or doctor.            New Medications      Instructions Start Date   amLODIPine 10 MG tablet  Commonly known as: NORVASC  Started by: Papa Miguel MD   10 mg, Oral, Daily         Continue These Medications      Instructions Start Date   atorvastatin 40 MG tablet  Commonly known as: LIPITOR   40 mg, Per J Tube, Nightly      Farxiga 10 MG tablet  Generic drug: dapagliflozin Propanediol   10 mg, Per J Tube, Daily      IMODIUM PO   1 tablet, Per J Tube, As  Needed, LOOSE STOOLS      lisinopril 10 MG tablet  Commonly known as: PRINIVIL,ZESTRIL   10 mg, Oral, Daily      lisinopril 10 MG tablet  Commonly known as: PRINIVIL,ZESTRIL   10 mg, Per J Tube, Every 24 Hours Scheduled      metoprolol succinate XL 25 MG 24 hr tablet  Commonly known as: TOPROL-XL   Daily      mirtazapine 15 MG disintegrating tablet  Commonly known as: REMERON SOL-TAB   15 mg, Translingual, Nightly      O2  Commonly known as: OXYGEN   2 L/min, Inhalation, Nightly         Stop These Medications    cephalexin 500 MG capsule  Commonly known as: Keflex  Stopped by: Papa Miguel MD     metFORMIN 500 MG tablet  Commonly known as: GLUCOPHAGE  Stopped by: Papa Miguel MD     metoprolol tartrate 25 MG tablet  Commonly known as: LOPRESSOR  Stopped by: Papa Miguel MD                **Dragon Disclaimer:   Much of this encounter note is an electronic transcription/translation of spoken language to printed text. The electronic translation of spoken language may permit erroneous, or at times, nonsensical words or phrases to be inadvertently transcribed. Although I have reviewed the note for such errors, some may still exist.

## 2023-03-28 ENCOUNTER — OFFICE VISIT (OUTPATIENT)
Dept: SURGERY | Facility: CLINIC | Age: 63
End: 2023-03-28
Payer: COMMERCIAL

## 2023-03-28 ENCOUNTER — OFFICE VISIT (OUTPATIENT)
Dept: PSYCHIATRY | Facility: HOSPITAL | Age: 63
End: 2023-03-28
Payer: COMMERCIAL

## 2023-03-28 VITALS
HEART RATE: 88 BPM | DIASTOLIC BLOOD PRESSURE: 68 MMHG | HEIGHT: 73 IN | WEIGHT: 294.2 LBS | OXYGEN SATURATION: 96 % | SYSTOLIC BLOOD PRESSURE: 138 MMHG | BODY MASS INDEX: 38.99 KG/M2

## 2023-03-28 DIAGNOSIS — F33.1 MODERATE EPISODE OF RECURRENT MAJOR DEPRESSIVE DISORDER: ICD-10-CM

## 2023-03-28 DIAGNOSIS — F41.1 GAD (GENERALIZED ANXIETY DISORDER): Primary | ICD-10-CM

## 2023-03-28 DIAGNOSIS — C15.5 MALIGNANT NEOPLASM OF LOWER THIRD OF ESOPHAGUS: Primary | ICD-10-CM

## 2023-03-28 DIAGNOSIS — G47.00 INSOMNIA, UNSPECIFIED TYPE: ICD-10-CM

## 2023-03-28 DIAGNOSIS — C15.5 MALIGNANT NEOPLASM OF LOWER THIRD OF ESOPHAGUS: ICD-10-CM

## 2023-03-28 PROCEDURE — 99024 POSTOP FOLLOW-UP VISIT: CPT | Performed by: NURSE PRACTITIONER

## 2023-03-28 PROCEDURE — 99214 OFFICE O/P EST MOD 30 MIN: CPT | Performed by: NURSE PRACTITIONER

## 2023-03-28 PROCEDURE — 3078F DIAST BP <80 MM HG: CPT | Performed by: NURSE PRACTITIONER

## 2023-03-28 PROCEDURE — 3075F SYST BP GE 130 - 139MM HG: CPT | Performed by: NURSE PRACTITIONER

## 2023-03-28 PROCEDURE — 1160F RVW MEDS BY RX/DR IN RCRD: CPT | Performed by: NURSE PRACTITIONER

## 2023-03-28 PROCEDURE — 1159F MED LIST DOCD IN RCRD: CPT | Performed by: NURSE PRACTITIONER

## 2023-03-28 RX ORDER — MIRTAZAPINE 30 MG/1
30 TABLET, FILM COATED ORAL NIGHTLY
Qty: 30 TABLET | Refills: 2 | Status: SHIPPED | OUTPATIENT
Start: 2023-03-28 | End: 2024-03-27

## 2023-03-28 NOTE — PROGRESS NOTES
"Chief Complaint  Esophageal cancer, follow-up    Subjective        Edmond Chase presents to [facility] THORACIC SURGERY for continued postop follow-up and care.    History of Present Illness     Jose Chase is a pleasant 62-year-old gentleman who presents to our office today for continued follow-up after undergoing a total esophagectomy by Dr. Valerie Stockton on 2/3/2023 for a stage I esophageal malignancy.  Since he was last seen in our office he is maintained his tube feeds utilizing about 3 cans/day which is half the right he was initiated on postoperatively.  He has been tolerating a soft diet.  He does report that sometimes he feels full very quickly and regurgitates, however he is learning how to eat small meals more frequently to prevent feelings of fullness and nausea.  He presents with another gentleman today who has been helping care for Jose postoperatively.  The patient reports that he is still utilizing supplemental oxygen at night since he has been able to use his CPAP since surgery.  He is wondering if this is still necessary.     Objective   Vital Signs:  /68 (BP Location: Left arm, Patient Position: Sitting, Cuff Size: Adult)   Pulse 88   Ht 184.9 cm (72.8\")   Wt 133 kg (294 lb 3.2 oz)   SpO2 96%   BMI 39.03 kg/m²   Estimated body mass index is 39.03 kg/m² as calculated from the following:    Height as of this encounter: 184.9 cm (72.8\").    Weight as of this encounter: 133 kg (294 lb 3.2 oz).             Physical Exam  Vitals and nursing note reviewed.   Constitutional:       Appearance: Normal appearance.   HENT:      Head: Normocephalic.   Cardiovascular:      Rate and Rhythm: Normal rate.   Pulmonary:      Effort: Pulmonary effort is normal.      Breath sounds: No wheezing, rhonchi or rales.   Abdominal:      General: Bowel sounds are normal.      Palpations: Abdomen is soft.      Comments: Jejunostomy tube is in place with suture intact.   Skin:     General: " Skin is warm and dry.   Neurological:      General: No focal deficit present.      Mental Status: He is alert. Mental status is at baseline.   Psychiatric:         Mood and Affect: Mood normal.         Thought Content: Thought content normal.         Judgment: Judgment normal.        Result Review :                   Assessment and Plan   Diagnoses and all orders for this visit:    1. Malignant neoplasm of lower third of esophagus (HCC) (Primary)    Mr. Chase is doing very well postoperatively.  At this point it is fine for him to resume using his CPAP and DC the supplemental oxygen at night.  His weight is up to 294 pounds today so we will stop his tube feeds today and allow him to maintain his weight with a soft diet.  I have asked him to continue to eat multiple small meals each day every few hours.  Also asked him to flush his J-tube a couple of times per day to keep it patent.  I will have him return to the office in 2 weeks to see Dr. Stockton for a weight check and possible jejunostomy tube removal.  I have asked him to be scheduled first thing in the morning and be n.p.o. after midnight in case he is able to have his jejunostomy tube removed.  Both the patient and his friend verbalized understanding.         Follow Up   Return in about 2 weeks (around 4/11/2023) for Next scheduled follow up.  Patient was given instructions and counseling regarding his condition or for health maintenance advice. Please see specific information pulled into the AVS if appropriate.

## 2023-03-28 NOTE — PROGRESS NOTES
Behavioral Oncology Services  Video visit-pandemic. Pt consented to session conducted through Zoom video.  You have chosen to receive care through a telehealth visit.  Do you consent to use a video/audio connection for your medical care today? YES  Telehealth provided in patient's home.  Location of Provider: [facility]    Subjective  Patient ID: Edmond Chase is a 62 y.o. male is seen via telehealth in the Behavioral Oncology Clinic.    CC: anxiety, depression, sleep    HPI/ Interval History:   Pt is seen in follow up alongside esophageal cancer, recent esophagectomy. Continues to make steps toward recovery, while acknolwedging persistent demoralization, adjusted normal. Pt has appreciated reduced tube feeds, anticipating apt today with Dr. Stockton with hopes of discontinuing completely. Has increased to full diet, noting challenges managing frequency and early satiety. Continues to endorse mechanical challenges associated with swallowing, gagging. Able to manage this by intentional swallowing, controlled eating, etc.    Mood remains somewhat depressed, not endorsing significant benefit to newly added mirtazapine. Despite this, considers recent enjoyment with drive with friends, although with inability to participate fully due to fatigue, reduced endurance. Pt reports almost daily walk. Does note appreciation of increased interaction with friends and loved ones on mood, and shares goal of being as physically active as is possible. Pt does report improved sleep since adding mirtazapine, currently taking appx 11 PM. Feels this becomes sedating within appx 30 minutes. Goals scheduled 11 PM to 7 AM, although with perception of increased fatigue in AM since adding medication. Does note significant improvement to anxiety, despite rumination on upcoming medical appointments, etc.    Collateral history reviewed with friendLester. He confirms sedative effect perceived on mirtazapine. Does appreciate  improved anxiety, sleep since adding.     Exam: As above    Recent Labs Reviewed:  Office Visit on 03/03/2023   Component Date Value   • Hemoglobin A1C 03/03/2023 6.60 (H)    Admission on 02/17/2023, Discharged on 02/17/2023   Component Date Value   • QT Interval 02/17/2023 347    • Glucose 02/17/2023 192 (H)    • BUN 02/17/2023 44 (H)    • Creatinine 02/17/2023 1.25    • Sodium 02/17/2023 143    • Potassium 02/17/2023 4.1    • Chloride 02/17/2023 104    • CO2 02/17/2023 28.7    • Calcium 02/17/2023 8.7    • Total Protein 02/17/2023 6.6    • Albumin 02/17/2023 3.3 (L)    • ALT (SGPT) 02/17/2023 18    • AST (SGOT) 02/17/2023 10    • Alkaline Phosphatase 02/17/2023 97    • Total Bilirubin 02/17/2023 <0.2    • Globulin 02/17/2023 3.3    • A/G Ratio 02/17/2023 1.0    • BUN/Creatinine Ratio 02/17/2023 35.2 (H)    • Anion Gap 02/17/2023 10.3    • eGFR 02/17/2023 64.3    • HS Troponin T 02/17/2023 46 (H)    • proBNP 02/17/2023 314.0    • Magnesium 02/17/2023 2.5 (H)    • WBC 02/17/2023 9.73    • RBC 02/17/2023 3.66 (L)    • Hemoglobin 02/17/2023 9.9 (L)    • Hematocrit 02/17/2023 32.1 (L)    • MCV 02/17/2023 87.7    • MCH 02/17/2023 27.0    • MCHC 02/17/2023 30.8 (L)    • RDW 02/17/2023 14.0    • RDW-SD 02/17/2023 44.3    • MPV 02/17/2023 9.4    • Platelets 02/17/2023 459 (H)    • Neutrophil % 02/17/2023 79.3 (H)    • Lymphocyte % 02/17/2023 11.4 (L)    • Monocyte % 02/17/2023 7.2    • Eosinophil % 02/17/2023 1.5    • Basophil % 02/17/2023 0.3    • Immature Grans % 02/17/2023 0.3    • Neutrophils, Absolute 02/17/2023 7.71 (H)    • Lymphocytes, Absolute 02/17/2023 1.11    • Monocytes, Absolute 02/17/2023 0.70    • Eosinophils, Absolute 02/17/2023 0.15    • Basophils, Absolute 02/17/2023 0.03    • Immature Grans, Absolute 02/17/2023 0.03    • nRBC 02/17/2023 0.0    • HS Troponin T 02/17/2023 36 (H)    • Troponin T Delta 02/17/2023 -10 (L)    No results displayed because visit has over 200 results.      labs  reviewed    Current Psychotropic Medications:  Mirtazapine 15 mg q hs    Plan of Care/ Medical Decision Making  Increase remeron to 30 mg q hs for treatment of major depressive disorder, generalized anxiety disorder. Considered higher sedation at lower doses, alongside continued goals of treating generalized anxiety disorder and major depressive disorder. Acknowledged potential weight increase on current medication; given goals of dedicated intake, complicated surgery, will continue.  Scheduling goals considered; pt to adjust medication to 9-10 PM, get out of bed at 8 AM. Counseling provided regarding benefits of behavioral activation.  Follow up arranged in clinic in 2 weeks.    Diagnoses and all orders for this visit:    1. CHARANJIT (generalized anxiety disorder) (Primary)    2. Malignant neoplasm of lower third of esophagus (HCC)    3. Insomnia, unspecified type    4. Moderate episode of recurrent major depressive disorder (HCC)    Other orders  -     mirtazapine (REMERON SOL-TAB) 30 MG disintegrating tablet; Place 1 tablet on the tongue Every Night.  Dispense: 30 tablet; Refill: 2    I spent 36 minutes caring for Edmond on this date of service.

## 2023-04-05 ENCOUNTER — PATIENT OUTREACH (OUTPATIENT)
Dept: OTHER | Facility: HOSPITAL | Age: 63
End: 2023-04-05
Payer: COMMERCIAL

## 2023-04-05 NOTE — PROGRESS NOTES
"Called patient. He is doing \"so so\". He is completely off supplemental feedings now; he hopes to have his J tube removed next week.  He is having trouble adjusting to his eating after surgery. He has intermittent vomiting after eating. Offered to have Audrey contact him again.  He agreed.     He meets with GI next week and Dr. Stockton on 4/11. He knows he will need a colonoscopy and is concerned about ingesting the prep before.  We discussed the new regimes with less fluid intake.    The patient states he has to have a dental exam soon. He states he is concerned since his \"gag reflex is so high\". He will discuss with his dentist.     The patient stated \"I had a good walk yesterday\". He hasn't driven yet. Encouraged activity as tolerated. His caregiver is leaving in 1.5 weeks. He states he is physically able to drive.     He has been able to lie down and nap in his bed. He had been sleeping in a chair since July of last year.     He met with Anna last week; he cancelled his 4/11 appt (8:30) with Anna because his appt time with Dr. Stockton at 9am. He wants to reschedule.  I will message Anna and Megan about that.    He denies any supportive care or resource needs at this time.  I will call next month; he will call as needed.       Contacted Anna Ventura and Megan.   "

## 2023-04-07 ENCOUNTER — TELEPHONE (OUTPATIENT)
Dept: NUTRITION | Facility: HOSPITAL | Age: 63
End: 2023-04-07
Payer: COMMERCIAL

## 2023-04-07 NOTE — PROGRESS NOTES
"OUTPATIENT ONCOLOGY NUTRITION ASSESSMENT    Patient Name: Edmond Chase  YOB: 1960  MRN: [id number]  Assessment Date: 4/7/2023    COMMENTS:  Asked to follow up with patient regarding complaints of vomiting after eating, trying to adapt to po diet after surgery. Tried to call patient today. Left message.        Medical/Surgical History Past Medical History:   Diagnosis Date   • Abnormal nuclear stress test    • Anxiety and depression    • Aortic valve insufficiency     nonrheumtic    • Arthritis    • Ascending aortic aneurysm    • CAD (coronary artery disease)    • Cancer    • Chest pain    • Diabetes mellitus    • Dizzy     CAREFUL WHEN GETTING UP   • Dyspnea    • Esophageal cancer    • Essential hypertension    • Fatigue    • GERD (gastroesophageal reflux disease)    • GI bleed    • History of recent blood transfusion    • Hyperglycemia    • Hyperlipidemia    • Hypersomnia with sleep apnea    • Lightheadedness    • Malaise and fatigue    • Migraines     \"OCCULAR MIGRAINES\"   • Morbid obesity    • Myocardial ischemia    • Nocturia    • TJ on auto CPAP 10/31/2016    Overnight polysomnogram.  Weight 276 pounds.  Severe TJ with AHI 79 events per hour.  Auto CPAP recommended.   • Pneumonia     FEB 2016   • Scrotal bleeding    • Shortness of breath    • SOB (shortness of breath)        Past Surgical History:   Procedure Laterality Date   • AORTIC VALVE REPAIR/REPLACEMENT  05/2016   • ASCENDING ARCH/HEMIARCH REPLACEMENT N/A 5/2/2016    Procedure: BHAVESH STERNOTOMY CORONARY ARTERY BYPASS GRAFT TIMES 3 USING LEFT INTERNAL MAMMARY ARTERY AND RIGHT GREATER SAPHENOUS VEIN GRAFT PER ENDOSCOPIC VEIN HARVESTING, AORTIC ANEURYSM REPAIR WITH ROOT REPAIR AND AORTIC VALVE REPLACEMENT;  Surgeon: Rosalio Cline MD;  Location: [facility] OR;  Service:    • CARDIAC CATHETERIZATION N/A 4/1/2016    Procedure: Left Heart Cath;  Surgeon: Erick Tam MD;  Location: [facility] INVASIVE LOCATION;  Service:    • CARDIAC " CATHETERIZATION N/A 4/1/2016    Procedure: Left ventriculography;  Surgeon: Erick Tam MD;  Location: [facility] CATH INVASIVE LOCATION;  Service:    • CARDIAC CATHETERIZATION N/A 4/1/2016    Procedure: Right Heart Cath;  Surgeon: Erick Tam MD;  Location: [facility] CATH INVASIVE LOCATION;  Service:    • CARDIAC CATHETERIZATION N/A 10/30/2017    Procedure: Coronary angiography;  Surgeon: Sorin Vasquez MD;  Location: [facility] CATH INVASIVE LOCATION;  Service:    • CARDIAC CATHETERIZATION  10/30/2017    Procedure: Saphenous Vein Graft;  Surgeon: Sorin Vasquez MD;  Location: [facility] CATH INVASIVE LOCATION;  Service:    • CARDIAC CATHETERIZATION N/A 10/30/2017    Procedure: Native mammary injection;  Surgeon: Sorin Vasquez MD;  Location: [facility] CATH INVASIVE LOCATION;  Service:    • CARDIAC CATHETERIZATION N/A 7/7/2020    Procedure: Coronary angiography;  Surgeon: Gregor Kim MD;  Location: [facility] CATH INVASIVE LOCATION;  Service: Cardiovascular;  Laterality: N/A;   • CARDIAC CATHETERIZATION N/A 7/7/2020    Procedure: Left heart cath;  Surgeon: Gregor Kim MD;  Location: [facility] CATH INVASIVE LOCATION;  Service: Cardiovascular;  Laterality: N/A;   • CARDIAC CATHETERIZATION N/A 7/7/2020    Procedure: Left ventriculography;  Surgeon: Gregor Kim MD;  Location: [facility] CATH INVASIVE LOCATION;  Service: Cardiovascular;  Laterality: N/A;   • CARDIAC CATHETERIZATION N/A 7/7/2020    Procedure: Stent ESMER bypass graft;  Surgeon: Gregor Kim MD;  Location: [facility] CATH INVASIVE LOCATION;  Service: Cardiovascular;  Laterality: N/A;   • CARDIAC CATHETERIZATION N/A 6/17/2021    Procedure: SAPHENOUS VEIN GRAFT;  Surgeon: Marques Ndiaye MD;  Location: [facility] CATH INVASIVE LOCATION;  Service: Cardiovascular;  Laterality: N/A;   • CARDIAC CATHETERIZATION N/A 6/17/2021    Procedure: Left Heart Cath;  Surgeon: Marques Ndiaye MD;  Location: [facility] CATH INVASIVE LOCATION;  Service: Cardiovascular;   Laterality: N/A;   • CARDIAC CATHETERIZATION N/A 6/17/2021    Procedure: Coronary angiography;  Surgeon: Marques Ndiaye MD;  Location: [facility] CATH INVASIVE LOCATION;  Service: Cardiovascular;  Laterality: N/A;   • CARDIAC CATHETERIZATION N/A 7/14/2022    Procedure: Left Heart Cath;  Surgeon: Charlie Aj MD;  Location: [facility] CATH INVASIVE LOCATION;  Service: Cardiovascular;  Laterality: N/A;   • CARDIAC CATHETERIZATION N/A 7/14/2022    Procedure: Coronary angiography;  Surgeon: Charlie Aj MD;  Location: [facility] CATH INVASIVE LOCATION;  Service: Cardiovascular;  Laterality: N/A;   • CARDIAC CATHETERIZATION  7/14/2022    Procedure: Saphenous Vein Graft;  Surgeon: Charlie Aj MD;  Location: [facility] CATH INVASIVE LOCATION;  Service: Cardiovascular;;   • CARDIAC CATHETERIZATION N/A 7/14/2022    Procedure: Native mammary injection;  Surgeon: Charlie Aj MD;  Location: [facility] CATH INVASIVE LOCATION;  Service: Cardiovascular;  Laterality: N/A;   • COLONOSCOPY N/A 11/26/2022    Procedure: COLONOSCOPY AT BEDSIDE;  Surgeon: Tomy Lopez MD;  Location: [facility] MAIN OR;  Service: Gastroenterology;  Laterality: N/A;   • COLONOSCOPY W/ POLYPECTOMY N/A 10/4/2022    Procedure: COLONOSCOPY to cecum with cold forceps and cold snare polypectomies;  Surgeon: Gregor Carlson MD;  Location: [facility] ENDOSCOPY;  Service: Gastroenterology;  Laterality: N/A;  PRE- hx of polyps  POST- diverticulosis, polyps   • CORONARY ARTERY BYPASS GRAFT  05/2016    LIMA TO LAD, SVG TO PDA, SVG TO OM2   • ENDOSCOPY N/A 7/13/2022    Procedure: ESOPHAGOGASTRODUODENOSCOPY;  Surgeon: Marcia Hollis MD;  Location: [facility] ENDOSCOPY;  Service: Gastroenterology;  Laterality: N/A;  PRE- ANEMIA, MELENA  POST- MILD EROSIVE GASTRITIS, GE JUNCTION ULCER   • ENDOSCOPY N/A 10/4/2022    Procedure: ESOPHAGOGASTRODUODENOSCOPY with biopsies;  Surgeon: Gregor Carlson MD;  Location: [facility] ENDOSCOPY;  Service:  "Gastroenterology;  Laterality: N/A;  PRE- hx of esophageal ulcer  POST- gastric cardia mass   • ESOPHAGOGASTRECTOMY Right 2/3/2023    Procedure: BRONCHOSCOPY, RIGHT ROBOTIC VIDEO ASSISTED THORACOSCOPY WITH TOTAL ESOPHAGECTOMY, INTERCOSTAL NERVE BLOCK, FEEDING JEJUNOSTOMY PLACEMENT;  Surgeon: Valerie Stockton MD;  Location: [facility];  Service: Robotics - DaVinci;  Laterality: Right;   • INNER EAR SURGERY     • TONSILLECTOMY            Anthropometrics        Current Height Ht Readings from Last 1 Encounters:   03/28/23 184.9 cm (72.8\")      Current Weight Wt Readings from Last 1 Encounters:   03/28/23 133 kg (294 lb 3.2 oz)      Weight History Wt Readings from Last 30 Encounters:   03/28/23 1427 133 kg (294 lb 3.2 oz)   03/17/23 1058 133 kg (294 lb)   03/14/23 1421 133 kg (292 lb 8.6 oz)   03/03/23 1254 132 kg (291 lb)   02/28/23 1442 131 kg (289 lb 6.4 oz)   02/17/23 1554 (!) 137 kg (301 lb)   02/10/23 0500 (!) 137 kg (301 lb)   02/09/23 0500 (!) 137 kg (301 lb)   02/05/23 0300 (!) 147 kg (324 lb 8.3 oz)   02/04/23 1727 (!) 144 kg (317 lb 7.4 oz)   02/04/23 0600 (!) 144 kg (317 lb 10.9 oz)   02/03/23 1800 (!) 143 kg (315 lb 4.1 oz)   01/30/23 1509 (!) 142 kg (314 lb)   01/23/23 1440 (!) 142 kg (314 lb)   01/20/23 0756 (!) 142 kg (314 lb)   01/16/23 0949 (!) 141 kg (310 lb)   12/15/22 0618 (!) 142 kg (313 lb 11.4 oz)   12/14/22 1553 (!) 140 kg (308 lb)   12/14/22 1532 (!) 142 kg (313 lb)   12/14/22 1006 (!) 142 kg (313 lb)   12/14/22 0932 (!) 142 kg (313 lb)   12/06/22 1529 (!) 140 kg (308 lb 8 oz)   12/02/22 0600 (!) 143 kg (314 lb 6 oz)   12/01/22 0546 (!) 144 kg (317 lb 3.2 oz)   11/25/22 1123 (!) 141 kg (310 lb)   10/21/22 1052 (!) 142 kg (314 lb)   10/04/22 0934 (!) 140 kg (308 lb 11.2 oz)   09/15/22 0915 (!) 141 kg (310 lb)   08/29/22 1355 (!) 141 kg (311 lb 8 oz)   08/17/22 0900 (!) 142 kg (314 lb)   08/08/22 1032 (!) 144 kg (318 lb)   08/04/22 0814 (!) 143 kg (315 lb)   07/29/22 0744 (!) 142 kg (314 lb) "   07/22/22 1247 (!) 142 kg (314 lb)   07/15/22 0505 (!) 146 kg (322 lb 1.5 oz)   07/12/22 1121 (!) 144 kg (318 lb)   07/11/22 0500 (!) 144 kg (318 lb 1.6 oz)   07/10/22 0618 (!) 148 kg (327 lb)   01/28/22 1108 (!) 148 kg (327 lb)   08/17/21 1007 (!) 150 kg (330 lb)   07/30/21 1016 (!) 149 kg (329 lb)   07/07/21 1225 (!) 154 kg (340 lb)          Medications           Current medications: Loperamide HCl, O2, amLODIPine, atorvastatin, dapagliflozin Propanediol, lisinopril, metoprolol succinate XL, and mirtazapine                   Labs       Pertinent Labs          Invalid input(s): LABALBU, PROT          COVID19   Date Value Ref Range Status   07/10/2022 Not Detected Not Detected - Ref. Range Final     Lab Results   Component Value Date    HGBA1C 6.60 (H) 03/03/2023            Electronically signed by:  Audrey Correa RD, LD  04/07/23 13:03 EDT

## 2023-04-10 ENCOUNTER — OFFICE VISIT (OUTPATIENT)
Dept: GASTROENTEROLOGY | Facility: CLINIC | Age: 63
End: 2023-04-10
Payer: COMMERCIAL

## 2023-04-10 VITALS
HEIGHT: 73 IN | SYSTOLIC BLOOD PRESSURE: 139 MMHG | WEIGHT: 294 LBS | TEMPERATURE: 97.5 F | HEART RATE: 85 BPM | OXYGEN SATURATION: 97 % | DIASTOLIC BLOOD PRESSURE: 88 MMHG | BODY MASS INDEX: 38.97 KG/M2

## 2023-04-10 DIAGNOSIS — C15.5 MALIGNANT NEOPLASM OF LOWER THIRD OF ESOPHAGUS: ICD-10-CM

## 2023-04-10 DIAGNOSIS — K63.5 POLYP OF COLON, UNSPECIFIED PART OF COLON, UNSPECIFIED TYPE: Primary | ICD-10-CM

## 2023-04-10 PROCEDURE — 3075F SYST BP GE 130 - 139MM HG: CPT | Performed by: PHYSICIAN ASSISTANT

## 2023-04-10 PROCEDURE — 99214 OFFICE O/P EST MOD 30 MIN: CPT | Performed by: PHYSICIAN ASSISTANT

## 2023-04-10 PROCEDURE — 3079F DIAST BP 80-89 MM HG: CPT | Performed by: PHYSICIAN ASSISTANT

## 2023-04-10 NOTE — Clinical Note
Please call patient and let him know that I talked to Dr. Metzger's office and Dr. Metzger is planning to perform the colonoscopy in case he needs a repeat submucosal resection.  I asked Dr. Metzger's office to call and make him a follow-up so he can talk to Dr. Metzger about colonoscopy timing and prep.  Also talked to the  and Dr. Lopez about changing providers from Dr. Carlson with Dr. Lopez.  Dr. Carlson is agreeable to the switch.  Dr. Lopez is agreeable but would like his current issues with the esophageal cancer/esophagectomy to be improved.  Meaning if he had any acute issues related to current problems, he should see Dr. Carlson.  I do not think he needs to follow-up with our office soon as he has Dr. Metzger and Dr. Stockton who are dealing with his current GI issues.

## 2023-04-10 NOTE — PROGRESS NOTES
"Chief Complaint  Vomiting    Subjective          History of Present Illness    Edmond Chase is a  62 y.o. male presents for follow-up on stage I esophageal cancer status post total esophagectomy with Dr. Stockton on 2/3/2023.  He was last seen by Dr. Carlson 12/2023.    He was on J-tube feedings status post surgery but has recently been able to tolerate small amounts of soft food by mouth.  He does report intermittent vomiting since the surgery.  He can only do small sips of liquid at a time otherwise he will vomit.  He is trying to eat small meals more frequently.    He has appointment with Dr. Stockton tomorrow to remove J-tube.    He was initially diagnosed with gastric cardia lesion and underwent endoscopic mucosal resection with Dr. Metzger at [facility].  He also had large sessile serrated adenoma in the colon and also underwent EMR for this.  Recommended colonoscopy follow-up in 6 to 12 months.  Colonoscopy was performed in 11/26/2022.    2/17/2023 labs show hemoglobin 9.9, normocytic, platelets 459; CMP with albumin 3.3, creatinine 1.25, BUN 44.    Objective   Vital Signs:   /88   Pulse 85   Temp 97.5 °F (36.4 °C)   Ht 185.4 cm (73\")   Wt 133 kg (294 lb)   SpO2 97%   BMI 38.79 kg/m²       Physical Exam  Vitals reviewed.   Constitutional:       General: He is awake. He is not in acute distress.     Appearance: Normal appearance. He is well-developed and well-groomed.   HENT:      Head: Normocephalic.   Pulmonary:      Effort: Pulmonary effort is normal. No respiratory distress.   Skin:     Coloration: Skin is not pale.   Neurological:      Mental Status: He is alert and oriented to person, place, and time.      Gait: Gait is intact.   Psychiatric:         Mood and Affect: Mood and affect normal.         Speech: Speech normal.         Behavior: Behavior is cooperative.         Judgment: Judgment normal.          Result Review :             Assessment and Plan    Diagnoses and all orders for " this visit:    1. Polyp of colon, unspecified part of colon, unspecified type (Primary)    2. Malignant neoplasm of lower third of esophagus    He is mostly concerned about the sessile serrated adenoma in the cecum of the colon.  Dr. Metzger's note mentions repeating ESMR in 6 to 12 months.  Unfortunately he would not be able to hold down a prep at this time considering he can only intake small amounts of soft food and sips of liquid.  He asked about prolonged liquid diet as a prep instead. --- Called Dr. Metzger's office and confirmed that Dr. Metzger will perform the colonoscopy.  I asked his office to call patient to make an appointment so they can discuss colonoscopy timing and prep.    Request to switch GI physicians and has spoken with our  in regards to this. Felt most comfortable with Dr. Lopez and she is working on getting consent to switch them.  --- Discussed patient with Dr. Lopez in .  Dr. Lopez is agreeable to take patient on in the future but would like his acute issues with esophageal cancer/esophagectomy improved first.    He is following up with Dr. Stockton for esophageal cancer/esophagectomy.  He is following up with Dr. Metzger for sessile serrated adenoma in cecum.  I do not think that we need to see him back anytime soon given other providers are caring for his current issues.  Could plan follow-up at the end of this year to see how he is doing.    Follow Up   Return in about 8 months (around 12/10/2023).    Dragon dictation used throughout this note.     Shelley Estrella PA-C

## 2023-04-11 ENCOUNTER — OFFICE VISIT (OUTPATIENT)
Dept: SURGERY | Facility: CLINIC | Age: 63
End: 2023-04-11
Payer: COMMERCIAL

## 2023-04-11 VITALS
OXYGEN SATURATION: 95 % | SYSTOLIC BLOOD PRESSURE: 152 MMHG | DIASTOLIC BLOOD PRESSURE: 92 MMHG | BODY MASS INDEX: 37.56 KG/M2 | HEART RATE: 80 BPM | WEIGHT: 283.4 LBS | HEIGHT: 73 IN

## 2023-04-11 DIAGNOSIS — C15.5 MALIGNANT NEOPLASM OF LOWER THIRD OF ESOPHAGUS: Primary | ICD-10-CM

## 2023-04-11 PROCEDURE — 1159F MED LIST DOCD IN RCRD: CPT | Performed by: THORACIC SURGERY (CARDIOTHORACIC VASCULAR SURGERY)

## 2023-04-11 PROCEDURE — 1160F RVW MEDS BY RX/DR IN RCRD: CPT | Performed by: THORACIC SURGERY (CARDIOTHORACIC VASCULAR SURGERY)

## 2023-04-11 PROCEDURE — 3080F DIAST BP >= 90 MM HG: CPT | Performed by: THORACIC SURGERY (CARDIOTHORACIC VASCULAR SURGERY)

## 2023-04-11 PROCEDURE — 99024 POSTOP FOLLOW-UP VISIT: CPT | Performed by: THORACIC SURGERY (CARDIOTHORACIC VASCULAR SURGERY)

## 2023-04-11 PROCEDURE — 3077F SYST BP >= 140 MM HG: CPT | Performed by: THORACIC SURGERY (CARDIOTHORACIC VASCULAR SURGERY)

## 2023-04-11 RX ORDER — SODIUM CHLORIDE 0.9 % (FLUSH) 0.9 %
3-10 SYRINGE (ML) INJECTION AS NEEDED
Status: CANCELLED | OUTPATIENT
Start: 2023-04-17

## 2023-04-11 RX ORDER — SODIUM CHLORIDE 9 MG/ML
40 INJECTION, SOLUTION INTRAVENOUS AS NEEDED
Status: CANCELLED | OUTPATIENT
Start: 2023-04-17

## 2023-04-11 RX ORDER — SODIUM CHLORIDE 0.9 % (FLUSH) 0.9 %
3 SYRINGE (ML) INJECTION EVERY 12 HOURS SCHEDULED
Status: CANCELLED | OUTPATIENT
Start: 2023-04-17

## 2023-04-11 NOTE — LETTER
April 29, 2023     VIKTORIA Carbone  [address]  [facility] [address]  [place] [facility] [place] [address]    Patient: Edmond Chase   YOB: 1960   Date of Visit: 4/11/2023       Dear VIKTORIA Carbone,    Edmond Chase was in my office today. Below are the relevant portions of my assessment and plan of care.           If you have questions, please do not hesitate to call me. I look forward to following Edmond along with you.         Sincerely,        Valerie Stockton MD        CC: MD Gregor Yañez MD Ryan Edward Modlinski, MD Boutros El-Haddad, MD Brennan M Haraden, MD Stephanie Bernauer, STEVE

## 2023-04-11 NOTE — H&P (VIEW-ONLY)
Chief Complaint  Esophageal cancer    Subjective        Edmond ANGELA Chase presents to [facility] THORACIC SURGERY  History of Present Illness  Mr. Chase is a pleasant 62-year-old gentleman status post esophagectomy on 02/03/2023 who presents in follow-up for a YPT1b N0 esophageal adenocarcinoma. He presents today to discuss removal of his jejunostomy tube. His weight is 283 pounds down from 294 pounds, which he was for the prior 3 visits. He is accompanied by an adult male.    The patient states he is feeling pretty good. He reports he is losing weight. He states the physical active eating is keeping him from eating. He reports he can eat about 1 bite and the difference between feeling slightly uncomfortable and tremendously uncomfortable is 1 bite. He states when he is uncomfortable, he does not want to move around because he feels like if he has a sudden movement, he is going to throw up. He notes that he feels like it is in his throat. He reports he does not feel like things go down all the time. He states that he takes little sips of fluids when he feels like he has eaten too much, to try to make it go down. He reports it is so slow that by the time he feels normal again, it is time to eat again. He notes that he had a snack at 9:00 PM last night and he still feels it. He is a little scared to have a scope done. He states one of the worst things that happened to him during his hospitalization was they put a tube down his nose and he thought he was going to die. He inquiries how many bottles of food he should be eating a day. The adult male reports the patient needs a little more rigid enforcement on his nutrition because his choices are not always the best. The patient states that he can not drink a whole Boost or Ensure at one time. He notes that he is very depressed about all of this. He reports he went to see his GI as needed and was advised that he needs a colonoscopy. He states he was  "told maybe by a liquid diet and some pills, he could do a prep with colonoscopy because there is no way he can swallow the volume. He reports he needs to get better from this before he can have a colonoscopy.    Objective   Vital Signs:  /92 (BP Location: Left arm, Patient Position: Sitting, Cuff Size: Adult)   Pulse 80   Ht 185.4 cm (73\")   Wt 129 kg (283 lb 6.4 oz)   SpO2 95%   BMI 37.39 kg/m²   Estimated body mass index is 38.79 kg/m² as calculated from the following:    Height as of 4/10/23: 185.4 cm (73\").    Weight as of 4/10/23: 133 kg (294 lb).             Physical Exam  Vitals and nursing note reviewed.   Constitutional:       Appearance: He is well-developed.   HENT:      Head: Normocephalic and atraumatic.      Nose: Nose normal.   Eyes:      Conjunctiva/sclera: Conjunctivae normal.   Pulmonary:      Effort: Pulmonary effort is normal.   Musculoskeletal:         General: Normal range of motion.      Cervical back: Normal range of motion and neck supple.   Skin:     General: Skin is warm and dry.   Neurological:      Mental Status: He is alert and oriented to person, place, and time.   Psychiatric:         Behavior: Behavior normal.         Thought Content: Thought content normal.         Judgment: Judgment normal.        Result Review :        No results reviewed during today's visit.         Assessment and Plan   Diagnoses and all orders for this visit:    1. Malignant neoplasm of lower third of esophagus (Primary)  -     Ambulatory Referral to Oncology  -     Case Request; Standing  -     sodium chloride 0.9 % flush 3 mL  -     sodium chloride 0.9 % flush 3-10 mL  -     sodium chloride 0.9 % infusion 40 mL  -     Case Request    Other orders  -     Follow Anesthesia Guidelines / Protocol; Future  -     Obtain Informed Consent; Future  -     Provide NPO Instructions to Patient; Future  -     Chlorhexidine Skin Prep; Future  -     Follow Anesthesia Guidelines / Protocol; Standing  -     Verify " / Perform Chlorhexidine Skin Prep; Standing  -     Verify / Perform Chlorhexidine Skin Prep if Indicated (If Not Already Completed); Standing  -     Insert Peripheral IV; Standing  -     Saline Lock & Maintain IV Access; Standing    Mr. Chase is a pleasant 62-year-old gentleman status post esophagectomy. He will need to resume his tube feeds as he is losing weight without them. We will plan to resume tube feeds at half dose. He was also referred to medical oncology but has not seen a medical oncologist since his surgery. Discussed adjuvant treatment, although I do not think that there will be any. He will resume his J-tube tube feedings for 2 weeks while he tries to advance his diet.  He will also need an EGD with possible dilation of his pylorus given his early satiety symptoms.  I suspect that this is supratentorial in nature though.       Follow Up   No follow-ups on file.  Patient was given instructions and counseling regarding his condition or for health maintenance advice. Please see specific information pulled into the AVS if appropriate.     Transcribed from ambient dictation for Valerie Stockton MD by Naila Herrera.  04/11/23   11:22 EDT    Patient or patient representative verbalized consent to the visit recording.  I have personally performed the services described in this document as transcribed by the above individual, and it is both accurate and complete.

## 2023-04-11 NOTE — PROGRESS NOTES
Chief Complaint  Esophageal cancer    Subjective        Edmond ANGELA Chase presents to [facility] THORACIC SURGERY  History of Present Illness  Mr. Chase is a pleasant 62-year-old gentleman status post esophagectomy on 02/03/2023 who presents in follow-up for a YPT1b N0 esophageal adenocarcinoma. He presents today to discuss removal of his jejunostomy tube. His weight is 283 pounds down from 294 pounds, which he was for the prior 3 visits. He is accompanied by an adult male.    The patient states he is feeling pretty good. He reports he is losing weight. He states the physical active eating is keeping him from eating. He reports he can eat about 1 bite and the difference between feeling slightly uncomfortable and tremendously uncomfortable is 1 bite. He states when he is uncomfortable, he does not want to move around because he feels like if he has a sudden movement, he is going to throw up. He notes that he feels like it is in his throat. He reports he does not feel like things go down all the time. He states that he takes little sips of fluids when he feels like he has eaten too much, to try to make it go down. He reports it is so slow that by the time he feels normal again, it is time to eat again. He notes that he had a snack at 9:00 PM last night and he still feels it. He is a little scared to have a scope done. He states one of the worst things that happened to him during his hospitalization was they put a tube down his nose and he thought he was going to die. He inquiries how many bottles of food he should be eating a day. The adult male reports the patient needs a little more rigid enforcement on his nutrition because his choices are not always the best. The patient states that he can not drink a whole Boost or Ensure at one time. He notes that he is very depressed about all of this. He reports he went to see his GI as needed and was advised that he needs a colonoscopy. He states he was  "told maybe by a liquid diet and some pills, he could do a prep with colonoscopy because there is no way he can swallow the volume. He reports he needs to get better from this before he can have a colonoscopy.    Objective   Vital Signs:  /92 (BP Location: Left arm, Patient Position: Sitting, Cuff Size: Adult)   Pulse 80   Ht 185.4 cm (73\")   Wt 129 kg (283 lb 6.4 oz)   SpO2 95%   BMI 37.39 kg/m²   Estimated body mass index is 38.79 kg/m² as calculated from the following:    Height as of 4/10/23: 185.4 cm (73\").    Weight as of 4/10/23: 133 kg (294 lb).             Physical Exam  Vitals and nursing note reviewed.   Constitutional:       Appearance: He is well-developed.   HENT:      Head: Normocephalic and atraumatic.      Nose: Nose normal.   Eyes:      Conjunctiva/sclera: Conjunctivae normal.   Pulmonary:      Effort: Pulmonary effort is normal.   Musculoskeletal:         General: Normal range of motion.      Cervical back: Normal range of motion and neck supple.   Skin:     General: Skin is warm and dry.   Neurological:      Mental Status: He is alert and oriented to person, place, and time.   Psychiatric:         Behavior: Behavior normal.         Thought Content: Thought content normal.         Judgment: Judgment normal.        Result Review :        No results reviewed during today's visit.         Assessment and Plan   Diagnoses and all orders for this visit:    1. Malignant neoplasm of lower third of esophagus (Primary)  -     Ambulatory Referral to Oncology  -     Case Request; Standing  -     sodium chloride 0.9 % flush 3 mL  -     sodium chloride 0.9 % flush 3-10 mL  -     sodium chloride 0.9 % infusion 40 mL  -     Case Request    Other orders  -     Follow Anesthesia Guidelines / Protocol; Future  -     Obtain Informed Consent; Future  -     Provide NPO Instructions to Patient; Future  -     Chlorhexidine Skin Prep; Future  -     Follow Anesthesia Guidelines / Protocol; Standing  -     Verify " / Perform Chlorhexidine Skin Prep; Standing  -     Verify / Perform Chlorhexidine Skin Prep if Indicated (If Not Already Completed); Standing  -     Insert Peripheral IV; Standing  -     Saline Lock & Maintain IV Access; Standing    Mr. Chase is a pleasant 62-year-old gentleman status post esophagectomy. He will need to resume his tube feeds as he is losing weight without them. We will plan to resume tube feeds at half dose. He was also referred to medical oncology but has not seen a medical oncologist since his surgery. Discussed adjuvant treatment, although I do not think that there will be any. He will resume his J-tube tube feedings for 2 weeks while he tries to advance his diet.  He will also need an EGD with possible dilation of his pylorus given his early satiety symptoms.  I suspect that this is supratentorial in nature though.       Follow Up   No follow-ups on file.  Patient was given instructions and counseling regarding his condition or for health maintenance advice. Please see specific information pulled into the AVS if appropriate.     Transcribed from ambient dictation for Valerie Stockton MD by Naila Herrera.  04/11/23   11:22 EDT    Patient or patient representative verbalized consent to the visit recording.  I have personally performed the services described in this document as transcribed by the above individual, and it is both accurate and complete.

## 2023-04-12 ENCOUNTER — TELEPHONE (OUTPATIENT)
Dept: GASTROENTEROLOGY | Facility: CLINIC | Age: 63
End: 2023-04-12
Payer: COMMERCIAL

## 2023-04-12 NOTE — TELEPHONE ENCOUNTER
----- Message from Shelley Estrella PA-C sent at 4/12/2023 12:41 PM EDT -----  Please call patient and let him know that I talked to Dr. Metzger's office and Dr. Metzger is planning to perform the colonoscopy in case he needs a repeat submucosal resection.  I asked Dr. Metzger's office to call and make him a follow-up so he can talk to Dr. Metzger about colonoscopy timing and prep.  Also talked to the  and Dr. Lopez about changing providers from Dr. Carlson with Dr. Lopez.  Dr. Carlson is agreeable to the switch.  Dr. Lopez is agreeable but would like his current issues with the esophageal cancer/esophagectomy to be improved.  Meaning if he had any acute issues related to current problems, he should see Dr. Carlson.  I do not think he needs to follow-up with our office soon as he has Dr. Metzger and Dr. Stockton who are dealing with his current GI issues.

## 2023-04-12 NOTE — TELEPHONE ENCOUNTER
I have called and passed on Shelley's message and the pt verbalized understanding and is agreeable to the plan.

## 2023-04-13 ENCOUNTER — PRE-ADMISSION TESTING (OUTPATIENT)
Dept: PREADMISSION TESTING | Facility: HOSPITAL | Age: 63
End: 2023-04-13
Payer: COMMERCIAL

## 2023-04-13 VITALS
BODY MASS INDEX: 38.17 KG/M2 | SYSTOLIC BLOOD PRESSURE: 151 MMHG | OXYGEN SATURATION: 97 % | HEIGHT: 73 IN | WEIGHT: 288 LBS | DIASTOLIC BLOOD PRESSURE: 75 MMHG | HEART RATE: 83 BPM | RESPIRATION RATE: 24 BRPM

## 2023-04-13 LAB
ANION GAP SERPL CALCULATED.3IONS-SCNC: 7.1 MMOL/L (ref 5–15)
BUN SERPL-MCNC: 17 MG/DL (ref 8–23)
BUN/CREAT SERPL: 19.8 (ref 7–25)
CALCIUM SPEC-SCNC: 9.1 MG/DL (ref 8.6–10.5)
CHLORIDE SERPL-SCNC: 105 MMOL/L (ref 98–107)
CO2 SERPL-SCNC: 25.9 MMOL/L (ref 22–29)
CREAT SERPL-MCNC: 0.86 MG/DL (ref 0.76–1.27)
DEPRECATED RDW RBC AUTO: 46.1 FL (ref 37–54)
EGFRCR SERPLBLD CKD-EPI 2021: 96.7 ML/MIN/1.73
ERYTHROCYTE [DISTWIDTH] IN BLOOD BY AUTOMATED COUNT: 14.8 % (ref 12.3–15.4)
GLUCOSE SERPL-MCNC: 106 MG/DL (ref 65–99)
HCT VFR BLD AUTO: 33.7 % (ref 37.5–51)
HGB BLD-MCNC: 10.9 G/DL (ref 13–17.7)
MCH RBC QN AUTO: 27.8 PG (ref 26.6–33)
MCHC RBC AUTO-ENTMCNC: 32.3 G/DL (ref 31.5–35.7)
MCV RBC AUTO: 86 FL (ref 79–97)
PLATELET # BLD AUTO: 207 10*3/MM3 (ref 140–450)
PMV BLD AUTO: 9.3 FL (ref 6–12)
POTASSIUM SERPL-SCNC: 3.9 MMOL/L (ref 3.5–5.2)
RBC # BLD AUTO: 3.92 10*6/MM3 (ref 4.14–5.8)
SODIUM SERPL-SCNC: 138 MMOL/L (ref 136–145)
WBC NRBC COR # BLD: 5.11 10*3/MM3 (ref 3.4–10.8)

## 2023-04-13 PROCEDURE — 80048 BASIC METABOLIC PNL TOTAL CA: CPT

## 2023-04-13 PROCEDURE — 36415 COLL VENOUS BLD VENIPUNCTURE: CPT

## 2023-04-13 PROCEDURE — 85027 COMPLETE CBC AUTOMATED: CPT

## 2023-04-13 RX ORDER — CHLORHEXIDINE GLUCONATE 500 MG/1
1 CLOTH TOPICAL
COMMUNITY

## 2023-04-13 NOTE — DISCHARGE INSTRUCTIONS
Take the following medications the morning of surgery with a small sip of water:    none    If you are on prescription narcotic pain medication to control your pain you may also take that medication the morning of surgery.    General Instructions:  Do not eat or drink anything after midnight the night before surgery.  Infants may have breast milk up to four hours before surgery.  Infants drinking formula may drink formula up to six hours before surgery.   Patients who avoid smoking, chewing tobacco and alcohol for 4 weeks prior to surgery have a reduced risk of post-operative complications.  Quit smoking as many days before surgery as you can.  Do not smoke, use chewing tobacco or drink alcohol the day of surgery.   If applicable bring your C-PAP/ BI-PAP machine in with you to preop day of surgery.  Bring any papers given to you in the doctor’s office.  Wear clean comfortable clothes.  Do not wear contact lenses, false eyelashes or make-up.  Bring a case for your glasses.   Bring crutches or walker if applicable.  Remove all piercings.  Leave jewelry and any other valuables at home.  Hair extensions with metal clips must be removed prior to surgery.  The Pre-Admission Testing nurse will instruct you to bring medications if unable to obtain an accurate list in Pre-Admission Testing.        If you were given a blood bank ID arm band remember to bring it with you the day of surgery.    Preventing a Surgical Site Infection:  For 2 to 3 days before surgery, avoid shaving with a razor because the razor can irritate skin and make it easier to develop an infection.    Any areas of open skin can increase the risk of a post-operative wound infection by allowing bacteria to enter and travel throughout the body.  Notify your surgeon if you have any skin wounds / rashes even if it is not near the expected surgical site.  The area will need assessed to determine if surgery should be delayed until it is healed.  The night prior to  surgery shower using a fresh bar of anti-bacterial soap (such as Dial) and clean washcloth.  Sleep in a clean bed with clean clothing.  Do not allow pets to sleep with you.  Shower on the morning of surgery using a fresh bar of anti-bacterial soap (such as Dial) and clean washcloth.  Dry with a clean towel and dress in clean clothing.  Ask your surgeon if you will be receiving antibiotics prior to surgery.  Make sure you, your family, and all healthcare providers clean their hands with soap and water or an alcohol based hand  before caring for you or your wound.    Day of surgery:4/17/2023   1030  Your arrival time is approximately two hours before your scheduled surgery time.  Upon arrival, a Pre-op nurse and Anesthesiologist will review your health history, obtain vital signs, and answer questions you may have.  The only belongings needed at this time will be your home medications and if applicable your C-PAP/BI-PAP machine.  A Pre-op nurse will start an IV and you may receive medication in preparation for surgery, including something to help you relax.      Please be aware that surgery does come with discomfort.  We want to make every effort to control your discomfort so please discuss any uncontrolled symptoms with your nurse.   Your doctor will most likely have prescribed pain medications.      If you are going home after surgery you will receive individualized written care instructions before being discharged.  A responsible adult must drive you to and from the hospital on the day of your surgery and stay with you for 24 hours.  Discharge prescriptions can be filled by the hospital pharmacy during regular pharmacy hours.  If you are having surgery late in the day/evening your prescription may be e-prescribed to your pharmacy.  Please verify your pharmacy hours or chose a 24 hour pharmacy to avoid not having access to your prescription because your pharmacy has closed for the day.    If you are staying  overnight following surgery, you will be transported to your hospital room following the recovery period.  [facility] has all private rooms.    If you have any questions please call Pre-Admission Testing at [phone].  Deductibles and co-payments are collected on the day of service. Please be prepared to pay the required co-pay, deductible or deposit on the day of service as defined by your plan.    Call your surgeon immediately if you experience any of the following symptoms:  Sore Throat  Shortness of Breath or difficulty breathing  Cough  Chills  Body soreness or muscle pain  Headache  Fever  New loss of taste or smell  Do not arrive for your surgery ill.  Your procedure will need to be rescheduled to another time.  You will need to call your physician before the day of surgery to avoid any unnecessary exposure to hospital staff as well as other patients.   CHLORHEXIDINE CLOTH INSTRUCTIONS  The morning of surgery follow these instructions using the Chlorhexidine cloths you've been given.  These steps reduce bacteria on the body.  Do not use the cloths near your eyes, ears mouth, genitalia or on open wounds.  Throw the cloths away after use but do not try to flush them down a toilet.      Open and remove one cloth at a time from the package.    Leave the cloth unfolded and begin the bathing.  Massage the skin with the cloths using gentle pressure to remove bacteria.  Do not scrub harshly.   Follow the steps below with one 2% CHG cloth per area (6 total cloths).  One cloth for neck, shoulders and chest.  One cloth for both arms, hands, fingers and underarms (do underarms last).  One cloth for the abdomen followed by groin.  One cloth for right leg and foot including between the toes.  One cloth for left leg and foot including between the toes.  The last cloth is to be used for the back of the neck, back and buttocks.    Allow the CHG to air dry 3 minutes on the skin which will give it time to  work and decrease the chance of irritation.  The skin may feel sticky until it is dry.  Do not rinse with water or any other liquid or you will lose the beneficial effects of the CHG.  If mild skin irritation occurs, do rinse the skin to remove the CHG.  Report this to the nurse at time of admission.  Do not apply lotions, creams, ointments, deodorants or perfumes after using the clothes. Dress in clean clothes before coming to the hospital.

## 2023-04-17 ENCOUNTER — ANESTHESIA EVENT (OUTPATIENT)
Dept: GASTROENTEROLOGY | Facility: HOSPITAL | Age: 63
End: 2023-04-17

## 2023-04-17 ENCOUNTER — ANESTHESIA (OUTPATIENT)
Dept: GASTROENTEROLOGY | Facility: HOSPITAL | Age: 63
End: 2023-04-17

## 2023-04-18 ENCOUNTER — PREP FOR SURGERY (OUTPATIENT)
Dept: OTHER | Facility: HOSPITAL | Age: 63
End: 2023-04-18
Payer: COMMERCIAL

## 2023-04-18 DIAGNOSIS — C15.5 MALIGNANT NEOPLASM OF LOWER THIRD OF ESOPHAGUS: Primary | ICD-10-CM

## 2023-04-18 RX ORDER — SODIUM CHLORIDE 9 MG/ML
40 INJECTION, SOLUTION INTRAVENOUS AS NEEDED
OUTPATIENT
Start: 2023-04-18

## 2023-04-18 RX ORDER — SODIUM CHLORIDE 0.9 % (FLUSH) 0.9 %
3 SYRINGE (ML) INJECTION EVERY 12 HOURS SCHEDULED
OUTPATIENT
Start: 2023-04-18

## 2023-04-18 RX ORDER — SODIUM CHLORIDE 0.9 % (FLUSH) 0.9 %
3-10 SYRINGE (ML) INJECTION AS NEEDED
OUTPATIENT
Start: 2023-04-18

## 2023-04-20 ENCOUNTER — TELEPHONE (OUTPATIENT)
Dept: SURGERY | Facility: CLINIC | Age: 63
End: 2023-04-20
Payer: COMMERCIAL

## 2023-04-25 ENCOUNTER — OFFICE VISIT (OUTPATIENT)
Dept: SURGERY | Facility: CLINIC | Age: 63
End: 2023-04-25
Payer: COMMERCIAL

## 2023-04-25 VITALS
DIASTOLIC BLOOD PRESSURE: 82 MMHG | WEIGHT: 289 LBS | HEIGHT: 73 IN | OXYGEN SATURATION: 96 % | BODY MASS INDEX: 38.3 KG/M2 | HEART RATE: 80 BPM | SYSTOLIC BLOOD PRESSURE: 130 MMHG

## 2023-04-25 DIAGNOSIS — C15.5 MALIGNANT NEOPLASM OF LOWER THIRD OF ESOPHAGUS: Primary | ICD-10-CM

## 2023-04-25 PROCEDURE — 3075F SYST BP GE 130 - 139MM HG: CPT | Performed by: THORACIC SURGERY (CARDIOTHORACIC VASCULAR SURGERY)

## 2023-04-25 PROCEDURE — 3079F DIAST BP 80-89 MM HG: CPT | Performed by: THORACIC SURGERY (CARDIOTHORACIC VASCULAR SURGERY)

## 2023-04-25 PROCEDURE — 99024 POSTOP FOLLOW-UP VISIT: CPT | Performed by: THORACIC SURGERY (CARDIOTHORACIC VASCULAR SURGERY)

## 2023-04-25 NOTE — PROGRESS NOTES
"Chief Complaint  esophageal cancer  Subjective        Edmond Chase presents to [facility] THORACIC SURGERY  History of Present Illness  Mr. Chase is a pleasant 62-year-old gentleman status post esophagectomy who presents for a weight check. His weight today is 289 pounds. He restarted tube feeds at his last visit.    The patient reports that he is feeling about the same. He states that he is feeding himself through his tube most days, but not every day. He reports that he is getting 3 cans in his tube every day. He states that eating is going very hard and very slow. He notes that he usually vomits every 2 to 3 days.       Objective   Vital Signs:  /82   Pulse 80   Ht 185.4 cm (72.99\")   Wt 131 kg (289 lb)   SpO2 96%   BMI 38.14 kg/m²   Estimated body mass index is 38.14 kg/m² as calculated from the following:    Height as of this encounter: 185.4 cm (72.99\").    Weight as of this encounter: 131 kg (289 lb).             Physical Exam  Vitals and nursing note reviewed.   Constitutional:       Appearance: He is well-developed.   HENT:      Head: Normocephalic and atraumatic.      Nose: Nose normal.   Eyes:      Conjunctiva/sclera: Conjunctivae normal.   Pulmonary:      Effort: Pulmonary effort is normal.   Musculoskeletal:         General: Normal range of motion.      Cervical back: Normal range of motion and neck supple.   Skin:     General: Skin is warm and dry.   Neurological:      Mental Status: He is alert and oriented to person, place, and time.   Psychiatric:         Behavior: Behavior normal.         Thought Content: Thought content normal.         Judgment: Judgment normal.        Result Review :        No results were obtained or interpreted today.         Assessment and Plan   There are no diagnoses linked to this encounter.  Mr. Chase is a pleasant 62-year-old gentleman status post esophagectomy for a [id number] esophageal adenocarcinoma. At his last visit, I referred " him to see Dr. Schwartz, but this has not happened. He has been referred to Dr. Schwartz for consideration of adjuvant immunotherapy. His weight is stable. We will try again to decrease his tube feeds. He is also scheduled for an EGD to check for stricture and pyloric stenosis.  I will see him back in 2 weeks for a weight check.       Follow Up   No follow-ups on file.   Patient was given instructions and counseling regarding his condition or for health maintenance advice. Please see specific information pulled into the AVS if appropriate.     Transcribed from ambient dictation for Valerie Stockton MD by Naila Herrera.  04/25/23   11:40 EDT    Patient or patient representative verbalized consent to the visit recording.  I have personally performed the services described in this document as transcribed by the above individual, and it is both accurate and complete.

## 2023-04-27 ENCOUNTER — OFFICE VISIT (OUTPATIENT)
Dept: PSYCHIATRY | Facility: HOSPITAL | Age: 63
End: 2023-04-27
Payer: COMMERCIAL

## 2023-04-27 DIAGNOSIS — F33.1 MODERATE EPISODE OF RECURRENT MAJOR DEPRESSIVE DISORDER: ICD-10-CM

## 2023-04-27 DIAGNOSIS — C15.5 MALIGNANT NEOPLASM OF LOWER THIRD OF ESOPHAGUS: ICD-10-CM

## 2023-04-27 DIAGNOSIS — F41.1 GAD (GENERALIZED ANXIETY DISORDER): Primary | ICD-10-CM

## 2023-04-27 RX ORDER — MIRTAZAPINE 30 MG/1
30 TABLET, FILM COATED ORAL NIGHTLY
Qty: 30 TABLET | Refills: 2 | Status: SHIPPED | OUTPATIENT
Start: 2023-04-27

## 2023-04-27 NOTE — PROGRESS NOTES
Behavioral Oncology Services  Video visit-pandemic. Pt consented to session conducted through Zoom video.  You have chosen to receive care through a telehealth visit.  Do you consent to use a video/audio connection for your medical care today? YES  Telehealth provided in patient's home.  Location of Provider: [facility]    Subjective  Patient ID: Edmond Chase is a 62 y.o. male is seen via telehealth in the Behavioral Oncology Clinic.    CC: Anxiety, depression    HPI/ Interval History:   Pt is seen in follow up alongside esophageal cancer, continued recovery from recent esophagectomy. Anticipates initial visit with medical oncology, seeing Dr. Schwartz is 2 weeks. Also has upcoming visit with specialist to consider previous GI event. Pt continues to endorse demoralization, complicated by nausea, vomitting, and fatigue. Pt reports challenges accepting information stating he is doing well, endorsing significant disconnect from prior sense of self. Has been able to go get groceries, although mourns extreme exhaustion caused by this without energy left to eat. Continues to identify adjusted sense of self. Pt continues to identify high emotionality with any emotion, positive or negative. Pt reports trend toward isolation. Stays in the home most of the day aside for medical appointments.  Pt does note ability to care for self since friend has left. Reports having support available, although trying to use this sparingly. Pt is driving, noting appropriate reflexes and increasing comfort. Medications reviewed; pt reports continuing mirtazapine, although sometimes unable to tolerate due to taste of ODT. Otherwise is sleeping well, while still requiring nap during day.    Exam: As above    Recent Labs Reviewed:  Pre-Admission Testing on 04/13/2023   Component Date Value   • Glucose 04/13/2023 106 (H)    • BUN 04/13/2023 17    • Creatinine 04/13/2023 0.86    • Sodium 04/13/2023 138    • Potassium 04/13/2023  3.9    • Chloride 04/13/2023 105    • CO2 04/13/2023 25.9    • Calcium 04/13/2023 9.1    • BUN/Creatinine Ratio 04/13/2023 19.8    • Anion Gap 04/13/2023 7.1    • eGFR 04/13/2023 96.7    • WBC 04/13/2023 5.11    • RBC 04/13/2023 3.92 (L)    • Hemoglobin 04/13/2023 10.9 (L)    • Hematocrit 04/13/2023 33.7 (L)    • MCV 04/13/2023 86.0    • MCH 04/13/2023 27.8    • MCHC 04/13/2023 32.3    • RDW 04/13/2023 14.8    • RDW-SD 04/13/2023 46.1    • MPV 04/13/2023 9.3    • Platelets 04/13/2023 207    Office Visit on 03/03/2023   Component Date Value   • Hemoglobin A1C 03/03/2023 6.60 (H)    Labs reviewed    Current Psychotropic Medications:  Mirtazapine 30 mg q hs    Plan of Care/ Medical Decision Making  Continue mirtazapine as written.  Supported patient increasing behavioral activation, while encouraging graded reintroduction to tasks, driving, etc.  Pt identifies goal of walking outside daily when temperature is not too cold. Also plans to engage in regular tasks around the home on daily basis.  CBT techniques reviewed and utilized, identifying potential cognitive distortions.  Follow up arranged in clinic in 4 weeks.    Diagnoses and all orders for this visit:    1. CHARANJIT (generalized anxiety disorder) (Primary)    2. Malignant neoplasm of lower third of esophagus    3. Moderate episode of recurrent major depressive disorder    I spent 36 minutes caring for Edmond on this date of service. This time includes time spent by me in the following activities: preparing for the visit, reviewing tests, obtaining and/or reviewing a separately obtained history, performing a medically appropriate examination and/or evaluation, counseling and educating the patient/family/caregiver, ordering medications, tests, or procedures and documenting information in the medical record.

## 2023-05-01 ENCOUNTER — HOSPITAL ENCOUNTER (OUTPATIENT)
Facility: HOSPITAL | Age: 63
Setting detail: HOSPITAL OUTPATIENT SURGERY
Discharge: HOME | End: 2023-05-01
Attending: THORACIC SURGERY (CARDIOTHORACIC VASCULAR SURGERY) | Admitting: THORACIC SURGERY (CARDIOTHORACIC VASCULAR SURGERY)
Payer: COMMERCIAL

## 2023-05-01 ENCOUNTER — ANESTHESIA (OUTPATIENT)
Dept: GASTROENTEROLOGY | Facility: HOSPITAL | Age: 63
End: 2023-05-01
Payer: COMMERCIAL

## 2023-05-01 ENCOUNTER — ANESTHESIA EVENT (OUTPATIENT)
Dept: GASTROENTEROLOGY | Facility: HOSPITAL | Age: 63
End: 2023-05-01
Payer: COMMERCIAL

## 2023-05-01 VITALS
WEIGHT: 281.9 LBS | OXYGEN SATURATION: 94 % | RESPIRATION RATE: 18 BRPM | BODY MASS INDEX: 37.36 KG/M2 | SYSTOLIC BLOOD PRESSURE: 186 MMHG | HEART RATE: 80 BPM | DIASTOLIC BLOOD PRESSURE: 101 MMHG | HEIGHT: 73 IN

## 2023-05-01 DIAGNOSIS — C15.5 MALIGNANT NEOPLASM OF LOWER THIRD OF ESOPHAGUS: ICD-10-CM

## 2023-05-01 LAB
GLUCOSE BLDC GLUCOMTR-MCNC: 121 MG/DL (ref 70–130)
QT INTERVAL: 416 MS

## 2023-05-01 PROCEDURE — 25010000002 DEXAMETHASONE PER 1 MG: Performed by: NURSE ANESTHETIST, CERTIFIED REGISTERED

## 2023-05-01 PROCEDURE — 82948 REAGENT STRIP/BLOOD GLUCOSE: CPT

## 2023-05-01 PROCEDURE — 25010000002 SUGAMMADEX 200 MG/2ML SOLUTION: Performed by: NURSE ANESTHETIST, CERTIFIED REGISTERED

## 2023-05-01 PROCEDURE — 93005 ELECTROCARDIOGRAM TRACING: CPT | Performed by: STUDENT IN AN ORGANIZED HEALTH CARE EDUCATION/TRAINING PROGRAM

## 2023-05-01 PROCEDURE — 25010000002 SUCCINYLCHOLINE PER 20 MG: Performed by: NURSE ANESTHETIST, CERTIFIED REGISTERED

## 2023-05-01 PROCEDURE — 25010000002 PROPOFOL 10 MG/ML EMULSION: Performed by: NURSE ANESTHETIST, CERTIFIED REGISTERED

## 2023-05-01 PROCEDURE — C1726 CATH, BAL DIL, NON-VASCULAR: HCPCS | Performed by: THORACIC SURGERY (CARDIOTHORACIC VASCULAR SURGERY)

## 2023-05-01 PROCEDURE — 93010 ELECTROCARDIOGRAM REPORT: CPT | Performed by: INTERNAL MEDICINE

## 2023-05-01 PROCEDURE — 25010000002 ONDANSETRON PER 1 MG: Performed by: NURSE ANESTHETIST, CERTIFIED REGISTERED

## 2023-05-01 PROCEDURE — C1713 ANCHOR/SCREW BN/BN,TIS/BN: HCPCS | Performed by: THORACIC SURGERY (CARDIOTHORACIC VASCULAR SURGERY)

## 2023-05-01 RX ORDER — HYDRALAZINE HYDROCHLORIDE 20 MG/ML
5 INJECTION INTRAMUSCULAR; INTRAVENOUS
Status: DISCONTINUED | OUTPATIENT
Start: 2023-05-01 | End: 2023-05-01 | Stop reason: HOSPADM

## 2023-05-01 RX ORDER — ROCURONIUM BROMIDE 10 MG/ML
INJECTION, SOLUTION INTRAVENOUS AS NEEDED
Status: DISCONTINUED | OUTPATIENT
Start: 2023-05-01 | End: 2023-05-01 | Stop reason: SURG

## 2023-05-01 RX ORDER — EPHEDRINE SULFATE 50 MG/ML
5 INJECTION, SOLUTION INTRAVENOUS ONCE AS NEEDED
Status: DISCONTINUED | OUTPATIENT
Start: 2023-05-01 | End: 2023-05-01 | Stop reason: HOSPADM

## 2023-05-01 RX ORDER — ACETAMINOPHEN 325 MG/1
650 TABLET ORAL ONCE AS NEEDED
Status: DISCONTINUED | OUTPATIENT
Start: 2023-05-01 | End: 2023-05-01 | Stop reason: HOSPADM

## 2023-05-01 RX ORDER — DROPERIDOL 2.5 MG/ML
0.62 INJECTION, SOLUTION INTRAMUSCULAR; INTRAVENOUS
Status: DISCONTINUED | OUTPATIENT
Start: 2023-05-01 | End: 2023-05-01 | Stop reason: HOSPADM

## 2023-05-01 RX ORDER — ONDANSETRON 2 MG/ML
4 INJECTION INTRAMUSCULAR; INTRAVENOUS ONCE AS NEEDED
Status: DISCONTINUED | OUTPATIENT
Start: 2023-05-01 | End: 2023-05-01 | Stop reason: HOSPADM

## 2023-05-01 RX ORDER — LIDOCAINE HYDROCHLORIDE 20 MG/ML
INJECTION, SOLUTION INFILTRATION; PERINEURAL AS NEEDED
Status: DISCONTINUED | OUTPATIENT
Start: 2023-05-01 | End: 2023-05-01 | Stop reason: SURG

## 2023-05-01 RX ORDER — SUCCINYLCHOLINE CHLORIDE 20 MG/ML
INJECTION INTRAMUSCULAR; INTRAVENOUS AS NEEDED
Status: DISCONTINUED | OUTPATIENT
Start: 2023-05-01 | End: 2023-05-01 | Stop reason: SURG

## 2023-05-01 RX ORDER — SODIUM CHLORIDE 9 MG/ML
40 INJECTION, SOLUTION INTRAVENOUS AS NEEDED
Status: DISCONTINUED | OUTPATIENT
Start: 2023-05-01 | End: 2023-05-01 | Stop reason: HOSPADM

## 2023-05-01 RX ORDER — LABETALOL HYDROCHLORIDE 5 MG/ML
5 INJECTION, SOLUTION INTRAVENOUS
Status: DISCONTINUED | OUTPATIENT
Start: 2023-05-01 | End: 2023-05-01 | Stop reason: HOSPADM

## 2023-05-01 RX ORDER — SODIUM CHLORIDE 9 MG/ML
INJECTION, SOLUTION INTRAVENOUS CONTINUOUS PRN
Status: DISCONTINUED | OUTPATIENT
Start: 2023-05-01 | End: 2023-05-01 | Stop reason: SURG

## 2023-05-01 RX ORDER — ACETAMINOPHEN 650 MG/1
650 SUPPOSITORY RECTAL ONCE AS NEEDED
Status: DISCONTINUED | OUTPATIENT
Start: 2023-05-01 | End: 2023-05-01 | Stop reason: HOSPADM

## 2023-05-01 RX ORDER — DEXAMETHASONE SODIUM PHOSPHATE 4 MG/ML
INJECTION, SOLUTION INTRA-ARTICULAR; INTRALESIONAL; INTRAMUSCULAR; INTRAVENOUS; SOFT TISSUE AS NEEDED
Status: DISCONTINUED | OUTPATIENT
Start: 2023-05-01 | End: 2023-05-01 | Stop reason: SURG

## 2023-05-01 RX ORDER — NALOXONE HCL 0.4 MG/ML
0.2 VIAL (ML) INJECTION AS NEEDED
Status: DISCONTINUED | OUTPATIENT
Start: 2023-05-01 | End: 2023-05-01 | Stop reason: HOSPADM

## 2023-05-01 RX ORDER — FLUMAZENIL 0.1 MG/ML
0.2 INJECTION INTRAVENOUS AS NEEDED
Status: DISCONTINUED | OUTPATIENT
Start: 2023-05-01 | End: 2023-05-01 | Stop reason: HOSPADM

## 2023-05-01 RX ORDER — ONDANSETRON 2 MG/ML
INJECTION INTRAMUSCULAR; INTRAVENOUS AS NEEDED
Status: DISCONTINUED | OUTPATIENT
Start: 2023-05-01 | End: 2023-05-01 | Stop reason: SURG

## 2023-05-01 RX ORDER — PROPOFOL 10 MG/ML
VIAL (ML) INTRAVENOUS AS NEEDED
Status: DISCONTINUED | OUTPATIENT
Start: 2023-05-01 | End: 2023-05-01 | Stop reason: SURG

## 2023-05-01 RX ADMIN — LIDOCAINE HYDROCHLORIDE 100 MG: 20 INJECTION, SOLUTION INFILTRATION; PERINEURAL at 12:15

## 2023-05-01 RX ADMIN — SODIUM CHLORIDE: 9 INJECTION, SOLUTION INTRAVENOUS at 12:10

## 2023-05-01 RX ADMIN — ONDANSETRON 4 MG: 2 INJECTION INTRAMUSCULAR; INTRAVENOUS at 12:24

## 2023-05-01 RX ADMIN — SUGAMMADEX 200 MG: 100 INJECTION, SOLUTION INTRAVENOUS at 12:30

## 2023-05-01 RX ADMIN — SUCCINYLCHOLINE CHLORIDE 140 MG: 20 INJECTION, SOLUTION INTRAMUSCULAR; INTRAVENOUS; PARENTERAL at 12:15

## 2023-05-01 RX ADMIN — DEXAMETHASONE SODIUM PHOSPHATE 10 MG: 4 INJECTION, SOLUTION INTRA-ARTICULAR; INTRALESIONAL; INTRAMUSCULAR; INTRAVENOUS; SOFT TISSUE at 12:24

## 2023-05-01 RX ADMIN — ROCURONIUM BROMIDE 40 MG: 50 INJECTION INTRAVENOUS at 12:24

## 2023-05-01 RX ADMIN — PROPOFOL 150 MG: 10 INJECTION, EMULSION INTRAVENOUS at 12:15

## 2023-05-01 RX ADMIN — ROCURONIUM BROMIDE 10 MG: 50 INJECTION INTRAVENOUS at 12:15

## 2023-05-01 NOTE — ANESTHESIA PREPROCEDURE EVALUATION
" Anesthesia Evaluation     Patient summary reviewed and Nursing notes reviewed   no history of anesthetic complications:  NPO Solid Status: > 8 hours  NPO Liquid Status: > 8 hours           Airway   Mallampati: II  TM distance: >3 FB  Neck ROM: full  No difficulty expected  Dental - normal exam     Pulmonary    (+) sleep apnea on CPAP,   (-) rhonchi, decreased breath sounds, wheezes, not a smoker  Cardiovascular   Exercise tolerance: good (4-7 METS)    PT is on anticoagulation therapy  Rhythm: regular  Rate: normal    (+) hypertension, CAD, CABG, angina, hyperlipidemia,   (-) LOWERY, murmur    ROS comment: LVEF 51-55%   CAD recommended medical mgmt not amenable to reperfusion intervention     Neuro/Psych  (+) dizziness/light headedness,    (-) CVA  GI/Hepatic/Renal/Endo    (+) morbid obesity, PUD,  diabetes mellitus type 2 poorly controlled using insulin,   (-) no renal disease    Musculoskeletal     Abdominal     Abdomen: soft.   Substance History      OB/GYN          Other                          Anesthesia Plan    ASA 4     general   total IV anesthesia  (Surgeon wants GA with muscle relaxation    Hx stable \"angina\" occurring several times a week - he relates his doctors are unable to definitively say if his chest pain origin is cardiac or esophageal   He presents with stable \"angina\" today  Some soa which is also normally associaated with this; no other signs or symptoms    Will check an ekg and discuss with dr forrester)  intravenous induction     Anesthetic plan, risks, benefits, and alternatives have been provided, discussed and informed consent has been obtained with: patient.        CODE STATUS:       "

## 2023-05-01 NOTE — ANESTHESIA POSTPROCEDURE EVALUATION
Patient: Edmond Chase    Procedure Summary     Date: 05/01/23 Room / Location: [facility] ENDOSCOPY 5 / [facility] ENDOSCOPY    Anesthesia Start: 1210 Anesthesia Stop: 1244    Procedure: ESOPHAGOGASTRODUODENOSCOPY WITH  [id number] balloon DILATATION of pylorus and anastomosis (Esophagus) Diagnosis:       Malignant neoplasm of lower third of esophagus      (Malignant neoplasm of lower third of esophagus [C15.5])    Surgeons: Valerie Stockton MD Provider: Yandel Anderson MD    Anesthesia Type: general ASA Status: 4          Anesthesia Type: general    Vitals  Vitals Value Taken Time   /115 05/01/23 1239   Temp     Pulse 77 05/01/23 1239   Resp 18 05/01/23 1239   SpO2 96 % 05/01/23 1239           Post Anesthesia Care and Evaluation    Patient location during evaluation: bedside  Patient participation: complete - patient participated  Level of consciousness: awake  Pain management: adequate    Airway patency: patent  Anesthetic complications: No anesthetic complications  PONV Status: controlled  Cardiovascular status: acceptable  Respiratory status: acceptable  Hydration status: acceptable    Comments: ---------------------            05/01/23                1239      ---------------------   BP:     (!) 181/101   Pulse:      77        Resp:       18        SpO2:       96%      ---------------------

## 2023-05-01 NOTE — OP NOTE
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 Chase was identified in the preoperative holding area and again his consent was verified.  The patient was transported to the endoscopy suite and placed on the table in supine position.  A general anesthetic was administered without difficulty and he was intubated.  A time out was performed.     The olympus video endoscope was introduced into the patients esophagus and an examination was conducted to the duodenum.  The anastomosis was widely patent without evidence of stricture.  The pylorus was open.  A 18-19-20mm pneumatic balloon dilator was used to dilate both the pylorus and the esophagogastric anastomosis under direct vision.  The balloon was withdrawn and there was no evidence of injury to either area. The conduit appeared in good condition without evidence of issues.     The scope was withdrawn and the patient was awakened.  He tolerated procedure without difficulty.      Valerie Stockton MD     Date: 5/1/2023  Time: 14:12 EDT

## 2023-05-01 NOTE — NURSING NOTE
PATIENT STILL C/O CHEST PAIN 3/10, STATES SHORTNESS OF BREATH IS IMPROVED WITH O2. DR. MADERA AWARE OF EKG RESULTS. MD STATES HE SPOKE WITH DR. ARMSTRONG AND BOTH AGREE OK TO PROCEED WITH EGD PROCEDURE TODAY. NO NEW ORDERS.

## 2023-05-01 NOTE — NURSING NOTE
PATIENT WALKED BACK TO PRE OP FROM WAITING ROOM. PATIENT STATES HE IS SHORT OF BREATH AND HAVING 3/10 CHEST PAIN. DR. SANTY SHELLEY NOTIFIED AND SPEAKS WITH PATIENT AT BEDSIDE. MD ORDERS EKG AND STATES HE WILL UPDATE DR. ARMSTRONG. STAT EKG ORDERED AT THIS TIME.

## 2023-05-01 NOTE — ANESTHESIA PROCEDURE NOTES
Airway  Urgency: elective    Date/Time: 5/1/2023 12:22 PM  Airway not difficult    General Information and Staff    Patient location during procedure: OR  Anesthesiologist: Yandel Anderson MD  CRNA/CAA: Hattie Cohen CRNA    Indications and Patient Condition  Indications for airway management: airway protection    Preoxygenated: yes (pt pre-O2 with 100% O2)  Mask difficulty assessment: 0 - not attempted    Final Airway Details  Final airway type: endotracheal airway      Successful airway: ETT  Cuffed: yes   Successful intubation technique: direct laryngoscopy  Endotracheal tube insertion site: oral  Blade: Viet  Blade size: 4  ETT size (mm): 7.5  Cormack-Lehane Classification: grade I - full view of glottis  Placement verified by: chest auscultation and capnometry   Cuff volume (mL): 7  Measured from: lips  ETT/EBT  to lips (cm): 22  Number of attempts at approach: 1  Assessment: lips, teeth, and gum same as pre-op and atraumatic intubation    Additional Comments  ATOETx1. No change in dentition.

## 2023-05-08 ENCOUNTER — CONSULT (OUTPATIENT)
Dept: ONCOLOGY | Facility: CLINIC | Age: 63
End: 2023-05-08
Payer: COMMERCIAL

## 2023-05-08 ENCOUNTER — LAB (OUTPATIENT)
Dept: LAB | Facility: HOSPITAL | Age: 63
End: 2023-05-08
Payer: COMMERCIAL

## 2023-05-08 VITALS
SYSTOLIC BLOOD PRESSURE: 140 MMHG | WEIGHT: 279.7 LBS | OXYGEN SATURATION: 98 % | DIASTOLIC BLOOD PRESSURE: 74 MMHG | BODY MASS INDEX: 37.07 KG/M2 | TEMPERATURE: 99.8 F | HEIGHT: 73 IN | HEART RATE: 63 BPM

## 2023-05-08 DIAGNOSIS — C15.5 MALIGNANT NEOPLASM OF LOWER THIRD OF ESOPHAGUS: Primary | ICD-10-CM

## 2023-05-08 LAB
BASOPHILS # BLD AUTO: 0.03 10*3/MM3 (ref 0–0.2)
BASOPHILS NFR BLD AUTO: 0.5 % (ref 0–1.5)
DEPRECATED RDW RBC AUTO: 46.6 FL (ref 37–54)
EOSINOPHIL # BLD AUTO: 0.18 10*3/MM3 (ref 0–0.4)
EOSINOPHIL NFR BLD AUTO: 2.9 % (ref 0.3–6.2)
ERYTHROCYTE [DISTWIDTH] IN BLOOD BY AUTOMATED COUNT: 14.6 % (ref 12.3–15.4)
HCT VFR BLD AUTO: 39.9 % (ref 37.5–51)
HGB BLD-MCNC: 12.5 G/DL (ref 13–17.7)
IMM GRANULOCYTES # BLD AUTO: 0.02 10*3/MM3 (ref 0–0.05)
IMM GRANULOCYTES NFR BLD AUTO: 0.3 % (ref 0–0.5)
LYMPHOCYTES # BLD AUTO: 1.3 10*3/MM3 (ref 0.7–3.1)
LYMPHOCYTES NFR BLD AUTO: 20.7 % (ref 19.6–45.3)
MCH RBC QN AUTO: 27.7 PG (ref 26.6–33)
MCHC RBC AUTO-ENTMCNC: 31.3 G/DL (ref 31.5–35.7)
MCV RBC AUTO: 88.5 FL (ref 79–97)
MONOCYTES # BLD AUTO: 0.53 10*3/MM3 (ref 0.1–0.9)
MONOCYTES NFR BLD AUTO: 8.5 % (ref 5–12)
NEUTROPHILS NFR BLD AUTO: 4.21 10*3/MM3 (ref 1.7–7)
NEUTROPHILS NFR BLD AUTO: 67.1 % (ref 42.7–76)
NRBC BLD AUTO-RTO: 0 /100 WBC (ref 0–0.2)
PLATELET # BLD AUTO: 199 10*3/MM3 (ref 140–450)
PMV BLD AUTO: 9.8 FL (ref 6–12)
RBC # BLD AUTO: 4.51 10*6/MM3 (ref 4.14–5.8)
WBC NRBC COR # BLD: 6.27 10*3/MM3 (ref 3.4–10.8)

## 2023-05-08 PROCEDURE — 3078F DIAST BP <80 MM HG: CPT | Performed by: INTERNAL MEDICINE

## 2023-05-08 PROCEDURE — 36415 COLL VENOUS BLD VENIPUNCTURE: CPT

## 2023-05-08 PROCEDURE — 85025 COMPLETE CBC W/AUTO DIFF WBC: CPT

## 2023-05-08 PROCEDURE — 99244 OFF/OP CNSLTJ NEW/EST MOD 40: CPT | Performed by: INTERNAL MEDICINE

## 2023-05-08 PROCEDURE — 1126F AMNT PAIN NOTED NONE PRSNT: CPT | Performed by: INTERNAL MEDICINE

## 2023-05-08 PROCEDURE — 3077F SYST BP >= 140 MM HG: CPT | Performed by: INTERNAL MEDICINE

## 2023-05-08 NOTE — LETTER
May 8, 2023     Valerie Stockton MD  [address]  [address]  [place] [address]    Patient: Edmond Chase   YOB: 1960   Date of Visit: 5/8/2023       Dear Dr. Mahin MD:    Thank you for referring Edmond Chase to me for evaluation. Below are the relevant portions of my assessment and plan of care.    If you have questions, please do not hesitate to call me. I look forward to following Edmond along with you.         Sincerely,        Dylon Schwartz MD        CC: MD Gregor Valente MD Amanda Blume, APRN Kommor, Michael D., MD  05/08/23 1135  Signed        REASON FOR CONSULTATION:    Provide an opinion on any further workup or treatment                             REQUESTING PHYSICIAN:  Valerie Stockton MD    RECORDS OBTAINED:  Records of the patients history including those obtained from the referring provider were reviewed and summarized in detail.    HISTORY OF PRESENT ILLNESS:  The patient is a 62 y.o. year old male who is here for an opinion about the above issue.    History of Present Illness    The patient is a 62-year-old male with a history that includes assessment for iron deficiency anemia beginning September 2022.  At this point he carried additional history of coronary disease with stent placement, Plavix use, prior placement of bioprosthetic arctic valve, diabetes mellitus type 2, TJ on auto CPAP.  He had been maintained on long-term antiplatelet therapy with Plavix.    As part of his work-up he underwent EGD and colonoscopy by GI-Dr. Chowdhury-10/1/2022 was found to have an esophageal mass as well as a large cecal polyp.  He underwent endoscopic mucosal resection of the esophageal and cecal masses and was confirmed to have an esophageal cancer and a precancer polyp in the cecum.  The pathology of the esophageal malignancy was a pT1b adenocarcinoma esophagus located at the GE junction  Pathology eval included Last's esophagus with high-grade  dysplasia and foci of least superficial invasion, in the cecum fragments of sessile serrated lesion, fragments of hyperplastic polyp in the transverse colon, sigmoid colon, rectosigmoid colon  .  He was referred to thoracic surgery seen 11/29/2022 after hospitalization for severe lower GI bleed.  A colonoscopy 11/26 revealed a colonic ulcer with a large clot attached.    Upon this consultation it was felt that he would need to proceed with surgical resection without neoadjuvant therapy.    The patient required readmission 12/1/12/2 for additional GI bleeding requiring transfusion and then transferred to [facility].    Patient admitted 12/14-15/2022 with fatigue and chest pain.  He was transfused, felt to have stable angina and acid reflux, treated Imdur, amlodipine and PPI therapy.    Patient subsequent admitted 2/3- 10/2023.  An esophagectomy had been recommended and this proceeded via scheduled right VAT with total esophagectomy and jejunostomy feeding tube placement.  Perioperatively he had issues with RAMIREZ with oliguria, hypertension, TJ, nutrition.    Pathology revealed evidence that the tumors midpoint 2 cm or less in the proximal stomach or cardia and tumor involving the esophagogastric junction, adenocarcinoma, well-differentiated, invading submucosa, negative margins, all regional lymph nodes negative.  Final pathologic staging was [id number] stage IB    Laboratory testing 4/13/2023 with H&H of 10.9 and 33.7, white count of 5110, platelet count of 207,000.    The patient was seen 4/29/2023 losing weight from dropping from 294 to 283 pounds pounds, difficulty eating, periodic shortness of breath.  He was assessed via EGD and dilated 5/1/2023.    The patient's case was discussed with thoracic surgery 5/7/2023 and is next seen in office 5/8/2023.  We reviewed his history from his initial GI bleeding episode.    At present he is slowly improving though unable to eat easily without vomiting if he  "does not pace himself correctly and he has lost considerable weight.  He is thought to have ongoing anginal symptoms managed by cardiology and bilateral knee pain followed and treated by orthopedics.        Past Medical History:   Diagnosis Date   • Abnormal nuclear stress test    • Anxiety    • Anxiety and depression    • Aortic valve insufficiency     nonrheumtic    • Arthritis     knees   • Ascending aortic aneurysm    • CAD (coronary artery disease)     stent   • Chest pain    • Diabetes mellitus    • Diarrhea    • Dizzy     CAREFUL WHEN GETTING UP   • Dyspnea    • Esophageal cancer 11/2022    no chemo or radiation   • Essential hypertension    • Fatigue    • G tube feedings     per jejunostomy tube nightly with permitin  3 cans nightly   • GERD (gastroesophageal reflux disease)    • GI bleed    • Heart murmur    • History of pneumonia    • History of recent blood transfusion     hemorrhage     no reaction   • Hyperglycemia    • Hyperlipidemia    • Hypersomnia with sleep apnea    • Jejunostomy tube present    • Lightheadedness    • Malaise and fatigue    • Migraines     \"OCCULAR MIGRAINES\"   • Morbid obesity    • Myocardial ischemia    • Nausea    • Nocturia    • TJ on auto CPAP 10/31/2016    Overnight polysomnogram.  Weight 276 pounds.  Severe TJ with AHI 79 events per hour.  Auto CPAP recommended.   • Pneumonia     FEB 2016   • Scrotal bleeding    • SOB (shortness of breath)         Past Surgical History:   Procedure Laterality Date   • AORTIC VALVE REPAIR/REPLACEMENT  05/2016   • ASCENDING ARCH/HEMIARCH REPLACEMENT N/A 05/02/2016    Procedure: BHAVESH STERNOTOMY CORONARY ARTERY BYPASS GRAFT TIMES 3 USING LEFT INTERNAL MAMMARY ARTERY AND RIGHT GREATER SAPHENOUS VEIN GRAFT PER ENDOSCOPIC VEIN HARVESTING, AORTIC ANEURYSM REPAIR WITH ROOT REPAIR AND AORTIC VALVE REPLACEMENT;  Surgeon: Rosalio Cline MD;  Location: [facility];  Service:    • CARDIAC CATHETERIZATION N/A 04/01/2016    Procedure: Left Heart Cath; "  Surgeon: Erick Tam MD;  [facility];  Service:    • CARDIAC CATHETERIZATION N/A 04/01/2016    Procedure: Left ventriculography;  Surgeon: Erick Tam MD;  Location: [facility] CATH INVASIVE LOCATION;  Service:    • CARDIAC CATHETERIZATION N/A 04/01/2016    Procedure: Right Heart Cath;  Surgeon: Erick Tam MD;  Location: [facility] CATH INVASIVE LOCATION;  Service:    • CARDIAC CATHETERIZATION N/A 10/30/2017    Procedure: Coronary angiography;  Surgeon: Sorin Vasquez MD;  Location: [facility] CATH INVASIVE LOCATION;  Service:    • CARDIAC CATHETERIZATION  10/30/2017    Procedure: Saphenous Vein Graft;  Surgeon: Sorin Vasquez MD;  Location: [facility] CATH INVASIVE LOCATION;  Service:    • CARDIAC CATHETERIZATION N/A 10/30/2017    Procedure: Native mammary injection;  Surgeon: Sorin Vasquez MD;  Location: [facility] CATH INVASIVE LOCATION;  Service:    • CARDIAC CATHETERIZATION N/A 07/07/2020    Procedure: Coronary angiography;  Surgeon: Gregor Kim MD;  Location: [facility] CATH INVASIVE LOCATION;  Service: Cardiovascular;  Laterality: N/A;   • CARDIAC CATHETERIZATION N/A 07/07/2020    Procedure: Left heart cath;  Surgeon: Gregor Kim MD;  Location: [facility] CATH INVASIVE LOCATION;  Service: Cardiovascular;  Laterality: N/A;   • CARDIAC CATHETERIZATION N/A 07/07/2020    Procedure: Left ventriculography;  Surgeon: Gregor Kim MD;  Location: [facility] CATH INVASIVE LOCATION;  Service: Cardiovascular;  Laterality: N/A;   • CARDIAC CATHETERIZATION N/A 07/07/2020    Procedure: Stent ESMER bypass graft;  Surgeon: Gregor Kim MD;  Location: [facility] CATH INVASIVE LOCATION;  Service: Cardiovascular;  Laterality: N/A;   • CARDIAC CATHETERIZATION N/A 06/17/2021    Procedure: SAPHENOUS VEIN GRAFT;  Surgeon: Marques Ndiaye MD;  Location: [facility] CATH INVASIVE LOCATION;  Service: Cardiovascular;  Laterality: N/A;   • CARDIAC CATHETERIZATION N/A 06/17/2021    Procedure: Left Heart Cath;   Surgeon: Marques Ndiaye MD;  Location: [facility] CATH INVASIVE LOCATION;  Service: Cardiovascular;  Laterality: N/A;   • CARDIAC CATHETERIZATION N/A 06/17/2021    Procedure: Coronary angiography;  Surgeon: Marques Ndiaye MD;  Location: [facility] CATH INVASIVE LOCATION;  Service: Cardiovascular;  Laterality: N/A;   • CARDIAC CATHETERIZATION N/A 07/14/2022    Procedure: Left Heart Cath;  Surgeon: Charlie Aj MD;  Location: [facility] CATH INVASIVE LOCATION;  Service: Cardiovascular;  Laterality: N/A;   • CARDIAC CATHETERIZATION N/A 07/14/2022    Procedure: Coronary angiography;  Surgeon: Charlie Aj MD;  Location: [facility] CATH INVASIVE LOCATION;  Service: Cardiovascular;  Laterality: N/A;   • CARDIAC CATHETERIZATION  07/14/2022    Procedure: Saphenous Vein Graft;  Surgeon: Charlie Aj MD;  Location: [facility] CATH INVASIVE LOCATION;  Service: Cardiovascular;;   • CARDIAC CATHETERIZATION N/A 07/14/2022    Procedure: Native mammary injection;  Surgeon: Charlie Aj MD;  Location: [facility] CATH INVASIVE LOCATION;  Service: Cardiovascular;  Laterality: N/A;   • CATARACT EXTRACTION EXTRACAPSULAR W/ INTRAOCULAR LENS IMPLANTATION Left    • COLONOSCOPY N/A 11/26/2022    Procedure: COLONOSCOPY AT BEDSIDE;  Surgeon: Tomy Lopez MD;  Location: [facility] MAIN OR;  Service: Gastroenterology;  Laterality: N/A;   • COLONOSCOPY W/ POLYPECTOMY N/A 10/04/2022    Procedure: COLONOSCOPY to cecum with cold forceps and cold snare polypectomies;  Surgeon: Gregor Carlson MD;  Location: [facility] ENDOSCOPY;  Service: Gastroenterology;  Laterality: N/A;  PRE- hx of polyps  POST- diverticulosis, polyps   • CORONARY ARTERY BYPASS GRAFT  05/2016    LIMA TO LAD, SVG TO PDA, SVG TO OM2   • ENDOSCOPY N/A 07/13/2022    Procedure: ESOPHAGOGASTRODUODENOSCOPY;  Surgeon: Marcia Hollis MD;  Location: [facility] ENDOSCOPY;  Service: Gastroenterology;  Laterality: N/A;  PRE- ANEMIA, MELENA  POST- MILD EROSIVE GASTRITIS,  GE JUNCTION ULCER   • ENDOSCOPY N/A 10/04/2022    Procedure: ESOPHAGOGASTRODUODENOSCOPY with biopsies;  Surgeon: Gregor Carlson MD;  Location: [facility] ENDOSCOPY;  Service: Gastroenterology;  Laterality: N/A;  PRE- hx of esophageal ulcer  POST- gastric cardia mass   • ENDOSCOPY N/A 5/1/2023    Procedure: ESOPHAGOGASTRODUODENOSCOPY WITH  [id number] balloon DILATATION of pylorus and anastomosis;  Surgeon: Valerie Stockton MD;  Location: [facility] ENDOSCOPY;  Service: Gastroenterology;  Laterality: N/A;  PRE: dysphagia  POST: no abnormalities seen   • ESOPHAGOGASTRECTOMY Right 02/03/2023    Procedure: BRONCHOSCOPY, RIGHT ROBOTIC VIDEO ASSISTED THORACOSCOPY WITH TOTAL ESOPHAGECTOMY, INTERCOSTAL NERVE BLOCK, FEEDING JEJUNOSTOMY PLACEMENT;  Surgeon: Valerie Stockton MD;  Location: [facility] MAIN OR;  Service: [facility] - [facility];  Laterality: Right;   • INNER EAR SURGERY     • TONSILLECTOMY          Current Outpatient Medications on File Prior to Visit   Medication Sig Dispense Refill   • amLODIPine (NORVASC) 10 MG tablet Take 1 tablet by mouth Daily. (Patient taking differently: Take 1 tablet by mouth Every Night.) 30 tablet 11   • atorvastatin (LIPITOR) 40 MG tablet Administer 1 tablet per J tube Every Night. (Patient taking differently: Take 1 tablet by mouth Every Night. Indications: High Amount of Fats in the Blood) 30 tablet 0   • Chlorhexidine Gluconate Cloth 2 % pads Apply 1 application topically. USE AS DIRECTED PREOP     • dapagliflozin Propanediol (Farxiga) 10 MG tablet Administer 10 mg per J tube Daily. (Patient taking differently: Take 10 mg by mouth Daily. Indications: Type 2 Diabetes) 30 tablet 0   • lisinopril (PRINIVIL,ZESTRIL) 10 MG tablet Administer 1 tablet per J tube Daily for 30 days. (Patient taking differently: Take 1 tablet by mouth Every Night.) 30 tablet 1   • Loperamide HCl (IMODIUM PO) Take 1 tablet by mouth As Needed. LOOSE STOOLS     • metoprolol succinate XL (TOPROL-XL) 25 MG 24 hr  "tablet 1 tablet Every Night.     • mirtazapine (REMERON) 30 MG tablet Take 1 tablet by mouth Every Night. 30 tablet 2     No current facility-administered medications on file prior to visit.        ALLERGIES:  No Known Allergies     Social History     Socioeconomic History   • Marital status: Single   Tobacco Use   • Smoking status: Never     Passive exposure: Never   • Smokeless tobacco: Never   Vaping Use   • Vaping Use: Never used   Substance and Sexual Activity   • Alcohol use: Not Currently     Comment: usually 1-2 month but none since christmas 2022   • Drug use: Never   • Sexual activity: Defer        Family History   Problem Relation Age of Onset   • Hypertension Mother    • Diabetes Mother    • Heart disease Mother    • Hypertension Father    • Lung cancer Father    • Heart disease Sister    • Stroke Maternal Grandmother    • Heart disease Maternal Grandmother    • Hypertension Maternal Grandfather    • Hypertension Paternal Grandmother    • Hypertension Paternal Grandfather    • Other Other         The patient states that his sister has a problem that goes by the name of \"long QT\".  He says this is a familial trait but he also says that he is \"not interested in pursuing it for himself\".   • Coronary artery disease Other    • Malig Hyperthermia Neg Hx         Review of Systems   Constitutional: Positive for activity change, appetite change and fatigue.   HENT: Negative.    Eyes: Negative.    Respiratory: Positive for chest tightness and shortness of breath.    Cardiovascular: Positive for palpitations (See history of tachycardia).   Gastrointestinal:        See history of present illness, follow-up colonoscopy anticipated at [facility].   Endocrine: Negative.    Genitourinary: Negative.    Musculoskeletal: Positive for arthralgias (Particularly bilateral knees).   Skin: Negative.    Allergic/Immunologic: Negative.    Neurological: Negative.    Psychiatric/Behavioral: Positive for dysphoric mood. " The patient is nervous/anxious.         Objective     There were no vitals filed for this visit.       View : No data to display.                Physical Exam  Constitutional:       Appearance: He is obese.   HENT:      Head: Normocephalic and atraumatic.      Nose: Nose normal.      Mouth/Throat:      Mouth: Mucous membranes are moist.      Pharynx: Oropharynx is clear.   Eyes:      Extraocular Movements: Extraocular movements intact.      Conjunctiva/sclera: Conjunctivae normal.      Pupils: Pupils are equal, round, and reactive to light.   Cardiovascular:      Rate and Rhythm: Normal rate and regular rhythm.      Pulses: Normal pulses.      Heart sounds: Normal heart sounds.   Pulmonary:      Effort: Pulmonary effort is normal.      Breath sounds: Normal breath sounds.   Abdominal:      General: Bowel sounds are normal.      Palpations: Abdomen is soft.      Comments: J-tube remains in place   Musculoskeletal:         General: Normal range of motion.      Cervical back: Normal range of motion and neck supple.   Skin:     General: Skin is warm and dry.   Neurological:      General: No focal deficit present.      Mental Status: He is oriented to person, place, and time.   Psychiatric:         Behavior: Behavior normal.           RECENT LABS:  Hematology WBC   Date Value Ref Range Status   04/13/2023 5.11 3.40 - 10.80 10*3/mm3 Final   09/15/2022 7.97 3.40 - 10.80 10*3/mm3 Final     RBC   Date Value Ref Range Status   04/13/2023 3.92 (L) 4.14 - 5.80 10*6/mm3 Final   09/15/2022 4.32 4.14 - 5.80 10*6/mm3 Final     Hemoglobin   Date Value Ref Range Status   04/13/2023 10.9 (L) 13.0 - 17.7 g/dL Final     Hematocrit   Date Value Ref Range Status   04/13/2023 33.7 (L) 37.5 - 51.0 % Final     Platelets   Date Value Ref Range Status   04/13/2023 207 140 - 450 10*3/mm3 Final          Assessment & Plan         62-year-old male with a history of coronary artery disease, previous AV replacement, diabetes mellitus, morbid  obesity, TJ and reflux.  He had presented with GI bleeding after anti-inflammatory use for bilateral knee pain.  Studies revealed an esophageal mass as well as a large cecal polyp and further endoscopic mucosal resection of the esophageal mass and cecal mass revealed an esophageal cancer and a premalignant polyp in the cecum.  After his additional procedures he had further lower GI bleeding requiring hospitalization and, upon slow recovery with additional hospitalizations for both bleeding and chest discomfort, he was felt a candidate for esophagectomy.    This was performed during admission 2/3 - 10/20/2023-right VAT with total esophagectomy and jejunostomy feeding tube placement.    Pathology revealed evidence that the tumors midpoint 2 cm or less in the proximal stomach or cardia and tumor involving the esophagogastric junction, adenocarcinoma, well-differentiated, invading submucosa, negative margins, all regional lymph nodes negative.  Final pathologic staging was [id number] stage IB.    Please note that HER2 overexpression by immunohistochemistry was 2+ or equivocal.  FISH analysis was also, evidently, noncontributory-indeterminate.    The patient seen in consultation 5/8/2023, and fortunately, is recovering well from surgery and hematologic losses with improvement in his general performance status, maintenance weight, and anemia.  He does continue to have generalized anxiety, and depression and is followed by supportive oncology.    As to adjuvant therapy concerns we have discussed that postoperative adjuvant therapy is primarily reserved for node positive for pathologic T3 or T4 disease.  Adjuvant therapy is also given for high risk-poorly differentiated histology, lymphovascular or perineural invasion in younger patients with resected disease.    It is not felt that adjuvant therapy is necessary in this patient considering his potential risk of recurrent disease.  The risk of this therapy exceeds  the potential benefit at this stage.    After lengthy discussion the patient will follow-up with thoracic surgery and GI medicine at both [facility] and here at [facility].  Thank you very much for allowing us to see this patient consultation and please let me know if any questions concerning this case.

## 2023-05-09 ENCOUNTER — OFFICE VISIT (OUTPATIENT)
Dept: SURGERY | Facility: CLINIC | Age: 63
End: 2023-05-09
Payer: COMMERCIAL

## 2023-05-09 VITALS
HEIGHT: 73 IN | DIASTOLIC BLOOD PRESSURE: 84 MMHG | BODY MASS INDEX: 37.32 KG/M2 | WEIGHT: 281.6 LBS | HEART RATE: 73 BPM | OXYGEN SATURATION: 96 % | SYSTOLIC BLOOD PRESSURE: 162 MMHG

## 2023-05-09 DIAGNOSIS — C15.5 MALIGNANT NEOPLASM OF LOWER THIRD OF ESOPHAGUS: Primary | ICD-10-CM

## 2023-05-09 PROCEDURE — 1160F RVW MEDS BY RX/DR IN RCRD: CPT | Performed by: THORACIC SURGERY (CARDIOTHORACIC VASCULAR SURGERY)

## 2023-05-09 PROCEDURE — 3079F DIAST BP 80-89 MM HG: CPT | Performed by: THORACIC SURGERY (CARDIOTHORACIC VASCULAR SURGERY)

## 2023-05-09 PROCEDURE — 3077F SYST BP >= 140 MM HG: CPT | Performed by: THORACIC SURGERY (CARDIOTHORACIC VASCULAR SURGERY)

## 2023-05-09 PROCEDURE — 99214 OFFICE O/P EST MOD 30 MIN: CPT | Performed by: THORACIC SURGERY (CARDIOTHORACIC VASCULAR SURGERY)

## 2023-05-09 PROCEDURE — 1159F MED LIST DOCD IN RCRD: CPT | Performed by: THORACIC SURGERY (CARDIOTHORACIC VASCULAR SURGERY)

## 2023-05-09 NOTE — PROGRESS NOTES
Chief Complaint  Esophageal cancer  Subjective        Edmond Chase presents to [facility] THORACIC SURGERY  History of Present Illness    Mr. Chase is a pleasant 62-year-old gentleman status post esophagectomy for a YPT1b N0 pathologic stage 1b who presents in follow-up. His weight is stable from his prior visit on 04/01/2023 and from 04/25/2023. He underwent an EGD on 05/01/2023 which did not demonstrate any abnormalities.    The patient reports that he is feeling a little better. He denies doing his tube feeds. He notes he was on tube feeds and was informed last visit to discontinue them. He reports that he would like to get rid of the G-tube. He adds his G-tube gets caught up in his shirts. He states that he had a good visit with the oncologist, Dr. Ohara. He notes Dr. Ohara did not tell him anything that we had not told him. He adds Dr. Ohara took his time and emphasized how truly bay he is.     He maintains that he has arthritis in his knee. He took his ibuprofen, and most likely had Last's. He reports that he had an esophageal bleed. He underwent an upper GI exam and they discovered the cancer. He adds if he had not taken the ibuprofen, they would not have found it.     He adds 8 years ago he came to the ER in respiratory distress and was diagnosed with pneumonia. They took an image of his chest and he was informed that he had an aneurysm in his aorta. He states that if he would not of had the pneumonia, they would not have taken the image and the aneurysm would have probably ruptured.    The patient reports that he has a colonoscopy next month to see Dr. Metzger and he is sure that he will want to do another colonoscopy. He states that he is still nervous about his colon. He notes that he was informed without causing damage, no prophylactic measure was needed for his chest that would cause more harm than good.    He maintains that he is eating has improved and he is learning to  "portion size. He states that frequency is a little bit more problematic now and he needs to think about quality rather than just quantity. He reports that he is not throwing up as much. He notes that he vomits approximately every 2 to 3 days. He believes it is strictly mechanical. He states that he gets too much phlegm or a little bit of food and it triggers a gag reflex and the last thing he had just comes up. He adds he has been weighing himself at home after he gets out of the shower and he has been stable for 2 weeks.    The patient notes the G-tube removal today was more painful then his 3 drain tubes removed from his back after heart surgery.    Objective   Vital Signs:  /84 (BP Location: Left arm, Patient Position: Sitting, Cuff Size: Adult)   Pulse 73   Ht 185.4 cm (72.99\")   Wt 128 kg (281 lb 9.6 oz)   SpO2 96%   BMI 37.16 kg/m²   Estimated body mass index is 37.16 kg/m² as calculated from the following:    Height as of this encounter: 185.4 cm (72.99\").    Weight as of this encounter: 128 kg (281 lb 9.6 oz).             Physical Exam  Vitals and nursing note reviewed.   Constitutional:       Appearance: He is well-developed.   HENT:      Head: Normocephalic and atraumatic.      Nose: Nose normal.   Eyes:      Conjunctiva/sclera: Conjunctivae normal.   Pulmonary:      Effort: Pulmonary effort is normal.   Musculoskeletal:         General: Normal range of motion.      Cervical back: Normal range of motion and neck supple.   Skin:     General: Skin is warm and dry.   Neurological:      Mental Status: He is alert and oriented to person, place, and time.   Psychiatric:         Behavior: Behavior normal.         Thought Content: Thought content normal.         Judgment: Judgment normal.        Result Review :                   Assessment and Plan   There are no diagnoses linked to this encounter.    Mr. 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 He was seen by Dr. Schwartz who does not recommend any further therapy. He does not have any abnormalities on scope. We will plan to see him in 1 month. He is status post esophagectomy on 02/03/2023 He will need a CT of the chest, abdomen, and pelvis at that time. He was advised his first CAT scan is due 05/2023.    The patient's J-tube was removed in today's visit. He was advised to keep the wound covered for 24 hours.       I spent 30 minutes caring for Edmond on this date of service. This time includes time spent by me in the following activities:preparing for the visit, reviewing tests, obtaining and/or reviewing a separately obtained history, performing a medically appropriate examination and/or evaluation , counseling and educating the patient/family/caregiver, ordering medications, tests, or procedures, documenting information in the medical record and independently interpreting results and communicating that information with the patient/family/caregiver  Follow Up   No follow-ups on file.  Patient was given instructions and counseling regarding his condition or for health maintenance advice. Please see specific information pulled into the AVS if appropriate.     Transcribed from ambient dictation for Valerie Stockton MD by Cristiane Hernandez.  05/09/23   15:43 EDT    Patient or patient representative verbalized consent to the visit recording.  I have personally performed the services described in this document as transcribed by the above individual, and it is both accurate and complete.

## 2023-05-09 NOTE — LETTER
May 27, 2023     VIKTORIA Carbone  [address]  [facility] [address]  [facility] [address]    Patient: Edmond Chase   YOB: 1960   Date of Visit: 5/9/2023       Dear VIKTORIA Carbone,    Edmond Chase was in my office today. Below are the relevant portions of my assessment and plan of care.           If you have questions, please do not hesitate to call me. I look forward to following Edmond along with you.         Sincerely,        Valerie Stockton MD        CC: MD Sekou South MD Brian David Beauerle, MD Ryan Edward Modlinski, MD Katherine Halloway, APRN Boutros El-Haddad, MD Brennan M Haraden, MD Stephanie Bernauer, STEVE

## 2023-05-10 ENCOUNTER — PATIENT OUTREACH (OUTPATIENT)
Dept: OTHER | Facility: HOSPITAL | Age: 63
End: 2023-05-10
Payer: COMMERCIAL

## 2023-05-10 NOTE — PROGRESS NOTES
"Called patient. He stated Dr. Stockton removed his feeding tube yesterday. He is gaining weight. He has been working on eating and \"retaining it\".  The patient has been working on eating small amounts. I asked Audrey to connect with the patient again.     The patient met with Dr. Schwartz 5/8 for consultation.  He was pleased to hear that he will just be in surveillance for his stage IB esophageal cancer.     The patient is being referred to Dr. Metzger @ [facility]. He meets with him next month. The patient states he will likely have another colonoscopy in the near future.     The patient has been working on trying to sleep in his bed. He previously had trouble laying flat due to his J tube. The patient reported his anxiety worsened.  He continues to meet with Anna, behavioral oncology. He has been taking medicines at night, which he states has been helping.     The patient denies any other resource or supportive care needs.    I will call in July; he will call as needed          "

## 2023-05-11 ENCOUNTER — TELEPHONE (OUTPATIENT)
Dept: NUTRITION | Facility: HOSPITAL | Age: 63
End: 2023-05-11
Payer: COMMERCIAL

## 2023-05-11 NOTE — PROGRESS NOTES
"OUTPATIENT ONCOLOGY NUTRITION ASSESSMENT    Patient Name: Edmond Chase  YOB: 1960  MRN: [id number]  Assessment Date: 5/11/2023    COMMENTS: Received message that patient had some questions about po diet. Noted feeding tube has been removed. Left message for patient to return call.         Medical/Surgical History Past Medical History:   Diagnosis Date   • Abnormal nuclear stress test    • Anxiety    • Anxiety and depression    • Aortic valve insufficiency     nonrheumtic    • Arthritis     knees   • Ascending aortic aneurysm    • CAD (coronary artery disease)     stent   • Chest pain    • Diabetes mellitus    • Diarrhea    • Dizzy     CAREFUL WHEN GETTING UP   • Dyspnea    • Esophageal cancer 11/2022    no chemo or radiation   • Essential hypertension    • Fatigue    • G tube feedings     per jejunostomy tube nightly with permitin  3 cans nightly   • GERD (gastroesophageal reflux disease)    • GI bleed    • Heart murmur    • History of pneumonia    • History of recent blood transfusion     hemorrhage     no reaction   • Hyperglycemia    • Hyperlipidemia    • Hypersomnia with sleep apnea    • Jejunostomy tube present    • Lightheadedness    • Malaise and fatigue    • Migraines     \"OCCULAR MIGRAINES\"   • Morbid obesity    • Myocardial ischemia    • Nausea    • Nocturia    • TJ on auto CPAP 10/31/2016    Overnight polysomnogram.  Weight 276 pounds.  Severe TJ with AHI 79 events per hour.  Auto CPAP recommended.   • Pneumonia     FEB 2016   • Scrotal bleeding    • SOB (shortness of breath)        Past Surgical History:   Procedure Laterality Date   • AORTIC VALVE REPAIR/REPLACEMENT  05/2016   • ASCENDING ARCH/HEMIARCH REPLACEMENT N/A 05/02/2016    Procedure: BHAVESH STERNOTOMY CORONARY ARTERY BYPASS GRAFT TIMES 3 USING LEFT INTERNAL MAMMARY ARTERY AND RIGHT GREATER SAPHENOUS VEIN GRAFT PER ENDOSCOPIC VEIN HARVESTING, AORTIC ANEURYSM REPAIR WITH ROOT REPAIR AND AORTIC VALVE REPLACEMENT;  Surgeon: " Rosalio Cline MD;  Location: [facility] MAIN OR;  Service:    • CARDIAC CATHETERIZATION N/A 04/01/2016    Procedure: Left Heart Cath;  Surgeon: Erick Tam MD;  Location: [facility] CATH INVASIVE LOCATION;  Service:    • CARDIAC CATHETERIZATION N/A 04/01/2016    Procedure: Left ventriculography;  Surgeon: Erick Tam MD;  Location: [facility] CATH INVASIVE LOCATION;  Service:    • CARDIAC CATHETERIZATION N/A 04/01/2016    Procedure: Right Heart Cath;  Surgeon: Erick Tam MD;  Location: [facility] CATH INVASIVE LOCATION;  Service:    • CARDIAC CATHETERIZATION N/A 10/30/2017    Procedure: Coronary angiography;  Surgeon: Sorin Vasquez MD;  Location: [facility] CATH INVASIVE LOCATION;  Service:    • CARDIAC CATHETERIZATION  10/30/2017    Procedure: Saphenous Vein Graft;  Surgeon: Sorin Vasquez MD;  Location: [facility] CATH INVASIVE LOCATION;  Service:    • CARDIAC CATHETERIZATION N/A 10/30/2017    Procedure: Native mammary injection;  Surgeon: Sorin Vasquez MD;  Location: [facility] CATH INVASIVE LOCATION;  Service:    • CARDIAC CATHETERIZATION N/A 07/07/2020    Procedure: Coronary angiography;  Surgeon: Gregor Kim MD;  Location: [facility] CATH INVASIVE LOCATION;  Service: Cardiovascular;  Laterality: N/A;   • CARDIAC CATHETERIZATION N/A 07/07/2020    Procedure: Left heart cath;  Surgeon: Gregor Kim MD;  Location: [facility] CATH INVASIVE LOCATION;  Service: Cardiovascular;  Laterality: N/A;   • CARDIAC CATHETERIZATION N/A 07/07/2020    Procedure: Left ventriculography;  Surgeon: Gregor Kim MD;  Location: [facility] CATH INVASIVE LOCATION;  Service: Cardiovascular;  Laterality: N/A;   • CARDIAC CATHETERIZATION N/A 07/07/2020    Procedure: Stent ESMER bypass graft;  Surgeon: Gregor Kim MD;  Location: [facility] CATH INVASIVE LOCATION;  Service: Cardiovascular;  Laterality: N/A;   • CARDIAC CATHETERIZATION N/A 06/17/2021    Procedure: SAPHENOUS VEIN GRAFT;  Surgeon: Marques Ndiaye MD;  Location: [facility] CATH  INVASIVE LOCATION;  Service: Cardiovascular;  Laterality: N/A;   • CARDIAC CATHETERIZATION N/A 06/17/2021    Procedure: Left Heart Cath;  Surgeon: Marques Ndiaye MD;  Location: [facility] CATH INVASIVE LOCATION;  Service: Cardiovascular;  Laterality: N/A;   • CARDIAC CATHETERIZATION N/A 06/17/2021    Procedure: Coronary angiography;  Surgeon: Marques Ndiaye MD;  Location: [facility] CATH INVASIVE LOCATION;  Service: Cardiovascular;  Laterality: N/A;   • CARDIAC CATHETERIZATION N/A 07/14/2022    Procedure: Left Heart Cath;  Surgeon: Charlie Aj MD;  Location: [facility] CATH INVASIVE LOCATION;  Service: Cardiovascular;  Laterality: N/A;   • CARDIAC CATHETERIZATION N/A 07/14/2022    Procedure: Coronary angiography;  Surgeon: Charlie Aj MD;  Location: [facility] CATH INVASIVE LOCATION;  Service: Cardiovascular;  Laterality: N/A;   • CARDIAC CATHETERIZATION  07/14/2022    Procedure: Saphenous Vein Graft;  Surgeon: Charlie Aj MD;  Location: [facility] CATH INVASIVE LOCATION;  Service: Cardiovascular;;   • CARDIAC CATHETERIZATION N/A 07/14/2022    Procedure: Native mammary injection;  Surgeon: Charlie Aj MD;  Location: [facility] CATH INVASIVE LOCATION;  Service: Cardiovascular;  Laterality: N/A;   • CATARACT EXTRACTION EXTRACAPSULAR W/ INTRAOCULAR LENS IMPLANTATION Left    • COLONOSCOPY N/A 11/26/2022    Procedure: COLONOSCOPY AT BEDSIDE;  Surgeon: Tomy Lopez MD;  Location: [facility] MAIN OR;  Service: Gastroenterology;  Laterality: N/A;   • COLONOSCOPY W/ POLYPECTOMY N/A 10/04/2022    Procedure: COLONOSCOPY to cecum with cold forceps and cold snare polypectomies;  Surgeon: Gregor Carlson MD;  Location: [facility] ENDOSCOPY;  Service: Gastroenterology;  Laterality: N/A;  PRE- hx of polyps  POST- diverticulosis, polyps   • CORONARY ARTERY BYPASS GRAFT  05/2016    LIMA TO LAD, SVG TO PDA, SVG TO OM2   • ENDOSCOPY N/A 07/13/2022    Procedure: ESOPHAGOGASTRODUODENOSCOPY;  Surgeon: Telma  "Marcia GALLEGOS MD;  Location: [facility] ENDOSCOPY;  Service: Gastroenterology;  Laterality: N/A;  PRE- ANEMIA, MELENA  POST- MILD EROSIVE GASTRITIS, GE JUNCTION ULCER   • ENDOSCOPY N/A 10/04/2022    Procedure: ESOPHAGOGASTRODUODENOSCOPY with biopsies;  Surgeon: Gregor Carlson MD;  Location: [facility] ENDOSCOPY;  Service: Gastroenterology;  Laterality: N/A;  PRE- hx of esophageal ulcer  POST- gastric cardia mass   • ENDOSCOPY N/A 5/1/2023    Procedure: ESOPHAGOGASTRODUODENOSCOPY WITH  [id number] balloon DILATATION of pylorus and anastomosis;  Surgeon: Valerie Stockton MD;  Location: [facility] ENDOSCOPY;  Service: Gastroenterology;  Laterality: N/A;  PRE: dysphagia  POST: no abnormalities seen   • ESOPHAGOGASTRECTOMY Right 02/03/2023    Procedure: BRONCHOSCOPY, RIGHT ROBOTIC VIDEO ASSISTED THORACOSCOPY WITH TOTAL ESOPHAGECTOMY, INTERCOSTAL NERVE BLOCK, FEEDING JEJUNOSTOMY PLACEMENT;  Surgeon: Valerie Stockton MD;  Location: [facility] MAIN OR;  Service: Robotics - DaVinci;  Laterality: Right;   • INNER EAR SURGERY     • TONSILLECTOMY            Anthropometrics        Current Height Ht Readings from Last 1 Encounters:   05/09/23 185.4 cm (72.99\")      Current Weight Wt Readings from Last 1 Encounters:   05/09/23 128 kg (281 lb 9.6 oz)      Weight History Wt Readings from Last 30 Encounters:   05/09/23 1110 128 kg (281 lb 9.6 oz)   05/08/23 1031 127 kg (279 lb 11.2 oz)   05/01/23 1035 128 kg (281 lb 14.4 oz)   04/25/23 0902 131 kg (289 lb)   04/13/23 1606 131 kg (288 lb)   04/11/23 0903 129 kg (283 lb 6.4 oz)   04/10/23 0957 133 kg (294 lb)   03/28/23 1427 133 kg (294 lb 3.2 oz)   03/17/23 1058 133 kg (294 lb)   03/14/23 1421 133 kg (292 lb 8.6 oz)   03/03/23 1254 132 kg (291 lb)   02/28/23 1442 131 kg (289 lb 6.4 oz)   02/17/23 1554 (!) 137 kg (301 lb)   02/10/23 0500 (!) 137 kg (301 lb)   02/09/23 0500 (!) 137 kg (301 lb)   02/05/23 0300 (!) 147 kg (324 lb 8.3 oz)   02/04/23 1727 (!) 144 kg (317 lb 7.4 oz)   02/04/23 0600 " (!) 144 kg (317 lb 10.9 oz)   02/03/23 1800 (!) 143 kg (315 lb 4.1 oz)   01/30/23 1509 (!) 142 kg (314 lb)   01/23/23 1440 (!) 142 kg (314 lb)   01/20/23 0756 (!) 142 kg (314 lb)   01/16/23 0949 (!) 141 kg (310 lb)   12/15/22 0618 (!) 142 kg (313 lb 11.4 oz)   12/14/22 1553 (!) 140 kg (308 lb)   12/14/22 1532 (!) 142 kg (313 lb)   12/14/22 1006 (!) 142 kg (313 lb)   12/14/22 0932 (!) 142 kg (313 lb)   12/06/22 1529 (!) 140 kg (308 lb 8 oz)   12/02/22 0600 (!) 143 kg (314 lb 6 oz)   12/01/22 0546 (!) 144 kg (317 lb 3.2 oz)   11/25/22 1123 (!) 141 kg (310 lb)   10/21/22 1052 (!) 142 kg (314 lb)   10/04/22 0934 (!) 140 kg (308 lb 11.2 oz)   09/15/22 0915 (!) 141 kg (310 lb)   08/29/22 1355 (!) 141 kg (311 lb 8 oz)   08/17/22 0900 (!) 142 kg (314 lb)   08/08/22 1032 (!) 144 kg (318 lb)   08/04/22 0814 (!) 143 kg (315 lb)          Medications           Current medications: Chlorhexidine Gluconate Cloth, Loperamide HCl, amLODIPine, atorvastatin, dapagliflozin Propanediol, lisinopril, metoprolol succinate XL, and mirtazapine                 Tests/Procedures        Tests/Procedures Other:  post esophagectomy, JT removed 5/9     Labs       Pertinent Labs          Invalid input(s): LABALJUANITA SUBRAMANIAN  Results from last 7 days   Lab Units 05/08/23  0942   HEMOGLOBIN g/dL 12.5*   HEMATOCRIT % 39.9   WBC 10*3/mm3 6.27     Results from last 7 days   Lab Units 05/08/23  0942   PLATELETS 10*3/mm3 199     COVID19   Date Value Ref Range Status   07/10/2022 Not Detected Not Detected - Ref. Range Final     Lab Results   Component Value Date    HGBA1C 6.60 (H) 03/03/2023            Electronically signed by:  Audrey Correa RD, LD  05/11/23 12:38 EDT

## 2023-05-16 ENCOUNTER — DOCUMENTATION (OUTPATIENT)
Dept: OTHER | Facility: HOSPITAL | Age: 63
End: 2023-05-16
Payer: COMMERCIAL

## 2023-05-16 NOTE — PROGRESS NOTES
Oncology Social Work  ..Distress Screening Follow-up    Diagnosis: Esophageal Cancer - stage Ib    Location of Distress Screening: Medical Oncology    Distress Level: 6 (5/8/2023 11:00 AM)    Physical Concerns:    Fatigue: Y    Practical Problems:       Emotional Concerns:       Family Concerns:       Spiritual Concerns:        Interventions:     Emotional Needs: emotional suppport/coping strategies (seeing Anna JENKINS)    Comments: Chart reviewed.  Patient being followed by Navigation, Behavioral Oncology and Nutrition.   Will remain available if needed    Vita Hernandez, MSSW, LCSW

## 2023-05-25 ENCOUNTER — TELEMEDICINE (OUTPATIENT)
Dept: PSYCHIATRY | Facility: HOSPITAL | Age: 63
End: 2023-05-25
Payer: COMMERCIAL

## 2023-05-25 DIAGNOSIS — C15.5 MALIGNANT NEOPLASM OF LOWER THIRD OF ESOPHAGUS: ICD-10-CM

## 2023-05-25 DIAGNOSIS — F41.1 GAD (GENERALIZED ANXIETY DISORDER): Primary | ICD-10-CM

## 2023-05-25 DIAGNOSIS — F33.1 MODERATE EPISODE OF RECURRENT MAJOR DEPRESSIVE DISORDER: ICD-10-CM

## 2023-05-25 RX ORDER — MIRTAZAPINE 45 MG/1
45 TABLET, FILM COATED ORAL NIGHTLY
Qty: 30 TABLET | Refills: 2 | Status: SHIPPED | OUTPATIENT
Start: 2023-05-25 | End: 2024-05-24

## 2023-05-25 NOTE — PROGRESS NOTES
Behavioral Oncology Services  Video visit conducted via [employer] Video Visit  You have chosen to receive care through a telehealth visit.  Do you consent to use a video/audio connection for your medical care today? YES  Telehealth provided in patient's home.  Location of Provider: [facility]  Patient ID: Edmond Chase is a 62 y.o. male is seen via telehealth in the Behavioral Oncology Clinic.    CC: Anxiety, nausea, continued challenges accepting adjusted health    HPI/ Interval History:   Pt is seen in follow up regarding esophageal cancer. Continues with challenges managing intake, acknowledging the need to eat all of the time. Continues to deny enjoyment with eating. Finds it difficult to cook because he eats so little. Is mostly relying on frozen entrees, etc. Continus to reflect upon previous appetite and weight, comparing current early satiety and weight loss. Continues to endorse pain in knees, somewhat assisted by weight reduction.    Anticipates upcoming scans, follow up with GI anticipating upcoming colonoscopy. Reports anxiety surrounding previous GI bleed, fears for cancer. Identifies the 'unknown' as a primary source of his anxiety. Anticipates regular CT scans for vigilance. Acknowledges contemporaries also have health decline. Identifies barriers to previous enjoyments, finding it difficult to identify new, realistic goals. Contitues to mourn isolation.    Pt continues on mirtazapine 30 mg q hs. Sleep is appropriate, assisted by restart of CPAP. Depression and anxiety persist. Appetite remains challenging amidst anatomical challenges.     Last seen in person: January 27, 2023    Exam: As above    Recent Labs Reviewed:  Lab on 05/08/2023   Component Date Value   • WBC 05/08/2023 6.27    • RBC 05/08/2023 4.51    • Hemoglobin 05/08/2023 12.5 (L)    • Hematocrit 05/08/2023 39.9    • MCV 05/08/2023 88.5    • MCH 05/08/2023 27.7    • MCHC 05/08/2023 31.3 (L)    • RDW  05/08/2023 14.6    • RDW-SD 05/08/2023 46.6    • MPV 05/08/2023 9.8    • Platelets 05/08/2023 199    • Neutrophil % 05/08/2023 67.1    • Lymphocyte % 05/08/2023 20.7    • Monocyte % 05/08/2023 8.5    • Eosinophil % 05/08/2023 2.9    • Basophil % 05/08/2023 0.5    • Immature Grans % 05/08/2023 0.3    • Neutrophils, Absolute 05/08/2023 4.21    • Lymphocytes, Absolute 05/08/2023 1.30    • Monocytes, Absolute 05/08/2023 0.53    • Eosinophils, Absolute 05/08/2023 0.18    • Basophils, Absolute 05/08/2023 0.03    • Immature Grans, Absolute 05/08/2023 0.02    • nRBC 05/08/2023 0.0    Admission on 05/01/2023, Discharged on 05/01/2023   Component Date Value   • Glucose 05/01/2023 121    • QT Interval 05/01/2023 416    Pre-Admission Testing on 04/13/2023   Component Date Value   • Glucose 04/13/2023 106 (H)    • BUN 04/13/2023 17    • Creatinine 04/13/2023 0.86    • Sodium 04/13/2023 138    • Potassium 04/13/2023 3.9    • Chloride 04/13/2023 105    • CO2 04/13/2023 25.9    • Calcium 04/13/2023 9.1    • BUN/Creatinine Ratio 04/13/2023 19.8    • Anion Gap 04/13/2023 7.1    • eGFR 04/13/2023 96.7    • WBC 04/13/2023 5.11    • RBC 04/13/2023 3.92 (L)    • Hemoglobin 04/13/2023 10.9 (L)    • Hematocrit 04/13/2023 33.7 (L)    • MCV 04/13/2023 86.0    • MCH 04/13/2023 27.8    • MCHC 04/13/2023 32.3    • RDW 04/13/2023 14.8    • RDW-SD 04/13/2023 46.1    • MPV 04/13/2023 9.3    • Platelets 04/13/2023 207    Lab review    Current Psychotropic Medications:  Mirtazapine 30 mg q hs    Plan of Care/ Medical Decision Making  Increase mirtazapoine to 45 mg at bedtime   Strongly encouraged patient to participate in [employer];  Supportive counseling provided surrounding validation of health concerns, gratitude for loved ones, opportunities for connection to others.  Follow up arranged in 4 weeks, in person.    Diagnoses and all orders for this visit:    1. CHARANJIT (generalized anxiety disorder) (Primary)    2. Malignant neoplasm of lower third  of esophagus    3. Moderate episode of recurrent major depressive disorder    Other orders  -     mirtazapine (REMERON) 45 MG tablet; Take 1 tablet by mouth Every Night.  Dispense: 30 tablet; Refill: 2    I spent 35 minutes caring for Edmond on this date of service. This time includes time spent by me in the following activities: preparing for the visit, reviewing tests, obtaining and/or reviewing a separately obtained history, performing a medically appropriate examination and/or evaluation, counseling and educating the patient/family/caregiver, ordering medications, tests, or procedures and documenting information in the medical record.

## 2023-05-31 DIAGNOSIS — C15.5 MALIGNANT NEOPLASM OF LOWER THIRD OF ESOPHAGUS: Primary | ICD-10-CM

## 2023-06-01 ENCOUNTER — HOSPITAL ENCOUNTER (OUTPATIENT)
Dept: CT IMAGING | Facility: HOSPITAL | Age: 63
Discharge: HOME | End: 2023-06-01

## 2023-06-01 DIAGNOSIS — C15.5 MALIGNANT NEOPLASM OF LOWER THIRD OF ESOPHAGUS: ICD-10-CM

## 2023-06-01 LAB — CREAT BLDA-MCNC: 0.8 MG/DL (ref 0.6–1.3)

## 2023-06-01 PROCEDURE — 71260 CT THORAX DX C+: CPT

## 2023-06-01 PROCEDURE — 82565 ASSAY OF CREATININE: CPT

## 2023-06-01 PROCEDURE — 74177 CT ABD & PELVIS W/CONTRAST: CPT

## 2023-06-01 PROCEDURE — 25510000001 IOPAMIDOL 61 % SOLUTION: Performed by: THORACIC SURGERY (CARDIOTHORACIC VASCULAR SURGERY)

## 2023-06-01 RX ADMIN — IOPAMIDOL 85 ML: 612 INJECTION, SOLUTION INTRAVENOUS at 08:54

## 2023-06-09 ENCOUNTER — OFFICE VISIT (OUTPATIENT)
Dept: INTERNAL MEDICINE | Age: 63
End: 2023-06-09
Payer: COMMERCIAL

## 2023-06-09 VITALS
SYSTOLIC BLOOD PRESSURE: 140 MMHG | BODY MASS INDEX: 36.71 KG/M2 | TEMPERATURE: 97.6 F | HEART RATE: 81 BPM | WEIGHT: 277 LBS | HEIGHT: 73 IN | OXYGEN SATURATION: 96 % | DIASTOLIC BLOOD PRESSURE: 80 MMHG

## 2023-06-09 DIAGNOSIS — E78.5 HYPERLIPIDEMIA, UNSPECIFIED HYPERLIPIDEMIA TYPE: ICD-10-CM

## 2023-06-09 DIAGNOSIS — E11.65 TYPE 2 DIABETES MELLITUS WITH HYPERGLYCEMIA, WITHOUT LONG-TERM CURRENT USE OF INSULIN: ICD-10-CM

## 2023-06-09 DIAGNOSIS — I10 ESSENTIAL HYPERTENSION: ICD-10-CM

## 2023-06-09 DIAGNOSIS — R53.83 OTHER FATIGUE: Primary | ICD-10-CM

## 2023-06-09 NOTE — PROGRESS NOTES
"    I N T E R N A L  M E D I C I SAMUEL E  VIKTORIA Carbone    ENCOUNTER DATE:  06/09/2023    Edmond Chase / 62 y.o. / male      CHIEF COMPLAINT / REASON FOR OFFICE VISIT     Diabetes, Hypertension, and Hyperlipidemia      ASSESSMENT & PLAN     Diagnoses and all orders for this visit:    1. Other fatigue (Primary)  -     ECG 12 Lead    2. Essential hypertension  -     CBC & Differential  -     Comprehensive Metabolic Panel    3. Hyperlipidemia, unspecified hyperlipidemia type  -     Lipid Panel With / Chol / HDL Ratio    4. Type 2 diabetes mellitus with hyperglycemia, without long-term current use of insulin  -     Comprehensive Metabolic Panel  -     Hemoglobin A1c  -     Lipid Panel With / Chol / HDL Ratio         SUMMARY/DISCUSSION  He was instructed to closely monitor his BP at home to ensure it is averaging < 130/80.  Will anticipate increasing furosemide 20 mg dose to daily after review of updated CMP.  Discussed with pt recommendation to seek support through cancer survivor groups as well as engaging in regular physical activity to boost natural serotonin to help with mood.        ECG 12 Lead    Date/Time: 6/9/2023 12:45 PM  Performed by: Nargis eVlasquez APRN  Authorized by: Nargis Velasquez APRN   Comparison: compared with previous ECG from 5/1/2023  Similar to previous ECG  Rhythm: sinus rhythm  Ectopy: unifocal PVCs        Next Appointment with me: Visit date not found    No follow-ups on file.      VITAL SIGNS     Visit Vitals  /80   Pulse 81   Temp 97.6 °F (36.4 °C)   Ht 185.4 cm (72.99\")   Wt 126 kg (277 lb)   SpO2 96%   BMI 36.55 kg/m²             Wt Readings from Last 3 Encounters:   06/14/23 126 kg (277 lb)   06/09/23 126 kg (277 lb)   05/09/23 128 kg (281 lb 9.6 oz)     Body mass index is 36.55 kg/m².        MEDICATIONS AT THE TIME OF OFFICE VISIT     Current Outpatient Medications on File Prior to Visit   Medication Sig Dispense Refill    amLODIPine (NORVASC) 10 MG tablet Take 1 tablet by " mouth Daily. (Patient taking differently: Take 1 tablet by mouth Every Night.) 30 tablet 11    atorvastatin (LIPITOR) 40 MG tablet Administer 1 tablet per J tube Every Night. (Patient taking differently: Take 1 tablet by mouth Every Night. Indications: High Amount of Fats in the Blood) 30 tablet 0    Chlorhexidine Gluconate Cloth 2 % pads Apply 1 application topically. USE AS DIRECTED PREOP      dapagliflozin Propanediol (Farxiga) 10 MG tablet Administer 10 mg per J tube Daily. (Patient taking differently: Take 10 mg by mouth Daily. Indications: Type 2 Diabetes) 30 tablet 0    Loperamide HCl (IMODIUM PO) Take 1 tablet by mouth As Needed. LOOSE STOOLS      metoprolol succinate XL (TOPROL-XL) 25 MG 24 hr tablet 1 tablet Every Night.      mirtazapine (REMERON) 45 MG tablet Take 1 tablet by mouth Every Night. 30 tablet 2    lisinopril (PRINIVIL,ZESTRIL) 10 MG tablet Administer 1 tablet per J tube Daily for 30 days. (Patient taking differently: Take 1 tablet by mouth Every Night.) 30 tablet 1     No current facility-administered medications on file prior to visit.        HISTORY OF PRESENT ILLNESS     Reports chronic, ongoing fatigue.  His mood is down.  He expresses this is related to the significant lifestyle and eating changes required following his esophagectomy.  He is followed by psychiatry.      He continues to be followed by thoracic surgery for esophageal cancer.  June 2023 CT Abd showed left renal cyst, and he was referred to urology.  Renal US was ordered.  He will next follow up with Dr. Stockton in 3 months.    HTN: BP elevated today, 140/80, on amlodipine 10 mg nightly, lisinopril 10 mg daily, metoprolol succinate XL 25 mg nightly.  Does report BLE swelling, for which he has been taking furosemide 20 mg approximately 3x weekly with some benefit.  Denies chest pain, dyspnea.      HLD: Atorvastatin 40 mg daily.  August 2022 Lipid panel with triglycerides 104; LDL 60.    Type 2 diabetes: March 2023 Farxiga 10 mg  daily.    Insomnia: Mitrazapine 45 mg nightly with benefit.      Patient Care Team:  Nargis Velasquez APRN as PCP - General (Family Medicine)  Papa Miguel MD as Consulting Physician (Cardiology)  Sekou Pisano MD as Consulting Physician (Pulmonary Disease)  Gregor Carlson MD as Consulting Physician (Gastroenterology)  Estevan Yuan MD (Sports Medicine)  Shelley Goldsmith, RN as Nurse Navigator  Alex Hernadez MD as Consulting Physician (Nephrology)  Yazmin Molina APRN as Nurse Practitioner (Psychiatry)  Dylon Schwartz MD as Consulting Physician (Hematology and Oncology)  Valerie Stockton MD as Referring Physician (Thoracic Surgery)    REVIEW OF SYSTEMS     Review of Systems   Constitutional:  Positive for fatigue. Negative for chills, fever and unexpected weight change.   Respiratory:  Negative for cough, chest tightness and shortness of breath.    Cardiovascular:  Positive for leg swelling. Negative for chest pain and palpitations.   Neurological:  Negative for dizziness, weakness, light-headedness and headaches.   Psychiatric/Behavioral:  The patient is not nervous/anxious.         PHYSICAL EXAMINATION     Physical Exam  Vitals reviewed.   Constitutional:       General: He is not in acute distress.     Appearance: Normal appearance. He is not ill-appearing, toxic-appearing or diaphoretic.   HENT:      Head: Normocephalic and atraumatic.   Cardiovascular:      Rate and Rhythm: Normal rate and regular rhythm.      Heart sounds: Normal heart sounds.      Comments: Possible PVCs auscultated    Pulmonary:      Effort: Pulmonary effort is normal.      Breath sounds: Normal breath sounds.   Musculoskeletal:      Right lower leg: Edema present.      Left lower leg: Edema present.   Neurological:      Mental Status: He is alert and oriented to person, place, and time. Mental status is at baseline.   Psychiatric:         Mood and Affect: Mood normal.         Behavior:  Behavior normal.         Thought Content: Thought content normal.         Judgment: Judgment normal.         REVIEWED DATA     Labs:           Imaging:            Medical Tests:           Summary of old records / correspondence / consultant report:           Request outside records:

## 2023-06-10 LAB
ALBUMIN SERPL-MCNC: 4 G/DL (ref 3.5–5.2)
ALBUMIN/GLOB SERPL: 1.8 G/DL
ALP SERPL-CCNC: 93 U/L (ref 39–117)
ALT SERPL-CCNC: 17 U/L (ref 1–41)
AST SERPL-CCNC: 17 U/L (ref 1–40)
BASOPHILS # BLD AUTO: 0.02 10*3/MM3 (ref 0–0.2)
BASOPHILS NFR BLD AUTO: 0.3 % (ref 0–1.5)
BILIRUB SERPL-MCNC: 0.5 MG/DL (ref 0–1.2)
BUN SERPL-MCNC: 12 MG/DL (ref 8–23)
BUN/CREAT SERPL: 12.9 (ref 7–25)
CALCIUM SERPL-MCNC: 9.1 MG/DL (ref 8.6–10.5)
CHLORIDE SERPL-SCNC: 104 MMOL/L (ref 98–107)
CHOLEST SERPL-MCNC: 122 MG/DL (ref 0–200)
CHOLEST/HDLC SERPL: 3.39 {RATIO}
CO2 SERPL-SCNC: 27.2 MMOL/L (ref 22–29)
CREAT SERPL-MCNC: 0.93 MG/DL (ref 0.76–1.27)
EGFRCR SERPLBLD CKD-EPI 2021: 91.7 ML/MIN/1.73
EOSINOPHIL # BLD AUTO: 0.09 10*3/MM3 (ref 0–0.4)
EOSINOPHIL NFR BLD AUTO: 1.5 % (ref 0.3–6.2)
ERYTHROCYTE [DISTWIDTH] IN BLOOD BY AUTOMATED COUNT: 14.5 % (ref 12.3–15.4)
GLOBULIN SER CALC-MCNC: 2.2 GM/DL
GLUCOSE SERPL-MCNC: 101 MG/DL (ref 65–99)
HBA1C MFR BLD: 6.2 % (ref 4.8–5.6)
HCT VFR BLD AUTO: 38.3 % (ref 37.5–51)
HDLC SERPL-MCNC: 36 MG/DL (ref 40–60)
HGB BLD-MCNC: 12.3 G/DL (ref 13–17.7)
IMM GRANULOCYTES # BLD AUTO: 0.01 10*3/MM3 (ref 0–0.05)
IMM GRANULOCYTES NFR BLD AUTO: 0.2 % (ref 0–0.5)
LDLC SERPL CALC-MCNC: 66 MG/DL (ref 0–100)
LYMPHOCYTES # BLD AUTO: 1.03 10*3/MM3 (ref 0.7–3.1)
LYMPHOCYTES NFR BLD AUTO: 17 % (ref 19.6–45.3)
MCH RBC QN AUTO: 28.3 PG (ref 26.6–33)
MCHC RBC AUTO-ENTMCNC: 32.1 G/DL (ref 31.5–35.7)
MCV RBC AUTO: 88 FL (ref 79–97)
MONOCYTES # BLD AUTO: 0.41 10*3/MM3 (ref 0.1–0.9)
MONOCYTES NFR BLD AUTO: 6.8 % (ref 5–12)
NEUTROPHILS # BLD AUTO: 4.49 10*3/MM3 (ref 1.7–7)
NEUTROPHILS NFR BLD AUTO: 74.2 % (ref 42.7–76)
NRBC BLD AUTO-RTO: 0 /100 WBC (ref 0–0.2)
PLATELET # BLD AUTO: 215 10*3/MM3 (ref 140–450)
POTASSIUM SERPL-SCNC: 3.9 MMOL/L (ref 3.5–5.2)
PROT SERPL-MCNC: 6.2 G/DL (ref 6–8.5)
RBC # BLD AUTO: 4.35 10*6/MM3 (ref 4.14–5.8)
SODIUM SERPL-SCNC: 143 MMOL/L (ref 136–145)
TRIGL SERPL-MCNC: 111 MG/DL (ref 0–150)
VLDLC SERPL CALC-MCNC: 20 MG/DL (ref 5–40)
WBC # BLD AUTO: 6.05 10*3/MM3 (ref 3.4–10.8)

## 2023-06-12 DIAGNOSIS — I10 ESSENTIAL HYPERTENSION: Primary | ICD-10-CM

## 2023-06-14 ENCOUNTER — OFFICE VISIT (OUTPATIENT)
Dept: SURGERY | Facility: CLINIC | Age: 63
End: 2023-06-14
Payer: COMMERCIAL

## 2023-06-14 VITALS
HEART RATE: 91 BPM | BODY MASS INDEX: 36.71 KG/M2 | OXYGEN SATURATION: 96 % | WEIGHT: 277 LBS | RESPIRATION RATE: 18 BRPM | HEIGHT: 73 IN | DIASTOLIC BLOOD PRESSURE: 84 MMHG | SYSTOLIC BLOOD PRESSURE: 140 MMHG

## 2023-06-14 DIAGNOSIS — R11.0 NAUSEA: ICD-10-CM

## 2023-06-14 DIAGNOSIS — C15.5 MALIGNANT NEOPLASM OF LOWER THIRD OF ESOPHAGUS: Primary | ICD-10-CM

## 2023-06-14 DIAGNOSIS — N28.1 RENAL CYST, LEFT: ICD-10-CM

## 2023-06-14 PROCEDURE — 3077F SYST BP >= 140 MM HG: CPT | Performed by: NURSE PRACTITIONER

## 2023-06-14 PROCEDURE — 3079F DIAST BP 80-89 MM HG: CPT | Performed by: NURSE PRACTITIONER

## 2023-06-14 PROCEDURE — 1159F MED LIST DOCD IN RCRD: CPT | Performed by: NURSE PRACTITIONER

## 2023-06-14 PROCEDURE — 1160F RVW MEDS BY RX/DR IN RCRD: CPT | Performed by: NURSE PRACTITIONER

## 2023-06-14 PROCEDURE — 99214 OFFICE O/P EST MOD 30 MIN: CPT | Performed by: NURSE PRACTITIONER

## 2023-06-14 RX ORDER — METOCLOPRAMIDE 5 MG/1
5 TABLET ORAL 3 TIMES DAILY PRN
Qty: 90 TABLET | Refills: 1 | Status: SHIPPED | OUTPATIENT
Start: 2023-06-14 | End: 2023-08-13

## 2023-06-14 NOTE — PROGRESS NOTES
"Chief Complaint  Esophageal cancer, follow-up    Subjective        Edmond Chase presents to [facility] THORACIC SURGERY for continued follow-up and surveillance with CT imaging.    History of Present Illness    Jose Chase is a very pleasant 62-year-old gentleman who presents to our office today for further surveillance after undergoing a total esophagectomy by Dr. 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  The patient was followed by Dr. Dylon Schwartz with Psychiatric medical oncology, as well.  Most recent underwent endoscopic evaluation by Dr. Stockton in May with no abnormalities visualized at that time.  Presents today in stable condition.  He reports persistent nausea with occasional vomiting when he eats too large of a portion.  He reports that his change in diet has been a significant adjustment and he has been following with Anna Diaz with our multidisciplinary thoracic oncology center for continued follow-up for depression and anxiety.  Has encouraged him to become more active in local support groups such as [email or web address].  Patient has lost some significant weight since his surgery, however weight is fairly stable on examination today.  He reports no difficulty with swallowing but does report that he feels like his food times takes a while to digest and this is what is causing persistent nausea.  His cardiac issues are stable and he denies any new complaints.  He is scheduled to follow-up with his gastroenterologist for further evaluation and colonoscopy.  Me today that he was initiated on a diuretic due to some swelling in his lower extremities.    Objective   Vital Signs:  /84 (BP Location: Left arm, Patient Position: Sitting, Cuff Size: Adult)   Pulse 91   Resp 18   Ht 185.4 cm (72.99\")   Wt 126 kg (277 lb)   SpO2 96%   BMI 36.56 kg/m²   Estimated body mass index is 36.56 kg/m² as calculated from the following:    Height as of this " "encounter: 185.4 cm (72.99\").    Weight as of this encounter: 126 kg (277 lb).             Physical Exam  Vitals and nursing note reviewed.   Constitutional:       Appearance: Normal appearance.   HENT:      Head: Normocephalic and atraumatic.   Cardiovascular:      Rate and Rhythm: Normal rate and regular rhythm.      Pulses: Normal pulses.      Heart sounds: Normal heart sounds.   Pulmonary:      Effort: Pulmonary effort is normal.      Breath sounds: Normal breath sounds. No wheezing, rhonchi or rales.   Abdominal:      General: Bowel sounds are normal.      Palpations: Abdomen is soft.   Skin:     General: Skin is warm and dry.   Neurological:      General: No focal deficit present.      Mental Status: He is alert. Mental status is at baseline.      Result Review :  The following data was reviewed by: VIKTORIA Ramirez on 06/14/2023:    Data reviewed : Radiologic studies CT of the chest abdomen and pelvis performed 6/1/2023 was independently reviewed.  Stable postoperative changes s/p esophagectomy.  There is what I would consider to be expected appearance of the gastric conduit.  Stable right pulmonary nodule with very minimal right pleural effusion.  Previously seen right hepatic lobe lesion is unchanged with mild central ductal dilation.  There is an indeterminate anterior lower pole left kidney lesion that measures 9 mm in size.             Assessment and Plan   Diagnoses and all orders for this visit:    1. Malignant neoplasm of lower third of esophagus (Primary)  -     CT Chest Without Contrast Diagnostic; Future  -     Cancel: CT Abdomen Without Contrast; Future  -     CT abdomen pelvis wo contrast; Future    2. Renal cyst, left  -     US Renal Bilateral; Future  -     Ambulatory Referral to Urology    3. Nausea  -     metoclopramide (Reglan) 5 MG tablet; Take 1 tablet by mouth 3 (Three) Times a Day As Needed (nausea) for up to 60 days.  Dispense: 90 tablet; Refill: 1    CT imaging was stable " postoperative changes s/p total esophagectomy.  The patient does have a 9 mm lesion in the anterior left lower pole of the kidney.  I will go ahead and get an ultrasound and refer the patient to urology for further evaluation.  Plan to repeat CT chest, abdomen and pelvis in 3 months and follow-up with Dr. Stockton at that time.  Did send some Reglan over to the patient's pharmacy to see if this helps with his nausea.  Educated him that sometimes diarrhea is a side effect of this medication if that is the case he would need to discontinue it.  He verbalizes understanding.  Continued to support patient what has been a vast change in his lifestyle status post total esophagectomy.  He no longer receives enjoyment from food and he feels somewhat isolated.  I did encourage him to look into local support groups, particularly [employer].  I also encouraged him to take some walks outside on nice days as this may help with his depression and also mobility may help with his lower extremity swelling, too.  Plan for follow-up with Dr. Stockton in 3 months.  Of course I am happy to see him in the interim, should the need arise.  Thank you for allowing us to participate in the care of Jose Chase.  We will continue to keep you informed of his progress.       I spent 35 minutes caring for Edmond on this date of service. This time includes time spent by me in the following activities:preparing for the visit, reviewing tests, obtaining and/or reviewing a separately obtained history, performing a medically appropriate examination and/or evaluation , counseling and educating the patient/family/caregiver, ordering medications, tests, or procedures, referring and communicating with other health care professionals , documenting information in the medical record, independently interpreting results and communicating that information with the patient/family/caregiver, and care coordination  Follow Up   Return in about 6 weeks (around  7/26/2023) for Next scheduled follow up.  Patient was given instructions and counseling regarding his condition or for health maintenance advice. Please see specific information pulled into the AVS if appropriate.

## 2023-06-16 DIAGNOSIS — R11.0 NAUSEA: Primary | ICD-10-CM

## 2023-07-26 ENCOUNTER — OFFICE VISIT (OUTPATIENT)
Dept: PSYCHIATRY | Facility: HOSPITAL | Age: 63
End: 2023-07-26
Payer: COMMERCIAL

## 2023-07-26 DIAGNOSIS — F33.1 MODERATE EPISODE OF RECURRENT MAJOR DEPRESSIVE DISORDER: ICD-10-CM

## 2023-07-26 DIAGNOSIS — F41.1 GAD (GENERALIZED ANXIETY DISORDER): Primary | ICD-10-CM

## 2023-07-26 DIAGNOSIS — C15.5 MALIGNANT NEOPLASM OF LOWER THIRD OF ESOPHAGUS: ICD-10-CM

## 2023-07-28 ENCOUNTER — HOSPITAL ENCOUNTER (OUTPATIENT)
Dept: ULTRASOUND IMAGING | Facility: HOSPITAL | Age: 63
Discharge: HOME | End: 2023-07-28
Payer: COMMERCIAL

## 2023-07-28 ENCOUNTER — APPOINTMENT (OUTPATIENT)
Dept: CT IMAGING | Facility: HOSPITAL | Age: 63
End: 2023-07-28
Payer: COMMERCIAL

## 2023-07-28 DIAGNOSIS — R11.0 NAUSEA: ICD-10-CM

## 2023-07-28 DIAGNOSIS — N28.1 RENAL CYST, LEFT: ICD-10-CM

## 2023-07-28 PROCEDURE — 76775 US EXAM ABDO BACK WALL LIM: CPT

## 2023-07-28 PROCEDURE — 76705 ECHO EXAM OF ABDOMEN: CPT

## 2023-07-31 ENCOUNTER — PATIENT OUTREACH (OUTPATIENT)
Dept: OTHER | Facility: HOSPITAL | Age: 63
End: 2023-07-31
Payer: COMMERCIAL

## 2023-08-03 RX ORDER — ATORVASTATIN CALCIUM 40 MG/1
TABLET, FILM COATED ORAL
Qty: 90 TABLET | Refills: 3 | Status: SHIPPED | OUTPATIENT
Start: 2023-08-03

## 2023-08-08 RX ORDER — ATORVASTATIN CALCIUM 40 MG/1
TABLET, FILM COATED ORAL
Qty: 90 TABLET | Refills: 3 | Status: SHIPPED | OUTPATIENT
Start: 2023-08-08

## 2023-08-15 ENCOUNTER — HOSPITAL ENCOUNTER (OUTPATIENT)
Dept: CT IMAGING | Facility: HOSPITAL | Age: 63
Discharge: HOME | End: 2023-08-15
Admitting: NURSE PRACTITIONER
Payer: COMMERCIAL

## 2023-08-15 DIAGNOSIS — C15.5 MALIGNANT NEOPLASM OF LOWER THIRD OF ESOPHAGUS: ICD-10-CM

## 2023-08-15 PROCEDURE — 74176 CT ABD & PELVIS W/O CONTRAST: CPT

## 2023-08-15 PROCEDURE — 71250 CT THORAX DX C-: CPT

## 2023-08-15 RX ORDER — ATORVASTATIN CALCIUM 40 MG/1
TABLET, FILM COATED ORAL
Qty: 90 TABLET | Refills: 3 | OUTPATIENT
Start: 2023-08-15

## 2023-08-21 ENCOUNTER — TRANSCRIBE ORDERS (OUTPATIENT)
Dept: ADMINISTRATIVE | Facility: HOSPITAL | Age: 63
End: 2023-08-21
Payer: COMMERCIAL

## 2023-08-21 ENCOUNTER — LAB (OUTPATIENT)
Dept: LAB | Facility: HOSPITAL | Age: 63
End: 2023-08-21
Payer: COMMERCIAL

## 2023-08-21 ENCOUNTER — OFFICE VISIT (OUTPATIENT)
Dept: SURGERY | Facility: CLINIC | Age: 63
End: 2023-08-21
Payer: COMMERCIAL

## 2023-08-21 VITALS
BODY MASS INDEX: 36.6 KG/M2 | WEIGHT: 276.2 LBS | HEART RATE: 72 BPM | DIASTOLIC BLOOD PRESSURE: 88 MMHG | HEIGHT: 73 IN | SYSTOLIC BLOOD PRESSURE: 152 MMHG | OXYGEN SATURATION: 97 %

## 2023-08-21 DIAGNOSIS — N40.1 ENLARGED PROSTATE WITH URINARY OBSTRUCTION: ICD-10-CM

## 2023-08-21 DIAGNOSIS — N13.8 ENLARGED PROSTATE WITH URINARY OBSTRUCTION: Primary | ICD-10-CM

## 2023-08-21 DIAGNOSIS — N40.1 ENLARGED PROSTATE WITH URINARY OBSTRUCTION: Primary | ICD-10-CM

## 2023-08-21 DIAGNOSIS — K80.20 CALCULUS OF GALLBLADDER WITHOUT CHOLECYSTITIS WITHOUT OBSTRUCTION: ICD-10-CM

## 2023-08-21 DIAGNOSIS — C15.5 MALIGNANT NEOPLASM OF LOWER THIRD OF ESOPHAGUS: Primary | ICD-10-CM

## 2023-08-21 DIAGNOSIS — N13.8 ENLARGED PROSTATE WITH URINARY OBSTRUCTION: ICD-10-CM

## 2023-08-21 LAB
ALBUMIN SERPL-MCNC: 4.3 G/DL (ref 3.5–5.2)
ALBUMIN/GLOB SERPL: 2.3 G/DL
ALP SERPL-CCNC: 98 U/L (ref 39–117)
ALT SERPL W P-5'-P-CCNC: 15 U/L (ref 1–41)
ANION GAP SERPL CALCULATED.3IONS-SCNC: 8 MMOL/L (ref 5–15)
AST SERPL-CCNC: 15 U/L (ref 1–40)
BILIRUB SERPL-MCNC: 0.6 MG/DL (ref 0–1.2)
BUN SERPL-MCNC: 12 MG/DL (ref 8–23)
BUN/CREAT SERPL: 13.3 (ref 7–25)
CALCIUM SPEC-SCNC: 8.8 MG/DL (ref 8.6–10.5)
CHLORIDE SERPL-SCNC: 107 MMOL/L (ref 98–107)
CO2 SERPL-SCNC: 29 MMOL/L (ref 22–29)
CREAT SERPL-MCNC: 0.9 MG/DL (ref 0.76–1.27)
EGFRCR SERPLBLD CKD-EPI 2021: 95.4 ML/MIN/1.73
GLOBULIN UR ELPH-MCNC: 1.9 GM/DL
GLUCOSE SERPL-MCNC: 105 MG/DL (ref 65–99)
POTASSIUM SERPL-SCNC: 3.7 MMOL/L (ref 3.5–5.2)
PROT SERPL-MCNC: 6.2 G/DL (ref 6–8.5)
PSA SERPL-MCNC: 1.14 NG/ML (ref 0–4)
SODIUM SERPL-SCNC: 144 MMOL/L (ref 136–145)

## 2023-08-21 PROCEDURE — 1159F MED LIST DOCD IN RCRD: CPT | Performed by: THORACIC SURGERY (CARDIOTHORACIC VASCULAR SURGERY)

## 2023-08-21 PROCEDURE — 84153 ASSAY OF PSA TOTAL: CPT

## 2023-08-21 PROCEDURE — 36415 COLL VENOUS BLD VENIPUNCTURE: CPT

## 2023-08-21 PROCEDURE — 80053 COMPREHEN METABOLIC PANEL: CPT

## 2023-08-21 PROCEDURE — 99214 OFFICE O/P EST MOD 30 MIN: CPT | Performed by: THORACIC SURGERY (CARDIOTHORACIC VASCULAR SURGERY)

## 2023-08-21 PROCEDURE — 3077F SYST BP >= 140 MM HG: CPT | Performed by: THORACIC SURGERY (CARDIOTHORACIC VASCULAR SURGERY)

## 2023-08-21 PROCEDURE — 3079F DIAST BP 80-89 MM HG: CPT | Performed by: THORACIC SURGERY (CARDIOTHORACIC VASCULAR SURGERY)

## 2023-08-21 PROCEDURE — 1160F RVW MEDS BY RX/DR IN RCRD: CPT | Performed by: THORACIC SURGERY (CARDIOTHORACIC VASCULAR SURGERY)

## 2023-08-21 NOTE — PROGRESS NOTES
"Chief Complaint  Esophageal cancer    Subjective        Edmond Chase presents to [facility] THORACIC SURGERY  History of Present Illness  Mr. 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 He presents today for surveillance.    The patient reports he is doing \"so-so.\" He states he has learned how to eat. He reports he is taking furosemide every day. He notes that he still has swelling. He states his cardiologist thought that part of the problem is not being active. He reports he is trying to get more active, but it is a long road. He states he has seen a CT scan and multiple ultrasounds. He reports that he did see a urologist, Dr. Holloway, who told him he has a renal cyst and gallstones. He notes last week he had a gallbladder attack. He states for the past many weeks he has had persistent back pain. He thinks it could be a mildly  rib or something high up in his right side that he currently feels. He reports the manner that he eats, he is very big on leftovers because he can only eat small amounts. He states on Wednesday, 08/16/2023, he was feeling \"risky\" and he ordered a big greasy pizza. He notes over 2 days, he had 3 meals of this pizza very early in the morning. He reports on Thursday, 08/17/2023, he was miserable. He states that he was not in excruciating pain, but he was in pain. He reports that he had a lot of nausea to the point where he was dry heaving. He notes he could not get comfortable, sit, or lie down. He states after about 3 hours, it passed. He reports he still has this awareness and he thinks it is gallstones. He states VIKTORIA Plascencia, gave him some medicine for nausea and he usually takes 1 pill, but a few days he has taken 2. He notes that he has never taken 3 but he was told he can take up to 3. He reports there is no acid when he is vomiting. He states in a " "normal week, unless he eats too fast or eats slightly too much, he vomits. He states this happens maybe once per week and it passes as soon as the volume goes down or reaches the volume that it can deal with. He reports there is no fun in eating. He notes he is trying to watch the heaviness of what he eats. He states he feels pretty good. He reports that he is in a little bit better spirit. He states that he has been seeing Dr. Diaz once a month. He notes that she is trying something new with a group therapy for cancer survivors. He reports he no longer feels like an invalid, but he does not feel like his old self either. He states he has lost 60 pounds since before surgery. He reports he probably has to go back and see his orthopedic doctor for his arthritic knees.    Objective   Vital Signs:  /88 (BP Location: Left arm, Patient Position: Sitting, Cuff Size: Adult)   Pulse 72   Ht 185.4 cm (73\")   Wt 125 kg (276 lb 3.2 oz)   SpO2 97%   BMI 36.44 kg/mý   Estimated body mass index is 36.44 kg/mý as calculated from the following:    Height as of this encounter: 185.4 cm (73\").    Weight as of this encounter: 125 kg (276 lb 3.2 oz).             Physical Exam  Vitals and nursing note reviewed.   Constitutional:       Appearance: He is well-developed.   HENT:      Head: Normocephalic and atraumatic.      Nose: Nose normal.   Eyes:      Conjunctiva/sclera: Conjunctivae normal.   Pulmonary:      Effort: Pulmonary effort is normal.   Musculoskeletal:         General: Normal range of motion.      Cervical back: Normal range of motion and neck supple.   Skin:     General: Skin is warm and dry.   Neurological:      Mental Status: He is alert and oriented to person, place, and time.   Psychiatric:         Behavior: Behavior normal.         Thought Content: Thought content normal.         Judgment: Judgment normal.      Result Review :          I have independently reviewed the CT of the chest, abdomen, and pelvis " performed on 08/15/2023 which demonstrates scarring consistent with his prior esophagectomy. There are stable lung nodules, the largest which measures 7 mm in the right lower lobe. There is some right-sided pleural thickening that is stable. There are no enlarged mediastinal or hilar lymphadenopathy. There is no pleural or pericardial effusion. There is no evidence of metastatic disease.           Assessment and Plan   Diagnoses and all orders for this visit:    1. Malignant neoplasm of lower third of esophagus (Primary)  -     CT Chest Without Contrast; Future  -     CT abdomen pelvis wo contrast; Future    2. Calculus of gallbladder without cholecystitis without obstruction  -     Comprehensive Metabolic Panel; Future  -     Ambulatory Referral to General Surgery      Mr. Chase is a pleasant 62-year-old gentleman status post esophagectomy for stage 1 esophageal cancer. His weight is stable at 276 pounds and this has been stable since 05/2023. His CT of the chest does not demonstrate any evidence of recurrent disease. He will need continued surveillance CT of the chest, abdomen, and pelvis in 4 months and then a follow-up visit after. He was advised to wear his compression hose during the day. He was advised to eat small meals frequently throughout the day. He was referred to general surgery to discuss his gallbladder.       I spent 30 minutes caring for Edmond on this date of service. This time includes time spent by me in the following activities:preparing for the visit, reviewing tests, obtaining and/or reviewing a separately obtained history, performing a medically appropriate examination and/or evaluation , counseling and educating the patient/family/caregiver, ordering medications, tests, or procedures, documenting information in the medical record, and independently interpreting results and communicating that information with the patient/family/caregiver  Follow Up   No follow-ups on file.  The patient  will follow up in 4 months with a CT of the chest, abdomen, and pelvis.  Patient was given instructions and counseling regarding his condition or for health maintenance advice. Please see specific information pulled into the AVS if appropriate.     Transcribed from ambient dictation for Valerie Stockton MD by Naila Herrera.  08/21/23   13:59 EDT    Patient or patient representative verbalized consent to the visit recording.  I have personally performed the services described in this document as transcribed by the above individual, and it is both accurate and complete.

## 2023-08-21 NOTE — TELEPHONE ENCOUNTER
Requested Prescriptions     Pending Prescriptions Disp Refills    mirtazapine (REMERON) 45 MG tablet [Pharmacy Med Name: MIRTAZAPINE 45 MG TABLET] 30 tablet 2     Sig: TAKE ONE TABLET BY MOUTH ONCE NIGHTLY     07/26/2023 last appointment  08/23/2023 upcoming appointment

## 2023-08-21 NOTE — LETTER
"September 1, 2023     VIKTORIA Carbone  [address]  [address] [address]    Patient: Edmond Chase   YOB: 1960   Date of Visit: 8/21/2023     Dear VIKTORIA Carbone:       Thank you for referring Edmond Chase to me for evaluation. Below are the relevant portions of my assessment and plan of care.    If you have questions, please do not hesitate to call me. I look forward to following Edmond along with you.         Sincerely,        Valerie Stockton MD        CC: No Recipients    Valerie Stockton MD  09/01/23 1446  Sign when Signing Visit  Chief Complaint  Esophageal cancer    Subjective        Edmond Chase presents to [facility]ERY  History of Present Illness  Mr. 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 He presents today for surveillance.    The patient reports he is doing \"so-so.\" He states he has learned how to eat. He reports he is taking furosemide every day. He notes that he still has swelling. He states his cardiologist thought that part of the problem is not being active. He reports he is trying to get more active, but it is a long road. He states he has seen a CT scan and multiple ultrasounds. He reports that he did see a urologist, Dr. Holloway, who told him he has a renal cyst and gallstones. He notes last week he had a gallbladder attack. He states for the past many weeks he has had persistent back pain. He thinks it could be a mildly  rib or something high up in his right side that he currently feels. He reports the manner that he eats, he is very big on leftovers because he can only eat small amounts. He states on Wednesday, 08/16/2023, he was feeling \"risky\" and he ordered a big greasy pizza. He notes over 2 days, he had 3 meals of this pizza very early in the morning. He reports on Thursday, 08/17/2023, he was " "miserable. He states that he was not in excruciating pain, but he was in pain. He reports that he had a lot of nausea to the point where he was dry heaving. He notes he could not get comfortable, sit, or lie down. He states after about 3 hours, it passed. He reports he still has this awareness and he thinks it is gallstones. He states VIKTORIA Plascencia, gave him some medicine for nausea and he usually takes 1 pill, but a few days he has taken 2. He notes that he has never taken 3 but he was told he can take up to 3. He reports there is no acid when he is vomiting. He states in a normal week, unless he eats too fast or eats slightly too much, he vomits. He states this happens maybe once per week and it passes as soon as the volume goes down or reaches the volume that it can deal with. He reports there is no fun in eating. He notes he is trying to watch the heaviness of what he eats. He states he feels pretty good. He reports that he is in a little bit better spirit. He states that he has been seeing Dr. Diaz once a month. He notes that she is trying something new with a group therapy for cancer survivors. He reports he no longer feels like an invalid, but he does not feel like his old self either. He states he has lost 60 pounds since before surgery. He reports he probably has to go back and see his orthopedic doctor for his arthritic knees.    Objective   Vital Signs:  /88 (BP Location: Left arm, Patient Position: Sitting, Cuff Size: Adult)   Pulse 72   Ht 185.4 cm (73\")   Wt 125 kg (276 lb 3.2 oz)   SpO2 97%   BMI 36.44 kg/mý   Estimated body mass index is 36.44 kg/mý as calculated from the following:    Height as of this encounter: 185.4 cm (73\").    Weight as of this encounter: 125 kg (276 lb 3.2 oz).             Physical Exam  Vitals and nursing note reviewed.   Constitutional:       Appearance: He is well-developed.   HENT:      Head: Normocephalic and atraumatic.      Nose: Nose normal.   Eyes: "      Conjunctiva/sclera: Conjunctivae normal.   Pulmonary:      Effort: Pulmonary effort is normal.   Musculoskeletal:         General: Normal range of motion.      Cervical back: Normal range of motion and neck supple.   Skin:     General: Skin is warm and dry.   Neurological:      Mental Status: He is alert and oriented to person, place, and time.   Psychiatric:         Behavior: Behavior normal.         Thought Content: Thought content normal.         Judgment: Judgment normal.      Result Review :          I have independently reviewed the CT of the chest, abdomen, and pelvis performed on 08/15/2023 which demonstrates scarring consistent with his prior esophagectomy. There are stable lung nodules, the largest which measures 7 mm in the right lower lobe. There is some right-sided pleural thickening that is stable. There are no enlarged mediastinal or hilar lymphadenopathy. There is no pleural or pericardial effusion. There is no evidence of metastatic disease.           Assessment and Plan   Diagnoses and all orders for this visit:    1. Malignant neoplasm of lower third of esophagus (Primary)  -     CT Chest Without Contrast; Future  -     CT abdomen pelvis wo contrast; Future    2. Calculus of gallbladder without cholecystitis without obstruction  -     Comprehensive Metabolic Panel; Future  -     Ambulatory Referral to General Surgery      Mr. Chase is a pleasant 62-year-old gentleman status post esophagectomy for stage 1 esophageal cancer. His weight is stable at 276 pounds and this has been stable since 05/2023. His CT of the chest does not demonstrate any evidence of recurrent disease. He will need continued surveillance CT of the chest, abdomen, and pelvis in 4 months and then a follow-up visit after. He was advised to wear his compression hose during the day. He was advised to eat small meals frequently throughout the day. He was referred to general surgery to discuss his gallbladder.       I spent 30  minutes caring for Edmond on this date of service. This time includes time spent by me in the following activities:preparing for the visit, reviewing tests, obtaining and/or reviewing a separately obtained history, performing a medically appropriate examination and/or evaluation , counseling and educating the patient/family/caregiver, ordering medications, tests, or procedures, documenting information in the medical record, and independently interpreting results and communicating that information with the patient/family/caregiver  Follow Up   No follow-ups on file.  The patient will follow up in 4 months with a CT of the chest, abdomen, and pelvis.  Patient was given instructions and counseling regarding his condition or for health maintenance advice. Please see specific information pulled into the AVS if appropriate.     Transcribed from ambient dictation for Valerie Stockton MD by Naila Herrera.  08/21/23   13:59 EDT    Patient or patient representative verbalized consent to the visit recording.  I have personally performed the services described in this document as transcribed by the above individual, and it is both accurate and complete.

## 2023-08-22 RX ORDER — MIRTAZAPINE 45 MG/1
TABLET, FILM COATED ORAL
Qty: 30 TABLET | Refills: 2 | Status: SHIPPED | OUTPATIENT
Start: 2023-08-22

## 2023-08-23 ENCOUNTER — OFFICE VISIT (OUTPATIENT)
Dept: PSYCHIATRY | Facility: HOSPITAL | Age: 63
End: 2023-08-23
Payer: COMMERCIAL

## 2023-08-23 DIAGNOSIS — F33.1 MODERATE EPISODE OF RECURRENT MAJOR DEPRESSIVE DISORDER: ICD-10-CM

## 2023-08-23 DIAGNOSIS — F41.1 GAD (GENERALIZED ANXIETY DISORDER): Primary | ICD-10-CM

## 2023-08-23 RX ORDER — FUROSEMIDE 20 MG/1
TABLET ORAL
Qty: 30 TABLET | Refills: 2 | Status: SHIPPED | OUTPATIENT
Start: 2023-08-23

## 2023-08-23 NOTE — PROGRESS NOTES
"Subjective  Patient ID: Edmond Chase is a 62 y.o.. male seen in regularly scheduled group session.  Group participants have consented to group conducted in person    Total Group Time, Face to Face: 60 minutes  Total Group Participants: 2, plus facilitation per VIKTORIA Moeller and VIKTORIA Velez    Group Therapy  Group topics explored including development of new normal, interpersonal sensitivity, short and long term effects of diagnosis and treatment, and physical deconditioning. Considers challenges managing differences in the way patient appears physically and feels internally. Shares challenges determine when to share full information vs partial information to those who ask \"how are you doing?\" Both members acknowledge impact of uncertainty, consideration of legacy, and benefits of connectedness to others.    Counseling provided regarding cognitive behavioral therapy (CBT) strategies, behavioral activation/ activity scheduling, reintroduction to activity through graded tasks, balancing avoidance with approach , recognition and allowance of need for rest, lifestyle counseling, allowance of self care, exploration of quality of life goals, survivorship issues, anxiety/ mood management , medication education, and existential distress. Supported budding group dynamics, shared support, sharing of information, and normalization of feelings through universality.    Discussed current programming available in Cancer Center and community.    Next shared medical visit: 9/20/23 at 3:30 PM in person    Patient response to group: Pt participates fully in group setting, sharing openly with other group members    Medications Management  VIKTORIA Moeller and VIKTORIA Velez present for medication discussion and management.  Pt continues in survivorship of esophageal cancer.    CC: Anxiety, depression  HPI: Pt continues in follow up alongside hx GI bleed, esophagectomy. Continues to report challenges accepting " 'disease free survival' while acknowledging comparison to previous sense of ability. Appreciates trend toward recovery, appreciating sense of improved physical wellness. Has been having gallstone pain, anticipating consultation and surgical excision. Continues on mirtazapine 30 mg q hs with continued improvement regarding weight/ appetite despite chronic adjustment from baseline. Continues with surveillance regarding sleep medicine.   Exam: As Above  Current medication regimen: mirtazapine 45 mg q hs  Lab Review:   Lab on 08/21/2023   Component Date Value    Glucose 08/21/2023 105 (H)     BUN 08/21/2023 12     Creatinine 08/21/2023 0.90     Sodium 08/21/2023 144     Potassium 08/21/2023 3.7     Chloride 08/21/2023 107     CO2 08/21/2023 29.0     Calcium 08/21/2023 8.8     Total Protein 08/21/2023 6.2     Albumin 08/21/2023 4.3     ALT (SGPT) 08/21/2023 15     AST (SGOT) 08/21/2023 15     Alkaline Phosphatase 08/21/2023 98     Total Bilirubin 08/21/2023 0.6     Globulin 08/21/2023 1.9     A/G Ratio 08/21/2023 2.3     BUN/Creatinine Ratio 08/21/2023 13.3     Anion Gap 08/21/2023 8.0     eGFR 08/21/2023 95.4    Lab on 08/21/2023   Component Date Value    PSA 08/21/2023 1.140    Results Encounter on 06/26/2023   Component Date Value    Glucose 06/23/2023 120 (H)     BUN 06/23/2023 10     Creatinine 06/23/2023 0.79     EGFR Result 06/23/2023 99.2     BUN/Creatinine Ratio 06/23/2023 12.7     Sodium 06/23/2023 144     Potassium 06/23/2023 3.7     Chloride 06/23/2023 105     Total CO2 06/23/2023 29.6 (H)     Calcium 06/23/2023 9.0      MDM:  Meds reviewed and renewed as appropriate.  Continues mirtazapine as written.  Follow up scheduled in group setting.    There are no diagnoses linked to this encounter.

## 2023-08-24 ENCOUNTER — PATIENT OUTREACH (OUTPATIENT)
Dept: OTHER | Facility: HOSPITAL | Age: 63
End: 2023-08-24
Payer: COMMERCIAL

## 2023-08-24 NOTE — PROGRESS NOTES
"Called patient. He met with Dr. Stockton on Monday. She was \"very pleased\". He is \"cancer free\".  The patient is still having issues with eating.  We discussed referring back to the dietician, which he declined. He has been taking nausea medications with good relief. The patient will follow up in 4 months with a CT of the chest, abdomen, and pelvis.  He meets with Dr. Stockton 12/18.     The patient will have a colonoscopy 9/6 by Dr. Metzger @ [facility]. He is also scheduled to see a general surgeon for management of his gallstones.      The patient states that he no longer feels like an invalid, but he does not feel like his old self either. He reports improved spirits. He met with Anna in group yesterday.     We again discussed the services available in the Cancer Center. The patient denies any questions/concerns or ongoing resource needs. I will call after his scan and f/u appt with Dr. Stockton; encouraged patient to call as needed.   "

## 2023-08-30 ENCOUNTER — TELEPHONE (OUTPATIENT)
Dept: SLEEP MEDICINE | Facility: HOSPITAL | Age: 63
End: 2023-08-30
Payer: COMMERCIAL

## 2023-08-30 ENCOUNTER — OFFICE VISIT (OUTPATIENT)
Dept: SLEEP MEDICINE | Facility: HOSPITAL | Age: 63
End: 2023-08-30
Payer: COMMERCIAL

## 2023-08-30 VITALS — HEART RATE: 44 BPM | WEIGHT: 275 LBS | HEIGHT: 73 IN | BODY MASS INDEX: 36.45 KG/M2 | OXYGEN SATURATION: 96 %

## 2023-08-30 DIAGNOSIS — E66.01 CLASS 2 SEVERE OBESITY DUE TO EXCESS CALORIES WITH SERIOUS COMORBIDITY AND BODY MASS INDEX (BMI) OF 36.0 TO 36.9 IN ADULT: ICD-10-CM

## 2023-08-30 DIAGNOSIS — G47.14 HYPERSOMNIA DUE TO MEDICAL CONDITION: ICD-10-CM

## 2023-08-30 DIAGNOSIS — Z99.89 OSA ON CPAP: Primary | ICD-10-CM

## 2023-08-30 DIAGNOSIS — G47.33 OSA ON CPAP: Primary | ICD-10-CM

## 2023-08-30 PROCEDURE — G0463 HOSPITAL OUTPT CLINIC VISIT: HCPCS

## 2023-08-30 NOTE — PROGRESS NOTES
"Follow Up Sleep Disorders Center Note     Chief Complaint:  TJ     Primary Care Physician: Nargis Velasquez APRN    Interval History:   The patient is a 62 y.o. male  who I last saw 8/17/2022 and that note was reviewed.  In the interim, the patient was diagnosed with esophageal cancer and did undergo esophagectomy.  Presently he is having some mild GB issues.    Patient goes to bed at midnight and gets out of bed at 7 AM.    Review of Systems:    A complete review of systems was done and all were negative with the exception of some nasal congestion, shortness of breath with activity, some chest discomfort, and some anxiety and depression    Social History:    Social History     Socioeconomic History    Marital status: Single   Tobacco Use    Smoking status: Never     Passive exposure: Never    Smokeless tobacco: Never   Vaping Use    Vaping Use: Never used   Substance and Sexual Activity    Alcohol use: Not Currently     Comment: usually 1-2 month but none since christmas 2022    Drug use: Never    Sexual activity: Defer       Allergies:  Patient has no known allergies.     Medication Review: His list was reviewed.      Vital Signs:    Vitals:    08/30/23 0836   Pulse: (!) 44   SpO2: 96%   Weight: 125 kg (275 lb)   Height: 185.4 cm (73\")     Body mass index is 36.28 kg/mý.    Physical Exam:    Constitutional:  Well developed 62 y.o. male that appears in no apparent distress.  Awake & oriented times 3.  Normal mood with normal recent and remote memory and normal judgement.  Eyes:  Conjunctivae normal.  Oropharynx: Previously, moist mucous membranes without exudate and a large tongue and uvula and moderate narrowing of the posterior pharyngeal opening class II-3 Mallampati airway.    Self-administered Loop Sleepiness Scale test results: 9, previously 5  0-5 Lower normal daytime sleepiness  6-10 Higher normal daytime sleepiness  11-12 Mild, 13-15 Moderate, & 16-24 Severe excessive daytime sleepiness     Downloaded " PAP Data Evaluated For Therapeutic Response and Compliance:  DME is [facility] and he uses a fullface mask.  Downloads between 7/31 and 8/29/2023 compliance 100%.  Average usage is 10 hours and 29 minutes.  Average AHI is normal without leak.  Average auto CPAP pressure is 15 and his DreamStation 2 auto CPAP is set at 14-20    I have reviewed the above results and compared them with the patient's last downloads and reviewed with the patient.    Impression:     Severe obstructive sleep apnea by overnight polysomnogram 10/31/2016, weight 276 pounds, adequately treated with DreamStation 2 auto titrating CPAP.  The patient is at goal and demonstrates good compliance and usage.  The patient does have some persistent complaints of hypersomnolence.     Plan:  Good sleep hygiene measures should be maintained.  Weight loss would be beneficial in this patient who has class II severe obesity by Body mass index is 36.28 kg/mý..      After evaluating the patient and assessing results available, the patient is benefiting from the treatment being provided.     The patient will continue DreamStation 2 auto CPAP.  Potential side effects of CPAP therapy reviewed and addressed as needed.  After clinical evaluation and review of downloads, I recommend changes to the patient's pressures.  Based on downloads, I recommend changing auto CPAP to 13-17 cm water pressure.  A new prescription will be sent to the patient's DME.    I answered all of the patient's questions.  The patient will call the Sleep Disorder Center for any problems with side effects of CPAP therapy and will follow up in 1 year.      Sekou Pisano MD  Sleep Medicine  08/30/23  08:43 EDT

## 2023-09-01 ENCOUNTER — OFFICE VISIT (OUTPATIENT)
Dept: SURGERY | Facility: CLINIC | Age: 63
End: 2023-09-01
Payer: MEDICARE

## 2023-09-01 ENCOUNTER — APPOINTMENT (OUTPATIENT)
Dept: GENERAL RADIOLOGY | Facility: HOSPITAL | Age: 63
End: 2023-09-01
Payer: MEDICARE

## 2023-09-01 ENCOUNTER — ANESTHESIA (OUTPATIENT)
Dept: PERIOP | Facility: HOSPITAL | Age: 63
End: 2023-09-01
Payer: MEDICARE

## 2023-09-01 ENCOUNTER — ANESTHESIA EVENT (OUTPATIENT)
Dept: PERIOP | Facility: HOSPITAL | Age: 63
End: 2023-09-01
Payer: MEDICARE

## 2023-09-01 ENCOUNTER — HOSPITAL ENCOUNTER (OUTPATIENT)
Facility: HOSPITAL | Age: 63
Discharge: HOME | End: 2023-09-03
Attending: SURGERY | Admitting: SURGERY
Payer: MEDICARE

## 2023-09-01 VITALS
HEIGHT: 73 IN | BODY MASS INDEX: 36.45 KG/M2 | DIASTOLIC BLOOD PRESSURE: 88 MMHG | SYSTOLIC BLOOD PRESSURE: 138 MMHG | TEMPERATURE: 98.1 F | WEIGHT: 275 LBS

## 2023-09-01 DIAGNOSIS — K81.0 ACUTE CHOLECYSTITIS: Primary | ICD-10-CM

## 2023-09-01 DIAGNOSIS — K81.0 ACUTE CHOLECYSTITIS: ICD-10-CM

## 2023-09-01 PROBLEM — K80.20 CHOLELITHIASIS: Status: ACTIVE | Noted: 2023-09-01

## 2023-09-01 LAB
GLUCOSE BLDC GLUCOMTR-MCNC: 138 MG/DL (ref 70–130)
GLUCOSE BLDC GLUCOMTR-MCNC: 96 MG/DL (ref 70–130)

## 2023-09-01 PROCEDURE — 25010000002 CEFAZOLIN IN DEXTROSE 2-4 GM/100ML-% SOLUTION: Performed by: ANESTHESIOLOGY

## 2023-09-01 PROCEDURE — 25010000002 MORPHINE PER 10 MG: Performed by: ANESTHESIOLOGY

## 2023-09-01 PROCEDURE — 25010000002 PROPOFOL 10 MG/ML EMULSION: Performed by: REGISTERED NURSE

## 2023-09-01 PROCEDURE — 88304 TISSUE EXAM BY PATHOLOGIST: CPT | Performed by: SURGERY

## 2023-09-01 PROCEDURE — 25010000002 HYDROMORPHONE PER 4 MG: Performed by: SURGERY

## 2023-09-01 PROCEDURE — 25010000002 ONDANSETRON PER 1 MG: Performed by: SURGERY

## 2023-09-01 PROCEDURE — 25010000002 CEFAZOLIN PER 500 MG: Performed by: SURGERY

## 2023-09-01 PROCEDURE — 25010000002 FENTANYL CITRATE (PF) 50 MCG/ML SOLUTION: Performed by: REGISTERED NURSE

## 2023-09-01 PROCEDURE — 25010000002 SUGAMMADEX 200 MG/2ML SOLUTION: Performed by: ANESTHESIOLOGY

## 2023-09-01 PROCEDURE — C1757 CATH, THROMBECTOMY/EMBOLECT: HCPCS | Performed by: SURGERY

## 2023-09-01 PROCEDURE — G0378 HOSPITAL OBSERVATION PER HR: HCPCS

## 2023-09-01 PROCEDURE — 47564 LAPARO CHOLECYSTECTOMY/EXPLR: CPT | Performed by: SURGERY

## 2023-09-01 PROCEDURE — 74300 X-RAY BILE DUCTS/PANCREAS: CPT

## 2023-09-01 PROCEDURE — 25010000002 DROPERIDOL PER 5 MG: Performed by: REGISTERED NURSE

## 2023-09-01 PROCEDURE — 25010000002 ONDANSETRON PER 1 MG: Performed by: REGISTERED NURSE

## 2023-09-01 PROCEDURE — 82948 REAGENT STRIP/BLOOD GLUCOSE: CPT

## 2023-09-01 PROCEDURE — 25010000002 KETOROLAC TROMETHAMINE PER 15 MG: Performed by: SURGERY

## 2023-09-01 PROCEDURE — 25510000001 IOPAMIDOL 61 % SOLUTION: Performed by: SURGERY

## 2023-09-01 PROCEDURE — 25010000002 DEXAMETHASONE SODIUM PHOSPHATE 20 MG/5ML SOLUTION: Performed by: REGISTERED NURSE

## 2023-09-01 PROCEDURE — 47564 LAPARO CHOLECYSTECTOMY/EXPLR: CPT | Performed by: REGISTERED NURSE

## 2023-09-01 DEVICE — SEAL HEMO SURG ARISTA/AH ABS/PWDR 3GM: Type: IMPLANTABLE DEVICE | Site: ABDOMEN | Status: FUNCTIONAL

## 2023-09-01 DEVICE — MEDIUM-LARGE LIGATION CLIPS 6 CLIPS/CART
Type: IMPLANTABLE DEVICE | Site: ABDOMEN | Status: FUNCTIONAL
Brand: VAS-Q-CLIP

## 2023-09-01 DEVICE — CLIP LIGAT VASC HORIZON TI MD/LG GRN 6CT: Type: IMPLANTABLE DEVICE | Site: ABDOMEN | Status: FUNCTIONAL

## 2023-09-01 DEVICE — LARGE LIGATION CLIPS 6 CLIPS/CART
Type: IMPLANTABLE DEVICE | Site: ABDOMEN | Status: FUNCTIONAL
Brand: VAS-Q-CLIP

## 2023-09-01 RX ORDER — SODIUM CHLORIDE 9 MG/ML
40 INJECTION, SOLUTION INTRAVENOUS AS NEEDED
Status: DISCONTINUED | OUTPATIENT
Start: 2023-09-01 | End: 2023-09-03 | Stop reason: HOSPADM

## 2023-09-01 RX ORDER — SODIUM CHLORIDE 0.9 % (FLUSH) 0.9 %
3-10 SYRINGE (ML) INJECTION AS NEEDED
Status: DISCONTINUED | OUTPATIENT
Start: 2023-09-01 | End: 2023-09-01 | Stop reason: HOSPADM

## 2023-09-01 RX ORDER — ONDANSETRON 2 MG/ML
4 INJECTION INTRAMUSCULAR; INTRAVENOUS EVERY 6 HOURS PRN
Status: DISCONTINUED | OUTPATIENT
Start: 2023-09-01 | End: 2023-09-03 | Stop reason: HOSPADM

## 2023-09-01 RX ORDER — SODIUM CHLORIDE 0.9 % (FLUSH) 0.9 %
10 SYRINGE (ML) INJECTION AS NEEDED
Status: DISCONTINUED | OUTPATIENT
Start: 2023-09-01 | End: 2023-09-01 | Stop reason: HOSPADM

## 2023-09-01 RX ORDER — HYDROMORPHONE HYDROCHLORIDE 1 MG/ML
0.25 INJECTION, SOLUTION INTRAMUSCULAR; INTRAVENOUS; SUBCUTANEOUS
Status: DISCONTINUED | OUTPATIENT
Start: 2023-09-01 | End: 2023-09-01

## 2023-09-01 RX ORDER — SODIUM CHLORIDE 0.9 % (FLUSH) 0.9 %
3-10 SYRINGE (ML) INJECTION AS NEEDED
Status: DISCONTINUED | OUTPATIENT
Start: 2023-09-01 | End: 2023-09-03 | Stop reason: HOSPADM

## 2023-09-01 RX ORDER — SODIUM CHLORIDE 0.9 % (FLUSH) 0.9 %
10 SYRINGE (ML) INJECTION AS NEEDED
Status: CANCELLED | OUTPATIENT
Start: 2023-09-01

## 2023-09-01 RX ORDER — SODIUM CHLORIDE 0.9 % (FLUSH) 0.9 %
3 SYRINGE (ML) INJECTION EVERY 12 HOURS SCHEDULED
Status: DISCONTINUED | OUTPATIENT
Start: 2023-09-01 | End: 2023-09-03 | Stop reason: HOSPADM

## 2023-09-01 RX ORDER — FLUMAZENIL 0.1 MG/ML
0.2 INJECTION INTRAVENOUS AS NEEDED
Status: DISCONTINUED | OUTPATIENT
Start: 2023-09-01 | End: 2023-09-01

## 2023-09-01 RX ORDER — SODIUM CHLORIDE, SODIUM LACTATE, POTASSIUM CHLORIDE, CALCIUM CHLORIDE 600; 310; 30; 20 MG/100ML; MG/100ML; MG/100ML; MG/100ML
9 INJECTION, SOLUTION INTRAVENOUS CONTINUOUS
Status: DISCONTINUED | OUTPATIENT
Start: 2023-09-01 | End: 2023-09-01

## 2023-09-01 RX ORDER — SODIUM CHLORIDE 9 MG/ML
40 INJECTION, SOLUTION INTRAVENOUS AS NEEDED
Status: CANCELLED | OUTPATIENT
Start: 2023-09-01

## 2023-09-01 RX ORDER — NALOXONE HCL 0.4 MG/ML
0.4 VIAL (ML) INJECTION
Status: DISCONTINUED | OUTPATIENT
Start: 2023-09-01 | End: 2023-09-03 | Stop reason: HOSPADM

## 2023-09-01 RX ORDER — DROPERIDOL 2.5 MG/ML
INJECTION, SOLUTION INTRAMUSCULAR; INTRAVENOUS AS NEEDED
Status: DISCONTINUED | OUTPATIENT
Start: 2023-09-01 | End: 2023-09-01 | Stop reason: SURG

## 2023-09-01 RX ORDER — BUPIVACAINE HYDROCHLORIDE AND EPINEPHRINE 5; 5 MG/ML; UG/ML
INJECTION, SOLUTION EPIDURAL; INTRACAUDAL; PERINEURAL AS NEEDED
Status: DISCONTINUED | OUTPATIENT
Start: 2023-09-01 | End: 2023-09-01 | Stop reason: HOSPADM

## 2023-09-01 RX ORDER — SODIUM CHLORIDE 0.9 % (FLUSH) 0.9 %
10 SYRINGE (ML) INJECTION EVERY 12 HOURS SCHEDULED
Status: CANCELLED | OUTPATIENT
Start: 2023-09-01

## 2023-09-01 RX ORDER — DROPERIDOL 2.5 MG/ML
0.62 INJECTION, SOLUTION INTRAMUSCULAR; INTRAVENOUS
Status: DISCONTINUED | OUTPATIENT
Start: 2023-09-01 | End: 2023-09-01

## 2023-09-01 RX ORDER — HYDRALAZINE HYDROCHLORIDE 20 MG/ML
5 INJECTION INTRAMUSCULAR; INTRAVENOUS
Status: DISCONTINUED | OUTPATIENT
Start: 2023-09-01 | End: 2023-09-01

## 2023-09-01 RX ORDER — SODIUM CHLORIDE, SODIUM LACTATE, POTASSIUM CHLORIDE, CALCIUM CHLORIDE 600; 310; 30; 20 MG/100ML; MG/100ML; MG/100ML; MG/100ML
100 INJECTION, SOLUTION INTRAVENOUS CONTINUOUS
Status: DISCONTINUED | OUTPATIENT
Start: 2023-09-01 | End: 2023-09-03

## 2023-09-01 RX ORDER — ATORVASTATIN CALCIUM 10 MG/1
40 TABLET, FILM COATED ORAL DAILY
Status: DISCONTINUED | OUTPATIENT
Start: 2023-09-01 | End: 2023-09-03 | Stop reason: HOSPADM

## 2023-09-01 RX ORDER — PROPOFOL 10 MG/ML
VIAL (ML) INTRAVENOUS AS NEEDED
Status: DISCONTINUED | OUTPATIENT
Start: 2023-09-01 | End: 2023-09-01 | Stop reason: SURG

## 2023-09-01 RX ORDER — AMOXICILLIN 250 MG
2 CAPSULE ORAL 2 TIMES DAILY
Status: DISCONTINUED | OUTPATIENT
Start: 2023-09-01 | End: 2023-09-03 | Stop reason: HOSPADM

## 2023-09-01 RX ORDER — POLYETHYLENE GLYCOL 3350 17 G/17G
17 POWDER, FOR SOLUTION ORAL DAILY PRN
Status: DISCONTINUED | OUTPATIENT
Start: 2023-09-01 | End: 2023-09-03 | Stop reason: HOSPADM

## 2023-09-01 RX ORDER — HYDROCODONE BITARTRATE AND ACETAMINOPHEN 7.5; 325 MG/1; MG/1
1 TABLET ORAL EVERY 4 HOURS PRN
Status: DISCONTINUED | OUTPATIENT
Start: 2023-09-01 | End: 2023-09-01

## 2023-09-01 RX ORDER — PROMETHAZINE HYDROCHLORIDE 25 MG/1
25 SUPPOSITORY RECTAL ONCE AS NEEDED
Status: DISCONTINUED | OUTPATIENT
Start: 2023-09-01 | End: 2023-09-01

## 2023-09-01 RX ORDER — FAMOTIDINE 20 MG/1
40 TABLET, FILM COATED ORAL DAILY
Status: DISCONTINUED | OUTPATIENT
Start: 2023-09-01 | End: 2023-09-03 | Stop reason: HOSPADM

## 2023-09-01 RX ORDER — OXYCODONE HYDROCHLORIDE AND ACETAMINOPHEN 5; 325 MG/1; MG/1
1 TABLET ORAL EVERY 4 HOURS PRN
Status: DISCONTINUED | OUTPATIENT
Start: 2023-09-01 | End: 2023-09-03 | Stop reason: HOSPADM

## 2023-09-01 RX ORDER — METOCLOPRAMIDE 5 MG/1
TABLET ORAL
COMMUNITY
Start: 2023-08-31

## 2023-09-01 RX ORDER — HYDROMORPHONE HYDROCHLORIDE 1 MG/ML
0.5 INJECTION, SOLUTION INTRAMUSCULAR; INTRAVENOUS; SUBCUTANEOUS
Status: DISCONTINUED | OUTPATIENT
Start: 2023-09-01 | End: 2023-09-03 | Stop reason: HOSPADM

## 2023-09-01 RX ORDER — HYDROCODONE BITARTRATE AND ACETAMINOPHEN 5; 325 MG/1; MG/1
1 TABLET ORAL ONCE AS NEEDED
Status: DISCONTINUED | OUTPATIENT
Start: 2023-09-01 | End: 2023-09-01

## 2023-09-01 RX ORDER — CEFAZOLIN SODIUM IN 0.9 % NACL 3 G/100 ML
3000 INTRAVENOUS SOLUTION, PIGGYBACK (ML) INTRAVENOUS ONCE
Status: COMPLETED | OUTPATIENT
Start: 2023-09-01 | End: 2023-09-01

## 2023-09-01 RX ORDER — METOPROLOL SUCCINATE 25 MG/1
25 TABLET, EXTENDED RELEASE ORAL ONCE
Status: COMPLETED | OUTPATIENT
Start: 2023-09-01 | End: 2023-09-01

## 2023-09-01 RX ORDER — SODIUM CHLORIDE 0.9 % (FLUSH) 0.9 %
3 SYRINGE (ML) INJECTION EVERY 12 HOURS SCHEDULED
Status: DISCONTINUED | OUTPATIENT
Start: 2023-09-01 | End: 2023-09-01 | Stop reason: HOSPADM

## 2023-09-01 RX ORDER — ISOSORBIDE MONONITRATE 30 MG/1
30 TABLET, EXTENDED RELEASE ORAL DAILY
Status: DISCONTINUED | OUTPATIENT
Start: 2023-09-01 | End: 2023-09-03 | Stop reason: HOSPADM

## 2023-09-01 RX ORDER — LIDOCAINE HYDROCHLORIDE 10 MG/ML
0.5 INJECTION, SOLUTION INFILTRATION; PERINEURAL ONCE AS NEEDED
Status: DISCONTINUED | OUTPATIENT
Start: 2023-09-01 | End: 2023-09-01 | Stop reason: HOSPADM

## 2023-09-01 RX ORDER — SODIUM CHLORIDE 9 MG/ML
40 INJECTION, SOLUTION INTRAVENOUS AS NEEDED
Status: DISCONTINUED | OUTPATIENT
Start: 2023-09-01 | End: 2023-09-01 | Stop reason: HOSPADM

## 2023-09-01 RX ORDER — DEXAMETHASONE SODIUM PHOSPHATE 4 MG/ML
INJECTION, SOLUTION INTRA-ARTICULAR; INTRALESIONAL; INTRAMUSCULAR; INTRAVENOUS; SOFT TISSUE AS NEEDED
Status: DISCONTINUED | OUTPATIENT
Start: 2023-09-01 | End: 2023-09-01 | Stop reason: SURG

## 2023-09-01 RX ORDER — PROMETHAZINE HYDROCHLORIDE 25 MG/1
25 TABLET ORAL ONCE AS NEEDED
Status: DISCONTINUED | OUTPATIENT
Start: 2023-09-01 | End: 2023-09-01

## 2023-09-01 RX ORDER — EPHEDRINE SULFATE 50 MG/ML
5 INJECTION, SOLUTION INTRAVENOUS ONCE AS NEEDED
Status: DISCONTINUED | OUTPATIENT
Start: 2023-09-01 | End: 2023-09-01

## 2023-09-01 RX ORDER — MORPHINE SULFATE 4 MG/ML
INJECTION, SOLUTION INTRAMUSCULAR; INTRAVENOUS AS NEEDED
Status: DISCONTINUED | OUTPATIENT
Start: 2023-09-01 | End: 2023-09-01 | Stop reason: SURG

## 2023-09-01 RX ORDER — ENOXAPARIN SODIUM 100 MG/ML
40 INJECTION SUBCUTANEOUS DAILY
Status: DISCONTINUED | OUTPATIENT
Start: 2023-09-02 | End: 2023-09-03 | Stop reason: HOSPADM

## 2023-09-01 RX ORDER — FENTANYL CITRATE 50 UG/ML
INJECTION, SOLUTION INTRAMUSCULAR; INTRAVENOUS AS NEEDED
Status: DISCONTINUED | OUTPATIENT
Start: 2023-09-01 | End: 2023-09-01 | Stop reason: SURG

## 2023-09-01 RX ORDER — DIPHENHYDRAMINE HYDROCHLORIDE 50 MG/ML
12.5 INJECTION INTRAMUSCULAR; INTRAVENOUS
Status: DISCONTINUED | OUTPATIENT
Start: 2023-09-01 | End: 2023-09-01

## 2023-09-01 RX ORDER — EPHEDRINE SULFATE 50 MG/ML
INJECTION, SOLUTION INTRAVENOUS AS NEEDED
Status: DISCONTINUED | OUTPATIENT
Start: 2023-09-01 | End: 2023-09-01 | Stop reason: SURG

## 2023-09-01 RX ORDER — BISACODYL 5 MG/1
5 TABLET, DELAYED RELEASE ORAL DAILY PRN
Status: DISCONTINUED | OUTPATIENT
Start: 2023-09-01 | End: 2023-09-03 | Stop reason: HOSPADM

## 2023-09-01 RX ORDER — IPRATROPIUM BROMIDE AND ALBUTEROL SULFATE 2.5; .5 MG/3ML; MG/3ML
3 SOLUTION RESPIRATORY (INHALATION) ONCE AS NEEDED
Status: DISCONTINUED | OUTPATIENT
Start: 2023-09-01 | End: 2023-09-01

## 2023-09-01 RX ORDER — FUROSEMIDE 40 MG/1
20 TABLET ORAL DAILY
Status: DISCONTINUED | OUTPATIENT
Start: 2023-09-01 | End: 2023-09-03 | Stop reason: HOSPADM

## 2023-09-01 RX ORDER — SODIUM CHLORIDE 0.9 % (FLUSH) 0.9 %
10 SYRINGE (ML) INJECTION EVERY 12 HOURS SCHEDULED
Status: DISCONTINUED | OUTPATIENT
Start: 2023-09-01 | End: 2023-09-01 | Stop reason: HOSPADM

## 2023-09-01 RX ORDER — NALOXONE HCL 0.4 MG/ML
0.2 VIAL (ML) INJECTION AS NEEDED
Status: DISCONTINUED | OUTPATIENT
Start: 2023-09-01 | End: 2023-09-01

## 2023-09-01 RX ORDER — ROCURONIUM BROMIDE 10 MG/ML
INJECTION, SOLUTION INTRAVENOUS AS NEEDED
Status: DISCONTINUED | OUTPATIENT
Start: 2023-09-01 | End: 2023-09-01 | Stop reason: SURG

## 2023-09-01 RX ORDER — CEFAZOLIN SODIUM 2 G/100ML
INJECTION, SOLUTION INTRAVENOUS AS NEEDED
Status: DISCONTINUED | OUTPATIENT
Start: 2023-09-01 | End: 2023-09-01 | Stop reason: SURG

## 2023-09-01 RX ORDER — ONDANSETRON 2 MG/ML
INJECTION INTRAMUSCULAR; INTRAVENOUS AS NEEDED
Status: DISCONTINUED | OUTPATIENT
Start: 2023-09-01 | End: 2023-09-01 | Stop reason: SURG

## 2023-09-01 RX ORDER — METOPROLOL SUCCINATE 25 MG/1
25 TABLET, EXTENDED RELEASE ORAL DAILY
Status: DISCONTINUED | OUTPATIENT
Start: 2023-09-01 | End: 2023-09-03 | Stop reason: HOSPADM

## 2023-09-01 RX ORDER — METOPROLOL SUCCINATE 25 MG/1
TABLET, EXTENDED RELEASE ORAL
Qty: 90 TABLET | Refills: 2 | Status: SHIPPED | OUTPATIENT
Start: 2023-09-01

## 2023-09-01 RX ORDER — ONDANSETRON 2 MG/ML
4 INJECTION INTRAMUSCULAR; INTRAVENOUS ONCE AS NEEDED
Status: COMPLETED | OUTPATIENT
Start: 2023-09-01 | End: 2023-09-01

## 2023-09-01 RX ORDER — BISACODYL 10 MG
10 SUPPOSITORY, RECTAL RECTAL DAILY PRN
Status: DISCONTINUED | OUTPATIENT
Start: 2023-09-01 | End: 2023-09-03 | Stop reason: HOSPADM

## 2023-09-01 RX ORDER — METOPROLOL TARTRATE 5 MG/5ML
INJECTION INTRAVENOUS AS NEEDED
Status: DISCONTINUED | OUTPATIENT
Start: 2023-09-01 | End: 2023-09-01 | Stop reason: SURG

## 2023-09-01 RX ORDER — MIDAZOLAM HYDROCHLORIDE 1 MG/ML
1 INJECTION INTRAMUSCULAR; INTRAVENOUS
Status: DISCONTINUED | OUTPATIENT
Start: 2023-09-01 | End: 2023-09-01 | Stop reason: HOSPADM

## 2023-09-01 RX ORDER — FAMOTIDINE 10 MG/ML
20 INJECTION, SOLUTION INTRAVENOUS ONCE
Status: COMPLETED | OUTPATIENT
Start: 2023-09-01 | End: 2023-09-01

## 2023-09-01 RX ORDER — LABETALOL HYDROCHLORIDE 5 MG/ML
5 INJECTION, SOLUTION INTRAVENOUS
Status: DISCONTINUED | OUTPATIENT
Start: 2023-09-01 | End: 2023-09-01

## 2023-09-01 RX ORDER — KETOROLAC TROMETHAMINE 15 MG/ML
15 INJECTION, SOLUTION INTRAMUSCULAR; INTRAVENOUS EVERY 6 HOURS PRN
Status: DISCONTINUED | OUTPATIENT
Start: 2023-09-01 | End: 2023-09-03 | Stop reason: HOSPADM

## 2023-09-01 RX ORDER — FENTANYL CITRATE 50 UG/ML
25 INJECTION, SOLUTION INTRAMUSCULAR; INTRAVENOUS
Status: DISCONTINUED | OUTPATIENT
Start: 2023-09-01 | End: 2023-09-01

## 2023-09-01 RX ORDER — LIDOCAINE HYDROCHLORIDE 20 MG/ML
INJECTION, SOLUTION INFILTRATION; PERINEURAL AS NEEDED
Status: DISCONTINUED | OUTPATIENT
Start: 2023-09-01 | End: 2023-09-01 | Stop reason: SURG

## 2023-09-01 RX ORDER — AMLODIPINE BESYLATE 10 MG/1
10 TABLET ORAL DAILY
Status: DISCONTINUED | OUTPATIENT
Start: 2023-09-01 | End: 2023-09-03 | Stop reason: HOSPADM

## 2023-09-01 RX ORDER — LISINOPRIL 20 MG/1
10 TABLET ORAL NIGHTLY
Status: DISCONTINUED | OUTPATIENT
Start: 2023-09-01 | End: 2023-09-03 | Stop reason: HOSPADM

## 2023-09-01 RX ADMIN — ONDANSETRON 4 MG: 2 INJECTION INTRAMUSCULAR; INTRAVENOUS at 23:07

## 2023-09-01 RX ADMIN — KETOROLAC TROMETHAMINE 15 MG: 15 INJECTION, SOLUTION INTRAMUSCULAR; INTRAVENOUS at 20:34

## 2023-09-01 RX ADMIN — SODIUM CHLORIDE, POTASSIUM CHLORIDE, SODIUM LACTATE AND CALCIUM CHLORIDE 9 ML/HR: 600; 310; 30; 20 INJECTION, SOLUTION INTRAVENOUS at 12:48

## 2023-09-01 RX ADMIN — CEFAZOLIN 3000 MG: 10 INJECTION, POWDER, FOR SOLUTION INTRAVENOUS at 13:23

## 2023-09-01 RX ADMIN — HYDROMORPHONE HYDROCHLORIDE 0.5 MG: 1 INJECTION, SOLUTION INTRAMUSCULAR; INTRAVENOUS; SUBCUTANEOUS at 20:34

## 2023-09-01 RX ADMIN — MORPHINE SULFATE 4 MG: 4 INJECTION, SOLUTION INTRAMUSCULAR; INTRAVENOUS at 15:56

## 2023-09-01 RX ADMIN — FAMOTIDINE 40 MG: 20 TABLET, FILM COATED ORAL at 20:34

## 2023-09-01 RX ADMIN — FENTANYL CITRATE 50 MCG: 50 INJECTION, SOLUTION INTRAMUSCULAR; INTRAVENOUS at 13:36

## 2023-09-01 RX ADMIN — EPHEDRINE SULFATE 5 MG: 50 INJECTION, SOLUTION INTRAVENOUS at 14:39

## 2023-09-01 RX ADMIN — SENNOSIDES AND DOCUSATE SODIUM 2 TABLET: 50; 8.6 TABLET ORAL at 20:34

## 2023-09-01 RX ADMIN — LIDOCAINE HYDROCHLORIDE 100 MG: 20 INJECTION, SOLUTION INFILTRATION; PERINEURAL at 13:36

## 2023-09-01 RX ADMIN — SUGAMMADEX 200 MG: 100 INJECTION, SOLUTION INTRAVENOUS at 17:13

## 2023-09-01 RX ADMIN — ONDANSETRON 4 MG: 2 INJECTION INTRAMUSCULAR; INTRAVENOUS at 13:45

## 2023-09-01 RX ADMIN — SODIUM CHLORIDE, POTASSIUM CHLORIDE, SODIUM LACTATE AND CALCIUM CHLORIDE 100 ML/HR: 600; 310; 30; 20 INJECTION, SOLUTION INTRAVENOUS at 23:05

## 2023-09-01 RX ADMIN — ROCURONIUM BROMIDE 50 MG: 10 INJECTION, SOLUTION INTRAVENOUS at 13:37

## 2023-09-01 RX ADMIN — ONDANSETRON 4 MG: 2 INJECTION INTRAMUSCULAR; INTRAVENOUS at 17:35

## 2023-09-01 RX ADMIN — Medication 10 ML: at 20:39

## 2023-09-01 RX ADMIN — Medication 3 ML: at 20:35

## 2023-09-01 RX ADMIN — HYDROCODONE BITARTRATE AND ACETAMINOPHEN 1 TABLET: 7.5; 325 TABLET ORAL at 18:06

## 2023-09-01 RX ADMIN — DROPERIDOL 0.62 MG: 2.5 INJECTION, SOLUTION INTRAMUSCULAR; INTRAVENOUS at 14:00

## 2023-09-01 RX ADMIN — FENTANYL CITRATE 25 MCG: 50 INJECTION, SOLUTION INTRAMUSCULAR; INTRAVENOUS at 17:35

## 2023-09-01 RX ADMIN — FAMOTIDINE 20 MG: 10 INJECTION INTRAVENOUS at 12:59

## 2023-09-01 RX ADMIN — CEFAZOLIN SODIUM 3 G: 2 INJECTION, SOLUTION INTRAVENOUS at 17:23

## 2023-09-01 RX ADMIN — DEXAMETHASONE SODIUM PHOSPHATE 10 MG: 4 INJECTION, SOLUTION INTRAMUSCULAR; INTRAVENOUS at 13:45

## 2023-09-01 RX ADMIN — PROPOFOL 200 MG: 10 INJECTION, EMULSION INTRAVENOUS at 13:37

## 2023-09-01 RX ADMIN — METOPROLOL TARTRATE 5 MG: 5 INJECTION INTRAVENOUS at 13:35

## 2023-09-01 RX ADMIN — METOPROLOL SUCCINATE 25 MG: 25 TABLET, EXTENDED RELEASE ORAL at 12:57

## 2023-09-01 RX ADMIN — Medication 3 ML: at 20:39

## 2023-09-01 RX ADMIN — SODIUM CHLORIDE, POTASSIUM CHLORIDE, SODIUM LACTATE AND CALCIUM CHLORIDE 100 ML/HR: 600; 310; 30; 20 INJECTION, SOLUTION INTRAVENOUS at 20:40

## 2023-09-01 NOTE — ANESTHESIA POSTPROCEDURE EVALUATION
Patient: Edmond Chase    Procedure Summary       Date: 09/01/23 Room / Location: [facility] OR  / [facility] MAIN OR    Anesthesia Start: 1329 Anesthesia Stop: 1734    Procedure: CHOLECYSTECTOMY LAPAROSCOPIC INTRAOPERATIVE CHOLANGIOGRAM choledoscopy common bile duct exploration (Abdomen) Diagnosis:       Acute cholecystitis      (Acute cholecystitis [K81.0])    Surgeons: Jose Manuel Baca MD Provider: Marlo Knowles MD    Anesthesia Type: general ASA Status: 4            Anesthesia Type: general    Vitals  Vitals Value Taken Time   /91 09/01/23 1815   Temp 36.4 øC (97.6 øF) 09/01/23 1731   Pulse 70 09/01/23 1823   Resp 16 09/01/23 1735   SpO2 90 % 09/01/23 1823   Vitals shown include unvalidated device data.        Post Anesthesia Care and Evaluation    Patient location during evaluation: bedside  Patient participation: complete - patient participated  Level of consciousness: awake  Pain management: adequate    Airway patency: patent  Anesthetic complications: No anesthetic complications    Cardiovascular status: acceptable  Respiratory status: acceptable  Hydration status: acceptable

## 2023-09-01 NOTE — ANESTHESIA PREPROCEDURE EVALUATION
" Anesthesia Evaluation     Patient summary reviewed and Nursing notes reviewed   no history of anesthetic complications:   NPO Solid Status: > 8 hours  NPO Liquid Status: > 8 hours           Airway   Mallampati: II  TM distance: >3 FB  Neck ROM: full  No difficulty expected  Dental - normal exam     Pulmonary    (+) ,shortness of breath, sleep apnea on CPAP  (-) rhonchi, decreased breath sounds, wheezes, not a smoker  Cardiovascular   Exercise tolerance: good (4-7 METS)    Patient on routine beta blocker  Rhythm: regular  Rate: normal    (+) hypertension, valvular problems/murmurs (s/p AVR), CAD, CABG >6 Months, angina with exertion, hyperlipidemia  (-) LOWERY, murmur    ROS comment: LVEF 51-55%   CAD recommended medical mgmt not amenable to reperfusion intervention   Ordered Toprol in preop, may need additional intra-op beta blockade    Neuro/Psych  (+) dizziness/light headedness, psychiatric history Anxiety and Depression  (-) CVA  GI/Hepatic/Renal/Endo    (+) morbid obesity, PUD, diabetes mellitus type 2 poorly controlled using insulin  (-) no renal disease    Musculoskeletal     Abdominal     Abdomen: soft.   Substance History      OB/GYN          Other      history of cancer remission                    Anesthesia Plan    ASA 4     general   Rapid sequence  (Needs RSI s/p esophagectomy and feeling nauseous today he thinks related to his gallbladder    Hx stable \"angina\" occurring several times a week - he relates his doctors are unable to definitively say if his chest pain origin is cardiac or esophageal   He presents with stable \"angina\" today  Some soa which is also normally associaated with this; no other signs or symptoms  He reports this is his baseline and is unchanged today     )  intravenous induction     Anesthetic plan, risks, benefits, and alternatives have been provided, discussed and informed consent has been obtained with: patient.      CODE STATUS:       "

## 2023-09-01 NOTE — ANESTHESIA PROCEDURE NOTES
Airway  Urgency: elective    Date/Time: 9/1/2023 1:43 PM  Airway not difficult    General Information and Staff    Patient location during procedure: OR  Anesthesiologist: Carmela Barrientos MD  CRNA/CAA: Nathan Jiang CRNA    Indications and Patient Condition  Indications for airway management: airway protection    Preoxygenated: yes  MILS not maintained throughout  Mask difficulty assessment: 1 - vent by mask    Final Airway Details  Final airway type: endotracheal airway      Successful airway: ETT  Cuffed: yes   Successful intubation technique: direct laryngoscopy and RSI  Facilitating devices/methods: intubating stylet  Endotracheal tube insertion site: oral  Blade: CMAC  Blade size: D  ETT size (mm): 7.5  Cormack-Lehane Classification: grade I - full view of glottis  Placement verified by: chest auscultation and capnometry   Cuff volume (mL): 10  Measured from: teeth  ETT/EBT  to teeth (cm): 22  Number of attempts at approach: 2  Assessment: lips, teeth, and gum same as pre-op and atraumatic intubation    Additional Comments  1st attempt with MAC 4 unsuccessful GRADE 3 view.  2nd attempt successful with CMAC

## 2023-09-01 NOTE — OP NOTE
PREOPERATIVE DIAGNOSIS: Acute cholecystitis, cholelithiasis   POSTOPERATIVE DIAGNOSIS: Acute cholecystitis and cholelithiasis   PROCEDURE:   - Laparoscopic cholecystectomy with Intraoperative cholangiogram and choledoscopy withcommon bile duct exploration    SURGEON/STAFF: BRIGETTE Baca    ASST: Martine Garcia CSA that was in charge of retraction, exposure, holding camera, closing wounds and placing dressings that was essential for the success of the case  SPECIMENS: Gallbladder.  INTRAOPERATIVE COMPLICATIONS: None.  ANESTHESIA: General.  BLOOD LOSS: Minimal  COUNTS: Needle and sponge counts correct.   FINDINGS: Cholecystitis, cholelithiasis, cystic duct stones    INDICATIONS: Patient is a very pleasant 62-year-old male that presented to my office with 3 weeks of abdominal pain.  He was found to have pain on exam and had cholelithiasis.  I diagnosed him with acute cholecystitis and cholelithiasis.  We discussed with the patient about treatment options and I recommend that he undergoes laparoscopic cholecystectomy with intraoperative cholangiogram.  Risks and benefits of laparoscopic, open, and conservative management of the cholecystitis were discussed at length and in detail with the patient. This included detail drawings of the anatomy and possible postoperative complications including but not limited to bile leak, retained stone, bleeding and common bile duct injury. He agreed and was consented for operative management without duress.     PROCEDURE: The patient was brought to the operating room in stable condition. Perioperative antibiotics were given and sequential compression devices applied. He was then laid supine on the operating room table. General anesthesia was induced by the Anesthesia Service without difficulty.     At this time, the patient's abdomen was accessed at the right periumbilical area with Optiview technique and insertion of a 5 mm trocar, pneumoperitoneum was insufflated.  Upon expiration of the  abdominal cavity there was no evidence of injury from trocar placement.  I placed an 11 mm trocars in the epigastric area and another two 5 mm trocar in the right upper quadrant.  Patient was positioned in steep reverse Trendelenburg.  The gallbladder was visualized and was found to be distended.  The gallbladder was drained with laparoscopic needle and clear fluid was drained.     The peritoneal attachment of the gallbladder at the level of the infundibulum was scored from laterally to medially, terminating at the level of the hepatic edge. The peritoneum was gently stripped down, exposing the underlying cystic artery and cystic duct. This was also done on the lateral side of the gallbladder by reflecting the gallbladder medially. The peritoneal attachment here was again gently dissected inferiorly. After completely exposing the cystic duct as well as cystic artery, a retro-cystic window was created. These 2 structures, the cystic duct and cystic artery, were further delineated. A firm critical view was obtained, visualizing healthy-appearing hepatic tissue behind the 2 structures, with no evidence of looping, bridging or tenting of the common bile duct.   The cystic duct was extremely distended and it felt hard.  I was concerned that he had multiple stones in it.  I placed a large Hem-o-uriel distally to the cystic duct.  I then perform a small ductotomy and cholangiogram was attempted.  There was no flow into the biliary tree so I then extended the ductotomy and I was able to remove at least 3 large stones from the cystic duct.  After this cholangiogram for the was performed after the 16 Hungarian sheath was placed through the patient abdominal wall.  I used a gallbladder grasper to keep the cholangiogram in place.    Cholangiogram was performed that showed filling of the cystic duct as well as the common bile duct and the right and left hepatic ducts.  There was a filling defect at the cystic duct.  There’s prompt  emptying of the contrast into the duodenum.  I then attempted to milk the gallstone to the duct opening and I was not able.  I then used 4 mm Ciara catheter to try to extract the cystic duct stones.  I was able to retrieve several stones but he continued to have large amount of stones in the proximal cystic duct.  I then dissected the cystic duct more proximally and then perform a ductotomy vertically to try to extract the stones of the cystic duct but this did not allow me to remove all the stones.  I then decided to perform a common bile duct exploration with choledscope.  I was able to access the cystic duct with the choledoscope and this was advanced up to a point where there were 2 large gallstones.  Before we were able to get the stone basket retrieval I was able to milk the stones distally and the cystic duct obstruction was relieved.  Choledoscope was then inserted up to the junction of the cystic duct with the common bile duct.  There was a very small opening from the cystic duct into the common bile duct and I was not able to transverse it.  I decided to remove the choledoscope.     Next, the cystic artery was clipped twice proximally and once distally. It was inspected and transected with scissors The gallbladder, which was extremely inflamed, was then carefully dissected off the hepatic bed using hook electrocautery. It was firmly adherent to the underlying bed, and an effort careful and meticulous hemostasis was undertaken. The gallbladder, after being removed from the hepatic bed, was placed in an EndoCatch bag. It was removed through the epigastric trocar without difficulty.    A final complete survey of the abdominal cavity was undertaken, and there was no evidence of bleeding or intrabdominal injury.  I placed Ady hemostatic agent at the gallbladder bed.  A 19 Luxembourger Yohan drain was introduced through 11 mm trocar and positioned at the gallbladder bed.  Was brought out through the right sided 5  mm trocar.  The 11 mm trocar was removed and the fascial defect was closed with 0 vicryl and endoclose technique.  At this time all 5-mm trocars in the upper abdomen were then removed under direct vision while desufflating the abdomen. Next, the umbilical trocar was removed. The skin incisions were closed with 4-0 Monocryl and surgical glue. At the end of the case, all needle and sponge counts were correct. I, the attending surgeon, was scrubbed, present and persisted in the entire operation. The patient was extubated and taken to the recovery room in stable condition.    Jose Manuel Baca MD

## 2023-09-01 NOTE — INTERVAL H&P NOTE
H&P updated. The patient was examined and the following changes are noted:  Patient does not have anybody at home to stay with him.  He will need to be admitted overnight for observation.

## 2023-09-01 NOTE — H&P (VIEW-ONLY)
"Date of Service: 9/1/2023    No chief complaint on file.      Subjective: Edmond Chase is a 62 y.o. male who is referred to my clinic by Valerie Stockton MD for a possible cholecystectomy.  Patient complains of the following symptoms:  Abdominal Pain: Patient complains of abdominal pain. The pain is described as colicky and sharp, and is 7/10 in intensity. Pain is located in the RUQ with radiation to right back. Onset was 3 weeks ago. Symptoms have been gradually worsening since. Aggravating factors: eating and fatty foods.  Alleviating factors: none. Associated symptoms: chills, constipation, nausea, and vomiting. The patient denies anorexia, dysuria, and fever.    Past Medical History:   Diagnosis Date    Abnormal nuclear stress test     Anxiety     Anxiety and depression     Aortic valve insufficiency     nonrheumtic     Arthritis     knees    Ascending aortic aneurysm     CAD (coronary artery disease)     stent    Chest pain     Diabetes mellitus     Diarrhea     Dizzy     CAREFUL WHEN GETTING UP    Dyspnea     Esophageal cancer 11/2022    no chemo or radiation    Essential hypertension     Fatigue     G tube feedings     per jejunostomy tube nightly with permitin  3 cans nightly    GERD (gastroesophageal reflux disease)     GI bleed     Heart murmur     History of pneumonia     History of recent blood transfusion     hemorrhage     no reaction    Hyperglycemia     Hyperlipidemia     Hypersomnia with sleep apnea     Jejunostomy tube present     Lightheadedness     Malaise and fatigue     Migraines     \"OCCULAR MIGRAINES\"    Morbid obesity     Myocardial ischemia     Nausea     Nocturia     TJ on auto CPAP 10/31/2016    Overnight polysomnogram.  Weight 276 pounds.  Severe TJ with AHI 79 events per hour.  Auto CPAP recommended.    Pneumonia     FEB 2016    Scrotal bleeding     SOB (shortness of breath)        Past Surgical History:   Procedure Laterality Date    AORTIC VALVE REPAIR/REPLACEMENT  05/2016    " ASCENDING ARCH/HEMIARCH REPLACEMENT N/A 05/02/2016    Procedure: BHAVESH STERNOTOMY CORONARY ARTERY BYPASS GRAFT TIMES 3 USING LEFT INTERNAL MAMMARY ARTERY AND RIGHT GREATER SAPHENOUS VEIN GRAFT PER ENDOSCOPIC VEIN HARVESTING, AORTIC ANEURYSM REPAIR WITH ROOT REPAIR AND AORTIC VALVE REPLACEMENT;  Surgeon: Rosalio Cline MD;  Location: [facility] MAIN OR;  Service:     CARDIAC CATHETERIZATION N/A 04/01/2016    Procedure: Left Heart Cath;  Surgeon: Erick Tam MD;  [facility];  Service:     CARDIAC CATHETERIZATION N/A 04/01/2016    Procedure: Left ventriculography;  Surgeon: Erick Tam MD;  [facility];  Service:     CARDIAC CATHETERIZATION N/A 04/01/2016    Procedure: Right Heart Cath;  Surgeon: Erick Tam MD;  Location: [facility] CATH INVASIVE LOCATION;  Service:     CARDIAC CATHETERIZATION N/A 10/30/2017    Procedure: Coronary angiography;  Surgeon: Sorin Vasquez MD;  Location: [facility] CATH INVASIVE LOCATION;  Service:     CARDIAC CATHETERIZATION  10/30/2017    Procedure: Saphenous Vein Graft;  Surgeon: Sorin Vasquez MD;  Location: [facility] CATH INVASIVE LOCATION;  Service:     CARDIAC CATHETERIZATION N/A 10/30/2017    Procedure: Native mammary injection;  Surgeon: Sorin Vasquez MD;  Location: [facility] CATH INVASIVE LOCATION;  Service:     CARDIAC CATHETERIZATION N/A 07/07/2020    Procedure: Coronary angiography;  Surgeon: Gregor Kim MD;  Location: [facility] CATH INVASIVE LOCATION;  Service: Cardiovascular;  Laterality: N/A;    CARDIAC CATHETERIZATION N/A 07/07/2020    Procedure: Left heart cath;  Surgeon: Gregor Kim MD;  Location: [facility] CATH INVASIVE LOCATION;  Service: Cardiovascular;  Laterality: N/A;    CARDIAC CATHETERIZATION N/A 07/07/2020    Procedure: Left ventriculography;  Surgeon: Gregor Kim MD;  Location: [facility] CATH INVASIVE LOCATION;  Service: Cardiovascular;  Laterality: N/A;    CARDIAC CATHETERIZATION N/A 07/07/2020     Procedure: Stent ESMER bypass graft;  Surgeon: Gregor Kim MD;  Location: [facility] CATH INVASIVE LOCATION;  Service: Cardiovascular;  Laterality: N/A;    CARDIAC CATHETERIZATION N/A 06/17/2021    Procedure: SAPHENOUS VEIN GRAFT;  Surgeon: Marques Ndiaye MD;  [facility];  Service: Cardiovascular;  Laterality: N/A;    CARDIAC CATHETERIZATION N/A 06/17/2021    Procedure: Left Heart Cath;  Surgeon: Marques Ndiaye MD;  Location: [facility] CATH INVASIVE LOCATION;  Service: Cardiovascular;  Laterality: N/A;    CARDIAC CATHETERIZATION N/A 06/17/2021    Procedure: Coronary angiography;  Surgeon: Marques Ndiaye MD;  Location: [facility] CATH INVASIVE LOCATION;  Service: Cardiovascular;  Laterality: N/A;    CARDIAC CATHETERIZATION N/A 07/14/2022    Procedure: Left Heart Cath;  Surgeon: Charlie Aj MD;  Location: [facility] CATH INVASIVE LOCATION;  Service: Cardiovascular;  Laterality: N/A;    CARDIAC CATHETERIZATION N/A 07/14/2022    Procedure: Coronary angiography;  Surgeon: Charlie Aj MD;  Location: [facility] CATH INVASIVE LOCATION;  Service: Cardiovascular;  Laterality: N/A;    CARDIAC CATHETERIZATION  07/14/2022    Procedure: Saphenous Vein Graft;  Surgeon: Charlie Aj MD;  Location: [facility] CATH INVASIVE LOCATION;  Service: Cardiovascular;;    CARDIAC CATHETERIZATION N/A 07/14/2022    Procedure: Native mammary injection;  Surgeon: Charlie Aj MD;  Location: [facility] CATH INVASIVE LOCATION;  Service: Cardiovascular;  Laterality: N/A;    CATARACT EXTRACTION EXTRACAPSULAR W/ INTRAOCULAR LENS IMPLANTATION Left     COLONOSCOPY N/A 11/26/2022    Procedure: COLONOSCOPY AT BEDSIDE;  Surgeon: Tomy Lopez MD;  Location: [facility] OR;  Service: Gastroenterology;  Laterality: N/A;    COLONOSCOPY W/ POLYPECTOMY N/A 10/04/2022    Procedure: COLONOSCOPY to cecum with cold forceps and cold snare polypectomies;  Surgeon: Gregor Carlson MD;  Location: [facility]  ENDOSCOPY;  Service: Gastroenterology;  Laterality: N/A;  PRE- hx of polyps  POST- diverticulosis, polyps    CORONARY ARTERY BYPASS GRAFT  05/2016    LIMA TO LAD, SVG TO PDA, SVG TO OM2    ENDOSCOPY N/A 07/13/2022    Procedure: ESOPHAGOGASTRODUODENOSCOPY;  Surgeon: Marcia Hollis MD;  Location: [facility] ENDOSCOPY;  Service: Gastroenterology;  Laterality: N/A;  PRE- ANEMIA, MELENA  POST- MILD EROSIVE GASTRITIS, GE JUNCTION ULCER    ENDOSCOPY N/A 10/04/2022    Procedure: ESOPHAGOGASTRODUODENOSCOPY with biopsies;  Surgeon: Gregor Carlson MD;  [facility];  Service: Gastroenterology;  Laterality: N/A;  PRE- hx of esophageal ulcer  POST- gastric cardia mass    ENDOSCOPY N/A 5/1/2023    Procedure: ESOPHAGOGASTRODUODENOSCOPY WITH  23ci-91ft-10sh balloon DILATATION of pylorus and anastomosis;  Surgeon: Valerie Stockton MD;  Location: [facility] ENDOSCOPY;  Service: Gastroenterology;  Laterality: N/A;  PRE: dysphagia  POST: no abnormalities seen    ESOPHAGOGASTRECTOMY Right 02/03/2023    Procedure: BRONCHOSCOPY, RIGHT ROBOTIC VIDEO ASSISTED THORACOSCOPY WITH TOTAL ESOPHAGECTOMY, INTERCOSTAL NERVE BLOCK, FEEDING JEJUNOSTOMY PLACEMENT;  Surgeon: Valerie Stockton MD;  Location: [facility] MAIN OR;  Service: [facility];  Laterality: Right;    INNER EAR SURGERY      TONSILLECTOMY         Current Outpatient Medications on File Prior to Visit   Medication Sig Dispense Refill    amLODIPine (NORVASC) 10 MG tablet Take 1 tablet by mouth Daily. (Patient taking differently: Take 1 tablet by mouth Every Night.) 30 tablet 11    atorvastatin (LIPITOR) 40 MG tablet TAKE ONE TABLET BY MOUTH DAILY 90 tablet 3    dapagliflozin Propanediol (Farxiga) 10 MG tablet Administer 10 mg per J tube Daily. Indications: Type 2 Diabetes 30 tablet 5    furosemide (LASIX) 20 MG tablet TAKE ONE TABLET BY MOUTH DAILY 30 tablet 2    isosorbide mononitrate (IMDUR) 30 MG 24 hr tablet Take 1 tablet by mouth Daily. 30 tablet 11     "lisinopril (PRINIVIL,ZESTRIL) 10 MG tablet Take 1 tablet by mouth Every Night for 360 days. 90 tablet 3    metoprolol succinate XL (TOPROL-XL) 25 MG 24 hr tablet TAKE ONE TABLET BY MOUTH DAILY 90 tablet 2    mirtazapine (REMERON) 45 MG tablet TAKE ONE TABLET BY MOUTH ONCE NIGHTLY 30 tablet 2    [DISCONTINUED] Chlorhexidine Gluconate Cloth 2 % pads Apply 1 application  topically. USE AS DIRECTED PREOP (Patient not taking: Reported on 8/21/2023)      [DISCONTINUED] Loperamide HCl (IMODIUM PO) Take 1 tablet by mouth As Needed. LOOSE STOOLS      [DISCONTINUED] metoprolol succinate XL (TOPROL-XL) 25 MG 24 hr tablet 1 tablet Every Night.      [DISCONTINUED] metoprolol tartrate (LOPRESSOR) 25 MG tablet 1 tablet by Enteral route.       No current facility-administered medications on file prior to visit.       No Known Allergies    Social History     Socioeconomic History    Marital status: Single   Tobacco Use    Smoking status: Never     Passive exposure: Never    Smokeless tobacco: Never   Vaping Use    Vaping Use: Never used   Substance and Sexual Activity    Alcohol use: Not Currently     Comment: usually 1-2 month but none since christmas 2022    Drug use: Never    Sexual activity: Defer       Family History   Problem Relation Age of Onset    Hyperlipidemia Mother     Arthritis Mother     Hypertension Mother     Diabetes Mother     Heart disease Mother     Hyperlipidemia Father     Heart disease Father     Hypertension Father     Lung cancer Father     Heart disease Sister     Stroke Maternal Grandmother     Heart disease Maternal Grandmother     Hypertension Maternal Grandfather     Hypertension Paternal Grandmother     Hypertension Paternal Grandfather     Other Other         The patient states that his sister has a problem that goes by the name of \"long QT\".  He says this is a familial trait but he also says that he is \"not interested in pursuing it for himself\".    Coronary artery disease Other     Malig " "Hyperthermia Neg Hx        REVIEW OF SYSTEMS    CONSTITUTIONAL: Denies fevers, chills, unintentional weight loss or weight gain  RESPIRATORY: Denies chronic cough or sob.   CARDIAC: Denies chest pain, palpitations, edema  GI: Denies dyspepsia, reflux, heartburn   : Denies dysuria or hematuria.    MUSCULOSKELETAL: Denies muscle weakness and pain   NEURO: Denies chronic headaches.   ENDOCRINE: Denies significant heat or cold intolerance or history of thyroid problems.    DERM: no rashes,lesions or discharge.     Physical Examination  /88 (BP Location: Right arm, Patient Position: Lying, Cuff Size: Adult)   Temp 98.1 °F (36.7 °C) (Oral)   Ht 185.4 cm (73\")   Wt 125 kg (275 lb)   BMI 36.28 kg/m²   Body mass index is 36.28 kg/m².  GENERAL:oriented to person, place, and time and ill-appearing  HEENT: normochephalic, atraumatic, no scleral icterus moist mucous membranes.  NECK: Supple there is no thyromegaly or lymphadenopathy  CHEST: Breathing comfortable   CARDIAC: regular rate and rhythm    ABDOMEN: soft, tender to palpation in the right upper quadrant, Nondistended, no masses or organomegaly.  Well-healed upper midline incision.  There is calcification of the soft tissue over the superior part of the wound.  EXTREMITIES: no cyanosis, clubbing or edema    NEURO: alert and oriented, normal speech, cranial nerves 2-12 grossly intact, no focal deficits   SKIN: Moist, warm, no rashes.    Radiographic Studies:  The patient did have a RUQ U/S which showed cholelithiasis, distention of the gallbladder and borderline upper normal common bile duct  The patient did CT scan Abdomen: abnormal cholelithiasis, distended gallbladder.  Calcification of the upper portion of the wound      Laboratory:  Lab Results   Component Value Date    WBC 6.05 06/09/2023    RBC 4.35 06/09/2023    HGB 12.3 (L) 06/09/2023    HGB 12.5 (L) 05/08/2023    HCT 38.3 06/09/2023    HCT 39.9 05/08/2023     06/09/2023     05/08/2023 "     No results found for: AMYLASE  No results found for: LIPASE     8/21/2023:   Normal CMP    Assessment:   Edmond Chase is a 62 y.o. male who presents with symptoms consistent with acute cholecystitis cholelithiasis.  We discussed with patient about treatment options.  I recommend that he undergoes laparoscopic cholecystectomy with intraoperative [place].  Risk and benefits of procedure including bleeding, infection, perforation, biliary tree injury, bile leak discussed in detail with the patient that verbalized understanding and agreed the plan        Diagnosis Plan   1. Acute cholecystitis  Case Request    sodium chloride 0.9 % flush 10 mL    sodium chloride 0.9 % flush 10 mL    sodium chloride 0.9 % infusion 40 mL    ceFAZolin (ANCEF) 3,000 mg in sodium chloride 0.9 % 100 mL IVPB    Case Request               Plan:        Laparoscopic Cholecystectomy with IOC under General anesthesia    - Surgery scheduling initiated.    Risks and rationale for the procedure were discussed with the patient including but not limited to bleeding, infection, converting to and open procedure, bile duct injury and the bowel function changes associated with this surgery. All questions were answered and the patient was willing to proceed with the above recommendations.All questions were   answered at this time.    Jose Manuel Baca MD  General, Minimally Invasive and Endoscopic Surgery  [facility], [facility]    [address], [address]  [place], [place], [address]  P: [phone]  F: [phone]

## 2023-09-01 NOTE — PROGRESS NOTES
"Date of Service: 9/1/2023    No chief complaint on file.      Subjective: Edmond Chase is a 62 y.o. male who is referred to my clinic by Valerie Stockton MD for a possible cholecystectomy.  Patient complains of the following symptoms:  Abdominal Pain: Patient complains of abdominal pain. The pain is described as colicky and sharp, and is 7/10 in intensity. Pain is located in the RUQ with radiation to right back. Onset was 3 weeks ago. Symptoms have been gradually worsening since. Aggravating factors: eating and fatty foods.  Alleviating factors: none. Associated symptoms: chills, constipation, nausea, and vomiting. The patient denies anorexia, dysuria, and fever.    Past Medical History:   Diagnosis Date    Abnormal nuclear stress test     Anxiety     Anxiety and depression     Aortic valve insufficiency     nonrheumtic     Arthritis     knees    Ascending aortic aneurysm     CAD (coronary artery disease)     stent    Chest pain     Diabetes mellitus     Diarrhea     Dizzy     CAREFUL WHEN GETTING UP    Dyspnea     Esophageal cancer 11/2022    no chemo or radiation    Essential hypertension     Fatigue     G tube feedings     per jejunostomy tube nightly with permitin  3 cans nightly    GERD (gastroesophageal reflux disease)     GI bleed     Heart murmur     History of pneumonia     History of recent blood transfusion     hemorrhage     no reaction    Hyperglycemia     Hyperlipidemia     Hypersomnia with sleep apnea     Jejunostomy tube present     Lightheadedness     Malaise and fatigue     Migraines     \"OCCULAR MIGRAINES\"    Morbid obesity     Myocardial ischemia     Nausea     Nocturia     JT on auto CPAP 10/31/2016    Overnight polysomnogram.  Weight 276 pounds.  Severe TJ with AHI 79 events per hour.  Auto CPAP recommended.    Pneumonia     FEB 2016    Scrotal bleeding     SOB (shortness of breath)        Past Surgical History:   Procedure Laterality Date    AORTIC VALVE REPAIR/REPLACEMENT  05/2016    " ASCENDING ARCH/HEMIARCH REPLACEMENT N/A 05/02/2016    Procedure: BHAVESH STERNOTOMY CORONARY ARTERY BYPASS GRAFT TIMES 3 USING LEFT INTERNAL MAMMARY ARTERY AND RIGHT GREATER SAPHENOUS VEIN GRAFT PER ENDOSCOPIC VEIN HARVESTING, AORTIC ANEURYSM REPAIR WITH ROOT REPAIR AND AORTIC VALVE REPLACEMENT;  Surgeon: Rosalio Clien MD;  Location: [facility] MAIN OR;  Service:     CARDIAC CATHETERIZATION N/A 04/01/2016    Procedure: Left Heart Cath;  Surgeon: Erick Tam MD;  [facility];  Service:     CARDIAC CATHETERIZATION N/A 04/01/2016    Procedure: Left ventriculography;  Surgeon: Erick Tam MD;  [facility];  Service:     CARDIAC CATHETERIZATION N/A 04/01/2016    Procedure: Right Heart Cath;  Surgeon: Erick Tam MD;  Location: [facility] CATH INVASIVE LOCATION;  Service:     CARDIAC CATHETERIZATION N/A 10/30/2017    Procedure: Coronary angiography;  Surgeon: Sorin Vasquez MD;  Location: [facility] CATH INVASIVE LOCATION;  Service:     CARDIAC CATHETERIZATION  10/30/2017    Procedure: Saphenous Vein Graft;  Surgeon: Sorin Vasquez MD;  Location: [facility] CATH INVASIVE LOCATION;  Service:     CARDIAC CATHETERIZATION N/A 10/30/2017    Procedure: Native mammary injection;  Surgeon: Sorin Vasquez MD;  Location: [facility] CATH INVASIVE LOCATION;  Service:     CARDIAC CATHETERIZATION N/A 07/07/2020    Procedure: Coronary angiography;  Surgeon: Gregor Kim MD;  Location: [facility] CATH INVASIVE LOCATION;  Service: Cardiovascular;  Laterality: N/A;    CARDIAC CATHETERIZATION N/A 07/07/2020    Procedure: Left heart cath;  Surgeon: Gregor Kim MD;  Location: [facility] CATH INVASIVE LOCATION;  Service: Cardiovascular;  Laterality: N/A;    CARDIAC CATHETERIZATION N/A 07/07/2020    Procedure: Left ventriculography;  Surgeon: Gregor Kim MD;  Location: [facility] CATH INVASIVE LOCATION;  Service: Cardiovascular;  Laterality: N/A;    CARDIAC CATHETERIZATION N/A 07/07/2020     Procedure: Stent ESMER bypass graft;  Surgeon: Gregor Kim MD;  Location: [facility] CATH INVASIVE LOCATION;  Service: Cardiovascular;  Laterality: N/A;    CARDIAC CATHETERIZATION N/A 06/17/2021    Procedure: SAPHENOUS VEIN GRAFT;  Surgeon: Marques Ndiaye MD;  [facility];  Service: Cardiovascular;  Laterality: N/A;    CARDIAC CATHETERIZATION N/A 06/17/2021    Procedure: Left Heart Cath;  Surgeon: Marques Ndiaye MD;  Location: [facility] CATH INVASIVE LOCATION;  Service: Cardiovascular;  Laterality: N/A;    CARDIAC CATHETERIZATION N/A 06/17/2021    Procedure: Coronary angiography;  Surgeon: Marques Ndiaye MD;  Location: [facility] CATH INVASIVE LOCATION;  Service: Cardiovascular;  Laterality: N/A;    CARDIAC CATHETERIZATION N/A 07/14/2022    Procedure: Left Heart Cath;  Surgeon: Charlie Aj MD;  Location: [facility] CATH INVASIVE LOCATION;  Service: Cardiovascular;  Laterality: N/A;    CARDIAC CATHETERIZATION N/A 07/14/2022    Procedure: Coronary angiography;  Surgeon: Charlie Aj MD;  Location: [facility] CATH INVASIVE LOCATION;  Service: Cardiovascular;  Laterality: N/A;    CARDIAC CATHETERIZATION  07/14/2022    Procedure: Saphenous Vein Graft;  Surgeon: Charlie jA MD;  Location: [facility] CATH INVASIVE LOCATION;  Service: Cardiovascular;;    CARDIAC CATHETERIZATION N/A 07/14/2022    Procedure: Native mammary injection;  Surgeon: Charlie Aj MD;  Location: [facility] CATH INVASIVE LOCATION;  Service: Cardiovascular;  Laterality: N/A;    CATARACT EXTRACTION EXTRACAPSULAR W/ INTRAOCULAR LENS IMPLANTATION Left     COLONOSCOPY N/A 11/26/2022    Procedure: COLONOSCOPY AT BEDSIDE;  Surgeon: Tomy Lopez MD;  Location: [facility] OR;  Service: Gastroenterology;  Laterality: N/A;    COLONOSCOPY W/ POLYPECTOMY N/A 10/04/2022    Procedure: COLONOSCOPY to cecum with cold forceps and cold snare polypectomies;  Surgeon: Gregor Carlson MD;  Location: [facility]  ENDOSCOPY;  Service: Gastroenterology;  Laterality: N/A;  PRE- hx of polyps  POST- diverticulosis, polyps    CORONARY ARTERY BYPASS GRAFT  05/2016    LIMA TO LAD, SVG TO PDA, SVG TO OM2    ENDOSCOPY N/A 07/13/2022    Procedure: ESOPHAGOGASTRODUODENOSCOPY;  Surgeon: Marcia Hollis MD;  Location: [facility] ENDOSCOPY;  Service: Gastroenterology;  Laterality: N/A;  PRE- ANEMIA, MELENA  POST- MILD EROSIVE GASTRITIS, GE JUNCTION ULCER    ENDOSCOPY N/A 10/04/2022    Procedure: ESOPHAGOGASTRODUODENOSCOPY with biopsies;  Surgeon: Gregor Carlson MD;  [facility];  Service: Gastroenterology;  Laterality: N/A;  PRE- hx of esophageal ulcer  POST- gastric cardia mass    ENDOSCOPY N/A 5/1/2023    Procedure: ESOPHAGOGASTRODUODENOSCOPY WITH  [id number] balloon DILATATION of pylorus and anastomosis;  Surgeon: Valerie Stockton MD;  Location: [facility] ENDOSCOPY;  Service: Gastroenterology;  Laterality: N/A;  PRE: dysphagia  POST: no abnormalities seen    ESOPHAGOGASTRECTOMY Right 02/03/2023    Procedure: BRONCHOSCOPY, RIGHT ROBOTIC VIDEO ASSISTED THORACOSCOPY WITH TOTAL ESOPHAGECTOMY, INTERCOSTAL NERVE BLOCK, FEEDING JEJUNOSTOMY PLACEMENT;  Surgeon: Valerie Stockton MD;  Location: [facility] MAIN OR;  Service: Robotics - [facility];  Laterality: Right;    INNER EAR SURGERY      TONSILLECTOMY         Current Outpatient Medications on File Prior to Visit   Medication Sig Dispense Refill    amLODIPine (NORVASC) 10 MG tablet Take 1 tablet by mouth Daily. (Patient taking differently: Take 1 tablet by mouth Every Night.) 30 tablet 11    atorvastatin (LIPITOR) 40 MG tablet TAKE ONE TABLET BY MOUTH DAILY 90 tablet 3    dapagliflozin Propanediol (Farxiga) 10 MG tablet Administer 10 mg per J tube Daily. Indications: Type 2 Diabetes 30 tablet 5    furosemide (LASIX) 20 MG tablet TAKE ONE TABLET BY MOUTH DAILY 30 tablet 2    isosorbide mononitrate (IMDUR) 30 MG 24 hr tablet Take 1 tablet by mouth Daily. 30 tablet 11     "lisinopril (PRINIVIL,ZESTRIL) 10 MG tablet Take 1 tablet by mouth Every Night for 360 days. 90 tablet 3    metoprolol succinate XL (TOPROL-XL) 25 MG 24 hr tablet TAKE ONE TABLET BY MOUTH DAILY 90 tablet 2    mirtazapine (REMERON) 45 MG tablet TAKE ONE TABLET BY MOUTH ONCE NIGHTLY 30 tablet 2    [DISCONTINUED] Chlorhexidine Gluconate Cloth 2 % pads Apply 1 application  topically. USE AS DIRECTED PREOP (Patient not taking: Reported on 8/21/2023)      [DISCONTINUED] Loperamide HCl (IMODIUM PO) Take 1 tablet by mouth As Needed. LOOSE STOOLS      [DISCONTINUED] metoprolol succinate XL (TOPROL-XL) 25 MG 24 hr tablet 1 tablet Every Night.      [DISCONTINUED] metoprolol tartrate (LOPRESSOR) 25 MG tablet 1 tablet by Enteral route.       No current facility-administered medications on file prior to visit.       No Known Allergies    Social History     Socioeconomic History    Marital status: Single   Tobacco Use    Smoking status: Never     Passive exposure: Never    Smokeless tobacco: Never   Vaping Use    Vaping Use: Never used   Substance and Sexual Activity    Alcohol use: Not Currently     Comment: usually 1-2 month but none since christmas 2022    Drug use: Never    Sexual activity: Defer       Family History   Problem Relation Age of Onset    Hyperlipidemia Mother     Arthritis Mother     Hypertension Mother     Diabetes Mother     Heart disease Mother     Hyperlipidemia Father     Heart disease Father     Hypertension Father     Lung cancer Father     Heart disease Sister     Stroke Maternal Grandmother     Heart disease Maternal Grandmother     Hypertension Maternal Grandfather     Hypertension Paternal Grandmother     Hypertension Paternal Grandfather     Other Other         The patient states that his sister has a problem that goes by the name of \"long QT\".  He says this is a familial trait but he also says that he is \"not interested in pursuing it for himself\".    Coronary artery disease Other     Malig " "Hyperthermia Neg Hx        REVIEW OF SYSTEMS    CONSTITUTIONAL: Denies fevers, chills, unintentional weight loss or weight gain  RESPIRATORY: Denies chronic cough or sob.   CARDIAC: Denies chest pain, palpitations, edema  GI: Denies dyspepsia, reflux, heartburn   : Denies dysuria or hematuria.    MUSCULOSKELETAL: Denies muscle weakness and pain   NEURO: Denies chronic headaches.   ENDOCRINE: Denies significant heat or cold intolerance or history of thyroid problems.    DERM: no rashes,lesions or discharge.     Physical Examination  /88 (BP Location: Right arm, Patient Position: Lying, Cuff Size: Adult)   Temp 98.1 øF (36.7 øC) (Oral)   Ht 185.4 cm (73\")   Wt 125 kg (275 lb)   BMI 36.28 kg/mý   Body mass index is 36.28 kg/mý.  GENERAL:oriented to person, place, and time and ill-appearing  HEENT: normochephalic, atraumatic, no scleral icterus moist mucous membranes.  NECK: Supple there is no thyromegaly or lymphadenopathy  CHEST: Breathing comfortable   CARDIAC: regular rate and rhythm    ABDOMEN: soft, tender to palpation in the right upper quadrant, Nondistended, no masses or organomegaly.  Well-healed upper midline incision.  There is calcification of the soft tissue over the superior part of the wound.  EXTREMITIES: no cyanosis, clubbing or edema    NEURO: alert and oriented, normal speech, cranial nerves 2-12 grossly intact, no focal deficits   SKIN: Moist, warm, no rashes.    Radiographic Studies:  The patient did have a RUQ U/S which showed cholelithiasis, distention of the gallbladder and borderline upper normal common bile duct  The patient did CT scan Abdomen: abnormal cholelithiasis, distended gallbladder.  Calcification of the upper portion of the wound      Laboratory:  Lab Results   Component Value Date    WBC 6.05 06/09/2023    RBC 4.35 06/09/2023    HGB 12.3 (L) 06/09/2023    HGB 12.5 (L) 05/08/2023    HCT 38.3 06/09/2023    HCT 39.9 05/08/2023     06/09/2023     05/08/2023 "     No results found for: AMYLASE  No results found for: LIPASE     8/21/2023:   Normal CMP    Assessment:   Edmond Chase is a 62 y.o. male who presents with symptoms consistent with acute cholecystitis cholelithiasis.  We discussed with patient about treatment options.  I recommend that he undergoes laparoscopic cholecystectomy with intraoperative [place].  Risk and benefits of procedure including bleeding, infection, perforation, biliary tree injury, bile leak discussed in detail with the patient that verbalized understanding and agreed the plan        Diagnosis Plan   1. Acute cholecystitis  Case Request    sodium chloride 0.9 % flush 10 mL    sodium chloride 0.9 % flush 10 mL    sodium chloride 0.9 % infusion 40 mL    ceFAZolin (ANCEF) 3,000 mg in sodium chloride 0.9 % 100 mL IVPB    Case Request               Plan:        Laparoscopic Cholecystectomy with IOC under General anesthesia    - Surgery scheduling initiated.    Risks and rationale for the procedure were discussed with the patient including but not limited to bleeding, infection, converting to and open procedure, bile duct injury and the bowel function changes associated with this surgery. All questions were answered and the patient was willing to proceed with the above recommendations.All questions were   answered at this time.    Jose Manuel Baca MD  General, Minimally Invasive and Endoscopic Surgery  [facility]    [address], [address]  [place], [place], [address]  P: [phone]  F: [phone]

## 2023-09-02 LAB
ALBUMIN SERPL-MCNC: 3.5 G/DL (ref 3.5–5.2)
ALBUMIN/GLOB SERPL: 1.6 G/DL
ALP SERPL-CCNC: 91 U/L (ref 39–117)
ALT SERPL W P-5'-P-CCNC: 26 U/L (ref 1–41)
ANION GAP SERPL CALCULATED.3IONS-SCNC: 13 MMOL/L (ref 5–15)
AST SERPL-CCNC: 33 U/L (ref 1–40)
BASOPHILS # BLD AUTO: 0.01 10*3/MM3 (ref 0–0.2)
BASOPHILS NFR BLD AUTO: 0.1 % (ref 0–1.5)
BILIRUB SERPL-MCNC: 0.5 MG/DL (ref 0–1.2)
BUN SERPL-MCNC: 12 MG/DL (ref 8–23)
BUN/CREAT SERPL: 17.6 (ref 7–25)
CALCIUM SPEC-SCNC: 8.4 MG/DL (ref 8.6–10.5)
CHLORIDE SERPL-SCNC: 106 MMOL/L (ref 98–107)
CO2 SERPL-SCNC: 25 MMOL/L (ref 22–29)
CREAT SERPL-MCNC: 0.68 MG/DL (ref 0.76–1.27)
DEPRECATED RDW RBC AUTO: 41.9 FL (ref 37–54)
EGFRCR SERPLBLD CKD-EPI 2021: 103.8 ML/MIN/1.73
EOSINOPHIL # BLD AUTO: 0 10*3/MM3 (ref 0–0.4)
EOSINOPHIL NFR BLD AUTO: 0 % (ref 0.3–6.2)
ERYTHROCYTE [DISTWIDTH] IN BLOOD BY AUTOMATED COUNT: 13.2 % (ref 12.3–15.4)
GLOBULIN UR ELPH-MCNC: 2.2 GM/DL
GLUCOSE SERPL-MCNC: 136 MG/DL (ref 65–99)
HCT VFR BLD AUTO: 33.6 % (ref 37.5–51)
HGB BLD-MCNC: 11.1 G/DL (ref 13–17.7)
IMM GRANULOCYTES # BLD AUTO: 0.02 10*3/MM3 (ref 0–0.05)
IMM GRANULOCYTES NFR BLD AUTO: 0.2 % (ref 0–0.5)
LYMPHOCYTES # BLD AUTO: 0.56 10*3/MM3 (ref 0.7–3.1)
LYMPHOCYTES NFR BLD AUTO: 6.7 % (ref 19.6–45.3)
MCH RBC QN AUTO: 29 PG (ref 26.6–33)
MCHC RBC AUTO-ENTMCNC: 33 G/DL (ref 31.5–35.7)
MCV RBC AUTO: 87.7 FL (ref 79–97)
MONOCYTES # BLD AUTO: 0.58 10*3/MM3 (ref 0.1–0.9)
MONOCYTES NFR BLD AUTO: 6.9 % (ref 5–12)
NEUTROPHILS NFR BLD AUTO: 7.2 10*3/MM3 (ref 1.7–7)
NEUTROPHILS NFR BLD AUTO: 86.1 % (ref 42.7–76)
NRBC BLD AUTO-RTO: 0 /100 WBC (ref 0–0.2)
PLATELET # BLD AUTO: 182 10*3/MM3 (ref 140–450)
PMV BLD AUTO: 9.9 FL (ref 6–12)
POTASSIUM SERPL-SCNC: 3.6 MMOL/L (ref 3.5–5.2)
PROT SERPL-MCNC: 5.7 G/DL (ref 6–8.5)
RBC # BLD AUTO: 3.83 10*6/MM3 (ref 4.14–5.8)
SODIUM SERPL-SCNC: 144 MMOL/L (ref 136–145)
WBC NRBC COR # BLD: 8.37 10*3/MM3 (ref 3.4–10.8)

## 2023-09-02 PROCEDURE — 25010000002 HYDROMORPHONE PER 4 MG: Performed by: SURGERY

## 2023-09-02 PROCEDURE — 85025 COMPLETE CBC W/AUTO DIFF WBC: CPT | Performed by: SURGERY

## 2023-09-02 PROCEDURE — 25010000002 ONDANSETRON PER 1 MG: Performed by: SURGERY

## 2023-09-02 PROCEDURE — 80053 COMPREHEN METABOLIC PANEL: CPT | Performed by: SURGERY

## 2023-09-02 PROCEDURE — 99024 POSTOP FOLLOW-UP VISIT: CPT | Performed by: SURGERY

## 2023-09-02 PROCEDURE — 25010000002 ENOXAPARIN PER 10 MG: Performed by: SURGERY

## 2023-09-02 PROCEDURE — G0378 HOSPITAL OBSERVATION PER HR: HCPCS

## 2023-09-02 PROCEDURE — 25010000002 KETOROLAC TROMETHAMINE PER 15 MG: Performed by: SURGERY

## 2023-09-02 RX ORDER — POTASSIUM CHLORIDE 750 MG/1
40 TABLET, FILM COATED, EXTENDED RELEASE ORAL EVERY 4 HOURS
Status: DISPENSED | OUTPATIENT
Start: 2023-09-02 | End: 2023-09-02

## 2023-09-02 RX ADMIN — MIRTAZAPINE 45 MG: 30 TABLET, FILM COATED ORAL at 04:03

## 2023-09-02 RX ADMIN — ENOXAPARIN SODIUM 40 MG: 100 INJECTION SUBCUTANEOUS at 09:28

## 2023-09-02 RX ADMIN — HYDROMORPHONE HYDROCHLORIDE 0.5 MG: 1 INJECTION, SOLUTION INTRAMUSCULAR; INTRAVENOUS; SUBCUTANEOUS at 05:57

## 2023-09-02 RX ADMIN — LISINOPRIL 10 MG: 20 TABLET ORAL at 22:08

## 2023-09-02 RX ADMIN — SENNOSIDES AND DOCUSATE SODIUM 2 TABLET: 50; 8.6 TABLET ORAL at 09:27

## 2023-09-02 RX ADMIN — ISOSORBIDE MONONITRATE 30 MG: 30 TABLET, EXTENDED RELEASE ORAL at 04:03

## 2023-09-02 RX ADMIN — LISINOPRIL 10 MG: 20 TABLET ORAL at 04:03

## 2023-09-02 RX ADMIN — MIRTAZAPINE 45 MG: 30 TABLET, FILM COATED ORAL at 22:10

## 2023-09-02 RX ADMIN — POTASSIUM CHLORIDE 40 MEQ: 750 TABLET, EXTENDED RELEASE ORAL at 09:26

## 2023-09-02 RX ADMIN — KETOROLAC TROMETHAMINE 15 MG: 15 INJECTION, SOLUTION INTRAMUSCULAR; INTRAVENOUS at 05:57

## 2023-09-02 RX ADMIN — OXYCODONE HYDROCHLORIDE AND ACETAMINOPHEN 1 TABLET: 5; 325 TABLET ORAL at 22:11

## 2023-09-02 RX ADMIN — Medication 3 ML: at 09:28

## 2023-09-02 RX ADMIN — SODIUM CHLORIDE, POTASSIUM CHLORIDE, SODIUM LACTATE AND CALCIUM CHLORIDE 100 ML/HR: 600; 310; 30; 20 INJECTION, SOLUTION INTRAVENOUS at 20:06

## 2023-09-02 RX ADMIN — Medication 3 ML: at 22:12

## 2023-09-02 RX ADMIN — ATORVASTATIN CALCIUM 40 MG: 10 TABLET, FILM COATED ORAL at 04:03

## 2023-09-02 RX ADMIN — ONDANSETRON 4 MG: 2 INJECTION INTRAMUSCULAR; INTRAVENOUS at 05:57

## 2023-09-02 RX ADMIN — AMLODIPINE BESYLATE 10 MG: 10 TABLET ORAL at 04:03

## 2023-09-02 RX ADMIN — SODIUM CHLORIDE, POTASSIUM CHLORIDE, SODIUM LACTATE AND CALCIUM CHLORIDE 100 ML/HR: 600; 310; 30; 20 INJECTION, SOLUTION INTRAVENOUS at 09:30

## 2023-09-02 RX ADMIN — METOPROLOL SUCCINATE 25 MG: 25 TABLET, EXTENDED RELEASE ORAL at 04:03

## 2023-09-02 NOTE — PLAN OF CARE
Problem: Adult Inpatient Plan of Care  Goal: Plan of Care Review  9/2/2023 0434 by Sheron Bateman RN  Outcome: Ongoing, Progressing  9/2/2023 0311 by Sheron Bateman RN  Outcome: Ongoing, Progressing  Goal: Patient-Specific Goal (Individualized)  9/2/2023 0434 by Sheron Bateman RN  Outcome: Ongoing, Progressing  9/2/2023 0311 by Sheron Bateman RN  Outcome: Ongoing, Progressing  Goal: Absence of Hospital-Acquired Illness or Injury  9/2/2023 0434 by Sheron Bateman RN  Outcome: Ongoing, Progressing  9/2/2023 0311 by Sheron Bateman RN  Outcome: Ongoing, Progressing  Intervention: Identify and Manage Fall Risk  Recent Flowsheet Documentation  Taken 9/1/2023 2230 by Sheron Bateman RN  Safety Promotion/Fall Prevention:   activity supervised   assistive device/personal items within reach   safety round/check completed  Intervention: Prevent Skin Injury  Recent Flowsheet Documentation  Taken 9/1/2023 2230 by Sheron Bateman RN  Body Position:   turned   head facing, left   legs elevated   side-lying  Intervention: Prevent and Manage VTE (Venous Thromboembolism) Risk  Recent Flowsheet Documentation  Taken 9/1/2023 2230 by Sheron Bateman RN  Activity Management:   ambulated in room   ambulated to bathroom   back to bed   up ad annita   activity encouraged  VTE Prevention/Management:   sequential compression devices on   bilateral  Range of Motion: active ROM (range of motion) encouraged  Intervention: Prevent Infection  Recent Flowsheet Documentation  Taken 9/1/2023 2230 by Sheron Bateman RN  Infection Prevention: rest/sleep promoted  Goal: Optimal Comfort and Wellbeing  9/2/2023 0434 by Sheron Bateman RN  Outcome: Ongoing, Progressing  9/2/2023 0311 by Sheron Bateman RN  Outcome: Ongoing, Progressing  Intervention: Monitor Pain and Promote Comfort  Recent Flowsheet Documentation  Taken 9/1/2023 2230 by Sheron Bateman RN  Pain  Management Interventions: see MAR  Intervention: Provide Person-Centered Care  Recent Flowsheet Documentation  Taken 9/1/2023 2230 by Sheron Bateman RN  Trust Relationship/Rapport:   care explained   choices provided  Goal: Readiness for Transition of Care  9/2/2023 0434 by Sheron Bateman, RN  Outcome: Ongoing, Progressing  9/2/2023 0311 by Sheron Bateman RN  Outcome: Ongoing, Progressing  Intervention: Mutually Develop Transition Plan  Recent Flowsheet Documentation  Taken 9/1/2023 2102 by Sheron Bateman RN  Transportation Anticipated: car, drives self  Patient/Family Anticipated Services at Transition: none  Patient/Family Anticipates Transition to: home  Goal: Plan of Care Review  Outcome: Ongoing, Progressing  Goal: Patient-Specific Goal (Individualized)  Outcome: Ongoing, Progressing  Goal: Absence of Hospital-Acquired Illness or Injury  Outcome: Ongoing, Progressing  Goal: Optimal Comfort and Wellbeing  Outcome: Ongoing, Progressing  Goal: Readiness for Transition of Care  Outcome: Ongoing, Progressing     Problem: Pain Acute  Goal: Acceptable Pain Control and Functional Ability  Outcome: Ongoing, Progressing   Goal Outcome Evaluation:

## 2023-09-02 NOTE — PLAN OF CARE
Problem: Adult Inpatient Plan of Care  Goal: Plan of Care Review  9/2/2023 0434 by Sheron Bateman RN  Outcome: Ongoing, Progressing  9/2/2023 0311 by Sheron Bateman RN  Outcome: Ongoing, Progressing  Goal: Patient-Specific Goal (Individualized)  9/2/2023 0434 by Sheron Bateman RN  Outcome: Ongoing, Progressing  9/2/2023 0311 by Sheron Bateman RN  Outcome: Ongoing, Progressing  Goal: Absence of Hospital-Acquired Illness or Injury  9/2/2023 0434 by Sheron Bateman RN  Outcome: Ongoing, Progressing  9/2/2023 0311 by Sheron Bateman RN  Outcome: Ongoing, Progressing  Intervention: Identify and Manage Fall Risk  Recent Flowsheet Documentation  Taken 9/1/2023 2230 by Sheron Bateman RN  Safety Promotion/Fall Prevention:   activity supervised   assistive device/personal items within reach   safety round/check completed  Intervention: Prevent Skin Injury  Recent Flowsheet Documentation  Taken 9/1/2023 2230 by Sheron Bateman RN  Body Position:   turned   head facing, left   legs elevated   side-lying  Intervention: Prevent and Manage VTE (Venous Thromboembolism) Risk  Recent Flowsheet Documentation  Taken 9/1/2023 2230 by Sheron Bateman RN  Activity Management:   ambulated in room   ambulated to bathroom   back to bed   up ad annita   activity encouraged  VTE Prevention/Management:   sequential compression devices on   bilateral  Range of Motion: active ROM (range of motion) encouraged  Intervention: Prevent Infection  Recent Flowsheet Documentation  Taken 9/1/2023 2230 by Sheron Bateman RN  Infection Prevention: rest/sleep promoted  Goal: Optimal Comfort and Wellbeing  9/2/2023 0434 by Sheron Bateman RN  Outcome: Ongoing, Progressing  9/2/2023 0311 by Sheron Bateman RN  Outcome: Ongoing, Progressing  Intervention: Monitor Pain and Promote Comfort  Recent Flowsheet Documentation  Taken 9/1/2023 2230 by Sheron Bateman RN  Pain  Management Interventions: see MAR  Intervention: Provide Person-Centered Care  Recent Flowsheet Documentation  Taken 9/1/2023 2230 by Sheron Bateman RN  Trust Relationship/Rapport:   care explained   choices provided  Goal: Readiness for Transition of Care  9/2/2023 0434 by Sheron Bateman RN  Outcome: Ongoing, Progressing  9/2/2023 0311 by Sheron Bateman RN  Outcome: Ongoing, Progressing  Intervention: Mutually Develop Transition Plan  Recent Flowsheet Documentation  Taken 9/1/2023 2102 by Sheron Bateman RN  Transportation Anticipated: car, drives self  Patient/Family Anticipated Services at Transition: none  Patient/Family Anticipates Transition to: home  Goal: Plan of Care Review  Outcome: Ongoing, Progressing  Goal: Patient-Specific Goal (Individualized)  Outcome: Ongoing, Progressing  Goal: Absence of Hospital-Acquired Illness or Injury  Outcome: Ongoing, Progressing  Intervention: Identify and Manage Fall Risk  Recent Flowsheet Documentation  Taken 9/1/2023 2230 by Sheron Bateman RN  Safety Promotion/Fall Prevention:   activity supervised   assistive device/personal items within reach   safety round/check completed  Intervention: Prevent Skin Injury  Recent Flowsheet Documentation  Taken 9/1/2023 2230 by Sheron Bateman RN  Body Position:   turned   head facing, left   legs elevated   side-lying  Intervention: Prevent and Manage VTE (Venous Thromboembolism) Risk  Recent Flowsheet Documentation  Taken 9/1/2023 2230 by Sheron Bateman RN  Activity Management:   ambulated in room   ambulated to bathroom   back to bed   up ad annita   activity encouraged  VTE Prevention/Management:   sequential compression devices on   bilateral  Range of Motion: active ROM (range of motion) encouraged  Intervention: Prevent Infection  Recent Flowsheet Documentation  Taken 9/1/2023 2230 by Sheron Bateman RN  Infection Prevention: rest/sleep promoted  Goal: Optimal Comfort  and Wellbeing  Outcome: Ongoing, Progressing  Intervention: Monitor Pain and Promote Comfort  Recent Flowsheet Documentation  Taken 9/1/2023 2230 by Sheron Bateman RN  Pain Management Interventions: see MAR  Intervention: Provide Person-Centered Care  Recent Flowsheet Documentation  Taken 9/1/2023 2230 by Sheron Bateman RN  Trust Relationship/Rapport:   care explained   choices provided  Goal: Readiness for Transition of Care  Outcome: Ongoing, Progressing  Intervention: Mutually Develop Transition Plan  Recent Flowsheet Documentation  Taken 9/1/2023 2102 by Sheron Bateman RN  Transportation Anticipated: car, drives self  Patient/Family Anticipated Services at Transition: none  Patient/Family Anticipates Transition to: home     Problem: Pain Acute  Goal: Acceptable Pain Control and Functional Ability  Outcome: Ongoing, Progressing  Intervention: Prevent or Manage Pain  Recent Flowsheet Documentation  Taken 9/1/2023 2230 by Sheron Bateman RN  Medication Review/Management: medications reviewed  Intervention: Develop Pain Management Plan  Recent Flowsheet Documentation  Taken 9/1/2023 2230 by Sheron Bateman RN  Pain Management Interventions: see MAR   Goal Outcome Evaluation:

## 2023-09-02 NOTE — PROGRESS NOTES
Chief Complaint:    S/P Laparoscopic cholecystectomy with intraoperative cholangiogram and CBD exploration, POD 1    Subjective:    Patient is feeling well with only expected postop abdominal pain.  He is tolerating a clear liquid diet and is ready to advance.  He is having expected postop pain and has required several doses of Dilaudid.  He is ready to transition to oral pain medication.    Objective:    Temp:  [97.5 °F (36.4 °C)-99.1 °F (37.3 °C)] 99.1 °F (37.3 °C)  Heart Rate:  [60-79] 72  Resp:  [16-18] 18  BP: (135-184)/(72-98) 135/79    Physical Exam  Constitutional:       Appearance: He is not ill-appearing or toxic-appearing.   Abdominal:      Palpations: Abdomen is soft.      Tenderness: There is generalized abdominal tenderness (Appropriate postop tenderness).      Comments: MARIN drain: Serosanguineous output.   Neurological:      Mental Status: He is alert.   Psychiatric:         Behavior: Behavior is cooperative.       Results:    WBC is 8.37.  H/H is 11.1/33.6.  BUN is 12 and creatinine 0.68.    Impression/Plan:    The patient is POD 1 from a laparoscopic cholecystectomy with intraoperative cholangiogram and CBD exploration.  He is recovering well.  His diet will be advanced.  I anticipate he will be ready for discharge tomorrow.    Gino Jones Jr., M.D.

## 2023-09-02 NOTE — PLAN OF CARE
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 and have to take them slowly. Potassium was replaced. MARIN drain in take and draining total of 95 mL. Pt ambulated well in the hallway. Clear liquid diet. Advanced as tolerated. MD wants patient to stay one more night and advance his diet to GI soft. And possibly remove drain.      Problem: Adult Inpatient Plan of Care  Goal: Patient-Specific Goal (Individualized)  Outcome: Ongoing, Progressing  Flowsheets (Taken 9/2/2023 1607)  Patient-Specific Goals (Include Timeframe): to control pain  Individualized Care Needs: PRN and scheduled meds, MARIN drain, up ad annita

## 2023-09-02 NOTE — PLAN OF CARE
Problem: Adult Inpatient Plan of Care  Goal: Plan of Care Review  Outcome: Ongoing, Progressing  Goal: Patient-Specific Goal (Individualized)  Outcome: Ongoing, Progressing  Goal: Absence of Hospital-Acquired Illness or Injury  Outcome: Ongoing, Progressing  Intervention: Identify and Manage Fall Risk  Recent Flowsheet Documentation  Taken 9/1/2023 2230 by Sheron Bateman RN  Safety Promotion/Fall Prevention:   activity supervised   assistive device/personal items within reach   safety round/check completed  Intervention: Prevent Skin Injury  Recent Flowsheet Documentation  Taken 9/1/2023 2230 by Sheron Bateman RN  Body Position:   turned   head facing, left   legs elevated   side-lying  Intervention: Prevent and Manage VTE (Venous Thromboembolism) Risk  Recent Flowsheet Documentation  Taken 9/1/2023 2230 by Sheron Bateman RN  Activity Management:   ambulated in room   ambulated to bathroom   back to bed   up ad annita   activity encouraged  VTE Prevention/Management:   sequential compression devices on   bilateral  Range of Motion: active ROM (range of motion) encouraged  Intervention: Prevent Infection  Recent Flowsheet Documentation  Taken 9/1/2023 2230 by Sheron Bateman RN  Infection Prevention: rest/sleep promoted  Goal: Optimal Comfort and Wellbeing  Outcome: Ongoing, Progressing  Intervention: Monitor Pain and Promote Comfort  Recent Flowsheet Documentation  Taken 9/1/2023 2230 by Sheron Bateman RN  Pain Management Interventions: see MAR  Intervention: Provide Person-Centered Care  Recent Flowsheet Documentation  Taken 9/1/2023 2230 by Sheron Bateman RN  Trust Relationship/Rapport:   care explained   choices provided  Goal: Readiness for Transition of Care  Outcome: Ongoing, Progressing  Intervention: Mutually Develop Transition Plan  Recent Flowsheet Documentation  Taken 9/1/2023 2102 by Sheron Bateman RN  Transportation Anticipated: car, drives  self  Patient/Family Anticipated Services at Transition: none  Patient/Family Anticipates Transition to: home   Goal Outcome Evaluation:

## 2023-09-03 ENCOUNTER — READMISSION MANAGEMENT (OUTPATIENT)
Dept: CALL CENTER | Facility: HOSPITAL | Age: 63
End: 2023-09-03
Payer: MEDICARE

## 2023-09-03 VITALS
DIASTOLIC BLOOD PRESSURE: 91 MMHG | OXYGEN SATURATION: 96 % | HEART RATE: 60 BPM | SYSTOLIC BLOOD PRESSURE: 156 MMHG | WEIGHT: 275 LBS | BODY MASS INDEX: 36.45 KG/M2 | HEIGHT: 73 IN | TEMPERATURE: 98 F | RESPIRATION RATE: 18 BRPM

## 2023-09-03 PROCEDURE — G0378 HOSPITAL OBSERVATION PER HR: HCPCS

## 2023-09-03 RX ORDER — OXYCODONE HYDROCHLORIDE AND ACETAMINOPHEN 5; 325 MG/1; MG/1
TABLET ORAL
Qty: 10 TABLET | Refills: 0 | Status: SHIPPED | OUTPATIENT
Start: 2023-09-03 | End: 2023-09-11

## 2023-09-03 RX ADMIN — SODIUM CHLORIDE, POTASSIUM CHLORIDE, SODIUM LACTATE AND CALCIUM CHLORIDE 100 ML/HR: 600; 310; 30; 20 INJECTION, SOLUTION INTRAVENOUS at 05:13

## 2023-09-03 NOTE — PLAN OF CARE
Goal Outcome Evaluation:  VSS. A&Ox4. 2L's o2 overnight. Lactacted ringers @ 100 mL/hr. GI soft diet. Pills taken whole, but one at a time. MARIN drain in place. PRN percocet given x1. Bed alarm remains on for pt safety. Care continuing.

## 2023-09-03 NOTE — OUTREACH NOTE
Prep Survey      Flowsheet Row Responses   [facility] patient discharged from? [place]   Is LACE score < 7 ? Yes   Eligibility [facility]   Date of Admission 09/01/23   Date of Discharge 09/03/23   Discharge Disposition Home or Self Care   Discharge diagnosis Laparoscopic cholecystectomy   Does the patient have one of the following disease processes/diagnoses(primary or secondary)? General Surgery   Does the patient have Home health ordered? No   Is there a DME ordered? No   Prep survey completed? Yes            Teagan LAGUERRE - Registered Nurse

## 2023-09-03 NOTE — DISCHARGE SUMMARY
Discharge Summary    Date of admission: 9/1/2023    Date of discharge: 9/3/2023    Final diagnosis:    1.  Cholelithiasis with cholecystitis  2.  Diabetes mellitus  3.  Coronary artery disease with previous stent  4.  Aortic valve insufficiency with previous aortic valve replacement  5.  Esophageal cancer with previous esophagectomy    Procedures performed during admission:    1.  Laparoscopic cholecystectomy with intraoperative cholangiogram and CBD exploration on 9/1/2023    Consulting physicians:    1.  None    Reason for admission:    The patient is a pleasant 62-year-old male who has been having right upper quadrant abdominal pain that is worse with eating fatty foods.  A right upper quadrant ultrasound showed cholelithiasis with distention of the gallbladder and a dilated CBD.  He was diagnosed with cholelithiasis and cholecystitis.  He was admitted for laparoscopic cholecystectomy with intraoperative cholangiogram.    Hospital course:    The patient was admitted on 9/1/2023 and taken to the operating room where he underwent a laparoscopic cholecystectomy with intraoperative cholangiogram.  He was found to have numerous stones in the cystic duct and underwent choledochoscopy and CBD exploration.  A drain was placed.  Postoperatively he was transferred to floor where he did well.  He had a brief expected postop ileus but his diet was advanced and he tolerated this well.  He remained afebrile and hemodynamically stable.  The drain was serosanguineous and never had bile present.  By 9/3/2023 he was felt ready for discharge to home.  The drain was removed.    Prescriptions:    He was given a prescription for Percocet.    Follow-up:    He will follow-up with Dr. Baca in 2 weeks.    Gino Jones Jr., M.D.

## 2023-09-04 ENCOUNTER — TRANSITIONAL CARE MANAGEMENT TELEPHONE ENCOUNTER (OUTPATIENT)
Dept: CALL CENTER | Facility: HOSPITAL | Age: 63
End: 2023-09-04
Payer: MEDICARE

## 2023-09-04 NOTE — OUTREACH NOTE
Call Center TCM Note      Flowsheet Row Responses   [facility] patient discharged from? [place]   Does the patient have one of the following disease processes/diagnoses(primary or secondary)? General Surgery   TCM attempt successful? Yes   Call start time 1335   Call end time 1337   Discharge diagnosis Laparoscopic cholecystectomy   Meds reviewed with patient/caregiver? Yes   Is the patient having any side effects they believe may be caused by any medication additions or changes? No   Does the patient have all medications related to this admission filled (includes all antibiotics, pain medications, etc.) No   Is the patient taking all medications as directed (includes completed medication regime)? No   Medication comments Needs to  pain med from pharmacy today.   Comments Has appt with PCP on 9/11 but is only a 15 min appt.  Will message office regarding this.   Does the patient have an appointment with their PCP within 7-14 days of discharge? Yes   Psychosocial issues? No   Did the patient receive a copy of their discharge instructions? Yes   Nursing interventions Reviewed instructions with patient   What is the patient's perception of their health status since discharge? Improving   Nursing interventions Nurse provided patient education   Is the patient /caregiver able to teach back basic post-op care? Practice 'cough and deep breath', Continue use of incentive spirometry at least 1 week post discharge, Take showers only when approved by MD-sponge bathe until then, Keep incision areas clean,dry and protected, Lifting as instructed by MD in discharge instructions   Is the patient/caregiver able to teach back signs and symptoms of incisional infection? Fever, Pus or odor from incision, Incisional warmth, Increased drainage or bleeding, Increased redness, swelling or pain at the incisonal site   Is the patient/caregiver able to teach back steps to recovery at home? Set small, achievable goals for  return to baseline health, Rest and rebuild strength, gradually increase activity, Eat a well-balance diet   If the patient is a current smoker, are they able to teach back resources for cessation? Not a smoker   Is the patient/caregiver able to teach back the hierarchy of who to call/visit for symptoms/problems? PCP, Specialist, Home health nurse, Urgent Care, ED, 911 Yes   Additional teach back comments Pt is going to  pain medication today.  Call was brief due to pt states phamacy closes at 2:00.   TCM call completed? Yes   Call end time 1747            Isabel Rubin LPN    9/4/2023, 13:39 EDT

## 2023-09-04 NOTE — OUTREACH NOTE
Call Center TCM Note      Flowsheet Row Responses   [facility] patient discharged from? [place]   Does the patient have one of the following disease processes/diagnoses(primary or secondary)? General Surgery   TCM attempt successful? No   Unsuccessful attempts Attempt 1            Isabel Rubin LPN    9/4/2023, 08:27 EDT

## 2023-09-05 NOTE — CASE MANAGEMENT/SOCIAL WORK
Case Management Discharge Note      Final Note: Home         Selected Continued Care - Discharged on 9/3/2023 Admission date: 9/1/2023 - Discharge disposition: Home or Self Care      Destination    No services have been selected for the patient.             Durable Medical Equipment    No services have been selected for the patient.             Dialysis/Infusion    No services have been selected for the patient.             Home Medical Care    No services have been selected for the patient.             Therapy    No services have been selected for the patient.             Community Resources    No services have been selected for the patient.             Community & DME    No services have been selected for the patient.                      Final Discharge Disposition Code: 01 - home or self-care

## 2023-09-06 LAB
LAB AP CASE REPORT: NORMAL
PATH REPORT.FINAL DX SPEC: NORMAL
PATH REPORT.GROSS SPEC: NORMAL

## 2023-09-11 ENCOUNTER — OFFICE VISIT (OUTPATIENT)
Dept: INTERNAL MEDICINE | Age: 63
End: 2023-09-11
Payer: MEDICARE

## 2023-09-11 VITALS
HEART RATE: 95 BPM | WEIGHT: 270 LBS | DIASTOLIC BLOOD PRESSURE: 80 MMHG | BODY MASS INDEX: 35.78 KG/M2 | HEIGHT: 73 IN | TEMPERATURE: 97.6 F | OXYGEN SATURATION: 95 % | SYSTOLIC BLOOD PRESSURE: 140 MMHG

## 2023-09-11 DIAGNOSIS — Z90.49 HISTORY OF CHOLECYSTECTOMY: Primary | ICD-10-CM

## 2023-09-11 DIAGNOSIS — T14.8XXA SUPERFICIAL INJURY OF SKIN: ICD-10-CM

## 2023-09-11 LAB
ALBUMIN SERPL-MCNC: 4.1 G/DL (ref 3.5–5.2)
ALBUMIN/GLOB SERPL: 2.2 G/DL
ALP SERPL-CCNC: 87 U/L (ref 39–117)
ALT SERPL-CCNC: 28 U/L (ref 1–41)
AST SERPL-CCNC: 23 U/L (ref 1–40)
BASOPHILS # BLD AUTO: 0.03 10*3/MM3 (ref 0–0.2)
BASOPHILS NFR BLD AUTO: 0.6 % (ref 0–1.5)
BILIRUB SERPL-MCNC: 0.5 MG/DL (ref 0–1.2)
BUN SERPL-MCNC: 13 MG/DL (ref 8–23)
BUN/CREAT SERPL: 14.9 (ref 7–25)
CALCIUM SERPL-MCNC: 8.7 MG/DL (ref 8.6–10.5)
CHLORIDE SERPL-SCNC: 104 MMOL/L (ref 98–107)
CO2 SERPL-SCNC: 28.7 MMOL/L (ref 22–29)
CREAT SERPL-MCNC: 0.87 MG/DL (ref 0.76–1.27)
EGFRCR SERPLBLD CKD-EPI 2021: 96.4 ML/MIN/1.73
EOSINOPHIL # BLD AUTO: 0.27 10*3/MM3 (ref 0–0.4)
EOSINOPHIL NFR BLD AUTO: 5.1 % (ref 0.3–6.2)
ERYTHROCYTE [DISTWIDTH] IN BLOOD BY AUTOMATED COUNT: 13.6 % (ref 12.3–15.4)
GLOBULIN SER CALC-MCNC: 1.9 GM/DL
GLUCOSE SERPL-MCNC: 101 MG/DL (ref 65–99)
HCT VFR BLD AUTO: 35.2 % (ref 37.5–51)
HGB BLD-MCNC: 11.3 G/DL (ref 13–17.7)
IMM GRANULOCYTES # BLD AUTO: 0.01 10*3/MM3 (ref 0–0.05)
IMM GRANULOCYTES NFR BLD AUTO: 0.2 % (ref 0–0.5)
LYMPHOCYTES # BLD AUTO: 1.01 10*3/MM3 (ref 0.7–3.1)
LYMPHOCYTES NFR BLD AUTO: 19.1 % (ref 19.6–45.3)
MCH RBC QN AUTO: 29.1 PG (ref 26.6–33)
MCHC RBC AUTO-ENTMCNC: 32.1 G/DL (ref 31.5–35.7)
MCV RBC AUTO: 90.7 FL (ref 79–97)
MONOCYTES # BLD AUTO: 0.35 10*3/MM3 (ref 0.1–0.9)
MONOCYTES NFR BLD AUTO: 6.6 % (ref 5–12)
NEUTROPHILS # BLD AUTO: 3.63 10*3/MM3 (ref 1.7–7)
NEUTROPHILS NFR BLD AUTO: 68.4 % (ref 42.7–76)
NRBC BLD AUTO-RTO: 0 /100 WBC (ref 0–0.2)
PLATELET # BLD AUTO: 218 10*3/MM3 (ref 140–450)
POTASSIUM SERPL-SCNC: 3.6 MMOL/L (ref 3.5–5.2)
PROT SERPL-MCNC: 6 G/DL (ref 6–8.5)
RBC # BLD AUTO: 3.88 10*6/MM3 (ref 4.14–5.8)
SODIUM SERPL-SCNC: 140 MMOL/L (ref 136–145)
WBC # BLD AUTO: 5.3 10*3/MM3 (ref 3.4–10.8)

## 2023-09-11 NOTE — PROGRESS NOTES
"    I N T E R N A L  M E D I C I N E  Nargis Velasquez, APRN       ENCOUNTER DATE:  09/11/2023    Edmond Chase / 62 y.o. / male        CC:   (Transitional Care Follow Up Visit)  Hospital Follow Up Visit        Within 48 business hours after discharge our office contacted him via telephone to coordinate his care and needs.      I reviewed and discussed the details of that call along with the discharge summary, hospital problems, inpatient lab results, inpatient diagnostic studies, and consultation reports with the patient.         9/3/2023    10:30 AM   Date of TCM Phone Call   [facility]   Date of Admission 9/1/2023   Date of Discharge 9/3/2023   Discharge Disposition Home or Self Care       Risk for Readmission (LACE) Score: 6 (9/3/2023  6:00 AM)            VITALS    Visit Vitals  /80   Pulse 95   Temp 97.6 °F (36.4 °C)   Ht 185.4 cm (72.99\")   Wt 122 kg (270 lb)   SpO2 95%   BMI 35.63 kg/m²       BP Readings from Last 3 Encounters:   09/11/23 140/80   09/03/23 156/91   09/01/23 138/88     Wt Readings from Last 3 Encounters:   09/11/23 122 kg (270 lb)   09/02/23 125 kg (275 lb)   09/01/23 125 kg (275 lb)      Body mass index is 35.63 kg/m².    HPI:     Date of admission/discharge: As noted above in CC  Hospital: [facility]   Principle Dx: Cholelithiasis with cholecystitis   Secondary Dx: Diabetes, CAD with stent, Aortic valve insufficiency with previous aortic insufficiency, esophageal cancer.   History prior to hospitalization: Presented to ER upon recommendation of general surgeon, Dr. Baca, for lap cholecystectomy.    Evaluation/Treatment: Intraoperative cholangiogram showed numerous stones in the cystic duct.  Underwent choledochoscopy and CBD exploration.  A drain was placed. Brief postop ileus but this resolved, and he tolerated advancing diet.  Drain was serosanguineous and removed prior to discharge.  Course: Today, patient is feeling feel.  Denies fever, chills, " "abdominal pain, chest pain, shortness of breath.  Reports minor incisional pain.  Eating, drinking, urinating well.  Last bowel movement was this morning and reportedly normal.  Last use of Percocet was a couple of days ago.  He has close follow up scheduled with Dr. Baca on Friday, September 15, 2023.  He has noticed some \"burning\" pain when shower water hits a low right abdominal incision site.       Patient Care Team:  Nargis Velasquez APRN as PCP - General (Family Medicine)  Papa Miguel MD as Consulting Physician (Cardiology)  Sekou Pisano MD as Consulting Physician (Pulmonary Disease)  Gregor Carlson MD as Consulting Physician (Gastroenterology)  Estevan Yuan MD (Sports Medicine)  Shelley Goldsmith, RN as Nurse Navigator  Alex Hernadez MD as Consulting Physician (Nephrology)  Yazmin Molina APRN as Nurse Practitioner (Psychiatry)  Dylon Schwartz MD as Consulting Physician (Hematology and Oncology)  Valerie Stockton MD as Referring Physician (Thoracic Surgery)  Valerie Stockton MD as Surgeon (Thoracic Surgery)  ____________________________________________________________________    ASSESSMENT & PLAN:    1. History of cholecystectomy    2. Superficial injury of skin      Orders Placed This Encounter   Procedures    Comprehensive Metabolic Panel    CBC & Differential       Summary/Discussion:  Upon exam, patient found to have superficial skin tear near site where he has experienced pain in the shower.  No evidence for infection.  Recommend that he use non adherent dressings (if needed), paper tape.  Keep site clean, dry and use topical antibiotic ointment.  Wash with gentle soap and water.  Follow up with General Surgery as scheduled.  Pt was educated on signs/ symptoms of infection, and when to seek follow up.  Will monitor patient's anemia and kidney function with updated labs.    Return in about 3 months (around 12/11/2023) for Annual " physical.    ____________________________________________________________________    REVIEW OF SYSTEMS    Review of Systems   Constitutional:  Negative for chills, fever and unexpected weight change.   Respiratory:  Negative for cough, chest tightness and shortness of breath.    Cardiovascular:  Negative for chest pain, palpitations and leg swelling.   Gastrointestinal:  Negative for abdominal pain, blood in stool, diarrhea, nausea and vomiting.   Neurological:  Negative for dizziness, weakness, light-headedness and headaches.   Psychiatric/Behavioral:  The patient is not nervous/anxious.        PHYSICAL EXAMINATION    Physical Exam  Vitals reviewed.   Constitutional:       General: He is not in acute distress.     Appearance: Normal appearance. He is not ill-appearing, toxic-appearing or diaphoretic.   HENT:      Head: Normocephalic and atraumatic.   Cardiovascular:      Rate and Rhythm: Normal rate and regular rhythm.      Heart sounds: Normal heart sounds.   Pulmonary:      Effort: Pulmonary effort is normal.      Breath sounds: Normal breath sounds.   Abdominal:      General: There is no distension.      Palpations: Abdomen is soft.      Tenderness: There is no abdominal tenderness.   Skin:     General: Skin is warm and dry.      Findings: No erythema.      Comments: Superficial skin tear, right lower abdominal wall, near lap site.  Approximately silver dollar sized.  No drainage, erythema, warmth.     Neurological:      Mental Status: He is alert and oriented to person, place, and time. Mental status is at baseline.   Psychiatric:         Mood and Affect: Mood normal.         Behavior: Behavior normal.         Thought Content: Thought content normal.         Judgment: Judgment normal.         REVIEWED DATA:    Labs:   Lab Results   Component Value Date     09/11/2023    K 3.6 09/11/2023    CALCIUM 8.7 09/11/2023    AST 23 09/11/2023    ALT 28 09/11/2023    BUN 13 09/11/2023    CREATININE 0.87 09/11/2023     CREATININE 0.68 (L) 09/02/2023    CREATININE 0.90 08/21/2023    EGFRIFNONA 91 06/17/2021    EGFRIFAFRI 72 03/29/2016       Lab Results   Component Value Date    WBC 5.30 09/11/2023    HGB 11.3 (L) 09/11/2023    HGB 11.1 (L) 09/02/2023    HGB 12.3 (L) 06/09/2023     09/11/2023       Lab Results   Component Value Date    PROTEIN 1+ (A) 08/04/2022    GLUCOSEU 3+ (A) 08/04/2022    BLOODU Negative 08/04/2022    NITRITEU Negative 08/04/2022    LEUKOCYTESUR Negative 08/04/2022       Imaging:   FL Cholangiogram Operative    Result Date: 9/1/2023  Narrative: INTRAOPERATIVE CHOLANGIOGRAM  HISTORY: Cholelithiasis.  FT 3 minutes 182.39 (Ka,r) mGy 166 images [facility] C-arm #7   FINDINGS: Images provided show contrast within the intra and extrahepatic biliary tree. No stricture or filling defect is identified. Contrast passes into the duodenum as expected.      Impression: Negative intraoperative cholangiogram.  This report was finalized on 9/1/2023 9:01 PM by Dr. Yandel Lees M.D.        Medical Tests:        Summary of old records / correspondence / consultant report:   DC summary re: issues addressed on HPI    Request outside records:         MEDICATIONS   Current Outpatient Medications   Medication Sig Dispense Refill    amLODIPine (NORVASC) 10 MG tablet Take 1 tablet by mouth Daily. (Patient taking differently: Take 1 tablet by mouth Every Night.) 30 tablet 11    atorvastatin (LIPITOR) 40 MG tablet TAKE ONE TABLET BY MOUTH DAILY 90 tablet 3    dapagliflozin Propanediol (Farxiga) 10 MG tablet Administer 10 mg per J tube Daily. Indications: Type 2 Diabetes (Patient taking differently: Take 10 mg by mouth Daily. Indications: Type 2 Diabetes) 30 tablet 5    furosemide (LASIX) 20 MG tablet TAKE ONE TABLET BY MOUTH DAILY 30 tablet 2    isosorbide mononitrate (IMDUR) 30 MG 24 hr tablet Take 1 tablet by mouth Daily. 30 tablet 11    lisinopril (PRINIVIL,ZESTRIL) 10 MG tablet Take 1 tablet by mouth Every Night for 360 days. 90  tablet 3    metoclopramide (REGLAN) 5 MG tablet       metoprolol succinate XL (TOPROL-XL) 25 MG 24 hr tablet TAKE ONE TABLET BY MOUTH DAILY 90 tablet 2    mirtazapine (REMERON) 45 MG tablet TAKE ONE TABLET BY MOUTH ONCE NIGHTLY 30 tablet 2     No current facility-administered medications for this visit.       Current outpatient and discharge medications have been reconciled for the patient.  Reviewed by: VIKTORIA Carbone         @MSK@

## 2023-09-15 ENCOUNTER — OFFICE VISIT (OUTPATIENT)
Dept: SURGERY | Facility: CLINIC | Age: 63
End: 2023-09-15
Payer: COMMERCIAL

## 2023-09-15 VITALS
SYSTOLIC BLOOD PRESSURE: 168 MMHG | DIASTOLIC BLOOD PRESSURE: 88 MMHG | HEIGHT: 73 IN | BODY MASS INDEX: 35.52 KG/M2 | WEIGHT: 268 LBS

## 2023-09-15 DIAGNOSIS — Z09 ENCOUNTER FOR FOLLOW-UP: Primary | ICD-10-CM

## 2023-09-15 PROCEDURE — 99024 POSTOP FOLLOW-UP VISIT: CPT | Performed by: PHYSICIAN ASSISTANT

## 2023-09-15 NOTE — PROGRESS NOTES
CC:  Postop visit    S:  This is a 62-year-old gentleman returning to the office today status post laparoscopic cholecystectomy that was performed on 9/1/2023.  Overall he is doing well since surgery having returned to his normal state of health that he was in prior to his gallbladder attacks.  He is eating the smaller more frequent meals that are required from his esophagectomy and does have the normal amount of nausea that he was having prior to his episodes of gallbladder attacks.  He is concerned about some skin breakdown that he has on his right side near his drain site from the tape that he had applied to the area.  He does feel as if it is oozing ever so slightly.  He denies having changes to his bowel habits nor has he had any increased or continued abdominal pain.    O:  Vitals: Reviewed  Abdomen: All incisions have healed very well on the right side of his abdomen he has multiple areas of skin breakdown consistent with adhesive tape being placed and removed    A/P:  Dr. Baca and I cautioned him to slowly return to his normal activities using his body as his guide.  He is to return to the diet that he can tolerate from his esophagectomy.  As for the skin breakdown we recommended no bandage but to place antibiotic ointment twice daily over the area until healed.  Pathology was reviewed with him which indicated that he had acute cholecystitis with cholelithiasis.  All questions were answered and he was willing to proceed with all recommendations.    Dennis Aponte PA-C

## 2023-09-15 NOTE — PROGRESS NOTES
I have reviewed the notes, assessments, and/or procedures performed by Dennis Aponte PA-C, I concur with her/his documentation of Edmond Chase.

## 2023-09-20 ENCOUNTER — OFFICE VISIT (OUTPATIENT)
Dept: PSYCHIATRY | Facility: HOSPITAL | Age: 63
End: 2023-09-20
Payer: MEDICARE

## 2023-09-20 DIAGNOSIS — F41.1 GAD (GENERALIZED ANXIETY DISORDER): Primary | ICD-10-CM

## 2023-09-20 DIAGNOSIS — F33.42 RECURRENT MAJOR DEPRESSIVE DISORDER, IN FULL REMISSION: ICD-10-CM

## 2023-09-20 NOTE — PROGRESS NOTES
Subjective  Patient ID: Edmond Chase is a 63 y.o.. male seen in regularly scheduled group session.  Group participants have consented to group conducted in person    Total Group Time, Face to Face: 65 minutes  Total Group Participants: 3, plus facilitation per VIKTORIA Moeller and VIKTORIA Machuca Student    Group Therapy  Group topics explored including development of new normal, short and long term effects of diagnosis and treatment, anticipitory anxiety, anxiety surrounding adjusted treatment plan or adjusted follow up, physical deconditioning, social determinants of health (finances, living arrangements, etc), and lifestyle choices. Patient's share impact of medical complexity, near death experiences, sense of purpose, adjusted expectations, and importance of connection to others.     Counseling provided regarding cognitive behavioral therapy (CBT) strategies, reintroduction to activity through graded tasks, balancing avoidance with approach , sleep hygiene, recognition and allowance of need for rest, pharmacological and non pharmacological management of sleep disturbance, lifestyle counseling, benefits of exercise and strategies for managing barriers to engagement, allowance of self care, exploration of quality of life goals, survivorship issues, current programming available in community and clinic, anxiety/ mood management , medication education, and existential distress. Considered sense of purpose, therapeutic factors of group, radical acceptance.    Discussed current programming available in [facility] and community.    Next shared medical visit: October 18 at 3:30 PM in person    Patient response to group: Pt engages well with other members, participating fully in conversation.    Medications Management  VIKTORIA Moeller and VIKTORIA Machuca Student present for medication discussion and management.  Pt continues in survivorship of esophageal cancer.    CC: depression,  anxiety  HPI: Pt continues in survivorship of various health complexities including esophageal cancer, GI bleed, recent cholecystectomy. Considers chronicity of recent illnesses, delayed recovery, although with gradual adjustment to new normal. No longer feels like an invalid. Notes personal goals of reengagement, sleeping more on schedule. Notes ability to do this and agrees depression is improved on mirtazapine. Continues to endorse goals of appetite improvement/ maintenance alongside significant reduction and weight loss associated with surgery.  Exam: As Above  Current medication regimen: mirtazapine 45 mg q hs  Lab Review:   Office Visit on 09/11/2023   Component Date Value    WBC 09/11/2023 5.30     RBC 09/11/2023 3.88 (L)     Hemoglobin 09/11/2023 11.3 (L)     Hematocrit 09/11/2023 35.2 (L)     MCV 09/11/2023 90.7     MCH 09/11/2023 29.1     MCHC 09/11/2023 32.1     RDW 09/11/2023 13.6     Platelets 09/11/2023 218     Neutrophil Rel % 09/11/2023 68.4     Lymphocyte Rel % 09/11/2023 19.1 (L)     Monocyte Rel % 09/11/2023 6.6     Eosinophil Rel % 09/11/2023 5.1     Basophil Rel % 09/11/2023 0.6     Neutrophils Absolute 09/11/2023 3.63     Lymphocytes Absolute 09/11/2023 1.01     Monocytes Absolute 09/11/2023 0.35     Eosinophils Absolute 09/11/2023 0.27     Basophils Absolute 09/11/2023 0.03     Immature Granulocyte Rel* 09/11/2023 0.2     Immature Grans Absolute 09/11/2023 0.01     nRBC 09/11/2023 0.0     Glucose 09/11/2023 101 (H)     BUN 09/11/2023 13     Creatinine 09/11/2023 0.87     EGFR Result 09/11/2023 96.4     BUN/Creatinine Ratio 09/11/2023 14.9     Sodium 09/11/2023 140     Potassium 09/11/2023 3.6     Chloride 09/11/2023 104     Total CO2 09/11/2023 28.7     Calcium 09/11/2023 8.7     Total Protein 09/11/2023 6.0     Albumin 09/11/2023 4.1     Globulin 09/11/2023 1.9     A/G Ratio 09/11/2023 2.2     Total Bilirubin 09/11/2023 0.5     Alkaline Phosphatase 09/11/2023 87     AST (SGOT) 09/11/2023 23      ALT (SGPT) 09/11/2023 28    Admission on 09/01/2023, Discharged on 09/03/2023   Component Date Value    Glucose 09/01/2023 96     Case Report 09/01/2023                      Value:Surgical Pathology Report                         Case: [id number]                                  Authorizing Provider:  Jose Manuel Baca,  Collected:           09/01/2023 02:14 PM                                 MD                                                                           Ordering Location:     [facility]  Received:            09/01/2023 08:50 PM                                 MAIN OR                                                                      Pathologist:           Sharon Walter MD                                                    Specimen:    Gallbladder, gallbladder                                                                   Final Diagnosis 09/01/2023                      Value:This result contains rich text formatting which cannot be displayed here.    Gross Description 09/01/2023                      Value:This result contains rich text formatting which cannot be displayed here.    Glucose 09/01/2023 138 (H)     WBC 09/02/2023 8.37     RBC 09/02/2023 3.83 (L)     Hemoglobin 09/02/2023 11.1 (L)     Hematocrit 09/02/2023 33.6 (L)     MCV 09/02/2023 87.7     MCH 09/02/2023 29.0     MCHC 09/02/2023 33.0     RDW 09/02/2023 13.2     RDW-SD 09/02/2023 41.9     MPV 09/02/2023 9.9     Platelets 09/02/2023 182     Neutrophil % 09/02/2023 86.1 (H)     Lymphocyte % 09/02/2023 6.7 (L)     Monocyte % 09/02/2023 6.9     Eosinophil % 09/02/2023 0.0 (L)     Basophil % 09/02/2023 0.1     Immature Grans % 09/02/2023 0.2     Neutrophils, Absolute 09/02/2023 7.20 (H)     Lymphocytes, Absolute 09/02/2023 0.56 (L)     Monocytes, Absolute 09/02/2023 0.58     Eosinophils, Absolute 09/02/2023 0.00     Basophils, Absolute 09/02/2023 0.01     Immature Grans, Absolute 09/02/2023 0.02     nRBC  09/02/2023 0.0     Glucose 09/02/2023 136 (H)     BUN 09/02/2023 12     Creatinine 09/02/2023 0.68 (L)     Sodium 09/02/2023 144     Potassium 09/02/2023 3.6     Chloride 09/02/2023 106     CO2 09/02/2023 25.0     Calcium 09/02/2023 8.4 (L)     Total Protein 09/02/2023 5.7 (L)     Albumin 09/02/2023 3.5     ALT (SGPT) 09/02/2023 26     AST (SGOT) 09/02/2023 33     Alkaline Phosphatase 09/02/2023 91     Total Bilirubin 09/02/2023 0.5     Globulin 09/02/2023 2.2     A/G Ratio 09/02/2023 1.6     BUN/Creatinine Ratio 09/02/2023 17.6     Anion Gap 09/02/2023 13.0     eGFR 09/02/2023 103.8    Lab on 08/21/2023   Component Date Value    Glucose 08/21/2023 105 (H)     BUN 08/21/2023 12     Creatinine 08/21/2023 0.90     Sodium 08/21/2023 144     Potassium 08/21/2023 3.7     Chloride 08/21/2023 107     CO2 08/21/2023 29.0     Calcium 08/21/2023 8.8     Total Protein 08/21/2023 6.2     Albumin 08/21/2023 4.3     ALT (SGPT) 08/21/2023 15     AST (SGOT) 08/21/2023 15     Alkaline Phosphatase 08/21/2023 98     Total Bilirubin 08/21/2023 0.6     Globulin 08/21/2023 1.9     A/G Ratio 08/21/2023 2.3     BUN/Creatinine Ratio 08/21/2023 13.3     Anion Gap 08/21/2023 8.0     eGFR 08/21/2023 95.4    Lab on 08/21/2023   Component Date Value    PSA 08/21/2023 1.140      MDM:  Meds reviewed and renewed as appropriate.  Continue mirtazapine as written.  FU in group setting.    Diagnoses and all orders for this visit:    1. CHARANJIT (generalized anxiety disorder) (Primary)    2. Recurrent major depressive disorder, in full remission

## 2023-09-29 RX ORDER — MIRTAZAPINE 45 MG/1
TABLET, FILM COATED ORAL
Qty: 90 TABLET | Refills: 0 | Status: SHIPPED | OUTPATIENT
Start: 2023-09-29

## 2023-10-17 ENCOUNTER — OFFICE VISIT (OUTPATIENT)
Dept: CARDIOLOGY | Facility: CLINIC | Age: 63
End: 2023-10-17
Payer: MEDICARE

## 2023-10-17 VITALS
SYSTOLIC BLOOD PRESSURE: 148 MMHG | DIASTOLIC BLOOD PRESSURE: 86 MMHG | BODY MASS INDEX: 35.39 KG/M2 | WEIGHT: 267 LBS | HEART RATE: 75 BPM | HEIGHT: 73 IN | OXYGEN SATURATION: 95 %

## 2023-10-17 DIAGNOSIS — Z95.1 S/P CABG (CORONARY ARTERY BYPASS GRAFT): ICD-10-CM

## 2023-10-17 DIAGNOSIS — I10 ESSENTIAL HYPERTENSION: ICD-10-CM

## 2023-10-17 DIAGNOSIS — I25.708 CORONARY ARTERY DISEASE OF BYPASS GRAFT OF NATIVE HEART WITH STABLE ANGINA PECTORIS: Primary | ICD-10-CM

## 2023-10-17 DIAGNOSIS — G47.33 OBSTRUCTIVE SLEEP APNEA, ADULT: ICD-10-CM

## 2023-10-17 DIAGNOSIS — Z95.2 S/P AVR (AORTIC VALVE REPLACEMENT): ICD-10-CM

## 2023-10-17 RX ORDER — ISOSORBIDE MONONITRATE 30 MG/1
60 TABLET, EXTENDED RELEASE ORAL DAILY
Qty: 60 TABLET | Refills: 11 | Status: SHIPPED | OUTPATIENT
Start: 2023-10-17

## 2023-10-17 NOTE — PROGRESS NOTES
CARDIOLOGY        Patient Name: Edmond Chase  :1960  Age: 63 y.o.  Primary Cardiologist: Papa Miguel MD  Encounter Provider:  VIKTROIA Van    Date of Service: 10/17/23            CHIEF COMPLAINT / REASON FOR OFFICE VISIT     Coronary Artery Disease      HISTORY OF PRESENT ILLNESS       HPI  Edmond Chase is a 63 y.o. male who presents today for 1 month reevaluation.    Pt has a  history significant for hypertension, diabetes, CAD with history of CABG, history of aortic valve replacement.    Review of medical records reveals recent hospitalization 7/10 - 7/15/2022.  Patient reports that he has been experiencing intermittent angina.  Patient came to the emergency department and ECG did not reveal any ischemia but he opted not to stay for evaluation.  Early the morning of admission patient woke with recurrent angina and diaphoresis.  He took an additional half of an Imdur with some improvement and called EMS.  Following arrival to the hospital he was continued to have angina and was noted to be mildly hypotensive initial EKG revealed stable lateral T wave changes.  Repeat EKG was poor quality but there was concern about inferior ST elevation, on Dr. Patel's review there was no evidence of ST elevation to warrant emergent cardiac catheterization.  During hospitalization patient had an echocardiogram with normal LVEF, normal prosthetic valve gradients.  Myocardial perfusion study revealed moderately severe area of ischemia in the inferior wall.  On hospital day 2 patient admitted that he had some melena over the weekend.  GI was consulted and EGD performed revealing nonbleeding gastric ulcers.  Cardiac catheterization performed revealed stable coronary disease.  Decision was made to drop aspirin given gastric ulcer history.  Patient does have mild diastolic dysfunction and low-dose furosemide was added.  He was also referred to pulmonary for outpatient follow-up for chronic  "dyspnea.    He has a history of esophageal cancer and reports that his esophagus has now been removed.  He is also s/p cholecystectomy.     At last assessment patient was continuing to complain of episodes of chest discomfort.  Patient was placed on isosorbide mononitrate and presents today for follow-up.    Patient reports that he is continuing to have episodes of angina.  He reports a pain to the mid-sternal area.  He was placed on isosorbide mononitrate.  He reports that because of having his esophagectomy he has trouble eating and can only eat small portions.  He reports increased nausea and vomiting.  He reports that his mid sternal chest pain is worsened with exertion.  He fears that he is malnourished.  He has generalized weakness and fatigue.      The following portions of the patient's history were reviewed and updated as appropriate: allergies, current medications, past family history, past medical history, past social history, past surgical history and problem list.      VITAL SIGNS     Visit Vitals  /86   Pulse 75   Ht 185.4 cm (72.99\")   Wt 121 kg (267 lb)   SpO2 95%   BMI 35.24 kg/m²           Wt Readings from Last 3 Encounters:   10/17/23 121 kg (267 lb)   09/15/23 122 kg (268 lb)   09/11/23 122 kg (270 lb)     Body mass index is 35.24 kg/m².      REVIEW OF SYSTEMS   Review of Systems   Constitutional: Positive for malaise/fatigue. Negative for weight gain and weight loss.   Cardiovascular:  Positive for chest pain and dyspnea on exertion. Negative for leg swelling.   Respiratory:  Positive for shortness of breath. Negative for snoring and wheezing.    Hematologic/Lymphatic: Negative for bleeding problem. Does not bruise/bleed easily.   Skin:  Negative for color change.   Musculoskeletal:  Negative for falls and joint pain.   Gastrointestinal:  Negative for melena.   Genitourinary:  Negative for hematuria.   Neurological:  Negative for excessive daytime sleepiness.   Psychiatric/Behavioral:  " Negative for depression. The patient is nervous/anxious.            PHYSICAL EXAMINATION     Constitutional:       Appearance: Normal appearance. Well-developed.   Eyes:      Conjunctiva/sclera: Conjunctivae normal.   Neck:      Vascular: No carotid bruit.   Pulmonary:      Effort: Pulmonary effort is normal.      Breath sounds: Normal breath sounds.   Cardiovascular:      Normal rate. Regular rhythm. Normal S1. Normal S2.       Murmurs: There is a grade 3/6 systolic murmur at the URSB and ULSB.      No gallop.  No click. No rub.      Comments:     Edema:     Peripheral edema absent.   Musculoskeletal: Normal range of motion. Skin:     General: Skin is warm and dry.   Neurological:      Mental Status: Alert and oriented to person, place, and time.      GCS: GCS eye subscore is 4. GCS verbal subscore is 5. GCS motor subscore is 6.   Psychiatric:         Mood and Affect: Mood is anxious.         Speech: Speech normal.         Behavior: Behavior normal.         Thought Content: Thought content normal.         Judgment: Judgment normal.           REVIEWED DATA     Procedures    Cardiac Procedures:  Echocardiogram 6/27/2017.  LVEF 56%.  Normal LV cavity size.  All LV wall segments contract normally.  Moderate LVH.  LAE.  RA is moderately dilated.  Bioprosthetic aortic valve with peak and mean gradients elevated, however dimensionless index is 0.34 which suggests normal valve function.  No regurgitation.  Mild MAC.  Mild mitral valve regurgitation.  Trace tricuspid valve regurgitation.  Calculated RVSP 3 mmHg.  Myocardial perfusion stress test 7/20/2017.  Small sized infarct in the inferior wall with moderate brijesh-infarct ischemia.  Impressions consistent with intermediate risk study.  Cardiac catheterization 10/30/2017.  Three-vessel CAD, patent LIMA to LAD, occluded AO-OMB SVG, occluded AO-PDA SVG recommend medical therapy.  Echocardiogram 7/1/2020.  LVEF 56%.  Normal diastolic function.  Normal RV cavity size, wall  thickness.  No significant perivalvular leak although bioprosthetic valve was not well visualized.  Aortic valve maximum pressure gradient 43.2 mmHg, mean pressure gradient 28.8 mmHg, aortic valve area 1.0 cm².  Mild dilation of the sinuses of Valsalva measuring 4.2 cm.  Worsening aortic valve gradient compared to prior study.  Myocardial perfusion stress test 7/1/2020.  Small to medium sized, severe area of ischemia located in the inferior wall.  Impressions consistent with intermediate risk study.  Patient was very symptomatic.  Patient set up for cardiac catheterization.  Cardiac catheterization 7/7/2020.  Severe multivessel coronary disease with 100% proximal LAD, 50 to 60% stenoses throughout the circumflex territory within the proximal segment as well as the high marginal and diffuse 70% stenoses throughout the RCA with 100% PDA stenosis with faint collaterals from the LAD.  PCI of the mid LAD via MARCELINA graft with placement of ESMER.  No residual stenosis.  Recommend reevaluating symptoms and if still symptomatic aggressively titrating antianginal medications with consideration given to long-acting nitrates in the hopes of medically managing his RCA disease as this would require extensive amount of stents to have fully revascularized.  Cardiac catheterization 6/17/2021. LIMA to LAD stent was widely patent and normal.  Left main with 20% luminal irregularities.  Circumflex had an area that was 70% stenosed in the midportion but does not go anywhere and is mainly in the AV groove branch.  RCA dominant and very tortuous vessel with 50% proximal disease, 70% mid vessel disease, 50 to 60% disease up to 90% in the distal RCA territory but due to tortuosity not amenable to any intervention.  Recommend increasing isosorbide mononitrate and following up with cardiology office.  Myocardial perfusion stress test 7/12/2022.  Medium sized, mildly severe area of ischemia in the inferior wall.  Echocardiogram 7/12/2022.  LVEF  51-55%.  All LV wall segments contract normally.  No significant aortic valve regurgitation.  No hemodynamically significant aortic valve stenosis.  There is a 27 mm porcine magna bioprosthetic aortic valve with stable gradients.  Cardiac catheterization 7/14/2022.  Left main normal.  LAD with calcified 90% proximal stenosis.   mid segment.  Mid to distal vessels fill via patent LIMA to LAD.  Circumflex severely calcified 70-80% stenosis in the midsegment.RCA is severely calcified and tortuous vessel with diffuse 70% mid vessel stenosis and discrete 80% mid to distal stenosis.   small caliber PDA branch PDA branch fills via left to right collaterals.  Ramus is discrete 50% stenosis in the inferior branch.  LIMA to LAD is normal.  SVG to OM is occluded at proximal anastomosis.  SVG to PDA is occluded at proximal anastomosis.  Continue medical management.  RCA and LAD are not candidates for intervention and unchanged from prior cardiac catheterization.  Echocardiogram 2/4/2023.  Moderate LVH.        ASSESSMENT & PLAN     Diagnoses and all orders for this visit:    1. Coronary artery disease of bypass graft of native heart with stable angina pectoris (Primary)  2. S/P CABG (coronary artery bypass graft)  Patient has been experiencing episodes that he feels are related to angina.  This is a complicated situation as patient also has esophageal cancer and is now status post esophagectomy.  At last appointment patient was placed on isosorbide mononitrate 30 mg/day and he does not feel that this is relieved his symptoms.  Patient has been on higher doses in the past.  Recommend increasing isosorbide mononitrate to 60 mg/day.  Patient not on aspirin or any antiplatelet medications secondary to history of life-threatening GI bleeding    3. S/P AVR (aortic valve replacement)  Patient is aware of the need for prophylactic antibiotics before any dental procedures.  Most recent echocardiogram revealed stability of the  valve    4. Essential hypertension  Blood pressure somewhat elevated in clinic today at 148/86  Continue amlodipine 10 mg/day, lisinopril 10 mg/day, metoprolol succinate 25 mg/day.  Adding isosorbide mononitrate which will help with blood pressure control    5. Obstructive sleep apnea, adult    Other orders  -     isosorbide mononitrate (IMDUR) 30 MG 24 hr tablet; Take 2 tablets by mouth Daily.  Dispense: 60 tablet; Refill: 11        Return in about 3 months (around 1/17/2024) for Dr. Miguel- Routine.    Future Appointments         Provider Department Center    10/18/2023 3:30 PM Yazmin Molina APRN [facility] HEMATOLOGY & ONCOLOGY     12/11/2023 1:15 PM [facility] CT [facility]    12/18/2023 10:45 AM Valerie Stockton MD [facility] THORACIC SURGERY [facility]    1/2/2024 10:00 AM Nargis Velasquez APRN [facility] PRIMARY CARE [facility]    1/24/2024 11:00 AM Papa Miguel MD [facility] CARDIOLOGY [facility]    8/28/2024 9:30 AM Sekou Pisano MD [facility] SLEEP MEDICINE                 MEDICATIONS         Discharge Medications            Accurate as of October 17, 2023 11:51 AM. If you have any questions, ask your nurse or doctor.                Changes to Medications        Instructions Start Date   amLODIPine 10 MG tablet  Commonly known as: NORVASC  What changed: when to take this   10 mg, Oral, Daily      Farxiga 10 MG tablet  Generic drug: dapagliflozin Propanediol  What changed: how to take this   10 mg, Per J Tube, Daily      isosorbide mononitrate 30 MG 24 hr tablet  Commonly known as: IMDUR  What changed: how much to take  Changed by: VIKTORIA Van   60 mg, Oral, Daily             Continue These Medications        Instructions Start Date   atorvastatin 40 MG tablet  Commonly known as: LIPITOR   TAKE ONE TABLET BY MOUTH DAILY      furosemide 20 MG tablet  Commonly known as: LASIX   TAKE ONE TABLET  BY MOUTH DAILY      lisinopril 10 MG tablet  Commonly known as: PRINIVIL,ZESTRIL   10 mg, Oral, Nightly      metoclopramide 5 MG tablet  Commonly known as: REGLAN   Take 1 tablet by mouth 3 (Three) Times a Day As Needed.      metoprolol succinate XL 25 MG 24 hr tablet  Commonly known as: TOPROL-XL   TAKE ONE TABLET BY MOUTH DAILY      mirtazapine 45 MG tablet  Commonly known as: REMERON   TAKE ONE TABLET BY MOUTH ONCE NIGHTLY                   **Dragon Disclaimer:   Much of this encounter note is an electronic transcription/translation of spoken language to printed text. The electronic translation of spoken language may permit erroneous, or at times, nonsensical words or phrases to be inadvertently transcribed. Although I have reviewed the note for such errors, some may still exist.

## 2023-10-18 ENCOUNTER — OFFICE VISIT (OUTPATIENT)
Dept: PSYCHIATRY | Facility: HOSPITAL | Age: 63
End: 2023-10-18
Payer: MEDICARE

## 2023-10-18 DIAGNOSIS — F33.42 RECURRENT MAJOR DEPRESSIVE DISORDER, IN FULL REMISSION: Primary | ICD-10-CM

## 2023-10-18 NOTE — PROGRESS NOTES
Subjective  Patient ID: Edmond Chase is a 63 y.o.. male seen in regularly scheduled group session.  Group participants have consented to group conducted in person    Total Group Time, Face to Face: 60 minutes  Total Group Participants: 2, plus facilitation per VIKTORIA Moeller and VIKTORIA Machuca Student    Group Therapy  Group topics explored including development of new normal, short and long term effects of diagnosis and treatment, anticipitory anxiety, pain management, physical deconditioning, weight management, and lifestyle choices. Considers the illusion of control, loss of control amidst health complexity. Considered existential distress, acceptance of limitations, benefits of positivity, questions regarding feelings of betrayal of body, and consideration of realistic goals.    Counseling provided regarding cognitive behavioral therapy (CBT) strategies, behavioral activation/ activity scheduling, reintroduction to activity through graded tasks, balancing avoidance with approach , recognition and allowance of need for rest, lifestyle counseling, benefits of exercise and strategies for managing barriers to engagement, allowance of self care, exploration of quality of life goals, survivorship issues, current programming available in community and clinic, anxiety/ mood management , medication education, and existential distress. Supported interaction between group members, identifying short and long term goals, with counseling provided surrounding therapeutic dynamics of group.    Discussed current programming available in [facility] and community.    Next shared medical visit: November 15 at 3:30 PM    Patient response to group: Pt engages fully in group setting, supporting other group member.     Medications Management  Dr. Tamika Mejía, VIKTORIA Moeller, and VIKTORIA Machuca Student present for medication discussion and management.  Pt continues in survivorship of  esophageal cancer.    CC: Anxiety, depression  HPI: Pt continues in survivorship of esophageal cancer, serious GI bleed. Continues to process trauma of near death experiences, specifically in midst of upcoming anniversaries. Appreciates continued benefit to remeron, while continuing to endorse tendency to isolate with goals of increased engagement.  Exam: As Above  Current medication regimen: mirtazapine 45 mg daily  Lab Review:   Office Visit on 09/11/2023   Component Date Value    WBC 09/11/2023 5.30     RBC 09/11/2023 3.88 (L)     Hemoglobin 09/11/2023 11.3 (L)     Hematocrit 09/11/2023 35.2 (L)     MCV 09/11/2023 90.7     MCH 09/11/2023 29.1     MCHC 09/11/2023 32.1     RDW 09/11/2023 13.6     Platelets 09/11/2023 218     Neutrophil Rel % 09/11/2023 68.4     Lymphocyte Rel % 09/11/2023 19.1 (L)     Monocyte Rel % 09/11/2023 6.6     Eosinophil Rel % 09/11/2023 5.1     Basophil Rel % 09/11/2023 0.6     Neutrophils Absolute 09/11/2023 3.63     Lymphocytes Absolute 09/11/2023 1.01     Monocytes Absolute 09/11/2023 0.35     Eosinophils Absolute 09/11/2023 0.27     Basophils Absolute 09/11/2023 0.03     Immature Granulocyte Rel* 09/11/2023 0.2     Immature Grans Absolute 09/11/2023 0.01     nRBC 09/11/2023 0.0     Glucose 09/11/2023 101 (H)     BUN 09/11/2023 13     Creatinine 09/11/2023 0.87     EGFR Result 09/11/2023 96.4     BUN/Creatinine Ratio 09/11/2023 14.9     Sodium 09/11/2023 140     Potassium 09/11/2023 3.6     Chloride 09/11/2023 104     Total CO2 09/11/2023 28.7     Calcium 09/11/2023 8.7     Total Protein 09/11/2023 6.0     Albumin 09/11/2023 4.1     Globulin 09/11/2023 1.9     A/G Ratio 09/11/2023 2.2     Total Bilirubin 09/11/2023 0.5     Alkaline Phosphatase 09/11/2023 87     AST (SGOT) 09/11/2023 23     ALT (SGPT) 09/11/2023 28    Admission on 09/01/2023, Discharged on 09/03/2023   Component Date Value    Glucose 09/01/2023 96     Case Report 09/01/2023                      Value:Surgical Pathology  Report                         Case: [id number]                                  Authorizing Provider:  Jose Manuel Baca,  Collected:           09/01/2023 02:14 PM                                 MD                                                                           Ordering Location:     [facility]  Received:            09/01/2023 08:50 PM                                 MAIN OR                                                                      Pathologist:           Sharon Walter MD                                                    Specimen:    Gallbladder, gallbladder                                                                   Final Diagnosis 09/01/2023                      Value:This result contains rich text formatting which cannot be displayed here.    Gross Description 09/01/2023                      Value:This result contains rich text formatting which cannot be displayed here.    Glucose 09/01/2023 138 (H)     WBC 09/02/2023 8.37     RBC 09/02/2023 3.83 (L)     Hemoglobin 09/02/2023 11.1 (L)     Hematocrit 09/02/2023 33.6 (L)     MCV 09/02/2023 87.7     MCH 09/02/2023 29.0     MCHC 09/02/2023 33.0     RDW 09/02/2023 13.2     RDW-SD 09/02/2023 41.9     MPV 09/02/2023 9.9     Platelets 09/02/2023 182     Neutrophil % 09/02/2023 86.1 (H)     Lymphocyte % 09/02/2023 6.7 (L)     Monocyte % 09/02/2023 6.9     Eosinophil % 09/02/2023 0.0 (L)     Basophil % 09/02/2023 0.1     Immature Grans % 09/02/2023 0.2     Neutrophils, Absolute 09/02/2023 7.20 (H)     Lymphocytes, Absolute 09/02/2023 0.56 (L)     Monocytes, Absolute 09/02/2023 0.58     Eosinophils, Absolute 09/02/2023 0.00     Basophils, Absolute 09/02/2023 0.01     Immature Grans, Absolute 09/02/2023 0.02     nRBC 09/02/2023 0.0     Glucose 09/02/2023 136 (H)     BUN 09/02/2023 12     Creatinine 09/02/2023 0.68 (L)     Sodium 09/02/2023 144     Potassium 09/02/2023 3.6     Chloride 09/02/2023 106     CO2  09/02/2023 25.0     Calcium 09/02/2023 8.4 (L)     Total Protein 09/02/2023 5.7 (L)     Albumin 09/02/2023 3.5     ALT (SGPT) 09/02/2023 26     AST (SGOT) 09/02/2023 33     Alkaline Phosphatase 09/02/2023 91     Total Bilirubin 09/02/2023 0.5     Globulin 09/02/2023 2.2     A/G Ratio 09/02/2023 1.6     BUN/Creatinine Ratio 09/02/2023 17.6     Anion Gap 09/02/2023 13.0     eGFR 09/02/2023 103.8    Lab on 08/21/2023   Component Date Value    Glucose 08/21/2023 105 (H)     BUN 08/21/2023 12     Creatinine 08/21/2023 0.90     Sodium 08/21/2023 144     Potassium 08/21/2023 3.7     Chloride 08/21/2023 107     CO2 08/21/2023 29.0     Calcium 08/21/2023 8.8     Total Protein 08/21/2023 6.2     Albumin 08/21/2023 4.3     ALT (SGPT) 08/21/2023 15     AST (SGOT) 08/21/2023 15     Alkaline Phosphatase 08/21/2023 98     Total Bilirubin 08/21/2023 0.6     Globulin 08/21/2023 1.9     A/G Ratio 08/21/2023 2.3     BUN/Creatinine Ratio 08/21/2023 13.3     Anion Gap 08/21/2023 8.0     eGFR 08/21/2023 95.4    Lab on 08/21/2023   Component Date Value    PSA 08/21/2023 1.140      MDM:  Meds reviewed and renewed as appropriate.  Continue mirtazapine 45 mg daily. No refills needed.    Diagnoses and all orders for this visit:    1. Recurrent major depressive disorder, in full remission (Primary)

## 2023-10-30 ENCOUNTER — TELEPHONE (OUTPATIENT)
Dept: CARDIOLOGY | Facility: CLINIC | Age: 63
End: 2023-10-30
Payer: MEDICARE

## 2023-10-30 NOTE — TELEPHONE ENCOUNTER
Pt left vm. Where you going to higher his dosage of the isosorbide mononitrate  ? Or he just takes it BID instead of 1 daily  ?

## 2023-11-15 ENCOUNTER — OFFICE VISIT (OUTPATIENT)
Dept: PSYCHIATRY | Facility: HOSPITAL | Age: 63
End: 2023-11-15
Payer: MEDICARE

## 2023-11-15 DIAGNOSIS — F33.1 MODERATE EPISODE OF RECURRENT MAJOR DEPRESSIVE DISORDER: Primary | ICD-10-CM

## 2023-11-15 RX ORDER — MIRTAZAPINE 45 MG/1
45 TABLET, FILM COATED ORAL NIGHTLY
Qty: 90 TABLET | Refills: 0 | Status: SHIPPED | OUTPATIENT
Start: 2023-11-15

## 2023-11-15 NOTE — PROGRESS NOTES
Subjective  Patient ID: Edmond Chase is a 63 y.o.. male seen in regularly scheduled group session.  Group participants have consented to group conducted in person    Total Group Time, Face to Face: 60 minutes  Total Group Participants: 2, plus facilitation per VIKTORIA Moeller    Group Therapy  Group topics explored including development of new normal, interpersonal sensitivity, short and long term effects of diagnosis and treatment, anticipitory anxiety, pain management, physical deconditioning, weight management, social determinants of health (finances, living arrangements, etc), and lifestyle choices. Considered existential distress, thoughts of aging, death, and dying, appreciation of close friend groups, and goals in current phase of life.    Counseling provided regarding cognitive behavioral therapy (CBT) strategies, behavioral activation/ activity scheduling, balancing avoidance with approach , sleep hygiene, recognition and allowance of need for rest, lifestyle counseling, benefits of exercise and strategies for managing barriers to engagement, allowance of self care, exploration of quality of life goals, survivorship issues, anxiety/ mood management , medication education, and existential distress. Supported shared dynamics of developing group, reiterating theraputic factors of group. Challenged increased engagement, goals for maximal QOL/ full force living search.    Discussed current programming available in [facility] and community.    Next shared medical visit: December 20 at 3:30 PM in person    Patient response to group: Pt engages fully and insightfully supports other member.    Medications Management  VIKTORIA Moeller present for medication discussion and management.  Pt continues in survivorship of esophageal cancer.    CC: Depression, demoralization  HPI: Pt is seen in follow up regarding sx of depression, remaining adherent to mirtazapine 45 mg q hs. Questions whether he is truly  responding to medication, finding more benefit to in person engagement. Acknowledges need for increased physical activity.   Exam: As Above  Current medication regimen: mirtazapine 45 mg q hs  Lab Review:   No visits with results within 2 Month(s) from this visit.   Latest known visit with results is:   Office Visit on 09/11/2023   Component Date Value    WBC 09/11/2023 5.30     RBC 09/11/2023 3.88 (L)     Hemoglobin 09/11/2023 11.3 (L)     Hematocrit 09/11/2023 35.2 (L)     MCV 09/11/2023 90.7     MCH 09/11/2023 29.1     MCHC 09/11/2023 32.1     RDW 09/11/2023 13.6     Platelets 09/11/2023 218     Neutrophil Rel % 09/11/2023 68.4     Lymphocyte Rel % 09/11/2023 19.1 (L)     Monocyte Rel % 09/11/2023 6.6     Eosinophil Rel % 09/11/2023 5.1     Basophil Rel % 09/11/2023 0.6     Neutrophils Absolute 09/11/2023 3.63     Lymphocytes Absolute 09/11/2023 1.01     Monocytes Absolute 09/11/2023 0.35     Eosinophils Absolute 09/11/2023 0.27     Basophils Absolute 09/11/2023 0.03     Immature Granulocyte Rel* 09/11/2023 0.2     Immature Grans Absolute 09/11/2023 0.01     nRBC 09/11/2023 0.0     Glucose 09/11/2023 101 (H)     BUN 09/11/2023 13     Creatinine 09/11/2023 0.87     EGFR Result 09/11/2023 96.4     BUN/Creatinine Ratio 09/11/2023 14.9     Sodium 09/11/2023 140     Potassium 09/11/2023 3.6     Chloride 09/11/2023 104     Total CO2 09/11/2023 28.7     Calcium 09/11/2023 8.7     Total Protein 09/11/2023 6.0     Albumin 09/11/2023 4.1     Globulin 09/11/2023 1.9     A/G Ratio 09/11/2023 2.2     Total Bilirubin 09/11/2023 0.5     Alkaline Phosphatase 09/11/2023 87     AST (SGOT) 09/11/2023 23     ALT (SGPT) 09/11/2023 28      MDM:  Meds reviewed and renewed as appropriate.  Continue medication as written; supported patient in more regular engagement which has seemingly contributed to best response. Revisited [employer] and provided information for [employer] via [employer].  FU scheduled in group setting.    Diagnoses and  all orders for this visit:    1. Moderate episode of recurrent major depressive disorder (Primary)    Other orders  -     mirtazapine (REMERON) 45 MG tablet; Take 1 tablet by mouth Every Night.  Dispense: 90 tablet; Refill: 0

## 2023-12-11 ENCOUNTER — HOSPITAL ENCOUNTER (OUTPATIENT)
Dept: CT IMAGING | Facility: HOSPITAL | Age: 63
Discharge: HOME | End: 2023-12-11
Admitting: THORACIC SURGERY (CARDIOTHORACIC VASCULAR SURGERY)
Payer: COMMERCIAL

## 2023-12-11 DIAGNOSIS — C15.5 MALIGNANT NEOPLASM OF LOWER THIRD OF ESOPHAGUS: ICD-10-CM

## 2023-12-11 PROCEDURE — 71250 CT THORAX DX C-: CPT

## 2023-12-11 PROCEDURE — 74176 CT ABD & PELVIS W/O CONTRAST: CPT

## 2023-12-11 RX ORDER — FUROSEMIDE 20 MG/1
20 TABLET ORAL DAILY
Qty: 90 TABLET | Refills: 0 | Status: SHIPPED | OUTPATIENT
Start: 2023-12-11

## 2023-12-13 ENCOUNTER — TELEPHONE (OUTPATIENT)
Dept: SURGERY | Facility: CLINIC | Age: 63
End: 2023-12-13
Payer: MEDICARE

## 2023-12-18 ENCOUNTER — OFFICE VISIT (OUTPATIENT)
Dept: SURGERY | Facility: CLINIC | Age: 63
End: 2023-12-18
Payer: MEDICARE

## 2023-12-18 VITALS
HEIGHT: 73 IN | SYSTOLIC BLOOD PRESSURE: 120 MMHG | DIASTOLIC BLOOD PRESSURE: 66 MMHG | WEIGHT: 269 LBS | HEART RATE: 76 BPM | BODY MASS INDEX: 35.65 KG/M2 | OXYGEN SATURATION: 96 %

## 2023-12-18 DIAGNOSIS — C15.5 MALIGNANT NEOPLASM OF LOWER THIRD OF ESOPHAGUS: Primary | ICD-10-CM

## 2023-12-18 PROCEDURE — 3078F DIAST BP <80 MM HG: CPT

## 2023-12-18 PROCEDURE — 1160F RVW MEDS BY RX/DR IN RCRD: CPT

## 2023-12-18 PROCEDURE — 1159F MED LIST DOCD IN RCRD: CPT

## 2023-12-18 PROCEDURE — 3074F SYST BP LT 130 MM HG: CPT

## 2023-12-18 PROCEDURE — 99215 OFFICE O/P EST HI 40 MIN: CPT

## 2023-12-18 NOTE — PROGRESS NOTES
"Chief Complaint  Esophageal cancer    Subjective        Edmond Chase presents to [facility]  History of Present Illness  Mr. Chase is a 63-year-old gentleman who underwent esophagectomy on 2/3/2023 by Dr. 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  He presents today for continued surveillance postoperatively.    Patient reports that he is doing \"so-so.\"  He has been doing okay since his last visit.  He reports that he underwent upper and lower scope at [facility] last month with unremarkable findings.  He reports that biopsies from the colonoscopy demonstrated polyps and that no biopsy was performed with EGD.  Patient reports he has been attempting to adhere to a diet of small but frequent meals so that has been difficult for him.  He reports that he usually becomes nauseous and needs to take his Reglan and prescribed to him during a prior encounter although he only takes this sporadically.  Usually he eats frozen dinners for his meals.  When he eats too much he does vomit especially when eating breads and sandwiches.    Objective   Vital Signs:  /66 (BP Location: Left arm, Patient Position: Sitting, Cuff Size: Adult)   Pulse 76   Ht 185.4 cm (72.99\")   Wt 122 kg (269 lb)   SpO2 96%   BMI 35.50 kg/m²   Estimated body mass index is 35.5 kg/m² as calculated from the following:    Height as of this encounter: 185.4 cm (72.99\").    Weight as of this encounter: 122 kg (269 lb).     Physical Exam   Result Review :      Data reviewed : Radiologic studies CT of the chest, abdomen and pelvis performed 12/11/2023    CT of the chest: Stable pulmonary nodules.  Atelectasis.  No consolidation, pleural effusion, new or enlarging nodules or mass.  No pneumothorax.  Central airways are present.  No enlarged supraclavicular or axillary lymph nodes are seen.  Stable enlarged precarinal nodes.  Postsurgical changes are seen following " esophagectomy with gastric pull-through.  There is stable aortic root dilation.  Coronary calcifications are seen's s/p median sternotomy with coronary artery bypass grafting.  There is a prosthetic aortic valve.  Normal heart size without pericardial effusion.    CT of the abdomen and pelvis: The gallbladder is absent.  A cyst is seen in the hepatic segment.  Cystic lesions that are technically indeterminate, likely cysts are seen in the left kidney.  There are punctate nonobstructing stones in the right kidney.  Postsurgical changes are seen in the ventral abdominal wall.  There are colonic diverticula, without evidence of acute diverticulitis.  The spleen, adrenal glands, pancreas, small bowel, urinary bladder, and abdominal vasculature are normal.  There are ventral abdominal wall fat-containing hernias.  No intraperitoneal fluid collection or free gas seen.  No enlarged lymph nodes are demonstrated.           Assessment and Plan   Diagnoses and all orders for this visit:    1. Malignant neoplasm of lower third of esophagus (Primary)  -     Ambulatory Referral to Nutrition Services  -     CT Chest Without Contrast; Future  -     CT abdomen pelvis wo contrast; Future    I have independently reviewed the CT of the chest, abdomen, pelvis which demonstrates continued stable appearance postoperatively.  No explicit findings suggestive of disease recurrence.  His primary complaint was nausea and at times vomiting and his inability to tolerate normal portions.  We discussed lifestyle modifications required following an esophagectomy.    He reports that his primary dietary intake are frozen meals.  He reports that he has been having issues adhering to a proper diet. We discussed the option of being evaluated and getting recommendations from a dietitian.  He is agreeable and well comes this recommendation.  We discussed that adhering to small, frequent, nutrient dense meals may help with his nausea and especially  vomiting.  We also discussed the importance of limiting his diet of highly processed foods.  Also instructed him to take his Reglan as prescribed to aid his nausea and vomiting.    Would like to repeat a CT of the chest, abdomen, and pelvis to be performed in 4 months for continued postoperative surveillance.  Will also require EGD with biopsy at the completion of his initial postoperative year.        I spent 45 minutes caring for Edmond on this date of service. This time includes time spent by me in the following activities:preparing for the visit, reviewing tests, obtaining and/or reviewing a separately obtained history, counseling and educating the patient/family/caregiver, ordering medications, tests, or procedures, referring and communicating with other health care professionals , documenting information in the medical record, independently interpreting results and communicating that information with the patient/family/caregiver, and care coordination  Follow Up   Return in about 4 months (around 4/18/2024).  Patient was given instructions and counseling regarding his condition or for health maintenance advice. Please see specific information pulled into the AVS if appropriate.

## 2023-12-19 ENCOUNTER — TELEMEDICINE - AUDIO (OUTPATIENT)
Dept: OTHER | Facility: HOSPITAL | Age: 63
End: 2023-12-19
Payer: MEDICARE

## 2023-12-19 NOTE — PROGRESS NOTES
OUTPATIENT ONCOLOGY NUTRITION ASSESSMENT    Patient Name: Edmond Chase  YOB: 1960  MRN: [id number]  Assessment Date: 12/19/2023    COMMENTS: Spoke to patient on the phone today. Referral sent from thoracic surgeon VIKTORIA.   The patient reports that he is adapting after esophagectomy but still vomits 2-3 times a week usually if he eats too much. He is having a hard time gaging when to stop eating as he cannot tell when he is full. He had some issues on Thanksgiving and at holiday parties that center around eating.   His weight has slowly declined but he was overweight prior to surgery.   He does not cook very much and uses frozen meals primarily. He has learned to eat small frequent meals per day and to avoid bread.   We talked about maybe adding to the microwave meals some vegetable or fruit- items that would not necessarily need to be cooked but could add to the nutrient content of his meals. He would like to meet in person after the holidays. I suggested that he make an appointment with me while he is here in the building for other services.   I am leaving a folder with sample menus for him in the [facility] for him to  tomorrow when he is here for group.           Reason for Assessment Nurse Practitioner referral , Education      Diagnosis/Problem   H/o esophageal cancer         Encounter Information        Nutrition/Diet History:  Small frequent meals   Oral Nutrition Supplements:    Factors/Symptoms Affecting Intake: Early satiety, Vomiting, Weight loss   Comments:      Medical/Surgical History Past Medical History:   Diagnosis Date    Abnormal nuclear stress test     Anxiety     Anxiety and depression     Aortic valve insufficiency     nonrheumtic     Arthritis     knees    Ascending aortic aneurysm     CAD (coronary artery disease)     stent    Chest pain     Diabetes mellitus     Diarrhea     Dizzy     CAREFUL WHEN GETTING UP    Dyspnea     Esophageal cancer 11/2022    no chemo or  "radiation    Essential hypertension     Fatigue     G tube feedings     per jejunostomy tube nightly with permitin  3 cans nightly    GERD (gastroesophageal reflux disease)     GI bleed     Heart murmur     History of pneumonia     History of recent blood transfusion     hemorrhage     no reaction    Hyperglycemia     Hyperlipidemia     Hypersomnia with sleep apnea     Jejunostomy tube present     Lightheadedness     Malaise and fatigue     Migraines     \"OCCULAR MIGRAINES\"    Morbid obesity     Myocardial ischemia     Nausea     Nocturia     TJ on auto CPAP 10/31/2016    Overnight polysomnogram.  Weight 276 pounds.  Severe TJ with AHI 79 events per hour.  Auto CPAP recommended.    Pneumonia     FEB 2016    Scrotal bleeding     SOB (shortness of breath)        Past Surgical History:   Procedure Laterality Date    AORTIC VALVE REPAIR/REPLACEMENT  05/2016    ASCENDING ARCH/HEMIARCH REPLACEMENT N/A 05/02/2016    Procedure: BHAVESH STERNOTOMY CORONARY ARTERY BYPASS GRAFT TIMES 3 USING LEFT INTERNAL MAMMARY ARTERY AND RIGHT GREATER SAPHENOUS VEIN GRAFT PER ENDOSCOPIC VEIN HARVESTING, AORTIC ANEURYSM REPAIR WITH ROOT REPAIR AND AORTIC VALVE REPLACEMENT;  Surgeon: Rosalio Cline MD;  Location: [facility] OR;  Service:     CARDIAC CATHETERIZATION N/A 04/01/2016    Procedure: Left Heart Cath;  Surgeon: Erick Tam MD;  Location: [facility] CATH INVASIVE LOCATION;  Service:     CARDIAC CATHETERIZATION N/A 04/01/2016    Procedure: Left ventriculography;  Surgeon: Erick Tam MD;  Location: [facility] CATH INVASIVE LOCATION;  Service:     CARDIAC CATHETERIZATION N/A 04/01/2016    Procedure: Right Heart Cath;  Surgeon: Erick Tam MD;  Location: [facility] CATH INVASIVE LOCATION;  Service:     CARDIAC CATHETERIZATION N/A 10/30/2017    Procedure: Coronary angiography;  Surgeon: Sorin Vasquez MD;  Location: [facility] CATH INVASIVE LOCATION;  Service:     CARDIAC CATHETERIZATION  10/30/2017    Procedure: Saphenous Vein Graft;  Surgeon: " Sorin Vasquez MD;  [facility];  Service:     CARDIAC CATHETERIZATION N/A 10/30/2017    Procedure: Native mammary injection;  Surgeon: Sorin Vasquez MD;  [facility];  Service:     CARDIAC CATHETERIZATION N/A 07/07/2020    Procedure: Coronary angiography;  Surgeon: Gregor Kim MD;  [facility];  Service: Cardiovascular;  Laterality: N/A;    CARDIAC CATHETERIZATION N/A 07/07/2020    Procedure: Left heart cath;  Surgeon: Gregor Kim MD;  [facility];  Service: Cardiovascular;  Laterality: N/A;    CARDIAC CATHETERIZATION N/A 07/07/2020    Procedure: Left ventriculography;  Surgeon: Gregor Kim MD;  [facility];  Service: Cardiovascular;  Laterality: N/A;    CARDIAC CATHETERIZATION N/A 07/07/2020    Procedure: Stent ESMER bypass graft;  Surgeon: Gregor Kim MD;  Location: [facility] CATH INVASIVE LOCATION;  Service: Cardiovascular;  Laterality: N/A;    CARDIAC CATHETERIZATION N/A 06/17/2021    Procedure: SAPHENOUS VEIN GRAFT;  Surgeon: Marques Ndiaye MD;  Location: [facility] CATH INVASIVE LOCATION;  Service: Cardiovascular;  Laterality: N/A;    CARDIAC CATHETERIZATION N/A 06/17/2021    Procedure: Left Heart Cath;  Surgeon: Marques Ndiaye MD;  Location: [facility] CATH INVASIVE LOCATION;  Service: Cardiovascular;  Laterality: N/A;    CARDIAC CATHETERIZATION N/A 06/17/2021    Procedure: Coronary angiography;  Surgeon: Marques Ndiaye MD;  Location: [facility] CATH INVASIVE LOCATION;  Service: Cardiovascular;  Laterality: N/A;    CARDIAC CATHETERIZATION N/A 07/14/2022    Procedure: Left Heart Cath;  Surgeon: Charlie Aj MD;  Location: [facility] CATH INVASIVE LOCATION;  Service: Cardiovascular;  Laterality: N/A;    CARDIAC CATHETERIZATION N/A 07/14/2022    Procedure: Coronary angiography;  Surgeon: Charlie Aj MD;  Location: [facility] CATH  INVASIVE LOCATION;  Service: Cardiovascular;  Laterality: N/A;    CARDIAC CATHETERIZATION  07/14/2022    Procedure: Saphenous Vein Graft;  Surgeon: Charlie Aj MD;  Location: [facility] CATH INVASIVE LOCATION;  Service: Cardiovascular;;    CARDIAC CATHETERIZATION N/A 07/14/2022    Procedure: Native mammary injection;  Surgeon: Charlie Aj MD;  [facility];  Service: Cardiovascular;  Laterality: N/A;    CATARACT EXTRACTION EXTRACAPSULAR W/ INTRAOCULAR LENS IMPLANTATION Left     CHOLECYSTECTOMY WITH INTRAOPERATIVE CHOLANGIOGRAM N/A 9/1/2023    Procedure: CHOLECYSTECTOMY LAPAROSCOPIC INTRAOPERATIVE CHOLANGIOGRAM choledoscopy common bile duct exploration;  Surgeon: Jose Manuel Baca MD;  Location: [facility] MAIN OR;  Service: General;  Laterality: N/A;    COLONOSCOPY N/A 11/26/2022    Procedure: COLONOSCOPY AT BEDSIDE;  Surgeon: Tomy Lopez MD;  Location: [facility] MAIN OR;  Service: Gastroenterology;  Laterality: N/A;    COLONOSCOPY W/ POLYPECTOMY N/A 10/04/2022    Procedure: COLONOSCOPY to cecum with cold forceps and cold snare polypectomies;  Surgeon: Gregor Carlson MD;  Location: [facility] ENDOSCOPY;  Service: Gastroenterology;  Laterality: N/A;  PRE- hx of polyps  POST- diverticulosis, polyps    CORONARY ARTERY BYPASS GRAFT  05/2016    LIMA TO LAD, SVG TO PDA, SVG TO OM2    ENDOSCOPY N/A 07/13/2022    Procedure: ESOPHAGOGASTRODUODENOSCOPY;  Surgeon: Marcia Hollis MD;  Location: [facility] ENDOSCOPY;  Service: Gastroenterology;  Laterality: N/A;  PRE- ANEMIA, MELENA  POST- MILD EROSIVE GASTRITIS, GE JUNCTION ULCER    ENDOSCOPY N/A 10/04/2022    Procedure: ESOPHAGOGASTRODUODENOSCOPY with biopsies;  Surgeon: Gregor Carlson MD;  Location: [facility] ENDOSCOPY;  Service: Gastroenterology;  Laterality: N/A;  PRE- hx of esophageal ulcer  POST- gastric cardia mass    ENDOSCOPY N/A 5/1/2023    Procedure: ESOPHAGOGASTRODUODENOSCOPY WITH  [id number] balloon  "DILATATION of pylorus and anastomosis;  Surgeon: Valerie Stockton MD;  Location: [facility] ENDOSCOPY;  Service: Gastroenterology;  Laterality: N/A;  PRE: dysphagia  POST: no abnormalities seen    ESOPHAGOGASTRECTOMY Right 02/03/2023    Procedure: BRONCHOSCOPY, RIGHT ROBOTIC VIDEO ASSISTED THORACOSCOPY WITH TOTAL ESOPHAGECTOMY, INTERCOSTAL NERVE BLOCK, FEEDING JEJUNOSTOMY PLACEMENT;  Surgeon: Valerie Stockton MD;  Location: [facility] MAIN OR;  Service: Robotics - DaVinci;  Laterality: Right;    INNER EAR SURGERY      TONSILLECTOMY              Anthropometrics        Current Height Ht Readings from Last 1 Encounters:   12/18/23 185.4 cm (72.99\")      Current Weight Wt Readings from Last 1 Encounters:   12/18/23 122 kg (269 lb)      BMI  35   Ideal Body Weight (IBW) 184 lb   Usual Body Weight (UBW) 313 lb   Weight Change/Trend Stable   Weight History Wt Readings from Last 30 Encounters:   12/18/23 1328 122 kg (269 lb)   10/17/23 1019 121 kg (267 lb)   09/15/23 0922 122 kg (268 lb)   09/11/23 1326 122 kg (270 lb)   09/02/23 0931 125 kg (275 lb)   09/01/23 0953 125 kg (275 lb)   08/30/23 0836 125 kg (275 lb)   08/21/23 1055 125 kg (276 lb 3.2 oz)   07/12/23 0818 124 kg (274 lb)   06/14/23 1046 126 kg (277 lb)   06/09/23 1300 126 kg (277 lb)   05/09/23 1110 128 kg (281 lb 9.6 oz)   05/08/23 1031 127 kg (279 lb 11.2 oz)   05/01/23 1035 128 kg (281 lb 14.4 oz)   04/25/23 0902 131 kg (289 lb)   04/13/23 1606 131 kg (288 lb)   04/11/23 0903 129 kg (283 lb 6.4 oz)   04/10/23 0957 133 kg (294 lb)   03/28/23 1427 133 kg (294 lb 3.2 oz)   03/17/23 1058 133 kg (294 lb)   03/14/23 1421 133 kg (292 lb 8.6 oz)   03/03/23 1254 132 kg (291 lb)   02/28/23 1442 131 kg (289 lb 6.4 oz)   02/17/23 1554 (!) 137 kg (301 lb)   02/10/23 0500 (!) 137 kg (301 lb)   02/09/23 0500 (!) 137 kg (301 lb)   02/05/23 0300 (!) 147 kg (324 lb 8.3 oz)   02/04/23 1727 (!) 144 kg (317 lb 7.4 oz)   02/04/23 0600 (!) 144 kg (317 lb 10.9 oz)   02/03/23 1800 (!) 143 kg " "(315 lb 4.1 oz)   01/30/23 1509 (!) 142 kg (314 lb)   01/23/23 1440 (!) 142 kg (314 lb)   01/20/23 0756 (!) 142 kg (314 lb)   01/16/23 0949 (!) 141 kg (310 lb)   12/15/22 0618 (!) 142 kg (313 lb 11.4 oz)   12/14/22 1553 (!) 140 kg (308 lb)   12/14/22 1532 (!) 142 kg (313 lb)   12/14/22 1006 (!) 142 kg (313 lb)          Medications           Current medications: amLODIPine, atorvastatin, dapagliflozin Propanediol, furosemide, isosorbide mononitrate, lisinopril, metoclopramide, metoprolol succinate XL, and mirtazapine                 Tests/Procedures        Tests/Procedures No new tests/procedures     Labs       Pertinent Labs          Invalid input(s): \"LABALBU\", \"PROT\"          COVID19   Date Value Ref Range Status   07/10/2022 Not Detected Not Detected - Ref. Range Final     Lab Results   Component Value Date    HGBA1C 6.20 (H) 06/09/2023              Estimated/Assessed Needs        Energy Requirements    Height for Calculation      Weight for Calculation 83 kg (IBW)   Method for Estimation  25 kcal/kg   EST Needs (kcal/day) 2075 kcals/d       Protein Requirements    Weight for Calculation 122 kg   EST Protein Needs (g/kg) 1.0 gm/kg   EST Daily Needs (g/day) 122 g/d       Fluid Requirements     Method for Estimation 1 mL/kcal    Estimated Needs (mL/day) 1530-3560 ml/d           PES STATEMENT / NUTRITION DIAGNOSIS      Nutrition Dx Problem Problem:    NutritionDiagnosisProblem: Knowledge Deficit and Altered GI Function    Etiology:  Medical diagnosis: Esophageal cancer  Factors affecting nutrition: Reported/Observed By, Appetite, Reported GI Symptoms      Signs/Symptoms:  Information Deficit    Comment:      NUTRITION INTERVENTION / PLAN OF CARE      Intervention Goal(s) Provide information, Disease management/therapy, Tolerate PO , Maintain weight, and No significant weight loss         RD Intervention/Action Follow Tx progress         Recommendations:       PO Diet Small frequent meals, work on improving nutrient " balance at meals and snacks with meals that require minimal cooking      Supplements       Snacks       Other    --      Monitor/Evaluation PO intake, Supplement intake, Pertinent labs, Weight, Symptoms   Education Education provided-- to meet in person at later date   --    RD to follow     Electronically signed by:  Audrey Correa RD, LD  12/19/23 14:20 EST

## 2023-12-20 ENCOUNTER — OFFICE VISIT (OUTPATIENT)
Dept: PSYCHIATRY | Facility: HOSPITAL | Age: 63
End: 2023-12-20
Payer: MEDICARE

## 2023-12-20 DIAGNOSIS — F33.1 MODERATE EPISODE OF RECURRENT MAJOR DEPRESSIVE DISORDER: Primary | ICD-10-CM

## 2023-12-20 NOTE — PROGRESS NOTES
Subjective  Patient ID: Edmond Chase is a 63 y.o.. male seen in regularly scheduled group session.  Group participants have consented to group conducted in person    Total Group Time, Face to Face: 60 minutes  Total Group Participants: 2, plus facilitation per VIKTORIA Moeller    Group Therapy  Group topics explored including development of new normal, interpersonal sensitivity, short and long term effects of diagnosis and treatment, significant other or family conflict, anticipitory anxiety, physical deconditioning, weight management, social determinants of health (finances, living arrangements, etc), and lifestyle choices. Holiday joys of health and happiness, goals of not burdening others and perhaps making the holidays of others better.    Counseling provided regarding behavioral activation/ activity scheduling, reintroduction to activity through graded tasks, balancing avoidance with approach , recognition and allowance of need for rest, lifestyle counseling, allowance of self care, exploration of quality of life goals, survivorship issues, anxiety/ mood management , medication education, and existential distress. Considered impact of habit and routine, acknowledging reduced emotion and sincerity with certain behaviors, specifically alongside the holidays. Considered patient goals, opportunities for attaining in adjusted health circumstance, current environment. Supported collaboration between members.    Discussed current programming available in [facility] and community.    Next shared medical visit: January 17 at 2:00 PM    Patient response to group: Pt share fully and openly in group setting.    Medications Management  VIKTORIA Moeller present for medication discussion and management.  Pt continues in survivorship of esophageal cancer.    CC: Anxiety, depression  HPI: Pt is seen in follow up regarding continued survivorship of esophageal cancer, traumatic GI bleed. Appreciates stable  findings with recent scopes, improved physical health on scans while sharing continued perception of self as 'half an invalid.' Identifies goals of feeling stronger physically, more able to participate in desired activities, able to identify aspirations. Continues to sleep adequarely, appreciating stability of this and mood, anxiety with adherence to mirtazapine. Continues to experience nausea regularly, speaking about this with surgical team and also hoping to work with dietitian to better mechanical challenges of how much he is eating and how fast he is eating. Weight is generally stable with slow decline.  Exam: As Above  Current medication regimen: mirtazapine 45 mg q hs  Lab Review:   No visits with results within 2 Month(s) from this visit.   Latest known visit with results is:   Office Visit on 09/11/2023   Component Date Value    WBC 09/11/2023 5.30     RBC 09/11/2023 3.88 (L)     Hemoglobin 09/11/2023 11.3 (L)     Hematocrit 09/11/2023 35.2 (L)     MCV 09/11/2023 90.7     MCH 09/11/2023 29.1     MCHC 09/11/2023 32.1     RDW 09/11/2023 13.6     Platelets 09/11/2023 218     Neutrophil Rel % 09/11/2023 68.4     Lymphocyte Rel % 09/11/2023 19.1 (L)     Monocyte Rel % 09/11/2023 6.6     Eosinophil Rel % 09/11/2023 5.1     Basophil Rel % 09/11/2023 0.6     Neutrophils Absolute 09/11/2023 3.63     Lymphocytes Absolute 09/11/2023 1.01     Monocytes Absolute 09/11/2023 0.35     Eosinophils Absolute 09/11/2023 0.27     Basophils Absolute 09/11/2023 0.03     Immature Granulocyte Rel* 09/11/2023 0.2     Immature Grans Absolute 09/11/2023 0.01     nRBC 09/11/2023 0.0     Glucose 09/11/2023 101 (H)     BUN 09/11/2023 13     Creatinine 09/11/2023 0.87     EGFR Result 09/11/2023 96.4     BUN/Creatinine Ratio 09/11/2023 14.9     Sodium 09/11/2023 140     Potassium 09/11/2023 3.6     Chloride 09/11/2023 104     Total CO2 09/11/2023 28.7     Calcium 09/11/2023 8.7     Total Protein 09/11/2023 6.0     Albumin 09/11/2023 4.1      Globulin 09/11/2023 1.9     A/G Ratio 09/11/2023 2.2     Total Bilirubin 09/11/2023 0.5     Alkaline Phosphatase 09/11/2023 87     AST (SGOT) 09/11/2023 23     ALT (SGPT) 09/11/2023 28      MDM:  Meds reviewed and renewed as appropriate.  Continue mirtazapine as written.  FU scheduled in group setting.    Diagnoses and all orders for this visit:    1. Moderate episode of recurrent major depressive disorder (Primary)

## 2023-12-22 ENCOUNTER — PATIENT OUTREACH (OUTPATIENT)
Dept: OTHER | Facility: HOSPITAL | Age: 63
End: 2023-12-22
Payer: MEDICARE

## 2023-12-22 NOTE — PROGRESS NOTES
Reviewed chart. S/p esophagogastrectomy 2/3/23; saw Nicola thoracic surgery APRN 12/18; met with Audrey nutrition 12/19; ongoing group appts with Anna. Scans 4/22, sees Dr. Stockton 4/29    Called patient. We discussed his recent appt with Nicola. He was referred back to Audrey due to vomiting 3-4 times a week, ongoing weight loss.  Audrey left information for him this week which he picked up after his Men's group.    We again discussed integrative therapies and other [facility].  Since his cancer is stable 10 months after surgery with no ongoing resource needs, I will close his case to navigation although reminded him that he can access the services in the [facility] even with his navigation case being closed. Encouraged patient to call in the future if the need arises. Patient verbalized understanding.

## 2024-01-02 ENCOUNTER — OFFICE VISIT (OUTPATIENT)
Dept: INTERNAL MEDICINE | Age: 64
End: 2024-01-02
Payer: MEDICARE

## 2024-01-02 VITALS
BODY MASS INDEX: 36.6 KG/M2 | DIASTOLIC BLOOD PRESSURE: 102 MMHG | OXYGEN SATURATION: 98 % | WEIGHT: 276.2 LBS | SYSTOLIC BLOOD PRESSURE: 162 MMHG | HEART RATE: 76 BPM | HEIGHT: 73 IN | TEMPERATURE: 97.9 F

## 2024-01-02 DIAGNOSIS — I10 ESSENTIAL HYPERTENSION: ICD-10-CM

## 2024-01-02 DIAGNOSIS — E78.5 HYPERLIPIDEMIA, UNSPECIFIED HYPERLIPIDEMIA TYPE: ICD-10-CM

## 2024-01-02 DIAGNOSIS — E11.65 TYPE 2 DIABETES MELLITUS WITH HYPERGLYCEMIA, WITHOUT LONG-TERM CURRENT USE OF INSULIN: ICD-10-CM

## 2024-01-02 DIAGNOSIS — Z23 ENCOUNTER FOR IMMUNIZATION: ICD-10-CM

## 2024-01-02 DIAGNOSIS — Z00.00 ENCOUNTER FOR ANNUAL WELLNESS VISIT (AWV) IN MEDICARE PATIENT: Primary | ICD-10-CM

## 2024-01-02 DIAGNOSIS — Z12.5 PROSTATE CANCER SCREENING: ICD-10-CM

## 2024-01-02 LAB — GLUCOSE BLDC GLUCOMTR-MCNC: 117 MG/DL (ref 70–130)

## 2024-01-02 NOTE — LETTER
[facility]  Vaccine Consent Form    Patient Name:  Emdond Chase  Patient :  1960     Vaccine(s) Ordered    Fluzone High-Dose 65+yrs ([id number])        Screening Checklist  The following questions should be completed prior to vaccination. If you answer “yes” to any question, it does not necessarily mean you should not be vaccinated. It just means we may need to clarify or ask more questions. If a question is unclear, please ask your healthcare provider to explain it.    Yes No   Any fever or moderate to severe illness today (mild illness and/or antibiotic treatment are not contraindications)?     Do you have a history of a serious reaction to any previous vaccinations, such as anaphylaxis, encephalopathy within 7 days, Guillain-[place] syndrome within 6 weeks, seizure?     Have you received any live vaccine(s) (e.g MMR, JUAN) or any other vaccines in the last month (to ensure duplicate doses aren't given)?     Do you have an anaphylactic allergy to latex (DTaP, DTaP-IPV, Hep A, Hep B, MenB, RV, Td, Tdap), baker’s yeast (Hep B, HPV), polysorbates (RSV, nirsevimab, PCV 20, Rotavirrus, Tdap, Shingrix), or gelatin (JUAN, MMR)?     Do you have an anaphylactic allergy to neomycin (Rabies, JUAN, MMR, IPV, Hep A), polymyxin B (IPV), or streptomycin (IPV)?      Any cancer, leukemia, AIDS, or other immune system disorder? (JUAN, MMR, RV)     Do you have a parent, brother, or sister with an immune system problem (if immune competence of vaccine recipient clinically verified, can proceed)? (MMR, JUAN)     Any recent steroid treatments for >2 weeks, chemotherapy, or radiation treatment? (JUAN, MMR)     Have you received antibody-containing blood transfusions or IVIG in the past 11 months (recommended interval is dependent on product)? (MMR, JUAN)     Have you taken antiviral drugs (acyclovir, famciclovir, valacyclovir for JUAN) in the last 24 or 48 hours, respectively?      Are you pregnant or planning to become pregnant  "within 1 month? (JUAN, MMR, HPV, IPV, MenB, Abrexvy; For Hep B- refer to Engerix-B; For RSV - Abrysvo is indicated for 32-36 weeks of pregnancy from September to January)     For infants, have you ever been told your child has had intussusception or a medical emergency involving obstruction of the intestine (Rotavirus)? If not for an infant, can skip this question.         *Ordering Physicians/APC should be consulted if \"yes\" is checked by the patient or guardian above.  I have received, read, and understand the Vaccine Information Statement (VIS) for each vaccine ordered.  I have considered my or my child's health status as well as the health status of my close contacts.  I have taken the opportunity to discuss my vaccine questions with my or my child's health care provider.   I have requested that the ordered vaccine(s) be given to me or my child.  I understand the benefits and risks of the vaccines.  I understand that I should remain in the clinic for 15 minutes after receiving the vaccine(s).  _________________________________________________________  Signature of Patient or Parent/Legal Guardian ____________________  Date     "

## 2024-01-03 LAB
ALBUMIN SERPL-MCNC: 4.1 G/DL (ref 3.5–5.2)
ALBUMIN/CREAT UR: 22 MG/G CREAT (ref 0–29)
ALBUMIN/GLOB SERPL: 1.6 G/DL
ALP SERPL-CCNC: 93 U/L (ref 39–117)
ALT SERPL-CCNC: 37 U/L (ref 1–41)
APPEARANCE UR: ABNORMAL
AST SERPL-CCNC: 29 U/L (ref 1–40)
BASOPHILS # BLD AUTO: 0.02 10*3/MM3 (ref 0–0.2)
BASOPHILS NFR BLD AUTO: 0.4 % (ref 0–1.5)
BILIRUB SERPL-MCNC: 0.5 MG/DL (ref 0–1.2)
BILIRUB UR QL STRIP: NEGATIVE
BUN SERPL-MCNC: 16 MG/DL (ref 8–23)
BUN/CREAT SERPL: 16.8 (ref 7–25)
CALCIUM SERPL-MCNC: 8.9 MG/DL (ref 8.6–10.5)
CHLORIDE SERPL-SCNC: 105 MMOL/L (ref 98–107)
CHOLEST SERPL-MCNC: 140 MG/DL (ref 0–200)
CHOLEST/HDLC SERPL: 3.18 {RATIO}
CO2 SERPL-SCNC: 25.3 MMOL/L (ref 22–29)
COLOR UR: YELLOW
CREAT SERPL-MCNC: 0.95 MG/DL (ref 0.76–1.27)
CREAT UR-MCNC: 215.2 MG/DL
EGFRCR SERPLBLD CKD-EPI 2021: 88.8 ML/MIN/1.73
EOSINOPHIL # BLD AUTO: 0.14 10*3/MM3 (ref 0–0.4)
EOSINOPHIL NFR BLD AUTO: 2.6 % (ref 0.3–6.2)
ERYTHROCYTE [DISTWIDTH] IN BLOOD BY AUTOMATED COUNT: 13.2 % (ref 12.3–15.4)
GLOBULIN SER CALC-MCNC: 2.6 GM/DL
GLUCOSE SERPL-MCNC: 112 MG/DL (ref 65–99)
GLUCOSE UR QL STRIP: ABNORMAL
HBA1C MFR BLD: 6.1 % (ref 4.8–5.6)
HCT VFR BLD AUTO: 40 % (ref 37.5–51)
HDLC SERPL-MCNC: 44 MG/DL (ref 40–60)
HGB BLD-MCNC: 12.5 G/DL (ref 13–17.7)
HGB UR QL STRIP: NEGATIVE
IMM GRANULOCYTES # BLD AUTO: 0.01 10*3/MM3 (ref 0–0.05)
IMM GRANULOCYTES NFR BLD AUTO: 0.2 % (ref 0–0.5)
KETONES UR QL STRIP: ABNORMAL
LDLC SERPL CALC-MCNC: 80 MG/DL (ref 0–100)
LEUKOCYTE ESTERASE UR QL STRIP: NEGATIVE
LYMPHOCYTES # BLD AUTO: 1.18 10*3/MM3 (ref 0.7–3.1)
LYMPHOCYTES NFR BLD AUTO: 21.9 % (ref 19.6–45.3)
MCH RBC QN AUTO: 27.9 PG (ref 26.6–33)
MCHC RBC AUTO-ENTMCNC: 31.3 G/DL (ref 31.5–35.7)
MCV RBC AUTO: 89.3 FL (ref 79–97)
MICROALBUMIN UR-MCNC: 46.8 UG/ML
MONOCYTES # BLD AUTO: 0.45 10*3/MM3 (ref 0.1–0.9)
MONOCYTES NFR BLD AUTO: 8.3 % (ref 5–12)
NEUTROPHILS # BLD AUTO: 3.59 10*3/MM3 (ref 1.7–7)
NEUTROPHILS NFR BLD AUTO: 66.6 % (ref 42.7–76)
NITRITE UR QL STRIP: NEGATIVE
NRBC BLD AUTO-RTO: 0 /100 WBC (ref 0–0.2)
PH UR STRIP: 5.5 [PH] (ref 5–8)
PLATELET # BLD AUTO: 201 10*3/MM3 (ref 140–450)
POTASSIUM SERPL-SCNC: 3.8 MMOL/L (ref 3.5–5.2)
PROT SERPL-MCNC: 6.7 G/DL (ref 6–8.5)
PROT UR QL STRIP: ABNORMAL
PSA SERPL-MCNC: 1.13 NG/ML (ref 0–4)
RBC # BLD AUTO: 4.48 10*6/MM3 (ref 4.14–5.8)
SODIUM SERPL-SCNC: 139 MMOL/L (ref 136–145)
SP GR UR STRIP: ABNORMAL (ref 1–1.03)
T4 FREE SERPL-MCNC: 1.27 NG/DL (ref 0.93–1.7)
TRIGL SERPL-MCNC: 84 MG/DL (ref 0–150)
TSH SERPL DL<=0.005 MIU/L-ACNC: 2.93 UIU/ML (ref 0.27–4.2)
UROBILINOGEN UR STRIP-MCNC: ABNORMAL MG/DL
VLDLC SERPL CALC-MCNC: 16 MG/DL (ref 5–40)
WBC # BLD AUTO: 5.39 10*3/MM3 (ref 3.4–10.8)

## 2024-01-15 RX ORDER — DAPAGLIFLOZIN 10 MG/1
TABLET, FILM COATED ORAL
Qty: 30 TABLET | Refills: 5 | Status: SHIPPED | OUTPATIENT
Start: 2024-01-15

## 2024-01-17 ENCOUNTER — OFFICE VISIT (OUTPATIENT)
Dept: PSYCHIATRY | Facility: HOSPITAL | Age: 64
End: 2024-01-17
Payer: MEDICARE

## 2024-01-17 DIAGNOSIS — F33.1 MODERATE EPISODE OF RECURRENT MAJOR DEPRESSIVE DISORDER: Primary | ICD-10-CM

## 2024-01-17 DIAGNOSIS — C15.5 MALIGNANT NEOPLASM OF LOWER THIRD OF ESOPHAGUS: ICD-10-CM

## 2024-01-17 DIAGNOSIS — R53.0 NEOPLASTIC MALIGNANT RELATED FATIGUE: ICD-10-CM

## 2024-01-17 DIAGNOSIS — G47.33 OSA (OBSTRUCTIVE SLEEP APNEA): ICD-10-CM

## 2024-01-17 RX ORDER — MODAFINIL 100 MG/1
100 TABLET ORAL DAILY
Qty: 30 TABLET | Refills: 2 | Status: SHIPPED | OUTPATIENT
Start: 2024-01-17

## 2024-01-17 RX ORDER — MIRTAZAPINE 45 MG/1
45 TABLET, FILM COATED ORAL NIGHTLY
Qty: 90 TABLET | Refills: 0 | Status: SHIPPED | OUTPATIENT
Start: 2024-01-17

## 2024-01-17 NOTE — PROGRESS NOTES
Subjective  Patient ID: Edmond Chase is a 63 y.o.. male seen in regularly scheduled group session.  Group participants have consented to group conducted in person    Total Group Time, Face to Face: 70 minutes  Total Group Participants: 4, plus facilitation per Dr. Tamika Mejía and VIKTORIA Moellre    Group Therapy  Group topics explored including development of new normal, short and long term effects of diagnosis and treatment, significant other or family conflict, anticipitory anxiety, anxiety surrounding adjusted treatment plan or adjusted follow up, pain management, physical deconditioning, weight management, social determinants of health (finances, living arrangements, etc), and lifestyle choices. Shared PTSD with complicated medical experiences, non pharmacological strategies for managing sx, QOL goals and barriers, and recent victories. Shares existential distress, concerns regarding impact of debilitation, reduced independence. Shares concern regarding fears of being a burden, although desire to have others to interact with regularly.    Counseling provided regarding cognitive behavioral therapy (CBT) strategies, behavioral activation/ activity scheduling, reintroduction to activity through graded tasks, balancing avoidance with approach , sleep hygiene, recognition and allowance of need for rest, pharmacological and non pharmacological management of sleep disturbance, lifestyle counseling, benefits of exercise and strategies for managing barriers to engagement, allowance of self care, exploration of quality of life goals, survivorship issues, current programming available in community and clinic, anxiety/ mood management , medication education, and existential distress. Supported patients in developing group dynamics, acknowledging long standing impact of physical illness, importance pharmacological and non pharmacological management of sx, and impact of demoralization. Impressed importance of  social connection.    Discussed current programming available in [facility] and community.    Next shared medical visit: February 21 at 1:00 PM in person     Patient response to group: Pt shares openly in group setting, interacting appropriately with other members.    Medications Management  Dr. Tamika Mejía and VIKTORIA Moeller present for medication discussion and management.  Pt continues in survivorship of esophageal cancer.    CC: Anxiety and depression  HPI: Pt is seen in follow up regarding sx of depression and anxiety in survivorship of complicated illness. Continues to report stable sx, appreciation of benefit to current medications. Questions impact of seasonal affective disorder, acknowledging low motivation, difficulty propelling self to engage. Pt does have TJ, adherent to CPAP.  Exam: As Above  Current medication regimen: Mirtazapine 45 mg q hs  Lab Review:   Office Visit on 01/02/2024   Component Date Value    WBC 01/02/2024 5.39     RBC 01/02/2024 4.48     Hemoglobin 01/02/2024 12.5 (L)     Hematocrit 01/02/2024 40.0     MCV 01/02/2024 89.3     MCH 01/02/2024 27.9     MCHC 01/02/2024 31.3 (L)     RDW 01/02/2024 13.2     Platelets 01/02/2024 201     Neutrophil Rel % 01/02/2024 66.6     Lymphocyte Rel % 01/02/2024 21.9     Monocyte Rel % 01/02/2024 8.3     Eosinophil Rel % 01/02/2024 2.6     Basophil Rel % 01/02/2024 0.4     Neutrophils Absolute 01/02/2024 3.59     Lymphocytes Absolute 01/02/2024 1.18     Monocytes Absolute 01/02/2024 0.45     Eosinophils Absolute 01/02/2024 0.14     Basophils Absolute 01/02/2024 0.02     Immature Granulocyte Rel* 01/02/2024 0.2     Immature Grans Absolute 01/02/2024 0.01     nRBC 01/02/2024 0.0     Glucose 01/02/2024 112 (H)     BUN 01/02/2024 16     Creatinine 01/02/2024 0.95     EGFR Result 01/02/2024 88.8     BUN/Creatinine Ratio 01/02/2024 16.8     Sodium 01/02/2024 139     Potassium 01/02/2024 3.8     Chloride 01/02/2024 105     Total CO2 01/02/2024  25.3     Calcium 01/02/2024 8.9     Total Protein 01/02/2024 6.7     Albumin 01/02/2024 4.1     Globulin 01/02/2024 2.6     A/G Ratio 01/02/2024 1.6     Total Bilirubin 01/02/2024 0.5     Alkaline Phosphatase 01/02/2024 93     AST (SGOT) 01/02/2024 29     ALT (SGPT) 01/02/2024 37     Hemoglobin A1C 01/02/2024 6.10 (H)     Total Cholesterol 01/02/2024 140     Triglycerides 01/02/2024 84     HDL Cholesterol 01/02/2024 44     VLDL Cholesterol Richi 01/02/2024 16     LDL Chol Calc (NIH) 01/02/2024 80     Chol/HDL Ratio 01/02/2024 3.18     PSA 01/02/2024 1.130     TSH 01/02/2024 2.930     Free T4 01/02/2024 1.27     Specific Gravity, UA 01/02/2024 Comment     pH, UA 01/02/2024 5.5     Color, UA 01/02/2024 Yellow     Appearance, UA 01/02/2024 Cloudy (A)     Leukocytes, UA 01/02/2024 Negative     Protein 01/02/2024 See below: (A)     Glucose, UA 01/02/2024 See below: (A)     Ketones 01/02/2024 Trace (A)     Blood, UA 01/02/2024 Negative     Bilirubin, UA 01/02/2024 Negative     Urobilinogen, UA 01/02/2024 Comment     Nitrite, UA 01/02/2024 Negative     Creatinine, Urine 01/02/2024 215.2     Microalbumin, Urine 01/02/2024 46.8     Microalbumin/Creatinine * 01/02/2024 22     Glucose 01/02/2024 117    Labs reviewed    MDM:  Meds reviewed and renewed as appropriate.  Continue medications as written.  Initiate modafinil 100 mg q hs; pt has TJ, compliant with CPAP.  FU scheduled in group setting.    Diagnoses and all orders for this visit:    1. Moderate episode of recurrent major depressive disorder (Primary)    2. Malignant neoplasm of lower third of esophagus    3. Neoplastic malignant related fatigue  -     modafinil (PROVIGIL) 100 MG tablet; Take 1 tablet by mouth Daily.  Dispense: 30 tablet; Refill: 2    4. TJ (obstructive sleep apnea)    Other orders  -     mirtazapine (REMERON) 45 MG tablet; Take 1 tablet by mouth Every Night.  Dispense: 90 tablet; Refill: 0

## 2024-01-22 ENCOUNTER — TELEPHONE (OUTPATIENT)
Dept: PSYCHIATRY | Facility: HOSPITAL | Age: 64
End: 2024-01-22
Payer: MEDICARE

## 2024-01-22 NOTE — TELEPHONE ENCOUNTER
PA has been approved for [facility] Patient has been informed and letter has been scanned.

## 2024-01-23 ENCOUNTER — OFFICE VISIT (OUTPATIENT)
Age: 64
End: 2024-01-23
Payer: MEDICARE

## 2024-01-23 VITALS
HEIGHT: 60 IN | OXYGEN SATURATION: 97 % | BODY MASS INDEX: 55.56 KG/M2 | WEIGHT: 283 LBS | HEART RATE: 71 BPM | SYSTOLIC BLOOD PRESSURE: 120 MMHG | DIASTOLIC BLOOD PRESSURE: 80 MMHG

## 2024-01-23 DIAGNOSIS — I25.708 CORONARY ARTERY DISEASE OF BYPASS GRAFT OF NATIVE HEART WITH STABLE ANGINA PECTORIS: Primary | ICD-10-CM

## 2024-01-23 DIAGNOSIS — N52.9 ERECTILE DYSFUNCTION, UNSPECIFIED ERECTILE DYSFUNCTION TYPE: ICD-10-CM

## 2024-01-23 DIAGNOSIS — Z95.2 S/P AVR (AORTIC VALVE REPLACEMENT): ICD-10-CM

## 2024-01-23 DIAGNOSIS — G47.33 OBSTRUCTIVE SLEEP APNEA, ADULT: ICD-10-CM

## 2024-01-23 DIAGNOSIS — I10 ESSENTIAL HYPERTENSION: ICD-10-CM

## 2024-01-23 DIAGNOSIS — Z95.1 S/P CABG (CORONARY ARTERY BYPASS GRAFT): ICD-10-CM

## 2024-01-23 PROCEDURE — 1159F MED LIST DOCD IN RCRD: CPT | Performed by: NURSE PRACTITIONER

## 2024-01-23 PROCEDURE — 1160F RVW MEDS BY RX/DR IN RCRD: CPT | Performed by: NURSE PRACTITIONER

## 2024-01-23 PROCEDURE — 3074F SYST BP LT 130 MM HG: CPT | Performed by: NURSE PRACTITIONER

## 2024-01-23 PROCEDURE — 3079F DIAST BP 80-89 MM HG: CPT | Performed by: NURSE PRACTITIONER

## 2024-01-23 PROCEDURE — 99214 OFFICE O/P EST MOD 30 MIN: CPT | Performed by: NURSE PRACTITIONER

## 2024-01-23 RX ORDER — RANOLAZINE 500 MG/1
500 TABLET, EXTENDED RELEASE ORAL 2 TIMES DAILY
Qty: 60 TABLET | Refills: 11 | Status: SHIPPED | OUTPATIENT
Start: 2024-01-23

## 2024-01-23 NOTE — PROGRESS NOTES
CARDIOLOGY        Patient Name: Edmond Chase  :1960  Age: 63 y.o.  Primary Cardiologist: Papa Miguel MD  Encounter Provider:  VIKTORIA Van    Date of Service: 24      CHIEF COMPLAINT / REASON FOR OFFICE VISIT     Follow-up, Cardiac Valve Problem, and Coronary Artery Disease      HISTORY OF PRESENT ILLNESS       HPI  Edmond Chase is a 63 y.o. male who presents today for 3 month reevaluation.    Pt has a  history significant for hypertension, diabetes, CAD with history of CABG, history of aortic valve replacement.    Review of medical records reveals recent hospitalization 7/10 - 7/15/2022.  Patient reports that he has been experiencing intermittent angina.  Patient came to the emergency department and ECG did not reveal any ischemia but he opted not to stay for evaluation.  Early the morning of admission patient woke with recurrent angina and diaphoresis.  He took an additional half of an Imdur with some improvement and called EMS.  Following arrival to the hospital he was continued to have angina and was noted to be mildly hypotensive initial EKG revealed stable lateral T wave changes.  Repeat EKG was poor quality but there was concern about inferior ST elevation, on Dr. Patel's review there was no evidence of ST elevation to warrant emergent cardiac catheterization.  During hospitalization patient had an echocardiogram with normal LVEF, normal prosthetic valve gradients.  Myocardial perfusion study revealed moderately severe area of ischemia in the inferior wall.  On hospital day 2 patient admitted that he had some melena over the weekend.  GI was consulted and EGD performed revealing nonbleeding gastric ulcers.  Cardiac catheterization performed revealed stable coronary disease.  Decision was made to drop aspirin given gastric ulcer history.  Patient does have mild diastolic dysfunction and low-dose furosemide was added.  He was also referred to pulmonary for  "outpatient follow-up for chronic dyspnea.    He has a history of esophageal cancer and reports that his esophagus has now been removed.  He is also s/p cholecystectomy.     Follow-up appointment in October 2023 patient continued to complain of chest discomfort.  At that time we recommended the patient increase his isosorbide mononitrate to twice daily dosing.    Patient reports since increasing isosorbide mono nitrate his angina has been more controlled.  He does have intermittent episodes of lower extremity edema as well as generalized fatigue.  Patient notes that he still has depression and he notes that his psychiatrist has recommended a mood medication which he plans to start tomorrow.  Patient does have recurrent nausea secondary to esophageal cancer.  Patient is sedentary and not exercising.  Patient does note that he has a new girlfriend and is interested in being intimate in her however he needs medication to assist with erectile dysfunction.  PCP deferred decision to me.    The following portions of the patient's history were reviewed and updated as appropriate: allergies, current medications, past family history, past medical history, past social history, past surgical history and problem list.      VITAL SIGNS     Visit Vitals  /80 (BP Location: Right arm, Patient Position: Sitting)   Pulse 71   Ht 73 cm (28.74\")   Wt 128 kg (283 lb)   SpO2 97%   .96 kg/m²           Wt Readings from Last 3 Encounters:   01/23/24 128 kg (283 lb)   01/02/24 125 kg (276 lb 3.2 oz)   12/18/23 122 kg (269 lb)     Body mass index is 240.96 kg/m².      REVIEW OF SYSTEMS   Review of Systems   Constitutional: Positive for malaise/fatigue. Negative for weight gain and weight loss.   Cardiovascular:  Negative for chest pain, dyspnea on exertion and leg swelling.   Respiratory:  Negative for shortness of breath, snoring and wheezing.    Hematologic/Lymphatic: Negative for bleeding problem. Does not bruise/bleed easily. "   Skin:  Negative for color change.   Musculoskeletal:  Negative for falls and joint pain.   Gastrointestinal:  Negative for melena.   Genitourinary:  Negative for hematuria.   Neurological:  Negative for excessive daytime sleepiness.   Psychiatric/Behavioral:  Negative for depression. The patient is nervous/anxious.            PHYSICAL EXAMINATION     Constitutional:       Appearance: Normal appearance. Well-developed.   Eyes:      Conjunctiva/sclera: Conjunctivae normal.   Neck:      Vascular: No carotid bruit.   Pulmonary:      Effort: Pulmonary effort is normal.      Breath sounds: Normal breath sounds.   Cardiovascular:      Normal rate. Regular rhythm. Normal S1. Normal S2.       Murmurs: There is a grade 3/6 systolic murmur at the URSB and ULSB.      No gallop.  No click. No rub.      Comments:     Edema:     Peripheral edema absent.   Musculoskeletal: Normal range of motion. Skin:     General: Skin is warm and dry.   Neurological:      Mental Status: Alert and oriented to person, place, and time.      GCS: GCS eye subscore is 4. GCS verbal subscore is 5. GCS motor subscore is 6.   Psychiatric:         Mood and Affect: Mood is anxious.         Speech: Speech normal.         Behavior: Behavior normal.         Thought Content: Thought content normal.         Judgment: Judgment normal.           REVIEWED DATA     Procedures    Cardiac Procedures:  Echocardiogram 6/27/2017.  LVEF 56%.  Normal LV cavity size.  All LV wall segments contract normally.  Moderate LVH.  LAE.  RA is moderately dilated.  Bioprosthetic aortic valve with peak and mean gradients elevated, however dimensionless index is 0.34 which suggests normal valve function.  No regurgitation.  Mild MAC.  Mild mitral valve regurgitation.  Trace tricuspid valve regurgitation.  Calculated RVSP 3 mmHg.  Myocardial perfusion stress test 7/20/2017.  Small sized infarct in the inferior wall with moderate brijesh-infarct ischemia.  Impressions consistent with  intermediate risk study.  Cardiac catheterization 10/30/2017.  Three-vessel CAD, patent LIMA to LAD, occluded AO-OMB SVG, occluded AO-PDA SVG recommend medical therapy.  Echocardiogram 7/1/2020.  LVEF 56%.  Normal diastolic function.  Normal RV cavity size, wall thickness.  No significant perivalvular leak although bioprosthetic valve was not well visualized.  Aortic valve maximum pressure gradient 43.2 mmHg, mean pressure gradient 28.8 mmHg, aortic valve area 1.0 cm².  Mild dilation of the sinuses of Valsalva measuring 4.2 cm.  Worsening aortic valve gradient compared to prior study.  Myocardial perfusion stress test 7/1/2020.  Small to medium sized, severe area of ischemia located in the inferior wall.  Impressions consistent with intermediate risk study.  Patient was very symptomatic.  Patient set up for cardiac catheterization.  Cardiac catheterization 7/7/2020.  Severe multivessel coronary disease with 100% proximal LAD, 50 to 60% stenoses throughout the circumflex territory within the proximal segment as well as the high marginal and diffuse 70% stenoses throughout the RCA with 100% PDA stenosis with faint collaterals from the LAD.  PCI of the mid LAD via MARCELINA graft with placement of ESMER.  No residual stenosis.  Recommend reevaluating symptoms and if still symptomatic aggressively titrating antianginal medications with consideration given to long-acting nitrates in the hopes of medically managing his RCA disease as this would require extensive amount of stents to have fully revascularized.  Cardiac catheterization 6/17/2021. LIMA to LAD stent was widely patent and normal.  Left main with 20% luminal irregularities.  Circumflex had an area that was 70% stenosed in the midportion but does not go anywhere and is mainly in the AV groove branch.  RCA dominant and very tortuous vessel with 50% proximal disease, 70% mid vessel disease, 50 to 60% disease up to 90% in the distal RCA territory but due to tortuosity not  amenable to any intervention.  Recommend increasing isosorbide mononitrate and following up with cardiology office.  Myocardial perfusion stress test 7/12/2022.  Medium sized, mildly severe area of ischemia in the inferior wall.  Echocardiogram 7/12/2022.  LVEF 51-55%.  All LV wall segments contract normally.  No significant aortic valve regurgitation.  No hemodynamically significant aortic valve stenosis.  There is a 27 mm porcine magna bioprosthetic aortic valve with stable gradients.  Cardiac catheterization 7/14/2022.  Left main normal.  LAD with calcified 90% proximal stenosis.   mid segment.  Mid to distal vessels fill via patent LIMA to LAD.  Circumflex severely calcified 70-80% stenosis in the midsegment.RCA is severely calcified and tortuous vessel with diffuse 70% mid vessel stenosis and discrete 80% mid to distal stenosis.   small caliber PDA branch PDA branch fills via left to right collaterals.  Ramus is discrete 50% stenosis in the inferior branch.  LIMA to LAD is normal.  SVG to OM is occluded at proximal anastomosis.  SVG to PDA is occluded at proximal anastomosis.  Continue medical management.  RCA and LAD are not candidates for intervention and unchanged from prior cardiac catheterization.  Echocardiogram 2/4/2023.  Moderate LVH.        ASSESSMENT & PLAN     Diagnoses and all orders for this visit:    1. Coronary artery disease of bypass graft of native heart with stable angina pectoris (Primary)  Assessment & Plan:  Patient reports that angina has improved with increased dose of isosorbide mononitrate however he is wanting to utilize medications for erectile dysfunction.  Discussed transitioning from isosorbide mononitrate to Ranexa.  Patient is in agreement.  He will call me for further episodes of angina after he comes off of isosorbide mononitrate.  No anticoagulation with aspirin or antiplatelets secondary to history of life-threatening GI bleed      2. S/P CABG (coronary artery bypass  graft)    3. S/P AVR (aortic valve replacement)  Assessment & Plan:  Gradient stable on most recent echocardiogram  Patient aware of need for prophylactic antibiotics for dental procedures      4. Essential hypertension  Assessment & Plan:  Blood pressure controlled in clinic today at 120/80  Continue the following regimen:  Amlodipine 10 mg/day  Lisinopril 10 mg/day  Metoprolol succinate 25 mg twice daily      5. Obstructive sleep apnea, adult    6. Erectile dysfunction, unspecified erectile dysfunction type  Assessment & Plan:  Patient cannot take medications for erectile dysfunction while he is on long-acting nitrates.  Transition from Ranexa to isosorbide mononitrate.  As long as patient is able to wean from isosorbide mononitrate, I do not have any contraindications to him starting medications to assist with erectile dysfunction.      Other orders  -     ranolazine (Ranexa) 500 MG 12 hr tablet; Take 1 tablet by mouth 2 (Two) Times a Day.  Dispense: 60 tablet; Refill: 11        Return in about 6 months (around 7/23/2024) for Dr. Miguel- Routine.    Future Appointments         Provider Department Center    4/22/2024 1:45 PM [facility] 3 [facility]    4/29/2024 10:45 AM Valerie Stockton MD [facility] THORACIC SURGERY [facility]    5/2/2024 11:00 AM Nargis Velasquez APRN [facility] PRIMARY CARE [facility]    7/26/2024 11:40 AM Papa Miguel MD [facility] CARDIOLOGY [facility]    8/28/2024 9:30 AM Sekou Pisano MD [facility] SLEEP MEDICINE                 MEDICATIONS         Discharge Medications            Accurate as of January 23, 2024  2:57 PM. If you have any questions, ask your nurse or doctor.                New Medications        Instructions Start Date   ranolazine 500 MG 12 hr tablet  Commonly known as: Ranexa  Started by: VIKTORIA Van mg, Oral, 2 Times Daily             Changes to Medications         Instructions Start Date   amLODIPine 10 MG tablet  Commonly known as: NORVASC  What changed: when to take this   10 mg, Oral, Daily             Continue These Medications        Instructions Start Date   atorvastatin 40 MG tablet  Commonly known as: LIPITOR   TAKE ONE TABLET BY MOUTH DAILY      Farxiga 10 MG tablet  Generic drug: dapagliflozin Propanediol   ADMINISTER ONE TABLET PER J-TUBE DAILY      furosemide 20 MG tablet  Commonly known as: LASIX   20 mg, Oral, Daily      isosorbide mononitrate 60 MG 24 hr tablet  Commonly known as: IMDUR   60 mg, Oral, 2 Times Daily      lisinopril 10 MG tablet  Commonly known as: PRINIVIL,ZESTRIL   10 mg, Oral, Nightly      metoclopramide 5 MG tablet  Commonly known as: REGLAN   Take 1 tablet by mouth 3 (Three) Times a Day As Needed.      metoprolol succinate XL 25 MG 24 hr tablet  Commonly known as: TOPROL-XL   TAKE ONE TABLET BY MOUTH DAILY      mirtazapine 45 MG tablet  Commonly known as: REMERON   45 mg, Oral, Nightly      modafinil 100 MG tablet  Commonly known as: PROVIGIL   100 mg, Oral, Daily                   **Dragon Disclaimer:   Much of this encounter note is an electronic transcription/translation of spoken language to printed text. The electronic translation of spoken language may permit erroneous, or at times, nonsensical words or phrases to be inadvertently transcribed. Although I have reviewed the note for such errors, some may still exist.

## 2024-01-23 NOTE — ASSESSMENT & PLAN NOTE
Patient cannot take medications for erectile dysfunction while he is on long-acting nitrates.  Transition from Ranexa to isosorbide mononitrate.  As long as patient is able to wean from isosorbide mononitrate, I do not have any contraindications to him starting medications to assist with erectile dysfunction.

## 2024-01-23 NOTE — ASSESSMENT & PLAN NOTE
Blood pressure controlled in clinic today at 120/80  Continue the following regimen:  Amlodipine 10 mg/day  Lisinopril 10 mg/day  Metoprolol succinate 25 mg twice daily

## 2024-01-23 NOTE — ASSESSMENT & PLAN NOTE
Patient reports that angina has improved with increased dose of isosorbide mononitrate however he is wanting to utilize medications for erectile dysfunction.  Discussed transitioning from isosorbide mononitrate to Ranexa.  Patient is in agreement.  He will call me for further episodes of angina after he comes off of isosorbide mononitrate.  No anticoagulation with aspirin or antiplatelets secondary to history of life-threatening GI bleed

## 2024-01-23 NOTE — ASSESSMENT & PLAN NOTE
Gradient stable on most recent echocardiogram  Patient aware of need for prophylactic antibiotics for dental procedures

## 2024-02-21 ENCOUNTER — OFFICE VISIT (OUTPATIENT)
Dept: PSYCHIATRY | Facility: HOSPITAL | Age: 64
End: 2024-02-21
Payer: MEDICARE

## 2024-02-21 DIAGNOSIS — R53.0 NEOPLASTIC MALIGNANT RELATED FATIGUE: ICD-10-CM

## 2024-02-21 DIAGNOSIS — G47.33 OSA (OBSTRUCTIVE SLEEP APNEA): ICD-10-CM

## 2024-02-21 DIAGNOSIS — C15.5 MALIGNANT NEOPLASM OF LOWER THIRD OF ESOPHAGUS: ICD-10-CM

## 2024-02-21 DIAGNOSIS — F33.1 MODERATE EPISODE OF RECURRENT MAJOR DEPRESSIVE DISORDER: Primary | ICD-10-CM

## 2024-02-21 NOTE — PROGRESS NOTES
Subjective  Patient ID: Edmond Chase is a 63 y.o.. male seen in regularly scheduled group session.  Group participants have consented to group conducted in person    Total Group Time, Face to Face: 70 minutes  Total Group Participants: 4, plus facilitation per Dr. Tamika Mejía and VIKTORIA Moeller    Group Therapy  Group topics explored including development of new normal, interpersonal sensitivity, short and long term effects of diagnosis and treatment, significant other or family conflict, anticipitory anxiety, physical deconditioning, social determinants of health (finances, living arrangements, etc), and lifestyle choices. Shared regret of past, worry of future, feeling robbed of the present. Identified impact of physical and social concerns, previous roles and current role strain. Considered goals of remaining engaged in medical decision making, wanting to know information to assist in making medical decisions. Identified reduced sense of purpose, not understanding current place or role.    Counseling provided regarding cognitive behavioral therapy (CBT) strategies, behavioral activation/ activity scheduling, reintroduction to activity through graded tasks, balancing avoidance with approach , recognition and allowance of need for rest, lifestyle counseling, benefits of obtaining appropraite BMI including programs available in [facility] and community, allowance of self care, exploration of quality of life goals, survivorship issues, current programming available in community and clinic, anxiety/ mood management , and medication education. Shared availability of oncology , consideration of 1:1 visits for persistent concerns or those uncomfortable sharing in group setting. Supported members in rational existential distress, supporting patients in full force living. Educated regarding mindfulness, recognition of stella, mastery and pleasure exercises. Considered benefits of group setting for  connection, exploration of new normal.    Discussed current programming available in [facility] and community.    Next shared medical visit: March 20 at 2:00 in person, men's group    Patient response to group: Pt shares openly in group setting while also identifying needs that need to be addressed privately.    Medications Management  Dr. Tamika Mejía and VIKTORIA Moeller present for medication discussion and management.  Pt continues in survivorship of esophageal cancer, acute GI bleed.    CC: Anxiety, depression  HPI: Pt is seen in follow up regarding anxiety, low mood, continued impact of existential distress, concern for future. Reports intrusion of anxiety for future on ability to enjoy the present. Endorses financial challenges, concern for becoming a burden on loved ones if outlives savings. Shares goal of being able to live for today, burdened by uncertainty. Pt continues on mirtazapine 45 mg q hs. Reports trial of modafinil, taking it for two weeks although eventually was picking at cuticles and unsure whether modafinil was contributing to anxiety increase. Did feel this was making her 'perkier' when he was taking it. Does share various additional stressors occurring alongside this period of time.    Briefly met with pt 1:1 following group setting. Pt shared conditional SI, specifically regarding health and also including concern current finances will only last 8 more years, further burdened by recent need to pay for roof on condo. Pt denies current desire or intent to harm self. Denies plan. Pt does have gun and states when he has been depressed before he has given this to family member.     Exam: As Above  Current medication regimen: mirtazapine 45 mg q hs, modafinil 100 mg q am- not currently taking  Lab Review:   Office Visit on 01/02/2024   Component Date Value    WBC 01/02/2024 5.39     RBC 01/02/2024 4.48     Hemoglobin 01/02/2024 12.5 (L)     Hematocrit 01/02/2024 40.0     MCV 01/02/2024  89.3     MCH 01/02/2024 27.9     MCHC 01/02/2024 31.3 (L)     RDW 01/02/2024 13.2     Platelets 01/02/2024 201     Neutrophil Rel % 01/02/2024 66.6     Lymphocyte Rel % 01/02/2024 21.9     Monocyte Rel % 01/02/2024 8.3     Eosinophil Rel % 01/02/2024 2.6     Basophil Rel % 01/02/2024 0.4     Neutrophils Absolute 01/02/2024 3.59     Lymphocytes Absolute 01/02/2024 1.18     Monocytes Absolute 01/02/2024 0.45     Eosinophils Absolute 01/02/2024 0.14     Basophils Absolute 01/02/2024 0.02     Immature Granulocyte Rel* 01/02/2024 0.2     Immature Grans Absolute 01/02/2024 0.01     nRBC 01/02/2024 0.0     Glucose 01/02/2024 112 (H)     BUN 01/02/2024 16     Creatinine 01/02/2024 0.95     EGFR Result 01/02/2024 88.8     BUN/Creatinine Ratio 01/02/2024 16.8     Sodium 01/02/2024 139     Potassium 01/02/2024 3.8     Chloride 01/02/2024 105     Total CO2 01/02/2024 25.3     Calcium 01/02/2024 8.9     Total Protein 01/02/2024 6.7     Albumin 01/02/2024 4.1     Globulin 01/02/2024 2.6     A/G Ratio 01/02/2024 1.6     Total Bilirubin 01/02/2024 0.5     Alkaline Phosphatase 01/02/2024 93     AST (SGOT) 01/02/2024 29     ALT (SGPT) 01/02/2024 37     Hemoglobin A1C 01/02/2024 6.10 (H)     Total Cholesterol 01/02/2024 140     Triglycerides 01/02/2024 84     HDL Cholesterol 01/02/2024 44     VLDL Cholesterol Richi 01/02/2024 16     LDL Chol Calc (NIH) 01/02/2024 80     Chol/HDL Ratio 01/02/2024 3.18     PSA 01/02/2024 1.130     TSH 01/02/2024 2.930     Free T4 01/02/2024 1.27     Specific Gravity, UA 01/02/2024 Comment     pH, UA 01/02/2024 5.5     Color, UA 01/02/2024 Yellow     Appearance, UA 01/02/2024 Cloudy (A)     Leukocytes, UA 01/02/2024 Negative     Protein 01/02/2024 See below: (A)     Glucose, UA 01/02/2024 See below: (A)     Ketones 01/02/2024 Trace (A)     Blood, UA 01/02/2024 Negative     Bilirubin, UA 01/02/2024 Negative     Urobilinogen, UA 01/02/2024 Comment     Nitrite, UA 01/02/2024 Negative     Creatinine, Urine  01/02/2024 215.2     Microalbumin, Urine 01/02/2024 46.8     Microalbumin/Creatinine * 01/02/2024 22     Glucose 01/02/2024 117      MDM:  Meds reviewed and renewed as appropriate.  Continue mirtazapine q hs. Considered retrial of modafinl, taking 1/2 to full tab while assessing for impact on anxiety.  Continue FU in group, while encouraging individual visit for persistent or worsening thoughts of SI or self harm.  Safety discussed at length, including escalation to higher level of care. Specifically considered getting gun out of home, and pt states he could give to family member. Pt denies acute concerns, need for higher level of care. Is agreeable to continuing with monthyl FU in group setting. Is agreeable to considering sooner apt with me, specifically if thoughts persist or increase. [phone] number provided with instructions for use. Also discussed presenting to ED for acute thoughts of SI recognizing this is not urgent care clinic and not open 24/7.    Diagnoses and all orders for this visit:    1. Moderate episode of recurrent major depressive disorder (Primary)    2. Neoplastic malignant related fatigue    3. Malignant neoplasm of lower third of esophagus    4. TJ (obstructive sleep apnea)

## 2024-02-28 NOTE — CASE COMMUNICATION
Needs TSH order for another provider told him it had to be ordered by doctor Jama before we can make a lab juan   Please call patient back after order is put in to make a lab appt   Wants move appointment next week to Thursday

## 2024-03-20 ENCOUNTER — OFFICE VISIT (OUTPATIENT)
Dept: PSYCHIATRY | Facility: HOSPITAL | Age: 64
End: 2024-03-20
Payer: MEDICARE

## 2024-03-20 ENCOUNTER — OFFICE VISIT (OUTPATIENT)
Dept: INTERNAL MEDICINE | Age: 64
End: 2024-03-20
Payer: MEDICARE

## 2024-03-20 VITALS
SYSTOLIC BLOOD PRESSURE: 132 MMHG | TEMPERATURE: 97 F | WEIGHT: 278 LBS | OXYGEN SATURATION: 97 % | HEART RATE: 79 BPM | BODY MASS INDEX: 36.84 KG/M2 | DIASTOLIC BLOOD PRESSURE: 80 MMHG | HEIGHT: 73 IN

## 2024-03-20 DIAGNOSIS — F33.1 MODERATE EPISODE OF RECURRENT MAJOR DEPRESSIVE DISORDER: Primary | ICD-10-CM

## 2024-03-20 DIAGNOSIS — H93.13 BILATERAL TINNITUS: ICD-10-CM

## 2024-03-20 DIAGNOSIS — C15.5 MALIGNANT NEOPLASM OF LOWER THIRD OF ESOPHAGUS: ICD-10-CM

## 2024-03-20 DIAGNOSIS — R53.0 NEOPLASTIC MALIGNANT RELATED FATIGUE: ICD-10-CM

## 2024-03-20 DIAGNOSIS — M70.21 OLECRANON BURSITIS OF RIGHT ELBOW: Primary | ICD-10-CM

## 2024-03-20 PROCEDURE — 3079F DIAST BP 80-89 MM HG: CPT

## 2024-03-20 PROCEDURE — 3044F HG A1C LEVEL LT 7.0%: CPT

## 2024-03-20 PROCEDURE — 3075F SYST BP GE 130 - 139MM HG: CPT

## 2024-03-20 PROCEDURE — 99214 OFFICE O/P EST MOD 30 MIN: CPT

## 2024-03-20 PROCEDURE — 1160F RVW MEDS BY RX/DR IN RCRD: CPT

## 2024-03-20 PROCEDURE — 1159F MED LIST DOCD IN RCRD: CPT

## 2024-03-20 RX ORDER — MODAFINIL 100 MG/1
100 TABLET ORAL DAILY
Qty: 30 TABLET | Refills: 2 | Status: SHIPPED | OUTPATIENT
Start: 2024-03-20

## 2024-03-20 RX ORDER — MIRTAZAPINE 45 MG/1
45 TABLET, FILM COATED ORAL NIGHTLY
Qty: 90 TABLET | Refills: 0 | Status: SHIPPED | OUTPATIENT
Start: 2024-03-20
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
## 2024-03-20 NOTE — PROGRESS NOTES
Subjective  Patient ID: Edmond Chase is a 63 y.o.. male seen in regularly scheduled group session.  Group participants have consented to group conducted in person    Total Group Time, Face to Face: 75 minutes  Total Group Participants: 5, plus facilitation per VIKTORIA Moeller and Erin Sibley MD    Group Therapy  Group topics explored including development of new normal, interpersonal sensitivity, short and long term effects of diagnosis and treatment, significant other or family conflict, anticipitory anxiety, physical deconditioning, social determinants of health (finances, living arrangements, etc), and lifestyle choices. Patients share importance of helpful connection to medical team, ability to receive necessary care, continued impact of chronic illness on QOL. Considers thought of mortality, importance of allowing space to discuss this.     Counseling provided regarding behavioral activation/ activity scheduling, reintroduction to activity through graded tasks, balancing avoidance with approach , recognition and allowance of need for rest, pharmacological and non pharmacological management of sleep disturbance, lifestyle counseling, allowance of self care, exploration of quality of life goals, survivorship issues, anxiety/ mood management , medication education, and existential distress. Counseling continued including increased incidence of depression and anxiety in survivorship of cancer, support of self advocacy. Supported developing group dynamics, support of other individuals.     Discussed current programming available in [facility] and community.    Benefits of group discussed while also acknowledging the need for 1:1 consultation at times. Members invited to discuss any concerns privately with me following group setting or to schedule a 1:1 visit if needs not being met in group.    Next shared medical visit: April 24 at 2 in person    Patient response to group: Pt communicates openly in  group setting, sharing needs and supporting other members.    Medications Management  VIKTORIA Moeller and Dr. Erin Sibley present for medication discussion and management.  Pt continues in survivorship of esophageal cancer.    CC: Depression  HPI: Pt is seen in follow up regarding sx of depression in survivorship of esophageal cancer. Continues on mirtazapine 45 mg q hs. Appetite remains complicated in survivorship of esophagectomy. Continues to report emesis appx 1-2 times weekly. Feels this occurs with even the slightest overeating. Has not yet attempted retrial of modafinil, although remains interested in use of this to assist with morning amotivation. SI explored 1:1; pt denies. Reports appreciated resolution of acute concerns at this time.  Exam: As Above   Current medication regimen: mirtazapine 45 mg q hs  Lab Review:   No visits with results within 2 Month(s) from this visit.   Latest known visit with results is:   Office Visit on 01/02/2024   Component Date Value    WBC 01/02/2024 5.39     RBC 01/02/2024 4.48     Hemoglobin 01/02/2024 12.5 (L)     Hematocrit 01/02/2024 40.0     MCV 01/02/2024 89.3     MCH 01/02/2024 27.9     MCHC 01/02/2024 31.3 (L)     RDW 01/02/2024 13.2     Platelets 01/02/2024 201     Neutrophil Rel % 01/02/2024 66.6     Lymphocyte Rel % 01/02/2024 21.9     Monocyte Rel % 01/02/2024 8.3     Eosinophil Rel % 01/02/2024 2.6     Basophil Rel % 01/02/2024 0.4     Neutrophils Absolute 01/02/2024 3.59     Lymphocytes Absolute 01/02/2024 1.18     Monocytes Absolute 01/02/2024 0.45     Eosinophils Absolute 01/02/2024 0.14     Basophils Absolute 01/02/2024 0.02     Immature Granulocyte Rel* 01/02/2024 0.2     Immature Grans Absolute 01/02/2024 0.01     nRBC 01/02/2024 0.0     Glucose 01/02/2024 112 (H)     BUN 01/02/2024 16     Creatinine 01/02/2024 0.95     EGFR Result 01/02/2024 88.8     BUN/Creatinine Ratio 01/02/2024 16.8     Sodium 01/02/2024 139     Potassium 01/02/2024 3.8      Chloride 01/02/2024 105     Total CO2 01/02/2024 25.3     Calcium 01/02/2024 8.9     Total Protein 01/02/2024 6.7     Albumin 01/02/2024 4.1     Globulin 01/02/2024 2.6     A/G Ratio 01/02/2024 1.6     Total Bilirubin 01/02/2024 0.5     Alkaline Phosphatase 01/02/2024 93     AST (SGOT) 01/02/2024 29     ALT (SGPT) 01/02/2024 37     Hemoglobin A1C 01/02/2024 6.10 (H)     Total Cholesterol 01/02/2024 140     Triglycerides 01/02/2024 84     HDL Cholesterol 01/02/2024 44     VLDL Cholesterol Richi 01/02/2024 16     LDL Chol Calc (NIH) 01/02/2024 80     Chol/HDL Ratio 01/02/2024 3.18     PSA 01/02/2024 1.130     TSH 01/02/2024 2.930     Free T4 01/02/2024 1.27     Specific Gravity, UA 01/02/2024 Comment     pH, UA 01/02/2024 5.5     Color, UA 01/02/2024 Yellow     Appearance, UA 01/02/2024 Cloudy (A)     Leukocytes, UA 01/02/2024 Negative     Protein 01/02/2024 See below: (A)     Glucose, UA 01/02/2024 See below: (A)     Ketones 01/02/2024 Trace (A)     Blood, UA 01/02/2024 Negative     Bilirubin, UA 01/02/2024 Negative     Urobilinogen, UA 01/02/2024 Comment     Nitrite, UA 01/02/2024 Negative     Creatinine, Urine 01/02/2024 215.2     Microalbumin, Urine 01/02/2024 46.8     Microalbumin/Creatinine * 01/02/2024 22     Glucose 01/02/2024 117      MDM:  Meds reviewed and renewed as appropriate.  Continue mirtazapine as written. Supported use of modafinil to assist with persistent low energy, lack of inertia.  FU scheduled in group setting.    Diagnoses and all orders for this visit:    1. Moderate episode of recurrent major depressive disorder (Primary)    2. Neoplastic malignant related fatigue  -     modafinil (PROVIGIL) 100 MG tablet; Take 1 tablet by mouth Daily. (Patient taking differently: Take 1 tablet by mouth Daily. Taking half of tablet)  Dispense: 30 tablet; Refill: 2    3. Malignant neoplasm of lower third of esophagus    Other orders  -     mirtazapine (REMERON) 45 MG tablet; Take 1 tablet by mouth Every Night.   Dispense: 90 tablet; Refill: 0

## 2024-03-27 NOTE — PROGRESS NOTES
New  Right Elbow      Patient: Edmond Chase        YOB: 1960        Chief Complaints: Elbow pain right elbow pain      History of Present Illness:  This is a very nice 63-year-old male who is right-hand dominant presents with right elbow swelling this been ongoing for about 2 weeks no history injury change in activity he has had an abundant amount of health problems over the last year including early diagnosis of esophageal cancer with resection but he states he is lost a lot of weight and was not invalid for quite some time.  He is getting back into his activity he is unsure about this although there was no history injury it does not hurt he just has local swelling        Allergies: No Known Allergies    Medications:   Home Medications:  Current Outpatient Medications on File Prior to Visit   Medication Sig    amLODIPine (NORVASC) 10 MG tablet Take 1 tablet by mouth Daily. (Patient taking differently: Take 1 tablet by mouth Every Night.)    atorvastatin (LIPITOR) 40 MG tablet TAKE ONE TABLET BY MOUTH DAILY    Farxiga 10 MG tablet ADMINISTER ONE TABLET PER J-TUBE DAILY    furosemide (LASIX) 20 MG tablet TAKE 1 TABLET BY MOUTH DAILY    isosorbide mononitrate (IMDUR) 60 MG 24 hr tablet Take 1 tablet by mouth 2 (Two) Times a Day.    lisinopril (PRINIVIL,ZESTRIL) 10 MG tablet Take 1 tablet by mouth Every Night for 360 days.    metoclopramide (REGLAN) 5 MG tablet Take 1 tablet by mouth 3 (Three) Times a Day As Needed.    metoprolol succinate XL (TOPROL-XL) 25 MG 24 hr tablet TAKE ONE TABLET BY MOUTH DAILY    mirtazapine (REMERON) 45 MG tablet Take 1 tablet by mouth Every Night.    modafinil (PROVIGIL) 100 MG tablet Take 1 tablet by mouth Daily.    ranolazine (Ranexa) 500 MG 12 hr tablet Take 1 tablet by mouth 2 (Two) Times a Day.     No current facility-administered medications on file prior to visit.     Current Medications:  Scheduled Meds:  Continuous Infusions:No current facility-administered  "medications for this visit.    PRN Meds:.    Past Medical History:   Diagnosis Date    Abnormal nuclear stress test     Anxiety     Anxiety and depression     Aortic valve insufficiency     nonrheumtic     Arthritis     knees    Ascending aortic aneurysm     CAD (coronary artery disease)     stent    Chest pain     Diabetes mellitus     Diarrhea     Dizzy     CAREFUL WHEN GETTING UP    Dyspnea     Esophageal cancer 11/2022    no chemo or radiation    Essential hypertension     Fatigue     G tube feedings     per jejunostomy tube nightly with permitin  3 cans nightly    GERD (gastroesophageal reflux disease)     GI bleed     Heart murmur     History of pneumonia     History of recent blood transfusion     hemorrhage     no reaction    Hyperglycemia     Hyperlipidemia     Hypersomnia with sleep apnea     Jejunostomy tube present     Lightheadedness     Malaise and fatigue     Migraines     \"OCCULAR MIGRAINES\"    Morbid obesity     Myocardial ischemia     Nausea     Nocturia     TJ on auto CPAP 10/31/2016    Overnight polysomnogram.  Weight 276 pounds.  Severe TJ with AHI 79 events per hour.  Auto CPAP recommended.    Pneumonia     FEB 2016    Scrotal bleeding     SOB (shortness of breath)         Past Surgical History:   Procedure Laterality Date    AORTIC VALVE REPAIR/REPLACEMENT  05/2016    ASCENDING ARCH/HEMIARCH REPLACEMENT N/A 05/02/2016    Procedure: BHAVESH STERNOTOMY CORONARY ARTERY BYPASS GRAFT TIMES 3 USING LEFT INTERNAL MAMMARY ARTERY AND RIGHT GREATER SAPHENOUS VEIN GRAFT PER ENDOSCOPIC VEIN HARVESTING, AORTIC ANEURYSM REPAIR WITH ROOT REPAIR AND AORTIC VALVE REPLACEMENT;  Surgeon: Rosalio Cline MD;  Location: [facility] OR;  Service:     CARDIAC CATHETERIZATION N/A 04/01/2016    Procedure: Left Heart Cath;  Surgeon: Erick Tam MD;  Location: [facility] INVASIVE LOCATION;  Service:     CARDIAC CATHETERIZATION N/A 04/01/2016    Procedure: Left ventriculography;  Surgeon: Erick Tam MD;  Location: [facility]" CATH INVASIVE LOCATION;  Service:     CARDIAC CATHETERIZATION N/A 04/01/2016    Procedure: Right Heart Cath;  Surgeon: Erick Tam MD;  Location: [facility] CATH INVASIVE LOCATION;  Service:     CARDIAC CATHETERIZATION N/A 10/30/2017    Procedure: Coronary angiography;  Surgeon: Sorin Vasquez MD;  Location: [facility] CATH INVASIVE LOCATION;  Service:     CARDIAC CATHETERIZATION  10/30/2017    Procedure: Saphenous Vein Graft;  Surgeon: Sorin Vasquez MD;  Location: [facility] CATH INVASIVE LOCATION;  Service:     CARDIAC CATHETERIZATION N/A 10/30/2017    Procedure: Native mammary injection;  Surgeon: Sorin Vasquez MD;  Location: [facility] CATH INVASIVE LOCATION;  Service:     CARDIAC CATHETERIZATION N/A 07/07/2020    Procedure: Coronary angiography;  Surgeon: Gregor Kim MD;  Location: [facility] CATH INVASIVE LOCATION;  Service: Cardiovascular;  Laterality: N/A;    CARDIAC CATHETERIZATION N/A 07/07/2020    Procedure: Left heart cath;  Surgeon: Gregor Kim MD;  Location: [facility] CATH INVASIVE LOCATION;  Service: Cardiovascular;  Laterality: N/A;    CARDIAC CATHETERIZATION N/A 07/07/2020    Procedure: Left ventriculography;  Surgeon: Gregor Kim MD;  Location: [facility] CATH INVASIVE LOCATION;  Service: Cardiovascular;  Laterality: N/A;    CARDIAC CATHETERIZATION N/A 07/07/2020    Procedure: Stent ESMER bypass graft;  Surgeon: Gregro Kim MD;  Location: [facility] CATH INVASIVE LOCATION;  Service: Cardiovascular;  Laterality: N/A;    CARDIAC CATHETERIZATION N/A 06/17/2021    Procedure: SAPHENOUS VEIN GRAFT;  Surgeon: Marques Ndiaye MD;  Location: [facility] CATH INVASIVE LOCATION;  Service: Cardiovascular;  Laterality: N/A;    CARDIAC CATHETERIZATION N/A 06/17/2021    Procedure: Left Heart Cath;  Surgeon: Marques Ndiaye MD;  Location: [facility] CATH INVASIVE LOCATION;  Service: Cardiovascular;  Laterality: N/A;    CARDIAC CATHETERIZATION N/A 06/17/2021    Procedure: Coronary angiography;  Surgeon: Senthil  MD Marques;  Location: [facility] CATH INVASIVE LOCATION;  Service: Cardiovascular;  Laterality: N/A;    CARDIAC CATHETERIZATION N/A 07/14/2022    Procedure: Left Heart Cath;  Surgeon: Charlie Aj MD;  Location: [facility] CATH INVASIVE LOCATION;  Service: Cardiovascular;  Laterality: N/A;    CARDIAC CATHETERIZATION N/A 07/14/2022    Procedure: Coronary angiography;  Surgeon: Charlie Aj MD;  Location: [facility] CATH INVASIVE LOCATION;  Service: Cardiovascular;  Laterality: N/A;    CARDIAC CATHETERIZATION  07/14/2022    Procedure: Saphenous Vein Graft;  Surgeon: Charlie Aj MD;  Location: [facility] CATH INVASIVE LOCATION;  Service: Cardiovascular;;    CARDIAC CATHETERIZATION N/A 07/14/2022    Procedure: Native mammary injection;  Surgeon: Charlie Aj MD;  Location: [facility] CATH INVASIVE LOCATION;  Service: Cardiovascular;  Laterality: N/A;    CATARACT EXTRACTION EXTRACAPSULAR W/ INTRAOCULAR LENS IMPLANTATION Left     CHOLECYSTECTOMY WITH INTRAOPERATIVE CHOLANGIOGRAM N/A 9/1/2023    Procedure: CHOLECYSTECTOMY LAPAROSCOPIC INTRAOPERATIVE CHOLANGIOGRAM choledoscopy common bile duct exploration;  Surgeon: Jose Manuel Baca MD;  Location: [facility] MAIN OR;  Service: General;  Laterality: N/A;    COLONOSCOPY N/A 11/26/2022    Procedure: COLONOSCOPY AT BEDSIDE;  Surgeon: Tomy Lopez MD;  Location: [facility] MAIN OR;  Service: Gastroenterology;  Laterality: N/A;    COLONOSCOPY W/ POLYPECTOMY N/A 10/04/2022    Procedure: COLONOSCOPY to cecum with cold forceps and cold snare polypectomies;  Surgeon: Gregor Carlson MD;  Location: [facility] ENDOSCOPY;  Service: Gastroenterology;  Laterality: N/A;  PRE- hx of polyps  POST- diverticulosis, polyps    CORONARY ARTERY BYPASS GRAFT  05/2016    LIMA TO LAD, SVG TO PDA, SVG TO OM2    ENDOSCOPY N/A 07/13/2022    Procedure: ESOPHAGOGASTRODUODENOSCOPY;  Surgeon: Marcia Hollis MD;  Location: [facility] ENDOSCOPY;  Service: Gastroenterology;   Laterality: N/A;  PRE- ANEMIA, MELENA  POST- MILD EROSIVE GASTRITIS, GE JUNCTION ULCER    ENDOSCOPY N/A 10/04/2022    Procedure: ESOPHAGOGASTRODUODENOSCOPY with biopsies;  Surgeon: Gregor Carlson MD;  Location: [facility] ENDOSCOPY;  Service: Gastroenterology;  Laterality: N/A;  PRE- hx of esophageal ulcer  POST- gastric cardia mass    ENDOSCOPY N/A 5/1/2023    Procedure: ESOPHAGOGASTRODUODENOSCOPY WITH  [id number] balloon DILATATION of pylorus and anastomosis;  Surgeon: Valerie Stockton MD;  Location: [facility] ENDOSCOPY;  Service: Gastroenterology;  Laterality: N/A;  PRE: dysphagia  POST: no abnormalities seen    ESOPHAGOGASTRECTOMY Right 02/03/2023    Procedure: BRONCHOSCOPY, RIGHT ROBOTIC VIDEO ASSISTED THORACOSCOPY WITH TOTAL ESOPHAGECTOMY, INTERCOSTAL NERVE BLOCK, FEEDING JEJUNOSTOMY PLACEMENT;  Surgeon: Valerie Stockton MD;  Location: [facility] MAIN OR;  Service: Robotics - DaVinci;  Laterality: Right;    INNER EAR SURGERY      TONSILLECTOMY          Social History     Occupational History    Not on file   Tobacco Use    Smoking status: Never     Passive exposure: Never    Smokeless tobacco: Never   Vaping Use    Vaping status: Never Used   Substance and Sexual Activity    Alcohol use: Not Currently     Comment: usually 1-2 month but none since christmas 2022    Drug use: Never    Sexual activity: Defer      Social History     Social History Narrative    Not on file        Family History   Problem Relation Age of Onset    Hyperlipidemia Mother     Arthritis Mother     Hypertension Mother     Diabetes Mother     Heart disease Mother     Hyperlipidemia Father     Heart disease Father     Hypertension Father     Lung cancer Father     Heart disease Sister     Stroke Maternal Grandmother     Heart disease Maternal Grandmother     Hypertension Maternal Grandfather     Hypertension Paternal Grandmother     Hypertension Paternal Grandfather     Other Other         The patient states that his sister has a problem  "that goes by the name of \"long QT\".  He says this is a familial trait but he also says that he is \"not interested in pursuing it for himself\".    Coronary artery disease Other     Malig Hyperthermia Neg Hx              Review of Systems:   Review of Systems      Physical Exam: 63 y.o. male  General Appearance:    Alert, cooperative, in no acute distress                 There were no vitals filed for this visit.   Patient is alert and read ×3 no acute distress appears her above-listed at height weight and age.  Affect is normal respiratory rate is normal unlabored. Heart rate regular rate rhythm, sclera, dentition and hearing are normal for the purpose of this exam.        Ortho Exam       Exam of the right elbow he has a golf ball sized swelling over that area there is no redness no tenderness he has full range of motion  Radiology:   AP, Lateral of the right elbow were ordered/reviewed to evaluate pain.  I have no comparative films these are normal      Assessment/Plan:    Right olecranon bursitis he sleeps in a chair and has for various other medical problems he states he sleeps great asking to really watch pressure on the elbow both at night and then when he is watching TV sitting in the car etc. telling that is what starts this olecranon bursitis he will also ice this I will see him back for 5 weeks if he still has swelling we could consider an aspiration                                "

## 2024-03-28 ENCOUNTER — OFFICE VISIT (OUTPATIENT)
Dept: ORTHOPEDIC SURGERY | Facility: CLINIC | Age: 64
End: 2024-03-28
Payer: MEDICARE

## 2024-03-28 VITALS — WEIGHT: 270 LBS | TEMPERATURE: 98.2 F | HEIGHT: 73 IN | BODY MASS INDEX: 35.78 KG/M2

## 2024-03-28 DIAGNOSIS — M70.21 OLECRANON BURSITIS OF RIGHT ELBOW: Primary | ICD-10-CM

## 2024-03-28 PROCEDURE — 99203 OFFICE O/P NEW LOW 30 MIN: CPT | Performed by: ORTHOPAEDIC SURGERY

## 2024-04-01 RX ORDER — AMLODIPINE BESYLATE 10 MG/1
10 TABLET ORAL DAILY
Qty: 30 TABLET | Refills: 11 | Status: SHIPPED | OUTPATIENT
Start: 2024-04-01

## 2024-04-11 ENCOUNTER — TELEPHONE (OUTPATIENT)
Age: 64
End: 2024-04-11
Payer: MEDICARE

## 2024-04-11 NOTE — TELEPHONE ENCOUNTER
PT called to says that Ranolazine which is  the medication He is taking for angina, is not working, he still has some pressure in his chest. He would like  to switch to Isosorbide mononitrate, in fact He started to take it yesterday. He also wants a prescription for the Isosorbide due He only has a few more tablets.     BREANNA 4/11/24

## 2024-04-12 RX ORDER — ISOSORBIDE MONONITRATE 60 MG/1
60 TABLET, EXTENDED RELEASE ORAL 2 TIMES DAILY
Qty: 180 TABLET | Refills: 3 | Status: SHIPPED | OUTPATIENT
Start: 2024-04-12

## 2024-04-18 NOTE — PROGRESS NOTES
Patient: Edmond Chase  YOB: 1960  Date of Service: 4/18/2024    Chief Complaints: Right elbow swelling    Subjective:    History of Present Illness: Pt is seen in the office today with complaints of right elbow swelling I saw him last he had olecranon bursitis we are trying to get him to take all pressure off that to see if it would go down he states it really has not changed at all does not hurt but he would like the fluid aspirated.  On his way walking in he had episode where he was dizzy he states he has chronic angina and takes a medication for that.  He states he has these episodes commonly and this is not uncommon at all.  Once he sat down had some water he felt good.  We did take his blood pressure initially it was 180/110 at the end of the visit it was 160/90 he was feeling much better.        Allergies: No Known Allergies    Medications:   Home Medications:  Current Outpatient Medications on File Prior to Visit   Medication Sig    amLODIPine (NORVASC) 10 MG tablet TAKE ONE TABLET BY MOUTH DAILY    atorvastatin (LIPITOR) 40 MG tablet TAKE ONE TABLET BY MOUTH DAILY    Farxiga 10 MG tablet ADMINISTER ONE TABLET PER J-TUBE DAILY    furosemide (LASIX) 20 MG tablet TAKE 1 TABLET BY MOUTH DAILY    isosorbide mononitrate (IMDUR) 60 MG 24 hr tablet Take 1 tablet by mouth 2 (Two) Times a Day.    lisinopril (PRINIVIL,ZESTRIL) 10 MG tablet Take 1 tablet by mouth Every Night for 360 days.    metoclopramide (REGLAN) 5 MG tablet Take 1 tablet by mouth 3 (Three) Times a Day As Needed.    metoprolol succinate XL (TOPROL-XL) 25 MG 24 hr tablet TAKE ONE TABLET BY MOUTH DAILY (Patient taking differently: Patient is taking two by mouth daily)    mirtazapine (REMERON) 45 MG tablet Take 1 tablet by mouth Every Night.    modafinil (PROVIGIL) 100 MG tablet Take 1 tablet by mouth Daily. (Patient taking differently: Take 1 tablet by mouth Daily. Taking half of tablet)    ranolazine (Ranexa) 500 MG 12 hr tablet  "Take 1 tablet by mouth 2 (Two) Times a Day.     No current facility-administered medications on file prior to visit.     Current Medications:  Scheduled Meds:  Continuous Infusions:No current facility-administered medications for this visit.    PRN Meds:.    I have reviewed the patient's medical history in detail and updated the computerized patient record.  Review and summarization of old records include:    Past Medical History:   Diagnosis Date    Abnormal nuclear stress test     Anxiety     Anxiety and depression     Aortic valve insufficiency     nonrheumtic     Arthritis     knees    Ascending aortic aneurysm     CAD (coronary artery disease)     stent    Chest pain     Diabetes mellitus     Diarrhea     Dizzy     CAREFUL WHEN GETTING UP    Dyspnea     Esophageal cancer 11/2022    no chemo or radiation    Essential hypertension     Fatigue     G tube feedings     per jejunostomy tube nightly with permitin  3 cans nightly    GERD (gastroesophageal reflux disease)     GI bleed     Heart murmur     History of pneumonia     History of recent blood transfusion     hemorrhage     no reaction    Hyperglycemia     Hyperlipidemia     Hypersomnia with sleep apnea     Jejunostomy tube present     Lightheadedness     Malaise and fatigue     Migraines     \"OCCULAR MIGRAINES\"    Morbid obesity     Myocardial ischemia     Nausea     Nocturia     TJ on auto CPAP 10/31/2016    Overnight polysomnogram.  Weight 276 pounds.  Severe TJ with AHI 79 events per hour.  Auto CPAP recommended.    Pneumonia     FEB 2016    Scrotal bleeding     SOB (shortness of breath)         Past Surgical History:   Procedure Laterality Date    AORTIC VALVE REPAIR/REPLACEMENT  05/2016    ASCENDING ARCH/HEMIARCH REPLACEMENT N/A 05/02/2016    Procedure: BHAVESH STERNOTOMY CORONARY ARTERY BYPASS GRAFT TIMES 3 USING LEFT INTERNAL MAMMARY ARTERY AND RIGHT GREATER SAPHENOUS VEIN GRAFT PER ENDOSCOPIC VEIN HARVESTING, AORTIC ANEURYSM REPAIR WITH ROOT REPAIR AND " AORTIC VALVE REPLACEMENT;  Surgeon: Rosalio Cline MD;  Location: [facility] MAIN OR;  Service:     CARDIAC CATHETERIZATION N/A 04/01/2016    Procedure: Left Heart Cath;  Surgeon: Erick Tam MD;  Location:  [facility] CATH INVASIVE LOCATION;  Service:     CARDIAC CATHETERIZATION N/A 04/01/2016    Procedure: Left ventriculography;  Surgeon: Erick Tam MD;  Location[facility] CATH [facility]AS[facility];  Service:     CARDIAC CATHETERIZATION N/A 04/01/2016    Procedure: Right Heart Cath;  Surgeon: Erick Tam MD;  Location: [facility];  Service:     CARDIAC CATHETERIZATION N/A 10/30/2017    Procedure: Coronary angiography;  Surgeon: Sorin Vasquez MD;  Location: [facility] C[facility];  Service:     CARDIAC CATHETERIZATION  10/30/2017    Procedure: Saphenous Vein Graft;  Surgeon: Sorin Vasquez MD;  Location: [facility] C[facility];  Service:     CARDIAC CATHETERIZATION N/A 10/30/2017    Procedure: Native mammary injection;  Surgeon: Sorin Vasquez MD;  Location: [facility] CATH INVASIVE LOCATION;  Service:     CARDIAC CATHETERIZATION N/A 07/07/2020    Procedure: Coronary angiography;  Surgeon: Gregor Kim MD;  Location: [facility] CATH [facility]ASIVE LOC[facility];  Service: Cardiovascular;  Laterality: N/A;    CARDIAC CATHETERIZATION N/A 07/07/2020    Procedure: Left heart cath;  Surgeon: Gregor Kim MD;  Location: [facility] C[facility];  Service: Cardiovascular;  Laterality: N/A;    CARDIAC CATHETERIZATION N/A 07/07/2020    Procedure: Left ventriculography;  Surgeon: Gregor Kim MD;  Location: [facility] CATH INVASIVE LOCATION;  Service: Cardiovascular;  Laterality: N/A;    CARDIAC CATHETERIZATION N/A 07/07/2020    Procedure: Stent ESMER bypass graft;  Surgeon: Gregor Kim MD;  Location: [facility] C[facility];  Service: Cardiovascular;  Laterality: N/A;    CARDIAC CATHETERIZATION N/A 06/17/2021    Procedure: SAPHENOUS VEIN GRAFT;  Surgeon: Marques Ndiaye MD;   Location:  [facility] CATH INVASIVE LOCATION;  Service: Cardiovascular;  Laterality: N/A;    CARDIAC CATHETERIZATION N/A 06/17/2021    Procedure: Left Heart Cath;  Surgeon: Marques Ndiaye MD;  Location:  [facility] CATH INVASIVE LOCATION;  Service: Cardiovascular;  Laterality: N/A;    CARDIAC CATHETERIZATION N/A 06/17/2021    Procedure: Coronary angiography;  Surgeon: Marques Ndiaye MD;  Location:  [facility] CATH INVASIVE LOCATION;  Service: Cardiovascular;  Laterality: N/A;    CARDIAC CATHETERIZATION N/A 07/14/2022    Procedure: Left Heart Cath;  Surgeon: Charlie Aj MD;  Location:  [facility] CATH INVASIVE LOCATION;  Service: Cardiovascular;  Laterality: N/A;    CARDIAC CATHETERIZATION N/A 07/14/2022    Procedure: Coronary angiography;  Surgeon: Charlie Aj MD;  Location:  [facility] CATH INVASIVE LOCATION;  Service: Cardiovascular;  Laterality: N/A;    CARDIAC CATHETERIZATION  07/14/2022    Procedure: Saphenous Vein Graft;  Surgeon: Charlie Aj MD;  [facility];  Service: Cardiovascular;;    CARDIAC CATHETERIZATION N/A 07/14/2022    Procedure: Native mammary injection;  Surgeon: Charlie Aj MD;  [facility]: [facility];  Service: Cardiovascular;  Laterality: N/A;    CATARACT EXTRACTION EXTRACAPSULAR W/ INTRAOCULAR LENS IMPLANTATION Left     CHOLECYSTECTOMY WITH INTRAOPERATIVE CHOLANGIOGRAM N/A 9/1/2023    Procedure: CHOLECYSTECTOMY LAPAROSCOPIC INTRAOPERATIVE CHOLANGIOGRAM choledoscopy common bile duct exploration;  Surgeon: Jose Manuel Baca MD;  Location: [facility] MAIN OR;  Service: General;  Laterality: N/A;    COLONOSCOPY N/A 11/26/2022    Procedure: COLONOSCOPY AT BEDSIDE;  Surgeon: Tomy Lopez MD;  Location: [facility] MAIN OR;  Service: Gastroenterology;  Laterality: N/A;    COLONOSCOPY W/ POLYPECTOMY N/A 10/04/2022    Procedure: COLONOSCOPY to cecum with cold forceps and cold snare polypectomies;  Surgeon: Gregor Carlson MD;   Location: [facility] ENDOSCOPY;  Service: Gastroenterology;  Laterality: N/A;  PRE- hx of polyps  POST- diverticulosis, polyps    CORONARY ARTERY BYPASS GRAFT  05/2016    LIMA TO LAD, SVG TO PDA, SVG TO OM2    ENDOSCOPY N/A 07/13/2022    Procedure: ESOPHAGOGASTRODUODENOSCOPY;  Surgeon: Marcia Hollis MD;  Location: [facility] ENDOSCOPY;  Service: Gastroenterology;  Laterality: N/A;  PRE- ANEMIA, MELENA  POST- MILD EROSIVE GASTRITIS, GE JUNCTION ULCER    ENDOSCOPY N/A 10/04/2022    Procedure: ESOPHAGOGASTRODUODENOSCOPY with biopsies;  Surgeon: Gregor Carlson MD;  Location: [facility] ENDOSCOPY;  Service: Gastroenterology;  Laterality: N/A;  PRE- hx of esophageal ulcer  POST- gastric cardia mass    ENDOSCOPY N/A 5/1/2023    Procedure: ESOPHAGOGASTRODUODENOSCOPY WITH  [id number] balloon DILATATION of pylorus and anastomosis;  Surgeon: Valerie Stockton MD;  Location: [facility] ENDOSCOPY;  Service: Gastroenterology;  Laterality: N/A;  PRE: dysphagia  POST: no abnormalities seen    ESOPHAGOGASTRECTOMY Right 02/03/2023    Procedure: BRONCHOSCOPY, RIGHT ROBOTIC VIDEO ASSISTED THORACOSCOPY WITH TOTAL ESOPHAGECTOMY, INTERCOSTAL NERVE BLOCK, FEEDING JEJUNOSTOMY PLACEMENT;  Surgeon: Valerie Stockton MD;  Location: [facility] OR;  Service: [facility] - [facility];  Laterality: Right;    INNER EAR SURGERY      TONSILLECTOMY          Social History     Occupational History    Not on file   Tobacco Use    Smoking status: Never     Passive exposure: Never    Smokeless tobacco: Never   Vaping Use    Vaping status: Never Used   Substance and Sexual Activity    Alcohol use: Not Currently     Comment: usually 1-2 month but none since christmas 2022    Drug use: Never    Sexual activity: Defer      Social History     Social History Narrative    Not on file        Family History   Problem Relation Age of Onset    Hyperlipidemia Mother     Arthritis Mother     Hypertension Mother     Diabetes Mother     Heart disease Mother     Hyperlipidemia  "Father     Heart disease Father     Hypertension Father     Lung cancer Father     Heart disease Sister     Stroke Maternal Grandmother     Heart disease Maternal Grandmother     Hypertension Maternal Grandfather     Hypertension Paternal Grandmother     Hypertension Paternal Grandfather     Other Other         The patient states that his sister has a problem that goes by the name of \"long QT\".  He says this is a familial trait but he also says that he is \"not interested in pursuing it for himself\".    Coronary artery disease Other     Malig Hyperthermia Neg Hx        ROS: 14 point review of systems was performed and was negative except for documented findings in HPI and today's encounter.     Allergies: No Known Allergies  Constitutional:  Denies fever, shaking or chills   Eyes:  Denies change in visual acuity   HENT:  Denies nasal congestion or sore throat   Respiratory:  Denies cough or shortness of breath   Cardiovascular:  Denies chest pain or severe LE edema   GI:  Denies abdominal pain, nausea, vomiting, bloody stools or diarrhea   Musculoskeletal:  Numbness, tingling, or loss of motor function only as noted above in history of present illness.  : Denies painful urination or hematuria  Integument:  Denies rash, lesion or ulceration   Neurologic:  Denies headache or focal weakness  Endocrine:  Denies lymphadenopathy  Psych:  Denies confusion or change in mental status   Hem:  Denies active bleeding      Physical Exam: 63 y.o. male  Wt Readings from Last 3 Encounters:   03/28/24 122 kg (270 lb)   03/20/24 126 kg (278 lb)   01/23/24 128 kg (283 lb)       There is no height or weight on file to calculate BMI.    There were no vitals filed for this visit.  Vital signs reviewed.   General Appearance:    Alert, cooperative, in no acute distress                    Ortho exam      Exam of the right elbow has a golf ball sized swelling on his right olecranon is not red is not tender he has full range of motion " persistent right olecranon bursitis that is reasonable to aspirate this I numbed it up aspirate about 15 cc of blood-tinged fluid and injected Marcaine Depo-Medrol he knows to watch his blood sugars           Assessment: As mentioned above he was feeling much better than the visit his blood pressure was much better he would like us to look at his knee in a few weeks so we will schedule that appointment we did assist him to his car as well he states he felt much better this is normal for him he felt like he was safe to drive    Plan:   Follow up as indicated.  Ice, elevate, and rest as needed.  Discussed conservative measures of pain control including ice, bracing.  Nani Jones M.D.    Medium Joint Arthrocentesis: R olecranon bursa  Date/Time: 4/23/2024 1:26 PM  Consent given by: patient  Site marked: site marked  Timeout: Immediately prior to procedure a time out was called to verify the correct patient, procedure, equipment, support staff and site/side marked as required   Supporting Documentation  Indications: pain and diagnostic evaluation   Procedure Details  Location: elbow - R olecranon bursa (Medial Epicondyle)  Preparation: Patient was prepped and draped in the usual sterile fashion  Needle size: 25 G  Approach: Into the area of the common flexor tendon.  Medications administered: 80 mg methylPREDNISolone acetate 80 MG/ML; 2 mL lidocaine PF 1% 1 %  Patient tolerance: patient tolerated the procedure well with no immediate complications

## 2024-04-23 ENCOUNTER — OFFICE VISIT (OUTPATIENT)
Dept: ORTHOPEDIC SURGERY | Facility: CLINIC | Age: 64
End: 2024-04-23
Payer: MEDICARE

## 2024-04-23 VITALS — WEIGHT: 270 LBS | TEMPERATURE: 98.2 F | BODY MASS INDEX: 35.78 KG/M2 | HEIGHT: 73 IN

## 2024-04-23 DIAGNOSIS — M70.21 OLECRANON BURSITIS OF RIGHT ELBOW: Primary | ICD-10-CM

## 2024-04-23 PROCEDURE — 99213 OFFICE O/P EST LOW 20 MIN: CPT | Performed by: ORTHOPAEDIC SURGERY

## 2024-04-23 PROCEDURE — 1160F RVW MEDS BY RX/DR IN RCRD: CPT | Performed by: ORTHOPAEDIC SURGERY

## 2024-04-23 PROCEDURE — 20605 DRAIN/INJ JOINT/BURSA W/O US: CPT | Performed by: ORTHOPAEDIC SURGERY

## 2024-04-23 PROCEDURE — 1159F MED LIST DOCD IN RCRD: CPT | Performed by: ORTHOPAEDIC SURGERY

## 2024-04-23 RX ORDER — FLUTICASONE PROPIONATE 50 MCG
SPRAY, SUSPENSION (ML) NASAL
COMMUNITY
Start: 2024-04-11

## 2024-04-23 RX ORDER — METHYLPREDNISOLONE ACETATE 80 MG/ML
80 INJECTION, SUSPENSION INTRA-ARTICULAR; INTRALESIONAL; INTRAMUSCULAR; SOFT TISSUE
Status: COMPLETED | OUTPATIENT
Start: 2024-04-23 | End: 2024-04-23

## 2024-04-23 RX ORDER — LIDOCAINE HYDROCHLORIDE 10 MG/ML
2 INJECTION, SOLUTION EPIDURAL; INFILTRATION; INTRACAUDAL; PERINEURAL
Status: COMPLETED | OUTPATIENT
Start: 2024-04-23 | End: 2024-04-23

## 2024-04-23 RX ADMIN — LIDOCAINE HYDROCHLORIDE 2 ML: 10 INJECTION, SOLUTION EPIDURAL; INFILTRATION; INTRACAUDAL; PERINEURAL at 13:26

## 2024-04-23 RX ADMIN — METHYLPREDNISOLONE ACETATE 80 MG: 80 INJECTION, SUSPENSION INTRA-ARTICULAR; INTRALESIONAL; INTRAMUSCULAR; SOFT TISSUE at 13:26

## 2024-05-06 ENCOUNTER — TELEPHONE (OUTPATIENT)
Dept: INTERNAL MEDICINE | Age: 64
End: 2024-05-06

## 2024-05-06 ENCOUNTER — OFFICE VISIT (OUTPATIENT)
Dept: INTERNAL MEDICINE | Age: 64
End: 2024-05-06
Payer: MEDICARE

## 2024-05-06 ENCOUNTER — TELEPHONE (OUTPATIENT)
Dept: CARDIOLOGY | Facility: CLINIC | Age: 64
End: 2024-05-06
Payer: MEDICARE

## 2024-05-06 VITALS
OXYGEN SATURATION: 97 % | HEIGHT: 73 IN | TEMPERATURE: 96.9 F | HEART RATE: 34 BPM | WEIGHT: 280 LBS | SYSTOLIC BLOOD PRESSURE: 180 MMHG | DIASTOLIC BLOOD PRESSURE: 110 MMHG | BODY MASS INDEX: 37.11 KG/M2

## 2024-05-06 DIAGNOSIS — R00.1 BRADYCARDIA: Primary | ICD-10-CM

## 2024-05-06 DIAGNOSIS — I10 ESSENTIAL HYPERTENSION: ICD-10-CM

## 2024-05-06 DIAGNOSIS — I49.8 BIGEMINY: Primary | ICD-10-CM

## 2024-05-06 DIAGNOSIS — E78.5 HYPERLIPIDEMIA, UNSPECIFIED HYPERLIPIDEMIA TYPE: ICD-10-CM

## 2024-05-06 DIAGNOSIS — E11.65 TYPE 2 DIABETES MELLITUS WITH HYPERGLYCEMIA, WITHOUT LONG-TERM CURRENT USE OF INSULIN: ICD-10-CM

## 2024-05-06 PROCEDURE — 99214 OFFICE O/P EST MOD 30 MIN: CPT

## 2024-05-06 PROCEDURE — 93000 ELECTROCARDIOGRAM COMPLETE: CPT

## 2024-05-06 PROCEDURE — 3080F DIAST BP >= 90 MM HG: CPT

## 2024-05-06 PROCEDURE — 1159F MED LIST DOCD IN RCRD: CPT

## 2024-05-06 PROCEDURE — 1160F RVW MEDS BY RX/DR IN RCRD: CPT

## 2024-05-06 PROCEDURE — 3077F SYST BP >= 140 MM HG: CPT

## 2024-05-06 PROCEDURE — 3044F HG A1C LEVEL LT 7.0%: CPT

## 2024-05-07 ENCOUNTER — OFFICE VISIT (OUTPATIENT)
Dept: ORTHOPEDIC SURGERY | Facility: CLINIC | Age: 64
End: 2024-05-07
Payer: MEDICARE

## 2024-05-07 VITALS — TEMPERATURE: 97.8 F | HEIGHT: 73 IN | WEIGHT: 278.7 LBS | BODY MASS INDEX: 36.94 KG/M2

## 2024-05-07 DIAGNOSIS — R52 PAIN: Primary | ICD-10-CM

## 2024-05-07 DIAGNOSIS — M17.12 PRIMARY LOCALIZED OSTEOARTHROSIS OF LEFT LOWER LEG: ICD-10-CM

## 2024-05-07 LAB
ALBUMIN SERPL-MCNC: 4.2 G/DL (ref 3.5–5.2)
ALBUMIN/GLOB SERPL: 1.9 G/DL
ALP SERPL-CCNC: 99 U/L (ref 39–117)
ALT SERPL-CCNC: 31 U/L (ref 1–41)
AST SERPL-CCNC: 27 U/L (ref 1–40)
BILIRUB SERPL-MCNC: 0.5 MG/DL (ref 0–1.2)
BUN SERPL-MCNC: 14 MG/DL (ref 8–23)
BUN/CREAT SERPL: 16.3 (ref 7–25)
CALCIUM SERPL-MCNC: 9.1 MG/DL (ref 8.6–10.5)
CHLORIDE SERPL-SCNC: 104 MMOL/L (ref 98–107)
CHOLEST SERPL-MCNC: 134 MG/DL (ref 0–200)
CHOLEST/HDLC SERPL: 2.63 {RATIO}
CO2 SERPL-SCNC: 28.1 MMOL/L (ref 22–29)
CREAT SERPL-MCNC: 0.86 MG/DL (ref 0.76–1.27)
EGFRCR SERPLBLD CKD-EPI 2021: 96.1 ML/MIN/1.73
GLOBULIN SER CALC-MCNC: 2.2 GM/DL
GLUCOSE SERPL-MCNC: 118 MG/DL (ref 65–99)
HBA1C MFR BLD: 6.1 % (ref 4.8–5.6)
HDLC SERPL-MCNC: 51 MG/DL (ref 40–60)
LDLC SERPL CALC-MCNC: 68 MG/DL (ref 0–100)
MAGNESIUM SERPL-MCNC: 2.1 MG/DL (ref 1.6–2.4)
POTASSIUM SERPL-SCNC: 4 MMOL/L (ref 3.5–5.2)
PROT SERPL-MCNC: 6.4 G/DL (ref 6–8.5)
SODIUM SERPL-SCNC: 145 MMOL/L (ref 136–145)
TRIGL SERPL-MCNC: 75 MG/DL (ref 0–150)
VLDLC SERPL CALC-MCNC: 15 MG/DL (ref 5–40)

## 2024-05-07 RX ADMIN — METHYLPREDNISOLONE ACETATE 1 ML: 80 INJECTION, SUSPENSION INTRA-ARTICULAR; INTRALESIONAL; INTRAMUSCULAR; SOFT TISSUE at 15:14

## 2024-05-07 RX ADMIN — LIDOCAINE HYDROCHLORIDE 2 ML: 10 INJECTION, SOLUTION EPIDURAL; INFILTRATION; INTRACAUDAL; PERINEURAL at 15:14

## 2024-05-08 RX ORDER — LIDOCAINE HYDROCHLORIDE 10 MG/ML
2 INJECTION, SOLUTION EPIDURAL; INFILTRATION; INTRACAUDAL; PERINEURAL
Status: COMPLETED | OUTPATIENT
Start: 2024-05-07 | End: 2024-05-07

## 2024-05-08 RX ORDER — METHYLPREDNISOLONE ACETATE 80 MG/ML
1 INJECTION, SUSPENSION INTRA-ARTICULAR; INTRALESIONAL; INTRAMUSCULAR; SOFT TISSUE
Status: COMPLETED | OUTPATIENT
Start: 2024-05-07 | End: 2024-05-07

## 2024-05-17 ENCOUNTER — HOSPITAL ENCOUNTER (OUTPATIENT)
Facility: HOSPITAL | Age: 64
Discharge: HOME | End: 2024-05-17
Payer: MEDICARE

## 2024-05-17 DIAGNOSIS — C15.5 MALIGNANT NEOPLASM OF LOWER THIRD OF ESOPHAGUS: ICD-10-CM

## 2024-05-17 PROCEDURE — 74176 CT ABD & PELVIS W/O CONTRAST: CPT

## 2024-05-17 PROCEDURE — 71250 CT THORAX DX C-: CPT

## 2024-05-23 ENCOUNTER — TELEPHONE (OUTPATIENT)
Dept: SURGERY | Facility: CLINIC | Age: 64
End: 2024-05-23
Payer: MEDICARE

## 2024-05-23 NOTE — TELEPHONE ENCOUNTER
Pt agreed to provider and time change for appointment on 6/10/2024 from Dr Stockton to VIKTORIA Jenkins. Pt was agreeable and understood    DB 1:53  5/24/2024

## 2024-05-28 RX ORDER — METOPROLOL SUCCINATE 25 MG/1
25 TABLET, EXTENDED RELEASE ORAL DAILY
Qty: 90 TABLET | Refills: 2 | Status: SHIPPED | OUTPATIENT
Start: 2024-05-28

## 2024-05-28 RX ORDER — FUROSEMIDE 20 MG/1
20 TABLET ORAL DAILY
Qty: 90 TABLET | Refills: 0 | Status: SHIPPED | OUTPATIENT
Start: 2024-05-28

## 2024-06-05 ENCOUNTER — OFFICE VISIT (OUTPATIENT)
Dept: PSYCHIATRY | Facility: HOSPITAL | Age: 64
End: 2024-06-05
Payer: MEDICARE

## 2024-06-05 DIAGNOSIS — R53.0 NEOPLASTIC MALIGNANT RELATED FATIGUE: ICD-10-CM

## 2024-06-05 DIAGNOSIS — C15.5 MALIGNANT NEOPLASM OF LOWER THIRD OF ESOPHAGUS: ICD-10-CM

## 2024-06-05 DIAGNOSIS — F33.1 MODERATE EPISODE OF RECURRENT MAJOR DEPRESSIVE DISORDER: Primary | ICD-10-CM

## 2024-06-05 PROCEDURE — 99213 OFFICE O/P EST LOW 20 MIN: CPT | Performed by: NURSE PRACTITIONER

## 2024-06-05 PROCEDURE — 90853 GROUP PSYCHOTHERAPY: CPT | Performed by: NURSE PRACTITIONER

## 2024-06-05 RX ORDER — MODAFINIL 100 MG/1
100 TABLET ORAL DAILY
Qty: 30 TABLET | Refills: 2 | Status: SHIPPED | OUTPATIENT
Start: 2024-06-05

## 2024-06-05 RX ORDER — MIRTAZAPINE 45 MG/1
45 TABLET, FILM COATED ORAL NIGHTLY
Qty: 90 TABLET | Refills: 0 | Status: SHIPPED | OUTPATIENT
Start: 2024-06-05

## 2024-06-10 ENCOUNTER — OFFICE VISIT (OUTPATIENT)
Dept: SURGERY | Facility: CLINIC | Age: 64
End: 2024-06-10
Payer: MEDICARE

## 2024-06-10 VITALS
OXYGEN SATURATION: 95 % | DIASTOLIC BLOOD PRESSURE: 82 MMHG | HEART RATE: 74 BPM | SYSTOLIC BLOOD PRESSURE: 144 MMHG | HEIGHT: 73 IN | BODY MASS INDEX: 36.78 KG/M2

## 2024-06-10 DIAGNOSIS — C15.5 MALIGNANT NEOPLASM OF LOWER THIRD OF ESOPHAGUS: Primary | ICD-10-CM

## 2024-06-11 ENCOUNTER — PREP FOR SURGERY (OUTPATIENT)
Dept: OTHER | Facility: HOSPITAL | Age: 64
End: 2024-06-11
Payer: MEDICARE

## 2024-06-11 ENCOUNTER — TELEPHONE (OUTPATIENT)
Dept: SURGERY | Facility: CLINIC | Age: 64
End: 2024-06-11
Payer: MEDICARE

## 2024-06-11 DIAGNOSIS — C15.5 MALIGNANT NEOPLASM OF LOWER THIRD OF ESOPHAGUS: Primary | ICD-10-CM

## 2024-06-11 RX ORDER — SODIUM CHLORIDE 0.9 % (FLUSH) 0.9 %
3 SYRINGE (ML) INJECTION EVERY 12 HOURS SCHEDULED
OUTPATIENT
Start: 2024-06-11

## 2024-06-11 RX ORDER — SODIUM CHLORIDE 0.9 % (FLUSH) 0.9 %
3-10 SYRINGE (ML) INJECTION AS NEEDED
OUTPATIENT
Start: 2024-06-11

## 2024-06-11 RX ORDER — SODIUM CHLORIDE 9 MG/ML
40 INJECTION, SOLUTION INTRAVENOUS AS NEEDED
OUTPATIENT
Start: 2024-06-11

## 2024-06-11 NOTE — TELEPHONE ENCOUNTER
"Patient was rescheduled by Norma    Caller: Edmond Chase \"RONAN\"    Relationship to patient: Self    Best call back number: [phone]     Chief complaint: PATIENTS MEDICAID RUNS OUT AT THE END OF JUNE.     Type of visit: SURGERY     Requested date: JUNE      If rescheduling, when is the original appointment: 7/9/24     Additional notes:PATIENT WOULD LIKE A CALL BACK TO DISCUSS SCHEDULING. THANK YOU.   "

## 2024-06-11 NOTE — TELEPHONE ENCOUNTER
"    Caller: Edmond Chase \"RONAN\"    Relationship to patient: Self    Best call back number: [phone]     Chief complaint: PATIENTS MEDICAID RUNS OUT AT THE END OF JUNE.     Type of visit: SURGERY     Requested date: JUNE      If rescheduling, when is the original appointment: 7/9/24     Additional notes:PATIENT WOULD LIKE A CALL BACK TO DISCUSS SCHEDULING. THANK YOU.   "

## 2024-06-14 NOTE — H&P (VIEW-ONLY)
"Chief Complaint  Esophageal cancer  Subjective        Edmond Chase presents to [facility] THORACIC SURGERY  History of Present Illness  Mr. Chase is a 63-year-old gentleman who underwent esophagectomy on 2/3/2023 by Dr. 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  He presents today for continued postoperative surveillance.  He was last seen in our office on 12/18/2023 and during this time CT of the chest demonstrated no evidence of disease recurrence or metastasis.  A 4-month CT of the chest, abdomen and pelvis was recommended which she presents to discuss today.    He reports he has been doing okay since his last visit although his primary complaint is malaise and fatigue.  He has been working on increasing his physical activity and working on his health. He has been attending group therapy.  He does have some sporadic episodes of nausea and vomiting.  He has been tolerating his oral intake and reports he has been working diligently on adhering to small frequent meals given lifestyle modifications following esophagectomy.  He has been having some mild dysphagia.  He has been maintaining his weight.  Last EGD with dilation was on 5/1/2023.    Objective   Vital Signs:  /82 (BP Location: Left arm, Patient Position: Sitting, Cuff Size: Adult)   Pulse 74   Ht 185.4 cm (72.99\")   SpO2 95%   BMI 36.78 kg/m²   Estimated body mass index is 36.78 kg/m² as calculated from the following:    Height as of this encounter: 185.4 cm (72.99\").    Weight as of 5/7/24: 126 kg (278 lb 11.2 oz).               Physical Exam   Result Review :        Data reviewed : Radiologic studies CT chest abdomen and pelvis performed 5/17/2024    No significant change compared to the previous.  Stable nonspecific mediastinal lymph nodes.  No significant change to trace pleural effusion versus thickening.  Stable subpleural density posterior medially on the right possibly vascular in nature.  " No focal pulmonary consolidation or mass.  Stable nodule at the minor fissure measuring 5 to 6 mm.  Right middle lobe pulmonary nodule is stable measuring 6 mm.  No new suspicious pulmonary nodules.  Unremarkable unenhanced appearance of the liver, spleen, adrenal glands, pancreas, kidneys and bladder.  Scattered small mesenteric and periaortic lymph nodes are seen and are not significant by size criteria.  Stable 6 to 7 mm aortocaval lymph node.  Overall no significant change compared to previous CT.             Assessment and Plan     Diagnoses and all orders for this visit:    1. Malignant neoplasm of lower third of esophagus (Primary)  -     Case request  -     CT Chest Without Contrast; Future  -     CT abdomen pelvis wo contrast; Future    I have independently reviewed the CT of the chest, abdomen and pelvis performed on 5/17/2024 which demonstrates no significant change compared to the previous.  No explicit evidence to suggest recurrent or metastatic disease. Patient has completed his initial year of postoperative surveillance.     Recommend continued postoperative surveillance with a CT of the chest, abdomen, and pelvis to be performed in 6 months.  Patient is complaining of some mild dysphagia and his last EGD with dilation was performed on 5/1/2023.  Discussed recommendation of repeating EGD with possible dilation in addition to obtaining possible biopsy for postoperative cancer surveillance.  Patient is electing to proceed and this is to be arranged and orders placed, please refer to separate encounter.  I have instructed him to reach out should any needs arise prior to his scheduled scope.  Patient is agreeable.       I spent 45 minutes caring for Edmond on this date of service. This time includes time spent by me in the following activities:preparing for the visit, reviewing tests, obtaining and/or reviewing a separately obtained history, counseling and educating the patient/family/caregiver, ordering  medications, tests, or procedures, referring and communicating with other health care professionals , documenting information in the medical record, independently interpreting results and communicating that information with the patient/family/caregiver, and care coordination  Follow Up     Return in about 6 months (around 12/10/2024), or if symptoms worsen or fail to improve.  Patient was given instructions and counseling regarding his condition or for health maintenance advice. Please see specific information pulled into the AVS if appropriate.

## 2024-06-14 NOTE — PROGRESS NOTES
"Chief Complaint  Esophageal cancer  Subjective        Edmond Chase presents to [facility] THORACIC SURGERY  History of Present Illness  Mr. Chase is a 63-year-old gentleman who underwent esophagectomy on 2/3/2023 by Dr. 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  He presents today for continued postoperative surveillance.  He was last seen in our office on 12/18/2023 and during this time CT of the chest demonstrated no evidence of disease recurrence or metastasis.  A 4-month CT of the chest, abdomen and pelvis was recommended which she presents to discuss today.    He reports he has been doing okay since his last visit although his primary complaint is malaise and fatigue.  He has been working on increasing his physical activity and working on his health. He has been attending group therapy.  He does have some sporadic episodes of nausea and vomiting.  He has been tolerating his oral intake and reports he has been working diligently on adhering to small frequent meals given lifestyle modifications following esophagectomy.  He has been having some mild dysphagia.  He has been maintaining his weight.  Last EGD with dilation was on 5/1/2023.    Objective   Vital Signs:  /82 (BP Location: Left arm, Patient Position: Sitting, Cuff Size: Adult)   Pulse 74   Ht 185.4 cm (72.99\")   SpO2 95%   BMI 36.78 kg/m²   Estimated body mass index is 36.78 kg/m² as calculated from the following:    Height as of this encounter: 185.4 cm (72.99\").    Weight as of 5/7/24: 126 kg (278 lb 11.2 oz).               Physical Exam   Result Review :        Data reviewed : Radiologic studies CT chest abdomen and pelvis performed 5/17/2024    No significant change compared to the previous.  Stable nonspecific mediastinal lymph nodes.  No significant change to trace pleural effusion versus thickening.  Stable subpleural density posterior medially on the right possibly vascular in nature.  " No focal pulmonary consolidation or mass.  Stable nodule at the minor fissure measuring 5 to 6 mm.  Right middle lobe pulmonary nodule is stable measuring 6 mm.  No new suspicious pulmonary nodules.  Unremarkable unenhanced appearance of the liver, spleen, adrenal glands, pancreas, kidneys and bladder.  Scattered small mesenteric and periaortic lymph nodes are seen and are not significant by size criteria.  Stable 6 to 7 mm aortocaval lymph node.  Overall no significant change compared to previous CT.             Assessment and Plan     Diagnoses and all orders for this visit:    1. Malignant neoplasm of lower third of esophagus (Primary)  -     Case request  -     CT Chest Without Contrast; Future  -     CT abdomen pelvis wo contrast; Future    I have independently reviewed the CT of the chest, abdomen and pelvis performed on 5/17/2024 which demonstrates no significant change compared to the previous.  No explicit evidence to suggest recurrent or metastatic disease. Patient has completed his initial year of postoperative surveillance.     Recommend continued postoperative surveillance with a CT of the chest, abdomen, and pelvis to be performed in 6 months.  Patient is complaining of some mild dysphagia and his last EGD with dilation was performed on 5/1/2023.  Discussed recommendation of repeating EGD with possible dilation in addition to obtaining possible biopsy for postoperative cancer surveillance.  Patient is electing to proceed and this is to be arranged and orders placed, please refer to separate encounter.  I have instructed him to reach out should any needs arise prior to his scheduled scope.  Patient is agreeable.       I spent 45 minutes caring for Edmond on this date of service. This time includes time spent by me in the following activities:preparing for the visit, reviewing tests, obtaining and/or reviewing a separately obtained history, counseling and educating the patient/family/caregiver, ordering  medications, tests, or procedures, referring and communicating with other health care professionals , documenting information in the medical record, independently interpreting results and communicating that information with the patient/family/caregiver, and care coordination  Follow Up     Return in about 6 months (around 12/10/2024), or if symptoms worsen or fail to improve.  Patient was given instructions and counseling regarding his condition or for health maintenance advice. Please see specific information pulled into the AVS if appropriate.

## 2024-06-17 DIAGNOSIS — I49.3 VENTRICULAR ECTOPY: Primary | ICD-10-CM

## 2024-06-19 ENCOUNTER — TELEPHONE (OUTPATIENT)
Dept: SURGERY | Facility: CLINIC | Age: 64
End: 2024-06-19
Payer: MEDICARE

## 2024-06-19 NOTE — TELEPHONE ENCOUNTER
I CALLED Sal BEBETO AND ASKED HIM TO ARRIVE AT 7:00 AM FOR HIS PROCEDURE ON TUESDAY 6/25/24 WITH DR. RAMSTRONG.  HE AGREED TO THE NEW ARRIVAL TIME.

## 2024-06-21 ENCOUNTER — TELEPHONE (OUTPATIENT)
Age: 64
End: 2024-06-21

## 2024-06-25 ENCOUNTER — APPOINTMENT (OUTPATIENT)
Dept: GENERAL RADIOLOGY | Facility: HOSPITAL | Age: 64
End: 2024-06-25
Payer: MEDICARE

## 2024-06-25 ENCOUNTER — ANESTHESIA EVENT (OUTPATIENT)
Dept: GASTROENTEROLOGY | Facility: HOSPITAL | Age: 64
End: 2024-06-25
Payer: MEDICARE

## 2024-06-25 ENCOUNTER — HOSPITAL ENCOUNTER (OUTPATIENT)
Facility: HOSPITAL | Age: 64
Setting detail: HOSPITAL OUTPATIENT SURGERY
Discharge: HOME | End: 2024-06-25
Attending: THORACIC SURGERY (CARDIOTHORACIC VASCULAR SURGERY) | Admitting: THORACIC SURGERY (CARDIOTHORACIC VASCULAR SURGERY)
Payer: MEDICARE

## 2024-06-25 ENCOUNTER — ANESTHESIA (OUTPATIENT)
Dept: GASTROENTEROLOGY | Facility: HOSPITAL | Age: 64
End: 2024-06-25
Payer: MEDICARE

## 2024-06-25 VITALS
RESPIRATION RATE: 16 BRPM | DIASTOLIC BLOOD PRESSURE: 91 MMHG | HEIGHT: 72 IN | HEART RATE: 66 BPM | BODY MASS INDEX: 37.31 KG/M2 | SYSTOLIC BLOOD PRESSURE: 137 MMHG | OXYGEN SATURATION: 93 % | WEIGHT: 275.5 LBS

## 2024-06-25 DIAGNOSIS — C15.5 MALIGNANT NEOPLASM OF LOWER THIRD OF ESOPHAGUS: ICD-10-CM

## 2024-06-25 PROCEDURE — C1726 CATH, BAL DIL, NON-VASCULAR: HCPCS | Performed by: THORACIC SURGERY (CARDIOTHORACIC VASCULAR SURGERY)

## 2024-06-25 PROCEDURE — 88305 TISSUE EXAM BY PATHOLOGIST: CPT | Performed by: THORACIC SURGERY (CARDIOTHORACIC VASCULAR SURGERY)

## 2024-06-25 PROCEDURE — 71045 X-RAY EXAM CHEST 1 VIEW: CPT

## 2024-06-25 PROCEDURE — 25010000002 PROPOFOL 10 MG/ML EMULSION: Performed by: NURSE ANESTHETIST, CERTIFIED REGISTERED

## 2024-06-25 PROCEDURE — 43239 EGD BIOPSY SINGLE/MULTIPLE: CPT | Performed by: THORACIC SURGERY (CARDIOTHORACIC VASCULAR SURGERY)

## 2024-06-25 PROCEDURE — 43245 EGD DILATE STRICTURE: CPT | Performed by: THORACIC SURGERY (CARDIOTHORACIC VASCULAR SURGERY)

## 2024-06-25 PROCEDURE — 25810000003 LACTATED RINGERS PER 1000 ML: Performed by: THORACIC SURGERY (CARDIOTHORACIC VASCULAR SURGERY)

## 2024-06-25 PROCEDURE — 25010000002 SUCCINYLCHOLINE PER 20 MG: Performed by: NURSE ANESTHETIST, CERTIFIED REGISTERED

## 2024-06-25 RX ORDER — DROPERIDOL 2.5 MG/ML
0.62 INJECTION, SOLUTION INTRAMUSCULAR; INTRAVENOUS
Status: DISCONTINUED | OUTPATIENT
Start: 2024-06-25 | End: 2024-06-25 | Stop reason: HOSPADM

## 2024-06-25 RX ORDER — SODIUM CHLORIDE 0.9 % (FLUSH) 0.9 %
3-10 SYRINGE (ML) INJECTION AS NEEDED
Status: DISCONTINUED | OUTPATIENT
Start: 2024-06-25 | End: 2024-06-25 | Stop reason: HOSPADM

## 2024-06-25 RX ORDER — LABETALOL HYDROCHLORIDE 5 MG/ML
5 INJECTION, SOLUTION INTRAVENOUS
Status: DISCONTINUED | OUTPATIENT
Start: 2024-06-25 | End: 2024-06-25 | Stop reason: HOSPADM

## 2024-06-25 RX ORDER — HYDRALAZINE HYDROCHLORIDE 20 MG/ML
5 INJECTION INTRAMUSCULAR; INTRAVENOUS
Status: DISCONTINUED | OUTPATIENT
Start: 2024-06-25 | End: 2024-06-25 | Stop reason: HOSPADM

## 2024-06-25 RX ORDER — PROPOFOL 10 MG/ML
VIAL (ML) INTRAVENOUS CONTINUOUS PRN
Status: DISCONTINUED | OUTPATIENT
Start: 2024-06-25 | End: 2024-06-25 | Stop reason: SURG

## 2024-06-25 RX ORDER — SODIUM CHLORIDE 0.9 % (FLUSH) 0.9 %
3 SYRINGE (ML) INJECTION EVERY 12 HOURS SCHEDULED
Status: DISCONTINUED | OUTPATIENT
Start: 2024-06-25 | End: 2024-06-25 | Stop reason: HOSPADM

## 2024-06-25 RX ORDER — SODIUM CHLORIDE 9 MG/ML
40 INJECTION, SOLUTION INTRAVENOUS AS NEEDED
Status: DISCONTINUED | OUTPATIENT
Start: 2024-06-25 | End: 2024-06-25 | Stop reason: HOSPADM

## 2024-06-25 RX ORDER — ONDANSETRON 2 MG/ML
4 INJECTION INTRAMUSCULAR; INTRAVENOUS ONCE AS NEEDED
Status: DISCONTINUED | OUTPATIENT
Start: 2024-06-25 | End: 2024-06-25 | Stop reason: HOSPADM

## 2024-06-25 RX ORDER — SUCCINYLCHOLINE CHLORIDE 20 MG/ML
INJECTION INTRAMUSCULAR; INTRAVENOUS AS NEEDED
Status: DISCONTINUED | OUTPATIENT
Start: 2024-06-25 | End: 2024-06-25 | Stop reason: SURG

## 2024-06-25 RX ORDER — PROMETHAZINE HYDROCHLORIDE 25 MG/1
25 SUPPOSITORY RECTAL ONCE AS NEEDED
Status: DISCONTINUED | OUTPATIENT
Start: 2024-06-25 | End: 2024-06-25 | Stop reason: HOSPADM

## 2024-06-25 RX ORDER — NALOXONE HCL 0.4 MG/ML
0.2 VIAL (ML) INJECTION AS NEEDED
Status: DISCONTINUED | OUTPATIENT
Start: 2024-06-25 | End: 2024-06-25 | Stop reason: HOSPADM

## 2024-06-25 RX ORDER — LIDOCAINE HYDROCHLORIDE 20 MG/ML
INJECTION, SOLUTION INFILTRATION; PERINEURAL AS NEEDED
Status: DISCONTINUED | OUTPATIENT
Start: 2024-06-25 | End: 2024-06-25 | Stop reason: SURG

## 2024-06-25 RX ORDER — IPRATROPIUM BROMIDE AND ALBUTEROL SULFATE 2.5; .5 MG/3ML; MG/3ML
3 SOLUTION RESPIRATORY (INHALATION) ONCE AS NEEDED
Status: DISCONTINUED | OUTPATIENT
Start: 2024-06-25 | End: 2024-06-25 | Stop reason: HOSPADM

## 2024-06-25 RX ORDER — DIPHENHYDRAMINE HYDROCHLORIDE 50 MG/ML
12.5 INJECTION INTRAMUSCULAR; INTRAVENOUS
Status: DISCONTINUED | OUTPATIENT
Start: 2024-06-25 | End: 2024-06-25 | Stop reason: HOSPADM

## 2024-06-25 RX ORDER — FLUMAZENIL 0.1 MG/ML
0.2 INJECTION INTRAVENOUS AS NEEDED
Status: DISCONTINUED | OUTPATIENT
Start: 2024-06-25 | End: 2024-06-25 | Stop reason: HOSPADM

## 2024-06-25 RX ORDER — HYDROCODONE BITARTRATE AND ACETAMINOPHEN 7.5; 325 MG/1; MG/1
1 TABLET ORAL EVERY 4 HOURS PRN
Status: DISCONTINUED | OUTPATIENT
Start: 2024-06-25 | End: 2024-06-25 | Stop reason: HOSPADM

## 2024-06-25 RX ORDER — HYDROCODONE BITARTRATE AND ACETAMINOPHEN 5; 325 MG/1; MG/1
1 TABLET ORAL ONCE AS NEEDED
Status: DISCONTINUED | OUTPATIENT
Start: 2024-06-25 | End: 2024-06-25 | Stop reason: HOSPADM

## 2024-06-25 RX ORDER — FENTANYL CITRATE 50 UG/ML
25 INJECTION, SOLUTION INTRAMUSCULAR; INTRAVENOUS
Status: DISCONTINUED | OUTPATIENT
Start: 2024-06-25 | End: 2024-06-25 | Stop reason: HOSPADM

## 2024-06-25 RX ORDER — SODIUM CHLORIDE, SODIUM LACTATE, POTASSIUM CHLORIDE, CALCIUM CHLORIDE 600; 310; 30; 20 MG/100ML; MG/100ML; MG/100ML; MG/100ML
30 INJECTION, SOLUTION INTRAVENOUS CONTINUOUS
Status: DISCONTINUED | OUTPATIENT
Start: 2024-06-25 | End: 2024-06-25 | Stop reason: HOSPADM

## 2024-06-25 RX ORDER — PROMETHAZINE HYDROCHLORIDE 25 MG/1
25 TABLET ORAL ONCE AS NEEDED
Status: DISCONTINUED | OUTPATIENT
Start: 2024-06-25 | End: 2024-06-25 | Stop reason: HOSPADM

## 2024-06-25 RX ORDER — HYDROMORPHONE HYDROCHLORIDE 2 MG/ML
0.25 INJECTION, SOLUTION INTRAMUSCULAR; INTRAVENOUS; SUBCUTANEOUS
Status: DISCONTINUED | OUTPATIENT
Start: 2024-06-25 | End: 2024-06-25 | Stop reason: HOSPADM

## 2024-06-25 RX ORDER — EPHEDRINE SULFATE 50 MG/ML
5 INJECTION, SOLUTION INTRAVENOUS ONCE AS NEEDED
Status: DISCONTINUED | OUTPATIENT
Start: 2024-06-25 | End: 2024-06-25 | Stop reason: HOSPADM

## 2024-06-25 RX ADMIN — PROPOFOL 250 MG: 10 INJECTION, EMULSION INTRAVENOUS at 08:25

## 2024-06-25 RX ADMIN — SUCCINYLCHOLINE CHLORIDE 140 MG: 20 INJECTION, SOLUTION INTRAMUSCULAR; INTRAVENOUS; PARENTERAL at 08:26

## 2024-06-25 RX ADMIN — PROPOFOL 200 MCG/KG/MIN: 10 INJECTION, EMULSION INTRAVENOUS at 08:24

## 2024-06-25 RX ADMIN — PROPOFOL 50 MG: 10 INJECTION, EMULSION INTRAVENOUS at 08:32

## 2024-06-25 RX ADMIN — LIDOCAINE HYDROCHLORIDE 60 MG: 20 INJECTION, SOLUTION INFILTRATION; PERINEURAL at 08:25

## 2024-06-25 RX ADMIN — SODIUM CHLORIDE, POTASSIUM CHLORIDE, SODIUM LACTATE AND CALCIUM CHLORIDE 30 ML/HR: 600; 310; 30; 20 INJECTION, SOLUTION INTRAVENOUS at 07:45

## 2024-06-25 NOTE — ANESTHESIA PREPROCEDURE EVALUATION
Anesthesia Evaluation     Patient summary reviewed and Nursing notes reviewed   NPO Solid Status: > 6 hours  NPO Liquid Status: > 2 hours           Airway   Mallampati: III  TM distance: >3 FB  Neck ROM: full  Dental - normal exam     Pulmonary    (+) ,sleep apnea on CPAP  (-) COPD, asthma, not a smoker  Cardiovascular     ECG reviewed    (+) hypertension, valvular problems/murmurs (s/p tissue AVR) AI, CAD, CABG >6 Months, angina, hyperlipidemia  (-) past MI, dysrhythmias    ROS comment: Mod LVH, nl LV/RV systolic fxn, no major valve issues on most recent echo    Neuro/Psych  (+) psychiatric history Anxiety and Depression  (-) seizures, CVA  GI/Hepatic/Renal/Endo    (+) morbid obesity, GERD, diabetes mellitus type 2  (-) liver disease, no renal disease, no thyroid disorder    Musculoskeletal     Abdominal    Substance History      OB/GYN          Other   arthritis,   history of cancer                Anesthesia Plan    ASA 3     general       Anesthetic plan, risks, benefits, and alternatives have been provided, discussed and informed consent has been obtained with: patient.    CODE STATUS:

## 2024-06-25 NOTE — ANESTHESIA PROCEDURE NOTES
Airway  Urgency: elective    Date/Time: 6/25/2024 8:27 AM  Airway not difficult    General Information and Staff    Patient location during procedure: OR  CRNA/CAA: Precious Renteria CRNA    Indications and Patient Condition  Indications for airway management: airway protection    Preoxygenated: yes  MILS maintained throughout  Mask difficulty assessment: 0 - not attempted    Final Airway Details  Final airway type: endotracheal airway      Successful airway: ETT  Cuffed: yes   Successful intubation technique: direct laryngoscopy and RSI  Endotracheal tube insertion site: oral  Blade: Viet  Blade size: 4  ETT size (mm): 7.0  Cormack-Lehane Classification: grade I - full view of glottis  Placement verified by: chest auscultation and capnometry   Cuff volume (mL): 5  Measured from: lips  ETT/EBT  to lips (cm): 23  Number of attempts at approach: 1  Assessment: lips, teeth, and gum same as pre-op and atraumatic intubation

## 2024-06-25 NOTE — OP NOTE
ESOPHAGOGASTRODUODENOSCOPY WITH BIOPSY  Procedure Report    Patient Name:  Edmond Chase  YOB: 1960    Date of Surgery:  6/25/2024     Indications: Dysphagia status post esophagectomy    Pre-op Diagnosis:   Malignant neoplasm of lower third of esophagus [C15.5]       Post-Op Diagnosis Codes:     * Malignant neoplasm of lower third of esophagus [C15.5]    Procedure/CPT® Codes:      Procedure(s):  ESOPHAGOGASTRODUODENOSCOPY WITH BIOPSY and 15mm balloon dilatation (pylorus)    Staff:  Surgeon(s):  Valerie Stockton MD        Anesthesia: General    Estimated Blood Loss: minimal    Implants:    Nothing was implanted during the procedure    Specimen:          Specimens       ID Source Type Tests Collected By Collected At Frozen?    A Esophagus Tissue TISSUE PATHOLOGY EXAM   Valerie Stockton MD 6/25/24 0837 No    Description: esophagogastric anastomosis bx    This specimen was not marked as sent.              Findings: Widely patent esophagogastric anastomosis at 25 cm, normal appearance.  Slight narrowing of the pylorus.    Complications: None apparent      Description of Procedure: Mr. Chase was identified in the preoperative holding area and again his consent for the procedure was verified.  He was transported the endoscopy suite.  A general anesthetic was successfully administered he was intubated without difficulty.  A timeout was performed.  The Olympus video endoscope was introduced in the patient's esophagus and examination was conducted to the duodenum.  The esophagogastric anastomosis was at 25 cm from the lip and was widely patent.  Multiple biopsies were taken of the esophagogastric anastomosis for surveillance purposes.  It did appear normal.  The remainder of the gastric conduit was normal in appearance.  There is no torsion.  The pylorus was slightly narrowed and an 10-11-12 mm pneumatic balloon dilation device was used to dilate the pylorus to 12 mm without difficulty.  The duodenum  was within normal limits.  There was no evidence of injury to the stomach or duodenum after dilation.  There is no evidence of bleeding from the biopsy site through the dilation site.  The scope was withdrawn.      Valerie Stockton MD     Date: 6/25/2024  Time: 08:52 EDT

## 2024-06-25 NOTE — ANESTHESIA POSTPROCEDURE EVALUATION
Patient: Edmond Chase    Procedure Summary       Date: 06/25/24 Room / Location:  [facility]    Anesthesia Start: 0818 Anesthesia Stop: 0856    Procedure: ESOPHAGOGASTRODUODENOSCOPY WITH BIOPSY and 15mm balloon dilatation (pylorus) (Esophagus) Diagnosis:       Malignant neoplasm of lower third of esophagus      (Malignant neoplasm of lower third of esophagus [C15.5])    Surgeons: Valerie Stockton MD Provider: Rishabh Adan MD    Anesthesia Type: general ASA Status: 3            Anesthesia Type: general    Vitals  Vitals Value Taken Time   /91 06/25/24 0916   Temp     Pulse 61 06/25/24 0933   Resp 16 06/25/24 0914   SpO2 96 % 06/25/24 0923   Vitals shown include unfiled device data.        Post Anesthesia Care and Evaluation    Patient location during evaluation: PACU  Patient participation: complete - patient participated  Level of consciousness: awake  Pain management: satisfactory to patient    Airway patency: patent  Anesthetic complications: No anesthetic complications  PONV Status: controlled  Cardiovascular status: acceptable  Respiratory status: acceptable  Hydration status: acceptable

## 2024-06-26 LAB
LAB AP CASE REPORT: NORMAL
PATH REPORT.FINAL DX SPEC: NORMAL
PATH REPORT.GROSS SPEC: NORMAL

## 2024-07-03 ENCOUNTER — TELEPHONE (OUTPATIENT)
Dept: SURGERY | Facility: CLINIC | Age: 64
End: 2024-07-03
Payer: MEDICARE

## 2024-07-05 RX ORDER — LISINOPRIL 10 MG/1
TABLET ORAL
Qty: 90 TABLET | Refills: 3 | Status: SHIPPED | OUTPATIENT
Start: 2024-07-05

## 2024-07-10 ENCOUNTER — OFFICE VISIT (OUTPATIENT)
Dept: PSYCHIATRY | Facility: HOSPITAL | Age: 64
End: 2024-07-10
Payer: MEDICARE

## 2024-07-10 DIAGNOSIS — G47.33 OSA (OBSTRUCTIVE SLEEP APNEA): ICD-10-CM

## 2024-07-10 DIAGNOSIS — R53.0 NEOPLASTIC MALIGNANT RELATED FATIGUE: Primary | ICD-10-CM

## 2024-07-10 DIAGNOSIS — F33.1 MODERATE EPISODE OF RECURRENT MAJOR DEPRESSIVE DISORDER: ICD-10-CM

## 2024-07-10 DIAGNOSIS — C15.5 MALIGNANT NEOPLASM OF LOWER THIRD OF ESOPHAGUS: ICD-10-CM

## 2024-07-10 PROCEDURE — 99214 OFFICE O/P EST MOD 30 MIN: CPT | Performed by: NURSE PRACTITIONER

## 2024-07-10 PROCEDURE — 90853 GROUP PSYCHOTHERAPY: CPT | Performed by: NURSE PRACTITIONER

## 2024-07-10 PROCEDURE — 1159F MED LIST DOCD IN RCRD: CPT | Performed by: NURSE PRACTITIONER

## 2024-07-10 PROCEDURE — 1160F RVW MEDS BY RX/DR IN RCRD: CPT | Performed by: NURSE PRACTITIONER

## 2024-07-10 RX ORDER — ARMODAFINIL 150 MG/1
150 TABLET ORAL DAILY
Qty: 30 TABLET | Refills: 2 | Status: SHIPPED | OUTPATIENT
Start: 2024-07-10

## 2024-07-10 NOTE — PROGRESS NOTES
Subjective  Patient ID: Edmond Chase is a 63 y.o.. male seen in regularly scheduled group session.  Group participants have consented to group conducted in person    Total Group Time, Face to Face: 75 minutes   Total Group Participants: 5, plus facilitation per Dr. Tamika Mejía and VIKTORIA Moeller    Group Therapy  Group topics explored including development of new normal, interpersonal sensitivity, short and long term effects of diagnosis and treatment, significant other or family conflict, intimacy concerns, anticipitory anxiety, physical deconditioning, and lifestyle choices. Patients share relationships conflict, ongoing grief and loss, challenges with long standing fatigue impacting desire to engage as desired. Considered need to approach each day while allowing uncertainty.    Counseling provided regarding cognitive behavioral therapy (CBT) strategies, behavioral activation/ activity scheduling, reintroduction to activity through graded tasks, balancing avoidance with approach , sleep hygiene, recognition and allowance of need for rest, lifestyle counseling, benefits of exercise and strategies for managing barriers to engagement, allowance of self care, exploration of quality of life goals, survivorship issues, anxiety/ mood management , medication education, and existential distress. Counseling continued regarding opportunities for independent choice, allowance for self care. Considered importance of energy use, scheduled activity, consideration of personal choice. Discussed importance of full force living, identifiying important goals, assessing and reacting to anxiety and worry, considering importance of effective communication with providers. Considred importance of pacing self, energy conservation, recognition of need for rest.    Discussed current programming available in [facility] and community.    Benefits of group discussed while also acknowledging the need for 1:1 consultation at  times. Members invited to discuss any concerns privately with me following group setting or to schedule a 1:1 visit if needs not being met in group.    Next shared medical visit: August 14 at 2:00 PM    Patient response to group: Pt shares openly with other groups members, listening attentively and supporting other members openly.    Medications Management  Dr. Tamika Mejía and VIKTORIA Moeller present for medication discussion and management.  Pt continues in survivorship of esophageal cancer.    CC: Depression, anxiety, low motivation  HPI: Pt is seen in follow up regarding ongoing depression, fatigue associated with survivorship of esophageal cancer. Reports ongoing challenges with inertia, processing of adjusted normal, sense of purpose. Shares great appreciation of men's group to assist with processing of feelings and life experience in company of others. Pt continuse to report adherence to mirtazapine 45 mg q hs. Is also taking modafinil on occasion, although not noting significant benefit to this.  Exam: As Above  Current medication regimen: mirtazapine 45 mg q hs, modafinil 100 mg q AM  Lab Review:   Admission on 06/25/2024, Discharged on 06/25/2024   Component Date Value    Case Report 06/25/2024                      Value:Surgical Pathology Report                         Case: [id number]                                  Authorizing Provider:  Valerie Stockton MD        Collected:           06/25/2024 08:37 AM          Ordering Location:     [facility]  Received:            06/25/2024 09:34 AM                                 ENDO SUITES                                                                  Pathologist:           Sarika Levine MD                                                          Specimen:    Esophagus, esophagogastric anastomosis bx at 25cm                                          Final Diagnosis 06/25/2024                      Value:This result contains rich text  formatting which cannot be displayed here.    Gross Description 06/25/2024                      Value:This result contains rich text formatting which cannot be displayed here.   Ancillary Procedure on 06/03/2024   Component Date Value    Number of Transmissions 06/03/2024 12      MDM:  Meds reviewed and renewed as appropriate.  Considered increase in modafinil vs transition to armodafinil; pt agreeable to transition. Will initiate armodafinil  mg q AM. Pt remains compliant with CPAP for TJ.  FU scheduled in group setting.    Diagnoses and all orders for this visit:    1. Neoplastic malignant related fatigue (Primary)  -     armodafinil (NUVIGIL) 150 MG tablet; Take 1 tablet by mouth Daily.  Dispense: 30 tablet; Refill: 2    2. Malignant neoplasm of lower third of esophagus  -     Vitamin B12; Future  -     Folate; Future    3. Moderate episode of recurrent major depressive disorder    4. TJ (obstructive sleep apnea)  -     armodafinil (NUVIGIL) 150 MG tablet; Take 1 tablet by mouth Daily.  Dispense: 30 tablet; Refill: 2

## 2024-07-11 RX ORDER — DAPAGLIFLOZIN 10 MG/1
10 TABLET, FILM COATED ORAL DAILY
Qty: 90 TABLET | Refills: 1 | Status: SHIPPED | OUTPATIENT
Start: 2024-07-11

## 2024-07-12 ENCOUNTER — TELEPHONE (OUTPATIENT)
Dept: INTERNAL MEDICINE | Age: 64
End: 2024-07-12
Payer: MEDICARE

## 2024-07-12 NOTE — TELEPHONE ENCOUNTER
Spoke with Pharmacy about running the Farxiga because the name brand don't need a PA pharmacist asked for us to start sending the brand name. Called Patient and told him he can pick it up after 3pm

## 2024-07-30 ENCOUNTER — TELEPHONE (OUTPATIENT)
Dept: PSYCHIATRY | Facility: HOSPITAL | Age: 64
End: 2024-07-30
Payer: MEDICARE

## 2024-07-30 NOTE — TELEPHONE ENCOUNTER
[facility]    Call placed to pt with VM left regarding approval of PA for armodafinil.

## 2024-08-05 ENCOUNTER — OFFICE VISIT (OUTPATIENT)
Dept: CARDIOLOGY | Facility: CLINIC | Age: 64
End: 2024-08-05
Payer: MEDICARE

## 2024-08-05 VITALS
HEART RATE: 78 BPM | SYSTOLIC BLOOD PRESSURE: 162 MMHG | HEIGHT: 72 IN | WEIGHT: 282.6 LBS | DIASTOLIC BLOOD PRESSURE: 80 MMHG | OXYGEN SATURATION: 95 % | BODY MASS INDEX: 38.28 KG/M2

## 2024-08-05 DIAGNOSIS — I71.21 ANEURYSM OF ASCENDING AORTA WITHOUT RUPTURE: ICD-10-CM

## 2024-08-05 DIAGNOSIS — Z95.1 S/P CABG (CORONARY ARTERY BYPASS GRAFT): Primary | ICD-10-CM

## 2024-08-05 DIAGNOSIS — I10 ESSENTIAL HYPERTENSION: ICD-10-CM

## 2024-08-05 DIAGNOSIS — Z95.2 S/P AVR (AORTIC VALVE REPLACEMENT): ICD-10-CM

## 2024-08-07 NOTE — PROGRESS NOTES
"      CARDIOLOGY    Papa Miguel MD    ENCOUNTER DATE:  08/05/2024    Edmond Chase / 63 y.o. / male        CHIEF COMPLAINT / REASON FOR OFFICE VISIT     Coronary artery disease status post coronary bypass grafting  Aortic valve replacement  Hypertension    HISTORY OF PRESENT ILLNESS       HPI  Edmond Chase is a 63 y.o. male who presents today for reevaluation.  Patient was diagnosed with esophageal cancer and underwent esophagectomy.  Unfortunately this has really changed his life significantly.  He said he is learning to eat again and all the issues associated with this.  For cardiovascular standpoint he has remained stable with no new symptoms.      The following portions of the patient's history were reviewed and updated as appropriate: allergies, current medications, past family history, past medical history, past social history, past surgical history and problem list.      VITAL SIGNS     Visit Vitals  /80 (BP Location: Right arm, Patient Position: Sitting, Cuff Size: Large Adult)   Pulse 78   Ht 182.9 cm (72.01\")   Wt 128 kg (282 lb 9.6 oz)   SpO2 95%   BMI 38.32 kg/m²         Wt Readings from Last 3 Encounters:   08/05/24 128 kg (282 lb 9.6 oz)   06/25/24 125 kg (275 lb 8 oz)   05/07/24 126 kg (278 lb 11.2 oz)     Body mass index is 38.32 kg/m².      REVIEW OF SYSTEMS   ROS        PHYSICAL EXAMINATION     Vitals reviewed.   Constitutional:       Appearance: Healthy appearance.   Pulmonary:      Effort: Pulmonary effort is normal.   Cardiovascular:      Normal rate. Regular rhythm. Normal S1. Normal S2.       Murmurs: There is no murmur.      No gallop.  No click. No rub.   Pulses:     Intact distal pulses.   Edema:     Peripheral edema absent.   Neurological:      Mental Status: Alert.           REVIEWED DATA       ECG 12 Lead    Date/Time: 8/5/2024 10:02 AM  Performed by: Papa Miguel MD    Authorized by: Papa Miguel MD  Comparison: compared with previous ECG from " 5/6/2024  Similar to previous ECG  Rhythm: sinus rhythm  Ectopy: unifocal PVCs  T inversion: V5 and V6    Clinical impression: abnormal EKG          Cardiac Procedures:      Lipid Panel          1/2/2024    11:04 5/6/2024    12:38   Lipid Panel   Total Cholesterol 140  134    Triglycerides 84  75    HDL Cholesterol 44  51    VLDL Cholesterol 16  15    LDL Cholesterol  80  68          ASSESSMENT & PLAN      Diagnosis Plan   1. S/P CABG (coronary artery bypass graft)        2. S/P AVR (aortic valve replacement)        3. Essential hypertension        4. Aneurysm of ascending aorta without rupture              SUMMARY/DISCUSSION  Coronary artery bypass grafting.  Clinically stable.  Aortic valve replacement  Hypertension patient's blood pressure is elevated he had a long walk in here today.  History of dilated aortic root.  At this point continue same patient was told follow-up 6 to 12 months sooner if issues.  Unfortunately his been dealing with a lot of issues associated with his esophagectomy and continues to try to overcome these issues.        MEDICATIONS         Discharge Medications            Accurate as of August 5, 2024 11:59 PM. If you have any questions, ask your nurse or doctor.                Changes to Medications        Instructions Start Date   amLODIPine 10 MG tablet  Commonly known as: NORVASC  What changed: additional instructions   10 mg, Oral, Daily      furosemide 20 MG tablet  Commonly known as: LASIX  What changed: additional instructions   20 mg, Oral, Daily      metoprolol succinate XL 25 MG 24 hr tablet  Commonly known as: TOPROL-XL  What changed: additional instructions   25 mg, Oral, Daily             Continue These Medications        Instructions Start Date   armodafinil 150 MG tablet  Commonly known as: NUVIGIL   150 mg, Oral, Daily      atorvastatin 40 MG tablet  Commonly known as: LIPITOR   TAKE ONE TABLET BY MOUTH DAILY      dapagliflozin Propanediol 10 MG tablet  Commonly known as:  Farxiga   10 mg, Oral, Daily      fluticasone 50 MCG/ACT nasal spray  Commonly known as: FLONASE   2 sprays into the nostril(s) as directed by provider Daily.      isosorbide mononitrate 60 MG 24 hr tablet  Commonly known as: IMDUR   60 mg, Oral, 2 Times Daily      lisinopril 10 MG tablet  Commonly known as: PRINIVIL,ZESTRIL   TAKE ONE TABLET BY MOUTH ONCE NIGHTLY  DAYS      mirtazapine 45 MG tablet  Commonly known as: REMERON   45 mg, Oral, Nightly                   **Dragon Disclaimer:   Much of this encounter note is an electronic transcription/translation of spoken language to printed text. The electronic translation of spoken language may permit erroneous, or at times, nonsensical words or phrases to be inadvertently transcribed. Although I have reviewed the note for such errors, some may still exist.

## 2024-08-12 ENCOUNTER — LAB (OUTPATIENT)
Dept: LAB | Facility: HOSPITAL | Age: 64
End: 2024-08-12
Payer: MEDICARE

## 2024-08-12 DIAGNOSIS — C15.5 MALIGNANT NEOPLASM OF LOWER THIRD OF ESOPHAGUS: ICD-10-CM

## 2024-08-12 LAB
FOLATE SERPL-MCNC: 12 NG/ML (ref 4.78–24.2)
VIT B12 BLD-MCNC: 225 PG/ML (ref 211–946)

## 2024-08-12 PROCEDURE — 82607 VITAMIN B-12: CPT

## 2024-08-12 PROCEDURE — 82746 ASSAY OF FOLIC ACID SERUM: CPT

## 2024-08-12 PROCEDURE — 36415 COLL VENOUS BLD VENIPUNCTURE: CPT

## 2024-08-14 ENCOUNTER — NUTRITION (OUTPATIENT)
Dept: OTHER | Facility: HOSPITAL | Age: 64
End: 2024-08-14
Payer: MEDICARE

## 2024-08-14 ENCOUNTER — OFFICE VISIT (OUTPATIENT)
Dept: PSYCHIATRY | Facility: HOSPITAL | Age: 64
End: 2024-08-14
Payer: MEDICARE

## 2024-08-14 DIAGNOSIS — R53.0 NEOPLASTIC MALIGNANT RELATED FATIGUE: ICD-10-CM

## 2024-08-14 DIAGNOSIS — C15.5 MALIGNANT NEOPLASM OF LOWER THIRD OF ESOPHAGUS: Primary | ICD-10-CM

## 2024-08-14 DIAGNOSIS — G47.33 OSA (OBSTRUCTIVE SLEEP APNEA): ICD-10-CM

## 2024-08-14 RX ORDER — MIRTAZAPINE 45 MG/1
45 TABLET, FILM COATED ORAL NIGHTLY
Qty: 90 TABLET | Refills: 0 | Status: SHIPPED | OUTPATIENT
Start: 2024-08-14

## 2024-08-14 RX ORDER — ARMODAFINIL 150 MG/1
150 TABLET ORAL DAILY
Qty: 30 TABLET | Refills: 2 | Status: SHIPPED | OUTPATIENT
Start: 2024-08-14

## 2024-08-14 NOTE — PROGRESS NOTES
OUTPATIENT ONCOLOGY NUTRITION ASSESSMENT    Patient Name: Edmond Chase  YOB: 1960  MRN: [id number]  Assessment Date: 8/14/2024    COMMENTS:  Met with patient today in the [facility] per his request.   The patient complained of being light headed today in the waiting room. He was feeling better by the time we finished meeting.   The patient continues to adjust to eating small frequent meals and portion sizes. He will occasionally vomit which he feels is due to either eating too fast or eating too much if he lets himself get too hungry.   He is eating mostly microwave meals and processed pre-prepared meals at home. He does not mind cooking but feels it is a waste of  time since he can't eat a lot at a time.    Suggested that perhaps he would feel better and healthier if he did prepare some of his own meals, prepping in smaller portions so that he only has to cook a couple of times to make several meals.   Provided him with several sample menus and meal plans to use as a guide.    Encouraged him to adhere to an eating schedule each day so that he can manage his hunger and not overeat as well as a way to better manage his blood sugars. Also encouraged him to include good sources of protein and healthy fats at meals and snacks.   Will be available for any questions.         Reason for Assessment Education , Patient request     Diagnosis/Problem   Esophageal cancer, s/p esophagectomy 2/3/23     Encounter Information        Nutrition/Diet History:  Wants some advice on meals, portions, wants to make better choices   Oral Nutrition Supplements:    Factors/Symptoms Affecting Intake: Early satiety, Vomiting   Comments:      Medical/Surgical History Past Medical History:   Diagnosis Date    Abnormal nuclear stress test     Anxiety     Anxiety and depression     Aortic valve insufficiency     nonrheumtic     Arthritis     knees    Ascending aortic aneurysm     CAD (coronary artery disease)      "stent    Chest pain     Diabetes mellitus     Diarrhea     Dizzy     CAREFUL WHEN GETTING UP    Dyspnea     Esophageal cancer 11/2022    no chemo or radiation    Essential hypertension     Fatigue     G tube feedings     per jejunostomy tube nightly with permitin  3 cans nightly/ J-TUBE REMOVED 2023    GERD (gastroesophageal reflux disease)     GI bleed     Heart murmur     History of pneumonia     History of recent blood transfusion     hemorrhage     no reaction    Hyperglycemia     Hyperlipidemia     Hypersomnia with sleep apnea     Jejunostomy tube present     REMOVED 2023    Lightheadedness     Malaise and fatigue     Migraines     \"OCCULAR MIGRAINES\"    Morbid obesity     Myocardial ischemia     Nausea     Nocturia     TJ on auto CPAP 10/31/2016    Overnight polysomnogram.  Weight 276 pounds.  Severe TJ with AHI 79 events per hour.  Auto CPAP recommended.    Pneumonia     FEB 2016    Scrotal bleeding     SOB (shortness of breath)        Past Surgical History:   Procedure Laterality Date    AORTIC VALVE REPAIR/REPLACEMENT  05/2016    ASCENDING ARCH/HEMIARCH REPLACEMENT N/A 05/02/2016    Procedure: BHAVESH STERNOTOMY CORONARY ARTERY BYPASS GRAFT TIMES 3 USING LEFT INTERNAL MAMMARY ARTERY AND RIGHT GREATER SAPHENOUS VEIN GRAFT PER ENDOSCOPIC VEIN HARVESTING, AORTIC ANEURYSM REPAIR WITH ROOT REPAIR AND AORTIC VALVE REPLACEMENT;  Surgeon: Rosalio Cline MD;  Location: [facility] OR;  Service:     CARDIAC CATHETERIZATION N/A 04/01/2016    Procedure: Left Heart Cath;  Surgeon: Erick Tam MD;  Location: [facility] CATH INVASIVE LOCATION;  Service:     CARDIAC CATHETERIZATION N/A 04/01/2016    Procedure: Left ventriculography;  Surgeon: Erick Tam MD;  Location: [facility] CATH INVASIVE LOCATION;  Service:     CARDIAC CATHETERIZATION N/A 04/01/2016    Procedure: Right Heart Cath;  Surgeon: Erick Tam MD;  Location: [facility] CATH INVASIVE LOCATION;  Service:     CARDIAC CATHETERIZATION N/A 10/30/2017    Procedure: Coronary " angiography;  Surgeon: Sorin Vasquez MD;  Location: [facility] CATH INVASIVE LOCATION;  Service:     CARDIAC CATHETERIZATION  10/30/2017    Procedure: Saphenous Vein Graft;  Surgeon: Sorin Vasquez MD;  Location: [facility] CATH INVASIVE LOCATION;  Service:     CARDIAC CATHETERIZATION N/A 10/30/2017    Procedure: Native mammary injection;  Surgeon: Sorin Vasquez MD;  Location: [facility] CATH INVASIVE LOCATION;  Service:     CARDIAC CATHETERIZATION N/A 07/07/2020    Procedure: Coronary angiography;  Surgeon: Gregor Kim MD;  Location: [facility] CATH INVASIVE LOCATION;  Service: Cardiovascular;  Laterality: N/A;    CARDIAC CATHETERIZATION N/A 07/07/2020    Procedure: Left heart cath;  Surgeon: Gregor Kim MD;  Location: [facility] CATH INVASIVE LOCATION;  Service: Cardiovascular;  Laterality: N/A;    CARDIAC CATHETERIZATION N/A 07/07/2020    Procedure: Left ventriculography;  Surgeon: Gregor Kim MD;  Location: [facility] CATH INVASIVE LOCATION;  Service: Cardiovascular;  Laterality: N/A;    CARDIAC CATHETERIZATION N/A 07/07/2020    Procedure: Stent ESMER bypass graft;  Surgeon: Gregor Kim MD;  Location: [facility] CATH INVASIVE LOCATION;  Service: Cardiovascular;  Laterality: N/A;    CARDIAC CATHETERIZATION N/A 06/17/2021    Procedure: SAPHENOUS VEIN GRAFT;  Surgeon: Marques Ndiaye MD;  Location: [facility] CATH INVASIVE LOCATION;  Service: Cardiovascular;  Laterality: N/A;    CARDIAC CATHETERIZATION N/A 06/17/2021    Procedure: Left Heart Cath;  Surgeon: Marques Ndiaye MD;  Location: [facility] CATH INVASIVE LOCATION;  Service: Cardiovascular;  Laterality: N/A;    CARDIAC CATHETERIZATION N/A 06/17/2021    Procedure: Coronary angiography;  Surgeon: Marques Ndiaye MD;  Location: [facility] CATH INVASIVE LOCATION;  Service: Cardiovascular;  Laterality: N/A;    CARDIAC CATHETERIZATION N/A 07/14/2022    Procedure: Left Heart Cath;  Surgeon: Charlie Aj MD;  Location: [facility] CATH INVASIVE LOCATION;   Service: Cardiovascular;  Laterality: N/A;    CARDIAC CATHETERIZATION N/A 07/14/2022    Procedure: Coronary angiography;  Surgeon: Charlie Aj MD;  Location: [facility] CATH INVASIVE LOCATION;  Service: Cardiovascular;  Laterality: N/A;    CARDIAC CATHETERIZATION  07/14/2022    Procedure: Saphenous Vein Graft;  Surgeon: Charlie Aj MD;  Location: [facility] CATH INVASIVE LOCATION;  Service: Cardiovascular;;    CARDIAC CATHETERIZATION N/A 07/14/2022    Procedure: Native mammary injection;  Surgeon: Charlie Aj MD;  Location: [facility] CATH INVASIVE LOCATION;  Service: Cardiovascular;  Laterality: N/A;    CATARACT EXTRACTION EXTRACAPSULAR W/ INTRAOCULAR LENS IMPLANTATION Left     CHOLECYSTECTOMY WITH INTRAOPERATIVE CHOLANGIOGRAM N/A 09/01/2023    Procedure: CHOLECYSTECTOMY LAPAROSCOPIC INTRAOPERATIVE CHOLANGIOGRAM choledoscopy common bile duct exploration;  Surgeon: Jose Manuel Baca MD;  Location: [facility] MAIN OR;  Service: General;  Laterality: N/A;    COLONOSCOPY N/A 11/26/2022    Procedure: COLONOSCOPY AT BEDSIDE;  Surgeon: Tomy Lopez MD;  Location: [facility] MAIN OR;  Service: Gastroenterology;  Laterality: N/A;    COLONOSCOPY W/ POLYPECTOMY N/A 10/04/2022    Procedure: COLONOSCOPY to cecum with cold forceps and cold snare polypectomies;  Surgeon: Gregor Carlson MD;  Location: [facility] ENDOSCOPY;  Service: Gastroenterology;  Laterality: N/A;  PRE- hx of polyps  POST- diverticulosis, polyps    CORONARY ARTERY BYPASS GRAFT  05/2016    LIMA TO LAD, SVG TO PDA, SVG TO OM2    ENDOSCOPY N/A 07/13/2022    Procedure: ESOPHAGOGASTRODUODENOSCOPY;  Surgeon: Marcia Hollis MD;  Location: [facility] ENDOSCOPY;  Service: Gastroenterology;  Laterality: N/A;  PRE- ANEMIA, MELENA  POST- MILD EROSIVE GASTRITIS, GE JUNCTION ULCER    ENDOSCOPY N/A 10/04/2022    Procedure: ESOPHAGOGASTRODUODENOSCOPY with biopsies;  Surgeon: Gregor Carlson MD;  Location: [facility] ENDOSCOPY;  Service:  "Gastroenterology;  Laterality: N/A;  PRE- hx of esophageal ulcer  POST- gastric cardia mass    ENDOSCOPY N/A 05/01/2023    Procedure: ESOPHAGOGASTRODUODENOSCOPY WITH  13iy-85rh-51mb balloon DILATATION of pylorus and anastomosis;  Surgeon: Valerie Stocktno MD;  Location: [facility] ENDOSCOPY;  Service: Gastroenterology;  Laterality: N/A;  PRE: dysphagia  POST: no abnormalities seen    ENDOSCOPY N/A 6/25/2024    Procedure: ESOPHAGOGASTRODUODENOSCOPY WITH BIOPSY and 15mm balloon dilatation (pylorus);  Surgeon: Valerie Stockton MD;  Location: [facility] ENDOSCOPY;  Service: Gastroenterology;  Laterality: N/A;  pre: Esophageal cancer  post: normal anastamosis    ESOPHAGOGASTRECTOMY Right 02/03/2023    Procedure: BRONCHOSCOPY, RIGHT ROBOTIC VIDEO ASSISTED THORACOSCOPY WITH TOTAL ESOPHAGECTOMY, INTERCOSTAL NERVE BLOCK, FEEDING JEJUNOSTOMY PLACEMENT;  Surgeon: Valerie Stockton MD;  Location: [facility] MAIN OR;  Service: Robotics - [employer];  Laterality: Right;    INNER EAR SURGERY      JEJUNOSTOMY TUBE INSERTION      REMOVED 2023    TONSILLECTOMY              Anthropometrics        Current Height Ht Readings from Last 1 Encounters:   08/05/24 182.9 cm (72.01\")      Current Weight Wt Readings from Last 1 Encounters:   08/05/24 128 kg (282 lb 9.6 oz)      BMI  38.3   Ideal Body Weight (IBW) 178 lb   Usual Body Weight (UBW) 280 lb   Weight Change/Trend Stable   Weight History Wt Readings from Last 30 Encounters:   08/05/24 0959 128 kg (282 lb 9.6 oz)   06/25/24 0733 125 kg (275 lb 8 oz)   05/07/24 1444 126 kg (278 lb 11.2 oz)   05/06/24 1110 127 kg (280 lb)   04/23/24 1323 122 kg (270 lb)   03/28/24 1327 122 kg (270 lb)   03/20/24 1241 126 kg (278 lb)   01/23/24 1055 128 kg (283 lb)   01/02/24 0953 125 kg (276 lb 3.2 oz)   12/18/23 1328 122 kg (269 lb)   10/17/23 1019 121 kg (267 lb)   09/15/23 0922 122 kg (268 lb)   09/11/23 1326 122 kg (270 lb)   09/02/23 0931 125 kg (275 lb)   09/01/23 0953 125 kg (275 lb)   08/30/23 0836 125 kg (275 " "lb)   08/21/23 1055 125 kg (276 lb 3.2 oz)   07/12/23 0818 124 kg (274 lb)   06/14/23 1046 126 kg (277 lb)   06/09/23 1300 126 kg (277 lb)   05/09/23 1110 128 kg (281 lb 9.6 oz)   05/08/23 1031 127 kg (279 lb 11.2 oz)   05/01/23 1035 128 kg (281 lb 14.4 oz)   04/25/23 0902 131 kg (289 lb)   04/13/23 1606 131 kg (288 lb)   04/11/23 0903 129 kg (283 lb 6.4 oz)   04/10/23 0957 133 kg (294 lb)   03/28/23 1427 133 kg (294 lb 3.2 oz)   03/17/23 1058 133 kg (294 lb)   03/14/23 1421 133 kg (292 lb 8.6 oz)          Medications           Current medications: amLODIPine, armodafinil, atorvastatin, dapagliflozin Propanediol, fluticasone, furosemide, isosorbide mononitrate, lisinopril, metoprolol succinate XL, and mirtazapine                 Tests/Procedures        Tests/Procedures No new tests/procedures last dilation 5/1/23     Labs       Pertinent Labs          Invalid input(s): \"LABALBU\", \"PROT\"          COVID19   Date Value Ref Range Status   07/10/2022 Not Detected Not Detected - Ref. Range Final     Lab Results   Component Value Date    HGBA1C 6.10 (H) 05/06/2024          Physical Findings        Physical Appearance alert, anxious     Edema  not assessed   Gastrointestinal vomiting   Tubes/Drains none   Oral/Mouth Cavity WNL   Skin        Estimated/Assessed Needs        Energy Requirements    Height for Calculation      Weight for Calculation 80 kg IBW   Method for Estimation  25 kcal/kg   EST Needs (kcal/day) 2000 kcals/d       Protein Requirements    Weight for Calculation 128 kg   EST Protein Needs (g/kg) 0.8 gm/kg   EST Daily Needs (g/day) 100-128 g/d       Fluid Requirements     Method for Estimation 1 mL/kcal    Estimated Needs (mL/day)            PES STATEMENT / NUTRITION DIAGNOSIS      Nutrition Dx Problem Problem:    NutritionDiagnosisProblem: Knowledge Deficit    Etiology:  Medical diagnosis: Esophageal cancer  Factors affecting nutrition: Reported/Observed By, Food Habit/Preferences  Functional diagnosis:none "    Signs/Symptoms:  Information Deficit    Comment:      NUTRITION INTERVENTION / PLAN OF CARE      Intervention Goal(s) Maintain nutrition status, Reduce/improve symptoms, Provide information, and Maintain weight         RD Intervention/Action Encouraged intake, Follow Tx progress, Recommended/ordered     --      Education Education provided   --    Electronically signed by:  Audrey Correa RD, LD  08/14/24 14:19 EDT

## 2024-08-14 NOTE — PROGRESS NOTES
Subjective  Patient ID: Edmond Chase is a 63 y.o.. male seen in regularly scheduled group session.  Group participants have consented to group conducted in person    Total Group Time, Face to Face: 75 minutes  Total Group Participants: 5, plus facilitation per Dr. Tamika Mejía and VIKTORIA Moeller    Group Therapy  Group topics explored including development of new normal, interpersonal sensitivity, short and long term effects of diagnosis and treatment, anticipitory anxiety, anxiety surrounding adjusted treatment plan or adjusted follow up, physical deconditioning, weight management, and lifestyle choices. Patients share achievements contributing to sense of self, purpose. Identified ongoing challenges in survivorship, adjusted pleasures, and grief associated with aging, failed expectations, and loss. Members share helpful life mottos, goals of not creating more regret. Members supported each other openly, honestly, and without judgement.    Counseling provided regarding cognitive behavioral therapy (CBT) strategies, behavioral activation/ activity scheduling, reintroduction to activity through graded tasks, balancing avoidance with approach , recognition and allowance of need for rest, lifestyle counseling, allowance of self care, exploration of quality of life goals, survivorship issues, anxiety/ mood management , medication education, and existential distress. Continued to foster developing group dynamics, namely appreciation for shared experiences, validation, and encouragement. Considered strategies for allowance of self care, in and out of group.     Discussed current programming available in Cancer [facility] and community.    Benefits of group discussed while also acknowledging the need for 1:1 consultation at times. Members invited to discuss any concerns privately with me following group setting or to schedule a 1:1 visit if needs not being met in group.    Next shared medical visit: September  18 at 2:00 in person    Patient response to group Pt eloquently shares current challenges and successes, continuing to accept new normal in survivorship of esophageal cancer.    Medications Management  Dr. Tamika Mejía and VIKTORIA Moeller present for medication discussion and management.  Pt continues in survivorship of esophageal cancer.    CC: Anxiety, depression  HPI: Pt is seen in follow up regarding ongoing sx of anxiety and depression, noting ongoing demoralization associated with continued adjustment to functional status, issues managing intake, continued trend toward nausea and emesis. Does appreciate recent addition of armodafinil, noting increased progress, increased motivation toward purposeful activity. Finds mood, sleep, and appetite are stable on current mirtazapine.  Exam: As Above  Current medication regimen: Mirtazapine 45 mg q hs, armodafinil 150 mg q AM  Lab Review:   Lab on 08/12/2024   Component Date Value    Folate 08/12/2024 12.00     Vitamin B-12 08/12/2024 225    Admission on 06/25/2024, Discharged on 06/25/2024   Component Date Value    Case Report 06/25/2024                      Value:Surgical Pathology Report                         Case: [id number]                                  Authorizing Provider:  Valerie Stockton MD        Collected:           06/25/2024 08:37 AM          Ordering Location:     [facility]  Received:            06/25/2024 09:34 AM                                 ENDO SUITES                                                                  Pathologist:           Sarika Levine MD                                                          Specimen:    Esophagus, esophagogastric anastomosis bx at 25cm                                          Final Diagnosis 06/25/2024                      Value:This result contains rich text formatting which cannot be displayed here.    Gross Description 06/25/2024                      Value:This result contains  rich text formatting which cannot be displayed here.     MDM:  Meds reviewed and renewed as appropriate.  Continue medications as written - no changes made at today's visit.  FU scheduled in group setting.    Diagnoses and all orders for this visit:    1. Malignant neoplasm of lower third of esophagus (Primary)    2. Neoplastic malignant related fatigue  -     armodafinil (NUVIGIL) 150 MG tablet; Take 1 tablet by mouth Daily.  Dispense: 30 tablet; Refill: 2    3. TJ (obstructive sleep apnea)  -     armodafinil (NUVIGIL) 150 MG tablet; Take 1 tablet by mouth Daily.  Dispense: 30 tablet; Refill: 2    Other orders  -     mirtazapine (REMERON) 45 MG tablet; Take 1 tablet by mouth Every Night.  Dispense: 90 tablet; Refill: 0

## 2024-08-16 NOTE — PROGRESS NOTES
Patient: Edmond Chase  YOB: 1960  Date of Service: 8/16/2024    Chief Complaints: Left knee pain    Subjective:    History of Present Illness: Pt is seen in the office today with complaints of left knee pain he has no degenerative changes we have done steroid he has had gel in the past by another physician got good relief from that he would like to do that again        Allergies: No Known Allergies    Medications:   Home Medications:  Current Outpatient Medications on File Prior to Visit   Medication Sig    amLODIPine (NORVASC) 10 MG tablet TAKE ONE TABLET BY MOUTH DAILY (Patient taking differently: Take 1 tablet by mouth Daily. Take 5 mg)    armodafinil (NUVIGIL) 150 MG tablet Take 1 tablet by mouth Daily.    atorvastatin (LIPITOR) 40 MG tablet TAKE ONE TABLET BY MOUTH DAILY    dapagliflozin Propanediol (Farxiga) 10 MG tablet Take 10 mg by mouth Daily.    fluticasone (FLONASE) 50 MCG/ACT nasal spray 2 sprays into the nostril(s) as directed by provider Daily.    furosemide (LASIX) 20 MG tablet TAKE 1 TABLET BY MOUTH DAILY (Patient taking differently: Take 1 tablet by mouth Daily. Takes maybe 2-3 times a week due to intolerance)    isosorbide mononitrate (IMDUR) 60 MG 24 hr tablet Take 1 tablet by mouth 2 (Two) Times a Day.    lisinopril (PRINIVIL,ZESTRIL) 10 MG tablet TAKE ONE TABLET BY MOUTH ONCE NIGHTLY  DAYS    metoprolol succinate XL (TOPROL-XL) 25 MG 24 hr tablet TAKE 1 TABLET BY MOUTH DAILY (Patient taking differently: Take 1 tablet by mouth Daily. Patient is now taking 2 tabs daily per )    mirtazapine (REMERON) 45 MG tablet Take 1 tablet by mouth Every Night.     No current facility-administered medications on file prior to visit.     Current Medications:  Scheduled Meds:  Continuous Infusions:No current facility-administered medications for this visit.    PRN Meds:.    I have reviewed the patient's medical history in detail and updated the computerized patient record.   "Review and summarization of old records include:    Past Medical History:   Diagnosis Date    Abnormal nuclear stress test     Anxiety     Anxiety and depression     Aortic valve insufficiency     nonrheumtic     Arthritis     knees    Ascending aortic aneurysm     CAD (coronary artery disease)     stent    Chest pain     Diabetes mellitus     Diarrhea     Dizzy     CAREFUL WHEN GETTING UP    Dyspnea     Esophageal cancer 11/2022    no chemo or radiation    Essential hypertension     Fatigue     G tube feedings     per jejunostomy tube nightly with permitin  3 cans nightly/ J-TUBE REMOVED 2023    GERD (gastroesophageal reflux disease)     GI bleed     Heart murmur     History of pneumonia     History of recent blood transfusion     hemorrhage     no reaction    Hyperglycemia     Hyperlipidemia     Hypersomnia with sleep apnea     Jejunostomy tube present     REMOVED 2023    Lightheadedness     Malaise and fatigue     Migraines     \"OCCULAR MIGRAINES\"    Morbid obesity     Myocardial ischemia     Nausea     Nocturia     TJ on auto CPAP 10/31/2016    Overnight polysomnogram.  Weight 276 pounds.  Severe TJ with AHI 79 events per hour.  Auto CPAP recommended.    Pneumonia     FEB 2016    Scrotal bleeding     SOB (shortness of breath)         Past Surgical History:   Procedure Laterality Date    AORTIC VALVE REPAIR/REPLACEMENT  05/2016    ASCENDING ARCH/HEMIARCH REPLACEMENT N/A 05/02/2016    Procedure: BHAVESH STERNOTOMY CORONARY ARTERY BYPASS GRAFT TIMES 3 USING LEFT INTERNAL MAMMARY ARTERY AND RIGHT GREATER SAPHENOUS VEIN GRAFT PER ENDOSCOPIC VEIN HARVESTING, AORTIC ANEURYSM REPAIR WITH ROOT REPAIR AND AORTIC VALVE REPLACEMENT;  Surgeon: Rosalio Cline MD;  Location: [facility] OR;  Service:     CARDIAC CATHETERIZATION N/A 04/01/2016    Procedure: Left Heart Cath;  Surgeon: Erick Tam MD;  Location: [facility] CATH INVASIVE LOCATION;  Service:     CARDIAC CATHETERIZATION N/A 04/01/2016    Procedure: Left ventriculography; "  Surgeon: Erick Tam MD;  Location: [facility] CATH INVASIVE LOCATION;  Service:     CARDIAC CATHETERIZATION N/A 04/01/2016    Procedure: Right Heart Cath;  Surgeon: Erick Tam MD;  Location: [facility] CATH INVASIVE LOCATION;  Service:     CARDIAC CATHETERIZATION N/A 10/30/2017    Procedure: Coronary angiography;  Surgeon: Sorin Vasquez MD;  Location: [facility] CATH INVASIVE LOCATION;  Service:     CARDIAC CATHETERIZATION  10/30/2017    Procedure: Saphenous Vein Graft;  Surgeon: Sorin Vasquez MD;  Location: [facility] CATH INVASIVE LOCATION;  Service:     CARDIAC CATHETERIZATION N/A 10/30/2017    Procedure: Native mammary injection;  Surgeon: Sorin Vasquez MD;  Location: [facility] CATH INVASIVE LOCATION;  Service:     CARDIAC CATHETERIZATION N/A 07/07/2020    Procedure: Coronary angiography;  Surgeon: Gregor Kim MD;  Location: [facility] CATH INVASIVE LOCATION;  Service: Cardiovascular;  Laterality: N/A;    CARDIAC CATHETERIZATION N/A 07/07/2020    Procedure: Left heart cath;  Surgeon: Gregor Kim MD;  Location: [facility] CATH INVASIVE LOCATION;  Service: Cardiovascular;  Laterality: N/A;    CARDIAC CATHETERIZATION N/A 07/07/2020    Procedure: Left ventriculography;  Surgeon: Gregor Kim MD;  Location: [facility] CATH INVASIVE LOCATION;  Service: Cardiovascular;  Laterality: N/A;    CARDIAC CATHETERIZATION N/A 07/07/2020    Procedure: Stent ESMER bypass graft;  Surgeon: Gregor Kim MD;  Location: [facility] CATH INVASIVE LOCATION;  Service: Cardiovascular;  Laterality: N/A;    CARDIAC CATHETERIZATION N/A 06/17/2021    Procedure: SAPHENOUS VEIN GRAFT;  Surgeon: Marques Ndiaye MD;  Location: [facility] CATH INVASIVE LOCATION;  Service: Cardiovascular;  Laterality: N/A;    CARDIAC CATHETERIZATION N/A 06/17/2021    Procedure: Left Heart Cath;  Surgeon: Marques Ndiaye MD;  Location: [facility] CATH INVASIVE LOCATION;  Service: Cardiovascular;  Laterality: N/A;    CARDIAC CATHETERIZATION N/A 06/17/2021     Procedure: Coronary angiography;  Surgeon: Marques Ndiaye MD;  Location: [facility] CATH INVASIVE LOCATION;  Service: Cardiovascular;  Laterality: N/A;    CARDIAC CATHETERIZATION N/A 07/14/2022    Procedure: Left Heart Cath;  Surgeon: Charlie Aj MD;  Location: [facility] CATH INVASIVE LOCATION;  Service: Cardiovascular;  Laterality: N/A;    CARDIAC CATHETERIZATION N/A 07/14/2022    Procedure: Coronary angiography;  Surgeon: Charlie Aj MD;  [facility] CATH INVASIVE LOCATION;  Service: Cardiovascular;  Laterality: N/A;    CARDIAC CATHETERIZATION  07/14/2022    Procedure: Saphenous Vein Graft;  Surgeon: Charlie Aj MD;  Location: [facility] CATH INVASIVE LOCATION;  Service: Cardiovascular;;    CARDIAC CATHETERIZATION N/A 07/14/2022    Procedure: Native mammary injection;  Surgeon: Charlie Aj MD;  Location: [facility] CATH INVASIVE LOCATION;  Service: Cardiovascular;  Laterality: N/A;    CATARACT EXTRACTION EXTRACAPSULAR W/ INTRAOCULAR LENS IMPLANTATION Left     CHOLECYSTECTOMY WITH INTRAOPERATIVE CHOLANGIOGRAM N/A 09/01/2023    Procedure: CHOLECYSTECTOMY LAPAROSCOPIC INTRAOPERATIVE CHOLANGIOGRAM choledoscopy common bile duct exploration;  Surgeon: Jose Manuel Baca MD;  Location: [facility] MAIN OR;  Service: General;  Laterality: N/A;    COLONOSCOPY N/A 11/26/2022    Procedure: COLONOSCOPY AT BEDSIDE;  Surgeon: Tomy Lopez MD;  Location: [facility] MAIN OR;  Service: Gastroenterology;  Laterality: N/A;    COLONOSCOPY W/ POLYPECTOMY N/A 10/04/2022    Procedure: COLONOSCOPY to cecum with cold forceps and cold snare polypectomies;  Surgeon: Gregor Carlson MD;  Location: [facility] ENDOSCOPY;  Service: Gastroenterology;  Laterality: N/A;  PRE- hx of polyps  POST- diverticulosis, polyps    CORONARY ARTERY BYPASS GRAFT  05/2016    LIMA TO LAD, SVG TO PDA, SVG TO OM2    ENDOSCOPY N/A 07/13/2022    Procedure: ESOPHAGOGASTRODUODENOSCOPY;  Surgeon: Marcia Hollis MD;  Location:  [facility] ENDOSCOPY;  Service: Gastroenterology;  Laterality: N/A;  PRE- ANEMIA, MELENA  POST- MILD EROSIVE GASTRITIS, GE JUNCTION ULCER    ENDOSCOPY N/A 10/04/2022    Procedure: ESOPHAGOGASTRODUODENOSCOPY with biopsies;  Surgeon: Gregor Carlson MD;  Location: [facility] ENDOSCOPY;  Service: Gastroenterology;  Laterality: N/A;  PRE- hx of esophageal ulcer  POST- gastric cardia mass    ENDOSCOPY N/A 05/01/2023    Procedure: ESOPHAGOGASTRODUODENOSCOPY WITH  38st-17ox-20cu balloon DILATATION of pylorus and anastomosis;  Surgeon: Valerie Stockton MD;  Location: [facility] ENDOSCOPY;  Service: Gastroenterology;  Laterality: N/A;  PRE: dysphagia  POST: no abnormalities seen    ENDOSCOPY N/A 6/25/2024    Procedure: ESOPHAGOGASTRODUODENOSCOPY WITH BIOPSY and 15mm balloon dilatation (pylorus);  Surgeon: Valerie Stockton MD;  Location: [facility] ENDOSCOPY;  Service: Gastroenterology;  Laterality: N/A;  pre: Esophageal cancer  post: normal anastamosis    ESOPHAGOGASTRECTOMY Right 02/03/2023    Procedure: BRONCHOSCOPY, RIGHT ROBOTIC VIDEO ASSISTED THORACOSCOPY WITH TOTAL ESOPHAGECTOMY, INTERCOSTAL NERVE BLOCK, FEEDING JEJUNOSTOMY PLACEMENT;  Surgeon: Valerie Stockton MD;  Location: [facility] OR;  Service: Robotics - [facility];  Laterality: Right;    INNER EAR SURGERY      JEJUNOSTOMY TUBE INSERTION      REMOVED 2023    TONSILLECTOMY          Social History     Occupational History    Not on file   Tobacco Use    Smoking status: Never     Passive exposure: Never    Smokeless tobacco: Never   Vaping Use    Vaping status: Never Used   Substance and Sexual Activity    Alcohol use: Not Currently     Comment: usually 1-2 month but none since christmas 2022    Drug use: Never    Sexual activity: Defer      Social History     Social History Narrative    Not on file        Family History   Problem Relation Age of Onset    Hyperlipidemia Mother     Arthritis Mother     Hypertension Mother     Diabetes Mother     Heart disease Mother      "Hyperlipidemia Father     Heart disease Father     Hypertension Father     Lung cancer Father     Heart disease Sister     Stroke Maternal Grandmother     Heart disease Maternal Grandmother     Hypertension Maternal Grandfather     Hypertension Paternal Grandmother     Hypertension Paternal Grandfather     Other Other         The patient states that his sister has a problem that goes by the name of \"long QT\".  He says this is a familial trait but he also says that he is \"not interested in pursuing it for himself\".    Coronary artery disease Other     Malig Hyperthermia Neg Hx        ROS: 14 point review of systems was performed and was negative except for documented findings in HPI and today's encounter.     Allergies: No Known Allergies  Constitutional:  Denies fever, shaking or chills   Eyes:  Denies change in visual acuity   HENT:  Denies nasal congestion or sore throat   Respiratory:  Denies cough or shortness of breath   Cardiovascular:  Denies chest pain or severe LE edema   GI:  Denies abdominal pain, nausea, vomiting, bloody stools or diarrhea   Musculoskeletal:  Numbness, tingling, or loss of motor function only as noted above in history of present illness.  : Denies painful urination or hematuria  Integument:  Denies rash, lesion or ulceration   Neurologic:  Denies headache or focal weakness  Endocrine:  Denies lymphadenopathy  Psych:  Denies confusion or change in mental status   Hem:  Denies active bleeding      Physical Exam: 63 y.o. male  Wt Readings from Last 3 Encounters:   08/05/24 128 kg (282 lb 9.6 oz)   06/25/24 125 kg (275 lb 8 oz)   05/07/24 126 kg (278 lb 11.2 oz)       There is no height or weight on file to calculate BMI.    There were no vitals filed for this visit.  Vital signs reviewed.   General Appearance:    Alert, cooperative, in no acute distress                    Ortho exam    Exam is unchanged             Assessment: Left knee OA    Plan: Gel injection  Follow up as " indicated.  Ice, elevate, and rest as needed.  Discussed conservative measures of pain control including ice, bracing.  Also talked about the importance of strengthening and maintaining ideal body weight    Nani Jones M.D.    Large Joint Arthrocentesis: L knee  Date/Time: 8/20/2024 10:37 AM  Consent given by: patient  Site marked: site marked  Timeout: Immediately prior to procedure a time out was called to verify the correct patient, procedure, equipment, support staff and site/side marked as required   Supporting Documentation  Indications: pain   Procedure Details  Location: knee - L knee  Preparation: Patient was prepped and draped in the usual sterile fashion  Needle gauge: 21G.  Medications administered: 2 mL lidocaine PF 1% 1 %; 60 mg Sodium Hyaluronate 60 MG/3ML  Patient tolerance: patient tolerated the procedure well with no immediate complications

## 2024-08-20 ENCOUNTER — OFFICE VISIT (OUTPATIENT)
Dept: ORTHOPEDIC SURGERY | Facility: CLINIC | Age: 64
End: 2024-08-20
Payer: MEDICARE

## 2024-08-20 VITALS — TEMPERATURE: 98 F | HEIGHT: 73 IN | WEIGHT: 279.3 LBS | BODY MASS INDEX: 37.02 KG/M2

## 2024-08-20 DIAGNOSIS — M17.12 PRIMARY LOCALIZED OSTEOARTHROSIS OF LEFT LOWER LEG: ICD-10-CM

## 2024-08-20 PROCEDURE — 20610 DRAIN/INJ JOINT/BURSA W/O US: CPT | Performed by: ORTHOPAEDIC SURGERY

## 2024-08-20 RX ORDER — LIDOCAINE HYDROCHLORIDE 10 MG/ML
2 INJECTION, SOLUTION EPIDURAL; INFILTRATION; INTRACAUDAL; PERINEURAL
Status: COMPLETED | OUTPATIENT
Start: 2024-08-20 | End: 2024-08-20

## 2024-08-20 RX ADMIN — LIDOCAINE HYDROCHLORIDE 2 ML: 10 INJECTION, SOLUTION EPIDURAL; INFILTRATION; INTRACAUDAL; PERINEURAL at 10:37

## 2024-08-21 ENCOUNTER — TELEPHONE (OUTPATIENT)
Dept: SURGERY | Facility: CLINIC | Age: 64
End: 2024-08-21
Payer: MEDICARE

## 2024-09-10 ENCOUNTER — OFFICE VISIT (OUTPATIENT)
Dept: INTERNAL MEDICINE | Age: 64
End: 2024-09-10
Payer: MEDICARE

## 2024-09-10 VITALS
TEMPERATURE: 97.6 F | SYSTOLIC BLOOD PRESSURE: 135 MMHG | BODY MASS INDEX: 37.11 KG/M2 | RESPIRATION RATE: 16 BRPM | WEIGHT: 280 LBS | HEART RATE: 77 BPM | OXYGEN SATURATION: 97 % | HEIGHT: 73 IN | DIASTOLIC BLOOD PRESSURE: 80 MMHG

## 2024-09-10 DIAGNOSIS — I10 ESSENTIAL HYPERTENSION: Primary | ICD-10-CM

## 2024-09-10 DIAGNOSIS — E78.5 HYPERLIPIDEMIA, UNSPECIFIED HYPERLIPIDEMIA TYPE: ICD-10-CM

## 2024-09-10 DIAGNOSIS — E11.9 TYPE 2 DIABETES MELLITUS WITHOUT COMPLICATION, WITHOUT LONG-TERM CURRENT USE OF INSULIN: ICD-10-CM

## 2024-09-10 LAB
ALBUMIN SERPL-MCNC: 3.9 G/DL (ref 3.5–5.2)
ALBUMIN/GLOB SERPL: 1.7 G/DL
ALP SERPL-CCNC: 90 U/L (ref 39–117)
ALT SERPL-CCNC: 35 U/L (ref 1–41)
AST SERPL-CCNC: 25 U/L (ref 1–40)
BILIRUB SERPL-MCNC: 0.4 MG/DL (ref 0–1.2)
BUN SERPL-MCNC: 12 MG/DL (ref 8–23)
BUN/CREAT SERPL: 12.5 (ref 7–25)
CALCIUM SERPL-MCNC: 8.6 MG/DL (ref 8.6–10.5)
CHLORIDE SERPL-SCNC: 105 MMOL/L (ref 98–107)
CHOLEST SERPL-MCNC: 128 MG/DL (ref 0–200)
CHOLEST/HDLC SERPL: 3.05 {RATIO}
CO2 SERPL-SCNC: 30.4 MMOL/L (ref 22–29)
CREAT SERPL-MCNC: 0.96 MG/DL (ref 0.76–1.27)
EGFRCR SERPLBLD CKD-EPI 2021: 87.7 ML/MIN/1.73
GLOBULIN SER CALC-MCNC: 2.3 GM/DL
GLUCOSE SERPL-MCNC: 119 MG/DL (ref 65–99)
HBA1C MFR BLD: 6 % (ref 4.8–5.6)
HDLC SERPL-MCNC: 42 MG/DL (ref 40–60)
LDLC SERPL CALC-MCNC: 70 MG/DL (ref 0–100)
POTASSIUM SERPL-SCNC: 4.1 MMOL/L (ref 3.5–5.2)
PROT SERPL-MCNC: 6.2 G/DL (ref 6–8.5)
SODIUM SERPL-SCNC: 144 MMOL/L (ref 136–145)
TRIGL SERPL-MCNC: 82 MG/DL (ref 0–150)
VLDLC SERPL CALC-MCNC: 16 MG/DL (ref 5–40)

## 2024-09-10 PROCEDURE — G2211 COMPLEX E/M VISIT ADD ON: HCPCS

## 2024-09-10 PROCEDURE — 3079F DIAST BP 80-89 MM HG: CPT

## 2024-09-10 PROCEDURE — 3075F SYST BP GE 130 - 139MM HG: CPT

## 2024-09-10 PROCEDURE — 1125F AMNT PAIN NOTED PAIN PRSNT: CPT

## 2024-09-10 PROCEDURE — 1159F MED LIST DOCD IN RCRD: CPT

## 2024-09-10 PROCEDURE — 3044F HG A1C LEVEL LT 7.0%: CPT

## 2024-09-10 PROCEDURE — 99214 OFFICE O/P EST MOD 30 MIN: CPT

## 2024-09-10 PROCEDURE — 1160F RVW MEDS BY RX/DR IN RCRD: CPT

## 2024-09-10 NOTE — PROGRESS NOTES
"    I N T E R N A L  M E D I C I N E  Nargis Velasquez, APRN    ENCOUNTER DATE:  09/10/2024    Edmond Chase / 63 y.o. / male      CHIEF COMPLAINT / REASON FOR OFFICE VISIT     Hypertension and Hyperlipidemia      ASSESSMENT & PLAN     Diagnoses and all orders for this visit:    1. Essential hypertension (Primary)    2. Type 2 diabetes mellitus without complication, without long-term current use of insulin  -     Comprehensive Metabolic Panel  -     Hemoglobin A1c  -     Ambulatory Referral to Podiatry    3. Hyperlipidemia, unspecified hyperlipidemia type  -     Comprehensive Metabolic Panel  -     Lipid Panel With / Chol / HDL Ratio         SUMMARY/DISCUSSION  Encouraged monitoring of BP at home to ensure it is averaging < 130/80.  Continue low fat, low glucose diets, encouraged increased exercise as tolerated.  Continue close follow up with all specialists as scheduled.  Encouraged COVID-19 and influenza vaccines this fall 2024.      Next Appointment with me: Visit date not found    Return in about 4 months (around 1/3/2025) for Medicare Wellness.      VITAL SIGNS     Visit Vitals  /80   Pulse 77   Temp 97.6 °F (36.4 °C)   Resp 16   Ht 185.4 cm (72.99\")   Wt 127 kg (280 lb)   SpO2 97%   BMI 36.95 kg/m²             Wt Readings from Last 3 Encounters:   09/10/24 127 kg (280 lb)   08/20/24 127 kg (279 lb 4.8 oz)   08/05/24 128 kg (282 lb 9.6 oz)     Body mass index is 36.95 kg/m².        MEDICATIONS AT THE TIME OF OFFICE VISIT     Current Outpatient Medications on File Prior to Visit   Medication Sig Dispense Refill    amLODIPine (NORVASC) 10 MG tablet TAKE ONE TABLET BY MOUTH DAILY (Patient taking differently: Take 1 tablet by mouth Daily. Take 5 mg) 30 tablet 11    armodafinil (NUVIGIL) 150 MG tablet Take 1 tablet by mouth Daily. 30 tablet 2    atorvastatin (LIPITOR) 40 MG tablet TAKE ONE TABLET BY MOUTH DAILY 90 tablet 3    dapagliflozin Propanediol (Farxiga) 10 MG tablet Take 10 mg by mouth Daily. 90 " tablet 1    fluticasone (FLONASE) 50 MCG/ACT nasal spray 2 sprays into the nostril(s) as directed by provider Daily.      isosorbide mononitrate (IMDUR) 60 MG 24 hr tablet Take 1 tablet by mouth 2 (Two) Times a Day. 180 tablet 3    lisinopril (PRINIVIL,ZESTRIL) 10 MG tablet TAKE ONE TABLET BY MOUTH ONCE NIGHTLY  DAYS 90 tablet 3    metoprolol succinate XL (TOPROL-XL) 25 MG 24 hr tablet TAKE 1 TABLET BY MOUTH DAILY (Patient taking differently: Take 1 tablet by mouth Daily. Patient is now taking 2 tabs daily per ) 90 tablet 2    mirtazapine (REMERON) 45 MG tablet Take 1 tablet by mouth Every Night. 90 tablet 0    furosemide (LASIX) 20 MG tablet TAKE 1 TABLET BY MOUTH DAILY (Patient not taking: Reported on 8/20/2024) 90 tablet 0     No current facility-administered medications on file prior to visit.        HISTORY OF PRESENT ILLNESS     Depression: Continues to be followed closely by psychiatry, VIKTORIA De Los Santos, with benefit.  Next appointment is September 18, 2024.  Continues to struggle with adjustment to his new life after esophageal cancer diagnosis from November 2022.  Remains on mirtazapine 45 mg nightly with benefit.  On armodafinil 150 mg in the morning with some benefit in energy levels, taking most days for last two months.  No SI/HI.       HTN with CAD: Followed by cardiology, most recently by Dr. Miguel in August 2024, and advised to follow up in 6-12 months.  Referred to cardiac electrophysiology, given PVCs and chronic fatigue, and will meet with DR. Jacobs on September 24, 2024.  BP is well controlled at today's visit, 135/80.  Not monitoring BP at home.  Remains on metoprolol succinate XL 50 mg daily, amlodipine 5 mg daily, lisinopril 10 mg daily.  On isosorbide 60 mg BID, furosemide 20 mg daily (actual use is about 1-2x weekly).  Angina remains stable.  Denies any new or different chest pain, dyspnea, palpitations.  Wears compression socks with benefit.   Will be following up  with nephrology, Dr. Hernadez, this afternoon.     Type 2 Diabetes: Remains on Farxiga 10 mg daily.  May 2024 A1C 6.1.  Up to date with diabetic eye exam.  Plans to update diabetic foot exam through podiatry; needs new referral.       HLD: Remains well controlled on atorvastatin 40 mg daily.  January 2024 Lipid panel with normal triglycerides 84, LDL 80.     Followed by cardiothoracic surgery for history of esophageal cancer.  Next appointment is December 16, 2024 with Dr. Valerie Stockton.     Up to date on colonoscopy as of November 2023 with [facility], 2 year recall.     Last PSA was January 2024 of 1.130, consistent with historical average.      Patient Care Team:  Nargis Velasquez APRN as PCP - General (Family Medicine)  Papa Miguel MD as Consulting Physician (Cardiology)  Sekou Pisano MD as Consulting Physician (Pulmonary Disease)  Gregor Carlson MD as Consulting Physician (Gastroenterology)  Estevan Yuan MD (Sports Medicine)  Alex Hernadez MD as Consulting Physician (Nephrology)  Yazmin Molina APRN as Nurse Practitioner (Psychiatry)  Dylon Schwartz MD as Consulting Physician (Hematology and Oncology)  Valerie Stockton MD as Referring Physician (Thoracic Surgery)  Valerie Stockton MD as Surgeon (Thoracic Surgery)    REVIEW OF SYSTEMS     Review of Systems   Constitutional:  Negative for chills, fever and unexpected weight change.   Respiratory:  Negative for cough, chest tightness and shortness of breath.    Cardiovascular:  Negative for chest pain, palpitations and leg swelling.   Neurological:  Negative for dizziness, weakness, light-headedness and headaches.   Psychiatric/Behavioral:  The patient is not nervous/anxious.           PHYSICAL EXAMINATION     Physical Exam  Vitals reviewed.   Constitutional:       General: He is not in acute distress.     Appearance: Normal appearance. He is not ill-appearing, toxic-appearing or diaphoretic.   HENT:       Head: Normocephalic and atraumatic.   Cardiovascular:      Rate and Rhythm: Normal rate and regular rhythm.      Heart sounds: Normal heart sounds. Murmur heard.   Pulmonary:      Effort: Pulmonary effort is normal.      Breath sounds: Normal breath sounds.   Musculoskeletal:      Right lower leg: No edema.      Left lower leg: No edema.   Neurological:      Mental Status: He is alert and oriented to person, place, and time. Mental status is at baseline.   Psychiatric:         Mood and Affect: Mood normal.         Behavior: Behavior normal.         Thought Content: Thought content normal.         Judgment: Judgment normal.           REVIEWED DATA     Labs:           Imaging:            Medical Tests:           Summary of old records / correspondence / consultant report:           Request outside records:

## 2024-09-18 ENCOUNTER — OFFICE VISIT (OUTPATIENT)
Dept: PSYCHIATRY | Facility: HOSPITAL | Age: 64
End: 2024-09-18
Payer: MEDICARE

## 2024-09-18 DIAGNOSIS — C15.5 MALIGNANT NEOPLASM OF LOWER THIRD OF ESOPHAGUS: ICD-10-CM

## 2024-09-18 DIAGNOSIS — R53.0 NEOPLASTIC MALIGNANT RELATED FATIGUE: ICD-10-CM

## 2024-09-18 DIAGNOSIS — F33.41 RECURRENT MAJOR DEPRESSIVE DISORDER, IN PARTIAL REMISSION: Primary | ICD-10-CM

## 2024-09-24 ENCOUNTER — APPOINTMENT (OUTPATIENT)
Dept: CT IMAGING | Facility: HOSPITAL | Age: 64
End: 2024-09-24
Payer: MEDICARE

## 2024-09-24 ENCOUNTER — APPOINTMENT (OUTPATIENT)
Dept: GENERAL RADIOLOGY | Facility: HOSPITAL | Age: 64
End: 2024-09-24
Payer: MEDICARE

## 2024-09-24 ENCOUNTER — APPOINTMENT (OUTPATIENT)
Dept: CARDIOLOGY | Facility: HOSPITAL | Age: 64
End: 2024-09-24
Payer: MEDICARE

## 2024-09-24 ENCOUNTER — OFFICE VISIT (OUTPATIENT)
Age: 64
End: 2024-09-24
Payer: MEDICARE

## 2024-09-24 ENCOUNTER — HOSPITAL ENCOUNTER (OUTPATIENT)
Facility: HOSPITAL | Age: 64
Setting detail: OBSERVATION
Discharge: HOME | End: 2024-09-25
Attending: EMERGENCY MEDICINE | Admitting: EMERGENCY MEDICINE
Payer: MEDICARE

## 2024-09-24 ENCOUNTER — HOSPITAL ENCOUNTER (OUTPATIENT)
Dept: CARDIOLOGY | Facility: HOSPITAL | Age: 64
Discharge: HOME | End: 2024-09-24
Admitting: INTERNAL MEDICINE
Payer: MEDICARE

## 2024-09-24 VITALS
DIASTOLIC BLOOD PRESSURE: 83 MMHG | SYSTOLIC BLOOD PRESSURE: 156 MMHG | HEART RATE: 68 BPM | OXYGEN SATURATION: 96 % | RESPIRATION RATE: 16 BRPM

## 2024-09-24 VITALS
HEART RATE: 76 BPM | DIASTOLIC BLOOD PRESSURE: 82 MMHG | SYSTOLIC BLOOD PRESSURE: 136 MMHG | WEIGHT: 280 LBS | HEIGHT: 73 IN | BODY MASS INDEX: 37.11 KG/M2

## 2024-09-24 DIAGNOSIS — R07.9 CHEST PAIN, UNSPECIFIED TYPE: Primary | ICD-10-CM

## 2024-09-24 DIAGNOSIS — Z98.890 STATUS POST ASCENDING AORTIC ANEURYSM REPAIR: ICD-10-CM

## 2024-09-24 DIAGNOSIS — R79.89 POSITIVE D DIMER: ICD-10-CM

## 2024-09-24 DIAGNOSIS — R07.2 PRECORDIAL PAIN: Primary | ICD-10-CM

## 2024-09-24 DIAGNOSIS — R06.02 SHORTNESS OF BREATH: ICD-10-CM

## 2024-09-24 DIAGNOSIS — I10 HYPERTENSION, UNSPECIFIED TYPE: ICD-10-CM

## 2024-09-24 DIAGNOSIS — Z95.2 S/P AVR (AORTIC VALVE REPLACEMENT): ICD-10-CM

## 2024-09-24 DIAGNOSIS — Z86.79 STATUS POST ASCENDING AORTIC ANEURYSM REPAIR: ICD-10-CM

## 2024-09-24 DIAGNOSIS — Z86.79 HISTORY OF CAD (CORONARY ARTERY DISEASE): ICD-10-CM

## 2024-09-24 DIAGNOSIS — R79.89 ELEVATED TROPONIN: ICD-10-CM

## 2024-09-24 DIAGNOSIS — Z95.1 S/P CABG (CORONARY ARTERY BYPASS GRAFT): Primary | ICD-10-CM

## 2024-09-24 DIAGNOSIS — R00.2 PALPITATIONS: ICD-10-CM

## 2024-09-24 PROBLEM — I49.3 PREMATURE VENTRICULAR CONTRACTIONS: Status: ACTIVE | Noted: 2024-09-24

## 2024-09-24 LAB
ALBUMIN SERPL-MCNC: 3.6 G/DL (ref 3.5–5.2)
ALBUMIN SERPL-MCNC: 4 G/DL (ref 3.5–5.2)
ALBUMIN/GLOB SERPL: 1.1 G/DL
ALBUMIN/GLOB SERPL: 1.6 G/DL
ALP SERPL-CCNC: 82 U/L (ref 39–117)
ALP SERPL-CCNC: 86 U/L (ref 39–117)
ALT SERPL W P-5'-P-CCNC: 17 U/L (ref 1–41)
ALT SERPL W P-5'-P-CCNC: 18 U/L (ref 1–41)
ANION GAP SERPL CALCULATED.3IONS-SCNC: 8.2 MMOL/L (ref 5–15)
ANION GAP SERPL CALCULATED.3IONS-SCNC: 9.3 MMOL/L (ref 5–15)
APTT PPP: 29.7 SECONDS (ref 22.7–35.4)
AST SERPL-CCNC: 16 U/L (ref 1–40)
AST SERPL-CCNC: 16 U/L (ref 1–40)
BASOPHILS # BLD AUTO: 0.03 10*3/MM3 (ref 0–0.2)
BASOPHILS # BLD AUTO: 0.03 10*3/MM3 (ref 0–0.2)
BASOPHILS NFR BLD AUTO: 0.5 % (ref 0–1.5)
BASOPHILS NFR BLD AUTO: 0.6 % (ref 0–1.5)
BH CV LOWER VASCULAR LEFT COMMON FEMORAL AUGMENT: NORMAL
BH CV LOWER VASCULAR LEFT COMMON FEMORAL COMPETENT: NORMAL
BH CV LOWER VASCULAR LEFT COMMON FEMORAL COMPRESS: NORMAL
BH CV LOWER VASCULAR LEFT COMMON FEMORAL PHASIC: NORMAL
BH CV LOWER VASCULAR LEFT COMMON FEMORAL SPONT: NORMAL
BH CV LOWER VASCULAR LEFT DISTAL FEMORAL COMPRESS: NORMAL
BH CV LOWER VASCULAR LEFT GASTRONEMIUS COMPRESS: NORMAL
BH CV LOWER VASCULAR LEFT GREATER SAPH AK COMPRESS: NORMAL
BH CV LOWER VASCULAR LEFT GREATER SAPH BK COMPRESS: NORMAL
BH CV LOWER VASCULAR LEFT LESSER SAPH COMPRESS: NORMAL
BH CV LOWER VASCULAR LEFT MID FEMORAL AUGMENT: NORMAL
BH CV LOWER VASCULAR LEFT MID FEMORAL COMPETENT: NORMAL
BH CV LOWER VASCULAR LEFT MID FEMORAL COMPRESS: NORMAL
BH CV LOWER VASCULAR LEFT MID FEMORAL PHASIC: NORMAL
BH CV LOWER VASCULAR LEFT MID FEMORAL SPONT: NORMAL
BH CV LOWER VASCULAR LEFT PERONEAL COMPRESS: NORMAL
BH CV LOWER VASCULAR LEFT POPLITEAL AUGMENT: NORMAL
BH CV LOWER VASCULAR LEFT POPLITEAL COMPETENT: NORMAL
BH CV LOWER VASCULAR LEFT POPLITEAL COMPRESS: NORMAL
BH CV LOWER VASCULAR LEFT POPLITEAL PHASIC: NORMAL
BH CV LOWER VASCULAR LEFT POPLITEAL SPONT: NORMAL
BH CV LOWER VASCULAR LEFT POSTERIOR TIBIAL COMPRESS: NORMAL
BH CV LOWER VASCULAR LEFT PROFUNDA FEMORAL COMPRESS: NORMAL
BH CV LOWER VASCULAR LEFT PROXIMAL FEMORAL COMPRESS: NORMAL
BH CV LOWER VASCULAR LEFT SAPHENOFEMORAL JUNCTION COMPRESS: NORMAL
BH CV LOWER VASCULAR RIGHT COMMON FEMORAL AUGMENT: NORMAL
BH CV LOWER VASCULAR RIGHT COMMON FEMORAL COMPETENT: NORMAL
BH CV LOWER VASCULAR RIGHT COMMON FEMORAL COMPRESS: NORMAL
BH CV LOWER VASCULAR RIGHT COMMON FEMORAL PHASIC: NORMAL
BH CV LOWER VASCULAR RIGHT COMMON FEMORAL SPONT: NORMAL
BH CV LOWER VASCULAR RIGHT DISTAL FEMORAL COMPRESS: NORMAL
BH CV LOWER VASCULAR RIGHT GASTRONEMIUS COMPRESS: NORMAL
BH CV LOWER VASCULAR RIGHT GREATER SAPH AK COMPRESS: NORMAL
BH CV LOWER VASCULAR RIGHT GREATER SAPH BK COMPRESS: NORMAL
BH CV LOWER VASCULAR RIGHT LESSER SAPH COMPRESS: NORMAL
BH CV LOWER VASCULAR RIGHT MID FEMORAL AUGMENT: NORMAL
BH CV LOWER VASCULAR RIGHT MID FEMORAL COMPETENT: NORMAL
BH CV LOWER VASCULAR RIGHT MID FEMORAL COMPRESS: NORMAL
BH CV LOWER VASCULAR RIGHT MID FEMORAL PHASIC: NORMAL
BH CV LOWER VASCULAR RIGHT MID FEMORAL SPONT: NORMAL
BH CV LOWER VASCULAR RIGHT PERONEAL COMPRESS: NORMAL
BH CV LOWER VASCULAR RIGHT POPLITEAL AUGMENT: NORMAL
BH CV LOWER VASCULAR RIGHT POPLITEAL COMPETENT: NORMAL
BH CV LOWER VASCULAR RIGHT POPLITEAL COMPRESS: NORMAL
BH CV LOWER VASCULAR RIGHT POPLITEAL PHASIC: NORMAL
BH CV LOWER VASCULAR RIGHT POPLITEAL SPONT: NORMAL
BH CV LOWER VASCULAR RIGHT POSTERIOR TIBIAL COMPRESS: NORMAL
BH CV LOWER VASCULAR RIGHT PROFUNDA FEMORAL COMPRESS: NORMAL
BH CV LOWER VASCULAR RIGHT PROXIMAL FEMORAL COMPRESS: NORMAL
BH CV LOWER VASCULAR RIGHT SAPHENOFEMORAL JUNCTION COMPRESS: NORMAL
BH CV VAS PRELIMINARY FINDINGS SCRIPTING: 1
BILIRUB SERPL-MCNC: 0.6 MG/DL (ref 0–1.2)
BILIRUB SERPL-MCNC: 0.6 MG/DL (ref 0–1.2)
BUN SERPL-MCNC: 9 MG/DL (ref 8–23)
BUN SERPL-MCNC: 9 MG/DL (ref 8–23)
BUN/CREAT SERPL: 11.3 (ref 7–25)
BUN/CREAT SERPL: 11.7 (ref 7–25)
CALCIUM SPEC-SCNC: 8.7 MG/DL (ref 8.6–10.5)
CALCIUM SPEC-SCNC: 8.7 MG/DL (ref 8.6–10.5)
CHLORIDE SERPL-SCNC: 105 MMOL/L (ref 98–107)
CHLORIDE SERPL-SCNC: 106 MMOL/L (ref 98–107)
CO2 SERPL-SCNC: 25.8 MMOL/L (ref 22–29)
CO2 SERPL-SCNC: 27.7 MMOL/L (ref 22–29)
CREAT SERPL-MCNC: 0.77 MG/DL (ref 0.76–1.27)
CREAT SERPL-MCNC: 0.8 MG/DL (ref 0.76–1.27)
D DIMER PPP FEU-MCNC: 1.69 MCGFEU/ML (ref 0–0.66)
DEPRECATED RDW RBC AUTO: 44.2 FL (ref 37–54)
DEPRECATED RDW RBC AUTO: 44.2 FL (ref 37–54)
EGFRCR SERPLBLD CKD-EPI 2021: 97.6 ML/MIN/1.73
EGFRCR SERPLBLD CKD-EPI 2021: 98.7 ML/MIN/1.73
EOSINOPHIL # BLD AUTO: 0.08 10*3/MM3 (ref 0–0.4)
EOSINOPHIL # BLD AUTO: 0.1 10*3/MM3 (ref 0–0.4)
EOSINOPHIL NFR BLD AUTO: 1.6 % (ref 0.3–6.2)
EOSINOPHIL NFR BLD AUTO: 1.6 % (ref 0.3–6.2)
ERYTHROCYTE [DISTWIDTH] IN BLOOD BY AUTOMATED COUNT: 13 % (ref 12.3–15.4)
ERYTHROCYTE [DISTWIDTH] IN BLOOD BY AUTOMATED COUNT: 13.1 % (ref 12.3–15.4)
FLUAV SUBTYP SPEC NAA+PROBE: NOT DETECTED
FLUBV RNA ISLT QL NAA+PROBE: NOT DETECTED
GEN 5 2HR TROPONIN T REFLEX: 33 NG/L
GLOBULIN UR ELPH-MCNC: 2.5 GM/DL
GLOBULIN UR ELPH-MCNC: 3.2 GM/DL
GLUCOSE SERPL-MCNC: 101 MG/DL (ref 65–99)
GLUCOSE SERPL-MCNC: 109 MG/DL (ref 65–99)
HCT VFR BLD AUTO: 37.2 % (ref 37.5–51)
HCT VFR BLD AUTO: 37.6 % (ref 37.5–51)
HGB BLD-MCNC: 12.2 G/DL (ref 13–17.7)
HGB BLD-MCNC: 12.5 G/DL (ref 13–17.7)
HOLD SPECIMEN: NORMAL
HOLD SPECIMEN: NORMAL
IMM GRANULOCYTES # BLD AUTO: 0.01 10*3/MM3 (ref 0–0.05)
IMM GRANULOCYTES # BLD AUTO: 0.02 10*3/MM3 (ref 0–0.05)
IMM GRANULOCYTES NFR BLD AUTO: 0.2 % (ref 0–0.5)
IMM GRANULOCYTES NFR BLD AUTO: 0.3 % (ref 0–0.5)
INR PPP: 1 (ref 0.9–1.1)
LYMPHOCYTES # BLD AUTO: 0.9 10*3/MM3 (ref 0.7–3.1)
LYMPHOCYTES # BLD AUTO: 1.25 10*3/MM3 (ref 0.7–3.1)
LYMPHOCYTES NFR BLD AUTO: 18.1 % (ref 19.6–45.3)
LYMPHOCYTES NFR BLD AUTO: 19.9 % (ref 19.6–45.3)
MAGNESIUM SERPL-MCNC: 2.1 MG/DL (ref 1.6–2.4)
MCH RBC QN AUTO: 30.6 PG (ref 26.6–33)
MCH RBC QN AUTO: 31.3 PG (ref 26.6–33)
MCHC RBC AUTO-ENTMCNC: 32.8 G/DL (ref 31.5–35.7)
MCHC RBC AUTO-ENTMCNC: 33.2 G/DL (ref 31.5–35.7)
MCV RBC AUTO: 93.2 FL (ref 79–97)
MCV RBC AUTO: 94 FL (ref 79–97)
MONOCYTES # BLD AUTO: 0.34 10*3/MM3 (ref 0.1–0.9)
MONOCYTES # BLD AUTO: 0.43 10*3/MM3 (ref 0.1–0.9)
MONOCYTES NFR BLD AUTO: 6.9 % (ref 5–12)
MONOCYTES NFR BLD AUTO: 6.9 % (ref 5–12)
NEUTROPHILS NFR BLD AUTO: 3.6 10*3/MM3 (ref 1.7–7)
NEUTROPHILS NFR BLD AUTO: 4.44 10*3/MM3 (ref 1.7–7)
NEUTROPHILS NFR BLD AUTO: 70.8 % (ref 42.7–76)
NEUTROPHILS NFR BLD AUTO: 72.6 % (ref 42.7–76)
NRBC BLD AUTO-RTO: 0 /100 WBC (ref 0–0.2)
NRBC BLD AUTO-RTO: 0 /100 WBC (ref 0–0.2)
NT-PROBNP SERPL-MCNC: 644 PG/ML (ref 0–900)
NT-PROBNP SERPL-MCNC: 650 PG/ML (ref 0–900)
PLATELET # BLD AUTO: 170 10*3/MM3 (ref 140–450)
PLATELET # BLD AUTO: 175 10*3/MM3 (ref 140–450)
PMV BLD AUTO: 9.5 FL (ref 6–12)
PMV BLD AUTO: 9.7 FL (ref 6–12)
POTASSIUM SERPL-SCNC: 3.1 MMOL/L (ref 3.5–5.2)
POTASSIUM SERPL-SCNC: 3.2 MMOL/L (ref 3.5–5.2)
PROT SERPL-MCNC: 6.5 G/DL (ref 6–8.5)
PROT SERPL-MCNC: 6.8 G/DL (ref 6–8.5)
PROTHROMBIN TIME: 13.4 SECONDS (ref 11.7–14.2)
QT INTERVAL: 429 MS
QTC INTERVAL: 492 MS
RBC # BLD AUTO: 3.99 10*6/MM3 (ref 4.14–5.8)
RBC # BLD AUTO: 4 10*6/MM3 (ref 4.14–5.8)
SARS-COV-2 RNA RESP QL NAA+PROBE: NOT DETECTED
SODIUM SERPL-SCNC: 139 MMOL/L (ref 136–145)
SODIUM SERPL-SCNC: 143 MMOL/L (ref 136–145)
T-UPTAKE NFR SERPL: 1.11 TBI (ref 0.8–1.3)
T4 SERPL-MCNC: 8.27 MCG/DL (ref 4.5–11.7)
TROPONIN T DELTA: -3 NG/L
TROPONIN T SERPL HS-MCNC: 33 NG/L
TROPONIN T SERPL HS-MCNC: 36 NG/L
TSH SERPL DL<=0.05 MIU/L-ACNC: 1.21 UIU/ML (ref 0.27–4.2)
WBC NRBC COR # BLD AUTO: 4.96 10*3/MM3 (ref 3.4–10.8)
WBC NRBC COR # BLD AUTO: 6.27 10*3/MM3 (ref 3.4–10.8)
WHOLE BLOOD HOLD COAG: NORMAL
WHOLE BLOOD HOLD SPECIMEN: NORMAL

## 2024-09-24 PROCEDURE — 83880 ASSAY OF NATRIURETIC PEPTIDE: CPT | Performed by: EMERGENCY MEDICINE

## 2024-09-24 PROCEDURE — 84436 ASSAY OF TOTAL THYROXINE: CPT

## 2024-09-24 PROCEDURE — 94760 N-INVAS EAR/PLS OXIMETRY 1: CPT

## 2024-09-24 PROCEDURE — 83880 ASSAY OF NATRIURETIC PEPTIDE: CPT | Performed by: INTERNAL MEDICINE

## 2024-09-24 PROCEDURE — 84443 ASSAY THYROID STIM HORMONE: CPT

## 2024-09-24 PROCEDURE — 93005 ELECTROCARDIOGRAM TRACING: CPT

## 2024-09-24 PROCEDURE — 93970 EXTREMITY STUDY: CPT

## 2024-09-24 PROCEDURE — 93010 ELECTROCARDIOGRAM REPORT: CPT | Performed by: INTERNAL MEDICINE

## 2024-09-24 PROCEDURE — 83735 ASSAY OF MAGNESIUM: CPT | Performed by: EMERGENCY MEDICINE

## 2024-09-24 PROCEDURE — 85730 THROMBOPLASTIN TIME PARTIAL: CPT | Performed by: EMERGENCY MEDICINE

## 2024-09-24 PROCEDURE — G0378 HOSPITAL OBSERVATION PER HR: HCPCS

## 2024-09-24 PROCEDURE — 87636 SARSCOV2 & INF A&B AMP PRB: CPT | Performed by: EMERGENCY MEDICINE

## 2024-09-24 PROCEDURE — 80053 COMPREHEN METABOLIC PANEL: CPT

## 2024-09-24 PROCEDURE — 71045 X-RAY EXAM CHEST 1 VIEW: CPT

## 2024-09-24 PROCEDURE — 85610 PROTHROMBIN TIME: CPT | Performed by: EMERGENCY MEDICINE

## 2024-09-24 PROCEDURE — 25510000001 IOPAMIDOL PER 1 ML: Performed by: EMERGENCY MEDICINE

## 2024-09-24 PROCEDURE — 85025 COMPLETE CBC W/AUTO DIFF WBC: CPT

## 2024-09-24 PROCEDURE — 84479 ASSAY OF THYROID (T3 OR T4): CPT

## 2024-09-24 PROCEDURE — 25010000002 LABETALOL 5 MG/ML SOLUTION: Performed by: EMERGENCY MEDICINE

## 2024-09-24 PROCEDURE — 96374 THER/PROPH/DIAG INJ IV PUSH: CPT

## 2024-09-24 PROCEDURE — 93005 ELECTROCARDIOGRAM TRACING: CPT | Performed by: EMERGENCY MEDICINE

## 2024-09-24 PROCEDURE — 93000 ELECTROCARDIOGRAM COMPLETE: CPT | Performed by: INTERNAL MEDICINE

## 2024-09-24 PROCEDURE — 36415 COLL VENOUS BLD VENIPUNCTURE: CPT

## 2024-09-24 PROCEDURE — 99214 OFFICE O/P EST MOD 30 MIN: CPT | Performed by: INTERNAL MEDICINE

## 2024-09-24 PROCEDURE — 99285 EMERGENCY DEPT VISIT HI MDM: CPT

## 2024-09-24 PROCEDURE — 84484 ASSAY OF TROPONIN QUANT: CPT | Performed by: INTERNAL MEDICINE

## 2024-09-24 PROCEDURE — 99204 OFFICE O/P NEW MOD 45 MIN: CPT | Performed by: INTERNAL MEDICINE

## 2024-09-24 PROCEDURE — 3079F DIAST BP 80-89 MM HG: CPT | Performed by: INTERNAL MEDICINE

## 2024-09-24 PROCEDURE — 71275 CT ANGIOGRAPHY CHEST: CPT

## 2024-09-24 PROCEDURE — 3075F SYST BP GE 130 - 139MM HG: CPT | Performed by: INTERNAL MEDICINE

## 2024-09-24 PROCEDURE — 84484 ASSAY OF TROPONIN QUANT: CPT | Performed by: EMERGENCY MEDICINE

## 2024-09-24 PROCEDURE — 80053 COMPREHEN METABOLIC PANEL: CPT | Performed by: EMERGENCY MEDICINE

## 2024-09-24 PROCEDURE — 93970 EXTREMITY STUDY: CPT | Performed by: SURGERY

## 2024-09-24 PROCEDURE — 85379 FIBRIN DEGRADATION QUANT: CPT | Performed by: INTERNAL MEDICINE

## 2024-09-24 RX ORDER — ISOSORBIDE MONONITRATE 30 MG/1
60 TABLET, EXTENDED RELEASE ORAL 2 TIMES DAILY
Status: DISCONTINUED | OUTPATIENT
Start: 2024-09-24 | End: 2024-09-25 | Stop reason: HOSPADM

## 2024-09-24 RX ORDER — ATORVASTATIN CALCIUM 20 MG/1
40 TABLET, FILM COATED ORAL NIGHTLY
Status: DISCONTINUED | OUTPATIENT
Start: 2024-09-24 | End: 2024-09-25 | Stop reason: HOSPADM

## 2024-09-24 RX ORDER — NITROGLYCERIN 0.4 MG/1
0.4 TABLET SUBLINGUAL
Status: DISCONTINUED | OUTPATIENT
Start: 2024-09-24 | End: 2024-09-25 | Stop reason: HOSPADM

## 2024-09-24 RX ORDER — METOPROLOL SUCCINATE 50 MG/1
50 TABLET, EXTENDED RELEASE ORAL DAILY
Status: DISCONTINUED | OUTPATIENT
Start: 2024-09-25 | End: 2024-09-25 | Stop reason: HOSPADM

## 2024-09-24 RX ORDER — CALCIUM CARBONATE 500 MG/1
2 TABLET, CHEWABLE ORAL 3 TIMES DAILY PRN
Status: DISCONTINUED | OUTPATIENT
Start: 2024-09-24 | End: 2024-09-25 | Stop reason: HOSPADM

## 2024-09-24 RX ORDER — SODIUM CHLORIDE 0.9 % (FLUSH) 0.9 %
10 SYRINGE (ML) INJECTION AS NEEDED
Status: SHIPPED | OUTPATIENT
Start: 2024-09-24

## 2024-09-24 RX ORDER — LABETALOL HYDROCHLORIDE 5 MG/ML
20 INJECTION, SOLUTION INTRAVENOUS ONCE
Status: COMPLETED | OUTPATIENT
Start: 2024-09-25 | End: 2024-09-25

## 2024-09-24 RX ORDER — SODIUM CHLORIDE 9 MG/ML
40 INJECTION, SOLUTION INTRAVENOUS AS NEEDED
Status: DISCONTINUED | OUTPATIENT
Start: 2024-09-24 | End: 2024-09-25 | Stop reason: HOSPADM

## 2024-09-24 RX ORDER — LISINOPRIL 10 MG/1
10 TABLET ORAL NIGHTLY
Status: DISCONTINUED | OUTPATIENT
Start: 2024-09-24 | End: 2024-09-25 | Stop reason: HOSPADM

## 2024-09-24 RX ORDER — AMLODIPINE BESYLATE 10 MG/1
10 TABLET ORAL DAILY
Status: DISCONTINUED | OUTPATIENT
Start: 2024-09-25 | End: 2024-09-25 | Stop reason: HOSPADM

## 2024-09-24 RX ORDER — ATORVASTATIN CALCIUM 20 MG/1
40 TABLET, FILM COATED ORAL NIGHTLY
Status: DISCONTINUED | OUTPATIENT
Start: 2024-09-25 | End: 2024-09-24

## 2024-09-24 RX ORDER — IOPAMIDOL 755 MG/ML
100 INJECTION, SOLUTION INTRAVASCULAR
Status: COMPLETED | OUTPATIENT
Start: 2024-09-24 | End: 2024-09-24

## 2024-09-24 RX ORDER — ATORVASTATIN CALCIUM 20 MG/1
40 TABLET, FILM COATED ORAL DAILY
Status: DISCONTINUED | OUTPATIENT
Start: 2024-09-25 | End: 2024-09-24

## 2024-09-24 RX ORDER — LABETALOL HYDROCHLORIDE 5 MG/ML
10 INJECTION, SOLUTION INTRAVENOUS ONCE
Status: COMPLETED | OUTPATIENT
Start: 2024-09-24 | End: 2024-09-24

## 2024-09-24 RX ORDER — POTASSIUM CHLORIDE 750 MG/1
40 TABLET, FILM COATED, EXTENDED RELEASE ORAL EVERY 4 HOURS
Status: DISPENSED | OUTPATIENT
Start: 2024-09-24 | End: 2024-09-25

## 2024-09-24 RX ORDER — SODIUM CHLORIDE 0.9 % (FLUSH) 0.9 %
10 SYRINGE (ML) INJECTION EVERY 12 HOURS SCHEDULED
Status: DISCONTINUED | OUTPATIENT
Start: 2024-09-24 | End: 2024-09-25 | Stop reason: HOSPADM

## 2024-09-24 RX ORDER — SODIUM CHLORIDE 0.9 % (FLUSH) 0.9 %
10 SYRINGE (ML) INJECTION AS NEEDED
Status: DISCONTINUED | OUTPATIENT
Start: 2024-09-24 | End: 2024-09-25 | Stop reason: HOSPADM

## 2024-09-24 RX ADMIN — Medication 10 ML: at 21:52

## 2024-09-24 RX ADMIN — ANTACID TABLETS 2 TABLET: 500 TABLET, CHEWABLE ORAL at 23:48

## 2024-09-24 RX ADMIN — ATORVASTATIN CALCIUM 40 MG: 20 TABLET, FILM COATED ORAL at 23:48

## 2024-09-24 RX ADMIN — IOPAMIDOL 95 ML: 755 INJECTION, SOLUTION INTRAVENOUS at 16:58

## 2024-09-24 RX ADMIN — NITROGLYCERIN 0.4 MG: 0.4 TABLET SUBLINGUAL at 13:59

## 2024-09-24 RX ADMIN — POTASSIUM CHLORIDE 40 MEQ: 750 TABLET, EXTENDED RELEASE ORAL at 21:50

## 2024-09-24 RX ADMIN — NITROGLYCERIN 0.4 MG: 0.4 TABLET SUBLINGUAL at 13:52

## 2024-09-24 RX ADMIN — LABETALOL HYDROCHLORIDE 10 MG: 5 INJECTION, SOLUTION INTRAVENOUS at 16:20

## 2024-09-24 RX ADMIN — ISOSORBIDE MONONITRATE 60 MG: 30 TABLET, EXTENDED RELEASE ORAL at 21:50

## 2024-09-24 NOTE — ED PROVIDER NOTES
EMERGENCY DEPARTMENT ENCOUNTER  Room Number:  25/25  Date of encounter:  9/24/2024  PCP: Nargis Velasquez APRN  Patient Care Team:  Nargis Velasquez APRN as PCP - General (Family Medicine)  Papa Miguel MD as Consulting Physician (Cardiology)  Sekou Pisano MD as Consulting Physician (Pulmonary Disease)  Gregor Carlson MD as Consulting Physician (Gastroenterology)  Estevan Yuan MD (Sports Medicine)  Alex Hernadez MD as Consulting Physician (Nephrology)  Yazmin Molina APRN as Nurse Practitioner (Psychiatry)  Dylon Schwartz MD as Consulting Physician (Hematology and Oncology)  Valerie Stockton MD as Referring Physician (Thoracic Surgery)  Valerie Stockton MD as Surgeon (Thoracic Surgery)     HPI:  Context: Edmond Chase is a 64 y.o. male who presents to the ED c/o chief complaint of chest pain.  Patient reports he was just seen at his cardiologist and sent over for evaluation.  Patient reports that he has cardiac history, has chronic angina but has been more severe for the last 2 days.  Patient reports last episode brought on by walking into his cardiologist office.  Patient reports he also has had this arrest recently.  Patient complains of diffuse chest pressure with associated shortness of breath.  Pain does not radiate.  Patient denies any nausea vomiting or diaphoresis associated with the pain but reports that he has chronic nausea secondary to history of esophageal cancer, status post surgery, in remission.  Patient denies any cough or upper respiratory symptoms, no fevers.    MEDICAL HISTORY REVIEW  Reviewed in EPIC    PAST MEDICAL HISTORY  Active Ambulatory Problems     Diagnosis Date Noted   • Essential hypertension 02/29/2016   • Hyperglycemia 02/29/2016   • Hyperlipidemia 02/29/2016   • Class 2 severe obesity due to excess calories with serious comorbidity and body mass index (BMI) of 36.0 to 36.9 in adult 02/29/2016   • Nocturia 02/29/2016   •  Nonrheumatic aortic valve insufficiency 03/29/2016   • Myocardial ischemia 03/29/2016   • Coronary artery disease involving native coronary artery of native heart 04/19/2016   • Ascending aortic aneurysm 04/19/2016   • CAD in native artery 05/02/2016   • S/P CABG (coronary artery bypass graft) 05/18/2016   • S/P AVR (aortic valve replacement) 05/18/2016   • Status post ascending aortic aneurysm repair 05/18/2016   • Fatigue 07/28/2017   • Abnormal nuclear stress test 10/23/2017   • Coronary artery disease 10/23/2017   • Coronary artery disease of bypass graft of native heart with stable angina pectoris 07/01/2020   • Obstructive sleep apnea, adult 10/31/2016   • Hypersomnia due to medical condition 08/21/2021   • Angina at rest 07/10/2022   • Type 2 diabetes mellitus, without long-term current use of insulin 07/11/2022   • Anemia 07/10/2022   • Ulcer of esophagus without bleeding 07/29/2022   • Polyp of colon 08/29/2022   • Rectal bleeding 11/25/2022   • Gastrointestinal hemorrhage 11/25/2022   • Malignant neoplasm of lower third of esophagus 11/29/2022   • Gastrointestinal hemorrhage, unspecified gastrointestinal hemorrhage type 12/01/2022   • Exertional chest pain 12/14/2022   • Iron deficiency anemia 12/14/2022   • Cholelithiasis 09/01/2023   • Erectile dysfunction 01/23/2024   • Premature ventricular contractions 09/24/2024     Resolved Ambulatory Problems     Diagnosis Date Noted   • Acute cholecystitis 09/01/2023     Past Medical History:   Diagnosis Date   • Anxiety    • Anxiety and depression    • Aortic valve insufficiency    • Arthritis    • CAD (coronary artery disease)    • Chest pain    • Diabetes mellitus    • Diarrhea    • Dizzy    • Dyspnea    • Esophageal cancer 11/2022   • G tube feedings    • GERD (gastroesophageal reflux disease)    • GI bleed    • Heart murmur    • History of pneumonia    • History of recent blood transfusion    • Hypersomnia with sleep apnea    • Jejunostomy tube present    •  Lightheadedness    • Malaise and fatigue    • Migraines    • Morbid obesity    • Nausea    • TJ on auto CPAP 10/31/2016   • Pneumonia    • Scrotal bleeding    • SOB (shortness of breath)        PAST SURGICAL HISTORY  Past Surgical History:   Procedure Laterality Date   • AORTIC VALVE REPAIR/REPLACEMENT  05/2016   • ASCENDING ARCH/HEMIARCH REPLACEMENT N/A 05/02/2016    Procedure: BHAVSEH STERNOTOMY CORONARY ARTERY BYPASS GRAFT TIMES 3 USING LEFT INTERNAL MAMMARY ARTERY AND RIGHT GREATER SAPHENOUS VEIN GRAFT PER ENDOSCOPIC VEIN HARVESTING, AORTIC ANEURYSM REPAIR WITH ROOT REPAIR AND AORTIC VALVE REPLACEMENT;  Surgeon: Rosalio Cline MD;  Location: [facility] MAIN OR;  Service:    • CARDIAC CATHETERIZATION N/A 04/01/2016    Procedure: Left Heart Cath;  Surgeon: Erick Tam MD;  Location: [facility] CATH INVASIVE LOCATION;  Service:    • CARDIAC CATHETERIZATION N/A 04/01/2016    Procedure: Left ventriculography;  Surgeon: Erick Tam MD;  Location: [facility] CATH INVASIVE LOCATION;  Service:    • CARDIAC CATHETERIZATION N/A 04/01/2016    Procedure: Right Heart Cath;  Surgeon: Erick Tam MD;  Location: [facility] CATH INVASIVE LOCATION;  Service:    • CARDIAC CATHETERIZATION N/A 10/30/2017    Procedure: Coronary angiography;  Surgeon: Sorin Vasquez MD;  Location: [facility] CATH INVASIVE LOCATION;  Service:    • CARDIAC CATHETERIZATION  10/30/2017    Procedure: Saphenous Vein Graft;  Surgeon: Sorin Vasquez MD;  Location: [facility] CATH INVASIVE LOCATION;  Service:    • CARDIAC CATHETERIZATION N/A 10/30/2017    Procedure: Native mammary injection;  Surgeon: Sorin Vasquez MD;  Location: [facility] CATH INVASIVE LOCATION;  Service:    • CARDIAC CATHETERIZATION N/A 07/07/2020    Procedure: Coronary angiography;  Surgeon: Gregor Kim MD;  Location: [facility] CATH INVASIVE LOCATION;  Service: Cardiovascular;  Laterality: N/A;   • CARDIAC CATHETERIZATION N/A 07/07/2020    Procedure: Left heart cath;  Surgeon: Gregor Kim MD;   [facility]VASIVE LOC[facility];  Service: Cardiovascular;  Laterality: N/A;   • CARDIAC CATHETERIZATION N/A 07/07/2020    Procedure: Left ventriculography;  Surgeon: Gregor Kim MD;  [facility];  Service: Cardiovascular;  Laterality: N/A;   • CARDIAC CATHETERIZATION N/A 07/07/2020    Procedure: Stent ESMER bypass graft;  Surgeon: Gregor Kim MD;  [facility];  Service: Cardiovascular;  Laterality: N/A;   • CARDIAC CATHETERIZATION N/A 06/17/2021    Procedure: SAPHENOUS VEIN GRAFT;  Surgeon: Marques Ndiaye MD;  Location: [facility] CATH INVASIVE LOCATION;  Service: Cardiovascular;  Laterality: N/A;   • CARDIAC CATHETERIZATION N/A 06/17/2021    Procedure: Left Heart Cath;  Surgeon: Marques Ndiaye MD;  Location: [facility] CATH INVASIVE LOCATION;  Service: Cardiovascular;  Laterality: N/A;   • CARDIAC CATHETERIZATION N/A 06/17/2021    Procedure: Coronary angiography;  Surgeon: Marques Ndiaye MD;  Location: [facility] CATH INVASIVE LOCATION;  Service: Cardiovascular;  Laterality: N/A;   • CARDIAC CATHETERIZATION N/A 07/14/2022    Procedure: Left Heart Cath;  Surgeon: Charlie Aj MD;  Location: [facility] CATH INVASIVE LOCATION;  Service: Cardiovascular;  Laterality: N/A;   • CARDIAC CATHETERIZATION N/A 07/14/2022    Procedure: Coronary angiography;  Surgeon: Charlie Aj MD;  Location: [facility] CATH INVASIVE LOCATION;  Service: Cardiovascular;  Laterality: N/A;   • CARDIAC CATHETERIZATION  07/14/2022    Procedure: Saphenous Vein Graft;  Surgeon: Charlie Aj MD;  Location: [facility] CATH INVASIVE LOCATION;  Service: Cardiovascular;;   • CARDIAC CATHETERIZATION N/A 07/14/2022    Procedure: Native mammary injection;  Surgeon: Charlie Aj MD;  Location: [facility] CATH INVASIVE LOCATION;  Service: Cardiovascular;  Laterality: N/A;   • CATARACT EXTRACTION EXTRACAPSULAR W/ INTRAOCULAR LENS IMPLANTATION Left    •  CHOLECYSTECTOMY WITH INTRAOPERATIVE CHOLANGIOGRAM N/A 09/01/2023    Procedure: CHOLECYSTECTOMY LAPAROSCOPIC INTRAOPERATIVE CHOLANGIOGRAM choledoscopy common bile duct exploration;  Surgeon: Jose Manuel Baca MD;  Location: [facility] MAIN OR;  Service: General;  Laterality: N/A;   • COLONOSCOPY N/A 11/26/2022    Procedure: COLONOSCOPY AT BEDSIDE;  Surgeon: Tomy Lopez MD;  Location: [facility] MAIN OR;  Service: Gastroenterology;  Laterality: N/A;   • COLONOSCOPY W/ POLYPECTOMY N/A 10/04/2022    Procedure: COLONOSCOPY to cecum with cold forceps and cold snare polypectomies;  Surgeon: Gregor Carlson MD;  Location: [facility] ENDOSCOPY;  Service: Gastroenterology;  Laterality: N/A;  PRE- hx of polyps  POST- diverticulosis, polyps   • CORONARY ARTERY BYPASS GRAFT  05/2016    LIMA TO LAD, SVG TO PDA, SVG TO OM2   • ENDOSCOPY N/A 07/13/2022    Procedure: ESOPHAGOGASTRODUODENOSCOPY;  Surgeon: Marcia Hollis MD;  Location: [facility] ENDOSCOPY;  Service: Gastroenterology;  Laterality: N/A;  PRE- ANEMIA, MELENA  POST- MILD EROSIVE GASTRITIS, GE JUNCTION ULCER   • ENDOSCOPY N/A 10/04/2022    Procedure: ESOPHAGOGASTRODUODENOSCOPY with biopsies;  Surgeon: Gregor Carlson MD;  Location: [facility] ENDOSCOPY;  Service: Gastroenterology;  Laterality: N/A;  PRE- hx of esophageal ulcer  POST- gastric cardia mass   • ENDOSCOPY N/A 05/01/2023    Procedure: ESOPHAGOGASTRODUODENOSCOPY WITH  [id number] balloon DILATATION of pylorus and anastomosis;  Surgeon: Valerie Stockton MD;  Location: [facility] ENDOSCOPY;  Service: Gastroenterology;  Laterality: N/A;  PRE: dysphagia  POST: no abnormalities seen   • ENDOSCOPY N/A 6/25/2024    Procedure: ESOPHAGOGASTRODUODENOSCOPY WITH BIOPSY and 15mm balloon dilatation (pylorus);  Surgeon: Valerie Stockton MD;  Location: [facility] ENDOSCOPY;  Service: Gastroenterology;  Laterality: N/A;  pre: Esophageal cancer  post: normal anastamosis   • ESOPHAGOGASTRECTOMY Right 02/03/2023    Procedure:  "BRONCHOSCOPY, RIGHT ROBOTIC VIDEO ASSISTED THORACOSCOPY WITH TOTAL ESOPHAGECTOMY, INTERCOSTAL NERVE BLOCK, FEEDING JEJUNOSTOMY PLACEMENT;  Surgeon: Valerie Stockton MD;  [facility];  [facility];  Laterality: Right;   • INNER EAR SURGERY     • JEJUNOSTOMY TUBE INSERTION      REMOVED 2023   • TONSILLECTOMY         FAMILY HISTORY  Family History   Problem Relation Age of Onset   • Hyperlipidemia Mother    • Arthritis Mother    • Hypertension Mother    • Diabetes Mother    • Heart disease Mother    • Hyperlipidemia Father    • Heart disease Father    • Hypertension Father    • Lung cancer Father    • Heart disease Sister    • Stroke Maternal Grandmother    • Heart disease Maternal Grandmother    • Hypertension Maternal Grandfather    • Hypertension Paternal Grandmother    • Hypertension Paternal Grandfather    • Other Other         The patient states that his sister has a problem that goes by the name of \"long QT\".  He says this is a familial trait but he also says that he is \"not interested in pursuing it for himself\".   • Coronary artery disease Other    • Malig Hyperthermia Neg Hx        SOCIAL HISTORY  Social History     Socioeconomic History   • Marital status: Single   Tobacco Use   • Smoking status: Never     Passive exposure: Never   • Smokeless tobacco: Never   Vaping Use   • Vaping status: Never Used   Substance and Sexual Activity   • Alcohol use: Not Currently     Comment: usually 1-2 month but none since christmas 2022   • Drug use: Never   • Sexual activity: Defer       ALLERGIES  Patient has no known allergies.    The patient's allergies have been reviewed    REVIEW OF SYSTEMS  All systems reviewed and negative except for those discussed in HPI.     PHYSICAL EXAM  I have reviewed the triage vital signs and nursing notes.  ED Triage Vitals   Temp Heart Rate Resp BP SpO2   09/24/24 1546 09/24/24 1546 09/24/24 1546 09/24/24 1547 09/24/24 1546   97.4 °F (36.3 °C) 70 20 (!) " 171/105 96 %      Temp src Heart Rate Source Patient Position BP Location FiO2 (%)   09/24/24 1546 09/24/24 1546 09/24/24 1547 09/24/24 1547 --   Tympanic Monitor Sitting Right arm        General: No acute distress.  HENT: NCAT, PERRL, Nares patent.  Eyes: no scleral icterus.  Neck: trachea midline, no ROM limitations.  CV: regular rhythm, regular rate.  Respiratory: normal effort, CTAB.  Abdomen: soft, nondistended, NTTP, no rebound tenderness, no guarding or rigidity.  Musculoskeletal: no deformity.  Neuro: alert, moves all extremities, follows commands.  Skin: warm, dry.    LAB RESULTS  Recent Results (from the past 24 hour(s))   Comprehensive Metabolic Panel    Collection Time: 09/24/24  2:09 PM    Specimen: Blood   Result Value Ref Range    Glucose 109 (H) 65 - 99 mg/dL    BUN 9 8 - 23 mg/dL    Creatinine 0.80 0.76 - 1.27 mg/dL    Sodium 143 136 - 145 mmol/L    Potassium 3.1 (L) 3.5 - 5.2 mmol/L    Chloride 106 98 - 107 mmol/L    CO2 27.7 22.0 - 29.0 mmol/L    Calcium 8.7 8.6 - 10.5 mg/dL    Total Protein 6.5 6.0 - 8.5 g/dL    Albumin 4.0 3.5 - 5.2 g/dL    ALT (SGPT) 18 1 - 41 U/L    AST (SGOT) 16 1 - 40 U/L    Alkaline Phosphatase 82 39 - 117 U/L    Total Bilirubin 0.6 0.0 - 1.2 mg/dL    Globulin 2.5 gm/dL    A/G Ratio 1.6 g/dL    BUN/Creatinine Ratio 11.3 7.0 - 25.0    Anion Gap 9.3 5.0 - 15.0 mmol/L    eGFR 97.6 >60.0 mL/min/1.73   Thyroid Panel With TSH    Collection Time: 09/24/24  2:09 PM    Specimen: Blood   Result Value Ref Range    TSH 1.210 0.270 - 4.200 uIU/mL    T Uptake 1.11 0.80 - 1.30 TBI    T4, Total 8.27 4.50 - 11.70 mcg/dL   Troponin    Collection Time: 09/24/24  2:09 PM    Specimen: Blood   Result Value Ref Range    HS Troponin T 36 (H) <22 ng/L   D-Dimer    Collection Time: 09/24/24  2:09 PM    Specimen: Blood   Result Value Ref Range    D-Dimer, Quantitative 1.69 (H) 0.00 - 0.66 MCGFEU/mL   proBNP    Collection Time: 09/24/24  2:09 PM    Specimen: Blood   Result Value Ref Range    proBNP  644.0 0.0 - 900.0 pg/mL   CBC Auto Differential    Collection Time: 09/24/24  2:09 PM    Specimen: Blood   Result Value Ref Range    WBC 4.96 3.40 - 10.80 10*3/mm3    RBC 3.99 (L) 4.14 - 5.80 10*6/mm3    Hemoglobin 12.2 (L) 13.0 - 17.7 g/dL    Hematocrit 37.2 (L) 37.5 - 51.0 %    MCV 93.2 79.0 - 97.0 fL    MCH 30.6 26.6 - 33.0 pg    MCHC 32.8 31.5 - 35.7 g/dL    RDW 13.1 12.3 - 15.4 %    RDW-SD 44.2 37.0 - 54.0 fl    MPV 9.5 6.0 - 12.0 fL    Platelets 170 140 - 450 10*3/mm3    Neutrophil % 72.6 42.7 - 76.0 %    Lymphocyte % 18.1 (L) 19.6 - 45.3 %    Monocyte % 6.9 5.0 - 12.0 %    Eosinophil % 1.6 0.3 - 6.2 %    Basophil % 0.6 0.0 - 1.5 %    Immature Grans % 0.2 0.0 - 0.5 %    Neutrophils, Absolute 3.60 1.70 - 7.00 10*3/mm3    Lymphocytes, Absolute 0.90 0.70 - 3.10 10*3/mm3    Monocytes, Absolute 0.34 0.10 - 0.90 10*3/mm3    Eosinophils, Absolute 0.08 0.00 - 0.40 10*3/mm3    Basophils, Absolute 0.03 0.00 - 0.20 10*3/mm3    Immature Grans, Absolute 0.01 0.00 - 0.05 10*3/mm3    nRBC 0.0 0.0 - 0.2 /100 WBC   ECG 12 Lead ED Triage Standing Order; SOA    Collection Time: 09/24/24  3:44 PM   Result Value Ref Range    QT Interval 429 ms    QTC Interval 492 ms   High Sensitivity Troponin T 2Hr    Collection Time: 09/24/24  3:53 PM    Specimen: Blood   Result Value Ref Range    HS Troponin T 33 (H) <22 ng/L    Troponin T Delta -3 >=-4 - <+4 ng/L   Comprehensive Metabolic Panel    Collection Time: 09/24/24  3:53 PM    Specimen: Blood   Result Value Ref Range    Glucose 101 (H) 65 - 99 mg/dL    BUN 9 8 - 23 mg/dL    Creatinine 0.77 0.76 - 1.27 mg/dL    Sodium 139 136 - 145 mmol/L    Potassium 3.2 (L) 3.5 - 5.2 mmol/L    Chloride 105 98 - 107 mmol/L    CO2 25.8 22.0 - 29.0 mmol/L    Calcium 8.7 8.6 - 10.5 mg/dL    Total Protein 6.8 6.0 - 8.5 g/dL    Albumin 3.6 3.5 - 5.2 g/dL    ALT (SGPT) 17 1 - 41 U/L    AST (SGOT) 16 1 - 40 U/L    Alkaline Phosphatase 86 39 - 117 U/L    Total Bilirubin 0.6 0.0 - 1.2 mg/dL    Globulin 3.2  gm/dL    A/G Ratio 1.1 g/dL    BUN/Creatinine Ratio 11.7 7.0 - 25.0    Anion Gap 8.2 5.0 - 15.0 mmol/L    eGFR 98.7 >60.0 mL/min/1.73   BNP    Collection Time: 09/24/24  3:53 PM    Specimen: Blood   Result Value Ref Range    proBNP 650.0 0.0 - 900.0 pg/mL   Single High Sensitivity Troponin T    Collection Time: 09/24/24  3:53 PM    Specimen: Blood   Result Value Ref Range    HS Troponin T 33 (H) <22 ng/L   Green Top (Gel)    Collection Time: 09/24/24  3:53 PM   Result Value Ref Range    Extra Tube Hold for add-ons.    Lavender Top    Collection Time: 09/24/24  3:53 PM   Result Value Ref Range    Extra Tube hold for add-on    Gold Top - SST    Collection Time: 09/24/24  3:53 PM   Result Value Ref Range    Extra Tube Hold for add-ons.    Light Blue Top    Collection Time: 09/24/24  3:53 PM   Result Value Ref Range    Extra Tube Hold for add-ons.    CBC Auto Differential    Collection Time: 09/24/24  3:53 PM    Specimen: Blood   Result Value Ref Range    WBC 6.27 3.40 - 10.80 10*3/mm3    RBC 4.00 (L) 4.14 - 5.80 10*6/mm3    Hemoglobin 12.5 (L) 13.0 - 17.7 g/dL    Hematocrit 37.6 37.5 - 51.0 %    MCV 94.0 79.0 - 97.0 fL    MCH 31.3 26.6 - 33.0 pg    MCHC 33.2 31.5 - 35.7 g/dL    RDW 13.0 12.3 - 15.4 %    RDW-SD 44.2 37.0 - 54.0 fl    MPV 9.7 6.0 - 12.0 fL    Platelets 175 140 - 450 10*3/mm3    Neutrophil % 70.8 42.7 - 76.0 %    Lymphocyte % 19.9 19.6 - 45.3 %    Monocyte % 6.9 5.0 - 12.0 %    Eosinophil % 1.6 0.3 - 6.2 %    Basophil % 0.5 0.0 - 1.5 %    Immature Grans % 0.3 0.0 - 0.5 %    Neutrophils, Absolute 4.44 1.70 - 7.00 10*3/mm3    Lymphocytes, Absolute 1.25 0.70 - 3.10 10*3/mm3    Monocytes, Absolute 0.43 0.10 - 0.90 10*3/mm3    Eosinophils, Absolute 0.10 0.00 - 0.40 10*3/mm3    Basophils, Absolute 0.03 0.00 - 0.20 10*3/mm3    Immature Grans, Absolute 0.02 0.00 - 0.05 10*3/mm3    nRBC 0.0 0.0 - 0.2 /100 WBC   Protime-INR    Collection Time: 09/24/24  3:53 PM    Specimen: Blood   Result Value Ref Range    Protime  13.4 11.7 - 14.2 Seconds    INR 1.00 0.90 - 1.10   aPTT    Collection Time: 09/24/24  3:53 PM    Specimen: Blood   Result Value Ref Range    PTT 29.7 22.7 - 35.4 seconds   Magnesium    Collection Time: 09/24/24  3:53 PM    Specimen: Blood   Result Value Ref Range    Magnesium 2.1 1.6 - 2.4 mg/dL   COVID-19 and FLU A/B PCR, 1 HR TAT - Swab, Nasopharynx    Collection Time: 09/24/24  4:15 PM    Specimen: Nasopharynx; Swab   Result Value Ref Range    COVID19 Not Detected Not Detected - Ref. Range    Influenza A PCR Not Detected Not Detected    Influenza B PCR Not Detected Not Detected       I ordered the above labs and reviewed the results.    RADIOLOGY  CT Angiogram Chest Pulmonary Embolism    Result Date: 9/24/2024  CT ANGIOGRAM CHEST PULMONARY EMBOLISM-  INDICATION: Shortness of air. Positive D-dimer.  COMPARISON: CT chest May 17, 2024  TECHNIQUE: CTA chest with IV contrast. Coronal and sagittal reformats. Three dimensional reconstructions. Radiation dose reduction techniques were utilized, including automated exposure control and exposure modulation based on body size.  FINDINGS:  Pulmonary arteries: No pulmonary embolism.  Chest wall: No lymphadenopathy.  Mediastinum: Three-vessel coronary artery atherosclerotic calcifications. CABG. Aortic valve replacement. Heart is normal in size. No pericardial effusion. No mediastinal or hilar lymphadenopathy.  Lungs/pleura: No effusions. Patent central airways. Mild airways wall thickening. Juxtapleural solid pulmonary nodule seen along the minor fissure, series 5, axial image 77, measures 5 mm, unchanged. Solid pulmonary nodule in the lateral segment right middle lobe, series 5, axial image 92, measures 5 mm, unchanged. Posterior dependent and bibasilar subsegmental atelectasis.  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 Cholecystectomy. Incidental splenule. Esophagectomy with gastric pull-up.  Osseous structures: Bilateral  glenohumeral osteoarthritis. Median sternotomy. Diffuse idiopathic skeletal hyperostosis in the mid and lower thoracic spine.       1. No pulmonary embolism. 2. Mild airways disease. 3. Stable pulmonary nodules. No new or enlarging pulmonary nodules identified. 4. Esophagectomy with gastric pull-up.  This report was finalized on 9/24/2024 5:42 PM by Dr. Dylon Barney M.D on Workstation: [id number]      XR Chest 1 View    Result Date: 9/24/2024  Emergency portable view of the chest on 9/24/2024  CLINICAL HISTORY: Shortness of breath and chest pressure  Is correlated to a prior chest CT on 5/17/2024 and a portable chest x-ray on 6/25/2024.  FINDINGS: There are multiple sternal wires. There is an aortic valve prosthesis in place. The cardiomediastinal silhouette is mildly enlarged. The pulmonary vasculature is within normal limits. There is minimal elevation left hemidiaphragm with linear atelectasis or scarring at the left lung base. Otherwise lungs are clear. The costophrenic angles are sharp.      1. No active disease is seen in the chest with no significant change when compared to the patient's prior portable chest x-ray on 6/25/2024 and the etiology of this patient's shortness of breath and chest pressure is not established on this exam  2. Previous median sternotomy and aortic valve prosthesis is in place and there is mild chronic elevation left hemidiaphragm with horizontal linear atelectasis or scarring at the left lung base.  This report was finalized on 9/24/2024 4:31 PM by Dr. Muary Byrne M.D on Workstation: [id number]       I ordered the above noted radiological studies. I reviewed the images and results. I agree with the radiologist interpretation.    PROCEDURES  Procedures    MEDICATIONS GIVEN IN ER  Medications   sodium chloride 0.9 % flush 10 mL (has no administration in time range)   labetalol (NORMODYNE,TRANDATE) injection 10 mg (10 mg Intravenous Given 9/24/24 1620)   iopamidol (ISOVUE-370)  76 % injection 100 mL (95 mL Intravenous Given by Other 9/24/24 9646)       PROGRESS, DATA ANALYSIS, CONSULTS, AND MEDICAL DECISION MAKING  A complete history and physical exam have been performed.  All available laboratory and imaging results have been reviewed by myself prior to disposition.    MDM    After the initial H&P, I discussed pertinent information from history and physical exam with patient/family.  Discussed differential diagnosis.  Discussed plan for ED evaluation/workup/treatment.  All questions answered.  Patient/family is agreeable with plan.  ED Course as of 09/24/24 2156   Tue Sep 24, 2024   1603 My differential diagnosis for dyspnea includes but is not limited to:  Asthma, COPD, pneumonia, pulmonary embolus, acute respiratory distress syndrome, pneumothorax, pleural effusion, pulmonary fibrosis, congestive heart failure, myocardial infarction, DKA, uremia, acidosis, sepsis, anemia, drug related, hyperventilation, CNS disease     [JG]   1604 EKG independently viewed and contemporaneously interpreted by ED physician. Time: 1544.  Rate 79.  Interpretation: EKG independently viewed and contemporaneously interpreted by ED physician. Time: 3:44 PM.  Rate 79.  Interpretation: Normal sinus rhythm with ventricular bigeminy, normal axis, delayed R wave progression, slight ST depression with T wave inversion in lead I and aVL..  PVCs are unifocal. [JG]   1605 When compared with prior EKG on 5/1/2023, slight ST depression with T wave inversion in high lateral leads was present on prior EKG.  Patient was not in ventricular bigeminy at that time. [JG]   1609 Review of prior external notes (non-ED) -and- Review of prior external test results outside of this encounter:   I reviewed patient's cardiology office visit note from today.  Patient referred for PVCs on Holter monitor.  Extensive cardiac history including CABG with valve replacement, also has history of esophageal cancer with prior surgery.  Patient was  complaining of chest pain at the visit today, patient was sent to the cardiac emergency's clinic at their office.  Patient was evaluated by Dr. Patel, had borderline O2 sat, elevated dimer with history of cancer, was sent to the ER for stat CTA and bilateral lower extremity Doppler.  Plan for if no evidence of DVT/PE would recommend admission for further cardiac evaluation. [JG]   1650 I viewed chest x-ray in PACS, no pulmonary infiltrates per my read. [JG]   1718 I reviewed CT angiogram in PACS, no pulmonary embolism per my read. [JG]   1747 High-sensitivity troponin elevated at 33, appears to be patient's baseline, EKG shows no acute findings, obtain repeat troponin to evaluate trend. [JG]   1755 Phone call with Melissa, COLLEEN with [facility].  Discussed the patient, relevant history, exam, diagnostics, ED findings/progress, and concerns. They agree to admit the patient to telemetry observation under Dr. Harp. Care assumed by the admitting physician at this time.     [JG]   1806 Preliminary report per ultrasound is patient is negative for DVT/SVT in bilateral lower extremities. [JG]      ED Course User Index  [JG] Lul Ellington MD       AS OF 17:56 EDT VITALS:    BP - 159/74  HR - 61  TEMP - 97.4 °F (36.3 °C) (Tympanic)  O2 SATS - 94%    DIAGNOSIS  Final diagnoses:   Chest pain, unspecified type   Elevated troponin   Hypertension, unspecified type   History of CAD (coronary artery disease)   Positive D dimer         DISPOSITION  ADMISSION    Discussed treatment plan and reason for admission with pt/family and admitting physician.  Pt/family voiced understanding of the plan for admission for further testing/treatment as needed.        Lul Ellington MD  09/24/24 [id number]       Lul Ellington MD  09/24/24 2637

## 2024-09-24 NOTE — PROGRESS NOTES
MD ATTESTATION NOTE    The COLLEEN and I have discussed this patient's history, physical exam, and treatment plan.  I have reviewed the documentation and personally had a face to face interaction with the patient. I affirm the documentation and agree with the treatment and plan.  The attached note describes my personal findings.      I provided a substantive portion of the care of the patient.  I personally performed the physical exam in its entirety, and below are my findings.      Brief HPI: This is a 64-year-old gentleman with multiple comorbidities.  He does have a history of coronary artery disease status post CABG and valve replacement in the distant past he has hypertension, hyperlipidemia diabetes morbid obesity and history of esophageal cancer with esophageal resection.  He presented to the cardiologist Dr. Jacobs's office today because of some concerns about premature ventricular contractions on Holter monitor.  Dr. Bishop saw and evaluated him and the patient was complaining of chest pain.  He was then referred to the chest pain unit with [facility].  He did have an elevated dimer and [facility] and the chest pain center sent him to the emergency department for further evaluation.  I reviewed both Dr. Bishop and Dr. Marcus and his note.    This gentleman has had chest pain since he has had bypass which was approximately 10 years ago.  His pain he describes as a pressure.  It will really occur at any time.  Sometimes it might occur with exertion other times it will occur at rest.  He has it chronically he states there is always a chronic ache there and pressure and discomfort but then will get worse at times.  Over the past several days he has noticed that this discomfort has been worsening.  He also does have shortness of breath.  At times he will have exertional shortness of breath at other times he will not.  He reports no fevers or chills or coughs or colds.  When I am seeing him  right now in bed 25 in the ED he does have some chest discomfort that is mild and is at his baseline.  He also does have chronic episodic swelling to his lower extremities which physicians have told him that it is due to immobility and his lack of getting up and moving around.  He always has a little bit swelling in his right lower extremity compared to his left lower extremity as that is where he had a vein graft from his bypass.  Denies any pain to his lower extremities.  Denies any headache denies any abdominal pain.    General : This is a pleasant male.  He is in no acute cardiovascular respiratory distress.  Patient is awake alert and oriented  HEENT: NCAT  CV: Heart is regular rate and rhythm with systolic murmur 2 out of 6  Respiratory: CTA bilaterally.  No respiratory distress pain is not reproducible on palpation or deep breathing.  Abd: Soft and nontender and morbidly obese  Ext: No acute abnormalities patient has 3+ edema to his right lower extremity and 2+ edema to his left lower extremity.  Patient states that he will get swelling like this in the past.  It will fluctuate.  He has intact distal pulses to his upper extremities that are equal symmetric and strong.  To his lower extremities with edema I feel palpable pulses that appear symmetric.  There is no coolness or cyanosis to his distal extremities.  Skin: No rash  Neuro: Cranial nerves II through XII grossly intact as tested.  No acute lateralizing deficits.  Psych: Normal mood and affect    I have reviewed the patient's vital signs, lab work, EKG and imaging.    Plan:  1.  Chest pain: No obvious acute EKG changes.  EKGs look similar to old EKG.  Troponins are stable at 36 and 33.  Patient did have a CT angiogram of the chest because of the elevation of the dimer there was no acute PE there is some nodularities that need follow-up at a later date and I informed the patient of this.  Patient just returned to venous Doppler and have been informed  that the venous Doppler is negative for any acute DVT.  Cardiology has been consulted.  They have not seen the patient as of yet to decide further management after the results of the CT angiogram as well as the venous Doppler.  I anticipate this gentleman might get a stress test.  But wait for cardiology's evaluation.  This seems to be chronic chest pain that is become worse over the past several days.  All questions answered at this time.        Note Disclaimer: At [facility], we believe that sharing information builds trust and better relationships. You are receiving this note because you recently visited [facility]. It is possible you will see health information before a provider has talked with you about it. This kind of information can be easy to misunderstand. To help you fully understand what it means for your health, we urge you to discuss this note with your provider.

## 2024-09-24 NOTE — ED TRIAGE NOTES
Pt was brought by wheelchair from dr pena office for soa that started yesterday, reports chest pressure and bilateral leg swelling. Pt had positive ddimer in office today

## 2024-09-24 NOTE — ED NOTES
..Nursing report ED to floor  Edmond Chase  64 y.o.  male    HPI :  HPI (Adult)  Stated Reason for Visit: soa, +ddimer    Chief Complaint  Chief Complaint   Patient presents with    Shortness of Breath       Admitting doctor:   Michael Harp MD    Admitting diagnosis:   The primary encounter diagnosis was Chest pain, unspecified type. Diagnoses of Elevated troponin, Hypertension, unspecified type, History of CAD (coronary artery disease), and Positive D dimer were also pertinent to this visit.    Code status:   Current Code Status       Date Active Code Status Order ID Comments User Context       Prior            Allergies:   Patient has no known allergies.    Isolation:   Enhanced Droplet/Contact     Intake and Output  No intake or output data in the 24 hours ending 09/24/24 1812    Weight:       09/24/24  1546   Weight: 127 kg (280 lb)       Most recent vitals:   Vitals:    09/24/24 1547 09/24/24 1618 09/24/24 1620 09/24/24 1631   BP: (!) 171/105 (!) 167/107 (!) 158/101 159/74   BP Location: Right arm Right arm     Patient Position: Sitting Sitting     Pulse:    61   Resp:       Temp:       TempSrc:       SpO2:    94%   Weight:       Height:           Active LDAs/IV Access:   Lines, Drains & Airways       Active LDAs       Name Placement date Placement time Site Days    Peripheral IV 09/24/24 1410 Left Antecubital 09/24/24  1410  Antecubital  less than 1                    Labs (abnormal labs have a star):   Labs Reviewed   COMPREHENSIVE METABOLIC PANEL - Abnormal; Notable for the following components:       Result Value    Glucose 101 (*)     Potassium 3.2 (*)     All other components within normal limits    Narrative:     GFR Normal >60  Chronic Kidney Disease <60  Kidney Failure <15     SINGLE HS TROPONIN T - Abnormal; Notable for the following components:    HS Troponin T 33 (*)     All other components within normal limits    Narrative:     High Sensitive Troponin T Reference Range:  <14.0 ng/L-  Negative Female for AMI  <22.0 ng/L- Negative Male for AMI  >=14 - Abnormal Female indicating possible myocardial injury.  >=22 - Abnormal Male indicating possible myocardial injury.   Clinicians would have to utilize clinical acumen, EKG, Troponin, and serial changes to determine if it is an Acute Myocardial Infarction or myocardial injury due to an underlying chronic condition.        CBC WITH AUTO DIFFERENTIAL - Abnormal; Notable for the following components:    RBC 4.00 (*)     Hemoglobin 12.5 (*)     All other components within normal limits   COVID-19 AND FLU A/B, NP SWAB IN TRANSPORT MEDIA 1 HR TAT - Normal    Narrative:     Fact sheet for providers: https://www.fda.gov/media/116702/download    Fact sheet for patients: https://www.fda.gov/media/278616/download    Test performed by PCR.   BNP (IN-HOUSE) - Normal    Narrative:     This assay is used as an aid in the diagnosis of individuals suspected of having heart failure. It can be used as an aid in the diagnosis of acute decompensated heart failure (ADHF) in patients presenting with signs and symptoms of ADHF to the emergency department (ED). In addition, NT-proBNP of <300 pg/mL indicates ADHF is not likely.    Age Range Result Interpretation  NT-proBNP Concentration (pg/mL:      <50             Positive            >450                   Gray                 300-450                    Negative             <300    50-75           Positive            >900                  Gray                300-900                  Negative            <300      >75             Positive            >1800                  Gray                300-1800                  Negative            <300   PROTIME-INR - Normal   APTT - Normal   MAGNESIUM - Normal   RAINBOW DRAW    Narrative:     The following orders were created for panel order [place] Draw.  Procedure                               Abnormality         Status                     ---------                                -----------         ------                     Green Top (Gel)[[id number]]                                  Final result               Lavender Top[[id number]]                                     Final result               Gold Top - SST[[id number]]                                   Final result               Light Blue Top[[id number]]                                   Final result                 Please view results for these tests on the individual orders.   CBC AND DIFFERENTIAL    Narrative:     The following orders were created for panel order CBC & Differential.  Procedure                               Abnormality         Status                     ---------                               -----------         ------                     CBC Auto Differential[[id number]]        Abnormal            Final result                 Please view results for these tests on the individual orders.   GREEN TOP   LAVENDER TOP   GOLD TOP - SST   LIGHT BLUE TOP       EKG:   ECG 12 Lead ED Triage Standing Order; SOA   Final Result   HEART RATE=79  bpm   RR Vkglukwz=450  ms   MA Interval=  ms   P Horizontal Axis=  deg   P Front Axis=  deg   QRSD Interval=89  ms   QT Ywdpubmg=642  ms   PBhC=792  ms   QRS Axis=-12  deg   T Wave Axis=143  deg   - ABNORMAL ECG -   Sinus rhythm   Ventricular bigeminy   Abnormal R-wave progression, late transition   Repol abnrm suggests ischemia, lateral leads   When compared with ECG of 01-May-2023 11:07:08,   Ventricular ectopy has increased   Electronically Signed By: Gregor Kim ([facility]) 2024-09-24 17:12:33   Date and Time of Study:2024-09-24 15:44:57          Meds given in ED:   Medications   sodium chloride 0.9 % flush 10 mL (has no administration in time range)   nitroglycerin (NITROSTAT) SL tablet 0.4 mg (has no administration in time range)   sodium chloride 0.9 % flush 10 mL (has no administration in time range)   sodium chloride 0.9 % flush 10 mL (has no administration in time range)   sodium  chloride 0.9 % infusion 40 mL (has no administration in time range)   Potassium Replacement - Follow Nurse / BPA Driven Protocol (has no administration in time range)   Magnesium Standard Dose Replacement - Follow Nurse / BPA Driven Protocol (has no administration in time range)   Phosphorus Replacement - Follow Nurse / BPA Driven Protocol (has no administration in time range)   Calcium Replacement - Follow Nurse / BPA Driven Protocol (has no administration in time range)   labetalol (NORMODYNE,TRANDATE) injection 10 mg (10 mg Intravenous Given 9/24/24 1620)   iopamidol (ISOVUE-370) 76 % injection 100 mL (95 mL Intravenous Given by Other 9/24/24 1658)       Imaging results:  CT Angiogram Chest Pulmonary Embolism    Result Date: 9/24/2024   1. No pulmonary embolism. 2. Mild airways disease. 3. Stable pulmonary nodules. No new or enlarging pulmonary nodules identified. 4. Esophagectomy with gastric pull-up.  This report was finalized on 9/24/2024 5:42 PM by Dr. Dylon Barney M.D on Workstation: [id number]      XR Chest 1 View    Result Date: 9/24/2024  1. No active disease is seen in the chest with no significant change when compared to the patient's prior portable chest x-ray on 6/25/2024 and the etiology of this patient's shortness of breath and chest pressure is not established on this exam  2. Previous median sternotomy and aortic valve prosthesis is in place and there is mild chronic elevation left hemidiaphragm with horizontal linear atelectasis or scarring at the left lung base.  This report was finalized on 9/24/2024 4:31 PM by Dr. Maury Byrne M.D on Workstation: [id number]       Ambulatory status:   - up at [facility], increase SOB with activity    Social issues:   Social History     Socioeconomic History    Marital status: Single   Tobacco Use    Smoking status: Never     Passive exposure: Never    Smokeless tobacco: Never   Vaping Use    Vaping status: Never Used   Substance and Sexual Activity    Alcohol  use: Not Currently     Comment: usually 1-2 month but none since christmas 2022    Drug use: Never    Sexual activity: Defer       Peripheral Neurovascular  Peripheral Neurovascular (Adult)  Peripheral Neurovascular WDL: WDL, pulse assessment  Calf Tenderness: none present  Additional Documentation: Edema (Group), Calf Tenderness (Row)  Edema  Edema: foot, right, foot, left, ankle, right, ankle, left  Ankle, Left Edema: 3+ (Moderate)  Ankle, Right Edema: 3+ (Moderate)  Foot, Left Edema: 3+ (Moderate)  Foot, Right Edema: 3+ (Moderate)    Neuro Cognitive  Neuro Cognitive (Adult)  Cognitive/Neuro/Behavioral WDL: WDL, arousability, mood/behavior, level of consciousness, motor response, orientation, speech, all  Level of Consciousness: Alert  Arousal Level: opens eyes spontaneously  Orientation: person, place, time, situation, oriented x 4  Speech: clear, spontaneous, logical  Mood/Behavior: calm, cooperative, behavior appropriate to situation  Motor Response  Motor Response: general motor response  General Motor Response: purposeful motor response    Learning  Learning Assessment (Adult)  Learning Readiness and Ability: no barriers identified  Education Provided  Person Taught: patient  Teaching Method: verbal instruction  Teaching Focus: symptom/problem overview, medical device/equipment use, medication administration, diagnostic test  Education Outcome Evaluation: verbalizes understanding    Respiratory  Respiratory WDL  Respiratory WDL: .WDL except, all, cough  Rhythm/Pattern, Respiratory: shortness of breath, pattern regular, depth regular, labored  Expansion/Accessory Muscles/Retractions: no retractions, no use of accessory muscles    Abdominal Pain       Pain Assessments  Pain (Adult)  (0-10) Pain Rating: Rest: 4    NIH Stroke Scale       Naila Holloway RN  09/24/24 18:12 EDT

## 2024-09-24 NOTE — H&P
[facility]   HISTORY AND PHYSICAL    Patient Name: Edmond Chase  : 1960  MRN: [id number]  Primary Care Physician:  Nargis Velasquez APRN  Date of admission: 2024    Subjective   Subjective     Chief Complaint:   Chief Complaint   Patient presents with    Shortness of Breath     HPI:    Edmond Chase is a 64 y.o. male who presented to the emergency department with complaints of chest pain.  He was seen at his cardiologist office earlier today.  He was noted to have an elevated D-dimer at 1.69 so you sent to the emergency department for CT angiogram and venous Doppler.  Past medical history significant for not limited to esophageal cancer, anxiety and depression, CAD with prior CABG/stent, valve replacement. CTA chest shows no pulmonary embolism, stable pulmonary nodules.  No new or enlarging pulmonary nodules identified.  Esophagectomy with gastric pull-up noted.  Venous Doppler was negative for DVT.  Initial troponin was 36 with a repeat of 33, delta -3.  Potassium was low at 3.2.  Hemoglobin was 12.5 with hematocrit of 37.6.  Open and influenza testing were negative.  He will be admitted to the ED observation unit for further management.  Will consult cardiology.    Review of Systems   All systems were reviewed and negative except for: Those mentioned in HPI    Personal History     Past Medical History:   Diagnosis Date    Abnormal nuclear stress test     Anxiety     Anxiety and depression     Aortic valve insufficiency     nonrheumtic     Arthritis     knees    Ascending aortic aneurysm     CAD (coronary artery disease)     stent    Chest pain     Diabetes mellitus     Diarrhea     Dizzy     CAREFUL WHEN GETTING UP    Dyspnea     Esophageal cancer 2022    no chemo or radiation    Essential hypertension     Fatigue     G tube feedings     per jejunostomy tube nightly with permitin  3 cans nightly/ J-TUBE REMOVED     GERD (gastroesophageal reflux disease)     GI bleed     Heart  "murmur     History of pneumonia     History of recent blood transfusion     hemorrhage     no reaction    Hyperglycemia     Hyperlipidemia     Hypersomnia with sleep apnea     Jejunostomy tube present     REMOVED 2023    Lightheadedness     Malaise and fatigue     Migraines     \"OCCULAR MIGRAINES\"    Morbid obesity     Myocardial ischemia     Nausea     Nocturia     TJ on auto CPAP 10/31/2016    Overnight polysomnogram.  Weight 276 pounds.  Severe TJ with AHI 79 events per hour.  Auto CPAP recommended.    Pneumonia     FEB 2016    Scrotal bleeding     SOB (shortness of breath)        Past Surgical History:   Procedure Laterality Date    AORTIC VALVE REPAIR/REPLACEMENT  05/2016    ASCENDING ARCH/HEMIARCH REPLACEMENT N/A 05/02/2016    Procedure: BHAVESH STERNOTOMY CORONARY ARTERY BYPASS GRAFT TIMES 3 USING LEFT INTERNAL MAMMARY ARTERY AND RIGHT GREATER SAPHENOUS VEIN GRAFT PER ENDOSCOPIC VEIN HARVESTING, AORTIC ANEURYSM REPAIR WITH ROOT REPAIR AND AORTIC VALVE REPLACEMENT;  Surgeon: Rosalio Cline MD;  Location: [facility] MAIN OR;  Service:     CARDIAC CATHETERIZATION N/A 04/01/2016    Procedure: Left Heart Cath;  Surgeon: Erick Tam MD;  Location: [facility] CATH INVASIVE LOCATION;  Service:     CARDIAC CATHETERIZATION N/A 04/01/2016    Procedure: Left ventriculography;  Surgeon: Erick Tam MD;  [facility]ASIVE LOC[facility];  Service:     CARDIAC CATHETERIZATION N/A 04/01/2016    Procedure: Right Heart Cath;  Surgeon: Erick Tam MD;  Location: [facility] CATH INVASIVE LOCATION;  Service:     CARDIAC CATHETERIZATION N/A 10/30/2017    Procedure: Coronary angiography;  Surgeon: Sorin Vasquez MD;  Location: [facility] CATH INVASIVE LOCATION;  Service:     CARDIAC CATHETERIZATION  10/30/2017    Procedure: Saphenous Vein Graft;  Surgeon: Sorin Vasquez MD;  Location: [facility] CATH INVASIVE LOCATION;  Service:     CARDIAC CATHETERIZATION N/A 10/30/2017    Procedure: Native mammary injection;  Surgeon: Sorin GUEVARA" MD Christina;  [facility];  Service:     CARDIAC CATHETERIZATION N/A 07/07/2020    Procedure: Coronary angiography;  Surgeon: Gregor Kim MD;  [facility];  Service: Cardiovascular;  Laterality: N/A;    CARDIAC CATHETERIZATION N/A 07/07/2020    Procedure: Left heart cath;  Surgeon: Gregor Kim MD;  [facility];  Service: Cardiovascular;  Laterality: N/A;    CARDIAC CATHETERIZATION N/A 07/07/2020    Procedure: Left ventriculography;  Surgeon: Gregor Kim MD;  [facility];  Service: Cardiovascular;  Laterality: N/A;    CARDIAC CATHETERIZATION N/A 07/07/2020    Procedure: Stent ESMER bypass graft;  Surgeon: Gregor Kim MD;  [facility];  Service: Cardiovascular;  Laterality: N/A;    CARDIAC CATHETERIZATION N/A 06/17/2021    Procedure: SAPHENOUS VEIN GRAFT;  Surgeon: Marques Ndiaye MD;  Location: [facility] CATH INVASIVE LOCATION;  Service: Cardiovascular;  Laterality: N/A;    CARDIAC CATHETERIZATION N/A 06/17/2021    Procedure: Left Heart Cath;  Surgeon: Marques Ndiaye MD;  Location: [facility] CATH INVASIVE LOCATION;  Service: Cardiovascular;  Laterality: N/A;    CARDIAC CATHETERIZATION N/A 06/17/2021    Procedure: Coronary angiography;  Surgeon: Marques Ndiaye MD;  Location: [facility] CATH INVASIVE LOCATION;  Service: Cardiovascular;  Laterality: N/A;    CARDIAC CATHETERIZATION N/A 07/14/2022    Procedure: Left Heart Cath;  Surgeon: Charlie Aj MD;  Location: [facility] CATH INVASIVE LOCATION;  Service: Cardiovascular;  Laterality: N/A;    CARDIAC CATHETERIZATION N/A 07/14/2022    Procedure: Coronary angiography;  Surgeon: Charlie Aj MD;  Location: [facility] CATH INVASIVE LOCATION;  Service: Cardiovascular;  Laterality: N/A;    CARDIAC CATHETERIZATION  07/14/2022    Procedure: Saphenous Vein Graft;  Surgeon: Charlie Aj MD;   Location: [facility] CATH INVASIVE LOCATION;  Service: Cardiovascular;;    CARDIAC CATHETERIZATION N/A 07/14/2022    Procedure: Native mammary injection;  Surgeon: Charlie Aj MD;  Location: [facility] CATH INVASIVE LOCATION;  Service: Cardiovascular;  Laterality: N/A;    CATARACT EXTRACTION EXTRACAPSULAR W/ INTRAOCULAR LENS IMPLANTATION Left     CHOLECYSTECTOMY WITH INTRAOPERATIVE CHOLANGIOGRAM N/A 09/01/2023    Procedure: CHOLECYSTECTOMY LAPAROSCOPIC INTRAOPERATIVE CHOLANGIOGRAM choledoscopy common bile duct exploration;  Surgeon: Jose Manuel Baca MD;  Location: [facility] MAIN OR;  Service: General;  Laterality: N/A;    COLONOSCOPY N/A 11/26/2022    Procedure: COLONOSCOPY AT BEDSIDE;  Surgeon: Tomy Lopez MD;  Location: [facility] MAIN OR;  Service: Gastroenterology;  Laterality: N/A;    COLONOSCOPY W/ POLYPECTOMY N/A 10/04/2022    Procedure: COLONOSCOPY to cecum with cold forceps and cold snare polypectomies;  Surgeon: Gregor Carlson MD;  Location: [facility] ENDOSCOPY;  Service: Gastroenterology;  Laterality: N/A;  PRE- hx of polyps  POST- diverticulosis, polyps    CORONARY ARTERY BYPASS GRAFT  05/2016    LIMA TO LAD, SVG TO PDA, SVG TO OM2    ENDOSCOPY N/A 07/13/2022    Procedure: ESOPHAGOGASTRODUODENOSCOPY;  Surgeon: Marcia Hollis MD;  Location: [facility] ENDOSCOPY;  Service: Gastroenterology;  Laterality: N/A;  PRE- ANEMIA, MELENA  POST- MILD EROSIVE GASTRITIS, GE JUNCTION ULCER    ENDOSCOPY N/A 10/04/2022    Procedure: ESOPHAGOGASTRODUODENOSCOPY with biopsies;  Surgeon: Gregor Carlson MD;  Location: [facility] ENDOSCOPY;  Service: Gastroenterology;  Laterality: N/A;  PRE- hx of esophageal ulcer  POST- gastric cardia mass    ENDOSCOPY N/A 05/01/2023    Procedure: ESOPHAGOGASTRODUODENOSCOPY WITH  [id number] balloon DILATATION of pylorus and anastomosis;  Surgeon: Valerie Stockton MD;  Location: [facility] ENDOSCOPY;  Service: Gastroenterology;  Laterality: N/A;  PRE: dysphagia  POST: no  abnormalities seen    ENDOSCOPY N/A 6/25/2024    Procedure: ESOPHAGOGASTRODUODENOSCOPY WITH BIOPSY and 15mm balloon dilatation (pylorus);  Surgeon: Valerie Stockton MD;  Location: [facility] ENDOSCOPY;  Service: Gastroenterology;  Laterality: N/A;  pre: Esophageal cancer  post: normal anastamosis    ESOPHAGOGASTRECTOMY Right 02/03/2023    Procedure: BRONCHOSCOPY, RIGHT ROBOTIC VIDEO ASSISTED THORACOSCOPY WITH TOTAL ESOPHAGECTOMY, INTERCOSTAL NERVE BLOCK, FEEDING JEJUNOSTOMY PLACEMENT;  Surgeon: Valerie Stockton MD;  Location: [facility] MAIN OR;  Service: Robotics - DaVinci;  Laterality: Right;    INNER EAR SURGERY      JEJUNOSTOMY TUBE INSERTION      REMOVED 2023    TONSILLECTOMY         Family History: family history includes Arthritis in his mother; Coronary artery disease in an other family member; Diabetes in his mother; Heart disease in his father, maternal grandmother, mother, and sister; Hyperlipidemia in his father and mother; Hypertension in his father, maternal grandfather, mother, paternal grandfather, and paternal grandmother; Lung cancer in his father; Other in an other family member; Stroke in his maternal grandmother. Otherwise pertinent FHx was reviewed and not pertinent to current issue.    Social History:  reports that he has never smoked. He has never been exposed to tobacco smoke. He has never used smokeless tobacco. He reports that he does not currently use alcohol. He reports that he does not use drugs.    Home Medications:  Tetanus-Diphth-Acell Pertussis, amLODIPine, armodafinil, atorvastatin, dapagliflozin Propanediol, fluticasone, furosemide, isosorbide mononitrate, lisinopril, metoprolol succinate XL, and mirtazapine    Allergies:  No Known Allergies    Objective   Objective     Vitals:   Temp:  [97.4 °F (36.3 °C)] 97.4 °F (36.3 °C)  Heart Rate:  [54-76] 61  Resp:  [16-20] 20  BP: (135-171)/() 159/74  Flow (L/min):  [2] 2  Physical Exam    Constitutional: Awake, alert   Eyes: PERRLA, sclerae  anicteric, no conjunctival injection   HENT: NCAT, mucous membranes moist   Neck: Supple, no thyromegaly, no lymphadenopathy, trachea midline   Respiratory: Clear to auscultation bilaterally, nonlabored respirations    Cardiovascular: Irregular rhythm, + murmurs, rubs, or gallops, palpable pedal pulses bilaterally   Gastrointestinal: Positive bowel sounds, soft, nontender, nondistended   Musculoskeletal: 1+ bilateral ankle edema, no clubbing or cyanosis to extremities   Psychiatric: Appropriate affect, cooperative   Neurologic: Oriented x 3, strength symmetric in all extremities, Cranial Nerves grossly intact to confrontation, speech clear   Skin: No rashes     Result Review    Result Review:  I have personally reviewed the results from the time of this admission to 9/24/2024 18:39 EDT and agree with these findings:  [x]  Laboratory list / accordion  []  Microbiology  [x]  Radiology  [x]  EKG/Telemetry   []  Cardiology/Vascular   []  Pathology  []  Old records  []  Other:  Most notable findings include: Troponin was 36 then 33.  Potassium low at 3.2.      Assessment & Plan   Assessment / Plan     Brief Patient Summary:  Edmond Chase is a 64 y.o. male who will be admitted to the ED observation unit for further management due to chest pain.  We will consult cardiology.    Active Hospital Problems:  Active Hospital Problems    Diagnosis     **Chest pain      Plan:     Chest pain  History of CAD with prior CABG/PCI  -Troponin 36, 33, repeat in the morning  -EKG negative for acute ischemia, repeat in the morning  -Continuous telemetry monitoring  -Vital signs per nursing routine  -Consult cardiology  -N.p.o. after midnight    Hypertension  -Continue lisinopril amlodipine    Hyperlipidemia  -Continue atorvastatin    VTE Prophylaxis:  No VTE prophylaxis order currently exists.    CODE STATUS:       Admission Status:  I believe this patient meets observation status.    Electronically signed by VIKTORIA Mathias,  09/24/24, 6:39 PM EDT.    75 minutes has been spent by [facility] providers in the care of this patient while under observation status    I have worn appropriate PPE during this patient encounter, sanitized my hands both with entering and exiting patient's room.    I have discussed plan of care with patient including advance care plan and/or surrogate decision maker.  Patient advises that their sister, Mariola will be their primary surrogate decision maker

## 2024-09-25 ENCOUNTER — READMISSION MANAGEMENT (OUTPATIENT)
Dept: CALL CENTER | Facility: HOSPITAL | Age: 64
End: 2024-09-25
Payer: MEDICARE

## 2024-09-25 ENCOUNTER — PATIENT ROUNDING (BHMG ONLY) (OUTPATIENT)
Dept: CARDIOLOGY | Facility: CLINIC | Age: 64
End: 2024-09-25
Payer: MEDICARE

## 2024-09-25 VITALS
RESPIRATION RATE: 18 BRPM | BODY MASS INDEX: 37.11 KG/M2 | WEIGHT: 280 LBS | SYSTOLIC BLOOD PRESSURE: 183 MMHG | HEART RATE: 59 BPM | TEMPERATURE: 97.7 F | DIASTOLIC BLOOD PRESSURE: 97 MMHG | OXYGEN SATURATION: 96 % | HEIGHT: 73 IN

## 2024-09-25 LAB
ANION GAP SERPL CALCULATED.3IONS-SCNC: 8.6 MMOL/L (ref 5–15)
BUN SERPL-MCNC: 8 MG/DL (ref 8–23)
BUN/CREAT SERPL: 11.9 (ref 7–25)
CALCIUM SPEC-SCNC: 8.5 MG/DL (ref 8.6–10.5)
CHLORIDE SERPL-SCNC: 106 MMOL/L (ref 98–107)
CHOLEST SERPL-MCNC: 102 MG/DL (ref 0–200)
CO2 SERPL-SCNC: 26.4 MMOL/L (ref 22–29)
CREAT SERPL-MCNC: 0.67 MG/DL (ref 0.76–1.27)
DEPRECATED RDW RBC AUTO: 44.7 FL (ref 37–54)
EGFRCR SERPLBLD CKD-EPI 2021: 103 ML/MIN/1.73
ERYTHROCYTE [DISTWIDTH] IN BLOOD BY AUTOMATED COUNT: 13.1 % (ref 12.3–15.4)
GLUCOSE SERPL-MCNC: 89 MG/DL (ref 65–99)
HCT VFR BLD AUTO: 33.9 % (ref 37.5–51)
HDLC SERPL-MCNC: 32 MG/DL (ref 40–60)
HGB BLD-MCNC: 11.2 G/DL (ref 13–17.7)
LDLC SERPL CALC-MCNC: 51 MG/DL (ref 0–100)
LDLC/HDLC SERPL: 1.59 {RATIO}
MCH RBC QN AUTO: 31.1 PG (ref 26.6–33)
MCHC RBC AUTO-ENTMCNC: 33 G/DL (ref 31.5–35.7)
MCV RBC AUTO: 94.2 FL (ref 79–97)
PLATELET # BLD AUTO: 149 10*3/MM3 (ref 140–450)
PMV BLD AUTO: 9.5 FL (ref 6–12)
POTASSIUM SERPL-SCNC: 3.2 MMOL/L (ref 3.5–5.2)
RBC # BLD AUTO: 3.6 10*6/MM3 (ref 4.14–5.8)
SODIUM SERPL-SCNC: 141 MMOL/L (ref 136–145)
TRIGL SERPL-MCNC: 96 MG/DL (ref 0–150)
TROPONIN T SERPL HS-MCNC: 34 NG/L
VLDLC SERPL-MCNC: 19 MG/DL (ref 5–40)
WBC NRBC COR # BLD AUTO: 5.65 10*3/MM3 (ref 3.4–10.8)

## 2024-09-25 PROCEDURE — 99214 OFFICE O/P EST MOD 30 MIN: CPT | Performed by: INTERNAL MEDICINE

## 2024-09-25 PROCEDURE — 96376 TX/PRO/DX INJ SAME DRUG ADON: CPT

## 2024-09-25 PROCEDURE — 80061 LIPID PANEL: CPT | Performed by: NURSE PRACTITIONER

## 2024-09-25 PROCEDURE — 80048 BASIC METABOLIC PNL TOTAL CA: CPT | Performed by: NURSE PRACTITIONER

## 2024-09-25 PROCEDURE — 84484 ASSAY OF TROPONIN QUANT: CPT | Performed by: NURSE PRACTITIONER

## 2024-09-25 PROCEDURE — 25010000002 LABETALOL 5 MG/ML SOLUTION

## 2024-09-25 PROCEDURE — G0378 HOSPITAL OBSERVATION PER HR: HCPCS

## 2024-09-25 PROCEDURE — 93005 ELECTROCARDIOGRAM TRACING: CPT | Performed by: NURSE PRACTITIONER

## 2024-09-25 PROCEDURE — 93010 ELECTROCARDIOGRAM REPORT: CPT | Performed by: INTERNAL MEDICINE

## 2024-09-25 PROCEDURE — 85027 COMPLETE CBC AUTOMATED: CPT | Performed by: NURSE PRACTITIONER

## 2024-09-25 RX ORDER — RANOLAZINE 500 MG/1
500 TABLET, EXTENDED RELEASE ORAL EVERY 12 HOURS SCHEDULED
Qty: 60 TABLET | Refills: 2 | Status: SHIPPED | OUTPATIENT
Start: 2024-09-25

## 2024-09-25 RX ORDER — RANOLAZINE 500 MG/1
500 TABLET, EXTENDED RELEASE ORAL EVERY 12 HOURS SCHEDULED
Status: DISCONTINUED | OUTPATIENT
Start: 2024-09-25 | End: 2024-09-25 | Stop reason: HOSPADM

## 2024-09-25 RX ADMIN — EMPAGLIFLOZIN 25 MG: 25 TABLET, FILM COATED ORAL at 08:42

## 2024-09-25 RX ADMIN — Medication 10 ML: at 08:42

## 2024-09-25 RX ADMIN — AMLODIPINE BESYLATE 10 MG: 10 TABLET ORAL at 08:41

## 2024-09-25 RX ADMIN — LABETALOL HYDROCHLORIDE 20 MG: 5 INJECTION, SOLUTION INTRAVENOUS at 00:11

## 2024-09-25 NOTE — PLAN OF CARE
Goal Outcome Evaluation:         Pt. Admitted to observation from the ED with chest pain. Pt. Reports that the chest pain is chronic but has felt worse recently. Potassium replaced. Cardio consult ordered. Edema in legs bilaterally. NPO. On 2L for comfort care, pt usually wears CPAP at night.

## 2024-09-25 NOTE — PROGRESS NOTES
Hospital Follow Up    LOS: 0  Patient Name: Edmond Chase  Age/Sex: 64 y.o. male  : 1960  MRN: [id number]    Day of Service: 24   Length of Stay: 0  Encounter Provider: Papa Miguel MD  Place of Service: [facility] CARDIOLOGY  Patient Care Team:  Nargis Velasquez APRN as PCP - General (Family Medicine)  Papa Miguel MD as Consulting Physician (Cardiology)  Sekou Pisano MD as Consulting Physician (Pulmonary Disease)  Gregor Carlson MD as Consulting Physician (Gastroenterology)  Estevan Yuan MD (Sports Medicine)  Alex Hernadez MD as Consulting Physician (Nephrology)  Yazmin Molina APRN as Nurse Practitioner (Psychiatry)  Dylon Schwartz MD as Consulting Physician (Hematology and Oncology)  Valerie Stockton MD as Referring Physician (Thoracic Surgery)  Valerie Stockton MD as Surgeon (Thoracic Surgery)    Subjective:     Chief Complaint: Chest pain    Interval History: Patient is doing better this morning.  No further chest discomfort.    Objective:     Objective:  Temp:  [97.4 °F (36.3 °C)-98.2 °F (36.8 °C)] 97.7 °F (36.5 °C)  Heart Rate:  [54-76] 59  Resp:  [16-20] 18  BP: (135-189)/() 183/97   No intake or output data in the 24 hours ending 24 0829  Body mass index is 36.94 kg/m².      24  1546   Weight: 127 kg (280 lb)     Weight change:       Physical Exam:   General :  Alert, cooperative, in no acute distress.  Neuro:   Alert,cooperative and oriented.  Lungs:  CTAB. Normal respiratory effort and rate.  CV:  Regular rate and rhythm, normal S1 and S2, no murmurs, gallops or rubs.   ABD:  Soft, nontender, nondistended. Positive bowel sounds.  Extr:  No edema or cyanosis, moves all extremities.    Lab Review:   Results from last 7 days   Lab Units 24  0408 24  1553 24  1409   SODIUM mmol/L 141 139 143   POTASSIUM mmol/L 3.2* 3.2* 3.1*   CHLORIDE mmol/L 106 105 106   CO2 mmol/L  26.4 25.8 27.7   BUN mg/dL 8 9 9   CREATININE mg/dL 0.67* 0.77 0.80   GLUCOSE mg/dL 89 101* 109*   CALCIUM mg/dL 8.5* 8.7 8.7   AST (SGOT) U/L  --  16 16   ALT (SGPT) U/L  --  17 18     Results from last 7 days   Lab Units 09/25/24  0408 09/24/24  1553 09/24/24  1409   HSTROP T ng/L 34* 33*  33* 36*     Results from last 7 days   Lab Units 09/25/24  0408 09/24/24  1553   WBC 10*3/mm3 5.65 6.27   HEMOGLOBIN g/dL 11.2* 12.5*   HEMATOCRIT % 33.9* 37.6   PLATELETS 10*3/mm3 149 175     Results from last 7 days   Lab Units 09/24/24  1553   INR  1.00   APTT seconds 29.7     Results from last 7 days   Lab Units 09/24/24  1553   MAGNESIUM mg/dL 2.1     Results from last 7 days   Lab Units 09/25/24  0408   CHOLESTEROL mg/dL 102   TRIGLYCERIDES mg/dL 96   HDL CHOL mg/dL 32*     Results from last 7 days   Lab Units 09/24/24  1553 09/24/24  1409   PROBNP pg/mL 650.0 644.0     Results from last 7 days   Lab Units 09/24/24  1409   TSH uIU/mL 1.210       Current Medications:   Scheduled Meds:amLODIPine, 10 mg, Oral, Daily  atorvastatin, 40 mg, Oral, Nightly  empagliflozin, 25 mg, Oral, Daily  [Held by provider] isosorbide mononitrate, 60 mg, Oral, BID  lisinopril, 10 mg, Oral, Nightly  [Held by provider] metoprolol succinate XL, 50 mg, Oral, Daily  mirtazapine, 45 mg, Oral, Nightly  sodium chloride, 10 mL, Intravenous, Q12H      Continuous Infusions:     Allergies:  No Known Allergies    Assessment:       Chest pain        Plan:     1.  Chest discomfort more than likely this is ischemic in nature.  Patient had a stress test several years ago with a fairly large area of ischemia but he did not have any vessels that could be intervened upon.  It was all small vessel disease.  He is now on a maximum dose of isosorbide.  Below that were going to add Ranexa 500 twice daily in addition to his other 3 antianginals.  I do not think repeating a stress test is going to be beneficial if he continues to have problems we may be forced to cath  him but even that may be not helpful.  Will see what evolves he is going to follow back up with myself or VIKTORIA Logan in 1 to 2 weeks.  2.  CT scan is negative for pulmonary embolism patient has postop changes from esophagectomy no new nodes.  3.  Okay to discharge from my standpoint spoke with Dr. Nicole.    Papa Miguel MD  09/25/24  08:29 EDT

## 2024-09-25 NOTE — PROGRESS NOTES
MD ATTESTATION NOTE     SHARED VISIT: This visit was performed by BOTH a physician and an APC. The substantive portion of the medical decision making was performed by this attesting physician who made or approved the management plan and takes responsibility for patient management. All studies in the APC note (if performed) were independently interpreted by me.  The COLLEEN and I have discussed this patient's history, physical exam, and treatment plan. I have reviewed the documentation and personally had a face to face interaction with the patient. I affirm the documentation and agree with the treatment and plan. I provided a substantive portion of the care of the patient.  I personally performed the physical exam in its entirety, and below are my findings.      Brief HPI: Patient complains of minimal chest soreness.  Denies shortness of breath, nausea, vomiting, or dizziness.    PHYSICAL EXAM    GENERAL: Awake and alert.  Well-developed, well-nourished elderly male.  Resting comfortably in no acute distress  HENT: nares patent  EYES: no scleral icterus  CV: regular rhythm, normal rate  RESPIRATORY: normal effort, CTAB  ABDOMEN: soft, nontender  MUSCULOSKELETAL: no deformity  NEURO: alert, moves all extremities, follows commands  PSYCH:  calm, cooperative  SKIN: warm, dry    Vital signs and nursing notes reviewed.        Plan: Serial troponins are flat.  Patient has been seen by Dr. Miguel, his cardiologist.  He recommends starting the patient on Ranexa 500 mg twice daily.  Patient can be discharged and will need to follow-up with Dr. Miguel or his COLLEEN in 2 weeks.

## 2024-09-25 NOTE — DISCHARGE SUMMARY
ED OBSERVATION PROGRESS/DISCHARGE SUMMARY    Date of Admission: 9/24/2024   LOS: 0 days   PCP: Nargis Velasquez APRN    Final Diagnosis chest pain      Subjective     Hospital Outcome:     Edmond Chase is a 64 y.o. male with history significant for esophageal cancer, anxiety, CAD with CABG/stents presenting with acute exacerbation of chronic chest pain.  He was sent to the ED from his cardiologist office with a D-dimer of 1.69.  Initial workup will include CT chest angiogram without pulmonary emboli, nonischemic EKG, ventricular bigeminy, heart rate 79, and troponin is flat with initial troponin of 36 and repeat of 33.  RVP negative.  Bilateral lower extremity duplex performed did not show any acute thrombus in lower extremities.    9/25/2024  Mildly exacerbated chest pain overnight and rest.  Denies SOA, diaphoresis.  States the pain feels about the same as his baseline.      Patient was seen and evaluate by Dr. Miguel with the cardiology service who recommends starting patient on Ranexa in addition to isosorbide.  Recommends setting patient up to follow-up with APRN in cardiology office in the next 1 to 2 weeks      Review of Systems:   Constitutional:  No weight changes, fever, or chills. No night sweats, no fatigue, no malaise.    ENT/Mouth:  No hearing changes, no ear pain, no nasal congestion, no sinus pain, no hoarseness, no sore throat, no rhinorrhea, no dysphagia.   Eyes:  No eye pain, swelling, or redness. No foreign body, discharge. No vision changes.    Cardiovascular:  No chest pain, no shortness of air, no dyspnea on exertion, no orthopnea, no palpitations, no edema.      Respiratory:  No cough, no smoke exposure, no dyspnea.    Gastrointestinal:  No nausea, vomiting, or diarrhea. No constipation, or GI discomfort. No reflux pain, no anorexia, no dysphagia. No hematochezia or melena.    Genitourinary:  No dyspareunia, no dysuria, no urinary frequency. No hematuria, no urinary incontinence. No  flank pain or urinary flow changes.   Musculoskeletal:  No arthralgias, no myalgias, no joint swelling or stiffness. No back or neck pain, no recent injuries.    Skin:  No skin lesions, no pruritus, no hair changes.    Neuro:  No weakness, no numbness, no paresthesias, no loss of consciousness, no syncope, no dizziness, no headache.   Psych:  No anxiety/panic, no depression, no insomnia, no personality changes, no delusions.    Heme/Lymph:  No bruising, or bleeding. No transfusions history, no lymphadenopathy.   Endocrine:  No polyuria, polydipsia, no temperature intolerances.     Objective   Physical Exam:   Constitutional: Awake, alert. Well developed for age. Nontoxic appearing.   Eyes: PERRL x2, sclerae anicteric, no conjunctival injection. No EOM abnormlaities noted.   HENT: NCAT, mucous membranes moist,   Neck: Supple, no thyromegaly, no lymphadenopathy, trachea midline  Respiratory: Clear to auscultation bilaterally, nonlabored respirations   Cardiovascular: RRR, no murmurs, rubs, or gallops, palpable pedal pulses bilaterally. No appreciable edema.   Gastrointestinal: Positive bowel sounds, soft, nontender, not distended.   Musculoskeletal: No bilateral ankle edema, no clubbing or cyanosis to extremities. No obvious deformities.   Psychiatric: Appropriate affect, cooperative. Converses appropriately for age.   Neurologic: Oriented x 3, strength symmetric in all extremities. Cranial nerves grossly intact to confrontation, speech clear  Skin: No rashes, skin intact.     Results Review:    I have reviewed the labs, radiology results and diagnostic studies.    Results from last 7 days   Lab Units 09/25/24  0408   WBC 10*3/mm3 5.65   HEMOGLOBIN g/dL 11.2*   HEMATOCRIT % 33.9*   PLATELETS 10*3/mm3 149     Results from last 7 days   Lab Units 09/25/24  0408 09/24/24  1553 09/24/24  1409   SODIUM mmol/L 141 139 143   POTASSIUM mmol/L 3.2* 3.2* 3.1*   CHLORIDE mmol/L 106 105 106   CO2 mmol/L 26.4 25.8 27.7   BUN mg/dL  8 9 9   CREATININE mg/dL 0.67* 0.77 0.80   CALCIUM mg/dL 8.5* 8.7 8.7   BILIRUBIN mg/dL  --  0.6 0.6   ALK PHOS U/L  --  86 82   ALT (SGPT) U/L  --  17 18   AST (SGOT) U/L  --  16 16   GLUCOSE mg/dL 89 101* 109*     Imaging Results (Last 24 Hours)       Procedure Component Value Units Date/Time    CT Angiogram Chest Pulmonary Embolism [[id number]] Collected: 09/24/24 1729     Updated: 09/24/24 1745    Narrative:      CT ANGIOGRAM CHEST PULMONARY EMBOLISM-     INDICATION: Shortness of air. Positive D-dimer.     COMPARISON: CT chest May 17, 2024     TECHNIQUE:  CTA chest with IV contrast. Coronal and sagittal reformats. Three  dimensional reconstructions. Radiation dose reduction techniques were  utilized, including automated exposure control and exposure modulation  based on body size.     FINDINGS:      Pulmonary arteries: No pulmonary embolism.     Chest wall: No lymphadenopathy.     Mediastinum: Three-vessel coronary artery atherosclerotic  calcifications. CABG. Aortic valve replacement. Heart is normal in size.  No pericardial effusion. No mediastinal or hilar lymphadenopathy.     Lungs/pleura: No effusions. Patent central airways. Mild airways wall  thickening. Juxtapleural solid pulmonary nodule seen along the minor  fissure, series 5, axial image 77, measures 5 mm, unchanged. Solid  pulmonary nodule in the lateral segment right middle lobe, series 5,  axial image 92, measures 5 mm, unchanged. Posterior dependent and  bibasilar subsegmental atelectasis.     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 Cholecystectomy. Incidental splenule. Esophagectomy with  gastric pull-up.     Osseous structures: Bilateral glenohumeral osteoarthritis. Median  sternotomy. Diffuse idiopathic skeletal hyperostosis in the mid and  lower thoracic spine.       Impression:         1. No pulmonary embolism.  2. Mild airways disease.  3. Stable pulmonary nodules. No new or enlarging pulmonary  nodules  identified.  4. Esophagectomy with gastric pull-up.     This report was finalized on 9/24/2024 5:42 PM by Dr. Dylon Barney M.D on Workstation: [id number]       XR Chest 1 View [[id number]] Collected: 09/24/24 1629     Updated: 09/24/24 1634    Narrative:      Emergency portable view of the chest on 9/24/2024     CLINICAL HISTORY: Shortness of breath and chest pressure     Is correlated to a prior chest CT on 5/17/2024 and a portable chest  x-ray on 6/25/2024.     FINDINGS: There are multiple sternal wires. There is an aortic valve  prosthesis in place. The cardiomediastinal silhouette is mildly  enlarged. The pulmonary vasculature is within normal limits. There is  minimal elevation left hemidiaphragm with linear atelectasis or scarring  at the left lung base. Otherwise lungs are clear. The costophrenic  angles are sharp.       Impression:      1. No active disease is seen in the chest with no significant change  when compared to the patient's prior portable chest x-ray on 6/25/2024  and the etiology of this patient's shortness of breath and chest  pressure is not established on this exam     2. Previous median sternotomy and aortic valve prosthesis is in place  and there is mild chronic elevation left hemidiaphragm with horizontal  linear atelectasis or scarring at the left lung base.     This report was finalized on 9/24/2024 4:31 PM by Dr. Maury Byrne M.D  on Workstation: [id number]               I have reviewed the medications.  ---------------------------------------------------------------------------------------------  Assessment & Plan   Assessment/Problem List    Chest pain      Plan:      Chest pain  History of CAD with prior CABG/PCI  -Troponin 36, 33  -EKG nonischemic, ventricular bigeminy  -Continuous telemetry monitoring overnight uneventful  -Consult cardiology, cleared for discharge home on Ranexa 500 twice daily in addition to his other 3 antianginals, recommend against stress  testing at this time     Hypertension  -Continue lisinopril amlodipine     Hyperlipidemia  -Continue atorvastatin    Disposition: Home    Follow-up after Discharge: PCP and cardiology    This note will serve as a discharge summary    VIKTORIA Ball 09/25/24 05:33 EDT    I have worn appropriate PPE during this patient encounter, sanitized my hands both with entering and exiting patient's room.      40 minutes has been spent by [facility] providers in the care of this patient while under observation status

## 2024-09-26 ENCOUNTER — TRANSITIONAL CARE MANAGEMENT TELEPHONE ENCOUNTER (OUTPATIENT)
Dept: CALL CENTER | Facility: HOSPITAL | Age: 64
End: 2024-09-26
Payer: MEDICARE

## 2024-09-26 LAB
QT INTERVAL: 431 MS
QTC INTERVAL: 498 MS

## 2024-09-26 NOTE — CASE MANAGEMENT/SOCIAL WORK
Case Management Discharge Note      Final Note: Home; self-care. Melissa CLARK         Selected Continued Care - Discharged on 9/25/2024 Admission date: 9/24/2024 - Discharge disposition: Home or Self Care      Destination    No services have been selected for the patient.                Durable Medical Equipment    No services have been selected for the patient.                Dialysis/Infusion    No services have been selected for the patient.                Home Medical Care    No services have been selected for the patient.                Therapy    No services have been selected for the patient.                Community Resources    No services have been selected for the patient.                Community & DME    No services have been selected for the patient.                         Final Discharge Disposition Code: 01 - home or self-care

## 2024-09-27 ENCOUNTER — TELEPHONE (OUTPATIENT)
Dept: CARDIOLOGY | Facility: CLINIC | Age: 64
End: 2024-09-27
Payer: MEDICARE

## 2024-10-09 ENCOUNTER — OFFICE VISIT (OUTPATIENT)
Dept: CARDIOLOGY | Facility: CLINIC | Age: 64
End: 2024-10-09
Payer: MEDICARE

## 2024-10-09 VITALS
BODY MASS INDEX: 36.71 KG/M2 | DIASTOLIC BLOOD PRESSURE: 90 MMHG | SYSTOLIC BLOOD PRESSURE: 140 MMHG | WEIGHT: 277 LBS | HEIGHT: 73 IN | HEART RATE: 71 BPM | OXYGEN SATURATION: 96 %

## 2024-10-09 DIAGNOSIS — I10 ESSENTIAL HYPERTENSION: ICD-10-CM

## 2024-10-09 DIAGNOSIS — I25.708 CORONARY ARTERY DISEASE OF BYPASS GRAFT OF NATIVE HEART WITH STABLE ANGINA PECTORIS: Primary | ICD-10-CM

## 2024-10-09 DIAGNOSIS — Z95.2 S/P AVR (AORTIC VALVE REPLACEMENT): ICD-10-CM

## 2024-10-09 DIAGNOSIS — Z95.1 S/P CABG (CORONARY ARTERY BYPASS GRAFT): ICD-10-CM

## 2024-10-09 DIAGNOSIS — I49.3 VENTRICULAR ECTOPY: ICD-10-CM

## 2024-10-09 DIAGNOSIS — Z86.79 STATUS POST ASCENDING AORTIC ANEURYSM REPAIR: ICD-10-CM

## 2024-10-09 DIAGNOSIS — Z98.890 STATUS POST ASCENDING AORTIC ANEURYSM REPAIR: ICD-10-CM

## 2024-10-09 RX ORDER — RANOLAZINE 500 MG/1
1000 TABLET, EXTENDED RELEASE ORAL EVERY 12 HOURS SCHEDULED
Qty: 360 TABLET | Refills: 3 | Status: SHIPPED | OUTPATIENT
Start: 2024-10-09

## 2024-10-09 RX ORDER — METOPROLOL SUCCINATE 50 MG/1
50 TABLET, EXTENDED RELEASE ORAL DAILY
Qty: 90 TABLET | Refills: 3 | Status: SHIPPED | OUTPATIENT
Start: 2024-10-09

## 2024-10-09 RX ORDER — AMLODIPINE BESYLATE 5 MG/1
5 TABLET ORAL DAILY
Qty: 90 TABLET | Refills: 3 | Status: SHIPPED | OUTPATIENT
Start: 2024-10-09

## 2024-10-09 NOTE — Clinical Note
Patient with longstanding history of chronic angina as well as generalized fatigue.  He had a outpatient telemetry monitor which revealed high PVC burden, he was seen by you guys with no recommendations as you were not sure the PVCs were contributing to symptoms.  He had a recent observation stay where he was in ventricular bigeminy on EKGs and had symptoms of angina and worsening fatigue.  Can you please review this case to see if there is anything that you think might be beneficial to improve his symptoms.  I told him that one of the 2 of us would reach out and let him know further recommendations.

## 2024-10-09 NOTE — H&P (VIEW-ONLY)
CARDIOLOGY        Patient Name: Edmond Chase  :1960  Age: 64 y.o.  Primary Cardiologist: Papa Miguel MD  Encounter Provider:  VIKTORIA Van    Date of Service: 10/09/24      CHIEF COMPLAINT / REASON FOR OFFICE VISIT     Follow-Up (CAD, ventricular ectopy)      HISTORY OF PRESENT ILLNESS       HPI  Edmond Chase is a 64 y.o. male who presents today for 3 month reevaluation.    Pt has a  history significant for hypertension, diabetes, CAD with history of CABG, history of aortic valve replacement.    Review of medical records reveals recent hospitalization 7/10 - 7/15/2022.  Patient reports that he has been experiencing intermittent angina.  Patient came to the emergency department and ECG did not reveal any ischemia but he opted not to stay for evaluation.  Early the morning of admission patient woke with recurrent angina and diaphoresis.  He took an additional half of an Imdur with some improvement and called EMS.  Following arrival to the hospital he was continued to have angina and was noted to be mildly hypotensive initial EKG revealed stable lateral T wave changes.  Repeat EKG was poor quality but there was concern about inferior ST elevation, on Dr. Patel's review there was no evidence of ST elevation to warrant emergent cardiac catheterization.  During hospitalization patient had an echocardiogram with normal LVEF, normal prosthetic valve gradients.  Myocardial perfusion study revealed moderately severe area of ischemia in the inferior wall.  On hospital day 2 patient admitted that he had some melena over the weekend.  GI was consulted and EGD performed revealing nonbleeding gastric ulcers.  Cardiac catheterization performed revealed stable coronary disease.  Decision was made to drop aspirin given gastric ulcer history.  Patient does have mild diastolic dysfunction and low-dose furosemide was added.  He was also referred to pulmonary for outpatient follow-up for chronic  "dyspnea.    He has a history of esophageal cancer and reports that his esophagus has now been removed.  He is also s/p cholecystectomy.     Follow-up appointment in October 2023 patient continued to complain of chest discomfort.  At that time we recommended the patient increase his isosorbide mononitrate to twice daily dosing.    Received a phone call from patient's primary care physician in May 2024 where heart rate was noted to be in the 30s, ECG revealed heart rate in the 60s but with bigeminy.  Laboratory studies were drawn and noted to be normal.  A outpatient monitor was placed.  Outpatient monitor revealed ventricular bigeminy with PVC burden of 19.7%.  He was evaluated by electrophysiology who did not make any changes to his daily regimen.    Patient was evaluated in the [facility] 9/24/2024 by Dr. Patel for complaints of chest pain and dyspnea.  Patient did have an elevated D-dimer and was sent to the hospital for a stat CTA of the chest as well as bilateral lower extremity Doppler.  Lower extremity duplex was negative for DVT.  CTA chest was negative for PE.    Medical record review reveals patient was hospitalized with discharge date 9/25/2024.  Patient presented with acute exacerbation of chronic chest pain.  He was evaluated by Dr. Miguel who recommended adding Ranexa in addition with patient's daily isosorbide mononitrate.  ECG during hospitalization revealed episodes of ventricular bigeminy.    Patient presents today for hospital follow up.  He notes that since that time, he continues to struggle with chest pressure.  He notes that he has not noted improvement with initiating Ranexa.  He notes that the symptoms of chest pressure and fatigue are causing a quality of life issue.  He does admit that he is not active and would like to exercise more, but he is continuing to have symptoms that are concerning for him.  He has a \"constant awareness of his chest\".  He has noted mild improvement of chest pressure " "with addition of Ranexa.  He has followed with GI and they do not feel that his esophageal problems are contributing.  He has been participating in group therapy with the cancer center.      The following portions of the patient's history were reviewed and updated as appropriate: allergies, current medications, past family history, past medical history, past social history, past surgical history and problem list.      VITAL SIGNS     Visit Vitals  /90 (BP Location: Right arm, Patient Position: Sitting)   Pulse 71   Ht 185.4 cm (73\")   Wt 126 kg (277 lb)   SpO2 96%   BMI 36.55 kg/m²           Wt Readings from Last 3 Encounters:   10/09/24 126 kg (277 lb)   09/24/24 127 kg (280 lb)   09/24/24 127 kg (280 lb)     Body mass index is 36.55 kg/m².      REVIEW OF SYSTEMS   Review of Systems   Constitutional: Positive for malaise/fatigue. Negative for weight gain and weight loss.   Cardiovascular:  Positive for chest pain. Negative for dyspnea on exertion and leg swelling.   Respiratory:  Negative for shortness of breath, snoring and wheezing.    Hematologic/Lymphatic: Negative for bleeding problem. Does not bruise/bleed easily.   Skin:  Negative for color change.   Musculoskeletal:  Negative for falls and joint pain.   Gastrointestinal:  Negative for melena.   Genitourinary:  Negative for hematuria.   Neurological:  Negative for excessive daytime sleepiness.   Psychiatric/Behavioral:  Negative for depression. The patient is nervous/anxious.            PHYSICAL EXAMINATION     Constitutional:       Appearance: Normal appearance. Well-developed.   Eyes:      Conjunctiva/sclera: Conjunctivae normal.   Neck:      Vascular: No carotid bruit.   Pulmonary:      Effort: Pulmonary effort is normal.      Breath sounds: Normal breath sounds.   Cardiovascular:      Normal rate. Regular rhythm. Normal S1. Normal S2.       Murmurs: There is a grade 3/6 systolic murmur at the URSB and ULSB.      No gallop.  No click. No rub.      " Comments:     Edema:     Peripheral edema absent.   Musculoskeletal: Normal range of motion. Skin:     General: Skin is warm and dry.   Neurological:      Mental Status: Alert and oriented to person, place, and time.      GCS: GCS eye subscore is 4. GCS verbal subscore is 5. GCS motor subscore is 6.   Psychiatric:         Mood and Affect: Mood is anxious.         Speech: Speech normal.         Behavior: Behavior normal.         Thought Content: Thought content normal.         Judgment: Judgment normal.           REVIEWED DATA       ECG 12 Lead    Date/Time: 10/9/2024 10:06 AM  Performed by: Marcia Stoner APRN    Authorized by: Marcia Stoner APRN  Comparison: compared with previous ECG from 9/26/2024  Rhythm: sinus rhythm  Ectopy: unifocal PVCs  Rate: normal  BPM: 71  Conduction: conduction normal  QRS axis: left  Other findings: non-specific ST-T wave changes    Clinical impression: non-specific ECG          ECG when in ED and patient was experiencing symptoms of chest pain and fatigue: 9/24/2024                  Cardiac Procedures:  Echocardiogram 6/27/2017.  LVEF 56%.  Normal LV cavity size.  All LV wall segments contract normally.  Moderate LVH.  LAE.  RA is moderately dilated.  Bioprosthetic aortic valve with peak and mean gradients elevated, however dimensionless index is 0.34 which suggests normal valve function.  No regurgitation.  Mild MAC.  Mild mitral valve regurgitation.  Trace tricuspid valve regurgitation.  Calculated RVSP 3 mmHg.  Myocardial perfusion stress test 7/20/2017.  Small sized infarct in the inferior wall with moderate brijesh-infarct ischemia.  Impressions consistent with intermediate risk study.  Cardiac catheterization 10/30/2017.  Three-vessel CAD, patent LIMA to LAD, occluded AO-OMB SVG, occluded AO-PDA SVG recommend medical therapy.  Echocardiogram 7/1/2020.  LVEF 56%.  Normal diastolic function.  Normal RV cavity size, wall thickness.  No significant perivalvular leak although  bioprosthetic valve was not well visualized.  Aortic valve maximum pressure gradient 43.2 mmHg, mean pressure gradient 28.8 mmHg, aortic valve area 1.0 cm².  Mild dilation of the sinuses of Valsalva measuring 4.2 cm.  Worsening aortic valve gradient compared to prior study.  Myocardial perfusion stress test 7/1/2020.  Small to medium sized, severe area of ischemia located in the inferior wall.  Impressions consistent with intermediate risk study.  Patient was very symptomatic.  Patient set up for cardiac catheterization.  Cardiac catheterization 7/7/2020.  Severe multivessel coronary disease with 100% proximal LAD, 50 to 60% stenoses throughout the circumflex territory within the proximal segment as well as the high marginal and diffuse 70% stenoses throughout the RCA with 100% PDA stenosis with faint collaterals from the LAD.  PCI of the mid LAD via MARCELINA graft with placement of ESMER.  No residual stenosis.  Recommend reevaluating symptoms and if still symptomatic aggressively titrating antianginal medications with consideration given to long-acting nitrates in the hopes of medically managing his RCA disease as this would require extensive amount of stents to have fully revascularized.  Cardiac catheterization 6/17/2021. LIMA to LAD stent was widely patent and normal.  Left main with 20% luminal irregularities.  Circumflex had an area that was 70% stenosed in the midportion but does not go anywhere and is mainly in the AV groove branch.  RCA dominant and very tortuous vessel with 50% proximal disease, 70% mid vessel disease, 50 to 60% disease up to 90% in the distal RCA territory but due to tortuosity not amenable to any intervention.  Recommend increasing isosorbide mononitrate and following up with cardiology office.  Myocardial perfusion stress test 7/12/2022.  Medium sized, mildly severe area of ischemia in the inferior wall.  Echocardiogram 7/12/2022.  LVEF 51-55%.  All LV wall segments contract normally.  No  significant aortic valve regurgitation.  No hemodynamically significant aortic valve stenosis.  There is a 27 mm porcine magna bioprosthetic aortic valve with stable gradients.  Cardiac catheterization 7/14/2022.  Left main normal.  LAD with calcified 90% proximal stenosis.   mid segment.  Mid to distal vessels fill via patent LIMA to LAD.  Circumflex severely calcified 70-80% stenosis in the midsegment.RCA is severely calcified and tortuous vessel with diffuse 70% mid vessel stenosis and discrete 80% mid to distal stenosis.   small caliber PDA branch PDA branch fills via left to right collaterals.  Ramus is discrete 50% stenosis in the inferior branch.  LIMA to LAD is normal.  SVG to OM is occluded at proximal anastomosis.  SVG to PDA is occluded at proximal anastomosis.  Continue medical management.  RCA and LAD are not candidates for intervention and unchanged from prior cardiac catheterization.  Echocardiogram 2/4/2023.  Moderate LVH.  Mobile Cardiac Outpatient Telemetry 6/17/2024.  PVC burden of 19.7%.  Triggered event correlated with ventricular bigeminy.        ASSESSMENT & PLAN     Diagnoses and all orders for this visit:    1. Coronary artery disease of bypass graft of native heart with stable angina pectoris (Primary)  Assessment & Plan:  Patient with history of CABG as well as stenting  Continues to struggle with chronic angina  Chest pressure somewhat better with use of Ranexa will increase dose  Regimen as follows:  No anticoagulation with aspirin or antiplatelets secondary to history of life-threatening GI bleed  Continue isosorbide mononitrate 60 mg twice daily  Increase Ranexa to 1000 mg twice daily    Orders:  -     ECG 12 Lead    2. S/P CABG (coronary artery bypass graft)    3. S/P AVR (aortic valve replacement)  Assessment & Plan:  Gradient stable on most recent echocardiogram  Patient aware of need for prophylactic antibiotics for dental procedures      4. Status post ascending aortic  aneurysm repair  Assessment & Plan:  BP adequately controlled at home  Stable on most recent echocardiogram      5. Essential hypertension  Assessment & Plan:  Slightly elevated in clinic today but has been controlled on home readings  Continue the following antihypertensive regimen:  Amlodipine 10 mg/day  Lisinopril 10 mg/day  Metoprolol succinate 25 mg twice daily      6. Ventricular ectopy  Assessment & Plan:  Patient continues to have generalized fatigue as well as chest pressure that he feels are affecting his quality of life  Patient had a outpatient monitor in June 2024 which revealed PVC burden of 19.7%, he was evaluated by EP at that time who did not recommend any intervention  Patient had a recent observation stay where he was experiencing chest pressure and at that time ECGs revealed ventricular bigeminy  I do question if patient's ventricular ectopy could be contributing to his symptoms, it is going to be very difficult to discern obtain whether or not this is the source or if it is deconditioning as patient admits that he does not exercise  I will reach out to EP and have them review patient's ECGs during his observation stay to see if they think he needs to be reevaluated.    Orders:  -     ECG 12 Lead    Other orders  -     metoprolol succinate XL (TOPROL-XL) 50 MG 24 hr tablet; Take 1 tablet by mouth Daily.  Dispense: 90 tablet; Refill: 3  -     ranolazine (RANEXA) 500 MG 12 hr tablet; Take 2 tablets by mouth Every 12 (Twelve) Hours.  Dispense: 360 tablet; Refill: 3  -     amLODIPine (NORVASC) 5 MG tablet; Take 1 tablet by mouth Daily. Take 5 mg  Dispense: 90 tablet; Refill: 3      ADDENDUM: [facility] have personally reviewed this case.  It has been since 2022 since patient has had a cardiac catheterization.  Dr. Miguel has personally reviewed the cardiac catheterization images and questions if there are areas that might be intervenable now that were not intervenable in 2022.  I have  called the patient and made him aware of this recommendation and he verbalizes understanding.  He understands the risks and benefits of procedure.    Return in about 6 months (around 4/9/2025) for Dr. Miguel- Leeann.    Future Appointments         Provider Department Center    10/30/2024 2:00 PM Yazmin Molina APRN [facility] HEMATOLOGY & ONCOLOGY     11/7/2024 10:45 AM Sekou Pisano MD [facility] SLEEP MEDICINE     12/10/2024 1:15 PM [facility] 3 [facility] CT [facility]    12/16/2024 9:00 AM Valerie Stockton MD [facility] THORACIC SURGERY [facility]    1/13/2025 11:00 AM Nargis Velasquez APRN [facility] PRIMARY CARE [facility]    4/11/2025 11:40 AM Papa Miguel MD [facility] CARDIOLOGY [facility]                MEDICATIONS         Discharge Medications            Accurate as of October 9, 2024  1:10 PM. If you have any questions, ask your nurse or doctor.                Changes to Medications        Instructions Start Date   amLODIPine 5 MG tablet  Commonly known as: NORVASC  What changed:   medication strength  how much to take  additional instructions  Changed by: VIKTORIA Turpin   5 mg, Oral, Daily, Take 5 mg      atorvastatin 40 MG tablet  Commonly known as: LIPITOR  What changed: when to take this   TAKE ONE TABLET BY MOUTH DAILY      furosemide 20 MG tablet  Commonly known as: LASIX  What changed: additional instructions   20 mg, Oral, Daily      lisinopril 10 MG tablet  Commonly known as: PRINIVIL,ZESTRIL  What changed: See the new instructions.   TAKE ONE TABLET BY MOUTH ONCE NIGHTLY  DAYS      metoprolol succinate XL 50 MG 24 hr tablet  Commonly known as: TOPROL-XL  What changed:   medication strength  how much to take  Changed by: VIKTORIA Turpin   50 mg, Oral, Daily      ranolazine 500 MG 12 hr tablet  Commonly known as: RANEXA  What changed: how much to take  Changed by: VIKTORIA Turpin    1,000 mg, Oral, Every 12 Hours Scheduled             Continue These Medications        Instructions Start Date   armodafinil 150 MG tablet  Commonly known as: NUVIGIL   150 mg, Oral, Daily      Boostrix 5-2.5-18.5 LF-MCG/0.5 injection  Generic drug: Tetanus-Diphth-Acell Pertussis   0.5 mL, Intramuscular      dapagliflozin Propanediol 10 MG tablet  Commonly known as: Farxiga   10 mg, Oral, Daily      fluticasone 50 MCG/ACT nasal spray  Commonly known as: FLONASE   Administer 2 sprays into the nostril(s) as directed by provider Daily.      isosorbide mononitrate 60 MG 24 hr tablet  Commonly known as: IMDUR   60 mg, Oral, 2 Times Daily      mirtazapine 45 MG tablet  Commonly known as: REMERON   45 mg, Oral, Nightly                   **Dragon Disclaimer:   Much of this encounter note is an electronic transcription/translation of spoken language to printed text. The electronic translation of spoken language may permit erroneous, or at times, nonsensical words or phrases to be inadvertently transcribed. Although I have reviewed the note for such errors, some may still exist.

## 2024-10-09 NOTE — PROGRESS NOTES
CARDIOLOGY        Patient Name: Edmond Chase  :1960  Age: 64 y.o.  Primary Cardiologist: Papa Miguel MD  Encounter Provider:  VIKTORIA Van    Date of Service: 10/09/24      CHIEF COMPLAINT / REASON FOR OFFICE VISIT     Follow-Up (CAD, ventricular ectopy)      HISTORY OF PRESENT ILLNESS       HPI  Edmond Chase is a 64 y.o. male who presents today for 3 month reevaluation.    Pt has a  history significant for hypertension, diabetes, CAD with history of CABG, history of aortic valve replacement.    Review of medical records reveals recent hospitalization 7/10 - 7/15/2022.  Patient reports that he has been experiencing intermittent angina.  Patient came to the emergency department and ECG did not reveal any ischemia but he opted not to stay for evaluation.  Early the morning of admission patient woke with recurrent angina and diaphoresis.  He took an additional half of an Imdur with some improvement and called EMS.  Following arrival to the hospital he was continued to have angina and was noted to be mildly hypotensive initial EKG revealed stable lateral T wave changes.  Repeat EKG was poor quality but there was concern about inferior ST elevation, on Dr. Patel's review there was no evidence of ST elevation to warrant emergent cardiac catheterization.  During hospitalization patient had an echocardiogram with normal LVEF, normal prosthetic valve gradients.  Myocardial perfusion study revealed moderately severe area of ischemia in the inferior wall.  On hospital day 2 patient admitted that he had some melena over the weekend.  GI was consulted and EGD performed revealing nonbleeding gastric ulcers.  Cardiac catheterization performed revealed stable coronary disease.  Decision was made to drop aspirin given gastric ulcer history.  Patient does have mild diastolic dysfunction and low-dose furosemide was added.  He was also referred to pulmonary for outpatient follow-up for chronic  "dyspnea.    He has a history of esophageal cancer and reports that his esophagus has now been removed.  He is also s/p cholecystectomy.     Follow-up appointment in October 2023 patient continued to complain of chest discomfort.  At that time we recommended the patient increase his isosorbide mononitrate to twice daily dosing.    Received a phone call from patient's primary care physician in May 2024 where heart rate was noted to be in the 30s, ECG revealed heart rate in the 60s but with bigeminy.  Laboratory studies were drawn and noted to be normal.  A outpatient monitor was placed.  Outpatient monitor revealed ventricular bigeminy with PVC burden of 19.7%.  He was evaluated by electrophysiology who did not make any changes to his daily regimen.    Patient was evaluated in the [facility] 9/24/2024 by Dr. Patel for complaints of chest pain and dyspnea.  Patient did have an elevated D-dimer and was sent to the hospital for a stat CTA of the chest as well as bilateral lower extremity Doppler.  Lower extremity duplex was negative for DVT.  CTA chest was negative for PE.    Medical record review reveals patient was hospitalized with discharge date 9/25/2024.  Patient presented with acute exacerbation of chronic chest pain.  He was evaluated by Dr. Miguel who recommended adding Ranexa in addition with patient's daily isosorbide mononitrate.  ECG during hospitalization revealed episodes of ventricular bigeminy.    Patient presents today for hospital follow up.  He notes that since that time, he continues to struggle with chest pressure.  He notes that he has not noted improvement with initiating Ranexa.  He notes that the symptoms of chest pressure and fatigue are causing a quality of life issue.  He does admit that he is not active and would like to exercise more, but he is continuing to have symptoms that are concerning for him.  He has a \"constant awareness of his chest\".  He has noted mild improvement of chest pressure " "with addition of Ranexa.  He has followed with GI and they do not feel that his esophageal problems are contributing.  He has been participating in group therapy with the cancer center.      The following portions of the patient's history were reviewed and updated as appropriate: allergies, current medications, past family history, past medical history, past social history, past surgical history and problem list.      VITAL SIGNS     Visit Vitals  /90 (BP Location: Right arm, Patient Position: Sitting)   Pulse 71   Ht 185.4 cm (73\")   Wt 126 kg (277 lb)   SpO2 96%   BMI 36.55 kg/m²           Wt Readings from Last 3 Encounters:   10/09/24 126 kg (277 lb)   09/24/24 127 kg (280 lb)   09/24/24 127 kg (280 lb)     Body mass index is 36.55 kg/m².      REVIEW OF SYSTEMS   Review of Systems   Constitutional: Positive for malaise/fatigue. Negative for weight gain and weight loss.   Cardiovascular:  Positive for chest pain. Negative for dyspnea on exertion and leg swelling.   Respiratory:  Negative for shortness of breath, snoring and wheezing.    Hematologic/Lymphatic: Negative for bleeding problem. Does not bruise/bleed easily.   Skin:  Negative for color change.   Musculoskeletal:  Negative for falls and joint pain.   Gastrointestinal:  Negative for melena.   Genitourinary:  Negative for hematuria.   Neurological:  Negative for excessive daytime sleepiness.   Psychiatric/Behavioral:  Negative for depression. The patient is nervous/anxious.            PHYSICAL EXAMINATION     Constitutional:       Appearance: Normal appearance. Well-developed.   Eyes:      Conjunctiva/sclera: Conjunctivae normal.   Neck:      Vascular: No carotid bruit.   Pulmonary:      Effort: Pulmonary effort is normal.      Breath sounds: Normal breath sounds.   Cardiovascular:      Normal rate. Regular rhythm. Normal S1. Normal S2.       Murmurs: There is a grade 3/6 systolic murmur at the URSB and ULSB.      No gallop.  No click. No rub.      " Comments:     Edema:     Peripheral edema absent.   Musculoskeletal: Normal range of motion. Skin:     General: Skin is warm and dry.   Neurological:      Mental Status: Alert and oriented to person, place, and time.      GCS: GCS eye subscore is 4. GCS verbal subscore is 5. GCS motor subscore is 6.   Psychiatric:         Mood and Affect: Mood is anxious.         Speech: Speech normal.         Behavior: Behavior normal.         Thought Content: Thought content normal.         Judgment: Judgment normal.           REVIEWED DATA       ECG 12 Lead    Date/Time: 10/9/2024 10:06 AM  Performed by: Marcia Stoner APRN    Authorized by: Marcia Stoner APRN  Comparison: compared with previous ECG from 9/26/2024  Rhythm: sinus rhythm  Ectopy: unifocal PVCs  Rate: normal  BPM: 71  Conduction: conduction normal  QRS axis: left  Other findings: non-specific ST-T wave changes    Clinical impression: non-specific ECG          ECG when in ED and patient was experiencing symptoms of chest pain and fatigue: 9/24/2024                  Cardiac Procedures:  Echocardiogram 6/27/2017.  LVEF 56%.  Normal LV cavity size.  All LV wall segments contract normally.  Moderate LVH.  LAE.  RA is moderately dilated.  Bioprosthetic aortic valve with peak and mean gradients elevated, however dimensionless index is 0.34 which suggests normal valve function.  No regurgitation.  Mild MAC.  Mild mitral valve regurgitation.  Trace tricuspid valve regurgitation.  Calculated RVSP 3 mmHg.  Myocardial perfusion stress test 7/20/2017.  Small sized infarct in the inferior wall with moderate brijesh-infarct ischemia.  Impressions consistent with intermediate risk study.  Cardiac catheterization 10/30/2017.  Three-vessel CAD, patent LIMA to LAD, occluded AO-OMB SVG, occluded AO-PDA SVG recommend medical therapy.  Echocardiogram 7/1/2020.  LVEF 56%.  Normal diastolic function.  Normal RV cavity size, wall thickness.  No significant perivalvular leak although  bioprosthetic valve was not well visualized.  Aortic valve maximum pressure gradient 43.2 mmHg, mean pressure gradient 28.8 mmHg, aortic valve area 1.0 cm².  Mild dilation of the sinuses of Valsalva measuring 4.2 cm.  Worsening aortic valve gradient compared to prior study.  Myocardial perfusion stress test 7/1/2020.  Small to medium sized, severe area of ischemia located in the inferior wall.  Impressions consistent with intermediate risk study.  Patient was very symptomatic.  Patient set up for cardiac catheterization.  Cardiac catheterization 7/7/2020.  Severe multivessel coronary disease with 100% proximal LAD, 50 to 60% stenoses throughout the circumflex territory within the proximal segment as well as the high marginal and diffuse 70% stenoses throughout the RCA with 100% PDA stenosis with faint collaterals from the LAD.  PCI of the mid LAD via MARCELINA graft with placement of ESMER.  No residual stenosis.  Recommend reevaluating symptoms and if still symptomatic aggressively titrating antianginal medications with consideration given to long-acting nitrates in the hopes of medically managing his RCA disease as this would require extensive amount of stents to have fully revascularized.  Cardiac catheterization 6/17/2021. LIMA to LAD stent was widely patent and normal.  Left main with 20% luminal irregularities.  Circumflex had an area that was 70% stenosed in the midportion but does not go anywhere and is mainly in the AV groove branch.  RCA dominant and very tortuous vessel with 50% proximal disease, 70% mid vessel disease, 50 to 60% disease up to 90% in the distal RCA territory but due to tortuosity not amenable to any intervention.  Recommend increasing isosorbide mononitrate and following up with cardiology office.  Myocardial perfusion stress test 7/12/2022.  Medium sized, mildly severe area of ischemia in the inferior wall.  Echocardiogram 7/12/2022.  LVEF 51-55%.  All LV wall segments contract normally.  No  significant aortic valve regurgitation.  No hemodynamically significant aortic valve stenosis.  There is a 27 mm porcine magna bioprosthetic aortic valve with stable gradients.  Cardiac catheterization 7/14/2022.  Left main normal.  LAD with calcified 90% proximal stenosis.   mid segment.  Mid to distal vessels fill via patent LIMA to LAD.  Circumflex severely calcified 70-80% stenosis in the midsegment.RCA is severely calcified and tortuous vessel with diffuse 70% mid vessel stenosis and discrete 80% mid to distal stenosis.   small caliber PDA branch PDA branch fills via left to right collaterals.  Ramus is discrete 50% stenosis in the inferior branch.  LIMA to LAD is normal.  SVG to OM is occluded at proximal anastomosis.  SVG to PDA is occluded at proximal anastomosis.  Continue medical management.  RCA and LAD are not candidates for intervention and unchanged from prior cardiac catheterization.  Echocardiogram 2/4/2023.  Moderate LVH.  Mobile Cardiac Outpatient Telemetry 6/17/2024.  PVC burden of 19.7%.  Triggered event correlated with ventricular bigeminy.        ASSESSMENT & PLAN     Diagnoses and all orders for this visit:    1. Coronary artery disease of bypass graft of native heart with stable angina pectoris (Primary)  Assessment & Plan:  Patient with history of CABG as well as stenting  Continues to struggle with chronic angina  Chest pressure somewhat better with use of Ranexa will increase dose  Regimen as follows:  No anticoagulation with aspirin or antiplatelets secondary to history of life-threatening GI bleed  Continue isosorbide mononitrate 60 mg twice daily  Increase Ranexa to 1000 mg twice daily    Orders:  -     ECG 12 Lead    2. S/P CABG (coronary artery bypass graft)    3. S/P AVR (aortic valve replacement)  Assessment & Plan:  Gradient stable on most recent echocardiogram  Patient aware of need for prophylactic antibiotics for dental procedures      4. Status post ascending aortic  aneurysm repair  Assessment & Plan:  BP adequately controlled at home  Stable on most recent echocardiogram      5. Essential hypertension  Assessment & Plan:  Slightly elevated in clinic today but has been controlled on home readings  Continue the following antihypertensive regimen:  Amlodipine 10 mg/day  Lisinopril 10 mg/day  Metoprolol succinate 25 mg twice daily      6. Ventricular ectopy  Assessment & Plan:  Patient continues to have generalized fatigue as well as chest pressure that he feels are affecting his quality of life  Patient had a outpatient monitor in June 2024 which revealed PVC burden of 19.7%, he was evaluated by EP at that time who did not recommend any intervention  Patient had a recent observation stay where he was experiencing chest pressure and at that time ECGs revealed ventricular bigeminy  I do question if patient's ventricular ectopy could be contributing to his symptoms, it is going to be very difficult to discern obtain whether or not this is the source or if it is deconditioning as patient admits that he does not exercise  I will reach out to EP and have them review patient's ECGs during his observation stay to see if they think he needs to be reevaluated.    Orders:  -     ECG 12 Lead    Other orders  -     metoprolol succinate XL (TOPROL-XL) 50 MG 24 hr tablet; Take 1 tablet by mouth Daily.  Dispense: 90 tablet; Refill: 3  -     ranolazine (RANEXA) 500 MG 12 hr tablet; Take 2 tablets by mouth Every 12 (Twelve) Hours.  Dispense: 360 tablet; Refill: 3  -     amLODIPine (NORVASC) 5 MG tablet; Take 1 tablet by mouth Daily. Take 5 mg  Dispense: 90 tablet; Refill: 3          Return in about 6 months (around 4/9/2025) for Dr. Miguel- Routine.    Future Appointments         Provider Department Center    10/30/2024 2:00 PM Yazmin Molina APRN [facility] HEMATOLOGY & ONCOLOGY     11/7/2024 10:45 AM Sekou Pisano MD [facility] SLEEP MEDICINE      12/10/2024 1:15 PM [facility] 3 [facility] CT [facility]    12/16/2024 9:00 AM Valerie Stockton MD [facility] THORACIC SURGERY [facility]    1/13/2025 11:00 AM Nargis Velasquez APRN [facility] PRIMARY CARE [facility]    4/11/2025 11:40 AM Papa Miguel MD [facility] CARDIOLOGY [facility]                MEDICATIONS         Discharge Medications            Accurate as of October 9, 2024  1:10 PM. If you have any questions, ask your nurse or doctor.                Changes to Medications        Instructions Start Date   amLODIPine 5 MG tablet  Commonly known as: NORVASC  What changed:   medication strength  how much to take  additional instructions  Changed by: VIKTORIA Turpin   5 mg, Oral, Daily, Take 5 mg      atorvastatin 40 MG tablet  Commonly known as: LIPITOR  What changed: when to take this   TAKE ONE TABLET BY MOUTH DAILY      furosemide 20 MG tablet  Commonly known as: LASIX  What changed: additional instructions   20 mg, Oral, Daily      lisinopril 10 MG tablet  Commonly known as: PRINIVILZESTRIL  What changed: See the new instructions.   TAKE ONE TABLET BY MOUTH ONCE NIGHTLY  DAYS      metoprolol succinate XL 50 MG 24 hr tablet  Commonly known as: TOPROL-XL  What changed:   medication strength  how much to take  Changed by: VIKTORIA Turpin   50 mg, Oral, Daily      ranolazine 500 MG 12 hr tablet  Commonly known as: RANEXA  What changed: how much to take  Changed by: VIKTORIA Turpin   1,000 mg, Oral, Every 12 Hours Scheduled             Continue These Medications        Instructions Start Date   armodafinil 150 MG tablet  Commonly known as: NUVIGIL   150 mg, Oral, Daily      Boostrix 5-2.5-18.5 LF-MCG/0.5 injection  Generic drug: Tetanus-Diphth-Acell Pertussis   0.5 mL, Intramuscular      dapagliflozin Propanediol 10 MG tablet  Commonly known as: Farxiga   10 mg, Oral, Daily      fluticasone 50 MCG/ACT nasal spray  Commonly known as: FLONASE    Administer 2 sprays into the nostril(s) as directed by provider Daily.      isosorbide mononitrate 60 MG 24 hr tablet  Commonly known as: IMDUR   60 mg, Oral, 2 Times Daily      mirtazapine 45 MG tablet  Commonly known as: REMERON   45 mg, Oral, Nightly                   **Dragon Disclaimer:   Much of this encounter note is an electronic transcription/translation of spoken language to printed text. The electronic translation of spoken language may permit erroneous, or at times, nonsensical words or phrases to be inadvertently transcribed. Although I have reviewed the note for such errors, some may still exist.

## 2024-10-09 NOTE — ASSESSMENT & PLAN NOTE
Patient with history of CABG as well as stenting  Continues to struggle with chronic angina  Chest pressure somewhat better with use of Ranexa will increase dose  Regimen as follows:  No anticoagulation with aspirin or antiplatelets secondary to history of life-threatening GI bleed  Continue isosorbide mononitrate 60 mg twice daily  Increase Ranexa to 1000 mg twice daily

## 2024-10-09 NOTE — ASSESSMENT & PLAN NOTE
Patient continues to have generalized fatigue as well as chest pressure that he feels are affecting his quality of life  Patient had a outpatient monitor in June 2024 which revealed PVC burden of 19.7%, he was evaluated by EP at that time who did not recommend any intervention  Patient had a recent observation stay where he was experiencing chest pressure and at that time ECGs revealed ventricular bigeminy  I do question if patient's ventricular ectopy could be contributing to his symptoms, it is going to be very difficult to discern obtain whether or not this is the source or if it is deconditioning as patient admits that he does not exercise  I will reach out to EP and have them review patient's ECGs during his observation stay to see if they think he needs to be reevaluated.

## 2024-10-09 NOTE — ASSESSMENT & PLAN NOTE
Slightly elevated in clinic today but has been controlled on home readings  Continue the following antihypertensive regimen:  Amlodipine 10 mg/day  Lisinopril 10 mg/day  Metoprolol succinate 25 mg twice daily   Hemostasis: Reynaldo's

## 2024-10-23 ENCOUNTER — LAB (OUTPATIENT)
Facility: HOSPITAL | Age: 64
End: 2024-10-23
Payer: MEDICARE

## 2024-10-23 DIAGNOSIS — I25.708 CORONARY ARTERY DISEASE OF BYPASS GRAFT OF NATIVE HEART WITH STABLE ANGINA PECTORIS: ICD-10-CM

## 2024-10-23 DIAGNOSIS — I49.3 VENTRICULAR ECTOPY: ICD-10-CM

## 2024-10-23 DIAGNOSIS — Z95.1 S/P CABG (CORONARY ARTERY BYPASS GRAFT): ICD-10-CM

## 2024-10-23 LAB
ANION GAP SERPL CALCULATED.3IONS-SCNC: 12.9 MMOL/L (ref 5–15)
BASOPHILS # BLD AUTO: 0.02 10*3/MM3 (ref 0–0.2)
BASOPHILS NFR BLD AUTO: 0.4 % (ref 0–1.5)
BUN SERPL-MCNC: 13 MG/DL (ref 8–23)
BUN/CREAT SERPL: 14.8 (ref 7–25)
CALCIUM SPEC-SCNC: 8.8 MG/DL (ref 8.6–10.5)
CHLORIDE SERPL-SCNC: 105 MMOL/L (ref 98–107)
CO2 SERPL-SCNC: 25.1 MMOL/L (ref 22–29)
CREAT SERPL-MCNC: 0.88 MG/DL (ref 0.76–1.27)
DEPRECATED RDW RBC AUTO: 45.8 FL (ref 37–54)
EGFRCR SERPLBLD CKD-EPI 2021: 94.8 ML/MIN/1.73
EOSINOPHIL # BLD AUTO: 0.06 10*3/MM3 (ref 0–0.4)
EOSINOPHIL NFR BLD AUTO: 1.2 % (ref 0.3–6.2)
ERYTHROCYTE [DISTWIDTH] IN BLOOD BY AUTOMATED COUNT: 13.6 % (ref 12.3–15.4)
GLUCOSE SERPL-MCNC: 115 MG/DL (ref 65–99)
HCT VFR BLD AUTO: 38.9 % (ref 37.5–51)
HGB BLD-MCNC: 13.4 G/DL (ref 13–17.7)
HOLD SPECIMEN: NORMAL
IMM GRANULOCYTES # BLD AUTO: 0.02 10*3/MM3 (ref 0–0.05)
IMM GRANULOCYTES NFR BLD AUTO: 0.4 % (ref 0–0.5)
LYMPHOCYTES # BLD AUTO: 0.86 10*3/MM3 (ref 0.7–3.1)
LYMPHOCYTES NFR BLD AUTO: 17.3 % (ref 19.6–45.3)
MCH RBC QN AUTO: 31.8 PG (ref 26.6–33)
MCHC RBC AUTO-ENTMCNC: 34.4 G/DL (ref 31.5–35.7)
MCV RBC AUTO: 92.2 FL (ref 79–97)
MONOCYTES # BLD AUTO: 0.36 10*3/MM3 (ref 0.1–0.9)
MONOCYTES NFR BLD AUTO: 7.2 % (ref 5–12)
NEUTROPHILS NFR BLD AUTO: 3.65 10*3/MM3 (ref 1.7–7)
NEUTROPHILS NFR BLD AUTO: 73.5 % (ref 42.7–76)
NRBC BLD AUTO-RTO: 0 /100 WBC (ref 0–0.2)
PLATELET # BLD AUTO: 189 10*3/MM3 (ref 140–450)
PMV BLD AUTO: 10 FL (ref 6–12)
POTASSIUM SERPL-SCNC: 4 MMOL/L (ref 3.5–5.2)
RBC # BLD AUTO: 4.22 10*6/MM3 (ref 4.14–5.8)
SODIUM SERPL-SCNC: 143 MMOL/L (ref 136–145)
WBC NRBC COR # BLD AUTO: 4.97 10*3/MM3 (ref 3.4–10.8)
WHOLE BLOOD HOLD COAG: NORMAL

## 2024-10-23 PROCEDURE — 36415 COLL VENOUS BLD VENIPUNCTURE: CPT

## 2024-10-23 PROCEDURE — 85025 COMPLETE CBC W/AUTO DIFF WBC: CPT

## 2024-10-23 PROCEDURE — 80048 BASIC METABOLIC PNL TOTAL CA: CPT

## 2024-10-24 ENCOUNTER — HOSPITAL ENCOUNTER (OUTPATIENT)
Facility: HOSPITAL | Age: 64
Setting detail: HOSPITAL OUTPATIENT SURGERY
Discharge: HOME | End: 2024-10-24
Attending: STUDENT IN AN ORGANIZED HEALTH CARE EDUCATION/TRAINING PROGRAM | Admitting: STUDENT IN AN ORGANIZED HEALTH CARE EDUCATION/TRAINING PROGRAM
Payer: MEDICARE

## 2024-10-24 VITALS
RESPIRATION RATE: 16 BRPM | TEMPERATURE: 98.2 F | SYSTOLIC BLOOD PRESSURE: 136 MMHG | HEART RATE: 60 BPM | BODY MASS INDEX: 35.78 KG/M2 | OXYGEN SATURATION: 93 % | HEIGHT: 73 IN | WEIGHT: 270 LBS | DIASTOLIC BLOOD PRESSURE: 77 MMHG

## 2024-10-24 DIAGNOSIS — I49.3 VENTRICULAR ECTOPY: ICD-10-CM

## 2024-10-24 DIAGNOSIS — I25.708 CORONARY ARTERY DISEASE OF BYPASS GRAFT OF NATIVE HEART WITH STABLE ANGINA PECTORIS: ICD-10-CM

## 2024-10-24 DIAGNOSIS — Z95.1 S/P CABG (CORONARY ARTERY BYPASS GRAFT): ICD-10-CM

## 2024-10-24 LAB
GLUCOSE BLDC GLUCOMTR-MCNC: 103 MG/DL (ref 70–130)
GLUCOSE BLDC GLUCOMTR-MCNC: 114 MG/DL (ref 70–130)

## 2024-10-24 PROCEDURE — 25010000002 LIDOCAINE 2% SOLUTION: Performed by: STUDENT IN AN ORGANIZED HEALTH CARE EDUCATION/TRAINING PROGRAM

## 2024-10-24 PROCEDURE — 93455 CORONARY ART/GRFT ANGIO S&I: CPT | Performed by: STUDENT IN AN ORGANIZED HEALTH CARE EDUCATION/TRAINING PROGRAM

## 2024-10-24 PROCEDURE — 25810000003 SODIUM CHLORIDE 0.9 % SOLUTION: Performed by: STUDENT IN AN ORGANIZED HEALTH CARE EDUCATION/TRAINING PROGRAM

## 2024-10-24 PROCEDURE — C1894 INTRO/SHEATH, NON-LASER: HCPCS | Performed by: STUDENT IN AN ORGANIZED HEALTH CARE EDUCATION/TRAINING PROGRAM

## 2024-10-24 PROCEDURE — 25010000002 MIDAZOLAM PER 1 MG: Performed by: STUDENT IN AN ORGANIZED HEALTH CARE EDUCATION/TRAINING PROGRAM

## 2024-10-24 PROCEDURE — 25010000002 HEPARIN (PORCINE) PER 1000 UNITS: Performed by: STUDENT IN AN ORGANIZED HEALTH CARE EDUCATION/TRAINING PROGRAM

## 2024-10-24 PROCEDURE — C1769 GUIDE WIRE: HCPCS | Performed by: STUDENT IN AN ORGANIZED HEALTH CARE EDUCATION/TRAINING PROGRAM

## 2024-10-24 PROCEDURE — 25010000002 FENTANYL CITRATE (PF) 50 MCG/ML SOLUTION: Performed by: STUDENT IN AN ORGANIZED HEALTH CARE EDUCATION/TRAINING PROGRAM

## 2024-10-24 PROCEDURE — 82948 REAGENT STRIP/BLOOD GLUCOSE: CPT

## 2024-10-24 PROCEDURE — 25510000001 IOPAMIDOL PER 1 ML: Performed by: STUDENT IN AN ORGANIZED HEALTH CARE EDUCATION/TRAINING PROGRAM

## 2024-10-24 RX ORDER — LIDOCAINE HYDROCHLORIDE 20 MG/ML
INJECTION, SOLUTION INFILTRATION; PERINEURAL
Status: DISCONTINUED | OUTPATIENT
Start: 2024-10-24 | End: 2024-10-24 | Stop reason: HOSPADM

## 2024-10-24 RX ORDER — SODIUM CHLORIDE 9 MG/ML
75 INJECTION, SOLUTION INTRAVENOUS CONTINUOUS
Status: DISCONTINUED | OUTPATIENT
Start: 2024-10-24 | End: 2024-10-24 | Stop reason: HOSPADM

## 2024-10-24 RX ORDER — FENTANYL CITRATE 50 UG/ML
INJECTION, SOLUTION INTRAMUSCULAR; INTRAVENOUS
Status: DISCONTINUED | OUTPATIENT
Start: 2024-10-24 | End: 2024-10-24 | Stop reason: HOSPADM

## 2024-10-24 RX ORDER — IOPAMIDOL 755 MG/ML
INJECTION, SOLUTION INTRAVASCULAR
Status: DISCONTINUED | OUTPATIENT
Start: 2024-10-24 | End: 2024-10-24 | Stop reason: HOSPADM

## 2024-10-24 RX ORDER — MIDAZOLAM HYDROCHLORIDE 1 MG/ML
INJECTION INTRAMUSCULAR; INTRAVENOUS
Status: DISCONTINUED | OUTPATIENT
Start: 2024-10-24 | End: 2024-10-24 | Stop reason: HOSPADM

## 2024-10-24 RX ORDER — HEPARIN SODIUM 1000 [USP'U]/ML
INJECTION, SOLUTION INTRAVENOUS; SUBCUTANEOUS
Status: DISCONTINUED | OUTPATIENT
Start: 2024-10-24 | End: 2024-10-24 | Stop reason: HOSPADM

## 2024-10-24 RX ORDER — ACETAMINOPHEN 325 MG/1
650 TABLET ORAL EVERY 4 HOURS PRN
Status: DISCONTINUED | OUTPATIENT
Start: 2024-10-24 | End: 2024-10-24 | Stop reason: HOSPADM

## 2024-10-24 RX ORDER — SODIUM CHLORIDE 0.9 % (FLUSH) 0.9 %
10 SYRINGE (ML) INJECTION AS NEEDED
Status: DISCONTINUED | OUTPATIENT
Start: 2024-10-24 | End: 2024-10-24 | Stop reason: HOSPADM

## 2024-10-24 RX ORDER — VERAPAMIL HYDROCHLORIDE 2.5 MG/ML
INJECTION, SOLUTION INTRAVENOUS
Status: DISCONTINUED | OUTPATIENT
Start: 2024-10-24 | End: 2024-10-24 | Stop reason: HOSPADM

## 2024-10-24 RX ORDER — SODIUM CHLORIDE 0.9 % (FLUSH) 0.9 %
10 SYRINGE (ML) INJECTION EVERY 12 HOURS SCHEDULED
Status: DISCONTINUED | OUTPATIENT
Start: 2024-10-24 | End: 2024-10-24 | Stop reason: HOSPADM

## 2024-10-24 RX ADMIN — SODIUM CHLORIDE 75 ML/HR: 9 INJECTION, SOLUTION INTRAVENOUS at 07:38

## 2024-10-24 NOTE — DISCHARGE INSTRUCTIONS
[facility]  [address] [place] [address]    Coronary Angiogram (Radial/Ulnar Approach) After Care    Refer to this sheet in the next few weeks. These instructions provide you with information on caring for yourself after your procedure. Your caregiver may also give you more specific instructions. Your treatment has been planned according to current medical practices, but problems sometimes occur. Call your caregiver if you have any problems or questions after your procedure.    Home Care Instructions:  You may shower the day after the procedure. Remove the bandage (dressing) and gently wash the site with plain soap and water. Gently pat the site dry. You may apply a band aid daily for 2 days if desired.    Do not apply powder or lotion to the site.  Do not submerge the affected site in water for 3 to 5 days or until the site is completely healed.   Do not lift, push or pull anything over 5 pounds for 5 days after your procedure or as directed by your physician.  As a reference, a gallon of milk weighs 8 pounds.   Inspect the site at least twice daily. You may notice some bruising at the site and it may be tender for 1 to 2 weeks.     Increase your fluid intake for the next 2 days.    Keep arm elevated for 24 hours. For the remainder of the day, keep your arm in “Pledge of Allegiance” position when up and about.     You may drive 24 hours after the procedure unless otherwise instructed by your caregiver.  Do not operate machinery or power tools for 24 hours.  A responsible adult should be with you for the first 24 hours after you arrive home. Do not make any important legal decisions or sign legal papers for 24 hours.  Do not drink alcohol for 24 hours.    Metformin or any medications containing Metformin should not be taken for 48 hours after your procedure.      Call Your Doctor if:   You have unusual pain at the radial/ulnar (wrist) site.  You have redness, warmth, swelling, or pain at the  radial/ulnar (wrist) site.  You have drainage (other than a small amount of blood on the dressing).  `You have chills or a fever > 101.  Your arm becomes pale or dark, cool, tingly, or numb.  You develop chest pain, shortness of breath, feel faint or pass out.    You have heavy bleeding from the site, hold pressure on the site for 20 minutes.  If the bleeding stops, apply a fresh bandage and call your cardiologist.  However, if you        continue to have bleeding, call 911 and continue to apply pressure to the site.   You have any symptoms of a stroke.  Remember BE FAST  B-balance. Sudden trouble walking or loss of balance.  E-eyes.  Sudden changes in how you see or a sudden onset of a very bad headache.   F-face. Sudden weakness or loss of feeling of the face or facial droop on one side.   A-arms Sudden weakness or numbness in one arm.  One arm drifts down if they are both held out in front of you. This happens suddenly and usually on one side of the body.   S-speech.  Sudden trouble speaking, slurred speech or trouble understanding what are saying.   T-time  Time to call emergency services.  Write down the symptoms and the time they started.

## 2024-10-24 NOTE — Clinical Note
Hemostasis started on the left radial artery. Radial compression device applied to vessel. Hemostasis achieved successfully. Closure device additional comment: Vasband applied by dr lyons      13c

## 2024-10-28 RX ORDER — ATORVASTATIN CALCIUM 40 MG/1
40 TABLET, FILM COATED ORAL DAILY
Qty: 90 TABLET | Refills: 3 | Status: SHIPPED | OUTPATIENT
Start: 2024-10-28

## 2024-10-30 ENCOUNTER — OFFICE VISIT (OUTPATIENT)
Dept: PSYCHIATRY | Facility: HOSPITAL | Age: 64
End: 2024-10-30
Payer: MEDICARE

## 2024-10-30 DIAGNOSIS — F33.42 RECURRENT MAJOR DEPRESSIVE DISORDER, IN FULL REMISSION: ICD-10-CM

## 2024-10-30 DIAGNOSIS — C15.5 MALIGNANT NEOPLASM OF LOWER THIRD OF ESOPHAGUS: Primary | ICD-10-CM

## 2024-10-30 DIAGNOSIS — G47.33 OSA (OBSTRUCTIVE SLEEP APNEA): ICD-10-CM

## 2024-10-30 DIAGNOSIS — R53.0 NEOPLASTIC MALIGNANT RELATED FATIGUE: ICD-10-CM

## 2024-10-30 NOTE — PROGRESS NOTES
Subjective  Patient ID: Edmond Chase is a 64 y.o.. male seen in regularly scheduled group session.  Group participants have consented to group conducted in person    Total Group Time, Face to Face: 70 minutes  Total Group Participants: 5, plus facilitation per VIKTROIA Moeller    Group Therapy  Group topics explored including development of new normal, interpersonal sensitivity, short and long term effects of diagnosis and treatment, significant other or family conflict, anticipitory anxiety, physical deconditioning, social determinants of health (finances, living arrangements, etc), and lifestyle choices. Shares benefits and impact of perspective, ongoing processing of historical medical events, and continued issues in survivorship. Considered genuine and artificial limitations, barriers to personal goals. Identified benefit of continuing to set goals, while giving self permission to make these smaller. Appreciated setting of realistic goals, maintaining health, and allowing permission to life actively vs prepare to die.    Counseling provided regarding behavioral activation/ activity scheduling, reintroduction to activity through graded tasks, balancing avoidance with approach , sleep hygiene, recognition and allowance of need for rest, pharmacological and non pharmacological management of sleep disturbance, lifestyle counseling, benefits of exercise and strategies for managing barriers to engagement, allowance of self care, exploration of quality of life goals, survivorship issues, anxiety/ mood management , medication education, and existential distress. Supported ongoing development of growing group. Considered doable goals, importance of regular engagement with others, acknowledging importance of energy conservation, prioritizing things needed and desired to do. Supported discussions of existential distress, life review, and consideration of purpose in this phase of life.     Discussed current  programming available in [facility] and community.    Benefits of group discussed while also acknowledging the need for 1:1 consultation at times. Members invited to discuss any concerns privately with me following group setting or to schedule a 1:1 visit if needs not being met in group.    Next shared medical visit: December 4 at 2:00     Patient response to group: Pt contributes largely to group discussion, supporting other members and remaining active and engaged in conversation.    Medications Management  VIKTORIA Moeller present for medication discussion and management.  Pt continues in survivorship of esophageal cancer.    CC: Anxiety, depression  HPI: Pt continues to be seen in group follow up regarding ongoing experience of depression and anxiety in survivorship. Continues to report benefit to mirtazapine 45 mg q hs, appreciating stable mood and sleep while identifying ongoing impact of adjusted health and wellness on anxiety, QOL experience. Does continue armodafinil q AM, remaining uncertain regarding benefit but feeling this has been helpful. Does report having angina from time to time, actively engaged with cardiology team.   Exam: As Above  Current medication regimen: mirtazapine 45 mg q hs, armodafinil 150 mg q AM  Lab Review:   Admission on 10/24/2024, Discharged on 10/24/2024   Component Date Value    Glucose 10/24/2024 114     Glucose 10/24/2024 103    Lab on 10/23/2024   Component Date Value    Glucose 10/23/2024 115 (H)     BUN 10/23/2024 13     Creatinine 10/23/2024 0.88     Sodium 10/23/2024 143     Potassium 10/23/2024 4.0     Chloride 10/23/2024 105     CO2 10/23/2024 25.1     Calcium 10/23/2024 8.8     BUN/Creatinine Ratio 10/23/2024 14.8     Anion Gap 10/23/2024 12.9     eGFR 10/23/2024 94.8     WBC 10/23/2024 4.97     RBC 10/23/2024 4.22     Hemoglobin 10/23/2024 13.4     Hematocrit 10/23/2024 38.9     MCV 10/23/2024 92.2     MCH 10/23/2024 31.8     MCHC 10/23/2024 34.4     RDW  10/23/2024 13.6     RDW-SD 10/23/2024 45.8     MPV 10/23/2024 10.0     Platelets 10/23/2024 189     Neutrophil % 10/23/2024 73.5     Lymphocyte % 10/23/2024 17.3 (L)     Monocyte % 10/23/2024 7.2     Eosinophil % 10/23/2024 1.2     Basophil % 10/23/2024 0.4     Immature Grans % 10/23/2024 0.4     Neutrophils, Absolute 10/23/2024 3.65     Lymphocytes, Absolute 10/23/2024 0.86     Monocytes, Absolute 10/23/2024 0.36     Eosinophils, Absolute 10/23/2024 0.06     Basophils, Absolute 10/23/2024 0.02     Immature Grans, Absolute 10/23/2024 0.02     nRBC 10/23/2024 0.0     Extra Tube 10/23/2024 Hold for add-ons.     Extra Tube 10/23/2024 Hold for add-ons.    Admission on 09/24/2024, Discharged on 09/25/2024   Component Date Value    QT Interval 09/24/2024 429     QTC Interval 09/24/2024 492     Glucose 09/24/2024 101 (H)     BUN 09/24/2024 9     Creatinine 09/24/2024 0.77     Sodium 09/24/2024 139     Potassium 09/24/2024 3.2 (L)     Chloride 09/24/2024 105     CO2 09/24/2024 25.8     Calcium 09/24/2024 8.7     Total Protein 09/24/2024 6.8     Albumin 09/24/2024 3.6     ALT (SGPT) 09/24/2024 17     AST (SGOT) 09/24/2024 16     Alkaline Phosphatase 09/24/2024 86     Total Bilirubin 09/24/2024 0.6     Globulin 09/24/2024 3.2     A/G Ratio 09/24/2024 1.1     BUN/Creatinine Ratio 09/24/2024 11.7     Anion Gap 09/24/2024 8.2     eGFR 09/24/2024 98.7     proBNP 09/24/2024 650.0     HS Troponin T 09/24/2024 33 (H)     Extra Tube 09/24/2024 Hold for add-ons.     Extra Tube 09/24/2024 hold for add-on     Extra Tube 09/24/2024 Hold for add-ons.     Extra Tube 09/24/2024 Hold for add-ons.     WBC 09/24/2024 6.27     RBC 09/24/2024 4.00 (L)     Hemoglobin 09/24/2024 12.5 (L)     Hematocrit 09/24/2024 37.6     MCV 09/24/2024 94.0     MCH 09/24/2024 31.3     MCHC 09/24/2024 33.2     RDW 09/24/2024 13.0     RDW-SD 09/24/2024 44.2     MPV 09/24/2024 9.7     Platelets 09/24/2024 175     Neutrophil % 09/24/2024 70.8     Lymphocyte %  09/24/2024 19.9     Monocyte % 09/24/2024 6.9     Eosinophil % 09/24/2024 1.6     Basophil % 09/24/2024 0.5     Immature Grans % 09/24/2024 0.3     Neutrophils, Absolute 09/24/2024 4.44     Lymphocytes, Absolute 09/24/2024 1.25     Monocytes, Absolute 09/24/2024 0.43     Eosinophils, Absolute 09/24/2024 0.10     Basophils, Absolute 09/24/2024 0.03     Immature Grans, Absolute 09/24/2024 0.02     nRBC 09/24/2024 0.0     Protime 09/24/2024 13.4     INR 09/24/2024 1.00     PTT 09/24/2024 29.7     Magnesium 09/24/2024 2.1     COVID19 09/24/2024 Not Detected     Influenza A PCR 09/24/2024 Not Detected     Influenza B PCR 09/24/2024 Not Detected     Right Common Femoral Spo* 09/24/2024 Y     Right Common Femoral Com* 09/24/2024 Y     Right Common Femoral Pha* 09/24/2024 Y     Right Common Femoral Com* 09/24/2024 C     Right Common Femoral Aug* 09/24/2024 Y     Right Saphenofemoral Kwasi* 09/24/2024 C     Right Profunda Femoral C* 09/24/2024 C     Right Proximal Femoral C* 09/24/2024 C     Right Mid Femoral Spont 09/24/2024 Y     Right Mid Femoral Compet* 09/24/2024 Y     Right Mid Femoral Phasic 09/24/2024 Y     Right Mid Femoral Compre* 09/24/2024 C     Right Mid Femoral Augment 09/24/2024 Y     Right Distal Femoral Com* 09/24/2024 C     Right Popliteal Spont 09/24/2024 Y     Right Popliteal Competent 09/24/2024 Y     Right Popliteal Phasic 09/24/2024 Y     Right Popliteal Compress 09/24/2024 C     Right Popliteal Augment 09/24/2024 Y     Right Posterior Tibial C* 09/24/2024 C     Right Peroneal Compress 09/24/2024 C     Right Gastronemius Compr* 09/24/2024 C     Right Greater Saph AK Co* 09/24/2024 C     Right Greater Saph BK Co* 09/24/2024 C     Right Lesser Saph Compre* 09/24/2024 C     Left Common Femoral Spont 09/24/2024 Y     Left Common Femoral Comp* 09/24/2024 Y     Left Common Femoral Phas* 09/24/2024 Y     Left Common Femoral Comp* 09/24/2024 C     Left Common Femoral Augm* 09/24/2024 Y     Left Saphenofemoral  Junc* 09/24/2024 C     Left Profunda Femoral Co* 09/24/2024 C     Left Proximal Femoral Co* 09/24/2024 C     Left Mid Femoral Spont 09/24/2024 Y     Left Mid Femoral Compete* 09/24/2024 Y     Left Mid Femoral Phasic 09/24/2024 Y     Left Mid Femoral Compress 09/24/2024 C     Left Mid Femoral Augment 09/24/2024 Y     Left Distal Femoral Comp* 09/24/2024 C     Left Popliteal Spont 09/24/2024 Y     Left Popliteal Competent 09/24/2024 Y     Left Popliteal Phasic 09/24/2024 Y     Left Popliteal Compress 09/24/2024 C     Left Popliteal Augment 09/24/2024 Y     Left Posterior Tibial Co* 09/24/2024 C     Left Peroneal Compress 09/24/2024 C     Left Gastronemius Compre* 09/24/2024 C     Left Greater Saph AK Com* 09/24/2024 C     Left Greater Saph BK Com* 09/24/2024 C     Left Lesser Saph Compress 09/24/2024 C     BH CV VAS PRELIMINARY FI* 09/24/2024 1.0     Glucose 09/25/2024 89     BUN 09/25/2024 8     Creatinine 09/25/2024 0.67 (L)     Sodium 09/25/2024 141     Potassium 09/25/2024 3.2 (L)     Chloride 09/25/2024 106     CO2 09/25/2024 26.4     Calcium 09/25/2024 8.5 (L)     BUN/Creatinine Ratio 09/25/2024 11.9     Anion Gap 09/25/2024 8.6     eGFR 09/25/2024 103.0     WBC 09/25/2024 5.65     RBC 09/25/2024 3.60 (L)     Hemoglobin 09/25/2024 11.2 (L)     Hematocrit 09/25/2024 33.9 (L)     MCV 09/25/2024 94.2     MCH 09/25/2024 31.1     MCHC 09/25/2024 33.0     RDW 09/25/2024 13.1     RDW-SD 09/25/2024 44.7     MPV 09/25/2024 9.5     Platelets 09/25/2024 149     HS Troponin T 09/25/2024 34 (H)     Total Cholesterol 09/25/2024 102     Triglycerides 09/25/2024 96     HDL Cholesterol 09/25/2024 32 (L)     LDL Cholesterol  09/25/2024 51     VLDL Cholesterol 09/25/2024 19     LDL/HDL Ratio 09/25/2024 1.59     QT Interval 09/25/2024 431     QTC Interval 09/25/2024 498    Hospital Outpatient Visit on 09/24/2024   Component Date Value    Glucose 09/24/2024 109 (H)     BUN 09/24/2024 9     Creatinine 09/24/2024 0.80     Sodium  09/24/2024 143     Potassium 09/24/2024 3.1 (L)     Chloride 09/24/2024 106     CO2 09/24/2024 27.7     Calcium 09/24/2024 8.7     Total Protein 09/24/2024 6.5     Albumin 09/24/2024 4.0     ALT (SGPT) 09/24/2024 18     AST (SGOT) 09/24/2024 16     Alkaline Phosphatase 09/24/2024 82     Total Bilirubin 09/24/2024 0.6     Globulin 09/24/2024 2.5     A/G Ratio 09/24/2024 1.6     BUN/Creatinine Ratio 09/24/2024 11.3     Anion Gap 09/24/2024 9.3     eGFR 09/24/2024 97.6     TSH 09/24/2024 1.210     T Uptake 09/24/2024 1.11     T4, Total 09/24/2024 8.27     HS Troponin T 09/24/2024 36 (H)     D-Dimer, Quantitative 09/24/2024 1.69 (H)     proBNP 09/24/2024 644.0     WBC 09/24/2024 4.96     RBC 09/24/2024 3.99 (L)     Hemoglobin 09/24/2024 12.2 (L)     Hematocrit 09/24/2024 37.2 (L)     MCV 09/24/2024 93.2     MCH 09/24/2024 30.6     MCHC 09/24/2024 32.8     RDW 09/24/2024 13.1     RDW-SD 09/24/2024 44.2     MPV 09/24/2024 9.5     Platelets 09/24/2024 170     Neutrophil % 09/24/2024 72.6     Lymphocyte % 09/24/2024 18.1 (L)     Monocyte % 09/24/2024 6.9     Eosinophil % 09/24/2024 1.6     Basophil % 09/24/2024 0.6     Immature Grans % 09/24/2024 0.2     Neutrophils, Absolute 09/24/2024 3.60     Lymphocytes, Absolute 09/24/2024 0.90     Monocytes, Absolute 09/24/2024 0.34     Eosinophils, Absolute 09/24/2024 0.08     Basophils, Absolute 09/24/2024 0.03     Immature Grans, Absolute 09/24/2024 0.01     nRBC 09/24/2024 0.0     HS Troponin T 09/24/2024 33 (H)     Troponin T Delta 09/24/2024 -3    Office Visit on 09/10/2024   Component Date Value    Glucose 09/10/2024 119 (H)     BUN 09/10/2024 12     Creatinine 09/10/2024 0.96     EGFR Result 09/10/2024 87.7     BUN/Creatinine Ratio 09/10/2024 12.5     Sodium 09/10/2024 144     Potassium 09/10/2024 4.1     Chloride 09/10/2024 105     Total CO2 09/10/2024 30.4 (H)     Calcium 09/10/2024 8.6     Total Protein 09/10/2024 6.2     Albumin 09/10/2024 3.9     Globulin 09/10/2024 2.3      A/G Ratio 09/10/2024 1.7     Total Bilirubin 09/10/2024 0.4     Alkaline Phosphatase 09/10/2024 90     AST (SGOT) 09/10/2024 25     ALT (SGPT) 09/10/2024 35     Hemoglobin A1C 09/10/2024 6.00 (H)     Total Cholesterol 09/10/2024 128     Triglycerides 09/10/2024 82     HDL Cholesterol 09/10/2024 42     VLDL Cholesterol Richi 09/10/2024 16     LDL Chol Calc (NIH) 09/10/2024 70     Chol/HDL Ratio 09/10/2024 3.05      MDM:  Meds reviewed and renewed as appropriate.  Continue medications as written.  FU scheduled in Group setting.    Diagnoses and all orders for this visit:    1. Malignant neoplasm of lower third of esophagus (Primary)    2. Neoplastic malignant related fatigue  -     armodafinil (NUVIGIL) 150 MG tablet; Take 1 tablet by mouth Daily.  Dispense: 30 tablet; Refill: 2    3. TJ (obstructive sleep apnea)  -     armodafinil (NUVIGIL) 150 MG tablet; Take 1 tablet by mouth Daily.  Dispense: 30 tablet; Refill: 2    4. Recurrent major depressive disorder, in full remission    Other orders  -     mirtazapine (REMERON) 45 MG tablet; Take 1 tablet by mouth Every Night.  Dispense: 90 tablet; Refill: 0

## 2024-10-30 NOTE — LETTER
October 31, 2024     Papa Miguel MD  [address]  [facility] [address]  [facility] [address]    Patient: Edmond Chase   YOB: 1960   Date of Visit: 10/30/2024     Dear Papa Miguel MD:       My name is Anna Molina, and I am a psychiatric nurse practitioner following Jose in group therapy alongside a shared medical visit in the [facility]. I have been prescribing armodafiinl to Jose for ongoing fatigue, depression, and low motivation. He has reported potential benefit, although still unsure, but wants to stay on this. I did witness him having an angina attack in the office yesterday. He was quickly OK, but made me question whether you had any concerns with him taking the armodafinil. I believe his angina and cardiac concerns much predated this, but was hoping for your thoughts regarding continuing.    Thoughts appreciated!    VIKTORIA De Los Santos        CC: No Recipients    Yazmin Molina APRN  10/31/24 0526  Sign when Signing Visit  Subjective  Patient ID: Edmond Chase is a 64 y.o.. male seen in regularly scheduled group session.  Group participants have consented to group conducted in person    Total Group Time, Face to Face: 70 minutes  Total Group Participants: 5, plus facilitation per VIKTORIA Moeller    Group Therapy  Group topics explored including development of new normal, interpersonal sensitivity, short and long term effects of diagnosis and treatment, significant other or family conflict, anticipitory anxiety, physical deconditioning, social determinants of health (finances, living arrangements, etc), and lifestyle choices. Shares benefits and impact of perspective, ongoing processing of historical medical events, and continued issues in survivorship. Considered genuine and artificial limitations, barriers to personal goals. Identified benefit of continuing to set goals, while giving self permission to make these smaller. Appreciated setting of  realistic goals, maintaining health, and allowing permission to life actively vs prepare to die.    Counseling provided regarding behavioral activation/ activity scheduling, reintroduction to activity through graded tasks, balancing avoidance with approach , sleep hygiene, recognition and allowance of need for rest, pharmacological and non pharmacological management of sleep disturbance, lifestyle counseling, benefits of exercise and strategies for managing barriers to engagement, allowance of self care, exploration of quality of life goals, survivorship issues, anxiety/ mood management , medication education, and existential distress. Supported ongoing development of growing group. Considered doable goals, importance of regular engagement with others, acknowledging importance of energy conservation, prioritizing things needed and desired to do. Supported discussions of existential distress, life review, and consideration of purpose in this phase of life.     Discussed current programming available in [facility] and community.    Benefits of group discussed while also acknowledging the need for 1:1 consultation at times. Members invited to discuss any concerns privately with me following group setting or to schedule a 1:1 visit if needs not being met in group.    Next shared medical visit: December 4 at 2:00     Patient response to group: Pt contributes largely to group discussion, supporting other members and remaining active and engaged in conversation.    Medications Management  VIKTORIA Moeller present for medication discussion and management.  Pt continues in survivorship of esophageal cancer.    CC: Anxiety, depression  HPI: Pt continues to be seen in group follow up regarding ongoing experience of depression and anxiety in survivorship. Continues to report benefit to mirtazapine 45 mg q hs, appreciating stable mood and sleep while identifying ongoing impact of adjusted health and wellness on anxiety,  QOL experience. Does continue armodafinil q AM, remaining uncertain regarding benefit but feeling this has been helpful. Does report having angina from time to time, actively engaged with cardiology team.   Exam: As Above  Current medication regimen: mirtazapine 45 mg q hs, armodafinil 150 mg q AM  Lab Review:   Admission on 10/24/2024, Discharged on 10/24/2024   Component Date Value   • Glucose 10/24/2024 114    • Glucose 10/24/2024 103    Lab on 10/23/2024   Component Date Value   • Glucose 10/23/2024 115 (H)    • BUN 10/23/2024 13    • Creatinine 10/23/2024 0.88    • Sodium 10/23/2024 143    • Potassium 10/23/2024 4.0    • Chloride 10/23/2024 105    • CO2 10/23/2024 25.1    • Calcium 10/23/2024 8.8    • BUN/Creatinine Ratio 10/23/2024 14.8    • Anion Gap 10/23/2024 12.9    • eGFR 10/23/2024 94.8    • WBC 10/23/2024 4.97    • RBC 10/23/2024 4.22    • Hemoglobin 10/23/2024 13.4    • Hematocrit 10/23/2024 38.9    • MCV 10/23/2024 92.2    • MCH 10/23/2024 31.8    • MCHC 10/23/2024 34.4    • RDW 10/23/2024 13.6    • RDW-SD 10/23/2024 45.8    • MPV 10/23/2024 10.0    • Platelets 10/23/2024 189    • Neutrophil % 10/23/2024 73.5    • Lymphocyte % 10/23/2024 17.3 (L)    • Monocyte % 10/23/2024 7.2    • Eosinophil % 10/23/2024 1.2    • Basophil % 10/23/2024 0.4    • Immature Grans % 10/23/2024 0.4    • Neutrophils, Absolute 10/23/2024 3.65    • Lymphocytes, Absolute 10/23/2024 0.86    • Monocytes, Absolute 10/23/2024 0.36    • Eosinophils, Absolute 10/23/2024 0.06    • Basophils, Absolute 10/23/2024 0.02    • Immature Grans, Absolute 10/23/2024 0.02    • nRBC 10/23/2024 0.0    • Extra Tube 10/23/2024 Hold for add-ons.    • Extra Tube 10/23/2024 Hold for add-ons.    Admission on 09/24/2024, Discharged on 09/25/2024   Component Date Value   • QT Interval 09/24/2024 429    • QTC Interval 09/24/2024 492    • Glucose 09/24/2024 101 (H)    • BUN 09/24/2024 9    • Creatinine 09/24/2024 0.77    • Sodium 09/24/2024 139    •  Potassium 09/24/2024 3.2 (L)    • Chloride 09/24/2024 105    • CO2 09/24/2024 25.8    • Calcium 09/24/2024 8.7    • Total Protein 09/24/2024 6.8    • Albumin 09/24/2024 3.6    • ALT (SGPT) 09/24/2024 17    • AST (SGOT) 09/24/2024 16    • Alkaline Phosphatase 09/24/2024 86    • Total Bilirubin 09/24/2024 0.6    • Globulin 09/24/2024 3.2    • A/G Ratio 09/24/2024 1.1    • BUN/Creatinine Ratio 09/24/2024 11.7    • Anion Gap 09/24/2024 8.2    • eGFR 09/24/2024 98.7    • proBNP 09/24/2024 650.0    • HS Troponin T 09/24/2024 33 (H)    • Extra Tube 09/24/2024 Hold for add-ons.    • Extra Tube 09/24/2024 hold for add-on    • Extra Tube 09/24/2024 Hold for add-ons.    • Extra Tube 09/24/2024 Hold for add-ons.    • WBC 09/24/2024 6.27    • RBC 09/24/2024 4.00 (L)    • Hemoglobin 09/24/2024 12.5 (L)    • Hematocrit 09/24/2024 37.6    • MCV 09/24/2024 94.0    • MCH 09/24/2024 31.3    • MCHC 09/24/2024 33.2    • RDW 09/24/2024 13.0    • RDW-SD 09/24/2024 44.2    • MPV 09/24/2024 9.7    • Platelets 09/24/2024 175    • Neutrophil % 09/24/2024 70.8    • Lymphocyte % 09/24/2024 19.9    • Monocyte % 09/24/2024 6.9    • Eosinophil % 09/24/2024 1.6    • Basophil % 09/24/2024 0.5    • Immature Grans % 09/24/2024 0.3    • Neutrophils, Absolute 09/24/2024 4.44    • Lymphocytes, Absolute 09/24/2024 1.25    • Monocytes, Absolute 09/24/2024 0.43    • Eosinophils, Absolute 09/24/2024 0.10    • Basophils, Absolute 09/24/2024 0.03    • Immature Grans, Absolute 09/24/2024 0.02    • nRBC 09/24/2024 0.0    • Protime 09/24/2024 13.4    • INR 09/24/2024 1.00    • PTT 09/24/2024 29.7    • Magnesium 09/24/2024 2.1    • COVID19 09/24/2024 Not Detected    • Influenza A PCR 09/24/2024 Not Detected    • Influenza B PCR 09/24/2024 Not Detected    • Right Common Femoral Spo* 09/24/2024 Y    • Right Common Femoral Com* 09/24/2024 Y    • Right Common Femoral Pha* 09/24/2024 Y    • Right Common Femoral Com* 09/24/2024 C    • Right Common Femoral Aug*  09/24/2024 Y    • Right Saphenofemoral Kwasi* 09/24/2024 C    • Right Profunda Femoral C* 09/24/2024 C    • Right Proximal Femoral C* 09/24/2024 C    • Right Mid Femoral Spont 09/24/2024 Y    • Right Mid Femoral Compet* 09/24/2024 Y    • Right Mid Femoral Phasic 09/24/2024 Y    • Right Mid Femoral Compre* 09/24/2024 C    • Right Mid Femoral Augment 09/24/2024 Y    • Right Distal Femoral Com* 09/24/2024 C    • Right Popliteal Spont 09/24/2024 Y    • Right Popliteal Competent 09/24/2024 Y    • Right Popliteal Phasic 09/24/2024 Y    • Right Popliteal Compress 09/24/2024 C    • Right Popliteal Augment 09/24/2024 Y    • Right Posterior Tibial C* 09/24/2024 C    • Right Peroneal Compress 09/24/2024 C    • Right Gastronemius Compr* 09/24/2024 C    • Right Greater Saph AK Co* 09/24/2024 C    • Right Greater Saph BK Co* 09/24/2024 C    • Right Lesser Saph Compre* 09/24/2024 C    • Left Common Femoral Spont 09/24/2024 Y    • Left Common Femoral Comp* 09/24/2024 Y    • Left Common Femoral Phas* 09/24/2024 Y    • Left Common Femoral Comp* 09/24/2024 C    • Left Common Femoral Augm* 09/24/2024 Y    • Left Saphenofemoral Junc* 09/24/2024 C    • Left Profunda Femoral Co* 09/24/2024 C    • Left Proximal Femoral Co* 09/24/2024 C    • Left Mid Femoral Spont 09/24/2024 Y    • Left Mid Femoral Compete* 09/24/2024 Y    • Left Mid Femoral Phasic 09/24/2024 Y    • Left Mid Femoral Compress 09/24/2024 C    • Left Mid Femoral Augment 09/24/2024 Y    • Left Distal Femoral Comp* 09/24/2024 C    • Left Popliteal Spont 09/24/2024 Y    • Left Popliteal Competent 09/24/2024 Y    • Left Popliteal Phasic 09/24/2024 Y    • Left Popliteal Compress 09/24/2024 C    • Left Popliteal Augment 09/24/2024 Y    • Left Posterior Tibial Co* 09/24/2024 C    • Left Peroneal Compress 09/24/2024 C    • Left Gastronemius Compre* 09/24/2024 C    • Left Greater Saph AK Com* 09/24/2024 C    • Left Greater Saph BK Com* 09/24/2024 C    • Left Lesser Saph Compress  09/24/2024 C    • BH CV VAS PRELIMINARY FI* 09/24/2024 1.0    • Glucose 09/25/2024 89    • BUN 09/25/2024 8    • Creatinine 09/25/2024 0.67 (L)    • Sodium 09/25/2024 141    • Potassium 09/25/2024 3.2 (L)    • Chloride 09/25/2024 106    • CO2 09/25/2024 26.4    • Calcium 09/25/2024 8.5 (L)    • BUN/Creatinine Ratio 09/25/2024 11.9    • Anion Gap 09/25/2024 8.6    • eGFR 09/25/2024 103.0    • WBC 09/25/2024 5.65    • RBC 09/25/2024 3.60 (L)    • Hemoglobin 09/25/2024 11.2 (L)    • Hematocrit 09/25/2024 33.9 (L)    • MCV 09/25/2024 94.2    • MCH 09/25/2024 31.1    • MCHC 09/25/2024 33.0    • RDW 09/25/2024 13.1    • RDW-SD 09/25/2024 44.7    • MPV 09/25/2024 9.5    • Platelets 09/25/2024 149    • HS Troponin T 09/25/2024 34 (H)    • Total Cholesterol 09/25/2024 102    • Triglycerides 09/25/2024 96    • HDL Cholesterol 09/25/2024 32 (L)    • LDL Cholesterol  09/25/2024 51    • VLDL Cholesterol 09/25/2024 19    • LDL/HDL Ratio 09/25/2024 1.59    • QT Interval 09/25/2024 431    • QTC Interval 09/25/2024 498    Hospital Outpatient Visit on 09/24/2024   Component Date Value   • Glucose 09/24/2024 109 (H)    • BUN 09/24/2024 9    • Creatinine 09/24/2024 0.80    • Sodium 09/24/2024 143    • Potassium 09/24/2024 3.1 (L)    • Chloride 09/24/2024 106    • CO2 09/24/2024 27.7    • Calcium 09/24/2024 8.7    • Total Protein 09/24/2024 6.5    • Albumin 09/24/2024 4.0    • ALT (SGPT) 09/24/2024 18    • AST (SGOT) 09/24/2024 16    • Alkaline Phosphatase 09/24/2024 82    • Total Bilirubin 09/24/2024 0.6    • Globulin 09/24/2024 2.5    • A/G Ratio 09/24/2024 1.6    • BUN/Creatinine Ratio 09/24/2024 11.3    • Anion Gap 09/24/2024 9.3    • eGFR 09/24/2024 97.6    • TSH 09/24/2024 1.210    • T Uptake 09/24/2024 1.11    • T4, Total 09/24/2024 8.27    • HS Troponin T 09/24/2024 36 (H)    • D-Dimer, Quantitative 09/24/2024 1.69 (H)    • proBNP 09/24/2024 644.0    • WBC 09/24/2024 4.96    • RBC 09/24/2024 3.99 (L)    • Hemoglobin 09/24/2024  12.2 (L)    • Hematocrit 09/24/2024 37.2 (L)    • MCV 09/24/2024 93.2    • MCH 09/24/2024 30.6    • MCHC 09/24/2024 32.8    • RDW 09/24/2024 13.1    • RDW-SD 09/24/2024 44.2    • MPV 09/24/2024 9.5    • Platelets 09/24/2024 170    • Neutrophil % 09/24/2024 72.6    • Lymphocyte % 09/24/2024 18.1 (L)    • Monocyte % 09/24/2024 6.9    • Eosinophil % 09/24/2024 1.6    • Basophil % 09/24/2024 0.6    • Immature Grans % 09/24/2024 0.2    • Neutrophils, Absolute 09/24/2024 3.60    • Lymphocytes, Absolute 09/24/2024 0.90    • Monocytes, Absolute 09/24/2024 0.34    • Eosinophils, Absolute 09/24/2024 0.08    • Basophils, Absolute 09/24/2024 0.03    • Immature Grans, Absolute 09/24/2024 0.01    • nRBC 09/24/2024 0.0    • HS Troponin T 09/24/2024 33 (H)    • Troponin T Delta 09/24/2024 -3    Office Visit on 09/10/2024   Component Date Value   • Glucose 09/10/2024 119 (H)    • BUN 09/10/2024 12    • Creatinine 09/10/2024 0.96    • EGFR Result 09/10/2024 87.7    • BUN/Creatinine Ratio 09/10/2024 12.5    • Sodium 09/10/2024 144    • Potassium 09/10/2024 4.1    • Chloride 09/10/2024 105    • Total CO2 09/10/2024 30.4 (H)    • Calcium 09/10/2024 8.6    • Total Protein 09/10/2024 6.2    • Albumin 09/10/2024 3.9    • Globulin 09/10/2024 2.3    • A/G Ratio 09/10/2024 1.7    • Total Bilirubin 09/10/2024 0.4    • Alkaline Phosphatase 09/10/2024 90    • AST (SGOT) 09/10/2024 25    • ALT (SGPT) 09/10/2024 35    • Hemoglobin A1C 09/10/2024 6.00 (H)    • Total Cholesterol 09/10/2024 128    • Triglycerides 09/10/2024 82    • HDL Cholesterol 09/10/2024 42    • VLDL Cholesterol Richi 09/10/2024 16    • LDL Chol Calc (NIH) 09/10/2024 70    • Chol/HDL Ratio 09/10/2024 3.05      MDM:  Meds reviewed and renewed as appropriate.  Continue medications as written.  FU scheduled in Group setting.    Diagnoses and all orders for this visit:    1. Malignant neoplasm of lower third of esophagus (Primary)    2. Neoplastic malignant related fatigue  -      armodafinil (NUVIGIL) 150 MG tablet; Take 1 tablet by mouth Daily.  Dispense: 30 tablet; Refill: 2    3. TJ (obstructive sleep apnea)  -     armodafinil (NUVIGIL) 150 MG tablet; Take 1 tablet by mouth Daily.  Dispense: 30 tablet; Refill: 2    4. Recurrent major depressive disorder, in full remission    Other orders  -     mirtazapine (REMERON) 45 MG tablet; Take 1 tablet by mouth Every Night.  Dispense: 90 tablet; Refill: 0

## 2024-10-31 RX ORDER — ARMODAFINIL 150 MG/1
150 TABLET ORAL DAILY
Qty: 30 TABLET | Refills: 2 | Status: SHIPPED | OUTPATIENT
Start: 2024-10-31

## 2024-10-31 RX ORDER — MIRTAZAPINE 45 MG/1
45 TABLET, FILM COATED ORAL NIGHTLY
Qty: 90 TABLET | Refills: 0 | Status: SHIPPED | OUTPATIENT
Start: 2024-10-31

## 2024-11-07 ENCOUNTER — OFFICE VISIT (OUTPATIENT)
Dept: SLEEP MEDICINE | Facility: HOSPITAL | Age: 64
End: 2024-11-07
Payer: MEDICARE

## 2024-11-07 VITALS — HEIGHT: 73 IN | OXYGEN SATURATION: 95 % | BODY MASS INDEX: 36.84 KG/M2 | WEIGHT: 278 LBS | HEART RATE: 82 BPM

## 2024-11-07 DIAGNOSIS — G47.14 HYPERSOMNIA DUE TO MEDICAL CONDITION: ICD-10-CM

## 2024-11-07 DIAGNOSIS — G47.33 OBSTRUCTIVE SLEEP APNEA, ADULT: Primary | ICD-10-CM

## 2024-11-07 DIAGNOSIS — E66.01 CLASS 2 SEVERE OBESITY DUE TO EXCESS CALORIES WITH SERIOUS COMORBIDITY AND BODY MASS INDEX (BMI) OF 36.0 TO 36.9 IN ADULT: ICD-10-CM

## 2024-11-07 DIAGNOSIS — E66.812 CLASS 2 SEVERE OBESITY DUE TO EXCESS CALORIES WITH SERIOUS COMORBIDITY AND BODY MASS INDEX (BMI) OF 36.0 TO 36.9 IN ADULT: ICD-10-CM

## 2024-11-07 PROCEDURE — 1159F MED LIST DOCD IN RCRD: CPT | Performed by: INTERNAL MEDICINE

## 2024-11-07 PROCEDURE — 1160F RVW MEDS BY RX/DR IN RCRD: CPT | Performed by: INTERNAL MEDICINE

## 2024-11-07 PROCEDURE — G0463 HOSPITAL OUTPT CLINIC VISIT: HCPCS

## 2024-11-07 PROCEDURE — 99213 OFFICE O/P EST LOW 20 MIN: CPT | Performed by: INTERNAL MEDICINE

## 2024-11-07 NOTE — PROGRESS NOTES
"Follow Up [facility] Note     Chief Complaint:  TJ     Primary Care Physician: Nargis Velasquez APRN    Interval History:   The patient is a 64 y.o. male who I last saw 8/30/2023 and that note was reviewed.  The patient reports he is doing well without new complaints.  He goes to bed at 1 AM gets out of bed at 7 AM.    The patient reports having increased angina.  Cardiac cath performed 10/24/2024.  Patient has a history of esophageal cancer and resection.    The patient reports he sleeps in a recliner and has not been in the bed in over 3 years.    Review of Systems:    A complete review of systems was done and all were negative with the exception of some nasal congestion, shortness of breath with chest pain as described, and some depression    Social History:    Social History     Socioeconomic History    Marital status: Single   Tobacco Use    Smoking status: Never     Passive exposure: Never    Smokeless tobacco: Never   Vaping Use    Vaping status: Never Used   Substance and Sexual Activity    Alcohol use: Not Currently     Comment: usually 1-2 month but none since christmas 2022    Drug use: Never    Sexual activity: Defer       Allergies:  Patient has no known allergies.     Medication Review:  Reviewed.      Vital Signs:    Vitals:    11/07/24 1048   Pulse: 82   SpO2: 95%   Weight: 126 kg (278 lb)   Height: 185.4 cm (73\")     Body mass index is 36.68 kg/m².    Physical Exam:    Constitutional:  Well developed 64 y.o. male that appears in no apparent distress.  Awake & oriented times 3.  Normal mood with normal recent and remote memory and normal judgement.  Eyes:  Conjunctivae normal.  Oropharynx: Previously, moist mucous membranes without exudate and a large tongue and uvula and moderate narrowing of the posterior pharyngeal opening and class II-3 Mallampati airway.    Self-administered [place] Sleepiness Scale test results: 11, previously 9  0-5 Lower normal daytime sleepiness  6-10 Higher " normal daytime sleepiness  11-12 Mild, 13-15 Moderate, & 16-24 Severe excessive daytime sleepiness     Downloaded PAP Data Evaluated For Therapeutic Response and Compliance:  DME is [employer] and the patient uses a fullface mask.  Downloads between 8/8 and 11/5/2024 compliance 100%.  Average usage is 7 hours and 58 minutes.  Average AHI is normal without leak.  Average auto CPAP pressure is 14 and his DreamStation 2 auto CPAP is 13-17    I have reviewed the above results and compared them with the patient's last downloads and reviewed with the patient.    Impression:   Severe obstructive sleep apnea by overnight polysomnogram 10/31/2016, weight 276 pounds, adequately treated with DreamStation 2 auto titrating CPAP.  The patient is at goal and demonstrates good compliance and good usage.  The patient does have some persistent complaints of hypersomnolence.     Plan:  Good sleep hygiene measures should be maintained.  Weight loss would be beneficial in this patient who has class II severe obesity by Body mass index is 36.68 kg/m².      After evaluating the patient and assessing results available, the patient is benefiting from the treatment being provided.     The patient will continue DreamStation 2 auto CPAP.  Potential side effects of not using PAP therapy reviewed and addressed as needed.  After clinical evaluation and review of downloads, I recommend no changes to the patient's pressures.  A new prescription will be sent to the patient's DME.    I answered all of the patient's questions.  The patient will call the [facility] Disorder Center for any problems and will follow up in 1 year.      Sekou Pisano MD  Sleep Medicine  11/07/24  11:05 EST

## 2024-12-04 ENCOUNTER — OFFICE VISIT (OUTPATIENT)
Dept: PSYCHIATRY | Facility: HOSPITAL | Age: 64
End: 2024-12-04
Payer: MEDICARE

## 2024-12-04 DIAGNOSIS — F33.42 RECURRENT MAJOR DEPRESSIVE DISORDER, IN FULL REMISSION: ICD-10-CM

## 2024-12-04 DIAGNOSIS — G47.33 OSA (OBSTRUCTIVE SLEEP APNEA): ICD-10-CM

## 2024-12-04 DIAGNOSIS — R53.0 NEOPLASTIC MALIGNANT RELATED FATIGUE: Primary | ICD-10-CM

## 2024-12-04 DIAGNOSIS — C15.5 MALIGNANT NEOPLASM OF LOWER THIRD OF ESOPHAGUS: ICD-10-CM

## 2024-12-04 PROCEDURE — 1159F MED LIST DOCD IN RCRD: CPT | Performed by: NURSE PRACTITIONER

## 2024-12-04 PROCEDURE — 1160F RVW MEDS BY RX/DR IN RCRD: CPT | Performed by: NURSE PRACTITIONER

## 2024-12-04 PROCEDURE — 90853 GROUP PSYCHOTHERAPY: CPT | Performed by: NURSE PRACTITIONER

## 2024-12-04 PROCEDURE — 99213 OFFICE O/P EST LOW 20 MIN: CPT | Performed by: NURSE PRACTITIONER

## 2024-12-04 NOTE — PROGRESS NOTES
Subjective  Patient ID: Edmond Chase is a 64 y.o.. male seen in regularly scheduled group session.  Group participants have consented to group conducted in person    Total Group Time, Face to Face: 90 minutes  Total Group Participants: 8, plus facilitation per VIKTORIA Moeller    Group Therapy  Group topics explored including development of new normal, short and long term effects of diagnosis and treatment, anticipitory anxiety, anxiety surrounding adjusted treatment plan or adjusted follow up, pain management, physical deconditioning, social determinants of health (finances, living arrangements, etc), and lifestyle choices. Members share expectations of recovery at completion of treatment, struggling to manage new normal. Identified ongoing burden of illness, able participation, managing reactions from others, and allowance of self to have boundaries and limits. Considered impact of knowing others with cancer or complicated illness, recognizing this can be emotional. Shared appreciation for group support, interacting extensively and providing open feedback to other members.    Counseling provided regarding cognitive behavioral therapy (CBT) strategies, behavioral activation/ activity scheduling, reintroduction to activity through graded tasks, balancing avoidance with approach , sleep hygiene, recognition and allowance of need for rest, pharmacological and non pharmacological management of sleep disturbance, lifestyle counseling, risks associated with substance use, benefits of exercise and strategies for managing barriers to engagement, allowance of self care, exploration of quality of life goals, and survivorship issues. Embraced group dynamics, recognizing benefits of universality, instillation of hope, and altruism. Supported group discussion. Identified and explored themes of uncertainty, grief, and processing of loss.     Discussed current programming available in [facility] Center and  community.    Benefits of group discussed while also acknowledging the need for 1:1 consultation at times. Members invited to discuss any concerns privately with me following group setting or to schedule a 1:1 visit if needs not being met in group.    Next shared medical visit: January 8 at 2:00 PM    Patient response to group: Pt shares openly guiding discussion, supporting others and continuing to endorse difficulties in survivorship.    Medications Management  Pt continues in survivorship of esophageal cancer.    CC: Depression, fatigue  HPI: Pt is seen in follow up regarding ongoing survivorship of esophageal cancer. Continues with stable sx of depression, remaining adherent to mirtazapine 45 mg q hs. Has added armodafinil, reporting benefit to this, and appreciating ability to initiate activity. Does note ongoing challenges with low energy, desire to live proactively, not as an invalid. Contemplates impact of disease, age, medication, while appreciating ability to maintain desired engagement at this time.   Exam: As Above  Current medication regimen: mirtazapine 45 mg q hs, armodafinil 150 mg daily  Lab Review:   Admission on 10/24/2024, Discharged on 10/24/2024   Component Date Value    Glucose 10/24/2024 114     Glucose 10/24/2024 103    Lab on 10/23/2024   Component Date Value    Glucose 10/23/2024 115 (H)     BUN 10/23/2024 13     Creatinine 10/23/2024 0.88     Sodium 10/23/2024 143     Potassium 10/23/2024 4.0     Chloride 10/23/2024 105     CO2 10/23/2024 25.1     Calcium 10/23/2024 8.8     BUN/Creatinine Ratio 10/23/2024 14.8     Anion Gap 10/23/2024 12.9     eGFR 10/23/2024 94.8     WBC 10/23/2024 4.97     RBC 10/23/2024 4.22     Hemoglobin 10/23/2024 13.4     Hematocrit 10/23/2024 38.9     MCV 10/23/2024 92.2     MCH 10/23/2024 31.8     MCHC 10/23/2024 34.4     RDW 10/23/2024 13.6     RDW-SD 10/23/2024 45.8     MPV 10/23/2024 10.0     Platelets 10/23/2024 189     Neutrophil % 10/23/2024 73.5      Lymphocyte % 10/23/2024 17.3 (L)     Monocyte % 10/23/2024 7.2     Eosinophil % 10/23/2024 1.2     Basophil % 10/23/2024 0.4     Immature Grans % 10/23/2024 0.4     Neutrophils, Absolute 10/23/2024 3.65     Lymphocytes, Absolute 10/23/2024 0.86     Monocytes, Absolute 10/23/2024 0.36     Eosinophils, Absolute 10/23/2024 0.06     Basophils, Absolute 10/23/2024 0.02     Immature Grans, Absolute 10/23/2024 0.02     nRBC 10/23/2024 0.0     Extra Tube 10/23/2024 Hold for add-ons.     Extra Tube 10/23/2024 Hold for add-ons.      MDM:  Meds reviewed and renewed as appropriate.  Continue medications as written. No refills needed.  FU scheduled in group setting.    Diagnoses and all orders for this visit:    1. Neoplastic malignant related fatigue (Primary)    2. TJ (obstructive sleep apnea)    3. Malignant neoplasm of lower third of esophagus    4. Recurrent major depressive disorder, in full remission

## 2024-12-10 ENCOUNTER — HOSPITAL ENCOUNTER (OUTPATIENT)
Dept: CT IMAGING | Facility: HOSPITAL | Age: 64
Discharge: HOME | End: 2024-12-10
Payer: MEDICARE

## 2024-12-10 DIAGNOSIS — C15.5 MALIGNANT NEOPLASM OF LOWER THIRD OF ESOPHAGUS: ICD-10-CM

## 2024-12-10 PROCEDURE — 71250 CT THORAX DX C-: CPT

## 2024-12-10 PROCEDURE — 74176 CT ABD & PELVIS W/O CONTRAST: CPT

## 2024-12-16 ENCOUNTER — TELEPHONE (OUTPATIENT)
Dept: SURGERY | Facility: CLINIC | Age: 64
End: 2024-12-16

## 2024-12-16 NOTE — TELEPHONE ENCOUNTER
"    Caller: Edmond Chase \"RONAN\"    Relationship to patient: Self    Best call back number: [phone]    Chief complaint: PT NOT FEELING WELL    Type of visit: F/U    Requested date: ANYTIME     If rescheduling, when is the original appointment: 12/16/24     Additional notes:PT CALLED IN NOT FEELING WELL WANTS TO [facility] APPT-[facility] HAS NO AVAILABILITY W/I 3 WEEKS-PT SAID HE HAD TO GO-I WAS UNABLE TO CONFIRM IF HE WANTED TO CANCEL APPT AFTER ADVISED NO AVAILBILITY OR NOT. PLEASE CALL PT -I DID NOT CANCEL APPT FOR TODAY         "

## 2024-12-18 ENCOUNTER — OFFICE VISIT (OUTPATIENT)
Dept: INTERNAL MEDICINE | Age: 64
End: 2024-12-18
Payer: MEDICARE

## 2024-12-18 VITALS
HEART RATE: 68 BPM | BODY MASS INDEX: 31.01 KG/M2 | RESPIRATION RATE: 17 BRPM | TEMPERATURE: 96.8 F | HEIGHT: 73 IN | WEIGHT: 234 LBS | OXYGEN SATURATION: 95 % | DIASTOLIC BLOOD PRESSURE: 100 MMHG | SYSTOLIC BLOOD PRESSURE: 160 MMHG

## 2024-12-18 DIAGNOSIS — J01.00 ACUTE MAXILLARY SINUSITIS, RECURRENCE NOT SPECIFIED: ICD-10-CM

## 2024-12-18 DIAGNOSIS — J06.9 ACUTE URI: Primary | ICD-10-CM

## 2024-12-18 LAB
EXPIRATION DATE: NORMAL
FLUAV AG UPPER RESP QL IA.RAPID: NOT DETECTED
FLUBV AG UPPER RESP QL IA.RAPID: NOT DETECTED
INTERNAL CONTROL: NORMAL
Lab: NORMAL
SARS-COV-2 AG UPPER RESP QL IA.RAPID: NOT DETECTED

## 2024-12-18 PROCEDURE — 99213 OFFICE O/P EST LOW 20 MIN: CPT | Performed by: PHYSICIAN ASSISTANT

## 2024-12-18 PROCEDURE — 3080F DIAST BP >= 90 MM HG: CPT | Performed by: PHYSICIAN ASSISTANT

## 2024-12-18 PROCEDURE — 87428 SARSCOV & INF VIR A&B AG IA: CPT | Performed by: PHYSICIAN ASSISTANT

## 2024-12-18 PROCEDURE — 3077F SYST BP >= 140 MM HG: CPT | Performed by: PHYSICIAN ASSISTANT

## 2024-12-18 PROCEDURE — 3044F HG A1C LEVEL LT 7.0%: CPT | Performed by: PHYSICIAN ASSISTANT

## 2024-12-18 PROCEDURE — 1125F AMNT PAIN NOTED PAIN PRSNT: CPT | Performed by: PHYSICIAN ASSISTANT

## 2024-12-18 RX ORDER — AZITHROMYCIN 250 MG/1
TABLET, FILM COATED ORAL
Qty: 6 TABLET | Refills: 0 | Status: SHIPPED | OUTPATIENT
Start: 2024-12-18

## 2024-12-18 NOTE — PROGRESS NOTES
"                            HARLEY BROWNE PA-C                  I  N  T  E  R  N  A  L    M  E  D  I  C  I  N  E         ENCOUNTER DATE:  12/18/2024    Edmond Chase / 64 y.o. / male    OFFICE VISIT ENCOUNTER       CHIEF COMPLAINT / REASON FOR OFFICE VISIT     Cough (1 week, pt taking the sever congestion otc; ), Nasal Congestion, Sore Throat, Fatigue, Chills, and Shortness of Breath      ASSESSMENT & PLAN     Problem List Items Addressed This Visit    None  Visit Diagnoses       Acute URI    -  Primary    Relevant Medications    azithromycin (Zithromax Z-Adán) 250 MG tablet    Other Relevant Orders    POCT SARS-CoV-2 Antigen TATA + Flu (Completed)    Acute maxillary sinusitis, recurrence not specified        Relevant Medications    azithromycin (Zithromax Z-Adán) 250 MG tablet          Orders Placed This Encounter   Procedures    POCT SARS-CoV-2 Antigen TATA + Flu     Order Specific Question:   Release to patient     Answer:   Routine Release [[id number]]     New Medications Ordered This Visit   Medications    azithromycin (Zithromax Z-Adán) 250 MG tablet     Sig: Take 2 tablets by mouth on day 1, then 1 tablet daily on days 2-5     Dispense:  6 tablet     Refill:  0       SUMMARY/DISCUSSION  POCT Covid-Sars-2 & Influenza A/B screens both negative on today.   Sinusitis: Start Z-Adán and take as directed.        Next Appointment:   Return if symptoms worsen or fail to improve. And, for Follow-up with VIKTORIA Carbone as scheduled 1/13/24.      VITAL SIGNS     Vitals:    12/18/24 1116   BP: 160/100   BP Location: Left arm   Patient Position: Sitting   Cuff Size: Large Adult   Pulse: 68   Resp: 17   Temp: 96.8 °F (36 °C)   TempSrc: Temporal   SpO2: 95%   Weight: 106 kg (234 lb)   Height: 185.4 cm (72.99\")       BP Readings from Last 3 Encounters:   12/18/24 160/100   10/24/24 136/77   10/09/24 140/90     Wt Readings from Last 3 Encounters:   12/18/24 106 kg (234 lb)   11/07/24 126 kg (278 lb)   10/24/24 122 kg " (270 lb)     Body mass index is 30.88 kg/m².         No data to display                   MEDICATIONS AT THE TIME OF OFFICE VISIT     Current Outpatient Medications on File Prior to Visit   Medication Sig    amLODIPine (NORVASC) 5 MG tablet Take 1 tablet by mouth Daily. Take 5 mg    armodafinil (NUVIGIL) 150 MG tablet Take 1 tablet by mouth Daily.    atorvastatin (LIPITOR) 40 MG tablet TAKE 1 TABLET BY MOUTH DAILY    COVID-19 mRNA Vac-Luciano,Pfizer, 30 MCG/0.3ML suspension prefilled syringe prefilled syringe Inject 0.3 mL into the appropriate muscle as directed by prescriber.    dapagliflozin Propanediol (Farxiga) 10 MG tablet Take 10 mg by mouth Daily.    fluticasone (FLONASE) 50 MCG/ACT nasal spray Administer 2 sprays into the nostril(s) as directed by provider Daily.    furosemide (LASIX) 20 MG tablet TAKE 1 TABLET BY MOUTH DAILY (Patient taking differently: Take 1 tablet by mouth Daily. 3-4 TIMES A WEEK)    influenza vac split high-dose (Fluzone High-Dose) 0.5 ML suspension prefilled syringe injection Inject 0.5 mL into the appropriate muscle as directed by prescriber.    isosorbide mononitrate (IMDUR) 60 MG 24 hr tablet Take 1 tablet by mouth 2 (Two) Times a Day.    lisinopril (PRINIVIL,ZESTRIL) 10 MG tablet TAKE ONE TABLET BY MOUTH ONCE NIGHTLY  DAYS (Patient taking differently: Take 1 tablet by mouth Daily.)    metoprolol succinate XL (TOPROL-XL) 50 MG 24 hr tablet Take 1 tablet by mouth Daily.    mirtazapine (REMERON) 45 MG tablet Take 1 tablet by mouth Every Night.    ranolazine (RANEXA) 500 MG 12 hr tablet Take 2 tablets by mouth Every 12 (Twelve) Hours.     No current facility-administered medications on file prior to visit.          HISTORY OF PRESENT ILLNESS     PT is a 64wm with hypertension, hyperlipidemia, and esophageal carcinoma in remission who presents with symptoms of worsening cold and congestion.     He reports symptoms of cough, post-nasal drainage, nasal congestion, sore throat,  fatigue, chills, and nausea that begin 9 days ago. Cough is productive and worsened right after eating. He also admits chronically dealing with angina, fatigue, and nausea secondary to history of esophageal carcinoma. He denies current chest pain, vomiting, or diarrhea. He has attempted OTC sever sinus congestion medication. He is up to date with all age-appropriate immunizations except for RSV.       Patient Care Team:  Nargis Velasquez APRN as PCP - General (Family Medicine)  Papa Miguel MD as Consulting Physician (Cardiology)  Sekou Pisano MD as Consulting Physician (Pulmonary Disease)  Gregor Carlson MD as Consulting Physician (Gastroenterology)  Estevan Yuan MD (Sports Medicine)  Alex Hernadez MD as Consulting Physician (Nephrology)  Yazmin Molina APRN as Nurse Practitioner (Psychiatry)  Dylon Schwartz MD as Consulting Physician (Hematology and Oncology)  Valerie Stockton MD as Referring Physician (Thoracic Surgery)  Valerie Stockton MD as Surgeon (Thoracic Surgery)    REVIEW OF SYSTEMS     Review of Systems   As Per HPI.  All other systems negative.     PHYSICAL EXAMINATION     Physical Exam  Vitals and nursing note reviewed.   Constitutional:       General: He is not in acute distress.     Appearance: Normal appearance. He is not ill-appearing.   HENT:      Head: Normocephalic and atraumatic.      Right Ear: Hearing, tympanic membrane, ear canal and external ear normal. There is no impacted cerumen.      Left Ear: Hearing, tympanic membrane, ear canal and external ear normal. There is no impacted cerumen.      Nose: Nose normal.      Right Sinus: Maxillary sinus tenderness present. No frontal sinus tenderness.      Left Sinus: Maxillary sinus tenderness present. No frontal sinus tenderness.      Mouth/Throat:      Mouth: Mucous membranes are moist.      Pharynx: Oropharynx is clear. No posterior oropharyngeal erythema.   Cardiovascular:      Rate and Rhythm:  Normal rate and regular rhythm.      Pulses: Normal pulses.      Heart sounds: Normal heart sounds. No murmur heard.     No friction rub. No gallop.   Pulmonary:      Effort: Pulmonary effort is normal. No respiratory distress.      Breath sounds: Normal breath sounds. No stridor. No wheezing.   Lymphadenopathy:      Head:      Right side of head: No submental, submandibular or tonsillar adenopathy.      Left side of head: No submental, submandibular or tonsillar adenopathy.      Cervical: No cervical adenopathy.   Neurological:      Mental Status: He is alert.   Psychiatric:         Mood and Affect: Mood normal.         Behavior: Behavior normal.             REVIEWED DATA     Labs:     Lab Results   Component Value Date     10/23/2024    K 4.0 10/23/2024    CALCIUM 8.8 10/23/2024    AST 16 09/24/2024    ALT 17 09/24/2024    BUN 13 10/23/2024    CREATININE 0.88 10/23/2024    CREATININE 0.67 (L) 09/25/2024    CREATININE 0.77 09/24/2024    EGFRRESULT 87.7 09/10/2024     Lab Results   Component Value Date    HGBA1C 6.00 (H) 09/10/2024    HGBA1C 6.10 (H) 05/06/2024    HGBA1C 6.10 (H) 01/02/2024     Lab Results   Component Value Date    LDL 51 09/25/2024    LDL 70 09/10/2024    LDL 68 05/06/2024    HDL 32 (L) 09/25/2024    HDL 42 09/10/2024    TRIG 96 09/25/2024    TRIG 82 09/10/2024     Lab Results   Component Value Date    TSH 1.210 09/24/2024    TSH 2.930 01/02/2024    TSH 1.680 12/15/2022    FREET4 1.27 01/02/2024    FREET4 1.38 08/04/2022    FREET4 1.37 06/24/2021     Lab Results   Component Value Date    WBC 4.97 10/23/2024    HGB 13.4 10/23/2024     10/23/2024     Lab Results   Component Value Date    MALBCRERATIO 22 01/02/2024             Imaging:               Medical Tests:               Summary of old records / correspondence / consultant report:             Request outside records:

## 2025-01-07 RX ORDER — DAPAGLIFLOZIN 10 MG/1
1 TABLET, FILM COATED ORAL DAILY
Qty: 90 TABLET | Refills: 1 | Status: SHIPPED | OUTPATIENT
Start: 2025-01-07

## 2025-01-13 ENCOUNTER — OFFICE VISIT (OUTPATIENT)
Dept: INTERNAL MEDICINE | Age: 65
End: 2025-01-13
Payer: MEDICARE

## 2025-01-13 ENCOUNTER — OFFICE VISIT (OUTPATIENT)
Dept: SURGERY | Facility: CLINIC | Age: 65
End: 2025-01-13
Payer: MEDICARE

## 2025-01-13 VITALS
HEIGHT: 73 IN | SYSTOLIC BLOOD PRESSURE: 158 MMHG | TEMPERATURE: 97.7 F | WEIGHT: 294 LBS | BODY MASS INDEX: 38.97 KG/M2 | HEART RATE: 83 BPM | DIASTOLIC BLOOD PRESSURE: 98 MMHG | OXYGEN SATURATION: 94 %

## 2025-01-13 VITALS
SYSTOLIC BLOOD PRESSURE: 158 MMHG | DIASTOLIC BLOOD PRESSURE: 90 MMHG | RESPIRATION RATE: 16 BRPM | OXYGEN SATURATION: 96 % | HEART RATE: 96 BPM | BODY MASS INDEX: 38.84 KG/M2 | WEIGHT: 294.4 LBS

## 2025-01-13 DIAGNOSIS — R26.89 POOR BALANCE: ICD-10-CM

## 2025-01-13 DIAGNOSIS — E11.65 TYPE 2 DIABETES MELLITUS WITH HYPERGLYCEMIA, WITHOUT LONG-TERM CURRENT USE OF INSULIN: ICD-10-CM

## 2025-01-13 DIAGNOSIS — E78.5 HYPERLIPIDEMIA, UNSPECIFIED HYPERLIPIDEMIA TYPE: ICD-10-CM

## 2025-01-13 DIAGNOSIS — Z12.5 PROSTATE CANCER SCREENING: ICD-10-CM

## 2025-01-13 DIAGNOSIS — I10 ESSENTIAL HYPERTENSION: ICD-10-CM

## 2025-01-13 DIAGNOSIS — C15.5 MALIGNANT NEOPLASM OF LOWER THIRD OF ESOPHAGUS: Primary | ICD-10-CM

## 2025-01-13 DIAGNOSIS — Z00.00 ENCOUNTER FOR ANNUAL WELLNESS VISIT (AWV) IN MEDICARE PATIENT: Primary | ICD-10-CM

## 2025-01-13 PROCEDURE — 1170F FXNL STATUS ASSESSED: CPT

## 2025-01-13 PROCEDURE — 1159F MED LIST DOCD IN RCRD: CPT

## 2025-01-13 PROCEDURE — 1159F MED LIST DOCD IN RCRD: CPT | Performed by: THORACIC SURGERY (CARDIOTHORACIC VASCULAR SURGERY)

## 2025-01-13 PROCEDURE — 99213 OFFICE O/P EST LOW 20 MIN: CPT | Performed by: THORACIC SURGERY (CARDIOTHORACIC VASCULAR SURGERY)

## 2025-01-13 PROCEDURE — G0439 PPPS, SUBSEQ VISIT: HCPCS

## 2025-01-13 PROCEDURE — 3080F DIAST BP >= 90 MM HG: CPT

## 2025-01-13 PROCEDURE — 99214 OFFICE O/P EST MOD 30 MIN: CPT

## 2025-01-13 PROCEDURE — 3080F DIAST BP >= 90 MM HG: CPT | Performed by: THORACIC SURGERY (CARDIOTHORACIC VASCULAR SURGERY)

## 2025-01-13 PROCEDURE — 3077F SYST BP >= 140 MM HG: CPT | Performed by: THORACIC SURGERY (CARDIOTHORACIC VASCULAR SURGERY)

## 2025-01-13 PROCEDURE — 1160F RVW MEDS BY RX/DR IN RCRD: CPT | Performed by: THORACIC SURGERY (CARDIOTHORACIC VASCULAR SURGERY)

## 2025-01-13 PROCEDURE — 3077F SYST BP >= 140 MM HG: CPT

## 2025-01-13 PROCEDURE — 1160F RVW MEDS BY RX/DR IN RCRD: CPT

## 2025-01-13 PROCEDURE — 1126F AMNT PAIN NOTED NONE PRSNT: CPT

## 2025-01-13 NOTE — LETTER
"January 30, 2025     VIKTORIA Carbone  [address]  [facility] [address]  [facility] [address]    Patient: Edmond Chase   YOB: 1960   Date of Visit: 1/13/2025     Dear VIKTORIA Carbone:       Thank you for referring Edmond Chase to me for evaluation. Below are the relevant portions of my assessment and plan of care.    If you have questions, please do not hesitate to call me. I look forward to following Edmond along with you.         Sincerely,        Valerie Stockton MD        CC: No Recipients    Valerie Stockton MD  01/30/25 [id number]  Sign when Signing Visit  Chief Complaint  Malignant Neoplasm Of Lower Third of Esophagus (CAP 12/10)    Subjective       Edmond Chase presents to [facility] THORACIC SURGERY  History of Present Illness  Mr. Chase is a pleasant 64-year-old gentleman who presents in follow-up for his esophageal cancer.  He is doing reasonably well.  He has joined the therapy group.  Objective  Vital Signs:  /90 (BP Location: Left arm, Patient Position: Sitting, Cuff Size: Adult)   Pulse 96   Resp 16   Wt 134 kg (294 lb 6.4 oz)   SpO2 96%   BMI 38.84 kg/m²   Estimated body mass index is 38.84 kg/m² as calculated from the following:    Height as of an earlier encounter on 1/13/25: 185.4 cm (73\").    Weight as of this encounter: 134 kg (294 lb 6.4 oz).            Physical Exam  Vitals and nursing note reviewed.   Constitutional:       Appearance: He is well-developed.   HENT:      Head: Normocephalic and atraumatic.      Nose: Nose normal.   Eyes:      Conjunctiva/sclera: Conjunctivae normal.   Pulmonary:      Effort: Pulmonary effort is normal.   Musculoskeletal:         General: Normal range of motion.      Cervical back: Normal range of motion and neck supple.   Skin:     General: Skin is warm and dry.   Neurological:      Mental Status: He is alert and oriented to person, place, and time.   Psychiatric:         Behavior: Behavior normal.         " Thought Content: Thought content normal.         Judgment: Judgment normal.        Result Review:          I have independently reviewed the CT of the chest on room pelvis performed on 12/10/2024 and agree with radiology report which demonstrates no evidence of metastatic disease.  Gastric pull-through and scarring consistent with this operation.  Stable 5 mm  pulmonary nodule on the right.  Hepatic and renal cysts unchanged.     Assessment and Plan   Diagnoses and all orders for this visit:    1. Malignant neoplasm of lower third of esophagus (Primary)  -     Cancel: CT Chest Without Contrast; Future  -     Cancel: CT abdomen pelvis wo contrast; Future  -     CT abdomen pelvis wo contrast; Future  -     CT Chest Without Contrast; Future    Mr. Chase is a pleasant 64-year-old gentleman status post resection of his esophageal cancer.  He is doing well.  He will need continued surveillance with a CT of the chest abdomen pelvis in 6 months.  He will plan to follow-up with the office.         Follow Up   No follow-ups on file.  Patient was given instructions and counseling regarding his condition or for health maintenance advice. Please see specific information pulled into the AVS if appropriate.

## 2025-01-13 NOTE — PROGRESS NOTES
I N T E R N A L  M E D I C I N E    VIKTORIA Carbone      ENCOUNTER DATE:  01/13/2025    Edmond Chase / 64 y.o. / male      MEDICARE ANNUAL WELLNESS VISIT       Chief Complaint:    Chief Complaint   Patient presents with    Medicare Wellness-subsequent         Patient's general assessment of his health since a year ago:     - Compared to one year ago, he feels his physical health is:   LITTLE WORSE    - Compared to one year ago, he feels his mental health is:  STABLE/SAME    Recent Hospitalization (within past 365 days) (NO unless indicated)  Yes at [facility] in September 2024 for chest pain.    Patient Care Team:    Patient Care Team:  Nargis Velasquez APRN as PCP - General (Family Medicine)  Papa Miguel MD as Consulting Physician (Cardiology)  Sekou Pisano MD as Consulting Physician (Pulmonary Disease)  Gregor Carlson MD as Consulting Physician (Gastroenterology)  Estevan Yuan MD (Sports Medicine)  Alex Hernadez MD as Consulting Physician (Nephrology)  Yazmin Molina APRN as Nurse Practitioner (Psychiatry)  Dylon Schwartz MD as Consulting Physician (Hematology and Oncology)  Valerie Stockton MD as Referring Physician (Thoracic Surgery)  Valerie Stockton MD as Surgeon (Thoracic Surgery)    Allergies:  Patient has no known allergies.    Medications:  Current Outpatient Medications on File Prior to Visit   Medication Sig Dispense Refill    amLODIPine (NORVASC) 5 MG tablet Take 1 tablet by mouth Daily. Take 5 mg 90 tablet 3    armodafinil (NUVIGIL) 150 MG tablet Take 1 tablet by mouth Daily. 30 tablet 2    atorvastatin (LIPITOR) 40 MG tablet TAKE 1 TABLET BY MOUTH DAILY 90 tablet 3    Farxiga 10 MG tablet TAKE 1 TABLET BY MOUTH DAILY 90 tablet 1    fluticasone (FLONASE) 50 MCG/ACT nasal spray Administer 2 sprays into the nostril(s) as directed by provider Daily.      furosemide (LASIX) 20 MG tablet TAKE 1 TABLET BY MOUTH DAILY (Patient  taking differently: Take 1 tablet by mouth Daily. 3-4 TIMES A WEEK) 90 tablet 0    isosorbide mononitrate (IMDUR) 60 MG 24 hr tablet Take 1 tablet by mouth 2 (Two) Times a Day. 180 tablet 3    lisinopril (PRINIVIL,ZESTRIL) 10 MG tablet TAKE ONE TABLET BY MOUTH ONCE NIGHTLY  DAYS (Patient taking differently: Take 1 tablet by mouth Daily.) 90 tablet 3    metoprolol succinate XL (TOPROL-XL) 50 MG 24 hr tablet Take 1 tablet by mouth Daily. 90 tablet 3    mirtazapine (REMERON) 45 MG tablet Take 1 tablet by mouth Every Night. 90 tablet 0    ranolazine (RANEXA) 500 MG 12 hr tablet Take 2 tablets by mouth Every 12 (Twelve) Hours. 360 tablet 3    [DISCONTINUED] azithromycin (Zithromax Z-Adán) 250 MG tablet Take 2 tablets by mouth on day 1, then 1 tablet daily on days 2-5 6 tablet 0    [DISCONTINUED] COVID-19 mRNA Vac-Luciano,[employer], 30 MCG/0.3ML suspension prefilled syringe prefilled syringe Inject 0.3 mL into the appropriate muscle as directed by prescriber. 0.3 mL 0    [DISCONTINUED] influenza vac split high-dose (Fluzone High-Dose) 0.5 ML suspension prefilled syringe injection Inject 0.5 mL into the appropriate muscle as directed by prescriber. 0.5 mL 0     No current facility-administered medications on file prior to visit.          No opioid medication identified on active medication list. I have reviewed chart for other potential  high risk medication/s and harmful drug interactions in the elderly.       No opioid listed on medication list       HPI for other active medical problems:     Depression: Followed by psychiatry, VIKTORIA De Los Santos.  Attending group counseling with benefit.  Plans to re schedule missed appointment from January 8, 2025.  Remains on mirtazapine 45 mg nightly with benefit.  On armodafinil 150 mg in the morning with benefit in energy levels.  No SI/HI.      Ongoing, chronic fatigue.  Wears CPAP nightly.  Followed by sleep medicine, Dr. Pisano, yearly.       HTN with CAD: Followed by  cardiology, Dr. Miguel.  Next appointment is April 11, 2025.  BP is elevated at today's visit, 158/98.  Not monitoring BP at home.  Remains on metoprolol succinate XL 50 mg daily (but missed last two days), amlodipine 5 mg daily, lisinopril 10 mg daily.  On ranolazine 1000 mg BID, isosorbide 60 mg BID, furosemide 20 mg daily (reports he is using once or twice weekly due to poor tolerance).  Ongoing angina, stable.  Denies any new or different chest pain, dyspnea, palpitations.  Wears compression socks with benefit.   Next follow up with nephrology, VIKTORIA Vann, is on March 12, 2025.     Type 2 Diabetes:  September 2024 A1C 6.0.  Remains on Farxiga 10 mg daily.   Needs to schedule diabetic eye exam.  Previously referred to podiatrist and plans to schedule.  Weight is up 16 pounds since November 2024.       HLD: Remains well controlled on atorvastatin 40 mg daily.  September 2024 Lipid panel with normal triglycerides 96, LDL 51.     Followed by cardiothoracic surgery for history of esophageal cancer.  Next appointment is later today with Dr. Valerie Stockton.     Up to date on colonoscopy as of November 2023 with [facility], 2 year recall.     Last PSA was January 2024 of 1.130, consistent with historical average.    Ongoing bilateral hand pain for which he plans to follow up with his ortho.  Balance is generally poor; denies any recent falls.  Given his multiple chronic care conditions, he has experienced general de conditioning.            HISTORY     PFSH:     The following portions of the patient's history were reviewed and updated as appropriate: Allergies / Current Medications / Past Medical History / Surgical History / Social History / Family History    Problem List:  Patient Active Problem List   Diagnosis    Essential hypertension    Hyperglycemia    Hyperlipidemia    Class 2 severe obesity due to excess calories with serious comorbidity and body mass index (BMI) of 36.0 to 36.9 in adult     Nocturia    Nonrheumatic aortic valve insufficiency    Myocardial ischemia    Coronary artery disease involving native coronary artery of native heart    Ascending aortic aneurysm    CAD in native artery    S/P CABG (coronary artery bypass graft)    S/P AVR (aortic valve replacement)    Status post ascending aortic aneurysm repair    Fatigue    Abnormal nuclear stress test    Coronary artery disease    Coronary artery disease of bypass graft of native heart with stable angina pectoris    Obstructive sleep apnea, adult treated with auto CPAP    Hypersomnia due to medical condition    Angina at rest    Type 2 diabetes mellitus, without long-term current use of insulin    Anemia    Ulcer of esophagus without bleeding    Polyp of colon    Rectal bleeding    Gastrointestinal hemorrhage    Malignant neoplasm of lower third of esophagus    Gastrointestinal hemorrhage, unspecified gastrointestinal hemorrhage type    Exertional chest pain    Iron deficiency anemia    Cholelithiasis    Erectile dysfunction    Ventricular ectopy    Chest pain       Past Medical History:  Past Medical History:   Diagnosis Date    Abnormal nuclear stress test     Anxiety     Anxiety and depression     Aortic valve insufficiency     nonrheumtic     Arthritis     knees    Ascending aortic aneurysm     CAD (coronary artery disease)     stent    Chest pain     Diabetes mellitus     Diarrhea     Dizzy     CAREFUL WHEN GETTING UP    Dyspnea     Esophageal cancer 11/2022    no chemo or radiation    Essential hypertension     Fatigue     G tube feedings     per jejunostomy tube nightly with permitin  3 cans nightly/ J-TUBE REMOVED 2023    GERD (gastroesophageal reflux disease)     GI bleed     Heart murmur     History of pneumonia     History of recent blood transfusion     hemorrhage     no reaction    Hyperglycemia     Hyperlipidemia     Hypersomnia with sleep apnea     Jejunostomy tube present     REMOVED 2023    Lightheadedness     Malaise and  "fatigue     Migraines     \"OCCULAR MIGRAINES\"    Morbid obesity     Myocardial ischemia     Nausea     Nocturia     TJ on auto CPAP 10/31/2016    Overnight polysomnogram.  Weight 276 pounds.  Severe TJ with AHI 79 events per hour.  Auto CPAP recommended.    Pneumonia     FEB 2016    Scrotal bleeding     SOB (shortness of breath)        Past Surgical History:  Past Surgical History:   Procedure Laterality Date    AORTIC VALVE REPAIR/REPLACEMENT  05/2016    ASCENDING ARCH/HEMIARCH REPLACEMENT N/A 05/02/2016    Procedure: BHAVESH STERNOTOMY CORONARY ARTERY BYPASS GRAFT TIMES 3 USING LEFT INTERNAL MAMMARY ARTERY AND RIGHT GREATER SAPHENOUS VEIN GRAFT PER ENDOSCOPIC VEIN HARVESTING, AORTIC ANEURYSM REPAIR WITH ROOT REPAIR AND AORTIC VALVE REPLACEMENT;  Surgeon: Rosalio Cline MD;  Location: [facility] MAIN OR;  Service:     CARDIAC CATHETERIZATION N/A 04/01/2016    Procedure: Left Heart Cath;  Surgeon: Erick Tam MD;  Location: [facility] CATH INVASIVE LOCATION;  Service:     CARDIAC CATHETERIZATION N/A 04/01/2016    Procedure: Left ventriculography;  Surgeon: Erick Tam MD;  Location: [facility] CATH INVASIVE LOCATION;  Service:     CARDIAC CATHETERIZATION N/A 04/01/2016    Procedure: Right Heart Cath;  Surgeon: Erick Tam MD;  Location: [facility] CATH INVASIVE LOCATION;  Service:     CARDIAC CATHETERIZATION N/A 10/30/2017    Procedure: Coronary angiography;  Surgeon: Sorin Vasquez MD;  Location: [facility] CATH INVASIVE LOCATION;  Service:     CARDIAC CATHETERIZATION  10/30/2017    Procedure: Saphenous Vein Graft;  Surgeon: Sorin Vasquez MD;  Location: [facility] CATH INVASIVE LOCATION;  Service:     CARDIAC CATHETERIZATION N/A 10/30/2017    Procedure: Native mammary injection;  Surgeon: Sorin Vasquez MD;  Location: [facility] CATH INVASIVE LOCATION;  Service:     CARDIAC CATHETERIZATION N/A 07/07/2020    Procedure: Coronary angiography;  Surgeon: Gregor Kim MD;  Location: [facility] CATH INVASIVE LOCATION;  Service: " Cardiovascular;  Laterality: N/A;    CARDIAC CATHETERIZATION N/A 07/07/2020    Procedure: Left heart cath;  Surgeon: Gregor Kim MD;  Location: [facility] CATH INVASIVE LOCATION;  Service: Cardiovascular;  Laterality: N/A;    CARDIAC CATHETERIZATION N/A 07/07/2020    Procedure: Left ventriculography;  Surgeon: Gregor Kim MD;  Location:  [facility] CATH INVASIVE LOCATION;  Service: Cardiovascular;  Laterality: N/A;    CARDIAC CATHETERIZATION N/A 07/07/2020    Procedure: Stent ESMER bypass graft;  Surgeon: Gregor Kim MD;  Location:  [facility] CATH INVASIVE LOCATION;  Service: Cardiovascular;  Laterality: N/A;    CARDIAC CATHETERIZATION N/A 06/17/2021    Procedure: SAPHENOUS VEIN GRAFT;  Surgeon: Marques Ndiaye MD;  Location: [facility] CATH INVASIVE LOCATION;  Service: Cardiovascular;  Laterality: N/A;    CARDIAC CATHETERIZATION N/A 06/17/2021    Procedure: Left Heart Cath;  Surgeon: Marques Ndiaye MD;  Location: [facility] CATH INVASIVE LOCATION;  Service: Cardiovascular;  Laterality: N/A;    CARDIAC CATHETERIZATION N/A 06/17/2021    Procedure: Coronary angiography;  Surgeon: Marques Ndiaye MD;  Location: [facility] CATH INVASIVE LOCATION;  Service: Cardiovascular;  Laterality: N/A;    CARDIAC CATHETERIZATION N/A 07/14/2022    Procedure: Left Heart Cath;  Surgeon: Charlie Aj MD;  Location: [facility] CATH INVASIVE LOCATION;  Service: Cardiovascular;  Laterality: N/A;    CARDIAC CATHETERIZATION N/A 07/14/2022    Procedure: Coronary angiography;  Surgeon: Charlie Aj MD;  Location: [facility] CATH INVASIVE LOCATION;  Service: Cardiovascular;  Laterality: N/A;    CARDIAC CATHETERIZATION  07/14/2022    Procedure: Saphenous Vein Graft;  Surgeon: Charlie Aj MD;  Location: [facility] CATH INVASIVE LOCATION;  Service: Cardiovascular;;    CARDIAC CATHETERIZATION N/A 07/14/2022    Procedure: Native mammary injection;  Surgeon: Charlie Aj MD;  Location: [facility] CATH INVASIVE LOCATION;   Service: Cardiovascular;  Laterality: N/A;    CARDIAC CATHETERIZATION N/A 10/24/2024    Procedure: Left Heart Cath;  Surgeon: Godwin Francis MD;  Location: [facility] CATH INVASIVE LOCATION;  Service: Cardiovascular;  Laterality: N/A;    CARDIAC CATHETERIZATION N/A 10/24/2024    Procedure: Coronary angiography;  Surgeon: Godwin Francis MD;  Location: [facility] CATH INVASIVE LOCATION;  Service: Cardiovascular;  Laterality: N/A;    CARDIAC CATHETERIZATION N/A 10/24/2024    Procedure: Native mammary injection;  Surgeon: Godwin Francis MD;  Location: [facility] CATH INVASIVE LOCATION;  Service: Cardiovascular;  Laterality: N/A;    CATARACT EXTRACTION EXTRACAPSULAR W/ INTRAOCULAR LENS IMPLANTATION Left     CHOLECYSTECTOMY WITH INTRAOPERATIVE CHOLANGIOGRAM N/A 09/01/2023    Procedure: CHOLECYSTECTOMY LAPAROSCOPIC INTRAOPERATIVE CHOLANGIOGRAM choledoscopy common bile duct exploration;  Surgeon: Jose Manuel Baca MD;  Location: [facility] MAIN OR;  Service: General;  Laterality: N/A;    COLONOSCOPY N/A 11/26/2022    Procedure: COLONOSCOPY AT BEDSIDE;  Surgeon: Tomy Lopez MD;  Location: [facility] MAIN OR;  Service: Gastroenterology;  Laterality: N/A;    COLONOSCOPY W/ POLYPECTOMY N/A 10/04/2022    Procedure: COLONOSCOPY to cecum with cold forceps and cold snare polypectomies;  Surgeon: Gregor Carlson MD;  Location: [facility] ENDOSCOPY;  Service: Gastroenterology;  Laterality: N/A;  PRE- hx of polyps  POST- diverticulosis, polyps    CORONARY ARTERY BYPASS GRAFT  05/2016    LIMA TO LAD, SVG TO PDA, SVG TO OM2    ENDOSCOPY N/A 07/13/2022    Procedure: ESOPHAGOGASTRODUODENOSCOPY;  Surgeon: Marcia Hollis MD;  Location: [facility] ENDOSCOPY;  Service: Gastroenterology;  Laterality: N/A;  PRE- ANEMIA, MELENA  POST- MILD EROSIVE GASTRITIS, GE JUNCTION ULCER    ENDOSCOPY N/A 10/04/2022    Procedure: ESOPHAGOGASTRODUODENOSCOPY with biopsies;  Surgeon: Gregor Carlson MD;  Location: [facility] ENDOSCOPY;  Service: Gastroenterology;   Laterality: N/A;  PRE- hx of esophageal ulcer  POST- gastric cardia mass    ENDOSCOPY N/A 05/01/2023    Procedure: ESOPHAGOGASTRODUODENOSCOPY WITH  [id number] balloon DILATATION of pylorus and anastomosis;  Surgeon: Valerie Stockton MD;  Location: [facility] ENDOSCOPY;  Service: Gastroenterology;  Laterality: N/A;  PRE: dysphagia  POST: no abnormalities seen    ENDOSCOPY N/A 6/25/2024    Procedure: ESOPHAGOGASTRODUODENOSCOPY WITH BIOPSY and 15mm balloon dilatation (pylorus);  Surgeon: Valerie Stockton MD;  Location: [facility] ENDOSCOPY;  Service: Gastroenterology;  Laterality: N/A;  pre: Esophageal cancer  post: normal anastamosis    ESOPHAGOGASTRECTOMY Right 02/03/2023    Procedure: BRONCHOSCOPY, RIGHT ROBOTIC VIDEO ASSISTED THORACOSCOPY WITH TOTAL ESOPHAGECTOMY, INTERCOSTAL NERVE BLOCK, FEEDING JEJUNOSTOMY PLACEMENT;  Surgeon: Valerie Stockton MD;  Location: [facility] MAIN OR;  Service: Robotics - DaVinci;  Laterality: Right;    INNER EAR SURGERY      JEJUNOSTOMY TUBE INSERTION      REMOVED 2023    TONSILLECTOMY         Social History:  Social History     Socioeconomic History    Marital status: Single   Tobacco Use    Smoking status: Never     Passive exposure: Never    Smokeless tobacco: Never   Vaping Use    Vaping status: Never Used   Substance and Sexual Activity    Alcohol use: Not Currently     Comment: usually 1-2 month but none since christmas 2022    Drug use: Never    Sexual activity: Defer       Family History:  Family History   Problem Relation Age of Onset    Hyperlipidemia Mother     Arthritis Mother     Hypertension Mother     Diabetes Mother     Heart disease Mother     Hyperlipidemia Father     Heart disease Father     Hypertension Father     Lung cancer Father     Heart disease Sister     Stroke Maternal Grandmother     Heart disease Maternal Grandmother     Hypertension Maternal Grandfather     Hypertension Paternal Grandmother     Hypertension Paternal Grandfather     Other Other         The patient  "states that his sister has a problem that goes by the name of \"long QT\".  He says this is a familial trait but he also says that he is \"not interested in pursuing it for himself\".    Coronary artery disease Other     Malig Hyperthermia Neg Hx          PATIENT ASSESSMENT     Vitals:  Vitals:    01/13/25 1113   BP: 158/98   Pulse: 83   Temp: 97.7 °F (36.5 °C)   SpO2: 94%   Weight: 133 kg (294 lb)   Height: 185.4 cm (73\")       BP Readings from Last 3 Encounters:   01/13/25 158/98   12/18/24 160/100   10/24/24 136/77     Wt Readings from Last 3 Encounters:   01/13/25 133 kg (294 lb)   12/18/24 106 kg (234 lb)   11/07/24 126 kg (278 lb)      Body mass index is 38.79 kg/m².    [unfilled]        Review of Systems:    Review of Systems   Constitutional:  Positive for fatigue. Negative for chills, fever and unexpected weight change.   Respiratory:  Positive for shortness of breath (With exertion, stable). Negative for cough and chest tightness.    Cardiovascular:  Positive for chest pain (Stable angina). Negative for palpitations and leg swelling.   Neurological:  Negative for dizziness, weakness, light-headedness and headaches.   Psychiatric/Behavioral:  Positive for sleep disturbance (TJ). The patient is not nervous/anxious.        Physical Exam:    Physical Exam  Vitals reviewed.   Constitutional:       General: He is not in acute distress.     Appearance: Normal appearance. He is not ill-appearing, toxic-appearing or diaphoretic.   HENT:      Head: Normocephalic and atraumatic.   Cardiovascular:      Rate and Rhythm: Normal rate and regular rhythm.      Heart sounds: Murmur heard.   Pulmonary:      Effort: Pulmonary effort is normal.      Breath sounds: Normal breath sounds.   Musculoskeletal:      Right lower leg: Edema present.      Left lower leg: Edema present.   Neurological:      Mental Status: He is alert and oriented to person, place, and time. Mental status is at baseline.   Psychiatric:         Mood and Affect: " Mood normal.         Behavior: Behavior normal.         Thought Content: Thought content normal.         Judgment: Judgment normal.           Reviewed Data:    Labs:   Lab Results   Component Value Date     10/23/2024    K 4.0 10/23/2024    CALCIUM 8.8 10/23/2024    AST 16 09/24/2024    ALT 17 09/24/2024    BUN 13 10/23/2024    CREATININE 0.88 10/23/2024    CREATININE 0.67 (L) 09/25/2024    CREATININE 0.77 09/24/2024    EGFRIFNONA 91 06/17/2021    EGFRIFAFRI 72 03/29/2016       Lab Results   Component Value Date    HGBA1C 6.00 (H) 09/10/2024    HGBA1C 6.10 (H) 05/06/2024    HGBA1C 6.10 (H) 01/02/2024    MICROALBUR 46.8 01/02/2024       Lab Results   Component Value Date    LDL 51 09/25/2024    LDL 70 09/10/2024    LDL 68 05/06/2024    HDL 32 (L) 09/25/2024    TRIG 96 09/25/2024    CHOLHDLRATIO 3.05 09/10/2024       Lab Results   Component Value Date    TSH 1.210 09/24/2024    FREET4 1.27 01/02/2024          Lab Results   Component Value Date    WBC 4.97 10/23/2024    HGB 13.4 10/23/2024    HGB 11.2 (L) 09/25/2024    HGB 12.5 (L) 09/24/2024     10/23/2024                   Lab Results   Component Value Date    PSA 1.130 01/02/2024    PSA 1.140 08/21/2023    PSA 1.5 08/04/2022       Imaging:                Medical Tests:              Summary of old records / correspondence / consultant report:             Request outside records:           SCREENING ASSESSMENT      Screening for Glaucoma:  Previous screening for glaucoma?: Yes    Hearing Loss Screen:  Finger Rub Hearing Test (right ear): Passed  Finger Rub Hearing Test (left ear): Passed    Urinary Incontinence Screen:  Episodes of urinary incontinence? : No    Depression Screen:      1/13/2025    11:27 AM   PHQ-2/PHQ-9 Depression Screening   Little interest or pleasure in doing things Almost all   Feeling down, depressed, or hopeless Almost all   Trouble falling or staying asleep, or sleeping too much Not at all   Feeling tired or having little energy  Almost all   Poor appetite or overeating Almost all   Feeling bad about yourself - or that you are a failure or have let yourself or your family down Over half   Trouble concentrating on things, such as reading the newspaper or watching television Several days   Moving or speaking so slowly that other people could have noticed? Or the opposite - being so fidgety or restless that you have been moving around a lot more than usual. Almost all   Thoughts that you would be better off dead or hurting yourself in some way Not at all   Patient Health Questionnaire-9 Score 18   How difficult have these problems made it for you to do your work, take care of things at home, or get along with other people? Very difficult        PHQ-2: N/A (Has diagnosis of depression and is on treatment)    PHQ-9: 15-19 (Moderately Severe Depression)         FUNCTIONAL, FALL RISK, & COGNITIVE SCREENING (components below):     A) Functional and cognitive status based on patient responses:        1/13/2025    11:23 AM   Functional & Cognitive Status   Do you have difficulty preparing food and eating? No   Do you have difficulty bathing yourself, getting dressed or grooming yourself? No   Do you have difficulty using the toilet? No   Do you have difficulty moving around from place to place? No   Do you have trouble with steps or getting out of a bed or a chair? No   Current Diet Limited Junk Food   Dental Exam Up to date   Eye Exam Not up to date   Exercise (times per week) 0 times per week   Current Exercises Include No Regular Exercise   Do you need help using the phone?  No   Are you deaf or do you have serious difficulty hearing?  No   Do you need help to go to places out of walking distance? No   Do you need help shopping? No   Do you need help preparing meals?  No   Do you need help with housework?  No   Do you need help with laundry? No   Do you need help taking your medications? No   Do you need help managing money? No   Do you ever drive  or ride in a car without wearing a seat belt? No   Have you felt unusual stress, anger or loneliness in the last month? Yes   Who do you live with? Alone   If you need help, do you have trouble finding someone available to you? Yes   Have you been bothered in the last four weeks by sexual problems? No   Do you have difficulty concentrating, remembering or making decisions? No       B) Assessment of Fall Risk:    Fall Risk Assessment was completed, and patient is at moderate risk for falls.    Need for further evaluation of gait, strength, and balance? : NO    Timed Up and Go (TUG): 12 seconds   (>= 12 seconds indicates high risk for falling)    Observable abnormalities included:   slow cautious pace    C. Assessment of Cognitive Function:    Mini-Cog Test:     1) Registration (3 objects): YES   2) Clock Draw: Passed? : N/A   3) Number of objects recalled: 3 (PROVIDER)     Further evaluation required? : No    **OVERALL ASSESSMENT OF FUNCTIONAL ABILITY**  (Assessment of ability to perform ADL's (showering/bathing, using toilet, dressing, feeding self, moving self around) and IADL's (use telephone, shop, prepare food, housekeep, do laundry, transport independently, take medications independently, and handle finances)    DEGREE OF FUNCTIONAL IMPAIRMENT:   NONE (based on assessment noted above)    ABILITY TO LIVE INDEPENDENTLY:    Capable of living independently       COUNSELING       A. Identification of Health Risk Factors:    Risk factors include: cardiovascular risk factors  depression / other psychiatric problems  obesity      B. Age-Appropriate Screening Schedule:  (Refer to the list below for future screening recommendations based on patient's age, sex and/or medical conditions. Orders for these recommended tests are listed in the plan section. The patient has been provided with a written plan)    Health Maintenance Topics  Health Maintenance   Topic Date Due    DIABETIC EYE EXAM  12/01/2022    BMI FOLLOWUP   12/18/2024    ANNUAL WELLNESS VISIT  01/02/2025    HEMOGLOBIN A1C  03/10/2025    LIPID PANEL  09/25/2025    COLORECTAL CANCER SCREENING  11/26/2032    TDAP/TD VACCINES (2 - Td or Tdap) 09/10/2034    HEPATITIS C SCREENING  Completed    COVID-19 Vaccine  Completed    INFLUENZA VACCINE  Completed    Pneumococcal Vaccine 65+  Completed    ZOSTER VACCINE  Completed    URINE MICROALBUMIN  Discontinued       Health Maintenance Topics Due or Over-Due  Health Maintenance Due   Topic Date Due    DIABETIC EYE EXAM  12/01/2022    BMI FOLLOWUP  12/18/2024    ANNUAL WELLNESS VISIT  01/02/2025    HEMOGLOBIN A1C  03/10/2025         C. Advanced Care Planning:    Advance Care Planning   ACP discussion was held with the patient during this visit. Patient has an advance directive in EMR which is still valid.        D. Patient Self-Management and Personalized Health Advice:    He has been provided with PERSONALIZED COUNSELING/INFORMATION (AVS educational information) about:     -- optimizing diet/nutrition plans  improving exercise / conditioning  reducing risk for cardiac/vascular events with pre-existing disease  fall prevention      He has been recommended for the following PREVENTATIVE SERVICES which has been performed today, will be ordered today or ordered/performed on upcoming follow-up visit:     LIFESTYLE PREVENTATIVE MEASURES   EYE exam for DIABETES recommended annually     CARDIOVASCULAR SCREENING   Has established history of CV disease    CANCER SCREENING   COLORECTAL cancer: Colonoscopy/Cologuard discussed   PROSTATE CANCER: (discussed and PSA will be performed annually)    MISC SCREENING  DIABETES screening performed (current/reviewed labs/lab ordered)    VACCINATION/IMMUNIZATION   CURRENT / UP TO DATE      E. Miscellaneous Items: [id number]    Aspirin use counseling: Does not need ASA (and currently is not on it)    Discussed BMI with him. The BMI is above average; BMI management plan is completed (discussed plans for  weight loss)    Reviewed use of high risk medication in the elderly: YES    Reviewed for potential of harmful drug interactions in the elderly: YES        WRAP UP       Assessment & Plan:    1) MEDICARE ANNUAL WELLNESS VISIT    2) OTHER MEDICAL CONDITIONS ADDRESSED TODAY:            Problem List Items Addressed This Visit       Essential hypertension    Relevant Medications    furosemide (LASIX) 20 MG tablet    lisinopril (PRINIVIL,ZESTRIL) 10 MG tablet    metoprolol succinate XL (TOPROL-XL) 50 MG 24 hr tablet    amLODIPine (NORVASC) 5 MG tablet    Hyperlipidemia    Relevant Medications    atorvastatin (LIPITOR) 40 MG tablet    Type 2 diabetes mellitus, without long-term current use of insulin    Relevant Medications    Farxiga 10 MG tablet     Other Visit Diagnoses       Encounter for annual wellness visit (AWV) in Medicare patient    -  Primary    Prostate cancer screening                        No orders of the defined types were placed in this encounter.      Discussion / Summary:    BP is elevated at today's visit; however, he has missed Toprol XL dosing for last two days.  Resume use.  Monitor daily BP readings for next 1-2 weeks and send readings to myself and cardiology office for review.  Ensure compliance with medications as prescribed, including furosemide given ongoing, lower extremity swelling.    Reschedule with psychiatry.    Work with physical therapy to help with balance.    Send our office diabetic eye exam records once complete.      Medications as of TODAY:              Current Outpatient Medications   Medication Sig Dispense Refill    amLODIPine (NORVASC) 5 MG tablet Take 1 tablet by mouth Daily. Take 5 mg 90 tablet 3    armodafinil (NUVIGIL) 150 MG tablet Take 1 tablet by mouth Daily. 30 tablet 2    atorvastatin (LIPITOR) 40 MG tablet TAKE 1 TABLET BY MOUTH DAILY 90 tablet 3    Farxiga 10 MG tablet TAKE 1 TABLET BY MOUTH DAILY 90 tablet 1    fluticasone (FLONASE) 50 MCG/ACT nasal spray  Administer 2 sprays into the nostril(s) as directed by provider Daily.      furosemide (LASIX) 20 MG tablet TAKE 1 TABLET BY MOUTH DAILY (Patient taking differently: Take 1 tablet by mouth Daily. 3-4 TIMES A WEEK) 90 tablet 0    isosorbide mononitrate (IMDUR) 60 MG 24 hr tablet Take 1 tablet by mouth 2 (Two) Times a Day. 180 tablet 3    lisinopril (PRINIVIL,ZESTRIL) 10 MG tablet TAKE ONE TABLET BY MOUTH ONCE NIGHTLY  DAYS (Patient taking differently: Take 1 tablet by mouth Daily.) 90 tablet 3    metoprolol succinate XL (TOPROL-XL) 50 MG 24 hr tablet Take 1 tablet by mouth Daily. 90 tablet 3    mirtazapine (REMERON) 45 MG tablet Take 1 tablet by mouth Every Night. 90 tablet 0    ranolazine (RANEXA) 500 MG 12 hr tablet Take 2 tablets by mouth Every 12 (Twelve) Hours. 360 tablet 3     No current facility-administered medications for this visit.         FOLLOW-UP:    Class 2 Severe Obesity (BMI >=35 and <=39.9). Obesity-related health conditions include the following: obstructive sleep apnea, hypertension, impaired fasting glucose, and dyslipidemias. Obesity is worsening. BMI is is above average; BMI management plan is completed. We discussed portion control and increasing exercise.            No follow-ups on file.              Future Appointments   Date Time Provider Department Center   1/13/2025  3:00 PM Valerie Stockton MD [facility]   4/11/2025 11:40 AM Papa Miguel MD [facility]   11/13/2025 11:45 AM Sekou Pisano MD [facility] None           After Visit Summary (AVS) including the Personalized Prevention  Plan Services (PPPS) was either printed and given to the patient at check-out today and/or sent to [facility] for review.

## 2025-01-14 LAB
ALBUMIN SERPL-MCNC: 4.2 G/DL (ref 3.5–5.2)
ALBUMIN/GLOB SERPL: 1.8 G/DL
ALP SERPL-CCNC: 84 U/L (ref 39–117)
ALT SERPL-CCNC: 31 U/L (ref 1–41)
APPEARANCE UR: CLEAR
AST SERPL-CCNC: 27 U/L (ref 1–40)
BASOPHILS # BLD AUTO: 0.01 10*3/MM3 (ref 0–0.2)
BASOPHILS NFR BLD AUTO: 0.2 % (ref 0–1.5)
BILIRUB SERPL-MCNC: 0.8 MG/DL (ref 0–1.2)
BILIRUB UR QL STRIP: NEGATIVE
BUN SERPL-MCNC: 14 MG/DL (ref 8–23)
BUN/CREAT SERPL: 15.2 (ref 7–25)
CALCIUM SERPL-MCNC: 9.2 MG/DL (ref 8.6–10.5)
CHLORIDE SERPL-SCNC: 103 MMOL/L (ref 98–107)
CO2 SERPL-SCNC: 33.3 MMOL/L (ref 22–29)
COLOR UR: ABNORMAL
CREAT SERPL-MCNC: 0.92 MG/DL (ref 0.76–1.27)
EGFRCR SERPLBLD CKD-EPI 2021: 91.7 ML/MIN/1.73
EOSINOPHIL # BLD AUTO: 0.1 10*3/MM3 (ref 0–0.4)
EOSINOPHIL NFR BLD AUTO: 2 % (ref 0.3–6.2)
ERYTHROCYTE [DISTWIDTH] IN BLOOD BY AUTOMATED COUNT: 13.8 % (ref 12.3–15.4)
GLOBULIN SER CALC-MCNC: 2.4 GM/DL
GLUCOSE SERPL-MCNC: 142 MG/DL (ref 65–99)
GLUCOSE UR QL STRIP: ABNORMAL
HBA1C MFR BLD: 6 % (ref 4.8–5.6)
HCT VFR BLD AUTO: 40.4 % (ref 37.5–51)
HGB BLD-MCNC: 13.5 G/DL (ref 13–17.7)
HGB UR QL STRIP: NEGATIVE
IMM GRANULOCYTES # BLD AUTO: 0.02 10*3/MM3 (ref 0–0.05)
IMM GRANULOCYTES NFR BLD AUTO: 0.4 % (ref 0–0.5)
KETONES UR QL STRIP: NEGATIVE
LEUKOCYTE ESTERASE UR QL STRIP: NEGATIVE
LYMPHOCYTES # BLD AUTO: 0.96 10*3/MM3 (ref 0.7–3.1)
LYMPHOCYTES NFR BLD AUTO: 19.1 % (ref 19.6–45.3)
MCH RBC QN AUTO: 32.6 PG (ref 26.6–33)
MCHC RBC AUTO-ENTMCNC: 33.4 G/DL (ref 31.5–35.7)
MCV RBC AUTO: 97.6 FL (ref 79–97)
MONOCYTES # BLD AUTO: 0.38 10*3/MM3 (ref 0.1–0.9)
MONOCYTES NFR BLD AUTO: 7.6 % (ref 5–12)
NEUTROPHILS # BLD AUTO: 3.55 10*3/MM3 (ref 1.7–7)
NEUTROPHILS NFR BLD AUTO: 70.7 % (ref 42.7–76)
NITRITE UR QL STRIP: NEGATIVE
NRBC BLD AUTO-RTO: 0 /100 WBC (ref 0–0.2)
PH UR STRIP: 5.5 [PH] (ref 5–8)
PLATELET # BLD AUTO: 165 10*3/MM3 (ref 140–450)
POTASSIUM SERPL-SCNC: 4.7 MMOL/L (ref 3.5–5.2)
PROT SERPL-MCNC: 6.6 G/DL (ref 6–8.5)
PROT UR QL STRIP: ABNORMAL
PSA SERPL-MCNC: 1.03 NG/ML (ref 0–4)
RBC # BLD AUTO: 4.14 10*6/MM3 (ref 4.14–5.8)
SODIUM SERPL-SCNC: 143 MMOL/L (ref 136–145)
SP GR UR STRIP: >1.03 (ref 1–1.03)
T4 FREE SERPL-MCNC: 1.24 NG/DL (ref 0.92–1.68)
TSH SERPL DL<=0.005 MIU/L-ACNC: 2.03 UIU/ML (ref 0.27–4.2)
UROBILINOGEN UR STRIP-MCNC: ABNORMAL MG/DL
WBC # BLD AUTO: 5.02 10*3/MM3 (ref 3.4–10.8)

## 2025-01-28 ENCOUNTER — TREATMENT (OUTPATIENT)
Age: 65
End: 2025-01-28
Payer: MEDICARE

## 2025-01-28 DIAGNOSIS — R26.89 POOR BALANCE: Primary | ICD-10-CM

## 2025-01-28 DIAGNOSIS — Z74.09 IMPAIRED FUNCTIONAL MOBILITY, BALANCE, GAIT, AND ENDURANCE: ICD-10-CM

## 2025-01-28 DIAGNOSIS — Z78.9 DEFICIT IN ACTIVITIES OF DAILY LIVING (ADL): ICD-10-CM

## 2025-01-28 NOTE — PROGRESS NOTES
"Physical Therapy Initial Evaluation and Plan of Care  [facility] [place]   [address] [place] [address]  P: [phone]       F: [phone]       Patient: Edmond Chase   : 1960  Visit Diagnoses:     ICD-10-CM ICD-9-CM   1. Poor balance  R26.89 781.99   2. Impaired functional mobility, balance, gait, and endurance  Z74.09 V49.89   3. Deficit in activities of daily living (ADL)  Z78.9 V49.89     Referring practitioner: VIKTORIA Carbone  Date of Initial Visit: 2025  Today's Date: 2025  Patient seen for 1 sessions           Subjective Questionnaire: ABC: 64%      Subjective Evaluation    History of Present Illness  Date of onset: 2025  Mechanism of injury: Patient reports having heart problems with angina, extreme fatigue, and low energy.  States is is hard for him to get ongoing in the mornings.  States the nature of his heart problems is that the doctors can not offer him any additional surgically options.  Reports he had esophagus cancer 2 years ago resulting in the removal of his esophagus and stretching of his stomach.  As a result of the surgical changes he no longer have a stomach sphincter which has resulted in him being unable to bend over.  States has difficulty putting his socks/shoes on because every time he bends over it makes him feel like he is going to vomit.  States he has a lot of nausea daily and chest pain daily.  \"I get just get these waves of a unwellness come over me, nausea, fatigue, hopelessness and I am seeing a therapist.  I do have a history when I was a small child of inner ear problems I did see ENT last year and he thought my ears were okay so I do not have vertigo but sometimes I do get dizzy and I do not know if it is a blood pressure thing or a blood sugar thing or what but I can get very very dizzy at times and it affects my balance.\"    No falls, occasionally uses a cane.  For the past 2 years he has been " "sleeping in a recliner due to his medical conditions.      2 rounds of cardiac rehab which was helpful.    Arthritis in knees L>R, had gel injections about a year ago.  States he thinks he is developing arthritis in his hands because he has been getting pains in his hands.      Occasionally gets tremor in his legs.  Recently when he was ill he had total body shaking.      Has swelling in his leg, right side more due to having the veins used for his heart bypass surgery.  States he has knee high compression socks but does not always wear them. Reports they caused a skin reaction.       Subjective comment: Patient reports poor balance.  Patient Occupation: Retired- Social Support  Lives in: [facility] (elevator access)  Lives with: alone    Hand dominance: left    Patient Goals  Patient goals for therapy: improved balance       Past Medical History:   Diagnosis Date    Abnormal nuclear stress test     Anxiety     Anxiety and depression     Aortic valve insufficiency     nonrheumtic     Arthritis     knees    Ascending aortic aneurysm     CAD (coronary artery disease)     stent    Chest pain     Diabetes mellitus     Diarrhea     Dizzy     CAREFUL WHEN GETTING UP    Dyspnea     Esophageal cancer 11/2022    no chemo or radiation    Essential hypertension     Fatigue     G tube feedings     per jejunostomy tube nightly with permitin  3 cans nightly/ J-TUBE REMOVED 2023    GERD (gastroesophageal reflux disease)     GI bleed     Heart murmur     History of pneumonia     History of recent blood transfusion     hemorrhage     no reaction    Hyperglycemia     Hyperlipidemia     Hypersomnia with sleep apnea     Jejunostomy tube present     REMOVED 2023    Lightheadedness     Malaise and fatigue     Migraines     \"OCCULAR MIGRAINES\"    Morbid obesity     Myocardial ischemia     Nausea     Nocturia     TJ on auto CPAP 10/31/2016    Overnight polysomnogram.  Weight 276 pounds.  Severe TJ with AHI 79 events per hour.  " Auto CPAP recommended.    Pneumonia     FEB 2016    Scrotal bleeding     SOB (shortness of breath)      Past Surgical History:   Procedure Laterality Date    AORTIC VALVE REPAIR/REPLACEMENT  05/2016    ASCENDING ARCH/HEMIARCH REPLACEMENT N/A 05/02/2016    Procedure: BHAVESH STERNOTOMY CORONARY ARTERY BYPASS GRAFT TIMES 3 USING LEFT INTERNAL MAMMARY ARTERY AND RIGHT GREATER SAPHENOUS VEIN GRAFT PER ENDOSCOPIC VEIN HARVESTING, AORTIC ANEURYSM REPAIR WITH ROOT REPAIR AND AORTIC VALVE REPLACEMENT;  Surgeon: Rosalio Cline MD;  Location: [facility] MAIN OR;  Service:     CARDIAC CATHETERIZATION N/A 04/01/2016    Procedure: Left Heart Cath;  Surgeon: Erick Tam MD;  Location: [facility] CATH INVASIVE LOCATION;  Service:     CARDIAC CATHETERIZATION N/A 04/01/2016    Procedure: Left ventriculography;  Surgeon: Erick Tam MD;  Location: [facility] CATH INVASIVE LOCATION;  Service:     CARDIAC CATHETERIZATION N/A 04/01/2016    Procedure: Right Heart Cath;  Surgeon: Erick Tam MD;  Location: [facility] CATH INVASIVE LOCATION;  Service:     CARDIAC CATHETERIZATION N/A 10/30/2017    Procedure: Coronary angiography;  Surgeon: Sorin Vasquez MD;  Location: [facility] CATH INVASIVE LOCATION;  Service:     CARDIAC CATHETERIZATION  10/30/2017    Procedure: Saphenous Vein Graft;  Surgeon: Sorin Vasquez MD;  Location: [facility] CATH INVASIVE LOCATION;  Service:     CARDIAC CATHETERIZATION N/A 10/30/2017    Procedure: Native mammary injection;  Surgeon: Sorin Vasquez MD;  Location: [facility] CATH INVASIVE LOCATION;  Service:     CARDIAC CATHETERIZATION N/A 07/07/2020    Procedure: Coronary angiography;  Surgeon: Gregor Kim MD;  Location: [facility] CATH INVASIVE LOCATION;  Service: Cardiovascular;  Laterality: N/A;    CARDIAC CATHETERIZATION N/A 07/07/2020    Procedure: Left heart cath;  Surgeon: Gregor Kim MD;  Location: [facility] CATH INVASIVE LOCATION;  Service: Cardiovascular;  Laterality: N/A;    CARDIAC CATHETERIZATION N/A  07/07/2020    Procedure: Left ventriculography;  Surgeon: Gregor Kim MD;  Location: [facility] CATH INVASIVE LOCATION;  Service: Cardiovascular;  Laterality: N/A;    CARDIAC CATHETERIZATION N/A 07/07/2020    Procedure: Stent ESMER bypass graft;  Surgeon: Gregor Kim MD;  Location:  [facility] CATH INVASIVE LOCATION;  Service: Cardiovascular;  Laterality: N/A;    CARDIAC CATHETERIZATION N/A 06/17/2021    Procedure: SAPHENOUS VEIN GRAFT;  Surgeon: Marques Ndiaye MD;  Location: [facility] CATH INVASIVE LOCATION;  Service: Cardiovascular;  Laterality: N/A;    CARDIAC CATHETERIZATION N/A 06/17/2021    Procedure: Left Heart Cath;  Surgeon: Marques Ndiaye MD;  Location: [facility] CATH INVASIVE LOCATION;  Service: Cardiovascular;  Laterality: N/A;    CARDIAC CATHETERIZATION N/A 06/17/2021    Procedure: Coronary angiography;  Surgeon: Marques Ndiaye MD;  Location: [facility] CATH INVASIVE LOCATION;  Service: Cardiovascular;  Laterality: N/A;    CARDIAC CATHETERIZATION N/A 07/14/2022    Procedure: Left Heart Cath;  Surgeon: Charlie Aj MD;  Location: [facility] CATH INVASIVE LOCATION;  Service: Cardiovascular;  Laterality: N/A;    CARDIAC CATHETERIZATION N/A 07/14/2022    Procedure: Coronary angiography;  Surgeon: Charlie Aj MD;  Location: [facility] CATH INVASIVE LOCATION;  Service: Cardiovascular;  Laterality: N/A;    CARDIAC CATHETERIZATION  07/14/2022    Procedure: Saphenous Vein Graft;  Surgeon: Charlie Aj MD;  Location: [facility] CATH INVASIVE LOCATION;  Service: Cardiovascular;;    CARDIAC CATHETERIZATION N/A 07/14/2022    Procedure: Native mammary injection;  Surgeon: Charlie Aj MD;  Location: [facility] CATH INVASIVE LOCATION;  Service: Cardiovascular;  Laterality: N/A;    CARDIAC CATHETERIZATION N/A 10/24/2024    Procedure: Left Heart Cath;  Surgeon: Godwin Francis MD;  Location: [facility] CATH INVASIVE LOCATION;  Service: Cardiovascular;  Laterality: N/A;    CARDIAC CATHETERIZATION N/A  10/24/2024    Procedure: Coronary angiography;  Surgeon: Godwin Francis MD;  Location: [facility] CATH INVASIVE LOCATION;  Service: Cardiovascular;  Laterality: N/A;    CARDIAC CATHETERIZATION N/A 10/24/2024    Procedure: Native mammary injection;  Surgeon: Godwin Francis MD;  Location: [facility] CATH INVASIVE LOCATION;  Service: Cardiovascular;  Laterality: N/A;    CATARACT EXTRACTION EXTRACAPSULAR W/ INTRAOCULAR LENS IMPLANTATION Left     CHOLECYSTECTOMY WITH INTRAOPERATIVE CHOLANGIOGRAM N/A 09/01/2023    Procedure: CHOLECYSTECTOMY LAPAROSCOPIC INTRAOPERATIVE CHOLANGIOGRAM choledoscopy common bile duct exploration;  Surgeon: Jose Manuel Baca MD;  Location: [facility] MAIN OR;  Service: General;  Laterality: N/A;    COLONOSCOPY N/A 11/26/2022    Procedure: COLONOSCOPY AT BEDSIDE;  Surgeon: Tomy Lopez MD;  Location: [facility] MAIN OR;  Service: Gastroenterology;  Laterality: N/A;    COLONOSCOPY W/ POLYPECTOMY N/A 10/04/2022    Procedure: COLONOSCOPY to cecum with cold forceps and cold snare polypectomies;  Surgeon: Gregor Carlson MD;  Location: [facility] ENDOSCOPY;  Service: Gastroenterology;  Laterality: N/A;  PRE- hx of polyps  POST- diverticulosis, polyps    CORONARY ARTERY BYPASS GRAFT  05/2016    LIMA TO LAD, SVG TO PDA, SVG TO OM2    ENDOSCOPY N/A 07/13/2022    Procedure: ESOPHAGOGASTRODUODENOSCOPY;  Surgeon: Marcia Hollis MD;  Location: [facility] ENDOSCOPY;  Service: Gastroenterology;  Laterality: N/A;  PRE- ANEMIA, MELENA  POST- MILD EROSIVE GASTRITIS, GE JUNCTION ULCER    ENDOSCOPY N/A 10/04/2022    Procedure: ESOPHAGOGASTRODUODENOSCOPY with biopsies;  Surgeon: Gregor Carlson MD;  Location: [facility] ENDOSCOPY;  Service: Gastroenterology;  Laterality: N/A;  PRE- hx of esophageal ulcer  POST- gastric cardia mass    ENDOSCOPY N/A 05/01/2023    Procedure: ESOPHAGOGASTRODUODENOSCOPY WITH  61tk-65us-64qt balloon DILATATION of pylorus and anastomosis;  Surgeon: Valerie Stockton MD;  Location: [facility]  ENDOSCOPY;  Service: Gastroenterology;  Laterality: N/A;  PRE: dysphagia  POST: no abnormalities seen    ENDOSCOPY N/A 2024    Procedure: ESOPHAGOGASTRODUODENOSCOPY WITH BIOPSY and 15mm balloon dilatation (pylorus);  Surgeon: Valerie Stockton MD;  Location: [facility] ENDOSCOPY;  Service: Gastroenterology;  Laterality: N/A;  pre: Esophageal cancer  post: normal anastamosis    ESOPHAGOGASTRECTOMY Right 2023    Procedure: BRONCHOSCOPY, RIGHT ROBOTIC VIDEO ASSISTED THORACOSCOPY WITH TOTAL ESOPHAGECTOMY, INTERCOSTAL NERVE BLOCK, FEEDING JEJUNOSTOMY PLACEMENT;  Surgeon: Valerie Stockton MD;  Location: [facility] MAIN OR;  Service: Robotics - DaVinci;  Laterality: Right;    INNER EAR SURGERY      JEJUNOSTOMY TUBE INSERTION      REMOVED     TONSILLECTOMY           Objective          Observations     Additional Knee Observation Details  Patient presents with bilateral lower extremity edema, right leg > left.    Strength/Myotome Testing     Left Hip   Planes of Motion   Flexion: 4  Extension: 4  Abduction: 4    Right Hip   Planes of Motion   Flexion: 4  Extension: 4  Abduction: 4    Left Knee   Normal strength    Right Knee   Normal strength    Left Hip Flexibility Comments:   Decreased hip girdle/LE muscle flexibility.    Right Hip Flexibility Comments:   Decreased hip girdle/LE muscle flexibility.    Ambulation     Observational Gait   Gait: asymmetric   Decreased walking speed, stride length and right stance time.     Functional Assessment     Comments  5XSTS: 38.30 seconds (used both hands to push up to stand and to control return to sitting)    TU.49 seconds (used both hands to push up to stand and control return to sitting)          Assessment & Plan       Assessment  Impairments: abnormal gait, activity intolerance, impaired balance, impaired physical strength and lacks appropriate home exercise program   Functional limitations: walking and standing   Assessment details: Edmond Chase is a pleasant  64 y.o. male that presents with impaired gait, impaired balance, decreased hip strength, decreased functional capacity.  Pt will benefit from skilled PT services in order to address listed impairments, improve balance and functional mobility.  Barriers to therapy: Multiple co-morbidities  Prognosis: fair  Prognosis details: Patient demonstrates fair rehab potential as evidenced by high multiple co-morbidities and interaction of conditions.      Goals  Plan Goals: Short Term Goals (2 wks):  1.  Educate patient in energy conservation strategies.  2.  Patient will have increased hip strength to 4+/5.  3.  Patient will be independent in HEP.    Long Term Goals (4 wks):  1.  Patient will have improved 5XSTS time of 28 seconds or better.  2.  Patient will have improved TUG time of 15 seconds of better.  3.  Patient will have improved LE muscle flexibility to WFL.    Plan  Therapy options: will be seen for skilled therapy services  Planned modality interventions: cryotherapy and thermotherapy (hydrocollator packs)  Planned therapy interventions: neuromuscular re-education, manual therapy, strengthening, stretching, therapeutic activities, abdominal trunk stabilization, balance/weight-bearing training, body mechanics training, flexibility, functional ROM exercises, gait training and home exercise program  Frequency: 1x week  Duration in weeks: 4  Treatment plan discussed with: patient  Plan details: Pt was educated on the importance of their HEP and their current need for skilled physical therapy. Patients goals and potential limitations were discussed and pt is in agreement with current plan of care and treatment emphasis.                 History # of Personal Factors and/or Comorbidities: HIGH (3+)  Examination of Body System(s): # of elements: HIGH (4+)  Clinical Presentation: STABLE   Clinical Decision Making: HIGH      Timed:         Manual Therapy:         mins  26750;     Therapeutic Exercise:         mins  72749;      Neuromuscular Flakito:        mins  52700;    Therapeutic Activity:     20     mins  92379;     Gait Training:           mins  16074;     Ultrasound:          mins  45224;    Ionto                                  mins  26970  Self Care                            mins  55017  Canalith Repos         mins  30396  Orthotic MGMT/Train         mins  69971    Un-Timed:  Electrical Stimulation:         mins  94446 ( );  Dry Needling:          mins  90766 self-pay;  Dry Needling:          mins  84378 self-pay  Traction          mins  78398  Low Eval          mins  45045  Mod Eval          mins  08924  High Eval                       40     mins  27305    Timed Treatment:   20   mins   Total Treatment:     60   mins      PT SIGNATURE: Sariah Jeffers PT     License Number: [id number]  Electronically signed by Sariah Jeffers PT, 01/28/25, 10:24 AM EST      DATE TREATMENT INITIATED: 1/28/2025    Initial Certification  Certification Period: 1/28/2025 thru 4/27/2025  I certify that the therapy services are furnished while this patient is under my care.  The services outlined above are required by this patient, and will be reviewed every 90 days.     PHYSICIAN: Nargis Velasquez APRN      NPI: [id number]  DATE:         Please sign and return via fax to [phone]. Thank you, [facility].

## 2025-01-30 NOTE — PROGRESS NOTES
"Chief Complaint  Malignant Neoplasm Of Lower Third of Esophagus (CAP 12/10)    Subjective        Edmond Chase presents to [facility] THORACIC SURGERY  History of Present Illness  Mr. Chase is a pleasant 64-year-old gentleman who presents in follow-up for his esophageal cancer.  He is doing reasonably well.  He has joined the therapy group.  Objective   Vital Signs:  /90 (BP Location: Left arm, Patient Position: Sitting, Cuff Size: Adult)   Pulse 96   Resp 16   Wt 134 kg (294 lb 6.4 oz)   SpO2 96%   BMI 38.84 kg/m²   Estimated body mass index is 38.84 kg/m² as calculated from the following:    Height as of an earlier encounter on 1/13/25: 185.4 cm (73\").    Weight as of this encounter: 134 kg (294 lb 6.4 oz).            Physical Exam  Vitals and nursing note reviewed.   Constitutional:       Appearance: He is well-developed.   HENT:      Head: Normocephalic and atraumatic.      Nose: Nose normal.   Eyes:      Conjunctiva/sclera: Conjunctivae normal.   Pulmonary:      Effort: Pulmonary effort is normal.   Musculoskeletal:         General: Normal range of motion.      Cervical back: Normal range of motion and neck supple.   Skin:     General: Skin is warm and dry.   Neurological:      Mental Status: He is alert and oriented to person, place, and time.   Psychiatric:         Behavior: Behavior normal.         Thought Content: Thought content normal.         Judgment: Judgment normal.        Result Review :          I have independently reviewed the CT of the chest on room pelvis performed on 12/10/2024 and agree with radiology report which demonstrates no evidence of metastatic disease.  Gastric pull-through and scarring consistent with this operation.  Stable 5 mm  pulmonary nodule on the right.  Hepatic and renal cysts unchanged.     Assessment and Plan   Diagnoses and all orders for this visit:    1. Malignant neoplasm of lower third of esophagus (Primary)  -     Cancel: CT Chest " Without Contrast; Future  -     Cancel: CT abdomen pelvis wo contrast; Future  -     CT abdomen pelvis wo contrast; Future  -     CT Chest Without Contrast; Future    Mr. Chase is a pleasant 64-year-old gentleman status post resection of his esophageal cancer.  He is doing well.  He will need continued surveillance with a CT of the chest abdomen pelvis in 6 months.  He will plan to follow-up with the office.         Follow Up   No follow-ups on file.  Patient was given instructions and counseling regarding his condition or for health maintenance advice. Please see specific information pulled into the AVS if appropriate.

## 2025-02-12 ENCOUNTER — TREATMENT (OUTPATIENT)
Age: 65
End: 2025-02-12
Payer: MEDICARE

## 2025-02-12 ENCOUNTER — OFFICE VISIT (OUTPATIENT)
Dept: PSYCHIATRY | Facility: HOSPITAL | Age: 65
End: 2025-02-12
Payer: MEDICARE

## 2025-02-12 DIAGNOSIS — Z78.9 DEFICIT IN ACTIVITIES OF DAILY LIVING (ADL): ICD-10-CM

## 2025-02-12 DIAGNOSIS — R53.0 NEOPLASTIC MALIGNANT RELATED FATIGUE: ICD-10-CM

## 2025-02-12 DIAGNOSIS — G47.33 OSA (OBSTRUCTIVE SLEEP APNEA): ICD-10-CM

## 2025-02-12 DIAGNOSIS — Z74.09 IMPAIRED FUNCTIONAL MOBILITY, BALANCE, GAIT, AND ENDURANCE: ICD-10-CM

## 2025-02-12 DIAGNOSIS — R26.89 POOR BALANCE: Primary | ICD-10-CM

## 2025-02-12 PROCEDURE — 97110 THERAPEUTIC EXERCISES: CPT | Performed by: PHYSICAL THERAPIST

## 2025-02-12 PROCEDURE — 97530 THERAPEUTIC ACTIVITIES: CPT | Performed by: PHYSICAL THERAPIST

## 2025-02-12 RX ORDER — ARMODAFINIL 150 MG/1
150 TABLET ORAL DAILY
Qty: 30 TABLET | Refills: 2 | Status: SHIPPED | OUTPATIENT
Start: 2025-02-12

## 2025-02-12 NOTE — PROGRESS NOTES
Physical Therapy Treatment Note  [facility] [place]   [address] [place] [address]  P: [phone]       F: [phone]      Patient: Edmond Chase   : 1960  Treatment Diagnosis:     ICD-10-CM ICD-9-CM   1. Poor balance  R26.89 781.99   2. Impaired functional mobility, balance, gait, and endurance  Z74.09 V49.89   3. Deficit in activities of daily living (ADL)  Z78.9 V49.89     Referring practitioner: VIKTORIA Carbone  Date of Initial Visit: Type: THERAPY  Noted: 2025  Today's Date: 2025  Patient seen for 2 sessions           Subjective   Patient reports he missed last week because he was not feeling well.  States he continues with his persistent general feeling of unwellness with bouts of nausea and angina.    Objective     See Exercise, Manual, and Modality Logs for complete treatment.       Assessment/Plan  Patient performed exercises to initiate PT interventions.  He tolerated NuStep well although at minute 9 he started having an episode of angina.  He reported that his symptoms were his typical sensations and he had already taken his medicine for that this morning.  He was able to continue with participation of the therapy session with no other complaints.  Progressed HEP and provided written instructions for home use.  Progress per Plan of Care           Timed:         Manual Therapy:         mins  30366;     Therapeutic Exercise:    20     mins  08815;    Neuromuscular Flakito:        mins  03630;    Therapeutic Activity:     15     mins  01416;     Gait Training:           mins  17848;     Ultrasound:          mins  50214;    Ionto                                  mins  75989  Self Care                            mins  40950  Canalith Repos         mins  39936  Orthotic MGMT/Train         mins  50340    Un-Timed:  Electrical Stimulation:         mins  72114 ( );  Dry Needling:          mins  98289 self-pay;  Dry Needling:           mins  78676 self-pay  Traction          mins  31417  Group Therapy:          mins  68929;    Timed Treatment:   35   mins   Total Treatment:     35   mins        PT SIGNATURE: Sariah Jeffers PT     License Number: [id number]  Electronically signed by Sariah Jeffers PT, 02/12/25, 11:19 AM EST

## 2025-02-12 NOTE — PATIENT INSTRUCTIONS
Access Code: [email or web address]  URL: [email or web address]  Date: 02/12/2025  Prepared by: Sariah Jeffers    Exercises  - Seated Flexion Stretch with Swiss Ball  - 1 x daily - 7 x weekly - 2 sets - 10 reps - 3 sec. hold  - Seated Hamstring Stretch  - 1 x daily - 7 x weekly - 1 sets - 5 reps - 5 sec. hold  - Seated Table Piriformis Stretch  - 1 x daily - 7 x weekly - 1 sets - 5 reps - 5 sec. hold

## 2025-02-12 NOTE — PROGRESS NOTES
Subjective  Patient ID: Edmond Chase is a 64 y.o.. male seen in regularly scheduled group session.  Group participants have consented to group conducted in person    Total Group Time, Face to Face: 65 minutes  Total Group Participants: 4, plus facilitation per VIKTORIA Moeller    Group Therapy  Group topics explored including development of new normal, interpersonal sensitivity, short and long term effects of diagnosis and treatment, significant other or family conflict, intimacy concerns, anticipitory anxiety, physical deconditioning, weight management, and lifestyle choices. Shared disruption in tastes, challenges in relationships, independent goals and barriers to achieving. Considered impact of pain and FU on life, also reflecting on limitations, goals of recognizliaing events of the past, and choosing to live in the present moment. Share personal need to continue personal responsibilities, remain active and engaged as desired and able.    Counseling provided regarding cognitive behavioral therapy (CBT) strategies, behavioral activation/ activity scheduling, reintroduction to activity through graded tasks, balancing avoidance with approach , sleep hygiene, discussion of need for restorative sleep with consideration of sleep apnea, recognition and allowance of need for rest, lifestyle counseling, benefits of exercise and strategies for managing barriers to engagement, allowance of self care, exploration of quality of life goals, survivorship issues, anxiety/ mood management , medication education, risks and expectations alongside prescribing of controlled substances, specifically regarding risk management and safe use, as well as expectations of prescribing, refills, storage, and diversion. , and existential distress. Supported discussion, exploration of past fears and current allowances. Reviewed QOL goals, barriers to achievement, and opportunities of group. Reviewed controlled substances, importance  "of balancing risk and benefit in any strategy, and supporting identification of goals for sx mgmt. Allowed space to share fears, concerns, trauma, and growth of cancer and other experiences.    Discussed current programming available in [facility] and community.    Benefits of group discussed while also acknowledging the need for 1:1 consultation at times. Members invited to discuss any concerns privately with me following group setting or to schedule a 1:1 visit if needs not being met in group.    Next shared medical visit: March 12 at 2 in person    Patient response to group: Pt listens attentively, reflecting upon the stories of others and sharing openly in conversation.    Medications Management  Pt continues in survivorship of esophageal cancer.    CC: Fatigue, depression, extential distress  HPI: Pt is seen in follow up regarding ongoing survivorship of esophageal cancer. Shares last medical visit with discussion of being \"cancer free.\" Additionally shares recent dream urging him to fix mistakes and be accountable for past situations. Finds self reflecting upon this, seeking to acknolwedge amends he has made, while also allowing self permission to live in the present, stop living in the past, and seeking not to make any new regrets. Remains on mirtazapine with stable mood and sleep. Has been taking armodafinil more regularly, recognizing the importance of engagement on wellness. Continues to endorse goals of becoming more active. Continues to report adherence to CPAP. Has goals of going camping in the spring, interested in potential portable CPAP to allow this.  Exam: As Above  Current medication regimen: mirtazapine 45 mg q hs, armodafinil 150 mg q AM  Lab Review:   Office Visit on 01/13/2025   Component Date Value    WBC 01/13/2025 5.02     RBC 01/13/2025 4.14     Hemoglobin 01/13/2025 13.5     Hematocrit 01/13/2025 40.4     MCV 01/13/2025 97.6 (H)     MCH 01/13/2025 32.6     MCHC 01/13/2025 33.4     RDW " 01/13/2025 13.8     Platelets 01/13/2025 165     Neutrophil Rel % 01/13/2025 70.7     Lymphocyte Rel % 01/13/2025 19.1 (L)     Monocyte Rel % 01/13/2025 7.6     Eosinophil Rel % 01/13/2025 2.0     Basophil Rel % 01/13/2025 0.2     Neutrophils Absolute 01/13/2025 3.55     Lymphocytes Absolute 01/13/2025 0.96     Monocytes Absolute 01/13/2025 0.38     Eosinophils Absolute 01/13/2025 0.10     Basophils Absolute 01/13/2025 0.01     Immature Granulocyte Rel* 01/13/2025 0.4     Immature Grans Absolute 01/13/2025 0.02     nRBC 01/13/2025 0.0     Glucose 01/13/2025 142 (H)     BUN 01/13/2025 14     Creatinine 01/13/2025 0.92     EGFR Result 01/13/2025 91.7     BUN/Creatinine Ratio 01/13/2025 15.2     Sodium 01/13/2025 143     Potassium 01/13/2025 4.7     Chloride 01/13/2025 103     Total CO2 01/13/2025 33.3 (H)     Calcium 01/13/2025 9.2     Total Protein 01/13/2025 6.6     Albumin 01/13/2025 4.2     Globulin 01/13/2025 2.4     A/G Ratio 01/13/2025 1.8     Total Bilirubin 01/13/2025 0.8     Alkaline Phosphatase 01/13/2025 84     AST (SGOT) 01/13/2025 27     ALT (SGPT) 01/13/2025 31     Hemoglobin A1C 01/13/2025 6.00 (H)     PSA 01/13/2025 1.030     TSH 01/13/2025 2.030     Free T4 01/13/2025 1.24     Specific Gravity, UA 01/13/2025 >1.030 (H)     pH, UA 01/13/2025 5.5     Color, UA 01/13/2025 See below: (A)     Appearance, UA 01/13/2025 Clear     Leukocytes, UA 01/13/2025 Negative     Protein 01/13/2025 Trace (A)     Glucose, UA 01/13/2025 See below: (A)     Ketones 01/13/2025 Negative     Blood, UA 01/13/2025 Negative     Bilirubin, UA 01/13/2025 Negative     Urobilinogen, UA 01/13/2025 Comment     Nitrite, UA 01/13/2025 Negative    Office Visit on 12/18/2024   Component Date Value    SARS Antigen 12/18/2024 Not Detected     Influenza A Antigen TATA 12/18/2024 Not Detected     Influenza B Antigen TATA 12/18/2024 Not Detected     Internal Control 12/18/2024 Passed     Lot Number 12/18/2024 [id number]     Expiration Date  12/18/2024 10,[id number]      MDM:  Meds reviewed and renewed as appropriate.  Continue medications as written.  FU scheduled in group setting.    Diagnoses and all orders for this visit:    1. Neoplastic malignant related fatigue  -     armodafinil (NUVIGIL) 150 MG tablet; Take 1 tablet by mouth Daily.  Dispense: 30 tablet; Refill: 2    2. TJ (obstructive sleep apnea)  -     armodafinil (NUVIGIL) 150 MG tablet; Take 1 tablet by mouth Daily.  Dispense: 30 tablet; Refill: 2

## 2025-02-26 ENCOUNTER — TELEPHONE (OUTPATIENT)
Dept: PHYSICAL THERAPY | Facility: OTHER | Age: 65
End: 2025-02-26
Payer: MEDICARE

## 2025-02-26 NOTE — TELEPHONE ENCOUNTER
"  Caller: Edmond Chase \"RONAN\"    Relationship: Self    What was the call regarding: PATIENT CANCELLED APPT TODAY BECAUSE THEY CANT MAKE IT       "

## 2025-03-12 ENCOUNTER — OFFICE VISIT (OUTPATIENT)
Dept: PSYCHIATRY | Facility: HOSPITAL | Age: 65
End: 2025-03-12
Payer: MEDICARE

## 2025-03-12 DIAGNOSIS — G47.33 OSA (OBSTRUCTIVE SLEEP APNEA): ICD-10-CM

## 2025-03-12 DIAGNOSIS — C15.5 MALIGNANT NEOPLASM OF LOWER THIRD OF ESOPHAGUS: Primary | ICD-10-CM

## 2025-03-12 DIAGNOSIS — R53.0 NEOPLASTIC MALIGNANT RELATED FATIGUE: ICD-10-CM

## 2025-03-12 RX ORDER — ARMODAFINIL 150 MG/1
150 TABLET ORAL DAILY
Qty: 30 TABLET | Refills: 2 | Status: SHIPPED | OUTPATIENT
Start: 2025-03-12

## 2025-03-12 RX ORDER — MIRTAZAPINE 45 MG/1
45 TABLET, FILM COATED ORAL NIGHTLY
Qty: 90 TABLET | Refills: 0 | Status: SHIPPED | OUTPATIENT
Start: 2025-03-12

## 2025-03-12 NOTE — PROGRESS NOTES
Subjective  Patient ID: Edmond Chase is a 64 y.o.. male seen in regularly scheduled group session.  Group participants have consented to group conducted in person    Total Group Time, Face to Face: 5   Total Group Participants: 65, plus facilitation per VIKTORIA Moeller    Group Therapy  Group topics explored including development of new normal, interpersonal sensitivity, short and long term effects of diagnosis and treatment, significant other or family conflict, intimacy concerns, anticipitory anxiety, anxiety surrounding adjusted treatment plan or adjusted follow up, physical deconditioning, and lifestyle choices. Members share impact of weather, challenges managing ice, cold, shorter days, lack of sunshine, seasonal affective disorder. Shared excitement for spring, increased engagement. Continue to explore importance of connection to others. Considered existential distress, desired legacy, life review, and goals of this phase of life.    Counseling provided regarding cognitive behavioral therapy (CBT) strategies, behavioral activation/ activity scheduling, reintroduction to activity through graded tasks, balancing avoidance with approach , sleep hygiene, discussion of need for restorative sleep with consideration of sleep apnea, recognition and allowance of need for rest, pharmacological and non pharmacological management of sleep disturbance, lifestyle counseling, benefits of exercise and strategies for managing barriers to engagement, allowance of self care, exploration of quality of life goals, survivorship issues, anxiety/ mood management , medication education, and existential distress.  Explored strategies for managing seasonal affective disorder, benefits of engagement, importance of physical activity.  Reviewed benefits of group, emphasizing importance of connection to others.  Discussed ideas regarding legacy work, introducing concept of dignity therapy.  Questions provided to patients for  self completion and review next group.    Discussed current programming available in [facility] and community.    Benefits of group discussed while also acknowledging the need for 1:1 consultation at times. Members invited to discuss any concerns privately with me following group setting or to schedule a 1:1 visit if needs not being met in group.    Next shared medical visit: April 30 at 2:00 PM    Patient response to group: Patient listens attentively, shares appropriately, and connects well with other group members.    Medications Management  Pt continues in survivorship of esophageal cancer.    CC: Existential distress  HPI: Patient continues to be seen in follow-up regarding symptoms of anxiety, existential distress, depression and survivorship of esophageal cancer.  Remains reflective on life experiences, endorsing burden of previous regrets while allowing self to live in the present.  Continues to appreciate benefit of mirtazapine and armodafinil, sleeping appropriately with use of CPAP.  Feels symptoms are acceptably managed, motivation assisted by modafinil, while morning impact of loneliness on QOL experience.  Continues to greatly appreciate group.  Exam: As Above  Current medication regimen: Mirtazapine 45 mg nightly, armodafinil 150 mg every morning  Lab Review:   Office Visit on 01/13/2025   Component Date Value    WBC 01/13/2025 5.02     RBC 01/13/2025 4.14     Hemoglobin 01/13/2025 13.5     Hematocrit 01/13/2025 40.4     MCV 01/13/2025 97.6 (H)     MCH 01/13/2025 32.6     MCHC 01/13/2025 33.4     RDW 01/13/2025 13.8     Platelets 01/13/2025 165     Neutrophil Rel % 01/13/2025 70.7     Lymphocyte Rel % 01/13/2025 19.1 (L)     Monocyte Rel % 01/13/2025 7.6     Eosinophil Rel % 01/13/2025 2.0     Basophil Rel % 01/13/2025 0.2     Neutrophils Absolute 01/13/2025 3.55     Lymphocytes Absolute 01/13/2025 0.96     Monocytes Absolute 01/13/2025 0.38     Eosinophils Absolute 01/13/2025 0.10     Basophils  Absolute 01/13/2025 0.01     Immature Granulocyte Rel* 01/13/2025 0.4     Immature Grans Absolute 01/13/2025 0.02     nRBC 01/13/2025 0.0     Glucose 01/13/2025 142 (H)     BUN 01/13/2025 14     Creatinine 01/13/2025 0.92     EGFR Result 01/13/2025 91.7     BUN/Creatinine Ratio 01/13/2025 15.2     Sodium 01/13/2025 143     Potassium 01/13/2025 4.7     Chloride 01/13/2025 103     Total CO2 01/13/2025 33.3 (H)     Calcium 01/13/2025 9.2     Total Protein 01/13/2025 6.6     Albumin 01/13/2025 4.2     Globulin 01/13/2025 2.4     A/G Ratio 01/13/2025 1.8     Total Bilirubin 01/13/2025 0.8     Alkaline Phosphatase 01/13/2025 84     AST (SGOT) 01/13/2025 27     ALT (SGPT) 01/13/2025 31     Hemoglobin A1C 01/13/2025 6.00 (H)     PSA 01/13/2025 1.030     TSH 01/13/2025 2.030     Free T4 01/13/2025 1.24     Specific Gravity, UA 01/13/2025 >1.030 (H)     pH, UA 01/13/2025 5.5     Color, UA 01/13/2025 See below: (A)     Appearance, UA 01/13/2025 Clear     Leukocytes, UA 01/13/2025 Negative     Protein 01/13/2025 Trace (A)     Glucose, UA 01/13/2025 See below: (A)     Ketones 01/13/2025 Negative     Blood, UA 01/13/2025 Negative     Bilirubin, UA 01/13/2025 Negative     Urobilinogen, UA 01/13/2025 Comment     Nitrite, UA 01/13/2025 Negative      MDM:  Meds reviewed and renewed as appropriate.  Continue medications as written  FU scheduled in group setting.    Diagnoses and all orders for this visit:    1. Malignant neoplasm of lower third of esophagus (Primary)    2. Neoplastic malignant related fatigue  -     armodafinil (NUVIGIL) 150 MG tablet; Take 1 tablet by mouth Daily.  Dispense: 30 tablet; Refill: 2    3. TJ (obstructive sleep apnea)  -     armodafinil (NUVIGIL) 150 MG tablet; Take 1 tablet by mouth Daily.  Dispense: 30 tablet; Refill: 2    Other orders  -     mirtazapine (REMERON) 45 MG tablet; Take 1 tablet by mouth Every Night.  Dispense: 90 tablet; Refill: 0

## 2025-04-16 ENCOUNTER — OFFICE VISIT (OUTPATIENT)
Dept: PSYCHIATRY | Facility: HOSPITAL | Age: 65
End: 2025-04-16
Payer: MEDICARE

## 2025-04-16 DIAGNOSIS — R53.0 NEOPLASTIC MALIGNANT RELATED FATIGUE: Primary | ICD-10-CM

## 2025-04-16 DIAGNOSIS — C15.5 MALIGNANT NEOPLASM OF LOWER THIRD OF ESOPHAGUS: ICD-10-CM

## 2025-04-16 DIAGNOSIS — F33.1 MODERATE EPISODE OF RECURRENT MAJOR DEPRESSIVE DISORDER: ICD-10-CM

## 2025-04-16 RX ORDER — MIRTAZAPINE 30 MG/1
30 TABLET, FILM COATED ORAL NIGHTLY
Qty: 30 TABLET | Refills: 2 | Status: SHIPPED | OUTPATIENT
Start: 2025-04-16 | End: 2025-05-02

## 2025-04-16 NOTE — PROGRESS NOTES
Subjective  Patient ID: Edmond Chase is a 64 y.o.. male seen in regularly scheduled group session.  Group participants have consented to group conducted in person    Total Group Time, Face to Face: 75 minutes  Total Group Participants: 5, plus facilitation per VIKTORIA Moeller    Group Therapy  Group topics explored including development of new normal, interpersonal sensitivity, short and long term effects of diagnosis and treatment, significant other or family conflict, physical deconditioning, social determinants of health (finances, living arrangements, etc), and lifestyle choices.  Members reviewed existential distress, specifically considering impact of recent recommended assignment, life review.  Explored feelings of satisfaction, allowance of prior mistakes, use of past as instruction for others moving forward, feelings of regret, grief, and sadness.  Members listened actively and encouraged others to share.  Shared ongoing symptom burden, difficulty moving past specific challenges.    Counseling provided regarding cognitive behavioral therapy (CBT) strategies, behavioral activation/ activity scheduling, reintroduction to activity through graded tasks, balancing avoidance with approach , sleep hygiene, recognition and allowance of need for rest, lifestyle counseling, benefits of exercise and strategies for managing barriers to engagement, allowance of self care, exploration of quality of life goals, survivorship issues, current programming available in community and clinic, anxiety/ mood management , medication education, and existential distress.  Counseling provided surrounding existential distress, acknowledgment of goals, progress, and disappointment.  Explored concept of nick, self-care strategies, identified strengths, and quality of life goals.    Discussed current programming available in [facility] and community.    Benefits of group discussed while also acknowledging the need for 1:1  consultation at times. Members invited to discuss any concerns privately with me following group setting or to schedule a 1:1 visit if needs not being met in group.    Next shared medical visit: May 14 at 2:00 PM    Patient response to group: Patient listened intently during group session while sharing feelings of sadness.      Medications Management  Pt continues in survivorship of esophageal cancer.    CC: Sadness, grief  HPI:   Patient is seen in follow-up regarding symptoms of depression, grief, and survivorship of esophageal cancer.  Reports being unable to complete Dignity Therapy questions, electing to throw these away due to negative personal response, sense of failure. Continues to greatly appreciate connection to group, appreciating ability to process past and identify goals for future. Continues to take mirtazapine q hs, although reports ongoing impact of depression, specifically feeling dark cloud lingering above him. Continues to report difficulty engaging. Spends days in chair, in apartment, not getting out. Reports persistent dwelling on the negative. Is sleeping fairly.    Patient seen individually following group to address current concerns; feels depression is not well managed. Continues to sleep in chair. Daytime fatigue is high. Continues on mirtazapine 45 mg q hs. Wears CPAP regularly. Continues to appear clinically weak, and pt states he has explored this with PCP with referral to PT, although this was disrupted due to weather.  Exam: As Above  Current medication regimen: Mirtazapine 45 mg nightly, armodafinil 150 mg every morning  Lab Review:   No visits with results within 2 Month(s) from this visit.   Latest known visit with results is:   Office Visit on 01/13/2025   Component Date Value    WBC 01/13/2025 5.02     RBC 01/13/2025 4.14     Hemoglobin 01/13/2025 13.5     Hematocrit 01/13/2025 40.4     MCV 01/13/2025 97.6 (H)     MCH 01/13/2025 32.6     MCHC 01/13/2025 33.4     RDW 01/13/2025  13.8     Platelets 01/13/2025 165     Neutrophil Rel % 01/13/2025 70.7     Lymphocyte Rel % 01/13/2025 19.1 (L)     Monocyte Rel % 01/13/2025 7.6     Eosinophil Rel % 01/13/2025 2.0     Basophil Rel % 01/13/2025 0.2     Neutrophils Absolute 01/13/2025 3.55     Lymphocytes Absolute 01/13/2025 0.96     Monocytes Absolute 01/13/2025 0.38     Eosinophils Absolute 01/13/2025 0.10     Basophils Absolute 01/13/2025 0.01     Immature Granulocyte Rel* 01/13/2025 0.4     Immature Grans Absolute 01/13/2025 0.02     nRBC 01/13/2025 0.0     Glucose 01/13/2025 142 (H)     BUN 01/13/2025 14     Creatinine 01/13/2025 0.92     EGFR Result 01/13/2025 91.7     BUN/Creatinine Ratio 01/13/2025 15.2     Sodium 01/13/2025 143     Potassium 01/13/2025 4.7     Chloride 01/13/2025 103     Total CO2 01/13/2025 33.3 (H)     Calcium 01/13/2025 9.2     Total Protein 01/13/2025 6.6     Albumin 01/13/2025 4.2     Globulin 01/13/2025 2.4     A/G Ratio 01/13/2025 1.8     Total Bilirubin 01/13/2025 0.8     Alkaline Phosphatase 01/13/2025 84     AST (SGOT) 01/13/2025 27     ALT (SGPT) 01/13/2025 31     Hemoglobin A1C 01/13/2025 6.00 (H)     PSA 01/13/2025 1.030     TSH 01/13/2025 2.030     Free T4 01/13/2025 1.24     Specific Gravity, UA 01/13/2025 >1.030 (H)     pH, UA 01/13/2025 5.5     Color, UA 01/13/2025 See below: (A)     Appearance, UA 01/13/2025 Clear     Leukocytes, UA 01/13/2025 Negative     Protein 01/13/2025 Trace (A)     Glucose, UA 01/13/2025 See below: (A)     Ketones 01/13/2025 Negative     Blood, UA 01/13/2025 Negative     Bilirubin, UA 01/13/2025 Negative     Urobilinogen, UA 01/13/2025 Comment     Nitrite, UA 01/13/2025 Negative      MDM:  Meds reviewed and renewed as appropriate.  Medication strategy reviewed; specifically considered potential for sedation on current medication regimen.  Will reduce mirtazapine to 30 mg nightly.  Reviewed with patient recommendation to pursue physical therapy, encouraging rescheduling as previously  ordered.  Counseling provided in group and personal setting regarding behavioral activation, scheduling of activities, engagement outside of home.  FU scheduled in group setting, although will meet with patient sooner for one-on-one visit.    Diagnoses and all orders for this visit:    1. Neoplastic malignant related fatigue (Primary)    2. Malignant neoplasm of lower third of esophagus    3. Moderate episode of recurrent major depressive disorder    Other orders  -     mirtazapine (Remeron) 30 MG tablet; Take 1 tablet by mouth Every Night.  Dispense: 30 tablet; Refill: 2

## 2025-04-23 NOTE — PROGRESS NOTES
RM:________     PCP: Nargis Velasquez APRN    : 1960  AGE: 64 y.o.   LAST EKG : 10/24/24  EST PATIENT           Wt Readings from Last 3 Encounters:   25 134 kg (294 lb 6.4 oz)   25 133 kg (294 lb)   24 106 kg (234 lb)           CP______  SOA_______ DIZZINESS _____ FATIGUE ______  PALPS ______    WT: ____________ BP: __________L __________R HR______    ALLERGIES:Patient has no known allergies.        Lab Results   Component Value Date    CHOL 102 2024    CHLPL 128 09/10/2024    TRIG 96 2024    HDL 32 (L) 2024    LDL 51 2024      Lab Results   Component Value Date    GLUCOSE 142 (H) 2025    CALCIUM 9.2 2025     2025    K 4.7 2025    CO2 33.3 (H) 2025     2025    BUN 14 2025    CREATININE 0.92 2025    EGFR 91.7 2025    BCR 15.2 2025    ANIONGAP 12.9 10/23/2024

## 2025-04-24 ENCOUNTER — OFFICE VISIT (OUTPATIENT)
Dept: CARDIOLOGY | Age: 65
End: 2025-04-24
Payer: MEDICARE

## 2025-04-24 VITALS
HEART RATE: 74 BPM | HEIGHT: 73 IN | WEIGHT: 293.2 LBS | SYSTOLIC BLOOD PRESSURE: 146 MMHG | DIASTOLIC BLOOD PRESSURE: 90 MMHG | OXYGEN SATURATION: 95 % | BODY MASS INDEX: 38.86 KG/M2

## 2025-04-24 DIAGNOSIS — E78.5 HYPERLIPIDEMIA LDL GOAL <70: ICD-10-CM

## 2025-04-24 DIAGNOSIS — Z95.2 S/P AVR (AORTIC VALVE REPLACEMENT): ICD-10-CM

## 2025-04-24 DIAGNOSIS — Z86.79 STATUS POST ASCENDING AORTIC ANEURYSM REPAIR: Primary | ICD-10-CM

## 2025-04-24 DIAGNOSIS — G47.33 OBSTRUCTIVE SLEEP APNEA, ADULT: ICD-10-CM

## 2025-04-24 DIAGNOSIS — I25.708 CORONARY ARTERY DISEASE OF BYPASS GRAFT OF NATIVE HEART WITH STABLE ANGINA PECTORIS: ICD-10-CM

## 2025-04-24 DIAGNOSIS — Z95.1 S/P CABG (CORONARY ARTERY BYPASS GRAFT): ICD-10-CM

## 2025-04-24 DIAGNOSIS — Z98.890 STATUS POST ASCENDING AORTIC ANEURYSM REPAIR: Primary | ICD-10-CM

## 2025-04-24 RX ORDER — METOPROLOL SUCCINATE 50 MG/1
50 TABLET, EXTENDED RELEASE ORAL DAILY
Qty: 90 TABLET | Refills: 3 | Status: SHIPPED | OUTPATIENT
Start: 2025-04-24

## 2025-04-24 RX ORDER — RANOLAZINE 500 MG/1
1000 TABLET, EXTENDED RELEASE ORAL EVERY 12 HOURS SCHEDULED
Qty: 360 TABLET | Refills: 3 | Status: SHIPPED | OUTPATIENT
Start: 2025-04-24

## 2025-04-24 RX ORDER — AMLODIPINE BESYLATE 5 MG/1
5 TABLET ORAL DAILY
Qty: 90 TABLET | Refills: 3 | Status: SHIPPED | OUTPATIENT
Start: 2025-04-24

## 2025-04-24 RX ORDER — LISINOPRIL 10 MG/1
10 TABLET ORAL DAILY
Qty: 90 TABLET | Refills: 3 | Status: SHIPPED | OUTPATIENT
Start: 2025-04-24

## 2025-04-24 RX ORDER — ATORVASTATIN CALCIUM 40 MG/1
40 TABLET, FILM COATED ORAL DAILY
Qty: 90 TABLET | Refills: 3 | Status: SHIPPED | OUTPATIENT
Start: 2025-04-24

## 2025-04-24 RX ORDER — ISOSORBIDE MONONITRATE 60 MG/1
60 TABLET, EXTENDED RELEASE ORAL 2 TIMES DAILY
Qty: 180 TABLET | Refills: 3 | Status: SHIPPED | OUTPATIENT
Start: 2025-04-24

## 2025-04-24 NOTE — PROGRESS NOTES
CARDIOLOGY        Patient Name: Edmond Chase  :1960  Age: 64 y.o.  Primary Cardiologist: Papa Miguel MD  Encounter Provider:  VIKTORIA Van    Date of Service: 2025      CHIEF COMPLAINT / REASON FOR OFFICE VISIT     Follow-up (6 month), Coronary Artery Disease, and Cardiac Valve Problem      HISTORY OF PRESENT ILLNESS       HPI  Edmond Chase is a 64 y.o. male who presents today for semiannual evaluation.     Pt has a history significant for hypertension, diabetes, CAD with history of CABG, history of aortic valve replacement.     Patient reports that he continues to have episodes of chronic angina.  He does admit both anxiety and depression.  He is in group therapy with the cancer center.  He is on medication for both anxiety and depression and feels that he is starting to make slow improvements.  He admits that he is sedentary as he does not have any desire or will to exercise although he knows that exercise will likely improve his symptoms of fatigue.  He is also anxious to exercise as he has chest pain.  He also feels that he is stuck in a negative feedback loop and is anxious on how to get out of this.    INTERVAL HISTORY     Echocardiogram 2017.  LVEF 56%.  Normal LV cavity size.  All LV wall segments contract normally.  Moderate LVH.  LAE.  RA is moderately dilated.  Bioprosthetic aortic valve with peak and mean gradients elevated, however dimensionless index is 0.34 which suggests normal valve function.  No regurgitation.  Mild MAC.  Mild mitral valve regurgitation.  Trace tricuspid valve regurgitation.  Calculated RVSP 3 mmHg.  Myocardial perfusion stress test 2017.  Small sized infarct in the inferior wall with moderate brijesh-infarct ischemia.  Impressions consistent with intermediate risk study.    Cardiac catheterization 10/30/2017.  Three-vessel CAD, patent LIMA to LAD, occluded AO-OMB SVG, occluded AO-PDA SVG recommend medical therapy.  Echocardiogram  7/1/2020.  LVEF 56%.  Normal diastolic function.  Normal RV cavity size, wall thickness.  No significant perivalvular leak although bioprosthetic valve was not well visualized.  Aortic valve maximum pressure gradient 43.2 mmHg, mean pressure gradient 28.8 mmHg, aortic valve area 1.0 cm².  Mild dilation of the sinuses of Valsalva measuring 4.2 cm.  Worsening aortic valve gradient compared to prior study.  Myocardial perfusion stress test 7/1/2020.  Small to medium sized, severe area of ischemia located in the inferior wall.  Impressions consistent with intermediate risk study.  Patient was very symptomatic.  Patient set up for cardiac catheterization.    Cardiac catheterization 7/7/2020.  Severe multivessel coronary disease with 100% proximal LAD, 50 to 60% stenoses throughout the circumflex territory within the proximal segment as well as the high marginal and diffuse 70% stenoses throughout the RCA with 100% PDA stenosis with faint collaterals from the LAD.  PCI of the mid LAD via MARCELINA graft with placement of ESMER.  No residual stenosis.  Recommend reevaluating symptoms and if still symptomatic aggressively titrating antianginal medications with consideration given to long-acting nitrates in the hopes of medically managing his RCA disease as this would require extensive amount of stents to have fully revascularized.    Cardiac catheterization 6/17/2021. LIMA to LAD stent was widely patent and normal.  Left main with 20% luminal irregularities.  Circumflex had an area that was 70% stenosed in the midportion but does not go anywhere and is mainly in the AV groove branch.  RCA dominant and very tortuous vessel with 50% proximal disease, 70% mid vessel disease, 50 to 60% disease up to 90% in the distal RCA territory but due to tortuosity not amenable to any intervention.  Recommend increasing isosorbide mononitrate and following up with cardiology office.    Review of medical records reveals recent hospitalization 7/10 -  7/15/2022.  Patient reports that he has been experiencing intermittent angina.  Patient came to the emergency department and ECG did not reveal any ischemia but he opted not to stay for evaluation.  Early the morning of admission patient woke with recurrent angina and diaphoresis.  He took an additional half of an Imdur with some improvement and called EMS.  Following arrival to the hospital he was continued to have angina and was noted to be mildly hypotensive initial EKG revealed stable lateral T wave changes.  Repeat EKG was poor quality but there was concern about inferior ST elevation, on Dr. Patel's review there was no evidence of ST elevation to warrant emergent cardiac catheterization.  During hospitalization patient had an echocardiogram with normal LVEF, normal prosthetic valve gradients.  Myocardial perfusion study revealed moderately severe area of ischemia in the inferior wall.  On hospital day 2 patient admitted that he had some melena over the weekend.  GI was consulted and EGD performed revealing nonbleeding gastric ulcers.  Cardiac catheterization performed revealed stable coronary disease.  Decision was made to drop aspirin given gastric ulcer history.  Patient does have mild diastolic dysfunction and low-dose furosemide was added.  He was also referred to pulmonary for outpatient follow-up for chronic dyspnea.    Cardiac catheterization 7/14/2022.  Left main normal.  LAD with calcified 90% proximal stenosis.   mid segment.  Mid to distal vessels fill via patent LIMA to LAD.  Circumflex severely calcified 70-80% stenosis in the midsegment.RCA is severely calcified and tortuous vessel with diffuse 70% mid vessel stenosis and discrete 80% mid to distal stenosis.   small caliber PDA branch PDA branch fills via left to right collaterals.  Ramus is discrete 50% stenosis in the inferior branch.  LIMA to LAD is normal.  SVG to OM is occluded at proximal anastomosis.  SVG to PDA is occluded at  proximal anastomosis.  Continue medical management.  RCA and LAD are not candidates for intervention and unchanged from prior cardiac catheterization.    He has a history of esophageal cancer and reports that his esophagus has now been removed.  He is also s/p cholecystectomy.     Follow-up appointment in October 2023 patient continued to complain of chest discomfort.  At that time we recommended the patient increase his isosorbide mononitrate to twice daily dosing.    Received a phone call from patient's primary care physician in May 2024 where heart rate was noted to be in the 30s, ECG revealed heart rate in the 60s but with bigeminy.  Laboratory studies were drawn and noted to be normal.  A outpatient monitor was placed.  Outpatient monitor revealed ventricular bigeminy with PVC burden of 19.7%.  He was evaluated by electrophysiology who did not make any changes to his daily regimen.    Patient was evaluated in the [facility] 9/24/2024 by Dr. Patel for complaints of chest pain and dyspnea.  Patient did have an elevated D-dimer and was sent to the hospital for a stat CTA of the chest as well as bilateral lower extremity Doppler.  Lower extremity duplex was negative for DVT.  CTA chest was negative for PE.    Medical record review reveals patient was hospitalized with discharge date 9/25/2024.  Patient presented with acute exacerbation of chronic chest pain.  He was evaluated by Dr. Miguel who recommended adding Ranexa in addition with patient's daily isosorbide mononitrate.  ECG during hospitalization revealed episodes of ventricular bigeminy.    Patient presented to the [facility] September 2024 with complaints of ongoing chest pain.  Reports that this is similar to previous anginal equivalent.  Patient was sent for a CTA chest which was negative for PE.    Cardiac catheterization 10/20/2024 revealed severe native vessel CAD including 100% mid LAD , 60-70% mid to distal RCA stenosis, long 50% proximal circumflex  "stenosis.  Patent LIMA to LAD.  Coronary artery disease appears to be stable compared to 2 years ago.  Continue aggressive risk modification    The following portions of the patient's history were reviewed and updated as appropriate: allergies, current medications, past family history, past medical history, past social history, past surgical history and problem list.      VITAL SIGNS     Visit Vitals  /90 (BP Location: Left arm, Patient Position: Sitting, Cuff Size: Large Adult)   Pulse 74   Ht 185.4 cm (73\")   Wt 133 kg (293 lb 3.2 oz)   SpO2 95%   BMI 38.68 kg/m²         Wt Readings from Last 3 Encounters:   04/24/25 133 kg (293 lb 3.2 oz)   01/13/25 134 kg (294 lb 6.4 oz)   01/13/25 133 kg (294 lb)     Body mass index is 38.68 kg/m².      REVIEW OF SYSTEMS     Review of Systems   Constitutional: Positive for malaise/fatigue. Negative for chills, fever, weight gain and weight loss.   Cardiovascular:  Positive for chest pain. Negative for leg swelling.   Respiratory:  Negative for cough, snoring and wheezing.    Hematologic/Lymphatic: Negative for bleeding problem. Does not bruise/bleed easily.   Skin:  Negative for color change.   Musculoskeletal:  Negative for falls, joint pain and myalgias.   Gastrointestinal:  Negative for melena.   Genitourinary:  Negative for hematuria.   Neurological:  Negative for excessive daytime sleepiness.   Psychiatric/Behavioral:  Positive for depression. The patient is nervous/anxious.            PHYSICAL EXAMINATION     Constitutional:       Appearance: Normal appearance. Well-developed.   Eyes:      Conjunctiva/sclera: Conjunctivae normal.   Neck:      Vascular: No carotid bruit.   Pulmonary:      Effort: Pulmonary effort is normal.      Breath sounds: Normal breath sounds.   Cardiovascular:      Normal rate. Regular rhythm. Normal S1. Normal S2.       Murmurs: There is no murmur.      No gallop.  No click. No rub.   Edema:     Peripheral edema absent.   Musculoskeletal: " Normal range of motion. Skin:     General: Skin is warm and dry.   Neurological:      Mental Status: Alert and oriented to person, place, and time.      GCS: GCS eye subscore is 4. GCS verbal subscore is 5. GCS motor subscore is 6.   Psychiatric:         Speech: Speech normal.         Behavior: Behavior normal.         Thought Content: Thought content normal.         Judgment: Judgment normal.           REVIEWED DATA     Procedures        Lipid Panel          5/6/2024    12:38 9/10/2024    11:03 9/25/2024    04:08   Lipid Panel   Total Cholesterol   102    Total Cholesterol 134  128     Triglycerides 75  82  96    HDL Cholesterol 51  42  32    VLDL Cholesterol 15  16  19    LDL Cholesterol  68  70  51    LDL/HDL Ratio   1.59        Lab Results   Component Value Date     01/13/2025     10/23/2024    K 4.7 01/13/2025    K 4.0 10/23/2024     01/13/2025     10/23/2024    CO2 33.3 (H) 01/13/2025    CO2 25.1 10/23/2024    BUN 14 01/13/2025    BUN 13 10/23/2024    CREATININE 0.92 01/13/2025    CREATININE 0.88 10/23/2024    EGFRIFNONA 91 06/17/2021    EGFRIFNONA 83 07/01/2020    EGFRIFAFRI 72 03/29/2016    EGFRIFAFRI >60 12/30/2015    GLUCOSE 142 (H) 01/13/2025    GLUCOSE 115 (H) 10/23/2024    CALCIUM 9.2 01/13/2025    CALCIUM 8.8 10/23/2024    ALBUMIN 4.2 01/13/2025    ALBUMIN 3.6 09/24/2024    BILITOT 0.8 01/13/2025    BILITOT 0.6 09/24/2024    AST 27 01/13/2025    AST 16 09/24/2024    ALT 31 01/13/2025    ALT 17 09/24/2024     Lab Results   Component Value Date    WBC 5.02 01/13/2025    WBC 4.97 10/23/2024    HGB 13.5 01/13/2025    HGB 13.4 10/23/2024    HCT 40.4 01/13/2025    HCT 38.9 10/23/2024    MCV 97.6 (H) 01/13/2025    MCV 92.2 10/23/2024     01/13/2025     10/23/2024     Lab Results   Component Value Date    PROBNP 650.0 09/24/2024    PROBNP 644.0 09/24/2024     Lab Results   Component Value Date    CKTOTAL 296 (H) 02/20/2016    TROPONINT 34 (H) 09/25/2024     Lab Results    Component Value Date    TSH 2.030 01/13/2025    TSH 1.210 09/24/2024             ASSESSMENT & PLAN     Diagnoses and all orders for this visit:    1. Status post ascending aortic aneurysm repair (Primary)    2. S/P CABG (coronary artery bypass graft)    3. S/P AVR (aortic valve replacement)  Overview:  Echocardiogram 7/12/2022.  LVEF 51-55%.  All LV wall segments contract normally.  No significant aortic valve regurgitation.  No hemodynamically significant aortic valve stenosis.  There is a 27 mm porcine magna bioprosthetic aortic valve with stable gradients.    Assessment & Plan:  Gradient is currently stable  Patient aware of need for prophylactic antibiotics for dental procedures      4. Coronary artery disease of bypass graft of native heart with stable angina pectoris  Overview:  Cardiac catheterization 10/20/2024 revealed severe native vessel CAD including 100% mid LAD , 60-70% mid to distal RCA stenosis, long 50% proximal circumflex stenosis.  Patent LIMA to LAD.  Coronary artery disease appears to be stable compared to 2 years ago.  Continue aggressive risk modification    Assessment & Plan:  Patient with chronic angina that could be secondary to microvascular disease versus possible anxiety regarding his coronary artery disease.  Patient and I had long discussion about exercising and needing to exercise and attempt to move his body more.  Patient will discuss with his care team at the cancer center about possibly adjusting his anxiety meds as anxiety could be contributing to his chest discomfort.  Patient will trial increasing isosorbide mononitrate to 120 mg twice daily  Patient has had extensive cardiac testing  Continue the following regimen:  Amlodipine 5 mg/day  Atorvastatin 40 mg/day  Farxiga 10 mg/day  Isosorbide mononitrate 120 mg twice daily  Lisinopril 10 mg/day  Metoprolol succinate 50 mg/day  Ranolazine 1000 mg twice daily    Orders:  -     amLODIPine (NORVASC) 5 MG tablet; Take 1 tablet by  mouth Daily. Take 5 mg  Dispense: 90 tablet; Refill: 3  -     atorvastatin (LIPITOR) 40 MG tablet; Take 1 tablet by mouth Daily.  Dispense: 90 tablet; Refill: 3  -     isosorbide mononitrate (IMDUR) 60 MG 24 hr tablet; Take 1 tablet by mouth 2 (Two) Times a Day.  Dispense: 180 tablet; Refill: 3  -     lisinopril (PRINIVIL,ZESTRIL) 10 MG tablet; Take 1 tablet by mouth Daily.  Dispense: 90 tablet; Refill: 3  -     metoprolol succinate XL (TOPROL-XL) 50 MG 24 hr tablet; Take 1 tablet by mouth Daily.  Dispense: 90 tablet; Refill: 3  -     ranolazine (RANEXA) 500 MG 12 hr tablet; Take 2 tablets by mouth Every 12 (Twelve) Hours.  Dispense: 360 tablet; Refill: 3    5. Obstructive sleep apnea, adult treated with auto CPAP  Overview:  Overnight polysomnogram.  Weight 276 pounds.  Severe TJ with AHI 79 events per hour.  Auto CPAP recommended.      6. Hyperlipidemia LDL goal <70  Overview:  Lipid Panel          5/6/2024    12:38 9/10/2024    11:03 9/25/2024    04:08   Lipid Panel   Total Cholesterol   102    Total Cholesterol 134  128     Triglycerides 75  82  96    HDL Cholesterol 51  42  32    VLDL Cholesterol 15  16  19    LDL Cholesterol  68  70  51    LDL/HDL Ratio   1.59          Assessment & Plan:   Lipid abnormalities are stable    Plan:  Continue same medication/s without change.      Discussed medication dosage, use, side effects, and goals of treatment in detail.    Counseled patient on lifestyle modifications to help control hyperlipidemia.     Patient Treatment Goals:   LDL goal is less than 70    Followup in 6 months.    Orders:  -     atorvastatin (LIPITOR) 40 MG tablet; Take 1 tablet by mouth Daily.  Dispense: 90 tablet; Refill: 3        No follow-ups on file.    Future Appointments         Provider Department Center    5/2/2025 8:30 AM Yazmin Molina APRN [facility] HEMATOLOGY & ONCOLOGY     5/14/2025 2:00 PM Yazmin Molina APRN [facility] HEMATOLOGY & ONCOLOGY      5/23/2025 10:30 AM Nargis Velasquez APRN [facility] PRIMARY CARE [facility]    7/14/2025 11:15 AM [facility] CT 1 [facility]     7/17/2025 1:00 PM Sujata John DNP, APRN [facility] THORACIC SURGERY [facility]    10/24/2025 1:20 PM Papa iMguel MD [facility] CARDIOLOGY [facility]    11/13/2025 11:45 AM Sekou Pisano MD [facility] SLEEP MEDICINE           Time Spent: I spent 67 minutes caring for Edmond on this date of service. This time includes time spent by me in the following activities: preparing for the visit, reviewing tests, performing a medically appropriate examination and/or evaluation, counseling and educating the patient/family/caregiver, ordering medications, tests, or procedures, documenting information in the medical record, and care coordination.         MEDICATIONS         Discharge Medications            Accurate as of April 24, 2025  2:57 PM. If you have any questions, ask your nurse or doctor.                Continue These Medications        Instructions Start Date   Abrysvo 120 MCG/0.5ML reconstituted solution injection  Generic drug: RSV Pre-Fusion F A&B Vac Rcmb   120 mcg, Intramuscular      amLODIPine 5 MG tablet  Commonly known as: NORVASC   5 mg, Oral, Daily, Take 5 mg      armodafinil 150 MG tablet  Commonly known as: NUVIGIL   150 mg, Oral, Daily      atorvastatin 40 MG tablet  Commonly known as: LIPITOR   40 mg, Oral, Daily      Farxiga 10 MG tablet  Generic drug: dapagliflozin Propanediol   10 mg, Oral, Daily      fluticasone 50 MCG/ACT nasal spray  Commonly known as: FLONASE   Administer 2 sprays into the nostril(s) as directed by provider Daily.      furosemide 20 MG tablet  Commonly known as: LASIX   20 mg, Oral, Daily      isosorbide mononitrate 60 MG 24 hr tablet  Commonly known as: IMDUR   60 mg, Oral, 2 Times Daily      lisinopril 10 MG tablet  Commonly known as: PRINIVIL,ZESTRIL    10 mg, Oral, Daily      metoprolol succinate XL 50 MG 24 hr tablet  Commonly known as: TOPROL-XL   50 mg, Oral, Daily      mirtazapine 30 MG tablet  Commonly known as: Remeron   30 mg, Oral, Nightly      ranolazine 500 MG 12 hr tablet  Commonly known as: RANEXA   1,000 mg, Oral, Every 12 Hours Scheduled                   **Dragon Disclaimer:   Much of this encounter note is an electronic transcription/translation of spoken language to printed text. The electronic translation of spoken language may permit erroneous, or at times, nonsensical words or phrases to be inadvertently transcribed. Although I have reviewed the note for such errors, some may still exist.

## 2025-04-24 NOTE — ASSESSMENT & PLAN NOTE
Patient with chronic angina that could be secondary to microvascular disease versus possible anxiety regarding his coronary artery disease.  Patient and I had long discussion about exercising and needing to exercise and attempt to move his body more.  Patient will discuss with his care team at the cancer center about possibly adjusting his anxiety meds as anxiety could be contributing to his chest discomfort.  Patient will trial increasing isosorbide mononitrate to 120 mg twice daily  Patient has had extensive cardiac testing  Continue the following regimen:  Amlodipine 5 mg/day  Atorvastatin 40 mg/day  Farxiga 10 mg/day  Isosorbide mononitrate 120 mg twice daily  Lisinopril 10 mg/day  Metoprolol succinate 50 mg/day  Ranolazine 1000 mg twice daily

## 2025-04-24 NOTE — ASSESSMENT & PLAN NOTE
Gradient is currently stable  Patient aware of need for prophylactic antibiotics for dental procedures

## 2025-05-02 ENCOUNTER — TELEPHONE (OUTPATIENT)
Dept: PSYCHIATRY | Facility: HOSPITAL | Age: 65
End: 2025-05-02

## 2025-05-02 RX ORDER — MIRTAZAPINE 15 MG/1
15 TABLET, FILM COATED ORAL NIGHTLY
Qty: 30 TABLET | Refills: 0 | Status: SHIPPED | OUTPATIENT
Start: 2025-05-02 | End: 2026-05-02

## 2025-05-02 RX ORDER — BUPROPION HYDROCHLORIDE 75 MG/1
75 TABLET ORAL TAKE AS DIRECTED
Qty: 60 TABLET | Refills: 2 | Status: SHIPPED | OUTPATIENT
Start: 2025-05-02

## 2025-05-02 NOTE — TELEPHONE ENCOUNTER
Supportive Oncology Services    Supportive call placed to patient upon this visit in clinic.  Patient reports having left a message to cancel this appointment, as he does not feel well today.  Remains incredibly fatigued.    Pt does report reducing mirtazapine to 30 mg q hs as directed. Has not noted any benefit or decline since this reduction. Advise to reduce further to 15 mg q hs.    Continues to take armodafinil q AM, although without any obvious benefit. Recommending continuing as written.    Initiate bupropion 75 mg q AM, increasing to 1 q AM and 1 q noon after 1 week if well tolerated.    Keep follow up in group as scheduled.     Pt firmly denies thoughts of self harm or suicide while mourning worsening depression, reduced sense of purpose.   Introduced opportunity for IOP, and patient accepts this number and agrees to call to understand available support.

## 2025-05-05 ENCOUNTER — TELEPHONE (OUTPATIENT)
Dept: PSYCHIATRY | Facility: HOSPITAL | Age: 65
End: 2025-05-05
Payer: MEDICARE

## 2025-05-05 DIAGNOSIS — I25.708 CORONARY ARTERY DISEASE OF BYPASS GRAFT OF NATIVE HEART WITH STABLE ANGINA PECTORIS: ICD-10-CM

## 2025-05-05 RX ORDER — ISOSORBIDE MONONITRATE 120 MG/1
120 TABLET, EXTENDED RELEASE ORAL DAILY
Qty: 90 TABLET | Refills: 1 | Status: SHIPPED | OUTPATIENT
Start: 2025-05-05

## 2025-05-05 NOTE — TELEPHONE ENCOUNTER
[facility] Oncology [facility]    Call received from patient regarding recent phone call, medication changes, discussion of IOP.  Patient reports placing call, unfortunately not knowing exactly how to connect.    Call placed to IOP, spoke with individual regarding goals and available services, and they have agreed to call patient regarding opportunities.

## 2025-05-14 ENCOUNTER — TELEPHONE (OUTPATIENT)
Dept: PSYCHIATRY | Facility: HOSPITAL | Age: 65
End: 2025-05-14

## 2025-05-14 ENCOUNTER — TELEPHONE (OUTPATIENT)
Dept: SLEEP MEDICINE | Facility: HOSPITAL | Age: 65
End: 2025-05-14
Payer: MEDICARE

## 2025-05-14 DIAGNOSIS — Z95.1 S/P CABG (CORONARY ARTERY BYPASS GRAFT): ICD-10-CM

## 2025-05-14 DIAGNOSIS — F32.A FATIGUE DUE TO DEPRESSION: Primary | ICD-10-CM

## 2025-05-14 DIAGNOSIS — C15.5 MALIGNANT NEOPLASM OF LOWER THIRD OF ESOPHAGUS: ICD-10-CM

## 2025-05-14 DIAGNOSIS — R53.83 FATIGUE DUE TO DEPRESSION: Primary | ICD-10-CM

## 2025-05-14 DIAGNOSIS — E66.01 CLASS 2 SEVERE OBESITY DUE TO EXCESS CALORIES WITH SERIOUS COMORBIDITY AND BODY MASS INDEX (BMI) OF 36.0 TO 36.9 IN ADULT: ICD-10-CM

## 2025-05-14 DIAGNOSIS — E66.812 CLASS 2 SEVERE OBESITY DUE TO EXCESS CALORIES WITH SERIOUS COMORBIDITY AND BODY MASS INDEX (BMI) OF 36.0 TO 36.9 IN ADULT: ICD-10-CM

## 2025-05-14 NOTE — TELEPHONE ENCOUNTER
Support[facility] Oncology [facility]    Call placed to patient regarding ongoing experience of depression.    Reports not feeling well today. Continues to relate this to combined cardiac and depressive concerns. Is following adequately with cardiology.     Reports reducing mirtazapine as instructed. Has initiated wellbutrin, taking 75 mg q AM and preparing to increase to bid dosing tomorrow, q AM and q noon. Feels this has been somewhat beneficial.     Again revisited recommendation for connection to PT, having previous evaluation over the winter and not continuing.

## 2025-05-22 NOTE — PROGRESS NOTES
"    I N T E R N A L  M E D I C I N E  Nargis Velasquez, APRN    ENCOUNTER DATE:  05/23/2025    Edmond Chase / 64 y.o. / male      CHIEF COMPLAINT / REASON FOR OFFICE VISIT     Hypertension, Diabetes, and Hyperlipidemia      ASSESSMENT & PLAN     Diagnoses and all orders for this visit:    1. Essential hypertension (Primary)  -     Comprehensive Metabolic Panel    2. Type 2 diabetes mellitus with hyperglycemia, without long-term current use of insulin  -     Comprehensive Metabolic Panel  -     Hemoglobin A1c  -     Microalbumin / Creatinine Urine Ratio - Urine, Clean Catch    3. Hyperlipidemia, unspecified hyperlipidemia type  -     Comprehensive Metabolic Panel  -     Lipid Panel With / Chol / HDL Ratio         SUMMARY/DISCUSSION  Patient encouraged to monitor BP at home for goal of < 130/80.  Encouraged low sodium diet, and discussed importance of taking diuretic as prescribed.    Encouraged low carb/ low fat diet, regular exercise as tolerated.  He plans to schedule follow up with nephrology office.  Understands importance of follow up with psychiatry office and continue to attend group therapy sessions.    Complete diabetic foot exam.      Next Appointment with me: Visit date not found    Return in about 4 months (around 9/23/2025) for Chronic care.      VITAL SIGNS     Visit Vitals  /84   Pulse 82   Temp 97.8 °F (36.6 °C)   Resp 16   Ht 185.4 cm (72.99\")   Wt 132 kg (290 lb)   SpO2 98%   BMI 38.27 kg/m²             Wt Readings from Last 3 Encounters:   05/23/25 132 kg (290 lb)   04/24/25 133 kg (293 lb 3.2 oz)   01/13/25 134 kg (294 lb 6.4 oz)     Body mass index is 38.27 kg/m².        MEDICATIONS AT THE TIME OF OFFICE VISIT     Current Outpatient Medications on File Prior to Visit   Medication Sig Dispense Refill    amLODIPine (NORVASC) 5 MG tablet Take 1 tablet by mouth Daily. Take 5 mg 90 tablet 3    armodafinil (NUVIGIL) 150 MG tablet Take 1 tablet by mouth Daily. 30 tablet 2    atorvastatin " "(LIPITOR) 40 MG tablet Take 1 tablet by mouth Daily. 90 tablet 3    buPROPion (WELLBUTRIN) 75 MG tablet Take 1 tablet by mouth Take As Directed. Begin once daily in AM. If tolerated, increase in 1 week to 1 tab q AM and 1 tab q noon. 60 tablet 2    Farxiga 10 MG tablet TAKE 1 TABLET BY MOUTH DAILY 90 tablet 1    fluticasone (FLONASE) 50 MCG/ACT nasal spray Administer 2 sprays into the nostril(s) as directed by provider Daily.      isosorbide mononitrate (IMDUR) 120 MG 24 hr tablet Take 1 tablet by mouth Daily. 90 tablet 1    lisinopril (PRINIVIL,ZESTRIL) 10 MG tablet Take 1 tablet by mouth Daily. 90 tablet 3    metoprolol succinate XL (TOPROL-XL) 50 MG 24 hr tablet Take 1 tablet by mouth Daily. 90 tablet 3    mirtazapine (Remeron) 15 MG tablet Take 1 tablet by mouth Every Night. 30 tablet 0    ranolazine (RANEXA) 500 MG 12 hr tablet Take 2 tablets by mouth Every 12 (Twelve) Hours. 360 tablet 3    furosemide (LASIX) 20 MG tablet TAKE 1 TABLET BY MOUTH DAILY (Patient not taking: Reported on 5/23/2025) 90 tablet 0    [DISCONTINUED] RSV Pre-Fusion F A&B Vac Rcmb (Abrysvo) 120 MCG/0.5ML reconstituted solution injection Inject 0.5 mL into the appropriate muscle as directed by prescriber. (Patient not taking: Reported on 5/23/2025) 0.5 mL 0     No current facility-administered medications on file prior to visit.        HISTORY OF PRESENT ILLNESS     Depression: Followed by psychiatry, VIKTORIA De Los Santos.  Still attending group counseling with benefit.  Next appointment is June 25, 2025.  Very sedentary this winter.   He is struggling to increase physical activity - feels that he is physically limited by his multiple medical conditions.  Vomits a couple times a week - related to history of surgery for esophageal cancer.  He states he struggles to accept this is his \"new normal.\"  Remains on mirtazapine 15 mg nightly with benefit.  On armodafinil 150 mg in the morning with modest benefit in energy levels.  Recently " started bupropion 75 mg BID - taking for last two weeks - has not yet seen benefit for mood.  No SI/HI.       Ongoing, chronic fatigue.  Wears CPAP nightly.  Followed by sleep medicine, Dr. Pisano, yearly.  Next appointment is November 13, 2025.     HTN with CAD: Followed by cardiology, Dr. Miguel.  Next appointment is October 24, 2025.  Today's BP is mildly elevated - not monitoring BP at home.  Remains on metoprolol succinate XL 50 mg daily, amlodipine 5 mg daily, lisinopril 10 mg daily.  On ranolazine 1000 mg BID, isosorbide 60 mg BID.  He is fully non compliant with furosemide 20 mg daily - due to urinary frequency.  Ongoing angina, stable.  Wears compression socks with benefit.   He plans to schedule follow up with nephrology, VIKTORIA Vann, in near future for chronic care follow up.     Type 2 Diabetes:  January 2025 A1C 6.0.  Remains on Farxiga 10 mg daily.  Weight is down 4 pounds since January 2025.       HLD: Remains well controlled on atorvastatin 40 mg daily.  September 2024 Lipid panel with normal triglycerides 96, LDL 51.     Followed by cardiothoracic surgery for history of esophageal cancer.  Next appointment is with VIKTORIA Galdamez on July 17, 2025.       Up to date on colonoscopy as of November 2023 with [facility], 2 year recall.     January 2025 PSA 1.030, consistent with historical average.         Patient Care Team:  Nargis Velasquez APRN as PCP - General (Family Medicine)  Papa Miguel MD as Consulting Physician (Cardiology)  Sekou Pisano MD as Consulting Physician (Pulmonary Disease)  Gregor Carlson MD as Consulting Physician (Gastroenterology)  Estevan Yuan MD (Sports Medicine)  Alex Hernadez MD as Consulting Physician (Nephrology)  Yazmin Molina APRN as Nurse Practitioner (Psychiatry)  Dylon Schwartz MD as Consulting Physician (Hematology and Oncology)  Valerie Stockton MD as Referring Physician (Thoracic  Surgery)  Valerie Stockton MD as Surgeon (Thoracic Surgery)    REVIEW OF SYSTEMS     Review of Systems   Constitutional:  Positive for fatigue. Negative for chills, fever and unexpected weight change.   Respiratory:  Positive for shortness of breath (Chronic). Negative for cough and chest tightness.    Cardiovascular:  Positive for chest pain (Chronic) and leg swelling (Chronic, non worsening). Negative for palpitations.   Neurological:  Negative for dizziness, weakness, light-headedness and headaches.   Psychiatric/Behavioral:  Positive for dysphoric mood (Chronic). Negative for self-injury and suicidal ideas. The patient is not nervous/anxious.           PHYSICAL EXAMINATION     Physical Exam  Vitals reviewed.   Constitutional:       General: He is not in acute distress.     Appearance: Normal appearance. He is not ill-appearing, toxic-appearing or diaphoretic.   HENT:      Head: Normocephalic and atraumatic.   Cardiovascular:      Rate and Rhythm: Normal rate and regular rhythm.      Heart sounds: Normal heart sounds.   Pulmonary:      Effort: Pulmonary effort is normal.      Breath sounds: Normal breath sounds.   Musculoskeletal:      Right lower leg: Edema (Non pitting) present.      Left lower leg: Edema (Non pitting) present.   Neurological:      Mental Status: He is alert and oriented to person, place, and time. Mental status is at baseline.   Psychiatric:         Mood and Affect: Mood normal.         Behavior: Behavior normal.         Thought Content: Thought content normal.         Judgment: Judgment normal.           REVIEWED DATA     Labs:           Imaging:            Medical Tests:           Summary of old records / correspondence / consultant report:           Request outside records:

## 2025-05-23 ENCOUNTER — TELEPHONE (OUTPATIENT)
Dept: INTERNAL MEDICINE | Age: 65
End: 2025-05-23

## 2025-05-23 ENCOUNTER — OFFICE VISIT (OUTPATIENT)
Dept: INTERNAL MEDICINE | Age: 65
End: 2025-05-23
Payer: MEDICARE

## 2025-05-23 VITALS
DIASTOLIC BLOOD PRESSURE: 84 MMHG | SYSTOLIC BLOOD PRESSURE: 138 MMHG | OXYGEN SATURATION: 98 % | HEART RATE: 82 BPM | TEMPERATURE: 97.8 F | WEIGHT: 290 LBS | HEIGHT: 73 IN | BODY MASS INDEX: 38.43 KG/M2 | RESPIRATION RATE: 16 BRPM

## 2025-05-23 DIAGNOSIS — E11.65 TYPE 2 DIABETES MELLITUS WITH HYPERGLYCEMIA, WITHOUT LONG-TERM CURRENT USE OF INSULIN: ICD-10-CM

## 2025-05-23 DIAGNOSIS — I10 ESSENTIAL HYPERTENSION: Primary | ICD-10-CM

## 2025-05-23 DIAGNOSIS — E78.5 HYPERLIPIDEMIA, UNSPECIFIED HYPERLIPIDEMIA TYPE: ICD-10-CM

## 2025-05-24 LAB
ALBUMIN SERPL-MCNC: 4.2 G/DL (ref 3.9–4.9)
ALBUMIN/CREAT UR: 22 MG/G CREAT (ref 0–29)
ALP SERPL-CCNC: 86 IU/L (ref 44–121)
ALT SERPL-CCNC: 25 IU/L (ref 0–44)
AST SERPL-CCNC: 22 IU/L (ref 0–40)
BILIRUB SERPL-MCNC: 0.7 MG/DL (ref 0–1.2)
BUN SERPL-MCNC: 14 MG/DL (ref 8–27)
BUN/CREAT SERPL: 15 (ref 10–24)
CALCIUM SERPL-MCNC: 8.8 MG/DL (ref 8.6–10.2)
CHLORIDE SERPL-SCNC: 103 MMOL/L (ref 96–106)
CHOLEST SERPL-MCNC: 133 MG/DL (ref 100–199)
CHOLEST/HDLC SERPL: 2.7 RATIO (ref 0–5)
CO2 SERPL-SCNC: 23 MMOL/L (ref 20–29)
CREAT SERPL-MCNC: 0.91 MG/DL (ref 0.76–1.27)
CREAT UR-MCNC: 130.3 MG/DL
EGFRCR SERPLBLD CKD-EPI 2021: 93 ML/MIN/1.73
GLOBULIN SER CALC-MCNC: 2.4 G/DL (ref 1.5–4.5)
GLUCOSE SERPL-MCNC: 121 MG/DL (ref 70–99)
HBA1C MFR BLD: 5.8 % (ref 4.8–5.6)
HDLC SERPL-MCNC: 49 MG/DL
LDLC SERPL CALC-MCNC: 69 MG/DL (ref 0–99)
MICROALBUMIN UR-MCNC: 28.1 UG/ML
POTASSIUM SERPL-SCNC: 4.1 MMOL/L (ref 3.5–5.2)
PROT SERPL-MCNC: 6.6 G/DL (ref 6–8.5)
SODIUM SERPL-SCNC: 142 MMOL/L (ref 134–144)
TRIGL SERPL-MCNC: 76 MG/DL (ref 0–149)
VLDLC SERPL CALC-MCNC: 15 MG/DL (ref 5–40)

## 2025-06-23 ENCOUNTER — TELEPHONE (OUTPATIENT)
Dept: CARDIOLOGY | Age: 65
End: 2025-06-23
Payer: MEDICARE

## 2025-06-23 DIAGNOSIS — I25.708 CORONARY ARTERY DISEASE OF BYPASS GRAFT OF NATIVE HEART WITH STABLE ANGINA PECTORIS: ICD-10-CM

## 2025-06-23 RX ORDER — ISOSORBIDE MONONITRATE 120 MG/1
120 TABLET, EXTENDED RELEASE ORAL DAILY
Qty: 90 TABLET | Refills: 1 | Status: SHIPPED | OUTPATIENT
Start: 2025-06-23

## 2025-06-23 NOTE — TELEPHONE ENCOUNTER
06/23/25  11:15 EDT    I will be more than happy to update his prescription.  If he feels like the 240 mg dosage is improving his anginal symptoms, that is the maximum daily dose and I can increase his daily prescription.  Please let me know if he would like to continue with 240 mg/day or if he would like to go back down to 120 mg/day before I send in a new prescription.      VIKTORIA Turpin  [facility]

## 2025-06-23 NOTE — TELEPHONE ENCOUNTER
"Pt called in after realizing he's accidentally been taking double his dose of ISMN for the past 1.5 months (he went through his 3-month supply already).  He's been on 240 mg QD.  He hasn't been checking his BP but says that he thinks \"there's one around here somewhere\".  He says that he feels dizzy sometimes but he usually feels \"pretty bad and lousy all the time\".  He's been dealing with angina for years, and has an episode of CP roughly QOD or several times per week.  He says it's an \"awareness of weight in the chest, a dull and pressure weight\".  He says his chest \"doesn't bother me all the time\".    Do you have any recommendations for this patient?  It sounds like he'll need a new script sent in since he's run out; I did ask him to find his BP machine and check it so I can obtain that info when I return his call.    Mel LOPEZ RN  [facility]  06/23/25  11:09 EDT    "

## 2025-06-23 NOTE — TELEPHONE ENCOUNTER
06/23/25  11:44 EDT    I sent in another prescription for isosorbide mononitrate 120 mg/day.  You may have to pay out-of-pocket for this since it will be a early fill, I am not sure if insurance will cover it.  This is out of my hands and I cannot do anything to correct that action.      Precious Stoner, VIKTORIA  [facility]

## 2025-06-23 NOTE — TELEPHONE ENCOUNTER
I spoke with Edmond Chase and gave them message from the provider.  They verbalized understanding & have no further questions at this time.    Mel LOPEZ RN  [place] [facility] [place]  06/23/25 11:56 EDT

## 2025-06-23 NOTE — TELEPHONE ENCOUNTER
I spoke with Edmond Chase and gave them message from the provider.  They verbalized understanding.    He'd prefer to go down to 120 mg QD.  He gave me his BP readings- /98, /92, HR 65.  He denies HA, blurry vision, or feeling abnormal.  He's already taken his other BP medications today.    I explained that since his BP is up, it might be a good idea to leave ISMN at 240 but he wants to try it at 120 mg QD.  I encouraged for pt to begin checking BP 1-2 hrs after their medication.  I asked them to write down the date, time, BP, & HR in a notebook, and to call our office in about 1-2 weeka to report these readings to us.  They verbalized understanding and have no further questions.      Mel LOPEZ RN  [facility] [place]  06/23/25 11:38 EDT

## 2025-06-25 ENCOUNTER — OFFICE VISIT (OUTPATIENT)
Dept: PSYCHIATRY | Facility: HOSPITAL | Age: 65
End: 2025-06-25
Payer: MEDICARE

## 2025-06-25 DIAGNOSIS — R53.83 FATIGUE DUE TO DEPRESSION: Primary | ICD-10-CM

## 2025-06-25 DIAGNOSIS — R53.0 NEOPLASTIC MALIGNANT RELATED FATIGUE: ICD-10-CM

## 2025-06-25 DIAGNOSIS — C15.5 MALIGNANT NEOPLASM OF LOWER THIRD OF ESOPHAGUS: ICD-10-CM

## 2025-06-25 DIAGNOSIS — F33.41 RECURRENT MAJOR DEPRESSIVE DISORDER, IN PARTIAL REMISSION: ICD-10-CM

## 2025-06-25 DIAGNOSIS — F32.A FATIGUE DUE TO DEPRESSION: Primary | ICD-10-CM

## 2025-06-25 DIAGNOSIS — G47.33 OSA (OBSTRUCTIVE SLEEP APNEA): ICD-10-CM

## 2025-06-25 RX ORDER — BUPROPION HYDROCHLORIDE 150 MG/1
150 TABLET ORAL EVERY MORNING
Qty: 30 TABLET | Refills: 2 | Status: SHIPPED | OUTPATIENT
Start: 2025-06-25 | End: 2026-06-25

## 2025-06-25 RX ORDER — ARMODAFINIL 150 MG/1
150 TABLET ORAL DAILY
Qty: 30 TABLET | Refills: 2 | Status: SHIPPED | OUTPATIENT
Start: 2025-06-25

## 2025-06-25 NOTE — PROGRESS NOTES
Subjective  Patient ID: Edmond Chase is a 64 y.o.. male seen in regularly scheduled group session.  Group participants have consented to group conducted in person    Total Group Time, Face to Face: 75 minutes  Total Group Participants: 7, plus facilitation per VIKTORIA Moeller    Group Therapy  Group topics explored including development of new normal, short and long term effects of diagnosis and treatment, anticipitory anxiety, anxiety surrounding adjusted treatment plan or adjusted follow up, physical deconditioning, social determinants of health (finances, living arrangements, etc), and lifestyle choices.  Members welcome new member, supporting others in current challenges, both medical and social.  Members shared continued grief surrounding adjusted physicality, medical complexity, intrusive mood experience and symptoms of isolation.  Personal experiences regarding infection, altered mental status, radical acceptance, and desire to maximize quality of life and independence discussed at length.  Members shared appreciation of recent travel, balance between desires and abilities, and permission to adjust plans as necessary.    Counseling provided regarding behavioral activation/ activity scheduling, reintroduction to activity through graded tasks, balancing avoidance with approach , sleep hygiene, recognition and allowance of need for rest, pharmacological and non pharmacological management of sleep disturbance, lifestyle counseling, benefits of exercise and strategies for managing barriers to engagement, allowance of self care, exploration of quality of life goals, survivorship issues, current programming available in community and clinic, anxiety/ mood management , medication education, and existential distress.  Explored cognitive distortions and therapeutic factors of group.  Supported developing group dynamics and support of members.  Identified importance of future oriented thinking and having  "things to look forward to.  Reviewed somatic symptoms of depression versus side effects of disease and treatment.    Discussed current programming available in [facility] and community.    Benefits of group discussed while also acknowledging the need for 1:1 consultation at times. Members invited to discuss any concerns privately with me following group setting or to schedule a 1:1 visit if needs not being met in group.    Next shared medical visit: July 30 at 2:00 PM in person    Patient response to group: Pt shares openly in group setting, sharing openly with other group members.    Medications Management  Pt continues in survivorship of esophageal cancer.    CC: Depression, anxiety  HPI: Pt is seen in follow up regarding ongoing anxiety, depression, and demoralization in survivorship of esophageal cancer. Continues to endorse long term effects of esophagectomy, reporting vomiting lunch today. Continues to endorse complicated physical symptoms in survivorship, while balancing this with desire to \"feel bay\" given early identification of symptoms, response to treatment. Continues to endorses tremendous GI disruption, adjusted QOL and ability to engage as desired. Continues to endorse burden of existential distress, allowing self to process this, and feeling somewhat comforted in having processed this. Appreciates having projects available, allowing self to leave things out to assist with the visual reminder to indicate, \"this is yet to be accomplished.\"  Has appreciated improved symptoms of depression since addition of bupropion, taking 150 mg daily.  Reports running out of mirtazapine after reducing to 15 mg nightly, last dose approximately 1 week ago.  Continues to take Nuvigil 150 mg every morning.  Exam: As Above  Current medication regimen: mirtazapine 15 mg q hs - ran out of this appx 1 week ago, bupropion 75 mg bid, and nuvigil 150 mg q AM  Lab Review:   Office Visit on 05/23/2025   Component Date Value "    Glucose 05/23/2025 121 (H)     BUN 05/23/2025 14     Creatinine 05/23/2025 0.91     EGFR Result 05/23/2025 93     BUN/Creatinine Ratio 05/23/2025 15     Sodium 05/23/2025 142     Potassium 05/23/2025 4.1     Chloride 05/23/2025 103     Total CO2 05/23/2025 23     Calcium 05/23/2025 8.8     Total Protein 05/23/2025 6.6     Albumin 05/23/2025 4.2     Globulin 05/23/2025 2.4     Total Bilirubin 05/23/2025 0.7     Alkaline Phosphatase 05/23/2025 86     AST (SGOT) 05/23/2025 22     ALT (SGPT) 05/23/2025 25     Hemoglobin A1C 05/23/2025 5.8 (H)     Total Cholesterol 05/23/2025 133     Triglycerides 05/23/2025 76     HDL Cholesterol 05/23/2025 49     VLDL Cholesterol Richi 05/23/2025 15     LDL Chol Calc (NIH) 05/23/2025 69     Chol/HDL Ratio 05/23/2025 2.7     Creatinine, Urine 05/23/2025 130.3     Microalbumin, Urine 05/23/2025 28.1     Microalbumin/Creatinine * 05/23/2025 22      MDM:  Meds reviewed and renewed as appropriate.  Remain off mirtazapine; adjust formulation of bupropion to  mg q AM. Continue modafinil as written.  FU scheduled in group setting.    Diagnoses and all orders for this visit:    1. Fatigue due to depression (Primary)    2. Malignant neoplasm of lower third of esophagus    3. Recurrent major depressive disorder, in partial remission    4. Neoplastic malignant related fatigue  -     armodafinil (NUVIGIL) 150 MG tablet; Take 1 tablet by mouth Daily.  Dispense: 30 tablet; Refill: 2    5. TJ (obstructive sleep apnea)  -     armodafinil (NUVIGIL) 150 MG tablet; Take 1 tablet by mouth Daily.  Dispense: 30 tablet; Refill: 2    Other orders  -     buPROPion XL (Wellbutrin XL) 150 MG 24 hr tablet; Take 1 tablet by mouth Every Morning.  Dispense: 30 tablet; Refill: 2

## 2025-06-27 ENCOUNTER — APPOINTMENT (OUTPATIENT)
Dept: CARDIOLOGY | Facility: HOSPITAL | Age: 65
End: 2025-06-27
Payer: MEDICARE

## 2025-06-27 ENCOUNTER — APPOINTMENT (OUTPATIENT)
Dept: GENERAL RADIOLOGY | Facility: HOSPITAL | Age: 65
End: 2025-06-27
Payer: MEDICARE

## 2025-06-27 ENCOUNTER — HOSPITAL ENCOUNTER (OUTPATIENT)
Facility: HOSPITAL | Age: 65
Setting detail: OBSERVATION
Discharge: HOME | End: 2025-06-28
Attending: EMERGENCY MEDICINE | Admitting: EMERGENCY MEDICINE
Payer: MEDICARE

## 2025-06-27 DIAGNOSIS — R06.00 ACUTE DYSPNEA: Primary | ICD-10-CM

## 2025-06-27 DIAGNOSIS — I50.9 ACUTE CONGESTIVE HEART FAILURE, UNSPECIFIED HEART FAILURE TYPE: ICD-10-CM

## 2025-06-27 LAB
ALBUMIN SERPL-MCNC: 3.8 G/DL (ref 3.5–5.2)
ALBUMIN/GLOB SERPL: 1.4 G/DL
ALP SERPL-CCNC: 83 U/L (ref 39–117)
ALT SERPL W P-5'-P-CCNC: 22 U/L (ref 1–41)
ANION GAP SERPL CALCULATED.3IONS-SCNC: 11.1 MMOL/L (ref 5–15)
AORTIC DIMENSIONLESS INDEX: 0.33 (DI)
AST SERPL-CCNC: 24 U/L (ref 1–40)
AV MEAN PRESS GRAD SYS DOP V1V2: 11.5 MMHG
AV VMAX SYS DOP: 244.2 CM/SEC
B PARAPERT DNA SPEC QL NAA+PROBE: NOT DETECTED
B PERT DNA SPEC QL NAA+PROBE: NOT DETECTED
BASOPHILS # BLD AUTO: 0.02 10*3/MM3 (ref 0–0.2)
BASOPHILS NFR BLD AUTO: 0.4 % (ref 0–1.5)
BH CV ECHO AV AORTIC VALVE AT ACCEL TIME CALCULATED: 129 MSEC
BH CV ECHO MEAS - ACS: 1.8 CM
BH CV ECHO MEAS - AO MAX PG: 23.9 MMHG
BH CV ECHO MEAS - AO ROOT AREA (BSA CORRECTED): 1.6 CM2
BH CV ECHO MEAS - AO ROOT DIAM: 3.9 CM
BH CV ECHO MEAS - AO V2 VTI: 56.7 CM
BH CV ECHO MEAS - AT: 0.13 SEC
BH CV ECHO MEAS - AVA(I,D): 1.06 CM2
BH CV ECHO MEAS - EDV(CUBED): 155.4 ML
BH CV ECHO MEAS - EDV(MOD-SP2): 71 ML
BH CV ECHO MEAS - EDV(MOD-SP4): 123 ML
BH CV ECHO MEAS - EF(MOD-SP2): 60.6 %
BH CV ECHO MEAS - EF(MOD-SP4): 59.3 %
BH CV ECHO MEAS - ESV(CUBED): 48.8 ML
BH CV ECHO MEAS - ESV(MOD-SP2): 28 ML
BH CV ECHO MEAS - ESV(MOD-SP4): 50 ML
BH CV ECHO MEAS - FS: 32 %
BH CV ECHO MEAS - IVS/LVPW: 1.21 CM
BH CV ECHO MEAS - IVSD: 1.93 CM
BH CV ECHO MEAS - LA DIMENSION: 5.8 CM
BH CV ECHO MEAS - LAT PEAK E' VEL: 8.8 CM/SEC
BH CV ECHO MEAS - LV DIASTOLIC VOL/BSA (35-75): 49.3 CM2
BH CV ECHO MEAS - LV MASS(C)D: 457.9 GRAMS
BH CV ECHO MEAS - LV MAX PG: 2.15 MMHG
BH CV ECHO MEAS - LV MEAN PG: 1.16 MMHG
BH CV ECHO MEAS - LV SYSTOLIC VOL/BSA (12-30): 20 CM2
BH CV ECHO MEAS - LV V1 MAX: 73.4 CM/SEC
BH CV ECHO MEAS - LV V1 VTI: 18.4 CM
BH CV ECHO MEAS - LVIDD: 5.4 CM
BH CV ECHO MEAS - LVIDS: 3.7 CM
BH CV ECHO MEAS - LVOT AREA: 3.2 CM2
BH CV ECHO MEAS - LVOT DIAM: 2.03 CM
BH CV ECHO MEAS - LVPWD: 1.59 CM
BH CV ECHO MEAS - MED PEAK E' VEL: 8.4 CM/SEC
BH CV ECHO MEAS - MV A MAX VEL: 39.8 CM/SEC
BH CV ECHO MEAS - MV DEC SLOPE: 334.3 CM/SEC2
BH CV ECHO MEAS - MV DEC TIME: 0.29 SEC
BH CV ECHO MEAS - MV E MAX VEL: 98 CM/SEC
BH CV ECHO MEAS - MV E/A: 2.46
BH CV ECHO MEAS - MV MAX PG: 5.5 MMHG
BH CV ECHO MEAS - MV MEAN PG: 2.03 MMHG
BH CV ECHO MEAS - MV P1/2T: 109.9 MSEC
BH CV ECHO MEAS - MV V2 VTI: 32.1 CM
BH CV ECHO MEAS - MVA(P1/2T): 2 CM2
BH CV ECHO MEAS - MVA(VTI): 1.87 CM2
BH CV ECHO MEAS - PA ACC TIME: 0.14 SEC
BH CV ECHO MEAS - PA V2 MAX: 96.3 CM/SEC
BH CV ECHO MEAS - PI END-D VEL: 140.3 CM/SEC
BH CV ECHO MEAS - QP/QS: 3.1
BH CV ECHO MEAS - RV MAX PG: 1.86 MMHG
BH CV ECHO MEAS - RV V1 MAX: 68.1 CM/SEC
BH CV ECHO MEAS - RV V1 VTI: 17.9 CM
BH CV ECHO MEAS - RVDD: 2.8 CM
BH CV ECHO MEAS - RVOT DIAM: 3.6 CM
BH CV ECHO MEAS - SUP REN AO DIAM: 2 CM
BH CV ECHO MEAS - SV(LVOT): 59.8 ML
BH CV ECHO MEAS - SV(MOD-SP2): 43 ML
BH CV ECHO MEAS - SV(MOD-SP4): 73 ML
BH CV ECHO MEAS - SV(RVOT): 185.9 ML
BH CV ECHO MEAS - SVI(LVOT): 24 ML/M2
BH CV ECHO MEAS - SVI(MOD-SP2): 17.2 ML/M2
BH CV ECHO MEAS - SVI(MOD-SP4): 29.3 ML/M2
BH CV ECHO MEAS - TAPSE (>1.6): 2.14 CM
BH CV ECHO MEASUREMENTS AVERAGE E/E' RATIO: 11.4
BH CV XLRA - RV BASE: 4.2 CM
BH CV XLRA - RV LENGTH: 8.8 CM
BH CV XLRA - RV MID: 3.1 CM
BH CV XLRA - TDI S': 11.7 CM/SEC
BILIRUB SERPL-MCNC: 0.7 MG/DL (ref 0–1.2)
BUN SERPL-MCNC: 11 MG/DL (ref 8–23)
BUN/CREAT SERPL: 13.3 (ref 7–25)
C PNEUM DNA NPH QL NAA+NON-PROBE: NOT DETECTED
CALCIUM SPEC-SCNC: 9 MG/DL (ref 8.6–10.5)
CHLORIDE SERPL-SCNC: 105 MMOL/L (ref 98–107)
CO2 SERPL-SCNC: 24.9 MMOL/L (ref 22–29)
CREAT SERPL-MCNC: 0.83 MG/DL (ref 0.76–1.27)
DEPRECATED RDW RBC AUTO: 48.1 FL (ref 37–54)
EGFRCR SERPLBLD CKD-EPI 2021: 96.5 ML/MIN/1.73
EOSINOPHIL # BLD AUTO: 0.06 10*3/MM3 (ref 0–0.4)
EOSINOPHIL NFR BLD AUTO: 1.1 % (ref 0.3–6.2)
ERYTHROCYTE [DISTWIDTH] IN BLOOD BY AUTOMATED COUNT: 13.5 % (ref 12.3–15.4)
FLUAV SUBTYP SPEC NAA+PROBE: NOT DETECTED
FLUBV RNA NPH QL NAA+NON-PROBE: NOT DETECTED
GEN 5 1HR TROPONIN T REFLEX: 32 NG/L
GLOBULIN UR ELPH-MCNC: 2.8 GM/DL
GLUCOSE BLDC GLUCOMTR-MCNC: 122 MG/DL (ref 70–130)
GLUCOSE SERPL-MCNC: 121 MG/DL (ref 65–99)
HADV DNA SPEC NAA+PROBE: NOT DETECTED
HCOV 229E RNA SPEC QL NAA+PROBE: NOT DETECTED
HCOV HKU1 RNA SPEC QL NAA+PROBE: NOT DETECTED
HCOV NL63 RNA SPEC QL NAA+PROBE: NOT DETECTED
HCOV OC43 RNA SPEC QL NAA+PROBE: NOT DETECTED
HCT VFR BLD AUTO: 41.6 % (ref 37.5–51)
HGB BLD-MCNC: 13.7 G/DL (ref 13–17.7)
HMPV RNA NPH QL NAA+NON-PROBE: NOT DETECTED
HOLD SPECIMEN: NORMAL
HOLD SPECIMEN: NORMAL
HPIV1 RNA ISLT QL NAA+PROBE: NOT DETECTED
HPIV2 RNA SPEC QL NAA+PROBE: NOT DETECTED
HPIV3 RNA NPH QL NAA+PROBE: NOT DETECTED
HPIV4 P GENE NPH QL NAA+PROBE: NOT DETECTED
IMM GRANULOCYTES # BLD AUTO: 0.03 10*3/MM3 (ref 0–0.05)
IMM GRANULOCYTES NFR BLD AUTO: 0.5 % (ref 0–0.5)
LEFT ATRIUM VOLUME INDEX: 31.4 ML/M2
LIPASE SERPL-CCNC: 14 U/L (ref 13–60)
LV EF BIPLANE MOD: 58.4 %
LYMPHOCYTES # BLD AUTO: 0.82 10*3/MM3 (ref 0.7–3.1)
LYMPHOCYTES NFR BLD AUTO: 14.9 % (ref 19.6–45.3)
M PNEUMO IGG SER IA-ACNC: NOT DETECTED
MCH RBC QN AUTO: 32.1 PG (ref 26.6–33)
MCHC RBC AUTO-ENTMCNC: 32.9 G/DL (ref 31.5–35.7)
MCV RBC AUTO: 97.4 FL (ref 79–97)
MONOCYTES # BLD AUTO: 0.44 10*3/MM3 (ref 0.1–0.9)
MONOCYTES NFR BLD AUTO: 8 % (ref 5–12)
NEUTROPHILS NFR BLD AUTO: 4.14 10*3/MM3 (ref 1.7–7)
NEUTROPHILS NFR BLD AUTO: 75.1 % (ref 42.7–76)
NRBC BLD AUTO-RTO: 0 /100 WBC (ref 0–0.2)
NT-PROBNP SERPL-MCNC: 996 PG/ML (ref 0–900)
PLATELET # BLD AUTO: 169 10*3/MM3 (ref 140–450)
PMV BLD AUTO: 9.2 FL (ref 6–12)
POTASSIUM SERPL-SCNC: 3.5 MMOL/L (ref 3.5–5.2)
PROT SERPL-MCNC: 6.6 G/DL (ref 6–8.5)
QT INTERVAL: 389 MS
QT INTERVAL: 392 MS
QTC INTERVAL: 397 MS
QTC INTERVAL: 423 MS
RBC # BLD AUTO: 4.27 10*6/MM3 (ref 4.14–5.8)
RHINOVIRUS RNA SPEC NAA+PROBE: NOT DETECTED
RSV RNA NPH QL NAA+NON-PROBE: NOT DETECTED
SARS-COV-2 RNA NPH QL NAA+NON-PROBE: NOT DETECTED
SINUS: 3.8 CM
SODIUM SERPL-SCNC: 141 MMOL/L (ref 136–145)
STJ: 3.4 CM
TROPONIN T % DELTA: -3
TROPONIN T NUMERIC DELTA: -1 NG/L
TROPONIN T SERPL HS-MCNC: 33 NG/L
WBC NRBC COR # BLD AUTO: 5.51 10*3/MM3 (ref 3.4–10.8)
WHOLE BLOOD HOLD COAG: NORMAL
WHOLE BLOOD HOLD SPECIMEN: NORMAL

## 2025-06-27 PROCEDURE — 85025 COMPLETE CBC W/AUTO DIFF WBC: CPT | Performed by: EMERGENCY MEDICINE

## 2025-06-27 PROCEDURE — 96376 TX/PRO/DX INJ SAME DRUG ADON: CPT

## 2025-06-27 PROCEDURE — 25010000002 FUROSEMIDE PER 20 MG: Performed by: EMERGENCY MEDICINE

## 2025-06-27 PROCEDURE — 93010 ELECTROCARDIOGRAM REPORT: CPT | Performed by: INTERNAL MEDICINE

## 2025-06-27 PROCEDURE — 84484 ASSAY OF TROPONIN QUANT: CPT | Performed by: EMERGENCY MEDICINE

## 2025-06-27 PROCEDURE — 99285 EMERGENCY DEPT VISIT HI MDM: CPT

## 2025-06-27 PROCEDURE — 82948 REAGENT STRIP/BLOOD GLUCOSE: CPT

## 2025-06-27 PROCEDURE — G0378 HOSPITAL OBSERVATION PER HR: HCPCS

## 2025-06-27 PROCEDURE — 96375 TX/PRO/DX INJ NEW DRUG ADDON: CPT

## 2025-06-27 PROCEDURE — 25010000002 ONDANSETRON PER 1 MG: Performed by: EMERGENCY MEDICINE

## 2025-06-27 PROCEDURE — 93306 TTE W/DOPPLER COMPLETE: CPT | Performed by: INTERNAL MEDICINE

## 2025-06-27 PROCEDURE — 25010000002 ONDANSETRON PER 1 MG: Performed by: NURSE PRACTITIONER

## 2025-06-27 PROCEDURE — 96374 THER/PROPH/DIAG INJ IV PUSH: CPT

## 2025-06-27 PROCEDURE — 25010000002 FAMOTIDINE 10 MG/ML SOLUTION: Performed by: NURSE PRACTITIONER

## 2025-06-27 PROCEDURE — 99214 OFFICE O/P EST MOD 30 MIN: CPT | Performed by: INTERNAL MEDICINE

## 2025-06-27 PROCEDURE — 80053 COMPREHEN METABOLIC PANEL: CPT | Performed by: EMERGENCY MEDICINE

## 2025-06-27 PROCEDURE — 93306 TTE W/DOPPLER COMPLETE: CPT

## 2025-06-27 PROCEDURE — 83880 ASSAY OF NATRIURETIC PEPTIDE: CPT | Performed by: EMERGENCY MEDICINE

## 2025-06-27 PROCEDURE — 93005 ELECTROCARDIOGRAM TRACING: CPT | Performed by: EMERGENCY MEDICINE

## 2025-06-27 PROCEDURE — 71045 X-RAY EXAM CHEST 1 VIEW: CPT

## 2025-06-27 PROCEDURE — 83690 ASSAY OF LIPASE: CPT | Performed by: PHYSICIAN ASSISTANT

## 2025-06-27 PROCEDURE — 25510000001 PERFLUTREN 6.52 MG/ML SUSPENSION 2 ML VIAL: Performed by: INTERNAL MEDICINE

## 2025-06-27 PROCEDURE — 0202U NFCT DS 22 TRGT SARS-COV-2: CPT | Performed by: NURSE PRACTITIONER

## 2025-06-27 PROCEDURE — 36415 COLL VENOUS BLD VENIPUNCTURE: CPT

## 2025-06-27 PROCEDURE — 93005 ELECTROCARDIOGRAM TRACING: CPT

## 2025-06-27 RX ORDER — AMOXICILLIN 250 MG
2 CAPSULE ORAL 2 TIMES DAILY PRN
Status: DISCONTINUED | OUTPATIENT
Start: 2025-06-27 | End: 2025-06-28 | Stop reason: HOSPADM

## 2025-06-27 RX ORDER — ISOSORBIDE MONONITRATE 30 MG/1
120 TABLET, EXTENDED RELEASE ORAL DAILY
Status: DISCONTINUED | OUTPATIENT
Start: 2025-06-27 | End: 2025-06-28 | Stop reason: HOSPADM

## 2025-06-27 RX ORDER — FUROSEMIDE 20 MG/1
20 TABLET ORAL DAILY
Status: DISCONTINUED | OUTPATIENT
Start: 2025-06-28 | End: 2025-06-28 | Stop reason: HOSPADM

## 2025-06-27 RX ORDER — LISINOPRIL 10 MG/1
10 TABLET ORAL DAILY
Status: DISCONTINUED | OUTPATIENT
Start: 2025-06-27 | End: 2025-06-28 | Stop reason: HOSPADM

## 2025-06-27 RX ORDER — SODIUM CHLORIDE 9 MG/ML
40 INJECTION, SOLUTION INTRAVENOUS AS NEEDED
Status: DISCONTINUED | OUTPATIENT
Start: 2025-06-27 | End: 2025-06-28 | Stop reason: HOSPADM

## 2025-06-27 RX ORDER — FAMOTIDINE 10 MG/ML
20 INJECTION, SOLUTION INTRAVENOUS EVERY 12 HOURS SCHEDULED
Status: DISCONTINUED | OUTPATIENT
Start: 2025-06-27 | End: 2025-06-28 | Stop reason: HOSPADM

## 2025-06-27 RX ORDER — DEXTROSE MONOHYDRATE 25 G/50ML
25 INJECTION, SOLUTION INTRAVENOUS
Status: DISCONTINUED | OUTPATIENT
Start: 2025-06-27 | End: 2025-06-28 | Stop reason: HOSPADM

## 2025-06-27 RX ORDER — NICOTINE POLACRILEX 4 MG
15 LOZENGE BUCCAL
Status: DISCONTINUED | OUTPATIENT
Start: 2025-06-27 | End: 2025-06-28 | Stop reason: HOSPADM

## 2025-06-27 RX ORDER — INSULIN LISPRO 100 [IU]/ML
2-7 INJECTION, SOLUTION INTRAVENOUS; SUBCUTANEOUS
Status: DISCONTINUED | OUTPATIENT
Start: 2025-06-27 | End: 2025-06-28 | Stop reason: HOSPADM

## 2025-06-27 RX ORDER — AMLODIPINE BESYLATE 5 MG/1
5 TABLET ORAL DAILY
Status: DISCONTINUED | OUTPATIENT
Start: 2025-06-27 | End: 2025-06-28 | Stop reason: HOSPADM

## 2025-06-27 RX ORDER — BISACODYL 5 MG/1
5 TABLET, DELAYED RELEASE ORAL DAILY PRN
Status: DISCONTINUED | OUTPATIENT
Start: 2025-06-27 | End: 2025-06-28 | Stop reason: HOSPADM

## 2025-06-27 RX ORDER — SODIUM CHLORIDE 0.9 % (FLUSH) 0.9 %
10 SYRINGE (ML) INJECTION AS NEEDED
Status: DISCONTINUED | OUTPATIENT
Start: 2025-06-27 | End: 2025-06-28 | Stop reason: HOSPADM

## 2025-06-27 RX ORDER — RANOLAZINE 500 MG/1
1000 TABLET, EXTENDED RELEASE ORAL EVERY 12 HOURS SCHEDULED
Status: DISCONTINUED | OUTPATIENT
Start: 2025-06-27 | End: 2025-06-28 | Stop reason: HOSPADM

## 2025-06-27 RX ORDER — BISACODYL 10 MG
10 SUPPOSITORY, RECTAL RECTAL DAILY PRN
Status: DISCONTINUED | OUTPATIENT
Start: 2025-06-27 | End: 2025-06-28 | Stop reason: HOSPADM

## 2025-06-27 RX ORDER — ATORVASTATIN CALCIUM 20 MG/1
40 TABLET, FILM COATED ORAL DAILY
Status: DISCONTINUED | OUTPATIENT
Start: 2025-06-27 | End: 2025-06-28 | Stop reason: HOSPADM

## 2025-06-27 RX ORDER — BUPROPION HYDROCHLORIDE 150 MG/1
150 TABLET ORAL EVERY MORNING
Status: DISCONTINUED | OUTPATIENT
Start: 2025-06-27 | End: 2025-06-28 | Stop reason: HOSPADM

## 2025-06-27 RX ORDER — METOPROLOL SUCCINATE 50 MG/1
50 TABLET, EXTENDED RELEASE ORAL DAILY
Status: DISCONTINUED | OUTPATIENT
Start: 2025-06-27 | End: 2025-06-28 | Stop reason: HOSPADM

## 2025-06-27 RX ORDER — NITROGLYCERIN 0.4 MG/1
0.4 TABLET SUBLINGUAL
Status: DISCONTINUED | OUTPATIENT
Start: 2025-06-27 | End: 2025-06-28 | Stop reason: HOSPADM

## 2025-06-27 RX ORDER — ONDANSETRON 2 MG/ML
4 INJECTION INTRAMUSCULAR; INTRAVENOUS EVERY 6 HOURS PRN
Status: DISCONTINUED | OUTPATIENT
Start: 2025-06-27 | End: 2025-06-28 | Stop reason: HOSPADM

## 2025-06-27 RX ORDER — IBUPROFEN 600 MG/1
1 TABLET ORAL
Status: DISCONTINUED | OUTPATIENT
Start: 2025-06-27 | End: 2025-06-28 | Stop reason: HOSPADM

## 2025-06-27 RX ORDER — FUROSEMIDE 10 MG/ML
80 INJECTION INTRAMUSCULAR; INTRAVENOUS ONCE
Status: COMPLETED | OUTPATIENT
Start: 2025-06-27 | End: 2025-06-27

## 2025-06-27 RX ORDER — GUAIFENESIN 600 MG/1
1200 TABLET, EXTENDED RELEASE ORAL EVERY 12 HOURS SCHEDULED
Status: DISCONTINUED | OUTPATIENT
Start: 2025-06-27 | End: 2025-06-28 | Stop reason: HOSPADM

## 2025-06-27 RX ORDER — SODIUM CHLORIDE 0.9 % (FLUSH) 0.9 %
10 SYRINGE (ML) INJECTION EVERY 12 HOURS SCHEDULED
Status: DISCONTINUED | OUTPATIENT
Start: 2025-06-27 | End: 2025-06-28 | Stop reason: HOSPADM

## 2025-06-27 RX ORDER — POLYETHYLENE GLYCOL 3350 17 G/17G
17 POWDER, FOR SOLUTION ORAL DAILY PRN
Status: DISCONTINUED | OUTPATIENT
Start: 2025-06-27 | End: 2025-06-28 | Stop reason: HOSPADM

## 2025-06-27 RX ORDER — ONDANSETRON 2 MG/ML
4 INJECTION INTRAMUSCULAR; INTRAVENOUS ONCE
Status: COMPLETED | OUTPATIENT
Start: 2025-06-27 | End: 2025-06-27

## 2025-06-27 RX ORDER — ONDANSETRON 4 MG/1
4 TABLET, ORALLY DISINTEGRATING ORAL EVERY 6 HOURS PRN
Status: DISCONTINUED | OUTPATIENT
Start: 2025-06-27 | End: 2025-06-28 | Stop reason: HOSPADM

## 2025-06-27 RX ADMIN — Medication 10 ML: at 18:10

## 2025-06-27 RX ADMIN — ONDANSETRON 4 MG: 2 INJECTION, SOLUTION INTRAMUSCULAR; INTRAVENOUS at 13:00

## 2025-06-27 RX ADMIN — ATORVASTATIN CALCIUM 40 MG: 20 TABLET, FILM COATED ORAL at 18:09

## 2025-06-27 RX ADMIN — ISOSORBIDE MONONITRATE 120 MG: 30 TABLET, EXTENDED RELEASE ORAL at 18:09

## 2025-06-27 RX ADMIN — AMLODIPINE BESYLATE 5 MG: 5 TABLET ORAL at 18:09

## 2025-06-27 RX ADMIN — FUROSEMIDE 80 MG: 10 INJECTION, SOLUTION INTRAMUSCULAR; INTRAVENOUS at 10:12

## 2025-06-27 RX ADMIN — ONDANSETRON 4 MG: 2 INJECTION, SOLUTION INTRAMUSCULAR; INTRAVENOUS at 10:12

## 2025-06-27 RX ADMIN — EMPAGLIFLOZIN 25 MG: 25 TABLET, FILM COATED ORAL at 18:09

## 2025-06-27 RX ADMIN — Medication 10 ML: at 20:12

## 2025-06-27 RX ADMIN — BUPROPION HYDROCHLORIDE 150 MG: 150 TABLET, EXTENDED RELEASE ORAL at 18:09

## 2025-06-27 RX ADMIN — GUAIFENESIN 1200 MG: 600 TABLET, EXTENDED RELEASE ORAL at 16:28

## 2025-06-27 RX ADMIN — FAMOTIDINE 20 MG: 10 INJECTION INTRAVENOUS at 22:09

## 2025-06-27 RX ADMIN — PERFLUTREN 2 ML: 6.52 INJECTION, SUSPENSION INTRAVENOUS at 09:52

## 2025-06-27 RX ADMIN — LISINOPRIL 10 MG: 10 TABLET ORAL at 18:09

## 2025-06-27 NOTE — ED PROVIDER NOTES
Brief history of present illness: 58-year-old male with what sounds like significant anxiety about his health presents complaining of concerns that he might vomit and aspirated in the night.  He has chronic chest pain, chronic dyspnea and chronic vomiting.  Today they are slightly worse but he just wanted to be checked out.  No active substernal crushing chest discomfort, new cough or fevers, or severe dyspnea.      Physical Exam   Constitutional: No distress.  Nontoxic  HENT:  Head: Normocephalic and atraumatic.   Oropharynx: Mucous membranes are moist.   Eyes: . No scleral icterus. No conjunctival pallor.  Neck: Normal range of motion. Neck supple.   Cardiovascular: Pink warm and well perfused throughout.  Regular  Pulmonary/Chest: No respiratory distress.  No tachypnea or increased work of breathing appreciated.  Clear to auscultation  Abdominal: Soft. There is no tenderness. There is no rebound and no guarding.   Musculoskeletal: Moves all extremities equally.  Chronic appearing lower extremity edema  Neurological: Alert and oriented.  No acute focal deficit appreciated.  Skin: Skin is pink, warm, and dry.   Psychiatric: Anxious mood  Nursing note and vitals reviewed.      MDM:   Results for orders placed or performed during the hospital encounter of 06/27/25   ECG 12 Lead ED Triage Standing Order; SOA    Collection Time: 06/27/25  7:17 AM   Result Value Ref Range    QT Interval 389 ms    QTC Interval 423 ms   ECG 12 Lead Dyspnea    Collection Time: 06/27/25  7:28 AM   Result Value Ref Range    QT Interval 392 ms    QTC Interval 397 ms   Comprehensive Metabolic Panel    Collection Time: 06/27/25  7:51 AM    Specimen: Blood   Result Value Ref Range    Glucose 121 (H) 65 - 99 mg/dL    BUN 11.0 8.0 - 23.0 mg/dL    Creatinine 0.83 0.76 - 1.27 mg/dL    Sodium 141 136 - 145 mmol/L    Potassium 3.5 3.5 - 5.2 mmol/L    Chloride 105 98 - 107 mmol/L    CO2 24.9 22.0 - 29.0 mmol/L    Calcium 9.0 8.6 - 10.5 mg/dL    Total  Protein 6.6 6.0 - 8.5 g/dL    Albumin 3.8 3.5 - 5.2 g/dL    ALT (SGPT) 22 1 - 41 U/L    AST (SGOT) 24 1 - 40 U/L    Alkaline Phosphatase 83 39 - 117 U/L    Total Bilirubin 0.7 0.0 - 1.2 mg/dL    Globulin 2.8 gm/dL    A/G Ratio 1.4 g/dL    BUN/Creatinine Ratio 13.3 7.0 - 25.0    Anion Gap 11.1 5.0 - 15.0 mmol/L    eGFR 96.5 >60.0 mL/min/1.73   BNP    Collection Time: 06/27/25  7:51 AM    Specimen: Blood   Result Value Ref Range    proBNP 996.0 (H) 0.0 - 900.0 pg/mL   High Sensitivity Troponin T    Collection Time: 06/27/25  7:51 AM    Specimen: Blood   Result Value Ref Range    HS Troponin T 33 (H) <22 ng/L   CBC Auto Differential    Collection Time: 06/27/25  7:51 AM    Specimen: Blood   Result Value Ref Range    WBC 5.51 3.40 - 10.80 10*3/mm3    RBC 4.27 4.14 - 5.80 10*6/mm3    Hemoglobin 13.7 13.0 - 17.7 g/dL    Hematocrit 41.6 37.5 - 51.0 %    MCV 97.4 (H) 79.0 - 97.0 fL    MCH 32.1 26.6 - 33.0 pg    MCHC 32.9 31.5 - 35.7 g/dL    RDW 13.5 12.3 - 15.4 %    RDW-SD 48.1 37.0 - 54.0 fl    MPV 9.2 6.0 - 12.0 fL    Platelets 169 140 - 450 10*3/mm3    Neutrophil % 75.1 42.7 - 76.0 %    Lymphocyte % 14.9 (L) 19.6 - 45.3 %    Monocyte % 8.0 5.0 - 12.0 %    Eosinophil % 1.1 0.3 - 6.2 %    Basophil % 0.4 0.0 - 1.5 %    Immature Grans % 0.5 0.0 - 0.5 %    Neutrophils, Absolute 4.14 1.70 - 7.00 10*3/mm3    Lymphocytes, Absolute 0.82 0.70 - 3.10 10*3/mm3    Monocytes, Absolute 0.44 0.10 - 0.90 10*3/mm3    Eosinophils, Absolute 0.06 0.00 - 0.40 10*3/mm3    Basophils, Absolute 0.02 0.00 - 0.20 10*3/mm3    Immature Grans, Absolute 0.03 0.00 - 0.05 10*3/mm3    nRBC 0.0 0.0 - 0.2 /100 WBC   Lipase    Collection Time: 06/27/25  7:51 AM    Specimen: Blood   Result Value Ref Range    Lipase 14 13 - 60 U/L   Green Top (Gel)    Collection Time: 06/27/25  7:51 AM   Result Value Ref Range    Extra Tube Hold for add-ons.    Lavender Top    Collection Time: 06/27/25  7:51 AM   Result Value Ref Range    Extra Tube hold for add-on    Gold Top  - SST    Collection Time: 06/27/25  7:51 AM   Result Value Ref Range    Extra Tube Hold for add-ons.    Light Blue Top    Collection Time: 06/27/25  7:51 AM   Result Value Ref Range    Extra Tube Hold for add-ons.        No results found.     My differential diagnosis for dyspnea includes but is not limited to:  Asthma, COPD, pneumonia, pulmonary embolus, acute respiratory distress syndrome, pneumothorax, pleural effusion, pulmonary fibrosis, congestive heart failure, myocardial infarction, DKA, uremia, acidosis, sepsis, anemia, drug related, hyperventilation, CNS disease      I have seen and personally evaluated this patient, discussed the case with the treating advanced practice provider, and reviewed their note. I was involved in the medical decision making during the evaluation, testing and disposition planning for this patient.    Progress:  ED Course as of 06/27/25 0853   Fri Jun 27, 2025   0724 EKG obtained at 0717 has a poor baseline and limits interpretation however he has ST depression in 1 aVL and some concern for possible ST elevation in V1.  Repeat stat requested. [RS]   0741 Patient does not look like a STEMI.  He reports chronic chest pain is only slightly worse than usual he just became concerned with some nausea and slight increase in chronic dyspnea this morning.  EKG is concerning but he has prior abnormalities noted on 9/25/2024 EKG.  Plan to discuss case with interventional but likely can continue workup in ED without activating Cath Lab. [RS]   0742 CONSULT        Provider: Dr. Ndiaye-interventional cardiology    Discussion: Reviewed patient history, ED presentation and evaluation.  Agrees with plan for medical evaluation.  He is available for consultation.  Continue workup without activating Cath Lab at this time.    Agreeable c treatment and planned disposition.         [RS]   0745 EKG           EKG time: 0717  Rhythm/Rate: Sinus with PVCs, 70  P waves and VT: ISMAEL within normal limits narrow  complex with poor R wave progression  QRS, axis: ST depression in lead I and aVL.    ST and T waves: Poor baseline with movement but some concern for ST elevation in V1     Interpreted Contemporaneously by me, independently viewed  Comparison: 9/25/2024-previously bigeminy which limits evaluation of the leads but he did have some ST abnormalities in 1 and aVL previously noted   [RS]   0747 EKG #2          EKG time: 0728  Rhythm/Rate: Sinus with PACs, 60  P waves and FL: Borderline first-degree AV block  QRS, axis: Narrow complex with slow R wave progression   ST and T waves: ST/T wave inversions 1 and aVL persist.    Interpreted Contemporaneously by me, independently viewed  Comparison: As above   [RS]   0841 HS Troponin T(!): 33  Stable chronic elevation [RS]   0841 proBNP(!): 996.0 [RS]   0841 Glucose(!): 121 [RS]   0841 BUN: 11.0 [RS]   0841 Creatinine: 0.83 [RS]   0841 Sodium: 141 [RS]   0841 Potassium: 3.5 [RS]   0841 ALT (SGPT): 22 [RS]   0841 AST (SGOT): 24 [RS]   0841 WBC: 5.51 [RS]   0841 Hemoglobin: 13.7 [RS]   0841 Immature Grans, Absolute: 0.03 [RS]   0841 RADIOLOGY      Study: Single view chest  Findings: No pneumothorax or large focal infiltrate noted  I independently viewed and interpreted these images contemporaneously with treatment.    [RS]   0845 Lipase: 14 [RS]   0851 Discussed findings with patient.  He would feel more comfortable if we watched him this afternoon.  It has been a while since he had an echocardiogram it appears that he has 1 pending from cardiology.  We will ask observation unit to watch him this afternoon and administer some furosemide here in the ED. [RS]   0852 CONSULT        Provider: ALAN KWON    Discussion: Patient history, ED presentation and evaluation as well as therapy initiated in the ED.  Agreeable to accept patient to the ED observation unit for further evaluation and treatment.    Agreeable c treatment and planned disposition.         [RS]      ED Course User  Index  [RS] Estevan Holloway MD         Final diagnoses:   Acute dyspnea   Acute congestive heart failure, unspecified heart failure type       Disposition:  ADMISSION    Discussed treatment plan and reason for admission with pt/family and admitting physician.  Pt/family voiced understanding of the plan for admission for further testing/treatment as needed.        Estevan Holloway MD  06/27/25 0830

## 2025-06-27 NOTE — ED NOTES
Nursing report ED to floor  Edmond Chase  64 y.o.  male    HPI :  HPI  Stated Reason for Visit: SOA since Wednesday    Chief Complaint  Chief Complaint   Patient presents with    Shortness of Breath       Admitting doctor:   El Martinez MD    Admitting diagnosis:   The primary encounter diagnosis was Acute dyspnea. A diagnosis of Acute congestive heart failure, unspecified heart failure type was also pertinent to this visit.    Code status:   Current Code Status       Date Active Code Status Order ID Comments User Context       Prior            Allergies:   Patient has no known allergies.    Isolation:   No active isolations    Intake and Output  No intake or output data in the 24 hours ending 06/27/25 0914    Weight:   There were no vitals filed for this visit.    Most recent vitals:   Vitals:    06/27/25 0715 06/27/25 0718   BP:  (!) 185/93   Pulse: 85    Resp: 22    Temp: 98 °F (36.7 °C)    TempSrc: Oral    SpO2: 94%        Active LDAs/IV Access:   Lines, Drains & Airways       Active LDAs       Name Placement date Placement time Site Days    Peripheral IV 06/27/25 0745 20 G Left Antecubital 06/27/25  0745  Antecubital  less than 1                    Labs (abnormal labs have a star):   Labs Reviewed   COMPREHENSIVE METABOLIC PANEL - Abnormal; Notable for the following components:       Result Value    Glucose 121 (*)     All other components within normal limits    Narrative:     GFR Categories in Chronic Kidney Disease (CKD)              GFR Category          GFR (mL/min/1.73)    Interpretation  G1                    90 or greater        Normal or high (1)  G2                    60-89                Mild decrease (1)  G3a                   45-59                Mild to moderate decrease  G3b                   30-44                Moderate to severe decrease  G4                    15-29                Severe decrease  G5                    14 or less           Kidney failure    (1)In the absence of  evidence of kidney disease, neither GFR category G1 or G2 fulfill the criteria for CKD.    eGFR calculation 2021 CKD-EPI creatinine equation, which does not include race as a factor   BNP (IN-HOUSE) - Abnormal; Notable for the following components:    proBNP 996.0 (*)     All other components within normal limits    Narrative:     This assay is used as an aid in the diagnosis of individuals suspected of having heart failure. It can be used as an aid in the diagnosis of acute decompensated heart failure (ADHF) in patients presenting with signs and symptoms of ADHF to the emergency department (ED). In addition, NT-proBNP of <300 pg/mL indicates ADHF is not likely.    Age Range Result Interpretation  NT-proBNP Concentration (pg/mL:      <50             Positive            >450                   Gray                 300-450                    Negative             <300    50-75           Positive            >900                  Gray                300-900                  Negative            <300      >75             Positive            >1800                  Gray                300-1800                  Negative            <300   TROPONIN - Abnormal; Notable for the following components:    HS Troponin T 33 (*)     All other components within normal limits    Narrative:     High Sensitive Troponin T Reference Range:  <14.0 ng/L- Negative Female for AMI  <22.0 ng/L- Negative Male for AMI  >=14 - Abnormal Female indicating possible myocardial injury.  >=22 - Abnormal Male indicating possible myocardial injury.   Clinicians would have to utilize clinical acumen, EKG, Troponin, and serial changes to determine if it is an Acute Myocardial Infarction or myocardial injury due to an underlying chronic condition.        CBC WITH AUTO DIFFERENTIAL - Abnormal; Notable for the following components:    MCV 97.4 (*)     Lymphocyte % 14.9 (*)     All other components within normal limits   LIPASE - Normal   RAINBOW DRAW    Narrative:      The following orders were created for panel order [facility] Draw.  Procedure                               Abnormality         Status                     ---------                               -----------         ------                     Green Top (Gel)[[id number]]                                  Final result               Lavender Top[[id number]]                                     Final result               Gold Top - SST[[id number]]                                   Final result               Light Blue Top[[id number]]                                   Final result                 Please view results for these tests on the individual orders.   HIGH SENSITIVITIY TROPONIN T 1HR   CBC AND DIFFERENTIAL    Narrative:     The following orders were created for panel order CBC & Differential.  Procedure                               Abnormality         Status                     ---------                               -----------         ------                     CBC Auto Differential[[id number]]        Abnormal            Final result                 Please view results for these tests on the individual orders.   GREEN TOP   LAVENDER TOP   GOLD TOP - SST   LIGHT BLUE TOP       EKG:   ECG 12 Lead Dyspnea   Preliminary Result   HEART RATE=62  bpm   RR Scvnodrh=306  ms   DC Qmuiajnf=570  ms   P Horizontal Axis=-11  deg   P Front Axis=-17  deg   QRSD Phbrufen=590  ms   QT Szkusjyq=107  ms   YVwL=535  ms   QRS Axis=-13  deg   T Wave Axis=155  deg   - ABNORMAL ECG -   Sinus rhythm   Borderline prolonged DC interval   Repol abnrm suggests ischemia, lateral leads   Date and Time of Study:2025-06-27 07:28:11      ECG 12 Lead ED Triage Standing Order; SOA   Preliminary Result   HEART RATE=71  bpm   RR Ofbvgwuk=911  ms   DC Hqkkavxw=891  ms   P Horizontal Axis=  deg   P Front Axis=-33  deg   QRSD Interval=98  ms   QT Uwnbvbcu=033  ms   URrP=547  ms   QRS Axis=-30  deg   T Wave Axis=164  deg   - ABNORMAL ECG -   Pacemaker spikes  or artifacts   Sinus rhythm   Ventricular premature complex   Left axis deviation   Abnormal R-wave progression, late transition   Repol abnrm suggests ischemia, lateral leads   When compared with ECG of 25-Sep-2024 04:11:00,   Significant change in rhythm   Date and Time of Study:2025-06-27 07:17:47      ECG 12 Lead Chest Pain    (Results Pending)       Meds given in ED:   Medications   sodium chloride 0.9 % flush 10 mL (has no administration in time range)   furosemide (LASIX) injection 80 mg (has no administration in time range)   ondansetron (ZOFRAN) injection 4 mg (has no administration in time range)       Imaging results:  No radiology results for the last day    Ambulatory status:   - standby    Social issues:   Social History     Socioeconomic History    Marital status: Single   Tobacco Use    Smoking status: Never     Passive exposure: Never    Smokeless tobacco: Never   Vaping Use    Vaping status: Never Used   Substance and Sexual Activity    Alcohol use: Not Currently     Comment: usually 1-2 month but none since christmas 2022    Drug use: Never    Sexual activity: Defer       Peripheral Neurovascular  Peripheral Neurovascular (Adult)  Peripheral Neurovascular WDL: WDL    Neuro Cognitive  Neuro Cognitive (Adult)  Cognitive/Neuro/Behavioral WDL: .WDL except  Additional Documentation: Dizziness Assessment (Group)  Dizziness Assessment  Dizziness Reported Symptoms: intermittent  Dizziness Occurs With: position change    Learning  Learning Assessment  Learning Readiness and Ability: no barriers identified  Education Provided  Person Taught: patient    Respiratory  Respiratory  Additional Documentation: Respiratory Secretions Assessment (Group)  Respiratory WDL  Respiratory WDL: .WDL except, all  Rhythm/Pattern, Respiratory: shortness of breath  Respiratory Secretions Assessment  Secretions Amount: small  Secretions Color: clear, white  Secretions Characteristics: thick    Abdominal Pain       Pain  Assessments  Pain (Adult)  (0-10) Pain Rating: Rest: 5  (0-10) Pain Rating: Activity: 5  Pain Location: chest    NIH Stroke Scale       Anusha Hamilton RN  06/27/25 09:14 EDT

## 2025-06-27 NOTE — CONSULTS
Cardiology History & Physical / Consultation      Patient Name: Edmond Chase  Age/Sex: 64 y.o. male  : 1960  MRN: [id number]    Date of Admission: 2025  Date of Encounter Visit: 25  Encounter Provider: Papa Miguel MD  Referring Provider: No ref. provider found  Place of Service: [facility] CARDIOLOGY  Patient Care Team:  Nargis Velasquez APRN as PCP - General (Family Medicine)  Papa Miguel MD as Consulting Physician (Cardiology)  Sekou Pisano MD as Consulting Physician (Pulmonary Disease)  Gregor Carlson MD as Consulting Physician (Gastroenterology)  Estevan Yuan MD (Sports Medicine)  Alex Hernadez MD as Consulting Physician (Nephrology)  Yazmin Molina APRN as Nurse Practitioner (Psychiatry)  Dylon Schwartz MD as Consulting Physician (Hematology and Oncology)  Valerie Stockton MD as Referring Physician (Thoracic Surgery)  Valerie Stockton MD as Surgeon (Thoracic Surgery)          Subjective:     Chief Complaint: Shortness of Breath     Reason for consultation: Shortness of Breath     History of Present Illness:    Edmond Chase is a 64 y.o. male with known hypertension, diabetes, CAD with history of CABG, history of aortic valve replacement.  He also has a history of esophageal cancer s/p esophagectomy .      He underwent coronary artery bypass and AVR in  but continues to have chronic angina and experiences anxiety and depression. Last cardiac catheterization in 2024 showed severe native vessel CAD including 100% mid LAD , 60-70% mid to distal RCA stenosis, long 50% proximal circumflex stenosis. Patent LIMA to LAD.  This was not changed from previous cath 2 years prior.  He is maintained on amlodipine 5 mg, atorvastatin 40 mg, Farxiga 10 mg, isosorbide 120 mg, lisinopril 10 mg,  metoprolol succinate 50 mg and Ranexa 500 mg.      He presented to the ED on 25 with  complaints of shortness of breath as well as chest pressure that is a bit worse than his chronic chest pressure.  Upon arrival, HR was 85, /93.    Work up shows chronically elevated and flat troponin's of 33->32, , normal electrolytes and renal function, lipase 14 and hemoglobin 13.7. EKG shows sinus rhythm, rate of 62, no acute ST elevation.  Patient says he has had his nausea and vomiting that he normally has.  He says he is really gotten worried that he is going to aspirate.  His shortness of breath may be a little bit different his discomfort is also pretty much baseline.  He was also taking a large dose of his isosorbide than prescribed.  Patient said it really did not change his chest discomfort again this has been chronic over the years.                [facility] 6-3-24    An abnormal monitor study.    There was a triggered event that correlated with ventricular bigeminy.  Otherwise frequent PVC burden with a total burden of 19.7%    ECHO 12-14-22    Left ventricular ejection fraction appears to be 66 - 70%.    Left ventricular wall thickness is consistent with mild concentric hypertrophy.    Left ventricular diastolic function was normal.    There is a bioprosthetic aortic valve present.    Mild dilation of the aortic root is present. The aortic root measures 4.4 cm. Mild dilation of the sinuses of Valsalva is present.    STRESS 7-12-22  Myocardial perfusion imaging indicates a medium-sized, moderately severe area of ischemia located in the inferior wall.  Left ventricular ejection fraction is normal. (Calculated EF = 50%).  GI artifact is present.  Arrhythmias during stress: frequent PVCs.    Past Medical History:  Past Medical History:   Diagnosis Date    Abnormal nuclear stress test     Anxiety     Anxiety and depression     Aortic valve insufficiency     nonrheumtic     Arthritis     knees    Ascending aortic aneurysm     CAD (coronary artery disease)     stent    Chest pain     Diabetes mellitus   "   Diarrhea     Dizzy     CAREFUL WHEN GETTING UP    Dyspnea     Esophageal cancer 11/2022    no chemo or radiation    Essential hypertension     Fatigue     G tube feedings     per jejunostomy tube nightly with permitin  3 cans nightly/ J-TUBE REMOVED 2023    GERD (gastroesophageal reflux disease)     GI bleed     Heart murmur     History of pneumonia     History of recent blood transfusion     hemorrhage     no reaction    Hyperglycemia     Hyperlipidemia     Hypersomnia with sleep apnea     Jejunostomy tube present     REMOVED 2023    Lightheadedness     Malaise and fatigue     Migraines     \"OCCULAR MIGRAINES\"    Morbid obesity     Myocardial ischemia     Nausea     Nocturia     TJ on auto CPAP 10/31/2016    Overnight polysomnogram.  Weight 276 pounds.  Severe TJ with AHI 79 events per hour.  Auto CPAP recommended.    Pneumonia     FEB 2016    Scrotal bleeding     SOB (shortness of breath)        Past Surgical History:   Procedure Laterality Date    AORTIC VALVE REPAIR/REPLACEMENT  05/2016    ASCENDING ARCH/HEMIARCH REPLACEMENT N/A 05/02/2016    Procedure: BHAVESH STERNOTOMY CORONARY ARTERY BYPASS GRAFT TIMES 3 USING LEFT INTERNAL MAMMARY ARTERY AND RIGHT GREATER SAPHENOUS VEIN GRAFT PER ENDOSCOPIC VEIN HARVESTING, AORTIC ANEURYSM REPAIR WITH ROOT REPAIR AND AORTIC VALVE REPLACEMENT;  Surgeon: Rosalio Cline MD;  Location: [facility] OR;  Service:     CARDIAC CATHETERIZATION N/A 04/01/2016    Procedure: Left Heart Cath;  Surgeon: Erick Tam MD;  Location: [facility] CATH INVASIVE LOCATION;  Service:     CARDIAC CATHETERIZATION N/A 04/01/2016    Procedure: Left ventriculography;  Surgeon: Erick Tam MD;  Location: [facility] CATH INVASIVE LOCATION;  Service:     CARDIAC CATHETERIZATION N/A 04/01/2016    Procedure: Right Heart Cath;  Surgeon: Erick Tam MD;  Location: [facility] CATH INVASIVE LOCATION;  Service:     CARDIAC CATHETERIZATION N/A 10/30/2017    Procedure: Coronary angiography;  Surgeon: Sorin Vasquez MD;  " Location[facility];  Service:     CARDIAC CATHETERIZATION  10/30/2017    Procedure: Saphenous Vein Graft;  Surgeon: Sorin Vasquez MD;  [facility];  Service:     CARDIAC CATHETERIZATION N/A 10/30/2017    Procedure: Native mammary injection;  Surgeon: Sorin Vasquez MD;  [facility];  Service:     CARDIAC CATHETERIZATION N/A 07/07/2020    Procedure: Coronary angiography;  Surgeon: Gregor Kim MD;  Location: [facility] CATH INVASIVE LOCATION;  Service: Cardiovascular;  Laterality: N/A;    CARDIAC CATHETERIZATION N/A 07/07/2020    Procedure: Left heart cath;  Surgeon: Gregor Kim MD;  Location: [facility] CATH INVASIVE LOCATION;  Service: Cardiovascular;  Laterality: N/A;    CARDIAC CATHETERIZATION N/A 07/07/2020    Procedure: Left ventriculography;  Surgeon: Gregor Kim MD;  Location: [facility] CATH INVASIVE LOCATION;  Service: Cardiovascular;  Laterality: N/A;    CARDIAC CATHETERIZATION N/A 07/07/2020    Procedure: Stent ESMER bypass graft;  Surgeon: Gregor Kim MD;  Location: [facility] CATH INVASIVE LOCATION;  Service: Cardiovascular;  Laterality: N/A;    CARDIAC CATHETERIZATION N/A 06/17/2021    Procedure: SAPHENOUS VEIN GRAFT;  Surgeon: Marques Ndiaye MD;  Location: [facility] CATH INVASIVE LOCATION;  Service: Cardiovascular;  Laterality: N/A;    CARDIAC CATHETERIZATION N/A 06/17/2021    Procedure: Left Heart Cath;  Surgeon: Marques Ndiaye MD;  Location: [facility] CATH INVASIVE LOCATION;  Service: Cardiovascular;  Laterality: N/A;    CARDIAC CATHETERIZATION N/A 06/17/2021    Procedure: Coronary angiography;  Surgeon: Marques Ndiaye MD;  Location: [facility] CATH INVASIVE LOCATION;  Service: Cardiovascular;  Laterality: N/A;    CARDIAC CATHETERIZATION N/A 07/14/2022    Procedure: Left Heart Cath;  Surgeon: Charlie Aj MD;  Location: [facility] CATH INVASIVE LOCATION;  Service: Cardiovascular;  Laterality: N/A;     CARDIAC CATHETERIZATION N/A 07/14/2022    Procedure: Coronary angiography;  Surgeon: Charlie Aj MD;  [facility];  Service: Cardiovascular;  Laterality: N/A;    CARDIAC CATHETERIZATION  07/14/2022    Procedure: Saphenous Vein Graft;  Surgeon: Charlie Aj MD;  Location:  [facility] CATH INVASIVE LOCATION;  Service: Cardiovascular;;    CARDIAC CATHETERIZATION N/A 07/14/2022    Procedure: Native mammary injection;  Surgeon: Charlie Aj MD;  [facility];  Service: Cardiovascular;  Laterality: N/A;    CARDIAC CATHETERIZATION N/A 10/24/2024    Procedure: Left Heart Cath;  Surgeon: Godwin Francis MD;  [facility];  Service: Cardiovascular;  Laterality: N/A;    CARDIAC CATHETERIZATION N/A 10/24/2024    Procedure: Coronary angiography;  Surgeon: Godwin Francis MD;  Location: [facility] CATH INVASIVE LOCATION;  Service: Cardiovascular;  Laterality: N/A;    CARDIAC CATHETERIZATION N/A 10/24/2024    Procedure: Native mammary injection;  Surgeon: Godwin Francis MD;  Location: [facility] CATH INVASIVE LOCATION;  Service: Cardiovascular;  Laterality: N/A;    CATARACT EXTRACTION EXTRACAPSULAR W/ INTRAOCULAR LENS IMPLANTATION Left     CHOLECYSTECTOMY WITH INTRAOPERATIVE CHOLANGIOGRAM N/A 09/01/2023    Procedure: CHOLECYSTECTOMY LAPAROSCOPIC INTRAOPERATIVE CHOLANGIOGRAM choledoscopy common bile duct exploration;  Surgeon: Jose Manuel Baca MD;  Location: [facility] MAIN OR;  Service: General;  Laterality: N/A;    COLONOSCOPY N/A 11/26/2022    Procedure: COLONOSCOPY AT BEDSIDE;  Surgeon: Tomy Lopez MD;  Location: [facility] MAIN OR;  Service: Gastroenterology;  Laterality: N/A;    COLONOSCOPY W/ POLYPECTOMY N/A 10/04/2022    Procedure: COLONOSCOPY to cecum with cold forceps and cold snare polypectomies;  Surgeon: Gregor Carlson MD;  Location: [facility] ENDOSCOPY;  Service: Gastroenterology;  Laterality: N/A;  PRE- hx of  polyps  POST- diverticulosis, polyps    CORONARY ARTERY BYPASS GRAFT  05/2016    LIMA TO LAD, SVG TO PDA, SVG TO OM2    ENDOSCOPY N/A 07/13/2022    Procedure: ESOPHAGOGASTRODUODENOSCOPY;  Surgeon: Marcia Hollis MD;  Location: [facility] ENDOSCOPY;  Service: Gastroenterology;  Laterality: N/A;  PRE- ANEMIA, MELENA  POST- MILD EROSIVE GASTRITIS, GE JUNCTION ULCER    ENDOSCOPY N/A 10/04/2022    Procedure: ESOPHAGOGASTRODUODENOSCOPY with biopsies;  Surgeon: Gregor Carlson MD;  Location: [facility] ENDOSCOPY;  Service: Gastroenterology;  Laterality: N/A;  PRE- hx of esophageal ulcer  POST- gastric cardia mass    ENDOSCOPY N/A 05/01/2023    Procedure: ESOPHAGOGASTRODUODENOSCOPY WITH  [id number] balloon DILATATION of pylorus and anastomosis;  Surgeon: Valerie Stockton MD;  Location: [facility] ENDOSCOPY;  Service: Gastroenterology;  Laterality: N/A;  PRE: dysphagia  POST: no abnormalities seen    ENDOSCOPY N/A 6/25/2024    Procedure: ESOPHAGOGASTRODUODENOSCOPY WITH BIOPSY and 15mm balloon dilatation (pylorus);  Surgeon: Valerie Stockton MD;  Location: [facility] ENDOSCOPY;  Service: Gastroenterology;  Laterality: N/A;  pre: Esophageal cancer  post: normal anastamosis    ESOPHAGOGASTRECTOMY Right 02/03/2023    Procedure: BRONCHOSCOPY, RIGHT ROBOTIC VIDEO ASSISTED THORACOSCOPY WITH TOTAL ESOPHAGECTOMY, INTERCOSTAL NERVE BLOCK, FEEDING JEJUNOSTOMY PLACEMENT;  Surgeon: Valerie Stockton MD;  Location: [facility] MAIN OR;  Service: [facility] - [facility];  Laterality: Right;    INNER EAR SURGERY      JEJUNOSTOMY TUBE INSERTION      REMOVED 2023    TONSILLECTOMY         Home Medications:   Medications Prior to Admission   Medication Sig Dispense Refill Last Dose/Taking    amLODIPine (NORVASC) 5 MG tablet Take 1 tablet by mouth Daily. Take 5 mg 90 tablet 3 6/26/2025    armodafinil (NUVIGIL) 150 MG tablet Take 1 tablet by mouth Daily. 30 tablet 2 6/26/2025    atorvastatin (LIPITOR) 40 MG tablet Take 1 tablet by mouth Daily. 90 tablet 3  6/26/2025    buPROPion XL (Wellbutrin XL) 150 MG 24 hr tablet Take 1 tablet by mouth Every Morning. 30 tablet 2 6/26/2025    Farxiga 10 MG tablet TAKE 1 TABLET BY MOUTH DAILY 90 tablet 1 6/26/2025    fluticasone (FLONASE) 50 MCG/ACT nasal spray Administer 2 sprays into the nostril(s) as directed by provider Daily.   6/26/2025    furosemide (LASIX) 20 MG tablet TAKE 1 TABLET BY MOUTH DAILY 90 tablet 0 6/27/2025    isosorbide mononitrate (IMDUR) 120 MG 24 hr tablet Take 1 tablet by mouth Daily. 90 tablet 1 6/26/2025    lisinopril (PRINIVIL,ZESTRIL) 10 MG tablet Take 1 tablet by mouth Daily. 90 tablet 3 6/26/2025    metoprolol succinate XL (TOPROL-XL) 50 MG 24 hr tablet Take 1 tablet by mouth Daily. 90 tablet 3 6/26/2025    ranolazine (RANEXA) 500 MG 12 hr tablet Take 2 tablets by mouth Every 12 (Twelve) Hours. 360 tablet 3 6/26/2025    COVID-19 mRNA Vac-Luciano,Pf[employer], (Comirnaty) 30 MCG/0.3ML suspension prefilled syringe prefilled syringe Inject 0.3 mL into the appropriate muscle as directed by prescriber. 0.3 mL 0        Allergies:  No Known Allergies    Past Social History:  Social History     Socioeconomic History    Marital status: Single   Tobacco Use    Smoking status: Never     Passive exposure: Never    Smokeless tobacco: Never   Vaping Use    Vaping status: Never Used   Substance and Sexual Activity    Alcohol use: Not Currently     Comment: usually 1-2 month but none since christmas 2022    Drug use: Never    Sexual activity: Defer       Past Family History: History reviewed. No pertinent family history.   Family History   Problem Relation Age of Onset    Hyperlipidemia Mother     Arthritis Mother     Hypertension Mother     Diabetes Mother     Heart disease Mother     Hyperlipidemia Father     Heart disease Father     Hypertension Father     Lung cancer Father     Heart disease Sister     Stroke Maternal Grandmother     Heart disease Maternal Grandmother     Hypertension Maternal Grandfather     Hypertension  "Paternal Grandmother     Hypertension Paternal Grandfather     Other Other         The patient states that his sister has a problem that goes by the name of \"long QT\".  He says this is a familial trait but he also says that he is \"not interested in pursuing it for himself\".    Coronary artery disease Other     Malig Hyperthermia Neg Hx        Review of Systems        Objective:     Objective:  Temp:  [98 °F (36.7 °C)] 98 °F (36.7 °C)  Heart Rate:  [58-85] 59  Resp:  [16-22] 18  BP: (152-185)/(83-94) 152/83  No intake or output data in the 24 hours ending 25 1200  Body mass index is 39.33 kg/m².      25  0918 25  1043   Weight: 132 kg (290 lb) 132 kg (290 lb)           Physical Exam:   Vitals reviewed.   Constitutional:       Appearance: Healthy appearance.   Pulmonary:      Effort: Pulmonary effort is normal.   Cardiovascular:      Normal rate. Regular rhythm. Normal S1. Normal S2.       Murmurs: There is no murmur.      No gallop.  No click. No rub.   Pulses:     Intact distal pulses.   Edema:     Peripheral edema absent.   Neurological:      Mental Status: Alert.          Labs:   Lab Review:     Results from last 7 days   Lab Units 25  0751   SODIUM mmol/L 141   POTASSIUM mmol/L 3.5   CHLORIDE mmol/L 105   CO2 mmol/L 24.9   BUN mg/dL 11.0   CREATININE mg/dL 0.83   GLUCOSE mg/dL 121*   CALCIUM mg/dL 9.0   AST (SGOT) U/L 24   ALT (SGPT) U/L 22     Results from last 7 days   Lab Units 25  1008 25  0751   HSTROP T ng/L 32* 33*     Results from last 7 days   Lab Units 25  0751   WBC 10*3/mm3 5.51   HEMOGLOBIN g/dL 13.7   HEMATOCRIT % 41.6   PLATELETS 10*3/mm3 169             Results from last 7 days   Lab Units 25  1120   MAGNESIUM mg/dL 1.9         Results from last 7 days   Lab Units 25  0751   PROBNP pg/mL 996.0*                 PREVIOUS EKG:  10-9-24        EK-27-25 0728    6-27-98 2714    Interpretation Summary         Left ventricular systolic function " is normal. Calculated left ventricular EF = 58.4%    Left ventricular wall thickness is consistent with moderate concentric hypertrophy.    Left ventricular diastolic function was normal.    The left atrial cavity is mildly dilated.    There is a bioprosthetic aortic valve present.    Aortic valve area is 1.1 cm2.    Aortic valve maximum pressure gradient is 24 mmHg. Aortic valve mean pressure gradient is 12 mmHg.    The aortic root measures 3.9 cm.     Assessment:       Dyspnea        Plan:     1.  Patient's symptoms are little bit vague.  He has chronic shortness of breath as well as chronic chest discomforts.  At this point it seems like he is more concerned about his GI tract.  His echocardiogram that was done this morning while he was having some discomfort was normal with no wall motion abnormality and normal ejection fraction.  His troponins are chronically elevated they have not changed they are flat.  It does not appear the patient is having a current cardiac issue.  2.  Bioprosthetic aortic valve appears to be functioning within normal limits.    3.  Nausea and vomiting he has had issues with this before.  4.  Patient's last cath was 10/24/2024.  Patient has known coronary artery disease that had not changed from the past 2 years.  His symptoms he describes to me is very chronic.  There is nothing else to do from a cardiovascular standpoint continue the same have him follow-up as an outpatient in 4 weeks.  Will sign off please call if questions or problems arise.    Thank you for allowing me to participate in the care of Edmond ANGELA Chase. Feel free to contact me directly with any further questions or concerns.    Papa Miguel MD  [facility]  06/27/25  12:00 EDT

## 2025-06-27 NOTE — PROGRESS NOTES
DOYLE [employer] Attestation Note     I supervised care provided by the midlevel provider. We have discussed this patient's history, physical exam, and treatment plan. I have reviewed the midlevel provider's note and I agree with the midlevel provider's findings and plan of care.   SHARED VISIT: This visit was performed by BOTH a physician and an APC. The substantive portion of the medical decision making was performed by this attesting physician who made or approved the management plan and takes responsibility for patient management. All studies in the APC note (if performed) were independently interpreted by me.   I have personally had a face to face encounter with the patient.   My personal findings are documented below:      Subjective  Pt is a 64 y.o. male admitted from Emergency Department for evaluation and treatment of multiple complaints.  Patient has had some increased nausea and vomiting.  Moderate shortness of breath.  Also reports worsening of chronic angina.  Currently patient still has nausea and dry heaves.  Denies abdominal pain.    Physical Exam  GENERAL: Alert and in NAD, Vitals reviewed-Blood pressure 183/94, pulse 63.  Temperature and O2 sats are normal  HENT: nares patent  EYES: no scleral icterus  CV: regular rhythm, regular rate-mild systolic murmur  RESPIRATORY: normal effort, clear to auscultation bilaterally-O2 sats upper 90s room air  ABDOMEN: soft  MUSCULOSKELETAL: no deformity  NEURO: Strength sensation and coordination are grossly intact.  Speech and mentation are unremarkable  SKIN: warm, dry    Assessment/Plan  I discussed tx and evaluation of this patient with VIKTORIA Gibbs.  EKG with some nonspecific changes.  High sensitive troponin 33 similar to prior values likely reflects chronic ischemia.  Chemistries unremarkable without evidence of significant dehydration or electrolyte disturbance.  Will plan observation admission for serial troponin, telemetry, cardiology consultation.  Cardiac  echo pending.

## 2025-06-27 NOTE — ED PROVIDER NOTES
EMERGENCY DEPARTMENT ENCOUNTER  Room Number:  10/10  PCP: Nargis Velasquez APRN  Independent Historians: Historian: Patient      HPI:  Chief Complaint: had concerns including Shortness of Breath.     A complete HPI/ROS/PMH/PSH/SH/FH are unobtainable due to: EM_Unobtainable History : None    Chronic or social conditions impacting patient care (Social Determinants of Health): None      Context: The patient is a 64 y.o. male with a medical history of hypertension, hyperlipidemia, CAD, CABG, aortic valve replacement, type 2 diabetes who presents to the ED c/o acute chest pain and shortness of breath.  Patient states he has daily chest pressure which he refers to as angina pectoralis that normally feels like a constant pressure of the 2 or 3 out of 10.  Today it feels like a 6 or 7 out of 10 and he feels short of breath.  It is not exertional or pleuritic.  He awoke with the symptoms.  He also has a history of esophageal cancer and has previously had an esophagogastrectomy, notes that he has occasional nausea and vomiting and had vomiting 2 days ago as well as last night.  He denies any radiation of the pain, no syncope or diaphoresis.  Nausea vomiting unrelated to the chest pain.  Has chronic bilateral lower extremity edema, states it is no worse than usual      Review of prior external notes (non-ED) -and- Review of prior external test results outside of this encounter: Labs performed 5/23/2025 and creatinine 0.91, sodium 142, potassium 4.1        PAST MEDICAL HISTORY  Active Ambulatory Problems     Diagnosis Date Noted    Essential hypertension 02/29/2016    Hyperglycemia 02/29/2016    Hyperlipidemia LDL goal <70 02/29/2016    Class 2 severe obesity due to excess calories with serious comorbidity and body mass index (BMI) of 36.0 to 36.9 in adult 02/29/2016    Nocturia 02/29/2016    Nonrheumatic aortic valve insufficiency 03/29/2016    Myocardial ischemia 03/29/2016    Coronary artery disease involving native coronary  artery of native heart 04/19/2016    Ascending aortic aneurysm 04/19/2016    CAD in native artery 05/02/2016    S/P CABG (coronary artery bypass graft) 05/18/2016    S/P AVR (aortic valve replacement) 05/18/2016    Status post ascending aortic aneurysm repair 05/18/2016    Fatigue 07/28/2017    Abnormal nuclear stress test 10/23/2017    Coronary artery disease 10/23/2017    Coronary artery disease of bypass graft of native heart with stable angina pectoris 07/01/2020    Obstructive sleep apnea, adult treated with auto CPAP 10/31/2016    Hypersomnia due to medical condition 08/21/2021    Angina at rest 07/10/2022    Type 2 diabetes mellitus, without long-term current use of insulin 07/11/2022    Anemia 07/10/2022    Ulcer of esophagus without bleeding 07/29/2022    Polyp of colon 08/29/2022    Rectal bleeding 11/25/2022    Gastrointestinal hemorrhage 11/25/2022    Malignant neoplasm of lower third of esophagus 11/29/2022    Gastrointestinal hemorrhage, unspecified gastrointestinal hemorrhage type 12/01/2022    Exertional chest pain 12/14/2022    Iron deficiency anemia 12/14/2022    Cholelithiasis 09/01/2023    Erectile dysfunction 01/23/2024    Ventricular ectopy 09/24/2024    Chest pain 09/24/2024     Resolved Ambulatory Problems     Diagnosis Date Noted    Acute cholecystitis 09/01/2023     Past Medical History:   Diagnosis Date    Anxiety     Anxiety and depression     Aortic valve insufficiency     Arthritis     CAD (coronary artery disease)     Diabetes mellitus     Diarrhea     Dizzy     Dyspnea     Esophageal cancer 11/2022    G tube feedings     GERD (gastroesophageal reflux disease)     GI bleed     Heart murmur     History of pneumonia     History of recent blood transfusion     Hyperlipidemia     Hypersomnia with sleep apnea     Jejunostomy tube present     Lightheadedness     Malaise and fatigue     Migraines     Morbid obesity     Nausea     TJ on auto CPAP 10/31/2016    Pneumonia     Scrotal bleeding      SOB (shortness of breath)          PAST SURGICAL HISTORY  Past Surgical History:   Procedure Laterality Date    AORTIC VALVE REPAIR/REPLACEMENT  05/2016    ASCENDING ARCH/HEMIARCH REPLACEMENT N/A 05/02/2016    Procedure: BHAVESH STERNOTOMY CORONARY ARTERY BYPASS GRAFT TIMES 3 USING LEFT INTERNAL MAMMARY ARTERY AND RIGHT GREATER SAPHENOUS VEIN GRAFT PER ENDOSCOPIC VEIN HARVESTING, AORTIC ANEURYSM REPAIR WITH ROOT REPAIR AND AORTIC VALVE REPLACEMENT;  Surgeon: Rosalio Cline MD;  Location: [facility] MAIN OR;  Service:     CARDIAC CATHETERIZATION N/A 04/01/2016    Procedure: Left Heart Cath;  Surgeon: Erick Tam MD;  Location: [facility] CATH INVASIVE LOCATION;  Service:     CARDIAC CATHETERIZATION N/A 04/01/2016    Procedure: Left ventriculography;  Surgeon: Erick Tam MD;  Location: [facility] CATH INVASIVE LOCATION;  Service:     CARDIAC CATHETERIZATION N/A 04/01/2016    Procedure: Right Heart Cath;  Surgeon: Erick Tam MD;  Location: [facility] CATH INVASIVE LOCATION;  Service:     CARDIAC CATHETERIZATION N/A 10/30/2017    Procedure: Coronary angiography;  Surgeon: Sorin Vasquez MD;  Location: [facility] CATH INVASIVE LOCATION;  Service:     CARDIAC CATHETERIZATION  10/30/2017    Procedure: Saphenous Vein Graft;  Surgeon: Sorin Vasquez MD;  Location: [facility] CATH INVASIVE LOCATION;  Service:     CARDIAC CATHETERIZATION N/A 10/30/2017    Procedure: Native mammary injection;  Surgeon: Sorin Vasquez MD;  Location: [facility] CATH INVASIVE LOCATION;  Service:     CARDIAC CATHETERIZATION N/A 07/07/2020    Procedure: Coronary angiography;  Surgeon: Gregor Kim MD;  Location: [facility] CATH INVASIVE LOCATION;  Service: Cardiovascular;  Laterality: N/A;    CARDIAC CATHETERIZATION N/A 07/07/2020    Procedure: Left heart cath;  Surgeon: Gregor Kim MD;  Location: [facility] CATH INVASIVE LOCATION;  Service: Cardiovascular;  Laterality: N/A;    CARDIAC CATHETERIZATION N/A 07/07/2020    Procedure: Left ventriculography;   Surgeon: Gregor Kim MD;  Location:  [facility] CATH INVASIVE LOCATION;  Service: Cardiovascular;  Laterality: N/A;    CARDIAC CATHETERIZATION N/A 07/07/2020    Procedure: Stent ESMER bypass graft;  Surgeon: Gregor Kim MD;  Location:  [facility] CATH INVASIVE LOCATION;  Service: Cardiovascular;  Laterality: N/A;    CARDIAC CATHETERIZATION N/A 06/17/2021    Procedure: SAPHENOUS VEIN GRAFT;  Surgeon: Marques Ndiaye MD;  Location:  [facility] CATH INVASIVE LOCATION;  Service: Cardiovascular;  Laterality: N/A;    CARDIAC CATHETERIZATION N/A 06/17/2021    Procedure: Left Heart Cath;  Surgeon: Marques Ndiaye MD;  Location:  [facility] CATH INVASIVE LOCATION;  Service: Cardiovascular;  Laterality: N/A;    CARDIAC CATHETERIZATION N/A 06/17/2021    Procedure: Coronary angiography;  Surgeon: Marques Ndiaye MD;  Location: [facility] CATH INVASIVE LOCATION;  Service: Cardiovascular;  Laterality: N/A;    CARDIAC CATHETERIZATION N/A 07/14/2022    Procedure: Left Heart Cath;  Surgeon: Charlie Aj MD;  Location: [facility] CATH INVASIVE LOCATION;  Service: Cardiovascular;  Laterality: N/A;    CARDIAC CATHETERIZATION N/A 07/14/2022    Procedure: Coronary angiography;  Surgeon: Charlie Aj MD;  Location: [facility] CATH INVASIVE LOCATION;  Service: Cardiovascular;  Laterality: N/A;    CARDIAC CATHETERIZATION  07/14/2022    Procedure: Saphenous Vein Graft;  Surgeon: Charlie Aj MD;  Location: [facility] CATH INVASIVE LOCATION;  Service: Cardiovascular;;    CARDIAC CATHETERIZATION N/A 07/14/2022    Procedure: Native mammary injection;  Surgeon: Charlie Aj MD;  Location: [facility] CATH INVASIVE LOCATION;  Service: Cardiovascular;  Laterality: N/A;    CARDIAC CATHETERIZATION N/A 10/24/2024    Procedure: Left Heart Cath;  Surgeon: Godwin Francis MD;  Location: [facility] CATH INVASIVE LOCATION;  Service: Cardiovascular;  Laterality: N/A;    CARDIAC CATHETERIZATION N/A 10/24/2024    Procedure: Coronary angiography;   Surgeon: Godwin Francis MD;  Location: [facility] CATH INVASIVE LOCATION;  Service: Cardiovascular;  Laterality: N/A;    CARDIAC CATHETERIZATION N/A 10/24/2024    Procedure: Native mammary injection;  Surgeon: Godwin Francis MD;  Location: [facility] CATH INVASIVE LOCATION;  Service: Cardiovascular;  Laterality: N/A;    CATARACT EXTRACTION EXTRACAPSULAR W/ INTRAOCULAR LENS IMPLANTATION Left     CHOLECYSTECTOMY WITH INTRAOPERATIVE CHOLANGIOGRAM N/A 09/01/2023    Procedure: CHOLECYSTECTOMY LAPAROSCOPIC INTRAOPERATIVE CHOLANGIOGRAM choledoscopy common bile duct exploration;  Surgeon: Jose Manuel Baca MD;  Location: [facility] MAIN OR;  Service: General;  Laterality: N/A;    COLONOSCOPY N/A 11/26/2022    Procedure: COLONOSCOPY AT BEDSIDE;  Surgeon: Tomy Lopez MD;  Location: [facility] MAIN OR;  Service: Gastroenterology;  Laterality: N/A;    COLONOSCOPY W/ POLYPECTOMY N/A 10/04/2022    Procedure: COLONOSCOPY to cecum with cold forceps and cold snare polypectomies;  Surgeon: Gregor Carlson MD;  Location: [facility] ENDOSCOPY;  Service: Gastroenterology;  Laterality: N/A;  PRE- hx of polyps  POST- diverticulosis, polyps    CORONARY ARTERY BYPASS GRAFT  05/2016    LIMA TO LAD, SVG TO PDA, SVG TO OM2    ENDOSCOPY N/A 07/13/2022    Procedure: ESOPHAGOGASTRODUODENOSCOPY;  Surgeon: Marcia Hollis MD;  Location: [facility] ENDOSCOPY;  Service: Gastroenterology;  Laterality: N/A;  PRE- ANEMIA, MELENA  POST- MILD EROSIVE GASTRITIS, GE JUNCTION ULCER    ENDOSCOPY N/A 10/04/2022    Procedure: ESOPHAGOGASTRODUODENOSCOPY with biopsies;  Surgeon: Gregor Carlson MD;  Location: [facility] ENDOSCOPY;  Service: Gastroenterology;  Laterality: N/A;  PRE- hx of esophageal ulcer  POST- gastric cardia mass    ENDOSCOPY N/A 05/01/2023    Procedure: ESOPHAGOGASTRODUODENOSCOPY WITH  [id number] balloon DILATATION of pylorus and anastomosis;  Surgeon: Valerie Stockton MD;  Location: [facility] ENDOSCOPY;  Service: Gastroenterology;  Laterality: N/A;   "PRE: dysphagia  POST: no abnormalities seen    ENDOSCOPY N/A 6/25/2024    Procedure: ESOPHAGOGASTRODUODENOSCOPY WITH BIOPSY and 15mm balloon dilatation (pylorus);  Surgeon: Valerie Stockton MD;  Location: [facility] ENDOSCOPY;  Service: Gastroenterology;  Laterality: N/A;  pre: Esophageal cancer  post: normal anastamosis    ESOPHAGOGASTRECTOMY Right 02/03/2023    Procedure: BRONCHOSCOPY, RIGHT ROBOTIC VIDEO ASSISTED THORACOSCOPY WITH TOTAL ESOPHAGECTOMY, INTERCOSTAL NERVE BLOCK, FEEDING JEJUNOSTOMY PLACEMENT;  Surgeon: Valerie Stockton MD;  Location: [facility] MAIN OR;  Service: Robotics - DaVinci;  Laterality: Right;    INNER EAR SURGERY      JEJUNOSTOMY TUBE INSERTION      REMOVED 2023    TONSILLECTOMY           FAMILY HISTORY  Family History   Problem Relation Age of Onset    Hyperlipidemia Mother     Arthritis Mother     Hypertension Mother     Diabetes Mother     Heart disease Mother     Hyperlipidemia Father     Heart disease Father     Hypertension Father     Lung cancer Father     Heart disease Sister     Stroke Maternal Grandmother     Heart disease Maternal Grandmother     Hypertension Maternal Grandfather     Hypertension Paternal Grandmother     Hypertension Paternal Grandfather     Other Other         The patient states that his sister has a problem that goes by the name of \"long QT\".  He says this is a familial trait but he also says that he is \"not interested in pursuing it for himself\".    Coronary artery disease Other     Malig Hyperthermia Neg Hx          SOCIAL HISTORY  Social History     Socioeconomic History    Marital status: Single   Tobacco Use    Smoking status: Never     Passive exposure: Never    Smokeless tobacco: Never   Vaping Use    Vaping status: Never Used   Substance and Sexual Activity    Alcohol use: Not Currently     Comment: usually 1-2 month but none since christmas 2022    Drug use: Never    Sexual activity: Defer         ALLERGIES  Patient has no known allergies.      REVIEW OF " SYSTEMS  Review of Systems  Included in HPI  All systems reviewed and negative except for those discussed in HPI.      PHYSICAL EXAM    I have reviewed the triage vital signs and nursing notes.    ED Triage Vitals   Temp Heart Rate Resp BP SpO2   06/27/25 0715 06/27/25 0715 06/27/25 0715 06/27/25 0718 06/27/25 0715   98 °F (36.7 °C) 85 22 (!) 185/93 94 %      Temp src Heart Rate Source Patient Position BP Location FiO2 (%)   06/27/25 0715 06/27/25 0715 -- -- --   Oral Monitor          Physical Exam  GENERAL: alert, no acute distress  SKIN: Warm, dry  HENT: Normocephalic, atraumatic  EYES: no scleral icterus  CV: regular rhythm, regular rate, 2+ nonpitting BLE  RESPIRATORY: normal effort, lungs clear  ABDOMEN: nondistended  MUSCULOSKELETAL: no deformity  NEURO: alert, moves all extremities, follows commands            LAB RESULTS  Recent Results (from the past 24 hours)   ECG 12 Lead ED Triage Standing Order; SOA    Collection Time: 06/27/25  7:17 AM   Result Value Ref Range    QT Interval 389 ms    QTC Interval 423 ms   ECG 12 Lead Dyspnea    Collection Time: 06/27/25  7:28 AM   Result Value Ref Range    QT Interval 392 ms    QTC Interval 397 ms   Comprehensive Metabolic Panel    Collection Time: 06/27/25  7:51 AM    Specimen: Blood   Result Value Ref Range    Glucose 121 (H) 65 - 99 mg/dL    BUN 11.0 8.0 - 23.0 mg/dL    Creatinine 0.83 0.76 - 1.27 mg/dL    Sodium 141 136 - 145 mmol/L    Potassium 3.5 3.5 - 5.2 mmol/L    Chloride 105 98 - 107 mmol/L    CO2 24.9 22.0 - 29.0 mmol/L    Calcium 9.0 8.6 - 10.5 mg/dL    Total Protein 6.6 6.0 - 8.5 g/dL    Albumin 3.8 3.5 - 5.2 g/dL    ALT (SGPT) 22 1 - 41 U/L    AST (SGOT) 24 1 - 40 U/L    Alkaline Phosphatase 83 39 - 117 U/L    Total Bilirubin 0.7 0.0 - 1.2 mg/dL    Globulin 2.8 gm/dL    A/G Ratio 1.4 g/dL    BUN/Creatinine Ratio 13.3 7.0 - 25.0    Anion Gap 11.1 5.0 - 15.0 mmol/L    eGFR 96.5 >60.0 mL/min/1.73   BNP    Collection Time: 06/27/25  7:51 AM    Specimen:  Blood   Result Value Ref Range    proBNP 996.0 (H) 0.0 - 900.0 pg/mL   High Sensitivity Troponin T    Collection Time: 06/27/25  7:51 AM    Specimen: Blood   Result Value Ref Range    HS Troponin T 33 (H) <22 ng/L   Green Top (Gel)    Collection Time: 06/27/25  7:51 AM   Result Value Ref Range    Extra Tube Hold for add-ons.    Lavender Top    Collection Time: 06/27/25  7:51 AM   Result Value Ref Range    Extra Tube hold for add-on    Gold Top - SST    Collection Time: 06/27/25  7:51 AM   Result Value Ref Range    Extra Tube Hold for add-ons.    Light Blue Top    Collection Time: 06/27/25  7:51 AM   Result Value Ref Range    Extra Tube Hold for add-ons.    CBC Auto Differential    Collection Time: 06/27/25  7:51 AM    Specimen: Blood   Result Value Ref Range    WBC 5.51 3.40 - 10.80 10*3/mm3    RBC 4.27 4.14 - 5.80 10*6/mm3    Hemoglobin 13.7 13.0 - 17.7 g/dL    Hematocrit 41.6 37.5 - 51.0 %    MCV 97.4 (H) 79.0 - 97.0 fL    MCH 32.1 26.6 - 33.0 pg    MCHC 32.9 31.5 - 35.7 g/dL    RDW 13.5 12.3 - 15.4 %    RDW-SD 48.1 37.0 - 54.0 fl    MPV 9.2 6.0 - 12.0 fL    Platelets 169 140 - 450 10*3/mm3    Neutrophil % 75.1 42.7 - 76.0 %    Lymphocyte % 14.9 (L) 19.6 - 45.3 %    Monocyte % 8.0 5.0 - 12.0 %    Eosinophil % 1.1 0.3 - 6.2 %    Basophil % 0.4 0.0 - 1.5 %    Immature Grans % 0.5 0.0 - 0.5 %    Neutrophils, Absolute 4.14 1.70 - 7.00 10*3/mm3    Lymphocytes, Absolute 0.82 0.70 - 3.10 10*3/mm3    Monocytes, Absolute 0.44 0.10 - 0.90 10*3/mm3    Eosinophils, Absolute 0.06 0.00 - 0.40 10*3/mm3    Basophils, Absolute 0.02 0.00 - 0.20 10*3/mm3    Immature Grans, Absolute 0.03 0.00 - 0.05 10*3/mm3    nRBC 0.0 0.0 - 0.2 /100 WBC   Lipase    Collection Time: 06/27/25  7:51 AM    Specimen: Blood   Result Value Ref Range    Lipase 14 13 - 60 U/L         RADIOLOGY  XR Chest 1 View  Result Date: 6/27/2025  XR CHEST 1 VW-  DATE OF EXAM: 6/27/2025 8:35 AM  INDICATION: Shortness of breath.  COMPARISON: CT 12/10/2024. Radiographs  9/24/2024 and 6/25/2024.  TECHNIQUE: A single portable AP view of the chest was obtained.  FINDINGS: Overlying artifacts. Stable sternotomy wires, postoperative changes from CABG, and prosthetic heart valve. Elevation of the left hemidiaphragm with mild chronic appearing ill-defined bibasilar opacities, likely atelectasis and/or scarring. No pneumothorax. Cardiomediastinal contours within normal limits. Incompletely evaluated chronic changes in the right shoulder. No acute osseous abnormality is identified.      Mild elevation of the left hemidiaphragm with mild chronic appearing ill-defined bibasilar opacities that could represent subsegmental atelectasis and/or scarring.  This report was finalized on 6/27/2025 9:15 AM by Karthik Brewer MD on Workstation: [id number]          MEDICATIONS GIVEN IN ER  Medications   sodium chloride 0.9 % flush 10 mL (has no administration in time range)   furosemide (LASIX) injection 80 mg (has no administration in time range)   ondansetron (ZOFRAN) injection 4 mg (has no administration in time range)         ORDERS PLACED DURING THIS VISIT:  Orders Placed This Encounter   Procedures    XR Chest 1 View    Sheldahl Draw    Comprehensive Metabolic Panel    BNP    High Sensitivity Troponin T    CBC Auto Differential    Lipase    High Sensitivity Troponin T 1Hr    NPO Diet NPO Type: Strict NPO    Undress & Gown    Continuous Pulse Oximetry    Vital Signs    Oxygen Therapy- Nasal Cannula; Titrate 1-6 LPM Per SpO2; 90 - 95%    ECG 12 Lead ED Triage Standing Order; SOA    ECG 12 Lead Chest Pain    ECG 12 Lead Dyspnea    Adult Transthoracic Echo Complete W/ Cont if Necessary Per Protocol    Insert Peripheral IV    Initiate Emergency Department Observation Status    CBC & Differential    Green Top (Gel)    Lavender Top    Gold Top - SST    Light Blue Top         OUTPATIENT MEDICATION MANAGEMENT:  Current Facility-Administered Medications Ordered in Epic   Medication Dose Route Frequency  Provider Last Rate Last Admin    furosemide (LASIX) injection 80 mg  80 mg Intravenous Once Estevan Holloway MD        ondansetron (ZOFRAN) injection 4 mg  4 mg Intravenous Once Estevan Holloway MD        sodium chloride 0.9 % flush 10 mL  10 mL Intravenous PRN Estevan Holloway MD         Current Outpatient Medications Ordered in Epic   Medication Sig Dispense Refill    amLODIPine (NORVASC) 5 MG tablet Take 1 tablet by mouth Daily. Take 5 mg 90 tablet 3    armodafinil (NUVIGIL) 150 MG tablet Take 1 tablet by mouth Daily. 30 tablet 2    atorvastatin (LIPITOR) 40 MG tablet Take 1 tablet by mouth Daily. 90 tablet 3    buPROPion XL (Wellbutrin XL) 150 MG 24 hr tablet Take 1 tablet by mouth Every Morning. 30 tablet 2    COVID-19 mRNA Vac-Luciano,Pf[employer], (Comirnaty) 30 MCG/0.3ML suspension prefilled syringe prefilled syringe Inject 0.3 mL into the appropriate muscle as directed by prescriber. 0.3 mL 0    Farxiga 10 MG tablet TAKE 1 TABLET BY MOUTH DAILY 90 tablet 1    fluticasone (FLONASE) 50 MCG/ACT nasal spray Administer 2 sprays into the nostril(s) as directed by provider Daily.      furosemide (LASIX) 20 MG tablet TAKE 1 TABLET BY MOUTH DAILY (Patient not taking: Reported on 5/23/2025) 90 tablet 0    isosorbide mononitrate (IMDUR) 120 MG 24 hr tablet Take 1 tablet by mouth Daily. 90 tablet 1    lisinopril (PRINIVIL,ZESTRIL) 10 MG tablet Take 1 tablet by mouth Daily. 90 tablet 3    metoprolol succinate XL (TOPROL-XL) 50 MG 24 hr tablet Take 1 tablet by mouth Daily. 90 tablet 3    ranolazine (RANEXA) 500 MG 12 hr tablet Take 2 tablets by mouth Every 12 (Twelve) Hours. 360 tablet 3         PROCEDURES  Procedures            PROGRESS, DATA ANALYSIS, CONSULTS, AND MEDICAL DECISION MAKING  All labs have been independently interpreted by me.  All radiology studies have been reviewed by me. All EKG's have been independently viewed and interpreted by me.  Discussion below represents my analysis of pertinent findings related to patient's  condition, differential diagnosis, treatment plan and final disposition.    DIFFERENTIAL    DDx includes but is not limited to acute coronary syndrome, pulmonary embolism, thoracic aortic dissection, pneumonia, pneumothorax, musculoskeletal pain, GERD or esophageal spasm, anxiety, myocarditis/pericarditis, esophageal rupture, pancreatitis.             Clinical Scores:                  ED Course as of 06/27/25 0928   Fri Jun 27, 2025   0724 EKG obtained at 0717 has a poor baseline and limits interpretation however he has ST depression in 1 aVL and some concern for possible ST elevation in V1.  Repeat stat requested. [RS]   0741 Patient does not look like a STEMI.  He reports chronic chest pain is only slightly worse than usual he just became concerned with some nausea and slight increase in chronic dyspnea this morning.  EKG is concerning but he has prior abnormalities noted on 9/25/2024 EKG.  Plan to discuss case with interventional but likely can continue workup in ED without activating Cath Lab. [RS]   0742 CONSULT        Provider: Dr. Ndiaye-interventional cardiology    Discussion: Reviewed patient history, ED presentation and evaluation.  Agrees with plan for medical evaluation.  He is available for consultation.  Continue workup without activating Cath Lab at this time.    Agreeable c treatment and planned disposition.         [RS]   0745 EKG           EKG time: 0717  Rhythm/Rate: Sinus with PVCs, 70  P waves and OR: ISMAEL within normal limits narrow complex with poor R wave progression  QRS, axis: ST depression in lead I and aVL.    ST and T waves: Poor baseline with movement but some concern for ST elevation in V1     Interpreted Contemporaneously by me, independently viewed  Comparison: 9/25/2024-previously bigeminy which limits evaluation of the leads but he did have some ST abnormalities in 1 and aVL previously noted   [RS]   0747 EKG #2          EKG time: 0728  Rhythm/Rate: Sinus with PACs, 60  P waves and  VA: Borderline first-degree AV block  QRS, axis: Narrow complex with slow R wave progression   ST and T waves: ST/T wave inversions 1 and aVL persist.    Interpreted Contemporaneously by me, independently viewed  Comparison: As above   [RS]   0841 HS Troponin T(!): 33  Stable chronic elevation [RS]   0841 proBNP(!): 996.0 [RS]   0841 Glucose(!): 121 [RS]   0841 BUN: 11.0 [RS]   0841 Creatinine: 0.83 [RS]   0841 Sodium: 141 [RS]   0841 Potassium: 3.5 [RS]   0841 ALT (SGPT): 22 [RS]   0841 AST (SGOT): 24 [RS]   0841 WBC: 5.51 [RS]   0841 Hemoglobin: 13.7 [RS]   0841 Immature Grans, Absolute: 0.03 [RS]   0841 RADIOLOGY      Study: Single view chest  Findings: No pneumothorax or large focal infiltrate noted  I independently viewed and interpreted these images contemporaneously with treatment.    [RS]   0845 Lipase: 14 [RS]   0851 Discussed findings with patient.  He would feel more comfortable if we watched him this afternoon.  It has been a while since he had an echocardiogram it appears that he has 1 pending from cardiology.  We will ask observation unit to watch him this afternoon and administer some furosemide here in the ED. [RS]   0852 CONSULT        Provider: ALAN KWON    Discussion: Patient history, ED presentation and evaluation as well as therapy initiated in the ED.  Agreeable to accept patient to the ED observation unit for further evaluation and treatment.    Agreeable c treatment and planned disposition.         [RS]      ED Course User Index  [RS] Estevan Holloway MD             BP - 169/87  HR - 58  TEMP - 98 °F (36.7 °C) (Oral)  O2 SATS - 94%    COMPLEXITY OF CARE  The patient requires admission.      DIAGNOSIS  Final diagnoses:   Acute dyspnea   Acute congestive heart failure, unspecified heart failure type         DISPOSITION  ED Disposition       ED Disposition   Decision to Admit    Condition   --    Comment   --                    Please note that portions of this document were completed with  a voice recognition program.    Note Disclaimer: At [facility], we believe that sharing information builds trust and better relationships. You are receiving this note because you recently visited [facility] Hospital. It is possible you will see health information before a provider has talked with you about it. This kind of information can be easy to misunderstand. To help you fully understand what it means for your health, we urge you to discuss this note with your provider.         Perla Resendiz PA-C  06/27/25 09

## 2025-06-28 ENCOUNTER — READMISSION MANAGEMENT (OUTPATIENT)
Dept: CALL CENTER | Facility: HOSPITAL | Age: 65
End: 2025-06-28
Payer: MEDICARE

## 2025-06-28 VITALS
OXYGEN SATURATION: 94 % | SYSTOLIC BLOOD PRESSURE: 121 MMHG | HEIGHT: 72 IN | TEMPERATURE: 98 F | WEIGHT: 290 LBS | BODY MASS INDEX: 39.28 KG/M2 | DIASTOLIC BLOOD PRESSURE: 72 MMHG | HEART RATE: 68 BPM | RESPIRATION RATE: 18 BRPM

## 2025-06-28 LAB
ALBUMIN SERPL-MCNC: 3.7 G/DL (ref 3.5–5.2)
ALBUMIN/GLOB SERPL: 1.4 G/DL
ALP SERPL-CCNC: 81 U/L (ref 39–117)
ALT SERPL W P-5'-P-CCNC: 19 U/L (ref 1–41)
ANION GAP SERPL CALCULATED.3IONS-SCNC: 13.7 MMOL/L (ref 5–15)
AST SERPL-CCNC: 19 U/L (ref 1–40)
BASOPHILS # BLD AUTO: 0.02 10*3/MM3 (ref 0–0.2)
BASOPHILS NFR BLD AUTO: 0.4 % (ref 0–1.5)
BILIRUB SERPL-MCNC: 0.7 MG/DL (ref 0–1.2)
BUN SERPL-MCNC: 14 MG/DL (ref 8–23)
BUN/CREAT SERPL: 14.6 (ref 7–25)
CALCIUM SPEC-SCNC: 8.7 MG/DL (ref 8.6–10.5)
CHLORIDE SERPL-SCNC: 102 MMOL/L (ref 98–107)
CO2 SERPL-SCNC: 26.3 MMOL/L (ref 22–29)
CREAT SERPL-MCNC: 0.96 MG/DL (ref 0.76–1.27)
DEPRECATED RDW RBC AUTO: 46.6 FL (ref 37–54)
EGFRCR SERPLBLD CKD-EPI 2021: 87.2 ML/MIN/1.73
EOSINOPHIL # BLD AUTO: 0.07 10*3/MM3 (ref 0–0.4)
EOSINOPHIL NFR BLD AUTO: 1.2 % (ref 0.3–6.2)
ERYTHROCYTE [DISTWIDTH] IN BLOOD BY AUTOMATED COUNT: 13.2 % (ref 12.3–15.4)
GLOBULIN UR ELPH-MCNC: 2.7 GM/DL
GLUCOSE BLDC GLUCOMTR-MCNC: 113 MG/DL (ref 70–130)
GLUCOSE SERPL-MCNC: 122 MG/DL (ref 65–99)
HCT VFR BLD AUTO: 38.8 % (ref 37.5–51)
HGB BLD-MCNC: 13.4 G/DL (ref 13–17.7)
IMM GRANULOCYTES # BLD AUTO: 0.02 10*3/MM3 (ref 0–0.05)
IMM GRANULOCYTES NFR BLD AUTO: 0.4 % (ref 0–0.5)
LYMPHOCYTES # BLD AUTO: 1.07 10*3/MM3 (ref 0.7–3.1)
LYMPHOCYTES NFR BLD AUTO: 18.8 % (ref 19.6–45.3)
MCH RBC QN AUTO: 33.4 PG (ref 26.6–33)
MCHC RBC AUTO-ENTMCNC: 34.5 G/DL (ref 31.5–35.7)
MCV RBC AUTO: 96.8 FL (ref 79–97)
MONOCYTES # BLD AUTO: 0.55 10*3/MM3 (ref 0.1–0.9)
MONOCYTES NFR BLD AUTO: 9.6 % (ref 5–12)
NEUTROPHILS NFR BLD AUTO: 3.97 10*3/MM3 (ref 1.7–7)
NEUTROPHILS NFR BLD AUTO: 69.6 % (ref 42.7–76)
NRBC BLD AUTO-RTO: 0 /100 WBC (ref 0–0.2)
PLATELET # BLD AUTO: 172 10*3/MM3 (ref 140–450)
PMV BLD AUTO: 9.5 FL (ref 6–12)
POTASSIUM SERPL-SCNC: 3.4 MMOL/L (ref 3.5–5.2)
PROT SERPL-MCNC: 6.4 G/DL (ref 6–8.5)
RBC # BLD AUTO: 4.01 10*6/MM3 (ref 4.14–5.8)
SODIUM SERPL-SCNC: 142 MMOL/L (ref 136–145)
WBC NRBC COR # BLD AUTO: 5.7 10*3/MM3 (ref 3.4–10.8)

## 2025-06-28 PROCEDURE — 82948 REAGENT STRIP/BLOOD GLUCOSE: CPT

## 2025-06-28 PROCEDURE — G0378 HOSPITAL OBSERVATION PER HR: HCPCS

## 2025-06-28 PROCEDURE — 25010000002 FAMOTIDINE 10 MG/ML SOLUTION: Performed by: NURSE PRACTITIONER

## 2025-06-28 PROCEDURE — 80053 COMPREHEN METABOLIC PANEL: CPT | Performed by: NURSE PRACTITIONER

## 2025-06-28 PROCEDURE — 96376 TX/PRO/DX INJ SAME DRUG ADON: CPT

## 2025-06-28 PROCEDURE — 85025 COMPLETE CBC W/AUTO DIFF WBC: CPT | Performed by: NURSE PRACTITIONER

## 2025-06-28 RX ORDER — GUAIFENESIN 600 MG/1
1200 TABLET, EXTENDED RELEASE ORAL EVERY 12 HOURS SCHEDULED
Qty: 40 TABLET | Refills: 0 | Status: ON HOLD | OUTPATIENT
Start: 2025-06-28 | End: 2025-07-08

## 2025-06-28 RX ADMIN — GUAIFENESIN 1200 MG: 600 TABLET, EXTENDED RELEASE ORAL at 08:18

## 2025-06-28 RX ADMIN — RANOLAZINE 1000 MG: 500 TABLET, FILM COATED, EXTENDED RELEASE ORAL at 08:36

## 2025-06-28 RX ADMIN — FAMOTIDINE 20 MG: 10 INJECTION INTRAVENOUS at 08:17

## 2025-06-28 RX ADMIN — Medication 10 ML: at 08:19

## 2025-06-28 RX ADMIN — EMPAGLIFLOZIN 25 MG: 25 TABLET, FILM COATED ORAL at 08:16

## 2025-06-28 RX ADMIN — METOPROLOL SUCCINATE 50 MG: 50 TABLET, EXTENDED RELEASE ORAL at 08:19

## 2025-06-28 NOTE — PROGRESS NOTES
Patient Care Team:  Nargis Velasquez APRN as PCP - General (Family Medicine)  Papa Miguel MD as Consulting Physician (Cardiology)  Sekou Pisano MD as Consulting Physician (Pulmonary Disease)  Gregor Carlson MD as Consulting Physician (Gastroenterology)  Estevan Yuan MD (Sports Medicine)  Alex Hernadez MD as Consulting Physician (Nephrology)  Yazmin Molina APRN as Nurse Practitioner (Psychiatry)  Dylon Schwartz MD as Consulting Physician (Hematology and Oncology)  Valerie Stockton MD as Referring Physician (Thoracic Surgery)  Valerie Stockton MD as Surgeon (Thoracic Surgery)     DOYLE [employer] Progress Note    I supervised care provided by the midlevel provider. We have discussed this patient's history, physical exam, and treatment plan. I have reviewed the midlevel provider's note and I agree with the midlevel provider's findings and plan of care.   SHARED VISIT: This visit was performed by BOTH a physician and an APC. The substantive portion of the medical decision making was performed by this attesting physician who made or approved the management plan and takes responsibility for patient management.   I have personally had a face to face encounter with the patient.   My personal findings are documented below:    Subjective:  No acute events    Physical Exam:  General: No acute distress.  HENT: NCAT, PERRL, Nares patent.  Eyes: no scleral icterus.  Neck: trachea midline, no ROM limitations.  CV: regular rhythm, regular rate.  Respiratory: normal effort, CTAB.  Abdomen: soft, nondistended, NTTP, no rebound tenderness, no guarding or rigidity.  Musculoskeletal: no deformity.  Neuro: alert, moves all extremities, follows commands.  Skin: warm, dry.    Assessment and Plan:  Patient admitted for chest pain shortness of breath, EKG nonischemic, troponin flat, BNP elevated at 996.  Echocardiogram obtained, unremarkable.  Patient cleared by cardiology, plan for discharge  today.

## 2025-06-28 NOTE — PLAN OF CARE
Goal Outcome Evaluation:  Patient is alert and oriented times 4. Vital signs are stable. On room air. Patient is agreeable with plan of care. Questions and concerns addressed. AVS and discharge instructions given to patient. Patient instructed to follow up with cardiology, nephrology, and primary care provider. Up ad annita with activity. IV removed. Patient left with all personal belongings.

## 2025-06-28 NOTE — DISCHARGE SUMMARY
ED OBSERVATION PROGRESS/DISCHARGE SUMMARY    Date of Admission: 6/27/2025   LOS: 0 days   PCP: Nargis Velasquez APRN    Final Diagnosis: Chest pain, dyspnea    Hospital Outcome:     Edmond Chase is a 64 y.o. male with history of anxiety, depression, CAD status post CABG/PCI, bioprosthetic aortic valve replacement, diabetes, esophageal cancer, TJ, type 2 diabetes, CKD who was admitted to the ED observation unit with multiple complaints.  He reports he has chronic chest pain and dyspnea.  He is nauseated, has had some vomiting. He reports he has not taken his diuretic for the last few weeks.     High-sensitivity troponin was 33, 32 with a delta of -1.  proBNP was 996 which is elevated from his baseline.  Glucose elevated at 121, otherwise CBC and CMP largely unremarkable.  Chest x-ray shows mild elevation of left hemidiaphragm with mild chronic appearing ill-defined bibasilar opacities that could represent subsegmental atelectasis and/or scarring.  EKG significant for ST depression in 1 and aVL.  Echocardiogram showed no wall motion abnormality and normal ejection fraction. His troponins are chronically elevated and have not changed and remain flat. Cardiology saw patient in consultation and signed off. Patient is follow up as an outpatient in 4 weeks.    ROS:  General: no fevers, chills  Respiratory: no cough, dyspnea  Cardiovascular: no chest pain, palpitations  Abdomen: No abdominal pain, nausea, vomiting, or diarrhea  Neurologic: No focal weakness    Objective   Physical Exam:  I have reviewed the vital signs.  Temp:  [98 °F (36.7 °C)-98.6 °F (37 °C)] 98 °F (36.7 °C)  Heart Rate:  [59-79] 79  Resp:  [16-18] 18  BP: (114-183)/(68-95) 121/72  General Appearance:    Alert, cooperative, no distress  Head:    Normocephalic, atraumatic  Eyes:    Sclerae anicteric  Neck:   Supple, no mass  Lungs: Clear to auscultation bilaterally, respirations unlabored  Heart: Regular rate and rhythm, S1 and S2 normal, no murmur,  rub or gallop  Abdomen:  Soft, nontender, bowel sounds active, nondistended  Extremities: No clubbing, cyanosis, or edema to lower extremities  Pulses:  2+ and symmetric in distal lower extremities  Skin: No rashes   Neurologic: Oriented x3, Normal strength to extremities    Results Review:    I have reviewed the labs, radiology results and diagnostic studies.    Results from last 7 days   Lab Units 06/28/25  0554   WBC 10*3/mm3 5.70   HEMOGLOBIN g/dL 13.4   HEMATOCRIT % 38.8   PLATELETS 10*3/mm3 172     Results from last 7 days   Lab Units 06/28/25  0554 06/27/25  0751   SODIUM mmol/L 142 141   POTASSIUM mmol/L 3.4* 3.5   CHLORIDE mmol/L 102 105   CO2 mmol/L 26.3 24.9   BUN mg/dL 14.0 11.0   CREATININE mg/dL 0.96 0.83   CALCIUM mg/dL 8.7 9.0   BILIRUBIN mg/dL 0.7 0.7   ALK PHOS U/L 81 83   ALT (SGPT) U/L 19 22   AST (SGOT) U/L 19 24   GLUCOSE mg/dL 122* 121*     Imaging Results (Last 24 Hours)       ** No results found for the last 24 hours. **            I have reviewed the medications.     Discharge Medications        New Medications        Instructions Start Date   guaiFENesin 600 MG 12 hr tablet  Commonly known as: MUCINEX   1,200 mg, Oral, Every 12 Hours Scheduled             Continue These Medications        Instructions Start Date   amLODIPine 5 MG tablet  Commonly known as: NORVASC   5 mg, Oral, Daily, Take 5 mg      armodafinil 150 MG tablet  Commonly known as: NUVIGIL   150 mg, Oral, Daily      atorvastatin 40 MG tablet  Commonly known as: LIPITOR   40 mg, Oral, Daily      buPROPion  MG 24 hr tablet  Commonly known as: Wellbutrin XL   150 mg, Oral, Every Morning      Comirnaty 30 MCG/0.3ML suspension prefilled syringe prefilled syringe  Generic drug: COVID-19 mRNA Vac-Luciano(Pf[employer])   0.3 mL, Intramuscular      Farxiga 10 MG tablet  Generic drug: dapagliflozin Propanediol   10 mg, Oral, Daily      fluticasone 50 MCG/ACT nasal spray  Commonly known as: FLONASE   Administer 2 sprays into the nostril(s)  as directed by provider Daily.      furosemide 20 MG tablet  Commonly known as: LASIX   20 mg, Oral, Daily      isosorbide mononitrate 120 MG 24 hr tablet  Commonly known as: IMDUR   120 mg, Oral, Daily      lisinopril 10 MG tablet  Commonly known as: PRINIVIL,ZESTRIL   10 mg, Oral, Daily      metoprolol succinate XL 50 MG 24 hr tablet  Commonly known as: TOPROL-XL   50 mg, Oral, Daily      ranolazine 500 MG 12 hr tablet  Commonly known as: RANEXA   1,000 mg, Oral, Every 12 Hours Scheduled              ---------------------------------------------------------------------------------------------  Assessment & Plan   Assessment/Problem List    Dyspnea    Plan:    Dyspnea  History of CAD  Continuous telemetry monitoring  Vital signs per nurse routine  High-sensitivity troponin 33, 32  proBNP 996  Chest x-ray negative for acute infiltrate  RVP negative  Cardiology consulted, no further workup needed from their standpoint  Echocardiogram unremarkable  Continue statin, isosorbide  Mucinex     Hypertension  Continue amlodipine, lisinopril     Diabetes  A1c 5.8  Continue home regimen    Disposition: Home    Follow-up after Discharge: Primary care, follow up with Cardiology in 4 weeks    This note will serve as a discharge summary    Melissa Gibbs, VIKTORIA 06/28/25 09:58 EDT    I have worn appropriate PPE during this patient encounter, sanitized my hands both with entering and exiting patient's room.    32 minutes has been spent by [facility] providers in the care of this patient while under observation status on this date 06/28/25

## 2025-06-28 NOTE — OUTREACH NOTE
Prep Survey      Flowsheet Row Responses   [facility] patient discharged from? [place]   Is LACE score < 7 ? No   Eligibility [facility]   Date of Admission 06/27/25   Date of Discharge 06/28/25   Discharge Disposition Home or Self Care   Discharge diagnosis Dyspnea   Does the patient have one of the following disease processes/diagnoses(primary or secondary)? Other   Does the patient have Home health ordered? No   Is there a DME ordered? No   Prep survey completed? Yes            VALENTIN MILLER - Registered Nurse

## 2025-06-30 ENCOUNTER — TRANSITIONAL CARE MANAGEMENT TELEPHONE ENCOUNTER (OUTPATIENT)
Dept: CALL CENTER | Facility: HOSPITAL | Age: 65
End: 2025-06-30
Payer: MEDICARE

## 2025-06-30 NOTE — OUTREACH NOTE
Call Center TCM Note      Flowsheet Row Responses   [facility] patient discharged from? [place]   Does the patient have one of the following disease processes/diagnoses(primary or secondary)? Other   TCM attempt successful? Yes   Call start time 1349   Call end time 1352   Discharge diagnosis Dyspnea   Meds reviewed with patient/caregiver? Yes   Is the patient having any side effects they believe may be caused by any medication additions or changes? No   Does the patient have all medications ordered at discharge? Yes   Is the patient taking all medications as directed (includes completed medication regime)? Yes   Comments States he is going to f/u with his oncologist.   Does the patient have an appointment with their PCP within 7-14 days of discharge? No   Nursing Interventions Patient desires to follow up with specialty only   Psychosocial issues? No   Did the patient receive a copy of their discharge instructions? Yes   Nursing interventions Reviewed instructions with patient   What is the patient's perception of their health status since discharge? Improving   Is the patient/caregiver able to teach back signs and symptoms related to disease process for when to call PCP? Yes   Is the patient/caregiver able to teach back signs and symptoms related to disease process for when to call 911? Yes   Is the patient/caregiver able to teach back the hierarchy of who to call/visit for symptoms/problems? PCP, Specialist, Home health nurse, Urgent Care, ED, 911 Yes   Additional teach back comments States he is doing better. Gave compliments on his care.   TCM call completed? Yes   Wrap up additional comments Denies questions or needs at this time.   Call end time 1352            Isabel PATEL - Licensed Nurse    6/30/2025, 13:53 EDT

## 2025-06-30 NOTE — PROGRESS NOTES
RM:________     PCP: Nargis Velasquez, APRN Last EKG :  __________    : 1960  AGE: 64 y.o.    REASON FOR VISIT: __________________________________________    ____________________________________________________________                                                   Wt Readings from Last 3 Encounters:   25 132 kg (290 lb)   25 132 kg (290 lb)   25 133 kg (293 lb 3.2 oz)      BP Readings from Last 3 Encounters:   25 121/72   25 138/84   25 146/90      WT: ____________ HT: ______ BP: __________ HR ______   02% _______               Lipid Panel          9/10/2024    11:03 2024    04:08 2025    11:08   Lipid Panel   Total Cholesterol  102     Total Cholesterol 128   133    Triglycerides 82  96  76    HDL Cholesterol 42  32  49    VLDL Cholesterol 16  19  15    LDL Cholesterol  70  51  69    LDL/HDL Ratio  1.59

## 2025-07-02 NOTE — CASE MANAGEMENT/SOCIAL WORK
Case Management Discharge Note      Final Note: Home via private vehicle         Selected Continued Care - Discharged on 6/28/2025 Admission date: 6/27/2025 - Discharge disposition: Home or Self Care      Destination    No services have been selected for the patient.                Durable Medical Equipment    No services have been selected for the patient.                Dialysis/Infusion    No services have been selected for the patient.                Home Medical Care    No services have been selected for the patient.                Therapy    No services have been selected for the patient.                Community Resources    No services have been selected for the patient.                Community & DME    No services have been selected for the patient.                    Transportation Services  Transportation: Private Transportation  Private: Car    Final Discharge Disposition Code: 01 - home or self-care

## 2025-07-05 ENCOUNTER — APPOINTMENT (OUTPATIENT)
Dept: CT IMAGING | Facility: HOSPITAL | Age: 65
End: 2025-07-05
Payer: MEDICARE

## 2025-07-05 ENCOUNTER — APPOINTMENT (OUTPATIENT)
Dept: GENERAL RADIOLOGY | Facility: HOSPITAL | Age: 65
End: 2025-07-05
Payer: MEDICARE

## 2025-07-05 ENCOUNTER — READMISSION MANAGEMENT (OUTPATIENT)
Dept: CALL CENTER | Facility: HOSPITAL | Age: 65
End: 2025-07-05
Payer: MEDICARE

## 2025-07-05 ENCOUNTER — HOSPITAL ENCOUNTER (INPATIENT)
Facility: HOSPITAL | Age: 65
LOS: 1 days | Discharge: HOME | End: 2025-07-09
Attending: EMERGENCY MEDICINE | Admitting: INTERNAL MEDICINE
Payer: MEDICARE

## 2025-07-05 DIAGNOSIS — R11.2 INTRACTABLE NAUSEA AND VOMITING: Primary | ICD-10-CM

## 2025-07-05 DIAGNOSIS — C15.5 MALIGNANT NEOPLASM OF LOWER THIRD OF ESOPHAGUS: ICD-10-CM

## 2025-07-05 PROBLEM — N20.0 NEPHROLITHIASIS: Status: ACTIVE | Noted: 2025-07-05

## 2025-07-05 PROBLEM — I48.91 ATRIAL FIBRILLATION: Status: ACTIVE | Noted: 2025-07-05

## 2025-07-05 LAB
ALBUMIN SERPL-MCNC: 4 G/DL (ref 3.5–5.2)
ALBUMIN/GLOB SERPL: 1.3 G/DL
ALP SERPL-CCNC: 85 U/L (ref 39–117)
ALT SERPL W P-5'-P-CCNC: 21 U/L (ref 1–41)
ANION GAP SERPL CALCULATED.3IONS-SCNC: 12.8 MMOL/L (ref 5–15)
AST SERPL-CCNC: 24 U/L (ref 1–40)
B PARAPERT DNA SPEC QL NAA+PROBE: NOT DETECTED
B PERT DNA SPEC QL NAA+PROBE: NOT DETECTED
BASOPHILS # BLD AUTO: 0.01 10*3/MM3 (ref 0–0.2)
BASOPHILS NFR BLD AUTO: 0.1 % (ref 0–1.5)
BILIRUB SERPL-MCNC: 0.9 MG/DL (ref 0–1.2)
BUN SERPL-MCNC: 16 MG/DL (ref 8–23)
BUN/CREAT SERPL: 16 (ref 7–25)
C PNEUM DNA NPH QL NAA+NON-PROBE: NOT DETECTED
CALCIUM SPEC-SCNC: 9.2 MG/DL (ref 8.6–10.5)
CHLORIDE SERPL-SCNC: 101 MMOL/L (ref 98–107)
CO2 SERPL-SCNC: 24.2 MMOL/L (ref 22–29)
CREAT SERPL-MCNC: 1 MG/DL (ref 0.76–1.27)
DEPRECATED RDW RBC AUTO: 48.3 FL (ref 37–54)
EGFRCR SERPLBLD CKD-EPI 2021: 83 ML/MIN/1.73
EOSINOPHIL # BLD AUTO: 0.02 10*3/MM3 (ref 0–0.4)
EOSINOPHIL NFR BLD AUTO: 0.3 % (ref 0.3–6.2)
ERYTHROCYTE [DISTWIDTH] IN BLOOD BY AUTOMATED COUNT: 13.2 % (ref 12.3–15.4)
FLUAV SUBTYP SPEC NAA+PROBE: NOT DETECTED
FLUBV RNA NPH QL NAA+NON-PROBE: NOT DETECTED
GEN 5 1HR TROPONIN T REFLEX: 32 NG/L
GLOBULIN UR ELPH-MCNC: 3 GM/DL
GLUCOSE BLDC GLUCOMTR-MCNC: 76 MG/DL (ref 70–130)
GLUCOSE BLDC GLUCOMTR-MCNC: 90 MG/DL (ref 70–130)
GLUCOSE SERPL-MCNC: 114 MG/DL (ref 65–99)
HADV DNA SPEC NAA+PROBE: NOT DETECTED
HCOV 229E RNA SPEC QL NAA+PROBE: NOT DETECTED
HCOV HKU1 RNA SPEC QL NAA+PROBE: NOT DETECTED
HCOV NL63 RNA SPEC QL NAA+PROBE: NOT DETECTED
HCOV OC43 RNA SPEC QL NAA+PROBE: NOT DETECTED
HCT VFR BLD AUTO: 45.1 % (ref 37.5–51)
HGB BLD-MCNC: 15.2 G/DL (ref 13–17.7)
HMPV RNA NPH QL NAA+NON-PROBE: NOT DETECTED
HPIV1 RNA ISLT QL NAA+PROBE: NOT DETECTED
HPIV2 RNA SPEC QL NAA+PROBE: NOT DETECTED
HPIV3 RNA NPH QL NAA+PROBE: NOT DETECTED
HPIV4 P GENE NPH QL NAA+PROBE: NOT DETECTED
IMM GRANULOCYTES # BLD AUTO: 0.03 10*3/MM3 (ref 0–0.05)
IMM GRANULOCYTES NFR BLD AUTO: 0.4 % (ref 0–0.5)
LIPASE SERPL-CCNC: 18 U/L (ref 13–60)
LYMPHOCYTES # BLD AUTO: 0.79 10*3/MM3 (ref 0.7–3.1)
LYMPHOCYTES NFR BLD AUTO: 10.4 % (ref 19.6–45.3)
M PNEUMO IGG SER IA-ACNC: NOT DETECTED
MAGNESIUM SERPL-MCNC: 2 MG/DL (ref 1.6–2.4)
MAGNESIUM SERPL-MCNC: 2.1 MG/DL (ref 1.6–2.4)
MCH RBC QN AUTO: 33.1 PG (ref 26.6–33)
MCHC RBC AUTO-ENTMCNC: 33.7 G/DL (ref 31.5–35.7)
MCV RBC AUTO: 98.3 FL (ref 79–97)
MONOCYTES # BLD AUTO: 0.59 10*3/MM3 (ref 0.1–0.9)
MONOCYTES NFR BLD AUTO: 7.8 % (ref 5–12)
NEUTROPHILS NFR BLD AUTO: 6.16 10*3/MM3 (ref 1.7–7)
NEUTROPHILS NFR BLD AUTO: 81 % (ref 42.7–76)
NRBC BLD AUTO-RTO: 0 /100 WBC (ref 0–0.2)
NT-PROBNP SERPL-MCNC: 2159 PG/ML (ref 0–900)
PLATELET # BLD AUTO: 213 10*3/MM3 (ref 140–450)
PMV BLD AUTO: 9.5 FL (ref 6–12)
POTASSIUM SERPL-SCNC: 3.5 MMOL/L (ref 3.5–5.2)
PROT SERPL-MCNC: 7 G/DL (ref 6–8.5)
QT INTERVAL: 381 MS
QTC INTERVAL: 455 MS
RBC # BLD AUTO: 4.59 10*6/MM3 (ref 4.14–5.8)
RHINOVIRUS RNA SPEC NAA+PROBE: NOT DETECTED
RSV RNA NPH QL NAA+NON-PROBE: NOT DETECTED
SARS-COV-2 RNA NPH QL NAA+NON-PROBE: NOT DETECTED
SODIUM SERPL-SCNC: 138 MMOL/L (ref 136–145)
TROPONIN T % DELTA: -20
TROPONIN T NUMERIC DELTA: -8 NG/L
TROPONIN T SERPL HS-MCNC: 40 NG/L
TSH SERPL DL<=0.05 MIU/L-ACNC: 1.63 UIU/ML (ref 0.27–4.2)
WBC NRBC COR # BLD AUTO: 7.6 10*3/MM3 (ref 3.4–10.8)

## 2025-07-05 PROCEDURE — 74177 CT ABD & PELVIS W/CONTRAST: CPT

## 2025-07-05 PROCEDURE — 82948 REAGENT STRIP/BLOOD GLUCOSE: CPT

## 2025-07-05 PROCEDURE — G0378 HOSPITAL OBSERVATION PER HR: HCPCS

## 2025-07-05 PROCEDURE — 25010000002 HYDRALAZINE PER 20 MG: Performed by: INTERNAL MEDICINE

## 2025-07-05 PROCEDURE — 83880 ASSAY OF NATRIURETIC PEPTIDE: CPT | Performed by: EMERGENCY MEDICINE

## 2025-07-05 PROCEDURE — 84484 ASSAY OF TROPONIN QUANT: CPT | Performed by: EMERGENCY MEDICINE

## 2025-07-05 PROCEDURE — 25010000002 ONDANSETRON PER 1 MG: Performed by: NURSE PRACTITIONER

## 2025-07-05 PROCEDURE — 83735 ASSAY OF MAGNESIUM: CPT | Performed by: EMERGENCY MEDICINE

## 2025-07-05 PROCEDURE — 80053 COMPREHEN METABOLIC PANEL: CPT | Performed by: EMERGENCY MEDICINE

## 2025-07-05 PROCEDURE — 71045 X-RAY EXAM CHEST 1 VIEW: CPT

## 2025-07-05 PROCEDURE — 25010000002 ONDANSETRON PER 1 MG: Performed by: EMERGENCY MEDICINE

## 2025-07-05 PROCEDURE — 25810000003 SODIUM CHLORIDE 0.9 % SOLUTION: Performed by: NURSE PRACTITIONER

## 2025-07-05 PROCEDURE — 83735 ASSAY OF MAGNESIUM: CPT | Performed by: INTERNAL MEDICINE

## 2025-07-05 PROCEDURE — 25810000003 SODIUM CHLORIDE 0.9 % SOLUTION: Performed by: EMERGENCY MEDICINE

## 2025-07-05 PROCEDURE — 99024 POSTOP FOLLOW-UP VISIT: CPT | Performed by: STUDENT IN AN ORGANIZED HEALTH CARE EDUCATION/TRAINING PROGRAM

## 2025-07-05 PROCEDURE — 93010 ELECTROCARDIOGRAM REPORT: CPT | Performed by: INTERNAL MEDICINE

## 2025-07-05 PROCEDURE — 85025 COMPLETE CBC W/AUTO DIFF WBC: CPT | Performed by: EMERGENCY MEDICINE

## 2025-07-05 PROCEDURE — 93005 ELECTROCARDIOGRAM TRACING: CPT | Performed by: EMERGENCY MEDICINE

## 2025-07-05 PROCEDURE — 99285 EMERGENCY DEPT VISIT HI MDM: CPT

## 2025-07-05 PROCEDURE — 83690 ASSAY OF LIPASE: CPT | Performed by: EMERGENCY MEDICINE

## 2025-07-05 PROCEDURE — 36415 COLL VENOUS BLD VENIPUNCTURE: CPT

## 2025-07-05 PROCEDURE — 84443 ASSAY THYROID STIM HORMONE: CPT | Performed by: INTERNAL MEDICINE

## 2025-07-05 PROCEDURE — 71260 CT THORAX DX C+: CPT

## 2025-07-05 PROCEDURE — 99221 1ST HOSP IP/OBS SF/LOW 40: CPT | Performed by: STUDENT IN AN ORGANIZED HEALTH CARE EDUCATION/TRAINING PROGRAM

## 2025-07-05 PROCEDURE — 0202U NFCT DS 22 TRGT SARS-COV-2: CPT | Performed by: NURSE PRACTITIONER

## 2025-07-05 PROCEDURE — 25510000001 IOPAMIDOL 61 % SOLUTION: Performed by: INTERNAL MEDICINE

## 2025-07-05 PROCEDURE — 25510000002 DIATRIZOATE MEGLUMINE & SODIUM PER 1 ML: Performed by: INTERNAL MEDICINE

## 2025-07-05 RX ORDER — DEXTROSE MONOHYDRATE 25 G/50ML
25 INJECTION, SOLUTION INTRAVENOUS
Status: DISCONTINUED | OUTPATIENT
Start: 2025-07-05 | End: 2025-07-09 | Stop reason: HOSPADM

## 2025-07-05 RX ORDER — DIATRIZOATE MEGLUMINE AND DIATRIZOATE SODIUM 660; 100 MG/ML; MG/ML
30 SOLUTION ORAL; RECTAL ONCE
Status: COMPLETED | OUTPATIENT
Start: 2025-07-05 | End: 2025-07-05

## 2025-07-05 RX ORDER — RANOLAZINE 500 MG/1
1000 TABLET, EXTENDED RELEASE ORAL EVERY 12 HOURS SCHEDULED
Status: DISCONTINUED | OUTPATIENT
Start: 2025-07-05 | End: 2025-07-09 | Stop reason: HOSPADM

## 2025-07-05 RX ORDER — PANTOPRAZOLE SODIUM 40 MG/10ML
40 INJECTION, POWDER, LYOPHILIZED, FOR SOLUTION INTRAVENOUS EVERY 12 HOURS SCHEDULED
Status: DISCONTINUED | OUTPATIENT
Start: 2025-07-05 | End: 2025-07-07

## 2025-07-05 RX ORDER — LISINOPRIL 10 MG/1
10 TABLET ORAL DAILY
Status: DISCONTINUED | OUTPATIENT
Start: 2025-07-05 | End: 2025-07-09 | Stop reason: HOSPADM

## 2025-07-05 RX ORDER — METOPROLOL SUCCINATE 50 MG/1
50 TABLET, EXTENDED RELEASE ORAL DAILY
Status: DISCONTINUED | OUTPATIENT
Start: 2025-07-05 | End: 2025-07-06

## 2025-07-05 RX ORDER — FUROSEMIDE 20 MG/1
20 TABLET ORAL DAILY
Status: DISCONTINUED | OUTPATIENT
Start: 2025-07-05 | End: 2025-07-09 | Stop reason: HOSPADM

## 2025-07-05 RX ORDER — POLYETHYLENE GLYCOL 3350 17 G/17G
17 POWDER, FOR SOLUTION ORAL DAILY PRN
Status: DISCONTINUED | OUTPATIENT
Start: 2025-07-05 | End: 2025-07-09 | Stop reason: HOSPADM

## 2025-07-05 RX ORDER — IBUPROFEN 600 MG/1
1 TABLET ORAL
Status: DISCONTINUED | OUTPATIENT
Start: 2025-07-05 | End: 2025-07-09 | Stop reason: HOSPADM

## 2025-07-05 RX ORDER — ATORVASTATIN CALCIUM 20 MG/1
40 TABLET, FILM COATED ORAL DAILY
Status: DISCONTINUED | OUTPATIENT
Start: 2025-07-05 | End: 2025-07-09 | Stop reason: HOSPADM

## 2025-07-05 RX ORDER — GUAIFENESIN 600 MG/1
1200 TABLET, EXTENDED RELEASE ORAL EVERY 12 HOURS SCHEDULED
Status: DISPENSED | OUTPATIENT
Start: 2025-07-05 | End: 2025-07-08

## 2025-07-05 RX ORDER — ISOSORBIDE MONONITRATE 60 MG/1
120 TABLET, EXTENDED RELEASE ORAL DAILY
Status: DISCONTINUED | OUTPATIENT
Start: 2025-07-05 | End: 2025-07-09 | Stop reason: HOSPADM

## 2025-07-05 RX ORDER — AMLODIPINE BESYLATE 5 MG/1
5 TABLET ORAL DAILY
Status: DISCONTINUED | OUTPATIENT
Start: 2025-07-05 | End: 2025-07-09 | Stop reason: HOSPADM

## 2025-07-05 RX ORDER — IOPAMIDOL 612 MG/ML
100 INJECTION, SOLUTION INTRAVASCULAR
Status: COMPLETED | OUTPATIENT
Start: 2025-07-05 | End: 2025-07-05

## 2025-07-05 RX ORDER — ONDANSETRON 2 MG/ML
4 INJECTION INTRAMUSCULAR; INTRAVENOUS ONCE
Status: COMPLETED | OUTPATIENT
Start: 2025-07-05 | End: 2025-07-05

## 2025-07-05 RX ORDER — INSULIN LISPRO 100 [IU]/ML
2-7 INJECTION, SOLUTION INTRAVENOUS; SUBCUTANEOUS
Status: DISCONTINUED | OUTPATIENT
Start: 2025-07-05 | End: 2025-07-06

## 2025-07-05 RX ORDER — SODIUM CHLORIDE 0.9 % (FLUSH) 0.9 %
10 SYRINGE (ML) INJECTION EVERY 12 HOURS SCHEDULED
Status: DISCONTINUED | OUTPATIENT
Start: 2025-07-05 | End: 2025-07-09 | Stop reason: HOSPADM

## 2025-07-05 RX ORDER — SODIUM CHLORIDE 9 MG/ML
40 INJECTION, SOLUTION INTRAVENOUS AS NEEDED
Status: DISCONTINUED | OUTPATIENT
Start: 2025-07-05 | End: 2025-07-09 | Stop reason: HOSPADM

## 2025-07-05 RX ORDER — FLUTICASONE PROPIONATE 50 MCG
2 SPRAY, SUSPENSION (ML) NASAL DAILY
Status: DISCONTINUED | OUTPATIENT
Start: 2025-07-05 | End: 2025-07-09 | Stop reason: HOSPADM

## 2025-07-05 RX ORDER — HYDRALAZINE HYDROCHLORIDE 20 MG/ML
10 INJECTION INTRAMUSCULAR; INTRAVENOUS EVERY 6 HOURS PRN
Status: DISCONTINUED | OUTPATIENT
Start: 2025-07-05 | End: 2025-07-09 | Stop reason: HOSPADM

## 2025-07-05 RX ORDER — SODIUM CHLORIDE 9 MG/ML
75 INJECTION, SOLUTION INTRAVENOUS CONTINUOUS
Status: ACTIVE | OUTPATIENT
Start: 2025-07-05 | End: 2025-07-06

## 2025-07-05 RX ORDER — BISACODYL 5 MG/1
5 TABLET, DELAYED RELEASE ORAL DAILY PRN
Status: DISCONTINUED | OUTPATIENT
Start: 2025-07-05 | End: 2025-07-09 | Stop reason: HOSPADM

## 2025-07-05 RX ORDER — ONDANSETRON 2 MG/ML
4 INJECTION INTRAMUSCULAR; INTRAVENOUS EVERY 6 HOURS PRN
Status: DISCONTINUED | OUTPATIENT
Start: 2025-07-05 | End: 2025-07-09 | Stop reason: HOSPADM

## 2025-07-05 RX ORDER — BISACODYL 10 MG
10 SUPPOSITORY, RECTAL RECTAL DAILY PRN
Status: DISCONTINUED | OUTPATIENT
Start: 2025-07-05 | End: 2025-07-09 | Stop reason: HOSPADM

## 2025-07-05 RX ORDER — NICOTINE POLACRILEX 4 MG
15 LOZENGE BUCCAL
Status: DISCONTINUED | OUTPATIENT
Start: 2025-07-05 | End: 2025-07-09 | Stop reason: HOSPADM

## 2025-07-05 RX ORDER — SODIUM CHLORIDE 0.9 % (FLUSH) 0.9 %
10 SYRINGE (ML) INJECTION AS NEEDED
Status: DISCONTINUED | OUTPATIENT
Start: 2025-07-05 | End: 2025-07-09 | Stop reason: HOSPADM

## 2025-07-05 RX ORDER — BUPROPION HYDROCHLORIDE 150 MG/1
150 TABLET ORAL EVERY MORNING
Status: DISCONTINUED | OUTPATIENT
Start: 2025-07-06 | End: 2025-07-09 | Stop reason: HOSPADM

## 2025-07-05 RX ORDER — AMOXICILLIN 250 MG
2 CAPSULE ORAL 2 TIMES DAILY PRN
Status: DISCONTINUED | OUTPATIENT
Start: 2025-07-05 | End: 2025-07-09 | Stop reason: HOSPADM

## 2025-07-05 RX ADMIN — HYDRALAZINE HYDROCHLORIDE 10 MG: 20 INJECTION INTRAMUSCULAR; INTRAVENOUS at 18:29

## 2025-07-05 RX ADMIN — LISINOPRIL 10 MG: 10 TABLET ORAL at 17:17

## 2025-07-05 RX ADMIN — METOPROLOL SUCCINATE 50 MG: 50 TABLET, EXTENDED RELEASE ORAL at 17:17

## 2025-07-05 RX ADMIN — DIATRIZOATE MEGLUMINE AND DIATRIZOATE SODIUM 30 ML: 660; 100 SOLUTION ORAL; RECTAL at 12:40

## 2025-07-05 RX ADMIN — SODIUM CHLORIDE 500 ML: 9 INJECTION, SOLUTION INTRAVENOUS at 09:30

## 2025-07-05 RX ADMIN — Medication 10 ML: at 15:22

## 2025-07-05 RX ADMIN — ONDANSETRON 4 MG: 2 INJECTION, SOLUTION INTRAMUSCULAR; INTRAVENOUS at 19:54

## 2025-07-05 RX ADMIN — ONDANSETRON 4 MG: 2 INJECTION, SOLUTION INTRAMUSCULAR; INTRAVENOUS at 09:34

## 2025-07-05 RX ADMIN — Medication 10 ML: at 20:01

## 2025-07-05 RX ADMIN — SODIUM CHLORIDE 75 ML/HR: 9 INJECTION, SOLUTION INTRAVENOUS at 15:58

## 2025-07-05 RX ADMIN — AMLODIPINE BESYLATE 5 MG: 5 TABLET ORAL at 17:17

## 2025-07-05 RX ADMIN — FUROSEMIDE 20 MG: 20 TABLET ORAL at 17:17

## 2025-07-05 RX ADMIN — PANTOPRAZOLE SODIUM 40 MG: 40 INJECTION, POWDER, FOR SOLUTION INTRAVENOUS at 20:01

## 2025-07-05 RX ADMIN — IOPAMIDOL 85 ML: 612 INJECTION, SOLUTION INTRAVENOUS at 13:56

## 2025-07-05 NOTE — ED NOTES
Nursing report ED to floor  Edmond Chase  64 y.o.  male    HPI :  HPI  Stated Reason for Visit: From home with c/o n/v x 1 wk.  seen here 1 wk ago for same c/o  History Obtained From: EMS    Chief Complaint  Chief Complaint   Patient presents with    Vomiting       Admitting doctor:   Darek Rodriguez MD    Admitting diagnosis:   The encounter diagnosis was Intractable nausea and vomiting.    Code status:   Current Code Status       Date Active Code Status Order ID Comments User Context       Prior            Allergies:   Patient has no known allergies.    Isolation:   No active isolations    Intake and Output    Intake/Output Summary (Last 24 hours) at 7/5/2025 1300  Last data filed at 7/5/2025 1053  Gross per 24 hour   Intake 500 ml   Output --   Net 500 ml       Weight:   There were no vitals filed for this visit.    Most recent vitals:   Vitals:    07/05/25 0906 07/05/25 1030 07/05/25 1100 07/05/25 1230   BP: 129/99 162/95 (!) 159/101 (!) 171/113   Pulse: 89 79 95 83   Resp: 18 18 18 20   Temp: 97.6 °F (36.4 °C)      SpO2: 95% 98% 97% 98%       Active LDAs/IV Access:   Lines, Drains & Airways       Active LDAs       Name Placement date Placement time Site Days    Peripheral IV 07/05/25 18 G Left Antecubital 07/05/25  --  Antecubital  less than 1                    Labs (abnormal labs have a star):   Labs Reviewed   COMPREHENSIVE METABOLIC PANEL - Abnormal; Notable for the following components:       Result Value    Glucose 114 (*)     All other components within normal limits    Narrative:     GFR Categories in Chronic Kidney Disease (CKD)              GFR Category          GFR (mL/min/1.73)    Interpretation  G1                    90 or greater        Normal or high (1)  G2                    60-89                Mild decrease (1)  G3a                   45-59                Mild to moderate decrease  G3b                   30-44                Moderate to severe decrease  G4                    15-29                 Severe decrease  G5                    14 or less           Kidney failure    (1)In the absence of evidence of kidney disease, neither GFR category G1 or G2 fulfill the criteria for CKD.    eGFR calculation 2021 CKD-EPI creatinine equation, which does not include race as a factor   BNP (IN-HOUSE) - Abnormal; Notable for the following components:    proBNP 2,159.0 (*)     All other components within normal limits    Narrative:     This assay is used as an aid in the diagnosis of individuals suspected of having heart failure. It can be used as an aid in the diagnosis of acute decompensated heart failure (ADHF) in patients presenting with signs and symptoms of ADHF to the emergency department (ED). In addition, NT-proBNP of <300 pg/mL indicates ADHF is not likely.    Age Range Result Interpretation  NT-proBNP Concentration (pg/mL:      <50             Positive            >450                   Gray                 300-450                    Negative             <300    50-75           Positive            >900                  Gray                300-900                  Negative            <300      >75             Positive            >1800                  Gray                300-1800                  Negative            <300   TROPONIN - Abnormal; Notable for the following components:    HS Troponin T 40 (*)     All other components within normal limits    Narrative:     High Sensitive Troponin T Reference Range:  <14.0 ng/L- Negative Female for AMI  <22.0 ng/L- Negative Male for AMI  >=14 - Abnormal Female indicating possible myocardial injury.  >=22 - Abnormal Male indicating possible myocardial injury.   Clinicians would have to utilize clinical acumen, EKG, Troponin, and serial changes to determine if it is an Acute Myocardial Infarction or myocardial injury due to an underlying chronic condition.        CBC WITH AUTO DIFFERENTIAL - Abnormal; Notable for the following components:    MCV 98.3 (*)     MCH 33.1  (*)     Neutrophil % 81.0 (*)     Lymphocyte % 10.4 (*)     All other components within normal limits   HIGH SENSITIVITIY TROPONIN T 1HR - Abnormal; Notable for the following components:    HS Troponin T 32 (*)     All other components within normal limits    Narrative:     High Sensitive Troponin T Reference Range:  <14.0 ng/L- Negative Female for AMI  <22.0 ng/L- Negative Male for AMI  >=14 - Abnormal Female indicating possible myocardial injury.  >=22 - Abnormal Male indicating possible myocardial injury.   Clinicians would have to utilize clinical acumen, EKG, Troponin, and serial changes to determine if it is an Acute Myocardial Infarction or myocardial injury due to an underlying chronic condition.        LIPASE - Normal   MAGNESIUM - Normal   URINALYSIS W/ MICROSCOPIC IF INDICATED (NO CULTURE)   CBC AND DIFFERENTIAL    Narrative:     The following orders were created for panel order CBC & Differential.  Procedure                               Abnormality         Status                     ---------                               -----------         ------                     CBC Auto Differential[[id number]]        Abnormal            Final result                 Please view results for these tests on the individual orders.       EKG:   ECG 12 Lead Chest Pain   Preliminary Result   HEART RATE=86  bpm   RR Yljvcazn=825  ms   WV Interval=  ms   P Horizontal Axis=  deg   P Front Axis=  deg   QRSD Interval=94  ms   QT Zidzfqog=523  ms   TDtH=870  ms   QRS Axis=-15  deg   T Wave Axis=160  deg   - ABNORMAL ECG -   Atrial fibrillation   Borderline left axis deviation   Repol abnrm suggests ischemia, lateral leads   Date and Time of Study:2025-07-05 09:38:58          Meds given in ED:   Medications   ondansetron (ZOFRAN) injection 4 mg (4 mg Intravenous Given 7/5/25 9929)   sodium chloride 0.9 % bolus 500 mL (0 mL Intravenous Stopped 7/5/25 4333)   diatrizoate meglumine-sodium (GASTROGRAFIN) 66-10 % oral solution 30 mL  (30 mL Oral Given 7/5/25 1240)       Imaging results:  XR Chest 1 View  Result Date: 7/5/2025  FINDINGS AND IMPRESSION: Pulmonary consolidation, pleural effusion or pneumothorax is seen. Cardiac silhouette within normal limits for size. There are mediastinal wires and prosthetic heart valve.  This report was finalized on 7/5/2025 9:47 AM by Dr. Rory Durán M.D on Workstation: [id number]        Ambulatory status:   - assist    Social issues:   Social History     Socioeconomic History    Marital status: Single   Tobacco Use    Smoking status: Never     Passive exposure: Never    Smokeless tobacco: Never   Vaping Use    Vaping status: Never Used   Substance and Sexual Activity    Alcohol use: Not Currently     Comment: usually 1-2 month but none since christmas 2022    Drug use: Never    Sexual activity: Defer       Peripheral Neurovascular  Peripheral Neurovascular (Adult)  Peripheral Neurovascular WDL: WDL    Neuro Cognitive  Neuro Cognitive (Adult)  Cognitive/Neuro/Behavioral WDL: WDL    Learning  Learning Assessment  Learning Readiness and Ability: no barriers identified    Respiratory  Respiratory  Airway WDL: WDL  Respiratory WDL  Respiratory WDL: WDL    Abdominal Pain       Pain Assessments  Pain (Adult)  (0-10) Pain Rating: Rest: 5  Pain Location: chest    NIH Stroke Scale       Raquel Rodríguez RN  07/05/25 13:00 EDT

## 2025-07-05 NOTE — OUTREACH NOTE
Medical Week 2 Survey      Flowsheet Row Responses   [facility] patient discharged from? [place]   Does the patient have one of the following disease processes/diagnoses(primary or secondary)? Other   Week 2 attempt successful? No   Unsuccessful attempts Attempt 1   Revoke Readmitted            Christina JENKINS - Registered Nurse

## 2025-07-05 NOTE — H&P
"    Patient Name:  Edmond Chase  YOB: 1960  MRN:  [id number]  Admit Date:  7/5/2025  Patient Care Team:  Nargis Velasquez APRN as PCP - General (Family Medicine)  Papa Miguel MD as Consulting Physician (Cardiology)  Sekou Pisano MD as Consulting Physician (Pulmonary Disease)  Gregor Carlson MD as Consulting Physician (Gastroenterology)  Estevan Yuan MD (Sports Medicine)  Alex Hernadez MD as Consulting Physician (Nephrology)  Yazmin Molina APRN as Nurse Practitioner (Psychiatry)  Dylon Schwartz MD as Consulting Physician (Hematology and Oncology)  Valerie Stockton MD as Referring Physician (Thoracic Surgery)  Valerie Stockton MD as Surgeon (Thoracic Surgery)      Subjective   History Present Illness     Chief Complaint   Patient presents with    Vomiting       Mr. Chase is a 64 y.o. male with a history of DM2, AAA repair AVR, TJ, esophageal cancer s/p esophagectomy,  JAYNE HTN, CAD, CABG that presents to [facility] complaining of worsening nausea and vomiting over the past week.  Vomited 3 times over the past 24 hours.  No hematemesis.  No diarrhea or constipation.  Patient states at baseline he will have vomiting once or twice per week.  No antiemetics at home.  No abdominal pain or cramping.  No fever, chills, sweats.  Has been unable to keep down any fluids or medications.  Reports chronic chest pain due to \" angina\", no worse than baseline currently.    Patient was admitted to the observation unit here 6/27 with nausea and vomiting, chronic chest pain and dyspnea.  He was evaluated by cardiology and no further workup recommended.  He was to continue statin and isosorbide at discharge.  Started on Mucinex.      Review of Systems   HENT:  Negative for congestion.    Respiratory:  Negative for cough and shortness of breath.    Genitourinary:  Negative for difficulty urinating.   Neurological:  Negative for dizziness and " "light-headedness.        Personal History     Past Medical History:   Diagnosis Date    Abnormal nuclear stress test     Anxiety     Anxiety and depression     Aortic valve insufficiency     nonrheumtic     Arthritis     knees    Ascending aortic aneurysm     CAD (coronary artery disease)     stent    Chest pain     Diabetes mellitus     Diarrhea     Dizzy     CAREFUL WHEN GETTING UP    Dyspnea     Esophageal cancer 11/2022    no chemo or radiation    Essential hypertension     Fatigue     G tube feedings     per jejunostomy tube nightly with permitin  3 cans nightly/ J-TUBE REMOVED 2023    GERD (gastroesophageal reflux disease)     GI bleed     Heart murmur     History of pneumonia     History of recent blood transfusion     hemorrhage     no reaction    Hyperglycemia     Hyperlipidemia     Hypersomnia with sleep apnea     Jejunostomy tube present     REMOVED 2023    Lightheadedness     Malaise and fatigue     Migraines     \"OCCULAR MIGRAINES\"    Morbid obesity     Myocardial ischemia     Nausea     Nocturia     TJ on auto CPAP 10/31/2016    Overnight polysomnogram.  Weight 276 pounds.  Severe TJ with AHI 79 events per hour.  Auto CPAP recommended.    Pneumonia     FEB 2016    Scrotal bleeding     SOB (shortness of breath)      Past Surgical History:   Procedure Laterality Date    AORTIC VALVE REPAIR/REPLACEMENT  05/2016    ASCENDING ARCH/HEMIARCH REPLACEMENT N/A 05/02/2016    Procedure: BHAVESH STERNOTOMY CORONARY ARTERY BYPASS GRAFT TIMES 3 USING LEFT INTERNAL MAMMARY ARTERY AND RIGHT GREATER SAPHENOUS VEIN GRAFT PER ENDOSCOPIC VEIN HARVESTING, AORTIC ANEURYSM REPAIR WITH ROOT REPAIR AND AORTIC VALVE REPLACEMENT;  Surgeon: Rosalio Cline MD;  Location: [facility] OR;  Service:     CARDIAC CATHETERIZATION N/A 04/01/2016    Procedure: Left Heart Cath;  Surgeon: Erick Tam MD;  Location: [facility] CATH INVASIVE LOCATION;  Service:     CARDIAC CATHETERIZATION N/A 04/01/2016    Procedure: Left ventriculography;  " Surgeon: Erick Tam MD;  Location: [facility] CATH INVASIVE LOCATION;  Service:     CARDIAC CATHETERIZATION N/A 04/01/2016    Procedure: Right Heart Cath;  Surgeon: Erick Tam MD;  Location: [facility] CATH INVASIVE LOCATION;  Service:     CARDIAC CATHETERIZATION N/A 10/30/2017    Procedure: Coronary angiography;  Surgeon: Sorin Vasquez MD;  Location: [facility] CATH INVASIVE LOCATION;  Service:     CARDIAC CATHETERIZATION  10/30/2017    Procedure: Saphenous Vein Graft;  Surgeon: Sorin Vasquez MD;  Location: [facility] CATH INVASIVE LOCATION;  Service:     CARDIAC CATHETERIZATION N/A 10/30/2017    Procedure: Native mammary injection;  Surgeon: Sorin Vasquez MD;  Location: [facility] CATH INVASIVE LOCATION;  Service:     CARDIAC CATHETERIZATION N/A 07/07/2020    Procedure: Coronary angiography;  Surgeon: Gregor Kim MD;  Location: [facility] CATH INVASIVE LOCATION;  Service: Cardiovascular;  Laterality: N/A;    CARDIAC CATHETERIZATION N/A 07/07/2020    Procedure: Left heart cath;  Surgeon: Gregor Kim MD;  Location: [facility] CATH INVASIVE LOCATION;  Service: Cardiovascular;  Laterality: N/A;    CARDIAC CATHETERIZATION N/A 07/07/2020    Procedure: Left ventriculography;  Surgeon: Gregor Kim MD;  Location: [facility] CATH INVASIVE LOCATION;  Service: Cardiovascular;  Laterality: N/A;    CARDIAC CATHETERIZATION N/A 07/07/2020    Procedure: Stent ESMER bypass graft;  Surgeon: Gregor Kim MD;  Location: [facility] CATH INVASIVE LOCATION;  Service: Cardiovascular;  Laterality: N/A;    CARDIAC CATHETERIZATION N/A 06/17/2021    Procedure: SAPHENOUS VEIN GRAFT;  Surgeon: Marques Ndiaye MD;  Location: [facility] CATH INVASIVE LOCATION;  Service: Cardiovascular;  Laterality: N/A;    CARDIAC CATHETERIZATION N/A 06/17/2021    Procedure: Left Heart Cath;  Surgeon: Marques Ndiaye MD;  Location: [facility] CATH INVASIVE LOCATION;  Service: Cardiovascular;  Laterality: N/A;    CARDIAC CATHETERIZATION N/A 06/17/2021     Procedure: Coronary angiography;  Surgeon: Marques Ndiaye MD;  [facility];  Service: Cardiovascular;  Laterality: N/A;    CARDIAC CATHETERIZATION N/A 07/14/2022    Procedure: Left Heart Cath;  Surgeon: Charlie Aj MD;  [facility];  Service: Cardiovascular;  Laterality: N/A;    CARDIAC CATHETERIZATION N/A 07/14/2022    Procedure: Coronary angiography;  Surgeon: Charlie Aj MD;  Location:  [facility] CATH INVASIVE LOCATION;  Service: Cardiovascular;  Laterality: N/A;    CARDIAC CATHETERIZATION  07/14/2022    Procedure: Saphenous Vein Graft;  Surgeon: Charlie Aj MD;  Location:  [facility] CATH INVASIVE LOCATION;  Service: Cardiovascular;;    CARDIAC CATHETERIZATION N/A 07/14/2022    Procedure: Native mammary injection;  Surgeon: Charlie Aj MD;  [facility];  Service: Cardiovascular;  Laterality: N/A;    CARDIAC CATHETERIZATION N/A 10/24/2024    Procedure: Left Heart Cath;  Surgeon: Godwin Francis MD;  [facility];  Service: Cardiovascular;  Laterality: N/A;    CARDIAC CATHETERIZATION N/A 10/24/2024    Procedure: Coronary angiography;  Surgeon: Godwin Francis MD;  [facility] C[facility];  Service: Cardiovascular;  Laterality: N/A;    CARDIAC CATHETERIZATION N/A 10/24/2024    Procedure: Native mammary injection;  Surgeon: Godwin Francis MD;  [facility];  Service: Cardiovascular;  Laterality: N/A;    CATARACT EXTRACTION EXTRACAPSULAR W/ INTRAOCULAR LENS IMPLANTATION Left     CHOLECYSTECTOMY WITH INTRAOPERATIVE CHOLANGIOGRAM N/A 09/01/2023    Procedure: CHOLECYSTECTOMY LAPAROSCOPIC INTRAOPERATIVE CHOLANGIOGRAM choledoscopy common bile duct exploration;  Surgeon: Jose Manuel Baca MD;  Location: [facility] MAIN OR;  Service: General;  Laterality: N/A;    COLONOSCOPY N/A 11/26/2022    Procedure: COLONOSCOPY AT BEDSIDE;  Surgeon: Jessica  MD Tomy;  Location: [facility] MAIN OR;  Service: Gastroenterology;  Laterality: N/A;    COLONOSCOPY W/ POLYPECTOMY N/A 10/04/2022    Procedure: COLONOSCOPY to cecum with cold forceps and cold snare polypectomies;  Surgeon: Gregor Carlson MD;  Location: [facility] ENDOSCOPY;  Service: Gastroenterology;  Laterality: N/A;  PRE- hx of polyps  POST- diverticulosis, polyps    CORONARY ARTERY BYPASS GRAFT  05/2016    LIMA TO LAD, SVG TO PDA, SVG TO OM2    ENDOSCOPY N/A 07/13/2022    Procedure: ESOPHAGOGASTRODUODENOSCOPY;  Surgeon: Marcia Hollis MD;  Location: [facility] ENDOSCOPY;  Service: Gastroenterology;  Laterality: N/A;  PRE- ANEMIA, MELENA  POST- MILD EROSIVE GASTRITIS, GE JUNCTION ULCER    ENDOSCOPY N/A 10/04/2022    Procedure: ESOPHAGOGASTRODUODENOSCOPY with biopsies;  Surgeon: Gregor Carlson MD;  Location: [facility] ENDOSCOPY;  Service: Gastroenterology;  Laterality: N/A;  PRE- hx of esophageal ulcer  POST- gastric cardia mass    ENDOSCOPY N/A 05/01/2023    Procedure: ESOPHAGOGASTRODUODENOSCOPY WITH  [id number] balloon DILATATION of pylorus and anastomosis;  Surgeon: Valerie Stockton MD;  Location: [facility] ENDOSCOPY;  Service: Gastroenterology;  Laterality: N/A;  PRE: dysphagia  POST: no abnormalities seen    ENDOSCOPY N/A 6/25/2024    Procedure: ESOPHAGOGASTRODUODENOSCOPY WITH BIOPSY and 15mm balloon dilatation (pylorus);  Surgeon: Valerie Stockton MD;  Location: [facility] ENDOSCOPY;  Service: Gastroenterology;  Laterality: N/A;  pre: Esophageal cancer  post: normal anastamosis    ESOPHAGOGASTRECTOMY Right 02/03/2023    Procedure: BRONCHOSCOPY, RIGHT ROBOTIC VIDEO ASSISTED THORACOSCOPY WITH TOTAL ESOPHAGECTOMY, INTERCOSTAL NERVE BLOCK, FEEDING JEJUNOSTOMY PLACEMENT;  Surgeon: Valerie Stockton MD;  Location: [facility] MAIN OR;  Service: Robotics - [facility];  Laterality: Right;    INNER EAR SURGERY      JEJUNOSTOMY TUBE INSERTION      REMOVED 2023    TONSILLECTOMY       Family History   Problem Relation Age  "of Onset    Hyperlipidemia Mother     Arthritis Mother     Hypertension Mother     Diabetes Mother     Heart disease Mother     Hyperlipidemia Father     Heart disease Father     Hypertension Father     Lung cancer Father     Heart disease Sister     Stroke Maternal Grandmother     Heart disease Maternal Grandmother     Hypertension Maternal Grandfather     Hypertension Paternal Grandmother     Hypertension Paternal Grandfather     Other Other         The patient states that his sister has a problem that goes by the name of \"long QT\".  He says this is a familial trait but he also says that he is \"not interested in pursuing it for himself\".    Coronary artery disease Other     Malig Hyperthermia Neg Hx      Social History     Tobacco Use    Smoking status: Never     Passive exposure: Never    Smokeless tobacco: Never   Vaping Use    Vaping status: Never Used   Substance Use Topics    Alcohol use: Not Currently     Comment: usually 1-2 month but none since christmas 2022    Drug use: Never     No current facility-administered medications on file prior to encounter.     Current Outpatient Medications on File Prior to Encounter   Medication Sig Dispense Refill    amLODIPine (NORVASC) 5 MG tablet Take 1 tablet by mouth Daily. Take 5 mg 90 tablet 3    armodafinil (NUVIGIL) 150 MG tablet Take 1 tablet by mouth Daily. 30 tablet 2    atorvastatin (LIPITOR) 40 MG tablet Take 1 tablet by mouth Daily. 90 tablet 3    buPROPion XL (Wellbutrin XL) 150 MG 24 hr tablet Take 1 tablet by mouth Every Morning. 30 tablet 2    COVID-19 mRNA Vac-Luciano,Pfizer, (Comirnaty) 30 MCG/0.3ML suspension prefilled syringe prefilled syringe Inject 0.3 mL into the appropriate muscle as directed by prescriber. 0.3 mL 0    Farxiga 10 MG tablet TAKE 1 TABLET BY MOUTH DAILY 90 tablet 1    fluticasone (FLONASE) 50 MCG/ACT nasal spray Administer 2 sprays into the nostril(s) as directed by provider Daily.      furosemide (LASIX) 20 MG tablet TAKE 1 TABLET BY " MOUTH DAILY 90 tablet 0    guaiFENesin (MUCINEX) 600 MG 12 hr tablet Take 2 tablets by mouth Every 12 (Twelve) Hours for 10 days. 40 tablet 0    isosorbide mononitrate (IMDUR) 120 MG 24 hr tablet Take 1 tablet by mouth Daily. 90 tablet 1    lisinopril (PRINIVIL,ZESTRIL) 10 MG tablet Take 1 tablet by mouth Daily. 90 tablet 3    metoprolol succinate XL (TOPROL-XL) 50 MG 24 hr tablet Take 1 tablet by mouth Daily. 90 tablet 3    ranolazine (RANEXA) 500 MG 12 hr tablet Take 2 tablets by mouth Every 12 (Twelve) Hours. 360 tablet 3     No Known Allergies    Objective    Objective     Vital Signs  Temp:  [97.6 °F (36.4 °C)] 97.6 °F (36.4 °C)  Heart Rate:  [79-95] 83  Resp:  [18-20] 20  BP: (129-171)/() 171/113  SpO2:  [95 %-98 %] 98 %  on   ;   Device (Oxygen Therapy): room air  There is no height or weight on file to calculate BMI.    Physical Exam  Constitutional:       Comments: Chronically ill-appearing.  Obese.   HENT:      Head: Normocephalic and atraumatic.   Eyes:      Extraocular Movements: Extraocular movements intact.      Pupils: Pupils are equal, round, and reactive to light.   Cardiovascular:      Rate and Rhythm: Normal rate and regular rhythm.      Pulses: Normal pulses.      Heart sounds: Normal heart sounds.   Pulmonary:      Effort: Pulmonary effort is normal. No respiratory distress.      Breath sounds: Normal breath sounds.   Abdominal:      General: Bowel sounds are normal. There is no distension.      Palpations: Abdomen is soft.      Tenderness: There is no abdominal tenderness.   Musculoskeletal:      Right lower leg: Edema present.      Left lower leg: Edema present.   Skin:     General: Skin is warm and dry.   Neurological:      General: No focal deficit present.      Mental Status: He is alert and oriented to person, place, and time.         Results Review:  I reviewed the patient's new clinical results.      Lab Results (last 24 hours)       Procedure Component Value Units Date/Time    CBC  & Differential [[id number]]  (Abnormal) Collected: 07/05/25 0930    Specimen: Blood Updated: 07/05/25 1000    Narrative:      The following orders were created for panel order CBC & Differential.  Procedure                               Abnormality         Status                     ---------                               -----------         ------                     CBC Auto Differential[[id number]]        Abnormal            Final result                 Please view results for these tests on the individual orders.    Comprehensive Metabolic Panel [[id number]]  (Abnormal) Collected: 07/05/25 0930    Specimen: Blood Updated: 07/05/25 1024     Glucose 114 mg/dL      BUN 16.0 mg/dL      Creatinine 1.00 mg/dL      Sodium 138 mmol/L      Potassium 3.5 mmol/L      Chloride 101 mmol/L      CO2 24.2 mmol/L      Calcium 9.2 mg/dL      Total Protein 7.0 g/dL      Albumin 4.0 g/dL      ALT (SGPT) 21 U/L      AST (SGOT) 24 U/L      Alkaline Phosphatase 85 U/L      Total Bilirubin 0.9 mg/dL      Globulin 3.0 gm/dL      A/G Ratio 1.3 g/dL      BUN/Creatinine Ratio 16.0     Anion Gap 12.8 mmol/L      eGFR 83.0 mL/min/1.73     Narrative:      GFR Categories in Chronic Kidney Disease (CKD)              GFR Category          GFR (mL/min/1.73)    Interpretation  G1                    90 or greater        Normal or high (1)  G2                    60-89                Mild decrease (1)  G3a                   45-59                Mild to moderate decrease  G3b                   30-44                Moderate to severe decrease  G4                    15-29                Severe decrease  G5                    14 or less           Kidney failure    (1)In the absence of evidence of kidney disease, neither GFR category G1 or G2 fulfill the criteria for CKD.    eGFR calculation 2021 CKD-EPI creatinine equation, which does not include race as a factor    Lipase [[id number]]  (Normal) Collected: 07/05/25 0930    Specimen: Blood Updated: 07/05/25  1024     Lipase 18 U/L     BNP [[id number]]  (Abnormal) Collected: 07/05/25 0930    Specimen: Blood Updated: 07/05/25 1024     proBNP 2,159.0 pg/mL     Narrative:      This assay is used as an aid in the diagnosis of individuals suspected of having heart failure. It can be used as an aid in the diagnosis of acute decompensated heart failure (ADHF) in patients presenting with signs and symptoms of ADHF to the emergency department (ED). In addition, NT-proBNP of <300 pg/mL indicates ADHF is not likely.    Age Range Result Interpretation  NT-proBNP Concentration (pg/mL:      <50             Positive            >450                   Gray                 300-450                    Negative             <300    50-75           Positive            >900                  Gray                300-900                  Negative            <300      >75             Positive            >1800                  Gray                300-1800                  Negative            <300    High Sensitivity Troponin T [[id number]]  (Abnormal) Collected: 07/05/25 0930    Specimen: Blood Updated: 07/05/25 1024     HS Troponin T 40 ng/L     Narrative:      High Sensitive Troponin T Reference Range:  <14.0 ng/L- Negative Female for AMI  <22.0 ng/L- Negative Male for AMI  >=14 - Abnormal Female indicating possible myocardial injury.  >=22 - Abnormal Male indicating possible myocardial injury.   Clinicians would have to utilize clinical acumen, EKG, Troponin, and serial changes to determine if it is an Acute Myocardial Infarction or myocardial injury due to an underlying chronic condition.         Magnesium [[id number]]  (Normal) Collected: 07/05/25 0930    Specimen: Blood Updated: 07/05/25 1024     Magnesium 2.0 mg/dL     CBC Auto Differential [[id number]]  (Abnormal) Collected: 07/05/25 0930    Specimen: Blood Updated: 07/05/25 1000     WBC 7.60 10*3/mm3      RBC 4.59 10*6/mm3      Hemoglobin 15.2 g/dL      Hematocrit 45.1 %      MCV 98.3 fL       MCH 33.1 pg      MCHC 33.7 g/dL      RDW 13.2 %      RDW-SD 48.3 fl      MPV 9.5 fL      Platelets 213 10*3/mm3      Neutrophil % 81.0 %      Lymphocyte % 10.4 %      Monocyte % 7.8 %      Eosinophil % 0.3 %      Basophil % 0.1 %      Immature Grans % 0.4 %      Neutrophils, Absolute 6.16 10*3/mm3      Lymphocytes, Absolute 0.79 10*3/mm3      Monocytes, Absolute 0.59 10*3/mm3      Eosinophils, Absolute 0.02 10*3/mm3      Basophils, Absolute 0.01 10*3/mm3      Immature Grans, Absolute 0.03 10*3/mm3      nRBC 0.0 /100 WBC     High Sensitivity Troponin T 1Hr [[id number]]  (Abnormal) Collected: 07/05/25 1149    Specimen: Blood Updated: 07/05/25 1217     HS Troponin T 32 ng/L      Troponin T Numeric Delta -8 ng/L      Troponin T % Delta -20    Narrative:      High Sensitive Troponin T Reference Range:  <14.0 ng/L- Negative Female for AMI  <22.0 ng/L- Negative Male for AMI  >=14 - Abnormal Female indicating possible myocardial injury.  >=22 - Abnormal Male indicating possible myocardial injury.   Clinicians would have to utilize clinical acumen, EKG, Troponin, and serial changes to determine if it is an Acute Myocardial Infarction or myocardial injury due to an underlying chronic condition.         Respiratory Panel PCR w/COVID-19(SARS-CoV-2) [facility] In-House, NP Swab in UTM/VTM, 2 HR TAT - Swab, Nasopharynx [[id number]]  (Normal) Collected: 07/05/25 1341    Specimen: Swab from Nasopharynx Updated: 07/05/25 1437     ADENOVIRUS, PCR Not Detected     Coronavirus 229E Not Detected     Coronavirus HKU1 Not Detected     Coronavirus NL63 Not Detected     Coronavirus OC43 Not Detected     COVID19 Not Detected     Human Metapneumovirus Not Detected     Human Rhinovirus/Enterovirus Not Detected     Influenza A PCR Not Detected     Influenza B PCR Not Detected     Parainfluenza Virus 1 Not Detected     Parainfluenza Virus 2 Not Detected     Parainfluenza Virus 3 Not Detected     Parainfluenza Virus 4 Not Detected      RSV, PCR Not Detected     Bordetella pertussis pcr Not Detected     Bordetella parapertussis PCR Not Detected     Chlamydophila pneumoniae PCR Not Detected     Mycoplasma pneumo by PCR Not Detected    Narrative:      In the setting of a positive respiratory panel with a viral infection PLUS a negative procalcitonin without other underlying concern for bacterial infection, consider observing off antibiotics or discontinuation of antibiotics and continue supportive care. If the respiratory panel is positive for atypical bacterial infection (Bordetella pertussis, Chlamydophila pneumoniae, or Mycoplasma pneumoniae), consider antibiotic de-escalation to target atypical bacterial infection.            Imaging Results (Last 24 Hours)       Procedure Component Value Units Date/Time    CT Chest With Contrast Diagnostic - In process [[id number]] Resulted: 07/05/25 1442     Updated: 07/05/25 1359    This result has not been signed. Information might be incomplete.      CT Abdomen Pelvis With Contrast - In process [[id number]] Resulted: 07/05/25 1354     Updated: 07/05/25 1359    This result has not been signed. Information might be incomplete.      XR Chest 1 View [[id number]] Collected: 07/05/25 0944     Updated: 07/05/25 0950    Narrative:      Portable chest radiograph     HISTORY: Chest pain     TECHNIQUE: Single AP portable radiograph of the chest     COMPARISON: Chest radiograph 6/27/2025       Impression:      FINDINGS AND IMPRESSION:  Pulmonary consolidation, pleural effusion or pneumothorax is seen.  Cardiac silhouette within normal limits for size. There are mediastinal  wires and prosthetic heart valve.     This report was finalized on 7/5/2025 9:47 AM by Dr. Rory Durán M.D  on Workstation: [id number]               Results for orders placed during the hospital encounter of 06/27/25    Adult Transthoracic Echo Complete W/ Cont if Necessary Per Protocol 06/27/2025 11:42 AM    Interpretation Summary    Left ventricular  systolic function is normal. Calculated left ventricular EF = 58.4%    Left ventricular wall thickness is consistent with moderate concentric hypertrophy.    Left ventricular diastolic function was normal.    The left atrial cavity is mildly dilated.    There is a bioprosthetic aortic valve present.    Aortic valve area is 1.1 cm2.    Aortic valve maximum pressure gradient is 24 mmHg. Aortic valve mean pressure gradient is 12 mmHg.    The aortic root measures 3.9 cm.      ECG 12 Lead Chest Pain   Preliminary Result   HEART RATE=86  bpm   RR Lcpucpxo=875  ms   VT Interval=  ms   P Horizontal Axis=  deg   P Front Axis=  deg   QRSD Interval=94  ms   QT Mlxrpmai=590  ms   WNdX=038  ms   QRS Axis=-15  deg   T Wave Axis=160  deg   - ABNORMAL ECG -   Atrial fibrillation   Borderline left axis deviation   Repol abnrm suggests ischemia, lateral leads   Date and Time of Study:2025-07-05 09:38:58           Assessment/Plan     Active Hospital Problems    Diagnosis  POA    **Intractable nausea and vomiting [R11.2]  Yes    Iron deficiency anemia [D50.9]  Yes    Malignant neoplasm of lower third of esophagus [C15.5]  Yes    Type 2 diabetes mellitus, without long-term current use of insulin [E11.9]  Yes    Coronary artery disease of bypass graft of native heart with stable angina pectoris [I25.708]  Yes    Obstructive sleep apnea, adult treated with auto CPAP [G47.33]  Yes    S/P CABG (coronary artery bypass graft) [Z95.1]  Not Applicable    S/P AVR (aortic valve replacement) [Z95.2]  Not Applicable    Essential hypertension [I10]  Yes      Resolved Hospital Problems   No resolved problems to display.     Mr. Chase is a 64-year-old male with history of esophagectomy and multiple other chronic problems presents with acute worsening of chronic nausea and vomiting.  Chemistry panel markable.  Troponins are flat.  BNP 2000.  Normal lipase.  Normal white count.  CT of the chest and abdomen pelvis are pending.  Thoracic surgery has  already been consulted and evaluated the patient.  Recommendations are for holding solid food and keep hydrated.  CT results pending.  Anticipate possible EGD.  Further plans per thoracic surgery.  Consider GI consult.  Continue Zofran.    Will address home medications once list is verified for accuracy and updated by nursing.    A1c last month was 5.8.  Glucose coverage with lispro SSI, low-dose.      I discussed the patient's findings and my recommendations with patient.    VTE Prophylaxis - SCDs.  Code Status - Full code.       VIKTORIA Sánchez  [facility]  07/05/25  14:52 EDT

## 2025-07-05 NOTE — ED PROVIDER NOTES
EMERGENCY DEPARTMENT ENCOUNTER  Room Number:  15/15  PCP: Nargis Velasquez APRN  Independent Historians: Historian: Patient  Date of encounter:  7/5/2025  Patient Care Team:  Nargis Velasquez APRN as PCP - General (Family Medicine)  Papa Miguel MD as Consulting Physician (Cardiology)  Sekou Pisano MD as Consulting Physician (Pulmonary Disease)  Gregor Carlson MD as Consulting Physician (Gastroenterology)  Estevan Yuan MD (Sports Medicine)  Alex Hernadez MD as Consulting Physician (Nephrology)  Yazmin Molina APRN as Nurse Practitioner (Psychiatry)  Dylon Schwartz MD as Consulting Physician (Hematology and Oncology)  Valerie Stockton MD as Referring Physician (Thoracic Surgery)  Valerie Stockton MD as Surgeon (Thoracic Surgery)     HPI:  Chief Complaint: had concerns including Vomiting.     A complete HPI/ROS/PMH/PSH/SH/FH are unobtainable due to: EM_Unobtainable History : None    Context: Edmond Chase is a 64 y.o. male with a medical history of hypertension, chronic chest pain, coronary disease, esophageal cancer status post esophagectomy who presents to the ED c/o acute vomiting.  Patient has had 3 episodes of vomiting over the past 24 hours.  He states that since his esophagus surgery, he will typically vomit 1-2 times per week.  He does not take any antiemetics.  He is concerned about dehydration.  He denies abdominal pain, diarrhea, constipation, urinary complaint, fever or chills.  He also has chronic chest pain without changes.  He states he has not taken his isosorbide in the past week and a half because his pharmacy has not been able to fill it.  No associated shortness of breath, diaphoresis.    Review of prior external notes (non-ED) -and- Review of prior external test results outside of this encounter: Discharge summary from 6/28/2025 reviewed.  Patient was admitted for multiple complaints including chest pain.  He was evaluated by cardiology with no  change in medications or further diagnostics performed.    PAST MEDICAL HISTORY  Active Ambulatory Problems     Diagnosis Date Noted    Essential hypertension 02/29/2016    Hyperglycemia 02/29/2016    Hyperlipidemia LDL goal <70 02/29/2016    Class 2 severe obesity due to excess calories with serious comorbidity and body mass index (BMI) of 36.0 to 36.9 in adult 02/29/2016    Nocturia 02/29/2016    Nonrheumatic aortic valve insufficiency 03/29/2016    Myocardial ischemia 03/29/2016    Coronary artery disease involving native coronary artery of native heart 04/19/2016    Ascending aortic aneurysm 04/19/2016    CAD in native artery 05/02/2016    S/P CABG (coronary artery bypass graft) 05/18/2016    S/P AVR (aortic valve replacement) 05/18/2016    Status post ascending aortic aneurysm repair 05/18/2016    Fatigue 07/28/2017    Abnormal nuclear stress test 10/23/2017    Coronary artery disease 10/23/2017    Coronary artery disease of bypass graft of native heart with stable angina pectoris 07/01/2020    Obstructive sleep apnea, adult treated with auto CPAP 10/31/2016    Hypersomnia due to medical condition 08/21/2021    Angina at rest 07/10/2022    Type 2 diabetes mellitus, without long-term current use of insulin 07/11/2022    Anemia 07/10/2022    Ulcer of esophagus without bleeding 07/29/2022    Polyp of colon 08/29/2022    Rectal bleeding 11/25/2022    Gastrointestinal hemorrhage 11/25/2022    Malignant neoplasm of lower third of esophagus 11/29/2022    Gastrointestinal hemorrhage, unspecified gastrointestinal hemorrhage type 12/01/2022    Exertional chest pain 12/14/2022    Iron deficiency anemia 12/14/2022    Cholelithiasis 09/01/2023    Erectile dysfunction 01/23/2024    Ventricular ectopy 09/24/2024    Chest pain 09/24/2024    Dyspnea 06/27/2025     Resolved Ambulatory Problems     Diagnosis Date Noted    Acute cholecystitis 09/01/2023     Past Medical History:   Diagnosis Date    Anxiety     Anxiety and  depression     Aortic valve insufficiency     Arthritis     CAD (coronary artery disease)     Diabetes mellitus     Diarrhea     Dizzy     Esophageal cancer 11/2022    G tube feedings     GERD (gastroesophageal reflux disease)     GI bleed     Heart murmur     History of pneumonia     History of recent blood transfusion     Hyperlipidemia     Hypersomnia with sleep apnea     Jejunostomy tube present     Lightheadedness     Malaise and fatigue     Migraines     Morbid obesity     Nausea     TJ on auto CPAP 10/31/2016    Pneumonia     Scrotal bleeding     SOB (shortness of breath)        PAST SURGICAL HISTORY  Past Surgical History:   Procedure Laterality Date    AORTIC VALVE REPAIR/REPLACEMENT  05/2016    ASCENDING ARCH/HEMIARCH REPLACEMENT N/A 05/02/2016    Procedure: BHAVESH STERNOTOMY CORONARY ARTERY BYPASS GRAFT TIMES 3 USING LEFT INTERNAL MAMMARY ARTERY AND RIGHT GREATER SAPHENOUS VEIN GRAFT PER ENDOSCOPIC VEIN HARVESTING, AORTIC ANEURYSM REPAIR WITH ROOT REPAIR AND AORTIC VALVE REPLACEMENT;  Surgeon: Rosalio Cline MD;  Location: [facility] MAIN OR;  Service:     CARDIAC CATHETERIZATION N/A 04/01/2016    Procedure: Left Heart Cath;  Surgeon: Erick Tam MD;  Location: [facility] CATH INVASIVE LOCATION;  Service:     CARDIAC CATHETERIZATION N/A 04/01/2016    Procedure: Left ventriculography;  Surgeon: Erick Tam MD;  Location: [facility] CATH INVASIVE LOCATION;  Service:     CARDIAC CATHETERIZATION N/A 04/01/2016    Procedure: Right Heart Cath;  Surgeon: Erick Tam MD;  Location: [facility] CATH INVASIVE LOCATION;  Service:     CARDIAC CATHETERIZATION N/A 10/30/2017    Procedure: Coronary angiography;  Surgeon: Sorin Vasquez MD;  Location: [facility] CATH INVASIVE LOCATION;  Service:     CARDIAC CATHETERIZATION  10/30/2017    Procedure: Saphenous Vein Graft;  Surgeon: Sorin Vasquez MD;  Location: [facility] CATH INVASIVE LOCATION;  Service:     CARDIAC CATHETERIZATION N/A 10/30/2017    Procedure: Native mammary injection;   Surgeon: Sorin Vasquez MD;  Location:  [facility] CATH INVASIVE LOCATION;  Service:     CARDIAC CATHETERIZATION N/A 07/07/2020    Procedure: Coronary angiography;  Surgeon: Gregor Kim MD;  [facility];  Service: Cardiovascular;  Laterality: N/A;    CARDIAC CATHETERIZATION N/A 07/07/2020    Procedure: Left heart cath;  Surgeon: Gregor Kim MD;  [facility];  Service: Cardiovascular;  Laterality: N/A;    CARDIAC CATHETERIZATION N/A 07/07/2020    Procedure: Left ventriculography;  Surgeon: Gregor Kim MD;  [facility];  Service: Cardiovascular;  Laterality: N/A;    CARDIAC CATHETERIZATION N/A 07/07/2020    Procedure: Stent ESMER bypass graft;  Surgeon: Gregor Kim MD;  [facility];  Service: Cardiovascular;  Laterality: N/A;    CARDIAC CATHETERIZATION N/A 06/17/2021    Procedure: SAPHENOUS VEIN GRAFT;  Surgeon: Marques Ndiaye MD;  [facility];  Service: Cardiovascular;  Laterality: N/A;    CARDIAC CATHETERIZATION N/A 06/17/2021    Procedure: Left Heart Cath;  Surgeon: Marques Ndiaye MD;  Location: [facility] CATH INVASIVE LOCATION;  Service: Cardiovascular;  Laterality: N/A;    CARDIAC CATHETERIZATION N/A 06/17/2021    Procedure: Coronary angiography;  Surgeon: Marques Ndiaye MD;  Location: [facility] CATH INVASIVE LOCATION;  Service: Cardiovascular;  Laterality: N/A;    CARDIAC CATHETERIZATION N/A 07/14/2022    Procedure: Left Heart Cath;  Surgeon: Charlie Aj MD;  [facility];  Service: Cardiovascular;  Laterality: N/A;    CARDIAC CATHETERIZATION N/A 07/14/2022    Procedure: Coronary angiography;  Surgeon: Charlie Aj MD;  Location: [facility] CATH INVASIVE LOCATION;  Service: Cardiovascular;  Laterality: N/A;    CARDIAC CATHETERIZATION  07/14/2022    Procedure: Saphenous Vein Graft;  Surgeon: Madai  Charlie MILLER MD;  [facility];  Service: Cardiovascular;;    CARDIAC CATHETERIZATION N/A 07/14/2022    Procedure: Native mammary injection;  Surgeon: Charlie Aj MD;  Location: [facility] CATH INVASIVE LOCATION;  Service: Cardiovascular;  Laterality: N/A;    CARDIAC CATHETERIZATION N/A 10/24/2024    Procedure: Left Heart Cath;  Surgeon: Godwin Francis MD;  Location: [facility] CATH INVASIVE LOCATION;  Service: Cardiovascular;  Laterality: N/A;    CARDIAC CATHETERIZATION N/A 10/24/2024    Procedure: Coronary angiography;  Surgeon: Godwin Francis MD;  Location: [facility] CATH INVASIVE LOCATION;  Service: Cardiovascular;  Laterality: N/A;    CARDIAC CATHETERIZATION N/A 10/24/2024    Procedure: Native mammary injection;  Surgeon: Godwin Francis MD;  Location: [facility] CATH INVASIVE LOCATION;  Service: Cardiovascular;  Laterality: N/A;    CATARACT EXTRACTION EXTRACAPSULAR W/ INTRAOCULAR LENS IMPLANTATION Left     CHOLECYSTECTOMY WITH INTRAOPERATIVE CHOLANGIOGRAM N/A 09/01/2023    Procedure: CHOLECYSTECTOMY LAPAROSCOPIC INTRAOPERATIVE CHOLANGIOGRAM choledoscopy common bile duct exploration;  Surgeon: Jose Manuel Baca MD;  Location: [facility] MAIN OR;  Service: General;  Laterality: N/A;    COLONOSCOPY N/A 11/26/2022    Procedure: COLONOSCOPY AT BEDSIDE;  Surgeon: Tomy Lopez MD;  Location: [facility] MAIN OR;  Service: Gastroenterology;  Laterality: N/A;    COLONOSCOPY W/ POLYPECTOMY N/A 10/04/2022    Procedure: COLONOSCOPY to cecum with cold forceps and cold snare polypectomies;  Surgeon: Gregor Carlson MD;  Location: [facility] ENDOSCOPY;  Service: Gastroenterology;  Laterality: N/A;  PRE- hx of polyps  POST- diverticulosis, polyps    CORONARY ARTERY BYPASS GRAFT  05/2016    LIMA TO LAD, SVG TO PDA, SVG TO OM2    ENDOSCOPY N/A 07/13/2022    Procedure: ESOPHAGOGASTRODUODENOSCOPY;  Surgeon: Marcia Hollis MD;  Location: [facility] ENDOSCOPY;  Service: Gastroenterology;  Laterality: N/A;  PRE-  "ANEMIA, MELENA  POST- MILD EROSIVE GASTRITIS, GE JUNCTION ULCER    ENDOSCOPY N/A 10/04/2022    Procedure: ESOPHAGOGASTRODUODENOSCOPY with biopsies;  Surgeon: Gregor Carlson MD;  Location: [facility] ENDOSCOPY;  Service: Gastroenterology;  Laterality: N/A;  PRE- hx of esophageal ulcer  POST- gastric cardia mass    ENDOSCOPY N/A 05/01/2023    Procedure: ESOPHAGOGASTRODUODENOSCOPY WITH  [id number] balloon DILATATION of pylorus and anastomosis;  Surgeon: Valerie Stockton MD;  Location: [facility] ENDOSCOPY;  Service: Gastroenterology;  Laterality: N/A;  PRE: dysphagia  POST: no abnormalities seen    ENDOSCOPY N/A 6/25/2024    Procedure: ESOPHAGOGASTRODUODENOSCOPY WITH BIOPSY and 15mm balloon dilatation (pylorus);  Surgeon: Valerie Stockton MD;  Location: [facility] ENDOSCOPY;  Service: Gastroenterology;  Laterality: N/A;  pre: Esophageal cancer  post: normal anastamosis    ESOPHAGOGASTRECTOMY Right 02/03/2023    Procedure: BRONCHOSCOPY, RIGHT ROBOTIC VIDEO ASSISTED THORACOSCOPY WITH TOTAL ESOPHAGECTOMY, INTERCOSTAL NERVE BLOCK, FEEDING JEJUNOSTOMY PLACEMENT;  Surgeon: Valerie Stockton MD;  Location: [facility] MAIN OR;  Service: Robotics - DaVinci;  Laterality: Right;    INNER EAR SURGERY      JEJUNOSTOMY TUBE INSERTION      REMOVED 2023    TONSILLECTOMY         FAMILY HISTORY  Family History   Problem Relation Age of Onset    Hyperlipidemia Mother     Arthritis Mother     Hypertension Mother     Diabetes Mother     Heart disease Mother     Hyperlipidemia Father     Heart disease Father     Hypertension Father     Lung cancer Father     Heart disease Sister     Stroke Maternal Grandmother     Heart disease Maternal Grandmother     Hypertension Maternal Grandfather     Hypertension Paternal Grandmother     Hypertension Paternal Grandfather     Other Other         The patient states that his sister has a problem that goes by the name of \"long QT\".  He says this is a familial trait but he also says that he is \"not interested " "in pursuing it for himself\".    Coronary artery disease Other     Malig Hyperthermia Neg Hx        SOCIAL HISTORY  Social History     Socioeconomic History    Marital status: Single   Tobacco Use    Smoking status: Never     Passive exposure: Never    Smokeless tobacco: Never   Vaping Use    Vaping status: Never Used   Substance and Sexual Activity    Alcohol use: Not Currently     Comment: usually 1-2 month but none since christmas 2022    Drug use: Never    Sexual activity: Defer       Chronic or social conditions impacting patient care (Social Determinants of Health):  Social Drivers of Health     Tobacco Use: Low Risk  (7/5/2025)    Patient History     Smoking Tobacco Use: Never     Smokeless Tobacco Use: Never     Passive Exposure: Never   Alcohol Use: Not At Risk (6/27/2025)    AUDIT-C     Frequency of Alcohol Consumption: Monthly or less     Average Number of Drinks: 1 or 2     Frequency of Binge Drinking: Never   Financial Resource Strain: Low Risk  (2/7/2023)    Overall Financial Resource Strain (CARDIA)     Difficulty of Paying Living Expenses: Not hard at all   Food Insecurity: No Food Insecurity (2/7/2023)    Hunger Vital Sign     Worried About Running Out of Food in the Last Year: Never true     Ran Out of Food in the Last Year: Never true   Transportation Needs: No Transportation Needs (3/8/2023)    OASIS : Transportation     Lack of Transportation (Medical): No     Lack of Transportation (Non-Medical): No     Patient Unable or Declines to Respond: No   Physical Activity: Inactive (2/7/2023)    Exercise Vital Sign     Days of Exercise per Week: 0 days     Minutes of Exercise per Session: 0 min   Stress: Not on file   Social Connections: Feeling Socially Integrated (3/8/2023)    OASIS : Social Isolation     Frequency of experiencing loneliness or isolation: Never   Interpersonal Safety: Not At Risk (6/27/2025)    Abuse Screen     Unsafe at Home or Work/School: no     Feels Threatened by " Someone?: no     Does Anyone Keep You from Contacting Others or Doint Things Outside the Home?: no     Physical Sign of Abuse Present: no   Depression: At risk (1/13/2025)    PHQ-2     PHQ-2 Score: 18   Housing Stability: Unknown (6/27/2025)    Housing Stability     Current Living Arrangements: home     Potentially Unsafe Housing Conditions: Not on file   Utilities: Not on file   Health Literacy: Adequate Health Literacy (3/8/2023)    OASIS : Health Literacy     Frequency of needing help to read materials from doctor or pharmacy: Rarely   Employment: Not At Risk (2/7/2023)    Employment     Do you want help finding or keeping work or a job?: I do not need or want help   Disabilities: Not At Risk (6/27/2025)    Disabilities     Concentrating, Remembering, or Making Decisions Difficulty: no     Doing Errands Independently Difficulty: no       ALLERGIES  Patient has no known allergies.    REVIEW OF SYSTEMS  Review of Systems  Included in HPI  All systems reviewed and negative except for those discussed in HPI.    PHYSICAL EXAM    I have reviewed the triage vital signs and nursing notes.    ED Triage Vitals [07/05/25 0906]   Temp Heart Rate Resp BP SpO2   97.6 °F (36.4 °C) 89 18 129/99 95 %      Temp src Heart Rate Source Patient Position BP Location FiO2 (%)   -- -- -- -- --       Physical Exam  Vitals and nursing note reviewed.   Constitutional:       General: He is not in acute distress.     Appearance: Normal appearance. He is not ill-appearing, toxic-appearing or diaphoretic.   HENT:      Head: Normocephalic and atraumatic.      Nose: Nose normal.      Mouth/Throat:      Mouth: Mucous membranes are moist.   Eyes:      Extraocular Movements: Extraocular movements intact.      Conjunctiva/sclera: Conjunctivae normal.      Pupils: Pupils are equal, round, and reactive to light.   Cardiovascular:      Rate and Rhythm: Normal rate and regular rhythm.      Pulses: Normal pulses.      Heart sounds: Normal heart  sounds. No murmur heard.     No friction rub. No gallop.   Pulmonary:      Effort: Pulmonary effort is normal. No respiratory distress.      Breath sounds: Normal breath sounds. No stridor. No wheezing, rhonchi or rales.   Abdominal:      General: Abdomen is flat. There is no distension.      Palpations: Abdomen is soft.      Tenderness: There is no abdominal tenderness. There is no guarding or rebound.   Musculoskeletal:      Cervical back: Normal range of motion and neck supple.      Right lower leg: No edema.      Left lower leg: No edema.   Skin:     General: Skin is warm and dry.      Capillary Refill: Capillary refill takes less than 2 seconds.   Neurological:      General: No focal deficit present.      Mental Status: He is alert and oriented to person, place, and time. Mental status is at baseline.   Psychiatric:         Mood and Affect: Mood normal.         Behavior: Behavior normal.         Thought Content: Thought content normal.         Judgment: Judgment normal.         LAB RESULTS  Recent Results (from the past 24 hours)   Comprehensive Metabolic Panel    Collection Time: 07/05/25  9:30 AM    Specimen: Blood   Result Value Ref Range    Glucose 114 (H) 65 - 99 mg/dL    BUN 16.0 8.0 - 23.0 mg/dL    Creatinine 1.00 0.76 - 1.27 mg/dL    Sodium 138 136 - 145 mmol/L    Potassium 3.5 3.5 - 5.2 mmol/L    Chloride 101 98 - 107 mmol/L    CO2 24.2 22.0 - 29.0 mmol/L    Calcium 9.2 8.6 - 10.5 mg/dL    Total Protein 7.0 6.0 - 8.5 g/dL    Albumin 4.0 3.5 - 5.2 g/dL    ALT (SGPT) 21 1 - 41 U/L    AST (SGOT) 24 1 - 40 U/L    Alkaline Phosphatase 85 39 - 117 U/L    Total Bilirubin 0.9 0.0 - 1.2 mg/dL    Globulin 3.0 gm/dL    A/G Ratio 1.3 g/dL    BUN/Creatinine Ratio 16.0 7.0 - 25.0    Anion Gap 12.8 5.0 - 15.0 mmol/L    eGFR 83.0 >60.0 mL/min/1.73   Lipase    Collection Time: 07/05/25  9:30 AM    Specimen: Blood   Result Value Ref Range    Lipase 18 13 - 60 U/L   BNP    Collection Time: 07/05/25  9:30 AM    Specimen:  Blood   Result Value Ref Range    proBNP 2,159.0 (H) 0.0 - 900.0 pg/mL   High Sensitivity Troponin T    Collection Time: 07/05/25  9:30 AM    Specimen: Blood   Result Value Ref Range    HS Troponin T 40 (H) <22 ng/L   Magnesium    Collection Time: 07/05/25  9:30 AM    Specimen: Blood   Result Value Ref Range    Magnesium 2.0 1.6 - 2.4 mg/dL   CBC Auto Differential    Collection Time: 07/05/25  9:30 AM    Specimen: Blood   Result Value Ref Range    WBC 7.60 3.40 - 10.80 10*3/mm3    RBC 4.59 4.14 - 5.80 10*6/mm3    Hemoglobin 15.2 13.0 - 17.7 g/dL    Hematocrit 45.1 37.5 - 51.0 %    MCV 98.3 (H) 79.0 - 97.0 fL    MCH 33.1 (H) 26.6 - 33.0 pg    MCHC 33.7 31.5 - 35.7 g/dL    RDW 13.2 12.3 - 15.4 %    RDW-SD 48.3 37.0 - 54.0 fl    MPV 9.5 6.0 - 12.0 fL    Platelets 213 140 - 450 10*3/mm3    Neutrophil % 81.0 (H) 42.7 - 76.0 %    Lymphocyte % 10.4 (L) 19.6 - 45.3 %    Monocyte % 7.8 5.0 - 12.0 %    Eosinophil % 0.3 0.3 - 6.2 %    Basophil % 0.1 0.0 - 1.5 %    Immature Grans % 0.4 0.0 - 0.5 %    Neutrophils, Absolute 6.16 1.70 - 7.00 10*3/mm3    Lymphocytes, Absolute 0.79 0.70 - 3.10 10*3/mm3    Monocytes, Absolute 0.59 0.10 - 0.90 10*3/mm3    Eosinophils, Absolute 0.02 0.00 - 0.40 10*3/mm3    Basophils, Absolute 0.01 0.00 - 0.20 10*3/mm3    Immature Grans, Absolute 0.03 0.00 - 0.05 10*3/mm3    nRBC 0.0 0.0 - 0.2 /100 WBC   ECG 12 Lead Chest Pain    Collection Time: 07/05/25  9:38 AM   Result Value Ref Range    QT Interval 381 ms    QTC Interval 455 ms   High Sensitivity Troponin T 1Hr    Collection Time: 07/05/25 11:49 AM    Specimen: Blood   Result Value Ref Range    HS Troponin T 32 (H) <22 ng/L    Troponin T Numeric Delta -8 ng/L    Troponin T % Delta -20 Abnormal if >/= 20%       RADIOLOGY  XR Chest 1 View  Result Date: 7/5/2025  Portable chest radiograph  HISTORY: Chest pain  TECHNIQUE: Single AP portable radiograph of the chest  COMPARISON: Chest radiograph 6/27/2025      FINDINGS AND IMPRESSION: Pulmonary  consolidation, pleural effusion or pneumothorax is seen. Cardiac silhouette within normal limits for size. There are mediastinal wires and prosthetic heart valve.  This report was finalized on 7/5/2025 9:47 AM by Dr. Rory Durán M.D on Workstation: [id number]        MEDICATIONS GIVEN IN ER  Medications   ondansetron (ZOFRAN) injection 4 mg (4 mg Intravenous Given 7/5/25 5369)   sodium chloride 0.9 % bolus 500 mL (0 mL Intravenous Stopped 7/5/25 1053)       ORDERS PLACED DURING THIS VISIT:  Orders Placed This Encounter   Procedures    XR Chest 1 View    CT Chest With Contrast Diagnostic    CT Abdomen Pelvis With Contrast    Comprehensive Metabolic Panel    Lipase    BNP    High Sensitivity Troponin T    Urinalysis With Microscopic If Indicated (No Culture) - Urine, Clean Catch    Magnesium    CBC Auto Differential    High Sensitivity Troponin T 1Hr    [facility] (on-call MD unless specified) Details    Thoracic Surgery Consult    ECG 12 Lead Chest Pain    Initiate Observation Status    CBC & Differential       OUTPATIENT MEDICATION MANAGEMENT:  No current Epic-ordered facility-administered medications on file.     Current Outpatient Medications Ordered in Epic   Medication Sig Dispense Refill    amLODIPine (NORVASC) 5 MG tablet Take 1 tablet by mouth Daily. Take 5 mg 90 tablet 3    armodafinil (NUVIGIL) 150 MG tablet Take 1 tablet by mouth Daily. 30 tablet 2    atorvastatin (LIPITOR) 40 MG tablet Take 1 tablet by mouth Daily. 90 tablet 3    buPROPion XL (Wellbutrin XL) 150 MG 24 hr tablet Take 1 tablet by mouth Every Morning. 30 tablet 2    COVID-19 mRNA Vac-Luciano,Pf[employer], (Comirnaty) 30 MCG/0.3ML suspension prefilled syringe prefilled syringe Inject 0.3 mL into the appropriate muscle as directed by prescriber. 0.3 mL 0    Farxiga 10 MG tablet TAKE 1 TABLET BY MOUTH DAILY 90 tablet 1    fluticasone (FLONASE) 50 MCG/ACT nasal spray Administer 2 sprays into the nostril(s) as directed by provider Daily.      furosemide (LASIX)  20 MG tablet TAKE 1 TABLET BY MOUTH DAILY 90 tablet 0    guaiFENesin (MUCINEX) 600 MG 12 hr tablet Take 2 tablets by mouth Every 12 (Twelve) Hours for 10 days. 40 tablet 0    isosorbide mononitrate (IMDUR) 120 MG 24 hr tablet Take 1 tablet by mouth Daily. 90 tablet 1    lisinopril (PRINIVIL,ZESTRIL) 10 MG tablet Take 1 tablet by mouth Daily. 90 tablet 3    metoprolol succinate XL (TOPROL-XL) 50 MG 24 hr tablet Take 1 tablet by mouth Daily. 90 tablet 3    ranolazine (RANEXA) 500 MG 12 hr tablet Take 2 tablets by mouth Every 12 (Twelve) Hours. 360 tablet 3       PROCEDURES  Procedures    PROGRESS, DATA ANALYSIS, CONSULTS, AND MEDICAL DECISION MAKING  All labs have been independently interpreted by me.  All radiology studies have been reviewed by me. All EKG's have been independently viewed and interpreted by me.  Discussion below represents my analysis of pertinent findings related to patient's condition, differential diagnosis, treatment plan and final disposition.    Differential diagnosis includes but is not limited to dehydration, lecture light derangement, UTI, ACS, arrhythmia.    Clinical Scores:                   ED Course as of 07/05/25 1229   Sat Jul 05, 2025   0946 EKG interpreted by myself  Time: 0938  Rate: 86  Rhythm: a fib  No ST elevation or depression  Normal morphology [AB]   1105 proBNP(!): 2,159.0 [AB]   1105 HS Troponin T(!): 40 [AB]   1105 Creatinine: 1.00 [AB]   1105 ALT (SGPT): 21 [AB]   1105 AST (SGOT): 24 [AB]   1105 Total Bilirubin: 0.9 [AB]   1105 Magnesium: 2.0 [AB]   1105 Lipase: 18 [AB]   1105 XR Chest 1 View  My independent interpretation of the imaging study is no lobar infiltrate or pneumothorax [AB]   1129 Updated patient on results.  Patient still complaining of persistent nausea.  He states that prior EGD showed possible narrowing or stricture.  Will speak with inpatient team for admission for monitoring, possible EGD. [AB]   1148 Phone call with Dr. Rodriguez with [facility].  Discussed the  patient, relevant history, exam, diagnostics, ED findings/progress, and concerns. They agree to admit the patient to telemetry. Care assumed by the admitting physician at this time.  He has a consult Dr. Stockton's group to discuss the patient's case. [AB]   1209 Dr. Mason with thoracic surgery has seen the patient.  Agrees with admission.  Recommends CT scan of chest, abdomen pelvis with IV and oral contrast.  This will be ordered to be followed by inpatient team. [AB]      ED Course User Index  [AB] Michele Bush MD             AS OF 12:29 EDT VITALS:    BP - (!) 159/101  HR - 95  TEMP - 97.6 °F (36.4 °C)  O2 SATS - 97%    COMPLEXITY OF CARE  The patient requires admission.      DIAGNOSIS  Final diagnoses:   Intractable nausea and vomiting         DISPOSITION  ED Disposition       ED Disposition   Decision to Admit    Condition   --    Comment   Level of Care: Telemetry [5]   Diagnosis: Intractable nausea and vomiting [603935]   Admitting Physician: MUSA MCINTYRE [[id number]]   Attending Physician: MUSA MCINTYRE [[id number]]   Is patient appropriate for Inpatient Observation Unit?: No [0]                  Please note that portions of this document were completed with a voice recognition program.    Note Disclaimer: At [facility], we believe that sharing information builds trust and better relationships. You are receiving this note because you recently visited [facility]. It is possible you will see health information before a provider has talked with you about it. This kind of information can be easy to misunderstand. To help you fully understand what it means for your health, we urge you to discuss this note with your provider.         Michele Bush MD  07/05/25 1210       Michele Bush MD  07/05/25 1220

## 2025-07-05 NOTE — PLAN OF CARE
Problem: Adult Inpatient Plan of Care  Goal: Plan of Care Review  Outcome: Not Progressing  Flowsheets (Taken 7/5/2025 1520)  Progress: no change  Plan of Care Reviewed With: patient  Goal: Patient-Specific Goal (Individualized)  Outcome: Not Progressing  Goal: Absence of Hospital-Acquired Illness or Injury  Outcome: Not Progressing  Intervention: Identify and Manage Fall Risk  Recent Flowsheet Documentation  Taken 7/5/2025 1451 by Ella Garcia RN  Safety Promotion/Fall Prevention:   assistive device/personal items within reach   clutter free environment maintained   fall prevention program maintained   nonskid shoes/slippers when out of bed   room organization consistent   safety round/check completed  Intervention: Prevent Skin Injury  Recent Flowsheet Documentation  Taken 7/5/2025 1451 by Ella Garcia RN  Body Position:   supine   position changed independently  Goal: Optimal Comfort and Wellbeing  Outcome: Not Progressing  Goal: Readiness for Transition of Care  Outcome: Not Progressing  Intervention: Mutually Develop Transition Plan  Recent Flowsheet Documentation  Taken 7/5/2025 1514 by Ella Garcia RN  Transportation Anticipated: family or friend will provide  Patient/Family Anticipated Services at Transition: none  Patient/Family Anticipates Transition to: home  Taken 7/5/2025 1511 by Ella Garcia, RN  Equipment Currently Used at Home: cpap     Problem: Fall Injury Risk  Goal: Absence of Fall and Fall-Related Injury  Outcome: Not Progressing  Intervention: Promote Injury-Free Environment  Recent Flowsheet Documentation  Taken 7/5/2025 1451 by Ella Garcia RN  Safety Promotion/Fall Prevention:   assistive device/personal items within reach   clutter free environment maintained   fall prevention program maintained   nonskid shoes/slippers when out of bed   room organization consistent   safety round/check completed     Problem: Nausea and Vomiting  Goal: Nausea and Vomiting  Relief  Outcome: Not Progressing   Goal Outcome Evaluation:  Plan of Care Reviewed With: patient        Progress: no change    Pt just arrived to floor from ER.  VSS, but B/P is high - patient has not had his meds today.  Orders received and being reviewed.  IVFs to be started.  Will continue to monitor patient.

## 2025-07-05 NOTE — CONSULTS
Inpatient Thoracic Surgery Consult  Consult performed by: Zaina Mason MD  Consult ordered by: Michele Bush MD          Patient Care Team:  Nargis Velasquez APRN as PCP - General (Family Medicine)  Papa Miguel MD as Consulting Physician (Cardiology)  Sekou Pisano MD as Consulting Physician (Pulmonary Disease)  Gregor Carlson MD as Consulting Physician (Gastroenterology)  Estevan Yuan MD (Sports Medicine)  Alex Hernadez MD as Consulting Physician (Nephrology)  Yazmin Molina APRN as Nurse Practitioner (Psychiatry)  Dylon Schwartz MD as Consulting Physician (Hematology and Oncology)  Valerie Stockton MD as Referring Physician (Thoracic Surgery)  Valerie Stockton MD as Surgeon (Thoracic Surgery)    Chief Complaint   Patient presents with    Vomiting       Subjective     History of Present Illness   Edmond Chase is a 64 y.o. male with a medical history of   esophageal carcinoma status post esophagectomy .  Patient had early stage carcinoma and no evidence of disease on his last follow-up in January 2025 with Dr. Stockton .    Morbid illnesses include hypertension, chronic chest pain, coronary disease    Patient reports acute onset of vomiting with inability to hold any food down .  Patient reports that he has learned how to live with his esophagectomy - diet eating small frequent meals and even with that he may have a vomiting 1-2 times a week but this current episode is new for him to the point where he is unable to hold any nutrition is concerned about dehydration.  Gone to the emergency room previously where cardiac workup was done and he was discharged home however with the persistence of symptoms over 2 weeks the patient is very concerned now     He denies abdominal pain, diarrhea, constipation, urinary complaint, fever or chills.  He also has chronic chest pain without changes.  No associated shortness of breath, diaphoresis.   Review of Systems      Patient Active Problem List   Diagnosis    Essential hypertension    Hyperglycemia    Hyperlipidemia LDL goal <70    Class 2 severe obesity due to excess calories with serious comorbidity and body mass index (BMI) of 36.0 to 36.9 in adult    Nocturia    Nonrheumatic aortic valve insufficiency    Myocardial ischemia    Coronary artery disease involving native coronary artery of native heart    Ascending aortic aneurysm    CAD in native artery    S/P CABG (coronary artery bypass graft)    S/P AVR (aortic valve replacement)    Status post ascending aortic aneurysm repair    Fatigue    Abnormal nuclear stress test    Coronary artery disease    Coronary artery disease of bypass graft of native heart with stable angina pectoris    Obstructive sleep apnea, adult treated with auto CPAP    Hypersomnia due to medical condition    Angina at rest    Type 2 diabetes mellitus, without long-term current use of insulin    Anemia    Ulcer of esophagus without bleeding    Polyp of colon    Rectal bleeding    Gastrointestinal hemorrhage    Malignant neoplasm of lower third of esophagus    Gastrointestinal hemorrhage, unspecified gastrointestinal hemorrhage type    Exertional chest pain    Iron deficiency anemia    Cholelithiasis    Erectile dysfunction    Ventricular ectopy    Chest pain    Dyspnea    Intractable nausea and vomiting     Past Medical History:   Diagnosis Date    Abnormal nuclear stress test     Anxiety     Anxiety and depression     Aortic valve insufficiency     nonrheumtic     Arthritis     knees    Ascending aortic aneurysm     CAD (coronary artery disease)     stent    Chest pain     Diabetes mellitus     Diarrhea     Dizzy     CAREFUL WHEN GETTING UP    Dyspnea     Esophageal cancer 11/2022    no chemo or radiation    Essential hypertension     Fatigue     G tube feedings     per jejunostomy tube nightly with permitin  3 cans nightly/ J-TUBE REMOVED 2023    GERD (gastroesophageal reflux disease)     GI bleed   "   Heart murmur     History of pneumonia     History of recent blood transfusion     hemorrhage     no reaction    Hyperglycemia     Hyperlipidemia     Hypersomnia with sleep apnea     Jejunostomy tube present     REMOVED 2023    Lightheadedness     Malaise and fatigue     Migraines     \"OCCULAR MIGRAINES\"    Morbid obesity     Myocardial ischemia     Nausea     Nocturia     TJ on auto CPAP 10/31/2016    Overnight polysomnogram.  Weight 276 pounds.  Severe TJ with AHI 79 events per hour.  Auto CPAP recommended.    Pneumonia     FEB 2016    Scrotal bleeding     SOB (shortness of breath)      Past Surgical History:   Procedure Laterality Date    AORTIC VALVE REPAIR/REPLACEMENT  05/2016    ASCENDING ARCH/HEMIARCH REPLACEMENT N/A 05/02/2016    Procedure: BHAVESH STERNOTOMY CORONARY ARTERY BYPASS GRAFT TIMES 3 USING LEFT INTERNAL MAMMARY ARTERY AND RIGHT GREATER SAPHENOUS VEIN GRAFT PER ENDOSCOPIC VEIN HARVESTING, AORTIC ANEURYSM REPAIR WITH ROOT REPAIR AND AORTIC VALVE REPLACEMENT;  Surgeon: Rosalio Cline MD;  Location: [facility] MAIN OR;  Service:     CARDIAC CATHETERIZATION N/A 04/01/2016    Procedure: Left Heart Cath;  Surgeon: Erick Tam MD;  Location: [facility] CATH INVASIVE LOCATION;  Service:     CARDIAC CATHETERIZATION N/A 04/01/2016    Procedure: Left ventriculography;  Surgeon: Erick Tam MD;  Location: [facility] CATH INVASIVE LOCATION;  Service:     CARDIAC CATHETERIZATION N/A 04/01/2016    Procedure: Right Heart Cath;  Surgeon: Erick Tam MD;  Location: [facility] CATH INVASIVE LOCATION;  Service:     CARDIAC CATHETERIZATION N/A 10/30/2017    Procedure: Coronary angiography;  Surgeon: Sorin Vasquez MD;  Location: [facility] CATH INVASIVE LOCATION;  Service:     CARDIAC CATHETERIZATION  10/30/2017    Procedure: Saphenous Vein Graft;  Surgeon: Sorin Vasquez MD;  Location: [facility] CATH INVASIVE LOCATION;  Service:     CARDIAC CATHETERIZATION N/A 10/30/2017    Procedure: Native mammary injection;  Surgeon: Sorin GUEVARA" MD Christina;  Location[facility] CATH INVASIVE LOC[facility];  Service:     CARDIAC CATHETERIZATION N/A 07/07/2020    Procedure: Coronary angiography;  Surgeon: Gregor Kim MD;  Location: [facility] CATH INVASIVE LOCATION;  Service: Cardiovascular;  Laterality: N/A;    CARDIAC CATHETERIZATION N/A 07/07/2020    Procedure: Left heart cath;  Surgeon: Gregor Kim MD;  Location: [facility] CATH INVASIVE LOCATION;  Service: Cardiovascular;  Laterality: N/A;    CARDIAC CATHETERIZATION N/A 07/07/2020    Procedure: Left ventriculography;  Surgeon: Gregor Kim MD;  Location: [facility] CATH INVASIVE LOCATION;  Service: Cardiovascular;  Laterality: N/A;    CARDIAC CATHETERIZATION N/A 07/07/2020    Procedure: Stent ESMER bypass graft;  Surgeon: Gregor Kim MD;  Location: [facility] CATH INVASIVE LOCATION;  Service: Cardiovascular;  Laterality: N/A;    CARDIAC CATHETERIZATION N/A 06/17/2021    Procedure: SAPHENOUS VEIN GRAFT;  Surgeon: Marques Ndiaye MD;  Location: [facility] CATH INVASIVE LOCATION;  Service: Cardiovascular;  Laterality: N/A;    CARDIAC CATHETERIZATION N/A 06/17/2021    Procedure: Left Heart Cath;  Surgeon: Marques Ndiaye MD;  Location: [facility] CATH INVASIVE LOCATION;  Service: Cardiovascular;  Laterality: N/A;    CARDIAC CATHETERIZATION N/A 06/17/2021    Procedure: Coronary angiography;  Surgeon: Marques Ndiaye MD;  Location: [facility] CATH INVASIVE LOCATION;  Service: Cardiovascular;  Laterality: N/A;    CARDIAC CATHETERIZATION N/A 07/14/2022    Procedure: Left Heart Cath;  Surgeon: Charlie Aj MD;  Location: [facility] CATH INVASIVE LOCATION;  Service: Cardiovascular;  Laterality: N/A;    CARDIAC CATHETERIZATION N/A 07/14/2022    Procedure: Coronary angiography;  Surgeon: Charlie Aj MD;  Location: [facility] CATH INVASIVE LOCATION;  Service: Cardiovascular;  Laterality: N/A;    CARDIAC CATHETERIZATION  07/14/2022    Procedure: Saphenous Vein Graft;  Surgeon: Charlie Aj MD;   Location: [facility] CATH INVASIVE LOCATION;  Service: Cardiovascular;;    CARDIAC CATHETERIZATION N/A 07/14/2022    Procedure: Native mammary injection;  Surgeon: Charlie Aj MD;  Location: [facility] CATH INVASIVE LOCATION;  Service: Cardiovascular;  Laterality: N/A;    CARDIAC CATHETERIZATION N/A 10/24/2024    Procedure: Left Heart Cath;  Surgeon: Godwin Francis MD;  Location: [facility] CATH INVASIVE LOCATION;  Service: Cardiovascular;  Laterality: N/A;    CARDIAC CATHETERIZATION N/A 10/24/2024    Procedure: Coronary angiography;  Surgeon: Godwin Francis MD;  Location: [facility] CATH INVASIVE LOCATION;  Service: Cardiovascular;  Laterality: N/A;    CARDIAC CATHETERIZATION N/A 10/24/2024    Procedure: Native mammary injection;  Surgeon: Godwin Francis MD;  Location: [facility] CATH INVASIVE LOCATION;  Service: Cardiovascular;  Laterality: N/A;    CATARACT EXTRACTION EXTRACAPSULAR W/ INTRAOCULAR LENS IMPLANTATION Left     CHOLECYSTECTOMY WITH INTRAOPERATIVE CHOLANGIOGRAM N/A 09/01/2023    Procedure: CHOLECYSTECTOMY LAPAROSCOPIC INTRAOPERATIVE CHOLANGIOGRAM choledoscopy common bile duct exploration;  Surgeon: Jose Manuel Baca MD;  Location: [facility] MAIN OR;  Service: General;  Laterality: N/A;    COLONOSCOPY N/A 11/26/2022    Procedure: COLONOSCOPY AT BEDSIDE;  Surgeon: Tomy Lopez MD;  Location: [facility] MAIN OR;  Service: Gastroenterology;  Laterality: N/A;    COLONOSCOPY W/ POLYPECTOMY N/A 10/04/2022    Procedure: COLONOSCOPY to cecum with cold forceps and cold snare polypectomies;  Surgeon: Gregor Carlson MD;  Location: [facility] ENDOSCOPY;  Service: Gastroenterology;  Laterality: N/A;  PRE- hx of polyps  POST- diverticulosis, polyps    CORONARY ARTERY BYPASS GRAFT  05/2016    LIMA TO LAD, SVG TO PDA, SVG TO OM2    ENDOSCOPY N/A 07/13/2022    Procedure: ESOPHAGOGASTRODUODENOSCOPY;  Surgeon: Marcia Hollis MD;  Location: [facility] ENDOSCOPY;  Service: Gastroenterology;  Laterality: N/A;  PRE- ANEMIA, MELENA  POST-  "MILD EROSIVE GASTRITIS, GE JUNCTION ULCER    ENDOSCOPY N/A 10/04/2022    Procedure: ESOPHAGOGASTRODUODENOSCOPY with biopsies;  Surgeon: Gregor Carlson MD;  Location: [facility] ENDOSCOPY;  Service: Gastroenterology;  Laterality: N/A;  PRE- hx of esophageal ulcer  POST- gastric cardia mass    ENDOSCOPY N/A 05/01/2023    Procedure: ESOPHAGOGASTRODUODENOSCOPY WITH  [id number] balloon DILATATION of pylorus and anastomosis;  Surgeon: Valerie Stockton MD;  Location: [facility] ENDOSCOPY;  Service: Gastroenterology;  Laterality: N/A;  PRE: dysphagia  POST: no abnormalities seen    ENDOSCOPY N/A 6/25/2024    Procedure: ESOPHAGOGASTRODUODENOSCOPY WITH BIOPSY and 15mm balloon dilatation (pylorus);  Surgeon: Valerie Stockton MD;  Location: [facility] ENDOSCOPY;  Service: Gastroenterology;  Laterality: N/A;  pre: Esophageal cancer  post: normal anastamosis    ESOPHAGOGASTRECTOMY Right 02/03/2023    Procedure: BRONCHOSCOPY, RIGHT ROBOTIC VIDEO ASSISTED THORACOSCOPY WITH TOTAL ESOPHAGECTOMY, INTERCOSTAL NERVE BLOCK, FEEDING JEJUNOSTOMY PLACEMENT;  Surgeon: Valerie Stockton MD;  Location: [facility] OR;  Service: Robotics - DaVinci;  Laterality: Right;    INNER EAR SURGERY      JEJUNOSTOMY TUBE INSERTION      REMOVED 2023    TONSILLECTOMY       Family History   Problem Relation Age of Onset    Hyperlipidemia Mother     Arthritis Mother     Hypertension Mother     Diabetes Mother     Heart disease Mother     Hyperlipidemia Father     Heart disease Father     Hypertension Father     Lung cancer Father     Heart disease Sister     Stroke Maternal Grandmother     Heart disease Maternal Grandmother     Hypertension Maternal Grandfather     Hypertension Paternal Grandmother     Hypertension Paternal Grandfather     Other Other         The patient states that his sister has a problem that goes by the name of \"long QT\".  He says this is a familial trait but he also says that he is \"not interested in pursuing it for himself\".    Coronary " artery disease Other     Malig Hyperthermia Neg Hx      Social History     Socioeconomic History    Marital status: Single   Tobacco Use    Smoking status: Never     Passive exposure: Never    Smokeless tobacco: Never   Vaping Use    Vaping status: Never Used   Substance and Sexual Activity    Alcohol use: Not Currently     Comment: usually 1-2 month but none since christmas 2022    Drug use: Never    Sexual activity: Defer     (Not in a hospital admission)    No Known Allergies    Patient denies shortness of breath    Objective      Vital Signs  Temp:  [97.6 °F (36.4 °C)] 97.6 °F (36.4 °C)  Heart Rate:  [79-95] 95  Resp:  [18] 18  BP: (129-162)/() 159/101          Physical Exam  Physical Exam    Constitutional:       Appearance uncomfortable he is well-developed-no evidence of recent weight loss.   HENT:      Head: Normocephalic and atraumatic.      Nose: Nose normal.   Eyes:      Conjunctiva/sclera: Conjunctivae normal.     Abd: Well-healed surgical scars from esophagectomy gallbladder surgery and CABG. abdomen is nondistended.  Nontender.  No palpable abnormality or organomegaly  Pulmonary:      Effort: Pulmonary effort is normal.    Musculoskeletal:         General: Normal range of motion.      Cervical back: Normal range of motion and neck supple.   Skin:     General: Skin is warm and dry.   Neurological:      Mental Status: He is alert and oriented to person, place, and time.   Psychiatric:         Behavior: Behavior normal.         Thought Content: Thought content normal.         Judgment: Judgment normal.   Results Review:    I reviewed the patient's new clinical results.    Imaging Results (Last 24 Hours)       Procedure Component Value Units Date/Time    XR Chest 1 View [[id number]] Collected: 07/05/25 0944     Updated: 07/05/25 0950    Narrative:      Portable chest radiograph     HISTORY: Chest pain     TECHNIQUE: Single AP portable radiograph of the chest     COMPARISON: Chest radiograph  6/27/2025       Impression:      FINDINGS AND IMPRESSION:  Pulmonary consolidation, pleural effusion or pneumothorax is seen.  Cardiac silhouette within normal limits for size. There are mediastinal  wires and prosthetic heart valve.     This report was finalized on 7/5/2025 9:47 AM by Dr. Rory Durán M.D  on Workstation: [id number]               Lab Results:  Lab Results (last 24 hours)       Procedure Component Value Units Date/Time    High Sensitivity Troponin T 1Hr [[id number]]  (Abnormal) Collected: 07/05/25 1149    Specimen: Blood Updated: 07/05/25 1217     HS Troponin T 32 ng/L      Troponin T Numeric Delta -8 ng/L      Troponin T % Delta -20    Narrative:      High Sensitive Troponin T Reference Range:  <14.0 ng/L- Negative Female for AMI  <22.0 ng/L- Negative Male for AMI  >=14 - Abnormal Female indicating possible myocardial injury.  >=22 - Abnormal Male indicating possible myocardial injury.   Clinicians would have to utilize clinical acumen, EKG, Troponin, and serial changes to determine if it is an Acute Myocardial Infarction or myocardial injury due to an underlying chronic condition.         Comprehensive Metabolic Panel [[id number]]  (Abnormal) Collected: 07/05/25 0930    Specimen: Blood Updated: 07/05/25 1024     Glucose 114 mg/dL      BUN 16.0 mg/dL      Creatinine 1.00 mg/dL      Sodium 138 mmol/L      Potassium 3.5 mmol/L      Chloride 101 mmol/L      CO2 24.2 mmol/L      Calcium 9.2 mg/dL      Total Protein 7.0 g/dL      Albumin 4.0 g/dL      ALT (SGPT) 21 U/L      AST (SGOT) 24 U/L      Alkaline Phosphatase 85 U/L      Total Bilirubin 0.9 mg/dL      Globulin 3.0 gm/dL      A/G Ratio 1.3 g/dL      BUN/Creatinine Ratio 16.0     Anion Gap 12.8 mmol/L      eGFR 83.0 mL/min/1.73     Narrative:      GFR Categories in Chronic Kidney Disease (CKD)              GFR Category          GFR (mL/min/1.73)    Interpretation  G1                    90 or greater        Normal or high (1)  G2                     60-89                Mild decrease (1)  G3a                   45-59                Mild to moderate decrease  G3b                   30-44                Moderate to severe decrease  G4                    15-29                Severe decrease  G5                    14 or less           Kidney failure    (1)In the absence of evidence of kidney disease, neither GFR category G1 or G2 fulfill the criteria for CKD.    eGFR calculation 2021 CKD-EPI creatinine equation, which does not include race as a factor    Lipase [[id number]]  (Normal) Collected: 07/05/25 0930    Specimen: Blood Updated: 07/05/25 1024     Lipase 18 U/L     BNP [[id number]]  (Abnormal) Collected: 07/05/25 0930    Specimen: Blood Updated: 07/05/25 1024     proBNP 2,159.0 pg/mL     Narrative:      This assay is used as an aid in the diagnosis of individuals suspected of having heart failure. It can be used as an aid in the diagnosis of acute decompensated heart failure (ADHF) in patients presenting with signs and symptoms of ADHF to the emergency department (ED). In addition, NT-proBNP of <300 pg/mL indicates ADHF is not likely.    Age Range Result Interpretation  NT-proBNP Concentration (pg/mL:      <50             Positive            >450                   Gray                 300-450                    Negative             <300    50-75           Positive            >900                  Gray                300-900                  Negative            <300      >75             Positive            >1800                  Gray                300-1800                  Negative            <300    Magnesium [[id number]]  (Normal) Collected: 07/05/25 0930    Specimen: Blood Updated: 07/05/25 1024     Magnesium 2.0 mg/dL     High Sensitivity Troponin T [[id number]]  (Abnormal) Collected: 07/05/25 0930    Specimen: Blood Updated: 07/05/25 1024     HS Troponin T 40 ng/L     Narrative:      High Sensitive Troponin T Reference Range:  <14.0 ng/L- Negative Female for  AMI  <22.0 ng/L- Negative Male for AMI  >=14 - Abnormal Female indicating possible myocardial injury.  >=22 - Abnormal Male indicating possible myocardial injury.   Clinicians would have to utilize clinical acumen, EKG, Troponin, and serial changes to determine if it is an Acute Myocardial Infarction or myocardial injury due to an underlying chronic condition.         CBC & Differential [[id number]]  (Abnormal) Collected: 07/05/25 0930    Specimen: Blood Updated: 07/05/25 1000    Narrative:      The following orders were created for panel order CBC & Differential.  Procedure                               Abnormality         Status                     ---------                               -----------         ------                     CBC Auto Differential[[id number]]        Abnormal            Final result                 Please view results for these tests on the individual orders.    CBC Auto Differential [[id number]]  (Abnormal) Collected: 07/05/25 0930    Specimen: Blood Updated: 07/05/25 1000     WBC 7.60 10*3/mm3      RBC 4.59 10*6/mm3      Hemoglobin 15.2 g/dL      Hematocrit 45.1 %      MCV 98.3 fL      MCH 33.1 pg      MCHC 33.7 g/dL      RDW 13.2 %      RDW-SD 48.3 fl      MPV 9.5 fL      Platelets 213 10*3/mm3      Neutrophil % 81.0 %      Lymphocyte % 10.4 %      Monocyte % 7.8 %      Eosinophil % 0.3 %      Basophil % 0.1 %      Immature Grans % 0.4 %      Neutrophils, Absolute 6.16 10*3/mm3      Lymphocytes, Absolute 0.79 10*3/mm3      Monocytes, Absolute 0.59 10*3/mm3      Eosinophils, Absolute 0.02 10*3/mm3      Basophils, Absolute 0.01 10*3/mm3      Immature Grans, Absolute 0.03 10*3/mm3      nRBC 0.0 /100 WBC                 Assessment & Plan       Intractable nausea and vomiting      Assessment & Plan  64-year-old gentleman status post esophagectomy for malignant neoplasm of lower third of esophagus no evidence of disease as of last follow-up in January 2025 -was due for repeat CT scan and  follow-up in our office this month.  Resented to the emergency room with 2 weeks of nausea and vomiting which is worse than his baseline.  Related to hold any solid food in the last 24 hours patient concerned about dehydration and keeping up with nutritional needs.  Recommend working up for any typical causes of vomiting, CT scan with p.o. contrast chest and abdomen.   Patient observation for hydration completion of workup.    Need an EGD as well however will wait on CT scan findings first    I discussed the patients findings and our recommendations with patient and consulting provider    Thank you for this consult and allowing us to participate in the care of your patient.  We will follow along with you during this hospitalization.       Zaina Mason MD  [facility]  07/05/25  12:48 EDT

## 2025-07-06 LAB
ANION GAP SERPL CALCULATED.3IONS-SCNC: 15 MMOL/L (ref 5–15)
BASOPHILS # BLD AUTO: 0.01 10*3/MM3 (ref 0–0.2)
BASOPHILS NFR BLD AUTO: 0.2 % (ref 0–1.5)
BILIRUB UR QL STRIP: NEGATIVE
BUN SERPL-MCNC: 14 MG/DL (ref 8–23)
BUN/CREAT SERPL: 18.9 (ref 7–25)
CALCIUM SPEC-SCNC: 8.3 MG/DL (ref 8.6–10.5)
CHLORIDE SERPL-SCNC: 106 MMOL/L (ref 98–107)
CLARITY UR: CLEAR
CO2 SERPL-SCNC: 20 MMOL/L (ref 22–29)
COLOR UR: YELLOW
CREAT SERPL-MCNC: 0.74 MG/DL (ref 0.76–1.27)
DEPRECATED RDW RBC AUTO: 49.7 FL (ref 37–54)
EGFRCR SERPLBLD CKD-EPI 2021: 99.9 ML/MIN/1.73
EOSINOPHIL # BLD AUTO: 0.02 10*3/MM3 (ref 0–0.4)
EOSINOPHIL NFR BLD AUTO: 0.3 % (ref 0.3–6.2)
ERYTHROCYTE [DISTWIDTH] IN BLOOD BY AUTOMATED COUNT: 13.1 % (ref 12.3–15.4)
GLUCOSE BLDC GLUCOMTR-MCNC: 139 MG/DL (ref 70–130)
GLUCOSE BLDC GLUCOMTR-MCNC: 65 MG/DL (ref 70–130)
GLUCOSE BLDC GLUCOMTR-MCNC: 70 MG/DL (ref 70–130)
GLUCOSE BLDC GLUCOMTR-MCNC: 70 MG/DL (ref 70–130)
GLUCOSE BLDC GLUCOMTR-MCNC: 71 MG/DL (ref 70–130)
GLUCOSE BLDC GLUCOMTR-MCNC: 74 MG/DL (ref 70–130)
GLUCOSE SERPL-MCNC: 68 MG/DL (ref 65–99)
GLUCOSE UR STRIP-MCNC: ABNORMAL MG/DL
HCT VFR BLD AUTO: 44.9 % (ref 37.5–51)
HGB BLD-MCNC: 14.5 G/DL (ref 13–17.7)
HGB UR QL STRIP.AUTO: NEGATIVE
IMM GRANULOCYTES # BLD AUTO: 0.02 10*3/MM3 (ref 0–0.05)
IMM GRANULOCYTES NFR BLD AUTO: 0.3 % (ref 0–0.5)
KETONES UR QL STRIP: ABNORMAL
LEUKOCYTE ESTERASE UR QL STRIP.AUTO: NEGATIVE
LYMPHOCYTES # BLD AUTO: 1.16 10*3/MM3 (ref 0.7–3.1)
LYMPHOCYTES NFR BLD AUTO: 18 % (ref 19.6–45.3)
MCH RBC QN AUTO: 32.7 PG (ref 26.6–33)
MCHC RBC AUTO-ENTMCNC: 32.3 G/DL (ref 31.5–35.7)
MCV RBC AUTO: 101.4 FL (ref 79–97)
MONOCYTES # BLD AUTO: 0.51 10*3/MM3 (ref 0.1–0.9)
MONOCYTES NFR BLD AUTO: 7.9 % (ref 5–12)
NEUTROPHILS NFR BLD AUTO: 4.72 10*3/MM3 (ref 1.7–7)
NEUTROPHILS NFR BLD AUTO: 73.3 % (ref 42.7–76)
NITRITE UR QL STRIP: NEGATIVE
NRBC BLD AUTO-RTO: 0 /100 WBC (ref 0–0.2)
PH UR STRIP.AUTO: <=5 [PH] (ref 5–8)
PLATELET # BLD AUTO: 189 10*3/MM3 (ref 140–450)
PMV BLD AUTO: 9.5 FL (ref 6–12)
POTASSIUM SERPL-SCNC: 4 MMOL/L (ref 3.5–5.2)
PROT UR QL STRIP: NEGATIVE
RBC # BLD AUTO: 4.43 10*6/MM3 (ref 4.14–5.8)
SODIUM SERPL-SCNC: 141 MMOL/L (ref 136–145)
SP GR UR STRIP: >1.03 (ref 1–1.03)
UROBILINOGEN UR QL STRIP: ABNORMAL
WBC NRBC COR # BLD AUTO: 6.44 10*3/MM3 (ref 3.4–10.8)

## 2025-07-06 PROCEDURE — 82948 REAGENT STRIP/BLOOD GLUCOSE: CPT

## 2025-07-06 PROCEDURE — 99232 SBSQ HOSP IP/OBS MODERATE 35: CPT | Performed by: THORACIC SURGERY (CARDIOTHORACIC VASCULAR SURGERY)

## 2025-07-06 PROCEDURE — 99214 OFFICE O/P EST MOD 30 MIN: CPT | Performed by: INTERNAL MEDICINE

## 2025-07-06 PROCEDURE — 25010000002 HYDRALAZINE PER 20 MG: Performed by: INTERNAL MEDICINE

## 2025-07-06 PROCEDURE — 99214 OFFICE O/P EST MOD 30 MIN: CPT | Performed by: STUDENT IN AN ORGANIZED HEALTH CARE EDUCATION/TRAINING PROGRAM

## 2025-07-06 PROCEDURE — 80048 BASIC METABOLIC PNL TOTAL CA: CPT | Performed by: NURSE PRACTITIONER

## 2025-07-06 PROCEDURE — 85025 COMPLETE CBC W/AUTO DIFF WBC: CPT | Performed by: NURSE PRACTITIONER

## 2025-07-06 PROCEDURE — 36415 COLL VENOUS BLD VENIPUNCTURE: CPT | Performed by: NURSE PRACTITIONER

## 2025-07-06 PROCEDURE — 81003 URINALYSIS AUTO W/O SCOPE: CPT | Performed by: EMERGENCY MEDICINE

## 2025-07-06 PROCEDURE — 25810000003 SODIUM CHLORIDE 0.9 % SOLUTION: Performed by: NURSE PRACTITIONER

## 2025-07-06 PROCEDURE — G0378 HOSPITAL OBSERVATION PER HR: HCPCS

## 2025-07-06 RX ORDER — METOPROLOL SUCCINATE 50 MG/1
50 TABLET, EXTENDED RELEASE ORAL EVERY 12 HOURS SCHEDULED
Status: DISCONTINUED | OUTPATIENT
Start: 2025-07-06 | End: 2025-07-09 | Stop reason: HOSPADM

## 2025-07-06 RX ORDER — INSULIN LISPRO 100 [IU]/ML
2-7 INJECTION, SOLUTION INTRAVENOUS; SUBCUTANEOUS EVERY 6 HOURS SCHEDULED
Status: DISCONTINUED | OUTPATIENT
Start: 2025-07-06 | End: 2025-07-09 | Stop reason: HOSPADM

## 2025-07-06 RX ADMIN — FUROSEMIDE 20 MG: 20 TABLET ORAL at 14:20

## 2025-07-06 RX ADMIN — Medication 10 ML: at 12:30

## 2025-07-06 RX ADMIN — BUPROPION HYDROCHLORIDE 150 MG: 150 TABLET, EXTENDED RELEASE ORAL at 14:21

## 2025-07-06 RX ADMIN — Medication 10 ML: at 20:32

## 2025-07-06 RX ADMIN — RANOLAZINE 1000 MG: 500 TABLET, FILM COATED, EXTENDED RELEASE ORAL at 20:31

## 2025-07-06 RX ADMIN — LISINOPRIL 10 MG: 10 TABLET ORAL at 14:21

## 2025-07-06 RX ADMIN — GUAIFENESIN 1200 MG: 600 TABLET, EXTENDED RELEASE ORAL at 14:23

## 2025-07-06 RX ADMIN — ISOSORBIDE MONONITRATE 120 MG: 60 TABLET, EXTENDED RELEASE ORAL at 14:21

## 2025-07-06 RX ADMIN — PANTOPRAZOLE SODIUM 40 MG: 40 INJECTION, POWDER, FOR SOLUTION INTRAVENOUS at 20:32

## 2025-07-06 RX ADMIN — METOPROLOL SUCCINATE 50 MG: 50 TABLET, EXTENDED RELEASE ORAL at 20:32

## 2025-07-06 RX ADMIN — DEXTROSE MONOHYDRATE 25 G: 25 INJECTION, SOLUTION INTRAVENOUS at 06:50

## 2025-07-06 RX ADMIN — RANOLAZINE 1000 MG: 500 TABLET, FILM COATED, EXTENDED RELEASE ORAL at 14:21

## 2025-07-06 RX ADMIN — SODIUM CHLORIDE 75 ML/HR: 9 INJECTION, SOLUTION INTRAVENOUS at 03:55

## 2025-07-06 RX ADMIN — FLUTICASONE PROPIONATE 2 SPRAY: 50 SPRAY, METERED NASAL at 14:24

## 2025-07-06 RX ADMIN — ATORVASTATIN CALCIUM 40 MG: 20 TABLET, FILM COATED ORAL at 14:21

## 2025-07-06 RX ADMIN — GUAIFENESIN 1200 MG: 600 TABLET, EXTENDED RELEASE ORAL at 20:32

## 2025-07-06 RX ADMIN — AMLODIPINE BESYLATE 5 MG: 5 TABLET ORAL at 14:23

## 2025-07-06 RX ADMIN — HYDRALAZINE HYDROCHLORIDE 10 MG: 20 INJECTION INTRAMUSCULAR; INTRAVENOUS at 00:41

## 2025-07-06 RX ADMIN — PANTOPRAZOLE SODIUM 40 MG: 40 INJECTION, POWDER, FOR SOLUTION INTRAVENOUS at 14:20

## 2025-07-06 NOTE — PROGRESS NOTES
Name: Edmond Chase ADMIT: 2025   : 1960  PCP: Nargis Velasquez APRN    MRN: [id number] LOS: 0 days   AGE/SEX: 64 y.o. male  ROOM: [facility]     Subjective   Subjective   Decreased appetite, feels dehydrated, requesting ice chips. Generalized fatigue and weakness from decreased po intake. Nausea improved since admission. But unable to keep any food down so far.    Review of Systems  As above     Objective   Objective   Vital Signs  Temp:  [97.7 °F (36.5 °C)-98.4 °F (36.9 °C)] 98.2 °F (36.8 °C)  Heart Rate:  [] 87  Resp:  [16-18] 18  BP: (131-179)/() 160/105  SpO2:  [96 %-100 %] 100 %  on  Flow (L/min) (Oxygen Therapy):  [2] 2;   Device (Oxygen Therapy): nasal cannula  Body mass index is 39.33 kg/m².  Physical Exam  Vitals and nursing note reviewed.   Constitutional:       General: He is not in acute distress.     Appearance: He is ill-appearing.   Cardiovascular:      Rate and Rhythm: Normal rate and regular rhythm.   Pulmonary:      Effort: Pulmonary effort is normal. No respiratory distress.   Abdominal:      General: Abdomen is flat. There is no distension.      Tenderness: There is no abdominal tenderness.   Musculoskeletal:         General: No swelling or deformity.   Skin:     General: Skin is warm and dry.   Neurological:      General: No focal deficit present.      Mental Status: He is alert. Mental status is at baseline.         Results Review     I reviewed the patient's new clinical results.  Results from last 7 days   Lab Units 25  0410 25  0930   WBC 10*3/mm3 6.44 7.60   HEMOGLOBIN g/dL 14.5 15.2   PLATELETS 10*3/mm3 189 213     Results from last 7 days   Lab Units 25  0410 25  0930   SODIUM mmol/L 141 138   POTASSIUM mmol/L 4.0 3.5   CHLORIDE mmol/L 106 101   CO2 mmol/L 20.0* 24.2   BUN mg/dL 14.0 16.0   CREATININE mg/dL 0.74* 1.00   GLUCOSE mg/dL 68 114*   Estimated Creatinine Clearance: 138.1 mL/min (A) (by C-G formula based on SCr of 0.74 mg/dL  (L)).  Results from last 7 days   Lab Units 07/05/25  0930   ALBUMIN g/dL 4.0   BILIRUBIN mg/dL 0.9   ALK PHOS U/L 85   AST (SGOT) U/L 24   ALT (SGPT) U/L 21     Results from last 7 days   Lab Units 07/06/25  0410 07/05/25  1711 07/05/25  0930   CALCIUM mg/dL 8.3*  --  9.2   ALBUMIN g/dL  --   --  4.0   MAGNESIUM mg/dL  --  2.1 2.0       COVID19   Date Value Ref Range Status   07/05/2025 Not Detected Not Detected - Ref. Range Final   06/27/2025 Not Detected Not Detected - Ref. Range Final     Glucose   Date/Time Value Ref Range Status   07/06/2025 1559 74 70 - 130 mg/dL Final   07/06/2025 1111 70 70 - 130 mg/dL Final   07/06/2025 0712 139 (H) 70 - 130 mg/dL Final   07/06/2025 0645 65 (L) 70 - 130 mg/dL Final   07/06/2025 0341 70 70 - 130 mg/dL Final   07/06/2025 0049 71 70 - 130 mg/dL Final   07/05/2025 2129 90 70 - 130 mg/dL Final       CT Chest With Contrast Diagnostic, CT Abdomen Pelvis With Contrast  Narrative: CT CHEST W CONTRAST DIAGNOSTIC-, CT ABDOMEN PELVIS W CONTRAST-     INDICATION: Chest pain, dysphagia. HISTORY of esophageal cancer     COMPARISON: CT chest, abdomen and pelvis December 10, 2024     TECHNIQUE:  Routine CT chest, abdomen and pelvis with IV contrast. Coronal and  sagittal reformats. Radiation dose reduction techniques were utilized,  including automated exposure control and exposure modulation based on  body size.     FINDINGS:      Chest wall: No lymphadenopathy.     Mediastinum: Three-vessel coronary artery atherosclerotic  calcifications. CABG. Heart is normal in size. Aortic valve replacement.  No pericardial effusion. Suspect graft repair of the ascending thoracic  aorta. No mediastinal or hilar lymphadenopathy.     Lungs/pleura: No effusions. Airways wall thickening. Patent central  airways. Juxta fissural solid pulmonary nodule along the minor fissure,  series 3, axial mage 60, measures 5 mm, unchanged. Solid pulmonary  nodule in the lateral segment right middle lobe, series 3, axial  mage  69, measures 5 mm, unchanged. Posterior dependent and bibasilar  subsegmental atelectasis. Respiratory motion artifact.     ABDOMEN: Small cyst seen in segment 6 of the right hepatic lobe,  unchanged. Cholecystectomy. No biliary ductal dilatation. Spleen is  normal in size. Incidental splenule. Mild pancreatic atrophy. No  pancreatic ductal dilatation or mass seen. Stable mild thickening of the  left adrenal gland. Nonobstructing nephrolithiasis in the right mid  kidney, series 2, axial mage 138, measures 2 mm. Bilateral fluid  attenuating renal cysts, largest in the inferior pole left kidney  measuring 6.6 cm. No solid-appearing renal mass or hydronephrosis.     Pelvis: Prostate is normal in size. Underdistended bladder. No bladder  calculus.     Bowel: Esophagectomy with gastric pull-up. Some suspected retained  ingested contrast or secretions and oral contrast seen in the remaining  distal esophagus, just above the esophagogastric anastomosis. Small  amount of contrast seen in the stomach. No extraluminal contrast. No  small bowel obstruction. Colonic diverticulosis. Long cecal mesentery.  Appendix not identified though no secondary findings of appendicitis.     Abdominal wall: Rectus diastases. Small fat-containing supraumbilical  ventral hernia. Small fat-containing umbilical hernia. Small  fat-containing inguinal hernias.     Retroperitoneum: No lymphadenopathy.     Vasculature: Patent. Mild aortoiliac atherosclerotic calcification. No  abdominal aortic aneurysm.     Osseous structures: Median sternotomy. Bilateral glenohumeral  osteoarthritis. Diffuse idiopathic skeletal hyperostosis seen in the  thoracic spine. Lumbar facet degenerative arthropathy with grade 1  anterolisthesis of L4 on L5.     Impression:    1. No acute findings identified in the chest, abdomen or pelvis.  2. Esophagectomy with gastric pull-up. Some retained ingested contents  or secretions and oral contrast seen in the remaining  esophagus and  small amount of contrast seen in the stomach. No extraluminal contrast.  3. Airways disease.  4. Stable pulmonary nodules. No new or enlarging pulmonary nodules seen.  5. Nonobstructing right nephrolithiasis.  6. Colonic diverticulosis  7. Three-vessel coronary artery atherosclerotic calcifications status  post CABG. Aortic valve replacement. Suspect graft repair of the  ascending thoracic aorta.     This report was finalized on 7/5/2025 3:00 PM by Dr. Dylon Barney M.D  on Workstation: [id number]     XR Chest 1 View  Narrative: Portable chest radiograph     HISTORY: Chest pain     TECHNIQUE: Single AP portable radiograph of the chest     COMPARISON: Chest radiograph 6/27/2025     Impression: FINDINGS AND IMPRESSION:  Pulmonary consolidation, pleural effusion or pneumothorax is seen.  Cardiac silhouette within normal limits for size. There are mediastinal  wires and prosthetic heart valve.     This report was finalized on 7/5/2025 9:47 AM by Dr. Rory Durán M.D  on Workstation: [id number]       I reviewed the patient's daily medications.  Scheduled Medications  amLODIPine, 5 mg, Oral, Daily  atorvastatin, 40 mg, Oral, Daily  buPROPion XL, 150 mg, Oral, QAM  fluticasone, 2 spray, Nasal, Daily  [Held by provider] furosemide, 20 mg, Oral, Daily  guaiFENesin, 1,200 mg, Oral, Q12H  insulin lispro, 2-7 Units, Subcutaneous, Q6H  isosorbide mononitrate, 120 mg, Oral, Daily  lisinopril, 10 mg, Oral, Daily  metoprolol succinate XL, 50 mg, Oral, Q12H  pantoprazole, 40 mg, Intravenous, Q12H  ranolazine, 1,000 mg, Oral, Q12H  sodium chloride, 10 mL, Intravenous, Q12H    Infusions   Diet  NPO Diet NPO Type: Sips with Meds  Diet: Liquid; Clear Liquid; Fluid Consistency: Thin (IDDSI 0)         I have personally reviewed:  [x]  Laboratory   []  Microbiology   [x]  Radiology   [x]  EKG/Telemetry   [x]  Cardiology/Vascular   []  Pathology   [x]  Records     Assessment/Plan     Active Hospital Problems     Diagnosis  POA    **Intractable nausea and vomiting [R11.2]  Yes    Atrial fibrillation [I48.91]  Yes    Nephrolithiasis [N20.0]  Yes    Iron deficiency anemia [D50.9]  Yes    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       64 y.o. male admitted with Intractable nausea and vomiting.    1.  Intractable nausea and vomiting in a patient with a history of esophageal cancer status post esophagectomy and J-tube placement with removal./history of GI bleed/diverticulosis.    IV Protonix, IV Zofran.  Consult thoracic surgery recommened speech evaluation and SBFT, both have been ordered. GI consulted, plan on EGD tomorrow. Normal liver function test and lipase.  GI examination is benign.  CT scan of the chest/abdomen/pelvis revealed no acute disease/esophagectomy with gastric pull-up  2.  New onset atrial fibrillation, flutter in a patient with a history of coronary artery disease status post CABG/hypertension/bioprosthetic aortic valve replacement/ascending thoracic aneurysm repair.   Potassium is normal.  Troponin is mildly elevated but plateaued.  proBNP is normal.  Patient's last 2D echo on 6/27/2025 revealing an ejection fraction of 58%/normal diastolic function/left ventricular hypertrophy/bioprosthetic aortic valve.  Will continue Toprol/Ranexa/lisinopril/Norvasc/Lasix/Imdur.  Add hydralazine as needed.  Check TSH/magnesium.  Cardiology consulted, increased metoprolol  succinate to 50mg BID. Holding AC with EGD planned for tomorrow.  3.  Type 2 diabetes.  A1c 5.82 months ago.  Will hold Farxiga..Sliding scale insulin  4.  Obstructive sleep apnea.  Resume CPAP.  Stable pulmonary nodule by CAT scan.  5.  Hyperlipidemia.  Continue Lipitor.  6.  Nonobstructive nephrolithiasis.  Asymptomatic.    SCDs for DVT prophylaxis.  Full code.  Discussed with patient, family, and primary care provider.  Anticipate discharge home with HH vs SNU facility in 2-3 days.    Expected discharge date/ time has not been documented.      Chris Ahuja MD  [facility]  07/06/25  17:41 EDT

## 2025-07-06 NOTE — CONSULTS
[facility]iology [facility] Consult    Patient Name: Edmond Chase  Age/Sex: 64 y.o. male  : 1960  MRN: [id number]    Date of Admission: 2025  Date of Encounter Visit: 25  Encounter Provider: Efra Durán MD  Referring Provider: Darek Rodriguez MD  Place of Service: [facility] CARDIOLOGY  Patient Care Team:  Nargis Velasquez APRN as PCP - General (Family Medicine)  Papa Miguel MD as Consulting Physician (Cardiology)  Sekou Pisano MD as Consulting Physician (Pulmonary Disease)  Gregor Carlson MD as Consulting Physician (Gastroenterology)  Estevan Yuan MD (Sports Medicine)  Alex Hernadez MD as Consulting Physician (Nephrology)  Yazmin Molina APRN as Nurse Practitioner (Psychiatry)  Dylon Schwartz MD as Consulting Physician (Hematology and Oncology)  Valerie Stockton MD as Referring Physician (Thoracic Surgery)  Valerie Stockton MD as Surgeon (Thoracic Surgery)    Subjective:     Consulted for: New onset A-fib    Chief Complaint: Nausea and vomiting    History of Present Illness:  Edmond Chase is a 64 y.o. male CAD status post CABG, aortic valve replacement, hypertension, hyperlipidemia, esophageal cancer status post esophagectomy.  He presented with worsening nausea and vomiting over the last week or 2.  Previously was vomiting once or twice a week.  Now he states he is vomiting 3-4 times per week and is able to keep any food down.  He denies any significant jeremy pain.  He has chronic chest pain that has not changed.  No shortness of breath.  No new dizziness or lightheadedness.  Does not feel palpitations.  Is undergoing GI workup with thoracic surgery with possible upper GI pending.  During his workup he is found to be in new onset rate controlled atrial fibrillation.    Past Medical History:  Past Medical History:   Diagnosis Date    Abnormal nuclear stress test     Anxiety     Anxiety and depression      "Aortic valve insufficiency     nonrheumtic     Arthritis     knees    Ascending aortic aneurysm     CAD (coronary artery disease)     stent    Chest pain     Diabetes mellitus     Diarrhea     Dizzy     CAREFUL WHEN GETTING UP    Dyspnea     Esophageal cancer 11/2022    no chemo or radiation    Essential hypertension     Fatigue     G tube feedings     per jejunostomy tube nightly with permitin  3 cans nightly/ J-TUBE REMOVED 2023    GERD (gastroesophageal reflux disease)     GI bleed     Heart murmur     History of pneumonia     History of recent blood transfusion     hemorrhage     no reaction    Hyperglycemia     Hyperlipidemia     Hypersomnia with sleep apnea     Jejunostomy tube present     REMOVED 2023    Lightheadedness     Malaise and fatigue     Migraines     \"OCCULAR MIGRAINES\"    Morbid obesity     Myocardial ischemia     Nausea     Nocturia     TJ on auto CPAP 10/31/2016    Overnight polysomnogram.  Weight 276 pounds.  Severe TJ with AHI 79 events per hour.  Auto CPAP recommended.    Pneumonia     FEB 2016    Scrotal bleeding     SOB (shortness of breath)        Past Surgical History:   Procedure Laterality Date    AORTIC VALVE REPAIR/REPLACEMENT  05/2016    ASCENDING ARCH/HEMIARCH REPLACEMENT N/A 05/02/2016    Procedure: BHAVESH STERNOTOMY CORONARY ARTERY BYPASS GRAFT TIMES 3 USING LEFT INTERNAL MAMMARY ARTERY AND RIGHT GREATER SAPHENOUS VEIN GRAFT PER ENDOSCOPIC VEIN HARVESTING, AORTIC ANEURYSM REPAIR WITH ROOT REPAIR AND AORTIC VALVE REPLACEMENT;  Surgeon: Rosalio Cline MD;  Location: [facility] MAIN OR;  Service:     CARDIAC CATHETERIZATION N/A 04/01/2016    Procedure: Left Heart Cath;  Surgeon: Erick Tam MD;  Location: [facility] CATH INVASIVE LOCATION;  Service:     CARDIAC CATHETERIZATION N/A 04/01/2016    Procedure: Left ventriculography;  Surgeon: Erick Tam MD;  Location: [facility] CATH INVASIVE LOCATION;  Service:     CARDIAC CATHETERIZATION N/A 04/01/2016    Procedure: Right Heart Cath;  Surgeon: " Erick Tam MD;  Location: [facility] CATH INVASIVE LOCATION;  Service:     CARDIAC CATHETERIZATION N/A 10/30/2017    Procedure: Coronary angiography;  Surgeon: Sorin Vasquez MD;  Location: [facility] CATH INVASIVE LOC[facility];  Service:     CARDIAC CATHETERIZATION  10/30/2017    Procedure: Saphenous Vein Graft;  Surgeon: Sorin Vasquez MD;  Location: [facility] CATH INVASIVE LOCATION;  Service:     CARDIAC CATHETERIZATION N/A 10/30/2017    Procedure: Native mammary injection;  Surgeon: Sorin Vasquez MD;  Location: [facility] CATH INVASIVE LOCATION;  Service:     CARDIAC CATHETERIZATION N/A 07/07/2020    Procedure: Coronary angiography;  Surgeon: Gregor Kim MD;  Location: [facility] CATH INVASIVE LOCATION;  Service: Cardiovascular;  Laterality: N/A;    CARDIAC CATHETERIZATION N/A 07/07/2020    Procedure: Left heart cath;  Surgeon: Gregor Kim MD;  Location: [facility] CATH INVASIVE LOCATION;  Service: Cardiovascular;  Laterality: N/A;    CARDIAC CATHETERIZATION N/A 07/07/2020    Procedure: Left ventriculography;  Surgeon: Gregor Kim MD;  Location: [facility] CATH INVASIVE LOCATION;  Service: Cardiovascular;  Laterality: N/A;    CARDIAC CATHETERIZATION N/A 07/07/2020    Procedure: Stent ESMER bypass graft;  Surgeon: Gregor Kim MD;  Location: [facility] CATH INVASIVE LOCATION;  Service: Cardiovascular;  Laterality: N/A;    CARDIAC CATHETERIZATION N/A 06/17/2021    Procedure: SAPHENOUS VEIN GRAFT;  Surgeon: Marques Ndiaye MD;  Location: [facility] CATH INVASIVE LOCATION;  Service: Cardiovascular;  Laterality: N/A;    CARDIAC CATHETERIZATION N/A 06/17/2021    Procedure: Left Heart Cath;  Surgeon: Marques Ndiaye MD;  Location: [facility] CATH INVASIVE LOCATION;  Service: Cardiovascular;  Laterality: N/A;    CARDIAC CATHETERIZATION N/A 06/17/2021    Procedure: Coronary angiography;  Surgeon: Marques Ndiaye MD;  Location: [facility] CATH INVASIVE LOCATION;  Service: Cardiovascular;  Laterality: N/A;    CARDIAC  CATHETERIZATION N/A 07/14/2022    Procedure: Left Heart Cath;  Surgeon: Charlie Aj MD;  Location:  [facility] CATH INVASIVE LOCATION;  Service: Cardiovascular;  Laterality: N/A;    CARDIAC CATHETERIZATION N/A 07/14/2022    Procedure: Coronary angiography;  Surgeon: Charlie Aj MD;  Location:  [facility] CATH INVASIVE LOCATION;  Service: Cardiovascular;  Laterality: N/A;    CARDIAC CATHETERIZATION  07/14/2022    Procedure: Saphenous Vein Graft;  Surgeon: Charlie Aj MD;  Location:  [facility] CATH INVASIVE LOCATION;  Service: Cardiovascular;;    CARDIAC CATHETERIZATION N/A 07/14/2022    Procedure: Native mammary injection;  Surgeon: Charlie Aj MD;  Location:  [facility] CATH INVASIVE LOCATION;  Service: Cardiovascular;  Laterality: N/A;    CARDIAC CATHETERIZATION N/A 10/24/2024    Procedure: Left Heart Cath;  Surgeon: Godwin Francis MD;  Location: [facility] CATH INVASIVE LOC[facility];  Service: Cardiovascular;  Laterality: N/A;    CARDIAC CATHETERIZATION N/A 10/24/2024    Procedure: Coronary angiography;  Surgeon: Godwin Francis MD;  Location:  [facility] CATH INVASIVE LOCATION;  Service: Cardiovascular;  Laterality: N/A;    CARDIAC CATHETERIZATION N/A 10/24/2024    Procedure: Native mammary injection;  Surgeon: Godwin Francis MD;  Location: [facility] CATH INVASIVE LOC[facility];  Service: Cardiovascular;  Laterality: N/A;    CATARACT EXTRACTION EXTRACAPSULAR W/ INTRAOCULAR LENS IMPLANTATION Left     CHOLECYSTECTOMY WITH INTRAOPERATIVE CHOLANGIOGRAM N/A 09/01/2023    Procedure: CHOLECYSTECTOMY LAPAROSCOPIC INTRAOPERATIVE CHOLANGIOGRAM choledoscopy common bile duct exploration;  Surgeon: Jose Manuel Baca MD;  Location: [facility] MAIN OR;  Service: General;  Laterality: N/A;    COLONOSCOPY N/A 11/26/2022    Procedure: COLONOSCOPY AT BEDSIDE;  Surgeon: Tomy Lopez MD;  Location: [facility] MAIN OR;  Service: Gastroenterology;  Laterality: N/A;    COLONOSCOPY W/ POLYPECTOMY N/A 10/04/2022    Procedure: COLONOSCOPY to  cecum with cold forceps and cold snare polypectomies;  Surgeon: Gregor Carlson MD;  Location: [facility] ENDOSCOPY;  Service: Gastroenterology;  Laterality: N/A;  PRE- hx of polyps  POST- diverticulosis, polyps    CORONARY ARTERY BYPASS GRAFT  05/2016    LIMA TO LAD, SVG TO PDA, SVG TO OM2    ENDOSCOPY N/A 07/13/2022    Procedure: ESOPHAGOGASTRODUODENOSCOPY;  Surgeon: Marcia Hollis MD;  Location: [facility] ENDOSCOPY;  Service: Gastroenterology;  Laterality: N/A;  PRE- ANEMIA, MELENA  POST- MILD EROSIVE GASTRITIS, GE JUNCTION ULCER    ENDOSCOPY N/A 10/04/2022    Procedure: ESOPHAGOGASTRODUODENOSCOPY with biopsies;  Surgeon: Gregor Carlson MD;  Location: [facility] ENDOSCOPY;  Service: Gastroenterology;  Laterality: N/A;  PRE- hx of esophageal ulcer  POST- gastric cardia mass    ENDOSCOPY N/A 05/01/2023    Procedure: ESOPHAGOGASTRODUODENOSCOPY WITH  [id number] balloon DILATATION of pylorus and anastomosis;  Surgeon: Valerie Stockton MD;  Location: [facility] ENDOSCOPY;  Service: Gastroenterology;  Laterality: N/A;  PRE: dysphagia  POST: no abnormalities seen    ENDOSCOPY N/A 6/25/2024    Procedure: ESOPHAGOGASTRODUODENOSCOPY WITH BIOPSY and 15mm balloon dilatation (pylorus);  Surgeon: Valerie Stockton MD;  Location: [facility] ENDOSCOPY;  Service: Gastroenterology;  Laterality: N/A;  pre: Esophageal cancer  post: normal anastamosis    ESOPHAGOGASTRECTOMY Right 02/03/2023    Procedure: BRONCHOSCOPY, RIGHT ROBOTIC VIDEO ASSISTED THORACOSCOPY WITH TOTAL ESOPHAGECTOMY, INTERCOSTAL NERVE BLOCK, FEEDING JEJUNOSTOMY PLACEMENT;  Surgeon: Valerie Stockton MD;  Location: [facility] MAIN OR;  Service: [facility] - [facility];  Laterality: Right;    INNER EAR SURGERY      JEJUNOSTOMY TUBE INSERTION      REMOVED 2023    TONSILLECTOMY         Home Medications:   Medications Prior to Admission   Medication Sig Dispense Refill Last Dose/Taking    amLODIPine (NORVASC) 5 MG tablet Take 1 tablet by mouth Daily. Take 5 mg 90 tablet 3  7/4/2025    armodafinil (NUVIGIL) 150 MG tablet Take 1 tablet by mouth Daily. 30 tablet 2 7/4/2025    atorvastatin (LIPITOR) 40 MG tablet Take 1 tablet by mouth Daily. 90 tablet 3 7/4/2025    buPROPion XL (Wellbutrin XL) 150 MG 24 hr tablet Take 1 tablet by mouth Every Morning. 30 tablet 2 7/4/2025    Farxiga 10 MG tablet TAKE 1 TABLET BY MOUTH DAILY 90 tablet 1 7/4/2025    fluticasone (FLONASE) 50 MCG/ACT nasal spray Administer 2 sprays into the nostril(s) as directed by provider Daily.   7/4/2025    furosemide (LASIX) 20 MG tablet TAKE 1 TABLET BY MOUTH DAILY 90 tablet 0 7/4/2025    guaiFENesin (MUCINEX) 600 MG 12 hr tablet Take 2 tablets by mouth Every 12 (Twelve) Hours for 10 days. 40 tablet 0 7/4/2025    isosorbide mononitrate (IMDUR) 120 MG 24 hr tablet Take 1 tablet by mouth Daily. 90 tablet 1 7/4/2025    lisinopril (PRINIVIL,ZESTRIL) 10 MG tablet Take 1 tablet by mouth Daily. 90 tablet 3 7/4/2025    metoprolol succinate XL (TOPROL-XL) 50 MG 24 hr tablet Take 1 tablet by mouth Daily. 90 tablet 3 7/4/2025    ranolazine (RANEXA) 500 MG 12 hr tablet Take 2 tablets by mouth Every 12 (Twelve) Hours. 360 tablet 3 7/4/2025       Allergies:  No Known Allergies    Past Social History:  Social History     Socioeconomic History    Marital status: Single   Tobacco Use    Smoking status: Never     Passive exposure: Never    Smokeless tobacco: Never   Vaping Use    Vaping status: Never Used   Substance and Sexual Activity    Alcohol use: Not Currently     Comment: usually 1-2 month but none since christmas 2022    Drug use: Never    Sexual activity: Defer       Past Family History:  Family History   Problem Relation Age of Onset    Hyperlipidemia Mother     Arthritis Mother     Hypertension Mother     Diabetes Mother     Heart disease Mother     Hyperlipidemia Father     Heart disease Father     Hypertension Father     Lung cancer Father     Heart disease Sister     Stroke Maternal Grandmother     Heart disease Maternal  "Grandmother     Hypertension Maternal Grandfather     Hypertension Paternal Grandmother     Hypertension Paternal Grandfather     Other Other         The patient states that his sister has a problem that goes by the name of \"long QT\".  He says this is a familial trait but he also says that he is \"not interested in pursuing it for himself\".    Coronary artery disease Other     Malig Hyperthermia Neg Hx        Review of Systems:   All systems reviewed. Pertinent positives identified in HPI. All other systems are negative.    Objective:   Temp:  [97.5 °F (36.4 °C)-98.4 °F (36.9 °C)] 98.2 °F (36.8 °C)  Heart Rate:  [] 91  Resp:  [16-20] 18  BP: (131-179)/() 148/94     Intake/Output Summary (Last 24 hours) at 7/6/2025 0954  Last data filed at 7/6/2025 0640  Gross per 24 hour   Intake 1581 ml   Output 200 ml   Net 1381 ml     Body mass index is 39.33 kg/m².      07/05/25  1516   Weight: 132 kg (290 lb)     Weight change:     Physical Exam:               Constitutional: Awake, alert              Eyes: PERRLA, sclerae anicteric              HENT: NCAT, mucous membranes moist              Neck: Supple,, trachea midline              Respiratory: Clear to auscultation bilaterally, nonlabored respirations               Cardiovascular: Irregularly irregular, no murmurs, rubs, or gallops, no edema, no jvd               Gastrointestinal: soft, nt, nd               Psychiatric: Appropriate affect, cooperative              Neurologic: Oriented x 3, no focal deficits    Lab Review:   Results from last 7 days   Lab Units 07/06/25  0410 07/05/25  0930   SODIUM mmol/L 141 138   POTASSIUM mmol/L 4.0 3.5   CHLORIDE mmol/L 106 101   CO2 mmol/L 20.0* 24.2   BUN mg/dL 14.0 16.0   CREATININE mg/dL 0.74* 1.00   GLUCOSE mg/dL 68 114*   CALCIUM mg/dL 8.3* 9.2   AST (SGOT) U/L  --  24   ALT (SGPT) U/L  --  21     Results from last 7 days   Lab Units 07/05/25  1149 07/05/25  0930   HSTROP T ng/L 32* 40*     Results from last 7 days " "  Lab Units 07/06/25  0410 07/05/25  0930   WBC 10*3/mm3 6.44 7.60   HEMOGLOBIN g/dL 14.5 15.2   HEMATOCRIT % 44.9 45.1   PLATELETS 10*3/mm3 189 213         Results from last 7 days   Lab Units 07/05/25  1711 07/05/25  0930   MAGNESIUM mg/dL 2.1 2.0           Invalid input(s): \"LDLCALC\"  Results from last 7 days   Lab Units 07/05/25  0930   PROBNP pg/mL 2,159.0*     Results from last 7 days   Lab Units 07/05/25  1149   TSH uIU/mL 1.630       Echo EF Estimated  Lab Results   Component Value Date    ECHOEFEST 62 07/07/2021       EKG:     Imaging:  Imaging Results (Most Recent)       Procedure Component Value Units Date/Time    CT Chest With Contrast Diagnostic [[id number]] Collected: 07/05/25 1442     Updated: 07/05/25 1503    Narrative:      CT CHEST W CONTRAST DIAGNOSTIC-, CT ABDOMEN PELVIS W CONTRAST-     INDICATION: Chest pain, dysphagia. HISTORY of esophageal cancer     COMPARISON: CT chest, abdomen and pelvis December 10, 2024     TECHNIQUE:  Routine CT chest, abdomen and pelvis with IV contrast. Coronal and  sagittal reformats. Radiation dose reduction techniques were utilized,  including automated exposure control and exposure modulation based on  body size.     FINDINGS:      Chest wall: No lymphadenopathy.     Mediastinum: Three-vessel coronary artery atherosclerotic  calcifications. CABG. Heart is normal in size. Aortic valve replacement.  No pericardial effusion. Suspect graft repair of the ascending thoracic  aorta. No mediastinal or hilar lymphadenopathy.     Lungs/pleura: No effusions. Airways wall thickening. Patent central  airways. Juxta fissural solid pulmonary nodule along the minor fissure,  series 3, axial mage 60, measures 5 mm, unchanged. Solid pulmonary  nodule in the lateral segment right middle lobe, series 3, axial mage  69, measures 5 mm, unchanged. Posterior dependent and bibasilar  subsegmental atelectasis. Respiratory motion artifact.     ABDOMEN: Small cyst seen in segment 6 of the " right hepatic lobe,  unchanged. Cholecystectomy. No biliary ductal dilatation. Spleen is  normal in size. Incidental splenule. Mild pancreatic atrophy. No  pancreatic ductal dilatation or mass seen. Stable mild thickening of the  left adrenal gland. Nonobstructing nephrolithiasis in the right mid  kidney, series 2, axial mage 138, measures 2 mm. Bilateral fluid  attenuating renal cysts, largest in the inferior pole left kidney  measuring 6.6 cm. No solid-appearing renal mass or hydronephrosis.     Pelvis: Prostate is normal in size. Underdistended bladder. No bladder  calculus.     Bowel: Esophagectomy with gastric pull-up. Some suspected retained  ingested contrast or secretions and oral contrast seen in the remaining  distal esophagus, just above the esophagogastric anastomosis. Small  amount of contrast seen in the stomach. No extraluminal contrast. No  small bowel obstruction. Colonic diverticulosis. Long cecal mesentery.  Appendix not identified though no secondary findings of appendicitis.     Abdominal wall: Rectus diastases. Small fat-containing supraumbilical  ventral hernia. Small fat-containing umbilical hernia. Small  fat-containing inguinal hernias.     Retroperitoneum: No lymphadenopathy.     Vasculature: Patent. Mild aortoiliac atherosclerotic calcification. No  abdominal aortic aneurysm.     Osseous structures: Median sternotomy. Bilateral glenohumeral  osteoarthritis. Diffuse idiopathic skeletal hyperostosis seen in the  thoracic spine. Lumbar facet degenerative arthropathy with grade 1  anterolisthesis of L4 on L5.       Impression:         1. No acute findings identified in the chest, abdomen or pelvis.  2. Esophagectomy with gastric pull-up. Some retained ingested contents  or secretions and oral contrast seen in the remaining esophagus and  small amount of contrast seen in the stomach. No extraluminal contrast.  3. Airways disease.  4. Stable pulmonary nodules. No new or enlarging pulmonary  nodules seen.  5. Nonobstructing right nephrolithiasis.  6. Colonic diverticulosis  7. Three-vessel coronary artery atherosclerotic calcifications status  post CABG. Aortic valve replacement. Suspect graft repair of the  ascending thoracic aorta.     This report was finalized on 7/5/2025 3:00 PM by Dr. Dylon Barney M.D  on Workstation: [id number]       CT Abdomen Pelvis With Contrast [[id number]] Collected: 07/05/25 1442     Updated: 07/05/25 1503    Narrative:      CT CHEST W CONTRAST DIAGNOSTIC-, CT ABDOMEN PELVIS W CONTRAST-     INDICATION: Chest pain, dysphagia. HISTORY of esophageal cancer     COMPARISON: CT chest, abdomen and pelvis December 10, 2024     TECHNIQUE:  Routine CT chest, abdomen and pelvis with IV contrast. Coronal and  sagittal reformats. Radiation dose reduction techniques were utilized,  including automated exposure control and exposure modulation based on  body size.     FINDINGS:      Chest wall: No lymphadenopathy.     Mediastinum: Three-vessel coronary artery atherosclerotic  calcifications. CABG. Heart is normal in size. Aortic valve replacement.  No pericardial effusion. Suspect graft repair of the ascending thoracic  aorta. No mediastinal or hilar lymphadenopathy.     Lungs/pleura: No effusions. Airways wall thickening. Patent central  airways. Juxta fissural solid pulmonary nodule along the minor fissure,  series 3, axial mage 60, measures 5 mm, unchanged. Solid pulmonary  nodule in the lateral segment right middle lobe, series 3, axial mage  69, measures 5 mm, unchanged. Posterior dependent and bibasilar  subsegmental atelectasis. Respiratory motion artifact.     ABDOMEN: Small cyst seen in segment 6 of the right hepatic lobe,  unchanged. Cholecystectomy. No biliary ductal dilatation. Spleen is  normal in size. Incidental splenule. Mild pancreatic atrophy. No  pancreatic ductal dilatation or mass seen. Stable mild thickening of the  left adrenal gland. Nonobstructing  nephrolithiasis in the right mid  kidney, series 2, axial mage 138, measures 2 mm. Bilateral fluid  attenuating renal cysts, largest in the inferior pole left kidney  measuring 6.6 cm. No solid-appearing renal mass or hydronephrosis.     Pelvis: Prostate is normal in size. Underdistended bladder. No bladder  calculus.     Bowel: Esophagectomy with gastric pull-up. Some suspected retained  ingested contrast or secretions and oral contrast seen in the remaining  distal esophagus, just above the esophagogastric anastomosis. Small  amount of contrast seen in the stomach. No extraluminal contrast. No  small bowel obstruction. Colonic diverticulosis. Long cecal mesentery.  Appendix not identified though no secondary findings of appendicitis.     Abdominal wall: Rectus diastases. Small fat-containing supraumbilical  ventral hernia. Small fat-containing umbilical hernia. Small  fat-containing inguinal hernias.     Retroperitoneum: No lymphadenopathy.     Vasculature: Patent. Mild aortoiliac atherosclerotic calcification. No  abdominal aortic aneurysm.     Osseous structures: Median sternotomy. Bilateral glenohumeral  osteoarthritis. Diffuse idiopathic skeletal hyperostosis seen in the  thoracic spine. Lumbar facet degenerative arthropathy with grade 1  anterolisthesis of L4 on L5.       Impression:         1. No acute findings identified in the chest, abdomen or pelvis.  2. Esophagectomy with gastric pull-up. Some retained ingested contents  or secretions and oral contrast seen in the remaining esophagus and  small amount of contrast seen in the stomach. No extraluminal contrast.  3. Airways disease.  4. Stable pulmonary nodules. No new or enlarging pulmonary nodules seen.  5. Nonobstructing right nephrolithiasis.  6. Colonic diverticulosis  7. Three-vessel coronary artery atherosclerotic calcifications status  post CABG. Aortic valve replacement. Suspect graft repair of the  ascending thoracic aorta.     This report was  finalized on 7/5/2025 3:00 PM by Dr. Dylon Barney M.D  on Workstation: [id number]       XR Chest 1 View [[id number]] Collected: 07/05/25 0944     Updated: 07/05/25 0950    Narrative:      Portable chest radiograph     HISTORY: Chest pain     TECHNIQUE: Single AP portable radiograph of the chest     COMPARISON: Chest radiograph 6/27/2025       Impression:      FINDINGS AND IMPRESSION:  Pulmonary consolidation, pleural effusion or pneumothorax is seen.  Cardiac silhouette within normal limits for size. There are mediastinal  wires and prosthetic heart valve.     This report was finalized on 7/5/2025 9:47 AM by Dr. Rory Durán M.D  on Workstation: [id number]               I personally viewed and interpreted the patient's EKG    Assessment:       Intractable nausea and vomiting    Essential hypertension    S/P CABG (coronary artery bypass graft)    S/P AVR (aortic valve replacement)    H/O esophagectomy    Coronary artery disease of bypass graft of native heart with stable angina pectoris    Obstructive sleep apnea, adult treated with auto CPAP    Type 2 diabetes mellitus, without long-term current use of insulin    Malignant neoplasm of lower third of esophagus    Iron deficiency anemia    Atrial fibrillation    Nephrolithiasis        Plan:     -A-fib is rate controlled on metoprolol. Will increase xl 50mg to bid dosing.   - Recent echocardiogram with normal EF and AVR functionally intact  - C2v 4, would recommend AC but hold in the setting of pending GI procedure. He does have a history of GI bleed remotely, will need to monitor.     We will continue to follow.     Thank you for allowing me to participate in the care of Edmond Chase. Feel free to contact me directly with any further questions or concerns.    Efra Durán MD  [facility]  07/06/25  09:54 EDT    EMR Dragon/Transcription disclaimer:   Part of this note may be an electronic transcription/translation of spoken language to  printed text using the Dragon dictation system.

## 2025-07-06 NOTE — CONSULTS
"[facility]  Initial Inpatient Consult Note    Referring Provider: Seymour    Reason for Consultation: Nausea and vomiting    Subjective     History of present illness:    64 y.o. male past medical history significant for esophageal cancer diagnosed 2023 with esophagectomy, admitted with nausea and vomiting of 2 weeks duration.  Vomiting every day.  Also found to have A-fib during this admission, being followed by cardiology and thoracic surgery.  Today he is not able to keep any food down.  Having difficulties with clear liquids.  There is no vomiting of blood.  No change in bowel habits.  Denies abdominal pain.    Most recent EGD was 1 year ago by Dr. Wood and with dilation of the pylorus to 12 mm, she believed it appeared slightly deformed and strictured.    Past Medical History:  Past Medical History:   Diagnosis Date    Abnormal nuclear stress test     Anxiety     Anxiety and depression     Aortic valve insufficiency     nonrheumtic     Arthritis     knees    Ascending aortic aneurysm     CAD (coronary artery disease)     stent    Chest pain     Diabetes mellitus     Diarrhea     Dizzy     CAREFUL WHEN GETTING UP    Dyspnea     Esophageal cancer 11/2022    no chemo or radiation    Essential hypertension     Fatigue     G tube feedings     per jejunostomy tube nightly with permitin  3 cans nightly/ J-TUBE REMOVED 2023    GERD (gastroesophageal reflux disease)     GI bleed     Heart murmur     History of pneumonia     History of recent blood transfusion     hemorrhage     no reaction    Hyperglycemia     Hyperlipidemia     Hypersomnia with sleep apnea     Jejunostomy tube present     REMOVED 2023    Lightheadedness     Malaise and fatigue     Migraines     \"OCCULAR MIGRAINES\"    Morbid obesity     Myocardial ischemia     Nausea     Nocturia     TJ on auto CPAP 10/31/2016    Overnight polysomnogram.  Weight 276 pounds.  Severe TJ with AHI 79 events per hour.  Auto CPAP recommended.    " Pneumonia     FEB 2016    Scrotal bleeding     SOB (shortness of breath)      Past Surgical History:  Past Surgical History:   Procedure Laterality Date    AORTIC VALVE REPAIR/REPLACEMENT  05/2016    ASCENDING ARCH/HEMIARCH REPLACEMENT N/A 05/02/2016    Procedure: BHAVESH STERNOTOMY CORONARY ARTERY BYPASS GRAFT TIMES 3 USING LEFT INTERNAL MAMMARY ARTERY AND RIGHT GREATER SAPHENOUS VEIN GRAFT PER ENDOSCOPIC VEIN HARVESTING, AORTIC ANEURYSM REPAIR WITH ROOT REPAIR AND AORTIC VALVE REPLACEMENT;  Surgeon: Rosalio Cline MD;  Location: [facility] MAIN OR;  Service:     CARDIAC CATHETERIZATION N/A 04/01/2016    Procedure: Left Heart Cath;  Surgeon: Erick Tam MD;  Location: [facility] CATH INVASIVE LOCATION;  Service:     CARDIAC CATHETERIZATION N/A 04/01/2016    Procedure: Left ventriculography;  Surgeon: Erick Tam MD;  Location: [facility] CATH INVASIVE LOCATION;  Service:     CARDIAC CATHETERIZATION N/A 04/01/2016    Procedure: Right Heart Cath;  Surgeon: Erick Tam MD;  Location: [facility] CATH INVASIVE LOCATION;  Service:     CARDIAC CATHETERIZATION N/A 10/30/2017    Procedure: Coronary angiography;  Surgeon: Sorin Vasquez MD;  Location: [facility] CATH INVASIVE LOCATION;  Service:     CARDIAC CATHETERIZATION  10/30/2017    Procedure: Saphenous Vein Graft;  Surgeon: Sorin Vasquez MD;  Location: [facility] CATH INVASIVE LOCATION;  Service:     CARDIAC CATHETERIZATION N/A 10/30/2017    Procedure: Native mammary injection;  Surgeon: Sorin Vasquez MD;  Location: [facility] CATH INVASIVE LOCATION;  Service:     CARDIAC CATHETERIZATION N/A 07/07/2020    Procedure: Coronary angiography;  Surgeon: Gregor Kim MD;  Location: [facility] CATH INVASIVE LOCATION;  Service: Cardiovascular;  Laterality: N/A;    CARDIAC CATHETERIZATION N/A 07/07/2020    Procedure: Left heart cath;  Surgeon: Gregor Kim MD;  Location: [facility] CATH INVASIVE LOCATION;  Service: Cardiovascular;  Laterality: N/A;    CARDIAC CATHETERIZATION N/A 07/07/2020     Procedure: Left ventriculography;  Surgeon: Gregor Kim MD;  [facility]AS[facility];  Service: Cardiovascular;  Laterality: N/A;    CARDIAC CATHETERIZATION N/A 07/07/2020    Procedure: Stent ESMER bypass graft;  Surgeon: Gregor Kim MD;  [facility];  Service: Cardiovascular;  Laterality: N/A;    CARDIAC CATHETERIZATION N/A 06/17/2021    Procedure: SAPHENOUS VEIN GRAFT;  Surgeon: Marques Ndiaye MD;  [facility];  Service: Cardiovascular;  Laterality: N/A;    CARDIAC CATHETERIZATION N/A 06/17/2021    Procedure: Left Heart Cath;  Surgeon: Marques Ndiaye MD;  [facility];  Service: Cardiovascular;  Laterality: N/A;    CARDIAC CATHETERIZATION N/A 06/17/2021    Procedure: Coronary angiography;  Surgeon: Marques Ndiaye MD;  Location: [facility] CATH INVASIVE LOCATION;  Service: Cardiovascular;  Laterality: N/A;    CARDIAC CATHETERIZATION N/A 07/14/2022    Procedure: Left Heart Cath;  Surgeon: Charlie Aj MD;  [facility];  Service: Cardiovascular;  Laterality: N/A;    CARDIAC CATHETERIZATION N/A 07/14/2022    Procedure: Coronary angiography;  Surgeon: Charlie Aj MD;  [facility];  Service: Cardiovascular;  Laterality: N/A;    CARDIAC CATHETERIZATION  07/14/2022    Procedure: Saphenous Vein Graft;  Surgeon: Charlie Aj MD;  Location: [facility] CATH INVASIVE LOCATION;  Service: Cardiovascular;;    CARDIAC CATHETERIZATION N/A 07/14/2022    Procedure: Native mammary injection;  Surgeon: Charlie Aj MD;  [facility];  Service: Cardiovascular;  Laterality: N/A;    CARDIAC CATHETERIZATION N/A 10/24/2024    Procedure: Left Heart Cath;  Surgeon: Godwin Francis MD;  [facility];  Service: Cardiovascular;  Laterality: N/A;    CARDIAC CATHETERIZATION N/A 10/24/2024     Procedure: Coronary angiography;  Surgeon: Godwin Francis MD;  Location: [facility] CATH INVASIVE LOCATION;  Service: Cardiovascular;  Laterality: N/A;    CARDIAC CATHETERIZATION N/A 10/24/2024    Procedure: Native mammary injection;  Surgeon: Godwin Francis MD;  [facility];  Service: Cardiovascular;  Laterality: N/A;    CATARACT EXTRACTION EXTRACAPSULAR W/ INTRAOCULAR LENS IMPLANTATION Left     CHOLECYSTECTOMY WITH INTRAOPERATIVE CHOLANGIOGRAM N/A 09/01/2023    Procedure: CHOLECYSTECTOMY LAPAROSCOPIC INTRAOPERATIVE CHOLANGIOGRAM choledoscopy common bile duct exploration;  Surgeon: Jose Manuel Baca MD;  Location: [facility] MAIN OR;  Service: General;  Laterality: N/A;    COLONOSCOPY N/A 11/26/2022    Procedure: COLONOSCOPY AT BEDSIDE;  Surgeon: Tomy Lopez MD;  Location: [facility] MAIN OR;  Service: Gastroenterology;  Laterality: N/A;    COLONOSCOPY W/ POLYPECTOMY N/A 10/04/2022    Procedure: COLONOSCOPY to cecum with cold forceps and cold snare polypectomies;  Surgeon: Gregor Carlson MD;  Location: [facility] ENDOSCOPY;  Service: Gastroenterology;  Laterality: N/A;  PRE- hx of polyps  POST- diverticulosis, polyps    CORONARY ARTERY BYPASS GRAFT  05/2016    LIMA TO LAD, SVG TO PDA, SVG TO OM2    ENDOSCOPY N/A 07/13/2022    Procedure: ESOPHAGOGASTRODUODENOSCOPY;  Surgeon: Marcia Hollis MD;  Location: [facility] ENDOSCOPY;  Service: Gastroenterology;  Laterality: N/A;  PRE- ANEMIA, MELENA  POST- MILD EROSIVE GASTRITIS, GE JUNCTION ULCER    ENDOSCOPY N/A 10/04/2022    Procedure: ESOPHAGOGASTRODUODENOSCOPY with biopsies;  Surgeon: Gregor Carlson MD;  Location: [facility] ENDOSCOPY;  Service: Gastroenterology;  Laterality: N/A;  PRE- hx of esophageal ulcer  POST- gastric cardia mass    ENDOSCOPY N/A 05/01/2023    Procedure: ESOPHAGOGASTRODUODENOSCOPY WITH  [id number] balloon DILATATION of pylorus and anastomosis;  Surgeon: Valerie Stockton MD;  Location: [facility] ENDOSCOPY;  Service:  "Gastroenterology;  Laterality: N/A;  PRE: dysphagia  POST: no abnormalities seen    ENDOSCOPY N/A 6/25/2024    Procedure: ESOPHAGOGASTRODUODENOSCOPY WITH BIOPSY and 15mm balloon dilatation (pylorus);  Surgeon: Valerie Stockton MD;  Location: [facility] ENDOSCOPY;  Service: Gastroenterology;  Laterality: N/A;  pre: Esophageal cancer  post: normal anastamosis    ESOPHAGOGASTRECTOMY Right 02/03/2023    Procedure: BRONCHOSCOPY, RIGHT ROBOTIC VIDEO ASSISTED THORACOSCOPY WITH TOTAL ESOPHAGECTOMY, INTERCOSTAL NERVE BLOCK, FEEDING JEJUNOSTOMY PLACEMENT;  Surgeon: Valerie Stockton MD;  Location: [facility] MAIN OR;  Service: Robotics - DaVinci;  Laterality: Right;    INNER EAR SURGERY      JEJUNOSTOMY TUBE INSERTION      REMOVED 2023    TONSILLECTOMY        Social History:   Social History     Tobacco Use    Smoking status: Never     Passive exposure: Never    Smokeless tobacco: Never   Substance Use Topics    Alcohol use: Not Currently     Comment: usually 1-2 month but none since christmas 2022      Family History:  Family History   Problem Relation Age of Onset    Hyperlipidemia Mother     Arthritis Mother     Hypertension Mother     Diabetes Mother     Heart disease Mother     Hyperlipidemia Father     Heart disease Father     Hypertension Father     Lung cancer Father     Heart disease Sister     Stroke Maternal Grandmother     Heart disease Maternal Grandmother     Hypertension Maternal Grandfather     Hypertension Paternal Grandmother     Hypertension Paternal Grandfather     Other Other         The patient states that his sister has a problem that goes by the name of \"long QT\".  He says this is a familial trait but he also says that he is \"not interested in pursuing it for himself\".    Coronary artery disease Other     Malig Hyperthermia Neg Hx        Home Meds:  Medications Prior to Admission   Medication Sig Dispense Refill Last Dose/Taking    amLODIPine (NORVASC) 5 MG tablet Take 1 tablet by mouth Daily. Take 5 mg 90 " tablet 3 7/4/2025    armodafinil (NUVIGIL) 150 MG tablet Take 1 tablet by mouth Daily. 30 tablet 2 7/4/2025    atorvastatin (LIPITOR) 40 MG tablet Take 1 tablet by mouth Daily. 90 tablet 3 7/4/2025    buPROPion XL (Wellbutrin XL) 150 MG 24 hr tablet Take 1 tablet by mouth Every Morning. 30 tablet 2 7/4/2025    Farxiga 10 MG tablet TAKE 1 TABLET BY MOUTH DAILY 90 tablet 1 7/4/2025    fluticasone (FLONASE) 50 MCG/ACT nasal spray Administer 2 sprays into the nostril(s) as directed by provider Daily.   7/4/2025    furosemide (LASIX) 20 MG tablet TAKE 1 TABLET BY MOUTH DAILY 90 tablet 0 7/4/2025    guaiFENesin (MUCINEX) 600 MG 12 hr tablet Take 2 tablets by mouth Every 12 (Twelve) Hours for 10 days. 40 tablet 0 7/4/2025    isosorbide mononitrate (IMDUR) 120 MG 24 hr tablet Take 1 tablet by mouth Daily. 90 tablet 1 7/4/2025    lisinopril (PRINIVIL,ZESTRIL) 10 MG tablet Take 1 tablet by mouth Daily. 90 tablet 3 7/4/2025    metoprolol succinate XL (TOPROL-XL) 50 MG 24 hr tablet Take 1 tablet by mouth Daily. 90 tablet 3 7/4/2025    ranolazine (RANEXA) 500 MG 12 hr tablet Take 2 tablets by mouth Every 12 (Twelve) Hours. 360 tablet 3 7/4/2025     Current Meds:   amLODIPine, 5 mg, Oral, Daily  atorvastatin, 40 mg, Oral, Daily  buPROPion XL, 150 mg, Oral, QAM  fluticasone, 2 spray, Nasal, Daily  [Held by provider] furosemide, 20 mg, Oral, Daily  guaiFENesin, 1,200 mg, Oral, Q12H  insulin lispro, 2-7 Units, Subcutaneous, Q6H  isosorbide mononitrate, 120 mg, Oral, Daily  lisinopril, 10 mg, Oral, Daily  metoprolol succinate XL, 50 mg, Oral, Q12H  pantoprazole, 40 mg, Intravenous, Q12H  ranolazine, 1,000 mg, Oral, Q12H  sodium chloride, 10 mL, Intravenous, Q12H      Allergies:  No Known Allergies  Review of Systems  There is weakness and fatigue all other systems are negative     Objective     Vital Signs  Temp:  [97.7 °F (36.5 °C)-98.4 °F (36.9 °C)] 98.2 °F (36.8 °C)  Heart Rate:  [] 87  Resp:  [16-18] 18  BP:  (131-179)/() 160/105  Physical Exam:  General Appearance:    Alert, cooperative, in no acute distress   Head:    Normocephalic, without obvious abnormality, atraumatic   Eyes:          Conjunctivae and sclerae normal, no icterus   Throat:   No thrush, oral mucosa moist   Neck:   Supple, no adenopathy   Lungs:     Clear to auscultation bilaterally    Heart:    Regular rhythm and normal rate    Chest Wall:    No abnormalities observed   Abdomen:     Soft, nondistended, nontender; normal bowel sounds   Extremities:   No edema, no redness   Skin:   No bruising or rash   Psychiatric:   Normal mood and insight     Results Review:   I reviewed the patient's new clinical results.    Results from last 7 days   Lab Units 07/06/25  0410 07/05/25  0930   WBC 10*3/mm3 6.44 7.60   HEMOGLOBIN g/dL 14.5 15.2   HEMATOCRIT % 44.9 45.1   PLATELETS 10*3/mm3 189 213     Results from last 7 days   Lab Units 07/06/25  0410 07/05/25  0930   SODIUM mmol/L 141 138   POTASSIUM mmol/L 4.0 3.5   CHLORIDE mmol/L 106 101   CO2 mmol/L 20.0* 24.2   BUN mg/dL 14.0 16.0   CREATININE mg/dL 0.74* 1.00   CALCIUM mg/dL 8.3* 9.2   BILIRUBIN mg/dL  --  0.9   ALK PHOS U/L  --  85   ALT (SGPT) U/L  --  21   AST (SGOT) U/L  --  24   GLUCOSE mg/dL 68 114*         Lab Results   Lab Value Date/Time    LIPASE 18 07/05/2025 0930    LIPASE 14 06/27/2025 0751       Radiology:  CT Chest With Contrast Diagnostic   Final Result       1. No acute findings identified in the chest, abdomen or pelvis.   2. Esophagectomy with gastric pull-up. Some retained ingested contents   or secretions and oral contrast seen in the remaining esophagus and   small amount of contrast seen in the stomach. No extraluminal contrast.   3. Airways disease.   4. Stable pulmonary nodules. No new or enlarging pulmonary nodules seen.   5. Nonobstructing right nephrolithiasis.   6. Colonic diverticulosis   7. Three-vessel coronary artery atherosclerotic calcifications status   post CABG.  Aortic valve replacement. Suspect graft repair of the   ascending thoracic aorta.       This report was finalized on 7/5/2025 3:00 PM by Dr. Dylon Barney M.D   on Workstation: [id number]          CT Abdomen Pelvis With Contrast   Final Result       1. No acute findings identified in the chest, abdomen or pelvis.   2. Esophagectomy with gastric pull-up. Some retained ingested contents   or secretions and oral contrast seen in the remaining esophagus and   small amount of contrast seen in the stomach. No extraluminal contrast.   3. Airways disease.   4. Stable pulmonary nodules. No new or enlarging pulmonary nodules seen.   5. Nonobstructing right nephrolithiasis.   6. Colonic diverticulosis   7. Three-vessel coronary artery atherosclerotic calcifications status   post CABG. Aortic valve replacement. Suspect graft repair of the   ascending thoracic aorta.       This report was finalized on 7/5/2025 3:00 PM by Dr. Dylon Barney M.D   on Workstation: [id number]          XR Chest 1 View   Final Result   FINDINGS AND IMPRESSION:   Pulmonary consolidation, pleural effusion or pneumothorax is seen.   Cardiac silhouette within normal limits for size. There are mediastinal   wires and prosthetic heart valve.       This report was finalized on 7/5/2025 9:47 AM by Dr. Rory Durán M.D   on Workstation: [id number]          FL Small Bowel Follow Through Single-Contrast    (Results Pending)       Assessment & Plan   Active Hospital Problems    Diagnosis     **Intractable nausea and vomiting     Atrial fibrillation     Nephrolithiasis     Iron deficiency anemia     Malignant neoplasm of lower third of esophagus     Type 2 diabetes mellitus, without long-term current use of insulin     Coronary artery disease of bypass graft of native heart with stable angina pectoris     Obstructive sleep apnea, adult treated with auto CPAP     S/P CABG (coronary artery bypass graft)     S/P AVR (aortic valve replacement)     H/O  esophagectomy     Essential hypertension        Assessment:  Nausea and vomiting 2 to 3 weeks, worsening  Status post esophagectomy for esophageal cancer 2023  Status post dilation of the pylorus June 2024 for deformity  Status post CABG  Status post aortic valve replacement  Patient is on no anticoagulant or antiplatelet medications  Atrial fibrillation    Plan:  For the above issues plan for EGD tomorrow  Clear liquid diet today n.p.o. after midnight  Cardiology following  Thoracic surgery following      I discussed the patient's findings and my recommendations with patient and nursing staff.    Gregor Last MD

## 2025-07-06 NOTE — PLAN OF CARE
Goal Outcome Evaluation:  Plan of Care Reviewed With: patient        Progress: no change  Outcome Evaluation: BP was elevated.  med given per orders.  APRN notified.  other VS stable.  pt denied pain.  pt expressed nausea and vomiting.  meds given per orders.  pt failed the bedside swallow eval.  NPO.  speech therapy consult placed.  unable to obtain stool specimen at this time.  will continue to monitor.

## 2025-07-06 NOTE — PROGRESS NOTES
Follow up    Noted results of the CT scan.   Doesn't seem to have any anatomical issues- no mass effect, stricturing, diverticulum etc   to explain recent development of nausea and vomiting (above what was his baseline after   Esophagectomy surgery).    May still have a functional issue and recommend swallow and small bowel follow through   To evaluate transit time. May also need EGD. This can be completed as an outpatient too.  Patient had previously been following up with gastroenterology for [facility] but hasn't seen them   Recently. May benefit from their input too.    Patient can be discharged from thoracic surgery stand point once he is meets criteria          arrative & Impression   CT CHEST W CONTRAST DIAGNOSTIC-, CT ABDOMEN PELVIS W CONTRAST-     INDICATION: Chest pain, dysphagia. HISTORY of esophageal cancer     COMPARISON: CT chest, abdomen and pelvis December 10, 2024     TECHNIQUE:  Routine CT chest, abdomen and pelvis with IV contrast. Coronal and  sagittal reformats. Radiation dose reduction techniques were utilized,  including automated exposure control and exposure modulation based on  body size.     FINDINGS:      Chest wall: No lymphadenopathy.     Mediastinum: Three-vessel coronary artery atherosclerotic  calcifications. CABG. Heart is normal in size. Aortic valve replacement.  No pericardial effusion. Suspect graft repair of the ascending thoracic  aorta. No mediastinal or hilar lymphadenopathy.     Lungs/pleura: No effusions. Airways wall thickening. Patent central  airways. Juxta fissural solid pulmonary nodule along the minor fissure,  series 3, axial mage 60, measures 5 mm, unchanged. Solid pulmonary  nodule in the lateral segment right middle lobe, series 3, axial mage  69, measures 5 mm, unchanged. Posterior dependent and bibasilar  subsegmental atelectasis. Respiratory motion artifact.     ABDOMEN: Small cyst seen in segment 6 of the right hepatic lobe,  unchanged. Cholecystectomy. No  biliary ductal dilatation. Spleen is  normal in size. Incidental splenule. Mild pancreatic atrophy. No  pancreatic ductal dilatation or mass seen. Stable mild thickening of the  left adrenal gland. Nonobstructing nephrolithiasis in the right mid  kidney, series 2, axial mage 138, measures 2 mm. Bilateral fluid  attenuating renal cysts, largest in the inferior pole left kidney  measuring 6.6 cm. No solid-appearing renal mass or hydronephrosis.     Pelvis: Prostate is normal in size. Underdistended bladder. No bladder  calculus.     Bowel: Esophagectomy with gastric pull-up. Some suspected retained  ingested contrast or secretions and oral contrast seen in the remaining  distal esophagus, just above the esophagogastric anastomosis. Small  amount of contrast seen in the stomach. No extraluminal contrast. No  small bowel obstruction. Colonic diverticulosis. Long cecal mesentery.  Appendix not identified though no secondary findings of appendicitis.     Abdominal wall: Rectus diastases. Small fat-containing supraumbilical  ventral hernia. Small fat-containing umbilical hernia. Small  fat-containing inguinal hernias.     Retroperitoneum: No lymphadenopathy.     Vasculature: Patent. Mild aortoiliac atherosclerotic calcification. No  abdominal aortic aneurysm.     Osseous structures: Median sternotomy. Bilateral glenohumeral  osteoarthritis. Diffuse idiopathic skeletal hyperostosis seen in the  thoracic spine. Lumbar facet degenerative arthropathy with grade 1  anterolisthesis of L4 on L5.     IMPRESSION:     1. No acute findings identified in the chest, abdomen or pelvis.  2. Esophagectomy with gastric pull-up. Some retained ingested contents  or secretions and oral contrast seen in the remaining esophagus and  small amount of contrast seen in the stomach. No extraluminal contrast.  3. Airways disease.  4. Stable pulmonary nodules. No new or enlarging pulmonary nodules seen.  5. Nonobstructing right nephrolithiasis.  6.  Colonic diverticulosis  7. Three-vessel coronary artery atherosclerotic calcifications status  post CABG. Aortic valve replacement. Suspect graft repair of the  ascending thoracic aorta.

## 2025-07-06 NOTE — SIGNIFICANT NOTE
07/06/25 1794   OTHER   Discipline physical therapist   Rehab Time/Intention   Session Not Performed other (see comments)  (Spoke with patient who reports not feeling well 2/2 several days of not keeping food down. Patient reeports he has been getting up to BR with nsg.  PT will f/u for eval.)   Recommendation   PT - Next Appointment 07/07/25

## 2025-07-06 NOTE — CONSULTS
"Patient Name: Edmond Chase  YOB: 1960  MRN: [id number]  Admission date: 7/5/2025  Reason for Encounter: MST 2-3 or Nursing Admission Screen    [facility] Clinical Nutrition Assessment     Subjective    Subjective Information     7/6: Pt is a 64 y.o. male with a history of DM2, AAA repair AVR, TJ, esophageal cancer s/p esophagectomy, JAYNE HTN, CAD, CABG who presented with c/o of worsening nausea and vomiting over the past week. Pt failed bedside swallow eval. Speech therapy consult placed. NPO. Will monitor for diet advancement.     Assessment    H&P and Current Problems      H&P  Past Medical History:   Diagnosis Date    Abnormal nuclear stress test     Anxiety     Anxiety and depression     Aortic valve insufficiency     nonrheumtic     Arthritis     knees    Ascending aortic aneurysm     CAD (coronary artery disease)     stent    Chest pain     Diabetes mellitus     Diarrhea     Dizzy     CAREFUL WHEN GETTING UP    Dyspnea     Esophageal cancer 11/2022    no chemo or radiation    Essential hypertension     Fatigue     G tube feedings     per jejunostomy tube nightly with permitin  3 cans nightly/ J-TUBE REMOVED 2023    GERD (gastroesophageal reflux disease)     GI bleed     Heart murmur     History of pneumonia     History of recent blood transfusion     hemorrhage     no reaction    Hyperglycemia     Hyperlipidemia     Hypersomnia with sleep apnea     Jejunostomy tube present     REMOVED 2023    Lightheadedness     Malaise and fatigue     Migraines     \"OCCULAR MIGRAINES\"    Morbid obesity     Myocardial ischemia     Nausea     Nocturia     TJ on auto CPAP 10/31/2016    Overnight polysomnogram.  Weight 276 pounds.  Severe TJ with AHI 79 events per hour.  Auto CPAP recommended.    Pneumonia     FEB 2016    Scrotal bleeding     SOB (shortness of breath)       Past Surgical History:   Procedure Laterality Date    AORTIC VALVE REPAIR/REPLACEMENT  05/2016    ASCENDING ARCH/HEMIARCH " REPLACEMENT N/A 05/02/2016    Procedure: BHAVESH STERNOTOMY CORONARY ARTERY BYPASS GRAFT TIMES 3 USING LEFT INTERNAL MAMMARY ARTERY AND RIGHT GREATER SAPHENOUS VEIN GRAFT PER ENDOSCOPIC VEIN HARVESTING, AORTIC ANEURYSM REPAIR WITH ROOT REPAIR AND AORTIC VALVE REPLACEMENT;  Surgeon: Rosalio Cline MD;  Location: [facility] MAIN OR;  Service:     CARDIAC CATHETERIZATION N/A 04/01/2016    Procedure: Left Heart Cath;  Surgeon: Erick Tam MD;  Location: [facility] CATH INVASIVE LOCATION;  Service:     CARDIAC CATHETERIZATION N/A 04/01/2016    Procedure: Left ventriculography;  Surgeon: Erick Tam MD;  [facility];  Service:     CARDIAC CATHETERIZATION N/A 04/01/2016    Procedure: Right Heart Cath;  Surgeon: Erick Tam MD;  Location: [facility] CATH INVASIVE LOCATION;  Service:     CARDIAC CATHETERIZATION N/A 10/30/2017    Procedure: Coronary angiography;  Surgeon: Sorin Vasquez MD;  Location: [facility] CATH INVASIVE LOCATION;  Service:     CARDIAC CATHETERIZATION  10/30/2017    Procedure: Saphenous Vein Graft;  Surgeon: Sorin Vasquez MD;  Location: [facility] CATH INVASIVE LOCATION;  Service:     CARDIAC CATHETERIZATION N/A 10/30/2017    Procedure: Native mammary injection;  Surgeon: Sorin Vasquez MD;  Location: [facility] CATH INVASIVE LOCATION;  Service:     CARDIAC CATHETERIZATION N/A 07/07/2020    Procedure: Coronary angiography;  Surgeon: Gregor Kim MD;  Location: [facility] CATH INVASIVE LOCATION;  Service: Cardiovascular;  Laterality: N/A;    CARDIAC CATHETERIZATION N/A 07/07/2020    Procedure: Left heart cath;  Surgeon: Gregor Kmi MD;  Location: [facility] CATH INVASIVE LOCATION;  Service: Cardiovascular;  Laterality: N/A;    CARDIAC CATHETERIZATION N/A 07/07/2020    Procedure: Left ventriculography;  Surgeon: Gregor Kim MD;  Location: [facility] CATH INVASIVE LOCATION;  Service: Cardiovascular;  Laterality: N/A;    CARDIAC CATHETERIZATION N/A 07/07/2020    Procedure: Stent ESMER  bypass graft;  Surgeon: Gregor Kim MD;  Location:  [facility] CATH INVASIVE LOCATION;  Service: Cardiovascular;  Laterality: N/A;    CARDIAC CATHETERIZATION N/A 06/17/2021    Procedure: SAPHENOUS VEIN GRAFT;  Surgeon: Marques Ndiaye MD;  Location:  [facility] CATH INVASIVE LOCATION;  Service: Cardiovascular;  Laterality: N/A;    CARDIAC CATHETERIZATION N/A 06/17/2021    Procedure: Left Heart Cath;  Surgeon: Marques Ndiaye MD;  Location:  [facility] CATH INVASIVE LOCATION;  Service: Cardiovascular;  Laterality: N/A;    CARDIAC CATHETERIZATION N/A 06/17/2021    Procedure: Coronary angiography;  Surgeon: Marques Ndiaye MD;  Location:  [facility] CATH INVASIVE LOCATION;  Service: Cardiovascular;  Laterality: N/A;    CARDIAC CATHETERIZATION N/A 07/14/2022    Procedure: Left Heart Cath;  Surgeon: Charlie Aj MD;  [facility] C[facility]VASIVE [facility];  Service: Cardiovascular;  Laterality: N/A;    CARDIAC CATHETERIZATION N/A 07/14/2022    Procedure: Coronary angiography;  Surgeon: Charlie Aj MD;  [facility] C[facility]ASIVE [facility];  Service: Cardiovascular;  Laterality: N/A;    CARDIAC CATHETERIZATION  07/14/2022    Procedure: Saphenous Vein Graft;  Surgeon: Charlie Aj MD;  [facility] C[facility]IVE [facility];  Service: Cardiovascular;;    CARDIAC CATHETERIZATION N/A 07/14/2022    Procedure: Native mammary injection;  Surgeon: Charlie Aj MD;  [facility] C[facility]AS[facility];  Service: Cardiovascular;  Laterality: N/A;    CARDIAC CATHETERIZATION N/A 10/24/2024    Procedure: Left Heart Cath;  Surgeon: Godwin Francis MD;  [facility] C[facility]VASIVE LOC[facility];  Service: Cardiovascular;  Laterality: N/A;    CARDIAC CATHETERIZATION N/A 10/24/2024    Procedure: Coronary angiography;  Surgeon: Godwin Francis MD;  [facility] CATH INVASIVE LOC[facility];  Service: Cardiovascular;  Laterality: N/A;    CARDIAC CATHETERIZATION N/A 10/24/2024    Procedure: Native mammary  injection;  Surgeon: Godwin Francis MD;  Location: [facility] CATH INVASIVE LOCATION;  Service: Cardiovascular;  Laterality: N/A;    CATARACT EXTRACTION EXTRACAPSULAR W/ INTRAOCULAR LENS IMPLANTATION Left     CHOLECYSTECTOMY WITH INTRAOPERATIVE CHOLANGIOGRAM N/A 09/01/2023    Procedure: CHOLECYSTECTOMY LAPAROSCOPIC INTRAOPERATIVE CHOLANGIOGRAM choledoscopy common bile duct exploration;  Surgeon: Jose Manuel Baca MD;  Location: [facility] MAIN OR;  Service: General;  Laterality: N/A;    COLONOSCOPY N/A 11/26/2022    Procedure: COLONOSCOPY AT BEDSIDE;  Surgeon: Tomy Lopez MD;  Location: [facility] MAIN OR;  Service: Gastroenterology;  Laterality: N/A;    COLONOSCOPY W/ POLYPECTOMY N/A 10/04/2022    Procedure: COLONOSCOPY to cecum with cold forceps and cold snare polypectomies;  Surgeon: Gregor Carlson MD;  Location: [facility] ENDOSCOPY;  Service: Gastroenterology;  Laterality: N/A;  PRE- hx of polyps  POST- diverticulosis, polyps    CORONARY ARTERY BYPASS GRAFT  05/2016    LIMA TO LAD, SVG TO PDA, SVG TO OM2    ENDOSCOPY N/A 07/13/2022    Procedure: ESOPHAGOGASTRODUODENOSCOPY;  Surgeon: Marcia Hollis MD;  Location: [facility] ENDOSCOPY;  Service: Gastroenterology;  Laterality: N/A;  PRE- ANEMIA, MELENA  POST- MILD EROSIVE GASTRITIS, GE JUNCTION ULCER    ENDOSCOPY N/A 10/04/2022    Procedure: ESOPHAGOGASTRODUODENOSCOPY with biopsies;  Surgeon: Gregor Carlson MD;  Location: [facility] ENDOSCOPY;  Service: Gastroenterology;  Laterality: N/A;  PRE- hx of esophageal ulcer  POST- gastric cardia mass    ENDOSCOPY N/A 05/01/2023    Procedure: ESOPHAGOGASTRODUODENOSCOPY WITH  38ct-90nq-50hb balloon DILATATION of pylorus and anastomosis;  Surgeon: Valerie Stockton MD;  Location: [facility] ENDOSCOPY;  Service: Gastroenterology;  Laterality: N/A;  PRE: dysphagia  POST: no abnormalities seen    ENDOSCOPY N/A 6/25/2024    Procedure: ESOPHAGOGASTRODUODENOSCOPY WITH BIOPSY and 15mm balloon dilatation (pylorus);  Surgeon: Mahin  "Valerie FIELD MD;  Location: [facility] ENDOSCOPY;  Service: Gastroenterology;  Laterality: N/A;  pre: Esophageal cancer  post: normal anastamosis    ESOPHAGOGASTRECTOMY Right 02/03/2023    Procedure: BRONCHOSCOPY, RIGHT ROBOTIC VIDEO ASSISTED THORACOSCOPY WITH TOTAL ESOPHAGECTOMY, INTERCOSTAL NERVE BLOCK, FEEDING JEJUNOSTOMY PLACEMENT;  Surgeon: Valerie Stockton MD;  Location: [facility] OR;  Service: Robotics - DaVinci;  Laterality: Right;    INNER EAR SURGERY      JEJUNOSTOMY TUBE INSERTION      REMOVED 2023    TONSILLECTOMY        Current Problems   Admission Diagnosis:  Intractable nausea and vomiting [R11.2]    Problem List:    Intractable nausea and vomiting    Essential hypertension    S/P CABG (coronary artery bypass graft)    S/P AVR (aortic valve replacement)    H/O esophagectomy    Coronary artery disease of bypass graft of native heart with stable angina pectoris    Obstructive sleep apnea, adult treated with auto CPAP    Type 2 diabetes mellitus, without long-term current use of insulin    Malignant neoplasm of lower third of esophagus    Iron deficiency anemia    Atrial fibrillation    Nephrolithiasis      Other Applicable Nutrition Information:      Anthropometrics      BMI, Height, Weight Estimated body mass index is 39.33 kg/m² as calculated from the following:    Height as of this encounter: 182.9 cm (72\").    Weight as of this encounter: 132 kg (290 lb).    Weight Method: Stated       Trending Weight Changes 7:6: No significant changes       Weight History  Wt Readings from Last 10 Encounters:   07/05/25 132 kg (290 lb)   06/27/25 132 kg (290 lb)   05/23/25 132 kg (290 lb)   04/24/25 133 kg (293 lb 3.2 oz)   01/13/25 134 kg (294 lb 6.4 oz)   01/13/25 133 kg (294 lb)   12/18/24 106 kg (234 lb)   11/07/24 126 kg (278 lb)   10/24/24 122 kg (270 lb)   10/09/24 126 kg (277 lb)        Labs      Comment: Reviewed, management per MD.     Results from last 7 days   Lab Units 07/06/25  0410 07/05/25  1711 " 07/05/25  0930   SODIUM mmol/L 141  --  138   POTASSIUM mmol/L 4.0  --  3.5   GLUCOSE mg/dL 68  --  114*   BUN mg/dL 14.0  --  16.0   CREATININE mg/dL 0.74*  --  1.00   CALCIUM mg/dL 8.3*  --  9.2   MAGNESIUM mg/dL  --  2.1 2.0   ALBUMIN g/dL  --   --  4.0   BILIRUBIN mg/dL  --   --  0.9   ALK PHOS U/L  --   --  85   AST (SGOT) U/L  --   --  24   ALT (SGPT) U/L  --   --  21   PROBNP pg/mL  --   --  2,159.0*     Results from last 7 days   Lab Units 07/06/25  0410 07/05/25  0930   PLATELETS 10*3/mm3 189 213   HEMOGLOBIN g/dL 14.5 15.2   HEMATOCRIT % 44.9 45.1     Lab Results   Component Value Date    HGBA1C 5.8 (H) 05/23/2025          Medications       Scheduled Medications amLODIPine, 5 mg, Oral, Daily  atorvastatin, 40 mg, Oral, Daily  buPROPion XL, 150 mg, Oral, QAM  fluticasone, 2 spray, Nasal, Daily  furosemide, 20 mg, Oral, Daily  guaiFENesin, 1,200 mg, Oral, Q12H  insulin lispro, 2-7 Units, Subcutaneous, Q6H  isosorbide mononitrate, 120 mg, Oral, Daily  lisinopril, 10 mg, Oral, Daily  metoprolol succinate XL, 50 mg, Oral, Daily  pantoprazole, 40 mg, Intravenous, Q12H  ranolazine, 1,000 mg, Oral, Q12H  sodium chloride, 10 mL, Intravenous, Q12H        Infusions sodium chloride, 75 mL/hr, Last Rate: 75 mL/hr (07/06/25 0642)        PRN Medications   senna-docusate sodium **AND** polyethylene glycol **AND** bisacodyl **AND** bisacodyl    dextrose    dextrose    glucagon (human recombinant)    hydrALAZINE    ondansetron    sodium chloride    sodium chloride     Physical Findings      Chewing/Swallowing    SLP eval pending   Dentition Mouth/Teeth WDL: WDL       Edema   generalized, lower extremity , 1+ (trace), 2+ (mild), 3+ (moderate)    Gastrointestinal abdominal pain, nausea, vomiting, last bowel movement: 7/3   Skin bruising    Lines/Drains none   I/O reviewed      Nutrition Focused Physical Exam     NFPE Not indicated at this time  07/06/25       Current Nutrition Orders & Evaluation of Intake      Oral Nutrition      Food Allergies  and Intolerances NKFA   Current PO Diet NPO Diet NPO Type: Strict NPO   Oral Nutrition Supplement None     Trending % PO Intake NPO   (2)  Assessment & Plan   Nutrition Diagnosis and Goals       Nutrition Diagnosis Problem: Altered GI Function  Etiology: Factors Affecting Nutrition - nausea, vomiting   Signs/Symptoms: NPO and Report/Observation        Goal(s) PO Diet Advances When Medically Appropriate , Tolerates PO Diet , and No Significant Weight Loss     Nutrition Intervention and Prescription       Intervention  Monitor for diet advancement      Diet Prescription     Supplement Prescription     Education Provided     (3)  Monitoring/Evaluation       Monitor/Evaluation Per Protocol and PO Intake      Electronically signed by:  Crystal Hope RD  07/06/25 09:36 EDT

## 2025-07-06 NOTE — PLAN OF CARE
Goal Outcome Evaluation:  Plan of Care Reviewed With: patient   Pt is being treated for intractable nausea and vomiting. On clear liquid diet. NPO at midnight. Plans for a EGD 7/7- consent has been signed. Plans for a small bowel follow through 7/7. Care plan ongoing     Progress: no change

## 2025-07-07 ENCOUNTER — APPOINTMENT (OUTPATIENT)
Dept: GENERAL RADIOLOGY | Facility: HOSPITAL | Age: 65
End: 2025-07-07
Payer: MEDICARE

## 2025-07-07 ENCOUNTER — ANESTHESIA (OUTPATIENT)
Dept: GASTROENTEROLOGY | Facility: HOSPITAL | Age: 65
End: 2025-07-07
Payer: MEDICARE

## 2025-07-07 ENCOUNTER — ANESTHESIA EVENT (OUTPATIENT)
Dept: GASTROENTEROLOGY | Facility: HOSPITAL | Age: 65
End: 2025-07-07
Payer: MEDICARE

## 2025-07-07 LAB
ALBUMIN SERPL-MCNC: 3.3 G/DL (ref 3.5–5.2)
ALBUMIN/GLOB SERPL: 1.3 G/DL
ALP SERPL-CCNC: 69 U/L (ref 39–117)
ALT SERPL W P-5'-P-CCNC: 14 U/L (ref 1–41)
ANION GAP SERPL CALCULATED.3IONS-SCNC: 10.2 MMOL/L (ref 5–15)
AST SERPL-CCNC: 15 U/L (ref 1–40)
BASOPHILS # BLD AUTO: 0.02 10*3/MM3 (ref 0–0.2)
BASOPHILS NFR BLD AUTO: 0.3 % (ref 0–1.5)
BILIRUB SERPL-MCNC: 1 MG/DL (ref 0–1.2)
BUN SERPL-MCNC: 18 MG/DL (ref 8–23)
BUN/CREAT SERPL: 19.6 (ref 7–25)
CALCIUM SPEC-SCNC: 8.4 MG/DL (ref 8.6–10.5)
CHLORIDE SERPL-SCNC: 105 MMOL/L (ref 98–107)
CO2 SERPL-SCNC: 22.8 MMOL/L (ref 22–29)
CREAT SERPL-MCNC: 0.92 MG/DL (ref 0.76–1.27)
DEPRECATED RDW RBC AUTO: 46.8 FL (ref 37–54)
EGFRCR SERPLBLD CKD-EPI 2021: 91.7 ML/MIN/1.73
EOSINOPHIL # BLD AUTO: 0.03 10*3/MM3 (ref 0–0.4)
EOSINOPHIL NFR BLD AUTO: 0.5 % (ref 0.3–6.2)
ERYTHROCYTE [DISTWIDTH] IN BLOOD BY AUTOMATED COUNT: 13.1 % (ref 12.3–15.4)
GLOBULIN UR ELPH-MCNC: 2.5 GM/DL
GLUCOSE BLDC GLUCOMTR-MCNC: 120 MG/DL (ref 70–130)
GLUCOSE BLDC GLUCOMTR-MCNC: 70 MG/DL (ref 70–130)
GLUCOSE BLDC GLUCOMTR-MCNC: 72 MG/DL (ref 70–130)
GLUCOSE BLDC GLUCOMTR-MCNC: 81 MG/DL (ref 70–130)
GLUCOSE BLDC GLUCOMTR-MCNC: 86 MG/DL (ref 70–130)
GLUCOSE BLDC GLUCOMTR-MCNC: 97 MG/DL (ref 70–130)
GLUCOSE SERPL-MCNC: 76 MG/DL (ref 65–99)
HCT VFR BLD AUTO: 41.4 % (ref 37.5–51)
HGB BLD-MCNC: 13.5 G/DL (ref 13–17.7)
IMM GRANULOCYTES # BLD AUTO: 0.01 10*3/MM3 (ref 0–0.05)
IMM GRANULOCYTES NFR BLD AUTO: 0.2 % (ref 0–0.5)
INR PPP: 1.18 (ref 0.9–1.1)
LYMPHOCYTES # BLD AUTO: 1.13 10*3/MM3 (ref 0.7–3.1)
LYMPHOCYTES NFR BLD AUTO: 17.4 % (ref 19.6–45.3)
MCH RBC QN AUTO: 32.1 PG (ref 26.6–33)
MCHC RBC AUTO-ENTMCNC: 32.6 G/DL (ref 31.5–35.7)
MCV RBC AUTO: 98.6 FL (ref 79–97)
MONOCYTES # BLD AUTO: 0.62 10*3/MM3 (ref 0.1–0.9)
MONOCYTES NFR BLD AUTO: 9.5 % (ref 5–12)
NEUTROPHILS NFR BLD AUTO: 4.69 10*3/MM3 (ref 1.7–7)
NEUTROPHILS NFR BLD AUTO: 72.1 % (ref 42.7–76)
NRBC BLD AUTO-RTO: 0 /100 WBC (ref 0–0.2)
PLATELET # BLD AUTO: 183 10*3/MM3 (ref 140–450)
PMV BLD AUTO: 9.6 FL (ref 6–12)
POTASSIUM SERPL-SCNC: 3.5 MMOL/L (ref 3.5–5.2)
PROT SERPL-MCNC: 5.8 G/DL (ref 6–8.5)
PROTHROMBIN TIME: 15 SECONDS (ref 11.7–14.2)
RBC # BLD AUTO: 4.2 10*6/MM3 (ref 4.14–5.8)
SODIUM SERPL-SCNC: 138 MMOL/L (ref 136–145)
WBC NRBC COR # BLD AUTO: 6.5 10*3/MM3 (ref 3.4–10.8)

## 2025-07-07 PROCEDURE — 80053 COMPREHEN METABOLIC PANEL: CPT | Performed by: INTERNAL MEDICINE

## 2025-07-07 PROCEDURE — 25010000002 PHENYLEPHRINE 10 MG/ML SOLUTION: Performed by: REGISTERED NURSE

## 2025-07-07 PROCEDURE — G0378 HOSPITAL OBSERVATION PER HR: HCPCS

## 2025-07-07 PROCEDURE — 25810000003 SODIUM CHLORIDE 0.9 % SOLUTION: Performed by: INTERNAL MEDICINE

## 2025-07-07 PROCEDURE — 25010000002 ONDANSETRON PER 1 MG

## 2025-07-07 PROCEDURE — 25010000002 SUCCINYLCHOLINE PER 20 MG: Performed by: REGISTERED NURSE

## 2025-07-07 PROCEDURE — 43245 EGD DILATE STRICTURE: CPT | Performed by: INTERNAL MEDICINE

## 2025-07-07 PROCEDURE — 43239 EGD BIOPSY SINGLE/MULTIPLE: CPT | Performed by: INTERNAL MEDICINE

## 2025-07-07 PROCEDURE — 99214 OFFICE O/P EST MOD 30 MIN: CPT | Performed by: NURSE PRACTITIONER

## 2025-07-07 PROCEDURE — 82948 REAGENT STRIP/BLOOD GLUCOSE: CPT

## 2025-07-07 PROCEDURE — 85610 PROTHROMBIN TIME: CPT | Performed by: INTERNAL MEDICINE

## 2025-07-07 PROCEDURE — 0DB48ZX EXCISION OF ESOPHAGOGASTRIC JUNCTION, VIA NATURAL OR ARTIFICIAL OPENING ENDOSCOPIC, DIAGNOSTIC: ICD-10-PCS | Performed by: INTERNAL MEDICINE

## 2025-07-07 PROCEDURE — 0DB38ZX EXCISION OF LOWER ESOPHAGUS, VIA NATURAL OR ARTIFICIAL OPENING ENDOSCOPIC, DIAGNOSTIC: ICD-10-PCS | Performed by: INTERNAL MEDICINE

## 2025-07-07 PROCEDURE — 88305 TISSUE EXAM BY PATHOLOGIST: CPT | Performed by: INTERNAL MEDICINE

## 2025-07-07 PROCEDURE — 25010000002 PROPOFOL 10 MG/ML EMULSION: Performed by: REGISTERED NURSE

## 2025-07-07 PROCEDURE — 0D768ZZ DILATION OF STOMACH, VIA NATURAL OR ARTIFICIAL OPENING ENDOSCOPIC: ICD-10-PCS | Performed by: INTERNAL MEDICINE

## 2025-07-07 PROCEDURE — C1726 CATH, BAL DIL, NON-VASCULAR: HCPCS | Performed by: INTERNAL MEDICINE

## 2025-07-07 PROCEDURE — 85025 COMPLETE CBC W/AUTO DIFF WBC: CPT | Performed by: INTERNAL MEDICINE

## 2025-07-07 RX ORDER — FLUMAZENIL 0.1 MG/ML
0.2 INJECTION INTRAVENOUS AS NEEDED
Status: DISCONTINUED | OUTPATIENT
Start: 2025-07-07 | End: 2025-07-07 | Stop reason: HOSPADM

## 2025-07-07 RX ORDER — PROPOFOL 10 MG/ML
VIAL (ML) INTRAVENOUS AS NEEDED
Status: DISCONTINUED | OUTPATIENT
Start: 2025-07-07 | End: 2025-07-07 | Stop reason: SURG

## 2025-07-07 RX ORDER — EPHEDRINE SULFATE 50 MG/ML
5 INJECTION, SOLUTION INTRAVENOUS ONCE AS NEEDED
Status: DISCONTINUED | OUTPATIENT
Start: 2025-07-07 | End: 2025-07-07 | Stop reason: HOSPADM

## 2025-07-07 RX ORDER — SODIUM CHLORIDE 9 MG/ML
30 INJECTION, SOLUTION INTRAVENOUS CONTINUOUS PRN
Status: ACTIVE | OUTPATIENT
Start: 2025-07-07 | End: 2025-07-07

## 2025-07-07 RX ORDER — ATROPINE SULFATE 0.4 MG/ML
0.4 INJECTION, SOLUTION INTRAMUSCULAR; INTRAVENOUS; SUBCUTANEOUS ONCE AS NEEDED
Status: DISCONTINUED | OUTPATIENT
Start: 2025-07-07 | End: 2025-07-07 | Stop reason: HOSPADM

## 2025-07-07 RX ORDER — DROPERIDOL 2.5 MG/ML
0.62 INJECTION, SOLUTION INTRAMUSCULAR; INTRAVENOUS
Status: DISCONTINUED | OUTPATIENT
Start: 2025-07-07 | End: 2025-07-07 | Stop reason: HOSPADM

## 2025-07-07 RX ORDER — HYDROCODONE BITARTRATE AND ACETAMINOPHEN 5; 325 MG/1; MG/1
1 TABLET ORAL ONCE AS NEEDED
Refills: 0 | Status: DISCONTINUED | OUTPATIENT
Start: 2025-07-07 | End: 2025-07-07 | Stop reason: HOSPADM

## 2025-07-07 RX ORDER — ONDANSETRON 2 MG/ML
4 INJECTION INTRAMUSCULAR; INTRAVENOUS ONCE AS NEEDED
Status: COMPLETED | OUTPATIENT
Start: 2025-07-07 | End: 2025-07-07

## 2025-07-07 RX ORDER — PROMETHAZINE HYDROCHLORIDE 25 MG/1
25 SUPPOSITORY RECTAL ONCE AS NEEDED
Status: DISCONTINUED | OUTPATIENT
Start: 2025-07-07 | End: 2025-07-07 | Stop reason: HOSPADM

## 2025-07-07 RX ORDER — PHENYLEPHRINE HYDROCHLORIDE 10 MG/ML
INJECTION INTRAVENOUS AS NEEDED
Status: DISCONTINUED | OUTPATIENT
Start: 2025-07-07 | End: 2025-07-07 | Stop reason: SURG

## 2025-07-07 RX ORDER — LABETALOL HYDROCHLORIDE 5 MG/ML
5 INJECTION, SOLUTION INTRAVENOUS
Status: DISCONTINUED | OUTPATIENT
Start: 2025-07-07 | End: 2025-07-07 | Stop reason: HOSPADM

## 2025-07-07 RX ORDER — SUCCINYLCHOLINE CHLORIDE 20 MG/ML
INJECTION INTRAMUSCULAR; INTRAVENOUS AS NEEDED
Status: DISCONTINUED | OUTPATIENT
Start: 2025-07-07 | End: 2025-07-07 | Stop reason: SURG

## 2025-07-07 RX ORDER — NALOXONE HCL 0.4 MG/ML
0.2 VIAL (ML) INJECTION AS NEEDED
Status: DISCONTINUED | OUTPATIENT
Start: 2025-07-07 | End: 2025-07-07 | Stop reason: HOSPADM

## 2025-07-07 RX ORDER — HYDRALAZINE HYDROCHLORIDE 20 MG/ML
5 INJECTION INTRAMUSCULAR; INTRAVENOUS
Status: DISCONTINUED | OUTPATIENT
Start: 2025-07-07 | End: 2025-07-07 | Stop reason: HOSPADM

## 2025-07-07 RX ORDER — PANTOPRAZOLE SODIUM 40 MG/1
40 TABLET, DELAYED RELEASE ORAL
Status: DISCONTINUED | OUTPATIENT
Start: 2025-07-07 | End: 2025-07-09 | Stop reason: HOSPADM

## 2025-07-07 RX ORDER — ONDANSETRON 2 MG/ML
INJECTION INTRAMUSCULAR; INTRAVENOUS
Status: COMPLETED
Start: 2025-07-07 | End: 2025-07-07

## 2025-07-07 RX ORDER — FENTANYL CITRATE 50 UG/ML
50 INJECTION, SOLUTION INTRAMUSCULAR; INTRAVENOUS
Status: DISCONTINUED | OUTPATIENT
Start: 2025-07-07 | End: 2025-07-07 | Stop reason: HOSPADM

## 2025-07-07 RX ORDER — DEXTROSE MONOHYDRATE 25 G/50ML
25 INJECTION, SOLUTION INTRAVENOUS
Status: DISCONTINUED | OUTPATIENT
Start: 2025-07-07 | End: 2025-07-07 | Stop reason: HOSPADM

## 2025-07-07 RX ORDER — PROMETHAZINE HYDROCHLORIDE 25 MG/1
25 TABLET ORAL ONCE AS NEEDED
Status: DISCONTINUED | OUTPATIENT
Start: 2025-07-07 | End: 2025-07-07 | Stop reason: HOSPADM

## 2025-07-07 RX ORDER — EPHEDRINE SULFATE 50 MG/ML
INJECTION, SOLUTION INTRAVENOUS AS NEEDED
Status: DISCONTINUED | OUTPATIENT
Start: 2025-07-07 | End: 2025-07-07 | Stop reason: SURG

## 2025-07-07 RX ORDER — OXYCODONE AND ACETAMINOPHEN 7.5; 325 MG/1; MG/1
1 TABLET ORAL EVERY 4 HOURS PRN
Refills: 0 | Status: DISCONTINUED | OUTPATIENT
Start: 2025-07-07 | End: 2025-07-07 | Stop reason: HOSPADM

## 2025-07-07 RX ORDER — DIPHENHYDRAMINE HYDROCHLORIDE 50 MG/ML
12.5 INJECTION, SOLUTION INTRAMUSCULAR; INTRAVENOUS
Status: DISCONTINUED | OUTPATIENT
Start: 2025-07-07 | End: 2025-07-07 | Stop reason: HOSPADM

## 2025-07-07 RX ORDER — IPRATROPIUM BROMIDE AND ALBUTEROL SULFATE 2.5; .5 MG/3ML; MG/3ML
3 SOLUTION RESPIRATORY (INHALATION) ONCE AS NEEDED
Status: DISCONTINUED | OUTPATIENT
Start: 2025-07-07 | End: 2025-07-07 | Stop reason: HOSPADM

## 2025-07-07 RX ORDER — DEXTROSE MONOHYDRATE 25 G/50ML
INJECTION, SOLUTION INTRAVENOUS
Status: COMPLETED
Start: 2025-07-07 | End: 2025-07-07

## 2025-07-07 RX ORDER — HYDROMORPHONE HYDROCHLORIDE 1 MG/ML
0.5 INJECTION, SOLUTION INTRAMUSCULAR; INTRAVENOUS; SUBCUTANEOUS
Refills: 0 | Status: DISCONTINUED | OUTPATIENT
Start: 2025-07-07 | End: 2025-07-07 | Stop reason: HOSPADM

## 2025-07-07 RX ADMIN — DEXTROSE MONOHYDRATE 25 G: 25 INJECTION, SOLUTION INTRAVENOUS at 10:51

## 2025-07-07 RX ADMIN — ISOSORBIDE MONONITRATE 120 MG: 60 TABLET, EXTENDED RELEASE ORAL at 16:41

## 2025-07-07 RX ADMIN — PHENYLEPHRINE HYDROCHLORIDE 200 MCG: 10 INJECTION INTRAVENOUS at 11:42

## 2025-07-07 RX ADMIN — PROPOFOL 200 MCG/KG/MIN: 10 INJECTION, EMULSION INTRAVENOUS at 11:32

## 2025-07-07 RX ADMIN — Medication 10 ML: at 15:12

## 2025-07-07 RX ADMIN — AMLODIPINE BESYLATE 5 MG: 5 TABLET ORAL at 16:41

## 2025-07-07 RX ADMIN — EPHEDRINE SULFATE 10 MG: 50 INJECTION INTRAMUSCULAR; INTRAVENOUS; SUBCUTANEOUS at 11:47

## 2025-07-07 RX ADMIN — GUAIFENESIN 1200 MG: 600 TABLET, EXTENDED RELEASE ORAL at 20:48

## 2025-07-07 RX ADMIN — METOPROLOL SUCCINATE 50 MG: 50 TABLET, EXTENDED RELEASE ORAL at 20:47

## 2025-07-07 RX ADMIN — ONDANSETRON 4 MG: 2 INJECTION, SOLUTION INTRAMUSCULAR; INTRAVENOUS at 12:15

## 2025-07-07 RX ADMIN — SUCCINYLCHOLINE CHLORIDE 150 MG: 20 INJECTION, SOLUTION INTRAMUSCULAR; INTRAVENOUS; PARENTERAL at 11:32

## 2025-07-07 RX ADMIN — LISINOPRIL 10 MG: 10 TABLET ORAL at 16:41

## 2025-07-07 RX ADMIN — EPHEDRINE SULFATE 10 MG: 50 INJECTION INTRAMUSCULAR; INTRAVENOUS; SUBCUTANEOUS at 11:45

## 2025-07-07 RX ADMIN — PHENYLEPHRINE HYDROCHLORIDE 200 MCG: 10 INJECTION INTRAVENOUS at 11:45

## 2025-07-07 RX ADMIN — ONDANSETRON 4 MG: 2 INJECTION INTRAMUSCULAR; INTRAVENOUS at 12:15

## 2025-07-07 RX ADMIN — PANTOPRAZOLE SODIUM 40 MG: 40 TABLET, DELAYED RELEASE ORAL at 16:41

## 2025-07-07 RX ADMIN — RANOLAZINE 1000 MG: 500 TABLET, FILM COATED, EXTENDED RELEASE ORAL at 20:47

## 2025-07-07 RX ADMIN — PROPOFOL 250 MG: 10 INJECTION, EMULSION INTRAVENOUS at 11:32

## 2025-07-07 RX ADMIN — FLUTICASONE PROPIONATE 2 SPRAY: 50 SPRAY, METERED NASAL at 16:43

## 2025-07-07 RX ADMIN — BUPROPION HYDROCHLORIDE 150 MG: 150 TABLET, EXTENDED RELEASE ORAL at 16:41

## 2025-07-07 RX ADMIN — PHENYLEPHRINE HYDROCHLORIDE 100 MCG: 10 INJECTION INTRAVENOUS at 11:40

## 2025-07-07 RX ADMIN — Medication 10 ML: at 20:48

## 2025-07-07 RX ADMIN — SODIUM CHLORIDE 30 ML/HR: 9 INJECTION, SOLUTION INTRAVENOUS at 10:36

## 2025-07-07 RX ADMIN — ATORVASTATIN CALCIUM 40 MG: 20 TABLET, FILM COATED ORAL at 16:41

## 2025-07-07 NOTE — ANESTHESIA POSTPROCEDURE EVALUATION
Patient: Edmond Chase    Procedure Summary       Date: 07/07/25 Room / Location: [facility] ENDOSCOPY 7 / [facility] ENDOSCOPY    Anesthesia Start: 1124 Anesthesia Stop: 1210    Procedure: ESOPHAGOGASTRODUODENOSCOPY WITH COLD BIOPSIES, PYLORIC DILATION (Esophagus) Diagnosis:       Malignant neoplasm of lower third of esophagus      (Malignant neoplasm of lower third of esophagus [C15.5])    Surgeons: Ubaldo De León MD Provider: Charlie Eason MD    Anesthesia Type: general ASA Status: 4            Anesthesia Type: general    Vitals  Vitals Value Taken Time   BP 87/58 07/07/25 12:39   Temp     Pulse 112 07/07/25 12:43   Resp 21 07/07/25 12:23   SpO2 95 % 07/07/25 12:43   Vitals shown include unfiled device data.        Post Anesthesia Care and Evaluation    Patient location during evaluation: PACU  Patient participation: complete - patient participated  Level of consciousness: awake and alert  Pain management: adequate    Airway patency: patent  Anesthetic complications: No anesthetic complications    Cardiovascular status: acceptable  Respiratory status: acceptable  Hydration status: acceptable    Comments: --------------------            07/07/25               1231     --------------------   BP:     (!) 88/59    Pulse:     108       Resp:                Temp:                SpO2:      93%      --------------------

## 2025-07-07 NOTE — PLAN OF CARE
Goal Outcome Evaluation:              Outcome Evaluation: Pt off the floor for EGD this date. Received incorrect order with no cosign required. If pt is appropriate for Speech consult please put in order with cosign required. Thank you.

## 2025-07-07 NOTE — PLAN OF CARE
Goal Outcome Evaluation:  Plan of Care Reviewed With: patient   Pt's vital signs are stable. EGD ordered and performed. On a full liquid diet. On room air. A/Ox4. No complaints during this shift. Care plan ongoing     Progress: improving

## 2025-07-07 NOTE — PLAN OF CARE
Goal Outcome Evaluation:              Outcome Evaluation: Pt is A/OX4, 2LO2 at night, schedule medications, no signs of nausea and vomiting overnight. Soft blood sugar number, Npo for FL Small Bowel Follow Through Single-Contrast. No acute changes over night.

## 2025-07-07 NOTE — PROGRESS NOTES
Name: Edmond Chase ADMIT: 2025   : 1960  PCP: Nargis Velasquez APRN    MRN: [id number] LOS: 0 days   AGE/SEX: 64 y.o. male  ROOM: [facility]/     Subjective   Subjective   Seen after EGD. Having a sore throat, otherwise doing okay. Anesthesia still wearing off. No nausea or vomiting    Review of Systems  As above     Objective   Objective   Vital Signs  Temp:  [98 °F (36.7 °C)-98.2 °F (36.8 °C)] 98 °F (36.7 °C)  Heart Rate:  [] 100  Resp:  [15-21] 20  BP: ()/() 115/70  SpO2:  [93 %-98 %] 94 %  on  Flow (L/min) (Oxygen Therapy):  [2] 2;   Device (Oxygen Therapy): room air  Body mass index is 39.33 kg/m².  Physical Exam  Vitals and nursing note reviewed.   Constitutional:       General: He is not in acute distress.     Appearance: He is ill-appearing.   Cardiovascular:      Rate and Rhythm: Normal rate and regular rhythm.   Pulmonary:      Effort: Pulmonary effort is normal. No respiratory distress.   Abdominal:      General: Abdomen is flat. There is no distension.      Tenderness: There is no abdominal tenderness.   Musculoskeletal:         General: No swelling or deformity.   Skin:     General: Skin is warm and dry.   Neurological:      General: No focal deficit present.      Mental Status: He is alert. Mental status is at baseline.         Results Review     I reviewed the patient's new clinical results.  Results from last 7 days   Lab Units 25  04425  0410 25  0930   WBC 10*3/mm3 6.50 6.44 7.60   HEMOGLOBIN g/dL 13.5 14.5 15.2   PLATELETS 10*3/mm3 183 189 213     Results from last 7 days   Lab Units 25  04425  0410 25  0930   SODIUM mmol/L 138 141 138   POTASSIUM mmol/L 3.5 4.0 3.5   CHLORIDE mmol/L 105 106 101   CO2 mmol/L 22.8 20.0* 24.2   BUN mg/dL 18.0 14.0 16.0   CREATININE mg/dL 0.92 0.74* 1.00   GLUCOSE mg/dL 76 68 114*   Estimated Creatinine Clearance: 111 mL/min (by C-G formula based on SCr of 0.92 mg/dL).  Results from last 7 days    Lab Units 07/07/25  0441 07/05/25  0930   ALBUMIN g/dL 3.3* 4.0   BILIRUBIN mg/dL 1.0 0.9   ALK PHOS U/L 69 85   AST (SGOT) U/L 15 24   ALT (SGPT) U/L 14 21     Results from last 7 days   Lab Units 07/07/25  0441 07/06/25  0410 07/05/25  1711 07/05/25  0930   CALCIUM mg/dL 8.4* 8.3*  --  9.2   ALBUMIN g/dL 3.3*  --   --  4.0   MAGNESIUM mg/dL  --   --  2.1 2.0       COVID19   Date Value Ref Range Status   07/05/2025 Not Detected Not Detected - Ref. Range Final   06/27/2025 Not Detected Not Detected - Ref. Range Final     Glucose   Date/Time Value Ref Range Status   07/07/2025 1623 81 70 - 130 mg/dL Final   07/07/2025 1229 86 70 - 130 mg/dL Final   07/07/2025 1104 120 70 - 130 mg/dL Final   07/07/2025 1038 70 70 - 130 mg/dL Final   07/07/2025 0605 72 70 - 130 mg/dL Final   07/06/2025 1559 74 70 - 130 mg/dL Final   07/06/2025 1111 70 70 - 130 mg/dL Final       CT Chest With Contrast Diagnostic, CT Abdomen Pelvis With Contrast  Narrative: CT CHEST W CONTRAST DIAGNOSTIC-, CT ABDOMEN PELVIS W CONTRAST-     INDICATION: Chest pain, dysphagia. HISTORY of esophageal cancer     COMPARISON: CT chest, abdomen and pelvis December 10, 2024     TECHNIQUE:  Routine CT chest, abdomen and pelvis with IV contrast. Coronal and  sagittal reformats. Radiation dose reduction techniques were utilized,  including automated exposure control and exposure modulation based on  body size.     FINDINGS:      Chest wall: No lymphadenopathy.     Mediastinum: Three-vessel coronary artery atherosclerotic  calcifications. CABG. Heart is normal in size. Aortic valve replacement.  No pericardial effusion. Suspect graft repair of the ascending thoracic  aorta. No mediastinal or hilar lymphadenopathy.     Lungs/pleura: No effusions. Airways wall thickening. Patent central  airways. Juxta fissural solid pulmonary nodule along the minor fissure,  series 3, axial mage 60, measures 5 mm, unchanged. Solid pulmonary  nodule in the lateral segment right middle  lobe, series 3, axial mage  69, measures 5 mm, unchanged. Posterior dependent and bibasilar  subsegmental atelectasis. Respiratory motion artifact.     ABDOMEN: Small cyst seen in segment 6 of the right hepatic lobe,  unchanged. Cholecystectomy. No biliary ductal dilatation. Spleen is  normal in size. Incidental splenule. Mild pancreatic atrophy. No  pancreatic ductal dilatation or mass seen. Stable mild thickening of the  left adrenal gland. Nonobstructing nephrolithiasis in the right mid  kidney, series 2, axial mage 138, measures 2 mm. Bilateral fluid  attenuating renal cysts, largest in the inferior pole left kidney  measuring 6.6 cm. No solid-appearing renal mass or hydronephrosis.     Pelvis: Prostate is normal in size. Underdistended bladder. No bladder  calculus.     Bowel: Esophagectomy with gastric pull-up. Some suspected retained  ingested contrast or secretions and oral contrast seen in the remaining  distal esophagus, just above the esophagogastric anastomosis. Small  amount of contrast seen in the stomach. No extraluminal contrast. No  small bowel obstruction. Colonic diverticulosis. Long cecal mesentery.  Appendix not identified though no secondary findings of appendicitis.     Abdominal wall: Rectus diastases. Small fat-containing supraumbilical  ventral hernia. Small fat-containing umbilical hernia. Small  fat-containing inguinal hernias.     Retroperitoneum: No lymphadenopathy.     Vasculature: Patent. Mild aortoiliac atherosclerotic calcification. No  abdominal aortic aneurysm.     Osseous structures: Median sternotomy. Bilateral glenohumeral  osteoarthritis. Diffuse idiopathic skeletal hyperostosis seen in the  thoracic spine. Lumbar facet degenerative arthropathy with grade 1  anterolisthesis of L4 on L5.     Impression:    1. No acute findings identified in the chest, abdomen or pelvis.  2. Esophagectomy with gastric pull-up. Some retained ingested contents  or secretions and oral contrast seen  in the remaining esophagus and  small amount of contrast seen in the stomach. No extraluminal contrast.  3. Airways disease.  4. Stable pulmonary nodules. No new or enlarging pulmonary nodules seen.  5. Nonobstructing right nephrolithiasis.  6. Colonic diverticulosis  7. Three-vessel coronary artery atherosclerotic calcifications status  post CABG. Aortic valve replacement. Suspect graft repair of the  ascending thoracic aorta.     This report was finalized on 7/5/2025 3:00 PM by Dr. Dylon Barney M.D  on Workstation: [id number]     XR Chest 1 View  Narrative: Portable chest radiograph     HISTORY: Chest pain     TECHNIQUE: Single AP portable radiograph of the chest     COMPARISON: Chest radiograph 6/27/2025     Impression: FINDINGS AND IMPRESSION:  Pulmonary consolidation, pleural effusion or pneumothorax is seen.  Cardiac silhouette within normal limits for size. There are mediastinal  wires and prosthetic heart valve.     This report was finalized on 7/5/2025 9:47 AM by Dr. Rory Durán M.D  on Workstation: [id number]       I reviewed the patient's daily medications.  Scheduled Medications  amLODIPine, 5 mg, Oral, Daily  atorvastatin, 40 mg, Oral, Daily  buPROPion XL, 150 mg, Oral, QAM  fluticasone, 2 spray, Nasal, Daily  [Held by provider] furosemide, 20 mg, Oral, Daily  guaiFENesin, 1,200 mg, Oral, Q12H  insulin lispro, 2-7 Units, Subcutaneous, Q6H  isosorbide mononitrate, 120 mg, Oral, Daily  lisinopril, 10 mg, Oral, Daily  metoprolol succinate XL, 50 mg, Oral, Q12H  pantoprazole, 40 mg, Oral, Q AM  ranolazine, 1,000 mg, Oral, Q12H  sodium chloride, 10 mL, Intravenous, Q12H    Infusions   Diet  Diet: Liquid; Full Liquid; Fluid Consistency: Thin (IDDSI 0)         I have personally reviewed:  [x]  Laboratory   []  Microbiology   [x]  Radiology   [x]  EKG/Telemetry   [x]  Cardiology/Vascular   []  Pathology   [x]  Records     Assessment/Plan     Active Hospital Problems    Diagnosis  POA    **Intractable  nausea and vomiting [R11.2]  Yes    Atrial fibrillation [I48.91]  Yes    Nephrolithiasis [N20.0]  Yes    Iron deficiency anemia [D50.9]  Yes    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       64 y.o. male admitted with Intractable nausea and vomiting.    1.  Intractable nausea and vomiting in a patient with a history of esophageal cancer status post esophagectomy and J-tube placement with removal./history of GI bleed/diverticulosis.    IV Protonix, IV Zofran.  Consult thoracic surgery. GI consulted, plan on EGD today with biopsies and pylorus dilation. Full liquid diet and advance as tolerated.   CT scan of the chest/abdomen/pelvis revealed no acute disease/esophagectomy with gastric pull-up  2.  New onset atrial fibrillation, flutter in a patient with a history of coronary artery disease status post CABG/hypertension/bioprosthetic aortic valve replacement/ascending thoracic aneurysm repair.   Potassium is normal.  Troponin is mildly elevated but plateaued.  proBNP is normal.  Patient's last 2D echo on 6/27/2025 revealing an ejection fraction of 58%/normal diastolic function/left ventricular hypertrophy/bioprosthetic aortic valve.  Will continue Toprol/Ranexa/lisinopril/Norvasc/Lasix/Imdur.  Hydralazine as needed.  Cardiology consulted, increased metoprolol succinate to 50mg BID. Holding AC for now  3.  Type 2 diabetes.  A1c 5.82 months ago.  Will hold  Farxiga..Sliding scale insulin  4.  Obstructive sleep apnea.  Resume CPAP.  Stable pulmonary nodule by CAT scan.  5.  Hyperlipidemia.  Continue Lipitor.  6.  Nonobstructive nephrolithiasis.  Asymptomatic.    SCDs for DVT prophylaxis.  Full code.  Discussed with patient, family, and primary care provider.  Anticipate discharge home with HH vs SNU facility in 2-3 days.    Expected Discharge Date: 7/9/2025; Expected Discharge Time:       Chris Ahuja MD  [facility]  07/07/25  18:10 EDT

## 2025-07-07 NOTE — CASE MANAGEMENT/SOCIAL WORK
"Continued Stay Note  [facility]     Patient Name: Edmond Chase  MRN: [id number]  Today's Date: 7/7/2025    Admit Date: 7/5/2025        Discharge Plan       Row Name 07/07/25 0947       Plan    Plan Comments CCP attempted to screen. Pt politely asked CCP to leave as he \"hasn't slept since Thursday and is supposed to have a procedure at 10am\" Amos RN/CCP                   Discharge Codes    No documentation.                       Xiomara Aburto RN    "

## 2025-07-07 NOTE — PROGRESS NOTES
EGD [facility] general anesthesia patricia to aspiration risk    INDICATIONS  H/o esophageal cancer s/p resection  Nausea and vomiting  Decreased po intake     FINDINGS  An esophago-gastric anastomosis was found at the gastroesophageal junction--there was some texture change inflammation at 25 cm at the anastamosis but it was widely patent.  Biopsies were taken with a cold forceps for histology.     A single small mucosal nodule with a localized distribution was found in the lower third of the esophagus, 19 cm from the incisors.  Biopsies were taken with a cold forceps for histology.     A mild deformity was found at the pylorus.  A TTS dilator was passed through the scope.  Dilation with a 12-13.5-15 mm pyloric balloon dilator was performed max between 13.5 and 15.       RECOMMENDATIONS  - advance diet  - ensure taking PPI  - to consider UGI or GES if no improvement   - Await pathology results.

## 2025-07-07 NOTE — ANESTHESIA PROCEDURE NOTES
Airway  Reason: elective    Date/Time: 7/7/2025 11:33 AM  Airway not difficult    General Information and Staff    Patient location during procedure: OR  CRNA/CAA: Cathi Masterson CRNA    Indications and Patient Condition  Indications for airway management: airway protection    Preoxygenated: yes  MILS maintained throughout    Mask difficulty assessment: 0 - not attempted    Final Airway Details    Final airway type: endotracheal airway      Successful airway: ETT  Cuffed: yes   Successful intubation technique: RSI and video laryngoscopy  Adjuncts used in placement: intubating stylet  Endotracheal tube insertion site: oral  Blade: Viet  Blade size: D  ETT size (mm): 7.5  Cormack-Lehane Classification: grade I - full view of glottis  Placement verified by: chest auscultation and capnometry   Measured from: lips  ETT/EBT  to lips (cm): 23  Number of attempts at approach: 1  Assessment: lips, teeth, and gum same as pre-op and atraumatic intubation    Additional Comments  Atraumatic insertion

## 2025-07-07 NOTE — PROGRESS NOTES
Attempted to see patient. He remains off the floor for EGD, await findings.   Will continue to follow. Please call with any questions.    VIKTORIA Hughes  Thoracic Surgery

## 2025-07-07 NOTE — PROGRESS NOTES
"    Chief Complaint: Tractable nausea and vomiting  Status post post esophagectomy      Subjective  Patient reports feeling much better today-has nausea has improved to the point that he was able to tolerate liquid diet.  He reports that he is still very fearful try to limit himself to a few sips .  does not have any appetite for anything solid.    Vital Signs:  Temp:  [97.7 °F (36.5 °C)-98.2 °F (36.8 °C)] 98.2 °F (36.8 °C)  Heart Rate:  [] 138  Resp:  [16-18] 18  BP: (103-160)/() 128/90    Intake & Output (last day)         07/06 0701 07/07 0700    P.O. 120    Total Intake(mL/kg) 120 (0.9)    Net +120         Urine Unmeasured Occurrence 1 x            Objective:  Vital signs: (most recent): Blood pressure 128/90, pulse (!) 138, temperature 98.2 °F (36.8 °C), temperature source Oral, resp. rate 18, height 182.9 cm (72\"), weight 132 kg (290 lb), SpO2 98%.              Awake alert comfortable  Head of bed 30 degrees  No palpable neck lesions  Chest clear to auscultation  Abdomen soft nontender nondistended  Bowel sounds +        Results Review:     I reviewed the patient's new clinical results.    Imaging Results (Last 24 Hours)       ** No results found for the last 24 hours. **            Lab Results:     Lab Results (last 24 hours)       Procedure Component Value Units Date/Time    POC Glucose Once [[id number]]  (Normal) Collected: 07/06/25 1559    Specimen: Blood Updated: 07/06/25 1600     Glucose 74 mg/dL     POC Glucose Once [[id number]]  (Normal) Collected: 07/06/25 1111    Specimen: Blood Updated: 07/06/25 1112     Glucose 70 mg/dL     POC Glucose Once [[id number]]  (Abnormal) Collected: 07/06/25 0712    Specimen: Blood Updated: 07/06/25 0713     Glucose 139 mg/dL     POC Glucose Once [[id number]]  (Abnormal) Collected: 07/06/25 0645    Specimen: Blood Updated: 07/06/25 0647     Glucose 65 mg/dL     Basic Metabolic Panel [[id number]]  (Abnormal) Collected: 07/06/25 0410    Specimen: Blood Updated: " 07/06/25 0445     Glucose 68 mg/dL      BUN 14.0 mg/dL      Creatinine 0.74 mg/dL      Sodium 141 mmol/L      Potassium 4.0 mmol/L      Comment: Slight hemolysis detected by analyzer. Result may be falsely elevated.        Chloride 106 mmol/L      CO2 20.0 mmol/L      Calcium 8.3 mg/dL      BUN/Creatinine Ratio 18.9     Anion Gap 15.0 mmol/L      eGFR 99.9 mL/min/1.73     Narrative:      GFR Categories in Chronic Kidney Disease (CKD)              GFR Category          GFR (mL/min/1.73)    Interpretation  G1                    90 or greater        Normal or high (1)  G2                    60-89                Mild decrease (1)  G3a                   45-59                Mild to moderate decrease  G3b                   30-44                Moderate to severe decrease  G4                    15-29                Severe decrease  G5                    14 or less           Kidney failure    (1)In the absence of evidence of kidney disease, neither GFR category G1 or G2 fulfill the criteria for CKD.    eGFR calculation 2021 CKD-EPI creatinine equation, which does not include race as a factor    CBC Auto Differential [[id number]]  (Abnormal) Collected: 07/06/25 0410    Specimen: Blood Updated: 07/06/25 0424     WBC 6.44 10*3/mm3      RBC 4.43 10*6/mm3      Hemoglobin 14.5 g/dL      Hematocrit 44.9 %      .4 fL      MCH 32.7 pg      MCHC 32.3 g/dL      RDW 13.1 %      RDW-SD 49.7 fl      MPV 9.5 fL      Platelets 189 10*3/mm3      Neutrophil % 73.3 %      Lymphocyte % 18.0 %      Monocyte % 7.9 %      Eosinophil % 0.3 %      Basophil % 0.2 %      Immature Grans % 0.3 %      Neutrophils, Absolute 4.72 10*3/mm3      Lymphocytes, Absolute 1.16 10*3/mm3      Monocytes, Absolute 0.51 10*3/mm3      Eosinophils, Absolute 0.02 10*3/mm3      Basophils, Absolute 0.01 10*3/mm3      Immature Grans, Absolute 0.02 10*3/mm3      nRBC 0.0 /100 WBC     POC Glucose Once [[id number]]  (Normal) Collected: 07/06/25 0341    Specimen: Blood  Updated: 07/06/25 0343     Glucose 70 mg/dL     Urinalysis With Microscopic If Indicated (No Culture) - Urine, Clean Catch [[id number]]  (Abnormal) Collected: 07/06/25 0046    Specimen: Urine, Clean Catch Updated: 07/06/25 0146     Color, UA Yellow     Appearance, UA Clear     pH, UA <=5.0     Specific Gravity, UA >1.030     Glucose, UA >=1000 mg/dL (3+)     Ketones, UA 80 mg/dL (3+)     Bilirubin, UA Negative     Blood, UA Negative     Protein, UA Negative     Leuk Esterase, UA Negative     Nitrite, UA Negative     Urobilinogen, UA 0.2 E.U./dL    Narrative:      Urine microscopic not indicated.             Assessment & Plan       Intractable nausea and vomiting    Essential hypertension    S/P CABG (coronary artery bypass graft)    S/P AVR (aortic valve replacement)    H/O esophagectomy    Coronary artery disease of bypass graft of native heart with stable angina pectoris    Obstructive sleep apnea, adult treated with auto CPAP    Type 2 diabetes mellitus, without long-term current use of insulin    Malignant neoplasm of lower third of esophagus    Iron deficiency anemia    Atrial fibrillation    Nephrolithiasis       Assessment & Plan  64-year-old gentleman who presents with tractable nausea and vomiting status post esophagectomy for adenocarcinoma.Postop had required EGD with pyloric dilatation (15 mm balloon) last one June 2024.  Patient reports that afterwards he was managing himself with size control (nothing more than palm of his hand at 1 time).  This he was vomiting once or twice a week had become normal for him.  The past 2 weeks he is experiencing acute worsening of nausea and vomiting symptoms to the point that prior to presentation he was unable to keep any solid food down.    CT scan showed some retained contrast but acute changes  (no mass effect or anatomical obstruction.)  Patient is eating liquid diet and plans for EGD with gastroenterology tomorrow    Zaina Mason MD  [facility]  07/07/25  01:21 EDT

## 2025-07-07 NOTE — ANESTHESIA PREPROCEDURE EVALUATION
Anesthesia Evaluation     Patient summary reviewed and Nursing notes reviewed                Airway   Mallampati: II  TM distance: <3 FB  Possible difficult intubation  Dental      Pulmonary    (+) ,shortness of breath, sleep apnea on CPAP  Cardiovascular     ECG reviewed  Patient on routine beta blocker  Rhythm: irregular  Rate: normal    (+) hypertension, valvular problems/murmurs (ss/p AVR bioprosthetic) murmur, CAD, CABG, cardiac stents Drug eluting stent more than 12 months ago , dysrhythmias Atrial Fib, Atrial Flutter, angina, hyperlipidemia      Neuro/Psych  (+) headaches, dizziness/light headedness, psychiatric history Anxiety and Depression  GI/Hepatic/Renal/Endo    (+) morbid obesity, GERD, PUD, GI bleeding , renal disease- stones, diabetes mellitus type 2    Musculoskeletal     Abdominal    Substance History - negative use     OB/GYN negative ob/gyn ROS         Other   arthritis,   history of cancer                Anesthesia Plan    ASA 4     general     (S/p esophagectomy for cancer  RSI/GETA with CMAC with 8-0 tube  Neww onset AFib/AFlutter)  intravenous induction     Anesthetic plan, risks, benefits, and alternatives have been provided, discussed and informed consent has been obtained with: patient.    CODE STATUS:    Code Status (Patient has no pulse and is not breathing): CPR (Attempt to Resuscitate)  Medical Interventions (Patient has pulse or is breathing): Full Support

## 2025-07-07 NOTE — PROGRESS NOTES
"[facility]    Patient Name: Edmond Chase  :1960  64 y.o.  LOS: 0  Encounter Provider: VIKTORIA Van      Patient Care Team:  Nargis Velasquez APRN as PCP - General (Family Medicine)  Papa Miguel MD as Consulting Physician (Cardiology)  Sekou Pisano MD as Consulting Physician (Pulmonary Disease)  Gregor aCrlson MD as Consulting Physician (Gastroenterology)  Estevan Yuan MD (Sports Medicine)  Alex Hernadez MD as Consulting Physician (Nephrology)  Yazmin Molina APRN as Nurse Practitioner (Psychiatry)  Dylon Schwartz MD as Consulting Physician (Hematology and Oncology)  Valerie Stockton MD as Referring Physician (Thoracic Surgery)  Valerie Stockton MD as Surgeon (Thoracic Surgery)    Chief Complaint: Follow-up new onset A-fib    Interval History: Patient appears uncomfortable and anxious.  Heart rate controlled       Objective   Vital Signs  Temp:  [98 °F (36.7 °C)-98.2 °F (36.8 °C)] 98 °F (36.7 °C)  Heart Rate:  [] 101  Resp:  [18] 18  BP: (103-160)/() 134/92    Intake/Output Summary (Last 24 hours) at 2025 09  Last data filed at 2025  Gross per 24 hour   Intake 120 ml   Output --   Net 120 ml     Flowsheet Rows      Flowsheet Row First Filed Value   Admission Height 182.9 cm (72\") Documented at 2025 1516   Admission Weight 132 kg (290 lb) Documented at 2025 1516              Constitutional:       Appearance: Normal appearance. Well-developed.   Eyes:      Conjunctiva/sclera: Conjunctivae normal.   Neck:      Vascular: No carotid bruit.   Pulmonary:      Effort: Pulmonary effort is normal.      Breath sounds: Normal breath sounds.   Cardiovascular:      Normal rate. Irregularly irregular rhythm. Normal S1. Normal S2.       Murmurs: There is no murmur.      No gallop.  No click. No rub.   Edema:     Peripheral edema absent.   Musculoskeletal: Normal range of motion. Skin:     General: " "Skin is warm and dry.   Neurological:      Mental Status: Alert and oriented to person, place, and time.      GCS: GCS eye subscore is 4. GCS verbal subscore is 5. GCS motor subscore is 6.   Psychiatric:         Mood and Affect: Mood is anxious.         Speech: Speech normal.         Behavior: Behavior normal.         Thought Content: Thought content normal.         Judgment: Judgment normal.           Pertinent Test Results:  Results from last 7 days   Lab Units 07/07/25  0441 07/06/25  0410 07/05/25  0930   SODIUM mmol/L 138 141 138   POTASSIUM mmol/L 3.5 4.0 3.5   CHLORIDE mmol/L 105 106 101   CO2 mmol/L 22.8 20.0* 24.2   BUN mg/dL 18.0 14.0 16.0   CREATININE mg/dL 0.92 0.74* 1.00   GLUCOSE mg/dL 76 68 114*   CALCIUM mg/dL 8.4* 8.3* 9.2   AST (SGOT) U/L 15  --  24   ALT (SGPT) U/L 14  --  21     Results from last 7 days   Lab Units 07/05/25  1149 07/05/25  0930   HSTROP T ng/L 32* 40*     Results from last 7 days   Lab Units 07/07/25  0441 07/06/25  0410 07/05/25  0930   WBC 10*3/mm3 6.50 6.44 7.60   HEMOGLOBIN g/dL 13.5 14.5 15.2   HEMATOCRIT % 41.4 44.9 45.1   PLATELETS 10*3/mm3 183 189 213     Results from last 7 days   Lab Units 07/07/25  0441   INR  1.18*     Results from last 7 days   Lab Units 07/05/25  1711 07/05/25  0930   MAGNESIUM mg/dL 2.1 2.0           Invalid input(s): \"LDLCALC\"  Results from last 7 days   Lab Units 07/05/25  0930   PROBNP pg/mL 2,159.0*     Results from last 7 days   Lab Units 07/05/25  1149   TSH uIU/mL 1.630           Medication Review:   amLODIPine, 5 mg, Oral, Daily  atorvastatin, 40 mg, Oral, Daily  buPROPion XL, 150 mg, Oral, QAM  fluticasone, 2 spray, Nasal, Daily  [Held by provider] furosemide, 20 mg, Oral, Daily  guaiFENesin, 1,200 mg, Oral, Q12H  insulin lispro, 2-7 Units, Subcutaneous, Q6H  isosorbide mononitrate, 120 mg, Oral, Daily  lisinopril, 10 mg, Oral, Daily  metoprolol succinate XL, 50 mg, Oral, Q12H  pantoprazole, 40 mg, Intravenous, Q12H  ranolazine, 1,000 mg, " Oral, Q12H  sodium chloride, 10 mL, Intravenous, Q12H              Assessment & Plan     Active Hospital Problems    Diagnosis  POA    **Intractable nausea and vomiting [R11.2]  Yes    Atrial fibrillation [I48.91]  Yes    Nephrolithiasis [N20.0]  Yes    Iron deficiency anemia [D50.9]  Yes    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        Intractable nausea and vomiting  GI following, plans for EGD  New onset atrial fibrillation with RVR  Echocardiogram reveals preserved LVEF and normal AVR function  A-fib is rate controlled, metoprolol succinate 50 mg twice daily  Patient in the process of GI workup, he does have history of remote GI bleed.  Will have to reassess anticoagulation status  History of CAD with CABG  Patient has known chronic chest pain  Continue medical management  History of aortic valve replacement  History of esophageal neoplasm  With history of esophagectomy  GI following, plans for EGD  Type 2 diabetes  TJ           VIKTORIA Van  [facility]   [facility]  [address] [place] [address]  Office: [phone]    07/07/25  09:25 EDT

## 2025-07-08 LAB
CYTO UR: NORMAL
GEN 5 1HR TROPONIN T REFLEX: 34 NG/L
GLUCOSE BLDC GLUCOMTR-MCNC: 110 MG/DL (ref 70–130)
GLUCOSE BLDC GLUCOMTR-MCNC: 111 MG/DL (ref 70–130)
GLUCOSE BLDC GLUCOMTR-MCNC: 134 MG/DL (ref 70–130)
GLUCOSE BLDC GLUCOMTR-MCNC: 77 MG/DL (ref 70–130)
LAB AP CASE REPORT: NORMAL
PATH REPORT.FINAL DX SPEC: NORMAL
PATH REPORT.GROSS SPEC: NORMAL
QT INTERVAL: 397 MS
QTC INTERVAL: 484 MS
TROPONIN T % DELTA: -8
TROPONIN T NUMERIC DELTA: -3 NG/L
TROPONIN T SERPL HS-MCNC: 34 NG/L
TROPONIN T SERPL HS-MCNC: 37 NG/L

## 2025-07-08 PROCEDURE — 99232 SBSQ HOSP IP/OBS MODERATE 35: CPT

## 2025-07-08 PROCEDURE — 93005 ELECTROCARDIOGRAM TRACING: CPT | Performed by: STUDENT IN AN ORGANIZED HEALTH CARE EDUCATION/TRAINING PROGRAM

## 2025-07-08 PROCEDURE — 93010 ELECTROCARDIOGRAM REPORT: CPT | Performed by: INTERNAL MEDICINE

## 2025-07-08 PROCEDURE — 82948 REAGENT STRIP/BLOOD GLUCOSE: CPT

## 2025-07-08 PROCEDURE — 99232 SBSQ HOSP IP/OBS MODERATE 35: CPT | Performed by: NURSE PRACTITIONER

## 2025-07-08 PROCEDURE — 97162 PT EVAL MOD COMPLEX 30 MIN: CPT

## 2025-07-08 PROCEDURE — 84484 ASSAY OF TROPONIN QUANT: CPT | Performed by: STUDENT IN AN ORGANIZED HEALTH CARE EDUCATION/TRAINING PROGRAM

## 2025-07-08 RX ORDER — PANTOPRAZOLE SODIUM 40 MG/1
40 TABLET, DELAYED RELEASE ORAL
Qty: 30 TABLET | Refills: 0 | Status: SHIPPED | OUTPATIENT
Start: 2025-07-09 | End: 2025-08-08

## 2025-07-08 RX ORDER — METOPROLOL SUCCINATE 50 MG/1
50 TABLET, EXTENDED RELEASE ORAL EVERY 12 HOURS SCHEDULED
Qty: 60 TABLET | Refills: 0 | Status: SHIPPED | OUTPATIENT
Start: 2025-07-08 | End: 2025-08-07

## 2025-07-08 RX ADMIN — RANOLAZINE 1000 MG: 500 TABLET, FILM COATED, EXTENDED RELEASE ORAL at 09:54

## 2025-07-08 RX ADMIN — METOPROLOL SUCCINATE 50 MG: 50 TABLET, EXTENDED RELEASE ORAL at 09:56

## 2025-07-08 RX ADMIN — LISINOPRIL 10 MG: 10 TABLET ORAL at 09:55

## 2025-07-08 RX ADMIN — PANTOPRAZOLE SODIUM 40 MG: 40 TABLET, DELAYED RELEASE ORAL at 06:28

## 2025-07-08 RX ADMIN — Medication 10 ML: at 09:58

## 2025-07-08 RX ADMIN — Medication 10 ML: at 20:14

## 2025-07-08 RX ADMIN — AMLODIPINE BESYLATE 5 MG: 5 TABLET ORAL at 09:54

## 2025-07-08 RX ADMIN — METOPROLOL SUCCINATE 50 MG: 50 TABLET, EXTENDED RELEASE ORAL at 20:13

## 2025-07-08 RX ADMIN — FLUTICASONE PROPIONATE 2 SPRAY: 50 SPRAY, METERED NASAL at 09:59

## 2025-07-08 RX ADMIN — GUAIFENESIN 1200 MG: 600 TABLET, EXTENDED RELEASE ORAL at 09:55

## 2025-07-08 RX ADMIN — ATORVASTATIN CALCIUM 40 MG: 20 TABLET, FILM COATED ORAL at 09:54

## 2025-07-08 RX ADMIN — APIXABAN 5 MG: 5 TABLET, FILM COATED ORAL at 20:13

## 2025-07-08 RX ADMIN — RANOLAZINE 1000 MG: 500 TABLET, FILM COATED, EXTENDED RELEASE ORAL at 20:13

## 2025-07-08 RX ADMIN — ISOSORBIDE MONONITRATE 120 MG: 60 TABLET, EXTENDED RELEASE ORAL at 09:55

## 2025-07-08 RX ADMIN — APIXABAN 5 MG: 5 TABLET, FILM COATED ORAL at 13:24

## 2025-07-08 RX ADMIN — BUPROPION HYDROCHLORIDE 150 MG: 150 TABLET, EXTENDED RELEASE ORAL at 09:55

## 2025-07-08 NOTE — PROGRESS NOTES
Gastroenterology   Inpatient Progress Note    Reason for Follow Up:  n/v     Subjective     Interval History:   Patient reports he is feeling some better this morning.  He has noticed a pattern with his nausea/vomiting that depends on how much and how fast he eats.  He has made dietary adjustments which is helpful.  He reports that he will vomit about twice weekly, but has occasional flares where he has severe vomiting throughout the day and gets nervous that he will not be able to take his medication or eat when this happens.      Current Facility-Administered Medications:     amLODIPine (NORVASC) tablet 5 mg, 5 mg, Oral, Daily, La Rider MD, 5 mg at 07/08/25 0954    apixaban (ELIQUIS) tablet 5 mg, 5 mg, Oral, Q12H, Marcia Stoner APRN, 5 mg at 07/08/25 1324    atorvastatin (LIPITOR) tablet 40 mg, 40 mg, Oral, Daily, La Rider MD, 40 mg at 07/08/25 0954    sennosides-docusate (PERICOLACE) 8.6-50 MG per tablet 2 tablet, 2 tablet, Oral, BID PRN **AND** polyethylene glycol (MIRALAX) packet 17 g, 17 g, Oral, Daily PRN **AND** bisacodyl (DULCOLAX) EC tablet 5 mg, 5 mg, Oral, Daily PRN **AND** bisacodyl (DULCOLAX) suppository 10 mg, 10 mg, Rectal, Daily PRN, Blanca Celestin APRN    buPROPion XL (WELLBUTRIN XL) 24 hr tablet 150 mg, 150 mg, Oral, QAMTereso Samer H, MD, 150 mg at 07/08/25 0955    dextrose (D50W) (25 g/50 mL) IV injection 25 g, 25 g, Intravenous, Q15 Min PRN, Blanca Celestin APRN, 25 g at 07/07/25 1051    dextrose (GLUTOSE) oral gel 15 g, 15 g, Oral, Q15 Min PRN, Blanca Celestin APRN    fluticasone (FLONASE) 50 MCG/ACT nasal spray 2 spray, 2 spray, Nasal, Daily, La Rider MD, 2 spray at 07/08/25 0959    [Held by provider] furosemide (LASIX) tablet 20 mg, 20 mg, Oral, Daily, La Rider MD, 20 mg at 07/06/25 1420    glucagon (GLUCAGEN) injection 1 mg, 1 mg, Intramuscular, Q15 Min PRN, Blanca Celestin APRN    guaiFENesin (MUCINEX) 12  hr tablet 1,200 mg, 1,200 mg, Oral, Q12H, La Rider MD, 1,200 mg at 07/08/25 0955    hydrALAZINE (APRESOLINE) injection 10 mg, 10 mg, Intravenous, Q6H PRN, La Rider MD, 10 mg at 07/06/25 0041    insulin lispro (HUMALOG/ADMELOG) injection 2-7 Units, 2-7 Units, Subcutaneous, Q6H, Shelley Evangelista APRN    isosorbide mononitrate (IMDUR) 24 hr tablet 120 mg, 120 mg, Oral, Daily, La Rider MD, 120 mg at 07/08/25 0955    lisinopril (PRINIVIL,ZESTRIL) tablet 10 mg, 10 mg, Oral, Daily, La Rider MD, 10 mg at 07/08/25 0955    metoprolol succinate XL (TOPROL-XL) 24 hr tablet 50 mg, 50 mg, Oral, Q12H, Efra Durán MD, 50 mg at 07/08/25 0956    ondansetron (ZOFRAN) injection 4 mg, 4 mg, Intravenous, Q6H PRN, Blanca Celestin APRN, 4 mg at 07/05/25 1954    pantoprazole (PROTONIX) EC tablet 40 mg, 40 mg, Oral, Q AM, Ubaldo De León MD, 40 mg at 07/08/25 0628    ranolazine (RANEXA) 12 hr tablet 1,000 mg, 1,000 mg, Oral, Q12H, La Rider MD, 1,000 mg at 07/08/25 0954    sodium chloride 0.9 % flush 10 mL, 10 mL, Intravenous, Q12H, Blanca Celestin APRN, 10 mL at 07/08/25 0958    sodium chloride 0.9 % flush 10 mL, 10 mL, Intravenous, PRN, Blanca Celestin APRN    sodium chloride 0.9 % infusion 40 mL, 40 mL, Intravenous, PRN, Blanca Celestin APRN      Review of Systems  See HPI    Objective     Vital Signs  Temp:  [97.3 °F (36.3 °C)-97.9 °F (36.6 °C)] 97.3 °F (36.3 °C)  Heart Rate:  [] 89  Resp:  [18] 18  BP: (125-152)/() 125/77  Body mass index is 39.33 kg/m².    Intake/Output Summary (Last 24 hours) at 7/8/2025 1328  Last data filed at 7/7/2025 2048  Gross per 24 hour   Intake 140 ml   Output --   Net 140 ml     No intake/output data recorded.       Physical Exam  Vitals reviewed.   Constitutional:       General: He is not in acute distress.     Appearance: He is well-developed.   HENT:      Head: Normocephalic and atraumatic.    Pulmonary:      Effort: Pulmonary effort is normal. No respiratory distress.   Abdominal:      General: Abdomen is flat. There is no distension.      Palpations: Abdomen is soft.      Tenderness: There is no abdominal tenderness. There is no guarding or rebound.   Skin:     General: Skin is dry.      Coloration: Skin is not pale.   Neurological:      Mental Status: He is alert and oriented to person, place, and time.   Psychiatric:         Thought Content: Thought content normal.            Results Review      Results from last 7 days   Lab Units 07/07/25 0441 07/06/25 0410 07/05/25  0930   WBC 10*3/mm3 6.50 6.44 7.60   HEMOGLOBIN g/dL 13.5 14.5 15.2   HEMATOCRIT % 41.4 44.9 45.1   PLATELETS 10*3/mm3 183 189 213     Results from last 7 days   Lab Units 07/07/25 0441 07/06/25 0410 07/05/25  0930   SODIUM mmol/L 138 141 138   POTASSIUM mmol/L 3.5 4.0 3.5   CHLORIDE mmol/L 105 106 101   CO2 mmol/L 22.8 20.0* 24.2   BUN mg/dL 18.0 14.0 16.0   CREATININE mg/dL 0.92 0.74* 1.00   CALCIUM mg/dL 8.4* 8.3* 9.2   BILIRUBIN mg/dL 1.0  --  0.9   ALK PHOS U/L 69  --  85   ALT (SGPT) U/L 14  --  21   AST (SGOT) U/L 15  --  24   GLUCOSE mg/dL 76 68 114*     Results from last 7 days   Lab Units 07/07/25 0441   INR  1.18*     Lab Results   Lab Value Date/Time    LIPASE 18 07/05/2025 0930    LIPASE 14 06/27/2025 0751       Radiology:  CT Chest With Contrast Diagnostic   Final Result       1. No acute findings identified in the chest, abdomen or pelvis.   2. Esophagectomy with gastric pull-up. Some retained ingested contents   or secretions and oral contrast seen in the remaining esophagus and   small amount of contrast seen in the stomach. No extraluminal contrast.   3. Airways disease.   4. Stable pulmonary nodules. No new or enlarging pulmonary nodules seen.   5. Nonobstructing right nephrolithiasis.   6. Colonic diverticulosis   7. Three-vessel coronary artery atherosclerotic calcifications status   post CABG. Aortic valve  replacement. Suspect graft repair of the   ascending thoracic aorta.       This report was finalized on 7/5/2025 3:00 PM by Dr. Dylon Barney M.D   on Workstation: [id number]          CT Abdomen Pelvis With Contrast   Final Result       1. No acute findings identified in the chest, abdomen or pelvis.   2. Esophagectomy with gastric pull-up. Some retained ingested contents   or secretions and oral contrast seen in the remaining esophagus and   small amount of contrast seen in the stomach. No extraluminal contrast.   3. Airways disease.   4. Stable pulmonary nodules. No new or enlarging pulmonary nodules seen.   5. Nonobstructing right nephrolithiasis.   6. Colonic diverticulosis   7. Three-vessel coronary artery atherosclerotic calcifications status   post CABG. Aortic valve replacement. Suspect graft repair of the   ascending thoracic aorta.       This report was finalized on 7/5/2025 3:00 PM by Dr. Dylon Barney M.D   on Workstation: [id number]          XR Chest 1 View   Final Result   FINDINGS AND IMPRESSION:   Pulmonary consolidation, pleural effusion or pneumothorax is seen.   Cardiac silhouette within normal limits for size. There are mediastinal   wires and prosthetic heart valve.       This report was finalized on 7/5/2025 9:47 AM by Dr. Rory Durán M.D   on Workstation: [id number]                Assessment & Plan     Active Hospital Problems    Diagnosis     **Intractable nausea and vomiting     Atrial fibrillation     Nephrolithiasis     Iron deficiency anemia     Malignant neoplasm of lower third of esophagus     Type 2 diabetes mellitus, without long-term current use of insulin     Coronary artery disease of bypass graft of native heart with stable angina pectoris     Obstructive sleep apnea, adult treated with auto CPAP     S/P CABG (coronary artery bypass graft)     S/P AVR (aortic valve replacement)     H/O esophagectomy     Essential hypertension        Assessment:  Nausea and vomiting 2  to 3 weeks, worsening  Status post esophagectomy for esophageal cancer 2023  Status post dilation of the pylorus June 2024 for deformity  Status post CABG  Status post aortic valve replacement  Patient is on no anticoagulant or antiplatelet medications  Atrial fibrillation      Results Reviewed:    Upper GI Endoscopy (07/07/2025 11:08)   Tissue Pathology Exam (07/07/2025 11:48)     Plan:  64-year-old male with history of esophageal cancer diagnosed 2023 with esophagectomy admitted with nausea/vomiting daily for the last 2 weeks.  Also with new onset A-fib.    EGD yesterday showed texture changes at the anastomosis, a small nodule in the lower third of the esophagus, and a deformity at the pylorus which was dilated.  Pathology pending.    Doing much better today with resolution of nausea/vomiting over the last 2 days.  Continue with low residue diet.  Recommend he continue with diet modifications he has made at home with smaller and more frequent meals.  If symptoms persist despite dilation of the pylorus, consider repeat dilation versus proceeding to gastric emptying study.    Continue PPI.    Cardiology following a rate is controlled.  Starting Eliquis twice daily.  No overt GI blood loss noted.    GI to sign off, please call if needed.      I discussed the patients findings and my recommendations with patient.             VIKTORIA Morin  [facility] [address] [address]  Office: [phone]

## 2025-07-08 NOTE — SIGNIFICANT NOTE
07/08/25  15:17 EDT    Received a call from Dr. Ahuja, patient is complaining of chest pain.  Patient chronically complains of chest pain.  He also phoned the office approximately 1 week ago stating that he had accidentally been taking double his isosorbide mononitrate.  He notified me this morning during his assessment that he had not yet resumed it at home and was out of it for approximately 2 weeks.  This was resumed while he was hospitalized.  Patient is also on max dose ranolazine.    Reviewed echocardiogram and there is no new LV wall motion abnormalities.    Reviewed ECGs, other than new onset atrial fibrillation ST/T waves are unchanged.    Reviewed most recent cardiac catheterization in October 2024, no intervenable targets and cardiac catheterization was unchanged compared to 2 years prior.    Repeat troponin is pending.  I do not think that patient will need a subsequent troponin after this as he has had them during admission.  Patient did complain of his normal chronic angina when I assessed him on 7/7/2025.    All of these things were discussed directly with Dr. Ahuja.  I do not have any hesitation to patient being discharged.  Our office has been sent a message to arrange close follow-up in 1 week.         VIKTORIA Van  [facility]   [facility]  [address] [place] [address] - [address]  [place] [address]  Office: [phone]    07/08/25  15:21 EDT

## 2025-07-08 NOTE — DISCHARGE SUMMARY
Patient Name: Edmond Chase  : 1960  MRN: [id number]    Date of Admission: 2025  Date of Discharge:  2025  Primary Care Physician: Nragis Velasquez APRN      Chief Complaint:   Vomiting      Discharge Diagnoses     Active Hospital Problems    Diagnosis  POA    **Intractable nausea and vomiting [R11.2]  Yes    Atrial fibrillation [I48.91]  Yes    Nephrolithiasis [N20.0]  Yes    Iron deficiency anemia [D50.9]  Yes    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        Hospital Course     Mr. Chase is a 64 y.o. male with a history of bioprosthetic heart valve, diabetes type 2, TJ, hyperlipidemia, hypertension, esophageal cancer status post distal esophagectomy and J-tube placement with interval removal presenting with intractable nausea and vomiting.  Thoracic surgery was consulted, CT without any anatomic problems.  No rupture.  GI was consulted and an EGD and pyloric dilatation was done which greatly improved symptoms.  Tolerating a GI soft diet and pills this morning.  Patient also had new onset atrial fibrillation.  Recent echo on  with an EF of 58%.  Cardiology consulted plan for initiation of Eliquis and increased metoprolol succinate to twice daily and follow-up with cardiology as an outpatient.  Has been counseled on watching his stools as he had GI bleeds in the past when he was on NSAIDs.    At the time of discharge patient was  told to take all medications as prescribed, keep all follow-up appointments, and call their doctor or return to the hospital with any worsening or concerning symptoms.    Day of Discharge     Subjective:  Patient resting comfortably in bed.  Tolerating diet and pills.  No nausea or vomiting.  Feeling much better today.        Physical Exam:  Temp:  [97.3 °F (36.3 °C)-97.9 °F (36.6 °C)] 97.3 °F (36.3 °C)  Heart Rate:  [] 89  Resp:  [18] 18  BP: (125-152)/() 125/77  Body mass index is 39.33 kg/m².  Physical Exam  Vitals and nursing note reviewed.   Constitutional:       General: He is not in acute distress.  Cardiovascular:      Rate and Rhythm: Normal rate and regular rhythm.   Pulmonary:      Effort: Pulmonary effort is normal. No respiratory distress.   Abdominal:      General: Abdomen is flat. There is no distension.      Tenderness: There is no abdominal tenderness.   Musculoskeletal:         General: No swelling or deformity.   Skin:     General: Skin is warm and dry.   Neurological:      General: No focal deficit present.      Mental Status: He is alert. Mental status is at baseline.         Consultants     Consult Orders (all) (From admission, onward)       Start     Ordered    07/06/25 1359  Inpatient Gastroenterology Consult  Once        Specialty:  Gastroenterology  Provider:  Gregor Carlson MD    07/06/25 1358    07/05/25 1627  Inpatient Cardiology Consult  Once        Specialty:  Cardiology  Provider:  Papa Miguel MD    07/05/25 1627    07/05/25 1625  Inpatient Thoracic Surgery Consult  Once        Specialty:  Thoracic Surgery  Provider:  Valerie Stockton MD    07/05/25 1627    07/05/25 1540  Inpatient Nutrition Consult  Once        Provider:  (Not yet assigned)    07/05/25 1539    07/05/25 1149  Inpatient Thoracic Surgery Consult  Once        Specialty:  Thoracic Surgery  Provider:  Valerie Stockton MD    07/05/25 1148    07/05/25 1131  A (on-call MD unless specified)  Details  Once        Specialty:  Hospitalist  Provider:  Darek Rodriguez MD    07/05/25 1130                  Procedures     Imaging Results (All)       Procedure Component Value Units Date/Time    CT Chest With Contrast Diagnostic [[id number]] Collected: 07/05/25 1442     Updated: 07/05/25 1503    Narrative:      CT CHEST W CONTRAST DIAGNOSTIC-, CT ABDOMEN PELVIS W CONTRAST-     INDICATION: Chest pain, dysphagia. HISTORY of esophageal cancer     COMPARISON: CT chest, abdomen and pelvis December 10, 2024     TECHNIQUE:  Routine CT chest, abdomen and pelvis with IV contrast. Coronal and  sagittal reformats. Radiation dose reduction techniques were utilized,  including automated exposure control and exposure modulation based on  body size.     FINDINGS:      Chest wall: No lymphadenopathy.     Mediastinum: Three-vessel coronary artery atherosclerotic  calcifications. CABG. Heart is normal in size. Aortic valve replacement.  No pericardial effusion. Suspect graft repair of the ascending thoracic  aorta. No mediastinal or hilar lymphadenopathy.     Lungs/pleura: No effusions. Airways wall thickening. Patent central  airways. Juxta fissural solid pulmonary nodule along the minor fissure,  series 3, axial mage 60, measures 5 mm, unchanged. Solid pulmonary  nodule in the lateral segment right middle lobe, series 3, axial mage  69, measures 5 mm, unchanged. Posterior dependent and bibasilar  subsegmental atelectasis. Respiratory motion artifact.     ABDOMEN: Small cyst seen in segment 6 of the right hepatic lobe,  unchanged. Cholecystectomy. No biliary ductal dilatation. Spleen is  normal in size. Incidental splenule. Mild pancreatic atrophy. No  pancreatic ductal dilatation or mass seen. Stable mild thickening of the  left adrenal gland. Nonobstructing nephrolithiasis in the right mid  kidney, series 2, axial mage 138, measures 2 mm. Bilateral fluid  attenuating renal cysts, largest in the inferior pole left kidney  measuring  6.6 cm. No solid-appearing renal mass or hydronephrosis.     Pelvis: Prostate is normal in size. Underdistended bladder. No bladder  calculus.     Bowel: Esophagectomy with gastric pull-up. Some suspected retained  ingested contrast or secretions and oral contrast seen in the remaining  distal esophagus, just above the esophagogastric anastomosis. Small  amount of contrast seen in the stomach. No extraluminal contrast. No  small bowel obstruction. Colonic diverticulosis. Long cecal mesentery.  Appendix not identified though no secondary findings of appendicitis.     Abdominal wall: Rectus diastases. Small fat-containing supraumbilical  ventral hernia. Small fat-containing umbilical hernia. Small  fat-containing inguinal hernias.     Retroperitoneum: No lymphadenopathy.     Vasculature: Patent. Mild aortoiliac atherosclerotic calcification. No  abdominal aortic aneurysm.     Osseous structures: Median sternotomy. Bilateral glenohumeral  osteoarthritis. Diffuse idiopathic skeletal hyperostosis seen in the  thoracic spine. Lumbar facet degenerative arthropathy with grade 1  anterolisthesis of L4 on L5.       Impression:         1. No acute findings identified in the chest, abdomen or pelvis.  2. Esophagectomy with gastric pull-up. Some retained ingested contents  or secretions and oral contrast seen in the remaining esophagus and  small amount of contrast seen in the stomach. No extraluminal contrast.  3. Airways disease.  4. Stable pulmonary nodules. No new or enlarging pulmonary nodules seen.  5. Nonobstructing right nephrolithiasis.  6. Colonic diverticulosis  7. Three-vessel coronary artery atherosclerotic calcifications status  post CABG. Aortic valve replacement. Suspect graft repair of the  ascending thoracic aorta.     This report was finalized on 7/5/2025 3:00 PM by Dr. Dylon Barney M.D  on Workstation: [id number]       CT Abdomen Pelvis With Contrast [[id number]] Collected: 07/05/25 1442     Updated:  07/05/25 1503    Narrative:      CT CHEST W CONTRAST DIAGNOSTIC-, CT ABDOMEN PELVIS W CONTRAST-     INDICATION: Chest pain, dysphagia. HISTORY of esophageal cancer     COMPARISON: CT chest, abdomen and pelvis December 10, 2024     TECHNIQUE:  Routine CT chest, abdomen and pelvis with IV contrast. Coronal and  sagittal reformats. Radiation dose reduction techniques were utilized,  including automated exposure control and exposure modulation based on  body size.     FINDINGS:      Chest wall: No lymphadenopathy.     Mediastinum: Three-vessel coronary artery atherosclerotic  calcifications. CABG. Heart is normal in size. Aortic valve replacement.  No pericardial effusion. Suspect graft repair of the ascending thoracic  aorta. No mediastinal or hilar lymphadenopathy.     Lungs/pleura: No effusions. Airways wall thickening. Patent central  airways. Juxta fissural solid pulmonary nodule along the minor fissure,  series 3, axial mage 60, measures 5 mm, unchanged. Solid pulmonary  nodule in the lateral segment right middle lobe, series 3, axial mage  69, measures 5 mm, unchanged. Posterior dependent and bibasilar  subsegmental atelectasis. Respiratory motion artifact.     ABDOMEN: Small cyst seen in segment 6 of the right hepatic lobe,  unchanged. Cholecystectomy. No biliary ductal dilatation. Spleen is  normal in size. Incidental splenule. Mild pancreatic atrophy. No  pancreatic ductal dilatation or mass seen. Stable mild thickening of the  left adrenal gland. Nonobstructing nephrolithiasis in the right mid  kidney, series 2, axial mage 138, measures 2 mm. Bilateral fluid  attenuating renal cysts, largest in the inferior pole left kidney  measuring 6.6 cm. No solid-appearing renal mass or hydronephrosis.     Pelvis: Prostate is normal in size. Underdistended bladder. No bladder  calculus.     Bowel: Esophagectomy with gastric pull-up. Some suspected retained  ingested contrast or secretions and oral contrast seen in the  remaining  distal esophagus, just above the esophagogastric anastomosis. Small  amount of contrast seen in the stomach. No extraluminal contrast. No  small bowel obstruction. Colonic diverticulosis. Long cecal mesentery.  Appendix not identified though no secondary findings of appendicitis.     Abdominal wall: Rectus diastases. Small fat-containing supraumbilical  ventral hernia. Small fat-containing umbilical hernia. Small  fat-containing inguinal hernias.     Retroperitoneum: No lymphadenopathy.     Vasculature: Patent. Mild aortoiliac atherosclerotic calcification. No  abdominal aortic aneurysm.     Osseous structures: Median sternotomy. Bilateral glenohumeral  osteoarthritis. Diffuse idiopathic skeletal hyperostosis seen in the  thoracic spine. Lumbar facet degenerative arthropathy with grade 1  anterolisthesis of L4 on L5.       Impression:         1. No acute findings identified in the chest, abdomen or pelvis.  2. Esophagectomy with gastric pull-up. Some retained ingested contents  or secretions and oral contrast seen in the remaining esophagus and  small amount of contrast seen in the stomach. No extraluminal contrast.  3. Airways disease.  4. Stable pulmonary nodules. No new or enlarging pulmonary nodules seen.  5. Nonobstructing right nephrolithiasis.  6. Colonic diverticulosis  7. Three-vessel coronary artery atherosclerotic calcifications status  post CABG. Aortic valve replacement. Suspect graft repair of the  ascending thoracic aorta.     This report was finalized on 7/5/2025 3:00 PM by Dr. Dylon Barney M.D  on Workstation: [id number]       XR Chest 1 View [[id number]] Collected: 07/05/25 0944     Updated: 07/05/25 0950    Narrative:      Portable chest radiograph     HISTORY: Chest pain     TECHNIQUE: Single AP portable radiograph of the chest     COMPARISON: Chest radiograph 6/27/2025       Impression:      FINDINGS AND IMPRESSION:  Pulmonary consolidation, pleural effusion or pneumothorax is  "seen.  Cardiac silhouette within normal limits for size. There are mediastinal  wires and prosthetic heart valve.     This report was finalized on 7/5/2025 9:47 AM by Dr. Rory Durán M.D  on Workstation: [id number]               Pertinent Labs     Results from last 7 days   Lab Units 07/07/25  0441 07/06/25  0410 07/05/25  0930   WBC 10*3/mm3 6.50 6.44 7.60   HEMOGLOBIN g/dL 13.5 14.5 15.2   PLATELETS 10*3/mm3 183 189 213     Results from last 7 days   Lab Units 07/07/25  0441 07/06/25  0410 07/05/25  0930   SODIUM mmol/L 138 141 138   POTASSIUM mmol/L 3.5 4.0 3.5   CHLORIDE mmol/L 105 106 101   CO2 mmol/L 22.8 20.0* 24.2   BUN mg/dL 18.0 14.0 16.0   CREATININE mg/dL 0.92 0.74* 1.00   GLUCOSE mg/dL 76 68 114*   Estimated Creatinine Clearance: 111 mL/min (by C-G formula based on SCr of 0.92 mg/dL).  Results from last 7 days   Lab Units 07/07/25  0441 07/05/25  0930   ALBUMIN g/dL 3.3* 4.0   BILIRUBIN mg/dL 1.0 0.9   ALK PHOS U/L 69 85   AST (SGOT) U/L 15 24   ALT (SGPT) U/L 14 21     Results from last 7 days   Lab Units 07/07/25  0441 07/06/25  0410 07/05/25  1711 07/05/25  0930   CALCIUM mg/dL 8.4* 8.3*  --  9.2   ALBUMIN g/dL 3.3*  --   --  4.0   MAGNESIUM mg/dL  --   --  2.1 2.0     Results from last 7 days   Lab Units 07/05/25  0930   LIPASE U/L 18     Results from last 7 days   Lab Units 07/05/25  1149 07/05/25  0930   HSTROP T ng/L 32* 40*   PROBNP pg/mL  --  2,159.0*           Invalid input(s): \"LDLCALC\"        Test Results Pending at Discharge       Discharge Details        Discharge Medications        New Medications        Instructions Start Date   apixaban 5 MG tablet tablet  Commonly known as: ELIQUIS   5 mg, Oral, Every 12 Hours Scheduled      pantoprazole 40 MG EC tablet  Commonly known as: PROTONIX   40 mg, Oral, Every Early Morning   Start Date: July 9, 2025            Changes to Medications        Instructions Start Date   metoprolol succinate XL 50 MG 24 hr tablet  Commonly known as: " TOPROL-XL  What changed: when to take this   50 mg, Oral, Every 12 Hours Scheduled             Continue These Medications        Instructions Start Date   amLODIPine 5 MG tablet  Commonly known as: NORVASC   5 mg, Oral, Daily, Take 5 mg      armodafinil 150 MG tablet  Commonly known as: NUVIGIL   150 mg, Oral, Daily      atorvastatin 40 MG tablet  Commonly known as: LIPITOR   40 mg, Oral, Daily      buPROPion  MG 24 hr tablet  Commonly known as: Wellbutrin XL   150 mg, Oral, Every Morning      Farxiga 10 MG tablet  Generic drug: dapagliflozin Propanediol   10 mg, Oral, Daily      fluticasone 50 MCG/ACT nasal spray  Commonly known as: FLONASE   Administer 2 sprays into the nostril(s) as directed by provider Daily.      furosemide 20 MG tablet  Commonly known as: LASIX   20 mg, Oral, Daily      isosorbide mononitrate 120 MG 24 hr tablet  Commonly known as: IMDUR   120 mg, Oral, Daily      lisinopril 10 MG tablet  Commonly known as: PRINIVIL,ZESTRIL   10 mg, Oral, Daily      Mucus Relief 600 MG 12 hr tablet  Generic drug: guaiFENesin   1,200 mg, Oral, Every 12 Hours Scheduled      ranolazine 500 MG 12 hr tablet  Commonly known as: RANEXA   1,000 mg, Oral, Every 12 Hours Scheduled               No Known Allergies      Discharge Disposition:  Home or Self Care    Discharge Diet:  Diet Order   Procedures    Diet: Gastrointestinal; Fiber-Restricted, Low Irritant; Texture: Soft to Chew (NDD 3); Soft to Chew: Whole Meat; Fluid Consistency: Thin (IDDSI 0)       Discharge Activity:   As tolerated    CODE STATUS:    Code Status and Medical Interventions: CPR (Attempt to Resuscitate); Full Support   Ordered at: 07/05/25 1338     Code Status (Patient has no pulse and is not breathing):    CPR (Attempt to Resuscitate)     Medical Interventions (Patient has pulse or is breathing):    Full Support       Future Appointments   Date Time Provider Department Center   7/14/2025 11:15 AM [facility] CT 1 BH [facility] BR None   7/17/2025   1:00 PM Sujata John DNP, APRN [facility]   7/28/2025 11:20 AM Marcia Stoner APRN [facility] CD [id number] [facility]   7/30/2025  2:00 PM Yazmin Molina APRN [facility] BE[facility] ONC None   9/19/2025 10:45 AM Nargis Velasquez APRN [facility] PC  [facility]   10/24/2025  1:20 PM Papa Miguel MD [facility] CD [id number] [facility]   11/13/2025 11:45 AM Sekou Pisano MD [facility] ANDERSO2 None      Follow-up Information       Nargis Velasquez APRN .    Specialties: Family Medicine, Nurse Practitioner  Contact information:  [facility] [place] [address]  [phone]                             Time Spent on Discharge:  Greater than 30 minutes      Chris Ahuja MD  [facility]  07/08/25  14:14 EDT

## 2025-07-08 NOTE — PROGRESS NOTES
Nutrition Services    Patient Name: Edmond Chase  YOB: 1960  MRN: [id number]  Admission date: 7/5/2025    PROGRESS NOTE      Encounter Information: Diet advanced on 7/8. Pt stated he may have eaten too much at lunch due to being excited to eat again. Encouraged pt to take it slow and pace his eating going forward. Pt requested Boost once daily.        PO Diet: Diet: Gastrointestinal; Fiber-Restricted, Low Irritant; Texture: Soft to Chew (NDD 3); Soft to Chew: Whole Meat; Fluid Consistency: Thin (IDDSI 0)   PO Supplements: n/a   PO Intake:  No intake data available- diet advanced from full today       Current nutrition support: -   Nutrition support review: -       Weight: Weight: 132 kg (290 lb) (07/05/25 1516)       Medications: reviewed, protonix, zofran, dextrose   Labs: reviewed        GI Function:  normoactive, last bowel movement: 7/7       Nutrition Intervention Updates: Monitor and encourage good PO intake when possible.     Add Boost GC once daily to aid with intake- Boost Glucose Control q daily (Provides 190 kcals, 16 g protein if consumed)       RD to follow up per protocol.        Results from last 7 days   Lab Units 07/07/25  0441 07/06/25  0410 07/05/25  0930   SODIUM mmol/L 138 141 138   POTASSIUM mmol/L 3.5 4.0 3.5   CHLORIDE mmol/L 105 106 101   CO2 mmol/L 22.8 20.0* 24.2   BUN mg/dL 18.0 14.0 16.0   CREATININE mg/dL 0.92 0.74* 1.00   CALCIUM mg/dL 8.4* 8.3* 9.2   BILIRUBIN mg/dL 1.0  --  0.9   ALK PHOS U/L 69  --  85   ALT (SGPT) U/L 14  --  21   AST (SGOT) U/L 15  --  24   GLUCOSE mg/dL 76 68 114*     Results from last 7 days   Lab Units 07/07/25  0441 07/06/25  0410 07/05/25  1711 07/05/25  0930   MAGNESIUM mg/dL  --   --  2.1 2.0   HEMOGLOBIN g/dL 13.5   < >  --  15.2   HEMATOCRIT % 41.4   < >  --  45.1    < > = values in this interval not displayed.     COVID19   Date Value Ref Range Status   07/05/2025 Not Detected Not Detected - Ref. Range Final     Lab Results    Component Value Date    HGBA1C 5.8 (H) 05/23/2025       RD to follow up per protocol.    Electronically signed by:  Pearl Valdovinos RD  07/08/25 11:33 EDT

## 2025-07-08 NOTE — PLAN OF CARE
Goal Outcome Evaluation:  Plan of Care Reviewed With: patient        Progress: no change  Outcome Evaluation: Pt is a 64 y.o. male with a history of DM2, AAA repair AVR, TJ, esophageal cancer s/p esophagectomy,  JAYNE HTN, CAD, CABG that presents to [facility] complaining of worsening nausea and vomiting over the past week.  Pt lives in an apt alone w/ elevator access. He is normally indep w/o an AD. He was received in bed AOx4, on RA, agreed to PT. Pt was SBA to sit EOB. He was very slow w/ reported new weakness and sternal pain, Rn reported abnormal EKG during session. Pt sat EOB very dizzy. Pt RN and PT agreed to have pt get back into bed as a result. He will benefit from PT to improve functional mobility activity tolerance, and dec fall risk. Discharge recc SNF vs HHPT depending on progress.    Anticipated Discharge Disposition (PT): skilled nursing facility, home with home health

## 2025-07-08 NOTE — PROGRESS NOTES
"[facility]    Patient Name: Edmond Chase  :1960  64 y.o.  LOS: 0  Encounter Provider: VIKTORIA Van      Patient Care Team:  Nargis Velasquez APRN as PCP - General (Family Medicine)  Papa Miguel MD as Consulting Physician (Cardiology)  Sekou Pisano MD as Consulting Physician (Pulmonary Disease)  Gregor Carlson MD as Consulting Physician (Gastroenterology)  Estevan Yuan MD (Sports Medicine)  Alex Hernadez MD as Consulting Physician (Nephrology)  Yazmin Molina APRN as Nurse Practitioner (Psychiatry)  Dylon Schwartz MD as Consulting Physician (Hematology and Oncology)  Valerie Stockton MD as Referring Physician (Thoracic Surgery)  Valerie Stockton MD as Surgeon (Thoracic Surgery)    Chief Complaint: Follow-up new onset A-fib    Interval History:Remains in atrial fibrillation, rate controlled.  Patient is in better spirits today, has more energy.        Objective   Vital Signs  Temp:  [97.5 °F (36.4 °C)-97.9 °F (36.6 °C)] 97.9 °F (36.6 °C)  Heart Rate:  [] 101  Resp:  [15-21] 18  BP: ()/() 145/96    Intake/Output Summary (Last 24 hours) at 2025 0927  Last data filed at 2025 2048  Gross per 24 hour   Intake 740 ml   Output --   Net 740 ml     Flowsheet Rows      Flowsheet Row First Filed Value   Admission Height 182.9 cm (72\") Documented at 2025 1516   Admission Weight 132 kg (290 lb) Documented at 2025 1516              Constitutional:       Appearance: Normal appearance. Well-developed.   Eyes:      Conjunctiva/sclera: Conjunctivae normal.   Neck:      Vascular: No carotid bruit.   Pulmonary:      Effort: Pulmonary effort is normal.      Breath sounds: Normal breath sounds.   Cardiovascular:      Normal rate. Irregularly irregular rhythm. Normal S1. Normal S2.       Murmurs: There is no murmur.      No gallop.  No click. No rub.   Edema:     Peripheral edema absent.   Musculoskeletal: " "Normal range of motion. Skin:     General: Skin is warm and dry.   Neurological:      Mental Status: Alert and oriented to person, place, and time.      GCS: GCS eye subscore is 4. GCS verbal subscore is 5. GCS motor subscore is 6.   Psychiatric:         Mood and Affect: Mood is anxious.         Speech: Speech normal.         Behavior: Behavior normal.         Thought Content: Thought content normal.         Judgment: Judgment normal.           Pertinent Test Results:  Results from last 7 days   Lab Units 07/07/25  0441 07/06/25  0410 07/05/25  0930   SODIUM mmol/L 138 141 138   POTASSIUM mmol/L 3.5 4.0 3.5   CHLORIDE mmol/L 105 106 101   CO2 mmol/L 22.8 20.0* 24.2   BUN mg/dL 18.0 14.0 16.0   CREATININE mg/dL 0.92 0.74* 1.00   GLUCOSE mg/dL 76 68 114*   CALCIUM mg/dL 8.4* 8.3* 9.2   AST (SGOT) U/L 15  --  24   ALT (SGPT) U/L 14  --  21     Results from last 7 days   Lab Units 07/05/25  1149 07/05/25  0930   HSTROP T ng/L 32* 40*     Results from last 7 days   Lab Units 07/07/25  0441 07/06/25  0410 07/05/25  0930   WBC 10*3/mm3 6.50 6.44 7.60   HEMOGLOBIN g/dL 13.5 14.5 15.2   HEMATOCRIT % 41.4 44.9 45.1   PLATELETS 10*3/mm3 183 189 213     Results from last 7 days   Lab Units 07/07/25  0441   INR  1.18*     Results from last 7 days   Lab Units 07/05/25  1711 07/05/25  0930   MAGNESIUM mg/dL 2.1 2.0           Invalid input(s): \"LDLCALC\"  Results from last 7 days   Lab Units 07/05/25  0930   PROBNP pg/mL 2,159.0*     Results from last 7 days   Lab Units 07/05/25  1149   TSH uIU/mL 1.630           Medication Review:   amLODIPine, 5 mg, Oral, Daily  atorvastatin, 40 mg, Oral, Daily  buPROPion XL, 150 mg, Oral, QAM  fluticasone, 2 spray, Nasal, Daily  [Held by provider] furosemide, 20 mg, Oral, Daily  guaiFENesin, 1,200 mg, Oral, Q12H  insulin lispro, 2-7 Units, Subcutaneous, Q6H  isosorbide mononitrate, 120 mg, Oral, Daily  lisinopril, 10 mg, Oral, Daily  metoprolol succinate XL, 50 mg, Oral, Q12H  pantoprazole, 40 " mg, Oral, Q AM  ranolazine, 1,000 mg, Oral, Q12H  sodium chloride, 10 mL, Intravenous, Q12H              Assessment & Plan     Active Hospital Problems    Diagnosis  POA    **Intractable nausea and vomiting [R11.2]  Yes    Atrial fibrillation [I48.91]  Yes    Nephrolithiasis [N20.0]  Yes    Iron deficiency anemia [D50.9]  Yes    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        Intractable nausea and vomiting  EGD performed 7/7/2025.  Reviewed full results.  Esophagus was dilated.  New onset atrial fibrillation with RVR  Echocardiogram reveals preserved LVEF and normal AVR function  A-fib is rate controlled, metoprolol succinate 50 mg twice daily  Patient in the process of GI workup, he does have history of remote GI bleed.  Will have to reassess anticoagulation status  History of CAD with CABG  Patient has known chronic chest pain  Continue medical management  History of aortic valve replacement  History of esophageal neoplasm  With history of esophagectomy  GI following, EGD results noted.  Esophagus dilated 7/7/2025  Type 2 diabetes  TJ      Plan: Heart rate controlled.  Start apixiban 5 mg BID.    I have discussed options for anticoagulation with Edmond Chase including but not limited to warfarin, rivaroxaban, apixaban.  Patient does not have any direct contraindications for blood thinners such as history of GI bleed, history of intracranial hemorrhage,  internal bleeding.    Edmond Chase does note a history of gastrointestinal bleeding in the past.  This was in the setting of using ibuprofen 600 mg several times a day for several weeks.  Reports that he then had a colonoscopy where a large polyp was removed.  Approximately 1 week after the polyp being removed he again had gastrointestinal bleeding.  At the time he was on baby aspirin.  He is no longer on baby aspirin.  He is agreeable to proceed with starting apixaban and is aware that he will need to inform us for any signs of blood in the stool.    Patient was informed of the risks versus benefits of anticoagulation in the setting of atrial fibrillation. Edmond Chase was advised that if they incur any epistaxis, blood in the stool, blood in the urine to notify the office immediately.  Patient was advised for any head injury to notify the office or be evaluated in the emergency department.           VIKTORIA Van  [facility]   [facility]  [address] [place] [address] [address] [place] [address]  Office: [phone]    07/08/25  09:27 EDT

## 2025-07-08 NOTE — PROGRESS NOTES
Chief Complaint: Intractable N/V, s/p esophagectomy with Dr Stockton February 2023    Subjective  Reports feeling much better after EGD with dilation per GI yesterday.  Is tolerating soft foods without issues.  No further nausea or vomiting.      Objective:  General appearance: Comfortable, in no acute distress  Vital signs: /77, HR 89, RR 18, spO2 95% room air  Lungs: Normal effort and normal respiratory rate.  Heart: Normal rate.  Regular rhythm.    Chest: Symmetric chest wall expansion.   Abdomen: Abdomen is soft, non-distended   Neurological: Patient is alert and oriented to person, place and time.    Skin:  Warm and dry.     Intake & Output (last day)         07/07 0701 07/08 0700 07/08 0701 07/09 0700    P.O. 140     I.V. (mL/kg) 600 (4.5)     Total Intake(mL/kg) 740 (5.6)     Net +740                   Results Review:    I have reviewed the patient's most recent labs and imaging.    Imaging Results (Last 24 Hours)       ** No results found for the last 24 hours. **            Lab Results:     Lab Results (last 24 hours)       Procedure Component Value Units Date/Time    Tissue Pathology Exam [[id number]] Collected: 07/07/25 1148    Specimen: Tissue from GE Junction, Tissue from Esophagus Updated: 07/08/25 1350     Case Report --     Surgical Pathology Report                         Case: [id number]                                  Authorizing Provider:  Ubaldo De León MD      Collected:           07/07/2025 11:48 AM          Ordering Location:     [facility]  Received:            07/07/2025 01:32 PM                                 ENDO SUITES                                                                  Pathologist:           Mulu Camacho MD                                                        Specimens:   1) - GE Junction, GE JUNCTION BIOPSIES AT ANASTAMOSIS                                               2) - Esophagus, ESOPHAGUS AT 19CM                                     "                       Final Diagnosis --     1.  Gastroesophageal junction, at anastomosis, biopsy:         A.  Squamocolumnar mucosa with mild chronic inflammation.         B.  Negative for intestinal metaplasia and dysplasia.    2.  Esophagus, at 19 cm, biopsy:         A.  Squamocolumnar mucosa with mild to moderate chronic inflammation.         B.  Negative for intestinal metaplasia and dysplasia.       Gross Description --     1. GE Junction.  The specimen is received in formalin labeled with the patient's name and further designated 'gastroesophageal junction   Biopsy at anastamosis' are multiple small fragments of gray-tan tissue. The specimen is submitted for embedding as received.    2. Esophagus.  The specimen is received in formalin labeled with the patient's name and further designated 'esophagus @ 19 cm   biopsy' is a small fragment of gray-tan tissue. The specimen is submitted for embedding as received.         Microscopic Description --     Unless \"gross only\" is specified, the final diagnosis for each specimen is based on microscopic examination of tissue.      POC Glucose Once [[id number]]  (Normal) Collected: 07/08/25 1103    Specimen: Blood Updated: 07/08/25 1104     Glucose 111 mg/dL     POC Glucose Once [[id number]]  (Normal) Collected: 07/08/25 0626    Specimen: Blood Updated: 07/08/25 0628     Glucose 77 mg/dL     POC Glucose Once [[id number]]  (Normal) Collected: 07/07/25 2121    Specimen: Blood Updated: 07/07/25 2122     Glucose 97 mg/dL     POC Glucose Once [[id number]]  (Normal) Collected: 07/07/25 1623    Specimen: Blood Updated: 07/07/25 1625     Glucose 81 mg/dL              Assessment & Plan   Intractable nausea and vomiting  S/p esophagectomy Feb 2023   Malignant neoplasm of lower third of esophagus  New onset atrial fibrillation  History of GI bleed with NSAID use    Assessment & Plan  Pt underwent EGD yesterday with GI; anastamosis was widely patent, mild deformity at the pylorus was " dilated successfully, single small mucosal nodule in the lower third of the esophagus 19cm from incisors was biopsied.      Patient has since tolerated soft diet without any difficulties, no issues swallowing pills, no further nausea or vomiting.  Continue PPI.      Of note he was found to be in new onset atrial fibrillation during this hospitalization, cards consulted and he will be started on Eliquis today.      OK to discharge home on prescribed GI diet.  Patient knows his limits regarding small portion sizes, food selection etc and this was encouraged and re-iterated.  He was already scheduled for a 6 month follow-up for post operative surveillance in our office with KRYSTAL BLUM on 7/17/25.  He will keep this appointment and call us with any further issues or concerns.      Discussed with Dr Stockton, patient    Cathi Moya DNP, VIKTORIA  [facility]  07/08/25  14:32 EDT    Greater than 35 minutes was spent reviewing the patient's chart, radiographic imaging, labs, provider notes, assessing the patient and developing a plan of care.  This was discussed with the patient and RN.

## 2025-07-08 NOTE — PLAN OF CARE
Goal Outcome Evaluation:  Plan of Care Reviewed With: patient        Progress: improving  Outcome Evaluation: Pt is A/o x4, A-fib on monitor, on 2l oxygen at night base line is room air, schedule medications, on full liquid diet, no c/o of nausea and vomiting. Continue to advance diet for a day and see how pt tolerates it. Care plan continues.

## 2025-07-08 NOTE — THERAPY EVALUATION
Patient Name: Edmond Chase  : 1960    MRN: [id number]                              Today's Date: 2025       Admit Date: 2025    Visit Dx:     ICD-10-CM ICD-9-CM   1. Intractable nausea and vomiting  R11.2 536.2   2. Malignant neoplasm of lower third of esophagus  C15.5 150.5     Patient Active Problem List   Diagnosis    Essential hypertension    Hyperglycemia    Hyperlipidemia LDL goal <70    Class 2 severe obesity due to excess calories with serious comorbidity and body mass index (BMI) of 36.0 to 36.9 in adult    Nocturia    Nonrheumatic aortic valve insufficiency    Myocardial ischemia    Coronary artery disease involving native coronary artery of native heart    Ascending aortic aneurysm    CAD in native artery    S/P CABG (coronary artery bypass graft)    S/P AVR (aortic valve replacement)    H/O esophagectomy    Fatigue    Abnormal nuclear stress test    Coronary artery disease    Coronary artery disease of bypass graft of native heart with stable angina pectoris    Obstructive sleep apnea, adult treated with auto CPAP    Hypersomnia due to medical condition    Angina at rest    Type 2 diabetes mellitus, without long-term current use of insulin    Anemia    Ulcer of esophagus without bleeding    Polyp of colon    Rectal bleeding    Gastrointestinal hemorrhage    Malignant neoplasm of lower third of esophagus    Gastrointestinal hemorrhage, unspecified gastrointestinal hemorrhage type    Exertional chest pain    Iron deficiency anemia    Cholelithiasis    Erectile dysfunction    Ventricular ectopy    Chest pain    Dyspnea    Intractable nausea and vomiting    Atrial fibrillation    Nephrolithiasis     Past Medical History:   Diagnosis Date    Abnormal nuclear stress test     Anxiety     Anxiety and depression     Aortic valve insufficiency     nonrheumtic     Arthritis     knees    Ascending aortic aneurysm     CAD (coronary artery disease)     stent    Chest pain     Diabetes mellitus   "   Diarrhea     Dizzy     CAREFUL WHEN GETTING UP    Dyspnea     Esophageal cancer 11/2022    no chemo or radiation    Essential hypertension     Fatigue     G tube feedings     per jejunostomy tube nightly with permitin  3 cans nightly/ J-TUBE REMOVED 2023    GERD (gastroesophageal reflux disease)     GI bleed     Heart murmur     History of pneumonia     History of recent blood transfusion     hemorrhage     no reaction    Hyperglycemia     Hyperlipidemia     Hypersomnia with sleep apnea     Jejunostomy tube present     REMOVED 2023    Lightheadedness     Malaise and fatigue     Migraines     \"OCCULAR MIGRAINES\"    Morbid obesity     Myocardial ischemia     Nausea     Nocturia     TJ on auto CPAP 10/31/2016    Overnight polysomnogram.  Weight 276 pounds.  Severe TJ with AHI 79 events per hour.  Auto CPAP recommended.    Pneumonia     FEB 2016    Scrotal bleeding     SOB (shortness of breath)      Past Surgical History:   Procedure Laterality Date    AORTIC VALVE REPAIR/REPLACEMENT  05/2016    ASCENDING ARCH/HEMIARCH REPLACEMENT N/A 05/02/2016    Procedure: BHAVESH STERNOTOMY CORONARY ARTERY BYPASS GRAFT TIMES 3 USING LEFT INTERNAL MAMMARY ARTERY AND RIGHT GREATER SAPHENOUS VEIN GRAFT PER ENDOSCOPIC VEIN HARVESTING, AORTIC ANEURYSM REPAIR WITH ROOT REPAIR AND AORTIC VALVE REPLACEMENT;  Surgeon: Rosalio Cline MD;  Location: [facility] OR;  Service:     CARDIAC CATHETERIZATION N/A 04/01/2016    Procedure: Left Heart Cath;  Surgeon: Erick Tam MD;  Location: [facility] CATH INVASIVE LOCATION;  Service:     CARDIAC CATHETERIZATION N/A 04/01/2016    Procedure: Left ventriculography;  Surgeon: Erick Tam MD;  Location: [facility] CATH INVASIVE LOCATION;  Service:     CARDIAC CATHETERIZATION N/A 04/01/2016    Procedure: Right Heart Cath;  Surgeon: Erick Tam MD;  Location: [facility] CATH INVASIVE LOCATION;  Service:     CARDIAC CATHETERIZATION N/A 10/30/2017    Procedure: Coronary angiography;  Surgeon: Sorin Vasquez MD;  " Location: [facility] CATH INVASIVE LOCATION;  Service:     CARDIAC CATHETERIZATION  10/30/2017    Procedure: Saphenous Vein Graft;  Surgeon: Sorin Vasquez MD;  Location: [facility] CATH INVASIVE LOCATION;  Service:     CARDIAC CATHETERIZATION N/A 10/30/2017    Procedure: Native mammary injection;  Surgeon: Sorin Vasquez MD;  Location: [facility] CATH INVASIVE LOCATION;  Service:     CARDIAC CATHETERIZATION N/A 07/07/2020    Procedure: Coronary angiography;  Surgeon: Gregor Kim MD;  Location: [facility] CATH INVASIVE LOCATION;  Service: Cardiovascular;  Laterality: N/A;    CARDIAC CATHETERIZATION N/A 07/07/2020    Procedure: Left heart cath;  Surgeon: Gregor Kim MD;  Location: [facility] CATH INVASIVE LOCATION;  Service: Cardiovascular;  Laterality: N/A;    CARDIAC CATHETERIZATION N/A 07/07/2020    Procedure: Left ventriculography;  Surgeon: Gregor Kim MD;  Location: [facility] CATH INVASIVE LOCATION;  Service: Cardiovascular;  Laterality: N/A;    CARDIAC CATHETERIZATION N/A 07/07/2020    Procedure: Stent ESMER bypass graft;  Surgeon: Gregor Kim MD;  Location: [facility] CATH INVASIVE LOCATION;  Service: Cardiovascular;  Laterality: N/A;    CARDIAC CATHETERIZATION N/A 06/17/2021    Procedure: SAPHENOUS VEIN GRAFT;  Surgeon: Marques Ndiaye MD;  Location: [facility] CATH INVASIVE LOCATION;  Service: Cardiovascular;  Laterality: N/A;    CARDIAC CATHETERIZATION N/A 06/17/2021    Procedure: Left Heart Cath;  Surgeon: Marques Ndiaye MD;  Location: [facility] CATH INVASIVE LOCATION;  Service: Cardiovascular;  Laterality: N/A;    CARDIAC CATHETERIZATION N/A 06/17/2021    Procedure: Coronary angiography;  Surgeon: Marques Ndiaye MD;  Location: [facility] CATH INVASIVE LOCATION;  Service: Cardiovascular;  Laterality: N/A;    CARDIAC CATHETERIZATION N/A 07/14/2022    Procedure: Left Heart Cath;  Surgeon: Charlie Aj MD;  Location: [facility] CATH INVASIVE LOCATION;  Service: Cardiovascular;  Laterality: N/A;     CARDIAC CATHETERIZATION N/A 07/14/2022    Procedure: Coronary angiography;  Surgeon: Charlie Aj MD;  [facility];  Service: Cardiovascular;  Laterality: N/A;    CARDIAC CATHETERIZATION  07/14/2022    Procedure: Saphenous Vein Graft;  Surgeon: Charlie Aj MD;  Location:  [facility] CATH INVASIVE LOCATION;  Service: Cardiovascular;;    CARDIAC CATHETERIZATION N/A 07/14/2022    Procedure: Native mammary injection;  Surgeon: Charlie Aj MD;  [facility];  Service: Cardiovascular;  Laterality: N/A;    CARDIAC CATHETERIZATION N/A 10/24/2024    Procedure: Left Heart Cath;  Surgeon: Godwin Francis MD;  [facility]: [facility];  Service: Cardiovascular;  Laterality: N/A;    CARDIAC CATHETERIZATION N/A 10/24/2024    Procedure: Coronary angiography;  Surgeon: Godwin Francis MD;  Location: [facility] CATH INVASIVE LOCATION;  Service: Cardiovascular;  Laterality: N/A;    CARDIAC CATHETERIZATION N/A 10/24/2024    Procedure: Native mammary injection;  Surgeon: Godwin Francis MD;  Location: [facility] CATH INVASIVE LOCATION;  Service: Cardiovascular;  Laterality: N/A;    CATARACT EXTRACTION EXTRACAPSULAR W/ INTRAOCULAR LENS IMPLANTATION Left     CHOLECYSTECTOMY WITH INTRAOPERATIVE CHOLANGIOGRAM N/A 09/01/2023    Procedure: CHOLECYSTECTOMY LAPAROSCOPIC INTRAOPERATIVE CHOLANGIOGRAM choledoscopy common bile duct exploration;  Surgeon: Jose Manuel Baca MD;  Location: [facility] MAIN OR;  Service: General;  Laterality: N/A;    COLONOSCOPY N/A 11/26/2022    Procedure: COLONOSCOPY AT BEDSIDE;  Surgeon: Tomy Lopez MD;  Location: [facility] MAIN OR;  Service: Gastroenterology;  Laterality: N/A;    COLONOSCOPY W/ POLYPECTOMY N/A 10/04/2022    Procedure: COLONOSCOPY to cecum with cold forceps and cold snare polypectomies;  Surgeon: Gregor Carlson MD;  Location: [facility] ENDOSCOPY;  Service: Gastroenterology;  Laterality: N/A;  PRE- hx of  polyps  POST- diverticulosis, polyps    CORONARY ARTERY BYPASS GRAFT  05/2016    LIMA TO LAD, SVG TO PDA, SVG TO OM2    ENDOSCOPY N/A 07/13/2022    Procedure: ESOPHAGOGASTRODUODENOSCOPY;  Surgeon: Marcia Hollis MD;  Location: [facility] ENDOSCOPY;  Service: Gastroenterology;  Laterality: N/A;  PRE- ANEMIA, MELENA  POST- MILD EROSIVE GASTRITIS, GE JUNCTION ULCER    ENDOSCOPY N/A 10/04/2022    Procedure: ESOPHAGOGASTRODUODENOSCOPY with biopsies;  Surgeon: Gregor Carlson MD;  Location: [facility] ENDOSCOPY;  Service: Gastroenterology;  Laterality: N/A;  PRE- hx of esophageal ulcer  POST- gastric cardia mass    ENDOSCOPY N/A 05/01/2023    Procedure: ESOPHAGOGASTRODUODENOSCOPY WITH  [id number] balloon DILATATION of pylorus and anastomosis;  Surgeon: Valerie Stockton MD;  Location: [facility] ENDOSCOPY;  Service: Gastroenterology;  Laterality: N/A;  PRE: dysphagia  POST: no abnormalities seen    ENDOSCOPY N/A 6/25/2024    Procedure: ESOPHAGOGASTRODUODENOSCOPY WITH BIOPSY and 15mm balloon dilatation (pylorus);  Surgeon: Valerie Stockton MD;  Location: [facility] ENDOSCOPY;  Service: Gastroenterology;  Laterality: N/A;  pre: Esophageal cancer  post: normal anastamosis    ENDOSCOPY N/A 7/7/2025    Procedure: ESOPHAGOGASTRODUODENOSCOPY WITH COLD BIOPSIES, PYLORIC DILATION;  Surgeon: Ubaldo De León MD;  Location: [facility] ENDOSCOPY;  Service: Gastroenterology;  Laterality: N/A;  PRE- N/V  POST- TEXTURE CHANGE AT ANASTAMOSIS, SURGICAL CHANGES, PYLORIC DILATION    ESOPHAGOGASTRECTOMY Right 02/03/2023    Procedure: BRONCHOSCOPY, RIGHT ROBOTIC VIDEO ASSISTED THORACOSCOPY WITH TOTAL ESOPHAGECTOMY, INTERCOSTAL NERVE BLOCK, FEEDING JEJUNOSTOMY PLACEMENT;  Surgeon: Valerie Stockton MD;  Location: [facility] OR;  Service: Robotics - [facility];  Laterality: Right;    INNER EAR SURGERY      JEJUNOSTOMY TUBE INSERTION      REMOVED 2023    TONSILLECTOMY        General Information       Row Name 07/08/25 0518          Physical Therapy  Time and Intention    Document Type evaluation  -     Mode of Treatment individual therapy;physical therapy  -       Row Name 07/08/25 1438          General Information    Patient Profile Reviewed yes  -       Row Name 07/08/25 1438          Living Environment    Current Living Arrangements home  -     People in Home alone  -       Row Name 07/08/25 1438          Cognition    Orientation Status (Cognition) oriented x 4  -               User Key  (r) = Recorded By, (t) = Taken By, (c) = Cosigned By      Initials Name Provider Type    Ron Lemus, PT Physical Therapist                   Mobility       Row Name 07/08/25 1438          Bed Mobility    Bed Mobility bed mobility (all) activities  -     All Activities, [place] (Bed Mobility) standby assist  -     Assistive Device (Bed Mobility) bed rails  -     Comment, (Bed Mobility) min vcs for safe hand placement and seq movement  -       Row Name 07/08/25 1438          Gait/Stairs (Locomotion)    Patient was able to Ambulate no, other medical factors prevent ambulation  -     Reason Patient was unable to Ambulate Cardiac Dysfunction;Dizziness;Excessive Weakness  -               User Key  (r) = Recorded By, (t) = Taken By, (c) = Cosigned By      Initials Name Provider Type     Ron Ruff, PT Physical Therapist                   Obj/Interventions       Row Name 07/08/25 1438          Range of Motion Comprehensive    General Range of Motion no range of motion deficits identified  -       Row Name 07/08/25 1438          Strength Comprehensive (MMT)    General Manual Muscle Testing (MMT) Assessment no strength deficits identified  -       Row Name 07/08/25 1438          Motor Skills    Motor Skills functional endurance  -     Functional Endurance poor  -       Row Name 07/08/25 1438          Balance    Balance Assessment sitting static balance;sitting dynamic balance  -     Static Sitting Balance supervision  -     Dynamic  Sitting Balance standby assist  -     Position, Sitting Balance supported;sitting edge of bed  -     Balance Interventions sitting;supported;static;dynamic;minimal challenge  -     Comment, Balance S to SBA for sitting balance, pt dizzy sitting EOB, unable to keep eyes open  -               User Key  (r) = Recorded By, (t) = Taken By, (c) = Cosigned By      Initials Name Provider Type    Phil Lemusaly, PT Physical Therapist                   Goals/Plan       Row Name 07/08/25 1438          Bed Mobility Goal 1 (PT)    Activity/Assistive Device (Bed Mobility Goal 1, PT) bed mobility activities, all  -     [place] Level/Cues Needed (Bed Mobility Goal 1, PT) independent  -     Time Frame (Bed Mobility Goal 1, PT) 1 week;short term goal (STG)  -     Progress/Outcomes (Bed Mobility Goal 1, PT) new goal  -       Row Name 07/08/25 1438          Transfer Goal 1 (PT)    Activity/Assistive Device (Transfer Goal 1, PT) transfers, all  -     [place] Level/Cues Needed (Transfer Goal 1, PT) independent  -     Time Frame (Transfer Goal 1, PT) 1 week;short term goal (STG)  -     Progress/Outcome (Transfer Goal 1, PT) new goal  -       Row Name 07/08/25 1438          Gait Training Goal 1 (PT)    Activity/Assistive Device (Gait Training Goal 1, PT) gait (walking locomotion)  -     [place] Level (Gait Training Goal 1, PT) supervision required  -     Distance (Gait Training Goal 1, PT) 100ft  -     Time Frame (Gait Training Goal 1, PT) 1 week;short term goal (STG)  -     Progress/Outcome (Gait Training Goal 1, PT) new goal  -       Row Name 07/08/25 1438          Therapy Assessment/Plan (PT)    Planned Therapy Interventions (PT) balance training;bed mobility training;gait training;home exercise program;transfer training;strengthening;patient/family education  -               User Key  (r) = Recorded By, (t) = Taken By, (c) = Cosigned By      Initials Name Provider Type    JARRED Ruff  Ron, PT Physical Therapist                   Clinical Impression       Row Name 07/08/25 1438          Pain    Pretreatment Pain Rating 6/10  -     Posttreatment Pain Rating 6/10  -     Pain Location chest  -     Pain Side/Orientation bilateral  -     Pre/Posttreatment Pain Comment Pt reported chest pain that remained in sitting as well as generalized pain for BLE  -       Row Name 07/08/25 1438          Plan of Care Review    Plan of Care Reviewed With patient  -     Progress no change  -     Outcome Evaluation Pt is a 64 y.o. male with a history of DM2, AAA repair AVR, TJ, esophageal cancer s/p esophagectomy,  JAYNE HTN, CAD, CABG that presents to [facility] complaining of worsening nausea and vomiting over the past week.  Pt lives in an apt alone w/ elevator access. He is normally indep w/o an AD. He was received in bed AOx4, on RA, agreed to PT. Pt was SBA to sit EOB. He was very slow w/ reported new weakness and sternal pain, Rn reported abnormal EKG during session. Pt sat EOB very dizzy. Pt RN and PT agreed to have pt get back into bed as a result. He will benefit from PT to improve functional mobility activity tolerance, and dec fall risk. Discharge recc SNF vs HHPT depending on progress.  -       Row Name 07/08/25 1434          Therapy Assessment/Plan (PT)    Rehab Potential (PT) fair  -     Criteria for Skilled Interventions Met (PT) yes  -     Therapy Frequency (PT) 5 times/wk  -       Row Name 07/08/25 1438          Positioning and Restraints    Pre-Treatment Position in bed  -     Post Treatment Position bed  -     In Bed notified nsg;supine;call light within reach;encouraged to call for assist;exit alarm on;patient within staff view;with nsg  -               User Key  (r) = Recorded By, (t) = Taken By, (c) = Cosigned By      Initials Name Provider Type    Ron Lemus, PT Physical Therapist                   Outcome Measures       Row Name 07/08/25 143  07/08/25 0608       How much help from another person do you currently need...    Turning from your back to your side while in flat bed without using bedrails? 3  - 4  -BB    Moving from lying on back to sitting on the side of a flat bed without bedrails? 3  - 4  -BB    Moving to and from a bed to a chair (including a wheelchair)? 2  - --    Standing up from a chair using your arms (e.g., wheelchair, bedside chair)? 2  - --    Climbing 3-5 steps with a railing? 2  - --    To walk in hospital room? 2  - --    AM-PAC 6 Clicks Score (PT) 14  - --    Highest Level of Mobility Goal Move to Chair/Commode-4  - --      Row Name 07/08/25 1438          Functional Assessment    Outcome Measure Options AM-PAC 6 Clicks Basic Mobility (PT)  -               User Key  (r) = Recorded By, (t) = Taken By, (c) = Cosigned By      Initials Name Provider Type    [place], RN Registered Nurse    Ron Lemus, PT Physical Therapist                                 Physical Therapy Education       Title: PT OT SLP Therapies (In Progress)       Topic: Physical Therapy (In Progress)       Point: Mobility training (In Progress)       Learning Progress Summary            Patient Acceptance, E,D, NR by JARRED at 7/8/2025 1438                      Point: Home exercise program (In Progress)       Learning Progress Summary            Patient Acceptance, E,D, NR by JARRED at 7/8/2025 1438                      Point: Body mechanics (In Progress)       Learning Progress Summary            Patient Acceptance, E,D, NR by JARRED at 7/8/2025 1438                      Point: Precautions (In Progress)       Learning Progress Summary            Patient Acceptance, E,D, NR by  at 7/8/2025 1438                                      User Key       Initials Effective Dates Name Provider Type Discipline     05/28/25 -  Ron Ruff, KELSEY Physical Therapist PT                  PT Recommendation and Plan  Planned Therapy Interventions (PT):  balance training, bed mobility training, gait training, home exercise program, transfer training, strengthening, patient/family education  Progress: no change  Outcome Evaluation: Pt is a 64 y.o. male with a history of DM2, AAA repair AVR, TJ, esophageal cancer s/p esophagectomy,  JAYNE HTN, CAD, CABG that presents to [facility] complaining of worsening nausea and vomiting over the past week.  Pt lives in an apt alone w/ elevator access. He is normally indep w/o an AD. He was received in bed AOx4, on RA, agreed to PT. Pt was SBA to sit EOB. He was very slow w/ reported new weakness and sternal pain, Rn reported abnormal EKG during session. Pt sat EOB very dizzy. Pt RN and PT agreed to have pt get back into bed as a result. He will benefit from PT to improve functional mobility activity tolerance, and dec fall risk. Discharge recc SNF vs HHPT depending on progress.     Time Calculation:   PT Evaluation Complexity  History, PT Evaluation Complexity: 3 or more personal factors and/or comorbidities  Examination of Body Systems (PT Eval Complexity): total of 3 or more elements  Clinical Presentation (PT Evaluation Complexity): evolving  Clinical Decision Making (PT Evaluation Complexity): moderate complexity  Overall Complexity (PT Evaluation Complexity): moderate complexity     PT Charges       Row Name 07/08/25 1438             Time Calculation    Start Time 1438  -      Stop Time 1457  -      Time Calculation (min) 19 min  -      PT Received On 07/08/25  -      PT - Next Appointment 07/09/25  -      PT Goal Re-Cert Due Date 07/22/25  -                User Key  (r) = Recorded By, (t) = Taken By, (c) = Cosigned By      Initials Name Provider Type     Ron Ruff, PT Physical Therapist                  Therapy Charges for Today       Code Description Service Date Service Provider Modifiers Qty    77017651122 HC PT EVAL MOD COMPLEXITY 2 7/8/2025 Ron Ruff, PT GP 1            PT  G-Codes  Outcome Measure Options: AM-PAC 6 Clicks Basic Mobility (PT)  AM-PAC 6 Clicks Score (PT): 14  PT Discharge Summary  Anticipated Discharge Disposition (PT): skilled nursing facility, home with home health    Ron Ruff, PT  7/8/2025

## 2025-07-09 ENCOUNTER — READMISSION MANAGEMENT (OUTPATIENT)
Dept: CALL CENTER | Facility: HOSPITAL | Age: 65
End: 2025-07-09
Payer: MEDICARE

## 2025-07-09 VITALS
HEIGHT: 72 IN | BODY MASS INDEX: 39.28 KG/M2 | DIASTOLIC BLOOD PRESSURE: 97 MMHG | OXYGEN SATURATION: 98 % | SYSTOLIC BLOOD PRESSURE: 167 MMHG | RESPIRATION RATE: 18 BRPM | WEIGHT: 290 LBS | HEART RATE: 94 BPM | TEMPERATURE: 97.9 F

## 2025-07-09 LAB
GLUCOSE BLDC GLUCOMTR-MCNC: 116 MG/DL (ref 70–130)
GLUCOSE BLDC GLUCOMTR-MCNC: 87 MG/DL (ref 70–130)

## 2025-07-09 PROCEDURE — 99232 SBSQ HOSP IP/OBS MODERATE 35: CPT | Performed by: INTERNAL MEDICINE

## 2025-07-09 PROCEDURE — 82948 REAGENT STRIP/BLOOD GLUCOSE: CPT

## 2025-07-09 RX ADMIN — ATORVASTATIN CALCIUM 40 MG: 20 TABLET, FILM COATED ORAL at 09:13

## 2025-07-09 RX ADMIN — ISOSORBIDE MONONITRATE 120 MG: 60 TABLET, EXTENDED RELEASE ORAL at 09:13

## 2025-07-09 RX ADMIN — PANTOPRAZOLE SODIUM 40 MG: 40 TABLET, DELAYED RELEASE ORAL at 05:50

## 2025-07-09 RX ADMIN — AMLODIPINE BESYLATE 5 MG: 5 TABLET ORAL at 09:14

## 2025-07-09 RX ADMIN — LISINOPRIL 10 MG: 10 TABLET ORAL at 09:13

## 2025-07-09 RX ADMIN — APIXABAN 5 MG: 5 TABLET, FILM COATED ORAL at 09:13

## 2025-07-09 RX ADMIN — Medication 10 ML: at 09:37

## 2025-07-09 RX ADMIN — RANOLAZINE 1000 MG: 500 TABLET, FILM COATED, EXTENDED RELEASE ORAL at 09:13

## 2025-07-09 RX ADMIN — BUPROPION HYDROCHLORIDE 150 MG: 150 TABLET, EXTENDED RELEASE ORAL at 09:13

## 2025-07-09 RX ADMIN — METOPROLOL SUCCINATE 50 MG: 50 TABLET, EXTENDED RELEASE ORAL at 09:13

## 2025-07-09 NOTE — DISCHARGE SUMMARY
Patient Name: Edmond Chase  : 1960  MRN: [id number]    Date of Admission: 2025  Date of Discharge:  2025  Primary Care Physician: Nargis Velasquez APRN      Chief Complaint:   Vomiting      Discharge Diagnoses     Active Hospital Problems    Diagnosis  POA    **Intractable nausea and vomiting [R11.2]  Yes    Atrial fibrillation [I48.91]  Yes    Nephrolithiasis [N20.0]  Yes    Iron deficiency anemia [D50.9]  Yes    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        Hospital Course     Mr. Chase is a 64 y.o. male with a history of bioprosthetic heart valve, diabetes type 2, TJ, hyperlipidemia, hypertension, esophageal cancer status post distal esophagectomy and J-tube placement with interval removal presenting with intractable nausea and vomiting.  Thoracic surgery was consulted, CT without any anatomic problems.  No rupture.  GI was consulted and an EGD and pyloric dilatation was done which greatly improved symptoms.  Tolerating a GI soft diet and pills this morning.  Patient also had new onset atrial fibrillation.  Recent echo on  with an EF of 58%.  Cardiology consulted plan for initiation of Eliquis and increased metoprolol succinate to twice daily and follow-up with cardiology as an outpatient.  Has been counseled on watching his stools as he had GI bleeds in the past when he was on NSAIDs.    Discharge delayed by chest pain  yesterday afternoon.  Troponins mildly elevated, at baseline, downtrending.  Discussed with cardiology and likely from his antianginal medications being held while he was having nausea or vomiting.  No further workup needed at this time.  Plan for follow-up with cardiology in 1 week.    Plan to go home with home health PT.    At the time of discharge patient was told to take all medications as prescribed, keep all follow-up appointments, and call their doctor or return to the hospital with any worsening or concerning symptoms.    Day of Discharge     Subjective:  Patient resting comfortably in bed.  Tolerating diet and pills.  No nausea or vomiting.  No chest pain this morning.        Physical Exam:  Temp:  [97.3 °F (36.3 °C)-97.9 °F (36.6 °C)] 97.9 °F (36.6 °C)  Heart Rate:  [76-94] 94  Resp:  [18] 18  BP: (125-167)/() 167/97  Body mass index is 39.33 kg/m².  Physical Exam  Vitals and nursing note reviewed.   Constitutional:       General: He is not in acute distress.  Cardiovascular:      Rate and Rhythm: Normal rate and regular rhythm.   Pulmonary:      Effort: Pulmonary effort is normal. No respiratory distress.   Abdominal:      General: Abdomen is flat. There is no distension.      Tenderness: There is no abdominal tenderness.   Musculoskeletal:         General: No swelling or deformity.   Skin:     General: Skin is warm and dry.   Neurological:      General: No focal deficit present.      Mental Status: He is alert. Mental status is at baseline.         Consultants     Consult Orders (all) (From admission, onward)       Start     Ordered    07/06/25 1359  Inpatient Gastroenterology Consult  Once        Specialty:  Gastroenterology  Provider:  Gregor Carlson MD    07/06/25 1358    07/05/25 1627  Inpatient Cardiology Consult  Once        Specialty:  Cardiology  Provider:  Papa Miguel MD    07/05/25 1627    07/05/25 1625  Inpatient Thoracic Surgery Consult  Once        Specialty:  Thoracic  Surgery  Provider:  Valerie Stockton MD    07/05/25 1627    07/05/25 1540  Inpatient Nutrition Consult  Once        Provider:  (Not yet assigned)    07/05/25 1539    07/05/25 1149  Inpatient Thoracic Surgery Consult  Once        Specialty:  Thoracic Surgery  Provider:  Valerie Stockton MD    07/05/25 1148    07/05/25 1131  [facility] (on-call MD unless specified) Details  Once        Specialty:  Hospitalist  Provider:  Darek Rodriguez MD    07/05/25 1130                  Procedures     Imaging Results (All)       Procedure Component Value Units Date/Time    CT Chest With Contrast Diagnostic [[id number]] Collected: 07/05/25 1442     Updated: 07/05/25 1503    Narrative:      CT CHEST W CONTRAST DIAGNOSTIC-, CT ABDOMEN PELVIS W CONTRAST-     INDICATION: Chest pain, dysphagia. HISTORY of esophageal cancer     COMPARISON: CT chest, abdomen and pelvis December 10, 2024     TECHNIQUE:  Routine CT chest, abdomen and pelvis with IV contrast. Coronal and  sagittal reformats. Radiation dose reduction techniques were utilized,  including automated exposure control and exposure modulation based on  body size.     FINDINGS:      Chest wall: No lymphadenopathy.     Mediastinum: Three-vessel coronary artery atherosclerotic  calcifications. CABG. Heart is normal in size. Aortic valve replacement.  No pericardial effusion. Suspect graft repair of the ascending thoracic  aorta. No mediastinal or hilar lymphadenopathy.     Lungs/pleura: No effusions. Airways wall thickening. Patent central  airways. Juxta fissural solid pulmonary nodule along the minor fissure,  series 3, axial mage 60, measures 5 mm, unchanged. Solid pulmonary  nodule in the lateral segment right middle lobe, series 3, axial mage  69, measures 5 mm, unchanged. Posterior dependent and bibasilar  subsegmental atelectasis. Respiratory motion artifact.     ABDOMEN: Small cyst seen in segment 6 of the right hepatic lobe,  unchanged. Cholecystectomy. No biliary ductal dilatation.  Spleen is  normal in size. Incidental splenule. Mild pancreatic atrophy. No  pancreatic ductal dilatation or mass seen. Stable mild thickening of the  left adrenal gland. Nonobstructing nephrolithiasis in the right mid  kidney, series 2, axial mage 138, measures 2 mm. Bilateral fluid  attenuating renal cysts, largest in the inferior pole left kidney  measuring 6.6 cm. No solid-appearing renal mass or hydronephrosis.     Pelvis: Prostate is normal in size. Underdistended bladder. No bladder  calculus.     Bowel: Esophagectomy with gastric pull-up. Some suspected retained  ingested contrast or secretions and oral contrast seen in the remaining  distal esophagus, just above the esophagogastric anastomosis. Small  amount of contrast seen in the stomach. No extraluminal contrast. No  small bowel obstruction. Colonic diverticulosis. Long cecal mesentery.  Appendix not identified though no secondary findings of appendicitis.     Abdominal wall: Rectus diastases. Small fat-containing supraumbilical  ventral hernia. Small fat-containing umbilical hernia. Small  fat-containing inguinal hernias.     Retroperitoneum: No lymphadenopathy.     Vasculature: Patent. Mild aortoiliac atherosclerotic calcification. No  abdominal aortic aneurysm.     Osseous structures: Median sternotomy. Bilateral glenohumeral  osteoarthritis. Diffuse idiopathic skeletal hyperostosis seen in the  thoracic spine. Lumbar facet degenerative arthropathy with grade 1  anterolisthesis of L4 on L5.       Impression:         1. No acute findings identified in the chest, abdomen or pelvis.  2. Esophagectomy with gastric pull-up. Some retained ingested contents  or secretions and oral contrast seen in the remaining esophagus and  small amount of contrast seen in the stomach. No extraluminal contrast.  3. Airways disease.  4. Stable pulmonary nodules. No new or enlarging pulmonary nodules seen.  5. Nonobstructing right nephrolithiasis.  6. Colonic  diverticulosis  7. Three-vessel coronary artery atherosclerotic calcifications status  post CABG. Aortic valve replacement. Suspect graft repair of the  ascending thoracic aorta.     This report was finalized on 7/5/2025 3:00 PM by Dr. Dylon Barney M.D  on Workstation: [id number]       CT Abdomen Pelvis With Contrast [[id number]] Collected: 07/05/25 1442     Updated: 07/05/25 1503    Narrative:      CT CHEST W CONTRAST DIAGNOSTIC-, CT ABDOMEN PELVIS W CONTRAST-     INDICATION: Chest pain, dysphagia. HISTORY of esophageal cancer     COMPARISON: CT chest, abdomen and pelvis December 10, 2024     TECHNIQUE:  Routine CT chest, abdomen and pelvis with IV contrast. Coronal and  sagittal reformats. Radiation dose reduction techniques were utilized,  including automated exposure control and exposure modulation based on  body size.     FINDINGS:      Chest wall: No lymphadenopathy.     Mediastinum: Three-vessel coronary artery atherosclerotic  calcifications. CABG. Heart is normal in size. Aortic valve replacement.  No pericardial effusion. Suspect graft repair of the ascending thoracic  aorta. No mediastinal or hilar lymphadenopathy.     Lungs/pleura: No effusions. Airways wall thickening. Patent central  airways. Juxta fissural solid pulmonary nodule along the minor fissure,  series 3, axial mage 60, measures 5 mm, unchanged. Solid pulmonary  nodule in the lateral segment right middle lobe, series 3, axial mage  69, measures 5 mm, unchanged. Posterior dependent and bibasilar  subsegmental atelectasis. Respiratory motion artifact.     ABDOMEN: Small cyst seen in segment 6 of the right hepatic lobe,  unchanged. Cholecystectomy. No biliary ductal dilatation. Spleen is  normal in size. Incidental splenule. Mild pancreatic atrophy. No  pancreatic ductal dilatation or mass seen. Stable mild thickening of the  left adrenal gland. Nonobstructing nephrolithiasis in the right mid  kidney, series 2, axial mage 138, measures 2  mm. Bilateral fluid  attenuating renal cysts, largest in the inferior pole left kidney  measuring 6.6 cm. No solid-appearing renal mass or hydronephrosis.     Pelvis: Prostate is normal in size. Underdistended bladder. No bladder  calculus.     Bowel: Esophagectomy with gastric pull-up. Some suspected retained  ingested contrast or secretions and oral contrast seen in the remaining  distal esophagus, just above the esophagogastric anastomosis. Small  amount of contrast seen in the stomach. No extraluminal contrast. No  small bowel obstruction. Colonic diverticulosis. Long cecal mesentery.  Appendix not identified though no secondary findings of appendicitis.     Abdominal wall: Rectus diastases. Small fat-containing supraumbilical  ventral hernia. Small fat-containing umbilical hernia. Small  fat-containing inguinal hernias.     Retroperitoneum: No lymphadenopathy.     Vasculature: Patent. Mild aortoiliac atherosclerotic calcification. No  abdominal aortic aneurysm.     Osseous structures: Median sternotomy. Bilateral glenohumeral  osteoarthritis. Diffuse idiopathic skeletal hyperostosis seen in the  thoracic spine. Lumbar facet degenerative arthropathy with grade 1  anterolisthesis of L4 on L5.       Impression:         1. No acute findings identified in the chest, abdomen or pelvis.  2. Esophagectomy with gastric pull-up. Some retained ingested contents  or secretions and oral contrast seen in the remaining esophagus and  small amount of contrast seen in the stomach. No extraluminal contrast.  3. Airways disease.  4. Stable pulmonary nodules. No new or enlarging pulmonary nodules seen.  5. Nonobstructing right nephrolithiasis.  6. Colonic diverticulosis  7. Three-vessel coronary artery atherosclerotic calcifications status  post CABG. Aortic valve replacement. Suspect graft repair of the  ascending thoracic aorta.     This report was finalized on 7/5/2025 3:00 PM by Dr. Dylon Barney M.D  on Workstation:  "[id number]       XR Chest 1 View [[id number]] Collected: 07/05/25 0944     Updated: 07/05/25 0950    Narrative:      Portable chest radiograph     HISTORY: Chest pain     TECHNIQUE: Single AP portable radiograph of the chest     COMPARISON: Chest radiograph 6/27/2025       Impression:      FINDINGS AND IMPRESSION:  Pulmonary consolidation, pleural effusion or pneumothorax is seen.  Cardiac silhouette within normal limits for size. There are mediastinal  wires and prosthetic heart valve.     This report was finalized on 7/5/2025 9:47 AM by Dr. Rory Durán M.D  on Workstation: [id number]               Pertinent Labs     Results from last 7 days   Lab Units 07/07/25 0441 07/06/25 0410 07/05/25  0930   WBC 10*3/mm3 6.50 6.44 7.60   HEMOGLOBIN g/dL 13.5 14.5 15.2   PLATELETS 10*3/mm3 183 189 213     Results from last 7 days   Lab Units 07/07/25 0441 07/06/25 0410 07/05/25  0930   SODIUM mmol/L 138 141 138   POTASSIUM mmol/L 3.5 4.0 3.5   CHLORIDE mmol/L 105 106 101   CO2 mmol/L 22.8 20.0* 24.2   BUN mg/dL 18.0 14.0 16.0   CREATININE mg/dL 0.92 0.74* 1.00   GLUCOSE mg/dL 76 68 114*   Estimated Creatinine Clearance: 111 mL/min (by C-G formula based on SCr of 0.92 mg/dL).  Results from last 7 days   Lab Units 07/07/25 0441 07/05/25  0930   ALBUMIN g/dL 3.3* 4.0   BILIRUBIN mg/dL 1.0 0.9   ALK PHOS U/L 69 85   AST (SGOT) U/L 15 24   ALT (SGPT) U/L 14 21     Results from last 7 days   Lab Units 07/07/25 0441 07/06/25 0410 07/05/25  1711 07/05/25  0930   CALCIUM mg/dL 8.4* 8.3*  --  9.2   ALBUMIN g/dL 3.3*  --   --  4.0   MAGNESIUM mg/dL  --   --  2.1 2.0     Results from last 7 days   Lab Units 07/05/25  0930   LIPASE U/L 18     Results from last 7 days   Lab Units 07/08/25 2035 07/08/25  1517 07/05/25  1149 07/05/25  0930   HSTROP T ng/L 34*  34* 37* 32* 40*   PROBNP pg/mL  --   --   --  2,159.0*           Invalid input(s): \"LDLCALC\"        Test Results Pending at Discharge       Discharge Details      "   Discharge Medications        New Medications        Instructions Start Date   Eliquis 5 MG tablet tablet  Generic drug: apixaban   5 mg, Oral, Every 12 Hours Scheduled      pantoprazole 40 MG EC tablet  Commonly known as: PROTONIX   40 mg, Oral, Every Early Morning             Changes to Medications        Instructions Start Date   metoprolol succinate XL 50 MG 24 hr tablet  Commonly known as: TOPROL-XL  What changed: when to take this   50 mg, Oral, Every 12 Hours Scheduled             Continue These Medications        Instructions Start Date   amLODIPine 5 MG tablet  Commonly known as: NORVASC   5 mg, Oral, Daily, Take 5 mg      armodafinil 150 MG tablet  Commonly known as: NUVIGIL   150 mg, Oral, Daily      atorvastatin 40 MG tablet  Commonly known as: LIPITOR   40 mg, Oral, Daily      buPROPion  MG 24 hr tablet  Commonly known as: Wellbutrin XL   150 mg, Oral, Every Morning      Farxiga 10 MG tablet  Generic drug: dapagliflozin Propanediol   10 mg, Oral, Daily      fluticasone 50 MCG/ACT nasal spray  Commonly known as: FLONASE   Administer 2 sprays into the nostril(s) as directed by provider Daily.      furosemide 20 MG tablet  Commonly known as: LASIX   20 mg, Oral, Daily      isosorbide mononitrate 120 MG 24 hr tablet  Commonly known as: IMDUR   120 mg, Oral, Daily      lisinopril 10 MG tablet  Commonly known as: PRINIVIL,ZESTRIL   10 mg, Oral, Daily      ranolazine 500 MG 12 hr tablet  Commonly known as: RANEXA   1,000 mg, Oral, Every 12 Hours Scheduled             ASK your doctor about these medications        Instructions Start Date   Mucus Relief 600 MG 12 hr tablet  Generic drug: guaiFENesin  Ask about: Should I take this medication?   1,200 mg, Oral, Every 12 Hours Scheduled               No Known Allergies      Discharge Disposition:  Home or Self Care    Discharge Diet:  Diet Order   Procedures    Diet: Gastrointestinal; Fiber-Restricted, Low Irritant; Texture: Soft to Chew (NDD 3); Soft to  Chew: Whole Meat; Fluid Consistency: Thin (IDDSI 0)       Discharge Activity:   As tolerated    CODE STATUS:    Code Status and Medical Interventions: CPR (Attempt to Resuscitate); Full Support   Ordered at: 07/05/25 1338     Code Status (Patient has no pulse and is not breathing):    CPR (Attempt to Resuscitate)     Medical Interventions (Patient has pulse or is breathing):    Full Support       Future Appointments   Date Time Provider Department Center   7/14/2025 11:15 AM [facility] CT 1 BH [facility] CT BR None   7/17/2025  1:00 PM Sujata John DNP, APRN [facility]   7/28/2025 11:20 AM Marcia Stoner APRN [facility] CD [id number] [facility]   7/30/2025  2:00 PM Yazmin Molina APRN [facility] BEH ONC None   9/19/2025 10:45 AM Juwna Velasquez APRN [facility] PC  [facility]   10/24/2025  1:20 PM Papa Miguel MD [facility] CD [id number] [facility]   11/13/2025 11:45 AM Sekou Pisano MD [facility] ANDERSO2 None     Additional Instructions for the Follow-ups that You Need to Schedule       Ambulatory Referral to Home Health   As directed      Face to Face Visit Date: 7/9/2025   Follow-up provider for Plan of Care?: I treated the patient in an acute care facility and will not continue treatment after discharge.   Follow-up provider: JUWAN VELASQUEZ [[id number]]   Reason/Clinical Findings: dysphagia, weakness   Describe mobility limitations that make leaving home difficult: dysphagia, weakness   Nursing/Therapeutic Services Requested: Physical Therapy   PT orders: Therapeutic exercise Strengthening   Frequency: 1 Week 1               Follow-up Information       Juwan Velasquez APRN .    Specialties: Family Medicine, Nurse Practitioner  Contact information:  [address] [facility] [address]  [facility] [id number] [phone]               Sujata John DNP, VIKTORIA. Go on 7/17/2025.    Specialties: Thoracic Surgery, Nurse Practitioner  Contact information:  [address]  [facility] [address]  [phone]               Marcia Stoner APRN. Schedule an  appointment as soon as possible for a visit in 1 week(s).    Specialties: Cardiology, Nurse Practitioner  Contact information:  [address]  [facility] [id number] [phone]               Ubaldo De León MD. Schedule an appointment as soon as possible for a visit.    Specialty: Gastroenterology  Why: As needed, If symptoms worsen  Contact information:  [address]  [address]  [facility] [id number] [phone]                             Additional Instructions for the Follow-ups that You Need to Schedule       Ambulatory Referral to Home Health   As directed      Face to Face Visit Date: 7/9/2025   Follow-up provider for Plan of Care?: I treated the patient in an acute care facility and will not continue treatment after discharge.   Follow-up provider: JUWAN NG [[id number]]   Reason/Clinical Findings: dysphagia, weakness   Describe mobility limitations that make leaving home difficult: dysphagia, weakness   Nursing/Therapeutic Services Requested: Physical Therapy   PT orders: Therapeutic exercise Strengthening   Frequency: 1 Week 1            Time Spent on Discharge:  Greater than 30 minutes      Chris Ahuja MD  [facility]  07/09/25  14:14 EDT

## 2025-07-09 NOTE — PROGRESS NOTES
Hospital Follow Up    LOS: 1  Patient Name: Edmond Chase  Age/Sex: 64 y.o. male  : 1960  MRN: [id number]    Day of Service: 25   Length of Stay: 1  Encounter Provider: Papa Miguel MD  Place of Service: [facility] CARDIOLOGY  Patient Care Team:  Nargis Velasquez APRN as PCP - General (Family Medicine)  Papa Miguel MD as Consulting Physician (Cardiology)  Sekou Pisano MD as Consulting Physician (Pulmonary Disease)  Gregor Carlson MD as Consulting Physician (Gastroenterology)  Estevan Yuan MD (Sports Medicine)  Alex Hernadez MD as Consulting Physician (Nephrology)  Yazmin Molina APRN as Nurse Practitioner (Psychiatry)  Dylon Schwartz MD as Consulting Physician (Hematology and Oncology)  Valerie Stockton MD as Referring Physician (Thoracic Surgery)  Valerie Stockton MD as Surgeon (Thoracic Surgery)    Subjective:     Chief Complaint: Atrial fibrillation    Interval History: Patient got feeling really bad yesterday.  He had episode where he just got incredibly fatigued and really could not move much.  I reviewed his rhythm strips.  There were several strips that was called ventricular tachycardia I believe it more is consistent with an aberrant conduction of his atrial fibrillation.  Patient had a normal ejection fraction 58.4% he had no signs of ischemia.    Objective:     Objective:  Temp:  [97.3 °F (36.3 °C)-97.9 °F (36.6 °C)] 97.9 °F (36.6 °C)  Heart Rate:  [76-94] 94  Resp:  [18] 18  BP: (143-167)/() 167/97   No intake or output data in the 24 hours ending 25 1339  Body mass index is 39.33 kg/m².      25  1516   Weight: 132 kg (290 lb)     Weight change:       Physical Exam:   General :  Alert, cooperative, in no acute distress.  Neuro:   Alert,cooperative and oriented.  Lungs:  CTAB. Normal respiratory effort and rate.  CV:  irregular rate and rhythm, normal S1 and S2, no murmurs,  "gallops or rubs.   ABD:  Soft, nontender, nondistended. Positive bowel sounds.  Extr:  No edema or cyanosis, moves all extremities.    Lab Review:   Results from last 7 days   Lab Units 07/07/25  0441 07/06/25  0410 07/05/25  0930   SODIUM mmol/L 138 141 138   POTASSIUM mmol/L 3.5 4.0 3.5   CHLORIDE mmol/L 105 106 101   CO2 mmol/L 22.8 20.0* 24.2   BUN mg/dL 18.0 14.0 16.0   CREATININE mg/dL 0.92 0.74* 1.00   GLUCOSE mg/dL 76 68 114*   CALCIUM mg/dL 8.4* 8.3* 9.2   AST (SGOT) U/L 15  --  24   ALT (SGPT) U/L 14  --  21     Results from last 7 days   Lab Units 07/08/25  2035 07/08/25  1517 07/05/25  1149 07/05/25  0930   HSTROP T ng/L 34*  34* 37* 32* 40*     Results from last 7 days   Lab Units 07/07/25  0441 07/06/25  0410   WBC 10*3/mm3 6.50 6.44   HEMOGLOBIN g/dL 13.5 14.5   HEMATOCRIT % 41.4 44.9   PLATELETS 10*3/mm3 183 189     Results from last 7 days   Lab Units 07/07/25  0441   INR  1.18*     Results from last 7 days   Lab Units 07/05/25  1711 07/05/25  0930   MAGNESIUM mg/dL 2.1 2.0           Invalid input(s): \"LDLCALC\"  Results from last 7 days   Lab Units 07/05/25  0930   PROBNP pg/mL 2,159.0*     Results from last 7 days   Lab Units 07/05/25  1149   TSH uIU/mL 1.630       Current Medications:   Scheduled Meds:amLODIPine, 5 mg, Oral, Daily  apixaban, 5 mg, Oral, Q12H  atorvastatin, 40 mg, Oral, Daily  buPROPion XL, 150 mg, Oral, QAM  fluticasone, 2 spray, Nasal, Daily  [Held by provider] furosemide, 20 mg, Oral, Daily  insulin lispro, 2-7 Units, Subcutaneous, Q6H  isosorbide mononitrate, 120 mg, Oral, Daily  lisinopril, 10 mg, Oral, Daily  metoprolol succinate XL, 50 mg, Oral, Q12H  pantoprazole, 40 mg, Oral, Q AM  ranolazine, 1,000 mg, Oral, Q12H  sodium chloride, 10 mL, Intravenous, Q12H      Continuous Infusions:     Allergies:  No Known Allergies    Assessment:       Intractable nausea and vomiting    Coronary artery disease of bypass graft of native heart with stable angina pectoris    Essential " hypertension    S/P CABG (coronary artery bypass graft)    S/P AVR (aortic valve replacement)    H/O esophagectomy    Obstructive sleep apnea, adult treated with auto CPAP    Type 2 diabetes mellitus, without long-term current use of insulin    Malignant neoplasm of lower third of esophagus    Iron deficiency anemia    Atrial fibrillation    Nephrolithiasis        Plan:   1.  Atrial fibrillation.  Agree with anticoagulation when okay from a GI standpoint.  2.  Weakness unsure what the etiology of this is.  Monitor was really unremarkable.  3.  Coronary artery bypass grafting  4.  AVR  5.  Patient had dilation of esophagus and since then continues to improve.  Again would like to place him on anticoagulation when okay from a standpoint.  6.  Okay to discharge from a cardiovascular standpoint.  Rate is controlled continue same medicines as noted above.        Papa Miguel MD  07/09/25  13:39 EDT

## 2025-07-09 NOTE — CASE MANAGEMENT/SOCIAL WORK
Continued Stay Note  [facility]     Patient Name: Edmond Chase  MRN: [id number]  Today's Date: 7/9/2025    Admit Date: 7/5/2025    Plan: Home   Discharge Plan       Row Name 07/09/25 1157       Plan    Plan Home    Patient/Family in Agreement with Plan yes    Plan Comments MD notified CCP pt is agreeable to HH at [place]. CCP met with pt at the bedside, introduced self and role of CCP. CCP discussed HH referral. Pt verbalized understanding but stated does not need HH referral at this time. Pt stated he would contact his PCP if HH referral needed once he arrives home. Amos WILSON/CCP    Final Discharge Disposition Code 01 - home or self-care    Final Note Home, no additional CCP needs.                   Discharge Codes    No documentation.                 Expected Discharge Date and Time       Expected Discharge Date Expected Discharge Time    Jul 9, 2025               Xiomara Aburto RN

## 2025-07-09 NOTE — DISCHARGE PLACEMENT REQUEST
"Edmond Chase A \"RONAN\" (64 y.o. Male)       Date of Birth   1960    Social Security Number       Address   [address]  [facility] [address]    Home Phone   [phone]    MRN   [id number]       P[employer]alism   Non-Jewish    Marital Status   Single                            Admission Date   7/5/2025    Admission Type   Emergency    Admitting Provider   La Rider MD    Attending Provider   Chris Ahuja MD    Department, Room/Bed   [address], S419/1       Discharge Date       Discharge Disposition   Home or Self Care    Discharge Destination                                 Attending Provider: Chris Ahuja MD    Allergies: No Known Allergies    Isolation: None   Infection: None   Code Status: CPR    Ht: 182.9 cm (72\")   Wt: 132 kg (290 lb)    Admission Cmt: None   Principal Problem: Intractable nausea and vomiting [R11.2]                   Active Insurance as of 7/5/2025       Primary Coverage       Payor Plan Insurance Group Employer/Plan Group    AETNA MEDICARE REPLACEMENT AETNA MEDICARE ADVANTAGE SNP [id number]       Payor Plan Address Payor Plan Phone Number Payor Plan Fax Number Effective Dates    [address] [phone]  3/1/2025 - None Entered    [place] [address]         Subscriber Name Subscriber Birth Date Member ID       EDMOND CHASE 1960 [id number]               Secondary Coverage       Payor Plan Insurance Group Employer/Plan Group    [id number]        Payor Plan Address Payor Plan Phone Number Payor Plan Fax Number Effective Dates    [address] 6/27/2025 - None Entered    [facility] [id number]         Subscriber Name Subscriber Birth Date Member ID       EDMOND CHASE 1960 [id number]                     Emergency Contacts        (Rel.) Home Phone Work Phone Mobile Phone    Kyra Ahuja (Sister) [phone] -- [phone]    Melinda AhujaA (Relative) " [phone] -- [phone]    Sadia Ahuja [facility] ([facility]) [phone] -- [phone]

## 2025-07-09 NOTE — PAYOR COMM NOTE
"Edmond Chase A \"RONAN\" (64 y.o. Male)      PATIENT DISCHARGED 07/09/2025:  ref# [id number]     FAX:  [phone]    PH:  [phone]    [facility]:  NPI [id number]  [facility]#  [id number]    BRYON GONZALES RN,CCP     Date of Birth   1960    Social Security Number       Address   [address]  [facility] [address]    Home Phone   [phone]    MRN   [id number]       Church   Non-Faith    Marital Status   Single                            Admission Date   7/5/2025    Admission Type   Emergency    Admitting Provider   La Rider MD    Attending Provider       Department, Room/Bed   [address], S419/1       Discharge Date   7/9/2025    Discharge Disposition   Home or Self Care    Discharge Destination                                 Attending Provider: (none)   Allergies: No Known Allergies    Isolation: None   Infection: None   Code Status: Prior    Ht: 182.9 cm (72\")   Wt: 132 kg (290 lb)    Admission Cmt: None   Principal Problem: Intractable nausea and vomiting [R11.2]                   Active Insurance as of 7/5/2025       Primary Coverage       Payor Plan Insurance Group Employer/Plan Group    AETNA MEDICARE REPLACEMENT AETNA MEDICARE ADVANTAGE SNP [id number]       Payor Plan Address Payor Plan Phone Number Payor Plan Fax Number Effective Dates    [address] [phone]  3/1/2025 - None Entered    [place] [address]         Subscriber Name Subscriber Birth Date Member ID       CHASEEDMOND FLORES ANGELA 1960 [id number]               Secondary Coverage       Payor Plan Insurance Group Employer/Plan Group    [id number]        Payor Plan Address Payor Plan Phone Number Payor Plan Fax Number Effective Dates    [address] 6/27/2025 - None Entered    [facility] [place] [address]         Subscriber Name Subscriber Birth Date Member ID       CHASEEDMOND FLORES ANGELA 1960 [id number]                     Emergency " Contacts        (Rel.) Home Phone Work Phone Mobile Phone    Kyra Ahuja (Sister) [phone] -- [phone]    Melinda Ahuja [facility] PO[facility] (Relative) [phone] -- [phone]    Sadia Ahuja [facility] (Relative) [phone] -- [phone]                 Discharge Summary        Chris Ahuja MD at 25 1412              Patient Name: Edmond Chase  : 1960  MRN: [id number]    Date of Admission: 2025  Date of Discharge:  2025  Primary Care Physician: Nargis Velasquez APRN      Chief Complaint:   Vomiting      Discharge Diagnoses     Active Hospital Problems    Diagnosis  POA    **Intractable nausea and vomiting [R11.2]  Yes    Atrial fibrillation [I48.91]  Yes    Nephrolithiasis [N20.0]  Yes    Iron deficiency anemia [D50.9]  Yes    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        Hospital Course     Mr. Chase is a 64 y.o. male with a history of bioprosthetic heart valve, diabetes type 2, TJ, hyperlipidemia, hypertension, esophageal cancer status post distal esophagectomy and J-tube placement with interval removal presenting with intractable nausea and vomiting.  Thoracic surgery was consulted, CT without any anatomic problems.  No rupture.  GI was consulted and an EGD and pyloric dilatation was done which greatly improved symptoms.  Tolerating a GI soft diet and pills this morning.  Patient also had new onset atrial  fibrillation.  Recent echo on 6/27 with an EF of 58%.  Cardiology consulted plan for initiation of Eliquis and increased metoprolol succinate to twice daily and follow-up with cardiology as an outpatient.  Has been counseled on watching his stools as he had GI bleeds in the past when he was on NSAIDs.    At the time of discharge patient was told to take all medications as prescribed, keep all follow-up appointments, and call their doctor or return to the hospital with any worsening or concerning symptoms.    Day of Discharge     Subjective:  Patient resting comfortably in bed.  Tolerating diet and pills.  No nausea or vomiting.  Feeling much better today.        Physical Exam:  Temp:  [97.3 °F (36.3 °C)-97.9 °F (36.6 °C)] 97.3 °F (36.3 °C)  Heart Rate:  [] 89  Resp:  [18] 18  BP: (125-152)/() 125/77  Body mass index is 39.33 kg/m².  Physical Exam  Vitals and nursing note reviewed.   Constitutional:       General: He is not in acute distress.  Cardiovascular:      Rate and Rhythm: Normal rate and regular rhythm.   Pulmonary:      Effort: Pulmonary effort is normal. No respiratory distress.   Abdominal:      General: Abdomen is flat. There is no distension.      Tenderness: There is no abdominal tenderness.   Musculoskeletal:         General: No swelling or deformity.   Skin:     General: Skin is warm and dry.   Neurological:      General: No focal deficit present.      Mental Status: He is alert. Mental status is at baseline.         Consultants     Consult Orders (all) (From admission, onward)       Start     Ordered    07/06/25 1359  Inpatient Gastroenterology Consult  Once        Specialty:  Gastroenterology  Provider:  Gregor Carlson MD    07/06/25 1358    07/05/25 1627  Inpatient Cardiology Consult  Once        Specialty:  Cardiology  Provider:  Papa Miguel MD    07/05/25 1627    07/05/25 1625  Inpatient Thoracic Surgery Consult  Once        Specialty:  Thoracic Surgery  Provider:   Valerie Stockton MD    07/05/25 1627    07/05/25 1540  Inpatient Nutrition Consult  Once        Provider:  (Not yet assigned)    07/05/25 1539    07/05/25 1149  Inpatient Thoracic Surgery Consult  Once        Specialty:  Thoracic Surgery  Provider:  Valerie Stockton MD    07/05/25 1148    07/05/25 1131  [facility] (on-call MD unless specified) Details  Once        Specialty:  Hospitalist  Provider:  Darek Rodriguez MD    07/05/25 1130                  Procedures     Imaging Results (All)       Procedure Component Value Units Date/Time    CT Chest With Contrast Diagnostic [[id number]] Collected: 07/05/25 1442     Updated: 07/05/25 1503    Narrative:      CT CHEST W CONTRAST DIAGNOSTIC-, CT ABDOMEN PELVIS W CONTRAST-     INDICATION: Chest pain, dysphagia. HISTORY of esophageal cancer     COMPARISON: CT chest, abdomen and pelvis December 10, 2024     TECHNIQUE:  Routine CT chest, abdomen and pelvis with IV contrast. Coronal and  sagittal reformats. Radiation dose reduction techniques were utilized,  including automated exposure control and exposure modulation based on  body size.     FINDINGS:      Chest wall: No lymphadenopathy.     Mediastinum: Three-vessel coronary artery atherosclerotic  calcifications. CABG. Heart is normal in size. Aortic valve replacement.  No pericardial effusion. Suspect graft repair of the ascending thoracic  aorta. No mediastinal or hilar lymphadenopathy.     Lungs/pleura: No effusions. Airways wall thickening. Patent central  airways. Juxta fissural solid pulmonary nodule along the minor fissure,  series 3, axial mage 60, measures 5 mm, unchanged. Solid pulmonary  nodule in the lateral segment right middle lobe, series 3, axial mage  69, measures 5 mm, unchanged. Posterior dependent and bibasilar  subsegmental atelectasis. Respiratory motion artifact.     ABDOMEN: Small cyst seen in segment 6 of the right hepatic lobe,  unchanged. Cholecystectomy. No biliary ductal dilatation. Spleen is  normal in  size. Incidental splenule. Mild pancreatic atrophy. No  pancreatic ductal dilatation or mass seen. Stable mild thickening of the  left adrenal gland. Nonobstructing nephrolithiasis in the right mid  kidney, series 2, axial mage 138, measures 2 mm. Bilateral fluid  attenuating renal cysts, largest in the inferior pole left kidney  measuring 6.6 cm. No solid-appearing renal mass or hydronephrosis.     Pelvis: Prostate is normal in size. Underdistended bladder. No bladder  calculus.     Bowel: Esophagectomy with gastric pull-up. Some suspected retained  ingested contrast or secretions and oral contrast seen in the remaining  distal esophagus, just above the esophagogastric anastomosis. Small  amount of contrast seen in the stomach. No extraluminal contrast. No  small bowel obstruction. Colonic diverticulosis. Long cecal mesentery.  Appendix not identified though no secondary findings of appendicitis.     Abdominal wall: Rectus diastases. Small fat-containing supraumbilical  ventral hernia. Small fat-containing umbilical hernia. Small  fat-containing inguinal hernias.     Retroperitoneum: No lymphadenopathy.     Vasculature: Patent. Mild aortoiliac atherosclerotic calcification. No  abdominal aortic aneurysm.     Osseous structures: Median sternotomy. Bilateral glenohumeral  osteoarthritis. Diffuse idiopathic skeletal hyperostosis seen in the  thoracic spine. Lumbar facet degenerative arthropathy with grade 1  anterolisthesis of L4 on L5.       Impression:         1. No acute findings identified in the chest, abdomen or pelvis.  2. Esophagectomy with gastric pull-up. Some retained ingested contents  or secretions and oral contrast seen in the remaining esophagus and  small amount of contrast seen in the stomach. No extraluminal contrast.  3. Airways disease.  4. Stable pulmonary nodules. No new or enlarging pulmonary nodules seen.  5. Nonobstructing right nephrolithiasis.  6. Colonic diverticulosis  7. Three-vessel  coronary artery atherosclerotic calcifications status  post CABG. Aortic valve replacement. Suspect graft repair of the  ascending thoracic aorta.     This report was finalized on 7/5/2025 3:00 PM by Dr. Dylon Barney M.D  on Workstation: [id number]       CT Abdomen Pelvis With Contrast [[id number]] Collected: 07/05/25 1442     Updated: 07/05/25 1503    Narrative:      CT CHEST W CONTRAST DIAGNOSTIC-, CT ABDOMEN PELVIS W CONTRAST-     INDICATION: Chest pain, dysphagia. HISTORY of esophageal cancer     COMPARISON: CT chest, abdomen and pelvis December 10, 2024     TECHNIQUE:  Routine CT chest, abdomen and pelvis with IV contrast. Coronal and  sagittal reformats. Radiation dose reduction techniques were utilized,  including automated exposure control and exposure modulation based on  body size.     FINDINGS:      Chest wall: No lymphadenopathy.     Mediastinum: Three-vessel coronary artery atherosclerotic  calcifications. CABG. Heart is normal in size. Aortic valve replacement.  No pericardial effusion. Suspect graft repair of the ascending thoracic  aorta. No mediastinal or hilar lymphadenopathy.     Lungs/pleura: No effusions. Airways wall thickening. Patent central  airways. Juxta fissural solid pulmonary nodule along the minor fissure,  series 3, axial mage 60, measures 5 mm, unchanged. Solid pulmonary  nodule in the lateral segment right middle lobe, series 3, axial mage  69, measures 5 mm, unchanged. Posterior dependent and bibasilar  subsegmental atelectasis. Respiratory motion artifact.     ABDOMEN: Small cyst seen in segment 6 of the right hepatic lobe,  unchanged. Cholecystectomy. No biliary ductal dilatation. Spleen is  normal in size. Incidental splenule. Mild pancreatic atrophy. No  pancreatic ductal dilatation or mass seen. Stable mild thickening of the  left adrenal gland. Nonobstructing nephrolithiasis in the right mid  kidney, series 2, axial mage 138, measures 2 mm. Bilateral fluid  attenuating  renal cysts, largest in the inferior pole left kidney  measuring 6.6 cm. No solid-appearing renal mass or hydronephrosis.     Pelvis: Prostate is normal in size. Underdistended bladder. No bladder  calculus.     Bowel: Esophagectomy with gastric pull-up. Some suspected retained  ingested contrast or secretions and oral contrast seen in the remaining  distal esophagus, just above the esophagogastric anastomosis. Small  amount of contrast seen in the stomach. No extraluminal contrast. No  small bowel obstruction. Colonic diverticulosis. Long cecal mesentery.  Appendix not identified though no secondary findings of appendicitis.     Abdominal wall: Rectus diastases. Small fat-containing supraumbilical  ventral hernia. Small fat-containing umbilical hernia. Small  fat-containing inguinal hernias.     Retroperitoneum: No lymphadenopathy.     Vasculature: Patent. Mild aortoiliac atherosclerotic calcification. No  abdominal aortic aneurysm.     Osseous structures: Median sternotomy. Bilateral glenohumeral  osteoarthritis. Diffuse idiopathic skeletal hyperostosis seen in the  thoracic spine. Lumbar facet degenerative arthropathy with grade 1  anterolisthesis of L4 on L5.       Impression:         1. No acute findings identified in the chest, abdomen or pelvis.  2. Esophagectomy with gastric pull-up. Some retained ingested contents  or secretions and oral contrast seen in the remaining esophagus and  small amount of contrast seen in the stomach. No extraluminal contrast.  3. Airways disease.  4. Stable pulmonary nodules. No new or enlarging pulmonary nodules seen.  5. Nonobstructing right nephrolithiasis.  6. Colonic diverticulosis  7. Three-vessel coronary artery atherosclerotic calcifications status  post CABG. Aortic valve replacement. Suspect graft repair of the  ascending thoracic aorta.     This report was finalized on 7/5/2025 3:00 PM by Dr. Dylon Barney M.D  on Workstation: [id number]       XR Chest 1 View  "[[id number]] Collected: 07/05/25 0944     Updated: 07/05/25 0950    Narrative:      Portable chest radiograph     HISTORY: Chest pain     TECHNIQUE: Single AP portable radiograph of the chest     COMPARISON: Chest radiograph 6/27/2025       Impression:      FINDINGS AND IMPRESSION:  Pulmonary consolidation, pleural effusion or pneumothorax is seen.  Cardiac silhouette within normal limits for size. There are mediastinal  wires and prosthetic heart valve.     This report was finalized on 7/5/2025 9:47 AM by Dr. Rory Durán M.D  on Workstation: [id number]               Pertinent Labs     Results from last 7 days   Lab Units 07/07/25 0441 07/06/25 0410 07/05/25  0930   WBC 10*3/mm3 6.50 6.44 7.60   HEMOGLOBIN g/dL 13.5 14.5 15.2   PLATELETS 10*3/mm3 183 189 213     Results from last 7 days   Lab Units 07/07/25 0441 07/06/25  0410 07/05/25  0930   SODIUM mmol/L 138 141 138   POTASSIUM mmol/L 3.5 4.0 3.5   CHLORIDE mmol/L 105 106 101   CO2 mmol/L 22.8 20.0* 24.2   BUN mg/dL 18.0 14.0 16.0   CREATININE mg/dL 0.92 0.74* 1.00   GLUCOSE mg/dL 76 68 114*   Estimated Creatinine Clearance: 111 mL/min (by C-G formula based on SCr of 0.92 mg/dL).  Results from last 7 days   Lab Units 07/07/25 0441 07/05/25  0930   ALBUMIN g/dL 3.3* 4.0   BILIRUBIN mg/dL 1.0 0.9   ALK PHOS U/L 69 85   AST (SGOT) U/L 15 24   ALT (SGPT) U/L 14 21     Results from last 7 days   Lab Units 07/07/25  0441 07/06/25  0410 07/05/25  1711 07/05/25  0930   CALCIUM mg/dL 8.4* 8.3*  --  9.2   ALBUMIN g/dL 3.3*  --   --  4.0   MAGNESIUM mg/dL  --   --  2.1 2.0     Results from last 7 days   Lab Units 07/05/25  0930   LIPASE U/L 18     Results from last 7 days   Lab Units 07/05/25  1149 07/05/25  0930   HSTROP T ng/L 32* 40*   PROBNP pg/mL  --  2,159.0*           Invalid input(s): \"LDLCALC\"        Test Results Pending at Discharge       Discharge Details        Discharge Medications        New Medications        Instructions Start Date   apixaban 5 MG " tablet tablet  Commonly known as: ELIQUIS   5 mg, Oral, Every 12 Hours Scheduled      pantoprazole 40 MG EC tablet  Commonly known as: PROTONIX   40 mg, Oral, Every Early Morning   Start Date: July 9, 2025            Changes to Medications        Instructions Start Date   metoprolol succinate XL 50 MG 24 hr tablet  Commonly known as: TOPROL-XL  What changed: when to take this   50 mg, Oral, Every 12 Hours Scheduled             Continue These Medications        Instructions Start Date   amLODIPine 5 MG tablet  Commonly known as: NORVASC   5 mg, Oral, Daily, Take 5 mg      armodafinil 150 MG tablet  Commonly known as: NUVIGIL   150 mg, Oral, Daily      atorvastatin 40 MG tablet  Commonly known as: LIPITOR   40 mg, Oral, Daily      buPROPion  MG 24 hr tablet  Commonly known as: Wellbutrin XL   150 mg, Oral, Every Morning      Farxiga 10 MG tablet  Generic drug: dapagliflozin Propanediol   10 mg, Oral, Daily      fluticasone 50 MCG/ACT nasal spray  Commonly known as: FLONASE   Administer 2 sprays into the nostril(s) as directed by provider Daily.      furosemide 20 MG tablet  Commonly known as: LASIX   20 mg, Oral, Daily      isosorbide mononitrate 120 MG 24 hr tablet  Commonly known as: IMDUR   120 mg, Oral, Daily      lisinopril 10 MG tablet  Commonly known as: PRINIVIL,ZESTRIL   10 mg, Oral, Daily      Mucus Relief 600 MG 12 hr tablet  Generic drug: guaiFENesin   1,200 mg, Oral, Every 12 Hours Scheduled      ranolazine 500 MG 12 hr tablet  Commonly known as: RANEXA   1,000 mg, Oral, Every 12 Hours Scheduled               No Known Allergies      Discharge Disposition:  Home or Self Care    Discharge Diet:  Diet Order   Procedures    Diet: Gastrointestinal; Fiber-Restricted, Low Irritant; Texture: Soft to Chew (NDD 3); Soft to Chew: Whole Meat; Fluid Consistency: Thin (IDDSI 0)       Discharge Activity:   As tolerated    CODE STATUS:    Code Status and Medical Interventions: CPR (Attempt to Resuscitate); Full  Support   Ordered at: 25 1338     Code Status (Patient has no pulse and is not breathing):    CPR (Attempt to Resuscitate)     Medical Interventions (Patient has pulse or is breathing):    Full Support       Future Appointments   Date Time Provider Department Center   2025 11:15 AM [facility] CT 1 BH [facility] CT BR None   2025  1:00 PM Sujata John DNP, APRN [facility] TS [facility]   2025 11:20 AM Marcia Stoner APRN [facility] CD [id number] [facility]   2025  2:00 PM Yazmin Molina APRN [facility] BEH ONC None   2025 10:45 AM Nargis Velasquez APRN [facility] PC  [facility]   10/24/2025  1:20 PM Papa Miguel MD [facility] CD LCG60 [facility]   2025 11:45 AM Sekou Pisano MD [facility]KRYSTAL ANDERSO2 None      Follow-up Information       Nargis Velasquez APRN .    Specialties: Family Medicine, Nurse Practitioner  Contact information:  [address] [place] [address]  [phone]                             Time Spent on Discharge:  Greater than 30 minutes      Chris Ahuja MD  [facility]  25  14:14 EDT                Electronically signed by Chris Ahuja MD at 25 1419       Chris Ahuja MD at 25 1146              Patient Name: Edmond Chase  : 1960  MRN: [id number]    Date of Admission: 2025  Date of Discharge:  2025  Primary Care Physician: Nargis Velasquez APRN      Chief Complaint:   Vomiting      Discharge Diagnoses     Active Hospital Problems    Diagnosis  POA    **Intractable nausea and vomiting [R11.2]  Yes    Atrial fibrillation [I48.91]  Yes    Nephrolithiasis [N20.0]  Yes    Iron deficiency anemia [D50.9]  Yes    Malignant neoplasm of lower third of esophagus [C15.5]  Yes    Type 2 diabetes mellitus, without long-term current use of insulin [E11.9]  Yes    Coronary artery disease of bypass graft of native heart with stable angina pectoris [I25.708]  Yes    Obstructive sleep apnea, adult treated with auto CPAP [G47.33]  Yes    S/P CABG  (coronary artery bypass graft) [Z95.1]  Not Applicable    S/P AVR (aortic valve replacement) [Z95.2]  Not Applicable    H/O esophagectomy [Z98.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        Hospital Course     Mr. Chase is a 64 y.o. male with a history of bioprosthetic heart valve, diabetes type 2, TJ, hyperlipidemia, hypertension, esophageal cancer status post distal esophagectomy and J-tube placement with interval removal presenting with intractable nausea and vomiting.  Thoracic surgery was consulted, CT without any anatomic problems.  No rupture.  GI was consulted and an EGD and pyloric dilatation was done which greatly improved symptoms.  Tolerating a GI soft diet and pills this morning.  Patient also had new onset atrial fibrillation.  Recent echo on 6/27 with an EF of 58%.  Cardiology consulted plan for initiation of Eliquis and increased metoprolol succinate to twice daily and follow-up with cardiology as an outpatient.  Has been counseled on watching his stools as he had GI bleeds in the past when he was on NSAIDs.    Discharge delayed by chest pain yesterday afternoon.  Troponins mildly elevated, at baseline, downtrending.  Discussed with cardiology and likely from his antianginal medications being held while he was having nausea or vomiting.  No further workup needed at this time.  Plan for follow-up with cardiology in 1 week.    Plan to go home with home health PT.    At the time of discharge patient was told to take all medications as prescribed, keep all follow-up appointments, and call their doctor or return to the hospital with any worsening or concerning symptoms.    Day of Discharge     Subjective:  Patient resting comfortably in bed.  Tolerating diet and pills.  No nausea or vomiting.  No chest pain this morning.        Physical Exam:  Temp:  [97.3 °F (36.3 °C)-97.9 °F (36.6 °C)] 97.9 °F (36.6  °C)  Heart Rate:  [76-94] 94  Resp:  [18] 18  BP: (125-167)/() 167/97  Body mass index is 39.33 kg/m².  Physical Exam  Vitals and nursing note reviewed.   Constitutional:       General: He is not in acute distress.  Cardiovascular:      Rate and Rhythm: Normal rate and regular rhythm.   Pulmonary:      Effort: Pulmonary effort is normal. No respiratory distress.   Abdominal:      General: Abdomen is flat. There is no distension.      Tenderness: There is no abdominal tenderness.   Musculoskeletal:         General: No swelling or deformity.   Skin:     General: Skin is warm and dry.   Neurological:      General: No focal deficit present.      Mental Status: He is alert. Mental status is at baseline.         Consultants     Consult Orders (all) (From admission, onward)       Start     Ordered    07/06/25 1359  Inpatient Gastroenterology Consult  Once        Specialty:  Gastroenterology  Provider:  Gregor Carlson MD    07/06/25 1358    07/05/25 1627  Inpatient Cardiology Consult  Once        Specialty:  Cardiology  Provider:  Papa Miguel MD    07/05/25 1627    07/05/25 1625  Inpatient Thoracic Surgery Consult  Once        Specialty:  Thoracic Surgery  Provider:  Valerie Stockton MD    07/05/25 1627    07/05/25 1540  Inpatient Nutrition Consult  Once        Provider:  (Not yet assigned)    07/05/25 1539    07/05/25 1149  Inpatient Thoracic Surgery Consult  Once        Specialty:  Thoracic Surgery  Provider:  Valerie Stockton MD    07/05/25 1148    07/05/25 1131  [facility] (on-call MD unless specified) Details  Once        Specialty:  Hospitalist  Provider:  Darek Rodriguez MD    07/05/25 1130                  Procedures     Imaging Results (All)       Procedure Component Value Units Date/Time    CT Chest With Contrast Diagnostic [[id number]] Collected: 07/05/25 1442     Updated: 07/05/25 1503    Narrative:      CT CHEST W CONTRAST DIAGNOSTIC-, CT ABDOMEN PELVIS W CONTRAST-     INDICATION: Chest pain,  dysphagia. HISTORY of esophageal cancer     COMPARISON: CT chest, abdomen and pelvis December 10, 2024     TECHNIQUE:  Routine CT chest, abdomen and pelvis with IV contrast. Coronal and  sagittal reformats. Radiation dose reduction techniques were utilized,  including automated exposure control and exposure modulation based on  body size.     FINDINGS:      Chest wall: No lymphadenopathy.     Mediastinum: Three-vessel coronary artery atherosclerotic  calcifications. CABG. Heart is normal in size. Aortic valve replacement.  No pericardial effusion. Suspect graft repair of the ascending thoracic  aorta. No mediastinal or hilar lymphadenopathy.     Lungs/pleura: No effusions. Airways wall thickening. Patent central  airways. Juxta fissural solid pulmonary nodule along the minor fissure,  series 3, axial mage 60, measures 5 mm, unchanged. Solid pulmonary  nodule in the lateral segment right middle lobe, series 3, axial mage  69, measures 5 mm, unchanged. Posterior dependent and bibasilar  subsegmental atelectasis. Respiratory motion artifact.     ABDOMEN: Small cyst seen in segment 6 of the right hepatic lobe,  unchanged. Cholecystectomy. No biliary ductal dilatation. Spleen is  normal in size. Incidental splenule. Mild pancreatic atrophy. No  pancreatic ductal dilatation or mass seen. Stable mild thickening of the  left adrenal gland. Nonobstructing nephrolithiasis in the right mid  kidney, series 2, axial mage 138, measures 2 mm. Bilateral fluid  attenuating renal cysts, largest in the inferior pole left kidney  measuring 6.6 cm. No solid-appearing renal mass or hydronephrosis.     Pelvis: Prostate is normal in size. Underdistended bladder. No bladder  calculus.     Bowel: Esophagectomy with gastric pull-up. Some suspected retained  ingested contrast or secretions and oral contrast seen in the remaining  distal esophagus, just above the esophagogastric anastomosis. Small  amount of contrast seen in the stomach. No  extraluminal contrast. No  small bowel obstruction. Colonic diverticulosis. Long cecal mesentery.  Appendix not identified though no secondary findings of appendicitis.     Abdominal wall: Rectus diastases. Small fat-containing supraumbilical  ventral hernia. Small fat-containing umbilical hernia. Small  fat-containing inguinal hernias.     Retroperitoneum: No lymphadenopathy.     Vasculature: Patent. Mild aortoiliac atherosclerotic calcification. No  abdominal aortic aneurysm.     Osseous structures: Median sternotomy. Bilateral glenohumeral  osteoarthritis. Diffuse idiopathic skeletal hyperostosis seen in the  thoracic spine. Lumbar facet degenerative arthropathy with grade 1  anterolisthesis of L4 on L5.       Impression:         1. No acute findings identified in the chest, abdomen or pelvis.  2. Esophagectomy with gastric pull-up. Some retained ingested contents  or secretions and oral contrast seen in the remaining esophagus and  small amount of contrast seen in the stomach. No extraluminal contrast.  3. Airways disease.  4. Stable pulmonary nodules. No new or enlarging pulmonary nodules seen.  5. Nonobstructing right nephrolithiasis.  6. Colonic diverticulosis  7. Three-vessel coronary artery atherosclerotic calcifications status  post CABG. Aortic valve replacement. Suspect graft repair of the  ascending thoracic aorta.     This report was finalized on 7/5/2025 3:00 PM by Dr. Dylon Barney M.D  on Workstation: [id number]       CT Abdomen Pelvis With Contrast [[id number]] Collected: 07/05/25 1442     Updated: 07/05/25 1503    Narrative:      CT CHEST W CONTRAST DIAGNOSTIC-, CT ABDOMEN PELVIS W CONTRAST-     INDICATION: Chest pain, dysphagia. HISTORY of esophageal cancer     COMPARISON: CT chest, abdomen and pelvis December 10, 2024     TECHNIQUE:  Routine CT chest, abdomen and pelvis with IV contrast. Coronal and  sagittal reformats. Radiation dose reduction techniques were utilized,  including automated  exposure control and exposure modulation based on  body size.     FINDINGS:      Chest wall: No lymphadenopathy.     Mediastinum: Three-vessel coronary artery atherosclerotic  calcifications. CABG. Heart is normal in size. Aortic valve replacement.  No pericardial effusion. Suspect graft repair of the ascending thoracic  aorta. No mediastinal or hilar lymphadenopathy.     Lungs/pleura: No effusions. Airways wall thickening. Patent central  airways. Juxta fissural solid pulmonary nodule along the minor fissure,  series 3, axial mage 60, measures 5 mm, unchanged. Solid pulmonary  nodule in the lateral segment right middle lobe, series 3, axial mage  69, measures 5 mm, unchanged. Posterior dependent and bibasilar  subsegmental atelectasis. Respiratory motion artifact.     ABDOMEN: Small cyst seen in segment 6 of the right hepatic lobe,  unchanged. Cholecystectomy. No biliary ductal dilatation. Spleen is  normal in size. Incidental splenule. Mild pancreatic atrophy. No  pancreatic ductal dilatation or mass seen. Stable mild thickening of the  left adrenal gland. Nonobstructing nephrolithiasis in the right mid  kidney, series 2, axial mage 138, measures 2 mm. Bilateral fluid  attenuating renal cysts, largest in the inferior pole left kidney  measuring 6.6 cm. No solid-appearing renal mass or hydronephrosis.     Pelvis: Prostate is normal in size. Underdistended bladder. No bladder  calculus.     Bowel: Esophagectomy with gastric pull-up. Some suspected retained  ingested contrast or secretions and oral contrast seen in the remaining  distal esophagus, just above the esophagogastric anastomosis. Small  amount of contrast seen in the stomach. No extraluminal contrast. No  small bowel obstruction. Colonic diverticulosis. Long cecal mesentery.  Appendix not identified though no secondary findings of appendicitis.     Abdominal wall: Rectus diastases. Small fat-containing supraumbilical  ventral hernia. Small fat-containing  umbilical hernia. Small  fat-containing inguinal hernias.     Retroperitoneum: No lymphadenopathy.     Vasculature: Patent. Mild aortoiliac atherosclerotic calcification. No  abdominal aortic aneurysm.     Osseous structures: Median sternotomy. Bilateral glenohumeral  osteoarthritis. Diffuse idiopathic skeletal hyperostosis seen in the  thoracic spine. Lumbar facet degenerative arthropathy with grade 1  anterolisthesis of L4 on L5.       Impression:         1. No acute findings identified in the chest, abdomen or pelvis.  2. Esophagectomy with gastric pull-up. Some retained ingested contents  or secretions and oral contrast seen in the remaining esophagus and  small amount of contrast seen in the stomach. No extraluminal contrast.  3. Airways disease.  4. Stable pulmonary nodules. No new or enlarging pulmonary nodules seen.  5. Nonobstructing right nephrolithiasis.  6. Colonic diverticulosis  7. Three-vessel coronary artery atherosclerotic calcifications status  post CABG. Aortic valve replacement. Suspect graft repair of the  ascending thoracic aorta.     This report was finalized on 7/5/2025 3:00 PM by Dr. Dylon Barney M.D  on Workstation: [id number]       XR Chest 1 View [[id number]] Collected: 07/05/25 0944     Updated: 07/05/25 0950    Narrative:      Portable chest radiograph     HISTORY: Chest pain     TECHNIQUE: Single AP portable radiograph of the chest     COMPARISON: Chest radiograph 6/27/2025       Impression:      FINDINGS AND IMPRESSION:  Pulmonary consolidation, pleural effusion or pneumothorax is seen.  Cardiac silhouette within normal limits for size. There are mediastinal  wires and prosthetic heart valve.     This report was finalized on 7/5/2025 9:47 AM by Dr. Rory Durán M.D  on Workstation: [id number]               Pertinent Labs     Results from last 7 days   Lab Units 07/07/25  0441 07/06/25  0410 07/05/25  0930   WBC 10*3/mm3 6.50 6.44 7.60   HEMOGLOBIN g/dL 13.5 14.5 15.2   PLATELETS  "10*3/mm3 183 189 213     Results from last 7 days   Lab Units 07/07/25  0441 07/06/25  0410 07/05/25  0930   SODIUM mmol/L 138 141 138   POTASSIUM mmol/L 3.5 4.0 3.5   CHLORIDE mmol/L 105 106 101   CO2 mmol/L 22.8 20.0* 24.2   BUN mg/dL 18.0 14.0 16.0   CREATININE mg/dL 0.92 0.74* 1.00   GLUCOSE mg/dL 76 68 114*   Estimated Creatinine Clearance: 111 mL/min (by C-G formula based on SCr of 0.92 mg/dL).  Results from last 7 days   Lab Units 07/07/25  0441 07/05/25  0930   ALBUMIN g/dL 3.3* 4.0   BILIRUBIN mg/dL 1.0 0.9   ALK PHOS U/L 69 85   AST (SGOT) U/L 15 24   ALT (SGPT) U/L 14 21     Results from last 7 days   Lab Units 07/07/25  0441 07/06/25  0410 07/05/25  1711 07/05/25  0930   CALCIUM mg/dL 8.4* 8.3*  --  9.2   ALBUMIN g/dL 3.3*  --   --  4.0   MAGNESIUM mg/dL  --   --  2.1 2.0     Results from last 7 days   Lab Units 07/05/25  0930   LIPASE U/L 18     Results from last 7 days   Lab Units 07/08/25  2035 07/08/25  1517 07/05/25  1149 07/05/25  0930   HSTROP T ng/L 34*  34* 37* 32* 40*   PROBNP pg/mL  --   --   --  2,159.0*           Invalid input(s): \"LDLCALC\"        Test Results Pending at Discharge       Discharge Details        Discharge Medications        New Medications        Instructions Start Date   Eliquis 5 MG tablet tablet  Generic drug: apixaban   5 mg, Oral, Every 12 Hours Scheduled      pantoprazole 40 MG EC tablet  Commonly known as: PROTONIX   40 mg, Oral, Every Early Morning             Changes to Medications        Instructions Start Date   metoprolol succinate XL 50 MG 24 hr tablet  Commonly known as: TOPROL-XL  What changed: when to take this   50 mg, Oral, Every 12 Hours Scheduled             Continue These Medications        Instructions Start Date   amLODIPine 5 MG tablet  Commonly known as: NORVASC   5 mg, Oral, Daily, Take 5 mg      armodafinil 150 MG tablet  Commonly known as: NUVIGIL   150 mg, Oral, Daily      atorvastatin 40 MG tablet  Commonly known as: LIPITOR   40 mg, Oral, " Daily      buPROPion  MG 24 hr tablet  Commonly known as: Wellbutrin XL   150 mg, Oral, Every Morning      Farxiga 10 MG tablet  Generic drug: dapagliflozin Propanediol   10 mg, Oral, Daily      fluticasone 50 MCG/ACT nasal spray  Commonly known as: FLONASE   Administer 2 sprays into the nostril(s) as directed by provider Daily.      furosemide 20 MG tablet  Commonly known as: LASIX   20 mg, Oral, Daily      isosorbide mononitrate 120 MG 24 hr tablet  Commonly known as: IMDUR   120 mg, Oral, Daily      lisinopril 10 MG tablet  Commonly known as: PRINIVIL,ZESTRIL   10 mg, Oral, Daily      ranolazine 500 MG 12 hr tablet  Commonly known as: RANEXA   1,000 mg, Oral, Every 12 Hours Scheduled             ASK your doctor about these medications        Instructions Start Date   Mucus Relief 600 MG 12 hr tablet  Generic drug: guaiFENesin  Ask about: Should I take this medication?   1,200 mg, Oral, Every 12 Hours Scheduled               No Known Allergies      Discharge Disposition:  Home or Self Care    Discharge Diet:  Diet Order   Procedures    Diet: Gastrointestinal; Fiber-Restricted, Low Irritant; Texture: Soft to Chew (NDD 3); Soft to Chew: Whole Meat; Fluid Consistency: Thin (IDDSI 0)       Discharge Activity:   As tolerated    CODE STATUS:    Code Status and Medical Interventions: CPR (Attempt to Resuscitate); Full Support   Ordered at: 07/05/25 1338     Code Status (Patient has no pulse and is not breathing):    CPR (Attempt to Resuscitate)     Medical Interventions (Patient has pulse or is breathing):    Full Support       Future Appointments   Date Time Provider Department Center   7/14/2025 11:15 AM [facility] CT 1 BH [facility] BR None   7/17/2025  1:00 PM Sujata John DNP, APRN [facility]   7/28/2025 11:20 AM Marcia Stoner APRN [facility] CD [id number] [facility]   7/30/2025  2:00 PM Yazmin Molina APRN [facility] ONC None   9/19/2025 10:45 AM Nargis Velasquez APRN [facility] PC  [facility]   10/24/2025  1:20 PM Lam[place]  MD [facility] [place] CD [id number] [facility]   11/13/2025 11:45 AM Sekou Pisano MD [facility] ANDERSO2 None     Additional Instructions for the Follow-ups that You Need to Schedule       Ambulatory Referral to Home Health   As directed      Face to Face Visit Date: 7/9/2025   Follow-up provider for Plan of Care?: I treated the patient in an acute care facility and will not continue treatment after discharge.   Follow-up provider: JWUAN VELASQUEZ [[id number]]   Reason/Clinical Findings: dysphagia, weakness   Describe mobility limitations that make leaving home difficult: dysphagia, weakness   Nursing/Therapeutic Services Requested: Physical Therapy   PT orders: Therapeutic exercise Strengthening   Frequency: 1 Week 1               Follow-up Information       Juwan Velasquez, APRN .    Specialties: Family Medicine, Nurse Practitioner  Contact information:  [address]  [place] [address]  [phone]               Sujata John DNP, APRN. Go on 7/17/2025.    Specialties: Thoracic Surgery, Nurse Practitioner  Contact information:  [address]  [place] [address]  [phone]               Marcia Stoner, APRN. Schedule an appointment as soon as possible for a visit in 1 week(s).    Specialties: Cardiology, Nurse Practitioner  Contact information:  [address]  [place] [address]  [phone]               Ubaldo De León MD. Schedule an appointment as soon as possible for a visit.    Specialty: Gastroenterology  Why: As needed, If symptoms worsen  Contact information:  [address]  [facility] [address]  [place] [id number]  [phone]                             Additional Instructions for the Follow-ups that You Need to Schedule       Ambulatory Referral to Home Health   As directed      Face to Face Visit Date: 7/9/2025   Follow-up provider for Plan of Care?: I treated the patient in an acute care facility and will not continue treatment after discharge.   Follow-up provider: BLUME,  JUWAN [[id number]]   Reason/Clinical Findings: dysphagia, weakness   Describe mobility limitations that make leaving home difficult: dysphagia, weakness   Nursing/Therapeutic Services Requested: Physical Therapy   PT orders: Therapeutic exercise Strengthening   Frequency: 1 Week 1            Time Spent on Discharge:  Greater than 30 minutes      Christie Shaw MD  [facility]  07/09/25  14:14 EDT                          Electronically signed by Christie Shaw MD at 07/09/25 1148       Discharge Order (From admission, onward)       Start     Ordered    07/09/25 1105  Discharge patient  Once        Expected Discharge Date: 07/09/25   Discharge Disposition: Home or Self Care   Physician of Record for Attribution - Please select from Treatment Team: CHRISTIE SHAW [[id number]]   Review needed by CMO to determine Physician of Record: No      Question Answer Comment   Physician of Record for Attribution - Please select from Treatment Team CHRISTIE SHAW    Review needed by CMO to determine Physician of Record No        07/09/25 1104    07/08/25 1245  Discharge patient  Once,   Status:  Canceled        Expected Discharge Date: 07/08/25   Discharge Disposition: Home or Self Care   Physician of Record for Attribution - Please select from Treatment Team: CHRISTIE SHAW [[id number]]   Review needed by CMO to determine Physician of Record: No      Question Answer Comment   Physician of Record for Attribution - Please select from Treatment Team CHRISTIE SHAW    Review needed by CMO to determine Physician of Record No        07/08/25 1246                    Xiomara Aburto RN   Registered Nurse  Case Management     Case Management/Social Work     Signed     Date of Service: 07/09/25 1157  Creation Time: 07/09/25 1157     Signed         Continued Stay Note  [facility]     Patient Name: Edmond Chase                MRN: [id number]  Today's Date: 7/9/2025                  Admit Date: 7/5/2025     Plan: Home     Discharge Plan         Row Name 07/09/25 1157           Plan     Plan Home     Patient/Family in Agreement with Plan yes     Plan Comments MD notified CCP pt is agreeable to HH at [place]. CCP met with pt at the bedside, introduced self and role of CCP. CCP discussed HH referral. Pt verbalized understanding but stated does not need HH referral at this time. Pt stated he would contact his PCP if HH referral needed once he arrives home. Amos WILSON/CCP     Final Discharge Disposition Code 01 - home or self-care     Final Note Home, no additional CCP needs.                         Discharge Codes    No documentation.                      Expected Discharge Date and Time         Expected Discharge Date Expected Discharge Time     Jul 9, 2025                     Xiomara Aburto, RN

## 2025-07-09 NOTE — PLAN OF CARE
Problem: Adult Inpatient Plan of Care  Goal: Plan of Care Review  Outcome: Progressing  Flowsheets (Taken 7/9/2025 0359)  Progress: no change  Outcome Evaluation: vss, ao x4, up ad annita, no c/o pain or cp since prior episode yesterday. repeat Troponin resulted. expected dc today  Plan of Care Reviewed With: patient  Goal: Patient-Specific Goal (Individualized)  Outcome: Progressing  Goal: Absence of Hospital-Acquired Illness or Injury  Outcome: Progressing  Intervention: Identify and Manage Fall Risk  Recent Flowsheet Documentation  Taken 7/8/2025 2345 by Toni Morel RN  Safety Promotion/Fall Prevention: safety round/check completed  Taken 7/8/2025 2037 by Toni Morel RN  Safety Promotion/Fall Prevention: safety round/check completed  Intervention: Prevent Skin Injury  Recent Flowsheet Documentation  Taken 7/8/2025 2345 by Toni Morel RN  Skin Protection: incontinence pads utilized  Taken 7/8/2025 2037 by Toni Morel RN  Body Position:   position changed independently   supine  Skin Protection: incontinence pads utilized  Intervention: Prevent and Manage VTE (Venous Thromboembolism) Risk  Recent Flowsheet Documentation  Taken 7/8/2025 2345 by Toni Morel RN  VTE Prevention/Management: patient refused intervention  Taken 7/8/2025 2037 by Toni Morel RN  VTE Prevention/Management: patient refused intervention  Intervention: Prevent Infection  Recent Flowsheet Documentation  Taken 7/8/2025 2345 by Toni Morel RN  Infection Prevention: rest/sleep promoted  Taken 7/8/2025 2037 by Toni Morel RN  Infection Prevention:   single patient room provided   rest/sleep promoted  Goal: Optimal Comfort and Wellbeing  Outcome: Progressing  Intervention: Provide Person-Centered Care  Recent Flowsheet Documentation  Taken 7/8/2025 2345 by Toni Morel RN  Trust Relationship/Rapport:   care explained   questions answered  Taken 7/8/2025 2037 by Toni Morel  RN  Trust Relationship/Rapport:   care explained   questions answered   thoughts/feelings acknowledged  Goal: Readiness for Transition of Care  Outcome: Progressing     Problem: Fall Injury Risk  Goal: Absence of Fall and Fall-Related Injury  Outcome: Progressing  Intervention: Identify and Manage Contributors  Recent Flowsheet Documentation  Taken 7/8/2025 2345 by Toni Morel RN  Medication Review/Management: medications reviewed  Taken 7/8/2025 2037 by Toni Morel RN  Medication Review/Management: medications reviewed  Intervention: Promote Injury-Free Environment  Recent Flowsheet Documentation  Taken 7/8/2025 2345 by Toni Morel RN  Safety Promotion/Fall Prevention: safety round/check completed  Taken 7/8/2025 2037 by Toni Morel RN  Safety Promotion/Fall Prevention: safety round/check completed     Problem: Nausea and Vomiting  Goal: Nausea and Vomiting Relief  Outcome: Progressing  Intervention: Prevent and Manage Nausea and Vomiting  Recent Flowsheet Documentation  Taken 7/8/2025 2345 by Toni Morel RN  Environmental Support: calm environment promoted   Goal Outcome Evaluation:  Plan of Care Reviewed With: patient        Progress: no change  Outcome Evaluation: vss, ao x4, up ad annita, no c/o pain or cp since prior episode yesterday. repeat Troponin resulted. expected dc today

## 2025-07-09 NOTE — PLAN OF CARE
Goal Outcome Evaluation:  Plan of Care Reviewed With: patient        Progress: improving  Outcome Evaluation: Patient has met criteria for discharge

## 2025-07-09 NOTE — CASE MANAGEMENT/SOCIAL WORK
Case Management Discharge Note      Final Note: Home, no additional CCP needs.         Selected Continued Care - Admitted Since 7/5/2025       Destination    No services have been selected for the patient.                Durable Medical Equipment    No services have been selected for the patient.                Dialysis/Infusion    No services have been selected for the patient.                Home Medical Care    No services have been selected for the patient.                Therapy    No services have been selected for the patient.                Community Resources    No services have been selected for the patient.                Community & DME    No services have been selected for the patient.                         Final Discharge Disposition Code: 01 - home or self-care

## 2025-07-09 NOTE — OUTREACH NOTE
Prep Survey      Flowsheet Row Responses   [facility] patient discharged from? [place]   Is LACE score < 7 ? No   Eligibility [facility]   Date of Admission 07/05/25   Date of Discharge 07/09/25   Discharge diagnosis Intractable nausea and vomiting   Does the patient have one of the following disease processes/diagnoses(primary or secondary)? Other   Prep survey completed? Yes            Sariah MILLS - Registered Nurse

## 2025-07-10 ENCOUNTER — TRANSITIONAL CARE MANAGEMENT TELEPHONE ENCOUNTER (OUTPATIENT)
Dept: CALL CENTER | Facility: HOSPITAL | Age: 65
End: 2025-07-10
Payer: MEDICARE

## 2025-07-10 NOTE — OUTREACH NOTE
Call Center TCM Note      Flowsheet Row Responses   [facility] patient discharged from? [place]   Does the patient have one of the following disease processes/diagnoses(primary or secondary)? Other   TCM attempt successful? No   Unsuccessful attempts Attempt 2   Call Status Left message            Chrissie Mays Registered Nurse    7/10/2025, 10:32 EDT

## 2025-07-10 NOTE — OUTREACH NOTE
Call Center TCM Note      Flowsheet Row Responses   [facility] patient discharged from? [place]   Does the patient have one of the following disease processes/diagnoses(primary or secondary)? Other   TCM attempt successful? No   Unsuccessful attempts Attempt 1   Call Status Left message            Chrissie Mays Registered Nurse    7/10/2025, 09:47 EDT

## 2025-07-11 ENCOUNTER — TRANSITIONAL CARE MANAGEMENT TELEPHONE ENCOUNTER (OUTPATIENT)
Dept: CALL CENTER | Facility: HOSPITAL | Age: 65
End: 2025-07-11
Payer: MEDICARE

## 2025-07-11 NOTE — OUTREACH NOTE
Call Center TCM Note      Flowsheet Row Responses   [facility] patient discharged from? [place]   Does the patient have one of the following disease processes/diagnoses(primary or secondary)? Other   TCM attempt successful? No   Unsuccessful attempts Attempt 3   Revoked Reason Other  [unable to reach x 3. No one listed on  PCP verbal release.]   Does the patient have an appointment with their PCP within 7-14 days of discharge? No   Nursing Interventions PCP office requested to make appointment - message sent, Routed TCM call to PCP office            Sara Mays Registered Nurse    7/11/2025, 14:44 EDT

## 2025-07-14 ENCOUNTER — HOSPITAL ENCOUNTER (OUTPATIENT)
Facility: HOSPITAL | Age: 65
Discharge: HOME | End: 2025-07-14
Admitting: THORACIC SURGERY (CARDIOTHORACIC VASCULAR SURGERY)
Payer: MEDICARE

## 2025-07-14 DIAGNOSIS — C15.5 MALIGNANT NEOPLASM OF LOWER THIRD OF ESOPHAGUS: ICD-10-CM

## 2025-07-14 PROCEDURE — 71250 CT THORAX DX C-: CPT

## 2025-07-14 PROCEDURE — 74176 CT ABD & PELVIS W/O CONTRAST: CPT

## 2025-07-17 ENCOUNTER — OFFICE VISIT (OUTPATIENT)
Dept: SURGERY | Facility: CLINIC | Age: 65
End: 2025-07-17
Payer: MEDICARE

## 2025-07-17 VITALS
HEART RATE: 64 BPM | OXYGEN SATURATION: 93 % | BODY MASS INDEX: 37.27 KG/M2 | SYSTOLIC BLOOD PRESSURE: 144 MMHG | WEIGHT: 274.8 LBS | RESPIRATION RATE: 18 BRPM | DIASTOLIC BLOOD PRESSURE: 98 MMHG

## 2025-07-17 DIAGNOSIS — Z98.890 HISTORY OF ESOPHAGECTOMY: Primary | ICD-10-CM

## 2025-07-17 DIAGNOSIS — Z90.49 HISTORY OF ESOPHAGECTOMY: Primary | ICD-10-CM

## 2025-07-17 RX ORDER — METOCLOPRAMIDE 5 MG/1
5 TABLET ORAL 3 TIMES DAILY
Qty: 90 TABLET | Refills: 1 | Status: SHIPPED | OUTPATIENT
Start: 2025-07-17 | End: 2025-08-16
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
## 2025-07-28 ENCOUNTER — OFFICE VISIT (OUTPATIENT)
Dept: CARDIOLOGY | Age: 65
End: 2025-07-28
Payer: MEDICARE

## 2025-07-28 VITALS
DIASTOLIC BLOOD PRESSURE: 120 MMHG | WEIGHT: 267 LBS | BODY MASS INDEX: 36.16 KG/M2 | SYSTOLIC BLOOD PRESSURE: 180 MMHG | HEART RATE: 68 BPM | HEIGHT: 72 IN

## 2025-07-28 DIAGNOSIS — I48.0 PAROXYSMAL ATRIAL FIBRILLATION: Primary | ICD-10-CM

## 2025-07-28 DIAGNOSIS — G47.33 OBSTRUCTIVE SLEEP APNEA, ADULT: ICD-10-CM

## 2025-07-28 DIAGNOSIS — Z95.2 S/P AVR (AORTIC VALVE REPLACEMENT): ICD-10-CM

## 2025-07-28 DIAGNOSIS — I25.708 CORONARY ARTERY DISEASE OF BYPASS GRAFT OF NATIVE HEART WITH STABLE ANGINA PECTORIS: ICD-10-CM

## 2025-07-28 DIAGNOSIS — I10 ESSENTIAL HYPERTENSION: ICD-10-CM

## 2025-07-28 DIAGNOSIS — Z95.1 S/P CABG (CORONARY ARTERY BYPASS GRAFT): ICD-10-CM

## 2025-07-28 RX ORDER — METOPROLOL SUCCINATE 50 MG/1
50 TABLET, EXTENDED RELEASE ORAL NIGHTLY
Qty: 90 TABLET | Refills: 3 | Status: SHIPPED | OUTPATIENT
Start: 2025-07-28 | End: 2026-07-23

## 2025-07-28 NOTE — PROGRESS NOTES
CARDIOLOGY        Patient Name: Edmond Chase  :1960  Age: 64 y.o.  Primary Cardiologist: Papa Miguel MD  Encounter Provider:  VIKTORIA Van    Date of Service: 2025      CHIEF COMPLAINT / REASON FOR OFFICE VISIT     Follow-up ([facility])      HISTORY OF PRESENT ILLNESS       HPI  Edmond Chase is a 64 y.o. male who presents today for hospital follow-up    Pt has a history significant for hypertension, diabetes, CAD with history of CABG, history of aortic valve replacement.     Medical record review reveals patient was hospitalized with discharge date 2025.  Patient presented with complaints of intractable nausea and vomiting.  GI was consulted and an EGD and pyloric dilatation was done which greatly improved symptoms.  Cardiology was consulted for new onset atrial fibrillation.  Placement was started on apixaban and metoprolol succinate was increased to twice daily.  During discharge process patient complained of chest pains.  Troponins were mildly elevated but at baseline and downtrending.  Patient had unfortunately been off of his isosorbide mononitrate due to an issue with his pharmacy and accidentally taking too much medication.  This was restarted and patient was ultimately discharged.    Patient reports that he has done fairly well since discharge.  He notes that he is still struggling with nausea and decreased oral intake.  He has followed up with Dr. Henderson.  He has continued to have some generalized fatigue.  He has not taken any of his medications this am for fear of fatigue and not being able to make it to this appointment.  He is compliment with eliquis and denies bleeding complications such as epistaxis, blood in the stool or in the urine.  He is not taking furosemide as needed and states that when he takes it, he does not feel well.     INTERVAL HISTORY     Echocardiogram 2017.  LVEF 56%.  Normal LV cavity size.  All LV wall segments contract  normally.  Moderate LVH.  LAE.  RA is moderately dilated.  Bioprosthetic aortic valve with peak and mean gradients elevated, however dimensionless index is 0.34 which suggests normal valve function.  No regurgitation.  Mild MAC.  Mild mitral valve regurgitation.  Trace tricuspid valve regurgitation.  Calculated RVSP 3 mmHg.  Myocardial perfusion stress test 7/20/2017.  Small sized infarct in the inferior wall with moderate brijesh-infarct ischemia.  Impressions consistent with intermediate risk study.    Cardiac catheterization 10/30/2017.  Three-vessel CAD, patent LIMA to LAD, occluded AO-OMB SVG, occluded AO-PDA SVG recommend medical therapy.    Echocardiogram 7/1/2020.  LVEF 56%.  Normal diastolic function.  Normal RV cavity size, wall thickness.  No significant perivalvular leak although bioprosthetic valve was not well visualized.  Aortic valve maximum pressure gradient 43.2 mmHg, mean pressure gradient 28.8 mmHg, aortic valve area 1.0 cm².  Mild dilation of the sinuses of Valsalva measuring 4.2 cm.  Worsening aortic valve gradient compared to prior study.  Myocardial perfusion stress test 7/1/2020.  Small to medium sized, severe area of ischemia located in the inferior wall.  Impressions consistent with intermediate risk study.  Patient was very symptomatic.  Patient set up for cardiac catheterization.    Cardiac catheterization 7/7/2020.  Severe multivessel coronary disease with 100% proximal LAD, 50 to 60% stenoses throughout the circumflex territory within the proximal segment as well as the high marginal and diffuse 70% stenoses throughout the RCA with 100% PDA stenosis with faint collaterals from the LAD.  PCI of the mid LAD via MARCELINA graft with placement of ESMER.  No residual stenosis.  Recommend reevaluating symptoms and if still symptomatic aggressively titrating antianginal medications with consideration given to long-acting nitrates in the hopes of medically managing his RCA disease as this would require  extensive amount of stents to have fully revascularized.    Cardiac catheterization 6/17/2021. LIMA to LAD stent was widely patent and normal.  Left main with 20% luminal irregularities.  Circumflex had an area that was 70% stenosed in the midportion but does not go anywhere and is mainly in the AV groove branch.  RCA dominant and very tortuous vessel with 50% proximal disease, 70% mid vessel disease, 50 to 60% disease up to 90% in the distal RCA territory but due to tortuosity not amenable to any intervention.  Recommend increasing isosorbide mononitrate and following up with cardiology office.    Review of medical records reveals recent hospitalization 7/10 - 7/15/2022.  Patient reports that he has been experiencing intermittent angina.  Patient came to the emergency department and ECG did not reveal any ischemia but he opted not to stay for evaluation.  Early the morning of admission patient woke with recurrent angina and diaphoresis.  He took an additional half of an Imdur with some improvement and called EMS.  Following arrival to the hospital he was continued to have angina and was noted to be mildly hypotensive initial EKG revealed stable lateral T wave changes.  Repeat EKG was poor quality but there was concern about inferior ST elevation, on Dr. Patel's review there was no evidence of ST elevation to warrant emergent cardiac catheterization.  During hospitalization patient had an echocardiogram with normal LVEF, normal prosthetic valve gradients.  Myocardial perfusion study revealed moderately severe area of ischemia in the inferior wall.  On hospital day 2 patient admitted that he had some melena over the weekend.  GI was consulted and EGD performed revealing nonbleeding gastric ulcers.  Cardiac catheterization performed revealed stable coronary disease.  Decision was made to drop aspirin given gastric ulcer history.  Patient does have mild diastolic dysfunction and low-dose furosemide was added.  He  was also referred to pulmonary for outpatient follow-up for chronic dyspnea.    Cardiac catheterization 7/14/2022.  Left main normal.  LAD with calcified 90% proximal stenosis.   mid segment.  Mid to distal vessels fill via patent LIMA to LAD.  Circumflex severely calcified 70-80% stenosis in the midsegment.RCA is severely calcified and tortuous vessel with diffuse 70% mid vessel stenosis and discrete 80% mid to distal stenosis.   small caliber PDA branch PDA branch fills via left to right collaterals.  Ramus is discrete 50% stenosis in the inferior branch.  LIMA to LAD is normal.  SVG to OM is occluded at proximal anastomosis.  SVG to PDA is occluded at proximal anastomosis.  Continue medical management.  RCA and LAD are not candidates for intervention and unchanged from prior cardiac catheterization.    He has a history of esophageal cancer and reports that his esophagus has now been removed.  He is also s/p cholecystectomy.     Follow-up appointment in October 2023 patient continued to complain of chest discomfort.  At that time we recommended the patient increase his isosorbide mononitrate to twice daily dosing.    Received a phone call from patient's primary care physician in May 2024 where heart rate was noted to be in the 30s, ECG revealed heart rate in the 60s but with bigeminy.  Laboratory studies were drawn and noted to be normal.  A outpatient monitor was placed.  Outpatient monitor revealed ventricular bigeminy with PVC burden of 19.7%.  He was evaluated by electrophysiology who did not make any changes to his daily regimen.    Patient was evaluated in the [facility] 9/24/2024 by Dr. Patel for complaints of chest pain and dyspnea.  Patient did have an elevated D-dimer and was sent to the hospital for a stat CTA of the chest as well as bilateral lower extremity Doppler.  Lower extremity duplex was negative for DVT.  CTA chest was negative for PE.    Medical record review reveals patient was hospitalized  "with discharge date 9/25/2024.  Patient presented with acute exacerbation of chronic chest pain.  He was evaluated by Dr. Miguel who recommended adding Ranexa in addition with patient's daily isosorbide mononitrate.  ECG during hospitalization revealed episodes of ventricular bigeminy.    Patient presented to the [facility] September 2024 with complaints of ongoing chest pain.  Reports that this is similar to previous anginal equivalent.  Patient was sent for a CTA chest which was negative for PE.    Cardiac catheterization 10/20/2024 revealed severe native vessel CAD including 100% mid LAD , 60-70% mid to distal RCA stenosis, long 50% proximal circumflex stenosis.  Patent LIMA to LAD.  Coronary artery disease appears to be stable compared to 2 years ago.  Continue aggressive risk modification.    Echocardiogram 6/27/2025.  LVEF 58%.  Moderate hypertrophy.  Diastolic function normal.  Left atrial cavity is mildly dilated.  Bioprosthetic aortic valve present with maximum pressure gradient 24 mmHg.  Mean pressure gradient 12 mmHg.    The following portions of the patient's history were reviewed and updated as appropriate: allergies, current medications, past family history, past medical history, past social history, past surgical history and problem list.      VITAL SIGNS     Visit Vitals  BP (!) 180/120 (BP Location: Left arm, Patient Position: Sitting, Cuff Size: Adult)   Pulse 68   Ht 182.9 cm (72.01\")   Wt 121 kg (267 lb)   BMI 36.20 kg/m²         Wt Readings from Last 3 Encounters:   07/28/25 121 kg (267 lb)   07/17/25 125 kg (274 lb 12.8 oz)   07/05/25 132 kg (290 lb)     Body mass index is 36.2 kg/m².      REVIEW OF SYSTEMS     Review of Systems   Constitutional: Positive for malaise/fatigue. Negative for chills, fever, weight gain and weight loss.   Cardiovascular:  Positive for chest pain (chronic). Negative for leg swelling.   Respiratory:  Negative for cough, snoring and wheezing.    Hematologic/Lymphatic: " Negative for bleeding problem. Does not bruise/bleed easily.   Skin:  Negative for color change.   Musculoskeletal:  Negative for falls, joint pain and myalgias.   Gastrointestinal:  Positive for nausea. Negative for melena.   Genitourinary:  Negative for hematuria.   Neurological:  Negative for excessive daytime sleepiness.   Psychiatric/Behavioral:  Positive for depression. The patient is nervous/anxious.            PHYSICAL EXAMINATION     Constitutional:       Appearance: Normal appearance. Well-developed.   Eyes:      Conjunctiva/sclera: Conjunctivae normal.   Neck:      Vascular: No carotid bruit.   Pulmonary:      Effort: Pulmonary effort is normal.      Breath sounds: Normal breath sounds.   Cardiovascular:      Normal rate. Regular rhythm. Normal S1. Normal S2.       Murmurs: There is no murmur.      No gallop.  No click. No rub.   Edema:     Peripheral edema absent.   Musculoskeletal: Normal range of motion. Skin:     General: Skin is warm and dry.   Neurological:      Mental Status: Alert and oriented to person, place, and time.      GCS: GCS eye subscore is 4. GCS verbal subscore is 5. GCS motor subscore is 6.   Psychiatric:         Speech: Speech normal.         Behavior: Behavior normal.         Thought Content: Thought content normal.         Judgment: Judgment normal.           REVIEWED DATA     Procedures        Lipid Panel          9/10/2024    11:03 9/25/2024    04:08 5/23/2025    11:08   Lipid Panel   Total Cholesterol  102     Total Cholesterol 128   133    Triglycerides 82  96  76    HDL Cholesterol 42  32  49    VLDL Cholesterol 16  19  15    LDL Cholesterol  70  51  69    LDL/HDL Ratio  1.59         Lab Results   Component Value Date     07/07/2025     07/06/2025    K 3.5 07/07/2025    K 4.0 07/06/2025     07/07/2025     07/06/2025    CO2 22.8 07/07/2025    CO2 20.0 (L) 07/06/2025    BUN 18.0 07/07/2025    BUN 14.0 07/06/2025    CREATININE 0.92 07/07/2025    CREATININE  0.74 (L) 07/06/2025    EGFRIFNONA 91 06/17/2021    EGFRIFNONA 83 07/01/2020    EGFRIFAFRI 72 03/29/2016    EGFRIFAFRI >60 12/30/2015    GLUCOSE 76 07/07/2025    GLUCOSE 68 07/06/2025    CALCIUM 8.4 (L) 07/07/2025    CALCIUM 8.3 (L) 07/06/2025    ALBUMIN 3.3 (L) 07/07/2025    ALBUMIN 4.0 07/05/2025    BILITOT 1.0 07/07/2025    BILITOT 0.9 07/05/2025    AST 15 07/07/2025    AST 24 07/05/2025    ALT 14 07/07/2025    ALT 21 07/05/2025     Lab Results   Component Value Date    WBC 6.50 07/07/2025    WBC 6.44 07/06/2025    HGB 13.5 07/07/2025    HGB 14.5 07/06/2025    HCT 41.4 07/07/2025    HCT 44.9 07/06/2025    MCV 98.6 (H) 07/07/2025    .4 (H) 07/06/2025     07/07/2025     07/06/2025     Lab Results   Component Value Date    PROBNP 2,159.0 (H) 07/05/2025    PROBNP 996.0 (H) 06/27/2025     Lab Results   Component Value Date    CKTOTAL 296 (H) 02/20/2016    TROPONINT 34 (H) 07/08/2025    TROPONINT 34 (H) 07/08/2025     Lab Results   Component Value Date    TSH 1.630 07/05/2025    TSH 2.030 01/13/2025             ASSESSMENT & PLAN     Diagnoses and all orders for this visit:    1. Paroxysmal atrial fibrillation (Primary)  Overview:  CHADS-VASc Risk Assessment               3 Total Score    1 Hypertension    1 DM    1 Age 65-74    Criteria that do not apply:    CHF    Age >/= 75    PRIOR STROKE/TIA/THROMBO    Vascular Disease    Sex: Female              Assessment & Plan:  Diagnosed in the setting of nausea, vomiting and during a hospitalization in July 2025  Decrease metoprolol succinate to 50 mg per night  Continue anticoagulation with apixaban    Orders:  -     apixaban (ELIQUIS) 5 MG tablet tablet; Take 1 tablet by mouth Every 12 (Twelve) Hours for 30 days. Indications: Atrial Fibrillation  Dispense: 180 tablet; Refill: 3  -     metoprolol succinate XL (TOPROL-XL) 50 MG 24 hr tablet; Take 1 tablet by mouth Every Night for 360 days.  Dispense: 90 tablet; Refill: 3    2. Coronary artery disease of  bypass graft of native heart with stable angina pectoris  Overview:  Cardiac catheterization 10/20/2024 revealed severe native vessel CAD including 100% mid LAD , 60-70% mid to distal RCA stenosis, long 50% proximal circumflex stenosis.  Patent LIMA to LAD.  Coronary artery disease appears to be stable compared to 2 years ago.  Continue aggressive risk modification    Assessment & Plan:  With chronic angina that could be secondary to microvascular disease versus anxiety versus known esophageal issues  Patient has had extensive cardiac testing  Continue the following regimen:  Amlodipine 5 mg/day  Atorvastatin 40 mg/day  Farxiga 10 mg/day  Isosorbide mononitrate 120 mg twice daily  Lisinopril 10 mg/day  Metoprolol succinate 50 mg/day  Ranolazine 1000 mg twice daily      3. S/P CABG (coronary artery bypass graft)    4. S/P AVR (aortic valve replacement)  Overview:  Echocardiogram 7/12/2022.  LVEF 51-55%.  All LV wall segments contract normally.  No significant aortic valve regurgitation.  No hemodynamically significant aortic valve stenosis.  There is a 27 mm porcine magna bioprosthetic aortic valve with stable gradients.      5. Essential hypertension  Overview:  Amlodipine 5 mg/day  Metoprolol succinate 50 mg per night  Lisinopril 10 mg/day    Assessment & Plan:  Hypertension is stable and controlled  Continue current treatment regimen.  Dietary sodium restriction.  Regular aerobic exercise.  Ambulatory blood pressure monitoring.  Blood pressure will be reassessed in 6 months.    To clinic today however patient has not taken a.m. meds.  He will take these when he gets home.  BP controlled at home.      6. Obstructive sleep apnea, adult treated with auto CPAP  Overview:  Overnight polysomnogram.  Weight 276 pounds.  Severe TJ with AHI 79 events per hour.  Auto CPAP recommended.          Return for Next scheduled follow up.    Future Appointments         Provider Department Center    7/30/2025 2:00 PM (Arrive by  1:45 PM) Yazmin Molina APRN [facility] HEMATOLOGY & ONCOLOGY     9/19/2025 10:45 AM (Arrive by 10:30 AM) Nargis Velasquez APRN [facility] PRIMARY CARE [facility]    10/24/2025 1:20 PM (Arrive by 1:05 PM) Papa Miguel MD [facility] CARDIOLOGY [facility]    11/13/2025 11:45 AM (Arrive by 11:30 AM) Sekou Pisano MD [facility] SLEEP MEDICINE     1/15/2026 11:00 AM Sujata John DNP, APRN [facility] THORACIC SURGERY [facility]                MEDICATIONS         Discharge Medications            Accurate as of July 28, 2025  4:01 PM. If you have any questions, ask your nurse or doctor.                Changes to Medications        Instructions Start Date   metoprolol succinate XL 50 MG 24 hr tablet  Commonly known as: TOPROL-XL  What changed: when to take this  Changed by: VIKTORIA Turpin   50 mg, Oral, Nightly      ranolazine 500 MG 12 hr tablet  Commonly known as: RANEXA  What changed: additional instructions   1,000 mg, Oral, Every 12 Hours Scheduled             Continue These Medications        Instructions Start Date   amLODIPine 5 MG tablet  Commonly known as: NORVASC   5 mg, Oral, Daily, Take 5 mg      apixaban 5 MG tablet tablet  Commonly known as: ELIQUIS   5 mg, Oral, Every 12 Hours Scheduled      armodafinil 150 MG tablet  Commonly known as: NUVIGIL   150 mg, Oral, Daily      atorvastatin 40 MG tablet  Commonly known as: LIPITOR   40 mg, Oral, Daily      buPROPion  MG 24 hr tablet  Commonly known as: Wellbutrin XL   150 mg, Oral, Every Morning      Farxiga 10 MG tablet  Generic drug: dapagliflozin Propanediol   10 mg, Oral, Daily      fluticasone 50 MCG/ACT nasal spray  Commonly known as: FLONASE   Administer 2 sprays into the nostril(s) as directed by provider Daily.      isosorbide mononitrate 120 MG 24 hr tablet  Commonly known as: IMDUR   120 mg, Oral, Daily      lisinopril 10 MG tablet  Commonly known as:  PRINIVIL,ZESTRIL   10 mg, Oral, Daily      metoclopramide 5 MG tablet  Commonly known as: Reglan   5 mg, Oral, 3 Times Daily      pantoprazole 40 MG EC tablet  Commonly known as: PROTONIX   40 mg, Oral, Every Early Morning             Stop These Medications      furosemide 20 MG tablet  Commonly known as: LASIX  Stopped by: VIKTORIA Turpin                  **Dragon Disclaimer:   Much of this encounter note is an electronic transcription/translation of spoken language to printed text. The electronic translation of spoken language may permit erroneous, or at times, nonsensical words or phrases to be inadvertently transcribed. Although I have reviewed the note for such errors, some may still exist.

## 2025-07-28 NOTE — ASSESSMENT & PLAN NOTE
Diagnosed in the setting of nausea, vomiting and during a hospitalization in July 2025  Decrease metoprolol succinate to 50 mg per night  Continue anticoagulation with apixaban

## 2025-07-28 NOTE — ASSESSMENT & PLAN NOTE
Hypertension is stable and controlled  Continue current treatment regimen.  Dietary sodium restriction.  Regular aerobic exercise.  Ambulatory blood pressure monitoring.  Blood pressure will be reassessed in 6 months.    To clinic today however patient has not taken a.m. meds.  He will take these when he gets home.  BP controlled at home.

## 2025-07-28 NOTE — ASSESSMENT & PLAN NOTE
With chronic angina that could be secondary to microvascular disease versus anxiety versus known esophageal issues  Patient has had extensive cardiac testing  Continue the following regimen:  Amlodipine 5 mg/day  Atorvastatin 40 mg/day  Farxiga 10 mg/day  Isosorbide mononitrate 120 mg twice daily  Lisinopril 10 mg/day  Metoprolol succinate 50 mg/day  Ranolazine 1000 mg twice daily

## 2025-07-30 ENCOUNTER — OFFICE VISIT (OUTPATIENT)
Dept: PSYCHIATRY | Facility: HOSPITAL | Age: 65
End: 2025-07-30
Payer: MEDICARE

## 2025-07-30 DIAGNOSIS — F33.41 RECURRENT MAJOR DEPRESSIVE DISORDER, IN PARTIAL REMISSION: ICD-10-CM

## 2025-07-30 DIAGNOSIS — R53.0 NEOPLASTIC MALIGNANT RELATED FATIGUE: ICD-10-CM

## 2025-07-30 DIAGNOSIS — C15.5 MALIGNANT NEOPLASM OF LOWER THIRD OF ESOPHAGUS: Primary | ICD-10-CM

## 2025-07-30 DIAGNOSIS — G47.33 OSA (OBSTRUCTIVE SLEEP APNEA): ICD-10-CM

## 2025-07-30 RX ORDER — BUPROPION HYDROCHLORIDE 150 MG/1
150 TABLET ORAL EVERY MORNING
Qty: 90 TABLET | Refills: 0 | Status: SHIPPED | OUTPATIENT
Start: 2025-07-30 | End: 2026-07-30

## 2025-07-30 NOTE — PROGRESS NOTES
"Subjective  Patient ID: Edmond Chase is a 64 y.o.. male seen in regularly scheduled group session.  Group participants have consented to group conducted in person    Total Group Time, Face to Face: 80 minutes  Total Group Participants: 7, plus facilitation per VIKTORIA Moeller    Group Therapy  Group topics explored including development of new normal, short and long term effects of diagnosis and treatment, significant other or family conflict, anxiety surrounding adjusted treatment plan or adjusted follow up, physical deconditioning, weight management, social determinants of health (finances, living arrangements, etc), and lifestyle choices. Members shares burden of ongoing medical complexity, sharing worsening, stable, and improving symptoms in survivorship. Members share additional medical complexities, balancing this, cancer, and age as realistic reminders of mortality. Shared impact of perspective, sharing goals of not creating new regrets. Members share appreciation of group in terms of collective perspective, connection to others, change from the \"why me\" to \"why us\" mentality, with goals of pushing forward to live today.     Counseling provided regarding behavioral activation/ activity scheduling, reintroduction to activity through graded tasks, balancing avoidance with approach , recognition and allowance of need for rest, pharmacological and non pharmacological management of sleep disturbance, lifestyle counseling, benefits of exercise and strategies for managing barriers to engagement, allowance of self care, exploration of quality of life goals, survivorship issues, current programming available in community and clinic, anxiety/ mood management , medication education, and existential distress. Counseling provided supporting developing group dynamics, reiterating therapeutic factors of universality, sharing of information, instillation of hope. Supported sharing of fears, identifying personal " goals, and considering limitations to ability approach goals with full force. Allowed exploration of existential distress.    Discussed current programming available in [facility] and community.    Benefits of group discussed while also acknowledging the need for 1:1 consultation at times. Members invited to discuss any concerns privately with me following group setting or to schedule a 1:1 visit if needs not being met in group.    Next shared medical visit: September 3 at 2:00 PM in person    Patient response to group: Pt engages fully in group setting, facilitating incorporation of new member into conversation.    Medications Management  Pt continues in survivorship of esophageal cancer.    CC: Depression, low motivation, existential distress  HPI: Pt is seen in follow up regarding long term impact of depression, exacerbated in survivorship of esophageal cancer. Shares recent hospitalization, fortunately feeling better today. Continues to approach difficulty eating, frequent nausea; fortunately feels recent adjustment has been helpful. Mood is improved, more engaged in conversation. Energy remains low, although improved. Continues to report adherence to bupropion, no longer taking armodafinil. Affect is bright, improved. Pt agrees with this observation by myself and other group members.  Exam: As Above  Current medication regimen: bupropion  mg daily, no longer taking armodafinil  Lab Review:   Admission on 07/05/2025, Discharged on 07/09/2025   Component Date Value    Glucose 07/05/2025 114 (H)     BUN 07/05/2025 16.0     Creatinine 07/05/2025 1.00     Sodium 07/05/2025 138     Potassium 07/05/2025 3.5     Chloride 07/05/2025 101     CO2 07/05/2025 24.2     Calcium 07/05/2025 9.2     Total Protein 07/05/2025 7.0     Albumin 07/05/2025 4.0     ALT (SGPT) 07/05/2025 21     AST (SGOT) 07/05/2025 24     Alkaline Phosphatase 07/05/2025 85     Total Bilirubin 07/05/2025 0.9     Globulin 07/05/2025 3.0     A/G  Ratio 07/05/2025 1.3     BUN/Creatinine Ratio 07/05/2025 16.0     Anion Gap 07/05/2025 12.8     eGFR 07/05/2025 83.0     Lipase 07/05/2025 18     QT Interval 07/05/2025 381     QTC Interval 07/05/2025 455     proBNP 07/05/2025 2,159.0 (H)     HS Troponin T 07/05/2025 40 (H)     Color, UA 07/06/2025 Yellow     Appearance, UA 07/06/2025 Clear     pH, UA 07/06/2025 <=5.0     Specific Gravity, UA 07/06/2025 >1.030 (H)     Glucose, UA 07/06/2025 >=1000 mg/dL (3+) (A)     Ketones, UA 07/06/2025 80 mg/dL (3+) (A)     Bilirubin, UA 07/06/2025 Negative     Blood, UA 07/06/2025 Negative     Protein, UA 07/06/2025 Negative     Leuk Esterase, UA 07/06/2025 Negative     Nitrite, UA 07/06/2025 Negative     Urobilinogen, UA 07/06/2025 0.2 E.U./dL     Magnesium 07/05/2025 2.0     WBC 07/05/2025 7.60     RBC 07/05/2025 4.59     Hemoglobin 07/05/2025 15.2     Hematocrit 07/05/2025 45.1     MCV 07/05/2025 98.3 (H)     MCH 07/05/2025 33.1 (H)     MCHC 07/05/2025 33.7     RDW 07/05/2025 13.2     RDW-SD 07/05/2025 48.3     MPV 07/05/2025 9.5     Platelets 07/05/2025 213     Neutrophil % 07/05/2025 81.0 (H)     Lymphocyte % 07/05/2025 10.4 (L)     Monocyte % 07/05/2025 7.8     Eosinophil % 07/05/2025 0.3     Basophil % 07/05/2025 0.1     Immature Grans % 07/05/2025 0.4     Neutrophils, Absolute 07/05/2025 6.16     Lymphocytes, Absolute 07/05/2025 0.79     Monocytes, Absolute 07/05/2025 0.59     Eosinophils, Absolute 07/05/2025 0.02     Basophils, Absolute 07/05/2025 0.01     Immature Grans, Absolute 07/05/2025 0.03     nRBC 07/05/2025 0.0     HS Troponin T 07/05/2025 32 (H)     Troponin T Numeric Delta 07/05/2025 -8     Troponin T % Delta 07/05/2025 -20     ADENOVIRUS, PCR 07/05/2025 Not Detected     Coronavirus 229E 07/05/2025 Not Detected     Coronavirus HKU1 07/05/2025 Not Detected     Coronavirus NL63 07/05/2025 Not Detected     Coronavirus OC43 07/05/2025 Not Detected     COVID19 07/05/2025 Not Detected     Human Metapneumovirus  07/05/2025 Not Detected     Human Rhinovirus/Enterov* 07/05/2025 Not Detected     Influenza A PCR 07/05/2025 Not Detected     Influenza B PCR 07/05/2025 Not Detected     Parainfluenza Virus 1 07/05/2025 Not Detected     Parainfluenza Virus 2 07/05/2025 Not Detected     Parainfluenza Virus 3 07/05/2025 Not Detected     Parainfluenza Virus 4 07/05/2025 Not Detected     RSV, PCR 07/05/2025 Not Detected     Bordetella pertussis pcr 07/05/2025 Not Detected     Bordetella parapertussis* 07/05/2025 Not Detected     Chlamydophila pneumoniae* 07/05/2025 Not Detected     Mycoplasma pneumo by PCR 07/05/2025 Not Detected     Glucose 07/05/2025 76     Magnesium 07/05/2025 2.1     TSH 07/05/2025 1.630     Glucose 07/05/2025 90     WBC 07/06/2025 6.44     RBC 07/06/2025 4.43     Hemoglobin 07/06/2025 14.5     Hematocrit 07/06/2025 44.9     MCV 07/06/2025 101.4 (H)     MCH 07/06/2025 32.7     MCHC 07/06/2025 32.3     RDW 07/06/2025 13.1     RDW-SD 07/06/2025 49.7     MPV 07/06/2025 9.5     Platelets 07/06/2025 189     Neutrophil % 07/06/2025 73.3     Lymphocyte % 07/06/2025 18.0 (L)     Monocyte % 07/06/2025 7.9     Eosinophil % 07/06/2025 0.3     Basophil % 07/06/2025 0.2     Immature Grans % 07/06/2025 0.3     Neutrophils, Absolute 07/06/2025 4.72     Lymphocytes, Absolute 07/06/2025 1.16     Monocytes, Absolute 07/06/2025 0.51     Eosinophils, Absolute 07/06/2025 0.02     Basophils, Absolute 07/06/2025 0.01     Immature Grans, Absolute 07/06/2025 0.02     nRBC 07/06/2025 0.0     Glucose 07/06/2025 68     BUN 07/06/2025 14.0     Creatinine 07/06/2025 0.74 (L)     Sodium 07/06/2025 141     Potassium 07/06/2025 4.0     Chloride 07/06/2025 106     CO2 07/06/2025 20.0 (L)     Calcium 07/06/2025 8.3 (L)     BUN/Creatinine Ratio 07/06/2025 18.9     Anion Gap 07/06/2025 15.0     eGFR 07/06/2025 99.9     Glucose 07/06/2025 71     Glucose 07/06/2025 70     Glucose 07/06/2025 65 (L)     Glucose 07/06/2025 139 (H)     Glucose 07/06/2025 70      Glucose 07/06/2025 74     Glucose 07/07/2025 76     BUN 07/07/2025 18.0     Creatinine 07/07/2025 0.92     Sodium 07/07/2025 138     Potassium 07/07/2025 3.5     Chloride 07/07/2025 105     CO2 07/07/2025 22.8     Calcium 07/07/2025 8.4 (L)     Total Protein 07/07/2025 5.8 (L)     Albumin 07/07/2025 3.3 (L)     ALT (SGPT) 07/07/2025 14     AST (SGOT) 07/07/2025 15     Alkaline Phosphatase 07/07/2025 69     Total Bilirubin 07/07/2025 1.0     Globulin 07/07/2025 2.5     A/G Ratio 07/07/2025 1.3     BUN/Creatinine Ratio 07/07/2025 19.6     Anion Gap 07/07/2025 10.2     eGFR 07/07/2025 91.7     Protime 07/07/2025 15.0 (H)     INR 07/07/2025 1.18 (H)     WBC 07/07/2025 6.50     RBC 07/07/2025 4.20     Hemoglobin 07/07/2025 13.5     Hematocrit 07/07/2025 41.4     MCV 07/07/2025 98.6 (H)     MCH 07/07/2025 32.1     MCHC 07/07/2025 32.6     RDW 07/07/2025 13.1     RDW-SD 07/07/2025 46.8     MPV 07/07/2025 9.6     Platelets 07/07/2025 183     Neutrophil % 07/07/2025 72.1     Lymphocyte % 07/07/2025 17.4 (L)     Monocyte % 07/07/2025 9.5     Eosinophil % 07/07/2025 0.5     Basophil % 07/07/2025 0.3     Immature Grans % 07/07/2025 0.2     Neutrophils, Absolute 07/07/2025 4.69     Lymphocytes, Absolute 07/07/2025 1.13     Monocytes, Absolute 07/07/2025 0.62     Eosinophils, Absolute 07/07/2025 0.03     Basophils, Absolute 07/07/2025 0.02     Immature Grans, Absolute 07/07/2025 0.01     nRBC 07/07/2025 0.0     Glucose 07/07/2025 72     Glucose 07/07/2025 70     Glucose 07/07/2025 120     Case Report 07/07/2025                      Value:Surgical Pathology Report                         Case: [id number]                                  Authorizing Provider:  Ubaldo De León MD      Collected:           07/07/2025 11:48 AM          Ordering Location:     [facility]  Received:            07/07/2025 01:32 PM                                 ENDO SUITES                                                                "   Pathologist:           Mulu Camacho MD                                                        Specimens:   1) - GE Junction, GE JUNCTION BIOPSIES AT ANASTAMOSIS                                               2) - Esophagus, ESOPHAGUS AT 19CM                                                          Final Diagnosis 07/07/2025                      Value:1.  Gastroesophageal junction, at anastomosis, biopsy:         A.  Squamocolumnar mucosa with mild chronic inflammation.         B.  Negative for intestinal metaplasia and dysplasia.    2.  Esophagus, at 19 cm, biopsy:         A.  Squamocolumnar mucosa with mild to moderate chronic inflammation.         B.  Negative for intestinal metaplasia and dysplasia.      Gross Description 07/07/2025                      Value:1. GE Junction.  The specimen is received in formalin labeled with the patient's name and further designated 'gastroesophageal junction   Biopsy at anastamosis' are multiple small fragments of gray-tan tissue. The specimen is submitted for embedding as received.    2. Esophagus.  The specimen is received in formalin labeled with the patient's name and further designated 'esophagus @ 19 cm   biopsy' is a small fragment of gray-tan tissue. The specimen is submitted for embedding as received.        Microscopic Description 07/07/2025                      Value:Unless \"gross only\" is specified, the final diagnosis for each specimen is based on microscopic examination of tissue.      Glucose 07/07/2025 86     Glucose 07/07/2025 81     Glucose 07/07/2025 97     Glucose 07/08/2025 77     Glucose 07/08/2025 111     QT Interval 07/08/2025 397     QTC Interval 07/08/2025 484     HS Troponin T 07/08/2025 37 (H)     Glucose 07/08/2025 110     HS Troponin T 07/08/2025 34 (H)     Troponin T Numeric Delta 07/08/2025 -3     Troponin T % Delta 07/08/2025 -8     HS Troponin T 07/08/2025 34 (H)     Glucose 07/08/2025 134 (H)     Glucose 07/09/2025 87     Glucose " 07/09/2025 116    Admission on 06/27/2025, Discharged on 06/28/2025   Component Date Value    QT Interval 06/27/2025 389     QTC Interval 06/27/2025 423     Glucose 06/27/2025 121 (H)     BUN 06/27/2025 11.0     Creatinine 06/27/2025 0.83     Sodium 06/27/2025 141     Potassium 06/27/2025 3.5     Chloride 06/27/2025 105     CO2 06/27/2025 24.9     Calcium 06/27/2025 9.0     Total Protein 06/27/2025 6.6     Albumin 06/27/2025 3.8     ALT (SGPT) 06/27/2025 22     AST (SGOT) 06/27/2025 24     Alkaline Phosphatase 06/27/2025 83     Total Bilirubin 06/27/2025 0.7     Globulin 06/27/2025 2.8     A/G Ratio 06/27/2025 1.4     BUN/Creatinine Ratio 06/27/2025 13.3     Anion Gap 06/27/2025 11.1     eGFR 06/27/2025 96.5     proBNP 06/27/2025 996.0 (H)     HS Troponin T 06/27/2025 33 (H)     Extra Tube 06/27/2025 Hold for add-ons.     Extra Tube 06/27/2025 hold for add-on     Extra Tube 06/27/2025 Hold for add-ons.     Extra Tube 06/27/2025 Hold for add-ons.     WBC 06/27/2025 5.51     RBC 06/27/2025 4.27     Hemoglobin 06/27/2025 13.7     Hematocrit 06/27/2025 41.6     MCV 06/27/2025 97.4 (H)     MCH 06/27/2025 32.1     MCHC 06/27/2025 32.9     RDW 06/27/2025 13.5     RDW-SD 06/27/2025 48.1     MPV 06/27/2025 9.2     Platelets 06/27/2025 169     Neutrophil % 06/27/2025 75.1     Lymphocyte % 06/27/2025 14.9 (L)     Monocyte % 06/27/2025 8.0     Eosinophil % 06/27/2025 1.1     Basophil % 06/27/2025 0.4     Immature Grans % 06/27/2025 0.5     Neutrophils, Absolute 06/27/2025 4.14     Lymphocytes, Absolute 06/27/2025 0.82     Monocytes, Absolute 06/27/2025 0.44     Eosinophils, Absolute 06/27/2025 0.06     Basophils, Absolute 06/27/2025 0.02     Immature Grans, Absolute 06/27/2025 0.03     nRBC 06/27/2025 0.0     QT Interval 06/27/2025 392     QTC Interval 06/27/2025 397     Lipase 06/27/2025 14     EF(MOD-bp) 06/27/2025 58.4     LVIDd 06/27/2025 5.4     LVIDs 06/27/2025 3.7     IVSd 06/27/2025 1.93     LVPWd 06/27/2025 1.59     FS  06/27/2025 32.0     IVS/LVPW 06/27/2025 1.21     ESV(cubed) 06/27/2025 48.8     LV Sys Vol (BSA correcte* 06/27/2025 20.0     EDV(cubed) 06/27/2025 155.4     LV Flanagan Vol (BSA correct* 06/27/2025 49.3     LV mass(C)d 06/27/2025 457.9     LVOT area 06/27/2025 3.2     LVOT diam 06/27/2025 2.03     EDV(MOD-sp2) 06/27/2025 71.0     EDV(MOD-sp4) 06/27/2025 123.0     ESV(MOD-sp2) 06/27/2025 28.0     ESV(MOD-sp4) 06/27/2025 50.0     SV(MOD-sp2) 06/27/2025 43.0     SV(MOD-sp4) 06/27/2025 73.0     SVi(MOD-SP2) 06/27/2025 17.2     SVi(MOD-SP4) 06/27/2025 29.3     SVi (LVOT) 06/27/2025 24.0     EF(MOD-sp2) 06/27/2025 60.6     EF(MOD-sp4) 06/27/2025 59.3     MV E max jr 06/27/2025 98.0     MV A max jr 06/27/2025 39.8     MV dec time 06/27/2025 0.29     MV E/A 06/27/2025 2.46     LA ESV Index (BP) 06/27/2025 31.4     Med Peak E' Jr 06/27/2025 8.4     Lat Peak E' Jr 06/27/2025 8.8     Avg E/e' ratio 06/27/2025 11.40     SV(LVOT) 06/27/2025 59.8     SV(RVOT) 06/27/2025 185.9     Qp/Qs 06/27/2025 3.1     RVIDd 06/27/2025 2.8     RV Base 06/27/2025 4.2     RV Mid 06/27/2025 3.1     RV Length 06/27/2025 8.8     TAPSE (>1.6) 06/27/2025 2.14     RV S' 06/27/2025 11.7     LA dimension (2D)  06/27/2025 5.8     LV V1 max 06/27/2025 73.4     LV V1 max PG 06/27/2025 2.15     LV V1 mean PG 06/27/2025 1.16     LV V1 VTI 06/27/2025 18.4     Ao pk jr 06/27/2025 244.2     Ao max PG 06/27/2025 23.9     Ao mean PG 06/27/2025 11.5     Ao V2 VTI 06/27/2025 56.7     LESLY(I,D) 06/27/2025 1.06     Dimensionless Index 06/27/2025 0.33     MV max PG 06/27/2025 5.5     MV mean PG 06/27/2025 2.03     MV V2 VTI 06/27/2025 32.1     MV P1/2t 06/27/2025 109.9     MVA(P1/2t) 06/27/2025 2.00     MVA(VTI) 06/27/2025 1.87     MV dec slope 06/27/2025 334.3     RVOT diam 06/27/2025 3.6     RV V1 max PG 06/27/2025 1.86     RV V1 max 06/27/2025 68.1     RV V1 VTI 06/27/2025 17.9     PA V2 max 06/27/2025 96.3     PA acc time 06/27/2025 0.14     PI end-d jr  06/27/2025 140.3     Ao root diam 06/27/2025 3.9     ACS 06/27/2025 1.80     Sinus 06/27/2025 3.8     STJ 06/27/2025 3.4     AT 06/27/2025 0.1290     accel time 06/27/2025 129.00     Ao root area (BSA correc* 06/27/2025 1.6     Abdo Ao Diam 06/27/2025 2.0     HS Troponin T 06/27/2025 32 (H)     Troponin T Numeric Delta 06/27/2025 -1     Troponin T % Delta 06/27/2025 -3     ADENOVIRUS, PCR 06/27/2025 Not Detected     Coronavirus 229E 06/27/2025 Not Detected     Coronavirus HKU1 06/27/2025 Not Detected     Coronavirus NL63 06/27/2025 Not Detected     Coronavirus OC43 06/27/2025 Not Detected     COVID19 06/27/2025 Not Detected     Human Metapneumovirus 06/27/2025 Not Detected     Human Rhinovirus/Enterov* 06/27/2025 Not Detected     Influenza A PCR 06/27/2025 Not Detected     Influenza B PCR 06/27/2025 Not Detected     Parainfluenza Virus 1 06/27/2025 Not Detected     Parainfluenza Virus 2 06/27/2025 Not Detected     Parainfluenza Virus 3 06/27/2025 Not Detected     Parainfluenza Virus 4 06/27/2025 Not Detected     RSV, PCR 06/27/2025 Not Detected     Bordetella pertussis pcr 06/27/2025 Not Detected     Bordetella parapertussis* 06/27/2025 Not Detected     Chlamydophila pneumoniae* 06/27/2025 Not Detected     Mycoplasma pneumo by PCR 06/27/2025 Not Detected     Glucose 06/27/2025 122     WBC 06/28/2025 5.70     RBC 06/28/2025 4.01 (L)     Hemoglobin 06/28/2025 13.4     Hematocrit 06/28/2025 38.8     MCV 06/28/2025 96.8     MCH 06/28/2025 33.4 (H)     MCHC 06/28/2025 34.5     RDW 06/28/2025 13.2     RDW-SD 06/28/2025 46.6     MPV 06/28/2025 9.5     Platelets 06/28/2025 172     Neutrophil % 06/28/2025 69.6     Lymphocyte % 06/28/2025 18.8 (L)     Monocyte % 06/28/2025 9.6     Eosinophil % 06/28/2025 1.2     Basophil % 06/28/2025 0.4     Immature Grans % 06/28/2025 0.4     Neutrophils, Absolute 06/28/2025 3.97     Lymphocytes, Absolute 06/28/2025 1.07     Monocytes, Absolute 06/28/2025 0.55     Eosinophils, Absolute  06/28/2025 0.07     Basophils, Absolute 06/28/2025 0.02     Immature Grans, Absolute 06/28/2025 0.02     nRBC 06/28/2025 0.0     Glucose 06/28/2025 122 (H)     BUN 06/28/2025 14.0     Creatinine 06/28/2025 0.96     Sodium 06/28/2025 142     Potassium 06/28/2025 3.4 (L)     Chloride 06/28/2025 102     CO2 06/28/2025 26.3     Calcium 06/28/2025 8.7     Total Protein 06/28/2025 6.4     Albumin 06/28/2025 3.7     ALT (SGPT) 06/28/2025 19     AST (SGOT) 06/28/2025 19     Alkaline Phosphatase 06/28/2025 81     Total Bilirubin 06/28/2025 0.7     Globulin 06/28/2025 2.7     A/G Ratio 06/28/2025 1.4     BUN/Creatinine Ratio 06/28/2025 14.6     Anion Gap 06/28/2025 13.7     eGFR 06/28/2025 87.2     Glucose 06/28/2025 113      MDM:  Meds reviewed and renewed as appropriate.  Continue bupropion as written, support dc of wake promoting agent. BP reviewed; pt is under care of cardiology regarding.  FU scheduled in group setting.    Diagnoses and all orders for this visit:    1. Malignant neoplasm of lower third of esophagus (Primary)    2. Recurrent major depressive disorder, in partial remission    3. Neoplastic malignant related fatigue    4. TJ (obstructive sleep apnea)    Other orders  -     buPROPion XL (Wellbutrin XL) 150 MG 24 hr tablet; Take 1 tablet by mouth Every Morning.  Dispense: 90 tablet; Refill: 0

## 2025-08-14 ENCOUNTER — TELEPHONE (OUTPATIENT)
Dept: INTERNAL MEDICINE | Age: 65
End: 2025-08-14
Payer: MEDICARE

## 2025-08-14 ENCOUNTER — TELEPHONE (OUTPATIENT)
Dept: CARDIOLOGY | Age: 65
End: 2025-08-14
Payer: MEDICARE

## (undated) DEVICE — SUT PROLN 3/0 SH D/A 36IN 8522H

## (undated) DEVICE — DISPOSABLE MONOPOLAR ENDOSCOPIC CORD 10 FT. (3M): Brand: KIRWAN

## (undated) DEVICE — REDUCER: Brand: ENDOWRIST

## (undated) DEVICE — TUBING, SUCTION, 1/4" X 10', STRAIGHT: Brand: MEDLINE

## (undated) DEVICE — CATH DIAG IMPULSE FR5 5F 100CM

## (undated) DEVICE — SPONGE,LAP,12"X12",XR,ST,5/PK,40PK/CS: Brand: MEDLINE

## (undated) DEVICE — GLIDESHEATH SLENDER STAINLESS STEEL KIT: Brand: GLIDESHEATH SLENDER

## (undated) DEVICE — STAPLER SHEATH: Brand: ENDOWRIST

## (undated) DEVICE — LOU LAP CHOLE: Brand: MEDLINE INDUSTRIES, INC.

## (undated) DEVICE — CATH DIAG IMPULSE IMT 6F 100CM

## (undated) DEVICE — ADAPT CLN BIOGUARD AIR/H2O DISP

## (undated) DEVICE — CANN O2 ETCO2 FITS ALL CONN CO2 SMPL A/ 7IN DISP LF

## (undated) DEVICE — CATH DIAG IMPULSE IMT 5F 100CM

## (undated) DEVICE — LN SMPL CO2 SHTRM SD STREAM W/M LUER

## (undated) DEVICE — INTENDED FOR TISSUE SEPARATION, AND OTHER PROCEDURES THAT REQUIRE A SHARP SURGICAL BLADE TO PUNCTURE OR CUT.: Brand: BARD-PARKER ® CARBON RIB-BACK BLADES

## (undated) DEVICE — KT ORCA ORCAPOD DISP STRL

## (undated) DEVICE — PK CATH CARD 40

## (undated) DEVICE — RADIFOCUS GLIDEWIRE: Brand: GLIDEWIRE

## (undated) DEVICE — ENDOPATH XCEL BLADELESS TROCARS WITH STABILITY SLEEVES: Brand: ENDOPATH XCEL

## (undated) DEVICE — SEAL

## (undated) DEVICE — PATIENT RETURN ELECTRODE, SINGLE-USE, CONTACT QUALITY MONITORING, ADULT, WITH 9FT CORD, FOR PATIENTS WEIGING OVER 33LBS. (15KG): Brand: MEGADYNE

## (undated) DEVICE — ENDOPOUCH RETRIEVER SPECIMEN RETRIEVAL BAGS: Brand: ENDOPOUCH RETRIEVER

## (undated) DEVICE — SUT VIC 0 TIES 18IN J912G

## (undated) DEVICE — LAPAROVUE VISIBILITY SYSTEM LAPAROSCOPIC SOLUTIONS: Brand: LAPAROVUE

## (undated) DEVICE — CATH DIAG IMPULSE FL4 5F 100CM

## (undated) DEVICE — ADHS SKIN SURG TISS VISC PREMIERPRO EXOFIN HI/VISC FAST/DRY

## (undated) DEVICE — FRCP BX RADJAW4 NDL 2.8 240CM LG OG BX40

## (undated) DEVICE — KT MANIFLD CARDIAC

## (undated) DEVICE — GUIDELINER CATHETERS ARE INTENDED TO BE USED IN CONJUNCTION WITH GUIDE CATHETERS TO ACCESS DISCRETE REGIONS OF THE CORONARY AND/OR PERIPHERAL VASCULATURE, AND TO FACILITATE PLACEMENT OF INTERVENTIONAL DEVICES.: Brand: GUIDELINER® V3 CATHETER

## (undated) DEVICE — 24 FR STRAIGHT – SOFT PVC CATHETER: Brand: PVC THORACIC CATHETERS

## (undated) DEVICE — GASTROINTESTINAL ANCHOR SET, SAF-T-PEXY* T-FASTENERS: Brand: GASTROINTESTINAL ANCHOR SET, SAF-T-PEXY* T-FASTENERS

## (undated) DEVICE — ENDOPATH PNEUMONEEDLE INSUFFLATION NEEDLES WITH LUER LOCK CONNECTORS 120MM: Brand: ENDOPATH

## (undated) DEVICE — MARKR SKIN W/RULR 2TP

## (undated) DEVICE — CATH VENT MIV RADL PIG ST TIP 5F 110CM

## (undated) DEVICE — SOL ANTISTICK CAUTRY ELECTROLUBE LF

## (undated) DEVICE — ANTIBACTERIAL UNDYED BRAIDED (POLYGLACTIN 910), SYNTHETIC ABSORBABLE SUTURE: Brand: COATED VICRYL

## (undated) DEVICE — CATH CHOLANG 4.5F18IN BRGNDY

## (undated) DEVICE — BND PRESS RADL COMFRT 14IN STRL

## (undated) DEVICE — BLCK/BITE BLOX W/DENTL/RIM W/STRAP 54F

## (undated) DEVICE — CATH DIAG IMPULSE FR4 5F 100CM

## (undated) DEVICE — SUT PDS 4-0 RB-1 Z304H

## (undated) DEVICE — ARM DRAPE

## (undated) DEVICE — EXTENSION SET, MALE LUER LOCK ADAPTER WITH RETRACTABLE COLLAR

## (undated) DEVICE — 3M™ STERI-DRAPE™ INSTRUMENT POUCH 1018: Brand: STERI-DRAPE™

## (undated) DEVICE — ELECTRD BLD EZ CLN MOD XLNG 2.75IN

## (undated) DEVICE — STPLR SKIN VISISTAT WD 35CT

## (undated) DEVICE — COLUMN DRAPE

## (undated) DEVICE — KT CLN CLEANOR SCPE

## (undated) DEVICE — FEMORAL ENTRY ANGIOGRAPHY SHIELD-YELLOW: Brand: RADPAD

## (undated) DEVICE — SYNCHROSEAL: Brand: DA VINCI ENERGY

## (undated) DEVICE — PENCL ES MEGADINE EZ/CLEAN BUTN W/HOLSTR 10FT

## (undated) DEVICE — CATH DIAG IMPULSE LCB 6F 100CM

## (undated) DEVICE — CATH IV INSYTE AUTOGARD 14G 1 1/2IN ORNG

## (undated) DEVICE — SENSR O2 OXIMAX FNGR A/ 18IN NONSTR

## (undated) DEVICE — ELECTRD BLD EZ CLN MOD 6.5IN

## (undated) DEVICE — GLV SURG BIOGEL LTX PF 6

## (undated) DEVICE — Device

## (undated) DEVICE — TB FEED JEJUNAL 18F

## (undated) DEVICE — GLV SURG PREMIERPRO ORTHO LTX PF SZ8 BRN

## (undated) DEVICE — SUT MNCRYL PLS ANTIB UD 4/0 PS2 18IN

## (undated) DEVICE — DRP C/ARM 41X74IN

## (undated) DEVICE — NDL HYPO PRECISIONGLIDE REG 20G 1 1/2

## (undated) DEVICE — STPCK 3WY D201 DISCOFIX

## (undated) DEVICE — CATH DIAG EXPO .052 MPA2 6F 100CM

## (undated) DEVICE — PENROSE DRAIN 18" X 5/8": Brand: CARDINAL HEALTH

## (undated) DEVICE — LOU THORACIC: Brand: MEDLINE INDUSTRIES, INC.

## (undated) DEVICE — GLIDESHEATH BASIC HYDROPHILIC COATED INTRODUCER SHEATH: Brand: GLIDESHEATH

## (undated) DEVICE — APPL CHLORAPREP HI/LITE 26ML ORNG

## (undated) DEVICE — CONTAINER,SPECIMEN,OR STERILE,4OZ: Brand: MEDLINE

## (undated) DEVICE — SUT VIC 0 CT1 27IN DYED J340H

## (undated) DEVICE — TBG INSUFFLATION LUER LOCK: Brand: MEDLINE INDUSTRIES, INC.

## (undated) DEVICE — MSK ENDO PORT O2 POM ELITE CURAPLEX A/

## (undated) DEVICE — BITEBLOCK OMNI BLOC

## (undated) DEVICE — SUT SILK 0 TIES 30IN A306H

## (undated) DEVICE — GW EMR FIX EXCHG J STD .035 3MM 260CM

## (undated) DEVICE — NDL PERC 1PRT THNWALL W/BASEPLT 18G 7CM

## (undated) DEVICE — SYR LUERLOK 20CC BX/50

## (undated) DEVICE — CATH DIAG CARD PERFORMA IMA BT 4F100CM

## (undated) DEVICE — WOUND RETRACTOR AND PROTECTOR: Brand: ALEXIS WOUND PROTECTOR-RETRACTOR

## (undated) DEVICE — PENROSE DRAIN, 36 X 3 4: Brand: CARDINAL HEALTH

## (undated) DEVICE — SOL NACL 0.9PCT 1000ML

## (undated) DEVICE — ENDOPATH XCEL UNIVERSAL TROCAR STABLILITY SLEEVES: Brand: ENDOPATH XCEL

## (undated) DEVICE — CATH DIAG EXPO .056 AR1 6F 100CM

## (undated) DEVICE — GW PTCA ASAHI PROWATER .014 180CM

## (undated) DEVICE — COUNT NDL MAG FM/BLCK 40COUNT

## (undated) DEVICE — SUT SILK 0 PSL 18IN 580H

## (undated) DEVICE — CATH DIAG IMPULSE AR2 5F 100CM

## (undated) DEVICE — DRAPE,REIN 53X77,STERILE: Brand: MEDLINE

## (undated) DEVICE — SUT SILK 3/0 SUTUPAK TIES 24IN SA74H

## (undated) DEVICE — UNIVERSAL PACK: Brand: CARDINAL HEALTH

## (undated) DEVICE — SYR LUERLOK 5CC

## (undated) DEVICE — SUT SILK 2/0 SUTUPAK TIES 24IN SA75H

## (undated) DEVICE — TOWEL,OR,DSP,ST,NATURAL,DLX,4/PK,20PK/CS: Brand: MEDLINE

## (undated) DEVICE — NDL HYPO PRECISIONGLIDE REG 25G 1 1/2

## (undated) DEVICE — 2, DISPOSABLE SUCTION/IRRIGATOR WITH DISPOSABLE TIP: Brand: STRYKEFLOW

## (undated) DEVICE — ESOPHAGEAL BALLOON DILATATION CATHETER: Brand: CRE FIXED WIRE

## (undated) DEVICE — THE SINGLE USE ETRAP – POLYP TRAP IS USED FOR SUCTION RETRIEVAL OF ENDOSCOPICALLY REMOVED POLYPS.: Brand: ETRAP

## (undated) DEVICE — 6F .070 IM 90CM: Brand: VISTA BRITE TIP

## (undated) DEVICE — THE VASC BAND HEMOSTAT IS A COMPRESSION DEVICE TO ASSIST HEMOSTASIS OF ARTERIAL, VENOUS AND HEMODIALYSIS PERCUTANEOUS ACCESS SITES.: Brand: VASC BAND™ HEMOSTAT

## (undated) DEVICE — T-TUBE 19FR SILICONE: Brand: CARDINAL HEALTH

## (undated) DEVICE — 3M™ IOBAN™ 2 ANTIMICROBIAL INCISE DRAPE 6651EZ: Brand: IOBAN™ 2

## (undated) DEVICE — STRL PENROSE DRAIN 18" X 1/4": Brand: CARDINAL HEALTH

## (undated) DEVICE — TR BAND RADIAL ARTERY COMPRESSION DEVICE: Brand: TR BAND

## (undated) DEVICE — ENDOCUT SCISSOR TIP, DISPOSABLE: Brand: RENEW

## (undated) DEVICE — SNAR POLYP CAPTIVATOR RND STFF 2.4 240CM 10MM 1P/U

## (undated) DEVICE — UNIVERSAL STAPLER: Brand: ENDO GIA ULTRA

## (undated) DEVICE — GLV SURG BIOGEL LTX PF 7 1/2

## (undated) DEVICE — 3M™ IOBAN™ 2 ANTIMICROBIAL INCISE DRAPE 6650EZ: Brand: IOBAN™ 2

## (undated) DEVICE — LAPAROSCOPIC SMOKE FILTRATION SYSTEM: Brand: PALL LAPAROSHIELD® PLUS LAPAROSCOPIC SMOKE FILTRATION SYSTEM

## (undated) DEVICE — DEV INFL ALLIANCE2 SYS

## (undated) DEVICE — SPNG LAP CIGARETTE KITTNER 5MM STRL PK/5

## (undated) DEVICE — 3F 80CM OVER-THE-WIRE EMBOLECTOMY CATHETER: Brand: LEMAITRE EMBOLECTOMY CATHETER

## (undated) DEVICE — CANNULA SEAL

## (undated) DEVICE — SUT SILK 3/0 SH CR5 18IN C0135

## (undated) DEVICE — KT ANTI FOG W/FLD AND SPNG

## (undated) DEVICE — GAUZE,SPONGE,4"X4",16PLY,XRAY,STRL,LF: Brand: MEDLINE

## (undated) DEVICE — DEV INFL CRE STERIFLATE 60CC DISP

## (undated) DEVICE — BLADELESS OBTURATOR: Brand: WECK VISTA

## (undated) DEVICE — SPONGE,DISSECTOR,K,XRAY,9/16"X1/4",STRL: Brand: MEDLINE

## (undated) DEVICE — CATH DIAG IMPULSE AL1 6F 100CM

## (undated) DEVICE — DGW .035 FC J3MM 260CM TEF: Brand: EMERALD

## (undated) DEVICE — SUT PDS 0 CT1 36IN Z346H

## (undated) DEVICE — CATH DIAG IMPULSE M/ PK ANGL 6FR

## (undated) DEVICE — INTRO SHEATH ART/FEM ENGAGE .035 6F12CM

## (undated) DEVICE — BNDR ABD 4PANEL 12IN MED/LG

## (undated) DEVICE — TOTAL TRAY, 16FR 10ML SIL FOLEY, URN: Brand: MEDLINE

## (undated) DEVICE — DILATOR CONTRL RADL 12/15MM 8CM BX5

## (undated) DEVICE — DEV OPN LIGASURE CRV 180D 36MM 13.5CM  1P/U

## (undated) DEVICE — OASIS DRAIN, SINGLE, INLINE & ATS COMPATIBLE: Brand: OASIS

## (undated) DEVICE — ADAPTER,CATHETER/SYRINGE/LUER,STERILE: Brand: MEDLINE

## (undated) DEVICE — 3M™ STERI-DRAPE™ INSTRUMENT POUCH 1018L: Brand: STERI-DRAPE™

## (undated) DEVICE — TREK CORONARY DILATATION CATHETER 2.50 MM X 12 MM / RAPID-EXCHANGE: Brand: TREK

## (undated) DEVICE — SUT SILK 3/0 RB1 CR8 18IN C053D

## (undated) DEVICE — ESOPHAGEAL/PYLORIC/COLONIC WIREGUIDED BALLOON DILATATION CATHETER: Brand: CRE WIREGUIDED

## (undated) DEVICE — THE STERILE LIGHT HANDLE COVER IS USED WITH STERIS SURGICAL LIGHTING AND VISUALIZATION SYSTEMS.

## (undated) DEVICE — CATH DIAG IMPULSE AL1 5F 100CM

## (undated) DEVICE — CATH F5 INF MP A2 100CM 2SH: Brand: INFINITI